# Patient Record
Sex: MALE | Race: WHITE | NOT HISPANIC OR LATINO | Employment: OTHER | URBAN - METROPOLITAN AREA
[De-identification: names, ages, dates, MRNs, and addresses within clinical notes are randomized per-mention and may not be internally consistent; named-entity substitution may affect disease eponyms.]

---

## 2017-01-13 ENCOUNTER — CARE COORDINATION (OUTPATIENT)
Dept: CARDIOLOGY | Facility: CLINIC | Age: 72
End: 2017-01-13

## 2017-01-13 DIAGNOSIS — I48.91 ATRIAL FIBRILLATION (H): ICD-10-CM

## 2017-01-13 DIAGNOSIS — I10 ESSENTIAL HYPERTENSION WITH GOAL BLOOD PRESSURE LESS THAN 140/90: Primary | ICD-10-CM

## 2017-01-13 RX ORDER — FLECAINIDE ACETATE 150 MG/1
150 TABLET ORAL 2 TIMES DAILY
Qty: 180 TABLET | Refills: 3 | Status: SHIPPED | OUTPATIENT
Start: 2017-01-13 | End: 2017-03-20

## 2017-01-13 NOTE — PROGRESS NOTES
Pt calling to request Rx refill of Flecainide. Last seen 12/2/16 by Dr. Castillo advised to follow up in 1 year. Will refill Flecainide 150 mg #90 tabs with 3 refills. JAZZ Campbell.

## 2017-03-06 ENCOUNTER — TELEPHONE (OUTPATIENT)
Dept: CARDIOLOGY | Facility: CLINIC | Age: 72
End: 2017-03-06

## 2017-03-06 DIAGNOSIS — I48.91 ATRIAL FIBRILLATION (H): Primary | ICD-10-CM

## 2017-03-06 NOTE — TELEPHONE ENCOUNTER
Pt called and stated that he was unable to  his Diltiazem and was unsure if this was because of our clinic or insurance. Call to pharmacy and was told it was pt insurance did not cover the 360 mg dose. Made pt aware who will call his insurance. Pt has changed his drug insurance to [employer]. Yanely

## 2017-03-08 RX ORDER — DILTIAZEM HYDROCHLORIDE 180 MG/1
360 CAPSULE, COATED, EXTENDED RELEASE ORAL DAILY
Qty: 180 CAPSULE | Refills: 3 | Status: SHIPPED | OUTPATIENT
Start: 2017-03-08 | End: 2017-03-20

## 2017-03-08 NOTE — TELEPHONE ENCOUNTER
Pt calling for status update on prior authorization for Diltiazem 360 mg as he has 1 tab left. Writer contacted prior authorization team they advised it could take up to a week. [facility] pharmacy contacted, spoke to [place] pharmacist, insurance will cover Diltiazem  mg take 2 tab daily, verbal order given. Med list updated. Pt advised and will  Rx today. JAZZ Campbell.

## 2017-03-15 NOTE — TELEPHONE ENCOUNTER
[facility] Prior Authorization Team   Phone: [phone]  Fax: [phone]    Prior Authorization Approval    Authorization Effective Date:    Authorization Expiration Date:    Medication: diltiazem (CARDIZEM CD; CARTIA XT) 360 MG 24 hr CD capsule  Approved Dose/Quantity: 180 PER 90 DAYS  Reference #:     Insurance Company: [employer] - Phone [phone] Fax [phone]  Expected CoPay: $14.93     CoPay Card Available:      Foundation Assistance Needed:    Which Pharmacy is filling the prescription (Not needed for infusion/clinic administered): [facility] [address] [place] [address] [address]  Pharmacy Notified: Yes  Patient Notified: No     PA not needed, patient picked up RX 03/09/2017 for a $14.93 co-pay.

## 2017-03-20 DIAGNOSIS — K30 INDIGESTION: ICD-10-CM

## 2017-03-20 DIAGNOSIS — I48.0 PAROXYSMAL ATRIAL FIBRILLATION (H): ICD-10-CM

## 2017-03-20 DIAGNOSIS — I10 ESSENTIAL HYPERTENSION WITH GOAL BLOOD PRESSURE LESS THAN 140/90: ICD-10-CM

## 2017-03-20 DIAGNOSIS — I48.91 ATRIAL FIBRILLATION (H): ICD-10-CM

## 2017-03-20 RX ORDER — FLECAINIDE ACETATE 150 MG/1
150 TABLET ORAL EVERY 12 HOURS
Qty: 180 TABLET | Refills: 2 | Status: SHIPPED | OUTPATIENT
Start: 2017-03-20 | End: 2017-09-25

## 2017-03-20 RX ORDER — DILTIAZEM HYDROCHLORIDE 180 MG/1
360 CAPSULE, COATED, EXTENDED RELEASE ORAL DAILY
Qty: 180 CAPSULE | Refills: 2 | Status: SHIPPED | OUTPATIENT
Start: 2017-03-20 | End: 2017-09-25

## 2017-03-20 NOTE — TELEPHONE ENCOUNTER
Pantoprazole Sod 40MG      Last Written Prescription Date: 5/18/16  Last Fill Quantity: 90,  # refills: 3   Last Office Visit with G, P or [facility] prescribing provider: 9/21/16

## 2017-03-23 RX ORDER — PANTOPRAZOLE SODIUM 40 MG/1
40 TABLET, DELAYED RELEASE ORAL DAILY
Qty: 90 TABLET | Refills: 3 | Status: SHIPPED | OUTPATIENT
Start: 2017-03-23 | End: 2017-09-25

## 2017-05-10 ENCOUNTER — TRANSFERRED RECORDS (OUTPATIENT)
Dept: HEALTH INFORMATION MANAGEMENT | Facility: CLINIC | Age: 72
End: 2017-05-10

## 2017-06-01 ENCOUNTER — HOSPITAL ENCOUNTER (EMERGENCY)
Facility: CLINIC | Age: 72
Discharge: HOME | End: 2017-06-01
Attending: EMERGENCY MEDICINE | Admitting: EMERGENCY MEDICINE
Payer: MEDICARE

## 2017-06-01 ENCOUNTER — APPOINTMENT (OUTPATIENT)
Dept: GENERAL RADIOLOGY | Facility: CLINIC | Age: 72
End: 2017-06-01
Attending: EMERGENCY MEDICINE
Payer: MEDICARE

## 2017-06-01 VITALS
TEMPERATURE: 97.7 F | HEART RATE: 73 BPM | DIASTOLIC BLOOD PRESSURE: 70 MMHG | BODY MASS INDEX: 42.66 KG/M2 | OXYGEN SATURATION: 95 % | HEIGHT: 72 IN | SYSTOLIC BLOOD PRESSURE: 151 MMHG | RESPIRATION RATE: 16 BRPM | WEIGHT: 315 LBS

## 2017-06-01 DIAGNOSIS — S49.92XA INJURY OF LEFT SHOULDER, INITIAL ENCOUNTER: ICD-10-CM
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
## 2017-06-01 PROCEDURE — 73030 X-RAY EXAM OF SHOULDER: CPT | Mod: LT

## 2017-06-01 PROCEDURE — 99283 EMERGENCY DEPT VISIT LOW MDM: CPT

## 2017-06-01 PROCEDURE — 25000132 ZZH RX MED GY IP 250 OP 250 PS 637: Performed by: EMERGENCY MEDICINE

## 2017-06-01 RX ORDER — GINSENG 100 MG
CAPSULE ORAL
Status: DISCONTINUED
Start: 2017-06-01 | End: 2017-06-01 | Stop reason: HOSPADM

## 2017-06-01 RX ORDER — OXYCODONE HYDROCHLORIDE 5 MG/1
10 TABLET ORAL ONCE
Status: COMPLETED | OUTPATIENT
Start: 2017-06-01 | End: 2017-06-01

## 2017-06-01 RX ORDER — OXYCODONE HYDROCHLORIDE 5 MG/1
TABLET ORAL
Qty: 20 TABLET | Refills: 0 | Status: SHIPPED | OUTPATIENT
Start: 2017-06-01 | End: 2017-06-05

## 2017-06-01 RX ADMIN — OXYCODONE HYDROCHLORIDE 10 MG: 5 TABLET ORAL at 15:25

## 2017-06-01 ASSESSMENT — ENCOUNTER SYMPTOMS
BLOOD IN STOOL: 0
ABDOMINAL PAIN: 0
HEADACHES: 0
COLOR CHANGE: 0
RECTAL PAIN: 0
WOUND: 1
DIARRHEA: 0
DIZZINESS: 0

## 2017-06-01 NOTE — ED NOTES
Pt educated on Oxycodone, pts wife can drive home, ice placed to affected shoulder pt updated on POC denies needs

## 2017-06-01 NOTE — ED AVS SNAPSHOT
[facility] Emergency Department    [address]    [facility] [address]    Phone:  [phone]    Fax:  [phone]                                       Hugo Weber   MRN: [id number]    Department:  [facility] Emergency Department   Date of Visit:  6/1/2017           Patient Information     Date Of Birth          1945        Your diagnoses for this visit were:     Injury of left shoulder, initial encounter     Fall down stairs, initial encounter        You were seen by Jennie Roberts MD.      Follow-up Information     Follow up with Brett Cassidy MD. Schedule an appointment as soon as possible for a visit in 2 days.    Specialty:  Family Practice    Contact information:    [facility]  [address] [place] [address]  [phone]          Discharge Instructions       You can use the prescribed medication as instructed for severe pain. If you use this medication, you should not drive or perform other activities that require full attention as this medication may make you drowsy. All opiate pain medications are potentially addictive and you should only use the minimum amount necessary to control your pain.    While taking any opiate pain medication (such as oxycodone, hydrocodone, hydromorphone, Vicodin, Percocet, etc) you can develop constipation. If you develop constipation, take over the counter Miralax daily according to package directions.      Shoulder Sprain  A sprain is a stretching or tearing of the ligaments that hold a joint together. A sprain may take up to 6 weeks to fully heal, depending on how severe it is. Moderate to severe shoulder sprains are treated with a sling or shoulder immobilizer. Minor sprains can be treated without any special support.  Home care  The following guidelines will help you care for your injury at home:    If a sling was given to you, leave it in place for the time advised by your health care  provider. If you aren t sure how long to wear it, ask for advice. If the sling becomes loose, adjust it so that your forearm is level with the ground. Your shoulder should feel well supported.    Put an ice pack on the injured area for 20 minutes every 1 to 2 hours the first day. You can make your own ice pack by putting ice cubes in a plastic bag. Wrap the bag in a thin towel. Continue with ice packs 3 to 4 times a day for the next 2 days. Then use the pack as needed to ease pain and swelling.    You may use acetaminophen or ibuprofen to control pain, unless another pain medicine was prescribed. If you have chronic liver or kidney disease, talk with your health care provider before using these medicines. Also talk with your provider if you ve had a stomach ulcer or GI bleeding.    Shoulder joints become stiff if left in a sling for too long. You should start range of motion exercises about 10 days after the injury. Talk with your provider to find out what type of exercises to do and how soon to start.  Follow-up care  Follow up with your health care provider as directed.  Any X-rays you had today don t show any broken bones, breaks, or fractures. Sometimes fractures don t show up on the first X-ray. Bruises and sprains can sometimes hurt as much as a fracture. These injuries can take time to heal completely. If your symptoms don t improve or they get worse, talk with your provider. You may need a repeat X-ray.  When to seek medical advice  Call your health care provider right away if any of the following occur:    Shoulder pain or swelling in your arm that gets worse    Fingers become cold, blue, numb, or tingly    Large amount of bruising of the shoulder or upper arm       2768-9498 [employer]. [address] [place] [address]. All rights reserved. This information is not intended as a substitute for professional medical care. Always follow your healthcare professional's  instructions.      Mechanical Fall  You have had a fall today. It appears that the cause is  mechanical . That means that you slipped, tripped or lost your balance. If your fall had been due to fainting or a seizure, further tests would be required.  Home Care:  1. Rest today and resume your normal activities when you are feeling back to normal.  2. If you were injured during the fall, follow the advice from your doctor regarding care of your injury.  3. You may use acetaminophen (Tylenol) or ibuprofen (Motrin, Advil) to control pain, unless another pain medicine was prescribed. [NOTE: If you have chronic liver or kidney disease or ever had a stomach ulcer or GI bleeding, talk with your doctor before using these medicines.]  Fall Prevention:    Was there anything that caused your fall that can be fixed, removed, or replaced?    Make your home safe by keeping walkways clear of objects you may trip over.    Use non-slip pads under rugs.    Do not walk in poorly lit areas.    Do not stand on chairs or wobbly ladders.    Use caution when reaching overhead or looking upward. This position can cause a loss of balance.    Be sure your shoes fit properly, have non-slip bottoms and are in good condition.    Be cautious when going up and down curbs, and walking on uneven sidewalks.    If your balance is poor, consider using a cane or walker.    Stay as active as you can. Balance, flexibility, strength, and endurance all come from exercise. They all play a role in preventing falls.  Follow Up  with your doctor or as advised by our staff.  Get Prompt Medical Attention  if any of the following occur:    Repeated mechanical falls, or unexplained falls    Dizziness, fainting or seizure    Severe headache    Chest pain or shortness of breath    Palpitations (very rapid or very slow or irregular heartbeat)    Blood in vomit, stools (black or red color)    Weakness of an arm or leg or one side of the face    Difficulty with speech or  vision    9609-7638 [employer]. [address] [place] [address], [place] [address]. All rights reserved. This information is not intended as a substitute for professional medical care. Always follow your healthcare professional's instructions.            24 Hour Appointment Hotline       To make an appointment at any [facility], call [phone] ([phone]). If you don't have a family doctor or clinic, we will help you find one. [facility] clinics are conveniently located to serve the needs of you and your family.             Review of your medicines      START taking        Dose / Directions Last dose taken    oxyCODONE 5 MG IR tablet   Commonly known as:  ROXICODONE   Quantity:  20 tablet        1-2 tabs po q6hrs prn pain   Refills:  0          Our records show that you are taking the medicines listed below. If these are incorrect, please call your family doctor or clinic.        Dose / Directions Last dose taken    azelastine 0.1 % spray   Commonly known as:  ASTELIN   Dose:  1-2 spray   Quantity:  3 Bottle        Spray 1-2 sprays into both nostrils 2 times daily   Refills:  3        buPROPion 100 MG tablet   Commonly known as:  WELLBUTRIN   Dose:  100 mg   Quantity:  60 tablet        Take 1 tablet (100 mg) by mouth 2 times daily   Refills:  3        diltiazem 180 MG 24 hr capsule   Commonly known as:  CARDIZEM CD   Dose:  360 mg   Quantity:  180 capsule        Take 2 capsules (360 mg) by mouth daily   Refills:  2        flecainide acetate 150 MG Tabs   Dose:  150 mg   Quantity:  180 tablet        Take 1 tablet (150 mg) by mouth every 12 hours   Refills:  2        fluticasone 50 MCG/ACT spray   Commonly known as:  FLONASE   Dose:  1-2 spray   Quantity:  48 g        Spray 1-2 sprays into both nostrils daily   Refills:  3        iron 325 (65 FE) MG tablet   Dose:  1 tablet   Quantity:  90 tablet        Take 1 tablet by mouth daily (with breakfast).   Refills:  3        naltrexone 50 MG tablet    Commonly known as:  DEPADE;REVIA   Quantity:  30 tablet        Take 1/2 Tablet daily then increase to maximum of 1 Full Tablet daily as tolerated.  Time it one to two hours prior to worst cravings   Refills:  3        pantoprazole 40 MG EC tablet   Commonly known as:  PROTONIX   Dose:  40 mg   Quantity:  90 tablet        Take 1 tablet (40 mg) by mouth daily   Refills:  3        rivaroxaban ANTICOAGULANT 20 MG Tabs tablet   Commonly known as:  XARELTO   Dose:  20 mg   Quantity:  90 tablet        Take 1 tablet (20 mg) by mouth daily (with dinner)   Refills:  2                Prescriptions were sent or printed at these locations (1 Prescription)                   Other Prescriptions                Printed at Department/Unit printer (1 of 1)         oxyCODONE (ROXICODONE) 5 MG IR tablet                Procedures and tests performed during your visit     Ice to affected area    Shoulder XR, G/E 3 views, left      Orders Needing Specimen Collection     None      Pending Results     No orders found from 5/30/2017 to 6/2/2017.            Pending Culture Results     No orders found from 5/30/2017 to 6/2/2017.            Pending Results Instructions     If you had any lab results that were not finalized at the time of your Discharge, you can call the ED Lab Result RN at [phone]. You will be contacted by this team for any positive Lab results or changes in treatment. The nurses are available 7 days a week from 10A to 6:30P.  You can leave a message 24 hours per day and they will return your call.        Test Results From Your Hospital Stay        6/1/2017  3:54 PM      Narrative     XR SHOULDER LT G/E 3 VW 6/1/2017 3:46 PM    COMPARISON: None.    HISTORY: Trauma, pain.        Impression     IMPRESSION: Moderate left glenohumeral osteoarthritis. No fracture or  dislocation is appreciated. No significant soft tissue swelling.    ADIA DUNN                Clinical Quality Measure: Blood Pressure Screening     Your blood  pressure was checked while you were in the emergency department today. The last reading we obtained was  BP: 151/70 . Please read the guidelines below about what these numbers mean and what you should do about them.  If your systolic blood pressure (the top number) is less than 120 and your diastolic blood pressure (the bottom number) is less than 80, then your blood pressure is normal. There is nothing more that you need to do about it.  If your systolic blood pressure (the top number) is 120-139 or your diastolic blood pressure (the bottom number) is 80-89, your blood pressure may be higher than it should be. You should have your blood pressure rechecked within a year by a primary care provider.  If your systolic blood pressure (the top number) is 140 or greater or your diastolic blood pressure (the bottom number) is 90 or greater, you may have high blood pressure. High blood pressure is treatable, but if left untreated over time it can put you at risk for heart attack, stroke, or kidney failure. You should have your blood pressure rechecked by a primary care provider within the next 4 weeks.  If your provider in the emergency department today gave you specific instructions to follow-up with your doctor or provider even sooner than that, you should follow that instruction and not wait for up to 4 weeks for your follow-up visit.        Thank you for choosing [facility]       Thank you for choosing [facility] for your care. Our goal is always to provide you with excellent care. Hearing back from our patients is one way we can continue to improve our services. Please take a few minutes to complete the written survey that you may receive in the mail after you visit with us. Thank you!        [employer] Information     [employer] gives you secure access to your electronic health record. If you see a primary care provider, you can also send messages to your care team and make appointments. If you have questions, please call your  primary care clinic.  If you do not have a primary care provider, please call [phone] and they will assist you.        Care EveryWhere ID     This is your Care EveryWhere ID. This could be used by other organizations to access your [facility] medical records  [id number]        After Visit Summary       This is your record. Keep this with you and show to your community pharmacist(s) and doctor(s) at your next visit.

## 2017-06-01 NOTE — ED AVS SNAPSHOT
[facility] Emergency Department    [address]    [facility] [address]    Phone:  [phone]    Fax:  [phone]                                       Hugo Weber   MRN: [id number]    Department:  [facility] Emergency Department   Date of Visit:  6/1/2017           After Visit Summary Signature Page     I have received my discharge instructions, and my questions have been answered. I have discussed any challenges I see with this plan with the nurse or doctor.    ..........................................................................................................................................  Patient/Patient Representative Signature      ..........................................................................................................................................  Patient Representative Print Name and Relationship to Patient    ..................................................               ................................................  Date                                            Time    ..........................................................................................................................................  Reviewed by Signature/Title    ...................................................              ..............................................  Date                                                            Time

## 2017-06-01 NOTE — DISCHARGE INSTRUCTIONS
You can use the prescribed medication as instructed for severe pain. If you use this medication, you should not drive or perform other activities that require full attention as this medication may make you drowsy. All opiate pain medications are potentially addictive and you should only use the minimum amount necessary to control your pain.    While taking any opiate pain medication (such as oxycodone, hydrocodone, hydromorphone, Vicodin, Percocet, etc) you can develop constipation. If you develop constipation, take over the counter Miralax daily according to package directions.      Shoulder Sprain  A sprain is a stretching or tearing of the ligaments that hold a joint together. A sprain may take up to 6 weeks to fully heal, depending on how severe it is. Moderate to severe shoulder sprains are treated with a sling or shoulder immobilizer. Minor sprains can be treated without any special support.  Home care  The following guidelines will help you care for your injury at home:    If a sling was given to you, leave it in place for the time advised by your health care provider. If you aren t sure how long to wear it, ask for advice. If the sling becomes loose, adjust it so that your forearm is level with the ground. Your shoulder should feel well supported.    Put an ice pack on the injured area for 20 minutes every 1 to 2 hours the first day. You can make your own ice pack by putting ice cubes in a plastic bag. Wrap the bag in a thin towel. Continue with ice packs 3 to 4 times a day for the next 2 days. Then use the pack as needed to ease pain and swelling.    You may use acetaminophen or ibuprofen to control pain, unless another pain medicine was prescribed. If you have chronic liver or kidney disease, talk with your health care provider before using these medicines. Also talk with your provider if you ve had a stomach ulcer or GI bleeding.    Shoulder joints become stiff if left in a sling for too long. You should  start range of motion exercises about 10 days after the injury. Talk with your provider to find out what type of exercises to do and how soon to start.  Follow-up care  Follow up with your health care provider as directed.  Any X-rays you had today don t show any broken bones, breaks, or fractures. Sometimes fractures don t show up on the first X-ray. Bruises and sprains can sometimes hurt as much as a fracture. These injuries can take time to heal completely. If your symptoms don t improve or they get worse, talk with your provider. You may need a repeat X-ray.  When to seek medical advice  Call your health care provider right away if any of the following occur:    Shoulder pain or swelling in your arm that gets worse    Fingers become cold, blue, numb, or tingly    Large amount of bruising of the shoulder or upper arm       2527-3248 [employer]. [address] [place] [address]. All rights reserved. This information is not intended as a substitute for professional medical care. Always follow your healthcare professional's instructions.      Mechanical Fall  You have had a fall today. It appears that the cause is  mechanical . That means that you slipped, tripped or lost your balance. If your fall had been due to fainting or a seizure, further tests would be required.  Home Care:  1. Rest today and resume your normal activities when you are feeling back to normal.  2. If you were injured during the fall, follow the advice from your doctor regarding care of your injury.  3. You may use acetaminophen (Tylenol) or ibuprofen (Motrin, Advil) to control pain, unless another pain medicine was prescribed. [NOTE: If you have chronic liver or kidney disease or ever had a stomach ulcer or GI bleeding, talk with your doctor before using these medicines.]  Fall Prevention:    Was there anything that caused your fall that can be fixed, removed, or replaced?    Make your home safe by keeping walkways clear  of objects you may trip over.    Use non-slip pads under rugs.    Do not walk in poorly lit areas.    Do not stand on chairs or wobbly ladders.    Use caution when reaching overhead or looking upward. This position can cause a loss of balance.    Be sure your shoes fit properly, have non-slip bottoms and are in good condition.    Be cautious when going up and down curbs, and walking on uneven sidewalks.    If your balance is poor, consider using a cane or walker.    Stay as active as you can. Balance, flexibility, strength, and endurance all come from exercise. They all play a role in preventing falls.  Follow Up  with your doctor or as advised by our staff.  Get Prompt Medical Attention  if any of the following occur:    Repeated mechanical falls, or unexplained falls    Dizziness, fainting or seizure    Severe headache    Chest pain or shortness of breath    Palpitations (very rapid or very slow or irregular heartbeat)    Blood in vomit, stools (black or red color)    Weakness of an arm or leg or one side of the face    Difficulty with speech or vision    4701-8012 [employer]. [address] [place] [address]. All rights reserved. This information is not intended as a substitute for professional medical care. Always follow your healthcare professional's instructions.

## 2017-06-01 NOTE — ED PROVIDER NOTES
History     Chief Complaint:  Shoulder Injury    HPI   Hugo Weber is a right hand dominant 72 year old male with a history of atrial fibrillation on xarelto who presents to the emergency department for evaluation of a left shoulder injury.  He states that he stumbled down 4 steps today around noon.  He describes his fall as landing slightly on his left shoulder, and his pain does not radiate down his arm.  His shoulder pain is worse with movement.  He also notes abrasions to his bilateral knees and left elbow but denies any tenderness or bruises anywhere else.  The patient adamantly denies hitting his head in any fashion, describes landing sitting up on his L arm.  He took tylenol for the pain without improvement.  He denies any black/bloody stools, neck pain, back pain, abdominal pain, dizziness, or numbness. He had no symptoms preceding the fall including the above, or any chest pain, dyspnea, or palpitations.    Allergies:  No known drug allergies.     Medications:    Protonix  Cardizem  Xarelto  Flecainide Acetate  Wellbutrin  Naltrexone  Flonase  Azelastine  Iron     Past Medical History:    Atrial fibrillation   Calculus of kidney   Arthritis   Cholelithiasis   Chronic peptic ulcer  Hepatitis C   Hyperlipidemia  Hypertension   Iron deficiency   Obesity  SCC    Past Surgical History:    Left total knee arthroplasty   Appendectomy   Eye surgery   Right knee arthroscopy   Laser Holmium lithotripsy, insert stent x 4  Gastric bypass  Lysite teeth removed  Left ear chonchal lesion removal   Right forearm spurs    Family History:    Alzheimer Disease-Father  CHF-Mother  Prostate Cancer-Father    Social History:  Marital Status:   Presents to the ED with his wife  Tobacco Use: Never Used  Alcohol Use: Yes, 2 times per week  PCP: Brett Cassidy      Review of Systems   Gastrointestinal: Negative for abdominal pain, blood in stool, diarrhea and rectal pain.   Musculoskeletal:        Positive for left  shoulder pain.    Skin: Positive for wound (abrasions). Negative for color change.   Neurological: Negative for dizziness, syncope and headaches.   All other systems reviewed and are negative.      Physical Exam   First Vitals:  BP: 151/70  Pulse: 73  Heart Rate: 73  Temp: 97.7  F (36.5  C)  Resp: 16  Height: 182.9 cm (6')  Weight: (!) 154.2 kg (340 lb)  SpO2: 95 %      Physical Exam  VITAL SIGNS: /70  Pulse 73  Temp 97.7  F (36.5  C) (Oral)  Resp 16  Ht 1.829 m (6')  Wt (!) 154.2 kg (340 lb)  SpO2 95%  BMI 46.11 kg/m2  Constitutional:  Well developed, well nourished, comfortable appearing, GCS = 15   Eyes:  PERRL, Grossly normal extraocular movements  HENT:  No contusions, abrasions or other gross deformities noted over the scalp or face.  Respiratory:  No respiratory distress, normal breath sounds, no wheezing.   Cardiovascular:  RRR, no murmur.    GI:  Abdomen is nondistended, soft, nontender to palpation  Musculoskeletal:  Shoulders grossly normal appearing bilaterally. Left shoulder has limited range of motion secondary to pain, mild tenderness over left humerus. No tenderness to the left clavicle, AC joint, elbow, wrist or hand.  Nerve distally intact. Minimal left chest wall tenderness, no contusions or abrasions seen. Bilateral knees: no focal tenderness, no joint laxity, full range of motion without pain.  Otherwise, no gross deformities of  bilateral UE or LE noted. C-spine: denies neck pain. Ranging neck spontaneously. T-spine and L-spine are without midline ttp, stepoff, contusion or abrasion.  Integument:  Abrasions over left elbow and bilateral knees  Neurologic: Symmetric face. Alert & oriented x 3, CN 2-12 normal, normal motor function, normal sensory function, no focal deficits noted   Psychiatric:  Normal affect.     Emergency Department Course   Imaging:  Shoulder x-ray, left, 3 views:   Moderate left glenohumeral osteoarthritis. No fracture or  dislocation is appreciated. No  significant soft tissue swelling.  Report per radiology.   Radiographic findings were communicated with the patient who voiced understanding of the findings.    Interventions:   (1525) Oxycodone, 10 mg, PO    Emergency Department Course:  Nursing notes and vitals reviewed.  I performed an exam of the patient as documented above.     The patient was sent for a shoulder x-ray while in the emergency department, findings above.      1622 I reevaluated the patient and provided an update in regards to his ED course.      1627 I revisited the patient for further information on his current medications.    Findings and plan explained to the patient. Patient discharged home with instructions regarding supportive care, medications, and reasons to return. The importance of close follow-up was reviewed. The patient was prescribed Roxicodone.      Impression & Plan    Medical Decision Making:  Hugo Weber is a 72 year old male who presents for evaluation of left shoulder pain after a mechanical fall down 4 stairs.  Exam shows no glenohumeral dislocation and the extremity is otherwise cleared clinically.  Head to toe trauma exam is ok, no signs of serious head, neck, chest, abd, or spinal injuries.  Follow up with primary.      Hugo Weber is a 72 year old male who presents for evaluation of a fall.  This was clearly a mechanical fall, not syncope.  There were no prodromal symptoms so I doubt stroke, cardiac arrhythmia or other serious etiology.  Detailed exam shows an isolated L shoulder injury and knee abrasions.  Based on this I did not do basic blood work and urinalysis.  Results as above.  I had the tech ambulate the patient and he did well.  Based on this I would send home for outpatient management.  I would not do workup for syncope, stroke, ACS, PE at this point.  I doubt serious underlying fractures, intracerebral issues, spinal fractures.  Did caution pt and his wife about return for any worsening symptoms,  arm sling with early ROM exercises, close f/u, rare possibility of delayed sx of head injury. THey agree to return if worse and agree with plan.     Diagnosis:    ICD-10-CM    1. Injury of left shoulder, initial encounter S49.92XA    2. Fall down stairs, initial encounter W10.8XXA        Disposition:  discharged to home    Discharge Medications:  Discharge Medication List as of 6/1/2017  5:00 PM      START taking these medications    Details   oxyCODONE (ROXICODONE) 5 MG IR tablet 1-2 tabs po q6hrs prn pain, Disp-20 tablet, R-0, Local Print             I, Alexis Solorzano, am serving as a scribe on 6/1/2017 at 2:52 PM to personally document services performed by Dr. Roberts based on my observations and the provider's statements to me.      6/1/2017   [facility] EMERGENCY DEPARTMENT  .     Jennie Roberts MD  06/01/17 1314

## 2017-06-01 NOTE — ED NOTES
Pt c/o left shoulder pain after falling on a few stairs, denies hitting head. No LOC. Pt alert, oriented x3. ABCs intact. Pt is on xeralto

## 2017-06-05 ENCOUNTER — OFFICE VISIT (OUTPATIENT)
Dept: FAMILY MEDICINE | Facility: CLINIC | Age: 72
End: 2017-06-05
Payer: COMMERCIAL

## 2017-06-05 VITALS
TEMPERATURE: 97.7 F | SYSTOLIC BLOOD PRESSURE: 146 MMHG | OXYGEN SATURATION: 93 % | HEART RATE: 71 BPM | DIASTOLIC BLOOD PRESSURE: 58 MMHG | HEIGHT: 72 IN | WEIGHT: 315 LBS | BODY MASS INDEX: 42.66 KG/M2

## 2017-06-05 DIAGNOSIS — R73.03 PREDIABETES: ICD-10-CM

## 2017-06-05 DIAGNOSIS — Z51.81 MEDICATION MONITORING ENCOUNTER: ICD-10-CM

## 2017-06-05 DIAGNOSIS — S80.211A ABRASION OF BOTH KNEES: ICD-10-CM

## 2017-06-05 DIAGNOSIS — I10 ESSENTIAL HYPERTENSION WITH GOAL BLOOD PRESSURE LESS THAN 140/90: ICD-10-CM

## 2017-06-05 DIAGNOSIS — I48.0 PAROXYSMAL ATRIAL FIBRILLATION (H): ICD-10-CM

## 2017-06-05 DIAGNOSIS — Z79.01 LONG TERM CURRENT USE OF ANTICOAGULANT THERAPY: ICD-10-CM

## 2017-06-05 DIAGNOSIS — S50.02XA LEFT ELBOW CONTUSION: ICD-10-CM
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
## 2017-06-05 DIAGNOSIS — S80.212A ABRASION OF BOTH KNEES: ICD-10-CM

## 2017-06-05 DIAGNOSIS — E66.01 MORBID OBESITY DUE TO EXCESS CALORIES (H): ICD-10-CM

## 2017-06-05 DIAGNOSIS — M25.512 ACUTE PAIN OF LEFT SHOULDER: ICD-10-CM

## 2017-06-05 DIAGNOSIS — S70.02XD: ICD-10-CM

## 2017-06-05 PROCEDURE — 99214 OFFICE O/P EST MOD 30 MIN: CPT | Performed by: FAMILY MEDICINE

## 2017-06-05 RX ORDER — OXYCODONE HYDROCHLORIDE 5 MG/1
TABLET ORAL
Qty: 20 TABLET | Refills: 0 | Status: SHIPPED | OUTPATIENT
Start: 2017-06-05 | End: 2017-09-25

## 2017-06-05 NOTE — PATIENT INSTRUCTIONS
Refilled oxycodone.     Advised weight loss.     Antibiotic ointment for knee abrasions.     Physical Therapy referral.     Elevate legs to reduce swelling.     Heat or ice - whichever feels better.     Take oxycodone 30-60 minutes before bed to help with sleep.     Take oxycodone 30-60 minutes before PT.    No advil, alleve, or aspirin.    Tylenol is OK -- 2 extra strength every 8 hours.     [facility]                        To reach your care team during and after hours:   [phone]  To reach our pharmacy:        [phone]    Clinic Hours                        Our clinic hours are:    Monday   7:30 am to 7:00 pm                  Tuesday through Friday 7:30 am to 5:00 pm                             Saturday   8:00 am to 12:00 pm      Sunday   Closed      Pharmacy Hours                        Our pharmacy hours are:    Monday   8:30 am to 7:00 pm       Tuesday to Friday  8:30 am to 6:00 pm                       Saturday    9:00 am to 1:00 pm              Sunday    Closed              There is also information available at our web site:  [email or web address]    If your provider ordered any lab tests and you do not receive the results within 10 business days, please call the clinic.    If you need a medication refill please contact your pharmacy.  Please allow 2-3 business days for your refill to be completed.    Our clinic offers telephone visits and e visits.  Please ask one of your team members to explain more.      Use [employer] (secure email communication and access to your chart) to send your primary care provider a message or make an appointment. Ask someone on your Team how to sign up for [employer].  Immunizations                      Immunization History   Administered Date(s) Administered     Influenza (High Dose) 3 valent vaccine 09/20/2012, 12/21/2013, 10/20/2015     Influenza (IIV3) 10/26/2007, 12/22/2008, 09/17/2009, 10/14/2011     Pneumococcal 23 valent 05/11/2005, 09/20/2012     TD  (ADULT, 7+) 11/30/1998     TDAP Vaccine (Adacel) 06/26/2007     TDAP Vaccine (Boostrix) 10/20/2015     Twinrix A/B 05/11/2005, 06/26/2007, 09/04/2008        Health Maintenance                         Health Maintenance Due   Topic Date Due     Pneumococcal Vaccine (2 of 2 - PCV13) 09/20/2013     Cholesterol Lab - yearly  03/10/2017     Microalbumin Lab - yearly  03/10/2017     Prostate Test (PSA) - yearly  03/10/2017     Wellness Visit with your Primary Provider - yearly  03/10/2017     FALL RISK ASSESSMENT  05/27/2017

## 2017-06-05 NOTE — PROGRESS NOTES
SUBJECTIVE:                                                    Hugo Weber is a 72 year old male who presents to clinic today for the following health issues:    Hugo reports that he had fallen down 4 steps outside of a rental home on 6/1/17 and fell on his left side. Hematoma of left hip, edema of left elbow, bilateral knee abrasions, and left should pain with decreased range of motion. Hugo notes left abdominal pain and lower extremity edema bilaterally. Did not get knocked out or hit his head. Hugo reports pain improvement with oxycodone - initially took 2 tablets every 6 hours, then switched to 1 tablet every 4 hours to keep up with the pain. Notes decreased sleep due to pain - only a couple of hours each night.     Hugo denies right sided pain.     Hugo begins baseball camp next week.     See [facility] ER notes    Hypertension/A-fib --    BP Readings from Last 3 Encounters:   06/05/17 146/58   06/01/17 151/70   12/02/16 138/68     Hyperlipidemia --    Recent Labs   Lab Test  03/10/16   0815  01/13/15   0815  09/03/14   0810   CHOL  166  131  156   HDL  38*  43  42   LDL  105*  74  93   TRIG  117  72  103   CHOLHDLRATIO   --   3.0  3.7     Prediabetes --    Lab Results   Component Value Date    A1C 5.8 03/30/2016    A1C 5.6 12/18/2013     Glucose   Date Value Ref Range Status   09/19/2016 132 (H) 70 - 99 mg/dL Final     ED/ Followup:    Facility:  [facility]  Date of visit: 6/1/17  Reason for visit: Left shoulder injury -   Current Status: bilateral knee pain- fluid in ankles - hip pain- left abdomen soreness - and shoulder still sore     61/17 -- [facility] ED    Medical Decision Making:  Hugo Weber is a 72 year old male who presents for evaluation of left shoulder pain after a mechanical fall down 4 stairs.  Exam shows no glenohumeral dislocation and the extremity is otherwise cleared clinically.  Head to toe trauma exam is ok, no signs of serious head, neck, chest, abd, or spinal injuries.   Follow up with primary.       Hugo Weber is a 72 year old male who presents for evaluation of a fall.  This was clearly a mechanical fall, not syncope.  There were no prodromal symptoms so I doubt stroke, cardiac arrhythmia or other serious etiology.  Detailed exam shows an isolated L shoulder injury and knee abrasions.  Based on this I did not do basic blood work and urinalysis.  Results as above.  I had the tech ambulate the patient and he did well.  Based on this I would send home for outpatient management.  I would not do workup for syncope, stroke, ACS, PE at this point.  I doubt serious underlying fractures, intracerebral issues, spinal fractures.  Did caution pt and his wife about return for any worsening symptoms, arm sling with early ROM exercises, close f/u, rare possibility of delayed sx of head injury. THey agree to return if worse and agree with plan.     Imaging:  Shoulder x-ray, left, 3 views:   Moderate left glenohumeral osteoarthritis. No fracture or  dislocation is appreciated. No significant soft tissue swelling.  Report per radiology.   Radiographic findings were communicated with the patient who voiced understanding of the findings.    Interventions:   (1525) Oxycodone, 10 mg, PO    Problem list and histories reviewed & adjusted, as indicated.  Additional history: as documented    Reviewed and updated as needed this visit by clinical staff  Tobacco  Allergies  Meds  Med Hx  Surg Hx  Fam Hx  Soc Hx      Reviewed and updated as needed this visit by Provider       BP Readings from Last 3 Encounters:   06/05/17 146/58   06/01/17 151/70   12/02/16 138/68       Wt Readings from Last 4 Encounters:   06/05/17 (!) 351 lb (159.2 kg)   06/01/17 (!) 340 lb (154.2 kg)   12/02/16 (!) 347 lb (157.4 kg)   09/21/16 (!) 343 lb (155.6 kg)       Health Maintenance    Health Maintenance Due   Topic Date Due     PNEUMOCOCCAL (2 of 2 - PCV13) 09/20/2013     LIPID MONITORING Q1 YEAR  03/10/2017      MICROALBUMIN Q1 YEAR  03/10/2017     PSA Q1 YR  03/10/2017     WELLNESS VISIT Q1 YR  03/10/2017     FALL RISK ASSESSMENT  05/27/2017       Current Problem List    Patient Active Problem List   Diagnosis     Iron deficiency anemia     Colon polyps     Obstructive sleep apnea syndrome     Hyperlipidemia LDL goal <100     Essential hypertension with goal blood pressure less than 140/90     Long term current use of anticoagulant therapy - for intermittent a-fib     Advanced directives, counseling/discussion     Total knee replacement status     Calculus of kidney     NAFLD (nonalcoholic fatty liver disease)     Morbid obesity due to excess calories (H)     History of hepatitis C     Prediabetes     Paroxysmal atrial fibrillation (H)     Cholelithiasis       Past Medical History    Past Medical History:   Diagnosis Date     Arrhythmia      Arthritis      Atrial fibrillation 2006    Followed by [facility]     Calculus of kidney 2005, 6/06, 10/12    dr walker/nestor - hematuria - w/u o/w neg     Cholelithiasis      Chronic peptic ulcer, unspecified site, without mention of hemorrhage, perforation, or obstruction 1985    gastric bypass     Colon polyps 8/05, 9/10, 10/15    2 tubular adenoma, 1 hyperplastic - multiple serrated/tubular adenomas     Hepatitis C 10/12    chronic hepatitis C, grade 1-2, stage 1 - mild - Dr Douglas     Hyperlipidemia LDL goal <130      Hypertension goal BP (blood pressure) < 140/90 6/06    dr jaciel winchester     Iron deficiency anemia, unspecified 1985    gastric bypass     Obesity, unspecified     s/p gastric bypass 1985      Prediabetes      Presbyacusis 1/04    dr corona     Pyelonephritis, unspecified 12/99     SCC (squamous cell carcinoma), ear 10/08    dr saez - lt conchal bowl     Sleep apnea 10/02    cpap - severe - 15 cm - dr cunha       Past Surgical History    Past Surgical History:   Procedure Laterality Date     ARTHROPLASTY KNEE  8/13/2012    LT TKA - Dr Villanueva     C APPENDECTOMY        CARDIOVERSION       COLONOSCOPY  8/05, 9/10, 10/15    multiple tubular/serrated/hyperplastic polyps     COLONOSCOPY N/A 10/29/2015    multiple tubular and serrated adenomas     COLONOSCOPY N/A 9/7/2016    Procedure: COLONOSCOPY;  Surgeon: Casey Bundy MD;  Location: [facility] OR     EYE SURGERY       HC KNEE SCOPE, DIAGNOSTIC  05/00    Arthroscopy, Knee RT     LASER HOLMIUM LITHOTRIPSY URETER(S), INSERT STENT, COMBINED  10/16/2012    stones x 4, Dr Campa     SURGICAL HISTORY OF -   1985    s/p gastric bypass Bilroth II     SURGICAL HISTORY OF -   11/98    wisdom teeth     SURGICAL HISTORY OF -   1979    cellulitis     SURGICAL HISTORY OF - 11/98    lt forearm spur's     SURGICAL HISTORY OF -   1/01,6/02,11/02    s/p lasik     SURGICAL HISTORY OF -   11/99    rt forearm spurs     SURGICAL HISTORY OF -   7/06    dr stevenson - lithotrypsy     SURGICAL HISTORY OF -   10/08, 12/08    Lt ear chonchal lesion removal SCC - dr saez       Current Medications    Current Outpatient Prescriptions   Medication Sig Dispense Refill     oxyCODONE (ROXICODONE) 5 MG IR tablet 1-2 tabs po q6hrs prn pain 20 tablet 0     pantoprazole (PROTONIX) 40 MG EC tablet Take 1 tablet (40 mg) by mouth daily 90 tablet 3     diltiazem (CARDIZEM CD) 180 MG 24 hr capsule Take 2 capsules (360 mg) by mouth daily 180 capsule 2     rivaroxaban ANTICOAGULANT (XARELTO) 20 MG TABS tablet Take 1 tablet (20 mg) by mouth daily (with dinner) 90 tablet 2     flecainide acetate 150 MG TABS Take 1 tablet (150 mg) by mouth every 12 hours 180 tablet 2     buPROPion (WELLBUTRIN) 100 MG tablet Take 1 tablet (100 mg) by mouth 2 times daily 60 tablet 3     fluticasone (FLONASE) 50 MCG/ACT nasal spray Spray 1-2 sprays into both nostrils daily 48 g 3     azelastine (ASTELIN) 0.1 % nasal spray Spray 1-2 sprays into both nostrils 2 times daily 3 Bottle 3     Ferrous Sulfate (IRON) 325 (65 FE) MG tablet Take 1 tablet by mouth daily (with breakfast). 90 tablet 3  "      Allergies    No Known Allergies    Immunizations    Immunization History   Administered Date(s) Administered     Influenza (High Dose) 3 valent vaccine 2012, 2013, 10/20/2015     Influenza (IIV3) 10/26/2007, 2008, 2009, 10/14/2011     Pneumococcal 23 valent 2005, 2012     TD (ADULT, 7+) 1998     TDAP Vaccine (Adacel) 2007     TDAP Vaccine (Boostrix) 10/20/2015     Twinrix A/B 2005, 2007, 2008       Family History    Family History   Problem Relation Age of Onset     Alzheimer Disease Father 82      at 88     Prostate Cancer Father 76     bladder and prostate     HEART DISEASE Mother 80     CHF     HEART DISEASE Maternal Grandfather      MI at 55     CANCER Paternal Uncle      ?       Social History    Social History     Social History     Marital status:      Spouse name: Mayra     Number of children: 3     Years of education: 21     Occupational History     retired teacher and football and   [employer]      Retired     Social History Main Topics     Smoking status: Never Smoker     Smokeless tobacco: Never Used     Alcohol use 2.4 - 3.6 oz/week      Comment: 2 per week     Drug use: No      Comment: acknowledges using herbal supplement \"Vibe\" for energy-none used recently      Sexual activity: Yes     Partners: Female     Birth control/ protection: None      Comment:       Other Topics Concern     Caffeine Concern Yes     <1 can qd     Sleep Concern Yes     sleep apnea, wears cpap     Exercise No     Seat Belt Yes     Parent/Sibling W/ Cabg, Mi Or Angioplasty Before 65f 55m? No     Social History Narrative       All above reviewed and updated, all stable unless otherwise noted    Recent labs reviewed    ROS:  10 point ROS of systems including Constitutional, Eyes, Respiratory, Cardiovascular, Gastroenterology, Genitourinary, Integumentary, Muscularskeletal, Psychiatric were all negative except " for pertinent positives noted in my HPI.    OBJECTIVE:                                                    /58  Pulse 71  Temp 97.7  F (36.5  C) (Oral)  Ht 6' (1.829 m)  Wt (!) 351 lb (159.2 kg)  SpO2 93%  BMI 47.6 kg/m2  Body mass index is 47.6 kg/(m^2).  GENERAL: healthy, alert and no distress, morbidly obese   EYES: Eyes grossly normal to inspection, extraocular movements - intact, and PERRL  HENT: ear canals- normal; TMs- normal; Nose- normal; Mouth- no ulcers, no lesions  NECK: no tenderness, no adenopathy, no asymmetry, no masses, no stiffness; thyroid- normal to palpation  RESP: lungs clear to auscultation - no rales, no rhonchi, no wheezes  CV: regular rates and rhythm, normal S1 S2, no S3 or S4 and no murmur, no click or rub -  ABDOMEN: soft, no tenderness, no  hepatosplenomegaly, no masses, normal bowel sounds  MS: extremities- no gross deformities noted, edema of lower extremities bilaterally and left elbow - mildly decreased left shoulder, slow deliberate motion  SKIN: bilateral knee abrasions, abrasions of left elbow, hematoma/eccymosis of left hip otherwise no suspicious lesions, no rashes  NEURO: mentation intact and speech normal  BACK: no CVA tenderness, no paralumbar tenderness  PSYCH: Alert and oriented times 3; speech- coherent , normal rate and volume; able to articulate logical thoughts, able to abstract reason, no tangential thoughts, no hallucinations or delusions, affect- normal    DIAGNOSTICS/PROCEDURES:                                                      No results found for this or any previous visit (from the past 24 hour(s)).     ASSESSMENT/PLAN:                                                        ICD-10-CM    1. Fall, subsequent encounter W19.XXXD oxyCODONE (ROXICODONE) 5 MG IR tablet     PHYSICAL THERAPY REFERRAL   2. Acute pain of left shoulder M25.512 oxyCODONE (ROXICODONE) 5 MG IR tablet     PHYSICAL THERAPY REFERRAL   3. Abrasion of both knees S80.211A oxyCODONE  (ROXICODONE) 5 MG IR tablet    S80.212A    4. Left elbow contusion S50.02XA oxyCODONE (ROXICODONE) 5 MG IR tablet     PHYSICAL THERAPY REFERRAL   5. Traumatic ecchymosis of left hip, subsequent encounter S70.02XD oxyCODONE (ROXICODONE) 5 MG IR tablet   6. Paroxysmal atrial fibrillation (H) I48.0    7. Prediabetes R73.03    8. Essential hypertension with goal blood pressure less than 140/90 I10    9. Long term current use of anticoagulant therapy - for intermittent a-fib Z79.01    10. Morbid obesity due to excess calories (H) E66.01    11. Medication monitoring encounter Z51.81        Discussed treatment/modality options, including risk and benefits, he desires further health care maintenance, heat/ice, medication refill(oxycodone), OTC meds(tylenol), physical therapy, referral(PT), weight loss information and observation. All diagnosis above reviewed and noted above, otherwise stable.  See [facility] orders for further details.  Follow up as needed - 1-2 weeks prn    Refilled oxycodone.    Advised weight loss, following with  Weight clinic    OTC antibiotic ointment for knee abrasions.     PT referral.     Elevate legs to reduce swelling.     Heat or ice - whichever feels better.     Take oxycodone 30-60 minutes before bed to help with sleep.     Take oxycodone 30-60 minutes before PT.    No advil, alleve, or aspirin.    Tylenol is OK -- 2 extra strength every 8 hours.     Follow up with weight loss clinic.    Health Maintenance Due   Topic Date Due     PNEUMOCOCCAL (2 of 2 - PCV13) 09/20/2013     LIPID MONITORING Q1 YEAR  03/10/2017     MICROALBUMIN Q1 YEAR  03/10/2017     PSA Q1 YR  03/10/2017     WELLNESS VISIT Q1 YR  03/10/2017     FALL RISK ASSESSMENT  05/27/2017     See Patient Instructions    This document serves as a record of the services and decisions personally performed and made by Brett Cassidy MD [facility]. It was created on their behalf by Deirdre Bella, a trained medical scribe. The creation of this  document is based the provider's statements to the medical scribe. Deirdre Bella June 5, 2017 11:04 AM              Brett Cassidy MD [address]  [phone] Fax

## 2017-06-05 NOTE — NURSING NOTE
Chief Complaint   Patient presents with     ER F/U       Initial /58  Pulse 71  Temp 97.7  F (36.5  C) (Oral)  Ht 6' (1.829 m)  Wt (!) 351 lb (159.2 kg)  SpO2 93%  BMI 47.6 kg/m2 Estimated body mass index is 47.6 kg/(m^2) as calculated from the following:    Height as of this encounter: 6' (1.829 m).    Weight as of this encounter: 351 lb (159.2 kg)..  BP completed using cuff size: heather Bowling MA

## 2017-06-05 NOTE — MR AVS SNAPSHOT
After Visit Summary   6/5/2017    Hugo Weber    MRN: [id number]           Patient Information     Date Of Birth          1945        Visit Information        Provider Department      6/5/2017 10:20 AM Brett Cassidy MD [facility]        Today's Diagnoses     Fall, subsequent encounter    -  1    Acute pain of left shoulder        Abrasion of both knees        Left elbow contusion        Traumatic ecchymosis of left hip, subsequent encounter        Paroxysmal atrial fibrillation (H)        Prediabetes        Essential hypertension with goal blood pressure less than 140/90        Long term current use of anticoagulant therapy - for intermittent a-fib        Morbid obesity due to excess calories (H)        Medication monitoring encounter          Care Instructions    Refilled oxycodone.     Advised weight loss.     Antibiotic ointment for knee abrasions.     Physical Therapy referral.     Elevate legs to reduce swelling.     Heat or ice - whichever feels better.     Take oxycodone 30-60 minutes before bed to help with sleep.     Take oxycodone 30-60 minutes before PT.    No advil, alleve, or aspirin.    Tylenol is OK -- 2 extra strength every 8 hours.     [facility]                        To reach your care team during and after hours:   [phone]  To reach our pharmacy:        [phone]    Clinic Hours                        Our clinic hours are:    Monday   7:30 am to 7:00 pm                  Tuesday through Friday 7:30 am to 5:00 pm                             Saturday   8:00 am to 12:00 pm      Sunday   Closed      Pharmacy Hours                        Our pharmacy hours are:    Monday   8:30 am to 7:00 pm       Tuesday to Friday  8:30 am to 6:00 pm                       Saturday    9:00 am to 1:00 pm              Sunday    Closed              There is also information available at our web site:  [email or web address]    If your provider ordered any lab  tests and you do not receive the results within 10 business days, please call the clinic.    If you need a medication refill please contact your pharmacy.  Please allow 2-3 business days for your refill to be completed.    Our clinic offers telephone visits and e visits.  Please ask one of your team members to explain more.      Use [employer] [employer] (secure email communication and access to your chart) to send your primary care provider a message or make an appointment. Ask someone on your Team how to sign up for [employer] [employer].  Immunizations                      Immunization History   Administered Date(s) Administered     Influenza (High Dose) 3 valent vaccine 09/20/2012, 12/21/2013, 10/20/2015     Influenza (IIV3) 10/26/2007, 12/22/2008, 09/17/2009, 10/14/2011     Pneumococcal 23 valent 05/11/2005, 09/20/2012     TD (ADULT, 7+) 11/30/1998     TDAP Vaccine (Adacel) 06/26/2007     TDAP Vaccine (Boostrix) 10/20/2015     Twinrix A/B 05/11/2005, 06/26/2007, 09/04/2008        Health Maintenance                         Health Maintenance Due   Topic Date Due     Pneumococcal Vaccine (2 of 2 - PCV13) 09/20/2013     Cholesterol Lab - yearly  03/10/2017     Microalbumin Lab - yearly  03/10/2017     Prostate Test (PSA) - yearly  03/10/2017     Wellness Visit with your Primary Provider - yearly  03/10/2017     FALL RISK ASSESSMENT  05/27/2017               Follow-ups after your visit        Additional Services     PHYSICAL THERAPY REFERRAL       *This therapy referral will be filtered to a centralized scheduling office at [facility] and the patient will receive a call to schedule an appointment at a [place] location most convenient for them. *     [facility] provides Physical Therapy evaluation and treatment and many specialty services across the [place] system.  If requesting a specialty program, please choose from the list below.    If you have not heard from the scheduling office within 2  "business days, please call [phone] for all locations, with the exception of [place], please call [phone].  Treatment: Evaluation & Treatment  Special Instructions/Modalities: none  Special Programs: None    Please be aware that coverage of these services is subject to the terms and limitations of your health insurance plan.  Call member services at your health plan with any benefit or coverage questions.      **Note to Provider:  If you are referring outside of [facility] [place] for the therapy appointment, please list the name of the location in the \"special instructions\" above, print the referral and give to the patient to schedule the appointment.                  Who to contact     If you have questions or need follow up information about today's clinic visit or your schedule please contact [facility] directly at [phone].  Normal or non-critical lab and imaging results will be communicated to you by MyChart, letter or phone within 4 business days after the clinic has received the results. If you do not hear from us within 7 days, please contact the clinic through [employer] or phone. If you have a critical or abnormal lab result, we will notify you by phone as soon as possible.  Submit refill requests through [employer] or call your pharmacy and they will forward the refill request to us. Please allow 3 business days for your refill to be completed.          Additional Information About Your Visit        [employer] Information     [employer] gives you secure access to your electronic health record. If you see a primary care provider, you can also send messages to your care team and make appointments. If you have questions, please call your primary care clinic.  If you do not have a primary care provider, please call [phone] and they will assist you.        Care EveryWhere ID     This is your Care EveryWhere ID. This could be used by other organizations to access your [place] medical " records  [id number]        Your Vitals Were     Pulse Temperature Height Pulse Oximetry BMI (Body Mass Index)       71 97.7  F (36.5  C) (Oral) 6' (1.829 m) 93% 47.6 kg/m2        Blood Pressure from Last 3 Encounters:   06/05/17 146/58   06/01/17 151/70   12/02/16 138/68    Weight from Last 3 Encounters:   06/05/17 (!) 351 lb (159.2 kg)   06/01/17 (!) 340 lb (154.2 kg)   12/02/16 (!) 347 lb (157.4 kg)              We Performed the Following     PHYSICAL THERAPY REFERRAL          Where to get your medicines      Some of these will need a paper prescription and others can be bought over the counter.  Ask your nurse if you have questions.     Bring a paper prescription for each of these medications     oxyCODONE 5 MG IR tablet          Primary Care Provider Office Phone # Fax #    Brett Cassidy [phone]       [facility] [address] [place] [id number]        Thank you!     Thank you for choosing [facility]  for your care. Our goal is always to provide you with excellent care. Hearing back from our patients is one way we can continue to improve our services. Please take a few minutes to complete the written survey that you may receive in the mail after your visit with us. Thank you!             Your Updated Medication List - Protect others around you: Learn how to safely use, store and throw away your medicines at www.disposemymeds.org.          This list is accurate as of: 6/5/17 11:35 AM.  Always use your most recent med list.                   Brand Name Dispense Instructions for use    azelastine 0.1 % spray    ASTELIN    3 Bottle    Spray 1-2 sprays into both nostrils 2 times daily       buPROPion 100 MG tablet    WELLBUTRIN    60 tablet    Take 1 tablet (100 mg) by mouth 2 times daily       diltiazem 180 MG 24 hr capsule    CARDIZEM CD    180 capsule    Take 2 capsules (360 mg) by mouth daily       flecainide acetate 150 MG Tabs     180 tablet    Take  1 tablet (150 mg) by mouth every 12 hours       fluticasone 50 MCG/ACT spray    FLONASE    48 g    Spray 1-2 sprays into both nostrils daily       iron 325 (65 FE) MG tablet     90 tablet    Take 1 tablet by mouth daily (with breakfast).       oxyCODONE 5 MG IR tablet    ROXICODONE    20 tablet    1-2 tabs po q6hrs prn pain       pantoprazole 40 MG EC tablet    PROTONIX    90 tablet    Take 1 tablet (40 mg) by mouth daily       rivaroxaban ANTICOAGULANT 20 MG Tabs tablet    XARELTO    90 tablet    Take 1 tablet (20 mg) by mouth daily (with dinner)

## 2017-06-06 ENCOUNTER — HOSPITAL ENCOUNTER (OUTPATIENT)
Dept: PHYSICAL THERAPY | Facility: CLINIC | Age: 72
Setting detail: THERAPIES SERIES
End: 2017-06-06
Attending: FAMILY MEDICINE
Payer: MEDICARE

## 2017-06-06 ENCOUNTER — TELEPHONE (OUTPATIENT)
Dept: FAMILY MEDICINE | Facility: CLINIC | Age: 72
End: 2017-06-06

## 2017-06-06 ENCOUNTER — TRANSFERRED RECORDS (OUTPATIENT)
Dept: HEALTH INFORMATION MANAGEMENT | Facility: CLINIC | Age: 72
End: 2017-06-06

## 2017-06-06 PROCEDURE — 97110 THERAPEUTIC EXERCISES: CPT | Mod: GP

## 2017-06-06 PROCEDURE — G8984 CARRY CURRENT STATUS: HCPCS | Mod: GP,CL

## 2017-06-06 PROCEDURE — 40000185 ZZHC STATISTIC PT OUTPT VISIT

## 2017-06-06 PROCEDURE — G8985 CARRY GOAL STATUS: HCPCS | Mod: GP,CI

## 2017-06-06 PROCEDURE — 97161 PT EVAL LOW COMPLEX 20 MIN: CPT | Mod: GP

## 2017-06-06 NOTE — TELEPHONE ENCOUNTER
Name of caller: Hugo  Relationship of Patient: Self    Reason for Call: Patient called to speak with Jeanine ONLY, he stated he has not heard back from the physical therapy schedulers about his shoulder. I told him that it was just put in yesterday and they probably haven't gotten to it. I gave him the number to call them directly if he hasn't heard from them in 2 business days as stated on the referral. However, he still wants Jeanine to call him back asap to discuss the same issue.     Best phone number to reach pt at is: [phone]  Ok to leave a message with medical info? Yes    Pharmacy preferred (if calling for a refill): [facility]

## 2017-06-06 NOTE — PROGRESS NOTES
06/06/17 1500   Quick Adds   Quick Adds Certification   Type of Visit Initial OP PT Evaluation   General Information   Start of Care Date 06/06/17   Referring Physician Brett Cassidy MD   Orders Evaluate and Treat as Indicated   Order Date 06/05/17   Medical Diagnosis L shoulder injury    Onset of illness/injury or Date of Surgery 06/01/17   Precautions/Limitations no known precautions/limitations   Surgical/Medical history reviewed Yes   Pertinent history of current problem (include personal factors and/or comorbidities that impact the POC) Hugo reports that he had fallen down 4 steps outside of a rental home on 6/1/17 and fell on his left side. Hematoma of left hip, edema of left elbow, bilateral knee abrasions, and left should pain with decreased range of motion. Hugo notes left abdominal pain and lower extremity edema bilaterally. Did not get knocked out or hit his head. Hugo reports pain improvement with oxycodone - initially took 2 tablets every 6 hours, then switched to 1 tablet every 4 hours to keep up with the pain. Notes decreased sleep due to pain - only a couple of hours each night.    Pertinent Visual History  Reports had lasik surgery   Diagnostic Tests X-ray   X-ray Results unremarkable  (Moderate L glenohumeral OA. No fracture or dislocation. )   Current Community Support Family/friend caregiver   Patient role/Employment history Retired   Living environment [facility]   Home/Community Accessibility Comments Pt lives with his wife in a house with no stairs to enter and 12 stairs with L handrail to access his bedroom.  Pt has 12 stairs to access his basement but reports he does not go into the basement.   Current Assistive Devices (none )   ADL Devices (none )   Assistive Devices Comments Pt is IND with mobility without AD and IND with ADL's without equipment.  Reports he is having difficulty with bathing and dressing due to decreased ROM in L shoulder.    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     Fall Risk Screen   Fall screen completed by PT   Per patient - Fall 2 or more times in past year? No   Per patient - Fall with injury in past year? Yes   Timed Up and Go score (seconds) 8.35   Is patient a fall risk? No   Pain   Patient currently in pain Yes   Pain location L shoulder   Pain rating 3-4/10  (8/10 with ROM)   Pain description Ache;Throbbing   Pain comments Reports pain in L shoulder down to the elbow (does not go past elbow).  No numbness/tingling.  Reports pain is increased at night after he has been using his arm.    Cognitive Status Examination   Orientation orientation to person, place and time   Level of Consciousness alert   Follows Commands and Answers Questions 100% of the time   Personal Safety and Judgment intact   Integumentary   Integumentary Comments abrasions noted on bilateral knees and L elbow and pt has a large hematoma on his L hip.   Posture   Posture Forward head position;Protracted shoulders   Posture Comments was given a sling for LUE today and donned at end of session.   Range of Motion (ROM)   ROM Quick Adds Shoulder, Left   Left Shoulder Flexion ROM   Left Shoulder Flexion AROM - degrees 35   Left Shoulder Flexion PROM - degrees 136   Left Shoulder ABduction ROM   Left Shoulder ABduction AROM - degrees 96   Left Shoulder ABduction PROM - degrees 118   Left Shoulder External Rotation ROM   Left Shoulder External Rotation AROM - degrees 40   Left Shoulder Internal Rotation ROM   Left Shoulder Internal Rotation AROM - degrees 70   Transfer Skills   Transfer Comments IND   Gait   Gait Comments Pt ambulated (I) demonstrating swing through gait pattern with good pace.  No balance deficits noted.   Balance   Balance no deficits were identified   Balance Special Tests   Balance Special Tests Timed up and go    Balance Special Tests Timed Up and Go   Seconds 8.35 Seconds   Sensory Examination   Sensory Perception no deficits were identified   Planned Therapy Interventions   Planned Therapy Interventions ROM;strengthening;stretching   Clinical Impression   Criteria for Skilled Therapeutic Interventions Met yes, treatment indicated   PT Diagnosis decreased ROM and strength in L shoulder impacting ability to complete functional activities and coaching activities.   Influenced by the following impairments pain, decreased ROM and strength in L shoulder.   Functional limitations due to impairments decreased ability to complete functional activities such as dressing and bathing and decreased ability to complete coaching activities.   Clinical Presentation Stable/Uncomplicated   Clinical Presentation Rationale pt's condition is stable.   Clinical Decision Making (Complexity) Low complexity   Therapy Frequency 1 time/week   Predicted Duration of Therapy Intervention (days/wks) 4 weeks   Risk & Benefits of therapy have been explained Yes   Patient, Family & other staff in agreement with plan of care Yes   Education Assessment   Barriers to Learning No barriers   GOALS   PT Eval Goals 1;2;3;4   Goal 1   Goal Identifier 1   Goal Description Hugo will achieve at least 90 degrees of active L shoulder flexion to improve ease in which he completes functional activities such as dressing and bathing.    Target Date 07/04/17   Goal 2   Goal Identifier 2   Goal Description Hugo will achieve at least 120 degrees of active L shoulder abduction to improve ease in which he completes functional activities such as dressing and bathing.    Target Date 07/04/17   Goal 3   Goal Identifier 3   Goal Description Hugo will achieve at least 70 degrees of active L shoulder ER to improve ease in which he completes functional activities such as dressing and bathing.    Target Date 07/04/17   Goal 4   Goal Identifier 4   Goal Description Hugo will  demonstrate (I) with ROM and strengthening HEP to facilitate greater ease with functional activities such as dressing and bathing.    Target Date 07/04/17   Total Evaluation Time   Total Evaluation Time (Minutes) 36   Therapy Certification   Certification date from 06/06/17   Certification date to 07/18/17   Medical Diagnosis L shoulder injury

## 2017-06-06 NOTE — PROGRESS NOTES
[facility]        OUTPATIENT PHYSICAL THERAPY FUNCTIONAL EVALUATION  PLAN OF TREATMENT FOR OUTPATIENT REHABILITATION  (COMPLETE FOR INITIAL CLAIMS ONLY)  Patient's Last Name, First Name, M.I.  YOB: 1945  Hugo Weber     Provider's Name   [facility]   Medical Record No.  [id number]     Start of Care Date:  06/06/17   Onset Date:  06/01/17   Type:     _X__PT   ____OT  ____SLP Medical Diagnosis:  L shoulder injury     PT Diagnosis:  decreased ROM and strength in L shoulder impacting ability to complete functional activities and coaching activities. Visits from SOC:  1                              __________________________________________________________________________________  Plan of Treatment/Functional Goals:  ROM, strengthening, stretching           GOALS  1  Hugo will achieve at least 90 degrees of active L shoulder flexion to improve ease in which he completes functional activities such as dressing and bathing.   07/04/17    2  Hugo will achieve at least 120 degrees of active L shoulder abduction to improve ease in which he completes functional activities such as dressing and bathing.   07/04/17    3  Hugo will achieve at least 70 degrees of active L shoulder ER to improve ease in which he completes functional activities such as dressing and bathing.   07/04/17    4  Hugo will demonstrate (I) with ROM and strengthening HEP to facilitate greater ease with functional activities such as dressing and bathing.   07/04/17                                                Therapy Frequency:  1 time/week   Predicted Duration of Therapy Intervention:  4 weeks    Breana Engel, PT                                    I CERTIFY THE NEED FOR THESE SERVICES FURNISHED UNDER        THIS PLAN OF TREATMENT AND WHILE UNDER MY CARE     (Physician co-signature of  this document indicates review and certification of the therapy plan).                Certification Date From:  06/06/17   Certification Date To:  07/18/17    Referring Provider:  Brett Cassidy MD    Initial Assessment  See Epic Evaluation- Start of Care Date: 06/06/17

## 2017-06-10 DIAGNOSIS — Z91.09 ENVIRONMENTAL ALLERGIES: ICD-10-CM

## 2017-06-10 DIAGNOSIS — G47.33 OBSTRUCTIVE SLEEP APNEA SYNDROME: ICD-10-CM

## 2017-06-13 RX ORDER — FLUTICASONE PROPIONATE 50 MCG
SPRAY, SUSPENSION (ML) NASAL
Qty: 48 ML | Refills: 3 | Status: SHIPPED | OUTPATIENT
Start: 2017-06-13 | End: 2017-09-25

## 2017-06-15 ENCOUNTER — HOSPITAL ENCOUNTER (OUTPATIENT)
Dept: PHYSICAL THERAPY | Facility: CLINIC | Age: 72
Setting detail: THERAPIES SERIES
End: 2017-06-15
Attending: FAMILY MEDICINE
Payer: MEDICARE

## 2017-06-15 PROCEDURE — 40000718 ZZHC STATISTIC PT DEPARTMENT ORTHO VISIT: Performed by: PHYSICAL THERAPIST

## 2017-06-15 PROCEDURE — 97110 THERAPEUTIC EXERCISES: CPT | Mod: GP | Performed by: PHYSICAL THERAPIST

## 2017-06-21 NOTE — ADDENDUM NOTE
Encounter addended by: Charity Espinosa PT on: 6/21/2017  2:32 PM<BR>     Actions taken: Sign clinical note, Flowsheet accepted

## 2017-06-21 NOTE — ADDENDUM NOTE
Encounter addended by: Charity Espinosa PT on: 6/21/2017  2:34 PM<BR>     Actions taken: Episode edited

## 2017-06-21 NOTE — PROGRESS NOTES
06/06/17 1500   Quick Adds   Quick Adds Certification   Type of Visit Initial OP PT Evaluation   General Information   Start of Care Date 06/06/17   Referring Physician Brett Cassidy MD   Orders Evaluate and Treat as Indicated   Order Date 06/05/17   Medical Diagnosis L shoulder injury    Onset of illness/injury or Date of Surgery 06/01/17   Precautions/Limitations no known precautions/limitations   Surgical/Medical history reviewed Yes   Pertinent history of current problem (include personal factors and/or comorbidities that impact the POC) Hugo reports that he had fallen down 4 steps outside of a rental home on 6/1/17 and fell on his left side. Hematoma of left hip, edema of left elbow, bilateral knee abrasions, and left should pain with decreased range of motion. Hugo notes left abdominal pain and lower extremity edema bilaterally. Did not get knocked out or hit his head. Hugo reports pain improvement with oxycodone - initially took 2 tablets every 6 hours, then switched to 1 tablet every 4 hours to keep up with the pain. Notes decreased sleep due to pain - only a couple of hours each night.    Pertinent Visual History  Reports had lasik surgery   Diagnostic Tests X-ray   X-ray Results unremarkable  (Moderate L glenohumeral OA. No fracture or dislocation. )   Current Community Support Family/friend caregiver   Patient role/Employment history Retired   Living environment [facility]   Home/Community Accessibility Comments Pt lives with his wife in a house with no stairs to enter and 12 stairs with L handrail to access his bedroom.  Pt has 12 stairs to access his basement but reports he does not go into the basement.   Current Assistive Devices (none )   ADL Devices (none )   Assistive Devices Comments Pt is IND with mobility without AD and IND with ADL's without equipment.  Reports he is having difficulty with bathing and dressing due to decreased ROM in L shoulder.    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     Fall Risk Screen   Fall screen completed by PT   Per patient - Fall 2 or more times in past year? No   Per patient - Fall with injury in past year? Yes   Timed Up and Go score (seconds) 8.35   Is patient a fall risk? No   Pain   Patient currently in pain Yes   Pain location L shoulder   Pain rating 3-4/10  (8/10 with ROM)   Pain description Ache;Throbbing   Pain comments Reports pain in L shoulder down to the elbow (does not go past elbow).  No numbness/tingling.  Reports pain is increased at night after he has been using his arm.    Cognitive Status Examination   Orientation orientation to person, place and time   Level of Consciousness alert   Follows Commands and Answers Questions 100% of the time   Personal Safety and Judgment intact   Integumentary   Integumentary Comments abrasions noted on bilateral knees and L elbow and pt has a large hematoma on his L hip.   Posture   Posture Forward head position;Protracted shoulders   Posture Comments was given a sling for LUE today and donned at end of session.   Range of Motion (ROM)   ROM Quick Adds Shoulder, Left   Left Shoulder Flexion ROM   Left Shoulder Flexion AROM - degrees 35   Left Shoulder Flexion PROM - degrees 136   Left Shoulder ABduction ROM   Left Shoulder ABduction AROM - degrees 96   Left Shoulder ABduction PROM - degrees 118   Left Shoulder External Rotation ROM   Left Shoulder External Rotation AROM - degrees 40   Left Shoulder Internal Rotation ROM   Left Shoulder Internal Rotation AROM - degrees 70   Transfer Skills   Transfer Comments IND   Gait   Gait Comments Pt ambulated (I) demonstrating swing through gait pattern with good pace.  No balance deficits noted.   Balance   Balance no deficits were identified   Balance Special Tests   Balance Special Tests Timed up and go    Balance Special Tests Timed Up and Go   Seconds 8.35 Seconds   Sensory Examination   Sensory Perception no deficits were identified   Planned Therapy Interventions   Planned Therapy Interventions ROM;strengthening;stretching   Clinical Impression   Criteria for Skilled Therapeutic Interventions Met yes, treatment indicated   PT Diagnosis decreased ROM and strength in L shoulder impacting ability to complete functional activities and coaching activities.   Influenced by the following impairments pain, decreased ROM and strength in L shoulder.   Functional limitations due to impairments decreased ability to complete functional activities such as dressing and bathing and decreased ability to complete coaching activities.   Clinical Presentation Stable/Uncomplicated   Clinical Presentation Rationale pt's condition is stable.   Clinical Decision Making (Complexity) Low complexity   Therapy Frequency 1 time/week   Predicted Duration of Therapy Intervention (days/wks) 4 weeks   Risk & Benefits of therapy have been explained Yes   Patient, Family & other staff in agreement with plan of care Yes   Education Assessment   Barriers to Learning No barriers   GOALS   PT Eval Goals 1;2;3;4   Goal 1   Goal Identifier 1   Goal Description Hugo will achieve at least 90 degrees of active L shoulder flexion to improve ease in which he completes functional activities such as dressing and bathing.    Target Date 07/04/17   Goal 2   Goal Identifier 2   Goal Description Hugo will achieve at least 120 degrees of active L shoulder abduction to improve ease in which he completes functional activities such as dressing and bathing.    Target Date 07/04/17   Goal 3   Goal Identifier 3   Goal Description Hugo will achieve at least 70 degrees of active L shoulder ER to improve ease in which he completes functional activities such as dressing and bathing.    Target Date 07/04/17   Goal 4   Goal Identifier 4   Goal Description Hugo will  demonstrate (I) with ROM and strengthening HEP to facilitate greater ease with functional activities such as dressing and bathing.    Target Date 07/04/17   Total Evaluation Time   Total Evaluation Time (Minutes) 36   Therapy Certification   Certification date from 06/06/17   Certification date to 07/18/17   Medical Diagnosis L shoulder injury

## 2017-06-28 ENCOUNTER — HOSPITAL ENCOUNTER (OUTPATIENT)
Dept: PHYSICAL THERAPY | Facility: CLINIC | Age: 72
Setting detail: THERAPIES SERIES
End: 2017-06-28
Attending: FAMILY MEDICINE
Payer: MEDICARE

## 2017-06-28 PROCEDURE — 97140 MANUAL THERAPY 1/> REGIONS: CPT | Mod: GP | Performed by: PHYSICAL THERAPIST

## 2017-06-28 PROCEDURE — 40000718 ZZHC STATISTIC PT DEPARTMENT ORTHO VISIT: Performed by: PHYSICAL THERAPIST

## 2017-06-28 PROCEDURE — 97110 THERAPEUTIC EXERCISES: CPT | Mod: GP | Performed by: PHYSICAL THERAPIST

## 2017-07-03 ENCOUNTER — HOSPITAL ENCOUNTER (OUTPATIENT)
Dept: PHYSICAL THERAPY | Facility: CLINIC | Age: 72
Setting detail: THERAPIES SERIES
End: 2017-07-03
Attending: FAMILY MEDICINE
Payer: MEDICARE

## 2017-07-03 PROCEDURE — 97110 THERAPEUTIC EXERCISES: CPT | Mod: GP | Performed by: PHYSICAL THERAPIST

## 2017-07-03 PROCEDURE — 97140 MANUAL THERAPY 1/> REGIONS: CPT | Mod: GP | Performed by: PHYSICAL THERAPIST

## 2017-07-03 PROCEDURE — 40000718 ZZHC STATISTIC PT DEPARTMENT ORTHO VISIT: Performed by: PHYSICAL THERAPIST

## 2017-07-05 ENCOUNTER — TRANSFERRED RECORDS (OUTPATIENT)
Dept: HEALTH INFORMATION MANAGEMENT | Facility: CLINIC | Age: 72
End: 2017-07-05

## 2017-07-13 ENCOUNTER — HOSPITAL ENCOUNTER (OUTPATIENT)
Dept: PHYSICAL THERAPY | Facility: CLINIC | Age: 72
Setting detail: THERAPIES SERIES
End: 2017-07-13
Attending: FAMILY MEDICINE
Payer: MEDICARE

## 2017-07-13 PROCEDURE — 97110 THERAPEUTIC EXERCISES: CPT | Mod: GP | Performed by: PHYSICAL THERAPIST

## 2017-07-13 PROCEDURE — 97140 MANUAL THERAPY 1/> REGIONS: CPT | Mod: GP | Performed by: PHYSICAL THERAPIST

## 2017-07-13 PROCEDURE — 40000718 ZZHC STATISTIC PT DEPARTMENT ORTHO VISIT: Performed by: PHYSICAL THERAPIST

## 2017-07-17 ENCOUNTER — HOSPITAL ENCOUNTER (OUTPATIENT)
Dept: PHYSICAL THERAPY | Facility: CLINIC | Age: 72
Setting detail: THERAPIES SERIES
End: 2017-07-17
Attending: FAMILY MEDICINE
Payer: MEDICARE

## 2017-07-17 PROCEDURE — G8986 CARRY D/C STATUS: HCPCS | Mod: GP,CJ | Performed by: PHYSICAL THERAPIST

## 2017-07-17 PROCEDURE — G8984 CARRY CURRENT STATUS: HCPCS | Mod: GP,CJ | Performed by: PHYSICAL THERAPIST

## 2017-07-17 PROCEDURE — 97110 THERAPEUTIC EXERCISES: CPT | Mod: GP | Performed by: PHYSICAL THERAPIST

## 2017-07-17 PROCEDURE — 97140 MANUAL THERAPY 1/> REGIONS: CPT | Mod: GP | Performed by: PHYSICAL THERAPIST

## 2017-07-17 PROCEDURE — 40000718 ZZHC STATISTIC PT DEPARTMENT ORTHO VISIT: Performed by: PHYSICAL THERAPIST

## 2017-07-17 PROCEDURE — G8985 CARRY GOAL STATUS: HCPCS | Mod: GP,CI | Performed by: PHYSICAL THERAPIST

## 2017-07-17 NOTE — PROGRESS NOTES
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  Minimal pain in L shoulder, only limitation is with overhead activities at times, but very minimal restrictions.    Objective Measurements:  Objective Measure: L shoulder ROM  Details: Flexion 133 deg, Abduction 122 deg, ER 45 deg, IR 78 deg (composite with protraction of scapula)  Objective Measure: L shoulder strength  Details: Flexion and abduction 4/5, mild pain.  IR 4+/5, ER 4/5 with mild pain    Outcome Measures (most recent score):  [facility] not completed at eval or followup.    Goals:  Goal Identifier 1   Goal Description Hugo will achieve at least 110 degrees of active L shoulder flexion to improve ease in which he completes functional activities such as dressing and bathing.  (goal met )   Target Date 17   Date Met   17   Progress:  L shoulder AROM flexion 135 deg, painful arc >110 deg     Goal Identifier 2   Goal Description Hugo will achieve at least 120 degrees of active L shoulder abduction to improve ease in which he completes functional activities such as dressing and bathing.  (goal met )   Target Date 17   Date Met   17   Progress: L shoulder AROM abduction 122 deg, painful arc >110 deg     Goal Identifier 3   Goal Description Hugo will achieve at least 70 degrees of active L shoulder ER to improve ease in which he completes functional activities such as dressing and bathing.  (goal not met, restricted to ~45 deg ER)   Target Date 17   Date Met      Progress:  Goal not met, ~45 deg ER on L, limited by pain/tightness     Goal  "Identifier 4   Goal Description Hugo will demonstrate (I) with ROM and strengthening HEP to facilitate greater ease with functional activities such as dressing and bathing.  (Goal met 7/17/17)   Target Date 07/18/17   Date Met   7/17/17   Progress:  Pt demonstrates indep with individualized HEP       Progress Toward Goals:   Progress this reporting period: Hugo was seen by PT for a total of 6 visits during the EOC to address ROM and strength limitations about his L shoulder.  ROM and strength improved with clinical interventions and HEP.  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  L shoulder flexion and abduction AROM returned to WNL (135 deg and 122 deg respectively).  Mild anterior/superior L shoulder pain with elevation >110 deg yet, improves with cues for posture and scapular retraction.  Mild strength deficit yet L shoulder compared to R, but likely chronic due to reported \"partial tear of rotator cuff\".  Of note, audible and palpable crepitus noted about L shoulder with exercises, sometimes causing pain.  Pt has been instructed in an individualized HEP to continue to manage his symptoms, he has demonstrated understanding.  No longer requires skilled PT care, can continue with his HEP.    Plan:  Discharge from therapy.    Discharge: yes    Reason for Discharge: No further expectation of progress.    Equipment Issued: none    Discharge Plan: Patient to continue home program.  "

## 2017-08-21 ENCOUNTER — TRANSFERRED RECORDS (OUTPATIENT)
Dept: HEALTH INFORMATION MANAGEMENT | Facility: CLINIC | Age: 72
End: 2017-08-21

## 2017-08-25 ENCOUNTER — TELEPHONE (OUTPATIENT)
Dept: FAMILY MEDICINE | Facility: CLINIC | Age: 72
End: 2017-08-25

## 2017-09-14 ENCOUNTER — OFFICE VISIT (OUTPATIENT)
Dept: FAMILY MEDICINE | Facility: CLINIC | Age: 72
End: 2017-09-14
Payer: COMMERCIAL

## 2017-09-14 VITALS
HEART RATE: 73 BPM | HEIGHT: 72 IN | BODY MASS INDEX: 42.66 KG/M2 | OXYGEN SATURATION: 94 % | SYSTOLIC BLOOD PRESSURE: 138 MMHG | DIASTOLIC BLOOD PRESSURE: 70 MMHG | TEMPERATURE: 98.2 F | WEIGHT: 315 LBS

## 2017-09-14 DIAGNOSIS — H92.01 OTALGIA, RIGHT: Primary | ICD-10-CM

## 2017-09-14 DIAGNOSIS — Z79.01 LONG TERM CURRENT USE OF ANTICOAGULANT THERAPY: ICD-10-CM

## 2017-09-14 DIAGNOSIS — I10 ESSENTIAL HYPERTENSION WITH GOAL BLOOD PRESSURE LESS THAN 140/90: ICD-10-CM

## 2017-09-14 DIAGNOSIS — K76.0 NAFLD (NONALCOHOLIC FATTY LIVER DISEASE): ICD-10-CM

## 2017-09-14 DIAGNOSIS — R73.03 PREDIABETES: ICD-10-CM

## 2017-09-14 DIAGNOSIS — I48.0 PAROXYSMAL ATRIAL FIBRILLATION (H): ICD-10-CM

## 2017-09-14 DIAGNOSIS — M26.609 TMJ (TEMPOROMANDIBULAR JOINT SYNDROME): ICD-10-CM

## 2017-09-14 DIAGNOSIS — Z51.81 MEDICATION MONITORING ENCOUNTER: ICD-10-CM

## 2017-09-14 DIAGNOSIS — E66.01 MORBID OBESITY, UNSPECIFIED OBESITY TYPE (H): ICD-10-CM

## 2017-09-14 DIAGNOSIS — G47.33 OBSTRUCTIVE SLEEP APNEA SYNDROME: ICD-10-CM

## 2017-09-14 PROCEDURE — 99213 OFFICE O/P EST LOW 20 MIN: CPT | Performed by: FAMILY MEDICINE

## 2017-09-14 NOTE — MR AVS SNAPSHOT
After Visit Summary   9/14/2017    Hugo Weber    MRN: [id number]           Patient Information     Date Of Birth          1945        Visit Information        Provider Department      9/14/2017 9:20 AM Brett Cassidy MD [facility]        Today's Diagnoses     Otalgia, right    -  1    TMJ (temporomandibular joint syndrome)        Prediabetes        Paroxysmal atrial fibrillation (H)        Essential hypertension with goal blood pressure less than 140/90        Obstructive sleep apnea syndrome        NAFLD (nonalcoholic fatty liver disease)        Morbid obesity, unspecified obesity type (H)        Long term current use of anticoagulant therapy - for intermittent a-fib        Medication monitoring encounter           Follow-ups after your visit        Your next 10 appointments already scheduled     Sep 25, 2017  8:40 AM CDT   PHYSICAL with Brett Cassidy MD   [facility]    [address] [place] [id number] [phone]              Who to contact     If you have questions or need follow up information about today's clinic visit or your schedule please contact [facility] directly at [phone].  Normal or non-critical lab and imaging results will be communicated to you by SweetSlaphart, letter or phone within 4 business days after the clinic has received the results. If you do not hear from us within 7 days, please contact the clinic through SweetSlaphart or phone. If you have a critical or abnormal lab result, we will notify you by phone as soon as possible.  Submit refill requests through [employer] or call your pharmacy and they will forward the refill request to us. Please allow 3 business days for your refill to be completed.          Additional Information About Your Visit        SweetSlaphart Information     T[employer] gives you secure access to your electronic health record. If you see a primary care  provider, you can also send messages to your care team and make appointments. If you have questions, please call your primary care clinic.  If you do not have a primary care provider, please call [phone] and they will assist you.        Care EveryWhere ID     This is your Care EveryWhere ID. This could be used by other organizations to access your [place] medical records  [id number]        Your Vitals Were     Pulse Temperature Height Pulse Oximetry BMI (Body Mass Index)       73 98.2  F (36.8  C) (Oral) 6' (1.829 m) 94% 47.47 kg/m2        Blood Pressure from Last 3 Encounters:   09/14/17 138/70   06/05/17 146/58   06/01/17 151/70    Weight from Last 3 Encounters:   09/14/17 (!) 350 lb (158.8 kg)   06/05/17 (!) 351 lb (159.2 kg)   06/01/17 (!) 340 lb (154.2 kg)              Today, you had the following     No orders found for display       Primary Care Provider Office Phone # Fax #    Brett Cassidy [phone] [address] [facility] [address]        Equal Access to Services     [facility]: Hadii aad ku hadasho Soomaali, waaxda luqadaha, qaybta kaalmada adeegyada, stella adkinsn tara albert. So [facility] [phone].    ATENCIÓN: Si habla español, tiene a oconnor disposición servicios gratuitos de asistencia lingüística. [facility] [phone].    We comply with applicable federal civil rights laws and [place] laws. We do not discriminate on the basis of race, color, national origin, age, disability sex, sexual orientation or gender identity.            Thank you!     Thank you for choosing [facility]  for your care. Our goal is always to provide you with excellent care. Hearing back from our patients is one way we can continue to improve our services. Please take a few minutes to complete the written survey that you may receive in the mail after your visit with us. Thank you!             Your Updated Medication List - Protect others around you: Learn  how to safely use, store and throw away your medicines at www.disposemymeds.org.          This list is accurate as of: 9/14/17 11:59 PM.  Always use your most recent med list.                   Brand Name Dispense Instructions for use Diagnosis    azelastine 0.1 % spray    ASTELIN    3 Bottle    Spray 1-2 sprays into both nostrils 2 times daily    Obstructive sleep apnea syndrome, Environmental allergies       buPROPion 100 MG tablet    WELLBUTRIN    60 tablet    Take 1 tablet (100 mg) by mouth 2 times daily    Morbid obesity due to excess calories (H)       diltiazem 180 MG 24 hr capsule    CARDIZEM CD    180 capsule    Take 2 capsules (360 mg) by mouth daily    Atrial fibrillation (H)       flecainide acetate 150 MG Tabs     180 tablet    Take 1 tablet (150 mg) by mouth every 12 hours    Essential hypertension with goal blood pressure less than 140/90       fluticasone 50 MCG/ACT spray    FLONASE    48 mL    SHAKE WELL AND USE 1 TO 2 SPRAYS IN EACH NOSTRIL DAILY    Environmental allergies, Obstructive sleep apnea syndrome       iron 325 (65 FE) MG tablet     90 tablet    Take 1 tablet by mouth daily (with breakfast).    Iron deficiency anemia, unspecified       oxyCODONE 5 MG IR tablet    ROXICODONE    20 tablet    1-2 tabs po q6hrs prn pain    Fall, subsequent encounter, Acute pain of left shoulder, Abrasion of both knees, Left elbow contusion, Traumatic ecchymosis of left hip, subsequent encounter       pantoprazole 40 MG EC tablet    PROTONIX    90 tablet    Take 1 tablet (40 mg) by mouth daily    Indigestion       rivaroxaban ANTICOAGULANT 20 MG Tabs tablet    XARELTO    90 tablet    Take 1 tablet (20 mg) by mouth daily (with dinner)    Paroxysmal atrial fibrillation (H)

## 2017-09-14 NOTE — NURSING NOTE
Chief Complaint   Patient presents with     Otalgia       Initial /70  Pulse 73  Temp 98.2  F (36.8  C) (Oral)  Ht 6' (1.829 m)  Wt (!) 350 lb (158.8 kg)  SpO2 94%  BMI 47.47 kg/m2 Estimated body mass index is 47.47 kg/(m^2) as calculated from the following:    Height as of this encounter: 6' (1.829 m).    Weight as of this encounter: 350 lb (158.8 kg).  Medication Reconciliation: complete

## 2017-09-14 NOTE — PROGRESS NOTES
"  SUBJECTIVE:   Hugo Weber is a 72 year old male who presents to clinic today for earache and jawpain. He is concerned he may have an ear infection.     The jaw pain has been bothering him for about 6 weeks and he noticed the ear pain about 3 weeks ago. Jaw and ear pain are both right sided and accompanied with \"right eye dripping\". He does not have troubles with opening or closing his eyelid.     Ear is without drainage, no external ear pain when tugging or pulling the ear. His eye is not itching or hurting. Has not experienced fever, nausea, vomiting sore throat, rhinorrhea, cough, SOB, chest pain, abdominal pain, diarrhea, no seasonal allergies, or fatigue. He has had headaches.    Jaw pain and ear pain prompted patient to see a dentist suspecting dental infection. His dentist did a work up and determined there to be no signs of infection or any reason to believe the source of his pain was related to his teeth. The dentist said he might have TMJ dysfunction. Dentist gave him a block or spacer a week ago to keep his teeth from grinding at night to help with is jaw pain. It is not clear whether the dental device has helped, as his dentist informed him it would take about 3 weeks to see improvement.      He has also tried tylenol for his pain which he says helps alleviate the pain.       Acute Illness   Acute illness concerns: ear pain  Onset: 3 wks     Fever: no    Chills/Sweats: no    Headache (location?): YES    Sinus Pressure:no    Conjunctivitis:  YES: right occasional     Ear Pain: YES: right, jaw pain saw dentist he suspected TMJ     Rhinorrhea: no    Congestion: no    Sore Throat: no     Cough: no    Wheeze: no    Decreased Appetite: no    Nausea: no    Vomiting: no    Diarrhea:  no    Dysuria/Freq.: no    Fatigue/Achiness: no    Sick/Strep Exposure: no     Therapies Tried and outcome: tylenol and dental item to help with grinding teeth minor relief       Problem list and histories reviewed & " adjusted, as indicated.  Additional history: as documented    Reviewed and updated as needed this visit by clinical staffAll[facility]  Meds  Med Hx       Reviewed and updated as needed this visit by Provider         ROS:  Negative except for as noted above.     OBJECTIVE:     /70  Pulse 73  Temp 98.2  F (36.8  C) (Oral)  Ht 6' (1.829 m)  Wt (!) 350 lb (158.8 kg)  SpO2 94%  BMI 47.47 kg/m2  Body mass index is 47.47 kg/(m^2).  GENERAL: healthy, alert and no distress  EYES: Eyes grossly normal to inspection, PERRL and conjunctivae and sclerae normal  HENT: ear canal mildly red on right side, with out motion tenderness, TMs normal bilaterally, nose and mouth without ulcers or lesions, tenderness noted over Rt TMJ  NECK: no adenopathy, no asymmetry, masses, or scars and thyroid normal to palpation  RESP: lungs clear to auscultation - no rales, rhonchi or wheezes  CV: regular rate and rhythm, normal S1 S2, no S3 or S4, no murmur, click or rub, no peripheral edema and peripheral pulses strong  ABDOMEN: soft, nontender, no hepatosplenomegaly, no masses and bowel sounds normal    Diagnostic Test Results:  none    ASSESSMENT/PLAN:         ICD-10-CM    1. Otalgia, right H92.01    2. TMJ (temporomandibular joint syndrome) M26.609    3. Prediabetes R73.03    4. Paroxysmal atrial fibrillation (H) I48.0    5. Essential hypertension with goal blood pressure less than 140/90 I10    6. Obstructive sleep apnea syndrome G47.33    7. NAFLD (nonalcoholic fatty liver disease) K76.0    8. Morbid obesity, unspecified obesity type (H) E66.01    9. Long term current use of anticoagulant therapy - for intermittent a-fib Z79.01    10. Medication monitoring encounter Z51.81          Hugo is a 71 yo male presenting for ear pain and TMJ pain. Exam was normal and no medication needed at this time as there are no signs of ear infection. The TMJ dysfunction is most likely the source of his ear pain. Patient was advised to use heat, ice,  and the mouth block. If no signs of improvement, MRI or referral to TMJ clinic were discussed. Patient agreed to above recommendation.     Follow up prn.    CPX fasting due.    Wilder Vásquez MS3 acted as a scribe for me today and accurately reflected my words and actions.    I agree with above History, Review of Systems, Physical exam and Plan.  I have reviewed the content of the documentation and have edited it as needed. I have personally performed the services documented here and the documentation accurately represents those services and the decisions I have made.            Brett Cassidy M.D.

## 2017-09-25 ENCOUNTER — OFFICE VISIT (OUTPATIENT)
Dept: FAMILY MEDICINE | Facility: CLINIC | Age: 72
End: 2017-09-25
Payer: COMMERCIAL

## 2017-09-25 ENCOUNTER — DOCUMENTATION ONLY (OUTPATIENT)
Dept: OTHER | Facility: CLINIC | Age: 72
End: 2017-09-25

## 2017-09-25 VITALS
BODY MASS INDEX: 42.66 KG/M2 | HEIGHT: 72 IN | DIASTOLIC BLOOD PRESSURE: 82 MMHG | OXYGEN SATURATION: 95 % | SYSTOLIC BLOOD PRESSURE: 138 MMHG | WEIGHT: 315 LBS | TEMPERATURE: 97.7 F | HEART RATE: 63 BPM

## 2017-09-25 DIAGNOSIS — D50.9 IRON DEFICIENCY ANEMIA, UNSPECIFIED IRON DEFICIENCY ANEMIA TYPE: ICD-10-CM

## 2017-09-25 DIAGNOSIS — Z51.81 MEDICATION MONITORING ENCOUNTER: ICD-10-CM

## 2017-09-25 DIAGNOSIS — Z91.09 ENVIRONMENTAL ALLERGIES: ICD-10-CM

## 2017-09-25 DIAGNOSIS — Z86.19 HISTORY OF HEPATITIS C: ICD-10-CM

## 2017-09-25 DIAGNOSIS — R73.03 PREDIABETES: ICD-10-CM

## 2017-09-25 DIAGNOSIS — Z23 NEED FOR PROPHYLACTIC VACCINATION AGAINST STREPTOCOCCUS PNEUMONIAE (PNEUMOCOCCUS): ICD-10-CM

## 2017-09-25 DIAGNOSIS — Z23 NEED FOR PROPHYLACTIC VACCINATION AND INOCULATION AGAINST INFLUENZA: ICD-10-CM

## 2017-09-25 DIAGNOSIS — Z98.84 H/O GASTRIC BYPASS: ICD-10-CM

## 2017-09-25 DIAGNOSIS — E66.01 MORBID OBESITY, UNSPECIFIED OBESITY TYPE (H): ICD-10-CM

## 2017-09-25 DIAGNOSIS — I10 HYPERTENSION GOAL BP (BLOOD PRESSURE) < 140/90: ICD-10-CM

## 2017-09-25 DIAGNOSIS — Z12.5 SCREENING FOR PROSTATE CANCER: ICD-10-CM

## 2017-09-25 DIAGNOSIS — K76.0 NAFLD (NONALCOHOLIC FATTY LIVER DISEASE): ICD-10-CM

## 2017-09-25 DIAGNOSIS — G47.33 OBSTRUCTIVE SLEEP APNEA SYNDROME: ICD-10-CM

## 2017-09-25 DIAGNOSIS — Z79.01 LONG TERM CURRENT USE OF ANTICOAGULANT THERAPY: ICD-10-CM

## 2017-09-25 DIAGNOSIS — Z91.81 AT RISK FOR FALLING: ICD-10-CM

## 2017-09-25 DIAGNOSIS — I48.0 PAROXYSMAL ATRIAL FIBRILLATION (H): ICD-10-CM

## 2017-09-25 DIAGNOSIS — Z12.11 SCREEN FOR COLON CANCER: ICD-10-CM

## 2017-09-25 DIAGNOSIS — K63.5 POLYP OF COLON, UNSPECIFIED PART OF COLON, UNSPECIFIED TYPE: ICD-10-CM

## 2017-09-25 DIAGNOSIS — E78.5 HYPERLIPIDEMIA LDL GOAL <100: ICD-10-CM

## 2017-09-25 DIAGNOSIS — Z71.89 ADVANCED DIRECTIVES, COUNSELING/DISCUSSION: Chronic | ICD-10-CM

## 2017-09-25 DIAGNOSIS — Z00.00 ENCOUNTER FOR ROUTINE ADULT HEALTH EXAMINATION WITHOUT ABNORMAL FINDINGS: Primary | ICD-10-CM

## 2017-09-25 LAB
ALBUMIN SERPL-MCNC: 3.7 G/DL (ref 3.4–5)
ALBUMIN UR-MCNC: NEGATIVE MG/DL
ALP SERPL-CCNC: 145 U/L (ref 40–150)
ALT SERPL W P-5'-P-CCNC: 23 U/L (ref 0–70)
ANION GAP SERPL CALCULATED.3IONS-SCNC: 6 MMOL/L (ref 3–14)
APPEARANCE UR: CLEAR
AST SERPL W P-5'-P-CCNC: 17 U/L (ref 0–45)
BACTERIA #/AREA URNS HPF: ABNORMAL /HPF
BILIRUB SERPL-MCNC: 0.4 MG/DL (ref 0.2–1.3)
BILIRUB UR QL STRIP: NEGATIVE
BUN SERPL-MCNC: 18 MG/DL (ref 7–30)
CALCIUM SERPL-MCNC: 8.8 MG/DL (ref 8.5–10.1)
CHLORIDE SERPL-SCNC: 102 MMOL/L (ref 94–109)
CHOLEST SERPL-MCNC: 167 MG/DL
CK SERPL-CCNC: 129 U/L (ref 30–300)
CO2 SERPL-SCNC: 29 MMOL/L (ref 20–32)
COLOR UR AUTO: YELLOW
CREAT SERPL-MCNC: 0.82 MG/DL (ref 0.66–1.25)
CREAT UR-MCNC: 108 MG/DL
ERYTHROCYTE [DISTWIDTH] IN BLOOD BY AUTOMATED COUNT: 13.5 % (ref 10–15)
FERRITIN SERPL-MCNC: 25 NG/ML (ref 26–388)
FOLATE SERPL-MCNC: 13.3 NG/ML
GFR SERPL CREATININE-BSD FRML MDRD: >90 ML/MIN/1.7M2
GLUCOSE SERPL-MCNC: 98 MG/DL (ref 70–99)
GLUCOSE UR STRIP-MCNC: NEGATIVE MG/DL
HBA1C MFR BLD: 5.7 % (ref 4.3–6)
HCT VFR BLD AUTO: 46.5 % (ref 40–53)
HCV AB SERPL QL IA: REACTIVE
HDLC SERPL-MCNC: 45 MG/DL
HGB BLD-MCNC: 15.4 G/DL (ref 13.3–17.7)
HGB UR QL STRIP: ABNORMAL
IRON SATN MFR SERPL: 37 % (ref 15–46)
IRON SERPL-MCNC: 132 UG/DL (ref 35–180)
KETONES UR STRIP-MCNC: NEGATIVE MG/DL
LDLC SERPL CALC-MCNC: 96 MG/DL
LEUKOCYTE ESTERASE UR QL STRIP: NEGATIVE
MCH RBC QN AUTO: 29.6 PG (ref 26.5–33)
MCHC RBC AUTO-ENTMCNC: 33.1 G/DL (ref 31.5–36.5)
MCV RBC AUTO: 89 FL (ref 78–100)
MICROALBUMIN UR-MCNC: 12 MG/L
MICROALBUMIN/CREAT UR: 10.74 MG/G CR (ref 0–17)
NITRATE UR QL: NEGATIVE
NONHDLC SERPL-MCNC: 122 MG/DL
PH UR STRIP: 6 PH (ref 5–7)
PLATELET # BLD AUTO: 257 10E9/L (ref 150–450)
POTASSIUM SERPL-SCNC: 4.3 MMOL/L (ref 3.4–5.3)
PROT SERPL-MCNC: 7.7 G/DL (ref 6.8–8.8)
PSA SERPL-ACNC: 2.76 UG/L (ref 0–4)
RBC # BLD AUTO: 5.2 10E12/L (ref 4.4–5.9)
RBC #/AREA URNS AUTO: ABNORMAL /HPF
SODIUM SERPL-SCNC: 137 MMOL/L (ref 133–144)
SOURCE: ABNORMAL
SP GR UR STRIP: 1.02 (ref 1–1.03)
TIBC SERPL-MCNC: 356 UG/DL (ref 240–430)
TRIGL SERPL-MCNC: 129 MG/DL
TSH SERPL DL<=0.005 MIU/L-ACNC: 2.58 MU/L (ref 0.4–4)
UROBILINOGEN UR STRIP-ACNC: 1 EU/DL (ref 0.2–1)
VIT B12 SERPL-MCNC: 254 PG/ML (ref 193–986)
WBC # BLD AUTO: 8.2 10E9/L (ref 4–11)
WBC #/AREA URNS AUTO: ABNORMAL /HPF

## 2017-09-25 PROCEDURE — 83540 ASSAY OF IRON: CPT | Performed by: FAMILY MEDICINE

## 2017-09-25 PROCEDURE — G0008 ADMIN INFLUENZA VIRUS VAC: HCPCS | Performed by: FAMILY MEDICINE

## 2017-09-25 PROCEDURE — 90662 IIV NO PRSV INCREASED AG IM: CPT | Performed by: FAMILY MEDICINE

## 2017-09-25 PROCEDURE — G0009 ADMIN PNEUMOCOCCAL VACCINE: HCPCS | Performed by: FAMILY MEDICINE

## 2017-09-25 PROCEDURE — 80053 COMPREHEN METABOLIC PANEL: CPT | Performed by: FAMILY MEDICINE

## 2017-09-25 PROCEDURE — 83550 IRON BINDING TEST: CPT | Performed by: FAMILY MEDICINE

## 2017-09-25 PROCEDURE — G0103 PSA SCREENING: HCPCS | Performed by: FAMILY MEDICINE

## 2017-09-25 PROCEDURE — 82728 ASSAY OF FERRITIN: CPT | Performed by: FAMILY MEDICINE

## 2017-09-25 PROCEDURE — 83036 HEMOGLOBIN GLYCOSYLATED A1C: CPT | Performed by: FAMILY MEDICINE

## 2017-09-25 PROCEDURE — 87522 HEPATITIS C REVRS TRNSCRPJ: CPT | Performed by: FAMILY MEDICINE

## 2017-09-25 PROCEDURE — 82746 ASSAY OF FOLIC ACID SERUM: CPT | Performed by: FAMILY MEDICINE

## 2017-09-25 PROCEDURE — 85027 COMPLETE CBC AUTOMATED: CPT | Performed by: FAMILY MEDICINE

## 2017-09-25 PROCEDURE — G0438 PPPS, INITIAL VISIT: HCPCS | Performed by: FAMILY MEDICINE

## 2017-09-25 PROCEDURE — 90670 PCV13 VACCINE IM: CPT | Performed by: FAMILY MEDICINE

## 2017-09-25 PROCEDURE — 82550 ASSAY OF CK (CPK): CPT | Performed by: FAMILY MEDICINE

## 2017-09-25 PROCEDURE — 86803 HEPATITIS C AB TEST: CPT | Performed by: FAMILY MEDICINE

## 2017-09-25 PROCEDURE — 84443 ASSAY THYROID STIM HORMONE: CPT | Performed by: FAMILY MEDICINE

## 2017-09-25 PROCEDURE — 36415 COLL VENOUS BLD VENIPUNCTURE: CPT | Performed by: FAMILY MEDICINE

## 2017-09-25 PROCEDURE — 81001 URINALYSIS AUTO W/SCOPE: CPT | Performed by: FAMILY MEDICINE

## 2017-09-25 PROCEDURE — 82043 UR ALBUMIN QUANTITATIVE: CPT | Performed by: FAMILY MEDICINE

## 2017-09-25 PROCEDURE — 80061 LIPID PANEL: CPT | Performed by: FAMILY MEDICINE

## 2017-09-25 PROCEDURE — 82607 VITAMIN B-12: CPT | Performed by: FAMILY MEDICINE

## 2017-09-25 RX ORDER — FLUTICASONE PROPIONATE 50 MCG
1-2 SPRAY, SUSPENSION (ML) NASAL DAILY
Qty: 48 ML | Refills: 3 | Status: ON HOLD | OUTPATIENT
Start: 2017-09-25 | End: 2018-05-02

## 2017-09-25 RX ORDER — FLECAINIDE ACETATE 150 MG/1
150 TABLET ORAL EVERY 12 HOURS
Qty: 180 TABLET | Refills: 3 | Status: SHIPPED | OUTPATIENT
Start: 2017-09-25 | End: 2018-01-05

## 2017-09-25 RX ORDER — PANTOPRAZOLE SODIUM 40 MG/1
40 TABLET, DELAYED RELEASE ORAL DAILY
Qty: 90 TABLET | Refills: 3 | Status: SHIPPED | OUTPATIENT
Start: 2017-09-25 | End: 2017-12-05

## 2017-09-25 RX ORDER — AZELASTINE 1 MG/ML
1-2 SPRAY, METERED NASAL 2 TIMES DAILY
Qty: 3 BOTTLE | Refills: 3 | Status: ON HOLD | OUTPATIENT
Start: 2017-09-25 | End: 2018-05-02

## 2017-09-25 RX ORDER — PNV NO.95/FERROUS FUM/FOLIC AC 28MG-0.8MG
1 TABLET ORAL
Qty: 90 TABLET | Refills: 3 | Status: SHIPPED | OUTPATIENT
Start: 2017-09-25 | End: 2018-04-24

## 2017-09-25 RX ORDER — DILTIAZEM HYDROCHLORIDE 180 MG/1
360 CAPSULE, COATED, EXTENDED RELEASE ORAL DAILY
Qty: 180 CAPSULE | Refills: 3 | Status: SHIPPED | OUTPATIENT
Start: 2017-09-25 | End: 2017-12-26

## 2017-09-25 NOTE — LETTER
[facility]  [address] [place] [address], [place] [address]                  [phone]   October 3, 2017    Hugo Weber  [address]   [facility] [id number]      Dear Hugo,    Here is a summary of your recent test results:    Labs are overall quite good, hepatitis C is negative.     Ferritin is a little low.   Microscopic blood in your urine and PSA has increased by >0.75.     We advise:     Continue current cares.   Balanced low cholesterol diet.   Regular exercise.   Weight loss.   Urology consult.   Increase Iron supplement to 2 daily. (iron sulfate 324 mg daily(     For additional lab test information, labtestsonline.org is an excellent reference.     Your test results are enclosed.      Please contact me if you have any questions.    In addition, here is a list of due or overdue Health Maintenance reminders.    Health Maintenance Due   Topic Date Due     Colon Cancer Screening - FIT Test - yearly  01/19/2016       Please call us at [phone] (or use [employer]) to address the above recommendations.            Thank you very much for trusting [facility]..     Healthy regards,        Brett Cassidy M.D.        Results for orders placed or performed in visit on 09/25/17   Comprehensive metabolic panel   Result Value Ref Range    Sodium 137 133 - 144 mmol/L    Potassium 4.3 3.4 - 5.3 mmol/L    Chloride 102 94 - 109 mmol/L    Carbon Dioxide 29 20 - 32 mmol/L    Anion Gap 6 3 - 14 mmol/L    Glucose 98 70 - 99 mg/dL    Urea Nitrogen 18 7 - 30 mg/dL    Creatinine 0.82 0.66 - 1.25 mg/dL    GFR Estimate >90 >60 mL/min/1.7m2    GFR Estimate If Black >90 >60 mL/min/1.7m2    Calcium 8.8 8.5 - 10.1 mg/dL    Bilirubin Total 0.4 0.2 - 1.3 mg/dL    Albumin 3.7 3.4 - 5.0 g/dL    Protein Total 7.7 6.8 - 8.8 g/dL    Alkaline Phosphatase 145 40 - 150 U/L    ALT 23 0 - 70 U/L    AST 17 0 - 45 U/L   Lipid panel reflex to direct LDL   Result Value Ref Range    Cholesterol 167  <200 mg/dL    Triglycerides 129 <150 mg/dL    HDL Cholesterol 45 >39 mg/dL    LDL Cholesterol Calculated 96 <100 mg/dL    Non HDL Cholesterol 122 <130 mg/dL   CK total   Result Value Ref Range    CK Total 129 30 - 300 U/L   CBC with platelets   Result Value Ref Range    WBC 8.2 4.0 - 11.0 10e9/L    RBC Count 5.20 4.4 - 5.9 10e12/L    Hemoglobin 15.4 13.3 - 17.7 g/dL    Hematocrit 46.5 40.0 - 53.0 %    MCV 89 78 - 100 fl    MCH 29.6 26.5 - 33.0 pg    MCHC 33.1 31.5 - 36.5 g/dL    RDW 13.5 10.0 - 15.0 %    Platelet Count 257 150 - 450 10e9/L   TSH with free T4 reflex   Result Value Ref Range    TSH 2.58 0.40 - 4.00 mU/L   Prostate spec antigen screen   Result Value Ref Range    PSA 2.76 0 - 4 ug/L   Hemoglobin A1c   Result Value Ref Range    Hemoglobin A1C 5.7 4.3 - 6.0 %   Albumin Random Urine Quantitative with Creat Ratio   Result Value Ref Range    Creatinine Urine 108 mg/dL    Albumin Urine mg/L 12 mg/L    Albumin Urine mg/g Cr 10.74 0 - 17 mg/g Cr   *UA reflex to Microscopic and Culture ([facility] (except [place] and [place])   Result Value Ref Range    Color Urine Yellow     Appearance Urine Clear     Glucose Urine Negative NEG^Negative mg/dL    Bilirubin Urine Negative NEG^Negative    Ketones Urine Negative NEG^Negative mg/dL    Specific Gravity Urine 1.020 1.003 - 1.035    Blood Urine Trace (A) NEG^Negative    pH Urine 6.0 5.0 - 7.0 pH    Protein Albumin Urine Negative NEG^Negative mg/dL    Urobilinogen Urine 1.0 0.2 - 1.0 EU/dL    Nitrite Urine Negative NEG^Negative    Leukocyte Esterase Urine Negative NEG^Negative    Source Midstream Urine    Iron and iron binding capacity   Result Value Ref Range    Iron 132 35 - 180 ug/dL    Iron Binding Cap 356 240 - 430 ug/dL    Iron Saturation Index 37 15 - 46 %   Ferritin   Result Value Ref Range    Ferritin 25 (L) 26 - 388 ng/mL   Vitamin B12   Result Value Ref Range    Vitamin B12 254 193 - 986 pg/mL   Folate   Result Value Ref Range    Folate  13.3 >5.4 ng/mL   Hepatitis C Screen Reflex to HCV RNA Quant and Genotype   Result Value Ref Range    Hepatitis C Antibody Reactive (A) NR^Nonreactive   Urine Microscopic   Result Value Ref Range    WBC Urine O - 2 OTO2^O - 2 /HPF    RBC Urine 2-5 (A) OTO2^O - 2 /HPF    Bacteria Urine Few (A) NEG^Negative /HPF   Hepatitis C RNA Quantitative   Result Value Ref Range    HCV RNA Quant IU/ml HCV RNA Not Detected HCVND^HCV RNA Not Detected [IU]/mL    Log of HCV RNA Qt Not Calculated <1.2 Log IU/mL

## 2017-09-25 NOTE — PATIENT INSTRUCTIONS
[facility]                        To reach your care team during and after hours:   [phone]  To reach our pharmacy:        [phone]    Clinic Hours                        Our clinic hours are:    Monday   7:30 am to 7:00 pm                  Tuesday through Friday 7:30 am to 5:00 pm                             Saturday   8:00 am to 12:00 pm      Sunday   Closed      Pharmacy Hours                        Our pharmacy hours are:    Monday   8:30 am to 7:00 pm       Tuesday to Friday  8:30 am to 6:00 pm                       Saturday    9:00 am to 1:00 pm              Sunday    Closed              There is also information available at our web site:  [email or web address]    If your provider ordered any lab tests and you do not receive the results within 10 business days, please call the clinic.    If you need a medication refill please contact your pharmacy.  Please allow 2-3 business days for your refill to be completed.    Our clinic offers telephone visits and e visits.  Please ask one of your team members to explain more.      Use [employer] (secure email communication and access to your chart) to send your primary care provider a message or make an appointment. Ask someone on your Team how to sign up for [employer].  Immunizations                      Immunization History   Administered Date(s) Administered     Influenza (High Dose) 3 valent vaccine 09/20/2012, 12/21/2013, 10/20/2015     Influenza (IIV3) 10/26/2007, 12/22/2008, 09/17/2009, 10/14/2011     Pneumococcal 23 valent 05/11/2005, 09/20/2012     TD (ADULT, 7+) 11/30/1998     TDAP Vaccine (Adacel) 06/26/2007     TDAP Vaccine (Boostrix) 10/20/2015     Twinrix A/B 05/11/2005, 06/26/2007, 09/04/2008        Health Maintenance                         Health Maintenance Due   Topic Date Due     Pneumococcal Vaccine (2 of 2 - PCV13) 09/20/2013     Cholesterol Lab - yearly  03/10/2017     Microalbumin Lab - yearly  03/10/2017     Prostate Test  (PSA) - yearly  03/10/2017     Wellness Visit with your Primary Provider - yearly  03/10/2017     FALL RISK ASSESSMENT  05/27/2017     Basic Metabolic Lab - yearly  09/19/2017     Flu Vaccine - yearly  09/01/2017     Pneumonia Vaccine (2) 09/20/2017           Services Typically covered by Medicare Recommended Completed   Vaccines    Pneumonoccol    Influenza    Hepatitis B (if medium/high risk)     Once for patients after age 65    Yearly  Medium/high risk factors:    End Stage Kidney Disease    Hemophiliacs who received Factor XIII or IX concentrates    Clients of institutions for developmentally disabled    Persons who live in same house as a Hepatitis B carrier    Homosexual men    Illicit injectable drug users    Health care workers     Mammogram Covered: One-time screen between age 35-39, annually for age 40+     Pap and Pelvic Exam Covered: Annually if  high risk,  or childbearing age with abnormal Pap in last 3 years.  Q24 months for all other women     Prostate Cancer Screening    Digital rectal exam    PSA Covered: Annually for all men > age 50     Corolrectal Cancer Screening Screening colonoscopy every 10 years, more often for high risk patients     Diabetes Self-Management Training Requires referral by treating physician for patient with diabetes     Diabetes Screening    Fasting blood sugar or glucose tolerance test   Once yearly, twice yearly if prediabetic     Cardiovascular Screening Blood Tests    Total Cholesterol    HDL    Triglycerides Every 5 years     Medical Nutrition Therapy for Diabetes or Renal Disease Requires referral by treating physician for patient with diabetes or kidney disease     Glaucoma Screening Annually for patients with one of the following risk factors:    Diabetes Mellitus    Family history of Glaucoma    -American age 50 and over    -American age 65 and over     Bone Mass Measurement Every 24 months if one of the following risk factors:    Estrogen deficiency     Vertebral abnormalities on x-ray indicative of Osteoporosis, Osteopenia, or Vertebral fracture    Receiving/expected to receive the equivalent of at least 5 mg of Prednisone per day for > 3 months    Hyperparathyroidism    Patient being monitored for response to Osteoporosis Therapy     One-time AAA screen  Must be ordered as part of Medicare IPPE   Any patient with a family history of AAA    Males Age 65-75, with history of smoking at least 100 cigarettes in lifetime     Smoking Cessation Counseling Beneficiaries who use tobacco are eligible to receive 2 cessation attempts per year; each attempt includes maximum of 4 sessions     HIV Screening Annually for beneficiaries at increased risk:       Increased risk for HIV infection is defined in the  National Coverage Determinations (NCD) Manual,  Publication 100-03 Sections 190.14 (diagnostic) and 210.7 (screening). See http://www.cms.gov/manuals/downloads/jat131n8_Ohma6.pdf and http://www.cms.gov/manuals/downloads/hga584p1_Cxhl5.pdf on the Internet.  Three times per pregnancy for beneficiaries who are pregnant.     Future Annual Wellness Visit Annually, for all beneficiaries.       Preventive Health Recommendations:       Male Ages 65 and over    Yearly exam:             See your health care provider every year in order to  o   Review health changes.   o   Discuss preventive care.    o   Review your medicines if your doctor has prescribed any.    Talk with your health care provider about whether you should have a test to screen for prostate cancer (PSA).    Every 3 years, have a diabetes test (fasting glucose). If you are at risk for diabetes, you should have this test more often.    Every 5 years, have a cholesterol test. Have this test more often if you are at risk for high cholesterol or heart disease.     Every 10 years, have a colonoscopy. Or, have a yearly FIT test (stool test). These exams will check for colon cancer.    Talk to with your health care  provider about screening for Abdominal Aortic Aneurysm if you have a family history of AAA or have a history of smoking.  Shots:     Get a flu shot each year.     Get a tetanus shot every 10 years.     Talk to your doctor about your pneumonia vaccines. There are now two you should receive - Pneumovax (PPSV 23) and Prevnar (PCV 13).    Talk to your doctor about a shingles vaccine.     Talk to your doctor about the hepatitis B vaccine.  Nutrition:     Eat at least 5 servings of fruits and vegetables each day.     Eat whole-grain bread, whole-wheat pasta and brown rice instead of white grains and rice.     Talk to your doctor about Calcium and Vitamin D.   Lifestyle    Exercise for at least 150 minutes a week (30 minutes a day, 5 days a week). This will help you control your weight and prevent disease.     Limit alcohol to one drink per day.     No smoking.     Wear sunscreen to prevent skin cancer.     See your dentist every six months for an exam and cleaning.     See your eye doctor every 1 to 2 years to screen for conditions such as glaucoma, macular degeneration and cataracts.

## 2017-09-25 NOTE — PROGRESS NOTES
SUBJECTIVE:   Hugo Weber is a 72 year old male who presents for Preventive Visit.    Are you in the first 12 months of your Medicare coverage?  No    Physical   Annual:     Getting at least 3 servings of Calcium per day::  Yes    Bi-annual eye exam::  Yes    Dental care twice a year::  NO    Sleep apnea or symptoms of sleep apnea::  Sleep apnea    Diet::  Regular (no restrictions)    Frequency of exercise::  2-3 days/week    Duration of exercise::  Greater than 60 minutes    Taking medications regularly::  Yes    Medication side effects::  None    Additional concerns today::  No    COGNITIVE SCREEN  1) Repeat 3 items (Banana, Sunrise, Chair)    2) Clock draw: NORMAL  3) 3 item recall: Recalls 2 objects   Results: NORMAL clock, 1-2 items recalled: COGNITIVE IMPAIRMENT LESS LIKELY    Mini-CogTM Copyright S Hubert. Licensed by the author for use in [place] [employer]; reprinted with permission (josi@[facility]). All rights reserved.      HAYLEE -- Using CPAP nightly, no concerns currently.     Right Ear/Jaw Pain -- Improving, still has sx.     GERD -- Protonix 40 mg daily. He reported d/c'ing for a bit, and had to restart due to sx.     NAFLD/Hx of Hep C -- Hx of treatment in past, no concerns currently.     A-fib -- Xarelto 20 mg daily, Flecainide Acetate 150 mg twice daily.     Weight Management -- Hx of gastric bypass surgery.     Wt Readings from Last 5 Encounters:   09/25/17 (!) 354 lb (160.6 kg)   09/14/17 (!) 350 lb (158.8 kg)   06/05/17 (!) 351 lb (159.2 kg)   06/01/17 (!) 340 lb (154.2 kg)   12/02/16 (!) 347 lb (157.4 kg)     HTN -- Cardizem  mg daily.     BP Readings from Last 3 Encounters:   09/25/17 138/82   09/14/17 138/70   06/05/17 146/58     Prediabetes --   Lab Results   Component Value Date    A1C 5.8 03/30/2016    A1C 5.6 12/18/2013     Glucose   Date Value Ref Range Status   09/19/2016 132 (H) 70 - 99 mg/dL Final     Creatinine   Date Value Ref Range Status   09/19/2016 0.81 0.66 - 1.25  mg/dL Final     Lipids --   Recent Labs   Lab Test  03/10/16   0815  01/13/15   0815  09/03/14   0810   CHOL  166  131  156   HDL  38*  43  42   LDL  105*  74  93   TRIG  117  72  103   CHOLHDLRATIO   --   3.0  3.7       Past/recent records reviewed and discussed for -- family hx, social hx, surgical hx, medications, immunizations, allergies.      Reviewed and updated as needed this visit by clinical staff  Tobacco  Allergies  Meds  Med Hx  Surg Hx  Fam Hx  Soc Hx        Reviewed and updated as needed this visit by Provider  Allergies        Social History   Substance Use Topics     Smoking status: Never Smoker     Smokeless tobacco: Never Used     Alcohol use 1.2 oz/week     2 Standard drinks or equivalent per week      Comment: 2 per week     The patient does not drink >3 drinks per day nor >7 drinks per week.    Today's PHQ-2 Score:   PHQ-2 ( 1999 Pfizer) 9/25/2017   Q1: Little interest or pleasure in doing things 0   Q2: Feeling down, depressed or hopeless 0   PHQ-2 Score 0   Q1: Little interest or pleasure in doing things Not at all   Q2: Feeling down, depressed or hopeless Not at all   PHQ-2 Score 0     Do you feel safe in your environment - Yes    Do you have a Health Care Directive?: Yes: Advance Directive has been received and scanned.    Current providers sharing in care for this patient include:   Patient Care Team:  Brett Cassidy MD as PCP - General      Hearing impairment: No    Ability to successfully perform activities of daily living: Yes, no assistance needed     Fall risk:  Fallen 2 or more times in the past year?: No  Any fall with injury in the past year?: Yes    Home safety:  none identified    The following health maintenance items are reviewed in Epic and correct as of today:  Health Maintenance   Topic Date Due     PNEUMOCOCCAL (2 of 2 - PCV13) 09/20/2013     FIT Q1 YR  01/19/2016     LIPID MONITORING Q1 YEAR  03/10/2017     MICROALBUMIN Q1 YEAR  03/10/2017     PSA Q1 YR  03/10/2017      WELLNESS VISIT Q1 YR  03/10/2017     FALL RISK ASSESSMENT  05/27/2017     BMP Q1 YR  09/19/2017     INFLUENZA VACCINE (SYSTEM ASSIGNED)  09/01/2017     PNEUMO 5YR BOOST DUE AFTER AGE 65 (NO IB MSG) (2) 09/20/2017     COLONOSCOPY Q5 YR  09/07/2021     ADVANCE DIRECTIVE PLANNING Q5 YRS  09/25/2022     TETANUS Q10 YR  10/20/2025     AORTIC ANEURYSM SCREENING (SYSTEM ASSIGNED)  Completed     HEPATITIS C SCREENING  Completed     Health Maintenance     Colonoscopy:  5 years, due 9/21 (last 9/16)   FIT:  Yearly, due (last 1/15)              PSA:  Yearly, due (last 3/16)   DEXA:  n/a    Health Maintenance Due   Topic Date Due     PNEUMOCOCCAL (2 of 2 - PCV13) 09/20/2013     FIT Q1 YR  01/19/2016     LIPID MONITORING Q1 YEAR  03/10/2017     MICROALBUMIN Q1 YEAR  03/10/2017     PSA Q1 YR  03/10/2017     WELLNESS VISIT Q1 YR  03/10/2017     FALL RISK ASSESSMENT  05/27/2017     BMP Q1 YR  09/19/2017     INFLUENZA VACCINE (SYSTEM ASSIGNED)  09/01/2017     PNEUMO 5YR BOOST DUE AFTER AGE 65 (NO IB MSG) (2) 09/20/2017       Current Problem List    Patient Active Problem List   Diagnosis     Iron deficiency anemia     Colon polyps     Obstructive sleep apnea syndrome     Hyperlipidemia LDL goal <100     Long term current use of anticoagulant therapy - for intermittent a-fib     Advance Care Planning     Total knee replacement status     Calculus of kidney     NAFLD (nonalcoholic fatty liver disease)     Morbid obesity due to excess calories (H)     History of hepatitis C     Prediabetes     Paroxysmal atrial fibrillation (H)     Cholelithiasis     Hypertension goal BP (blood pressure) < 140/90       Past Medical History    Past Medical History:   Diagnosis Date     Arrhythmia      Arthritis      Atrial fibrillation 2006    Followed by [facility]     Calculus of kidney 2005, 6/06, 10/12    dr walker/nestor - hematuria - w/u o/w neg     Cholelithiasis      Chronic peptic ulcer, unspecified site, without mention of hemorrhage,  perforation, or obstruction 1985    gastric bypass     Colon polyps 8/05, 9/10, 10/15    2 tubular adenoma, 1 hyperplastic - multiple serrated/tubular adenomas     Hepatitis C 10/12    chronic hepatitis C, grade 1-2, stage 1 - mild - Dr Douglas     Hyperlipidemia LDL goal <130      Hypertension goal BP (blood pressure) < 140/90 6/06    dr jaciel winchester     Iron deficiency anemia, unspecified 1985    gastric bypass     Obesity, unspecified     s/p gastric bypass 1985      Prediabetes      Presbyacusis 1/04    dr corona     Pyelonephritis, unspecified 12/99     SCC (squamous cell carcinoma), 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    dr saez - lt conchal bowl     Sleep apnea 10/02    cpap - severe - 15 cm - dr cunha       Past Surgical History    Past Surgical History:   Procedure Laterality Date     ARTHROPLASTY KNEE  8/13/2012    LT TKA - Dr Villanueva     CARDIOVERSION  2016     COLONOSCOPY  8/05, 9/10, 10/15    multiple tubular/serrated/hyperplastic polyps     COLONOSCOPY N/A 10/29/2015    multiple tubular and serrated adenomas     COLONOSCOPY N/A 9/7/2016     HC KNEE SCOPE, DIAGNOSTIC  05/00    Arthroscopy, Knee RT     LASER HOLMIUM LITHOTRIPSY URETER(S), INSERT STENT, COMBINED  10/16/2012    stones x 4, Dr Campa     SURGICAL HISTORY OF -   1985    s/p gastric bypass Bilroth II     SURGICAL HISTORY OF - 11/98    wisdom teeth     SURGICAL HISTORY OF -   1979    cellulitis     SURGICAL HISTORY OF - 11/98    lt forearm spur's     SURGICAL HISTORY OF -   1/01,6/02,11/02    s/p lasik     SURGICAL HISTORY OF -   11/99    rt forearm spurs     SURGICAL HISTORY OF -   7/06    dr stevenson - lithotrypsy     SURGICAL HISTORY OF -   10/08, 12/08    Lt ear chonchal lesion removal SCC - dr saez       Current Medications    Current Outpatient Prescriptions   Medication Sig Dispense Refill     fluticasone (FLONASE) 50 MCG/ACT spray Spray 1-2 sprays into both nostrils daily 48 mL 3     pantoprazole (PROTONIX) 40 MG EC tablet Take 1 tablet (40 mg) by mouth  daily 90 tablet 3     diltiazem (CARDIZEM CD) 180 MG 24 hr capsule Take 2 capsules (360 mg) by mouth daily 180 capsule 3     rivaroxaban ANTICOAGULANT (XARELTO) 20 MG TABS tablet Take 1 tablet (20 mg) by mouth daily (with dinner) 90 tablet 3     flecainide acetate 150 MG TABS Take 1 tablet (150 mg) by mouth every 12 hours 180 tablet 3     azelastine (ASTELIN) 0.1 % spray Spray 1-2 sprays into both nostrils 2 times daily 3 Bottle 3     Ferrous Sulfate (IRON) 325 (65 FE) MG tablet Take 1 tablet by mouth daily (with breakfast) 90 tablet 3     [DISCONTINUED] diltiazem (CARDIZEM CD) 180 MG 24 hr capsule Take 2 capsules (360 mg) by mouth daily 180 capsule 2     [DISCONTINUED] rivaroxaban ANTICOAGULANT (XARELTO) 20 MG TABS tablet Take 1 tablet (20 mg) by mouth daily (with dinner) 90 tablet 2     [DISCONTINUED] flecainide acetate 150 MG TABS Take 1 tablet (150 mg) by mouth every 12 hours 180 tablet 2       Allergies    No Known Allergies    Immunizations    Immunization History   Administered Date(s) Administered     Influenza (High Dose) 3 valent vaccine 2012, 2013, 10/20/2015, 2017     Influenza (IIV3) 10/26/2007, 2008, 2009, 10/14/2011     Pneumococcal (PCV 13) 2017     Pneumococcal 23 valent 2005, 2012     TD (ADULT, 7+) 1998     TDAP Vaccine (Adacel) 2007     TDAP Vaccine (Boostrix) 10/20/2015     Twinrix A/B 2005, 2007, 2008       Family History    Family History   Problem Relation Age of Onset     Alzheimer Disease Father 82      at 88     Prostate Cancer Father 76     bladder and prostate     HEART DISEASE Mother 80     CHF     HEART DISEASE Maternal Grandfather      MI at 55     CANCER Paternal Uncle      ?       Social History    Social History     Social History     Marital status:      Spouse name: Mayra     Number of children: 3     Years of education: 21     Occupational History     retired teacher and football and  "  [phone]      Retired     Social History Main Topics     Smoking status: Never Smoker     Smokeless tobacco: Never Used     Alcohol use 1.2 oz/week     2 Standard drinks or equivalent per week      Comment: 2 per week     Drug use: No      Comment: acknowledges using herbal supplement \"Vibe\" for energy-none used recently      Sexual activity: Yes     Partners: Female     Birth control/ protection: None      Comment:       Other Topics Concern     Caffeine Concern Yes     <1 can qd     Sleep Concern Yes     sleep apnea, wears cpap     Exercise No     Seat Belt Yes     Parent/Sibling W/ Cabg, Mi Or Angioplasty Before 65f 55m? No     Social History Narrative       ROS:  Constitutional, HEENT, cardiovascular, pulmonary, GI, , musculoskeletal, neuro, skin, endocrine and psych systems are negative, except as otherwise noted.    This document serves as a record of the services and decisions personally performed and made by Brett Cassidy MD [facility]. It was created on their behalf by Sanya Álvarez, a trained medical scribe. The creation of this document is based the provider's statements to the medical scribe.  Sanya Álvarez September 25, 2017 9:11 AM    OBJECTIVE:   /82  Pulse 63  Temp 97.7  F (36.5  C) (Oral)  Ht 6' (1.829 m)  Wt (!) 354 lb (160.6 kg)  SpO2 95%  BMI 48.01 kg/m2 Estimated body mass index is 48.01 kg/(m^2) as calculated from the following:    Height as of this encounter: 6' (1.829 m).    Weight as of this encounter: 354 lb (160.6 kg).  EXAM:   GENERAL: healthy, alert and no distress, morbidly obese   HENT: ear canals and TM's normal upon viewing with otoscope, nose and mouth without ulcers or lesions upon viewing with otoscope  RESP: lungs clear to auscultation - no rales, rhonchi or wheezes  CV: regular rate and rhythm, normal S1 S2, no S3 or S4, grade 1-2/6 systolic murmur, click or rub, no peripheral edema and peripheral pulses strong  ABDOMEN: soft, " nontender, no hepatosplenomegaly, no masses and bowel sounds normal   (male): testicles normal without atrophy or masses, no hernias and penis normal without urethral discharge  RECTAL: normal sphincter tone, no rectal masses and prostate 1+ in size and firm, smooth, nontender without masses/nodules  MS: no gross musculoskeletal defects noted, no edema  SKIN: no suspicious lesions or rashes to visible skin  NEURO: mentation intact and speech normal  PSYCH: mentation appears normal, affect normal/bright    ASSESSMENT / PLAN:       ICD-10-CM    1. Encounter for routine adult health examination without abnormal findings Z00.00 Comprehensive metabolic panel     Lipid panel reflex to direct LDL     CK total     CBC with platelets     TSH with free T4 reflex     Prostate spec antigen screen     Hemoglobin A1c     Albumin Random Urine Quantitative with Creat Ratio     *UA reflex to Microscopic and Culture ([facility] (except [place] and [place])     Fecal colorectal cancer screen (FIT)     Iron and iron binding capacity     Ferritin     Vitamin B12     Folate     Hepatitis C Screen Reflex to HCV RNA Quant and Genotype     Urine Microscopic   2. NAFLD (nonalcoholic fatty liver disease) K76.0 Comprehensive metabolic panel   3. History of hepatitis C Z86.19 Comprehensive metabolic panel     Hepatitis C Screen Reflex to HCV RNA Quant and Genotype   4. Paroxysmal atrial fibrillation (H) I48.0 Comprehensive metabolic panel     TSH with free T4 reflex     diltiazem (CARDIZEM CD) 180 MG 24 hr capsule     rivaroxaban ANTICOAGULANT (XARELTO) 20 MG TABS tablet     flecainide acetate 150 MG TABS   5. Prediabetes R73.03 Comprehensive metabolic panel     Lipid panel reflex to direct LDL     Hemoglobin A1c     Albumin Random Urine Quantitative with Creat Ratio     *UA reflex to Microscopic and Culture ([facility] (except [place] and [place])   6. Hypertension goal BP (blood pressure) < 140/90 I10  Comprehensive metabolic panel     Albumin Random Urine Quantitative with Creat Ratio     *UA reflex to Microscopic and Culture ([facility] (except [place] and [place])     diltiazem (CARDIZEM CD) 180 MG 24 hr capsule   7. Hyperlipidemia LDL goal <100 E78.5 Comprehensive metabolic panel     Lipid panel reflex to direct LDL     CK total   8. Obstructive sleep apnea syndrome G47.33 TSH with free T4 reflex     Ferritin     fluticasone (FLONASE) 50 MCG/ACT spray     azelastine (ASTELIN) 0.1 % spray   9. Environmental allergies Z91.09 fluticasone (FLONASE) 50 MCG/ACT spray     azelastine (ASTELIN) 0.1 % spray   10. Iron deficiency anemia, unspecified iron deficiency anemia type D50.9 CBC with platelets     Iron and iron binding capacity     Ferritin     Ferrous Sulfate (IRON) 325 (65 FE) MG tablet   11. Morbid obesity, unspecified obesity type (H) E66.01    12. Polyp of colon, unspecified part of colon, unspecified type K63.5 Fecal colorectal cancer screen (FIT)   13. H/O gastric bypass Z98.890 Comprehensive metabolic panel     Lipid panel reflex to direct LDL     CBC with platelets     Hemoglobin A1c     Albumin Random Urine Quantitative with Creat Ratio     *UA reflex to Microscopic and Culture ([facility] (except [place] and [place])     Iron and iron binding capacity     Ferritin     Vitamin B12     Folate     pantoprazole (PROTONIX) 40 MG EC tablet   14. Screening for prostate cancer Z12.5 Prostate spec antigen screen   15. Screen for colon cancer Z12.11 Fecal colorectal cancer screen (FIT)   16. Long term current use of anticoagulant therapy - for intermittent a-fib Z79.01    17. Medication monitoring encounter Z51.81 Comprehensive metabolic panel     Lipid panel reflex to direct LDL     CK total     CBC with platelets     TSH with free T4 reflex     Hemoglobin A1c     Albumin Random Urine Quantitative with Creat Ratio     *UA reflex to Microscopic and Culture ([facility] [place] [facility] [place])     Iron and iron binding capacity     Ferritin     Vitamin B12     Folate     Hepatitis C Screen Reflex to HCV RNA Quant and Genotype   18. At risk for falling Z91.81    19. Need for prophylactic vaccination and inoculation against influenza Z23 Vaccine Administration, Initial [90259]     FLU VACCINE, INCREASED ANTIGEN, PRESV FREE, AGE 65+ [91550]   20. Need for prophylactic vaccination against Streptococcus pneumoniae (pneumococcus) Z23 PNEUMOCOCCAL CONJ VACCINE 13 VALENT IM     Discussed treatment/modality options, including risk and benefits, he desires advised alcohol consumption 1oz per day or less, advised aspirin 81 mg po daily, advised 1 multivitamin per day, advised calcium 2252-1319 mg/d and Vitamin D 800-1200 IU/d, advised dentist every 6 months, advised diet, exercise, and weight loss, advised opthalmologist every 1-2 years, advised self testicular exam q month, further diagnostic(s), further health care maintenance, further lab(s), immunizations (influenza, prevnar 13), medication refill(s), OTC meds and observation. All diagnosis above reviewed and noted above, otherwise stable.  See Personal orders for further details.  Follow up in 6 month(s) and as needed.    Health Maintenance Due   Topic Date Due     PNEUMOCOCCAL (2 of 2 - PCV13) 09/20/2013     FIT Q1 YR  01/19/2016     LIPID MONITORING Q1 YEAR  03/10/2017     MICROALBUMIN Q1 YEAR  03/10/2017     PSA Q1 YR  03/10/2017     WELLNESS VISIT Q1 YR  03/10/2017     FALL RISK ASSESSMENT  05/27/2017     BMP Q1 YR  09/19/2017     INFLUENZA VACCINE (SYSTEM ASSIGNED)  09/01/2017     PNEUMO 5YR BOOST DUE AFTER AGE 65 (NO IB MSG) (2) 09/20/2017     End of Life Planning:  Patient currently has an advanced directive: Yes.  Practitioner is supportive of decision.    COUNSELING:  Reviewed preventive health counseling, as reflected in patient instructions     Estimated body mass index is 48.01 kg/(m^2) as calculated  from the following:    Height as of this encounter: 6' (1.829 m).    Weight as of this encounter: 354 lb (160.6 kg).  Weight management plan: Discussed healthy diet and exercise guidelines and patient will follow up in 12 months in clinic to re-evaluate.   reports that he has never smoked. He has never used smokeless tobacco.    Appropriate preventive services were discussed with this patient, including applicable screening as appropriate for cardiovascular disease, diabetes, osteopenia/osteoporosis, and glaucoma.  As appropriate for age/gender, discussed screening for colorectal cancer, prostate cancer, breast cancer, and cervical cancer. Checklist reviewing preventive services available has been given to the patient.    Reviewed patients plan of care and provided an AVS. The Basic Care Plan (routine screening as documented in Health Maintenance) for Hugo meets the Care Plan requirement. This Care Plan has been established and reviewed with the Patient.    Counseling Resources:  ATP IV Guidelines  Pooled Cohorts Equation Calculator  Breast Cancer Risk Calculator  FRAX Risk Assessment  ICSI Preventive Guidelines  Dietary Guidelines for Americans, 2010  [employer]'s MyPlate  ASA Prophylaxis  Lung CA Screening    The information in this document, created by the medical scribe for me, accurately reflects the services I personally performed and the decisions made by me. I have reviewed and approved this document for accuracy.   Brett Cassidy MD [facility]     Brett Cassidy MD  [facility]    Injectable Influenza Immunization Documentation    1.  Is the person to be vaccinated sick today?   No    2. Does the person to be vaccinated have an allergy to a component   of the vaccine?   No    3. Has the person to be vaccinated ever had a serious reaction   to influenza vaccine in the past?   No    4. Has the person to be vaccinated ever had Guillain-Barré syndrome?   No    Form completed by Gianfranco Montero  Assistant

## 2017-09-25 NOTE — NURSING NOTE
Chief Complaint   Patient presents with     Wellness Visit       Initial /78  Pulse 63  Temp 97.7  F (36.5  C) (Oral)  Ht 6' (1.829 m)  Wt (!) 354 lb (160.6 kg)  SpO2 95%  BMI 48.01 kg/m2 Estimated body mass index is 48.01 kg/(m^2) as calculated from the following:    Height as of this encounter: 6' (1.829 m).    Weight as of this encounter: 354 lb (160.6 kg)..  BP completed using cuff size: heather Bowling MA

## 2017-09-25 NOTE — MR AVS SNAPSHOT
After Visit Summary   9/25/2017    Hugo Weber    MRN: [id number]           Patient Information     Date Of Birth          1945        Visit Information        Provider Department      9/25/2017 8:40 AM Brett Cassidy MD [facility]        Today's Diagnoses     Encounter for routine adult health examination without abnormal findings    -  1    NAFLD (nonalcoholic fatty liver disease)        History of hepatitis C        Paroxysmal atrial fibrillation (H)        Prediabetes        Hypertension goal BP (blood pressure) < 140/90        Hyperlipidemia LDL goal <100        Obstructive sleep apnea syndrome        Environmental allergies        Iron deficiency anemia, unspecified iron deficiency anemia type        Morbid obesity, unspecified obesity type (H)        Polyp of colon, unspecified part of colon, unspecified type        H/O gastric bypass        Screening for prostate cancer        Screen for colon cancer        Long term current use of anticoagulant therapy - for intermittent a-fib        Medication monitoring encounter        At risk for falling        Need for prophylactic vaccination and inoculation against influenza        Need for prophylactic vaccination against Streptococcus pneumoniae (pneumococcus)          Care Instructions        [facility]                        To reach your care team during and after hours:   [phone]  To reach our pharmacy:        [phone]    Clinic Hours                        Our clinic hours are:    Monday   7:30 am to 7:00 pm                  Tuesday through Friday 7:30 am to 5:00 pm                             Saturday   8:00 am to 12:00 pm      Sunday   Closed      Pharmacy Hours                        Our pharmacy hours are:    Monday   8:30 am to 7:00 pm       Tuesday to Friday  8:30 am to 6:00 pm                       Saturday    9:00 am to 1:00 pm              Sunday    Closed              There is also  information available at our web site:  [email or web address]    If your provider ordered any lab tests and you do not receive the results within 10 business days, please call the clinic.    If you need a medication refill please contact your pharmacy.  Please allow 2-3 business days for your refill to be completed.    Our clinic offers telephone visits and e visits.  Please ask one of your team members to explain more.      Use [employer] (secure email communication and access to your chart) to send your primary care provider a message or make an appointment. Ask someone on your Team how to sign up for Armasightt.  Immunizations                      Immunization History   Administered Date(s) Administered     Influenza (High Dose) 3 valent vaccine 09/20/2012, 12/21/2013, 10/20/2015     Influenza (IIV3) 10/26/2007, 12/22/2008, 09/17/2009, 10/14/2011     Pneumococcal 23 valent 05/11/2005, 09/20/2012     TD (ADULT, 7+) 11/30/1998     TDAP Vaccine (Adacel) 06/26/2007     TDAP Vaccine (Boostrix) 10/20/2015     Twinrix A/B 05/11/2005, 06/26/2007, 09/04/2008        Health Maintenance                         Health Maintenance Due   Topic Date Due     Pneumococcal Vaccine (2 of 2 - PCV13) 09/20/2013     Cholesterol Lab - yearly  03/10/2017     Microalbumin Lab - yearly  03/10/2017     Prostate Test (PSA) - yearly  03/10/2017     Wellness Visit with your Primary Provider - yearly  03/10/2017     FALL RISK ASSESSMENT  05/27/2017     Basic Metabolic Lab - yearly  09/19/2017     Flu Vaccine - yearly  09/01/2017     Pneumonia Vaccine (2) 09/20/2017           Services Typically covered by Medicare Recommended Completed   Vaccines    Pneumonoccol    Influenza    Hepatitis B (if medium/high risk)     Once for patients after age 65    Yearly  Medium/high risk factors:    End Stage Kidney Disease    Hemophiliacs who received Factor XIII or IX concentrates    Clients of institutions for developmentally disabled    Persons who live in same house  as a Hepatitis B carrier    Homosexual men    Illicit injectable drug users    Health care workers     Mammogram Covered: One-time screen between age 35-39, annually for age 40+     Pap and Pelvic Exam Covered: Annually if  high risk,  or childbearing age with abnormal Pap in last 3 years.  Q24 months for all other women     Prostate Cancer Screening    Digital rectal exam    PSA Covered: Annually for all men > age 50     Corolrectal Cancer Screening Screening colonoscopy every 10 years, more often for high risk patients     Diabetes Self-Management Training Requires referral by treating physician for patient with diabetes     Diabetes Screening    Fasting blood sugar or glucose tolerance test   Once yearly, twice yearly if prediabetic     Cardiovascular Screening Blood Tests    Total Cholesterol    HDL    Triglycerides Every 5 years     Medical Nutrition Therapy for Diabetes or Renal Disease Requires referral by treating physician for patient with diabetes or kidney disease     Glaucoma Screening Annually for patients with one of the following risk factors:    Diabetes Mellitus    Family history of Glaucoma    -American age 50 and over    -American age 65 and over     Bone Mass Measurement Every 24 months if one of the following risk factors:    Estrogen deficiency    Vertebral abnormalities on x-ray indicative of Osteoporosis, Osteopenia, or Vertebral fracture    Receiving/expected to receive the equivalent of at least 5 mg of Prednisone per day for > 3 months    Hyperparathyroidism    Patient being monitored for response to Osteoporosis Therapy     One-time AAA screen  Must be ordered as part of Medicare IPPE   Any patient with a family history of AAA    Males Age 65-75, with history of smoking at least 100 cigarettes in lifetime     Smoking Cessation Counseling Beneficiaries who use tobacco are eligible to receive 2 cessation attempts per year; each attempt includes maximum of 4 sessions     HIV  Screening Annually for beneficiaries at increased risk:       Increased risk for HIV infection is defined in the  National Coverage Determinations (NCD) Manual,  Publication 100-03 Sections 190.14 (diagnostic) and 210.7 (screening). See http://www.cms.gov/manuals/downloads/xkx303y0_Wuvl4.pdf and http://www.cms.gov/manuals/downloads/nvs948r1_Wykv5.pdf on the Internet.  Three times per pregnancy for beneficiaries who are pregnant.     Future Annual Wellness Visit Annually, for all beneficiaries.       Preventive Health Recommendations:       Male Ages 65 and over    Yearly exam:             See your health care provider every year in order to  o   Review health changes.   o   Discuss preventive care.    o   Review your medicines if your doctor has prescribed any.    Talk with your health care provider about whether you should have a test to screen for prostate cancer (PSA).    Every 3 years, have a diabetes test (fasting glucose). If you are at risk for diabetes, you should have this test more often.    Every 5 years, have a cholesterol test. Have this test more often if you are at risk for high cholesterol or heart disease.     Every 10 years, have a colonoscopy. Or, have a yearly FIT test (stool test). These exams will check for colon cancer.    Talk to with your health care provider about screening for Abdominal Aortic Aneurysm if you have a family history of AAA or have a history of smoking.  Shots:     Get a flu shot each year.     Get a tetanus shot every 10 years.     Talk to your doctor about your pneumonia vaccines. There are now two you should receive - Pneumovax (PPSV 23) and Prevnar (PCV 13).    Talk to your doctor about a shingles vaccine.     Talk to your doctor about the hepatitis B vaccine.  Nutrition:     Eat at least 5 servings of fruits and vegetables each day.     Eat whole-grain bread, whole-wheat pasta and brown rice instead of white grains and rice.     Talk to your doctor about Calcium and  Vitamin D.   Lifestyle    Exercise for at least 150 minutes a week (30 minutes a day, 5 days a week). This will help you control your weight and prevent disease.     Limit alcohol to one drink per day.     No smoking.     Wear sunscreen to prevent skin cancer.     See your dentist every six months for an exam and cleaning.     See your eye doctor every 1 to 2 years to screen for conditions such as glaucoma, macular degeneration and cataracts.          Follow-ups after your visit        Future tests that were ordered for you today     Open Future Orders        Priority Expected Expires Ordered    Fecal colorectal cancer screen (FIT) Routine 10/16/2017 12/18/2017 9/25/2017            Who to contact     If you have questions or need follow up information about today's clinic visit or your schedule please contact [facility] directly at [phone].  Normal or non-critical lab and imaging results will be communicated to you by Counsylhart, letter or phone within 4 business days after the clinic has received the results. If you do not hear from us within 7 days, please contact the clinic through Counsylhart or phone. If you have a critical or abnormal lab result, we will notify you by phone as soon as possible.  Submit refill requests through Frengo or call your pharmacy and they will forward the refill request to us. Please allow 3 business days for your refill to be completed.          Additional Information About Your Visit        Frengo Information     Frengo gives you secure access to your electronic health record. If you see a primary care provider, you can also send messages to your care team and make appointments. If you have questions, please call your primary care clinic.  If you do not have a primary care provider, please call [phone] and they will assist you.        Care EveryWhere ID     This is your Care EveryWhere ID. This could be used by other organizations to access your [facility]  medical records  [id number]        Your Vitals Were     Pulse Temperature Height Pulse Oximetry BMI (Body Mass Index)       63 97.7  F (36.5  C) (Oral) 6' (1.829 m) 95% 48.01 kg/m2        Blood Pressure from Last 3 Encounters:   09/25/17 146/78   09/14/17 138/70   06/05/17 146/58    Weight from Last 3 Encounters:   09/25/17 (!) 354 lb (160.6 kg)   09/14/17 (!) 350 lb (158.8 kg)   06/05/17 (!) 351 lb (159.2 kg)              We Performed the Following     *UA reflex to Microscopic and Culture ([place] and [facility] (except [place] and [place])     Albumin Random Urine Quantitative with Creat Ratio     CBC with platelets     CK total     Comprehensive metabolic panel     Ferritin     FLU VACCINE, INCREASED ANTIGEN, PRESV FREE, AGE 65+ [57828]     Folate     Hemoglobin A1c     Hepatitis C Screen Reflex to HCV RNA Quant and Genotype     Iron and iron binding capacity     Lipid panel reflex to direct LDL     PNEUMOCOCCAL CONJ VACCINE 13 VALENT IM     Prostate spec antigen screen     TSH with free T4 reflex     Vaccine Administration, Initial [60016]     Vitamin B12          Today's Medication Changes          These changes are accurate as of: 9/25/17  9:22 AM.  If you have any questions, ask your nurse or doctor.               These medicines have changed or have updated prescriptions.        Dose/Directions    fluticasone 50 MCG/ACT spray   Commonly known as:  FLONASE   This may have changed:  See the new instructions.   Used for:  Environmental allergies, Obstructive sleep apnea syndrome   Changed by:  Brett Cassidy MD        Dose:  1-2 spray   Spray 1-2 sprays into both nostrils daily   Quantity:  48 mL   Refills:  3            Where to get your medicines      These medications were sent to [facility] - [place], [place] - [address]   [address], [facility] [address]     Phone:  [phone]     azelastine 0.1 % spray    diltiazem 180 MG 24 hr capsule    flecainide acetate 150  MG Tabs    fluticasone 50 MCG/ACT spray    iron 325 (65 FE) MG tablet    pantoprazole 40 MG EC tablet    rivaroxaban ANTICOAGULANT 20 MG Tabs tablet                Primary Care Provider Office Phone # Fax #    Brett Cassidy [phone] [address] [facility] [address]        Equal Access to Services     [facility] : Hadii aad ku hadasho Soomaali, waaxda luqadaha, qaybta kaalmada adeegyada, waxay idiin hayaan adeeg khalirezash lacarlos an . So [facility] [phone].    ATENCIÓN: Si habla español, tiene a oconnor disposición servicios gratuitos de asistencia lingüística. [facility] [phone].    We comply with applicable federal civil rights laws and [place] laws. We do not discriminate on the basis of race, color, national origin, age, disability sex, sexual orientation or gender identity.            Thank you!     Thank you for choosing [facility]  for your care. Our goal is always to provide you with excellent care. Hearing back from our patients is one way we can continue to improve our services. Please take a few minutes to complete the written survey that you may receive in the mail after your visit with us. Thank you!             Your Updated Medication List - Protect others around you: Learn how to safely use, store and throw away your medicines at www.disposemymeds.org.          This list is accurate as of: 9/25/17  9:22 AM.  Always use your most recent med list.                   Brand Name Dispense Instructions for use Diagnosis    azelastine 0.1 % spray    ASTELIN    3 Bottle    Spray 1-2 sprays into both nostrils 2 times daily    Obstructive sleep apnea syndrome, Environmental allergies       diltiazem 180 MG 24 hr capsule    CARDIZEM CD    180 capsule    Take 2 capsules (360 mg) by mouth daily    Paroxysmal atrial fibrillation (H), Hypertension goal BP (blood pressure) < 140/90       flecainide acetate 150 MG Tabs     180 tablet    Take 1 tablet (150 mg) by mouth every  12 hours    Paroxysmal atrial fibrillation (H)       fluticasone 50 MCG/ACT spray    FLONASE    48 mL    Spray 1-2 sprays into both nostrils daily    Environmental allergies, Obstructive sleep apnea syndrome       iron 325 (65 FE) MG tablet     90 tablet    Take 1 tablet by mouth daily (with breakfast)    Iron deficiency anemia, unspecified iron deficiency anemia type       pantoprazole 40 MG EC tablet    PROTONIX    90 tablet    Take 1 tablet (40 mg) by mouth daily    H/O gastric bypass       rivaroxaban ANTICOAGULANT 20 MG Tabs tablet    XARELTO    90 tablet    Take 1 tablet (20 mg) by mouth daily (with dinner)    Paroxysmal atrial fibrillation (H)

## 2017-09-28 LAB
HCV RNA SERPL NAA+PROBE-ACNC: NORMAL [IU]/ML
HCV RNA SERPL NAA+PROBE-LOG IU: NORMAL LOG IU/ML

## 2017-09-28 NOTE — PROGRESS NOTES
Hugo  I have reviewed your recent labs. Here are the results:    -Hepatitis C antibody screen test shows no signs of a previous hepatitis C infection.     If you have any questions please do not hesitate to contact our office via phone ([phone]) or Smaatohart.    Tegan Méndez, MS, PA-C  [facility] [place]

## 2017-10-02 ENCOUNTER — TELEPHONE (OUTPATIENT)
Dept: FAMILY MEDICINE | Facility: CLINIC | Age: 72
End: 2017-10-02

## 2017-10-02 DIAGNOSIS — M26.609 TEMPOROMANDIBULAR JOINT DISORDER: Primary | ICD-10-CM

## 2017-10-02 NOTE — TELEPHONE ENCOUNTER
Attempt #1  Called # below - Left a non-detailed message to call back and speak with any triage nurse.    Jennifer Berry RN  [place] Triage

## 2017-10-02 NOTE — TELEPHONE ENCOUNTER
Reason for Call: Request for an order or referral:    Order or referral being requested: Please put in an order in to go see a specialist for his clicking in his jaw & headaches. Has jaw pain    Date needed: as soon as possible    Has the patient been seen by the PCP for this problem? YES    Additional comments: Please call him back with info on who he should go see.    Phone number Patient can be reached at:  Cell number on file:    Telephone Information:   Mobile [phone]       Best Time:  any    Can we leave a detailed message on this number?  YES    Call taken on 10/2/2017 at 9:45 AM by Aicha Brewster

## 2017-10-02 NOTE — TELEPHONE ENCOUNTER
RN spoke with patient.  He had 9/14/2017 visit with TS and TMJ was noted.  He would like referral to specialist.  Will route to TS to review and recommend specialist.    RENETTA Sosa, RN, N  [facility]) [phone]

## 2017-10-02 NOTE — TELEPHONE ENCOUNTER
I called patient and let him know referral sent to [facility]  Jerrica Felipe RN- Triage [employer]

## 2017-10-09 ENCOUNTER — TELEPHONE (OUTPATIENT)
Dept: FAMILY MEDICINE | Facility: CLINIC | Age: 72
End: 2017-10-09

## 2017-10-09 DIAGNOSIS — N40.0 ENLARGED PROSTATE: Primary | ICD-10-CM

## 2017-10-09 DIAGNOSIS — L98.9 SKIN LESION: ICD-10-CM

## 2017-10-09 NOTE — TELEPHONE ENCOUNTER
Reason for Call:  Other Referral    Detailed comments: The patient wants a referral to a urologist. He needs the number for the skin doctors and a urologist. He said he has a growth on his toe that he needs cut off again. He wants a call back.    Phone Number Patient can be reached at: Cell number on file:    Telephone Information:   Mobile [phone]     Best Time: Anytime    Can we leave a detailed message on this number? YES    Call taken on 10/9/2017 at 1:52 PM by Gaby Huerta

## 2017-10-09 NOTE — TELEPHONE ENCOUNTER
Called # below     Pt stated he needed the referral for an enlarged prostate and also he needs a growth removed on his foot     Referrals placed     Kathia Centeno RN, BSN  [place] Triage

## 2017-10-12 ENCOUNTER — TRANSFERRED RECORDS (OUTPATIENT)
Dept: HEALTH INFORMATION MANAGEMENT | Facility: CLINIC | Age: 72
End: 2017-10-12

## 2017-10-12 PROBLEM — M26.629 TMJ ARTHRALGIA: Status: ACTIVE | Noted: 2017-09-01

## 2017-12-05 ENCOUNTER — OFFICE VISIT (OUTPATIENT)
Dept: FAMILY MEDICINE | Facility: CLINIC | Age: 72
End: 2017-12-05
Payer: COMMERCIAL

## 2017-12-05 DIAGNOSIS — L82.1 SEBORRHEIC KERATOSES: ICD-10-CM

## 2017-12-05 DIAGNOSIS — M67.471 DIGITAL MUCINOUS CYST OF TOE OF RIGHT FOOT: ICD-10-CM

## 2017-12-05 DIAGNOSIS — L57.0 AK (ACTINIC KERATOSIS): Primary | ICD-10-CM

## 2017-12-05 DIAGNOSIS — D18.01 CAPILLARY HEMANGIOMA OF SKIN: ICD-10-CM

## 2017-12-05 DIAGNOSIS — L81.4 SOLAR LENTIGO: ICD-10-CM

## 2017-12-05 PROCEDURE — 99213 OFFICE O/P EST LOW 20 MIN: CPT | Performed by: FAMILY MEDICINE

## 2017-12-05 NOTE — PROGRESS NOTES
[facility] - PRIMARY CARE SKIN    CC : Lesion(s)  SUBJECTIVE:                                                    Hugo Weber is a 72 year old male who presents to clinic today because of an asymptomatic cyst on the toe and questions regarding some brown spots on the chest.    Bothersome lesions noticed by the patient or other skin concerns :  Issue One : Cyst on right second toe. He reports a previous procedure to this lesion at least 2 years ago. The bump has recurred but has remained stable in size.  Onset : years ago.  Enlarging : NO.  Bleeding : NO  Itchy or irritating : NO.  Pain or tenderness : NO.  Changing color : NO.  Issue Two : Moles on trunk.    Personal history of skin cancer : NO - history of actinic keratoses.  Family history of skin cancer : NO.    Occupation : retired teacher/ (both indoor & outdoor).    Refer to electronic medical record (EMR) for past medical history and medications.    INTEGUMENTARY/SKIN: POSITIVE for lumps or bumps and mole changes  ROS : 14 point review of systems was negative except the symptoms listed above in the HPI.    This document serves as a record of the services and decisions personally performed and made by Selam Magallon MD. It was created on her behalf by Ricky Jones, a trained medical scribe.  The creation of this document is based on the scribe's personal observations and the provider's statements to the medical scribe.  Ricky Jones, December 5, 2017 12:22 PM      OBJECTIVE:                                                    GENERAL: healthy, alert and no distress  SKIN: Jackson Skin Type - II.  Face, Neck, Trunk, Arms and Toes were examined. The dermatoscope was used to help evaluate pigmented lesions.  Skin Pertinent Findings:  Chest : Scattered stuck-on appearing papules, raised, brown, coarse-textured, round lesion(s) most consistent with seborrheic keratoses. Slightly raised, red lesion(s) consistent with capillary hemangioma.    Right base  of neck : 15 mm x 10 mm in size, brown, macule(s) most consistent with benign solar lentigo.    Left lateral base of neck : 5 mm in size, stuck-on appearing papules, raised, brown, coarse-textured, round lesion(s) most consistent with seborrheic keratoses.    Back : Scattered stuck-on appearing papules, raised, brown, coarse-textured, round lesion(s) most consistent with seborrheic keratoses.    Left lateral back : Erythematous maculopapular eruption    Right second toe, at DIP joint : 4 mm in size, raised, smooth, firm lesion most consistent with benign cyst.    Face : 3 mm - 5 mm mm in size, erythematous, scaly, non-indurated lesion(s) most consistent with actinic keratoses  Name : Liquid Nitrogen Cry-Ac Cryotherapy.  Indication : Pre-malignant lesion.  Location(s) : right mid-lower cheek - x1, left temple - x1.  Completed by : Selam Magallon MD  Note : Discussed natural history of lesion and treatment options. Prior to treatment, we discussed inflammation, tenderness post-procedure, the healing process, and the risks of pain, infection, scarring, blistering, and hypo-/hyperpigmentation after healing. Explained that these lesions may grow back and may need additional treatment or re-treatment. The patient expressed a desire to proceed with cryotherapy.    The lesion(s) was treated with liquid nitrogen Cry-Ac, five second freeze repeated twice with a pause to allow for the area to thaw. If this lesion should recur, then it needs to be re-evaluated.    Patient tolerated the procedure well and left in good condition.  Total number of lesions treated : 2.    Diagnostic Test Results:  none           ASSESSMENT:                                                      Encounter Diagnoses   Name Primary?     AK (actinic keratosis) Yes     Seborrheic keratoses      Capillary hemangioma of skin      Solar lentigo      Digital mucinous cyst of toe of right foot          PLAN:                                                     Patient Instructions   FUTURE APPOINTMENTS  Follow up as needed.    CRYOTHERAPY FOR ACTINIC KERATOSES POST-TREATMENT CARE INSTRUCTIONS  Actinic keratoses are benign, scaly or gritty lesions that appear in sun-exposed areas and may progress to skin cancers. For this reason, it is important to treat them before they become cancerous.  Liquid nitrogen is mildly uncomfortable when applied to the skin, but the discomfort rapidly subsides.    Post-Treatment:  You may experience burning and/or stinging immediately following the procedure. The discomfort from the procedure may persist over the next 12-24 hours. The area treated will look pinker and slightly swollen before the healing process begins. You may also notice redness, swelling, tenderness, weeping and crusts or scabs. Healing time is approximately 10-14 days.    Blister - You may or may notice blistering from the freezing. If you develop an uncomfortable blister from today's treatment, you may gently puncture this with a needle that has been cleaned with alcohol. However, do not remove the protective skin layer of the blister.    Scab - After a few days, you may notice scaliness or scab formation. Do not pick at the scabs because this may cause slower healing and a permanent scar.    The skin may appear temporarily darker at the treatment site, but this usually fades over a period of months, provided that the area is protected from the sun.    Care of the areas treated:    Wash the area with a mild cleanser.    Gently pat dry.    Do not rub.     Keep protected from the sun during the healing process and for a full year following treatment as the skin continues to remodel during this time.    Apply Vaseline or Aquaphor ointment sparingly to the site for the first 7 days after treatment.    Do not use Neosporin, as many people eventually develop a medication allergy, that can easily be confused with an infection, to Neomycin.    Return if:  There should not be any  residual scaling. If there is any concern that the lesion has persisted after 4-6 weeks, make an appointment for a re-check. Healing time does vary depending on your individual healing process and the area of the body treated. Most patients will be healed in one month.    Signs of Infection:  Thankfully this is rare. However if you notice persistent colored drainage, increasing pain, fever or other signs of infection, please call us at: [phone]        PROCEDURES:                                                    None.    TT : 20 minutes.  CT : 15 minutes.      The information in this document, created by the medical scribe for me, accurately reflects the services I personally performed and the decisions made by me. I have reviewed and approved this document for accuracy prior to leaving the patient care area.  Selam Magallon MD December 5, 2017 12:22 PM  [facility] - PRIMARY CARE SKIN

## 2017-12-05 NOTE — MR AVS SNAPSHOT
After Visit Summary   12/5/2017    Hugo Weber    MRN: [id number]           Patient Information     Date Of Birth          1945        Visit Information        Provider Department      12/5/2017 12:20 PM Patricia Magallon MD [facility] - Primary Care Skin        Today's Diagnoses     AK (actinic keratosis)    -  1      Care Instructions    FUTURE APPOINTMENTS  Follow up as needed.    CRYOTHERAPY FOR ACTINIC KERATOSES POST-TREATMENT CARE INSTRUCTIONS  Actinic keratoses are benign, scaly or gritty lesions that appear in sun-exposed areas and may progress to skin cancers. For this reason, it is important to treat them before they become cancerous.  Liquid nitrogen is mildly uncomfortable when applied to the skin, but the discomfort rapidly subsides.    Post-Treatment:  You may experience burning and/or stinging immediately following the procedure. The discomfort from the procedure may persist over the next 12-24 hours. The area treated will look pinker and slightly swollen before the healing process begins. You may also notice redness, swelling, tenderness, weeping and crusts or scabs. Healing time is approximately 10-14 days.    Blister - You may or may notice blistering from the freezing. If you develop an uncomfortable blister from today's treatment, you may gently puncture this with a needle that has been cleaned with alcohol. However, do not remove the protective skin layer of the blister.    Scab - After a few days, you may notice scaliness or scab formation. Do not pick at the scabs because this may cause slower healing and a permanent scar.    The skin may appear temporarily darker at the treatment site, but this usually fades over a period of months, provided that the area is protected from the sun.    Care of the areas treated:    Wash the area with a mild cleanser.    Gently pat dry.    Do not rub.     Keep protected from the sun during the healing process and for a  full year following treatment as the skin continues to remodel during this time.    Apply Vaseline or Aquaphor ointment sparingly to the site for the first 7 days after treatment.    Do not use Neosporin, as many people eventually develop a medication allergy, that can easily be confused with an infection, to Neomycin.    Return if:  There should not be any residual scaling. If there is any concern that the lesion has persisted after 4-6 weeks, make an appointment for a re-check. Healing time does vary depending on your individual healing process and the area of the body treated. Most patients will be healed in one month.    Signs of Infection:  Thankfully this is rare. However if you notice persistent colored drainage, increasing pain, fever or other signs of infection, please call us at: [phone]          Follow-ups after your visit        Your next 10 appointments already scheduled     Dec 20, 2017 10:00 AM CST   Return Visit with Osmar Bey MD   [facility] ([facility])    [address]  [address]  [place] [id number] [phone]            Dec 26, 2017  3:15 PM CST   [facility] EP RETURN with Sarah Beth Castillo MD   [facility] ([facility])    [address]  [place] [id number] [phone]              Who to contact     If you have questions or need follow up information about today's clinic visit or your schedule please contact [facility] - PRIMARY CARE SKIN directly at [phone].  Normal or non-critical lab and imaging results will be communicated to you by [employer], letter or phone within 4 business days after the clinic has received the results. If you do not hear from us within 7 days, please contact the clinic through [employer] or phone. If you have a critical or abnormal lab result, we will notify you by phone as soon as possible.  Submit refill requests through [employer]  or call your pharmacy and they will forward the refill request to us. Please allow 3 business days for your refill to be completed.          Additional Information About Your Visit        [employer] Information     [employer] gives you secure access to your electronic health record. If you see a primary care provider, you can also send messages to your care team and make appointments. If you have questions, please call your primary care clinic.  If you do not have a primary care provider, please call [phone] and they will assist you.        Care EveryWhere ID     This is your Care EveryWhere ID. This could be used by other organizations to access your [facility] medical records  [id number]         Blood Pressure from Last 3 Encounters:   09/25/17 138/82   09/14/17 138/70   06/05/17 146/58    Weight from Last 3 Encounters:   09/25/17 (!) 354 lb (160.6 kg)   09/14/17 (!) 350 lb (158.8 kg)   06/05/17 (!) 351 lb (159.2 kg)              Today, you had the following     No orders found for display       Primary Care Provider Office Phone # Fax #    Brett Cassidy [phone] [address] [address]        Equal Access to Services     AUDRA PEARSON : Hadii selam stanfordo Sojoe, waaxda luqadaha, qaybta kaalmada adeegyada, stella albert. So [facility] [phone].    ATENCIÓN: Si habla español, tiene a oconnor disposición servicios gratuitos de asistencia lingüística. Llame al [phone].    We comply with applicable federal civil rights laws and [place] laws. We do not discriminate on the basis of race, color, national origin, age, disability, sex, sexual orientation, or gender identity.            Thank you!     Thank you for choosing [facility] - PRIMARY CARE [facility]  for your care. Our goal is always to provide you with excellent care. Hearing back from our patients is one way we can continue to improve our services. Please take a few minutes to complete  the written survey that you may receive in the mail after your visit with us. Thank you!             Your Updated Medication List - Protect others around you: Learn how to safely use, store and throw away your medicines at www.disposemymeds.org.          This list is accurate as of: 12/5/17 12:34 PM.  Always use your most recent med list.                   Brand Name Dispense Instructions for use Diagnosis    azelastine 0.1 % spray    ASTELIN    3 Bottle    Spray 1-2 sprays into both nostrils 2 times daily    Obstructive sleep apnea syndrome, Environmental allergies       diltiazem 180 MG 24 hr capsule    CARDIZEM CD    180 capsule    Take 2 capsules (360 mg) by mouth daily    Paroxysmal atrial fibrillation (H), Hypertension goal BP (blood pressure) < 140/90       flecainide acetate 150 MG Tabs     180 tablet    Take 1 tablet (150 mg) by mouth every 12 hours    Paroxysmal atrial fibrillation (H)       fluticasone 50 MCG/ACT spray    FLONASE    48 mL    Spray 1-2 sprays into both nostrils daily    Environmental allergies, Obstructive sleep apnea syndrome       iron 325 (65 FE) MG tablet     90 tablet    Take 1 tablet by mouth daily (with breakfast)    Iron deficiency anemia, unspecified iron deficiency anemia type       rivaroxaban ANTICOAGULANT 20 MG Tabs tablet    XARELTO    90 tablet    Take 1 tablet (20 mg) by mouth daily (with dinner)    Paroxysmal atrial fibrillation (H)

## 2017-12-05 NOTE — LETTER
12/5/2017         RE: Hugo Weber  [address]   [facility] [address]        Dear Colleague,    Thank you for referring your patient, Hugo Weber, to the [facility] - PRIMARY CARE SKIN. Please see a copy of my visit note below.    [facility] - PRIMARY CARE SKIN    CC : Lesion(s)  SUBJECTIVE:                                                    Hugo Weber is a 72 year old male who presents to clinic today because of an asymptomatic cyst on the toe and questions regarding some brown spots on the chest.    Bothersome lesions noticed by the patient or other skin concerns :  Issue One : Cyst on right second toe. He reports a previous procedure to this lesion at least 2 years ago. The bump has recurred but has remained stable in size.  Onset : years ago.  Enlarging : NO.  Bleeding : NO  Itchy or irritating : NO.  Pain or tenderness : NO.  Changing color : NO.  Issue Two : Moles on trunk.    Personal history of skin cancer : NO - history of actinic keratoses.  Family history of skin cancer : NO.    Occupation : retired teacher/ (both indoor & outdoor).    Refer to electronic medical record (EMR) for past medical history and medications.    INTEGUMENTARY/SKIN: POSITIVE for lumps or bumps and mole changes  ROS : 14 point review of systems was negative except the symptoms listed above in the HPI.    This document serves as a record of the services and decisions personally performed and made by Selam Magallon MD. It was created on her behalf by Ricky Jones, a trained medical scribe.  The creation of this document is based on the scribe's personal observations and the provider's statements to the medical scribe.  Ricky Jones, December 5, 2017 12:22 PM      OBJECTIVE:                                                    GENERAL: healthy, alert and no distress  SKIN: Jackson Skin Type - II.  Face, Neck, Trunk, Arms and Toes were examined. The dermatoscope was used to help evaluate  pigmented lesions.  Skin Pertinent Findings:  Chest : Scattered stuck-on appearing papules, raised, brown, coarse-textured, round lesion(s) most consistent with seborrheic keratoses. Slightly raised, red lesion(s) consistent with capillary hemangioma.    Right base of neck : 15 mm x 10 mm in size, brown, macule(s) most consistent with benign solar lentigo.    Left lateral base of neck : 5 mm in size, stuck-on appearing papules, raised, brown, coarse-textured, round lesion(s) most consistent with seborrheic keratoses.    Back : Scattered stuck-on appearing papules, raised, brown, coarse-textured, round lesion(s) most consistent with seborrheic keratoses.    Left lateral back : Erythematous maculopapular eruption    Right second toe, at DIP joint : 4 mm in size, raised, smooth, firm lesion most consistent with benign cyst.    Face : 3 mm - 5 mm mm in size, erythematous, scaly, non-indurated lesion(s) most consistent with actinic keratoses  Name : Liquid Nitrogen Cry-Ac Cryotherapy.  Indication : Pre-malignant lesion.  Location(s) : right mid-lower cheek - x1, left temple - x1.  Completed by : Selam Magallon MD  Note : Discussed natural history of lesion and treatment options. Prior to treatment, we discussed inflammation, tenderness post-procedure, the healing process, and the risks of pain, infection, scarring, blistering, and hypo-/hyperpigmentation after healing. Explained that these lesions may grow back and may need additional treatment or re-treatment. The patient expressed a desire to proceed with cryotherapy.    The lesion(s) was treated with liquid nitrogen Cry-Ac, five second freeze repeated twice with a pause to allow for the area to thaw. If this lesion should recur, then it needs to be re-evaluated.    Patient tolerated the procedure well and left in good condition.  Total number of lesions treated : 2.    Diagnostic Test Results:  none           ASSESSMENT:                                                       Encounter Diagnoses   Name Primary?     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    CRYOTHERAPY FOR ACTINIC KERATOSES POST-TREATMENT CARE INSTRUCTIONS  Actinic keratoses are benign, scaly or gritty lesions that appear in sun-exposed areas and may progress to skin cancers. For this reason, it is important to treat them before they become cancerous.  Liquid nitrogen is mildly uncomfortable when applied to the skin, but the discomfort rapidly subsides.    Post-Treatment:  You may experience burning and/or stinging immediately following the procedure. The discomfort from the procedure may persist over the next 12-24 hours. The area treated will look pinker and slightly swollen before the healing process begins. You may also notice redness, swelling, tenderness, weeping and crusts or scabs. Healing time is approximately 10-14 days.    Blister - You may or may notice blistering from the freezing. If you develop an uncomfortable blister from today's treatment, you may gently puncture this with a needle that has been cleaned with alcohol. However, do not remove the protective skin layer of the blister.    Scab - After a few days, you may notice scaliness or scab formation. Do not pick at the scabs because this may cause slower healing and a permanent scar.    The skin may appear temporarily darker at the treatment site, but this usually fades over a period of months, provided that the area is protected from the sun.    Care of the areas treated:    Wash the area with a mild cleanser.    Gently pat dry.    Do not rub.     Keep protected from the sun during the healing process and for a full year following treatment as the skin continues to remodel during this time.    Apply Vaseline  or Aquaphor ointment sparingly to the site for the first 7 days after treatment.    Do not use Neosporin, as many people eventually develop a medication allergy, that can easily be confused with an infection, to Neomycin.    Return if:  There should not be any residual scaling. If there is any concern that the lesion has persisted after 4-6 weeks, make an appointment for a re-check. Healing time does vary depending on your individual healing process and the area of the body treated. Most patients will be healed in one month.    Signs of Infection:  Thankfully this is rare. However if you notice persistent colored drainage, increasing pain, fever or other signs of infection, please call us at: [phone]        PROCEDURES:                                                    None.    TT : 20 minutes.  CT : 15 minutes.      The information in this document, created by the medical scribe for me, accurately reflects the services I personally performed and the decisions made by me. I have reviewed and approved this document for accuracy prior to leaving the patient care area.  Selam Magallon MD December 5, 2017 12:22 PM  [facility] - PRIMARY CARE SKIN    Again, thank you for allowing me to participate in the care of your patient.        Sincerely,        Patricia Magallon MD

## 2017-12-05 NOTE — PATIENT INSTRUCTIONS
FUTURE APPOINTMENTS  Follow up as needed.    CRYOTHERAPY FOR ACTINIC KERATOSES POST-TREATMENT CARE INSTRUCTIONS  Actinic keratoses are benign, scaly or gritty lesions that appear in sun-exposed areas and may progress to skin cancers. For this reason, it is important to treat them before they become cancerous.  Liquid nitrogen is mildly uncomfortable when applied to the skin, but the discomfort rapidly subsides.    Post-Treatment:  You may experience burning and/or stinging immediately following the procedure. The discomfort from the procedure may persist over the next 12-24 hours. The area treated will look pinker and slightly swollen before the healing process begins. You may also notice redness, swelling, tenderness, weeping and crusts or scabs. Healing time is approximately 10-14 days.    Blister - You may or may notice blistering from the freezing. If you develop an uncomfortable blister from today's treatment, you may gently puncture this with a needle that has been cleaned with alcohol. However, do not remove the protective skin layer of the blister.    Scab - After a few days, you may notice scaliness or scab formation. Do not pick at the scabs because this may cause slower healing and a permanent scar.    The skin may appear temporarily darker at the treatment site, but this usually fades over a period of months, provided that the area is protected from the sun.    Care of the areas treated:    Wash the area with a mild cleanser.    Gently pat dry.    Do not rub.     Keep protected from the sun during the healing process and for a full year following treatment as the skin continues to remodel during this time.    Apply Vaseline or Aquaphor ointment sparingly to the site for the first 7 days after treatment.    Do not use Neosporin, as many people eventually develop a medication allergy, that can easily be confused with an infection, to Neomycin.    Return if:  There should not be any residual scaling. If  there is any concern that the lesion has persisted after 4-6 weeks, make an appointment for a re-check. Healing time does vary depending on your individual healing process and the area of the body treated. Most patients will be healed in one month.    Signs of Infection:  Thankfully this is rare. However if you notice persistent colored drainage, increasing pain, fever or other signs of infection, please call us at: [phone]

## 2017-12-20 ENCOUNTER — OFFICE VISIT (OUTPATIENT)
Dept: UROLOGY | Facility: CLINIC | Age: 72
End: 2017-12-20
Payer: COMMERCIAL

## 2017-12-20 VITALS
HEIGHT: 72 IN | DIASTOLIC BLOOD PRESSURE: 70 MMHG | HEART RATE: 80 BPM | BODY MASS INDEX: 42.66 KG/M2 | SYSTOLIC BLOOD PRESSURE: 132 MMHG | WEIGHT: 315 LBS

## 2017-12-20 DIAGNOSIS — N40.0 ENLARGED PROSTATE: Primary | ICD-10-CM

## 2017-12-20 PROCEDURE — 99214 OFFICE O/P EST MOD 30 MIN: CPT | Mod: 25 | Performed by: UROLOGY

## 2017-12-20 PROCEDURE — 51798 US URINE CAPACITY MEASURE: CPT | Performed by: UROLOGY

## 2017-12-20 ASSESSMENT — PAIN SCALES - GENERAL: PAINLEVEL: NO PAIN (0)

## 2017-12-20 NOTE — NURSING NOTE
Chief Complaint   Patient presents with     Clinic Care Coordination - Follow-up     Enlarged Prostate      Milana Camarillo LPN

## 2017-12-20 NOTE — PROGRESS NOTES
History: This is a great pleasure to see this very pleasant 72-year-old gentleman in follow-up for the consultation.  I have not seen him for 2 years and he has a past history of urinary stone disease which is quite stable at this time.  However he does have a family history of prostate cancer, his father was diagnosed at the age of 75 with  at 89 of unrelated causes.  He has mild nocturia and some mild frequency of micturition during the day but his urine stream is satisfactory, there is no history of urinary tract infection or gross hematuria and he has no other major complaints.    Past Medical History:   Diagnosis Date     Arrhythmia      Arthritis      Atrial fibrillation     Followed by [facility]     Calculus of kidney , , 10/12    dr walker/nestor - hematuria - w/u o/w neg     Cholelithiasis      Chronic peptic ulcer, unspecified site, without mention of hemorrhage, perforation, or obstruction     gastric bypass     Colon polyps , 9/10, 10/15    2 tubular adenoma, 1 hyperplastic - multiple serrated/tubular adenomas     Hepatitis C 10/12    chronic hepatitis C, grade 1-2, stage 1 - mild - Dr Douglas     Hyperlipidemia LDL goal <130      Hypertension goal BP (blood pressure) < 140/90     dr jaciel winchester     Iron deficiency anemia, unspecified     gastric bypass     Obesity, unspecified     s/p gastric bypass 1985      Prediabetes      Presbyacusis     dr corona     Pyelonephritis, unspecified      SCC (squamous cell carcinoma), 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    dr saez - lt conchal bowl     Sleep apnea 10/02    cpap - severe - 15 cm - dr cunha     TMJ arthralgia 2017    [facility] Head and Neck       Social History     Social History     Marital status:      Spouse name: Mayra     Number of children: 3     Years of education: 21     Occupational History     retired teacher and football and   [employer]      Retired     Social History Main Topics     Smoking  "status: Never Smoker     Smokeless tobacco: Never Used     Alcohol use 1.2 oz/week     2 Standard drinks or equivalent per week      Comment: 2 per week     Drug use: No      Comment: acknowledges using herbal supplement \"Vibe\" for energy-none used recently      Sexual activity: Yes     Partners: Female     Birth control/ protection: None      Comment:       Other Topics Concern     Caffeine Concern Yes     <1 can qd     Sleep Concern Yes     sleep apnea, wears cpap     Exercise No     Seat Belt Yes     Parent/Sibling W/ Cabg, Mi Or Angioplasty Before 65f 55m? No     Social History Narrative       Past Surgical History:   Procedure Laterality Date     ARTHROPLASTY KNEE  2012    LT TKA - Dr Villanueva     CARDIOVERSION       COLONOSCOPY  , 9/10, 10/15    multiple tubular/serrated/hyperplastic polyps     COLONOSCOPY N/A 10/29/2015    multiple tubular and serrated adenomas     COLONOSCOPY N/A 2016     HC KNEE SCOPE, DIAGNOSTIC      Arthroscopy, Knee RT     LASER HOLMIUM LITHOTRIPSY URETER(S), INSERT STENT, COMBINED  10/16/2012    stones x 4, Dr Campa     SURGICAL HISTORY OF -       s/p gastric bypass Bilroth II     SURGICAL HISTORY OF -       wisdom teeth     SURGICAL HISTORY OF -       cellulitis     SURGICAL HISTORY OF -       lt forearm spur's     SURGICAL HISTORY OF -   ,,    s/p lasik     SURGICAL HISTORY OF -       rt forearm spurs     SURGICAL HISTORY OF -       dr stevenson - lithotrypsy     SURGICAL HISTORY OF -   10/08,     Lt ear chonchal lesion removal SCC - dr saez       Family History   Problem Relation Age of Onset     Alzheimer Disease Father 82      at 88     Prostate Cancer Father 76     bladder and prostate     HEART DISEASE Mother 80     CHF     HEART DISEASE Maternal Grandfather      MI at 55     CANCER Paternal Uncle      ?         Current Outpatient Prescriptions:      fluticasone (FLONASE) 50 MCG/ACT spray, Spray " 1-2 sprays into both nostrils daily, Disp: 48 mL, Rfl: 3     diltiazem (CARDIZEM CD) 180 MG 24 hr capsule, Take 2 capsules (360 mg) by mouth daily, Disp: 180 capsule, Rfl: 3     rivaroxaban ANTICOAGULANT (XARELTO) 20 MG TABS tablet, Take 1 tablet (20 mg) by mouth daily (with dinner), Disp: 90 tablet, Rfl: 3     flecainide acetate 150 MG TABS, Take 1 tablet (150 mg) by mouth every 12 hours, Disp: 180 tablet, Rfl: 3     azelastine (ASTELIN) 0.1 % spray, Spray 1-2 sprays into both nostrils 2 times daily, Disp: 3 Bottle, Rfl: 3     Ferrous Sulfate (IRON) 325 (65 FE) MG tablet, Take 1 tablet by mouth daily (with breakfast), Disp: 90 tablet, Rfl: 3    10 point ROS of systems including Constitutional, Eyes, Respiratory, Cardiovascular, Gastroenterology, Genitourinary, Integumentary, Muscularskeletal, Psychiatric were all negative except for pertinent positives noted in my HPI.    Examination:   /70 (BP Location: Right arm)  Pulse 80  Ht 1.829 m (6')  Wt (!) 160.6 kg (354 lb)  BMI 48.01 kg/m2  General Impression: Very pleasant gentleman in no acute distress, well-oriented in time place and person  Mental Status: Normal.  HEENT.  There is no evidence of jaundice and mucous membranes are normal  Skin: Skin is normal to examination  Respiratory System: The respiratory cycle is normal  Lymph Nodes: Not examined  Back/Flank Tenderness: There is no flank tenderness  Cardiovascular System: Not examined  Abdominal Examination: Markedly obese but no other remarkable features  Extremities: No significant peripheral edema  Genitial: Not examined  Rectal Examination: Good sphincter tone, normal perianal sensation.  Smooth rectal mucosa without hemorrhoids or fissures.  Smooth soft normal-sized prostate without evidence of tenderness, bogginess or nodules.  Seminal vesicles.  Not palpable.  Perineum is otherwise normal to examination  Neurologic System: There are no focal abnormal clinical neurological signs in the central, or  "peripheral nervous systems    Impression: The patient has a number of issues.  1.  There is a history of urinary stone disease, the last CT scan was over a year ago which showed a 5 mm stone in the right renal pelvis and very small stones in each kidney but no other remarkable features.  This is no other symptoms related to renal stones and I do not see an indication therefore for imaging unless further symptoms develop.  2.  There is a significant family history of prostate cancer, with his father having had prostate cancer.  PSA is 2.76 having risen from 1.9 to over the last 12 months.  This is still very low for a man of 72 years of age and the PSA velocity is quite slow.  Digital rectal examination also is quite benign.  3.  He is considerably obese.    My recommendations are that he could be followed at this time by his personal physician with an annual PSA and digital rectal examination however I would like to see him probably should any of the following situations arise.  1.  Gross hematuria.  2.  Significant deterioration in his urinary symptoms.  3.  Rising PSA above 5.0.  4.  Severe flank pain which would indicate recurrent stone symptoms.    I did discuss the entire situation with the patient in detail today.  I answered all his questions.    Plan.  I will see him on a p.r.n. basis.    Time.  25 minutes.  Greater than 50% ascending a full discussion, our consultation, review of records, review of pertinent radiologic studies and lab studies and discussing over a number of urologic issues    \"This dictation was performed with voice recognition software and may contain errors,  omissions and inadvertent word substitution.\"        "

## 2017-12-20 NOTE — MR AVS SNAPSHOT
After Visit Summary   12/20/2017    Hugo Weber    MRN: [id number]           Patient Information     Date Of Birth          1945        Visit Information        Provider Department      12/20/2017 10:00 AM Osmar Bey MD [facility] [place]        Today's Diagnoses     Enlarged prostate    -  1       Follow-ups after your visit        Follow-up notes from your care team     Return if symptoms worsen or fail to improve.      Your next 10 appointments already scheduled     Dec 26, 2017  3:15 PM CST   [facility] EP RETURN with Sarah Beth Castillo MD   [facility] ([facility])    [address] [place], [place] [id number]  [facility] [address] [phone]              Who to contact     If you have questions or need follow up information about today's clinic visit or your schedule please contact [facility] [place] directly at [phone].  Normal or non-critical lab and imaging results will be communicated to you by [employer], letter or phone within 4 business days after the clinic has received the results. If you do not hear from us within 7 days, please contact the clinic through [employer] or phone. If you have a critical or abnormal lab result, we will notify you by phone as soon as possible.  Submit refill requests through [employer] or call your pharmacy and they will forward the refill request to us. Please allow 3 business days for your refill to be completed.          Additional Information About Your Visit        MyChart Information     [employer] gives you secure access to your electronic health record. If you see a primary care provider, you can also send messages to your care team and make appointments. If you have questions, please call your primary care clinic.  If you do not have a primary care provider, please call [phone] and they will assist you.        Care EveryWhere ID     This is  your Care EveryWhere ID. This could be used by other organizations to access your [place] medical records  [id number]        Your Vitals Were     Pulse Height BMI (Body Mass Index)             80 1.829 m (6') 48.01 kg/m2          Blood Pressure from Last 3 Encounters:   12/20/17 132/70   09/25/17 138/82   09/14/17 138/70    Weight from Last 3 Encounters:   12/20/17 (!) 160.6 kg (354 lb)   09/25/17 (!) 160.6 kg (354 lb)   09/14/17 (!) 158.8 kg (350 lb)              We Performed the Following     MEASURE POST-VOID RESIDUAL URINE/BLADDER CAPACITY, US NON-IMAGING ([id number])        Primary Care Provider Office Phone # Fax #    Brett Cassidy [phone] [address] [facility] [address]        Equal Access to Services     [facility]: Hadii aad ku hadasho Soomaali, waaxda luqadaha, qaybta kaalmada adeegyada, stella doughertyin hayaan tara pierson . So [facility] [phone].    ATENCIÓN: Si habla español, tiene a oconnor disposición servicios gratuitos de asistencia lingüística. Llame al [phone].    We comply with applicable federal civil rights laws and [place] laws. We do not discriminate on the basis of race, color, national origin, age, disability, sex, sexual orientation, or gender identity.            Thank you!     Thank you for choosing [facility] [place]  for your care. Our goal is always to provide you with excellent care. Hearing back from our patients is one way we can continue to improve our services. Please take a few minutes to complete the written survey that you may receive in the mail after your visit with us. Thank you!             Your Updated Medication List - Protect others around you: Learn how to safely use, store and throw away your medicines at www.disposemymeds.org.          This list is accurate as of: 12/20/17 10:50 AM.  Always use your most recent med list.                   Brand Name Dispense Instructions for use Diagnosis     azelastine 0.1 % spray    ASTELIN    3 Bottle    Spray 1-2 sprays into both nostrils 2 times daily    Obstructive sleep apnea syndrome, Environmental allergies       diltiazem 180 MG 24 hr capsule    CARDIZEM CD    180 capsule    Take 2 capsules (360 mg) by mouth daily    Paroxysmal atrial fibrillation (H), Hypertension goal BP (blood pressure) < 140/90       flecainide acetate 150 MG Tabs     180 tablet    Take 1 tablet (150 mg) by mouth every 12 hours    Paroxysmal atrial fibrillation (H)       fluticasone 50 MCG/ACT spray    FLONASE    48 mL    Spray 1-2 sprays into both nostrils daily    Environmental allergies, Obstructive sleep apnea syndrome       iron 325 (65 FE) MG tablet     90 tablet    Take 1 tablet by mouth daily (with breakfast)    Iron deficiency anemia, unspecified iron deficiency anemia type       rivaroxaban ANTICOAGULANT 20 MG Tabs tablet    XARELTO    90 tablet    Take 1 tablet (20 mg) by mouth daily (with dinner)    Paroxysmal atrial fibrillation (H)

## 2017-12-20 NOTE — LETTER
2017       RE: Hugo Weber  [address]   [facility] [address]     Dear Colleague,    Thank you for referring your patient, Hugo Weber, to the [facility] [place] at [facility]. Please see a copy of my visit note below.    History: This is a great pleasure to see this very pleasant 72-year-old gentleman in follow-up for the consultation.  I have not seen him for 2 years and he has a past history of urinary stone disease which is quite stable at this time.  However he does have a family history of prostate cancer, his father was diagnosed at the age of 75 with  at 89 of unrelated causes.  He has mild nocturia and some mild frequency of micturition during the day but his urine stream is satisfactory, there is no history of urinary tract infection or gross hematuria and he has no other major complaints.    Past Medical History:   Diagnosis Date     Arrhythmia      Arthritis      Atrial fibrillation     Followed by [facility]     Calculus of kidney , , 10/12    dr walker/nestor - hematuria - w/u o/w neg     Cholelithiasis      Chronic peptic ulcer, unspecified site, without mention of hemorrhage, perforation, or obstruction     gastric bypass     Colon polyps , 9/10, 10/15    2 tubular adenoma, 1 hyperplastic - multiple serrated/tubular adenomas     Hepatitis C 10/12    chronic hepatitis C, grade 1-2, stage 1 - mild - Dr Douglas     Hyperlipidemia LDL goal <130      Hypertension goal BP (blood pressure) < 140/90     dr jaciel winchester     Iron deficiency anemia, unspecified     gastric bypass     Obesity, unspecified     s/p gastric bypass 1985      Prediabetes      Presbyacusis     dr corona     Pyelonephritis, unspecified      SCC (squamous cell carcinoma), 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    dr saez - lt conchal bowl     Sleep apnea 10/02    cpap - severe - 15 cm - dr cunha     TMJ arthralgia 2017    Mn Head and  "Neck       Social History     Social History     Marital status:      Spouse name: Mayra     Number of children: 3     Years of education: 21     Occupational History     retired teacher and football and   [employer]      Retired     Social History Main Topics     Smoking status: Never Smoker     Smokeless tobacco: Never Used     Alcohol use 1.2 oz/week     2 Standard drinks or equivalent per week      Comment: 2 per week     Drug use: No      Comment: acknowledges using herbal supplement \"Vibe\" for energy-none used recently      Sexual activity: Yes     Partners: Female     Birth control/ protection: None      Comment:       Other Topics Concern     Caffeine Concern Yes     <1 can qd     Sleep Concern Yes     sleep apnea, wears cpap     Exercise No     Seat Belt Yes     Parent/Sibling W/ Cabg, Mi Or Angioplasty Before 65f 55m? No     Social History Narrative       Past Surgical History:   Procedure Laterality Date     ARTHROPLASTY KNEE  8/13/2012    LT TKA - Dr Villanueva     CARDIOVERSION  2016     COLONOSCOPY  8/05, 9/10, 10/15    multiple tubular/serrated/hyperplastic polyps     COLONOSCOPY N/A 10/29/2015    multiple tubular and serrated adenomas     COLONOSCOPY N/A 9/7/2016     HC KNEE SCOPE, DIAGNOSTIC  05/00    Arthroscopy, Knee RT     LASER HOLMIUM LITHOTRIPSY URETER(S), INSERT STENT, COMBINED  10/16/2012    stones x 4, Dr Campa     SURGICAL HISTORY OF -   1985    s/p gastric bypass Bilroth II     SURGICAL HISTORY OF -   11/98    wisdom teeth     SURGICAL HISTORY OF -   1979    cellulitis     SURGICAL HISTORY OF - 11/98    lt forearm spur's     SURGICAL HISTORY OF -   1/01,6/02,11/02    s/p lasik     SURGICAL HISTORY OF -   11/99    rt forearm spurs     SURGICAL HISTORY OF -   7/06    dr stevenson - lithotrypsy     SURGICAL HISTORY OF -   10/08, 12/08    Lt ear chonchal lesion removal SCC - dr saez       Family History   Problem Relation Age of Onset     " Alzheimer Disease Father 82      at 88     Prostate Cancer Father 76     bladder and prostate     HEART DISEASE Mother 80     CHF     HEART DISEASE Maternal Grandfather      MI at 55     CANCER Paternal Uncle      ?         Current Outpatient Prescriptions:      fluticasone (FLONASE) 50 MCG/ACT spray, Spray 1-2 sprays into both nostrils daily, Disp: 48 mL, Rfl: 3     diltiazem (CARDIZEM CD) 180 MG 24 hr capsule, Take 2 capsules (360 mg) by mouth daily, Disp: 180 capsule, Rfl: 3     rivaroxaban ANTICOAGULANT (XARELTO) 20 MG TABS tablet, Take 1 tablet (20 mg) by mouth daily (with dinner), Disp: 90 tablet, Rfl: 3     flecainide acetate 150 MG TABS, Take 1 tablet (150 mg) by mouth every 12 hours, Disp: 180 tablet, Rfl: 3     azelastine (ASTELIN) 0.1 % spray, Spray 1-2 sprays into both nostrils 2 times daily, Disp: 3 Bottle, Rfl: 3     Ferrous Sulfate (IRON) 325 (65 FE) MG tablet, Take 1 tablet by mouth daily (with breakfast), Disp: 90 tablet, Rfl: 3    10 point ROS of systems including Constitutional, Eyes, Respiratory, Cardiovascular, Gastroenterology, Genitourinary, Integumentary, Muscularskeletal, Psychiatric were all negative except for pertinent positives noted in my HPI.    Examination:   /70 (BP Location: Right arm)  Pulse 80  Ht 1.829 m (6')  Wt (!) 160.6 kg (354 lb)  BMI 48.01 kg/m2  General Impression: Very pleasant gentleman in no acute distress, well-oriented in time place and person  Mental Status: Normal.  HEENT.  There is no evidence of jaundice and mucous membranes are normal  Skin: Skin is normal to examination  Respiratory System: The respiratory cycle is normal  Lymph Nodes: Not examined  Back/Flank Tenderness: There is no flank tenderness  Cardiovascular System: Not examined  Abdominal Examination: Markedly obese but no other remarkable features  Extremities: No significant peripheral edema  Genitial: Not examined  Rectal Examination: Good sphincter tone, normal perianal  sensation.  Smooth rectal mucosa without hemorrhoids or fissures.  Smooth soft normal-sized prostate without evidence of tenderness, bogginess or nodules.  Seminal vesicles.  Not palpable.  Perineum is otherwise normal to examination  Neurologic System: There are no focal abnormal clinical neurological signs in the central, or peripheral nervous systems    Impression: The patient has a number of issues.  1.  There is a history of urinary stone disease, the last CT scan was over a year ago which showed a 5 mm stone in the right renal pelvis and very small stones in each kidney but no other remarkable features.  This is no other symptoms related to renal stones and I do not see an indication therefore for imaging unless further symptoms develop.  2.  There is a significant family history of prostate cancer, with his father having had prostate cancer.  PSA is 2.76 having risen from 1.9 to over the last 12 months.  This is still very low for a man of 72 years of age and the PSA velocity is quite slow.  Digital rectal examination also is quite benign.  3.  He is considerably obese.    My recommendations are that he could be followed at this time by his personal physician with an annual PSA and digital rectal examination however I would like to see him probably should any of the following situations arise.  1.  Gross hematuria.  2.  Significant deterioration in his urinary symptoms.  3.  Rising PSA above 5.0.  4.  Severe flank pain which would indicate recurrent stone symptoms.    I did discuss the entire situation with the patient in detail today.  I answered all his questions.    Plan.  I will see him on a p.r.n. basis.    Time.  25 minutes.  Greater than 50% ascending a full discussion, our consultation, review of records, review of pertinent radiologic studies and lab studies and discussing over a number of urologic issues    Again, thank you for allowing me to participate in the care of your patient.       Sincerely,    Osmar Bey MD

## 2017-12-26 ENCOUNTER — OFFICE VISIT (OUTPATIENT)
Dept: CARDIOLOGY | Facility: CLINIC | Age: 72
End: 2017-12-26
Attending: INTERNAL MEDICINE
Payer: COMMERCIAL

## 2017-12-26 VITALS
DIASTOLIC BLOOD PRESSURE: 70 MMHG | HEIGHT: 72 IN | BODY MASS INDEX: 42.66 KG/M2 | WEIGHT: 315 LBS | HEART RATE: 60 BPM | SYSTOLIC BLOOD PRESSURE: 134 MMHG

## 2017-12-26 DIAGNOSIS — I10 HYPERTENSION GOAL BP (BLOOD PRESSURE) < 140/90: ICD-10-CM

## 2017-12-26 DIAGNOSIS — I48.0 PAROXYSMAL ATRIAL FIBRILLATION (H): ICD-10-CM

## 2017-12-26 PROCEDURE — 99214 OFFICE O/P EST MOD 30 MIN: CPT | Performed by: INTERNAL MEDICINE

## 2017-12-26 PROCEDURE — 93000 ELECTROCARDIOGRAM COMPLETE: CPT | Performed by: INTERNAL MEDICINE

## 2017-12-26 RX ORDER — DILTIAZEM HYDROCHLORIDE 180 MG/1
180 CAPSULE, COATED, EXTENDED RELEASE ORAL DAILY
Qty: 180 CAPSULE | Refills: 3 | Status: SHIPPED | OUTPATIENT
Start: 2017-12-26 | End: 2018-01-08

## 2017-12-26 NOTE — LETTER
12/26/2017      Brett Cassidy MD  [address] [facility] [address]      RE: Hugo Weber       Dear Colleague,    I had the pleasure of seeing Hugo Weber in the [facility].    HISTORY OF PRESENT ILLNESS:  I saw Mr. Weber for followup of atrial fibrillation.  He saw me in 12/2016.  For the last year, he has been on Xarelto, diltiazem and flecainide.  Symptomatically, he is not aware of palpitation, dizziness or shortness of breath.  The patient has planned to have a shoulder procedure in the next few weeks.  He has some difficulty to exercise and therefore believes that his weight gain is due to inactivity.      PHYSICAL EXAMINATION:   VITAL SIGNS:  Blood pressure was 134/70, heart rate 53 beats per minute, body weight is 360 pounds.   HEENT:  Eyes and ENT were unremarkable.   LUNGS:  Clear.   CARDIAC:  Rhythm was regular and heart sounds were normal with no murmur.   ABDOMEN:  Examination showed no hepatomegaly.   EXTREMITIES:  There was no pedal edema.      EKG today showed sinus bradycardia of 53 beats per minute.      ASSESSMENT AND RECOMMENDATIONS:  Mr. Weber is doing well from the arrhythmia point of view.  To prevent severe bradycardia, I have reduced the dose of Cardizem from 360 mg to 180 mg once a day.  He will continue current dose of flecainide.  If he is required to withhold Xarelto for the shoulder surgery, I would agree for temporary withholding of anticoagulation.  Otherwise, he is encouraged to lose weight.  He is scheduled to return for Cardiology followup in 1 year.        Outpatient Encounter Prescriptions as of 12/26/2017   Medication Sig Dispense Refill     diltiazem (CARDIZEM CD) 180 MG 24 hr capsule Take 1 capsule (180 mg) by mouth daily 180 capsule 3     fluticasone (FLONASE) 50 MCG/ACT spray Spray 1-2 sprays into both nostrils daily 48 mL 3     rivaroxaban ANTICOAGULANT (XARELTO) 20 MG TABS tablet Take 1 tablet (20 mg) by mouth  daily (with dinner) 90 tablet 3     flecainide acetate 150 MG TABS Take 1 tablet (150 mg) by mouth every 12 hours 180 tablet 3     azelastine (ASTELIN) 0.1 % spray Spray 1-2 sprays into both nostrils 2 times daily 3 Bottle 3     Ferrous Sulfate (IRON) 325 (65 FE) MG tablet Take 1 tablet by mouth daily (with breakfast) 90 tablet 3     [DISCONTINUED] diltiazem (CARDIZEM CD) 180 MG 24 hr capsule Take 2 capsules (360 mg) by mouth daily 180 capsule 3     No facility-administered encounter medications on file as of 12/26/2017.        Again, thank you for allowing me to participate in the care of your patient.      Sincerely,    Sarah Beth Castillo MD     [facility]

## 2017-12-26 NOTE — MR AVS SNAPSHOT
After Visit Summary   12/26/2017    Hugo Weber    MRN: [id number]           Patient Information     Date Of Birth          1945        Visit Information        Provider Department      12/26/2017 3:15 PM Sarah Beth Castillo MD [facility]        Today's Diagnoses     Paroxysmal atrial fibrillation (H)        Hypertension goal BP (blood pressure) < 140/90           Follow-ups after your visit        Additional Services     Follow-Up with Cardiac Advanced Practice Provider           Follow-Up with Electrophysiologist                 Who to contact     If you have questions or need follow up information about today's clinic visit or your schedule please contact [facility] directly at [phone].  Normal or non-critical lab and imaging results will be communicated to you by eMindfulhart, letter or phone within 4 business days after the clinic has received the results. If you do not hear from us within 7 days, please contact the clinic through eMindfulhart or phone. If you have a critical or abnormal lab result, we will notify you by phone as soon as possible.  Submit refill requests through SensorWave or call your pharmacy and they will forward the refill request to us. Please allow 3 business days for your refill to be completed.          Additional Information About Your Visit        MyChart Information     SensorWave gives you secure access to your electronic health record. If you see a primary care provider, you can also send messages to your care team and make appointments. If you have questions, please call your primary care clinic.  If you do not have a primary care provider, please call [phone] and they will assist you.        Care EveryWhere ID     This is your Care EveryWhere ID. This could be used by other organizations to access your [facility] medical records  [id number]        Your Vitals Were     Pulse Height BMI (Body  Mass Index)             60 1.829 m (6') 48.82 kg/m2          Blood Pressure from Last 3 Encounters:   12/26/17 134/70   12/20/17 132/70   09/25/17 138/82    Weight from Last 3 Encounters:   12/26/17 (!) 163.3 kg (360 lb)   12/20/17 (!) 160.6 kg (354 lb)   09/25/17 (!) 160.6 kg (354 lb)              We Performed the Following     EKG 12-lead complete w/read (Future)- to be scheduled     Follow-Up with Electrophysiologist          Today's Medication Changes          These changes are accurate as of: 12/26/17 11:59 PM.  If you have any questions, ask your nurse or doctor.               These medicines have changed or have updated prescriptions.        Dose/Directions    diltiazem 180 MG 24 hr capsule   Commonly known as:  CARDIZEM CD   This may have changed:  how much to take   Used for:  Paroxysmal atrial fibrillation (H), Hypertension goal BP (blood pressure) < 140/90   Changed by:  Sarah Beth Castillo MD        Dose:  180 mg   Take 1 capsule (180 mg) by mouth daily   Quantity:  180 capsule   Refills:  3            Where to get your medicines      These medications were sent to [facility] [address] [place] [address] AT [facility] [address] [address]    Hours:  24-hours Phone:  [phone]     diltiazem 180 MG 24 hr capsule                Primary Care Provider Office Phone # Fax #    Brett Cassidy [phone]       [address] [place] [address]        Equal Access to Services     [facility] Center[facility]ENT[facility] AH: Hadii aad darin hadasho Soomaali, waaxda luqadaha, qaybta kaalmada adeegyaterrie, stella albert. So [facility] [phone].    ATENCIÓN: Si habla español, tiene a oconnor disposición servicios gratuitos de asistencia lingüística. Llame al [phone].    We comply with applicable federal civil rights laws and [place] laws. We do not discriminate on the basis of race, color, national origin, age, disability, sex, sexual  orientation, or gender identity.            Thank you!     Thank you for choosing [facility]  for your care. Our goal is always to provide you with excellent care. Hearing back from our patients is one way we can continue to improve our services. Please take a few minutes to complete the written survey that you may receive in the mail after your visit with us. Thank you!             Your Updated Medication List - Protect others around you: Learn how to safely use, store and throw away your medicines at www.disposemymeds.org.          This list is accurate as of: 12/26/17 11:59 PM.  Always use your most recent med list.                   Brand Name Dispense Instructions for use Diagnosis    azelastine 0.1 % spray    ASTELIN    3 Bottle    Spray 1-2 sprays into both nostrils 2 times daily    Obstructive sleep apnea syndrome, Environmental allergies       diltiazem 180 MG 24 hr capsule    CARDIZEM CD    180 capsule    Take 1 capsule (180 mg) by mouth daily    Paroxysmal atrial fibrillation (H), Hypertension goal BP (blood pressure) < 140/90       flecainide acetate 150 MG Tabs     180 tablet    Take 1 tablet (150 mg) by mouth every 12 hours    Paroxysmal atrial fibrillation (H)       fluticasone 50 MCG/ACT spray    FLONASE    48 mL    Spray 1-2 sprays into both nostrils daily    Environmental allergies, Obstructive sleep apnea syndrome       iron 325 (65 FE) MG tablet     90 tablet    Take 1 tablet by mouth daily (with breakfast)    Iron deficiency anemia, unspecified iron deficiency anemia type       rivaroxaban ANTICOAGULANT 20 MG Tabs tablet    XARELTO    90 tablet    Take 1 tablet (20 mg) by mouth daily (with dinner)    Paroxysmal atrial fibrillation (H)

## 2017-12-26 NOTE — PROGRESS NOTES
HPI and Plan:   See dictation    Orders Placed This Encounter   Procedures     Follow-Up with Cardiac Advanced Practice Provider     Follow-Up with Electrophysiologist     EKG 12-lead complete w/read (Future)- to be scheduled       Orders Placed This Encounter   Medications     diltiazem (CARDIZEM CD) 180 MG 24 hr capsule     Sig: Take 1 capsule (180 mg) by mouth daily     Dispense:  180 capsule     Refill:  3       Medications Discontinued During This Encounter   Medication Reason     diltiazem (CARDIZEM CD) 180 MG 24 hr capsule Reorder         Encounter Diagnoses   Name Primary?     Paroxysmal atrial fibrillation (H)      Hypertension goal BP (blood pressure) < 140/90        CURRENT MEDICATIONS:  Current Outpatient Prescriptions   Medication Sig Dispense Refill     diltiazem (CARDIZEM CD) 180 MG 24 hr capsule Take 1 capsule (180 mg) by mouth daily 180 capsule 3     fluticasone (FLONASE) 50 MCG/ACT spray Spray 1-2 sprays into both nostrils daily 48 mL 3     rivaroxaban ANTICOAGULANT (XARELTO) 20 MG TABS tablet Take 1 tablet (20 mg) by mouth daily (with dinner) 90 tablet 3     flecainide acetate 150 MG TABS Take 1 tablet (150 mg) by mouth every 12 hours 180 tablet 3     azelastine (ASTELIN) 0.1 % spray Spray 1-2 sprays into both nostrils 2 times daily 3 Bottle 3     Ferrous Sulfate (IRON) 325 (65 FE) MG tablet Take 1 tablet by mouth daily (with breakfast) 90 tablet 3     [DISCONTINUED] diltiazem (CARDIZEM CD) 180 MG 24 hr capsule Take 2 capsules (360 mg) by mouth daily 180 capsule 3       ALLERGIES   No Known Allergies    PAST MEDICAL HISTORY:  Past Medical History:   Diagnosis Date     Arrhythmia      Arthritis      Atrial fibrillation 2006    Followed by [facility]     Calculus of kidney 2005, 6/06, 10/12    dr walker/nestor/иван - hematuria - w/u o/w neg     Cholelithiasis      Chronic peptic ulcer, unspecified site, without mention of hemorrhage, perforation, or obstruction 1985    gastric bypass     Colon  polyps , 9/10, 10/15    2 tubular adenoma, 1 hyperplastic - multiple serrated/tubular adenomas     Hepatitis C 10/12    chronic hepatitis C, grade 1-2, stage 1 - mild - Dr Douglas     Hyperlipidemia LDL goal <130      Hypertension goal BP (blood pressure) < 140/90     dr jaciel winchester     Iron deficiency anemia, unspecified     gastric bypass     Nephrolithiasis     Dr Bey     Obesity, unspecified     s/p gastric bypass 1985      Prediabetes      Presbyacusis     dr corona     Pyelonephritis, unspecified      SCC (squamous cell carcinoma), 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    dr saez - lt conchal bowl     Sleep apnea 10/02    cpap - severe - 15 cm - dr cunha     TMJ arthralgia 2017    [facility]       PAST SURGICAL HISTORY:  Past Surgical History:   Procedure Laterality Date     ARTHROPLASTY KNEE  2012    LT TKA - Dr Villanueva     CARDIOVERSION       COLONOSCOPY  , 9/10, 10/15    multiple tubular/serrated/hyperplastic polyps     COLONOSCOPY N/A 10/29/2015    multiple tubular and serrated adenomas     COLONOSCOPY N/A 2016     HC KNEE SCOPE, DIAGNOSTIC      Arthroscopy, Knee RT     LASER HOLMIUM LITHOTRIPSY URETER(S), INSERT STENT, COMBINED  10/16/2012    stones x 4, Dr Campa     SURGICAL HISTORY OF -       s/p gastric bypass Bilroth II     SURGICAL HISTORY OF -       wisdom teeth     SURGICAL HISTORY OF -       cellulitis     SURGICAL HISTORY OF     lt forearm spur's     SURGICAL HISTORY OF -   ,,    s/p lasik     SURGICAL HISTORY OF -       rt forearm spurs     SURGICAL HISTORY OF -       dr stevenson - lithotrypsy     SURGICAL HISTORY OF -   10/08,     Lt ear chonchal lesion removal SCC - dr saez       FAMILY HISTORY:  Family History   Problem Relation Age of Onset     Alzheimer Disease Father 82      at 88     Prostate Cancer Father 76     bladder and prostate     HEART DISEASE Mother 80     CHF     HEART DISEASE Maternal  "Grandfather      MI at 55     CANCER Paternal Uncle      ?       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[employer]      Retired     Social History Main Topics     Smoking status: Never Smoker     Smokeless tobacco: Never Used     Alcohol use 1.2 oz/week     2 Standard drinks or equivalent per week      Comment: 2 per week     Drug use: No      Comment: acknowledges using herbal supplement \"Vibe\" for energy-none used recently      Sexual activity: Yes     Partners: Female     Birth control/ protection: None      Comment:       Other Topics Concern     Caffeine Concern Yes     <1 can qd     Sleep Concern Yes     sleep apnea, wears cpap     Exercise No     Seat Belt Yes     Parent/Sibling W/ Cabg, Mi Or Angioplasty Before 65f 55m? No     Social History Narrative       Review of Systems:  Skin:  Positive for bruising     Eyes:  Negative      ENT:  Positive for hearing loss    Respiratory:  Positive for sleep apnea;CPAP     Cardiovascular:  Negative;chest pain;cyanosis;syncope or near-syncope;lightheadedness;dizziness;edema Positive for;palpitations;fatigue heart racing episodes, 1-2X week   Gastroenterology: Positive for heartburn;reflux;abdominal pain;excessive gas or bloating    Genitourinary:  Negative      Musculoskeletal:  Negative      Neurologic:  Negative      Psychiatric:  Negative      Heme/Lymph/Imm:  Negative      Endocrine:  Negative        Physical Exam:  Vitals: /70  Pulse 60  Ht 1.829 m (6')  Wt (!) 163.3 kg (360 lb)  BMI 48.82 kg/m2    Constitutional:           Skin:             Head:           Eyes:  pupils equal and round, conjunctivae and lids unremarkable, sclera white, no xanthalasma, EOMS intact, no nystagmus        Lymph:No Cervical lymphadenopathy present     ENT:  no " pallor or cyanosis, dentition good        Neck:  carotid pulses are full and equal bilaterally, JVP normal, no carotid bruit        Respiratory:            Cardiac:                                                           GI:  abdomen soft, non-tender, BS normoactive, no mass, no HSM, no bruits        Extremities and Muscular Skeletal:                 Neurological:  no gross motor deficits        Psych:           CC  Sarah Beth Castillo MD  [address]  [address]  [place] [address]

## 2017-12-26 NOTE — PROGRESS NOTES
HISTORY OF PRESENT ILLNESS:  I saw Mr. Smith for followup of atrial fibrillation.  He saw me in 2016.  For the last year, he has been on Xarelto, diltiazem and flecainide.  Symptomatically, he is not aware of palpitation, dizziness or shortness of breath.  The patient has planned to have a shoulder procedure in the next few weeks.  He has some difficulty to exercise and therefore believes that his weight gain is due to inactivity.      PHYSICAL EXAMINATION:   VITAL SIGNS:  Blood pressure was 134/70, heart rate 53 beats per minute, body weight is 360 pounds.   HEENT:  Eyes and ENT were unremarkable.   LUNGS:  Clear.   CARDIAC:  Rhythm was regular and heart sounds were normal with no murmur.   ABDOMEN:  Examination showed no hepatomegaly.   EXTREMITIES:  There was no pedal edema.      EKG today showed sinus bradycardia of 53 beats per minute.      ASSESSMENT AND RECOMMENDATIONS:  Mr. Smith is doing well from the arrhythmia point of view.  To prevent severe bradycardia, I have reduced the dose of Cardizem from 360 mg to 180 mg once a day.  He will continue current dose of flecainide.  If he is required to withhold Xarelto for the shoulder surgery, I would agree for temporary withholding of anticoagulation.  Otherwise, he is encouraged to lose weight.  He is scheduled to return for Cardiology followup in 1 year.      cc:      Brett Cassidy MD    [address] [address]         DULCE KRUGER MD             D: 2017 15:36   T: 2017 16:42   MT: ALYSIA      Name:     GAYATHRI SMITH   MRN:      [id number]        Account:      [id number]   :      1945           Service Date: 2017      Document: [id number]

## 2017-12-28 ENCOUNTER — TRANSFERRED RECORDS (OUTPATIENT)
Dept: HEALTH INFORMATION MANAGEMENT | Facility: CLINIC | Age: 72
End: 2017-12-28

## 2018-01-05 DIAGNOSIS — I10 HYPERTENSION GOAL BP (BLOOD PRESSURE) < 140/90: ICD-10-CM

## 2018-01-05 DIAGNOSIS — I48.0 PAROXYSMAL ATRIAL FIBRILLATION (H): ICD-10-CM

## 2018-01-05 RX ORDER — FLECAINIDE ACETATE 150 MG/1
150 TABLET ORAL EVERY 12 HOURS
Qty: 180 TABLET | Refills: 3 | Status: SHIPPED | OUTPATIENT
Start: 2018-01-05 | End: 2018-11-14

## 2018-01-05 RX ORDER — FLECAINIDE ACETATE 150 MG/1
150 TABLET ORAL EVERY 12 HOURS
Qty: 180 TABLET | Refills: 3 | Status: SHIPPED | OUTPATIENT
Start: 2018-01-05 | End: 2018-01-05

## 2018-01-05 NOTE — TELEPHONE ENCOUNTER
Patient called requesting refill on xarelto.  Also reports he is paying approximately $2000.00 annually for this medication.  Requesting cheaper medication.   Reviewed with patient that warfarin was cheaper however required labs to manage for therapeutic levels and dietary restrictions.  Informed patient I would reach out to PA to see if there was a tier exception that could be done to bring cost of medication down.  PA notified and will check on this and get back to us.  30 day refill sent in to preferred pharmacy. JAZZ Stone

## 2018-01-08 RX ORDER — DILTIAZEM HYDROCHLORIDE 180 MG/1
180 CAPSULE, COATED, EXTENDED RELEASE ORAL DAILY
Qty: 90 CAPSULE | Refills: 3 | Status: SHIPPED | OUTPATIENT
Start: 2018-01-08 | End: 2018-09-20

## 2018-01-08 NOTE — TELEPHONE ENCOUNTER
Spoke to patient as PA tried tier exceptions and no need for PA as patient needs to meet $400.00 deductible.  Recommended that patient call insurance to identify cost of medication had decreased.  Patient requesting scripts for 90 day supply be sent to [facility].  Sent script for diltiazem and xarelto as requested.  JAZZ Stone

## 2018-02-07 ENCOUNTER — TELEPHONE (OUTPATIENT)
Dept: FAMILY MEDICINE | Facility: CLINIC | Age: 73
End: 2018-02-07

## 2018-02-07 NOTE — TELEPHONE ENCOUNTER
Patient calling to schedule OV with Dr. Brett aCssidy for a bladder infection this week (2/8/18 or 2/9/18). Please call to schedule.  [phone] (home)   Thank you  Brandy Gregg

## 2018-02-08 ENCOUNTER — OFFICE VISIT (OUTPATIENT)
Dept: FAMILY MEDICINE | Facility: CLINIC | Age: 73
End: 2018-02-08
Payer: COMMERCIAL

## 2018-02-08 VITALS
SYSTOLIC BLOOD PRESSURE: 126 MMHG | BODY MASS INDEX: 42.66 KG/M2 | WEIGHT: 315 LBS | DIASTOLIC BLOOD PRESSURE: 68 MMHG | HEIGHT: 72 IN | OXYGEN SATURATION: 94 % | HEART RATE: 74 BPM | TEMPERATURE: 98.2 F

## 2018-02-08 DIAGNOSIS — K76.0 NAFLD (NONALCOHOLIC FATTY LIVER DISEASE): ICD-10-CM

## 2018-02-08 DIAGNOSIS — I10 HYPERTENSION GOAL BP (BLOOD PRESSURE) < 140/90: ICD-10-CM

## 2018-02-08 DIAGNOSIS — Z79.01 LONG TERM CURRENT USE OF ANTICOAGULANT THERAPY: ICD-10-CM

## 2018-02-08 DIAGNOSIS — I48.0 PAROXYSMAL ATRIAL FIBRILLATION (H): ICD-10-CM

## 2018-02-08 DIAGNOSIS — G47.33 OBSTRUCTIVE SLEEP APNEA SYNDROME: ICD-10-CM

## 2018-02-08 DIAGNOSIS — Z86.19 HISTORY OF HEPATITIS C: ICD-10-CM

## 2018-02-08 DIAGNOSIS — R30.0 DYSURIA: Primary | ICD-10-CM

## 2018-02-08 DIAGNOSIS — R73.03 PREDIABETES: ICD-10-CM

## 2018-02-08 DIAGNOSIS — E66.01 MORBID OBESITY DUE TO EXCESS CALORIES (H): ICD-10-CM

## 2018-02-08 LAB
ALBUMIN UR-MCNC: NEGATIVE MG/DL
APPEARANCE UR: CLEAR
BACTERIA #/AREA URNS HPF: ABNORMAL /HPF
BILIRUB UR QL STRIP: NEGATIVE
COLOR UR AUTO: YELLOW
GLUCOSE UR STRIP-MCNC: NEGATIVE MG/DL
HGB UR QL STRIP: ABNORMAL
KETONES UR STRIP-MCNC: NEGATIVE MG/DL
LEUKOCYTE ESTERASE UR QL STRIP: ABNORMAL
MUCOUS THREADS #/AREA URNS LPF: PRESENT /LPF
NITRATE UR QL: POSITIVE
NON-SQ EPI CELLS #/AREA URNS LPF: ABNORMAL /LPF
PH UR STRIP: 6 PH (ref 5–7)
RBC #/AREA URNS AUTO: ABNORMAL /HPF
SOURCE: ABNORMAL
SP GR UR STRIP: 1.01 (ref 1–1.03)
UROBILINOGEN UR STRIP-ACNC: 0.2 EU/DL (ref 0.2–1)
WBC #/AREA URNS AUTO: ABNORMAL /HPF

## 2018-02-08 PROCEDURE — 99214 OFFICE O/P EST MOD 30 MIN: CPT | Performed by: FAMILY MEDICINE

## 2018-02-08 PROCEDURE — 87186 SC STD MICRODIL/AGAR DIL: CPT | Performed by: FAMILY MEDICINE

## 2018-02-08 PROCEDURE — 87088 URINE BACTERIA CULTURE: CPT | Performed by: FAMILY MEDICINE

## 2018-02-08 PROCEDURE — 87086 URINE CULTURE/COLONY COUNT: CPT | Performed by: FAMILY MEDICINE

## 2018-02-08 PROCEDURE — 81001 URINALYSIS AUTO W/SCOPE: CPT | Performed by: FAMILY MEDICINE

## 2018-02-08 RX ORDER — CIPROFLOXACIN 500 MG/1
500 TABLET, FILM COATED ORAL 2 TIMES DAILY
Qty: 20 TABLET | Refills: 0 | Status: SHIPPED | OUTPATIENT
Start: 2018-02-08 | End: 2018-02-23

## 2018-02-08 NOTE — PATIENT INSTRUCTIONS
Begin Cipro, as directed.     Apply OTC hydrocortisone cream and Cetaphil or CeraVe moisturizer for the rash on your shoulder.     [facility]                        To reach your care team during and after hours:   [phone]  To reach our pharmacy:        [phone]    Clinic Hours                        Our clinic hours are:    Monday   7:30 am to 7:00 pm                  Tuesday through Friday 7:30 am to 5:00 pm                             Saturday   8:00 am to 12:00 pm      Sunday   Closed      Pharmacy Hours                        Our pharmacy hours are:    Monday   8:30 am to 7:00 pm       Tuesday to Friday  8:30 am to 6:00 pm                       Saturday    9:00 am to 1:00 pm              Sunday    Closed              There is also information available at our web site:  [email or web address]    If your provider ordered any lab tests and you do not receive the results within 10 business days, please call the clinic.    If you need a medication refill please contact your pharmacy.  Please allow 2-3 business days for your refill to be completed.    Our clinic offers telephone visits and e visits.  Please ask one of your team members to explain more.      Use Kidizen (secure email communication and access to your chart) to send your primary care provider a message or make an appointment. Ask someone on your Team how to sign up for Kidizen.  Immunizations                      Immunization History   Administered Date(s) Administered     Influenza (High Dose) 3 valent vaccine 09/20/2012, 12/21/2013, 10/20/2015, 09/25/2017     Influenza (IIV3) PF 10/26/2007, 12/22/2008, 09/17/2009, 10/14/2011     Pneumo Conj 13-V (2010&after) 09/25/2017     Pneumococcal 23 valent 05/11/2005, 09/20/2012     TD (ADULT, 7+) 11/30/1998     TDAP Vaccine (Adacel) 06/26/2007     TDAP Vaccine (Boostrix) 10/20/2015     Twinrix A/B 05/11/2005, 06/26/2007, 09/04/2008        [facility]  Maintenance Due   Topic Date Due     Colon Cancer Screening - FIT Test - yearly  01/19/2016

## 2018-02-08 NOTE — NURSING NOTE
Chief Complaint   Patient presents with     UTI       Initial /68  Pulse 74  Temp 98.2  F (36.8  C) (Oral)  Ht 6' (1.829 m)  Wt (!) 352 lb (159.7 kg)  SpO2 94%  BMI 47.74 kg/m2 Estimated body mass index is 47.74 kg/(m^2) as calculated from the following:    Height as of this encounter: 6' (1.829 m).    Weight as of this encounter: 352 lb (159.7 kg).  Medication Reconciliation: complete

## 2018-02-08 NOTE — MR AVS SNAPSHOT
After Visit Summary   2/8/2018    Hugo Weber    MRN: [id number]           Patient Information     Date Of Birth          1945        Visit Information        Provider Department      2/8/2018 9:00 AM Brett Cassidy MD [facility]        Today's Diagnoses     Dysuria    -  1    NAFLD (nonalcoholic fatty liver disease)        History of hepatitis C        Obstructive sleep apnea syndrome        Prediabetes        Paroxysmal atrial fibrillation (H)        Hypertension goal BP (blood pressure) < 140/90        Long term current use of anticoagulant therapy - for intermittent a-fib        Morbid obesity due to excess calories (H)          Care Instructions    Begin Cipro, as directed.     Apply OTC hydrocortisone cream and Cetaphil or CeraVe moisturizer for the rash on your shoulder.     [facility] [place]                        To reach your care team during and after hours:   [phone]  To reach our pharmacy:        [phone]    Clinic Hours                        Our clinic hours are:    Monday   7:30 am to 7:00 pm                  Tuesday through Friday 7:30 am to 5:00 pm                             Saturday   8:00 am to 12:00 pm      Sunday   Closed      Pharmacy Hours                        Our pharmacy hours are:    Monday   8:30 am to 7:00 pm       Tuesday to Friday  8:30 am to 6:00 pm                       Saturday    9:00 am to 1:00 pm              Sunday    Closed              There is also information available at our web site:  [email or web address]    If your provider ordered any lab tests and you do not receive the results within 10 business days, please call the clinic.    If you need a medication refill please contact your pharmacy.  Please allow 2-3 business days for your refill to be completed.    Our clinic offers telephone visits and e visits.  Please ask one of your team members to explain more.      Use [employer]s (secure email communication and  access to your chart) to send your primary care provider a message or make an appointment. Ask someone on your Team how to sign up for [employer].  Immunizations                      Immunization History   Administered Date(s) Administered     Influenza (High Dose) 3 valent vaccine 09/20/2012, 12/21/2013, 10/20/2015, 09/25/2017     Influenza (IIV3) PF 10/26/2007, 12/22/2008, 09/17/2009, 10/14/2011     Pneumo Conj 13-V (2010&after) 09/25/2017     Pneumococcal 23 valent 05/11/2005, 09/20/2012     TD (ADULT, 7+) 11/30/1998     TDAP Vaccine (Adacel) 06/26/2007     TDAP Vaccine (Boostrix) 10/20/2015     Twinrix A/B 05/11/2005, 06/26/2007, 09/04/2008        Health Maintenance                         Health Maintenance Due   Topic Date Due     Colon Cancer Screening - FIT Test - yearly  01/19/2016               Follow-ups after your visit        Follow-up notes from your care team     Return if symptoms worsen or fail to improve.      Who to contact     If you have questions or need follow up information about today's clinic visit or your schedule please contact [facility] directly at [phone].  Normal or non-critical lab and imaging results will be communicated to you by Indiewallshart, letter or phone within 4 business days after the clinic has received the results. If you do not hear from us within 7 days, please contact the clinic through Indiewallshart or phone. If you have a critical or abnormal lab result, we will notify you by phone as soon as possible.  Submit refill requests through [employer]dFan[employer] or call your pharmacy and they will forward the refill request to us. Please allow 3 business days for your refill to be completed.          Additional Information About Your Visit        IndiewallsharSK[employer] Network Technology Information     CrowdFanatic gives you secure access to your electronic health record. If you see a primary care provider, you can also send messages to your care team and make appointments. If you have questions, please call your  primary care clinic.  If you do not have a primary care provider, please call [phone] and they will assist you.        Care EveryWhere ID     This is your Care EveryWhere ID. This could be used by other organizations to access your [place] medical records  [id number]        Your Vitals Were     Pulse Temperature Height Pulse Oximetry BMI (Body Mass Index)       74 98.2  F (36.8  C) (Oral) 6' (1.829 m) 94% 47.74 kg/m2        Blood Pressure from Last 3 Encounters:   02/08/18 126/68   12/26/17 134/70   12/20/17 132/70    Weight from Last 3 Encounters:   02/08/18 (!) 352 lb (159.7 kg)   12/26/17 (!) 360 lb (163.3 kg)   12/20/17 (!) 354 lb (160.6 kg)              We Performed the Following     *UA reflex to Microscopic and Culture ([place] [facility] (except [place] and [place])     Urine Culture Aerobic Bacterial          Today's Medication Changes          These changes are accurate as of 2/8/18 10:15 AM.  If you have any questions, ask your nurse or doctor.               Start taking these medicines.        Dose/Directions    ciprofloxacin 500 MG tablet   Commonly known as:  CIPRO   Used for:  Dysuria   Started by:  Brett Cassidy MD        Dose:  500 mg   Take 1 tablet (500 mg) by mouth 2 times daily   Quantity:  20 tablet   Refills:  0            Where to get your medicines      These medications were sent to [facility] [address] [place] [address] AT [facility] [address] [address]    Hours:  24-hours Phone:  [phone]     ciprofloxacin 500 MG tablet                Primary Care Provider Office Phone # Fax #    Brett Cassidy [phone]       [address] [place] [address]        Equal Access to Services     [facility] MICHEL AH: Michelle higginbotham Sojoe, waaxda luqadaha, qaybta kaalmada adeankushyaterrie, stella albert. So [facility] [phone].    ATENCIÓN: Si renetta lewis  disposición servicios gratuitos de asistencia lingüística. Neha jackson [phone].    We comply with applicable federal civil rights laws and [place] laws. We do not discriminate on the basis of race, color, national origin, age, disability, sex, sexual orientation, or gender identity.            Thank you!     Thank you for choosing [facility]  for your care. Our goal is always to provide you with excellent care. Hearing back from our patients is one way we can continue to improve our services. Please take a few minutes to complete the written survey that you may receive in the mail after your visit with us. Thank you!             Your Updated Medication List - Protect others around you: Learn how to safely use, store and throw away your medicines at www.disposemymeds.org.          This list is accurate as of 2/8/18 10:15 AM.  Always use your most recent med list.                   Brand Name Dispense Instructions for use Diagnosis    azelastine 0.1 % spray    ASTELIN    3 Bottle    Spray 1-2 sprays into both nostrils 2 times daily    Obstructive sleep apnea syndrome, Environmental allergies       ciprofloxacin 500 MG tablet    CIPRO    20 tablet    Take 1 tablet (500 mg) by mouth 2 times daily    Dysuria       diltiazem 180 MG 24 hr capsule    CARDIZEM CD    90 capsule    Take 1 capsule (180 mg) by mouth daily    Paroxysmal atrial fibrillation (H), Hypertension goal BP (blood pressure) < 140/90       flecainide acetate 150 MG Tabs     180 tablet    Take 1 tablet (150 mg) by mouth every 12 hours    Paroxysmal atrial fibrillation (H)       fluticasone 50 MCG/ACT spray    FLONASE    48 mL    Spray 1-2 sprays into both nostrils daily    Environmental allergies, Obstructive sleep apnea syndrome       iron 325 (65 FE) MG tablet     90 tablet    Take 1 tablet by mouth daily (with breakfast)    Iron deficiency anemia, unspecified iron deficiency anemia type       rivaroxaban ANTICOAGULANT 20 MG Tabs  tablet    XARELTO    90 tablet    Take 1 tablet (20 mg) by mouth daily (with dinner)    Paroxysmal atrial fibrillation (H)

## 2018-02-08 NOTE — PROGRESS NOTES
SUBJECTIVE:   Hugo Weber is a 72 year old male who presents to clinic today for the following health issues:      Genitourinary - Male  Onset: 10 days    Description:   Dysuria (painful urination): YES- burning  Hematuria (blood in urine): no   Frequency: YES  Are you urinating at night : YES  Hesitancy (delay in urine): YES  Retention (unable to empty): YES  Decrease in urinary flow: YES  Incontinence: YES    Progression of Symptoms:  same    Accompanying Signs & Symptoms:  Fever: no   Back/Flank pain: no   Urethral discharge: no   Testicle lumps/masses/pain: no   Nausea and/or vomiting: no   Abdominal pain: no     History:   History of frequent UTI's: YES  History of kidney stones: YES  History of hernias: no       Patient with history of frequent UTIs and kidney stones reports dysuria, urinary hesitance, retention, decreased urinary flow, incontinence, increased urinary frequency and nocturia for 10 days. He feels symptoms are consistent with previous UTIs. He denies gross hematuria, urgency, flank/abdominal pain, fevers/chills.       Rash on left shoulder -- he mentions itchy rash on shoulder. He inquires about treatment options.     Problem list and histories reviewed & adjusted, as indicated.  Additional history: as documented    Reviewed and updated as needed this visit by clinical staff  Allergies  Meds  Med Hx       Reviewed and updated as needed this visit by Provider         BP Readings from Last 3 Encounters:   02/08/18 126/68   12/26/17 134/70   12/20/17 132/70       Wt Readings from Last 4 Encounters:   02/08/18 (!) 352 lb (159.7 kg)   12/26/17 (!) 360 lb (163.3 kg)   12/20/17 (!) 354 lb (160.6 kg)   09/25/17 (!) 354 lb (160.6 kg)       Health Maintenance    Health Maintenance Due   Topic Date Due     FIT Q1 YR  01/19/2016       Current Problem List    Patient Active Problem List   Diagnosis     Iron deficiency anemia     Colon polyps     Obstructive sleep apnea syndrome     Hyperlipidemia  LDL goal <100     Long term current use of anticoagulant therapy - for intermittent a-fib     Advance Care Planning     Total knee replacement status     Calculus of kidney     NAFLD (nonalcoholic fatty liver disease)     Morbid obesity due to excess calories (H)     History of hepatitis C     Prediabetes     Paroxysmal atrial fibrillation (H)     Cholelithiasis     Hypertension goal BP (blood pressure) < 140/90     TMJ arthralgia     Nephrolithiasis     OA (osteoarthritis)       Past Medical History    Past Medical History:   Diagnosis Date     Arrhythmia      Arthritis      Atrial fibrillation 2006    Followed by [facility]     Calculus of kidney 2005, 6/06, 10/12    dr walker/nestor/иван - hematuria - w/u o/w neg     Cholelithiasis      Chronic peptic ulcer, unspecified site, without mention of hemorrhage, perforation, or obstruction 1985    gastric bypass     Colon polyps 8/05, 9/10, 10/15    2 tubular adenoma, 1 hyperplastic - multiple serrated/tubular adenomas     Hepatitis C 10/12    chronic hepatitis C, grade 1-2, stage 1 - mild - Dr Douglas     Hyperlipidemia LDL goal <130      Hypertension goal BP (blood pressure) < 140/90 6/06    dr jaciel winchester     Iron deficiency anemia, unspecified 1985    gastric bypass     Nephrolithiasis     Dr Bey     OA (osteoarthritis)     Multiple - Left shoulder with rotator cuff tear - Dr Durham     Obesity, unspecified     s/p gastric bypass 1985      Prediabetes      Presbyacusis 1/04    dr corona     Pyelonephritis, unspecified 12/99     SCC (squamous cell carcinoma), 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    dr saez - lt conchal bowl     Sleep apnea 10/02    cpap - severe - 15 cm - dr cunha     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    [facility]       Past Surgical History    Past Surgical History:   Procedure Laterality Date     ARTHROPLASTY KNEE  8/13/2012    LT TKA - Dr Villanueva     CARDIOVERSION  2016     COLONOSCOPY  8/05, 9/10, 10/15    multiple tubular/serrated/hyperplastic polyps     COLONOSCOPY  N/A 10/29/2015    multiple tubular and serrated adenomas     COLONOSCOPY N/A 9/7/2016     HC KNEE SCOPE, DIAGNOSTIC  05/00    Arthroscopy, Knee RT     LASER HOLMIUM LITHOTRIPSY URETER(S), INSERT STENT, COMBINED  10/16/2012    stones x 4, Dr Campa     SURGICAL HISTORY OF -   1985    s/p gastric bypass Bilroth II     SURGICAL HISTORY OF - 11/98    wisdom teeth     SURGICAL HISTORY OF -   1979    cellulitis     SURGICAL HISTORY OF - 11/98    lt forearm spur's     SURGICAL HISTORY OF -   1/01,6/02,11/02    s/p lasik     SURGICAL HISTORY OF -   11/99    rt forearm spurs     SURGICAL HISTORY OF -   7/06    dr stevenson - lithotrypsy     SURGICAL HISTORY OF -   10/08, 12/08    Lt ear chonchal lesion removal SCC - dr saez       Current Medications    Current Outpatient Prescriptions   Medication Sig Dispense Refill     ciprofloxacin (CIPRO) 500 MG tablet Take 1 tablet (500 mg) by mouth 2 times daily 20 tablet 0     rivaroxaban ANTICOAGULANT (XARELTO) 20 MG TABS tablet Take 1 tablet (20 mg) by mouth daily (with dinner) 90 tablet 3     diltiazem (CARDIZEM CD) 180 MG 24 hr capsule Take 1 capsule (180 mg) by mouth daily 90 capsule 3     flecainide acetate 150 MG TABS Take 1 tablet (150 mg) by mouth every 12 hours 180 tablet 3     fluticasone (FLONASE) 50 MCG/ACT spray Spray 1-2 sprays into both nostrils daily 48 mL 3     azelastine (ASTELIN) 0.1 % spray Spray 1-2 sprays into both nostrils 2 times daily 3 Bottle 3     Ferrous Sulfate (IRON) 325 (65 FE) MG tablet Take 1 tablet by mouth daily (with breakfast) 90 tablet 3       Allergies    No Known Allergies    Immunizations    Immunization History   Administered Date(s) Administered     Influenza (High Dose) 3 valent vaccine 09/20/2012, 12/21/2013, 10/20/2015, 09/25/2017     Influenza (IIV3) PF 10/26/2007, 12/22/2008, 09/17/2009, 10/14/2011     Pneumo Conj 13-V (2010&after) 09/25/2017     Pneumococcal 23 valent 05/11/2005, 09/20/2012     TD (ADULT, 7+) 11/30/1998     TDAP  "Vaccine (Adacel) 2007     TDAP Vaccine (Boostrix) 10/20/2015     Twinrix A/B 2005, 2007, 2008       Family History    Family History   Problem Relation Age of Onset     Alzheimer Disease Father 82      at 88     Prostate Cancer Father 76     bladder and prostate     HEART DISEASE Mother 80     CHF     HEART DISEASE Maternal Grandfather      MI at 55     CANCER Paternal Uncle      ?       Social History    Social History     Social History     Marital status:      Spouse name: Mayra     Number of children: 3     Years of education: 21     Occupational History     retired teacher and football and   [employer]      Retired     Social History Main Topics     Smoking status: Never Smoker     Smokeless tobacco: Never Used     Alcohol use 1.2 oz/week     2 Standard drinks or equivalent per week      Comment: 2 per week     Drug use: No      Comment: acknowledges using herbal supplement \"Vibe\" for energy-none used recently      Sexual activity: Yes     Partners: Female     Birth control/ protection: None      Comment:       Other Topics Concern     Caffeine Concern Yes     <1 can qd     Sleep Concern Yes     sleep apnea, wears cpap     Exercise No     Seat Belt Yes     Parent/Sibling W/ Cabg, Mi Or Angioplasty Before 65f 55m? No     Social History Narrative       All above reviewed and updated, all stable unless otherwise noted    Recent labs reviewed    ROS:  Constitutional, HEENT, cardiovascular, pulmonary, GI, , musculoskeletal, neuro, skin, endocrine and psych systems are negative, except as otherwise noted.    This document serves as a record of the services and decisions personally performed and made by Brett Cassidy MD. It was created on their behalf by Lisa Ponce, a trained medical scribe. The creation of this document is based the provider's statements to the medical scribe.  Lisa Ponce 2018 9:23 AM      OBJECTIVE:  "                                                   /68  Pulse 74  Temp 98.2  F (36.8  C) (Oral)  Ht 6' (1.829 m)  Wt (!) 352 lb (159.7 kg)  SpO2 94%  BMI 47.74 kg/m2  Body mass index is 47.74 kg/(m^2).  GENERAL: healthy, alert and no distress  EYES: Eyes grossly normal to inspection, extraocular movements - intact, and PERRL  HENT: ear canals- normal; TMs- normal; Nose- normal; Mouth- no ulcers, no lesions  NECK: no tenderness, no adenopathy, no asymmetry, no masses, no stiffness; thyroid- normal to palpation  RESP: lungs clear to auscultation - no rales, no rhonchi, no wheezes  CV: regular rates and rhythm, normal S1 S2, no S3 or S4 and no murmur, no click or rub -  ABDOMEN: soft, no tenderness, no  hepatosplenomegaly, no masses, normal bowel sounds  MS: extremities- no gross deformities noted, no edema  SKIN: no suspicious lesions, no rashes  NEURO: strength and tone- normal, sensory exam- grossly normal, mentation- intact, speech- normal, reflexes- symmetric  BACK: no CVA tenderness, no paralumbar tenderness  PSYCH: Alert and oriented times 3; speech- coherent , normal rate and volume; able to articulate logical thoughts, able to abstract reason, no tangential thoughts, no hallucinations or delusions, affect- normal  LYMPHATICS: ant. cervical- normal, post. cervical- normal, axillary- normal, supraclavicular- normal, inguinal- normal    DIAGNOSTICS/PROCEDURES:                                                      No results found for this or any previous visit (from the past 24 hour(s)).     ASSESSMENT/PLAN:                                                        ICD-10-CM    1. Dysuria R30.0 *UA reflex to Microscopic and Culture ([place] [facility] (except [place] and [place])     Urine Culture Aerobic Bacterial     ciprofloxacin (CIPRO) 500 MG tablet     Urine Microscopic   2. NAFLD (nonalcoholic fatty liver disease) K76.0    3. History of hepatitis C Z86.19    4. Obstructive sleep apnea  syndrome G47.33    5. Prediabetes R73.03    6. Paroxysmal atrial fibrillation (H) I48.0    7. Hypertension goal BP (blood pressure) < 140/90 I10    8. Long term current use of anticoagulant therapy - for intermittent a-fib Z79.01    9. Morbid obesity due to excess calories (H) E66.01        Discussed treatment/modality options, including risk and benefits, he desires antibiotic as UA is consistent with UTI. All diagnosis above reviewed and noted above, otherwise stable.  See [employer] orders for further details.  Follow up as needed.    Health Maintenance Due   Topic Date Due     FIT Q1 YR  01/19/2016     Patient Instructions  Begin Cipro, as directed.     Apply OTC hydrocortisone cream and Cetaphil or CeraVe moisturizer for the rash on your shoulder.     The information in this document, created by the medical scribe for me, accurately reflects the services I personally performed and the decisions made by me. I have reviewed and approved this document for accuracy.   Brett Cassidy MD FAAFP            Brett Cassidy MD [address]  [address] [phone] Fax

## 2018-02-10 LAB
BACTERIA SPEC CULT: ABNORMAL
SPECIMEN SOURCE: ABNORMAL

## 2018-02-12 DIAGNOSIS — N39.0 URINARY TRACT INFECTION: Primary | ICD-10-CM

## 2018-02-23 ENCOUNTER — TELEPHONE (OUTPATIENT)
Dept: FAMILY MEDICINE | Facility: CLINIC | Age: 73
End: 2018-02-23

## 2018-02-23 ENCOUNTER — ALLIED HEALTH/NURSE VISIT (OUTPATIENT)
Dept: NURSING | Facility: CLINIC | Age: 73
End: 2018-02-23
Payer: COMMERCIAL

## 2018-02-23 ENCOUNTER — OFFICE VISIT (OUTPATIENT)
Dept: FAMILY MEDICINE | Facility: CLINIC | Age: 73
End: 2018-02-23
Payer: COMMERCIAL

## 2018-02-23 ENCOUNTER — HOSPITAL ENCOUNTER (OUTPATIENT)
Dept: CT IMAGING | Facility: CLINIC | Age: 73
Discharge: HOME | End: 2018-02-23
Attending: FAMILY MEDICINE | Admitting: FAMILY MEDICINE
Payer: MEDICARE

## 2018-02-23 VITALS
BODY MASS INDEX: 42.66 KG/M2 | DIASTOLIC BLOOD PRESSURE: 80 MMHG | RESPIRATION RATE: 14 BRPM | HEART RATE: 62 BPM | TEMPERATURE: 97.2 F | SYSTOLIC BLOOD PRESSURE: 122 MMHG | WEIGHT: 315 LBS | HEIGHT: 72 IN | OXYGEN SATURATION: 97 %

## 2018-02-23 DIAGNOSIS — R73.03 PREDIABETES: ICD-10-CM

## 2018-02-23 DIAGNOSIS — R31.9 BLOOD IN URINE: Primary | ICD-10-CM

## 2018-02-23 DIAGNOSIS — N20.0 NEPHROLITHIASIS: ICD-10-CM

## 2018-02-23 DIAGNOSIS — E66.01 MORBID OBESITY DUE TO EXCESS CALORIES (H): ICD-10-CM

## 2018-02-23 DIAGNOSIS — Z79.01 LONG TERM CURRENT USE OF ANTICOAGULANT THERAPY: ICD-10-CM

## 2018-02-23 DIAGNOSIS — R31.9 BLOOD IN URINE: ICD-10-CM

## 2018-02-23 DIAGNOSIS — N39.0 URINARY TRACT INFECTION: ICD-10-CM

## 2018-02-23 DIAGNOSIS — Z51.81 MEDICATION MONITORING ENCOUNTER: ICD-10-CM

## 2018-02-23 DIAGNOSIS — R31.0 GROSS HEMATURIA: Primary | ICD-10-CM

## 2018-02-23 DIAGNOSIS — I10 HYPERTENSION GOAL BP (BLOOD PRESSURE) < 140/90: ICD-10-CM

## 2018-02-23 LAB
ALBUMIN UR-MCNC: 30 MG/DL
APPEARANCE UR: ABNORMAL
BACTERIA #/AREA URNS HPF: ABNORMAL /HPF
BASOPHILS # BLD AUTO: 0.1 10E9/L (ref 0–0.2)
BASOPHILS NFR BLD AUTO: 0.6 %
BILIRUB UR QL STRIP: NEGATIVE
COLOR UR AUTO: ABNORMAL
DIFFERENTIAL METHOD BLD: NORMAL
EOSINOPHIL # BLD AUTO: 0.3 10E9/L (ref 0–0.7)
EOSINOPHIL NFR BLD AUTO: 2.9 %
ERYTHROCYTE [DISTWIDTH] IN BLOOD BY AUTOMATED COUNT: 14.1 % (ref 10–15)
GLUCOSE UR STRIP-MCNC: NEGATIVE MG/DL
HCT VFR BLD AUTO: 48.8 % (ref 40–53)
HGB BLD-MCNC: 15.7 G/DL (ref 13.3–17.7)
HGB UR QL STRIP: ABNORMAL
KETONES UR STRIP-MCNC: NEGATIVE MG/DL
LEUKOCYTE ESTERASE UR QL STRIP: NEGATIVE
LYMPHOCYTES # BLD AUTO: 2.1 10E9/L (ref 0.8–5.3)
LYMPHOCYTES NFR BLD AUTO: 20.7 %
MCH RBC QN AUTO: 29 PG (ref 26.5–33)
MCHC RBC AUTO-ENTMCNC: 32.2 G/DL (ref 31.5–36.5)
MCV RBC AUTO: 90 FL (ref 78–100)
MONOCYTES # BLD AUTO: 1.1 10E9/L (ref 0–1.3)
MONOCYTES NFR BLD AUTO: 10.7 %
NEUTROPHILS # BLD AUTO: 6.4 10E9/L (ref 1.6–8.3)
NEUTROPHILS NFR BLD AUTO: 65.1 %
NITRATE UR QL: NEGATIVE
PH UR STRIP: 5.5 PH (ref 5–7)
PLATELET # BLD AUTO: 310 10E9/L (ref 150–450)
RBC # BLD AUTO: 5.41 10E12/L (ref 4.4–5.9)
RBC #/AREA URNS AUTO: >100 /HPF
SOURCE: ABNORMAL
SP GR UR STRIP: 1.02 (ref 1–1.03)
UROBILINOGEN UR STRIP-ACNC: 0.2 EU/DL (ref 0.2–1)
WBC # BLD AUTO: 9.9 10E9/L (ref 4–11)
WBC #/AREA URNS AUTO: ABNORMAL /HPF

## 2018-02-23 PROCEDURE — 85025 COMPLETE CBC W/AUTO DIFF WBC: CPT | Performed by: FAMILY MEDICINE

## 2018-02-23 PROCEDURE — 81001 URINALYSIS AUTO W/SCOPE: CPT | Performed by: FAMILY MEDICINE

## 2018-02-23 PROCEDURE — 36415 COLL VENOUS BLD VENIPUNCTURE: CPT | Performed by: FAMILY MEDICINE

## 2018-02-23 PROCEDURE — 74176 CT ABD & PELVIS W/O CONTRAST: CPT

## 2018-02-23 PROCEDURE — 99207 ZZC NO CHARGE NURSE ONLY: CPT

## 2018-02-23 PROCEDURE — 80048 BASIC METABOLIC PNL TOTAL CA: CPT | Performed by: FAMILY MEDICINE

## 2018-02-23 PROCEDURE — 87086 URINE CULTURE/COLONY COUNT: CPT | Performed by: FAMILY MEDICINE

## 2018-02-23 PROCEDURE — 99214 OFFICE O/P EST MOD 30 MIN: CPT | Performed by: FAMILY MEDICINE

## 2018-02-23 RX ORDER — TAMSULOSIN HYDROCHLORIDE 0.4 MG/1
0.4 CAPSULE ORAL DAILY
Qty: 30 CAPSULE | Refills: 1 | Status: SHIPPED | OUTPATIENT
Start: 2018-02-23 | End: 2018-04-24

## 2018-02-23 NOTE — MR AVS SNAPSHOT
After Visit Summary   2/23/2018    Hugo Weber    MRN: [id number]           Patient Information     Date Of Birth          1945        Visit Information        Provider Department      2/23/2018 1:45 PM [facility]        Today's Diagnoses     Blood in urine    -  1       Follow-ups after your visit        Your next 10 appointments already scheduled     Feb 23, 2018  1:45 PM CST   Nurse Only with [facility] ANT[facility]AGULATION CLINIC   [facility] ([facility]    [address] [place] [id number] [phone]              Who to contact     If you have questions or need follow up information about today's clinic visit or your schedule please contact [facility] directly at [phone].  Normal or non-critical lab and imaging results will be communicated to you by MyChart, letter or phone within 4 business days after the clinic has received the results. If you do not hear from us within 7 days, please contact the clinic through MyChart or phone. If you have a critical or abnormal lab result, we will notify you by phone as soon as possible.  Submit refill requests through [email or web address] or call your pharmacy and they will forward the refill request to us. Please allow 3 business days for your refill to be completed.          Additional Information About Your Visit        MyChart Information     [email or web address] gives you secure access to your electronic health record. If you see a primary care provider, you can also send messages to your care team and make appointments. If you have questions, please call your primary care clinic.  If you do not have a primary care provider, please call [phone] and they will assist you.        Care EveryWhere ID     This is your Care EveryWhere ID. This could be used by other organizations to access your [place] medical records  [id number]         Blood Pressure from  Last 3 Encounters:   02/23/18 122/80   02/08/18 126/68   12/26/17 134/70    Weight from Last 3 Encounters:   02/23/18 (!) 350 lb (158.8 kg)   02/08/18 (!) 352 lb (159.7 kg)   12/26/17 (!) 360 lb (163.3 kg)              Today, you had the following     No orders found for display       Primary Care Provider Office Phone # Fax #    Brett Cassidy [phone]       [address] [place] [address]        Equal Access to Services     [facility]: Hadii aad ku hadasho Soomaali, waaxda luqadaha, qaybta kaalmada adeegyada, stella pierson . So [facility] [phone].    ATENCIÓN: Si habla español, tiene a oconnor disposición servicios gratuitos de asistencia lingüística. [facility] [phone].    We comply with applicable federal civil rights laws and [place] laws. We do not discriminate on the basis of race, color, national origin, age, disability, sex, sexual orientation, or gender identity.            Thank you!     Thank you for choosing [facility]  for your care. Our goal is always to provide you with excellent care. Hearing back from our patients is one way we can continue to improve our services. Please take a few minutes to complete the written survey that you may receive in the mail after your visit with us. Thank you!             Your Updated Medication List - Protect others around you: Learn how to safely use, store and throw away your medicines at www.disposemymeds.org.          This list is accurate as of 2/23/18 10:09 AM.  Always use your most recent med list.                   Brand Name Dispense Instructions for use Diagnosis    azelastine 0.1 % spray    ASTELIN    3 Bottle    Spray 1-2 sprays into both nostrils 2 times daily    Obstructive sleep apnea syndrome, Environmental allergies       diltiazem 180 MG 24 hr capsule    CARDIZEM CD    90 capsule    Take 1 capsule (180 mg) by mouth daily    Paroxysmal atrial fibrillation (H), Hypertension goal  BP (blood pressure) < 140/90       flecainide acetate 150 MG Tabs     180 tablet    Take 1 tablet (150 mg) by mouth every 12 hours    Paroxysmal atrial fibrillation (H)       fluticasone 50 MCG/ACT spray    FLONASE    48 mL    Spray 1-2 sprays into both nostrils daily    Environmental allergies, Obstructive sleep apnea syndrome       iron 325 (65 FE) MG tablet     90 tablet    Take 1 tablet by mouth daily (with breakfast)    Iron deficiency anemia, unspecified iron deficiency anemia type       rivaroxaban ANTICOAGULANT 20 MG Tabs tablet    XARELTO    90 tablet    Take 1 tablet (20 mg) by mouth daily (with dinner)    Paroxysmal atrial fibrillation (H)

## 2018-02-23 NOTE — TELEPHONE ENCOUNTER
Patient seen by MD RANDY today - needs to see Dr. Bey either today or this coming Monday for possible Kidney Stones    Called [facility] @ [phone] - appointment scheduled for Monday, 02/26/2018, @ 1120am ([address], [place], [place])    Called patient @ [phone] - detailed VM left with information above.     Jennifer Berry RN  [facility]

## 2018-02-23 NOTE — PROGRESS NOTES
"  SUBJECTIVE:   Hugo Weber is a 72 year old male who presents to clinic today for the following health issues:    Patient dropped off UA/UC for repeat UTI test - states he noticed blood in the urine    Genitourinary - Male  Onset: last night, 8pm    Description:   Dysuria (painful urination): no   Hematuria (blood in urine): YES- started last night @ 2000 - states there was \"quite a bit of blood\" in the urine.   Frequency: YES, at times  Are you urinating at night : no   Hesitancy (delay in urine): YES- just this morning, 02/23/2018  Retention (unable to empty): YES  Decrease in urinary flow: no   Incontinence: no     Progression of Symptoms:  same and constant    Accompanying Signs & Symptoms:  Fever: no   Back/Flank pain: no   Urethral discharge: no   Testicle lumps/masses/pain: no   Nausea and/or vomiting: no   Abdominal pain: no     History:   History of frequent UTI's: YES- states he used to but it has been ~2 years since this UTI  History of kidney stones: YES  History of hernias: no   Personal or Family history of Prostate problems: YES - father had prostate cancer when he was 76.   Sexually active: YES    Precipitating factors:   N/a    Alleviating factors:  n/a     Patient was seen by the urologist 1.5 months ago - states he is to F/U PRN.    Patient states the last time he had kidney stones he did not have any pain - just noticed blood in the urine. Symptoms yesterday/today feel similar to the last time he had kidney stones    Problem list and histories reviewed & adjusted, as indicated.  Additional history: as documented    Reviewed and updated as needed this visit by clinical staff       Reviewed and updated as needed this visit by Provider       BP Readings from Last 3 Encounters:   02/26/18 132/80   02/23/18 122/80   02/08/18 126/68       Wt Readings from Last 4 Encounters:   02/26/18 (!) 350 lb (158.8 kg)   02/23/18 (!) 350 lb (158.8 kg)   02/08/18 (!) 352 lb (159.7 kg)   12/26/17 (!) 360 lb " (163.3 kg)       Health Maintenance    Health Maintenance Due   Topic Date Due     FIT Q1 YR  01/19/2016       Current Problem List    Patient Active Problem List   Diagnosis     Iron deficiency anemia     Colon polyps     Obstructive sleep apnea syndrome     Hyperlipidemia LDL goal <100     Long term current use of anticoagulant therapy - for intermittent a-fib     Advance Care Planning     Total knee replacement status     Calculus of kidney     NAFLD (nonalcoholic fatty liver disease)     Morbid obesity due to excess calories (H)     History of hepatitis C     Prediabetes     Paroxysmal atrial fibrillation (H)     Cholelithiasis     Hypertension goal BP (blood pressure) < 140/90     TMJ arthralgia     Nephrolithiasis     OA (osteoarthritis)       Past Medical History    Past Medical History:   Diagnosis Date     Arrhythmia      Arthritis      Atrial fibrillation 2006    Followed by [facility]     Calculus of kidney 2005, 6/06, 10/12    dr walker/nestor/иван - hematuria - w/u o/w neg     Cholelithiasis      Chronic peptic ulcer, unspecified site, without mention of hemorrhage, perforation, or obstruction 1985    gastric bypass     Colon polyps 8/05, 9/10, 10/15    2 tubular adenoma, 1 hyperplastic - multiple serrated/tubular adenomas     Hepatitis C 10/12    chronic hepatitis C, grade 1-2, stage 1 - mild - Dr Douglas     Hyperlipidemia LDL goal <130      Hypertension goal BP (blood pressure) < 140/90 6/06    dr jaciel winchester     Iron deficiency anemia, unspecified 1985    gastric bypass     Nephrolithiasis     Dr Bey     OA (osteoarthritis)     Multiple - Left shoulder with rotator cuff tear - Dr Durham     Obesity, unspecified     s/p gastric bypass 1985      Prediabetes      Presbyacusis 1/04    dr corona     Pyelonephritis, unspecified 12/99     SCC (squamous cell carcinoma), 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    dr saez - lt conchal bowl     Sleep apnea 10/02    cpap - severe - 15 cm - dr cunha     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    Mn Head  and Neck       Past Surgical History    Past Surgical History:   Procedure Laterality Date     ARTHROPLASTY KNEE  8/13/2012    LT TKA - Dr Villanueva     CARDIOVERSION  2016     COLONOSCOPY  8/05, 9/10, 10/15    multiple tubular/serrated/hyperplastic polyps     COLONOSCOPY N/A 10/29/2015    multiple tubular and serrated adenomas     COLONOSCOPY N/A 9/7/2016     HC KNEE SCOPE, DIAGNOSTIC  05/00    Arthroscopy, Knee RT     LASER HOLMIUM LITHOTRIPSY URETER(S), INSERT STENT, COMBINED  10/16/2012    stones x 4, Dr Campa     SURGICAL HISTORY OF -   1985    s/p gastric bypass Bilroth II     SURGICAL HISTORY OF -   11/98    wisdom teeth     SURGICAL HISTORY OF -   1979    cellulitis     SURGICAL HISTORY OF -   11/98    lt forearm spur's     SURGICAL HISTORY OF -   1/01,6/02,11/02    s/p lasik     SURGICAL HISTORY OF -   11/99    rt forearm spurs     SURGICAL HISTORY OF -   7/06    dr stevenson - lithotrypsy     SURGICAL HISTORY OF -   10/08, 12/08    Lt ear chonchal lesion removal SCC - dr saez       Current Medications    Current Outpatient Prescriptions   Medication Sig Dispense Refill     tamsulosin (FLOMAX) 0.4 MG capsule Take 1 capsule (0.4 mg) by mouth daily 30 capsule 1     rivaroxaban ANTICOAGULANT (XARELTO) 20 MG TABS tablet Take 1 tablet (20 mg) by mouth daily (with dinner) 90 tablet 3     diltiazem (CARDIZEM CD) 180 MG 24 hr capsule Take 1 capsule (180 mg) by mouth daily 90 capsule 3     flecainide acetate 150 MG TABS Take 1 tablet (150 mg) by mouth every 12 hours 180 tablet 3     fluticasone (FLONASE) 50 MCG/ACT spray Spray 1-2 sprays into both nostrils daily 48 mL 3     azelastine (ASTELIN) 0.1 % spray Spray 1-2 sprays into both nostrils 2 times daily 3 Bottle 3     Ferrous Sulfate (IRON) 325 (65 FE) MG tablet Take 1 tablet by mouth daily (with breakfast) 90 tablet 3       Allergies    No Known Allergies    Immunizations    Immunization History   Administered Date(s) Administered     Influenza (High Dose) 3  "valent vaccine 2012, 2013, 10/20/2015, 2017     Influenza (IIV3) PF 10/26/2007, 2008, 2009, 10/14/2011     Pneumo Conj 13-V (2010&after) 2017     Pneumococcal 23 valent 2005, 2012     TD (ADULT, 7+) 1998     TDAP Vaccine (Adacel) 2007     TDAP Vaccine (Boostrix) 10/20/2015     Twinrix A/B 2005, 2007, 2008       Family History    Family History   Problem Relation Age of Onset     Alzheimer Disease Father 82      at 88     Prostate Cancer Father 76     bladder and prostate     HEART DISEASE Mother 80     CHF     HEART DISEASE Maternal Grandfather      MI at 55     CANCER Paternal Uncle      ?       Social History    Social History     Social History     Marital status:      Spouse name: Mayra     Number of children: 3     Years of education: 21     Occupational History     retired teacher and football and   [employer]      Retired     Social History Main Topics     Smoking status: Never Smoker     Smokeless tobacco: Never Used     Alcohol use 1.2 oz/week     2 Standard drinks or equivalent per week      Comment: 2 per week     Drug use: No      Comment: acknowledges using herbal supplement \"Vibe\" for energy-none used recently      Sexual activity: Yes     Partners: Female     Birth control/ protection: None      Comment:       Other Topics Concern     Caffeine Concern Yes     <1 can qd     Sleep Concern Yes     sleep apnea, wears cpap     Exercise No     Seat Belt Yes     Parent/Sibling W/ Cabg, Mi Or Angioplasty Before 65f 55m? No     Social History Narrative       All above reviewed and updated, all stable unless otherwise noted    Recent labs reviewed    ROS:  Constitutional, HEENT, cardiovascular, pulmonary, GI, , musculoskeletal, neuro, skin, endocrine and psych systems are negative, except as in HPI or otherwise noted     OBJECTIVE:                                                    BP " 122/80 (BP Location: Right arm, Patient Position: Sitting, Cuff Size: Adult Regular)  Pulse 62  Temp 97.2  F (36.2  C) (Oral)  Resp 14  Ht 6' (1.829 m)  Wt (!) 350 lb (158.8 kg)  SpO2 97%  BMI 47.47 kg/m2  Body mass index is 47.47 kg/(m^2).  GENERAL: healthy, alert and no distress, super morbidly obese   HENT: ear canals and TM's normal upon viewing with otoscope, nose and mouth without ulcers or lesions upon viewing with otoscope  RESP: lungs clear to auscultation - no rales, no rhonchi, no wheezes  CV: regular rates and rhythm, normal S1 S2, no S3 or S4 and no murmur, no click or rub -  ABDOMEN: soft, no tenderness, no  hepatosplenomegaly, no masses, normal bowel sounds  MS: extremities- no gross deformities noted, no edema  SKIN: no suspicious lesions, no rashes to visible skin  NEURO: mentation intact and speech normal  BACK: no CVA tenderness, no paralumbar tenderness  PSYCH: affect normal/bright    DIAGNOSTICS/PROCEDURES:                                                      No results found for this or any previous visit (from the past 24 hour(s)).     ASSESSMENT/PLAN:                                                        ICD-10-CM    1. Gross hematuria R31.0 Urine Culture Aerobic Bacterial     Basic metabolic panel  (Ca, Cl, CO2, Creat, Gluc, K, Na, BUN)     CBC with platelets and differential     tamsulosin (FLOMAX) 0.4 MG capsule     CT Abdomen Pelvis w/o Contrast     CANCELED: CT Abdomen Pelvis w Contrast   2. Nephrolithiasis N20.0    3. Prediabetes R73.03    4. Hypertension goal BP (blood pressure) < 140/90 I10    5. Morbid obesity due to excess calories (H) E66.01    6. Long term current use of anticoagulant therapy - for intermittent a-fib Z79.01    7. Medication monitoring encounter Z51.81      Discussed treatment/modality options, including risk and benefits, he desires further health care maintenance, further imaging (CT abd/pelvis), further lab(s), new medications (flomax), OTC meds, and  observation. All diagnosis above reviewed and noted above, otherwise stable.  See EpicCare orders for further details.  Follow up as needed, next few days    - Pt was recommended to follow up with Urology ([facility]), ASAP.     Leaving for [place] in the next few days.    Health Maintenance Due   Topic Date Due     FIT Q1 YR  01/19/2016       Patient Instructions   Your provider has ordered a procedure for you to be done at  [facility] - [address], [place]    Check in at the .    *Please understand that you are being worked in to their schedule and there may be a wait.  They will get to your exam as quickly as they can.*                                                                                                                                                                      After your exam, your physician will be contacted by the Radiologist with the results.  This usually takes about 45 minutes after the completion of the exam.  You will then be notified of the results by your physician.    Your exam is scheduled for:  Today immediately following this appointment    Special Instructions for your exam include:  CT Prep CT Abdomen/Pelvis:  Do NOT eat or drink for 2 hours prior to your exam.  You may take medications with a small amount of water.  You will need to drink an oral contrast, which you will start 2 hours before your exam time.  This contrast, Gastroview, will be given to you upon check-in at the Radiology Department.  The staff will go over the prep with you. The CT scan itself will take about 30 minutes.    *Please check in with the Admitting staff 15 minutes prior to your scheduled exam.*               Brett Cassidy MD [address]  [address] [phone] Fax

## 2018-02-23 NOTE — PROGRESS NOTES
Patient dropped off UA/UC for repeat UTI test - states he noticed blood in the urine    Genitourinary - Male  Onset: last night, 2000    Description:   Dysuria (painful urination): no   Hematuria (blood in urine): YES- started last night @ 2000 - states there was a lot of   Frequency: YES  Are you urinating at night : no   Hesitancy (delay in urine): YES- just this morning, 02/23/2018  Retention (unable to empty): YES  Decrease in urinary flow: no   Incontinence: no     Progression of Symptoms:  same and constant    Accompanying Signs & Symptoms:  Fever: no   Back/Flank pain: no   Urethral discharge: no   Testicle lumps/masses/pain: no   Nausea and/or vomiting: no   Abdominal pain: no     History:   History of frequent UTI's: YES- states he used to but it has been ~2 years since this UTI  History of kidney stones: YES  History of hernias: no   Personal or Family history of Prostate problems: YES - father had prostate cancer when he was 76.   Sexually active: YES    Precipitating factors:   N/a    Alleviating factors:  n/a     Patient was seen by the urologist 1.5 months ago - states he is to FU PRN  Patient states the last time he had kidney stones he did not have any pain - just noticed blood in the urine.     Advised OV with MD RANDY - provider to come see patient    Jennifer Berry RN  [facility]

## 2018-02-23 NOTE — PATIENT INSTRUCTIONS
Your provider has ordered a procedure for you to be done at  [facility] - [address], [place]    Check in at the .    *Please understand that you are being worked in to their schedule and there may be a wait.  They will get to your exam as quickly as they can.*                                                                                                                                                                      After your exam, your physician will be contacted by the Radiologist with the results.  This usually takes about 45 minutes after the completion of the exam.  You will then be notified of the results by your physician.    Your exam is scheduled for:  Today immediately following this appointment    Special Instructions for your exam include:  CT Prep CT Abdomen/Pelvis:  Do NOT eat or drink for 2 hours prior to your exam.  You may take medications with a small amount of water.  You will need to drink an oral contrast, which you will start 2 hours before your exam time.  This contrast, Gastroview, will be given to you upon check-in at the Radiology Department.  The staff will go over the prep with you. The CT scan itself will take about 30 minutes.    *Please check in with the Admitting staff 15 minutes prior to your scheduled exam.*

## 2018-02-23 NOTE — MR AVS SNAPSHOT
After Visit Summary   2/23/2018    Hugo Weber    MRN: [id number]           Patient Information     Date Of Birth          1945        Visit Information        Provider Department      2/23/2018 9:40 AM Brett Cassidy MD [facility]        Today's Diagnoses     Blood in urine    -  1      Care Instructions    Your provider has ordered a procedure for you to be done at  [facility] - [address], [place]    Check in at the .    *Please understand that you are being worked in to their schedule and there may be a wait.  They will get to your exam as quickly as they can.*                                                                                                                                                                      After your exam, your physician will be contacted by the Radiologist with the results.  This usually takes about 45 minutes after the completion of the exam.  You will then be notified of the results by your physician.    Your exam is scheduled for:  Today immediately following this appointment    Special Instructions for your exam include:  CT Prep CT Abdomen/Pelvis:  Do NOT eat or drink for 2 hours prior to your exam.  You may take medications with a small amount of water.  You will need to drink an oral contrast, which you will start 2 hours before your exam time.  This contrast, Gastroview, will be given to you upon check-in at the Radiology Department.  The staff will go over the prep with you. The CT scan itself will take about 30 minutes.    *Please check in with the Admitting staff 15 minutes prior to your scheduled exam.*            Follow-ups after your visit        Your next 10 appointments already scheduled     Feb 23, 2018  1:45 PM CST   Nurse [facility] with [facility]   [facility]    [address] [place] [id number]    [phone]              Future tests that were ordered for you today     Open Future Orders        Priority Expected Expires Ordered    CT Abdomen Pelvis w/o Contrast STAT  2/23/2019 2/23/2018            Who to contact     If you have questions or need follow up information about today's clinic visit or your schedule please contact [facility] directly at [phone].  Normal or non-critical lab and imaging results will be communicated to you by MyChart, letter or phone within 4 business days after the clinic has received the results. If you do not hear from us within 7 days, please contact the clinic through [employer] or phone. If you have a critical or abnormal lab result, we will notify you by phone as soon as possible.  Submit refill requests through [employer] or call your pharmacy and they will forward the refill request to us. Please allow 3 business days for your refill to be completed.          Additional Information About Your Visit        brand eins [employer] Information     [employer] gives you secure access to your electronic health record. If you see a primary care provider, you can also send messages to your care team and make appointments. If you have questions, please call your primary care clinic.  If you do not have a primary care provider, please call [phone] and they will assist you.        Care EveryWhere ID     This is your Care EveryWhere ID. This could be used by other organizations to access your [facility] medical records  [id number]        Your Vitals Were     Pulse Temperature Respirations Height Pulse Oximetry BMI (Body Mass Index)    62 97.2  F (36.2  C) (Oral) 14 6' (1.829 m) 97% 47.47 kg/m2       Blood Pressure from Last 3 Encounters:   02/23/18 122/80   02/08/18 126/68   12/26/17 134/70    Weight from Last 3 Encounters:   02/23/18 (!) 350 lb (158.8 kg)   02/08/18 (!) 352 lb (159.7 kg)   12/26/17 (!) 360 lb (163.3 kg)              We Performed the Following     Basic metabolic  panel  (Ca, Cl, CO2, Creat, Gluc, K, Na, BUN)     CBC with platelets and differential     Urine Culture Aerobic Bacterial          Today's Medication Changes          These changes are accurate as of 2/23/18 10:32 AM.  If you have any questions, ask your nurse or doctor.               Start taking these medicines.        Dose/Directions    tamsulosin 0.4 MG capsule   Commonly known as:  FLOMAX   Used for:  Blood in urine   Started by:  Brett Cassidy MD        Dose:  0.4 mg   Take 1 capsule (0.4 mg) by mouth daily   Quantity:  30 capsule   Refills:  1            Where to get your medicines      These medications were sent to [facility] [id number] [address] AT [facility] & [address], [facility] [address]    Hours:  24-hours Phone:  [phone]     tamsulosin 0.4 MG capsule                Primary Care Provider Office Phone # Fax #    Brett Cassidy [phone]       [address] [place] [address]        Equal Access to Services     [facility] AH: Hadii aad ku hadasho Soomaali, waaxda luqadaha, qaybta kaalmada adeegyada, waxay idiin hayangel luisn tara pierson . So [facility] [phone].    ATENCIÓN: Si habla español, tiene a oconnor disposición servicios gratuitos de asistencia lingüística. Llame al [phone].    We comply with applicable federal civil rights laws and [place] laws. We do not discriminate on the basis of race, color, national origin, age, disability, sex, sexual orientation, or gender identity.            Thank you!     Thank you for choosing [facility]  for your care. Our goal is always to provide you with excellent care. Hearing back from our patients is one way we can continue to improve our services. Please take a few minutes to complete the written survey that you may receive in the mail after your visit with us. Thank you!             Your Updated Medication List - Protect others around you: Learn  how to safely use, store and throw away your medicines at www.disposemymeds.org.          This list is accurate as of 2/23/18 10:32 AM.  Always use your most recent med list.                   Brand Name Dispense Instructions for use Diagnosis    azelastine 0.1 % spray    ASTELIN    3 Bottle    Spray 1-2 sprays into both nostrils 2 times daily    Obstructive sleep apnea syndrome, Environmental allergies       diltiazem 180 MG 24 hr capsule    CARDIZEM CD    90 capsule    Take 1 capsule (180 mg) by mouth daily    Paroxysmal atrial fibrillation (H), Hypertension goal BP (blood pressure) < 140/90       flecainide acetate 150 MG Tabs     180 tablet    Take 1 tablet (150 mg) by mouth every 12 hours    Paroxysmal atrial fibrillation (H)       fluticasone 50 MCG/ACT spray    FLONASE    48 mL    Spray 1-2 sprays into both nostrils daily    Environmental allergies, Obstructive sleep apnea syndrome       iron 325 (65 FE) MG tablet     90 tablet    Take 1 tablet by mouth daily (with breakfast)    Iron deficiency anemia, unspecified iron deficiency anemia type       rivaroxaban ANTICOAGULANT 20 MG Tabs tablet    XARELTO    90 tablet    Take 1 tablet (20 mg) by mouth daily (with dinner)    Paroxysmal atrial fibrillation (H)       tamsulosin 0.4 MG capsule    FLOMAX    30 capsule    Take 1 capsule (0.4 mg) by mouth daily    Blood in urine

## 2018-02-23 NOTE — NURSING NOTE
Chief Complaint   Patient presents with     Kidney Problem     Blood in Urine       Initial /80 (BP Location: Right arm, Patient Position: Sitting, Cuff Size: Adult Regular)  Pulse 62  Temp 97.2  F (36.2  C) (Oral)  Ht 6' (1.829 m)  Wt (!) 350 lb (158.8 kg)  SpO2 97%  BMI 47.47 kg/m2 Estimated body mass index is 47.47 kg/(m^2) as calculated from the following:    Height as of this encounter: 6' (1.829 m).    Weight as of this encounter: 350 lb (158.8 kg).  Medication Reconciliation: complete     Jennifer Berry RN  [facility]

## 2018-02-24 LAB
BACTERIA SPEC CULT: NORMAL
SPECIMEN SOURCE: NORMAL

## 2018-02-25 LAB
ANION GAP SERPL CALCULATED.3IONS-SCNC: 11 MMOL/L (ref 3–14)
BUN SERPL-MCNC: 19 MG/DL (ref 7–30)
CALCIUM SERPL-MCNC: 8.9 MG/DL (ref 8.5–10.1)
CHLORIDE SERPL-SCNC: 104 MMOL/L (ref 94–109)
CO2 SERPL-SCNC: 26 MMOL/L (ref 20–32)
CREAT SERPL-MCNC: 0.78 MG/DL (ref 0.66–1.25)
GFR SERPL CREATININE-BSD FRML MDRD: >90 ML/MIN/1.7M2
GLUCOSE SERPL-MCNC: 89 MG/DL (ref 70–99)
POTASSIUM SERPL-SCNC: 4.6 MMOL/L (ref 3.4–5.3)
SODIUM SERPL-SCNC: 141 MMOL/L (ref 133–144)

## 2018-02-26 ENCOUNTER — OFFICE VISIT (OUTPATIENT)
Dept: UROLOGY | Facility: CLINIC | Age: 73
End: 2018-02-26
Payer: COMMERCIAL

## 2018-02-26 VITALS
DIASTOLIC BLOOD PRESSURE: 80 MMHG | HEART RATE: 68 BPM | HEIGHT: 72 IN | OXYGEN SATURATION: 97 % | SYSTOLIC BLOOD PRESSURE: 132 MMHG | WEIGHT: 315 LBS | BODY MASS INDEX: 42.66 KG/M2

## 2018-02-26 DIAGNOSIS — N20.0 CALCULUS OF BOTH KIDNEYS: Primary | ICD-10-CM

## 2018-02-26 PROCEDURE — 99214 OFFICE O/P EST MOD 30 MIN: CPT | Performed by: UROLOGY

## 2018-02-26 ASSESSMENT — PAIN SCALES - GENERAL: PAINLEVEL: NO PAIN (0)

## 2018-02-26 NOTE — PROGRESS NOTES
History: It is a great pleasure to see this pleasant 72-year-old gentleman in follow-up consultation today.  He has recently developed gross hematuria without significant pain.  Recent CT scan has shown a 1 cm calculus in the region of the right ureteropelvic junction, with an 8 mm stone in the middle calyx of the right kidney 8 mm stone in the upper calyx of the left kidney.  No other stones could be seen in the ureter.  He is not complaining of any pain at this time.  He does have significant issues including the fact that he weighs 350 pounds, has a history of atrial fibrillation, is on anticoagulants.  He had previous surgery for stones in 2012, when he had ureteroscopy with treatment of a 1.2 cm stone in the right kidney, treated with holmium laser.  His size has precluded ESWL.      Past Medical History:   Diagnosis Date     Arrhythmia      Arthritis      Atrial fibrillation 2006    Followed by [facility]     Calculus of kidney 2005, 6/06, 10/12    dr walker/nestor/иван - hematuria - w/u o/w neg     Cholelithiasis      Chronic peptic ulcer, unspecified site, without mention of hemorrhage, perforation, or obstruction 1985    gastric bypass     Colon polyps 8/05, 9/10, 10/15    2 tubular adenoma, 1 hyperplastic - multiple serrated/tubular adenomas     Hepatitis C 10/12    chronic hepatitis C, grade 1-2, stage 1 - mild - Dr Douglas     Hyperlipidemia LDL goal <130      Hypertension goal BP (blood pressure) < 140/90 6/06    dr jaciel winchester     Iron deficiency anemia, unspecified 1985    gastric bypass     Nephrolithiasis     Dr Bey     OA (osteoarthritis)     Multiple - Left shoulder with rotator cuff tear - Dr Durham     Obesity, unspecified     s/p gastric bypass 1985      Prediabetes      Presbyacusis 1/04    dr corona     Pyelonephritis, unspecified 12/99     SCC (squamous cell carcinoma), 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    dr saez - lt conchal bowl     Sleep apnea 10/02    cpap - severe - 15 cm - dr cunha     TMJ arthralgia  "09/2017    [facility]       Social History     Social History     Marital status:      Spouse name: Mayra     Number of children: 3     Years of education: 21     Occupational History     retired teacher and football and   [phone]      Retired     Social History Main Topics     Smoking status: Never Smoker     Smokeless tobacco: Never Used     Alcohol use 1.2 oz/week     2 Standard drinks or equivalent per week      Comment: 2 per week     Drug use: No      Comment: acknowledges using herbal supplement \"Vibe\" for energy-none used recently      Sexual activity: Yes     Partners: Female     Birth control/ protection: None      Comment:       Other Topics Concern     Caffeine Concern Yes     <1 can qd     Sleep Concern Yes     sleep apnea, wears cpap     Exercise No     Seat Belt Yes     Parent/Sibling W/ Cabg, Mi Or Angioplasty Before 65f 55m? No     Social History Narrative       Past Surgical History:   Procedure Laterality Date     ARTHROPLASTY KNEE  8/13/2012    LT TKA - Dr Villanueva     CARDIOVERSION  2016     COLONOSCOPY  8/05, 9/10, 10/15    multiple tubular/serrated/hyperplastic polyps     COLONOSCOPY N/A 10/29/2015    multiple tubular and serrated adenomas     COLONOSCOPY N/A 9/7/2016     HC KNEE SCOPE, DIAGNOSTIC  05/00    Arthroscopy, Knee RT     LASER HOLMIUM LITHOTRIPSY URETER(S), INSERT STENT, COMBINED  10/16/2012    stones x 4, Dr Campa     SURGICAL HISTORY OF -   1985    s/p gastric bypass Bilroth II     SURGICAL HISTORY OF -   11/98    wisdom teeth     SURGICAL HISTORY OF -   1979    cellulitis     SURGICAL HISTORY OF -   11/98    lt forearm spur's     SURGICAL HISTORY OF -   1/01,6/02,11/02    s/p lasik     SURGICAL HISTORY OF -   11/99    rt forearm spurs     SURGICAL HISTORY OF -   7/06    dr stevenson - lithotrypsy     SURGICAL HISTORY OF -   10/08, 12/08    Lt ear chonchal lesion removal SCC - dr saez       Family History   Problem " Relation Age of Onset     Alzheimer Disease Father 82      at 88     Prostate Cancer Father 76     bladder and prostate     HEART DISEASE Mother 80     CHF     HEART DISEASE Maternal Grandfather      MI at 55     CANCER Paternal Uncle      ?         Current Outpatient Prescriptions:      tamsulosin (FLOMAX) 0.4 MG capsule, Take 1 capsule (0.4 mg) by mouth daily, Disp: 30 capsule, Rfl: 1     rivaroxaban ANTICOAGULANT (XARELTO) 20 MG TABS tablet, Take 1 tablet (20 mg) by mouth daily (with dinner), Disp: 90 tablet, Rfl: 3     diltiazem (CARDIZEM CD) 180 MG 24 hr capsule, Take 1 capsule (180 mg) by mouth daily, Disp: 90 capsule, Rfl: 3     flecainide acetate 150 MG TABS, Take 1 tablet (150 mg) by mouth every 12 hours, Disp: 180 tablet, Rfl: 3     fluticasone (FLONASE) 50 MCG/ACT spray, Spray 1-2 sprays into both nostrils daily, Disp: 48 mL, Rfl: 3     azelastine (ASTELIN) 0.1 % spray, Spray 1-2 sprays into both nostrils 2 times daily, Disp: 3 Bottle, Rfl: 3     Ferrous Sulfate (IRON) 325 (65 FE) MG tablet, Take 1 tablet by mouth daily (with breakfast), Disp: 90 tablet, Rfl: 3    10 point ROS of systems including Constitutional, Eyes, Respiratory, Cardiovascular, Gastroenterology, Genitourinary, Integumentary, Muscularskeletal, Psychiatric were all negative except for pertinent positives noted in my HPI.    Examination:   /80 (BP Location: Left arm, Patient Position: Sitting, Cuff Size: Adult Large)  Pulse 68  Ht 1.829 m (6')  Wt (!) 158.8 kg (350 lb)  SpO2 97%  BMI 47.47 kg/m2  General Impression: Very pleasant gentleman in no acute distress today was well-oriented in time place and person  Mental Status: Normal.  HEENT.  There is no clinical evidence of jaundice and mucous membranes are normal  Skin: The skin is normal to examination  Respiratory System: The respiratory cycle is normal  Lymph Nodes: Not examined  Back/Flank Tenderness: There is no flank tenderness  Cardiovascular System: Not  examined  Abdominal Examination: Grossly obese abdomen with no other remarkable features  Extremities: No significant peripheral edema  Genitial: Not examined  Rectal Examination: Good sphincter tone, normal perianal sensation.  Smooth rectal mucosa without hemorrhoids or fissures.  Smooth soft and moderately enlarged prostate without evidence of tenderness, bogginess or nodules.  Seminal vesicles.  Not palpable.  Perineum otherwise normal examination  Neurologic System: There are no focal abnormal clinical neurologic signs and central, or peripheral nervous systems    Impression:   I reviewed the recent CT scan  CT ABDOMEN PELVIS W/O CONTRAST 2/23/2018 11:21 AM     HISTORY: Hematuria.     COMPARISON: 9/19/2016     TECHNIQUE: Noncontrast abdomen and pelvis CT.  Radiation dose for this  scan was reduced using automated exposure control, adjustment of the  mA and/or kV according to patient size, or iterative reconstruction  technique.     FINDINGS: Lung bases are clear.     There is a 0.6 cm stone at the right UPJ causing mild right  hydronephrosis. This stone has increased in size in comparison with  9/9/2016. Additional nonobstructive calculi in both kidneys. Distal  ureters have normal caliber. Urinary bladder is partially distended  with normal wall thickness.     Within limits of noncontrast technique: Liver, gallbladder, pancreas,  spleen and adrenal glands appear normal.      Postsurgical changes in the left upper quadrant. No free air. Normal  caliber bowel. No free or loculated fluid collection.         IMPRESSION: Stone at the right UVJ measures 0.6 cm and causes mild  right hydronephrosis.     EVER MCQUEEN MD    The stone in the right kidney in the region of the ureteropelvic junction seemed considerably larger than 6 mm and is closer to 1 cm.  He is getting few symptoms at the present time, though, if this stone falls into the ureter, it may be hard to pass.  He also has one other stone in the right  "kidney and no further stone in the upper calyx of the left kidney over which about 8 mm in diameter.  I had a careful discussion with him therefore about options for management and these included  1 ESWL at 350 pounds ESWL is not possible.  2.  Ureteroscopy with holmium laser lithotripsy of the stones.  These large stones currently in the kidney and although it may be feasible to break the stones up removing all the fragments adequately may be challenging.  3.  Percutaneous nephrolithotomy.  I discussed this also with him in detail today this too may be quite a challenge in view of his size but it would be feasible if necessary.  Certainly it would be the best way to guarantee removing the stones on the right side.  We did talk about this procedure and the others in detail today discussing carefully all the potential side effects and complications.    We have decided to take time and wait 3 months and then do a KUB and then have further discussions unless he develops significant symptoms in the interim.    I did discuss the entire situation in detail with him today.  I answered all his questions  Plan: I reviewed    Time: 25 minutes with greater than 50% spent in discussion, consultation, review all records, review of radiologic studies, an extended discussion about multiple different options due to the complexity of his case    \"This dictation was performed with voice recognition software and may contain errors,  omissions and inadvertent word substitution.\"      "

## 2018-02-26 NOTE — NURSING NOTE
Chief Complaint   Patient presents with     Gross Hematuria     Patient here because seen blood in urine due too  stone      not able to run urine gross hematuria    eSlena Humphrey MA

## 2018-02-26 NOTE — LETTER
2/26/2018       RE: Hugo Weber  [address]   [address] [facility] [address]     Dear Colleague,    Thank you for referring your patient, Hugo Weber, to the [facility] at [facility]. Please see a copy of my visit note below.    History: It is a great pleasure to see this pleasant 72-year-old gentleman in follow-up consultation today.  He has recently developed gross hematuria without significant pain.  Recent CT scan has shown a 1 cm calculus in the region of the right ureteropelvic junction, with an 8 mm stone in the middle calyx of the right kidney 8 mm stone in the upper calyx of the left kidney.  No other stones could be seen in the ureter.  He is not complaining of any pain at this time.  He does have significant issues including the fact that he weighs 350 pounds, has a history of atrial fibrillation, is on anticoagulants.  He had previous surgery for stones in 2012, when he had ureteroscopy with treatment of a 1.2 cm stone in the right kidney, treated with holmium laser.  His size has precluded ESWL.      Past Medical History:   Diagnosis Date     Arrhythmia      Arthritis      Atrial fibrillation 2006    Followed by [facility]     Calculus of kidney 2005, 6/06, 10/12    dr walker/nestor/иван - hematuria - w/u o/w neg     Cholelithiasis      Chronic peptic ulcer, unspecified site, without mention of hemorrhage, perforation, or obstruction 1985    gastric bypass     Colon polyps 8/05, 9/10, 10/15    2 tubular adenoma, 1 hyperplastic - multiple serrated/tubular adenomas     Hepatitis C 10/12    chronic hepatitis C, grade 1-2, stage 1 - mild - Dr Douglas     Hyperlipidemia LDL goal <130      Hypertension goal BP (blood pressure) < 140/90 6/06    dr jaciel winchester     Iron deficiency anemia, unspecified 1985    gastric bypass     Nephrolithiasis     Dr Bey     OA (osteoarthritis)     Multiple - Left shoulder with rotator cuff tear -  "Dr Durham     Obesity, unspecified     s/p gastric bypass 1985      Prediabetes      Presbyacusis 1/04    dr corona     Pyelonephritis, unspecified 12/99     SCC (squamous cell carcinoma), 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    dr saez - lt conchal bowl     Sleep apnea 10/02    cpap - severe - 15 cm - dr cunha     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    [facility] and Neck       Social History     Social History     Marital status:      Spouse name: Mayra     Number of children: 3     Years of education: 21     Occupational History     retired teacher and football and   [employer]      Retired     Social History Main Topics     Smoking status: Never Smoker     Smokeless tobacco: Never Used     Alcohol use 1.2 oz/week     2 Standard drinks or equivalent per week      Comment: 2 per week     Drug use: No      Comment: acknowledges using herbal supplement \"Vibe\" for energy-none used recently      Sexual activity: Yes     Partners: Female     Birth control/ protection: None      Comment:       Other Topics Concern     Caffeine Concern Yes     <1 can qd     Sleep Concern Yes     sleep apnea, wears cpap     Exercise No     Seat Belt Yes     Parent/Sibling W/ Cabg, Mi Or Angioplasty Before 65f 55m? No     Social History Narrative       Past Surgical History:   Procedure Laterality Date     ARTHROPLASTY KNEE  8/13/2012    LT TKA - Dr Villanueva     CARDIOVERSION  2016     COLONOSCOPY  8/05, 9/10, 10/15    multiple tubular/serrated/hyperplastic polyps     COLONOSCOPY N/A 10/29/2015    multiple tubular and serrated adenomas     COLONOSCOPY N/A 9/7/2016     HC KNEE SCOPE, DIAGNOSTIC  05/00    Arthroscopy, Knee RT     LASER HOLMIUM LITHOTRIPSY URETER(S), INSERT STENT, COMBINED  10/16/2012    stones x 4, Dr Campa     SURGICAL HISTORY OF -   1985    s/p gastric bypass Bilroth II     SURGICAL HISTORY OF -   11/98    wisdom teeth     SURGICAL HISTORY OF -   1979    cellulitis     SURGICAL HISTORY OF - 11/98    lt forearm " spur's     SURGICAL HISTORY OF -   ,,    s/p lasik     SURGICAL HISTORY OF -       rt forearm spurs     SURGICAL HISTORY OF -       dr stevenson - lithotrypsy     SURGICAL HISTORY OF -   10/08,     Lt ear chonchal lesion removal SCC - dr saez       Family History   Problem Relation Age of Onset     Alzheimer Disease Father 82      at 88     Prostate Cancer Father 76     bladder and prostate     HEART DISEASE Mother 80     CHF     HEART DISEASE Maternal Grandfather      MI at 55     CANCER Paternal Uncle      ?         Current Outpatient Prescriptions:      tamsulosin (FLOMAX) 0.4 MG capsule, Take 1 capsule (0.4 mg) by mouth daily, Disp: 30 capsule, Rfl: 1     rivaroxaban ANTICOAGULANT (XARELTO) 20 MG TABS tablet, Take 1 tablet (20 mg) by mouth daily (with dinner), Disp: 90 tablet, Rfl: 3     diltiazem (CARDIZEM CD) 180 MG 24 hr capsule, Take 1 capsule (180 mg) by mouth daily, Disp: 90 capsule, Rfl: 3     flecainide acetate 150 MG TABS, Take 1 tablet (150 mg) by mouth every 12 hours, Disp: 180 tablet, Rfl: 3     fluticasone (FLONASE) 50 MCG/ACT spray, Spray 1-2 sprays into both nostrils daily, Disp: 48 mL, Rfl: 3     azelastine (ASTELIN) 0.1 % spray, Spray 1-2 sprays into both nostrils 2 times daily, Disp: 3 Bottle, Rfl: 3     Ferrous Sulfate (IRON) 325 (65 FE) MG tablet, Take 1 tablet by mouth daily (with breakfast), Disp: 90 tablet, Rfl: 3    10 point ROS of systems including Constitutional, Eyes, Respiratory, Cardiovascular, Gastroenterology, Genitourinary, Integumentary, Muscularskeletal, Psychiatric were all negative except for pertinent positives noted in my HPI.    Examination:   /80 (BP Location: Left arm, Patient Position: Sitting, Cuff Size: Adult Large)  Pulse 68  Ht 1.829 m (6')  Wt (!) 158.8 kg (350 lb)  SpO2 97%  BMI 47.47 kg/m2  General Impression: Very pleasant gentleman in no acute distress today was well-oriented in time place and person  Mental Status:  Normal.  HEENT.  There is no clinical evidence of jaundice and mucous membranes are normal  Skin: The skin is normal to examination  Respiratory System: The respiratory cycle is normal  Lymph Nodes: Not examined  Back/Flank Tenderness: There is no flank tenderness  Cardiovascular System: Not examined  Abdominal Examination: Grossly obese abdomen with no other remarkable features  Extremities: No significant peripheral edema  Genitial: Not examined  Rectal Examination: Good sphincter tone, normal perianal sensation.  Smooth rectal mucosa without hemorrhoids or fissures.  Smooth soft and moderately enlarged prostate without evidence of tenderness, bogginess or nodules.  Seminal vesicles.  Not palpable.  Perineum otherwise normal examination  Neurologic System: There are no focal abnormal clinical neurologic signs and central, or peripheral nervous systems    Impression:   I reviewed the recent CT scan  CT ABDOMEN PELVIS W/O CONTRAST 2/23/2018 11:21 AM     HISTORY: Hematuria.     COMPARISON: 9/19/2016     TECHNIQUE: Noncontrast abdomen and pelvis CT.  Radiation dose for this  scan was reduced using automated exposure control, adjustment of the  mA and/or kV according to patient size, or iterative reconstruction  technique.     FINDINGS: Lung bases are clear.     There is a 0.6 cm stone at the right UPJ causing mild right  hydronephrosis. This stone has increased in size in comparison with  9/9/2016. Additional nonobstructive calculi in both kidneys. Distal  ureters have normal caliber. Urinary bladder is partially distended  with normal wall thickness.     Within limits of noncontrast technique: Liver, gallbladder, pancreas,  spleen and adrenal glands appear normal.      Postsurgical changes in the left upper quadrant. No free air. Normal  caliber bowel. No free or loculated fluid collection.         IMPRESSION: Stone at the right UVJ measures 0.6 cm and causes mild  right hydronephrosis.     EVER MCQUEEN MD    The  "stone in the right kidney in the region of the ureteropelvic junction seemed considerably larger than 6 mm and is closer to 1 cm.  He is getting few symptoms at the present time, though, if this stone falls into the ureter, it may be hard to pass.  He also has one other stone in the right kidney and no further stone in the upper calyx of the left kidney over which about 8 mm in diameter.  I had a careful discussion with him therefore about options for management and these included  1 ESWL at 350 pounds ESWL is not possible.  2.  Ureteroscopy with holmium laser lithotripsy of the stones.  These large stones currently in the kidney and although it may be feasible to break the stones up removing all the fragments adequately may be challenging.  3.  Percutaneous nephrolithotomy.  I discussed this also with him in detail today this too may be quite a challenge in view of his size but it would be feasible if necessary.  Certainly it would be the best way to guarantee removing the stones on the right side.  We did talk about this procedure and the others in detail today discussing carefully all the potential side effects and complications.    We have decided to take time and wait 3 months and then do a KUB and then have further discussions unless he develops significant symptoms in the interim.    I did discuss the entire situation in detail with him today.  I answered all his questions  Plan: I reviewed    Time: 25 minutes with greater than 50% spent in discussion, consultation, review all records, review of radiologic studies, an extended discussion about multiple different options due to the complexity of his case    \"This dictation was performed with voice recognition software and may contain errors,  omissions and inadvertent word substitution.\"    Again, thank you for allowing me to participate in the care of your patient.      Sincerely,    Osmar Bey MD  "

## 2018-02-26 NOTE — MR AVS SNAPSHOT
After Visit Summary   2/26/2018    Hugo Weber    MRN: [id number]           Patient Information     Date Of Birth          1945        Visit Information        Provider Department      2/26/2018 11:20 AM Osmar Bey MD [facility] [place]        Today's Diagnoses     Calculus of both kidneys    -  1       Follow-ups after your visit        Follow-up notes from your care team     Return in about 3 months (around 5/26/2018) for KUB, Physical Exam.      Future tests that were ordered for you today     Open Future Orders        Priority Expected Expires Ordered    XR KUB [[id number]] Routine 2/26/2018 2/26/2019 2/26/2018            Who to contact     If you have questions or need follow up information about today's clinic visit or your schedule please contact [facility] [place] directly at [phone].  Normal or non-critical lab and imaging results will be communicated to you by THE [employer], letter or phone within 4 business days after the clinic has received the results. If you do not hear from us within 7 days, please contact the clinic through [employer] or phone. If you have a critical or abnormal lab result, we will notify you by phone as soon as possible.  Submit refill requests through [employer] or call your pharmacy and they will forward the refill request to us. Please allow 3 business days for your refill to be completed.          Additional Information About Your Visit        MyChart Information     [employer] gives you secure access to your electronic health record. If you see a primary care provider, you can also send messages to your care team and make appointments. If you have questions, please call your primary care clinic.  If you do not have a primary care provider, please call [phone] and they will assist you.        Care EveryWhere ID     This is your Care EveryWhere ID. This could be used by other  organizations to access your [place] medical records  [id number]        Your Vitals Were     Pulse Height Pulse Oximetry BMI (Body Mass Index)          68 1.829 m (6') 97% 47.47 kg/m2         Blood Pressure from Last 3 Encounters:   02/26/18 132/80   02/23/18 122/80   02/08/18 126/68    Weight from Last 3 Encounters:   02/26/18 (!) 158.8 kg (350 lb)   02/23/18 (!) 158.8 kg (350 lb)   02/08/18 (!) 159.7 kg (352 lb)               Primary Care Provider Office Phone # Fax #    Brett Cassidy [phone]       [address] [place] [address]        Equal Access to Services     AUDRA PEARSON : Hadii selam webster hadasho Soomaali, waaxda luqadaha, qaybta kaalmada adeegyada, stella doughertyin hayisaura pierson . So [facility] [phone].    ATENCIÓN: Si habla español, tiene a oconnor disposición servicios gratuitos de asistencia lingüística. [facility] [phone].    We comply with applicable federal civil rights laws and [place] laws. We do not discriminate on the basis of race, color, national origin, age, disability, sex, sexual orientation, or gender identity.            Thank you!     Thank you for choosing [facility] [place]  for your care. Our goal is always to provide you with excellent care. Hearing back from our patients is one way we can continue to improve our services. Please take a few minutes to complete the written survey that you may receive in the mail after your visit with us. Thank you!             Your Updated Medication List - Protect others around you: Learn how to safely use, store and throw away your medicines at www.disposemymeds.org.          This list is accurate as of 2/26/18  1:03 PM.  Always use your most recent med list.                   Brand Name Dispense Instructions for use Diagnosis    azelastine 0.1 % spray    ASTELIN    3 Bottle    Spray 1-2 sprays into both nostrils 2 times daily    Obstructive sleep apnea syndrome, Environmental  allergies       diltiazem 180 MG 24 hr capsule    CARDIZEM CD    90 capsule    Take 1 capsule (180 mg) by mouth daily    Paroxysmal atrial fibrillation (H), Hypertension goal BP (blood pressure) < 140/90       flecainide acetate 150 MG Tabs     180 tablet    Take 1 tablet (150 mg) by mouth every 12 hours    Paroxysmal atrial fibrillation (H)       fluticasone 50 MCG/ACT spray    FLONASE    48 mL    Spray 1-2 sprays into both nostrils daily    Environmental allergies, Obstructive sleep apnea syndrome       iron 325 (65 FE) MG tablet     90 tablet    Take 1 tablet by mouth daily (with breakfast)    Iron deficiency anemia, unspecified iron deficiency anemia type       rivaroxaban ANTICOAGULANT 20 MG Tabs tablet    XARELTO    90 tablet    Take 1 tablet (20 mg) by mouth daily (with dinner)    Paroxysmal atrial fibrillation (H)       tamsulosin 0.4 MG capsule    FLOMAX    30 capsule    Take 1 capsule (0.4 mg) by mouth daily    Blood in urine

## 2018-03-26 ENCOUNTER — TRANSFERRED RECORDS (OUTPATIENT)
Dept: HEALTH INFORMATION MANAGEMENT | Facility: CLINIC | Age: 73
End: 2018-03-26

## 2018-04-23 ENCOUNTER — OFFICE VISIT (OUTPATIENT)
Dept: UROLOGY | Facility: CLINIC | Age: 73
End: 2018-04-23
Payer: COMMERCIAL

## 2018-04-23 VITALS
WEIGHT: 315 LBS | SYSTOLIC BLOOD PRESSURE: 132 MMHG | DIASTOLIC BLOOD PRESSURE: 66 MMHG | BODY MASS INDEX: 42.66 KG/M2 | HEART RATE: 72 BPM | HEIGHT: 72 IN

## 2018-04-23 DIAGNOSIS — N20.0 CALCULUS OF RIGHT KIDNEY: Primary | ICD-10-CM

## 2018-04-23 DIAGNOSIS — R31.29 MICROHEMATURIA: Primary | ICD-10-CM

## 2018-04-23 LAB
ALBUMIN UR-MCNC: 100 MG/DL
APPEARANCE UR: ABNORMAL
BILIRUB UR QL STRIP: ABNORMAL
COLOR UR AUTO: ABNORMAL
GLUCOSE UR STRIP-MCNC: NEGATIVE MG/DL
HGB UR QL STRIP: ABNORMAL
KETONES UR STRIP-MCNC: ABNORMAL MG/DL
LEUKOCYTE ESTERASE UR QL STRIP: NEGATIVE
NITRATE UR QL: NEGATIVE
PH UR STRIP: 5 PH (ref 5–7)
SOURCE: ABNORMAL
SP GR UR STRIP: >1.03 (ref 1–1.03)
UROBILINOGEN UR STRIP-ACNC: 0.2 EU/DL (ref 0.2–1)

## 2018-04-23 PROCEDURE — 81003 URINALYSIS AUTO W/O SCOPE: CPT | Performed by: UROLOGY

## 2018-04-23 PROCEDURE — 99215 OFFICE O/P EST HI 40 MIN: CPT | Performed by: UROLOGY

## 2018-04-23 RX ORDER — CEFAZOLIN SODIUM 1 G
1 VIAL (EA) INJECTION SEE ADMIN INSTRUCTIONS
Status: CANCELLED | OUTPATIENT
Start: 2018-04-23

## 2018-04-23 ASSESSMENT — PAIN SCALES - GENERAL: PAINLEVEL: NO PAIN (0)

## 2018-04-23 NOTE — MR AVS SNAPSHOT
After Visit Summary   4/23/2018    Hugo Weber    MRN: [id number]           Patient Information     Date Of Birth          1945        Visit Information        Provider Department      4/23/2018 2:30 PM Osmar Bey MD [facility]        Today's Diagnoses     Microhematuria    -  1       Follow-ups after your visit        Follow-up notes from your care team     Return for Schedule surgery.      Your next 10 appointments already scheduled     May 02, 2018   Procedure with Osmar Bey MD   [facility] (--)    [address]  [facility] [address]   [phone]              Future tests that were ordered for you today     Open Future Orders        Priority Expected Expires Ordered    Ciarra-Operative Worksheet  (Urology General) Routine  4/23/2019 4/23/2018            Who to contact     If you have questions or need follow up information about today's clinic visit or your schedule please contact [facility] directly at [phone].  Normal or non-critical lab and imaging results will be communicated to you by [employer], letter or phone within 4 business days after the clinic has received the results. If you do not hear from us within 7 days, please contact the clinic through [employer] or phone. If you have a critical or abnormal lab result, we will notify you by phone as soon as possible.  Submit refill requests through [employer] or call your pharmacy and they will forward the refill request to us. Please allow 3 business days for your refill to be completed.          Additional Information About Your Visit        MyChart Information     [employer] gives you secure access to your electronic health record. If you see a primary care provider, you can also send messages to your care team and make appointments. If you have questions, please call your primary care clinic.  If  you do not have a primary care provider, please call [phone] and they will assist you.        Care EveryWhere ID     This is your Care EveryWhere ID. This could be used by other organizations to access your [place] medical records  [id number]        Your Vitals Were     Pulse Height BMI (Body Mass Index)             72 1.829 m (6') 47.47 kg/m2          Blood Pressure from Last 3 Encounters:   04/23/18 132/66   02/26/18 132/80   02/23/18 122/80    Weight from Last 3 Encounters:   04/23/18 (!) 158.8 kg (350 lb)   02/26/18 (!) 158.8 kg (350 lb)   02/23/18 (!) 158.8 kg (350 lb)              We Performed the Following     UA without Microscopic        Primary Care Provider Office Phone # Fax #    Brett Cassidy [phone]       [address] [place] [address]        Equal Access to Services     [facility] : Hadii selam ku hadasho Soomaali, waaxda luqadaha, qaybta kaalmada adeegyada, stella doughertyin emily pierson . So [facility] [place] [facility] [phone].    ATENCIÓN: Si habla espraquel, tiene a oconnor disposición servicios gratuitos de asistencia lingüística. Llame al [phone].    We comply with applicable federal civil rights laws and [place] laws. We do not discriminate on the basis of race, color, national origin, age, disability, sex, sexual orientation, or gender identity.            Thank you!     Thank you for choosing [facility] [place]  for your care. Our goal is always to provide you with excellent care. Hearing back from our patients is one way we can continue to improve our services. Please take a few minutes to complete the written survey that you may receive in the mail after your visit with us. Thank you!             Your Updated Medication List - Protect others around you: Learn how to safely use, store and throw away your medicines at www.disposemymeds.org.          This list is accurate as of 4/23/18  3:14 PM.  Always use your most recent med  list.                   Brand Name Dispense Instructions for use Diagnosis    azelastine 0.1 % spray    ASTELIN    3 Bottle    Spray 1-2 sprays into both nostrils 2 times daily    Obstructive sleep apnea syndrome, Environmental allergies       diltiazem 180 MG 24 hr capsule    CARDIZEM CD    90 capsule    Take 1 capsule (180 mg) by mouth daily    Paroxysmal atrial fibrillation (H), Hypertension goal BP (blood pressure) < 140/90       flecainide acetate 150 MG Tabs     180 tablet    Take 1 tablet (150 mg) by mouth every 12 hours    Paroxysmal atrial fibrillation (H)       fluticasone 50 MCG/ACT spray    FLONASE    48 mL    Spray 1-2 sprays into both nostrils daily    Environmental allergies, Obstructive sleep apnea syndrome       iron 325 (65 Fe) MG tablet     90 tablet    Take 1 tablet by mouth daily (with breakfast)    Iron deficiency anemia, unspecified iron deficiency anemia type       rivaroxaban ANTICOAGULANT 20 MG Tabs tablet    XARELTO    90 tablet    Take 1 tablet (20 mg) by mouth daily (with dinner)    Paroxysmal atrial fibrillation (H)       tamsulosin 0.4 MG capsule    FLOMAX    30 capsule    Take 1 capsule (0.4 mg) by mouth daily    Gross hematuria

## 2018-04-23 NOTE — LETTER
4/23/2018       RE: Hugo Weber  [address]   [place] [address]     Dear Colleague,    Thank you for referring your patient, Hugo Weber, to the [facility] [place] [facility]. Please see a copy of my visit note below.    History: It is a great pleasure to see this very pleasant 72-year-old gentleman in follow-up consultation today.  We recall, he has a rather complex history of urinary stone disease and other issues.  In summary, he is 350 pounds, has had previous ureteroscopy with holmium laser lithotripsy of renal stones in 2011, and currently has gross hematuria with some slight discomfort in the right side and the right inguinal area.  The most recent CT scan has shown 2 stones in the right kidney, a 7 mm stone in the right renal pelvis and a 1 cm stone in the right lower calyx as well as an 8 mm stone in the upper calyx of the left kidney.  There is perhaps very mild hydronephrosis above the region of the ureteropelvic junction.  He is not experiencing severe pain.  The patient is on anticoagulants as a result of atrial fibrillation.  Other medical issues as listed below.    Past Medical History:   Diagnosis Date     Arrhythmia      Arthritis      Atrial fibrillation 2006    Followed by [facility]     Calculus of kidney 2005, 6/06, 10/12    dr walker/nestor/иван - hematuria - w/u o/w neg     Cholelithiasis      Chronic peptic ulcer, unspecified site, without mention of hemorrhage, perforation, or obstruction 1985    gastric bypass     Colon polyps 8/05, 9/10, 10/15    2 tubular adenoma, 1 hyperplastic - multiple serrated/tubular adenomas     Hepatitis C 10/12    chronic hepatitis C, grade 1-2, stage 1 - mild - Dr Douglas     Hyperlipidemia LDL goal <130      Hypertension goal BP (blood pressure) < 140/90 6/06    dr jaciel winchester     Iron deficiency anemia, unspecified 1985    gastric bypass     Nephrolithiasis multiple  "episodes, last 2/18    Dr Bey     OA (osteoarthritis)     Multiple - Left shoulder with rotator cuff tear - Dr Durham     Obesity, unspecified     s/p gastric bypass 1985      Prediabetes      Presbyacusis 1/04    dr corona     Pyelonephritis, unspecified 12/99     SCC (squamous cell carcinoma), 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    dr saez - lt conchal bowl     Sleep apnea 10/02    cpap - severe - 15 cm - dr cunha     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    [facility] Neck       Social History     Social History     Marital status:      Spouse name: Mayra     Number of children: 3     Years of education: 21     Occupational History     retired teacher and football and   [employer]      Retired     Social History Main Topics     Smoking status: Never Smoker     Smokeless tobacco: Never Used     Alcohol use 1.2 oz/week     2 Standard drinks or equivalent per week      Comment: 2 per week     Drug use: No      Comment: acknowledges using herbal supplement \"Vibe\" for energy-none used recently      Sexual activity: Yes     Partners: Female     Birth control/ protection: None      Comment:       Other Topics Concern     Caffeine Concern Yes     <1 can qd     Sleep Concern Yes     sleep apnea, wears cpap     Exercise No     Seat Belt Yes     Parent/Sibling W/ Cabg, Mi Or Angioplasty Before 65f 55m? No     Social History Narrative       Past Surgical History:   Procedure Laterality Date     ARTHROPLASTY KNEE  8/13/2012    LT TKA - Dr Villanueva     CARDIOVERSION  2016     COLONOSCOPY  8/05, 9/10, 10/15    multiple tubular/serrated/hyperplastic polyps     COLONOSCOPY N/A 10/29/2015    multiple tubular and serrated adenomas     COLONOSCOPY N/A 9/7/2016     HC KNEE SCOPE, DIAGNOSTIC  05/00    Arthroscopy, Knee RT     LASER HOLMIUM LITHOTRIPSY URETER(S), INSERT STENT, COMBINED  10/16/2012    stones x 4, Dr Campa     SURGICAL HISTORY OF -   1985    s/p gastric bypass Bilroth II     SURGICAL HISTORY OF -   "     wisdom teeth     SURGICAL HISTORY OF -       cellulitis     SURGICAL HISTORY OF -       lt forearm spur's     SURGICAL HISTORY OF -   ,,    s/p lasik     SURGICAL HISTORY OF -       rt forearm spurs     SURGICAL HISTORY OF -       dr stevenson - lithotrypsy     SURGICAL HISTORY OF -   10/08,     Lt ear chonchal lesion removal SCC - dr saez       Family History   Problem Relation Age of Onset     Alzheimer Disease Father 82      at 88     Prostate Cancer Father 76     bladder and prostate     HEART DISEASE Mother 80     CHF     HEART DISEASE Maternal Grandfather      MI at 55     CANCER Paternal Uncle      ?         Current Outpatient Prescriptions:      azelastine (ASTELIN) 0.1 % spray, Spray 1-2 sprays into both nostrils 2 times daily, Disp: 3 Bottle, Rfl: 3     diltiazem (CARDIZEM CD) 180 MG 24 hr capsule, Take 1 capsule (180 mg) by mouth daily, Disp: 90 capsule, Rfl: 3     Ferrous Sulfate (IRON) 325 (65 FE) MG tablet, Take 1 tablet by mouth daily (with breakfast), Disp: 90 tablet, Rfl: 3     flecainide acetate 150 MG TABS, Take 1 tablet (150 mg) by mouth every 12 hours, Disp: 180 tablet, Rfl: 3     fluticasone (FLONASE) 50 MCG/ACT spray, Spray 1-2 sprays into both nostrils daily, Disp: 48 mL, Rfl: 3     rivaroxaban ANTICOAGULANT (XARELTO) 20 MG TABS tablet, Take 1 tablet (20 mg) by mouth daily (with dinner), Disp: 90 tablet, Rfl: 3     tamsulosin (FLOMAX) 0.4 MG capsule, Take 1 capsule (0.4 mg) by mouth daily, Disp: 30 capsule, Rfl: 1    10 point ROS of systems including Constitutional, Eyes, Respiratory, Cardiovascular, Gastroenterology, Genitourinary, Integumentary, Muscularskeletal, Psychiatric were all negative except for pertinent positives noted in my HPI.    Examination:   /66 (BP Location: Left arm)  Pulse 72  Ht 1.829 m (6')  Wt (!) 158.8 kg (350 lb)  BMI 47.47 kg/m2  General Impression: Very pleasant gentleman in no acute distress today was well  oriented to time place and person.    Mental Status: Normal.  HEENT.  There is no clinical evidence of jaundice, the mucous membranes are normal, there were no other remarkable features  Skin: Skin is otherwise normal to examination  Respiratory System: The respiratory cycle is normal  Lymph Nodes: Not examined  Back/Flank Tenderness: No significant flank tenderness elicited  Cardiovascular System: Not examined  Abdominal Examination: Grossly obese abdomen  Extremities: Ankle swelling but no evidence of pitting edema  Genitial: Not examined  Rectal Examination: Not examined  Neurologic System: There are no focal abnormal clinical neurological signs in the central, or peripheral nervous systems    Impression: This is a situation of some complexity.  We have previously discussed options and at 350 pounds ESWL is not an option.  We did again today discussed both URS with holmium laser lithotripsy and PCNL and off like a careful discussion with him closely scrutinized and the situation and decided that percutaneous nephrolithotomy would be both desirable as we could remove the stones but unfortunately the risk issues with him lying prone with a history of sleep apnea and 350 pounds likely preclude PCNL quite apart from the fact that the length of the tract puncture into the kidney would be very long.  For that reason I have decided that we should proceed with ureteroscopy, with a prior cystoscopy under anesthesia because of gross hematuria, with flexible ureteroscopy on the right side and laser lithotripsy of the stone in the right renal pelvis and if possible a stone in the right lower calyx.  He may well have to pass fragments himself.  He will also need a stent placed.  I did discuss this procedure very carefully with the patient and his wife in detail today.  We went over the potential side effects and complications.  I answered all the questions related to this.      Plan: Cystoscopy with right flexible  "ureteroscopy and holmium laser of renal stones with stent placement    Time: 40 minutes.  We did have a extensive discussion today to review the entire situation, review previous lab and radiologic studies and to talk about issues related to the concerns that was size related and noted to make the appropriate decision regarding the most efficacious method of treatment.  There were many questions asked him any onset    \"This dictation was performed with voice recognition software and may contain errors,  omissions and inadvertent word substitution.\"        Again, thank you for allowing me to participate in the care of your patient.      Sincerely,    Osmar Bey MD      "

## 2018-04-23 NOTE — NURSING NOTE
Chief Complaint   Patient presents with     Clinic Care Coordination - Follow-up     Kidney Stone/Gross Hematuria     Milana Camarillo LPN

## 2018-04-23 NOTE — PROGRESS NOTES
History: It is a great pleasure to see this very pleasant 72-year-old gentleman in follow-up consultation today.  We recall, he has a rather complex history of urinary stone disease and other issues.  In summary, he is 350 pounds, has had previous ureteroscopy with holmium laser lithotripsy of renal stones in 2011, and currently has gross hematuria with some slight discomfort in the right side and the right inguinal area.  The most recent CT scan has shown 2 stones in the right kidney, a 7 mm stone in the right renal pelvis and a 1 cm stone in the right lower calyx as well as an 8 mm stone in the upper calyx of the left kidney.  There is perhaps very mild hydronephrosis above the region of the ureteropelvic junction.  He is not experiencing severe pain.  The patient is on anticoagulants as a result of atrial fibrillation.  Other medical issues as listed below.    Past Medical History:   Diagnosis Date     Arrhythmia      Arthritis      Atrial fibrillation 2006    Followed by [facility]     Calculus of kidney 2005, 6/06, 10/12    dr walker/nestor/иван - hematuria - w/u o/w neg     Cholelithiasis      Chronic peptic ulcer, unspecified site, without mention of hemorrhage, perforation, or obstruction 1985    gastric bypass     Colon polyps 8/05, 9/10, 10/15    2 tubular adenoma, 1 hyperplastic - multiple serrated/tubular adenomas     Hepatitis C 10/12    chronic hepatitis C, grade 1-2, stage 1 - mild - Dr Douglas     Hyperlipidemia LDL goal <130      Hypertension goal BP (blood pressure) < 140/90 6/06    dr jaciel winchester     Iron deficiency anemia, unspecified 1985    gastric bypass     Nephrolithiasis multiple episodes, last 2/18    Dr Bey     OA (osteoarthritis)     Multiple - Left shoulder with rotator cuff tear - Dr Durham     Obesity, unspecified     s/p gastric bypass 1985      Prediabetes      Presbyacusis 1/04    dr corona     Pyelonephritis, unspecified 12/99     SCC (squamous cell carcinoma), 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      "se - lt conchal bowl     Sleep apnea 10/02    cpap - severe - 15 cm - dr cunha     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    [facility]       Social History     Social History     Marital status:      Spouse name: Mayra     Number of children: 3     Years of education: 21     Occupational History     retired teacher and football and   [employer]      Retired     Social History Main Topics     Smoking status: Never Smoker     Smokeless tobacco: Never Used     Alcohol use 1.2 oz/week     2 Standard drinks or equivalent per week      Comment: 2 per week     Drug use: No      Comment: acknowledges using herbal supplement \"Vibe\" for energy-none used recently      Sexual activity: Yes     Partners: Female     Birth control/ protection: None      Comment:       Other Topics Concern     Caffeine Concern Yes     <1 can qd     Sleep Concern Yes     sleep apnea, wears cpap     Exercise No     Seat Belt Yes     Parent/Sibling W/ Cabg, Mi Or Angioplasty Before 65f 55m? No     Social History Narrative       Past Surgical History:   Procedure Laterality Date     ARTHROPLASTY KNEE  8/13/2012    LT TKA - Dr Villanueva     CARDIOVERSION  2016     COLONOSCOPY  8/05, 9/10, 10/15    multiple tubular/serrated/hyperplastic polyps     COLONOSCOPY N/A 10/29/2015    multiple tubular and serrated adenomas     COLONOSCOPY N/A 9/7/2016     HC KNEE SCOPE, DIAGNOSTIC  05/00    Arthroscopy, Knee RT     LASER HOLMIUM LITHOTRIPSY URETER(S), INSERT STENT, COMBINED  10/16/2012    stones x 4, Dr Campa     SURGICAL HISTORY OF -   1985    s/p gastric bypass Bilroth II     SURGICAL HISTORY OF -   11/98    wisdom teeth     SURGICAL HISTORY OF -   1979    cellulitis     SURGICAL HISTORY OF - 11/98    lt forearm spur's     SURGICAL HISTORY OF -   1/01,6/02,11/02    s/p lasik     SURGICAL HISTORY OF -   11/99    rt forearm spurs     SURGICAL HISTORY OF -   7/06    dr stevenson - lithotrypsy     SURGICAL HISTORY OF - "   10/08,     Lt ear chonchal lesion removal SCC - dr saez       Family History   Problem Relation Age of Onset     Alzheimer Disease Father 82      at 88     Prostate Cancer Father 76     bladder and prostate     HEART DISEASE Mother 80     CHF     HEART DISEASE Maternal Grandfather      MI at 55     CANCER Paternal Uncle      ?         Current Outpatient Prescriptions:      azelastine (ASTELIN) 0.1 % spray, Spray 1-2 sprays into both nostrils 2 times daily, Disp: 3 Bottle, Rfl: 3     diltiazem (CARDIZEM CD) 180 MG 24 hr capsule, Take 1 capsule (180 mg) by mouth daily, Disp: 90 capsule, Rfl: 3     Ferrous Sulfate (IRON) 325 (65 FE) MG tablet, Take 1 tablet by mouth daily (with breakfast), Disp: 90 tablet, Rfl: 3     flecainide acetate 150 MG TABS, Take 1 tablet (150 mg) by mouth every 12 hours, Disp: 180 tablet, Rfl: 3     fluticasone (FLONASE) 50 MCG/ACT spray, Spray 1-2 sprays into both nostrils daily, Disp: 48 mL, Rfl: 3     rivaroxaban ANTICOAGULANT (XARELTO) 20 MG TABS tablet, Take 1 tablet (20 mg) by mouth daily (with dinner), Disp: 90 tablet, Rfl: 3     tamsulosin (FLOMAX) 0.4 MG capsule, Take 1 capsule (0.4 mg) by mouth daily, Disp: 30 capsule, Rfl: 1    10 point ROS of systems including Constitutional, Eyes, Respiratory, Cardiovascular, Gastroenterology, Genitourinary, Integumentary, Muscularskeletal, Psychiatric were all negative except for pertinent positives noted in my HPI.    Examination:   /66 (BP Location: Left arm)  Pulse 72  Ht 1.829 m (6')  Wt (!) 158.8 kg (350 lb)  BMI 47.47 kg/m2  General Impression: Very pleasant gentleman in no acute distress today was well oriented to time place and person.    Mental Status: Normal.  HEENT.  There is no clinical evidence of jaundice, the mucous membranes are normal, there were no other remarkable features  Skin: Skin is otherwise normal to examination  Respiratory System: The respiratory cycle is normal  Lymph Nodes: Not  examined  Back/Flank Tenderness: No significant flank tenderness elicited  Cardiovascular System: Not examined  Abdominal Examination: Grossly obese abdomen  Extremities: Ankle swelling but no evidence of pitting edema  Genitial: Not examined  Rectal Examination: Not examined  Neurologic System: There are no focal abnormal clinical neurological signs in the central, or peripheral nervous systems    Impression: This is a situation of some complexity.  We have previously discussed options and at 350 pounds ESWL is not an option.  We did again today discussed both URS with holmium laser lithotripsy and PCNL and off like a careful discussion with him closely scrutinized and the situation and decided that percutaneous nephrolithotomy would be both desirable as we could remove the stones but unfortunately the risk issues with him lying prone with a history of sleep apnea and 350 pounds likely preclude PCNL quite apart from the fact that the length of the tract puncture into the kidney would be very long.  For that reason I have decided that we should proceed with ureteroscopy, with a prior cystoscopy under anesthesia because of gross hematuria, with flexible ureteroscopy on the right side and laser lithotripsy of the stone in the right renal pelvis and if possible a stone in the right lower calyx.  He may well have to pass fragments himself.  He will also need a stent placed.  I did discuss this procedure very carefully with the patient and his wife in detail today.  We went over the potential side effects and complications.  I answered all the questions related to this.      Plan: Cystoscopy with right flexible ureteroscopy and holmium laser of renal stones with stent placement    Time: 40 minutes.  We did have a extensive discussion today to review the entire situation, review previous lab and radiologic studies and to talk about issues related to the concerns that was size related and noted to make the appropriate  "decision regarding the most efficacious method of treatment.  There were many questions asked him any onset    \"This dictation was performed with voice recognition software and may contain errors,  omissions and inadvertent word substitution.\"      "

## 2018-04-24 ENCOUNTER — OFFICE VISIT (OUTPATIENT)
Dept: FAMILY MEDICINE | Facility: CLINIC | Age: 73
End: 2018-04-24
Payer: COMMERCIAL

## 2018-04-24 VITALS
OXYGEN SATURATION: 94 % | BODY MASS INDEX: 42.66 KG/M2 | HEIGHT: 72 IN | WEIGHT: 315 LBS | DIASTOLIC BLOOD PRESSURE: 80 MMHG | TEMPERATURE: 98 F | HEART RATE: 62 BPM | SYSTOLIC BLOOD PRESSURE: 138 MMHG

## 2018-04-24 DIAGNOSIS — I10 HYPERTENSION GOAL BP (BLOOD PRESSURE) < 140/90: ICD-10-CM

## 2018-04-24 DIAGNOSIS — E66.01 MORBID OBESITY (H): ICD-10-CM

## 2018-04-24 DIAGNOSIS — E78.5 HYPERLIPIDEMIA LDL GOAL <100: ICD-10-CM

## 2018-04-24 DIAGNOSIS — R73.03 PREDIABETES: ICD-10-CM

## 2018-04-24 DIAGNOSIS — Z01.818 PREOPERATIVE EXAMINATION: Primary | ICD-10-CM

## 2018-04-24 DIAGNOSIS — K76.0 NAFLD (NONALCOHOLIC FATTY LIVER DISEASE): ICD-10-CM

## 2018-04-24 DIAGNOSIS — G47.33 OBSTRUCTIVE SLEEP APNEA SYNDROME: ICD-10-CM

## 2018-04-24 DIAGNOSIS — I48.0 PAROXYSMAL ATRIAL FIBRILLATION (H): ICD-10-CM

## 2018-04-24 DIAGNOSIS — Z79.01 LONG TERM CURRENT USE OF ANTICOAGULANT THERAPY: ICD-10-CM

## 2018-04-24 DIAGNOSIS — D50.9 IRON DEFICIENCY ANEMIA, UNSPECIFIED IRON DEFICIENCY ANEMIA TYPE: ICD-10-CM

## 2018-04-24 DIAGNOSIS — Z51.81 MEDICATION MONITORING ENCOUNTER: ICD-10-CM

## 2018-04-24 LAB
ALBUMIN SERPL-MCNC: 3.7 G/DL (ref 3.4–5)
ALBUMIN UR-MCNC: 30 MG/DL
ALP SERPL-CCNC: 138 U/L (ref 40–150)
ALT SERPL W P-5'-P-CCNC: 23 U/L (ref 0–70)
ANION GAP SERPL CALCULATED.3IONS-SCNC: 5 MMOL/L (ref 3–14)
APPEARANCE UR: CLEAR
AST SERPL W P-5'-P-CCNC: 12 U/L (ref 0–45)
BACTERIA #/AREA URNS HPF: ABNORMAL /HPF
BILIRUB SERPL-MCNC: 0.4 MG/DL (ref 0.2–1.3)
BILIRUB UR QL STRIP: ABNORMAL
BUN SERPL-MCNC: 19 MG/DL (ref 7–30)
CALCIUM SERPL-MCNC: 9 MG/DL (ref 8.5–10.1)
CHLORIDE SERPL-SCNC: 107 MMOL/L (ref 94–109)
CO2 SERPL-SCNC: 28 MMOL/L (ref 20–32)
COLOR UR AUTO: ABNORMAL
CREAT SERPL-MCNC: 0.81 MG/DL (ref 0.66–1.25)
ERYTHROCYTE [DISTWIDTH] IN BLOOD BY AUTOMATED COUNT: 13.4 % (ref 10–15)
GFR SERPL CREATININE-BSD FRML MDRD: >90 ML/MIN/1.7M2
GLUCOSE SERPL-MCNC: 94 MG/DL (ref 70–99)
GLUCOSE UR STRIP-MCNC: NEGATIVE MG/DL
HBA1C MFR BLD: 5.5 % (ref 0–5.6)
HCT VFR BLD AUTO: 45.3 % (ref 40–53)
HGB BLD-MCNC: 14.8 G/DL (ref 13.3–17.7)
HGB UR QL STRIP: ABNORMAL
KETONES UR STRIP-MCNC: NEGATIVE MG/DL
LEUKOCYTE ESTERASE UR QL STRIP: NEGATIVE
MCH RBC QN AUTO: 29.4 PG (ref 26.5–33)
MCHC RBC AUTO-ENTMCNC: 32.7 G/DL (ref 31.5–36.5)
MCV RBC AUTO: 90 FL (ref 78–100)
NITRATE UR QL: NEGATIVE
NON-SQ EPI CELLS #/AREA URNS LPF: ABNORMAL /LPF
PH UR STRIP: 5.5 PH (ref 5–7)
PLATELET # BLD AUTO: 245 10E9/L (ref 150–450)
POTASSIUM SERPL-SCNC: 4.5 MMOL/L (ref 3.4–5.3)
PROT SERPL-MCNC: 7.6 G/DL (ref 6.8–8.8)
RBC # BLD AUTO: 5.04 10E12/L (ref 4.4–5.9)
RBC #/AREA URNS AUTO: ABNORMAL /HPF
SODIUM SERPL-SCNC: 140 MMOL/L (ref 133–144)
SOURCE: ABNORMAL
SP GR UR STRIP: 1.02 (ref 1–1.03)
UROBILINOGEN UR STRIP-ACNC: 1 EU/DL (ref 0.2–1)
WBC # BLD AUTO: 8.9 10E9/L (ref 4–11)
WBC #/AREA URNS AUTO: ABNORMAL /HPF

## 2018-04-24 PROCEDURE — 85027 COMPLETE CBC AUTOMATED: CPT | Performed by: FAMILY MEDICINE

## 2018-04-24 PROCEDURE — 36415 COLL VENOUS BLD VENIPUNCTURE: CPT | Performed by: FAMILY MEDICINE

## 2018-04-24 PROCEDURE — 93000 ELECTROCARDIOGRAM COMPLETE: CPT | Performed by: FAMILY MEDICINE

## 2018-04-24 PROCEDURE — 80053 COMPREHEN METABOLIC PANEL: CPT | Performed by: FAMILY MEDICINE

## 2018-04-24 PROCEDURE — 99214 OFFICE O/P EST MOD 30 MIN: CPT | Performed by: FAMILY MEDICINE

## 2018-04-24 PROCEDURE — 81001 URINALYSIS AUTO W/SCOPE: CPT | Performed by: FAMILY MEDICINE

## 2018-04-24 PROCEDURE — 83036 HEMOGLOBIN GLYCOSYLATED A1C: CPT | Performed by: FAMILY MEDICINE

## 2018-04-24 RX ORDER — PNV NO.95/FERROUS FUM/FOLIC AC 28MG-0.8MG
2 TABLET ORAL
Qty: 90 TABLET | Refills: 3 | COMMUNITY
Start: 2018-04-24 | End: 2019-02-06

## 2018-04-24 NOTE — MR AVS SNAPSHOT
After Visit Summary   4/24/2018    Hugo Weber    MRN: [id number]           Patient Information     Date Of Birth          1945        Visit Information        Provider Department      4/24/2018 11:40 AM Brett Cassidy MD [facility] [place] [facility]        Today's Diagnoses     Preoperative examination    -  1    NAFLD (nonalcoholic fatty liver disease)        Prediabetes        Paroxysmal atrial fibrillation (H)        Hypertension goal BP (blood pressure) < 140/90        Hyperlipidemia LDL goal <100        Obstructive sleep apnea syndrome        Iron deficiency anemia, unspecified iron deficiency anemia type        Morbid obesity (H)        Long term current use of anticoagulant therapy - for intermittent a-fib        Medication monitoring encounter          Care Instructions    Physical after 9/17/18    Check on Shingrix for shingles    Hold xarelto for 1 week    [facility] [place]                        To reach your care team during and after hours:   [phone]  To reach our pharmacy:        [phone]    Clinic Hours                        Our clinic hours are:    Monday   7:30 am to 7:00 pm                  Tuesday through Friday 7:30 am to 5:00 pm                             Saturday   8:00 am to 12:00 pm      Sunday   Closed      Pharmacy Hours                        Our pharmacy hours are:    Monday   8:30 am to 7:00 pm       Tuesday to Friday  8:30 am to 6:00 pm                       Saturday    9:00 am to 1:00 pm              Sunday    Closed              There is also information available at our web site:  [email or web address]    If your provider ordered any lab tests and you do not receive the results within 10 business days, please call the clinic.    If you need a medication refill please contact your pharmacy.  Please allow 2-3 business days for your refill to be completed.    Our clinic offers telephone visits and e visits.  Please ask one of your team members  to explain more.      Use [employer] (secure email communication and access to your chart) to send your primary care provider a message or make an appointment. Ask someone on your Team how to sign up for [employer].  Immunizations                      Immunization History   Administered Date(s) Administered     Influenza (High Dose) 3 valent vaccine 09/20/2012, 12/21/2013, 10/20/2015, 09/25/2017     Influenza (IIV3) PF 10/26/2007, 12/22/2008, 09/17/2009, 10/14/2011     Pneumo Conj 13-V (2010&after) 09/25/2017     Pneumococcal 23 valent 05/11/2005, 09/20/2012     TD (ADULT, 7+) 11/30/1998     TDAP Vaccine (Adacel) 06/26/2007     TDAP Vaccine (Boostrix) 10/20/2015     Twinrix A/B 05/11/2005, 06/26/2007, 09/04/2008        Health Maintenance                         Health Maintenance Due   Topic Date Due     Colon Cancer Screening - FIT Test - yearly  01/19/2016         Before Your Surgery      Call your surgeon if there is any change in your health. This includes signs of a cold or flu (such as a sore throat, runny nose, cough, rash or fever).    Do not smoke, drink alcohol or take over the counter medicine (unless your surgeon or primary care doctor tells you to) for the 24 hours before and after surgery.    If you take prescribed drugs: Follow your doctor s orders about which medicines to take and which to stop until after surgery.    Eating and drinking prior to surgery: follow the instructions from your surgeon    Take a shower or bath the night before surgery. Use the soap your surgeon gave you to gently clean your skin. If you do not have soap from your surgeon, use your regular soap. Do not shave or scrub the surgery site.  Wear clean pajamas and have clean sheets on your bed.           Follow-ups after your visit        Follow-up notes from your care team     Return in about 5 months (around 9/24/2018) for Physical Exam.      Your next 10 appointments already scheduled     May 02, 2018   Procedure with Osmar Singh  MD [place]   [facility] (--)    [address]  [place] [address] [phone]            May 16, 2018  3:00 PM CDT   Cystoscopy with Osmar Bey MD,  CYF   [facility] [place] [facility])    [address]  [address]  [place] [id number] [phone]              Future tests that were ordered for you today     Open Future Orders        Priority Expected Expires Ordered    Ciarra-Operative Worksheet  (Urology General) Routine  4/23/2019 4/23/2018            Who to contact     If you have questions or need follow up information about today's clinic visit or your schedule please contact [facility] directly at [phone].  Normal or non-critical lab and imaging results will be communicated to you by MyChart, letter or phone within 4 business days after the clinic has received the results. If you do not hear from us within 7 days, please contact the clinic through [employer] or phone. If you have a critical or abnormal lab result, we will notify you by phone as soon as possible.  Submit refill requests through [employer] or call your pharmacy and they will forward the refill request to us. Please allow 3 business days for your refill to be completed.          Additional Information About Your Visit        MyChart Information     [employer] gives you secure access to your electronic health record. If you see a primary care provider, you can also send messages to your care team and make appointments. If you have questions, please call your primary care clinic.  If you do not have a primary care provider, please call [phone] and they will assist you.        Care EveryWhere ID     This is your Care EveryWhere ID. This could be used by other organizations to access your [place] medical records  [id number]        Your Vitals Were     Pulse Temperature Height Pulse Oximetry BMI (Body Mass Index)       62  98  F (36.7  C) (Oral) 6' (1.829 m) 94% 47.2 kg/m2        Blood Pressure from Last 3 Encounters:   04/24/18 138/80   04/23/18 132/66   02/26/18 132/80    Weight from Last 3 Encounters:   04/24/18 (!) 348 lb (157.9 kg)   04/23/18 (!) 350 lb (158.8 kg)   02/26/18 (!) 350 lb (158.8 kg)              We Performed the Following     *UA reflex to Microscopic and Culture ([facility] (except [place] and [place])     CBC with platelets     Comprehensive metabolic panel     EKG 12-lead complete w/read - Clinics     Hemoglobin A1c          Today's Medication Changes          These changes are accurate as of 4/24/18 12:52 PM.  If you have any questions, ask your nurse or doctor.               These medicines have changed or have updated prescriptions.        Dose/Directions    iron 325 (65 Fe) MG tablet   This may have changed:  how much to take   Used for:  Iron deficiency anemia, unspecified iron deficiency anemia type   Changed by:  Brett Cassidy MD        Dose:  2 tablet   Take 2 tablets by mouth daily (with breakfast)   Quantity:  90 tablet   Refills:  3                Primary Care Provider Office Phone # Fax #    Brett Cassidy [phone]       [address] [place] [address]        Equal Access to Services     STEVE PEARSON AH: Hadii selam webster hadasho Soomaali, waaxda luqadaha, qaybta kaalmada adeegyada, stella pichardo hayisaura albert. So [facility] [phone].    ATENCIÓN: Si habla español, tiene a oconnor disposición servicios gratuitos de asistencia lingüística. Llame al [phone].    We comply with applicable federal civil rights laws and [place] laws. We do not discriminate on the basis of race, color, national origin, age, disability, sex, sexual orientation, or gender identity.            Thank you!     Thank you for choosing [facility]  for your care. Our goal is always to provide you with excellent care. Hearing back from our patients is one way we  can continue to improve our services. Please take a few minutes to complete the written survey that you may receive in the mail after your visit with us. Thank you!             Your Updated Medication List - Protect others around you: Learn how to safely use, store and throw away your medicines at www.disposemymeds.org.          This list is accurate as of 4/24/18 12:52 PM.  Always use your most recent med list.                   Brand Name Dispense Instructions for use Diagnosis    azelastine 0.1 % spray    ASTELIN    3 Bottle    Spray 1-2 sprays into both nostrils 2 times daily    Obstructive sleep apnea syndrome, Environmental allergies       diltiazem 180 MG 24 hr capsule    CARDIZEM CD    90 capsule    Take 1 capsule (180 mg) by mouth daily    Paroxysmal atrial fibrillation (H), Hypertension goal BP (blood pressure) < 140/90       flecainide acetate 150 MG Tabs     180 tablet    Take 1 tablet (150 mg) by mouth every 12 hours    Paroxysmal atrial fibrillation (H)       fluticasone 50 MCG/ACT spray    FLONASE    48 mL    Spray 1-2 sprays into both nostrils daily    Environmental allergies, Obstructive sleep apnea syndrome       iron 325 (65 Fe) MG tablet     90 tablet    Take 2 tablets by mouth daily (with breakfast)    Iron deficiency anemia, unspecified iron deficiency anemia type       rivaroxaban ANTICOAGULANT 20 MG Tabs tablet    XARELTO    90 tablet    Take 1 tablet (20 mg) by mouth daily (with dinner)    Paroxysmal atrial fibrillation (H)

## 2018-04-24 NOTE — LETTER
[facility]  [address] [place] [address], [place] [address]                  [phone]   April 25, 2018    Hugo Weber  [address]   [facility] [address]      Dear Hugo,    Here is a summary of your recent test results:    Labs are overall quite good, except blood in your urine.     We advise:     Please proceed with surgery.     For additional lab test information, [email or web address] is an excellent reference.     Your test results are enclosed.      Please contact me if you have any questions.    In addition, here is a list of due or overdue Health Maintenance reminders.    Health Maintenance Due   Topic Date Due     Colon Cancer Screening - FIT Test - yearly  01/19/2016       Please call us at [phone] (or use [employer]) to address the above recommendations.            Thank you very much for trusting [facility]..     Healthy regards,        Brett Cassidy M.D.        Results for orders placed or performed in visit on 04/24/18   Comprehensive metabolic panel   Result Value Ref Range    Sodium 140 133 - 144 mmol/L    Potassium 4.5 3.4 - 5.3 mmol/L    Chloride 107 94 - 109 mmol/L    Carbon Dioxide 28 20 - 32 mmol/L    Anion Gap 5 3 - 14 mmol/L    Glucose 94 70 - 99 mg/dL    Urea Nitrogen 19 7 - 30 mg/dL    Creatinine 0.81 0.66 - 1.25 mg/dL    GFR Estimate >90 >60 mL/min/1.7m2    GFR Estimate If Black >90 >60 mL/min/1.7m2    Calcium 9.0 8.5 - 10.1 mg/dL    Bilirubin Total 0.4 0.2 - 1.3 mg/dL    Albumin 3.7 3.4 - 5.0 g/dL    Protein Total 7.6 6.8 - 8.8 g/dL    Alkaline Phosphatase 138 40 - 150 U/L    ALT 23 0 - 70 U/L    AST 12 0 - 45 U/L   CBC with platelets   Result Value Ref Range    WBC 8.9 4.0 - 11.0 10e9/L    RBC Count 5.04 4.4 - 5.9 10e12/L    Hemoglobin 14.8 13.3 - 17.7 g/dL    Hematocrit 45.3 40.0 - 53.0 %    MCV 90 78 - 100 fl    MCH 29.4 26.5 - 33.0 pg    MCHC 32.7 31.5 - 36.5 g/dL    RDW 13.4 10.0 - 15.0 %    Platelet Count 245 150 - 450  10e9/L   *UA reflex to Microscopic and Culture ([place] [facility] (except [place] and [place])   Result Value Ref Range    Color Urine Brown     Appearance Urine Clear     Glucose Urine Negative NEG^Negative mg/dL    Bilirubin Urine Small (A) NEG^Negative    Ketones Urine Negative NEG^Negative mg/dL    Specific Gravity Urine 1.025 1.003 - 1.035    Blood Urine Large (A) NEG^Negative    pH Urine 5.5 5.0 - 7.0 pH    Protein Albumin Urine 30 (A) NEG^Negative mg/dL    Urobilinogen Urine 1.0 0.2 - 1.0 EU/dL    Nitrite Urine Negative NEG^Negative    Leukocyte Esterase Urine Negative NEG^Negative    Source Midstream Urine    Hemoglobin A1c   Result Value Ref Range    Hemoglobin A1C 5.5 0 - 5.6 %   Urine Microscopic   Result Value Ref Range    WBC Urine 0 - 5 OTO5^0 - 5 /HPF    RBC Urine  (A) OTO2^O - 2 /HPF    Squamous Epithelial /LPF Urine Few FEW^Few /LPF    Bacteria Urine Few (A) NEG^Negative /HPF

## 2018-04-24 NOTE — PROGRESS NOTES
[address] [address]  [phone]  Dept: [phone]    PRE-OP EVALUATION:  Today's date: 2018    Hugo Weber (: 1945) presents for pre-operative evaluation assessment as requested by Dr. Bey.  He requires evaluation and anesthesia risk assessment prior to undergoing surgery/procedure for treatment of kidney stones .    Proposed Surgery/ Procedure: combined cystoscopy, ureteroscopy  Date of Surgery/ Procedure: 18  Time of Surgery/ Procedure: 12p  Hospital/Surgical Facility: [facility]  Primary Physician: Brett Cassidy  Type of Anesthesia Anticipated: General    Patient has a Health Care Directive or Living Will:  NO    1. NO - Do you have a history of heart attack, stroke, stent, bypass or surgery on an artery in the head, neck, heart or legs?  2. NO - Do you ever have any pain or discomfort in your chest?  3. NO - Do you have a history of  Heart Failure?  4. NO - Are you troubled by shortness of breath when: walking on the level, up a slight hill or at night?  5. NO - Do you currently have a cold, bronchitis or other respiratory infection?  6. NO - Do you have a cough, shortness of breath or wheezing?  7. NO - Do you sometimes get pains in the calves of your legs when you walk?  8. NO - Do you or anyone in your family have previous history of blood clots?  9. NO - Do you or does anyone in your family have a serious bleeding problem such as prolonged bleeding following surgeries or cuts?  10. YES - HAVE YOU EVER HAD PROBLEMS WITH ANEMIA OR BEEN TOLD TO TAKE IRON PILLS? Controlled currently microscopic hematuria   11. YES - HAVE YOU HAD ANY ABNORMAL BLOOD LOSS SUCH AS BLACK, TARRY OR BLOODY STOOLS, OR ABNORMAL VAGINAL BLEEDING? Dysuria   12. NO - Have you ever had a blood transfusion?  13. NO - Have you or any of your relatives ever had problems with anesthesia?  14. YES - DO YOU HAVE SLEEP APNEA, EXCESSIVE SNORING OR  DAYTIME DROWSINESS? he has continued to use his CPAP machine.   15. NO - Do you have any prosthetic heart valves?  16. YES - DO YOU HAVE PROSTHETIC JOINTS? Left TKA    17. NO - Is there any chance that you may be pregnant?      HPI:     HPI related to upcoming procedure:   Patient is still having hematuria and dysuria.  Patient recorded his BP at 132/66 yesterday.  He has been using his CPAP machine. He has lost a little weight since his last visit. He may try returning to Dr. Garcia, the Endocrinologist and Weight  at the [facility].     NAFLD: Patient has no complaints.     Prediabetes: Patient has no complaints.     Atrial Fibrillation: patient was given the option for an ablation procedure, but patient is doing well on the medication. Followed by Dr Catsillo - On Xarelto - no sx    Htn:    Creatinine   Date Value Ref Range Status   02/23/2018 0.78 0.66 - 1.25 mg/dL Final     BP Readings from Last 3 Encounters:   04/24/18 138/80   04/23/18 132/66   02/26/18 132/80     Lipids    Recent Labs   Lab Test  09/25/17   0922  03/10/16   0815  01/13/15   0815  09/03/14   0810   CHOL  167  166  131  156   HDL  45  38*  43  42   LDL  96  105*  74  93   TRIG  129  117  72  103   CHOLHDLRATIO   --    --   3.0  3.7     HAYLEE    On CPAP    Anemia    Hemoglobin   Date Value Ref Range Status   04/24/2018 14.8 13.3 - 17.7 g/dL Final       See problem list for active medical problems.  Problems all longstanding and stable, except as noted/documented.  See ROS for pertinent symptoms related to these conditions.                                                                                                                                                          .    MEDICAL HISTORY:     Patient Active Problem List    Diagnosis Date Noted     Hypertension goal BP (blood pressure) < 140/90 09/25/2017     Priority: High     Paroxysmal atrial fibrillation (H) 05/27/2016     Priority: High     Prediabetes       Priority: High     History of hepatitis C 05/12/2015     Priority: High     SVR May 2015       NAFLD (nonalcoholic fatty liver disease) 09/05/2014     Priority: High     Hyperlipidemia LDL goal <100 12/30/2011     Priority: High     Obstructive sleep apnea syndrome      Priority: High     cpap - severe - 15 cm - dr cunha       OA (osteoarthritis)      Priority: Medium     Multiple - Left shoulder with rotator cuff tear - Dr Durham       Nephrolithiasis      Priority: Medium     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 Provider to review       Advance Care Planning 09/25/2017     Priority: Low     Discussed advance care planning with patient; information given to patient to review. 8/7/2012 Ernestine Ojeda CMA       Morbid obesity due to excess calories (H) 09/05/2014     Priority: Low     Long term current use of anticoagulant therapy - for intermittent a-fib 03/30/2012     Priority: Low      Past Medical History:   Diagnosis Date     Arrhythmia      Arthritis      Atrial fibrillation 2006    Followed by [facility]     Calculus of kidney 2005, 6/06, 10/12    dr walker/nestor/иван - hematuria - w/u o/w neg     Cholelithiasis      Chronic peptic ulcer, unspecified site, without mention of hemorrhage, perforation, or obstruction 1985    gastric bypass     Colon polyps 8/05, 9/10, 10/15    2 tubular adenoma, 1 hyperplastic - multiple serrated/tubular adenomas     Hepatitis C 10/12    chronic hepatitis C, grade 1-2, stage 1 - mild - Dr Douglas     Hyperlipidemia LDL goal <130      Hypertension goal BP (blood pressure) < 140/90 6/06    dr jaciel winchester      Iron deficiency anemia, unspecified 1985    gastric bypass     Nephrolithiasis multiple episodes, last 2/18    Dr Bey     OA (osteoarthritis)     Multiple - Left shoulder with rotator cuff tear - Dr Durham     Obesity, unspecified     s/p gastric bypass 1985      Prediabetes      Presbyacusis 1/04    dr corona     Pyelonephritis, unspecified 12/99     SCC (squamous cell carcinoma), ear 10/08    dr saez - lt conchal bowl     Sleep apnea 10/02    cpap - severe - 15 cm - dr cunha     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    [facility] Head and Neck     Past Surgical History:   Procedure Laterality Date     ARTHROPLASTY KNEE  8/13/2012    LT TKA - Dr Villanueva     CARDIOVERSION  2016     COLONOSCOPY  8/05, 9/10, 10/15    multiple tubular/serrated/hyperplastic polyps     COLONOSCOPY N/A 10/29/2015    multiple tubular and serrated adenomas     COLONOSCOPY N/A 9/7/2016     HC KNEE SCOPE, DIAGNOSTIC  05/00    Arthroscopy, Knee RT     LASER HOLMIUM LITHOTRIPSY URETER(S), INSERT STENT, COMBINED  10/16/2012    stones x 4, Dr Campa     SURGICAL HISTORY OF -   1985    s/p gastric bypass Bilroth II     SURGICAL HISTORY OF -   11/98    wisdom teeth     SURGICAL HISTORY OF -   1979    cellulitis     SURGICAL HISTORY OF -   11/98    lt forearm spur's     SURGICAL HISTORY OF -   1/01,6/02,11/02    s/p lasik     SURGICAL HISTORY OF -   11/99    rt forearm spurs     SURGICAL HISTORY OF -   7/06    dr stevenson - lithotrypsy     SURGICAL HISTORY OF -   10/08, 12/08    Lt ear chonchal lesion removal SCC - dr saez     Current Outpatient Prescriptions   Medication Sig Dispense Refill     azelastine (ASTELIN) 0.1 % spray Spray 1-2 sprays into both nostrils 2 times daily 3 Bottle 3     diltiazem (CARDIZEM CD) 180 MG 24 hr capsule Take 1 capsule (180 mg) by mouth daily 90 capsule 3     Ferrous Sulfate (IRON) 325 (65 Fe) MG tablet Take 2 tablets by mouth daily (with breakfast) 90 tablet 3     flecainide acetate 150 MG TABS Take 1 tablet (150 mg) by  "mouth every 12 hours 180 tablet 3     fluticasone (FLONASE) 50 MCG/ACT spray Spray 1-2 sprays into both nostrils daily 48 mL 3     rivaroxaban ANTICOAGULANT (XARELTO) 20 MG TABS tablet Take 1 tablet (20 mg) by mouth daily (with dinner) 90 tablet 3     OTC products: None, except as noted above    No Known Allergies   Latex Allergy: NO    Social History   Substance Use Topics     Smoking status: Never Smoker     Smokeless tobacco: Never Used     Alcohol use 1.2 oz/week     2 Standard drinks or equivalent per week      Comment: 2 per week     History   Drug Use No     Comment: acknowledges using herbal supplement \"Vibe\" for energy-none used recently        REVIEW OF SYSTEMS:   CONSTITUTIONAL: NEGATIVE for fever, chills, change in weight  INTEGUMENTARY/SKIN: NEGATIVE for worrisome rashes, moles or lesions  EYES: NEGATIVE for vision changes or irritation  ENT/MOUTH: NEGATIVE for ear, mouth and throat problems  RESP: NEGATIVE for significant cough or SOB  BREAST: NEGATIVE for masses, tenderness or discharge  CV: NEGATIVE for chest pain, palpitations or peripheral edema  GI: NEGATIVE for nausea, abdominal pain, heartburn, or change in bowel habits  : NEGATIVE for frequency, dysuria, or hematuria  MUSCULOSKELETAL: NEGATIVE for significant arthralgias or myalgia  NEURO: NEGATIVE for weakness, dizziness or paresthesias  ENDOCRINE: NEGATIVE for temperature intolerance, skin/hair changes  HEME: NEGATIVE for bleeding problems  PSYCHIATRIC: NEGATIVE for changes in mood or affect    This document serves as a record of the services and decisions personally performed and made by Brett Cassidy MD [facility]. It was created on their behalf by Alex Salinas, a trained medical scribe. The creation of this document is based the provider's statements to the medical scribe.  Alex Salinas April 24, 2018, 12:29 PM     EXAM:   /80  Pulse 62  Temp 98  F (36.7  C) (Oral)  Ht 6' (1.829 m)  Wt (!) 348 lb (157.9 kg)  SpO2 94%  BMI " 47.2 kg/m2    GENERAL APPEARANCE: healthy, alert and no distress     EYES: EOMI,  PERRL     HENT: ear canals and TM's normal and nose and mouth without ulcers or lesions     NECK: no adenopathy, no asymmetry, masses, or scars and thyroid normal to palpation     RESP: lungs clear to auscultation - no rales, rhonchi or wheezes     CV: regular rates and rhythm, normal S1 S2, no S3 or S4 and 1/6 systolic, click or rub     ABDOMEN:  soft, nontender, no HSM or masses and bowel sounds normal     MS: extremities normal- no gross deformities noted, no evidence of inflammation in joints, FROM in all extremities.     SKIN: no suspicious lesions or rashes     NEURO: Normal strength and tone, sensory exam grossly normal, mentation intact and speech normal     PSYCH: mentation appears normal. and affect normal/bright     LYMPHATICS: No cervical adenopathy    DIAGNOSTICS:   EKG: appears normal, NSR, 1st degree block, normal axis, normal intervals, no acute ST/T changes c/w ischemia, no LVH by voltage criteria, unchanged from previous tracings  first degree block    Recent Labs   Lab Test  02/23/18   1017  09/25/17   0922   03/30/16   0754  03/21/16   2139   02/12/15   0833   HGB  15.7  15.4   < >   --   16.2   < >  13.6   PLT  310  257   < >   --   283   < >  263   INR   --    --    --    --   1.04   --   1.04   NA  141  137   < >  139  137   < >  137   POTASSIUM  4.6  4.3   < >  4.2  3.8   < >  3.9   CR  0.78  0.82   < >  0.95  1.00   < >  0.95   A1C   --   5.7   --   5.8   --    --    --     < > = values in this interval not displayed.        IMPRESSION:   Diagnosis/reason for consult: nephrolithiasis    The proposed surgical procedure is considered INTERMEDIATE risk.    REVISED CARDIAC RISK INDEX  The patient has the following serious cardiovascular risks for perioperative complications such as (MI, PE, VFib and 3  AV Block):  No serious cardiac risks  INTERPRETATION: 1 risks: Class II (low risk - 0.9% complication rate)    The  patient has the following additional risks for perioperative complications:  No identified additional risks  Morbid obesity      ICD-10-CM    1. Preoperative examination Z01.818 EKG 12-lead complete w/read - Clinics     Comprehensive metabolic panel     CBC with platelets     *UA reflex to Microscopic and Culture ([place] [facility] (except [place] and [place])     Hemoglobin A1c   2. NAFLD (nonalcoholic fatty liver disease) K76.0 Comprehensive metabolic panel   3. Prediabetes R73.03 EKG 12-lead complete w/read - Clinics     Comprehensive metabolic panel     Hemoglobin A1c   4. Paroxysmal atrial fibrillation (H) I48.0 EKG 12-lead complete w/read - Clinics     Comprehensive metabolic panel   5. Hypertension goal BP (blood pressure) < 140/90 I10 EKG 12-lead complete w/read - Clinics     Comprehensive metabolic panel   6. Hyperlipidemia LDL goal <100 E78.5 EKG 12-lead complete w/read - Clinics     Comprehensive metabolic panel   7. Obstructive sleep apnea syndrome G47.33    8. Iron deficiency anemia, unspecified iron deficiency anemia type D50.9 Comprehensive metabolic panel     Ferrous Sulfate (IRON) 325 (65 Fe) MG tablet   9. Morbid obesity (H) E66.01    10. Long term current use of anticoagulant therapy - for intermittent a-fib Z79.01 Comprehensive metabolic panel   11. Medication monitoring encounter Z51.81 EKG 12-lead complete w/read - Clinics     Comprehensive metabolic panel     CBC with platelets     *UA reflex to Microscopic and Culture ([place] [facility] (except [place] and [place])     Hemoglobin A1c       RECOMMENDATIONS:     --Patient is to take all scheduled medications on the day of surgery EXCEPT for modifications listed below.    Hold Xarelto 1 week    APPROVAL GIVEN to proceed with proposed procedure, without further diagnostic evaluation            Brett Cassidy MD, FAAFP    [facility] - [address]  [address] [phone]  [phone] Fax    Signed Electronically by: Brett Cassidy MD    Copy of this evaluation report is provided to requesting physician.    [place] Preop Guidelines    Revised Cardiac Risk Index        The information in this document, created by the medical scribe for me, accurately reflects the services I personally performed and the decisions made by me. I have reviewed and approved this document for accuracy.   Brett Cassidy MD FAAFP

## 2018-04-24 NOTE — PATIENT INSTRUCTIONS
Physical after 9/17/18    Check on Shingrix for shingles    Hold xarelto for 1 week    [facility]                        To reach your care team during and after hours:   [phone]  To reach our pharmacy:        [phone]    Clinic Hours                        Our clinic hours are:    Monday   7:30 am to 7:00 pm                  Tuesday through Friday 7:30 am to 5:00 pm                             Saturday   8:00 am to 12:00 pm      Sunday   Closed      Pharmacy Hours                        Our pharmacy hours are:    Monday   8:30 am to 7:00 pm       Tuesday to Friday  8:30 am to 6:00 pm                       Saturday    9:00 am to 1:00 pm              Sunday    Closed              There is also information available at our web site:  [email or web address]    If your provider ordered any lab tests and you do not receive the results within 10 business days, please call the clinic.    If you need a medication refill please contact your pharmacy.  Please allow 2-3 business days for your refill to be completed.    Our clinic offers telephone visits and e visits.  Please ask one of your team members to explain more.      Use [employer] (secure email communication and access to your chart) to send your primary care provider a message or make an appointment. Ask someone on your Team how to sign up for [employer].  Immunizations                      Immunization History   Administered Date(s) Administered     Influenza (High Dose) 3 valent vaccine 09/20/2012, 12/21/2013, 10/20/2015, 09/25/2017     Influenza (IIV3) PF 10/26/2007, 12/22/2008, 09/17/2009, 10/14/2011     Pneumo Conj 13-V (2010&after) 09/25/2017     Pneumococcal 23 valent 05/11/2005, 09/20/2012     TD (ADULT, 7+) 11/30/1998     TDAP Vaccine (Adacel) 06/26/2007     TDAP Vaccine (Boostrix) 10/20/2015     Twinrix A/B 05/11/2005, 06/26/2007, 09/04/2008        Health Maintenance                         Health Maintenance Due   Topic Date Due     Colon  Cancer Screening - FIT Test - yearly  01/19/2016         Before Your Surgery      Call your surgeon if there is any change in your health. This includes signs of a cold or flu (such as a sore throat, runny nose, cough, rash or fever).    Do not smoke, drink alcohol or take over the counter medicine (unless your surgeon or primary care doctor tells you to) for the 24 hours before and after surgery.    If you take prescribed drugs: Follow your doctor s orders about which medicines to take and which to stop until after surgery.    Eating and drinking prior to surgery: follow the instructions from your surgeon    Take a shower or bath the night before surgery. Use the soap your surgeon gave you to gently clean your skin. If you do not have soap from your surgeon, use your regular soap. Do not shave or scrub the surgery site.  Wear clean pajamas and have clean sheets on your bed.

## 2018-04-24 NOTE — NURSING NOTE
Chief Complaint   Patient presents with     Pre-Op Exam       Initial /88  Pulse 62  Temp 98  F (36.7  C) (Oral)  Ht 6' (1.829 m)  Wt (!) 348 lb (157.9 kg)  SpO2 94%  BMI 47.2 kg/m2 Estimated body mass index is 47.2 kg/(m^2) as calculated from the following:    Height as of this encounter: 6' (1.829 m).    Weight as of this encounter: 348 lb (157.9 kg)..  BP completed using cuff size: heather Bowling MA

## 2018-04-30 NOTE — H&P (VIEW-ONLY)
[address] [address]  [phone]  Dept: [phone]    PRE-OP EVALUATION:  Today's date: 2018    Hugo Weber (: 1945) presents for pre-operative evaluation assessment as requested by Dr. Bey.  He requires evaluation and anesthesia risk assessment prior to undergoing surgery/procedure for treatment of kidney stones .    Proposed Surgery/ Procedure: combined cystoscopy, ureteroscopy  Date of Surgery/ Procedure: 18  Time of Surgery/ Procedure: 12p  Hospital/Surgical Facility: [facility]  Primary Physician: Brett Cassidy  Type of Anesthesia Anticipated: General    Patient has a Health Care Directive or Living Will:  NO    1. NO - Do you have a history of heart attack, stroke, stent, bypass or surgery on an artery in the head, neck, heart or legs?  2. NO - Do you ever have any pain or discomfort in your chest?  3. NO - Do you have a history of  Heart Failure?  4. NO - Are you troubled by shortness of breath when: walking on the level, up a slight hill or at night?  5. NO - Do you currently have a cold, bronchitis or other respiratory infection?  6. NO - Do you have a cough, shortness of breath or wheezing?  7. NO - Do you sometimes get pains in the calves of your legs when you walk?  8. NO - Do you or anyone in your family have previous history of blood clots?  9. NO - Do you or does anyone in your family have a serious bleeding problem such as prolonged bleeding following surgeries or cuts?  10. YES - HAVE YOU EVER HAD PROBLEMS WITH ANEMIA OR BEEN TOLD TO TAKE IRON PILLS? Controlled currently microscopic hematuria   11. YES - HAVE YOU HAD ANY ABNORMAL BLOOD LOSS SUCH AS BLACK, TARRY OR BLOODY STOOLS, OR ABNORMAL VAGINAL BLEEDING? Dysuria   12. NO - Have you ever had a blood transfusion?  13. NO - Have you or any of your relatives ever had problems with anesthesia?  14. YES - DO YOU HAVE SLEEP APNEA, EXCESSIVE SNORING OR  DAYTIME DROWSINESS? he has continued to use his CPAP machine.   15. NO - Do you have any prosthetic heart valves?  16. YES - DO YOU HAVE PROSTHETIC JOINTS? Left TKA    17. NO - Is there any chance that you may be pregnant?      HPI:     HPI related to upcoming procedure:   Patient is still having hematuria and dysuria.  Patient recorded his BP at 132/66 yesterday.  He has been using his CPAP machine. He has lost a little weight since his last visit. He may try returning to Dr. Garcia, the Endocrinologist and Weight  at the [facility].     NAFLD: Patient has no complaints.     Prediabetes: Patient has no complaints.     Atrial Fibrillation: patient was given the option for an ablation procedure, but patient is doing well on the medication. Followed by Dr Castillo - On Xarelto - no sx    Htn:    Creatinine   Date Value Ref Range Status   02/23/2018 0.78 0.66 - 1.25 mg/dL Final     BP Readings from Last 3 Encounters:   04/24/18 138/80   04/23/18 132/66   02/26/18 132/80     Lipids    Recent Labs   Lab Test  09/25/17   0922  03/10/16   0815  01/13/15   0815  09/03/14   0810   CHOL  167  166  131  156   HDL  45  38*  43  42   LDL  96  105*  74  93   TRIG  129  117  72  103   CHOLHDLRATIO   --    --   3.0  3.7     HAYLEE    On CPAP    Anemia    Hemoglobin   Date Value Ref Range Status   04/24/2018 14.8 13.3 - 17.7 g/dL Final       See problem list for active medical problems.  Problems all longstanding and stable, except as noted/documented.  See ROS for pertinent symptoms related to these conditions.                                                                                                                                                          .    MEDICAL HISTORY:     Patient Active Problem List    Diagnosis Date Noted     Hypertension goal BP (blood pressure) < 140/90 09/25/2017     Priority: High     Paroxysmal atrial fibrillation (H) 05/27/2016     Priority: High     Prediabetes       Priority: High     History of hepatitis C 05/12/2015     Priority: High     SVR May 2015       NAFLD (nonalcoholic fatty liver disease) 09/05/2014     Priority: High     Hyperlipidemia LDL goal <100 12/30/2011     Priority: High     Obstructive sleep apnea syndrome      Priority: High     cpap - severe - 15 cm - dr cunha       OA (osteoarthritis)      Priority: Medium     Multiple - Left shoulder with rotator cuff tear - Dr Durham       Nephrolithiasis      Priority: Medium     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 Provider to review       Advance Care Planning 09/25/2017     Priority: Low     Discussed advance care planning with patient; information given to patient to review. 8/7/2012 Ernestine Ojeda CMA       Morbid obesity due to excess calories (H) 09/05/2014     Priority: Low     Long term current use of anticoagulant therapy - for intermittent a-fib 03/30/2012     Priority: Low      Past Medical History:   Diagnosis Date     Arrhythmia      Arthritis      Atrial fibrillation 2006    Followed by [facility]     Calculus of kidney 2005, 6/06, 10/12    dr walker/nestor/иван - hematuria - w/u o/w neg     Cholelithiasis      Chronic peptic ulcer, unspecified site, without mention of hemorrhage, perforation, or obstruction 1985    gastric bypass     Colon polyps 8/05, 9/10, 10/15    2 tubular adenoma, 1 hyperplastic - multiple serrated/tubular adenomas     Hepatitis C 10/12    chronic hepatitis C, grade 1-2, stage 1 - mild - Dr Douglas     Hyperlipidemia LDL goal <130      Hypertension goal BP (blood pressure) < 140/90 6/06    dr jaciel winchester      Iron deficiency anemia, unspecified 1985    gastric bypass     Nephrolithiasis multiple episodes, last 2/18    Dr Bey     OA (osteoarthritis)     Multiple - Left shoulder with rotator cuff tear - Dr Durham     Obesity, unspecified     s/p gastric bypass 1985      Prediabetes      Presbyacusis 1/04    dr corona     Pyelonephritis, unspecified 12/99     SCC (squamous cell carcinoma), ear 10/08    dr saez - lt conchal bowl     Sleep apnea 10/02    cpap - severe - 15 cm - dr cunha     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    Mn Head and Neck     Past Surgical History:   Procedure Laterality Date     ARTHROPLASTY KNEE  8/13/2012    LT TKA - Dr Villanueva     CARDIOVERSION  2016     COLONOSCOPY  8/05, 9/10, 10/15    multiple tubular/serrated/hyperplastic polyps     COLONOSCOPY N/A 10/29/2015    multiple tubular and serrated adenomas     COLONOSCOPY N/A 9/7/2016     HC KNEE SCOPE, DIAGNOSTIC  05/00    Arthroscopy, Knee RT     LASER HOLMIUM LITHOTRIPSY URETER(S), INSERT STENT, COMBINED  10/16/2012    stones x 4, Dr Campa     SURGICAL HISTORY OF -   1985    s/p gastric bypass Bilroth II     SURGICAL HISTORY OF -   11/98    wisdom teeth     SURGICAL HISTORY OF -   1979    cellulitis     SURGICAL HISTORY OF -   11/98    lt forearm spur's     SURGICAL HISTORY OF -   1/01,6/02,11/02    s/p lasik     SURGICAL HISTORY OF -   11/99    rt forearm spurs     SURGICAL HISTORY OF -   7/06    dr stevenson - lithotrypsy     SURGICAL HISTORY OF -   10/08, 12/08    Lt ear chonchal lesion removal SCC - dr saez     Current Outpatient Prescriptions   Medication Sig Dispense Refill     azelastine (ASTELIN) 0.1 % spray Spray 1-2 sprays into both nostrils 2 times daily 3 Bottle 3     diltiazem (CARDIZEM CD) 180 MG 24 hr capsule Take 1 capsule (180 mg) by mouth daily 90 capsule 3     Ferrous Sulfate (IRON) 325 (65 Fe) MG tablet Take 2 tablets by mouth daily (with breakfast) 90 tablet 3     flecainide acetate 150 MG TABS Take 1 tablet (150 mg) by  "mouth every 12 hours 180 tablet 3     fluticasone (FLONASE) 50 MCG/ACT spray Spray 1-2 sprays into both nostrils daily 48 mL 3     rivaroxaban ANTICOAGULANT (XARELTO) 20 MG TABS tablet Take 1 tablet (20 mg) by mouth daily (with dinner) 90 tablet 3     OTC products: None, except as noted above    No Known Allergies   Latex Allergy: NO    Social History   Substance Use Topics     Smoking status: Never Smoker     Smokeless tobacco: Never Used     Alcohol use 1.2 oz/week     2 Standard drinks or equivalent per week      Comment: 2 per week     History   Drug Use No     Comment: acknowledges using herbal supplement \"Vibe\" for energy-none used recently        REVIEW OF SYSTEMS:   CONSTITUTIONAL: NEGATIVE for fever, chills, change in weight  INTEGUMENTARY/SKIN: NEGATIVE for worrisome rashes, moles or lesions  EYES: NEGATIVE for vision changes or irritation  ENT/MOUTH: NEGATIVE for ear, mouth and throat problems  RESP: NEGATIVE for significant cough or SOB  BREAST: NEGATIVE for masses, tenderness or discharge  CV: NEGATIVE for chest pain, palpitations or peripheral edema  GI: NEGATIVE for nausea, abdominal pain, heartburn, or change in bowel habits  : NEGATIVE for frequency, dysuria, or hematuria  MUSCULOSKELETAL: NEGATIVE for significant arthralgias or myalgia  NEURO: NEGATIVE for weakness, dizziness or paresthesias  ENDOCRINE: NEGATIVE for temperature intolerance, skin/hair changes  HEME: NEGATIVE for bleeding problems  PSYCHIATRIC: NEGATIVE for changes in mood or affect    This document serves as a record of the services and decisions personally performed and made by Brett Cassidy MD [facility]. It was created on their behalf by Alex Salinas, a trained medical scribe. The creation of this document is based the provider's statements to the medical scribe.  Alex Salinas April 24, 2018, 12:29 PM     EXAM:   /80  Pulse 62  Temp 98  F (36.7  C) (Oral)  Ht 6' (1.829 m)  Wt (!) 348 lb (157.9 kg)  SpO2 94%  BMI " 47.2 kg/m2    GENERAL APPEARANCE: healthy, alert and no distress     EYES: EOMI,  PERRL     HENT: ear canals and TM's normal and nose and mouth without ulcers or lesions     NECK: no adenopathy, no asymmetry, masses, or scars and thyroid normal to palpation     RESP: lungs clear to auscultation - no rales, rhonchi or wheezes     CV: regular rates and rhythm, normal S1 S2, no S3 or S4 and 1/6 systolic, click or rub     ABDOMEN:  soft, nontender, no HSM or masses and bowel sounds normal     MS: extremities normal- no gross deformities noted, no evidence of inflammation in joints, FROM in all extremities.     SKIN: no suspicious lesions or rashes     NEURO: Normal strength and tone, sensory exam grossly normal, mentation intact and speech normal     PSYCH: mentation appears normal. and affect normal/bright     LYMPHATICS: No cervical adenopathy    DIAGNOSTICS:   EKG: appears normal, NSR, 1st degree block, normal axis, normal intervals, no acute ST/T changes c/w ischemia, no LVH by voltage criteria, unchanged from previous tracings  first degree block    Recent Labs   Lab Test  02/23/18   1017  09/25/17   0922   03/30/16   0754  03/21/16   2139   02/12/15   0833   HGB  15.7  15.4   < >   --   16.2   < >  13.6   PLT  310  257   < >   --   283   < >  263   INR   --    --    --    --   1.04   --   1.04   NA  141  137   < >  139  137   < >  137   POTASSIUM  4.6  4.3   < >  4.2  3.8   < >  3.9   CR  0.78  0.82   < >  0.95  1.00   < >  0.95   A1C   --   5.7   --   5.8   --    --    --     < > = values in this interval not displayed.        IMPRESSION:   Diagnosis/reason for consult: nephrolithiasis    The proposed surgical procedure is considered INTERMEDIATE risk.    REVISED CARDIAC RISK INDEX  The patient has the following serious cardiovascular risks for perioperative complications such as (MI, PE, VFib and 3  AV Block):  No serious cardiac risks  INTERPRETATION: 1 risks: Class II (low risk - 0.9% complication rate)    The  patient has the following additional risks for perioperative complications:  No identified additional risks  Morbid obesity      ICD-10-CM    1. Preoperative examination Z01.818 EKG 12-lead complete w/read - Clinics     Comprehensive metabolic panel     CBC with platelets     *UA reflex to Microscopic and Culture ([place] and [facility] (except [place] and [place])     Hemoglobin A1c   2. NAFLD (nonalcoholic fatty liver disease) K76.0 Comprehensive metabolic panel   3. Prediabetes R73.03 EKG 12-lead complete w/read - Clinics     Comprehensive metabolic panel     Hemoglobin A1c   4. Paroxysmal atrial fibrillation (H) I48.0 EKG 12-lead complete w/read - Clinics     Comprehensive metabolic panel   5. Hypertension goal BP (blood pressure) < 140/90 I10 EKG 12-lead complete w/read - Clinics     Comprehensive metabolic panel   6. Hyperlipidemia LDL goal <100 E78.5 EKG 12-lead complete w/read - Clinics     Comprehensive metabolic panel   7. Obstructive sleep apnea syndrome G47.33    8. Iron deficiency anemia, unspecified iron deficiency anemia type D50.9 Comprehensive metabolic panel     Ferrous Sulfate (IRON) 325 (65 Fe) MG tablet   9. Morbid obesity (H) E66.01    10. Long term current use of anticoagulant therapy - for intermittent a-fib Z79.01 Comprehensive metabolic panel   11. Medication monitoring encounter Z51.81 EKG 12-lead complete w/read - Clinics     Comprehensive metabolic panel     CBC with platelets     *UA reflex to Microscopic and Culture ([place] and [facility] (except [place] and [place])     Hemoglobin A1c       RECOMMENDATIONS:     --Patient is to take all scheduled medications on the day of surgery EXCEPT for modifications listed below.    Hold Xarelto 1 week    APPROVAL GIVEN to proceed with proposed procedure, without further diagnostic evaluation            Brett Cassidy MD, FAAFP    [facility] - [address]  [address] [phone]  [phone] Fax    Signed Electronically by: Brett Cassidy MD    Copy of this evaluation report is provided to requesting physician.    [place] Preop Guidelines    Revised Cardiac Risk Index        The information in this document, created by the medical scribe for me, accurately reflects the services I personally performed and the decisions made by me. I have reviewed and approved this document for accuracy.   Brett Cassidy MD FAAFP

## 2018-05-02 ENCOUNTER — ANESTHESIA (OUTPATIENT)
Dept: SURGERY | Facility: CLINIC | Age: 73
End: 2018-05-02
Payer: MEDICARE

## 2018-05-02 ENCOUNTER — ANESTHESIA EVENT (OUTPATIENT)
Dept: SURGERY | Facility: CLINIC | Age: 73
End: 2018-05-02
Payer: MEDICARE

## 2018-05-02 ENCOUNTER — SURGERY (OUTPATIENT)
Age: 73
End: 2018-05-02

## 2018-05-02 ENCOUNTER — APPOINTMENT (OUTPATIENT)
Dept: GENERAL RADIOLOGY | Facility: CLINIC | Age: 73
End: 2018-05-02
Attending: UROLOGY
Payer: MEDICARE

## 2018-05-02 ENCOUNTER — HOSPITAL ENCOUNTER (OUTPATIENT)
Facility: CLINIC | Age: 73
Discharge: HOME | End: 2018-05-02
Attending: UROLOGY | Admitting: UROLOGY
Payer: MEDICARE

## 2018-05-02 VITALS
SYSTOLIC BLOOD PRESSURE: 142 MMHG | DIASTOLIC BLOOD PRESSURE: 74 MMHG | TEMPERATURE: 97.2 F | BODY MASS INDEX: 42.66 KG/M2 | HEIGHT: 72 IN | WEIGHT: 315 LBS | RESPIRATION RATE: 18 BRPM | OXYGEN SATURATION: 95 %

## 2018-05-02 DIAGNOSIS — N20.0 CALCULUS OF KIDNEY: Primary | ICD-10-CM

## 2018-05-02 DIAGNOSIS — N20.0 CALCULUS OF RIGHT KIDNEY: ICD-10-CM

## 2018-05-02 PROCEDURE — 40000170 ZZH STATISTIC PRE-PROCEDURE ASSESSMENT II: Performed by: UROLOGY

## 2018-05-02 PROCEDURE — C2617 STENT, NON-COR, TEM W/O DEL: HCPCS | Performed by: UROLOGY

## 2018-05-02 PROCEDURE — 71000027 ZZH RECOVERY PHASE 2 EACH 15 MINS: Performed by: UROLOGY

## 2018-05-02 PROCEDURE — 74019 RADEX ABDOMEN 2 VIEWS: CPT

## 2018-05-02 PROCEDURE — 37000008 ZZH ANESTHESIA TECHNICAL FEE, 1ST 30 MIN: Performed by: UROLOGY

## 2018-05-02 PROCEDURE — 36000056 ZZH SURGERY LEVEL 3 1ST 30 MIN: Performed by: UROLOGY

## 2018-05-02 PROCEDURE — 25000128 H RX IP 250 OP 636: Performed by: NURSE ANESTHETIST, CERTIFIED REGISTERED

## 2018-05-02 PROCEDURE — 40000277 XR SURGERY CARM FLUORO LESS THAN 5 MIN W STILLS

## 2018-05-02 PROCEDURE — 25000128 H RX IP 250 OP 636: Performed by: UROLOGY

## 2018-05-02 PROCEDURE — 27210794 ZZH OR GENERAL SUPPLY STERILE: Performed by: UROLOGY

## 2018-05-02 PROCEDURE — 27210995 ZZH RX 272: Performed by: UROLOGY

## 2018-05-02 PROCEDURE — 71000012 ZZH RECOVERY PHASE 1 LEVEL 1 FIRST HR: Performed by: UROLOGY

## 2018-05-02 PROCEDURE — 25000566 ZZH SEVOFLURANE, EA 15 MIN: Performed by: UROLOGY

## 2018-05-02 PROCEDURE — C1758 CATHETER, URETERAL: HCPCS | Performed by: UROLOGY

## 2018-05-02 PROCEDURE — 52356 CYSTO/URETERO W/LITHOTRIPSY: CPT | Mod: RT | Performed by: UROLOGY

## 2018-05-02 PROCEDURE — 36000058 ZZH SURGERY LEVEL 3 EA 15 ADDTL MIN: Performed by: UROLOGY

## 2018-05-02 PROCEDURE — 37000009 ZZH ANESTHESIA TECHNICAL FEE, EACH ADDTL 15 MIN: Performed by: UROLOGY

## 2018-05-02 PROCEDURE — 25000125 ZZHC RX 250: Performed by: NURSE ANESTHETIST, CERTIFIED REGISTERED

## 2018-05-02 PROCEDURE — C1769 GUIDE WIRE: HCPCS | Performed by: UROLOGY

## 2018-05-02 DEVICE — IMPLANTABLE DEVICE: Type: IMPLANTABLE DEVICE | Status: FUNCTIONAL

## 2018-05-02 RX ORDER — MEPERIDINE HYDROCHLORIDE 25 MG/ML
12.5 INJECTION INTRAMUSCULAR; INTRAVENOUS; SUBCUTANEOUS
Status: DISCONTINUED | OUTPATIENT
Start: 2018-05-02 | End: 2018-05-02 | Stop reason: HOSPADM

## 2018-05-02 RX ORDER — LIDOCAINE HYDROCHLORIDE 20 MG/ML
INJECTION, SOLUTION INFILTRATION; PERINEURAL PRN
Status: DISCONTINUED | OUTPATIENT
Start: 2018-05-02 | End: 2018-05-02

## 2018-05-02 RX ORDER — HYDRALAZINE HYDROCHLORIDE 20 MG/ML
2.5-5 INJECTION INTRAMUSCULAR; INTRAVENOUS EVERY 10 MIN PRN
Status: DISCONTINUED | OUTPATIENT
Start: 2018-05-02 | End: 2018-05-02 | Stop reason: HOSPADM

## 2018-05-02 RX ORDER — SODIUM CHLORIDE, SODIUM LACTATE, POTASSIUM CHLORIDE, CALCIUM CHLORIDE 600; 310; 30; 20 MG/100ML; MG/100ML; MG/100ML; MG/100ML
INJECTION, SOLUTION INTRAVENOUS CONTINUOUS PRN
Status: DISCONTINUED | OUTPATIENT
Start: 2018-05-02 | End: 2018-05-02

## 2018-05-02 RX ORDER — PROPOFOL 10 MG/ML
INJECTION, EMULSION INTRAVENOUS PRN
Status: DISCONTINUED | OUTPATIENT
Start: 2018-05-02 | End: 2018-05-02

## 2018-05-02 RX ORDER — EPHEDRINE SULFATE 50 MG/ML
INJECTION, SOLUTION INTRAMUSCULAR; INTRAVENOUS; SUBCUTANEOUS PRN
Status: DISCONTINUED | OUTPATIENT
Start: 2018-05-02 | End: 2018-05-02

## 2018-05-02 RX ORDER — DEXAMETHASONE SODIUM PHOSPHATE 4 MG/ML
INJECTION, SOLUTION INTRA-ARTICULAR; INTRALESIONAL; INTRAMUSCULAR; INTRAVENOUS; SOFT TISSUE PRN
Status: DISCONTINUED | OUTPATIENT
Start: 2018-05-02 | End: 2018-05-02

## 2018-05-02 RX ORDER — CEFAZOLIN SODIUM 1 G/50ML
3 SOLUTION INTRAVENOUS
Status: COMPLETED | OUTPATIENT
Start: 2018-05-02 | End: 2018-05-02

## 2018-05-02 RX ORDER — FLUTICASONE PROPIONATE 50 MCG
1 SPRAY, SUSPENSION (ML) NASAL DAILY PRN
COMMUNITY
End: 2018-09-20

## 2018-05-02 RX ORDER — DEXAMETHASONE SODIUM PHOSPHATE 4 MG/ML
4 INJECTION, SOLUTION INTRA-ARTICULAR; INTRALESIONAL; INTRAMUSCULAR; INTRAVENOUS; SOFT TISSUE EVERY 10 MIN PRN
Status: DISCONTINUED | OUTPATIENT
Start: 2018-05-02 | End: 2018-05-02 | Stop reason: HOSPADM

## 2018-05-02 RX ORDER — LABETALOL HYDROCHLORIDE 5 MG/ML
10 INJECTION, SOLUTION INTRAVENOUS
Status: DISCONTINUED | OUTPATIENT
Start: 2018-05-02 | End: 2018-05-02 | Stop reason: HOSPADM

## 2018-05-02 RX ORDER — CEFAZOLIN SODIUM 1 G/3ML
1 INJECTION, POWDER, FOR SOLUTION INTRAMUSCULAR; INTRAVENOUS SEE ADMIN INSTRUCTIONS
Status: DISCONTINUED | OUTPATIENT
Start: 2018-05-02 | End: 2018-05-02 | Stop reason: HOSPADM

## 2018-05-02 RX ORDER — ONDANSETRON 2 MG/ML
INJECTION INTRAMUSCULAR; INTRAVENOUS PRN
Status: DISCONTINUED | OUTPATIENT
Start: 2018-05-02 | End: 2018-05-02

## 2018-05-02 RX ORDER — FENTANYL CITRATE 50 UG/ML
INJECTION, SOLUTION INTRAMUSCULAR; INTRAVENOUS PRN
Status: DISCONTINUED | OUTPATIENT
Start: 2018-05-02 | End: 2018-05-02

## 2018-05-02 RX ORDER — FENTANYL CITRATE 50 UG/ML
25-50 INJECTION, SOLUTION INTRAMUSCULAR; INTRAVENOUS
Status: DISCONTINUED | OUTPATIENT
Start: 2018-05-02 | End: 2018-05-02 | Stop reason: HOSPADM

## 2018-05-02 RX ORDER — AZELASTINE 1 MG/ML
1 SPRAY, METERED NASAL 2 TIMES DAILY PRN
COMMUNITY
End: 2018-07-05

## 2018-05-02 RX ORDER — NALOXONE HYDROCHLORIDE 0.4 MG/ML
.1-.4 INJECTION, SOLUTION INTRAMUSCULAR; INTRAVENOUS; SUBCUTANEOUS
Status: DISCONTINUED | OUTPATIENT
Start: 2018-05-02 | End: 2018-05-02 | Stop reason: HOSPADM

## 2018-05-02 RX ORDER — HYDROMORPHONE HYDROCHLORIDE 1 MG/ML
.3-.5 INJECTION, SOLUTION INTRAMUSCULAR; INTRAVENOUS; SUBCUTANEOUS EVERY 10 MIN PRN
Status: DISCONTINUED | OUTPATIENT
Start: 2018-05-02 | End: 2018-05-02 | Stop reason: HOSPADM

## 2018-05-02 RX ORDER — ONDANSETRON 2 MG/ML
4 INJECTION INTRAMUSCULAR; INTRAVENOUS EVERY 30 MIN PRN
Status: DISCONTINUED | OUTPATIENT
Start: 2018-05-02 | End: 2018-05-02 | Stop reason: HOSPADM

## 2018-05-02 RX ORDER — SODIUM CHLORIDE, SODIUM LACTATE, POTASSIUM CHLORIDE, CALCIUM CHLORIDE 600; 310; 30; 20 MG/100ML; MG/100ML; MG/100ML; MG/100ML
INJECTION, SOLUTION INTRAVENOUS CONTINUOUS
Status: DISCONTINUED | OUTPATIENT
Start: 2018-05-02 | End: 2018-05-02 | Stop reason: HOSPADM

## 2018-05-02 RX ORDER — ONDANSETRON 4 MG/1
4 TABLET, ORALLY DISINTEGRATING ORAL EVERY 30 MIN PRN
Status: DISCONTINUED | OUTPATIENT
Start: 2018-05-02 | End: 2018-05-02 | Stop reason: HOSPADM

## 2018-05-02 RX ORDER — FENTANYL CITRATE 50 UG/ML
25-50 INJECTION, SOLUTION INTRAMUSCULAR; INTRAVENOUS EVERY 5 MIN PRN
Status: DISCONTINUED | OUTPATIENT
Start: 2018-05-02 | End: 2018-05-02 | Stop reason: HOSPADM

## 2018-05-02 RX ADMIN — DEXAMETHASONE SODIUM PHOSPHATE 4 MG: 4 INJECTION, SOLUTION INTRA-ARTICULAR; INTRALESIONAL; INTRAMUSCULAR; INTRAVENOUS; SOFT TISSUE at 12:29

## 2018-05-02 RX ADMIN — SODIUM CHLORIDE, POTASSIUM CHLORIDE, SODIUM LACTATE AND CALCIUM CHLORIDE: 600; 310; 30; 20 INJECTION, SOLUTION INTRAVENOUS at 13:07

## 2018-05-02 RX ADMIN — SUCCINYLCHOLINE CHLORIDE 100 MG: 20 INJECTION, SOLUTION INTRAMUSCULAR; INTRAVENOUS; PARENTERAL at 12:21

## 2018-05-02 RX ADMIN — WATER 3000 ML: 100 IRRIGANT IRRIGATION at 12:36

## 2018-05-02 RX ADMIN — FENTANYL CITRATE 50 MCG: 50 INJECTION, SOLUTION INTRAMUSCULAR; INTRAVENOUS at 12:21

## 2018-05-02 RX ADMIN — SODIUM CHLORIDE, POTASSIUM CHLORIDE, SODIUM LACTATE AND CALCIUM CHLORIDE: 600; 310; 30; 20 INJECTION, SOLUTION INTRAVENOUS at 12:14

## 2018-05-02 RX ADMIN — Medication 5 MG: at 12:47

## 2018-05-02 RX ADMIN — FENTANYL CITRATE 50 MCG: 50 INJECTION, SOLUTION INTRAMUSCULAR; INTRAVENOUS at 12:14

## 2018-05-02 RX ADMIN — ONDANSETRON 4 MG: 2 INJECTION INTRAMUSCULAR; INTRAVENOUS at 12:29

## 2018-05-02 RX ADMIN — Medication 3 G: at 12:26

## 2018-05-02 RX ADMIN — Medication 5 MG: at 12:34

## 2018-05-02 RX ADMIN — LIDOCAINE HYDROCHLORIDE 100 MG: 20 INJECTION, SOLUTION INFILTRATION; PERINEURAL at 12:21

## 2018-05-02 RX ADMIN — PROPOFOL 200 MG: 10 INJECTION, EMULSION INTRAVENOUS at 12:21

## 2018-05-02 RX ADMIN — MIDAZOLAM 2 MG: 1 INJECTION INTRAMUSCULAR; INTRAVENOUS at 12:14

## 2018-05-02 ASSESSMENT — ENCOUNTER SYMPTOMS: DYSRHYTHMIAS: 1

## 2018-05-02 NOTE — OR NURSING
Dr Bey called [facility] for pt to start medications- Cipro 500 mg PO BID for two days- Ketoralac 10 mg PO Q4-6 PRN total of 20 tabs- Tamsulosin 0.4 mg PO Q Day for 30 days. Called in order to [facility] - talked to Pharmacist. Order taken w/o difficulty- Contacted pt and wife and instructed to call pharmacy to check. Instructed pt and wife to call [facility] PACU if any questions or problems.

## 2018-05-02 NOTE — ANESTHESIA POSTPROCEDURE EVALUATION
Patient: Hugo Weber    Procedure(s):  CYSTOSCOPY, RIGHT URETEROSCOPY, HOLMIUM LASER LITHOTRIPSY, RIGHT STENT PLACEMENT  - Wound Class: II-Clean Contaminated    Diagnosis:RIGHT RENAL STONE  Diagnosis Additional Information: No value filed.    Anesthesia Type:  General, ETT    Note:  Anesthesia Post Evaluation    Patient location during evaluation: PACU  Patient participation: Able to fully participate in evaluation  Level of consciousness: awake and alert  Pain management: adequate  Airway patency: patent  Cardiovascular status: acceptable  Respiratory status: acceptable and unassisted  Hydration status: acceptable  PONV: none             Last vitals:  Vitals:    05/02/18 1415 05/02/18 1430 05/02/18 1445   BP: (!) 162/96 150/86 162/82   Resp: 12 8 27   Temp: 36.4  C (97.5  F) 36.5  C (97.7  F) 36.2  C (97.2  F)   SpO2: 92% 93% 94%         Electronically Signed By: Roge Walters MD  May 2, 2018  2:58 PM

## 2018-05-02 NOTE — OP NOTE
Procedure Date: 05/02/2018      PREOPERATIVE DIAGNOSIS:  Right renal stones.      POSTOPERATIVE DIAGNOSIS:  Right renal stones.      PROCEDURE:  Cystoscopy, right flexible ureteroscopy, holmium laser lithotripsy of 2 large renal stones and insertion of right double-pigtail stent.      SURGEON:  Dr. Osmar Bey.      ANESTHESIA:  General.      INDICATIONS:  This pleasant 72-year-old gentleman who is unfortunately 350 pounds, who presented with right flank pain.  He has two 9 mm stones in the right kidney, one in the right renal pelvis, the other in the right middle gianni.  Because of his size ESWL is precluded.  Therefore, we planned to try and treat these by ureteroscopy with holmium laser lithotripsy, although he will still have to pass stone fragments.      DESCRIPTION OF PROCEDURE:  The patient was brought to the operative suite and after induction of anesthesia, was placed in the dorsal lithotomy position with the genital area prepped and draped in usual and customary fashion.      Timeout was then called.      A 21-Botswanan cystoscope was passed into the penile urethra.  The penile urethra is quite unremarkable.  The external sphincter was intact.  Went to verumontanum and there was mild enlargement of the lateral lobes of the prostate.  A small median lobe was also identified.  The interior of the bladder showed grade I-II trabeculation with no evidence of neoplasm or stone in the bladder.  A 6-Botswanan open-ended ureteral catheter was then used, so we could pass an 0.035 sensor wire into the right ureter and up into the right renal pelvis.  I then withdrew the cystoscope and over the wire then passed a double-lumen catheter up into the upper ureter and then passed a second 0.035 sensor wire alongside through this up into the renal pelvis.  I then withdrew the double lumen catheter, secured one of the 2 guidewires as a safety wire and over the second working wire then passed a 13/15 46 cm ureteroscopy sheath with  the dilator in place over the wire.  I was able to pass this into the upper part of the ureter, but could not reach all the way into the kidney.  I then withdrew the obturator and the working wire and through this, I then passed the high flexible ureteroscope.  I was able to pass it up into the kidney.  The stone in the renal pelvis was seen without difficulty.  Using a 200 micron holmium laser fiber, initially at 0.2 joules at 50 a second, and then subsequently at 1 joule at 10 per second, I was able to fragment this stone into small pieces, most of the fragments being between 1-2 mm.  Then I turned my attention to checking all the calices and the middle gianni, the second stone was identified in the middle gianni and then in similar fashion this stone was also fragmented into small pieces using the holmium laser at a setting of 1 joule at 10 per second.  Once again, this was fragmented into small fragments between 1 to 2 mm.   At the completion of the procedure, I withdrew the ureteroscope.  I then withdrew the ureteral access sheath and finally passed a 7 Kiswahili 28 cm double pigtail stent over the wire until one end was in the kidney and the other was released in the bladder.  I then decided to place a 20-Kiswahili Quach catheter in the bladder because there was some blood in the urine and the balloon was inflated with about 5 mL of fluid and connected to drainage.  The patient was taken to the recovery room, having tolerated the procedure well.      CONCLUSION:  The patient will go home today with the stent in place.  Will take the catheter out in the recovery room and he will go home when he voids.  I would like to leave the stent in 2 weeks.  I would like to start him on Cipro for 2 days, to take Toradol for pain and to take tamsulosin every day for the next 30 days, not only to reduce some of the spasm from the stent, but also to help the stone fragments pass.  The stent will be taken out in 2 weeks.  He will  continue to strain his urine, so we can have fragments to send for analysis.  I will have further discussions with him in the office at that time about management of the stone in the left kidney, which is currently asymptomatic.         SD LIND MD             D: 2018   T: 2018   MT: PARVIN      Name:     GAYATHRI SMITH   MRN:      [id number]        Account:        [id number]   :      1945           Procedure Date: 2018      Document: [id number]

## 2018-05-02 NOTE — IP AVS SNAPSHOT
[facility] PreOP/Phase II    [address]    [place] [address]    Phone:  [phone]                                       After Visit Summary   5/2/2018    Hugo Weber    MRN: [id number]           After Visit Summary Signature Page     I have received my discharge instructions, and my questions have been answered. I have discussed any challenges I see with this plan with the nurse or doctor.    ..........................................................................................................................................  Patient/Patient Representative Signature      ..........................................................................................................................................  Patient Representative Print Name and Relationship to Patient    ..................................................               ................................................  Date                                            Time    ..........................................................................................................................................  Reviewed by Signature/Title    ...................................................              ..............................................  Date                                                            Time

## 2018-05-02 NOTE — OR NURSING
Quach cath d/wendie- ptto void before discharge to home- pt to restart xarelto  blood thinner medications Tuesday May 8th

## 2018-05-02 NOTE — PROGRESS NOTES
Admission medication history interview status for the 5/2/2018  admission is complete. See EPIC admission navigator for prior to admission medications     Medication history source reliability:Good    Medication history interview source(s):Patient    Medication history resources (including written lists, pill bottles, clinic record):None    Primary pharmacy.[facility]    Additional medication history information not noted on PTA med list :None    Time spent in this activity: 45 minutes    Prior to Admission medications    Medication Sig Last Dose Taking? Auth Provider   AMOXICILLIN PO Take 2,000 mg by mouth daily as needed 4/11/2018 at prn Yes Reported, Patient   azelastine (ASTELIN) 0.1 % spray Spray 1 spray into both nostrils 2 times daily as needed for rhinitis 4/25/2018 Yes Reported, Patient   diltiazem (CARDIZEM CD) 180 MG 24 hr capsule Take 1 capsule (180 mg) by mouth daily 5/2/2018 at 0730 Yes Sarah Beth Castillo MD   Ferrous Sulfate (IRON) 325 (65 Fe) MG tablet Take 2 tablets by mouth daily (with breakfast) 5/1/2018 at am Yes Brett Cassidy MD   flecainide acetate 150 MG TABS Take 1 tablet (150 mg) by mouth every 12 hours 5/2/2018 at 0730 Yes Sarah Beth Castillo MD   fluticasone (FLONASE) 50 MCG/ACT spray Spray 1 spray into both nostrils daily as needed for rhinitis or allergies 4/25/2018 at prn Yes Reported, Patient   rivaroxaban ANTICOAGULANT (XARELTO) 20 MG TABS tablet Take 1 tablet (20 mg) by mouth daily (with dinner) 4/25/2018 Yes Sarah Beth Castillo MD

## 2018-05-02 NOTE — ANESTHESIA PREPROCEDURE EVALUATION
Anesthesia Evaluation     . Pt has had prior anesthetic.     No history of anesthetic complications          ROS/MED HX    ENT/Pulmonary:     (+)sleep apnea, uses CPAP , . .    Neurologic:  - neg neurologic ROS     Cardiovascular:     (+) Dyslipidemia, hypertension----. Taking blood thinners Pt has received instructions: . . . :. dysrhythmias a-fib, .       METS/Exercise Tolerance:     Hematologic:         Musculoskeletal:         GI/Hepatic:     (+) hepatitis type C, liver disease,      (-) GERD   Renal/Genitourinary:     (+) Nephrolithiasis ,       Endo:     (+) Obesity, .      Psychiatric:         Infectious Disease:         Malignancy:         Other:                     Physical Exam  Normal systems: pulmonary    Airway   Mallampati: II  TM distance: >3 FB  Neck ROM: full    Dental   (+) caps    Cardiovascular   Rhythm and rate: irregular      Pulmonary                     Anesthesia Plan      History & Physical Review  History and physical reviewed and following examination; no interval change.    ASA Status:  3 .    NPO Status:  > 8 hours    Plan for General and ETT with Intravenous and Propofol induction. Maintenance will be Balanced.    PONV prophylaxis:  Ondansetron (or other 5HT-3)       Postoperative Care  Postoperative pain management:  IV analgesics.      Consents  Anesthetic plan, risks, benefits and alternatives discussed with:  Patient..                          .

## 2018-05-02 NOTE — IP AVS SNAPSHOT
MRN:[id number]                      After Visit Summary   5/2/2018    Hugo Weber    MRN: [id number]           Thank you!     Thank you for choosing [facility] for your care. Our goal is always to provide you with excellent care. Hearing back from our patients is one way we can continue to improve our services. Please take a few minutes to complete the written survey that you may receive in the mail after you visit with us. Thank you!        Patient Information     Date Of Birth          1945        About your hospital stay     You were admitted on:  May 2, 2018 You last received care in the:  [facility] PreOP/Phase II    You were discharged on:  May 2, 2018       Who to Call     For medical emergencies, please call 911.  For non-urgent questions about your medical care, please call your primary care provider or clinic, [phone]  For questions related to your surgery, please call your surgery clinic        Attending Provider     Provider Osmar Carrion MD Urology       Primary Care Provider Office Phone # Fax #    Brett Cassidy [phone]      Your next 10 appointments already scheduled     May 16, 2018  3:00 PM CDT   Cystoscopy with Osmar Bey MD, UA CYF   [facility] ([facility])    [address]  [address]  [facility] [id number] [phone]            Sep 20, 2018  7:40 AM CDT   PHYSICAL with Brett Cassidy MD   [facility] ([facility]    [address] [place] [address] [facility] [id number] [phone]              Further instructions from your care team       You should restart your xarelto blood thinner medications on Tuesday May 8th    Same Day Surgery Discharge Instructions for  Sedation and General Anesthesia       It's not unusual to feel dizzy, light-headed or faint for up to 24 hours after surgery or while taking pain  medication.  If you have these symptoms: sit for a few minutes before standing and have someone assist you when you get up to walk or use the bathroom.      You should rest and relax for the next 24 hours. We recommend you make arrangements to have an adult stay with you for at least 24 hours after your discharge.  Avoid hazardous and strenuous activity.      DO NOT DRIVE any vehicle or operate mechanical equipment for 24 hours following the end of your surgery.  Even though you may feel normal, your reactions may be affected by the medication you have received.      Do not drink alcoholic beverages for 24 hours following surgery.       Slowly progress to your regular diet as you feel able. It's not unusual to feel nauseated and/or vomit after receiving anesthesia.  If you develop these symptoms, drink clear liquids (apple juice, ginger ale, broth, 7-up, etc. ) until you feel better.  If your nausea and vomiting persists for 24 hours, please notify your surgeon.        All narcotic pain medications, along with inactivity and anesthesia, can cause constipation. Drinking plenty of liquids and increasing fiber intake will help.      For any questions of a medical nature, call your surgeon.      Do not make important decisions for 24 hours.      If you had general anesthesia, you may have a sore throat for a couple of days related to the breathing tube used during surgery.  You may use Cepacol lozenges to help with this discomfort.  If it worsens or if you develop a fever, contact your surgeon.     If you feel your pain is not well managed with the pain medications prescribed by your surgeon, please contact your surgeon's office to let them know so they can address your concerns.         **Because you had anesthesia today and your history of sleep apnea, it is extremely important that you use your CPAP machine for the next 24 hours while napping or sleeping.**      Cystoscopy and  Holmium Laser Discharge  Instructions    Holmium laser lithotripsy was used to break up your kidney stone(s). It is normal to have visible blood in the urine, burning, urgency and frequency following this procedure. These symptoms may last for a few days to weeks.     Diet:    To help pass stone fragments and clear the blood out of the urine, it is important to drink 6-8 glasses of fluids per day at home - at least 3-4 glasses should be water.       Return to the diet that you were on before the procedure, unless you are given specific diet instructions.    Activity:    Walk short distances and increase as your strength allows.    You may climb stairs.    Do not do strenuous exercise or heavy lifting until approved by surgeon.    Do not drive while taking narcotic pain medications.    Bathing:    You may take a shower.           Call your physician if these signs/symptoms are present:    Pain that is not relieved by a short rest or ordered pain medications.    Temperature at or above 101.0 F or chills.    Inability or difficulty urinating.     Excessive blood in urine.    Any questions or concerns.        STENT INFORMATION SHEET  [facility], PKATIA De Santiago M.D.,  ROBI Bey M.D.    LELAND Santos M.D., ALHAJI Hernandez M.D.  [phone]    During surgery, a stent may be place in the ureter.  The ureter is the tube that drains urine from the kidney to the bladder.  The stent is placed to dilate (open) the ureter so the stone fragments can pass easily through the ureter or to decrease ureteral swelling after surgery, or to relieve an obstruction.    The stent is made of rubber.  The upper end of the stent curls in the kidney while the lower end rests in the bladder.    While the stent is in place you may experience the following symptoms:    Blood and/or small blood clots in urine.    Bladder spasm (frequency and urgency of urination).    Discomfort or aching in the back or side where the stent is.    Burning or discomfort at the end  of urine stream.    To decrease these symptoms you should:    Take pain medication as prescribed.    Drink plenty of fluids.    If you experience pain at the end of urination try not emptying your bladder completely.    If having discomfort in back or side, decrease activity.    Please call your physician or the physician on call if you experience:    Fever greater than 101 .    Severe pain not relieved by pain medication or rest.      Please make an appointment for removal of the stent according to your physician s instructions.          **If you have questions or concerns about your procedure,   call Dr. Bey at [phone]**        Pending Results     Date and Time Order Name Status Description    5/2/2018 1341 XR Surgery JESSE L/T 5 Min Fluoro w Stills In process             Admission Information     Date & Time Provider Department Dept. Phone    5/2/2018 Osmar Bey MD [facility] PreOP/Phase [phone]      Your Vitals Were     Blood Pressure Temperature Respirations Height Weight Pulse Oximetry    162/82 97.2  F (36.2  C) (Temporal) 27 1.829 m (6') 160.3 kg (353 lb 4.8 oz) 94%    BMI (Body Mass Index)                   47.92 kg/m2           MyChart Information     [employer] gives you secure access to your electronic health record. If you see a primary care provider, you can also send messages to your care team and make appointments. If you have questions, please call your primary care clinic.  If you do not have a primary care provider, please call [phone] and they will assist you.        Care EveryWhere ID     This is your Care EveryWhere ID. This could be used by other organizations to access your [facility] medical records  [id number]        Equal Access to Services     [facility]: Hadii selam Narvaez, wamisaelda luqadaha, qaybta stella bunn. So [facility] [phone].    ATENCIÓN: Si mishella español, tiene a oconnor disposición  servicios gratuitos de asistencia lingüística. Neha jackson [phone].    We comply with applicable federal civil rights laws and [place] laws. We do not discriminate on the basis of race, color, national origin, age, disability, sex, sexual orientation, or gender identity.               Review of your medicines      UNREVIEWED medicines. Ask your doctor about these medicines        Dose / Directions    AMOXICILLIN PO        Dose:  2000 mg   Take 2,000 mg by mouth daily as needed   Refills:  0       azelastine 0.1 % spray   Commonly known as:  ASTELIN        Dose:  1 spray   Spray 1 spray into both nostrils 2 times daily as needed for rhinitis   Refills:  0       diltiazem 180 MG 24 hr capsule   Commonly known as:  CARDIZEM CD   Used for:  Paroxysmal atrial fibrillation (H), Hypertension goal BP (blood pressure) < 140/90        Dose:  180 mg   Take 1 capsule (180 mg) by mouth daily   Quantity:  90 capsule   Refills:  3       flecainide acetate 150 MG Tabs   Used for:  Paroxysmal atrial fibrillation (H)        Dose:  150 mg   Take 1 tablet (150 mg) by mouth every 12 hours   Quantity:  180 tablet   Refills:  3       fluticasone 50 MCG/ACT spray   Commonly known as:  FLONASE        Dose:  1 spray   Spray 1 spray into both nostrils daily as needed for rhinitis or allergies   Refills:  0       iron 325 (65 Fe) MG tablet   Used for:  Iron deficiency anemia, unspecified iron deficiency anemia type        Dose:  2 tablet   Take 2 tablets by mouth daily (with breakfast)   Quantity:  90 tablet   Refills:  3       rivaroxaban ANTICOAGULANT 20 MG Tabs tablet   Commonly known as:  XARELTO   Used for:  Paroxysmal atrial fibrillation (H)        Dose:  20 mg   Take 1 tablet (20 mg) by mouth daily (with dinner)   Quantity:  90 tablet   Refills:  3                Protect others around you: Learn how to safely use, store and throw away your medicines at www.disposemymeds.org.        ANTIBIOTIC INSTRUCTION     You've Been Prescribed  an Antibiotic - Now What?  Your healthcare team thinks that you or your loved one might have an infection. Some infections can be treated with antibiotics, which are powerful, life-saving drugs. Like all medications, antibiotics have side effects and should only be used when necessary. There are some important things you should know about your antibiotic treatment.      Your healthcare team may run tests before you start taking an antibiotic.    Your team may take samples (e.g., from your blood, urine or other areas) to run tests to look for bacteria. These test can be important to determine if you need an antibiotic at all and, if you do, which antibiotic will work best.      Within a few days, your healthcare team might change or even stop your antibiotic.    Your team may start you on an antibiotic while they are working to find out what is making you sick.    Your team might change your antibiotic because test results show that a different antibiotic would be better to treat your infection.    In some cases, once your team has more information, they learn that you do not need an antibiotic at all. They may find out that you don't have an infection, or that the antibiotic you're taking won't work against your infection. For example, an infection caused by a virus can't be treated with antibiotics. Staying on an antibiotic when you don't need it is more likely to be harmful than helpful.      You may experience side effects from your antibiotic.    Like all medications, antibiotics have side effects. Some of these can be serious.    Let you healthcare team know if you have any known allergies when you are admitted to the hospital.    One significant side effect of nearly all antibiotics is the risk of severe and sometimes deadly diarrhea caused by Clostridium difficile (C. Difficile). This occurs when a person takes antibiotics because some good germs are destroyed. Antibiotic use allows C. diificile to take over,  putting patients at high risk for this serious infection.    As a patient or caregiver, it is important to understand your or your loved one's antibiotic treatment. It is especially important for caregivers to speak up when patients can't speak for themselves. Here are some important questions to ask your healthcare team.    What infection is this antibiotic treating and how do you know I have that infection?    What side effects might occur from this antibiotic?    How long will I need to take this antibiotic?    Is it safe to take this antibiotic with other medications or supplements (e.g., vitamins) that I am taking?     Are there any special directions I need to know about taking this antibiotic? For example, should I take it with food?    How will I be monitored to know whether my infection is responding to the antibiotic?    What tests may help to make sure the right antibiotic is prescribed for me?      Information provided by:  www.cdc.gov/getsmart  U.S. Department of Health and Human Services  Centers for disease Control and Prevention  National Center for Emerging and Zoonotic Infectious Diseases  Division of Healthcare Quality Promotion             Medication List: This is a list of all your medications and when to take them. Check marks below indicate your daily home schedule. Keep this list as a reference.      Medications           Morning Afternoon Evening Bedtime As Needed    AMOXICILLIN PO   Take 2,000 mg by mouth daily as needed                                azelastine 0.1 % spray   Commonly known as:  ASTELIN   Spray 1 spray into both nostrils 2 times daily as needed for rhinitis                                diltiazem 180 MG 24 hr capsule   Commonly known as:  CARDIZEM CD   Take 1 capsule (180 mg) by mouth daily                                flecainide acetate 150 MG Tabs   Take 1 tablet (150 mg) by mouth every 12 hours                                fluticasone 50 MCG/ACT spray   Commonly  known as:  FLONASE   Spray 1 spray into both nostrils daily as needed for rhinitis or allergies                                iron 325 (65 Fe) MG tablet   Take 2 tablets by mouth daily (with breakfast)                                rivaroxaban ANTICOAGULANT 20 MG Tabs tablet   Commonly known as:  XARELTO   Take 1 tablet (20 mg) by mouth daily (with dinner)

## 2018-05-02 NOTE — ANESTHESIA CARE TRANSFER NOTE
Patient: Hugo Weber    Procedure(s):  CYSTOSCOPY, RIGHT URETEROSCOPY, HOLMIUM LASER LITHOTRIPSY, RIGHT STENT PLACEMENT  - Wound Class: II-Clean Contaminated    Diagnosis: RIGHT RENAL STONE  Diagnosis Additional Information: No value filed.    Anesthesia Type:   General, ETT     Note:  Airway :Face Mask  Patient transferred to:PACU  Comments:  TOF 4/4, spontaneous respirations, adequate tidal volumes, followed commands to voice, oropharynx suctioned with soft flexible catheter, extubated atraumatically, extubated with suction, airway patent after extubation.  Oxygen via facemask at 8 liters per minute to PACU. Oxygen tubing connected to wall O2 in PACU, SpO2, NiBP, and EKG monitors and alarms on and functioning, Christianne Hugger warmer connected to patient gown, report on patient's clinical status given to PACU RN, RN questions answered. Handoff Report: Identifed the Patient, Identified the Reponsible Provider, Reviewed the pertinent medical history, Discussed the surgical course, Reviewed Intra-OP anesthesia mangement and issues during anesthesia, Set expectations for post-procedure period and Allowed opportunity for questions and acknowledgement of understanding      Vitals: (Last set prior to Anesthesia Care Transfer)    CRNA VITALS  5/2/2018 1317 - 5/2/2018 1354      5/2/2018             Pulse: 77    SpO2: 97 %    Resp Rate (set): 10                Electronically Signed By: BIENVENIDO Layton CRNA  May 2, 2018  1:54 PM

## 2018-05-02 NOTE — OR NURSING
AOX3-VSS-O2 sats >94% RA- good po intake, denies pain. Pt and family verbalize understanding of discharge instructions, denies questions. Up to BR voids red/pink X4- Up in W/C- transported to door for discharge to home.

## 2018-05-02 NOTE — OR NURSING
PNDS met, po per I&O sheet. Pt dressed, up in recliner and transported to Phase 2. Pt given his glasses

## 2018-05-02 NOTE — DISCHARGE INSTRUCTIONS
You should restart your xarelto blood thinner medications on Tuesday May 8th    Same Day Surgery Discharge Instructions for  Sedation and General Anesthesia       It's not unusual to feel dizzy, light-headed or faint for up to 24 hours after surgery or while taking pain medication.  If you have these symptoms: sit for a few minutes before standing and have someone assist you when you get up to walk or use the bathroom.      You should rest and relax for the next 24 hours. We recommend you make arrangements to have an adult stay with you for at least 24 hours after your discharge.  Avoid hazardous and strenuous activity.      DO NOT DRIVE any vehicle or operate mechanical equipment for 24 hours following the end of your surgery.  Even though you may feel normal, your reactions may be affected by the medication you have received.      Do not drink alcoholic beverages for 24 hours following surgery.       Slowly progress to your regular diet as you feel able. It's not unusual to feel nauseated and/or vomit after receiving anesthesia.  If you develop these symptoms, drink clear liquids (apple juice, ginger ale, broth, 7-up, etc. ) until you feel better.  If your nausea and vomiting persists for 24 hours, please notify your surgeon.        All narcotic pain medications, along with inactivity and anesthesia, can cause constipation. Drinking plenty of liquids and increasing fiber intake will help.      For any questions of a medical nature, call your surgeon.      Do not make important decisions for 24 hours.      If you had general anesthesia, you may have a sore throat for a couple of days related to the breathing tube used during surgery.  You may use Cepacol lozenges to help with this discomfort.  If it worsens or if you develop a fever, contact your surgeon.     If you feel your pain is not well managed with the pain medications prescribed by your surgeon, please contact your surgeon's office to let them know so they  can address your concerns.         **Because you had anesthesia today and your history of sleep apnea, it is extremely important that you use your CPAP machine for the next 24 hours while napping or sleeping.**      Cystoscopy and  Holmium Laser Discharge Instructions    Holmium laser lithotripsy was used to break up your kidney stone(s). It is normal to have visible blood in the urine, burning, urgency and frequency following this procedure. These symptoms may last for a few days to weeks.     Diet:    To help pass stone fragments and clear the blood out of the urine, it is important to drink 6-8 glasses of fluids per day at home - at least 3-4 glasses should be water.       Return to the diet that you were on before the procedure, unless you are given specific diet instructions.    Activity:    Walk short distances and increase as your strength allows.    You may climb stairs.    Do not do strenuous exercise or heavy lifting until approved by surgeon.    Do not drive while taking narcotic pain medications.    Bathing:    You may take a shower.           Call your physician if these signs/symptoms are present:    Pain that is not relieved by a short rest or ordered pain medications.    Temperature at or above 101.0 F or chills.    Inability or difficulty urinating.     Excessive blood in urine.    Any questions or concerns.        STENT INFORMATION SHEET  [facility], [facility].   THOM De Santiago M.D.,  ROBI Bey M.D.    LELAND Santos M.D., ALHAJI Hernandez M.D.  [phone]    During surgery, a stent may be place in the ureter.  The ureter is the tube that drains urine from the kidney to the bladder.  The stent is placed to dilate (open) the ureter so the stone fragments can pass easily through the ureter or to decrease ureteral swelling after surgery, or to relieve an obstruction.    The stent is made of rubber.  The upper end of the stent curls in the kidney while the lower end rests in the bladder.    While the  stent is in place you may experience the following symptoms:    Blood and/or small blood clots in urine.    Bladder spasm (frequency and urgency of urination).    Discomfort or aching in the back or side where the stent is.    Burning or discomfort at the end of urine stream.    To decrease these symptoms you should:    Take pain medication as prescribed.    Drink plenty of fluids.    If you experience pain at the end of urination try not emptying your bladder completely.    If having discomfort in back or side, decrease activity.    Please call your physician or the physician on call if you experience:    Fever greater than 101 .    Severe pain not relieved by pain medication or rest.      Please make an appointment for removal of the stent according to your physician s instructions.          **If you have questions or concerns about your procedure,   call Dr. Bey at [phone]**

## 2018-05-10 ENCOUNTER — TELEPHONE (OUTPATIENT)
Dept: UROLOGY | Facility: CLINIC | Age: 73
End: 2018-05-10

## 2018-05-10 NOTE — TELEPHONE ENCOUNTER
Hugo has indwelling stent in place and was calling with concerns of seeing blood in the urine yesterday. Informed him of the normal side effects from the stent . Instructed him on foods and beverages to avoid with the stent  In place as they may cause more irritated  voiding symptoms . Encouraged him to drink water  And keep BM's regular. Call office back wih any concerns or questions. Jennifer Nolasco LPN

## 2018-05-16 ENCOUNTER — OFFICE VISIT (OUTPATIENT)
Dept: UROLOGY | Facility: CLINIC | Age: 73
End: 2018-05-16
Payer: COMMERCIAL

## 2018-05-16 VITALS — BODY MASS INDEX: 42.66 KG/M2 | WEIGHT: 315 LBS | HEIGHT: 72 IN | HEART RATE: 62 BPM

## 2018-05-16 DIAGNOSIS — N20.0 KIDNEY STONE: Primary | ICD-10-CM

## 2018-05-16 PROCEDURE — 99212 OFFICE O/P EST SF 10 MIN: CPT | Mod: 25 | Performed by: UROLOGY

## 2018-05-16 PROCEDURE — 52310 CYSTOSCOPY AND TREATMENT: CPT | Performed by: UROLOGY

## 2018-05-16 RX ORDER — CIPROFLOXACIN 500 MG/1
500 TABLET, FILM COATED ORAL ONCE
Qty: 1 TABLET | Refills: 0 | Status: SHIPPED | OUTPATIENT
Start: 2018-05-16 | End: 2019-02-06

## 2018-05-16 RX ORDER — CIPROFLOXACIN 500 MG/1
500 TABLET, FILM COATED ORAL 2 TIMES DAILY
Qty: 3 TABLET | Refills: 0 | Status: SHIPPED | OUTPATIENT
Start: 2018-05-16 | End: 2018-07-05

## 2018-05-16 ASSESSMENT — PAIN SCALES - GENERAL: PAINLEVEL: NO PAIN (0)

## 2018-05-16 NOTE — NURSING NOTE
Prior to the start of the procedure and with procedural staff participation, I verbally confirmed the patient s identity using two indicators, relevant allergies, that the procedure was appropriate and matched the consent or emergent situation, and that the correct equipment/implants were available. Immediately prior to starting the procedure I conducted the Time Out with the procedural staff and re-confirmed the patient s name, procedure, and site/side. (The Joint Commission universal protocol was followed.)  Yes    Sedation (Moderate or Deep): None     Patient was draped in sterile fashion after betadine prep. 2% Lidocaine gel instilled into urethra . Jennifer Nolasco LPN

## 2018-05-16 NOTE — LETTER
5/16/2018       RE: Hugo Weber  PO   [facility] [address]     Dear Colleague,    Thank you for referring your patient, Hugo Weber, to the [facility] [place] at [facility]. Please see a copy of my visit note below.    This is a very pleasant 72-year-old gentleman returns today for cystoscopy and removal of the stent.  We recall he presented with right-sided flank pain and had 28 mm stones, one in the region of the right ureteropelvic junction and the other in the right mid calyx.  He was 350 pounds which precluded ESWL and so I did ureteroscopy and holmium laser lithotripsy with the flexible ureteroscope of the stones in the kidney.     Procedure.  Cystoscopy.  With removal of right-sided stent   Surgeon.    Sotero  Anesthesia.  Local anesthesia.  Description.  With the patient in the supine position, with the genital area prepped and draped in the customary fashion, with local anesthetic and the urethra, the flexible cystoscope was Inserted.  Penile urethra is normal.  External sphincter is intact.  Prostatic urethra showed mild hyperplasia.  Into the bladder carefully inspected.  Bloodstained urine observed.  No evidence of neoplasm of bladder.  The right-sided stent, was seen, grasped, and removed without difficulty.  No other remarkable features.    Impression.  He will continue to strain the urine for stone fragments and if retrieves I would like to send for analysis.  I did have a discussion about preventative measures today including hydration, increasing citrate, magnesium and calcium in the diet and reducing sodium in her diet.  Clearly also guidance regarding weight reduction is going to be very important in this I recommended discussions with his personal physician.  There is a close correlation between urinary stone disease and obesity.  I discussed the entire situation with the patient in detail today.  I  "answered all his questions.    Plan.  3 months for CT abdomen and pelvis without contrast and examination.    Time.  10 minutes is spent in addition to the procedure and noted to discuss the results of the procedure, the expectations after removal of the stent, need for measures to try and prevent further stone formation in the future and also to discuss management of a stone that is also remaining in the left kidney.    \"This dictation was performed with voice recognition software and may contain errors,  omissions and inadvertent word substitution.\"        Again, thank you for allowing me to participate in the care of your patient.      Sincerely,    Osmar Bey MD      "

## 2018-05-16 NOTE — PROGRESS NOTES
This is a very pleasant 72-year-old gentleman returns today for cystoscopy and removal of the stent.  We recall he presented with right-sided flank pain and had 28 mm stones, one in the region of the right ureteropelvic junction and the other in the right mid calyx.  He was 350 pounds which precluded ESWL and so I did ureteroscopy and holmium laser lithotripsy with the flexible ureteroscope of the stones in the kidney.     Procedure.  Cystoscopy.  With removal of right-sided stent   Surgeon.    Sotero  Anesthesia.  Local anesthesia.  Description.  With the patient in the supine position, with the genital area prepped and draped in the customary fashion, with local anesthetic and the urethra, the flexible cystoscope was Inserted.  Penile urethra is normal.  External sphincter is intact.  Prostatic urethra showed mild hyperplasia.  Into the bladder carefully inspected.  Bloodstained urine observed.  No evidence of neoplasm of bladder.  The right-sided stent, was seen, grasped, and removed without difficulty.  No other remarkable features.    Impression.  He will continue to strain the urine for stone fragments and if retrieves I would like to send for analysis.  I did have a discussion about preventative measures today including hydration, increasing citrate, magnesium and calcium in the diet and reducing sodium in her diet.  Clearly also guidance regarding weight reduction is going to be very important in this I recommended discussions with his personal physician.  There is a close correlation between urinary stone disease and obesity.  I discussed the entire situation with the patient in detail today.  I answered all his questions.    Plan.  3 months for CT abdomen and pelvis without contrast and examination.    Time.  10 minutes is spent in addition to the procedure and noted to discuss the results of the procedure, the expectations after removal of the stent, need for measures to try and prevent further stone  "formation in the future and also to discuss management of a stone that is also remaining in the left kidney.    \"This dictation was performed with voice recognition software and may contain errors,  omissions and inadvertent word substitution.\"      "

## 2018-05-16 NOTE — MR AVS SNAPSHOT
"              After Visit Summary   5/16/2018    Hugo Weber    MRN: [id number]           Patient Information     Date Of Birth          1945        Visit Information        Provider Department      5/16/2018 3:00 PM Osmar Bey MD; [facility] [place]        Today's Diagnoses     Kidney stone    -  1      Care Instructions         AFTER YOUR CYSTOSCOPY         You have just completed a cystoscopy, or \"cysto\", which allowed your physician to learn more about your bladder (or to remove a stent placed after surgery). We suggest that you continue to avoid caffeine, fruit juice, and alcohol for the next 24 hours, however, you are encouraged to return to your normal activities.       A few things that are considered normal after your cystoscopy:    * small amount of bleeding (or spotting) that clears within the next 24 hours    * slight burning sensation with urination    * sensation to of needing to avoid more frequently    * the feeling of \"air\" in your urine    * mild discomfort that is relieved with Acetaminophen (Example:Tylenol)        Please contact our office promptly if you:    * develop a fever above 101 degrees    * are unable to urinate, during non-office hours seek Medical Attention.       AFTER YOUR CYSTOSCOPY        You have just completed a cystoscopy, or \"cysto\", which allowed your physician to learn more about your bladder (or to remove a stent placed after surgery). We suggest that you continue to avoid caffeine, fruit juice, and alcohol for the next 24 hours, however, you are encouraged to return to your normal activities.         A few things that are considered normal after your cystoscopy:     * Small amount of bleeding (or spotting) that clears within the next 24 hours     * Slight burning sensation with urination     * Sensation to of needing to avoid more frequently     * The feeling of \"air\" in your urine     * Mild discomfort that " is relieved with Tylenol        Please contact our office promptly if you:     * Develop a fever above 101 degrees     * Are unable to urinate     * Develop bright red blood that does not stop     * Severe pain or swelling         Please contact our office with any concerns or questions @[facility].          Follow-ups after your visit        Follow-up notes from your care team     Return in about 3 months (around 8/16/2018) for CT Abdo/Pelvis stone protocol, Physical Exam.      Your next 10 appointments already scheduled     Sep 20, 2018  7:40 AM CDT   PHYSICAL with Brett Cassidy MD   [facility] ([facility])    [address] [place] [id number] [phone]              Who to contact     If you have questions or need follow up information about today's clinic visit or your schedule please contact [facility] [place] directly at [phone].  Normal or non-critical lab and imaging results will be communicated to you by MyChart, letter or phone within 4 business days after the clinic has received the results. If you do not hear from us within 7 days, please contact the clinic through [employer] or phone. If you have a critical or abnormal lab result, we will notify you by phone as soon as possible.  Submit refill requests through [employer] or call your pharmacy and they will forward the refill request to us. Please allow 3 business days for your refill to be completed.          Additional Information About Your Visit        [employer] Information     [employer] gives you secure access to your electronic health record. If you see a primary care provider, you can also send messages to your care team and make appointments. If you have questions, please call your primary care clinic.  If you do not have a primary care provider, please call [phone] and they will assist you.        Care EveryWhere ID     This is your Care EveryWhere ID. This  could be used by other organizations to access your [facility] medical records  [id number]        Your Vitals Were     Pulse Height BMI (Body Mass Index)             62 1.829 m (6') 47.47 kg/m2          Blood Pressure from Last 3 Encounters:   05/02/18 142/74   04/24/18 138/80   04/23/18 132/66    Weight from Last 3 Encounters:   05/16/18 (!) 158.8 kg (350 lb)   05/02/18 (!) 160.3 kg (353 lb 4.8 oz)   04/24/18 (!) 157.9 kg (348 lb)              We Performed the Following     CYSTOSCOPY W FOREIGN BODY REMOVAL, SIMPLE          Today's Medication Changes          These changes are accurate as of 5/16/18  4:01 PM.  If you have any questions, ask your nurse or doctor.               Start taking these medicines.        Dose/Directions    * ciprofloxacin 500 MG tablet   Commonly known as:  CIPRO   Used for:  Kidney stone   Started by:  Osmar Bey MD        Dose:  500 mg   Take 1 tablet (500 mg) by mouth once for 1 dose   Quantity:  1 tablet   Refills:  0       * ciprofloxacin 500 MG tablet   Commonly known as:  CIPRO   Used for:  Kidney stone   Started by:  Osmar Bey MD        Dose:  500 mg   Take 1 tablet (500 mg) by mouth 2 times daily   Quantity:  3 tablet   Refills:  0       * Notice:  This list has 2 medication(s) that are the same as other medications prescribed for you. Read the directions carefully, and ask your doctor or other care provider to review them with you.         Where to get your medicines      These medications were sent to [facility] [place] - [address]  [address] [facility] [address], [place] [address]     Phone:  [phone]     ciprofloxacin 500 MG tablet         These medications were sent to [id number] - [place], [place] - [address] [facility] [address]  [address], [place] [address]    Hours:  24-hours Phone:  [phone]     ciprofloxacin 500 MG tablet                Primary Care Provider Office  Phone # Fax #    Brett Cassidy [phone] [address] [address]        Equal Access to Services     AUDRA PEARSON : Hadii aad ku hadnoemísorin Sojoe, wamisaelda luqadaha, qaybta kaalmada caroline, stella adkinstony maradiagaankush calle kenna albert. So [facility] [phone].    ATENCIÓN: Si habla español, tiene a oconnor disposición servicios gratuitos de asistencia lingüística. Llame al [phone].    We comply with applicable federal civil rights laws and [place] laws. We do not discriminate on the basis of race, color, national origin, age, disability, sex, sexual orientation, or gender identity.            Thank you!     Thank you for choosing [facility] [place]  for your care. Our goal is always to provide you with excellent care. Hearing back from our patients is one way we can continue to improve our services. Please take a few minutes to complete the written survey that you may receive in the mail after your visit with us. Thank you!             Your Updated Medication List - Protect others around you: Learn how to safely use, store and throw away your medicines at www.disposemymeds.org.          This list is accurate as of 5/16/18  4:01 PM.  Always use your most recent med list.                   Brand Name Dispense Instructions for use Diagnosis    AMOXICILLIN PO      Take 2,000 mg by mouth daily as needed        azelastine 0.1 % spray    ASTELIN     Spray 1 spray into both nostrils 2 times daily as needed for rhinitis        * ciprofloxacin 500 MG tablet    CIPRO    1 tablet    Take 1 tablet (500 mg) by mouth once for 1 dose    Kidney stone       * ciprofloxacin 500 MG tablet    CIPRO    3 tablet    Take 1 tablet (500 mg) by mouth 2 times daily    Kidney stone       diltiazem 180 MG 24 hr capsule    CARDIZEM CD    90 capsule    Take 1 capsule (180 mg) by mouth daily    Paroxysmal atrial fibrillation (H), Hypertension goal BP (blood pressure) < 140/90        flecainide acetate 150 MG Tabs     180 tablet    Take 1 tablet (150 mg) by mouth every 12 hours    Paroxysmal atrial fibrillation (H)       fluticasone 50 MCG/ACT spray    FLONASE     Spray 1 spray into both nostrils daily as needed for rhinitis or allergies        iron 325 (65 Fe) MG tablet     90 tablet    Take 2 tablets by mouth daily (with breakfast)    Iron deficiency anemia, unspecified iron deficiency anemia type       rivaroxaban ANTICOAGULANT 20 MG Tabs tablet    XARELTO    90 tablet    Take 1 tablet (20 mg) by mouth daily (with dinner)    Paroxysmal atrial fibrillation (H)       * Notice:  This list has 2 medication(s) that are the same as other medications prescribed for you. Read the directions carefully, and ask your doctor or other care provider to review them with you.

## 2018-06-27 ENCOUNTER — TRANSFERRED RECORDS (OUTPATIENT)
Dept: HEALTH INFORMATION MANAGEMENT | Facility: CLINIC | Age: 73
End: 2018-06-27

## 2018-07-02 ENCOUNTER — TELEPHONE (OUTPATIENT)
Dept: FAMILY MEDICINE | Facility: CLINIC | Age: 73
End: 2018-07-02

## 2018-07-02 NOTE — TELEPHONE ENCOUNTER
Reason for Call:  Same Day Appointment, Requested Provider:  Brett Cassidy MD    PCP: Brett Cassidy    Reason for visit: rash, ringworm?      Duration of symptoms: ??  Awhile but is now getting worse    Have you been treated for this in the past? ??    Additional comments: na    Can we leave a detailed message on this number? YES    Phone number patient can be reached at: Home number on file [phone] (home)    Best Time: anytime    Call taken on 7/2/2018 at 8:16 AM by Patricia Suresh

## 2018-07-05 ENCOUNTER — OFFICE VISIT (OUTPATIENT)
Dept: FAMILY MEDICINE | Facility: CLINIC | Age: 73
End: 2018-07-05
Payer: COMMERCIAL

## 2018-07-05 VITALS
HEART RATE: 72 BPM | WEIGHT: 315 LBS | OXYGEN SATURATION: 94 % | HEIGHT: 72 IN | SYSTOLIC BLOOD PRESSURE: 139 MMHG | BODY MASS INDEX: 42.66 KG/M2 | DIASTOLIC BLOOD PRESSURE: 70 MMHG | TEMPERATURE: 98.8 F

## 2018-07-05 DIAGNOSIS — B35.4 TINEA CORPORIS: ICD-10-CM

## 2018-07-05 DIAGNOSIS — N40.1 BENIGN PROSTATIC HYPERPLASIA (BPH) WITH URINARY URGE INCONTINENCE: ICD-10-CM

## 2018-07-05 DIAGNOSIS — N39.41 BENIGN PROSTATIC HYPERPLASIA (BPH) WITH URINARY URGE INCONTINENCE: ICD-10-CM

## 2018-07-05 DIAGNOSIS — R21 RASH: Primary | ICD-10-CM

## 2018-07-05 LAB
KOH PREP SPEC: NORMAL
SPECIMEN SOURCE: NORMAL

## 2018-07-05 PROCEDURE — 87220 TISSUE EXAM FOR FUNGI: CPT | Performed by: FAMILY MEDICINE

## 2018-07-05 PROCEDURE — 99214 OFFICE O/P EST MOD 30 MIN: CPT | Performed by: FAMILY MEDICINE

## 2018-07-05 RX ORDER — BETAMETHASONE DIPROPIONATE 0.5 MG/G
CREAM TOPICAL
Qty: 45 G | Refills: 0 | Status: SHIPPED | OUTPATIENT
Start: 2018-07-05 | End: 2019-02-06

## 2018-07-05 RX ORDER — FLUCONAZOLE 200 MG/1
200 TABLET ORAL WEEKLY
Qty: 4 TABLET | Refills: 0 | Status: SHIPPED | OUTPATIENT
Start: 2018-07-05 | End: 2019-02-22

## 2018-07-05 RX ORDER — TAMSULOSIN HYDROCHLORIDE 0.4 MG/1
0.4 CAPSULE ORAL DAILY
Qty: 90 CAPSULE | Refills: 1 | Status: SHIPPED | OUTPATIENT
Start: 2018-07-05 | End: 2018-09-04

## 2018-07-05 NOTE — MR AVS SNAPSHOT
After Visit Summary   7/5/2018    Hugo Weber    MRN: [id number]           Patient Information     Date Of Birth          1945        Visit Information        Provider Department      7/5/2018 3:00 PM Magen Dimas MD [facility]        Today's Diagnoses     Rash    -  1    Benign prostatic hyperplasia (BPH) with urinary urge incontinence        Tinea corporis           Follow-ups after your visit        Follow-up notes from your care team     Return if symptoms worsen or fail to improve.      Your next 10 appointments already scheduled     Sep 20, 2018  7:40 AM CDT   PHYSICAL with Brett Cassidy MD   [facility] ([facility])    [address] [place] [id number] [phone]              Who to contact     If you have questions or need follow up information about today's clinic visit or your schedule please contact [facility] directly at [phone].  Normal or non-critical lab and imaging results will be communicated to you by MyChart, letter or phone within 4 business days after the clinic has received the results. If you do not hear from us within 7 days, please contact the clinic through [employer] or phone. If you have a critical or abnormal lab result, we will notify you by phone as soon as possible.  Submit refill requests through [employer] or call your pharmacy and they will forward the refill request to us. Please allow 3 business days for your refill to be completed.          Additional Information About Your Visit        MyChart Information     [employer] gives you secure access to your electronic health record. If you see a primary care provider, you can also send messages to your care team and make appointments. If you have questions, please call your primary care clinic.  If you do not have a primary care provider, please call [phone] and they will assist you.        Care EveryWhere ID      This is your Care EveryWhere ID. This could be used by other organizations to access your [place] medical records  [id number]        Your Vitals Were     Pulse Temperature Height Pulse Oximetry BMI (Body Mass Index)       72 98.8  F (37.1  C) (Oral) 6' (1.829 m) 94% 47.06 kg/m2        Blood Pressure from Last 3 Encounters:   07/05/18 140/70   05/02/18 142/74   04/24/18 138/80    Weight from Last 3 Encounters:   07/05/18 (!) 347 lb (157.4 kg)   05/16/18 (!) 350 lb (158.8 kg)   05/02/18 (!) 353 lb 4.8 oz (160.3 kg)              We Performed the Following     KOH prep (skin, hair or nails only)          Today's Medication Changes          These changes are accurate as of 7/5/18  3:42 PM.  If you have any questions, ask your nurse or doctor.               Start taking these medicines.        Dose/Directions    betamethasone dipropionate 0.05 % cream   Commonly known as:  DIPROSONE   Used for:  Rash   Started by:  Magen Dimas MD        Apply sparingly to affected area twice daily as needed.  Do not apply to face.   Quantity:  45 g   Refills:  0       fluconazole 200 MG tablet   Commonly known as:  DIFLUCAN   Used for:  Tinea corporis   Started by:  Magen Dimas MD        Dose:  200 mg   Take 1 tablet (200 mg) by mouth once a week for 28 days   Quantity:  4 tablet   Refills:  0       tamsulosin 0.4 MG capsule   Commonly known as:  FLOMAX   Used for:  Benign prostatic hyperplasia (BPH) with urinary urge incontinence   Started by:  Magen Dimas MD        Dose:  0.4 mg   Take 1 capsule (0.4 mg) by mouth daily   Quantity:  90 capsule   Refills:  1            Where to get your medicines      These medications were sent to [facility] - [place], [place] - [address] [place] [address]  [address] [place] [address]     Phone:  [phone]     betamethasone dipropionate 0.05 % cream    fluconazole 200 MG tablet    tamsulosin 0.4 MG capsule                Primary Care Provider  Office Phone # Fax #    Brett Cassidy [phone] [address] [facility] [address]        Equal Access to Services     AUDRA PEARSON : Hadii aad ku hadnoemío Sostefanali, wamisaelda luqadaha, qaybta kaalmada caroline, stella adkinstony maradiagaankush calle kenna albert. So [facility] [phone].    ATENCIÓN: Si habla español, tiene a oconnor disposición servicios gratuitos de asistencia lingüística. Llame al [phone].    We comply with applicable federal civil rights laws and [place] laws. We do not discriminate on the basis of race, color, national origin, age, disability, sex, sexual orientation, or gender identity.            Thank you!     Thank you for choosing [facility]  for your care. Our goal is always to provide you with excellent care. Hearing back from our patients is one way we can continue to improve our services. Please take a few minutes to complete the written survey that you may receive in the mail after your visit with us. Thank you!             Your Updated Medication List - Protect others around you: Learn how to safely use, store and throw away your medicines at www.disposemymeds.org.          This list is accurate as of 7/5/18  3:42 PM.  Always use your most recent med list.                   Brand Name Dispense Instructions for use Diagnosis    betamethasone dipropionate 0.05 % cream    DIPROSONE    45 g    Apply sparingly to affected area twice daily as needed.  Do not apply to face.    Rash       diltiazem 180 MG 24 hr capsule    CARDIZEM CD    90 capsule    Take 1 capsule (180 mg) by mouth daily    Paroxysmal atrial fibrillation (H), Hypertension goal BP (blood pressure) < 140/90       flecainide acetate 150 MG Tabs     180 tablet    Take 1 tablet (150 mg) by mouth every 12 hours    Paroxysmal atrial fibrillation (H)       fluconazole 200 MG tablet    DIFLUCAN    4 tablet    Take 1 tablet (200 mg) by mouth once a week for 28 days    Tinea corporis        fluticasone 50 MCG/ACT spray    FLONASE     Spray 1 spray into both nostrils daily as needed for rhinitis or allergies        iron 325 (65 Fe) MG tablet     90 tablet    Take 2 tablets by mouth daily (with breakfast)    Iron deficiency anemia, unspecified iron deficiency anemia type       rivaroxaban ANTICOAGULANT 20 MG Tabs tablet    XARELTO    90 tablet    Take 1 tablet (20 mg) by mouth daily (with dinner)    Paroxysmal atrial fibrillation (H)       tamsulosin 0.4 MG capsule    FLOMAX    90 capsule    Take 1 capsule (0.4 mg) by mouth daily    Benign prostatic hyperplasia (BPH) with urinary urge incontinence

## 2018-07-05 NOTE — TELEPHONE ENCOUNTER
Left message for call back - please have patient discuss current sx with triage.  Does he still need appointment?      Nina Larose

## 2018-07-05 NOTE — PROGRESS NOTES
SUBJECTIVE:                                                    Hugo Weber is a 73 year old male who presents to clinic today for the following health issues:    Rash  Onset: off and on for awhile (3 months ago or so), began on upper chest    Description:   Location: arms, bottom of left leg, chest  Character: round, raised, burning, red  Itching (Pruritis): YES    Progression of Symptoms:  worsening    Accompanying Signs & Symptoms:  Fever: no   Body aches or joint pain: no   Sore throat symptoms: no   Recent cold symptoms: YES    History:   Previous similar rash: no     Precipitating factors:   Exposure to similar rash: no   New exposures: fit bit watch   Recent travel: no     Alleviating factors:  Shower helps    Therapies Tried and outcome: hydrocortisone cream did not help, lamisil (helped), neosporin and first aid cream, 6 daily allergy medication (this did not help much)      Pt notes the fit bit on his wrist started these sx of itching/irritation.    Frequent urination- Pt reports having frequent urination with difficulties fully emptying his bladder and urinary urgency. He gets up 1-2 times at night to urinate. Will be travelling to [place] and is wondering if there is something he could use to help. He has been told by Dr. Cassidy that he has an enlarged prostate.     Problem list and histories reviewed & adjusted, as indicated.  Additional history: as documented    ROS:  Constitutional, HEENT, cardiovascular, pulmonary, GI, , musculoskeletal, neuro, skin, endocrine and psych systems are negative, except as otherwise noted.    This document serves as a record of the services and decisions personally performed and made by Magen Dimas MD. It was created on her behalf by Zita Lomax, a trained medical scribe. The creation of this document is based the provider's statements to the medical scribe.  Aureliano Lomax 3:10 PM, July 5, 2018    OBJECTIVE:                                                     /70  Pulse 72  Temp 98.8  F (37.1  C) (Oral)  Ht 1.829 m (6')  Wt (!) 157.4 kg (347 lb)  SpO2 94%  BMI 47.06 kg/m2 Body mass index is 47.06 kg/(m^2).     GENERAL: healthy, alert, well nourished, well hydrated, no distress  HENT: ear canals- normal; TMs- normal; Nose- normal; Mouth- no ulcers, no lesions  RESP: lungs clear to auscultation - no rales, no rhonchi, no wheezes  CV: regular rates and rhythm, normal S1 S2, no S3 or S4 and no murmur, no click or rub -  MS: extremities- no gross deformities noted, no edema  SKIN: Dry patches with scale on left upper chest and left medial wrist. Left inner medial leg has 3 cm circular, pink patch with active border, otherwise no suspicious lesions, no rashes     Diagnostic test results:  Results for orders placed or performed in visit on 07/05/18 (from the past 24 hour(s))   KOH prep (skin, hair or nails only)   Result Value Ref Range    Specimen Description Arm LEFT FOREARM     TAHMINA Skin Hair Nails Test No fungal elements seen         ASSESSMENT/PLAN:         Hugo was seen today for derm problem.    Diagnoses and all orders for this visit:    Rash - nummular eczema vs tinea vs other  -     KOH prep (skin, hair or nails only)  -     betamethasone dipropionate (DIPROSONE) 0.05 % cream; Apply sparingly to affected area twice daily as needed.  Do not apply to face.    Benign prostatic hyperplasia (BPH) with urinary urge incontinence  Pt reports difficulties fully emptying bladder with increased urinary frequency and urgency.   -     tamsulosin (FLOMAX) 0.4 MG capsule; Take 1 capsule (0.4 mg) by mouth daily    Tinea corporis  KOH test today showed no fungal elements, however the appearance of the rash warrants treatment with oral antifungal.   -     fluconazole (DIFLUCAN) 200 MG tablet; Take 1 tablet (200 mg) by mouth once a week for 28 days    Risks, benefits and alternatives of treatments discussed. Plan agreed on.      Followup: Return if symptoms worsen or  fail to improve.    See patient instructions.     Tobacco Cessation:   reports that he has never smoked. He has never used smokeless tobacco.    BMI:   Estimated body mass index is 47.06 kg/(m^2) as calculated from the following:    Height as of this encounter: 1.829 m (6').    Weight as of this encounter: 157.4 kg (347 lb).   Weight management plan: Discussed healthy diet and exercise guidelines and patient will follow up in 12 months in clinic to re-evaluate.    The information in this document, created by the medical scribe for me, accurately reflects the services I personally performed and the decisions made by me. I have reviewed and approved this document for accuracy prior to leaving the patient care area.  July 5, 2018 3:18 PM        Timur Dimas MD   Pager: [phone]

## 2018-07-05 NOTE — TELEPHONE ENCOUNTER
Angela Pitts contacted Hugo on 07/05/18 and left a message. If patient calls back please contact RN team.  Cassy Pitts RN  [facility]

## 2018-07-05 NOTE — TELEPHONE ENCOUNTER
Rash  Onset: off and on for awhile (3 months ago or so)    Description:   Location: arms, bottom of left leg, chest  Character: round, raised, burning, red  Itching (Pruritis): YES    Progression of Symptoms:  worsening    Accompanying Signs & Symptoms:  Fever: no   Body aches or joint pain: no   Sore throat symptoms: no   Recent cold symptoms: YES    History:   Previous similar rash: no     Precipitating factors:   Exposure to similar rash: no   New exposures: fit bit watch   Recent travel: no     Alleviating factors:  Shower helps    Therapies Tried and outcome: hydrocortisone cream did not help, lamisil (helped), neosporin and first aid cream, 6 daily allergy medication (this did not help much)     Pt notes the fit bit on their wrist started these sx of itching/irritation.    Pt scheduled with RL today for evaluation.  Advised pt to use cool compresses, cool hydrocortisone (in fridge), daily allergy antihistamine, call with any further sx or concerns.    The patient indicates understanding of these issues and agrees with the plan.  Cassy Pitts RN  [place]

## 2018-07-16 ENCOUNTER — OFFICE VISIT (OUTPATIENT)
Dept: FAMILY MEDICINE | Facility: CLINIC | Age: 73
End: 2018-07-16
Payer: COMMERCIAL

## 2018-07-16 VITALS
TEMPERATURE: 98.7 F | HEART RATE: 90 BPM | OXYGEN SATURATION: 94 % | BODY MASS INDEX: 42.66 KG/M2 | SYSTOLIC BLOOD PRESSURE: 126 MMHG | DIASTOLIC BLOOD PRESSURE: 60 MMHG | HEIGHT: 72 IN | WEIGHT: 315 LBS

## 2018-07-16 DIAGNOSIS — R82.90 NONSPECIFIC FINDING ON EXAMINATION OF URINE: ICD-10-CM

## 2018-07-16 DIAGNOSIS — I77.810 THORACIC AORTIC ECTASIA (H): ICD-10-CM

## 2018-07-16 DIAGNOSIS — R30.0 DYSURIA: ICD-10-CM

## 2018-07-16 DIAGNOSIS — N39.0 URINARY TRACT INFECTION WITHOUT HEMATURIA, SITE UNSPECIFIED: Primary | ICD-10-CM

## 2018-07-16 LAB
ALBUMIN UR-MCNC: 30 MG/DL
APPEARANCE UR: CLEAR
BACTERIA #/AREA URNS HPF: ABNORMAL /HPF
BILIRUB UR QL STRIP: NEGATIVE
COLOR UR AUTO: YELLOW
GLUCOSE UR STRIP-MCNC: NEGATIVE MG/DL
HGB UR QL STRIP: NEGATIVE
KETONES UR STRIP-MCNC: NEGATIVE MG/DL
LEUKOCYTE ESTERASE UR QL STRIP: NEGATIVE
NITRATE UR QL: NEGATIVE
PH UR STRIP: 7 PH (ref 5–7)
RBC #/AREA URNS AUTO: ABNORMAL /HPF
SOURCE: ABNORMAL
SP GR UR STRIP: 1.02 (ref 1–1.03)
UROBILINOGEN UR STRIP-ACNC: 0.2 EU/DL (ref 0.2–1)
WBC #/AREA URNS AUTO: >100 /HPF

## 2018-07-16 PROCEDURE — 87088 URINE BACTERIA CULTURE: CPT | Performed by: FAMILY MEDICINE

## 2018-07-16 PROCEDURE — 99213 OFFICE O/P EST LOW 20 MIN: CPT | Performed by: FAMILY MEDICINE

## 2018-07-16 PROCEDURE — 81001 URINALYSIS AUTO W/SCOPE: CPT | Performed by: FAMILY MEDICINE

## 2018-07-16 PROCEDURE — 87086 URINE CULTURE/COLONY COUNT: CPT | Performed by: FAMILY MEDICINE

## 2018-07-16 PROCEDURE — 87186 SC STD MICRODIL/AGAR DIL: CPT | Performed by: FAMILY MEDICINE

## 2018-07-16 RX ORDER — SULFAMETHOXAZOLE/TRIMETHOPRIM 800-160 MG
1 TABLET ORAL 2 TIMES DAILY
Qty: 14 TABLET | Refills: 0 | Status: SHIPPED | OUTPATIENT
Start: 2018-07-16 | End: 2018-07-23

## 2018-07-16 NOTE — MR AVS SNAPSHOT
After Visit Summary   7/16/2018    Hugo Weber    MRN: [id number]           Patient Information     Date Of Birth          1945        Visit Information        Provider Department      7/16/2018 3:00 PM Magen Dimas MD [facility]        Today's Diagnoses     Urinary tract infection without hematuria, site unspecified    -  1    Dysuria        Nonspecific finding on examination of urine           Follow-ups after your visit        Follow-up notes from your care team     Return in about 1 week (around 7/23/2018), or if symptoms worsen or fail to improve.      Your next 10 appointments already scheduled     Sep 20, 2018  7:40 AM CDT   PHYSICAL with Brett Cassidy MD   [facility]    [address] [place] [address] [phone]              Who to contact     If you have questions or need follow up information about today's clinic visit or your schedule please contact [facility] directly at [phone].  Normal or non-critical lab and imaging results will be communicated to you by [employer], letter or phone within 4 business days after the clinic has received the results. If you do not hear from us within 7 days, please contact the clinic through [email or web address] or phone. If you have a critical or abnormal lab result, we will notify you by phone as soon as possible.  Submit refill requests through [email or web address] or call your pharmacy and they will forward the refill request to us. Please allow 3 business days for your refill to be completed.          Additional Information About Your Visit        [employer] Information     [email or web address] gives you secure access to your electronic health record. If you see a primary care provider, you can also send messages to your care team and make appointments. If you have questions, please call your primary care clinic.  If you do not have a primary care provider, please call  [phone] and they will assist you.        Care EveryWhere ID     This is your Care EveryWhere ID. This could be used by other organizations to access your [facility] medical records  [id number]        Your Vitals Were     Pulse Temperature Height Pulse Oximetry BMI (Body Mass Index)       90 98.7  F (37.1  C) (Oral) 6' (1.829 m) 94% 46.52 kg/m2        Blood Pressure from Last 3 Encounters:   07/16/18 126/60   07/05/18 139/70   05/02/18 142/74    Weight from Last 3 Encounters:   07/16/18 (!) 343 lb (155.6 kg)   07/05/18 (!) 347 lb (157.4 kg)   05/16/18 (!) 350 lb (158.8 kg)              We Performed the Following     UA reflex to Microscopic     Urine Culture Aerobic Bacterial     Urine Microscopic          Today's Medication Changes          These changes are accurate as of 7/16/18  3:23 PM.  If you have any questions, ask your nurse or doctor.               Start taking these medicines.        Dose/Directions    sulfamethoxazole-trimethoprim 800-160 MG per tablet   Commonly known as:  BACTRIM DS/SEPTRA DS   Used for:  Urinary tract infection without hematuria, site unspecified   Started by:  Magen Dimas MD        Dose:  1 tablet   Take 1 tablet by mouth 2 times daily   Quantity:  14 tablet   Refills:  0            Where to get your medicines      These medications were sent to [facility] [address] [address]     Phone:  [phone]     sulfamethoxazole-trimethoprim 800-160 MG per tablet                Primary Care Provider Office Phone # Fax #    Brett Cassidy [phone]       [address] [place] [id number]        Equal Access to Services     [facility] MICHEL AH: Michelle Narvaez, waaxda luqadaha, qaybta kaalmada adeegyada, stella albert. So [facility] [phone].    ATENCIÓN: Si habla español, tiene a oconnor disposición servicios gratuitos de asistencia  lingüísticaJohn Solomon al [phone].    We comply with applicable federal civil rights laws and [place] laws. We do not discriminate on the basis of race, color, national origin, age, disability, sex, sexual orientation, or gender identity.            Thank you!     Thank you for choosing [facility]  for your care. Our goal is always to provide you with excellent care. Hearing back from our patients is one way we can continue to improve our services. Please take a few minutes to complete the written survey that you may receive in the mail after your visit with us. Thank you!             Your Updated Medication List - Protect others around you: Learn how to safely use, store and throw away your medicines at www.disposemymeds.org.          This list is accurate as of 7/16/18  3:23 PM.  Always use your most recent med list.                   Brand Name Dispense Instructions for use Diagnosis    betamethasone dipropionate 0.05 % cream    DIPROSONE    45 g    Apply sparingly to affected area twice daily as needed.  Do not apply to face.    Rash       diltiazem 180 MG 24 hr capsule    CARDIZEM CD    90 capsule    Take 1 capsule (180 mg) by mouth daily    Paroxysmal atrial fibrillation (H), Hypertension goal BP (blood pressure) < 140/90       flecainide acetate 150 MG Tabs     180 tablet    Take 1 tablet (150 mg) by mouth every 12 hours    Paroxysmal atrial fibrillation (H)       fluconazole 200 MG tablet    DIFLUCAN    4 tablet    Take 1 tablet (200 mg) by mouth once a week for 28 days    Tinea corporis       fluticasone 50 MCG/ACT spray    FLONASE     Spray 1 spray into both nostrils daily as needed for rhinitis or allergies        iron 325 (65 Fe) MG tablet     90 tablet    Take 2 tablets by mouth daily (with breakfast)    Iron deficiency anemia, unspecified iron deficiency anemia type       rivaroxaban ANTICOAGULANT 20 MG Tabs tablet    XARELTO    90 tablet    Take 1 tablet (20 mg) by mouth daily  (with dinner)    Paroxysmal atrial fibrillation (H)       sulfamethoxazole-trimethoprim 800-160 MG per tablet    BACTRIM DS/SEPTRA DS    14 tablet    Take 1 tablet by mouth 2 times daily    Urinary tract infection without hematuria, site unspecified       tamsulosin 0.4 MG capsule    FLOMAX    90 capsule    Take 1 capsule (0.4 mg) by mouth daily    Benign prostatic hyperplasia (BPH) with urinary urge incontinence

## 2018-07-16 NOTE — PROGRESS NOTES
SUBJECTIVE:  Hugo Weber is a 73 year old male who presents to clinic today for the following health issues:    Genitourinary - Male  Onset: 2 days ago    Description:   Dysuria (painful urination): YES  Hematuria (blood in urine): YES  Frequency: YES  Are you urinating at night : YES- going every 30-45 minutes   Hesitancy (delay in urine): no   Retention (unable to empty): YES  Decrease in urinary flow: YES  Incontinence: YES    Progression of Symptoms:  Slightly worsening    Accompanying Signs & Symptoms:  Fever: YES-  2 hours ago was 101.0, having chills/sweats   Back/Flank pain: no   Urethral discharge: no   Testicle lumps/masses/pain: no   Nausea and/or vomiting: no   Abdominal pain: no   Waking up with headaches that are persistent through out the day- ongoing 2 days   SOB- 2 days ongoing   Phlegm- 2 day ongoing     History:   History of frequent UTI's: YES  History of kidney stones: YES  History of hernias: no   Personal or Family history of Prostate problems: YES- Father history of Prostate Cancer  Sexually active: YES    Precipitating factors:   Hx of BPH    Alleviating factors:  Nothing         Problem list and histories reviewed & adjusted, as indicated.  Additional history: as documented    ROS:  Constitutional, HEENT, cardiovascular, pulmonary, GI, , musculoskeletal, neuro, skin, endocrine and psych systems are negative, except as otherwise noted.    OBJECTIVE:  /60  Pulse 90  Temp 98.7  F (37.1  C) (Oral)  Ht 6' (1.829 m)  Wt (!) 343 lb (155.6 kg)  SpO2 94%  BMI 46.52 kg/m2 Body mass index is 46.52 kg/(m^2).   GENERAL: healthy, alert, well nourished, well hydrated, no distress  HENT: ear canals- normal; TMs- normal; Nose- normal; Mouth- no ulcers, no lesions  NECK: no tenderness, no adenopathy, no asymmetry, no masses, no stiffness; thyroid- normal to palpation  RESP: lungs clear to auscultation - no rales, no rhonchi, no wheezes  CV: regular rates and rhythm, normal S1 S2, no S3  or S4 and no murmur, no click or rub -  ABDOMEN: soft, no tenderness, no  hepatosplenomegaly, no masses, normal bowel sounds  MS: extremities- no gross deformities noted, no edema  SKIN: no suspicious lesions, no rashes  BACK: no CVA tenderness, no paralumbar tenderness    Diagnostic test results:  Results for orders placed or performed in visit on 07/16/18 (from the past 24 hour(s))   UA reflex to Microscopic   Result Value Ref Range    Color Urine Yellow     Appearance Urine Clear     Glucose Urine Negative NEG^Negative mg/dL    Bilirubin Urine Negative NEG^Negative    Ketones Urine Negative NEG^Negative mg/dL    Specific Gravity Urine 1.020 1.003 - 1.035    Blood Urine Negative NEG^Negative    pH Urine 7.0 5.0 - 7.0 pH    Protein Albumin Urine 30 (A) NEG^Negative mg/dL    Urobilinogen Urine 0.2 0.2 - 1.0 EU/dL    Nitrite Urine Negative NEG^Negative    Leukocyte Esterase Urine Negative NEG^Negative    Source Midstream Urine    Urine Microscopic   Result Value Ref Range    WBC Urine >100 (A) OTO5^0 - 5 /HPF    RBC Urine 25-50 (A) OTO2^O - 2 /HPF    Bacteria Urine Many (A) NEG^Negative /HPF     Labs drawn and in process:   Unresulted Labs Ordered in the Past 30 Days of this Admission     Date and Time Order Name Status Description    7/16/2018 1507 URINE CULTURE AEROBIC BACTERIAL In process           ASSESSMENT/PLAN:  Hugo was seen today for uti.    Diagnoses and all orders for this visit:    Urinary tract infection without hematuria, site unspecified  -     sulfamethoxazole-trimethoprim (BACTRIM DS/SEPTRA DS) 800-160 MG per tablet; Take 1 tablet by mouth 2 times daily  -     Urine Culture Aerobic Bacterial  -     Urine Microscopic    H/o thoracic aortic ectasia - stable no change.     Risks, benefits and alternatives of treatments discussed. Plan agreed on.      Followup: Return in about 1 week (around 7/23/2018), or if symptoms worsen or fail to improve.    See patient instructions.         Timur Dimas MD    Pager: [phone]

## 2018-07-18 ENCOUNTER — TELEPHONE (OUTPATIENT)
Dept: FAMILY MEDICINE | Facility: CLINIC | Age: 73
End: 2018-07-18

## 2018-07-18 LAB
BACTERIA SPEC CULT: ABNORMAL
SPECIMEN SOURCE: ABNORMAL

## 2018-07-18 NOTE — TELEPHONE ENCOUNTER
Genitourinary - Male  Onset: Saturday night    Description:   Dysuria (painful urination): YES  Hematuria (blood in urine): no   Frequency: YES  Are you urinating at night : YES- frequency  Hesitancy (delay in urine): no   Retention (unable to empty): YES   Decrease in urinary flow: YES  Incontinence: YES    Progression of Symptoms:  Worsening since RL    Accompanying Signs & Symptoms:  Fever: no   Back/Flank pain: no   Urethral discharge: no   Testicle lumps/masses/pain: no   Nausea and/or vomiting: no   Abdominal pain: no     History:   History of frequent UTI's: YES  History of kidney stones: YES  History of hernias: no   Personal or Family history of Prostate problems: YES father at 75  Sexually active: YES    Precipitating factors:   nothing    Alleviating factors:  Nothing.    Pt started a medication with RL this week. And symptoms have increased frequency. Pt started on flomax as well. Pt reports a lot of urgency.      Pt uses [facility]. Will route to TS as high priority and pt recommendation.    Cassy Pitts RN  [facility]

## 2018-07-18 NOTE — TELEPHONE ENCOUNTER
Please triage full symptoms - can this be a evisit??        Friday's schedule is full -   Monday best I can offer is 3:40 but not sure the patient should wait that long

## 2018-07-18 NOTE — TELEPHONE ENCOUNTER
Name of caller: Hugo  Relationship of Patient: Self    Reason for Call: PT would like to see Dr. Cassidy ONLY on Friday 7/20 or Monday 7/23. He is having urinary frequency and urgency. He stated he is having to go to the restroom every hour.     Best phone number to reach pt at is: [phone]  Ok to leave a message with medical info? Yes    Pharmacy preferred (if calling for a refill): MICHAEL lGynn  Patient Representative - [facility]

## 2018-07-18 NOTE — TELEPHONE ENCOUNTER
Attempt #1  Called # below - Left a non-detailed message to call back and speak with any triage nurse.    Jennifer Berry RN  [place] [facility]

## 2018-07-19 NOTE — TELEPHONE ENCOUNTER
Patient scheduled with Dr. Cassidy on 07/23/2018 at 3:40 p.m.      Gaby Huerta  Patient Representative

## 2018-07-19 NOTE — TELEPHONE ENCOUNTER
Reviewed with patient.    Complete bactrim  Restart flomax.  Push fluids.  Follow up in next 1-2 days

## 2018-07-19 NOTE — PROGRESS NOTES
Dear Hugo,    Here is a summary of your recent test results:  -Urine culture is abnormal and grew out bacteria that are sensitive to the antibiotic you have been given.  Complete the medication as prescribed and if you experience new, worsening or persistent symptoms, you should call or return for a recheck.           Thank you very much for trusting me and [facility].     Healthy regards,  Timur Dimas MD

## 2018-07-23 ENCOUNTER — OFFICE VISIT (OUTPATIENT)
Dept: FAMILY MEDICINE | Facility: CLINIC | Age: 73
End: 2018-07-23
Payer: COMMERCIAL

## 2018-07-23 VITALS
WEIGHT: 315 LBS | SYSTOLIC BLOOD PRESSURE: 128 MMHG | DIASTOLIC BLOOD PRESSURE: 64 MMHG | HEIGHT: 72 IN | RESPIRATION RATE: 14 BRPM | BODY MASS INDEX: 42.66 KG/M2 | OXYGEN SATURATION: 97 % | HEART RATE: 70 BPM | TEMPERATURE: 98.6 F

## 2018-07-23 DIAGNOSIS — E66.01 MORBID OBESITY DUE TO EXCESS CALORIES (H): ICD-10-CM

## 2018-07-23 DIAGNOSIS — R30.0 DYSURIA: ICD-10-CM

## 2018-07-23 DIAGNOSIS — Z79.01 LONG TERM CURRENT USE OF ANTICOAGULANT THERAPY: ICD-10-CM

## 2018-07-23 DIAGNOSIS — N20.0 NEPHROLITHIASIS: ICD-10-CM

## 2018-07-23 DIAGNOSIS — R73.03 PREDIABETES: ICD-10-CM

## 2018-07-23 DIAGNOSIS — R82.90 NONSPECIFIC FINDING ON EXAMINATION OF URINE: ICD-10-CM

## 2018-07-23 DIAGNOSIS — N30.01 ACUTE CYSTITIS WITH HEMATURIA: Primary | ICD-10-CM

## 2018-07-23 DIAGNOSIS — I10 HYPERTENSION GOAL BP (BLOOD PRESSURE) < 140/90: ICD-10-CM

## 2018-07-23 DIAGNOSIS — Z51.81 MEDICATION MONITORING ENCOUNTER: ICD-10-CM

## 2018-07-23 DIAGNOSIS — I48.0 PAROXYSMAL ATRIAL FIBRILLATION (H): ICD-10-CM

## 2018-07-23 LAB
ALBUMIN UR-MCNC: NEGATIVE MG/DL
APPEARANCE UR: CLEAR
BACTERIA #/AREA URNS HPF: ABNORMAL /HPF
BILIRUB UR QL STRIP: NEGATIVE
COLOR UR AUTO: YELLOW
GLUCOSE UR STRIP-MCNC: NEGATIVE MG/DL
HGB UR QL STRIP: ABNORMAL
KETONES UR STRIP-MCNC: ABNORMAL MG/DL
LEUKOCYTE ESTERASE UR QL STRIP: ABNORMAL
MUCOUS THREADS #/AREA URNS LPF: PRESENT /LPF
NITRATE UR QL: NEGATIVE
PH UR STRIP: 5 PH (ref 5–7)
RBC #/AREA URNS AUTO: >100 /HPF
SOURCE: ABNORMAL
SP GR UR STRIP: >1.03 (ref 1–1.03)
UROBILINOGEN UR STRIP-ACNC: 0.2 EU/DL (ref 0.2–1)
WBC #/AREA URNS AUTO: ABNORMAL /HPF

## 2018-07-23 PROCEDURE — 99214 OFFICE O/P EST MOD 30 MIN: CPT | Performed by: FAMILY MEDICINE

## 2018-07-23 PROCEDURE — 87086 URINE CULTURE/COLONY COUNT: CPT | Performed by: FAMILY MEDICINE

## 2018-07-23 PROCEDURE — 81001 URINALYSIS AUTO W/SCOPE: CPT | Performed by: FAMILY MEDICINE

## 2018-07-23 RX ORDER — SULFAMETHOXAZOLE/TRIMETHOPRIM 800-160 MG
1 TABLET ORAL 2 TIMES DAILY
Qty: 20 TABLET | Refills: 1 | Status: SHIPPED | OUTPATIENT
Start: 2018-07-23 | End: 2018-10-26

## 2018-07-23 NOTE — PATIENT INSTRUCTIONS
[facility]                        To reach your care team during and after hours:   [phone]  To reach our pharmacy:        [phone]    Clinic Hours                        Our clinic hours are:    Monday   7:30 am to 7:00 pm                  Tuesday through Friday 7:30 am to 5:00 pm                             Saturday   8:00 am to 12:00 pm      Sunday   Closed      Pharmacy Hours                        Our pharmacy hours are:    Monday   8:30 am to 7:00 pm       Tuesday to Friday  8:30 am to 6:00 pm                       Saturday    9:00 am to 1:00 pm              Sunday    Closed              There is also information available at our web site:  [email or web address]    If your provider ordered any lab tests and you do not receive the results within 10 business days, please call the clinic.    If you need a medication refill please contact your pharmacy.  Please allow 2-3 business days for your refill to be completed.    Our clinic offers telephone visits and e visits.  Please ask one of your team members to explain more.      Use [employer] (secure email communication and access to your chart) to send your primary care provider a message or make an appointment. Ask someone on your Team how to sign up for [employer].  Immunizations                      Immunization History   Administered Date(s) Administered     Influenza (High Dose) 3 valent vaccine 09/20/2012, 12/21/2013, 10/20/2015, 09/25/2017     Influenza (IIV3) PF 10/26/2007, 12/22/2008, 09/17/2009, 10/14/2011     Influenza Vaccine, 3 YRS +, IM (QUADRIVALENT W/PRESERVATIVES) 10/30/2017     Pneumo Conj 13-V (2010&after) 03/10/2016, 09/25/2017     Pneumococcal 23 valent 05/11/2005, 09/20/2012     TD (ADULT, 7+) 11/30/1998     TDAP Vaccine (Adacel) 06/26/2007     TDAP Vaccine (Boostrix) 10/20/2015     Twinrix A/B 05/11/2005, 06/26/2007, 09/04/2008        Health Maintenance                         There are no preventive care reminders to  display for this patient.

## 2018-07-23 NOTE — MR AVS SNAPSHOT
After Visit Summary   7/23/2018    Hugo Weber    MRN: [id number]           Patient Information     Date Of Birth          1945        Visit Information        Provider Department      7/23/2018 3:40 PM Brett Cassidy MD [facility]        Today's Diagnoses     Acute cystitis with hematuria    -  1    Nephrolithiasis        Dysuria        Nonspecific finding on examination of urine        Paroxysmal atrial fibrillation (H)        Prediabetes        Hypertension goal BP (blood pressure) < 140/90        Morbid obesity due to excess calories (H)        Long term current use of anticoagulant therapy - for intermittent a-fib        Medication monitoring encounter          Care Instructions        [facility]                        To reach your care team during and after hours:   [phone]  To reach our pharmacy:        [phone]    Clinic Hours                        Our clinic hours are:    Monday   7:30 am to 7:00 pm                  Tuesday through Friday 7:30 am to 5:00 pm                             Saturday   8:00 am to 12:00 pm      Sunday   Closed      Pharmacy Hours                        Our pharmacy hours are:    Monday   8:30 am to 7:00 pm       Tuesday to Friday  8:30 am to 6:00 pm                       Saturday    9:00 am to 1:00 pm              Sunday    Closed              There is also information available at our web site:  [email or web address]    If your provider ordered any lab tests and you do not receive the results within 10 business days, please call the clinic.    If you need a medication refill please contact your pharmacy.  Please allow 2-3 business days for your refill to be completed.    Our clinic offers telephone visits and e visits.  Please ask one of your team members to explain more.      Use R&V (secure email communication and access to your chart) to send your primary care provider a message or make an appointment. Ask  someone on your Team how to sign up for [facility].  Immunizations                      Immunization History   Administered Date(s) Administered     Influenza (High Dose) 3 valent vaccine 09/20/2012, 12/21/2013, 10/20/2015, 09/25/2017     Influenza (IIV3) PF 10/26/2007, 12/22/2008, 09/17/2009, 10/14/2011     Influenza Vaccine, 3 YRS +, IM (QUADRIVALENT W/PRESERVATIVES) 10/30/2017     Pneumo Conj 13-V (2010&after) 03/10/2016, 09/25/2017     Pneumococcal 23 valent 05/11/2005, 09/20/2012     TD (ADULT, 7+) 11/30/1998     TDAP Vaccine (Adacel) 06/26/2007     TDAP Vaccine (Boostrix) 10/20/2015     Twinrix A/B 05/11/2005, 06/26/2007, 09/04/2008        Health Maintenance                         There are no preventive care reminders to display for this patient.            Follow-ups after your visit        Additional Services     UROLOGY ADULT REFERRAL       Your provider has referred you to: [facility]: [facility] [place] [phone] - Dr Bey [email or web address]    Please be aware that coverage of these services is subject to the terms and limitations of your health insurance plan.  Call member services at your health plan with any benefit or coverage questions.      Please bring the following with you to your appointment:    (1) Any X-Rays, CTs or MRIs which have been performed.  Contact the facility where they were done to arrange for  prior to your scheduled appointment.    (2) List of current medications  (3) This referral request   (4) Any documents/labs given to you for this referral                  Follow-up notes from your care team     Return in about 2 months (around 9/23/2018), or if symptoms worsen or fail to improve, for Routine Visit.      Your next 10 appointments already scheduled     Aug 01, 2018  9:00 AM CDT   Return Visit with Osmar Bey MD   [facility] ([facility]) [address]  [address]  [facility] [address]   [phone]            Sep 20, 2018  7:40 AM CDT   [facility]   [facility]    [address] [place] [id number] [phone]              Who to contact     If you have questions or need follow up information about today's clinic visit or your schedule please contact [facility] directly at [phone].  Normal or non-critical lab and imaging results will be communicated to you by [employer], letter or phone within 4 business days after the clinic has received the results. If you do not hear from us within 7 days, please contact the clinic through [employer] or phone. If you have a critical or abnormal lab result, we will notify you by phone as soon as possible.  Submit refill requests through [employer] or call your pharmacy and they will forward the refill request to us. Please allow 3 business days for your refill to be completed.          Additional Information About Your Visit        Amicus Information     [employer] gives you secure access to your electronic health record. If you see a primary care provider, you can also send messages to your care team and make appointments. If you have questions, please call your primary care clinic.  If you do not have a primary care provider, please call [phone] and they will assist you.        Care EveryWhere ID     This is your Care EveryWhere ID. This could be used by other organizations to access your [facility] medical records  [id number]        Your Vitals Were     Pulse Temperature Respirations Height Pulse Oximetry BMI (Body Mass Index)    70 98.6  F (37  C) (Tympanic) 14 6' (1.829 m) 97% 46.11 kg/m2       Blood Pressure from Last 3 Encounters:   07/23/18 128/64   07/16/18 126/60   07/05/18 139/70    Weight from Last 3 Encounters:   07/23/18 (!) 340 lb (154.2 kg)   07/16/18 (!) 343 lb (155.6 kg)   07/05/18 (!) 347 lb (157.4 kg)               We Performed the Following     UA reflex to Microscopic and Culture     Urine Culture Aerobic Bacterial     Urine Microscopic     UROLOGY ADULT REFERRAL          Today's Medication Changes          These changes are accurate as of 7/23/18  4:44 PM.  If you have any questions, ask your nurse or doctor.               Start taking these medicines.        Dose/Directions    sulfamethoxazole-trimethoprim 800-160 MG per tablet   Commonly known as:  BACTRIM DS/SEPTRA DS   Used for:  Acute cystitis with hematuria, Nephrolithiasis, Dysuria   Started by:  Brett Cassidy MD        Dose:  1 tablet   Take 1 tablet by mouth 2 times daily   Quantity:  20 tablet   Refills:  1            Where to get your medicines      These medications were sent to [facility] [id number] [place] [address] AT [facility] [address] [address], [facility] [address]    Hours:  24-hours Phone:  [phone]     sulfamethoxazole-trimethoprim 800-160 MG per tablet                Primary Care Provider Office Phone # Fax #    Brett Cassidy [phone]       [facility] [address]        Equal Access to Services     [facility] AH: Hadii aad ku hadasho Soomaali, waaxda luqadaha, qaybta kaalmada adeegyada, stella albert. So [facility] [place] [phone].    ATENCIÓN: Si habla español, tiene a oconnor disposición servicios gratuitos de asistencia lingüística. [facility] [phone].    We comply with applicable federal civil rights laws and [place] laws. We do not discriminate on the basis of race, color, national origin, age, disability, sex, sexual orientation, or gender identity.            Thank you!     Thank you for choosing [facility]  for your care. Our goal is always to provide you with excellent care. Hearing back from our patients is one way we can continue to improve our services. Please take a few minutes to complete the written survey  that you may receive in the mail after your visit with us. Thank you!             Your Updated Medication List - Protect others around you: Learn how to safely use, store and throw away your medicines at www.disposemymeds.org.          This list is accurate as of 7/23/18  4:44 PM.  Always use your most recent med list.                   Brand Name Dispense Instructions for use Diagnosis    betamethasone dipropionate 0.05 % cream    DIPROSONE    45 g    Apply sparingly to affected area twice daily as needed.  Do not apply to face.    Rash       diltiazem 180 MG 24 hr capsule    CARDIZEM CD    90 capsule    Take 1 capsule (180 mg) by mouth daily    Paroxysmal atrial fibrillation (H), Hypertension goal BP (blood pressure) < 140/90       flecainide acetate 150 MG Tabs     180 tablet    Take 1 tablet (150 mg) by mouth every 12 hours    Paroxysmal atrial fibrillation (H)       fluconazole 200 MG tablet    DIFLUCAN    4 tablet    Take 1 tablet (200 mg) by mouth once a week for 28 days    Tinea corporis       fluticasone 50 MCG/ACT spray    FLONASE     Spray 1 spray into both nostrils daily as needed for rhinitis or allergies        iron 325 (65 Fe) MG tablet     90 tablet    Take 2 tablets by mouth daily (with breakfast)    Iron deficiency anemia, unspecified iron deficiency anemia type       rivaroxaban ANTICOAGULANT 20 MG Tabs tablet    XARELTO    90 tablet    Take 1 tablet (20 mg) by mouth daily (with dinner)    Paroxysmal atrial fibrillation (H)       sulfamethoxazole-trimethoprim 800-160 MG per tablet    BACTRIM DS/SEPTRA DS    20 tablet    Take 1 tablet by mouth 2 times daily    Acute cystitis with hematuria, Nephrolithiasis, Dysuria       tamsulosin 0.4 MG capsule    FLOMAX    90 capsule    Take 1 capsule (0.4 mg) by mouth daily    Benign prostatic hyperplasia (BPH) with urinary urge incontinence

## 2018-07-23 NOTE — PROGRESS NOTES
SUBJECTIVE:   Hugo Weber is a 73 year old male who presents to clinic today for the following health issues:    Genitourinary - Male  Onset: Saturday night    Description:   Dysuria (painful urination): YES  Hematuria (blood in urine): no   Frequency: YES  Are you urinating at night : YES- frequency  Hesitancy (delay in urine): no   Retention (unable to empty): YES   Decrease in urinary flow: YES  Incontinence: YES    Progression of Symptoms:  Worsening since RL    Accompanying Signs & Symptoms:  Fever: no   Back/Flank pain: no   Urethral discharge: no   Testicle lumps/masses/pain: no   Nausea and/or vomiting: no   Abdominal pain: no     History:   History of frequent UTI's: YES  History of kidney stones: YES  History of hernias: no   Personal or Family history of Prostate problems: YES father at 75  Sexually active: YES    Precipitating factors:   nothing    Alleviating factors:  Nothing.       Patient was seen on 07/16/2018 for evaluation of urinary symptoms. UA culture was positive for E. Coli, treated with Bactrim. Now finished with medication course. Symptoms improved until Saturday 7/21. That night he had chills, sweats especially when going to sleep. He has also been urinating every 45-60 minutes which has never happened before with a previous UTI. Currently on flomax, which has been helping symptoms.     Has a hx of kidney stones, recently had one removed 5/2/2018 and notes that he still has another present. Abdominal CT scan on 2/23/2018 indicated a 6 mm stone at the right UPJ. Patient relates that he has never been able to pass a stone, he has tried straining in the past but was unsuccessful.     A-fib  Patient is on Xarelto.    Weight  Wt Readings from Last 3 Encounters:   07/23/18 (!) 340 lb (154.2 kg)   07/16/18 (!) 343 lb (155.6 kg)   07/05/18 (!) 347 lb (157.4 kg)     Pt has lost weight since last visit.     Prediabetes/Hypertension/Lipids    BP Readings from Last 3 Encounters:   07/23/18  128/64   07/16/18 126/60   07/05/18 139/70     Lab Results   Component Value Date    A1C 5.5 04/24/2018    A1C 5.7 09/25/2017    A1C 5.8 03/30/2016    A1C 5.6 12/18/2013     Recent Labs   Lab Test  09/25/17   0922  03/10/16   0815  01/13/15   0815  09/03/14   0810   CHOL  167  166  131  156   HDL  45  38*  43  42   LDL  96  105*  74  93   TRIG  129  117  72  103   CHOLHDLRATIO   --    --   3.0  3.7       Patient is going on vacation to [place] soon and asked about what he can take so he can sleep on the plane.       Problem list and histories reviewed & adjusted, as indicated.  Additional history: as documented    Labs reviewed in EPIC    Reviewed and updated as needed this visit by clinical staff  Tobacco  Allergies  Meds  Med Hx  Surg Hx  Fam Hx  Soc Hx      Reviewed and updated as needed this visit by Provider         BP Readings from Last 3 Encounters:   07/23/18 128/64   07/16/18 126/60   07/05/18 139/70     Wt Readings from Last 4 Encounters:   07/23/18 (!) 340 lb (154.2 kg)   07/16/18 (!) 343 lb (155.6 kg)   07/05/18 (!) 347 lb (157.4 kg)   05/16/18 (!) 350 lb (158.8 kg)       Health Maintenance    There are no preventive care reminders to display for this patient.    Current Problem List    Patient Active Problem List   Diagnosis     Iron deficiency anemia     Colon polyps     Obstructive sleep apnea syndrome     Hyperlipidemia LDL goal <100     Long term current use of anticoagulant therapy - for intermittent a-fib     Advance Care Planning     Total knee replacement status     Calculus of kidney     NAFLD (nonalcoholic fatty liver disease)     Morbid obesity due to excess calories (H)     History of hepatitis C     Prediabetes     Paroxysmal atrial fibrillation (H)     Cholelithiasis     Hypertension goal BP (blood pressure) < 140/90     TMJ arthralgia     Nephrolithiasis     OA (osteoarthritis)     First degree AV block     Benign prostatic hyperplasia (BPH) with urinary urge incontinence      Thoracic aortic ectasia (H)       Past Medical History    Past Medical History:   Diagnosis Date     Arrhythmia      Arthritis      Atrial fibrillation 2006    Followed by [facility]     Calculus of kidney 2005, 6/06, 10/12    dr walker/nestor/иван - hematuria - w/u o/w neg     Cholelithiasis      Chronic peptic ulcer, unspecified site, without mention of hemorrhage, perforation, or obstruction 1985    gastric bypass     Colon polyps 8/05, 9/10, 10/15    2 tubular adenoma, 1 hyperplastic - multiple serrated/tubular adenomas     First degree AV block      Hepatitis C 10/12    chronic hepatitis C, grade 1-2, stage 1 - mild - Dr Douglas     Hyperlipidemia LDL goal <130      Hypertension goal BP (blood pressure) < 140/90 6/06    dr jaciel winchester     Iron deficiency anemia, unspecified 1985    gastric bypass     Nephrolithiasis multiple episodes, last 6/18    Dr Bey     OA (osteoarthritis)     Multiple - Left shoulder with rotator cuff tear - Dr Durham     Obesity, unspecified     s/p gastric bypass 1985      Prediabetes      Presbyacusis 1/04    dr corona     Pyelonephritis, unspecified 12/99     SCC (squamous cell carcinoma), 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    dr saez - lt conchal bowl     Sleep apnea 10/02    cpap - severe - 15 cm - dr cunha     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    [facility]       Past Surgical History    Past Surgical History:   Procedure Laterality Date     APPENDECTOMY       ARTHROPLASTY KNEE  8/13/2012    LT TKA - Dr Villanueva     CARDIOVERSION  2016     COLONOSCOPY  8/05, 9/10, 10/15    multiple tubular/serrated/hyperplastic polyps     COLONOSCOPY N/A 10/29/2015    multiple tubular and serrated adenomas     COLONOSCOPY N/A 9/7/2016     HC KNEE SCOPE, DIAGNOSTIC  05/00    Arthroscopy, Knee RT     LASER HOLMIUM LITHOTRIPSY URETER(S), INSERT STENT, COMBINED  10/16/2012    stones x 4, Dr Campa     LASER HOLMIUM LITHOTRIPSY URETER(S), INSERT STENT, COMBINED Right 5/2/2018    CYSTOSCOPY, RIGHT URETEROSCOPY, HOLMIUM LASER  LITHOTRIPSY, RIGHT STENT PLACEMENT - Dr Bey     SURGICAL HISTORY OF -   1985    s/p gastric bypass Bilroth II     SURGICAL HISTORY OF -   11/98    wisdom teeth     SURGICAL HISTORY OF -   1979    cellulitis     SURGICAL HISTORY OF -   11/98    lt forearm spur's     SURGICAL HISTORY OF -   1/01,6/02,11/02    s/p lasik     SURGICAL HISTORY OF -   11/99    rt forearm spurs     SURGICAL HISTORY OF -   7/06    dr stevenson - lithotrypsy     SURGICAL HISTORY OF -   10/08, 12/08    Lt ear chonchal lesion removal SCC - dr saez       Current Medications    Current Outpatient Prescriptions   Medication Sig Dispense Refill     diltiazem (CARDIZEM CD) 180 MG 24 hr capsule Take 1 capsule (180 mg) by mouth daily 90 capsule 3     Ferrous Sulfate (IRON) 325 (65 Fe) MG tablet Take 2 tablets by mouth daily (with breakfast) 90 tablet 3     flecainide acetate 150 MG TABS Take 1 tablet (150 mg) by mouth every 12 hours 180 tablet 3     fluconazole (DIFLUCAN) 200 MG tablet Take 1 tablet (200 mg) by mouth once a week for 28 days 4 tablet 0     rivaroxaban ANTICOAGULANT (XARELTO) 20 MG TABS tablet Take 1 tablet (20 mg) by mouth daily (with dinner) 90 tablet 3     sulfamethoxazole-trimethoprim (BACTRIM DS/SEPTRA DS) 800-160 MG per tablet Take 1 tablet by mouth 2 times daily 20 tablet 1     tamsulosin (FLOMAX) 0.4 MG capsule Take 1 capsule (0.4 mg) by mouth daily 90 capsule 1     betamethasone dipropionate (DIPROSONE) 0.05 % cream Apply sparingly to affected area twice daily as needed.  Do not apply to face. 45 g 0     fluticasone (FLONASE) 50 MCG/ACT spray Spray 1 spray into both nostrils daily as needed for rhinitis or allergies         Allergies    No Known Allergies    Immunizations    Immunization History   Administered Date(s) Administered     Influenza (High Dose) 3 valent vaccine 09/20/2012, 12/21/2013, 10/20/2015, 09/25/2017     Influenza (IIV3) PF 10/26/2007, 12/22/2008, 09/17/2009, 10/14/2011     Influenza Vaccine, 3 YRS +, IM  "(QUADRIVALENT W/PRESERVATIVES) 10/30/2017     Pneumo Conj 13-V (2010&after) 03/10/2016, 2017     Pneumococcal 23 valent 2005, 2012     TD (ADULT, 7+) 1998     TDAP Vaccine (Adacel) 2007     TDAP Vaccine (Boostrix) 10/20/2015     Twinrix A/B 2005, 2007, 2008       Family History    Family History   Problem Relation Age of Onset     Alzheimer Disease Father 82      at 88     Prostate Cancer Father 76     bladder and prostate     HEART DISEASE Mother 80     CHF     HEART DISEASE Maternal Grandfather      MI at 55     Cancer Paternal Uncle      ?       Social History    Social History     Social History     Marital status:      Spouse name: Mayra     Number of children: 3     Years of education: 21     Occupational History     retired teacher and football and   [employer]      Retired     Social History Main Topics     Smoking status: Never Smoker     Smokeless tobacco: Never Used     Alcohol use 1.2 oz/week     2 Standard drinks or equivalent per week      Comment: 2 per week     Drug use: No      Comment: acknowledges using herbal supplement \"Vibe\" for energy-none used recently      Sexual activity: Yes     Partners: Female     Birth control/ protection: None      Comment:       Other Topics Concern     Caffeine Concern Yes     <1 can qd     Sleep Concern Yes     sleep apnea, wears cpap     Exercise No     Seat Belt Yes     Parent/Sibling W/ Cabg, Mi Or Angioplasty Before 65f 55m? No     Social History Narrative       All above reviewed and updated, all stable unless otherwise noted    Recent labs reviewed    ROS:  Constitutional, HEENT, cardiovascular, pulmonary, GI, , musculoskeletal, neuro, skin, endocrine and psych systems are negative, except as in HPI or otherwise noted     This document serves as a record of the services and decisions personally performed and made by Magen Dimas MD. It was created on his behalf " by Srinivas Cassidy, a trained medical scribe. The creation of this document is based the provider's statements to the medical scribe.  Scribe Srinivas Cassidy 4:23 PM, July 23, 2018    OBJECTIVE:                                                    /64  Pulse 70  Temp 98.6  F (37  C) (Tympanic)  Resp 14  Ht 6' (1.829 m)  Wt (!) 340 lb (154.2 kg)  SpO2 97%  BMI 46.11 kg/m2  Body mass index is 46.11 kg/(m^2).     GENERAL: healthy, alert and no distress  EYES: Eyes grossly normal to inspection, extraocular movements - intact, and PERRL  HENT: ear canals- normal; TMs- normal upon viewing with otoscope; Nose- normal; Mouth- no ulcers, no lesions upon viewing with otoscope  NECK: no tenderness, no adenopathy, no asymmetry, no masses, no stiffness; thyroid- normal to palpation  RESP: lungs clear to auscultation - no rales, no rhonchi, no wheezes  CV: regular rates and rhythm, normal S1 S2, no S3 or S4 and no murmur, no click or rub -  ABDOMEN: soft, no tenderness, no  hepatosplenomegaly, no masses, normal bowel sounds  MS: extremities- no gross deformities noted, no edema  SKIN: no suspicious lesions, no rashes to visible skin  NEURO: strength and tone- normal, sensory exam- grossly normal, mentation- intact, speech- normal, reflexes- symmetric  BACK: no CVA tenderness, no paralumbar tenderness  PSYCH: Alert and oriented times 3; speech- coherent , normal rate and volume; able to articulate logical thoughts, able to abstract reason, no tangential thoughts, no hallucinations or delusions, affect- normal.    DIAGNOSTICS/PROCEDURES:                                                      No results found for this or any previous visit (from the past 24 hour(s)).     ASSESSMENT/PLAN:                                                        ICD-10-CM    1. Acute cystitis with hematuria N30.01 UROLOGY ADULT REFERRAL     sulfamethoxazole-trimethoprim (BACTRIM DS/SEPTRA DS) 800-160 MG per tablet   2. Nephrolithiasis N20.0  UROLOGY ADULT REFERRAL     sulfamethoxazole-trimethoprim (BACTRIM DS/SEPTRA DS) 800-160 MG per tablet   3. Dysuria R30.0 UA reflex to Microscopic and Culture     Urine Microscopic     UROLOGY ADULT REFERRAL     sulfamethoxazole-trimethoprim (BACTRIM DS/SEPTRA DS) 800-160 MG per tablet   4. Nonspecific finding on examination of urine R82.90 Urine Culture Aerobic Bacterial     UROLOGY ADULT REFERRAL   5. Paroxysmal atrial fibrillation (H) I48.0    6. Prediabetes R73.03    7. Hypertension goal BP (blood pressure) < 140/90 I10    8. Morbid obesity due to excess calories (H) E66.01    9. Long term current use of anticoagulant therapy - for intermittent a-fib Z79.01    10. Medication monitoring encounter Z51.81      Discussed treatment/modality options, including risk and benefits, he desires advised alcohol consumption 1oz per day or less, advised aspirin 81 mg po daily, advised 1 multivitamin per day, advised calcium 3115-7426 mg/d and Vitamin D 800-1200 IU/d, advised dentist every 6 months and advised diet, exercise, and weight loss. All diagnosis above reviewed and noted above, otherwise stable.  See [employer] orders for further details.      Extending Bactrim. Push fluids and follow up with urology     Travel:  Consider ativan to help with sleeping on [place] Flight and compression stockings.     Follow up in 2 months    Return in about 2 months (around 9/23/2018), or if symptoms worsen or fail to improve, for Routine Visit.    There are no preventive care reminders to display for this patient.    The information in this document, created by the medical scribe for me, accurately reflects the services I personally performed and the decisions made by me. I have reviewed and approved this document for accuracy prior to leaving the patient care area.  4:23 PM, 07/23/18             Brett Cassidy MD [address]  [address] [phone] Fax

## 2018-07-24 LAB
BACTERIA SPEC CULT: NORMAL
BACTERIA SPEC CULT: NORMAL
SPECIMEN SOURCE: NORMAL

## 2018-07-26 DIAGNOSIS — R35.0 URINARY FREQUENCY: Primary | ICD-10-CM

## 2018-08-01 ENCOUNTER — OFFICE VISIT (OUTPATIENT)
Dept: UROLOGY | Facility: CLINIC | Age: 73
End: 2018-08-01
Payer: COMMERCIAL

## 2018-08-01 VITALS
SYSTOLIC BLOOD PRESSURE: 130 MMHG | HEIGHT: 72 IN | OXYGEN SATURATION: 94 % | WEIGHT: 315 LBS | DIASTOLIC BLOOD PRESSURE: 76 MMHG | HEART RATE: 58 BPM | BODY MASS INDEX: 42.66 KG/M2

## 2018-08-01 DIAGNOSIS — R35.0 URINARY FREQUENCY: ICD-10-CM

## 2018-08-01 LAB
ALBUMIN UR-MCNC: NEGATIVE MG/DL
APPEARANCE UR: CLEAR
BILIRUB UR QL STRIP: NEGATIVE
COLOR UR AUTO: YELLOW
GLUCOSE UR STRIP-MCNC: NEGATIVE MG/DL
HGB UR QL STRIP: ABNORMAL
KETONES UR STRIP-MCNC: NEGATIVE MG/DL
LEUKOCYTE ESTERASE UR QL STRIP: NEGATIVE
NITRATE UR QL: NEGATIVE
PH UR STRIP: 5.5 PH (ref 5–7)
RESIDUAL VOLUME (RV) (EXTERNAL): 64
SOURCE: ABNORMAL
SP GR UR STRIP: >1.03 (ref 1–1.03)
UROBILINOGEN UR STRIP-ACNC: 1 EU/DL (ref 0.2–1)

## 2018-08-01 PROCEDURE — 51798 US URINE CAPACITY MEASURE: CPT | Performed by: UROLOGY

## 2018-08-01 PROCEDURE — 99213 OFFICE O/P EST LOW 20 MIN: CPT | Mod: 25 | Performed by: UROLOGY

## 2018-08-01 PROCEDURE — 81003 URINALYSIS AUTO W/O SCOPE: CPT | Performed by: UROLOGY

## 2018-08-01 ASSESSMENT — PAIN SCALES - GENERAL: PAINLEVEL: NO PAIN (0)

## 2018-08-01 NOTE — LETTER
"8/1/2018       RE: Hugo Weber  [address]  [facility] [address]     Dear Colleague,    Thank you for referring your patient, Hugo Weber, to the [facility] [place] at [facility]. Please see a copy of my visit note below.    This very pleasant 73-year-old gentleman returns today for evaluation.  As we recall, this is a challenging problem is he weighs 350 pounds and he had to 8 mm stones in the right kidney which is symptomatic.  Subsequently we did perform flexible ureteroscopy with holmium laser lithotripsy of the stones which appeared to fragment well.  He has returned today we have planned a CT scan of the abdomen and pelvis without contrast but this has not been done.  Previous stone analysis was calcium oxalate monohydrate.  The patient does not retrieve fragments on this occasion.  He has no pain no complaints whatsoever is otherwise in good health.  I reviewed the records carefully today and discussed the situation in detail.  1.  We will need to arrange for CT scan of the abdomen and pelvis without contrast to evaluate stone retention rate.  2.  We had a discussion about preventative measures which included hydration, reduction in salt ingestion, and increasing ingestion of calcium, magnesium and citrate.  In addition I have recommended reducing red meat in the diet.  It is likely when we complete our current investigations I may also recommend consultation with a nephrologist regarding metabolic stone evaluation.  I did discuss the entire situation with the patient in detail today.  I answered all the questions.    Plan.  CT scan abdomen and pelvis without contrast and see me after.    Time.  15 minutes with greater than 50% of discussion consultation.    \"This dictation was performed with voice recognition software and may contain errors,  omissions and inadvertent word substitution.\"      Again, thank you for allowing " me to participate in the care of your patient.      Sincerely,    Osmar Bey MD

## 2018-08-01 NOTE — PROGRESS NOTES
"This very pleasant 73-year-old gentleman returns today for evaluation.  As we recall, this is a challenging problem is he weighs 350 pounds and he had to 8 mm stones in the right kidney which is symptomatic.  Subsequently we did perform flexible ureteroscopy with holmium laser lithotripsy of the stones which appeared to fragment well.  He has returned today we have planned a CT scan of the abdomen and pelvis without contrast but this has not been done.  Previous stone analysis was calcium oxalate monohydrate.  The patient does not retrieve fragments on this occasion.  He has no pain no complaints whatsoever is otherwise in good health.  I reviewed the records carefully today and discussed the situation in detail.  1.  We will need to arrange for CT scan of the abdomen and pelvis without contrast to evaluate stone retention rate.  2.  We had a discussion about preventative measures which included hydration, reduction in salt ingestion, and increasing ingestion of calcium, magnesium and citrate.  In addition I have recommended reducing red meat in the diet.  It is likely when we complete our current investigations I may also recommend consultation with a nephrologist regarding metabolic stone evaluation.  I did discuss the entire situation with the patient in detail today.  I answered all the questions.    Plan.  CT scan abdomen and pelvis without contrast and see me after.    Time.  15 minutes with greater than 50% of discussion consultation.    \"This dictation was performed with voice recognition software and may contain errors,  omissions and inadvertent word substitution.\"  .    "

## 2018-08-01 NOTE — NURSING NOTE
Chief Complaint   Patient presents with     Clinic Care Coordination - Follow-up     Pt here for frequency and incontinence     Kamilah Jones CMA

## 2018-08-01 NOTE — MR AVS SNAPSHOT
After Visit Summary   8/1/2018    Hugo Weber    MRN: [id number]           Patient Information     Date Of Birth          1945        Visit Information        Provider Department      8/1/2018 8:50 AM Osmar Bey MD [facility] [place]        Today's Diagnoses     Urinary frequency           Follow-ups after your visit        Follow-up notes from your care team     Return for CT Abdo/Pelvis stone protocol, SEE ME AFTER.      Your next 10 appointments already scheduled     Sep 10, 2018 12:30 PM CDT   CT ABDOMEN PELVIS W/O CONTRAST with SHCT1   [facility] CT ([facility])    [address] [facility] [address]   [phone]           Please bring any scans or X-rays taken at other hospitals, if similar tests were done. Also bring a list of your medicines, including vitamins, minerals and over-the-counter drugs. It is safest to leave personal items at home.  Be sure to tell your doctor:   If you have any allergies.   If there s any chance you are pregnant.   If you are breastfeeding.  How to prepare:   Do not eat or drink for 2 hours before your exam. If you need to take medicine, you may take it with small sips of water. (We may ask you to take liquid medicine as well.)   Please wear loose clothing, such as a sweat suit or jogging clothes. Avoid snaps, zippers and other metal. We may ask you to undress and put on a hospital gown.  Please arrive 30 minutes early for your CT. Once in the department you might be asked to drink water 15-20 minutes prior to your exam.  If indicated you may be asked to drink an oral contrast in advance of your CT.  If this is the case, the imaging team will let you know or be in contact with you prior to your appointment  Patients over 70 or patients with diabetes or kidney problems:   If you haven t had a blood test (creatinine test) within the last 30 days, the Cardiologist/Radiologist  may require you to get this test prior to your exam.  If you have diabetes:   Continue to take your metformin medication on the day of your exam  If you have any questions, please call the Imaging Department where you will have your exam.            Sep 10, 2018  1:30 PM CDT   Return Visit with Osmar Bey MD   [facility] [place] ([facility])    [address]  [address]  [facility] [id number] [phone]            Sep 20, 2018  7:40 AM CDT   PHYSICAL with Brett Cassidy MD   [facility] ([facility])    [address] [place] [id number] [phone]              Future tests that were ordered for you today     Open Future Orders        Priority Expected Expires Ordered    CT Abdomen Pelvis w/o Contrast [[id number]] Routine  8/1/2019 8/1/2018            Who to contact     If you have questions or need follow up information about today's clinic visit or your schedule please contact [facility] [place] directly at [phone].  Normal or non-critical lab and imaging results will be communicated to you by [employer], letter or phone within 4 business days after the clinic has received the results. If you do not hear from us within 7 days, please contact the clinic through [employer] or phone. If you have a critical or abnormal lab result, we will notify you by phone as soon as possible.  Submit refill requests through Get In or call your pharmacy and they will forward the refill request to us. Please allow 3 business days for your refill to be completed.          Additional Information About Your Visit        [employer] Information     Get In gives you secure access to your electronic health record. If you see a primary care provider, you can also send messages to your care team and make appointments. If you have questions, please call your primary care clinic.  If you do not have a  primary care provider, please call [phone] and they will assist you.        Care EveryWhere ID     This is your Care EveryWhere ID. This could be used by other organizations to access your [place] medical records  [id number]        Your Vitals Were     Pulse Height Pulse Oximetry BMI (Body Mass Index)          58 1.829 m (6') 94% 46.25 kg/m2         Blood Pressure from Last 3 Encounters:   08/01/18 130/76   07/23/18 128/64   07/16/18 126/60    Weight from Last 3 Encounters:   08/01/18 (!) 154.7 kg (341 lb)   07/23/18 (!) 154.2 kg (340 lb)   07/16/18 (!) 155.6 kg (343 lb)              We Performed the Following     MEASURE POST-VOID RESIDUAL URINE/BLADDER CAPACITY, US NON-IMAGING (46452)     UA without Microscopic        Primary Care Provider Office Phone # Fax #    Brett Cassidy [phone]       [facility] [address]        Equal Access to Services     STEVE [facility] Hospital[facility]ICENTE : Hadii aad ku hadasho Soomaali, waaxda luqadaha, qaybta kaalmada adeegyada, stella pierson . So [facility] [phone].    ATENCIÓN: Si habla español, tiene a oconnor disposición servicios gratuitos de asistencia lingüística. Llame al [phone].    We comply with applicable federal civil rights laws and [place] laws. We do not discriminate on the basis of race, color, national origin, age, disability, sex, sexual orientation, or gender identity.            Thank you!     Thank you for choosing [facility]  for your care. Our goal is always to provide you with excellent care. Hearing back from our patients is one way we can continue to improve our services. Please take a few minutes to complete the written survey that you may receive in the mail after your visit with us. Thank you!             Your Updated Medication List - Protect others around you: Learn how to safely use, store and throw away your medicines at www.disposemymeds.org.          This  list is accurate as of 8/1/18  9:37 AM.  Always use your most recent med list.                   Brand Name Dispense Instructions for use Diagnosis    betamethasone dipropionate 0.05 % cream    DIPROSONE    45 g    Apply sparingly to affected area twice daily as needed.  Do not apply to face.    Rash       diltiazem 180 MG 24 hr capsule    CARDIZEM CD    90 capsule    Take 1 capsule (180 mg) by mouth daily    Paroxysmal atrial fibrillation (H), Hypertension goal BP (blood pressure) < 140/90       flecainide acetate 150 MG Tabs     180 tablet    Take 1 tablet (150 mg) by mouth every 12 hours    Paroxysmal atrial fibrillation (H)       fluconazole 200 MG tablet    DIFLUCAN    4 tablet    Take 1 tablet (200 mg) by mouth once a week for 28 days    Tinea corporis       fluticasone 50 MCG/ACT spray    FLONASE     Spray 1 spray into both nostrils daily as needed for rhinitis or allergies        iron 325 (65 Fe) MG tablet     90 tablet    Take 2 tablets by mouth daily (with breakfast)    Iron deficiency anemia, unspecified iron deficiency anemia type       rivaroxaban ANTICOAGULANT 20 MG Tabs tablet    XARELTO    90 tablet    Take 1 tablet (20 mg) by mouth daily (with dinner)    Paroxysmal atrial fibrillation (H)       sulfamethoxazole-trimethoprim 800-160 MG per tablet    BACTRIM DS/SEPTRA DS    20 tablet    Take 1 tablet by mouth 2 times daily    Acute cystitis with hematuria, Nephrolithiasis, Dysuria       tamsulosin 0.4 MG capsule    FLOMAX    90 capsule    Take 1 capsule (0.4 mg) by mouth daily    Benign prostatic hyperplasia (BPH) with urinary urge incontinence

## 2018-09-04 DIAGNOSIS — N39.41 BENIGN PROSTATIC HYPERPLASIA (BPH) WITH URINARY URGE INCONTINENCE: ICD-10-CM

## 2018-09-04 DIAGNOSIS — N40.1 BENIGN PROSTATIC HYPERPLASIA (BPH) WITH URINARY URGE INCONTINENCE: ICD-10-CM

## 2018-09-05 NOTE — TELEPHONE ENCOUNTER
"Requested Prescriptions   Pending Prescriptions Disp Refills     tamsulosin (FLOMAX) 0.4 MG capsule 90 capsule 1    Last Written Prescription Date:  7.5.18  Last Fill Quantity: 90,  # refills: 1   Last Office Visit: 7/23/2018   Future Office Visit:    Next 5 appointments (look out 90 days)     Sep 20, 2018  7:40 AM CDT   [facility]   [facility]    [address] [place] [id number] [phone]                  Sig: Take 1 capsule (0.4 mg) by mouth daily    Alpha Blockers Passed    9/4/2018  3:27 PM       Passed - Blood pressure under 140/90 in past 12 months    BP Readings from Last 3 Encounters:   08/01/18 130/76   07/23/18 128/64   07/16/18 126/60                Passed - Recent (12 mo) or future (30 days) visit within the authorizing provider's specialty    Patient had office visit in the last 12 months or has a visit in the next 30 days with authorizing provider or within the authorizing provider's specialty.  See \"Patient Info\" tab in inbasket, or \"Choose Columns\" in Meds & Orders section of the refill encounter.           Passed - Patient does not have Tadalafil, Vardenafil, or Sildenafil on their medication list       Passed - Patient is 18 years of age or older          "

## 2018-09-06 RX ORDER — TAMSULOSIN HYDROCHLORIDE 0.4 MG/1
0.4 CAPSULE ORAL DAILY
Qty: 90 CAPSULE | Refills: 1 | Status: SHIPPED | OUTPATIENT
Start: 2018-09-06 | End: 2018-09-20

## 2018-09-06 NOTE — TELEPHONE ENCOUNTER
Pharmacy change. Prescription approved per [facility] Refill Protocol.      Cassy Pitts RN  [facility]

## 2018-09-07 ENCOUNTER — TELEPHONE (OUTPATIENT)
Dept: FAMILY MEDICINE | Facility: CLINIC | Age: 73
End: 2018-09-07

## 2018-09-07 NOTE — TELEPHONE ENCOUNTER
Patient scheduled for 10/15/2018 at 10:20 a.m. with Dr. Cassidy for his preop.      Gaby Huerta  Patient Representative

## 2018-09-10 ENCOUNTER — HOSPITAL ENCOUNTER (OUTPATIENT)
Dept: CT IMAGING | Facility: CLINIC | Age: 73
Discharge: HOME | End: 2018-09-10
Attending: UROLOGY | Admitting: UROLOGY
Payer: MEDICARE

## 2018-09-10 ENCOUNTER — OFFICE VISIT (OUTPATIENT)
Dept: UROLOGY | Facility: CLINIC | Age: 73
End: 2018-09-10
Payer: COMMERCIAL

## 2018-09-10 VITALS
WEIGHT: 315 LBS | HEIGHT: 72 IN | SYSTOLIC BLOOD PRESSURE: 146 MMHG | DIASTOLIC BLOOD PRESSURE: 66 MMHG | BODY MASS INDEX: 42.66 KG/M2

## 2018-09-10 DIAGNOSIS — R35.0 URINARY FREQUENCY: ICD-10-CM

## 2018-09-10 DIAGNOSIS — N20.0 CALCULUS OF KIDNEY: Primary | ICD-10-CM

## 2018-09-10 PROCEDURE — 74176 CT ABD & PELVIS W/O CONTRAST: CPT

## 2018-09-10 PROCEDURE — 99213 OFFICE O/P EST LOW 20 MIN: CPT | Performed by: UROLOGY

## 2018-09-10 ASSESSMENT — PAIN SCALES - GENERAL: PAINLEVEL: NO PAIN (0)

## 2018-09-10 NOTE — PROGRESS NOTES
This very pleasant 73-year-old gentleman returns today for follow-up following ureteroscopy and holmium laser lithotripsy  He had an 8 mm stone obstructing the right upper ureter and a similar sized stone in the lower calyx of the right kidney.  He was over 350 pounds which preclude ESWL.  He returns to a CT scan today to see how effeCT ABDOMEN AND PELVIS WITHOUT CONTRAST  9/10/2018 12:24 PM      HISTORY:  Post right lithotripsy. Urinary frequency.     COMPARISON: February 23, 2018     TECHNIQUE: Axial CT images of the abdomen and pelvis from the  diaphragm to the symphysis pubis were acquired without IV contrast.  Radiation dose for this scan was reduced using automated exposure  control, adjustment of the mA and/or kV according to patient size, or  iterative reconstruction technique.     FINDINGS: Previously seen stone in the right renal pelvis is no longer  present. No ureteral stones bilaterally. Nonobstructing stone  measuring 9 mm not significantly changed in the upper left kidney. 3-4  nonobstructing stones measuring up to 7 mm are seen on the right,  increased in number since the comparison study. The largest stone is  stable. There is no perinephric fat stranding. Kidneys are normal in  size and configuration.  There are no dilated loops of small intestine  or large bowel to suggest ileus or obstruction. Gastric bypass  changes. No free air or free fluid. There are moderate atherosclerotic  changes of the visualized aorta and its branches. There is no evidence  of aortic aneurysm. Gallbladder unremarkable. Liver unremarkable.  No  splenomegaly.  No definite adrenal nodules.  Pancreas grossly  unremarkable. The remainder of the visualized abdomen is unremarkable  on this noncontrast scan. Survey of the visualized bony structures  demonstrates no destructive bony lesions.   The visualized lung bases  are unremarkable.          IMPRESSION:   1. The previously seen right renal pelvis stone has passed.  2.  "Increased number of stones possibly related to lithotripsy on the  right. Stable left-sided nonobstructing stone.   ctive stone clearance has been.    The patient is quite asymptomatic at the present time and otherwise in good health.  He still weighs about 340 pounds.  Review of the CT scan which I personally reviewed shows that the stone previously seen in the right upper ureter is no longer seen and the stone in the lower calyx has been partially fragmented.  Stone in the left upper calyx is unchanged but had not previously been treated.  I had a lengthy discussion with the patient and his wife today.  In the absence of any symptoms I do not think there is any need to hasten into any further invasive treatment at the present time.  The fragments in the lower calyx of the right kidney may pass.  If they cause significant symptoms I have asked him to see me promptly.  I recommend we repeat a KUB in 1 year unless symptoms develop before then.  I note the previous stone analysis was consistent with calcium oxalate.  We did have a discussion about ongoing preventative measures including hydration, increasing calcium, citrate of magnesium in the diet and reducing sodium in the diet as well as eating red meat in moderation.    Plan.  I will see him in 1 year for KUB and examination.    Time.  20 minutes with 50% in discussion consultation    \"This dictation was performed with voice recognition software and may contain errors,  omissions and inadvertent word substitution.\"    "

## 2018-09-10 NOTE — LETTER
9/10/2018     RE: Hugo Weber  [address]  [facility] [address]     Dear Colleague,    Thank you for referring your patient, Hugo Weber, to the [facility] [place] at [facility]. Please see a copy of my visit note below.    This very pleasant 73-year-old gentleman returns today for follow-up following ureteroscopy and holmium laser lithotripsy  He had an 8 mm stone obstructing the right upper ureter and a similar sized stone in the lower calyx of the right kidney.  He was over 350 pounds which preclude ESWL.  He returns to a CT scan today to see how effeCT ABDOMEN AND PELVIS WITHOUT CONTRAST  9/10/2018 12:24 PM      HISTORY:  Post right lithotripsy. Urinary frequency.     COMPARISON: February 23, 2018     TECHNIQUE: Axial CT images of the abdomen and pelvis from the  diaphragm to the symphysis pubis were acquired without IV contrast.  Radiation dose for this scan was reduced using automated exposure  control, adjustment of the mA and/or kV according to patient size, or  iterative reconstruction technique.     FINDINGS: Previously seen stone in the right renal pelvis is no longer  present. No ureteral stones bilaterally. Nonobstructing stone  measuring 9 mm not significantly changed in the upper left kidney. 3-4  nonobstructing stones measuring up to 7 mm are seen on the right,  increased in number since the comparison study. The largest stone is  stable. There is no perinephric fat stranding. Kidneys are normal in  size and configuration.  There are no dilated loops of small intestine  or large bowel to suggest ileus or obstruction. Gastric bypass  changes. No free air or free fluid. There are moderate atherosclerotic  changes of the visualized aorta and its branches. There is no evidence  of aortic aneurysm. Gallbladder unremarkable. Liver unremarkable.  No  splenomegaly.  No definite adrenal nodules.  Pancreas  "grossly  unremarkable. The remainder of the visualized abdomen is unremarkable  on this noncontrast scan. Survey of the visualized bony structures  demonstrates no destructive bony lesions.   The visualized lung bases  are unremarkable.          IMPRESSION:   1. The previously seen right renal pelvis stone has passed.  2. Increased number of stones possibly related to lithotripsy on the  right. Stable left-sided nonobstructing stone.   ctive stone clearance has been.    The patient is quite asymptomatic at the present time and otherwise in good health.  He still weighs about 340 pounds.  Review of the CT scan which I personally reviewed shows that the stone previously seen in the right upper ureter is no longer seen and the stone in the lower calyx has been partially fragmented.  Stone in the left upper calyx is unchanged but had not previously been treated.  I had a lengthy discussion with the patient and his wife today.  In the absence of any symptoms I do not think there is any need to hasten into any further invasive treatment at the present time.  The fragments in the lower calyx of the right kidney may pass.  If they cause significant symptoms I have asked him to see me promptly.  I recommend we repeat a KUB in 1 year unless symptoms develop before then.  I note the previous stone analysis was consistent with calcium oxalate.  We did have a discussion about ongoing preventative measures including hydration, increasing calcium, citrate of magnesium in the diet and reducing sodium in the diet as well as eating red meat in moderation.    Plan.  I will see him in 1 year for KUB and examination.    Time.  20 minutes with 50% in discussion consultation    \"This dictation was performed with voice recognition software and may contain errors,  omissions and inadvertent word substitution.\"    Again, thank you for allowing me to participate in the care of your patient.      Sincerely,    Osmar Bey MD    "

## 2018-09-10 NOTE — MR AVS SNAPSHOT
After Visit Summary   9/10/2018    Hugo Weber    MRN: [id number]           Patient Information     Date Of Birth          1945        Visit Information        Provider Department      9/10/2018 1:30 PM Osmar Bey MD [facility] [place]        Today's Diagnoses     Calculus of kidney    -  1       Follow-ups after your visit        Follow-up notes from your care team     Return in about 1 year (around 9/10/2019) for KUB, Physical Exam.      Your next 10 appointments already scheduled     Sep 20, 2018  7:40 AM CDT   [facility] Brett [facility]idy MD   [facility]    [address] [place] [address] S. [facility] [id number] [phone]            Oct 15, 2018 10:20 AM CDT   Pre-Op physical with Brett Cassidy MD   [facility]    [address] [place] [id number] [phone]              Future tests that were ordered for you today     Open Future Orders        Priority Expected Expires Ordered    XR KUB [[id number]] Routine 9/10/2018 9/10/2019 9/10/2018            Who to contact     If you have questions or need follow up information about today's clinic visit or your schedule please contact [facility] [place] directly at [phone].  Normal or non-critical lab and imaging results will be communicated to you by MyChart, letter or phone within 4 business days after the clinic has received the results. If you do not hear from us within 7 days, please contact the clinic through MyChart or phone. If you have a critical or abnormal lab result, we will notify you by phone as soon as possible.  Submit refill requests through [employer] or call your pharmacy and they will forward the refill request to us. Please allow 3 business days for your refill to be completed.          Additional Information About Your Visit         [employer] Information     [employer] gives you secure access to your electronic health record. If you see a primary care provider, you can also send messages to your care team and make appointments. If you have questions, please call your primary care clinic.  If you do not have a primary care provider, please call [phone] and they will assist you.        Care EveryWhere ID     This is your Care EveryWhere ID. This could be used by other organizations to access your [place] medical records  [id number]        Your Vitals Were     Height BMI (Body Mass Index)                1.829 m (6') 46.25 kg/m2           Blood Pressure from Last 3 Encounters:   09/10/18 146/66   08/01/18 130/76   07/23/18 128/64    Weight from Last 3 Encounters:   09/10/18 (!) 154.7 kg (341 lb)   08/01/18 (!) 154.7 kg (341 lb)   07/23/18 (!) 154.2 kg (340 lb)               Primary Care Provider Office Phone # Fax #    Brett Cassidy [phone]       [address] [place] [address]        Equal Access to Services     [facility]: Hadii aad ku hadasho Soomaali, waaxda luqadaha, qaybta kaalmada adeegyada, stella pichardo hayangel luisn tara pierson . So [facility] [phone].    ATENCIÓN: Si habla español, tiene a oconnor disposición servicios gratuitos de asistencia lingüística. Llame al [phone].    We comply with applicable federal civil rights laws and [place] laws. We do not discriminate on the basis of race, color, national origin, age, disability, sex, sexual orientation, or gender identity.            Thank you!     Thank you for choosing [facility]  for your care. Our goal is always to provide you with excellent care. Hearing back from our patients is one way we can continue to improve our services. Please take a few minutes to complete the written survey that you may receive in the mail after your visit with us. Thank you!             Your Updated Medication List - Protect  others around you: Learn how to safely use, store and throw away your medicines at www.disposemymeds.org.          This list is accurate as of 9/10/18  2:34 PM.  Always use your most recent med list.                   Brand Name Dispense Instructions for use Diagnosis    betamethasone dipropionate 0.05 % cream    DIPROSONE    45 g    Apply sparingly to affected area twice daily as needed.  Do not apply to face.    Rash       diltiazem 180 MG 24 hr capsule    CARDIZEM CD    90 capsule    Take 1 capsule (180 mg) by mouth daily    Paroxysmal atrial fibrillation (H), Hypertension goal BP (blood pressure) < 140/90       flecainide acetate 150 MG Tabs     180 tablet    Take 1 tablet (150 mg) by mouth every 12 hours    Paroxysmal atrial fibrillation (H)       fluticasone 50 MCG/ACT spray    FLONASE     Spray 1 spray into both nostrils daily as needed for rhinitis or allergies        iron 325 (65 Fe) MG tablet     90 tablet    Take 2 tablets by mouth daily (with breakfast)    Iron deficiency anemia, unspecified iron deficiency anemia type       rivaroxaban ANTICOAGULANT 20 MG Tabs tablet    XARELTO    90 tablet    Take 1 tablet (20 mg) by mouth daily (with dinner)    Paroxysmal atrial fibrillation (H)       sulfamethoxazole-trimethoprim 800-160 MG per tablet    BACTRIM DS/SEPTRA DS    20 tablet    Take 1 tablet by mouth 2 times daily    Acute cystitis with hematuria, Nephrolithiasis, Dysuria       tamsulosin 0.4 MG capsule    FLOMAX    90 capsule    Take 1 capsule (0.4 mg) by mouth daily    Benign prostatic hyperplasia (BPH) with urinary urge incontinence

## 2018-09-14 ENCOUNTER — TELEPHONE (OUTPATIENT)
Dept: FAMILY MEDICINE | Facility: CLINIC | Age: 73
End: 2018-09-14

## 2018-09-14 NOTE — TELEPHONE ENCOUNTER
Yamel from [facility] calling.    Pt is having a double balloon procedure over at [facility] that is a type of colonoscopy to access the polyps easier. Dr. Eduar Huggins will do the procedure.     Hugo is on xarelto.  Is this ok for the pt to hold for 4 days beforehand?    [phone], press option 8, extension [phone]    Routing to PCP for further review/recommendations/orders.  Cassy Pitts RN  [facility]

## 2018-09-14 NOTE — TELEPHONE ENCOUNTER
Called [facility] @ # below - spoke with Yamel    Advised of MD RANDY message below.     Jennifer Berry RN  [facility]

## 2018-09-17 ASSESSMENT — ACTIVITIES OF DAILY LIVING (ADL)
CURRENT_FUNCTION: NO ASSISTANCE NEEDED
I_NEED_ASSISTANCE_FOR_THE_FOLLOWING_DAILY_ACTIVITIES:: NO ASSISTANCE IS NEEDED

## 2018-09-18 ENCOUNTER — TELEPHONE (OUTPATIENT)
Dept: FAMILY MEDICINE | Facility: CLINIC | Age: 73
End: 2018-09-18

## 2018-09-18 DIAGNOSIS — N20.0 NEPHROLITHIASIS: Primary | ICD-10-CM

## 2018-09-18 NOTE — TELEPHONE ENCOUNTER
Reason for Call:  Hugo is requesting a nurse from Dr Cassidy's care team to return his call. He is not happy with his care/treatment for the stone and would like to discuss.    Best phone number to reach pt at is: [phone]  Ok to leave a message with medical info? yes    Mayela Phillips  Patient Representative

## 2018-09-18 NOTE — TELEPHONE ENCOUNTER
"Called # below     Pt would like to have MD RANDY to look at his CT scan again because he has 2 stones that are still in left kidney and the pervious urologist Dr. Bey who blasted them stated that they are \"fragments\" but they are rather large still - pt is not happy that he still has stones after the blasting of them they should be gone. Then when the urologist tried to get the stent out he had to do multiple attempts to remove it causing more discomfort then when pt found out stones were not completely gone that made him more upset. basically this boils down to the pt does not want to go back to Dr. Bey again if he ever needs to get the stones blasted again     Routing to MD RANDY as an FYI     Kathia Centeno RN, BSN  [place] [facility]       "

## 2018-09-19 NOTE — TELEPHONE ENCOUNTER
Referral placed to P: [facility].  Non-detailed message left for patient to return call.  Jennifer Kennedy RN  [employer] [facility] [facility]

## 2018-09-19 NOTE — TELEPHONE ENCOUNTER
Called # below     Advised pt on the information below     Patient stated an understanding and agreed with plan.    Kathia Centeno RN, BSN  [facility]

## 2018-09-20 ENCOUNTER — OFFICE VISIT (OUTPATIENT)
Dept: FAMILY MEDICINE | Facility: CLINIC | Age: 73
End: 2018-09-20
Payer: COMMERCIAL

## 2018-09-20 VITALS
HEIGHT: 72 IN | TEMPERATURE: 97.8 F | BODY MASS INDEX: 42.66 KG/M2 | HEART RATE: 63 BPM | DIASTOLIC BLOOD PRESSURE: 84 MMHG | WEIGHT: 315 LBS | OXYGEN SATURATION: 95 % | SYSTOLIC BLOOD PRESSURE: 148 MMHG

## 2018-09-20 DIAGNOSIS — K76.0 NAFLD (NONALCOHOLIC FATTY LIVER DISEASE): ICD-10-CM

## 2018-09-20 DIAGNOSIS — D50.9 IRON DEFICIENCY ANEMIA, UNSPECIFIED IRON DEFICIENCY ANEMIA TYPE: ICD-10-CM

## 2018-09-20 DIAGNOSIS — Z12.11 SCREEN FOR COLON CANCER: ICD-10-CM

## 2018-09-20 DIAGNOSIS — N20.0 NEPHROLITHIASIS: ICD-10-CM

## 2018-09-20 DIAGNOSIS — I10 HYPERTENSION GOAL BP (BLOOD PRESSURE) < 140/90: ICD-10-CM

## 2018-09-20 DIAGNOSIS — N39.41 BENIGN PROSTATIC HYPERPLASIA (BPH) WITH URINARY URGE INCONTINENCE: ICD-10-CM

## 2018-09-20 DIAGNOSIS — R82.90 NONSPECIFIC FINDING ON EXAMINATION OF URINE: ICD-10-CM

## 2018-09-20 DIAGNOSIS — Z86.19 HISTORY OF HEPATITIS C: ICD-10-CM

## 2018-09-20 DIAGNOSIS — Z79.01 LONG TERM CURRENT USE OF ANTICOAGULANT THERAPY: ICD-10-CM

## 2018-09-20 DIAGNOSIS — Z12.5 SCREENING FOR PROSTATE CANCER: ICD-10-CM

## 2018-09-20 DIAGNOSIS — R73.03 PREDIABETES: ICD-10-CM

## 2018-09-20 DIAGNOSIS — E78.5 HYPERLIPIDEMIA LDL GOAL <100: ICD-10-CM

## 2018-09-20 DIAGNOSIS — E66.01 MORBID OBESITY DUE TO EXCESS CALORIES (H): ICD-10-CM

## 2018-09-20 DIAGNOSIS — I48.0 PAROXYSMAL ATRIAL FIBRILLATION (H): ICD-10-CM

## 2018-09-20 DIAGNOSIS — G47.33 OBSTRUCTIVE SLEEP APNEA SYNDROME: ICD-10-CM

## 2018-09-20 DIAGNOSIS — N40.1 BENIGN PROSTATIC HYPERPLASIA (BPH) WITH URINARY URGE INCONTINENCE: ICD-10-CM

## 2018-09-20 DIAGNOSIS — K63.5 POLYP OF COLON, UNSPECIFIED PART OF COLON, UNSPECIFIED TYPE: ICD-10-CM

## 2018-09-20 DIAGNOSIS — Z00.00 ENCOUNTER FOR ROUTINE ADULT HEALTH EXAMINATION WITHOUT ABNORMAL FINDINGS: Primary | ICD-10-CM

## 2018-09-20 DIAGNOSIS — Z91.09 ENVIRONMENTAL ALLERGIES: ICD-10-CM

## 2018-09-20 DIAGNOSIS — Z98.84 BARIATRIC SURGERY STATUS: ICD-10-CM

## 2018-09-20 DIAGNOSIS — M15.0 PRIMARY OSTEOARTHRITIS INVOLVING MULTIPLE JOINTS: ICD-10-CM

## 2018-09-20 DIAGNOSIS — Z23 NEED FOR INFLUENZA VACCINATION: ICD-10-CM

## 2018-09-20 LAB
ALBUMIN UR-MCNC: NEGATIVE MG/DL
APPEARANCE UR: CLEAR
BACTERIA #/AREA URNS HPF: ABNORMAL /HPF
BILIRUB UR QL STRIP: NEGATIVE
COLOR UR AUTO: YELLOW
ERYTHROCYTE [DISTWIDTH] IN BLOOD BY AUTOMATED COUNT: 13.7 % (ref 10–15)
GLUCOSE UR STRIP-MCNC: NEGATIVE MG/DL
HBA1C MFR BLD: 5.5 % (ref 0–5.6)
HCT VFR BLD AUTO: 46.5 % (ref 40–53)
HGB BLD-MCNC: 14.8 G/DL (ref 13.3–17.7)
HGB UR QL STRIP: NEGATIVE
KETONES UR STRIP-MCNC: NEGATIVE MG/DL
LEUKOCYTE ESTERASE UR QL STRIP: ABNORMAL
MCH RBC QN AUTO: 28.9 PG (ref 26.5–33)
MCHC RBC AUTO-ENTMCNC: 31.8 G/DL (ref 31.5–36.5)
MCV RBC AUTO: 91 FL (ref 78–100)
MUCOUS THREADS #/AREA URNS LPF: PRESENT /LPF
NITRATE UR QL: NEGATIVE
PH UR STRIP: 6 PH (ref 5–7)
PLATELET # BLD AUTO: 253 10E9/L (ref 150–450)
RBC # BLD AUTO: 5.12 10E12/L (ref 4.4–5.9)
RBC #/AREA URNS AUTO: ABNORMAL /HPF
SOURCE: ABNORMAL
SP GR UR STRIP: 1.02 (ref 1–1.03)
UROBILINOGEN UR STRIP-ACNC: 2 EU/DL (ref 0.2–1)
VIT B12 SERPL-MCNC: 225 PG/ML (ref 193–986)
WBC # BLD AUTO: 7.4 10E9/L (ref 4–11)
WBC #/AREA URNS AUTO: ABNORMAL /HPF

## 2018-09-20 PROCEDURE — 81001 URINALYSIS AUTO W/SCOPE: CPT | Performed by: FAMILY MEDICINE

## 2018-09-20 PROCEDURE — 36415 COLL VENOUS BLD VENIPUNCTURE: CPT | Performed by: FAMILY MEDICINE

## 2018-09-20 PROCEDURE — 80053 COMPREHEN METABOLIC PANEL: CPT | Performed by: FAMILY MEDICINE

## 2018-09-20 PROCEDURE — G0008 ADMIN INFLUENZA VIRUS VAC: HCPCS | Performed by: FAMILY MEDICINE

## 2018-09-20 PROCEDURE — 85027 COMPLETE CBC AUTOMATED: CPT | Performed by: FAMILY MEDICINE

## 2018-09-20 PROCEDURE — 82550 ASSAY OF CK (CPK): CPT | Performed by: FAMILY MEDICINE

## 2018-09-20 PROCEDURE — 80061 LIPID PANEL: CPT | Performed by: FAMILY MEDICINE

## 2018-09-20 PROCEDURE — 83540 ASSAY OF IRON: CPT | Performed by: FAMILY MEDICINE

## 2018-09-20 PROCEDURE — 90662 IIV NO PRSV INCREASED AG IM: CPT | Performed by: FAMILY MEDICINE

## 2018-09-20 PROCEDURE — 82728 ASSAY OF FERRITIN: CPT | Performed by: FAMILY MEDICINE

## 2018-09-20 PROCEDURE — G0103 PSA SCREENING: HCPCS | Performed by: FAMILY MEDICINE

## 2018-09-20 PROCEDURE — 83036 HEMOGLOBIN GLYCOSYLATED A1C: CPT | Performed by: FAMILY MEDICINE

## 2018-09-20 PROCEDURE — 87086 URINE CULTURE/COLONY COUNT: CPT | Performed by: FAMILY MEDICINE

## 2018-09-20 PROCEDURE — 82607 VITAMIN B-12: CPT | Performed by: FAMILY MEDICINE

## 2018-09-20 PROCEDURE — 83550 IRON BINDING TEST: CPT | Performed by: FAMILY MEDICINE

## 2018-09-20 PROCEDURE — 84443 ASSAY THYROID STIM HORMONE: CPT | Performed by: FAMILY MEDICINE

## 2018-09-20 PROCEDURE — 82043 UR ALBUMIN QUANTITATIVE: CPT | Performed by: FAMILY MEDICINE

## 2018-09-20 PROCEDURE — 82306 VITAMIN D 25 HYDROXY: CPT | Performed by: FAMILY MEDICINE

## 2018-09-20 PROCEDURE — G0439 PPPS, SUBSEQ VISIT: HCPCS | Performed by: FAMILY MEDICINE

## 2018-09-20 RX ORDER — TAMSULOSIN HYDROCHLORIDE 0.4 MG/1
0.4 CAPSULE ORAL DAILY
Qty: 90 CAPSULE | Refills: 3 | Status: SHIPPED | OUTPATIENT
Start: 2018-09-20 | End: 2019-01-22

## 2018-09-20 RX ORDER — DILTIAZEM HYDROCHLORIDE 180 MG/1
180 CAPSULE, COATED, EXTENDED RELEASE ORAL DAILY
Qty: 90 CAPSULE | Refills: 3 | Status: SHIPPED | OUTPATIENT
Start: 2018-09-20 | End: 2018-12-05

## 2018-09-20 RX ORDER — FLUTICASONE PROPIONATE 50 MCG
1 SPRAY, SUSPENSION (ML) NASAL DAILY PRN
Qty: 3 BOTTLE | Refills: 3 | Status: SHIPPED | OUTPATIENT
Start: 2018-09-20 | End: 2019-02-06

## 2018-09-20 ASSESSMENT — ACTIVITIES OF DAILY LIVING (ADL): CURRENT_FUNCTION: NO ASSISTANCE NEEDED

## 2018-09-20 NOTE — MR AVS SNAPSHOT
After Visit Summary   9/20/2018    Hugo Weber    MRN: [id number]           Patient Information     Date Of Birth          1945        Visit Information        Provider Department      9/20/2018 7:40 AM Brett Cassidy MD [facility]        Today's Diagnoses     Encounter for routine adult health examination without abnormal findings    -  1    Nephrolithiasis        Paroxysmal atrial fibrillation (H)        NAFLD (nonalcoholic fatty liver disease)        History of hepatitis C        Obstructive sleep apnea syndrome        Prediabetes        Hypertension goal BP (blood pressure) < 140/90        Hyperlipidemia LDL goal <100        Iron deficiency anemia, unspecified iron deficiency anemia type        Primary osteoarthritis involving multiple joints        Environmental allergies        Benign prostatic hyperplasia (BPH) with urinary urge incontinence        Bariatric surgery status        Polyp of colon, unspecified part of colon, unspecified type        Screen for colon cancer        Screening for prostate cancer        Long term current use of anticoagulant therapy - for intermittent a-fib        Morbid obesity due to excess calories (H)        Need for influenza vaccination          Care Instructions    [facility]                        To reach your care team during and after hours:   [phone]  To reach our pharmacy:        [phone]    Clinic Hours                        Our clinic hours are:    Monday   7:30 am to 7:00 pm                  Tuesday through Friday 7:30 am to 5:00 pm                             Saturday   8:00 am to 12:00 pm      Sunday   Closed      Pharmacy Hours                        Our pharmacy hours are:    Monday   8:30 am to 7:00 pm       Tuesday to Friday  8:30 am to 6:00 pm                       Saturday    9:00 am to 1:00 pm              Sunday    Closed              There is also information available at our  web site:  [email or web address]    If your provider ordered any lab tests and you do not receive the results within 10 business days, please call the clinic.    If you need a medication refill please contact your pharmacy.  Please allow 2-3 business days for your refill to be completed.    Our clinic offers telephone visits and e visits.  Please ask one of your team members to explain more.      Use [employer] (secure email communication and access to your chart) to send your primary care provider a message or make an appointment. Ask someone on your Team how to sign up for [employer].  Immunizations                      Immunization History   Administered Date(s) Administered     Influenza (High Dose) 3 valent vaccine 09/20/2012, 12/21/2013, 10/20/2015, 09/25/2017     Influenza (IIV3) PF 10/26/2007, 12/22/2008, 09/17/2009, 10/14/2011     Influenza Vaccine, 3 YRS +, IM (QUADRIVALENT W/PRESERVATIVES) 10/30/2017     Pneumo Conj 13-V (2010&after) 03/10/2016, 09/25/2017     Pneumococcal 23 valent 05/11/2005, 09/20/2012     TD (ADULT, 7+) 11/30/1998     TDAP Vaccine (Adacel) 06/26/2007     TDAP Vaccine (Boostrix) 10/20/2015     Twinrix A/B 05/11/2005, 06/26/2007, 09/04/2008        Health Maintenance                         Health Maintenance Due   Topic Date Due     Flu Vaccine (1) 09/01/2018     FALL RISK ASSESSMENT  09/25/2018     Cholesterol Lab - yearly  09/25/2018     Microalbumin Lab - yearly  09/25/2018     Prostate Test (PSA) - yearly  09/25/2018     Wellness Visit with your Primary Provider - yearly  09/25/2018               Follow-ups after your visit        Your next 10 appointments already scheduled     Oct 15, 2018 10:20 AM CDT   Pre-Op physical with Brett Cassidy MD   [facility]    [address] [place] [id number] [phone]              Future tests that were ordered for you today     Open Future Orders        Priority Expected Expires Ordered     Fecal colorectal cancer screen (FIT) Routine 10/11/2018 12/13/2018 9/20/2018            Who to contact     If you have questions or need follow up information about today's clinic visit or your schedule please contact [facility] directly at [phone].  Normal or non-critical lab and imaging results will be communicated to you by MyChart, letter or phone within 4 business days after the clinic has received the results. If you do not hear from us within 7 days, please contact the clinic through [employer] or phone. If you have a critical or abnormal lab result, we will notify you by phone as soon as possible.  Submit refill requests through [employer] or call your pharmacy and they will forward the refill request to us. Please allow 3 business days for your refill to be completed.          Additional Information About Your Visit        [employer] Information     [employer] gives you secure access to your electronic health record. If you see a primary care provider, you can also send messages to your care team and make appointments. If you have questions, please call your primary care clinic.  If you do not have a primary care provider, please call [phone] and they will assist you.        Care EveryWhere ID     This is your Care EveryWhere ID. This could be used by other organizations to access your [place] medical records  [id number]        Your Vitals Were     Pulse Temperature Height Pulse Oximetry BMI (Body Mass Index)       63 97.8  F (36.6  C) (Oral) 6' (1.829 m) 95% 46.38 kg/m2        Blood Pressure from Last 3 Encounters:   09/20/18 170/80   09/10/18 146/66   08/01/18 130/76    Weight from Last 3 Encounters:   09/20/18 (!) 342 lb (155.1 kg)   09/10/18 (!) 341 lb (154.7 kg)   08/01/18 (!) 341 lb (154.7 kg)              We Performed the Following     *UA reflex to Microscopic and Culture ([place] and [place] [facility] (except [place] and [place])     Albumin Random Urine Quantitative with Creat  Ratio     CBC with platelets     CK total     Comprehensive metabolic panel     Ferritin     FLU VACCINE, INCREASED ANTIGEN, PRESV FREE     Hemoglobin A1c     Iron and iron binding capacity     Lipid panel reflex to direct LDL Fasting     Prostate spec antigen screen     TSH with free T4 reflex     Vitamin B12     Vitamin D Deficiency          Where to get your medicines      These medications were sent to [facility] - [place], [place] - [address]  [address] [facility], [place] [address]     Phone:  [phone]     diltiazem 180 MG 24 hr capsule    fluticasone 50 MCG/ACT spray    rivaroxaban ANTICOAGULANT 20 MG Tabs tablet    tamsulosin 0.4 MG capsule          Primary Care Provider Office Phone # Fax #    Brett Cassidy [phone] [address] [facility] [address]        Equal Access to Services     AUDRA PEARSON : Hadii aad ku hadasho Soomaali, waaxda luqadaha, qaybta kaalmada adeegyada, waxay ladonnain emily pierson . So [facility] [phone].    ATENCIÓN: Si habla español, tiene a oconnor disposición servicios gratuitos de asistencia lingüística. [facility] [phone].    We comply with applicable federal civil rights laws and [place] laws. We do not discriminate on the basis of race, color, national origin, age, disability, sex, sexual orientation, or gender identity.            Thank you!     Thank you for choosing [facility]  for your care. Our goal is always to provide you with excellent care. Hearing back from our patients is one way we can continue to improve our services. Please take a few minutes to complete the written survey that you may receive in the mail after your visit with us. Thank you!             Your Updated Medication List - Protect others around you: Learn how to safely use, store and throw away your medicines at www.disposemymeds.org.          This list is accurate as of 9/20/18  8:55 AM.  Always use your most  recent med list.                   Brand Name Dispense Instructions for use Diagnosis    betamethasone dipropionate 0.05 % cream    DIPROSONE    45 g    Apply sparingly to affected area twice daily as needed.  Do not apply to face.    Rash       diltiazem 180 MG 24 hr capsule    CARDIZEM CD    90 capsule    Take 1 capsule (180 mg) by mouth daily    Paroxysmal atrial fibrillation (H), Hypertension goal BP (blood pressure) < 140/90       flecainide acetate 150 MG Tabs     180 tablet    Take 1 tablet (150 mg) by mouth every 12 hours    Paroxysmal atrial fibrillation (H)       fluticasone 50 MCG/ACT spray    FLONASE    3 Bottle    Spray 1 spray into both nostrils daily as needed for rhinitis or allergies    Environmental allergies       iron 325 (65 Fe) MG tablet     90 tablet    Take 2 tablets by mouth daily (with breakfast)    Iron deficiency anemia, unspecified iron deficiency anemia type       rivaroxaban ANTICOAGULANT 20 MG Tabs tablet    XARELTO    90 tablet    Take 1 tablet (20 mg) by mouth daily (with dinner)    Paroxysmal atrial fibrillation (H)       sulfamethoxazole-trimethoprim 800-160 MG per tablet    BACTRIM DS/SEPTRA DS    20 tablet    Take 1 tablet by mouth 2 times daily    Acute cystitis with hematuria, Nephrolithiasis, Dysuria       tamsulosin 0.4 MG capsule    FLOMAX    90 capsule    Take 1 capsule (0.4 mg) by mouth daily    Benign prostatic hyperplasia (BPH) with urinary urge incontinence

## 2018-09-20 NOTE — PROGRESS NOTES
SUBJECTIVE:   Hugo Weber is a 73 year old male who presents for Preventive Visit.    Are you in the first 12 months of your Medicare coverage?  No    Physical   Annual:     Getting at least 3 servings of Calcium per day:  Yes    Bi-annual eye exam:  Yes    Dental care twice a year:  Yes    Sleep apnea or symptoms of sleep apnea:  Sleep apnea    Frequency of exercise:  2-3 days/week    Duration of exercise:  45-60 minutes    Taking medications regularly:  Yes    Medication side effects:  None    Additional concerns today:  YES    Ability to successfully perform activities of daily living: no assistance needed    Home Safety:  No safety concerns identified    Hearing Impairment: difficulty following a conversation in a noisy restaurant or crowded room, feel that people are mumbling or not speaking clearly, difficulty following dialogue in the theater and difficulty understanding soft or whispered speech    Fall risk:  Fallen 2 or more times in the past year?: No  Any fall with injury in the past year?: No    COGNITIVE SCREEN  1) Repeat 3 items (Leader, Season, Table)    2) Clock draw:   NORMAL  3) 3 item recall:   Recalls 3 objects  Results: 3 items recalled: COGNITIVE IMPAIRMENT LESS LIKELY    Mini-CogTM Copyright S Hubert. Licensed by the author for use in [facility]; reprinted with permission (josi@[email or web address]). All rights reserved.        Hypertension:     Medications: diltiazem; was previously on a second tablet last year but the dose was reduced at that time.    Initial BP today (170/80) is the highest that it has been in the past 5-6 years per patient.     BP Readings from Last 3 Encounters:   09/20/18 148/84   09/10/18 146/66   08/01/18 130/76     Creatinine   Date Value Ref Range Status   04/24/2018 0.81 0.66 - 1.25 mg/dL Final     Heart racing: since returning from [place] on 9/1/18 the patient has noticed a few episodes of racing heart beat. These episodes are associated with indigestion and  seem to be helped by taking Tums. The episodes do feel a bit like the afib he has had in the past but he denies irregular rhythms, chest pain, and lightheadedness with his current symptoms. The patient does see cardiology (Dr. Castillo) - he usually wants the patient to come back around this time of year but the patient has not yet scheduled an appointment.    Kidney Stones: The patient saw Dr. Bey (urology) on 9/10/18 for followup on kidney stones and lithotripsy earlier this year (possibly had lithotripsy performed on 5/2/18 per patient). The patient currently does not have any kidney stone symptoms but says that he still has stones in each kidney even after the lithotropsy which he is disappointed about. He had lithotripsy 5-6 years ago which went well but this round of lithotripsy didn't seem to be as effective as before. The patient was recommended to drink lemon juice as well which he has been doing. He says that urology would like him to follow-up every year or with any further concerns, desires to be seen in [place] with Dr De Santiago.    Obesity:     Body mass index is 46.38 kg/(m^2). Hx Bilroth II gastric bypass 1986 per patient. The patient has been working on losing weight but had planatar fasciitis over the past few weeks which has limited his ability to exercise.    Wt Readings from Last 4 Encounters:   09/20/18 (!) 342 lb (155.1 kg)   09/10/18 (!) 341 lb (154.7 kg)   08/01/18 (!) 341 lb (154.7 kg)   07/23/18 (!) 340 lb (154.2 kg)     Diet: Tries not to eat after 8:00pm and also doesn't drink too much pop    Exercise: Three times per week on the treadmill for 30-40 minutes, then weightlifting exercises for 30-45minute    Sleep Apnea: No concerns - uses CPAP without any troubles. Also uses Flonase for CPAP.    Hx of Hepatitis C: took Sovaldi and was cured of it; has had normal AST/ALT over the past 5-6 years - no concerns today.    Rash: Rash from 7/5/18 appointment is clearing up and when it does flare up  he uses the betamethasone cream which helps. No concerns.     Osteoarthritis: No concerns today. Hx left knee replacement. Goes to PT intermittantly for torn rotator cuff and for plantar fasciitis more recently. See's Dr. Durham (ortho) for shoulder pain and at one point received a steroid shot.     Prostate/Sexual Dysfunction: No concerns about leaking urine aside from a small amount intermittantly. However he feels that sex is not as thrilling as it used to be due to decreased volume of ejaculation and wonders if this is related to Flowmax. He wonders if there is a way that this could be improved.    Preiabetes:    Medications: None    Last blood glucose from 4/24/18 was 94.    Lab Results   Component Value Date    A1C 5.5 04/24/2018    A1C 5.7 09/25/2017    A1C 5.8 03/30/2016    A1C 5.6 12/18/2013     Paroxysmal Atrial Fibrillation: On flecainide and rivaroxaban.    Reviewed and updated as needed this visit by clinical staff  Allergies         Reviewed and updated as needed this visit by Provider  Allergies        Social History   Substance Use Topics     Smoking status: Never Smoker     Smokeless tobacco: Never Used     Alcohol use 1.2 oz/week     2 Standard drinks or equivalent per week      Comment: 2 per week       Alcohol Use 9/17/2018   If you drink alcohol do you typically have greater than 3 drinks per day OR greater than 7 drinks per week? No   No flowsheet data found.      Today's PHQ-2 Score:   PHQ-2 ( 1999 Pf[employer]) 9/20/2018   Q1: Little interest or pleasure in doing things 0   Q2: Feeling down, depressed or hopeless 0   PHQ-2 Score 0   Q1: Little interest or pleasure in doing things -   Q2: Feeling down, depressed or hopeless -   PHQ-2 Score -       Do you feel safe in your environment - YES    Do you have a Health Care Directive?: Yes: Advance Directive has been received and scanned.    Current providers sharing in care for this patient include:   Patient Care Team:  Brett Cassidy MD as PCP -  General    The following health maintenance items are reviewed in Epic and correct as of today:  Health Maintenance   Topic Date Due     FALL RISK ASSESSMENT  09/25/2018     LIPID MONITORING Q1 YEAR  09/25/2018     MICROALBUMIN Q1 YEAR  09/25/2018     PSA Q1 YR  09/25/2018     BMP Q1 YR  04/24/2019     FIT Q1 YR  07/05/2019     WELLNESS VISIT Q1 YR  09/20/2019     PHQ-2 Q1 YR  09/20/2019     COLONOSCOPY Q5 YR  09/07/2021     ADVANCE DIRECTIVE PLANNING Q5 YRS  09/25/2022     TETANUS Q10 YR  10/20/2025     PNEUMO 5YR BOOST DUE AFTER AGE 65 (NO IB MSG)  Completed     PNEUMOCOCCAL  Completed     INFLUENZA VACCINE  Completed     AORTIC ANEURYSM SCREENING (SYSTEM ASSIGNED)  Completed     HEPATITIS C SCREENING  Completed     Labs reviewed in EPIC  BP Readings from Last 3 Encounters:   09/20/18 148/84   09/10/18 146/66   08/01/18 130/76    Wt Readings from Last 3 Encounters:   09/20/18 (!) 342 lb (155.1 kg)   09/10/18 (!) 341 lb (154.7 kg)   08/01/18 (!) 341 lb (154.7 kg)            Patient Active Problem List   Diagnosis     Iron deficiency anemia     Colon polyps     Obstructive sleep apnea syndrome     Hyperlipidemia LDL goal <100     Long term current use of anticoagulant therapy - for intermittent a-fib     Advance Care Planning     Total knee replacement status     Calculus of kidney     NAFLD (nonalcoholic fatty liver disease)     Morbid obesity due to excess calories (H)     History of hepatitis C     Prediabetes     Paroxysmal atrial fibrillation (H)     Cholelithiasis     Hypertension goal BP (blood pressure) < 140/90     TMJ arthralgia     Nephrolithiasis     OA (osteoarthritis)     First degree AV block     Benign prostatic hyperplasia (BPH) with urinary urge incontinence     Thoracic aortic ectasia (H)     Past Surgical History:   Procedure Laterality Date     APPENDECTOMY       ARTHROPLASTY KNEE  8/13/2012    LT TKA - Dr Villanueva     CARDIOVERSION  2016     COLONOSCOPY  8/05, 9/10, 10/15    multiple  tubular/serrated/hyperplastic polyps     COLONOSCOPY N/A 10/29/2015    multiple tubular and serrated adenomas     COLONOSCOPY N/A 2016     HC KNEE SCOPE, DIAGNOSTIC      Arthroscopy, Knee RT     LASER HOLMIUM LITHOTRIPSY URETER(S), INSERT STENT, COMBINED  10/16/2012    stones x 4, Dr Campa     LASER HOLMIUM LITHOTRIPSY URETER(S), INSERT STENT, COMBINED Right 2018    CYSTOSCOPY, RIGHT URETEROSCOPY, HOLMIUM LASER LITHOTRIPSY, RIGHT STENT PLACEMENT - Dr Bey     SURGICAL HISTORY OF -       s/p gastric bypass Bilroth II     SURGICAL HISTORY OF -       wisdom teeth     SURGICAL HISTORY OF -       cellulitis     SURGICAL HISTORY OF -       lt forearm spur's     SURGICAL HISTORY OF -   ,,    s/p lasik     SURGICAL HISTORY OF -       rt forearm spurs     SURGICAL HISTORY OF -       dr stevenson - lithotrypsy     SURGICAL HISTORY OF -   10/08,     Lt ear chonchal lesion removal SCC - dr saez       Social History   Substance Use Topics     Smoking status: Never Smoker     Smokeless tobacco: Never Used     Alcohol use 1.2 oz/week     2 Standard drinks or equivalent per week      Comment: 2 per week     Family History   Problem Relation Age of Onset     Alzheimer Disease Father 82      at 88     Prostate Cancer Father 76     bladder and prostate     HEART DISEASE Mother 80     CHF     HEART DISEASE Maternal Grandfather      MI at 55     Cancer Paternal Uncle      ?         Current Outpatient Prescriptions   Medication Sig Dispense Refill     diltiazem (CARDIZEM CD) 180 MG 24 hr capsule Take 1 capsule (180 mg) by mouth daily 90 capsule 3     fluticasone (FLONASE) 50 MCG/ACT spray Spray 1 spray into both nostrils daily as needed for rhinitis or allergies 3 Bottle 3     rivaroxaban ANTICOAGULANT (XARELTO) 20 MG TABS tablet Take 1 tablet (20 mg) by mouth daily (with dinner) 90 tablet 3     tamsulosin (FLOMAX) 0.4 MG capsule Take 1 capsule (0.4 mg) by mouth daily 90  capsule 3     betamethasone dipropionate (DIPROSONE) 0.05 % cream Apply sparingly to affected area twice daily as needed.  Do not apply to face. 45 g 0     Ferrous Sulfate (IRON) 325 (65 Fe) MG tablet Take 2 tablets by mouth daily (with breakfast) 90 tablet 3     flecainide acetate 150 MG TABS Take 1 tablet (150 mg) by mouth every 12 hours 180 tablet 3     sulfamethoxazole-trimethoprim (BACTRIM DS/SEPTRA DS) 800-160 MG per tablet Take 1 tablet by mouth 2 times daily (Patient not taking: Reported on 9/10/2018) 20 tablet 1     [DISCONTINUED] diltiazem (CARDIZEM CD) 180 MG 24 hr capsule Take 1 capsule (180 mg) by mouth daily 90 capsule 3     [DISCONTINUED] rivaroxaban ANTICOAGULANT (XARELTO) 20 MG TABS tablet Take 1 tablet (20 mg) by mouth daily (with dinner) 90 tablet 3     No Known Allergies      Review of Systems  Constitutional, HEENT, cardiovascular, pulmonary, GI, , musculoskeletal, neuro, skin, endocrine and psych systems are negative, except as otherwise noted.     OBJECTIVE:   /84  Pulse 63  Temp 97.8  F (36.6  C) (Oral)  Ht 6' (1.829 m)  Wt (!) 342 lb (155.1 kg)  SpO2 95%  BMI 46.38 kg/m2 Estimated body mass index is 46.38 kg/(m^2) as calculated from the following:    Height as of this encounter: 6' (1.829 m).    Weight as of this encounter: 342 lb (155.1 kg).  Physical Exam  GENERAL: healthy, alert and no distress  EYES: Eyes grossly normal to inspection, conjunctivae and sclerae normal  HENT: ear canals and TM's normal, nose and mouth without ulcers or lesions  RESP: lungs clear to auscultation - no rales, rhonchi or wheezes  CV: Initial BP measurement 170/80. Bilateral trace lower extremity edema. regular rate and rhythm, normal S1 S2, no S3 or S4, no murmur, click or rub  ABDOMEN: Midline scar. Enlarged xyphoid process. soft, nontender, no hepatosplenomegaly, bowel sounds normal   (male): normal male genitalia without lesions or urethral discharge, no hernia  RECTAL: Uniformly enlarged  prostate, 1+. normal sphincter tone, no rectal masses; prostate smooth, nontender without nodules or masses  MS: no gross musculoskeletal defects noted, no edema  SKIN: decreased hair and brown discoloration consistent with chronic venous stasis changes in shins bilaterally; vertical scar on left knee. no suspicious lesions or rashes  NEURO: Normal strength and tone, mentation intact and speech normal  PSYCH: mentation appears normal, affect normal/bright    Diagnostic Test Results:  Results for orders placed or performed in visit on 09/20/18 (from the past 24 hour(s))   CBC with platelets   Result Value Ref Range    WBC 7.4 4.0 - 11.0 10e9/L    RBC Count 5.12 4.4 - 5.9 10e12/L    Hemoglobin 14.8 13.3 - 17.7 g/dL    Hematocrit 46.5 40.0 - 53.0 %    MCV 91 78 - 100 fl    MCH 28.9 26.5 - 33.0 pg    MCHC 31.8 31.5 - 36.5 g/dL    RDW 13.7 10.0 - 15.0 %    Platelet Count 253 150 - 450 10e9/L   Hemoglobin A1c   Result Value Ref Range    Hemoglobin A1C 5.5 0 - 5.6 %   *UA reflex to Microscopic and Culture ([place] [facility] (except [place] and [place])   Result Value Ref Range    Color Urine Yellow     Appearance Urine Clear     Glucose Urine Negative NEG^Negative mg/dL    Bilirubin Urine Negative NEG^Negative    Ketones Urine Negative NEG^Negative mg/dL    Specific Gravity Urine 1.020 1.003 - 1.035    Blood Urine Negative NEG^Negative    pH Urine 6.0 5.0 - 7.0 pH    Protein Albumin Urine Negative NEG^Negative mg/dL    Urobilinogen Urine 2.0 (H) 0.2 - 1.0 EU/dL    Nitrite Urine Negative NEG^Negative    Leukocyte Esterase Urine Trace (A) NEG^Negative    Source Midstream Urine    Vitamin B12   Result Value Ref Range    Vitamin B12 225 193 - 986 pg/mL   Urine Microscopic   Result Value Ref Range    WBC Urine 5-10 (A) OTO5^0 - 5 /HPF    RBC Urine O - 2 OTO2^O - 2 /HPF    Bacteria Urine Few (A) NEG^Negative /HPF    Mucous Urine Present (A) NEG^Negative /LPF       ASSESSMENT / PLAN:       ICD-10-CM    1. Encounter  for routine adult health examination without abnormal findings Z00.00 Comprehensive metabolic panel     Lipid panel reflex to direct LDL Fasting     CK total     CBC with platelets     Hemoglobin A1c     Albumin Random Urine Quantitative with Creat Ratio     TSH with free T4 reflex     Prostate spec antigen screen     *UA reflex to Microscopic and Culture ([place] [facility] (except [place] and [place])     Fecal colorectal cancer screen (FIT)     Vitamin B12     Vitamin D Deficiency     Iron and iron binding capacity     Ferritin     Urine Microscopic   2. Nephrolithiasis N20.0 *UA reflex to Microscopic and Culture ([facility] (except [place] and [place])   3. Paroxysmal atrial fibrillation (H) I48.0 Comprehensive metabolic panel     TSH with free T4 reflex     diltiazem (CARDIZEM CD) 180 MG 24 hr capsule     rivaroxaban ANTICOAGULANT (XARELTO) 20 MG TABS tablet   4. NAFLD (nonalcoholic fatty liver disease) K76.0 Comprehensive metabolic panel   5. History of hepatitis C Z86.19 Comprehensive metabolic panel   6. Obstructive sleep apnea syndrome G47.33 TSH with free T4 reflex   7. Prediabetes R73.03 Comprehensive metabolic panel     Hemoglobin A1c     Albumin Random Urine Quantitative with Creat Ratio     *UA reflex to Microscopic and Culture ([facility] (except [place] and [place])   8. Hypertension goal BP (blood pressure) < 140/90 I10 Comprehensive metabolic panel     Albumin Random Urine Quantitative with Creat Ratio     *UA reflex to Microscopic and Culture ([facility] (except [place] and [place])     diltiazem (CARDIZEM CD) 180 MG 24 hr capsule   9. Hyperlipidemia LDL goal <100 E78.5 Comprehensive metabolic panel     Lipid panel reflex to direct LDL Fasting     CK total   10. Iron deficiency anemia, unspecified iron deficiency anemia type D50.9 CBC with platelets     Iron and iron binding capacity     Ferritin   11. Primary osteoarthritis  involving multiple joints M15.0    12. Environmental allergies Z91.09 fluticasone (FLONASE) 50 MCG/ACT spray   13. Benign prostatic hyperplasia (BPH) with urinary urge incontinence N40.1 Prostate spec antigen screen    N39.41 tamsulosin (FLOMAX) 0.4 MG capsule   14. Bariatric surgery status Z98.84 Comprehensive metabolic panel     Lipid panel reflex to direct LDL Fasting     CBC with platelets     Vitamin B12     Vitamin D Deficiency     Iron and iron binding capacity     Ferritin   15. Polyp of colon, unspecified part of colon, unspecified type K63.5 Fecal colorectal cancer screen (FIT)   16. Screen for colon cancer Z12.11 Fecal colorectal cancer screen (FIT)   17. Screening for prostate cancer Z12.5 Prostate spec antigen screen   18. Long term current use of anticoagulant therapy - for intermittent a-fib Z79.01 CBC with platelets   19. Morbid obesity due to excess calories (H) E66.01 Comprehensive metabolic panel     Lipid panel reflex to direct LDL Fasting     CK total     CBC with platelets     Hemoglobin A1c     Albumin Random Urine Quantitative with Creat Ratio     TSH with free T4 reflex     *UA reflex to Microscopic and Culture ([facility] (except [place] and [place])     Vitamin B12     Vitamin D Deficiency     Iron and iron binding capacity     Ferritin   20. Need for influenza vaccination Z23 FLU VACCINE, INCREASED ANTIGEN, PRESV FREE   21. Nonspecific finding on examination of urine R82.90 Urine Culture Aerobic Bacterial     Discussed treatment/modality options, including risk and benefits, he desires advised alcohol consumption 1oz per day or less, advised aspirin 81 mg po daily, advised 1 multivitamin per day, advised calcium 6004-1495 mg/d and Vitamin D 800-1200 IU/d, advised dentist every 6 months, advised diet, exercise, and weight loss, advised opthalmologist every 1-2 years, advised blood pressure checks soon (nurse only), advised self testicular exam q month, further health  care maintenance, further lab(s), immunization(s), medication refill(s) and observation. All diagnosis above reviewed and noted above, otherwise stable.  See [facility] orders for further details.      Return in about 6 months (around 3/20/2019), or if symptoms worsen or fail to improve, for Medication Recheck Visit.    Health Maintenance Due   Topic Date Due     FALL RISK ASSESSMENT  09/25/2018     LIPID MONITORING Q1 YEAR  09/25/2018     MICROALBUMIN Q1 YEAR  09/25/2018     PSA Q1 YR  09/25/2018       1) Flu shot today. Labs today. Pt will check with insurance about Shingrix.        End of Life Planning:  Patient currently has an advanced directive: Yes.  Practitioner is supportive of decision.    COUNSELING:  Reviewed preventive health counseling, as reflected in patient instructions       Regular exercise       Healthy diet/nutrition       Immunizations    Vaccinated for: Influenza             Colon cancer screening    BP Readings from Last 1 Encounters:   09/20/18 148/84     Estimated body mass index is 46.38 kg/(m^2) as calculated from the following:    Height as of this encounter: 6' (1.829 m).    Weight as of this encounter: 342 lb (155.1 kg).      Weight management plan: Discussed healthy diet and exercise guidelines and patient will follow up in 6 months in clinic to re-evaluate.     reports that he has never smoked. He has never used smokeless tobacco.      Appropriate preventive services were discussed with this patient, including applicable screening as appropriate for cardiovascular disease, diabetes, osteopenia/osteoporosis, and glaucoma.  As appropriate for age/gender, discussed screening for colorectal cancer, prostate cancer, breast cancer, and cervical cancer. Checklist reviewing preventive services available has been given to the patient.    Reviewed patients plan of care and provided an AVS. The Basic Care Plan (routine screening as documented in Health Maintenance) for Hugo meets the Care Plan  requirement. This Care Plan has been established and reviewed with the Patient.    Counseling Resources:  ATP IV Guidelines  Pooled Cohorts Equation Calculator  Breast Cancer Risk Calculator  FRAX Risk Assessment  ICSI Preventive Guidelines  Dietary Guidelines for Americans, 2010  USDA's MyPlate  ASA Prophylaxis  Lung CA Screening    Srinivas Elena, MS3 Student, has participated in the care of this patient.     Provider Disclosure:  I agree with above History, Review of Systems, Physical exam and Plan.  I have reviewed the content of the documentation and have edited it as needed. I have personally performed the services documented here and the documentation accurately represents those services and the decisions I have made.      Electronically signed by:        RAND Donato MD  [facility]  Answers for HPI/ROS submitted by the patient on 9/17/2018   PHQ-2 Score: 0

## 2018-09-20 NOTE — PATIENT INSTRUCTIONS
shingrix    [facility]                        To reach your care team during and after hours:   [phone]  To reach our pharmacy:        [phone]    Clinic Hours                        Our clinic hours are:    Monday   7:30 am to 7:00 pm                  Tuesday through Friday 7:30 am to 5:00 pm                             Saturday   8:00 am to 12:00 pm      Sunday   Closed      Pharmacy Hours                        Our pharmacy hours are:    Monday   8:30 am to 7:00 pm       Tuesday to Friday  8:30 am to 6:00 pm                       Saturday    9:00 am to 1:00 pm              Sunday    Closed              There is also information available at our web site:  [email or web address]    If your provider ordered any lab tests and you do not receive the results within 10 business days, please call the clinic.    If you need a medication refill please contact your pharmacy.  Please allow 2-3 business days for your refill to be completed.    Our clinic offers telephone visits and e visits.  Please ask one of your team members to explain more.      Use [email or web address] (secure email communication and access to your chart) to send your primary care provider a message or make an appointment. Ask someone on your Team how to sign up for [email or web address].  Immunizations                      Immunization History   Administered Date(s) Administered     Influenza (High Dose) 3 valent vaccine 09/20/2012, 12/21/2013, 10/20/2015, 09/25/2017     Influenza (IIV3) PF 10/26/2007, 12/22/2008, 09/17/2009, 10/14/2011     Influenza Vaccine, 3 YRS +, IM (QUADRIVALENT W/PRESERVATIVES) 10/30/2017     Pneumo Conj 13-V (2010&after) 03/10/2016, 09/25/2017     Pneumococcal 23 valent 05/11/2005, 09/20/2012     TD (ADULT, 7+) 11/30/1998     TDAP Vaccine (Adacel) 06/26/2007     TDAP Vaccine (Boostrix) 10/20/2015     Twinrix A/B 05/11/2005, 06/26/2007, 09/04/2008        Health Maintenance                         Health Maintenance Due   Topic Date  Due     Flu Vaccine (1) 09/01/2018     FALL RISK ASSESSMENT  09/25/2018     Cholesterol Lab - yearly  09/25/2018     Microalbumin Lab - yearly  09/25/2018     Prostate Test (PSA) - yearly  09/25/2018     Wellness Visit with your Primary Provider - yearly  09/25/2018

## 2018-09-21 LAB
ALBUMIN SERPL-MCNC: 3.8 G/DL (ref 3.4–5)
ALP SERPL-CCNC: 144 U/L (ref 40–150)
ALT SERPL W P-5'-P-CCNC: 18 U/L (ref 0–70)
ANION GAP SERPL CALCULATED.3IONS-SCNC: 9 MMOL/L (ref 3–14)
AST SERPL W P-5'-P-CCNC: 13 U/L (ref 0–45)
BACTERIA SPEC CULT: NORMAL
BILIRUB SERPL-MCNC: 0.4 MG/DL (ref 0.2–1.3)
BUN SERPL-MCNC: 15 MG/DL (ref 7–30)
CALCIUM SERPL-MCNC: 8.1 MG/DL (ref 8.5–10.1)
CHLORIDE SERPL-SCNC: 102 MMOL/L (ref 94–109)
CHOLEST SERPL-MCNC: 165 MG/DL
CK SERPL-CCNC: 99 U/L (ref 30–300)
CO2 SERPL-SCNC: 26 MMOL/L (ref 20–32)
CREAT SERPL-MCNC: 0.77 MG/DL (ref 0.66–1.25)
CREAT UR-MCNC: 132 MG/DL
DEPRECATED CALCIDIOL+CALCIFEROL SERPL-MC: 29 UG/L (ref 20–75)
FERRITIN SERPL-MCNC: 28 NG/ML (ref 26–388)
GFR SERPL CREATININE-BSD FRML MDRD: >90 ML/MIN/1.7M2
GLUCOSE SERPL-MCNC: 88 MG/DL (ref 70–99)
HDLC SERPL-MCNC: 44 MG/DL
IRON SATN MFR SERPL: 30 % (ref 15–46)
IRON SERPL-MCNC: 112 UG/DL (ref 35–180)
LDLC SERPL CALC-MCNC: 100 MG/DL
MICROALBUMIN UR-MCNC: 20 MG/L
MICROALBUMIN/CREAT UR: 15.38 MG/G CR (ref 0–17)
NONHDLC SERPL-MCNC: 121 MG/DL
POTASSIUM SERPL-SCNC: 4 MMOL/L (ref 3.4–5.3)
PROT SERPL-MCNC: 8 G/DL (ref 6.8–8.8)
PSA SERPL-ACNC: 4.46 UG/L (ref 0–4)
SODIUM SERPL-SCNC: 137 MMOL/L (ref 133–144)
SPECIMEN SOURCE: NORMAL
TIBC SERPL-MCNC: 376 UG/DL (ref 240–430)
TRIGL SERPL-MCNC: 106 MG/DL
TSH SERPL DL<=0.005 MIU/L-ACNC: 2.18 MU/L (ref 0.4–4)

## 2018-09-24 DIAGNOSIS — R97.20 ELEVATED PROSTATE SPECIFIC ANTIGEN (PSA): Primary | ICD-10-CM

## 2018-10-02 ENCOUNTER — TRANSFERRED RECORDS (OUTPATIENT)
Dept: HEALTH INFORMATION MANAGEMENT | Facility: CLINIC | Age: 73
End: 2018-10-02

## 2018-10-11 ENCOUNTER — TELEPHONE (OUTPATIENT)
Dept: UROLOGY | Facility: CLINIC | Age: 73
End: 2018-10-11

## 2018-10-11 NOTE — TELEPHONE ENCOUNTER
----- Message -----     From: Bruno De Santiago MD     Sent: 10/9/2018   5:00 PM       To: Laura Lowery    No. He should see someone treating stones on a regular basis...  Justice Ramirez MD in [facility] or Regulo Heath MD at the [facility]  Nurse can call with this info.  ----- Message -----     From: Laura Lowery     Sent: 10/9/2018   3:48 PM       To: Bruno De Santiago MD    Is this ok with you?  ----- Message -----     From: Osmar Bey MD     Sent: 10/9/2018   3:45 PM       To: Laura Lowery    yes  ----- Message -----     From: Laura Lowery     Sent: 10/4/2018   1:55 PM       To: MD Mariama Montenegro would like to switch care to Dr. De Santiago, is that ok with you?

## 2018-10-26 ENCOUNTER — OFFICE VISIT (OUTPATIENT)
Dept: FAMILY MEDICINE | Facility: CLINIC | Age: 73
End: 2018-10-26
Payer: COMMERCIAL

## 2018-10-26 VITALS
SYSTOLIC BLOOD PRESSURE: 119 MMHG | HEIGHT: 72 IN | HEART RATE: 71 BPM | DIASTOLIC BLOOD PRESSURE: 67 MMHG | TEMPERATURE: 97.9 F | BODY MASS INDEX: 42.66 KG/M2 | WEIGHT: 315 LBS | OXYGEN SATURATION: 94 %

## 2018-10-26 DIAGNOSIS — G47.33 OBSTRUCTIVE SLEEP APNEA SYNDROME: ICD-10-CM

## 2018-10-26 DIAGNOSIS — K76.0 NAFLD (NONALCOHOLIC FATTY LIVER DISEASE): ICD-10-CM

## 2018-10-26 DIAGNOSIS — Z86.0100 HISTORY OF COLONIC POLYPS: ICD-10-CM

## 2018-10-26 DIAGNOSIS — K21.00 GASTROESOPHAGEAL REFLUX DISEASE WITH ESOPHAGITIS: ICD-10-CM

## 2018-10-26 DIAGNOSIS — I10 HYPERTENSION GOAL BP (BLOOD PRESSURE) < 140/90: ICD-10-CM

## 2018-10-26 DIAGNOSIS — Z51.81 MEDICATION MONITORING ENCOUNTER: ICD-10-CM

## 2018-10-26 DIAGNOSIS — E78.5 HYPERLIPIDEMIA LDL GOAL <100: ICD-10-CM

## 2018-10-26 DIAGNOSIS — R73.03 PREDIABETES: ICD-10-CM

## 2018-10-26 DIAGNOSIS — I48.0 PAROXYSMAL ATRIAL FIBRILLATION (H): ICD-10-CM

## 2018-10-26 DIAGNOSIS — R31.29 MICROSCOPIC HEMATURIA: ICD-10-CM

## 2018-10-26 DIAGNOSIS — Z86.19 HISTORY OF HEPATITIS C: ICD-10-CM

## 2018-10-26 DIAGNOSIS — Z01.818 PREOP GENERAL PHYSICAL EXAM: Primary | ICD-10-CM

## 2018-10-26 DIAGNOSIS — E66.01 MORBID OBESITY DUE TO EXCESS CALORIES (H): ICD-10-CM

## 2018-10-26 DIAGNOSIS — N28.9 DECREASED RENAL FUNCTION: ICD-10-CM

## 2018-10-26 DIAGNOSIS — I49.3 PVC'S (PREMATURE VENTRICULAR CONTRACTIONS): ICD-10-CM

## 2018-10-26 DIAGNOSIS — R73.09 ELEVATED GLUCOSE: ICD-10-CM

## 2018-10-26 DIAGNOSIS — Z79.01 LONG TERM CURRENT USE OF ANTICOAGULANT THERAPY: ICD-10-CM

## 2018-10-26 LAB
ALBUMIN UR-MCNC: ABNORMAL MG/DL
APPEARANCE UR: CLEAR
BACTERIA #/AREA URNS HPF: ABNORMAL /HPF
BASOPHILS # BLD AUTO: 0 10E9/L (ref 0–0.2)
BASOPHILS NFR BLD AUTO: 0.5 %
BILIRUB UR QL STRIP: ABNORMAL
COLOR UR AUTO: YELLOW
DIFFERENTIAL METHOD BLD: ABNORMAL
EOSINOPHIL # BLD AUTO: 0.2 10E9/L (ref 0–0.7)
EOSINOPHIL NFR BLD AUTO: 3.1 %
ERYTHROCYTE [DISTWIDTH] IN BLOOD BY AUTOMATED COUNT: 13.6 % (ref 10–15)
GLUCOSE UR STRIP-MCNC: NEGATIVE MG/DL
HCT VFR BLD AUTO: 45.4 % (ref 40–53)
HGB BLD-MCNC: 14.2 G/DL (ref 13.3–17.7)
HGB UR QL STRIP: NEGATIVE
KETONES UR STRIP-MCNC: ABNORMAL MG/DL
LEUKOCYTE ESTERASE UR QL STRIP: NEGATIVE
LYMPHOCYTES # BLD AUTO: 1.7 10E9/L (ref 0.8–5.3)
LYMPHOCYTES NFR BLD AUTO: 21.8 %
MCH RBC QN AUTO: 28.7 PG (ref 26.5–33)
MCHC RBC AUTO-ENTMCNC: 31.3 G/DL (ref 31.5–36.5)
MCV RBC AUTO: 92 FL (ref 78–100)
MONOCYTES # BLD AUTO: 0.8 10E9/L (ref 0–1.3)
MONOCYTES NFR BLD AUTO: 10.7 %
MUCOUS THREADS #/AREA URNS LPF: PRESENT /LPF
NEUTROPHILS # BLD AUTO: 5 10E9/L (ref 1.6–8.3)
NEUTROPHILS NFR BLD AUTO: 63.9 %
NITRATE UR QL: NEGATIVE
NON-SQ EPI CELLS #/AREA URNS LPF: ABNORMAL /LPF
PH UR STRIP: 5.5 PH (ref 5–7)
PLATELET # BLD AUTO: 252 10E9/L (ref 150–450)
RBC # BLD AUTO: 4.95 10E12/L (ref 4.4–5.9)
RBC #/AREA URNS AUTO: ABNORMAL /HPF
SOURCE: ABNORMAL
SP GR UR STRIP: >1.03 (ref 1–1.03)
UROBILINOGEN UR STRIP-ACNC: 1 EU/DL (ref 0.2–1)
WBC # BLD AUTO: 7.8 10E9/L (ref 4–11)
WBC #/AREA URNS AUTO: ABNORMAL /HPF

## 2018-10-26 PROCEDURE — 93000 ELECTROCARDIOGRAM COMPLETE: CPT | Performed by: FAMILY MEDICINE

## 2018-10-26 PROCEDURE — 81001 URINALYSIS AUTO W/SCOPE: CPT | Performed by: FAMILY MEDICINE

## 2018-10-26 PROCEDURE — 80053 COMPREHEN METABOLIC PANEL: CPT | Performed by: FAMILY MEDICINE

## 2018-10-26 PROCEDURE — 85025 COMPLETE CBC W/AUTO DIFF WBC: CPT | Performed by: FAMILY MEDICINE

## 2018-10-26 PROCEDURE — 99214 OFFICE O/P EST MOD 30 MIN: CPT | Performed by: FAMILY MEDICINE

## 2018-10-26 PROCEDURE — 36415 COLL VENOUS BLD VENIPUNCTURE: CPT | Performed by: FAMILY MEDICINE

## 2018-10-26 RX ORDER — PANTOPRAZOLE SODIUM 40 MG/1
40 TABLET, DELAYED RELEASE ORAL DAILY
Qty: 90 TABLET | Refills: 3 | Status: SHIPPED | OUTPATIENT
Start: 2018-10-26 | End: 2020-01-31

## 2018-10-26 NOTE — PROGRESS NOTES
[address] [address]  [phone]  Dept: [phone]    PRE-OP EVALUATION:  Today's date: 10/26/2018    Hugo Weber (: 1945) presents for pre-operative evaluation assessment.  He requires evaluation and anesthesia risk assessment prior to undergoing surgery/procedure for treatment of Colon polyps  .    Proposed Surgery/ Procedure: colonoscopy   Date of Surgery/ Procedure: 18  Time of Surgery/ Procedure: 8:00 AM   Hospital/Surgical Facility: [facility]   Fax number for surgical facility: pending  Primary Physician: Brett Cassidy  Type of Anesthesia Anticipated: General    Patient has a Health Care Directive or Living Will:  YES     1. NO - Do you have a history of heart attack, stroke, stent, bypass or surgery on an artery in the head, neck, heart or legs?  2. NO - Do you ever have any pain or discomfort in your chest?  3. NO - Do you have a history of  Heart Failure?  4. NO - Are you troubled by shortness of breath when: walking on the level, up a slight hill or at night?  5. NO - Do you currently have a cold, bronchitis or other respiratory infection?  6. NO - Do you have a cough, shortness of breath or wheezing?  7. NO - Do you sometimes get pains in the calves of your legs when you walk?  8. NO - Do you or anyone in your family have previous history of blood clots?  9. NO - Do you or does anyone in your family have a serious bleeding problem such as prolonged bleeding following surgeries or cuts?  10. YES - HAVE YOU EVER HAD PROBLEMS WITH ANEMIA OR BEEN TOLD TO TAKE IRON PILLS? Iron pills for gastric bypass  11. NO - Have you had any abnormal blood loss such as black, tarry or bloody stools, or abnormal vaginal bleeding?  12. NO - Have you ever had a blood transfusion?  13. NO - Have you or any of your relatives ever had problems with anesthesia?  14. YES - DO YOU HAVE SLEEP APNEA, EXCESSIVE SNORING OR DAYTIME DROWSINESS? Sleep  apnea   15. NO - Do you have any prosthetic heart valves?  16. YES - DO YOU HAVE PROSTHETIC JOINTS? Left knee   17. NO - Is there any chance that you may be pregnant?      HPI:     HPI related to upcoming procedure: Patient has a double balloon colonoscopy procedure scheduled for 11/16/2018 at [facility] due to colon polyps.     GERD/Indigestion: Stable. Patient's GERD well controlled by Protonix.    Paroxysmal A-fib/Thoracic Aortic AV Block: Stable.    HYPERLIPIDEMIA - Stable. Patient has a long history of significant Hyperlipidemia not requiring medication.                           Recent Labs   Lab Test  09/20/18   0919  09/25/17   0922   01/13/15   0815  09/03/14   0810   CHOL  165  167   < >  131  156   HDL  44  45   < >  43  42   LDL  100*  96   < >  74  93   TRIG  106  129   < >  72  103   CHOLHDLRATIO   --    --    --   3.0  3.7    < > = values in this interval not displayed.                                                                                                                                   .  HYPERTENSION - Patient has longstanding history of HTN , currently denies any symptoms referable to elevated blood pressure. Specifically denies chest pain, palpitations, dyspnea, orthopnea, PND or peripheral edema. Blood pressure readings have been in normal range.    BP Readings from Last 5 Encounters:   10/26/18 119/67   09/20/18 148/84   09/10/18 146/66   08/01/18 130/76   07/23/18 128/64     Creatinine   Date Value Ref Range Status   09/20/2018 0.77 0.66 - 1.25 mg/dL Final                        .  SLEEP APNEA- Patient has a longstanding history of sleep apnea. Patient is currently prescribed a sleep appliance for management of this issue.    Prediabetes: Stable.    Glucose   Date Value Ref Range Status   09/20/2018 88 70 - 99 mg/dL Final     Comment:     Fasting specimen   04/24/2018 94 70 - 99 mg/dL Final   02/23/2018 89 70 - 99 mg/dL Final   09/25/2017 98 70 - 99 mg/dL Final     Comment:     Fasting  specimen   09/19/2016 132 (H) 70 - 99 mg/dL Final     Lab Results   Component Value Date    A1C 5.5 09/20/2018    A1C 5.5 04/24/2018    A1C 5.7 09/25/2017    A1C 5.8 03/30/2016    A1C 5.6 12/18/2013     Osteoarthritis: Patient reports some mild stiffness/pain in neck, otherwise stable.                                                                                                            MEDICAL HISTORY:     Patient Active Problem List    Diagnosis Date Noted     Hypertension goal BP (blood pressure) < 140/90 09/25/2017     Priority: High     Paroxysmal atrial fibrillation (H) 05/27/2016     Priority: High     Prediabetes      Priority: High     History of hepatitis C 05/12/2015     Priority: High     SVR May 2015       NAFLD (nonalcoholic fatty liver disease) 09/05/2014     Priority: High     Hyperlipidemia LDL goal <100 12/30/2011     Priority: High     Obstructive sleep apnea syndrome      Priority: High     cpap - severe - 15 cm - dr cunha       Thoracic aortic ectasia (H) 07/06/2018     Priority: Medium     Benign prostatic hyperplasia (BPH) with urinary urge incontinence 07/05/2018     Priority: Medium     First degree AV block      Priority: Medium     OA (osteoarthritis)      Priority: Medium     Multiple - Left shoulder with rotator cuff tear - Dr Durham       Nephrolithiasis      Priority: Medium     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 Provider to review       Advance Care Planning 09/25/2017     Priority: Low     Discussed advance care planning with patient; information given to patient to review. 8/7/2012 Ernestine Ojeda  CMA       Morbid obesity due to excess calories (H) 09/05/2014     Priority: Low     Long term current use of anticoagulant therapy - for intermittent a-fib 03/30/2012     Priority: Low      Past Medical History:   Diagnosis Date     Arrhythmia      Arthritis      Atrial fibrillation 2006    Followed by [facility]     Calculus of kidney 2005, 6/06, 10/12, 8/18    dr walker/nestor/иван - hematuria - w/u o/w neg     Cholelithiasis      Chronic peptic ulcer, unspecified site, without mention of hemorrhage, perforation, or obstruction 1985    gastric bypass     Colon polyps 8/05, 9/10, 10/15    2 tubular adenoma, 1 hyperplastic - multiple serrated/tubular adenomas     First degree AV block      Hepatitis C 10/12    chronic hepatitis C, grade 1-2, stage 1 - mild - Dr Douglas     Hyperlipidemia LDL goal <130      Hypertension goal BP (blood pressure) < 140/90 6/06    dr jaciel winchester     Iron deficiency anemia, unspecified 1985    gastric bypass     Nephrolithiasis multiple episodes, last 8/18    Dr Bey     OA (osteoarthritis)     Multiple - Left shoulder with rotator cuff tear - Dr Durham     Obesity, unspecified     s/p gastric bypass 1985      Prediabetes      Presbyacusis 1/04    dr corona     Pyelonephritis, unspecified 12/99     SCC (squamous cell carcinoma), ear 10/08    dr saez - lt conchal bowl     Sleep apnea 10/02    cpap - severe - 15 cm - dr cunha     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    Mn Head and Neck     Past Surgical History:   Procedure Laterality Date     APPENDECTOMY       ARTHROPLASTY KNEE  8/13/2012    LT TKA - Dr Villanueva     CARDIOVERSION  2016     COLONOSCOPY  8/05, 9/10, 10/15    multiple tubular/serrated/hyperplastic polyps     COLONOSCOPY N/A 10/29/2015    multiple tubular and serrated adenomas     COLONOSCOPY N/A 9/7/2016     HC KNEE SCOPE, DIAGNOSTIC  05/00    Arthroscopy, Knee RT     LASER HOLMIUM LITHOTRIPSY URETER(S), INSERT STENT, COMBINED  10/16/2012    stones x 4, Dr Campa     LASER HOLMIUM  LITHOTRIPSY URETER(S), INSERT STENT, COMBINED Right 5/2/2018    CYSTOSCOPY, RIGHT URETEROSCOPY, HOLMIUM LASER LITHOTRIPSY, RIGHT STENT PLACEMENT - Dr Bey     SURGICAL HISTORY OF -   1985    s/p gastric bypass Bilroth II     SURGICAL HISTORY OF -   11/98    wisdom teeth     SURGICAL HISTORY OF -   1979    cellulitis     SURGICAL HISTORY OF -   11/98    lt forearm spur's     SURGICAL HISTORY OF -   1/01,6/02,11/02    s/p lasik     SURGICAL HISTORY OF -   11/99    rt forearm spurs     SURGICAL HISTORY OF -   7/06    dr stevenson - lithotrypsy     SURGICAL HISTORY OF -   10/08, 12/08    Lt ear chonchal lesion removal SCC - dr saez     Current Outpatient Prescriptions   Medication Sig Dispense Refill     betamethasone dipropionate (DIPROSONE) 0.05 % cream Apply sparingly to affected area twice daily as needed.  Do not apply to face. 45 g 0     diltiazem (CARDIZEM CD) 180 MG 24 hr capsule Take 1 capsule (180 mg) by mouth daily 90 capsule 3     Ferrous Sulfate (IRON) 325 (65 Fe) MG tablet Take 2 tablets by mouth daily (with breakfast) 90 tablet 3     flecainide acetate 150 MG TABS Take 1 tablet (150 mg) by mouth every 12 hours 180 tablet 3     fluticasone (FLONASE) 50 MCG/ACT spray Spray 1 spray into both nostrils daily as needed for rhinitis or allergies 3 Bottle 3     pantoprazole (PROTONIX) 40 MG EC tablet Take 1 tablet (40 mg) by mouth daily Take 30-60 minutes before a meal. 90 tablet 3     rivaroxaban ANTICOAGULANT (XARELTO) 20 MG TABS tablet Take 1 tablet (20 mg) by mouth daily (with dinner) 90 tablet 3     tamsulosin (FLOMAX) 0.4 MG capsule Take 1 capsule (0.4 mg) by mouth daily 90 capsule 3     OTC products: calcium, pantoprazole     No Known Allergies   Latex Allergy: NO    Social History   Substance Use Topics     Smoking status: Never Smoker     Smokeless tobacco: Never Used     Alcohol use 1.2 oz/week     2 Standard drinks or equivalent per week      Comment: 2 per week     History   Drug Use No      "Comment: acknowledges using herbal supplement \"Vibe\" for energy-none used recently        REVIEW OF SYSTEMS:   Constitutional, HEENT, cardiovascular, pulmonary, GI, , musculoskeletal, neuro, skin, endocrine and psych systems are negative, except as otherwise noted.    EXAM:   /67  Pulse 71  Temp 97.9  F (36.6  C) (Oral)  Ht 6' (1.829 m)  Wt (!) 344 lb (156 kg)  SpO2 94%  BMI 46.65 kg/m2    GENERAL APPEARANCE: healthy, alert and no distress     EYES: EOMI,  PERRL     HENT: ear canals and TM's normal and nose and mouth without ulcers or lesions     NECK: no adenopathy, no asymmetry, masses, or scars and thyroid normal to palpation     RESP: lungs clear to auscultation - no rales, rhonchi or wheezes     CV: regular rates and rhythm, normal S1 S2, no S3 or S4 and 1-2/6 systolic murmur, click or rub     ABDOMEN:  soft, nontender, no HSM or masses and bowel sounds normal     MS: extremities normal- no gross deformities noted, no evidence of inflammation in joints, FROM in all extremities. Mild LE edema     SKIN: no suspicious lesions or rashes     NEURO: Normal strength and tone, sensory exam grossly normal, mentation intact and speech normal     PSYCH: mentation appears normal. and affect normal/bright     LYMPHATICS: No cervical adenopathy    DIAGNOSTICS:   EKG: appears normal, NSR, normal axis, normal intervals, no acute ST/T changes c/w ischemia, no LVH by voltage criteria, Freq PVC's, trigeminy    Recent Labs   Lab Test  09/20/18   0919  04/24/18   1150   03/21/16   2139   02/12/15   0833   HGB  14.8  14.8   < >  16.2   < >  13.6   PLT  253  245   < >  283   < >  263   INR   --    --    --   1.04   --   1.04   NA  137  140   < >  137   < >  137   POTASSIUM  4.0  4.5   < >  3.8   < >  3.9   CR  0.77  0.81   < >  1.00   < >  0.95   A1C  5.5  5.5   < >   --    --    --     < > = values in this interval not displayed.        IMPRESSION:   Patient has a double balloon colonoscopy procedure scheduled for " 11/16/2018 at [facility] due to colon polyps.     The proposed surgical procedure is considered INTERMEDIATE risk.    REVISED CARDIAC RISK INDEX  The patient has the following serious cardiovascular risks for perioperative complications such as (MI, PE, VFib and 3  AV Block):  No serious cardiac risks  INTERPRETATION: 1 risks: Class II (low risk - 0.9% complication rate)    The patient has the following additional risks for perioperative complications:  No identified additional risks      ICD-10-CM    1. Preop general physical exam Z01.818 CBC with platelets and differential     Comprehensive metabolic panel (BMP + Alb, Alk Phos, ALT, AST, Total. Bili, TP)     *UA reflex to Microscopic and Culture ([facility] (except [place] and [place])     EKG 12-lead complete w/read - Clinics   2. History of colonic polyps Z86.010    3. Paroxysmal atrial fibrillation (H) I48.0 Comprehensive metabolic panel (BMP + Alb, Alk Phos, ALT, AST, Total. Bili, TP)   4. PVC's (premature ventricular contractions) I49.3    5. NAFLD (nonalcoholic fatty liver disease) K76.0 Comprehensive metabolic panel (BMP + Alb, Alk Phos, ALT, AST, Total. Bili, TP)   6. History of hepatitis C Z86.19 Comprehensive metabolic panel (BMP + Alb, Alk Phos, ALT, AST, Total. Bili, TP)   7. Gastroesophageal reflux disease with esophagitis K21.0 pantoprazole (PROTONIX) 40 MG EC tablet   8. Obstructive sleep apnea syndrome G47.33    9. Prediabetes R73.03 Comprehensive metabolic panel (BMP + Alb, Alk Phos, ALT, AST, Total. Bili, TP)   10. Hypertension goal BP (blood pressure) < 140/90 I10 Comprehensive metabolic panel (BMP + Alb, Alk Phos, ALT, AST, Total. Bili, TP)   11. Hyperlipidemia LDL goal <100 E78.5 Comprehensive metabolic panel (BMP + Alb, Alk Phos, ALT, AST, Total. Bili, TP)   12. Morbid obesity due to excess calories (H) E66.01    13. Long term current use of anticoagulant therapy - for intermittent a-fib Z79.01    14. Medication monitoring  encounter Z51.81 CBC with platelets and differential     Comprehensive metabolic panel (BMP + Alb, Alk Phos, ALT, AST, Total. Bili, TP)     *UA reflex to Microscopic and Culture ([facility] (except [place] and [place])     EKG 12-lead complete w/read - Clinics       RECOMMENDATIONS:     --Patient is to take all scheduled medications on the day of surgery EXCEPT for modifications listed below.    Hold Xarelto for 5 days prior to surgery  Hold Iron 1 week prior to surgery    APPROVAL GIVEN to proceed with proposed procedure, without further diagnostic evaluation       Signed Electronically by:            Brett Cassidy MD, FAAFP    [address]  [address] [phone] Fax    Copy of this evaluation report is provided to requesting physician.    [facility] Preop Guidelines    Revised Cardiac Risk Index

## 2018-10-26 NOTE — PATIENT INSTRUCTIONS
Recommend heat/ice/stretching for neck soreness/pain.    [facility]                        To reach your care team during and after hours:   [phone]  To reach our pharmacy:        [phone]    Clinic Hours                        Our clinic hours are:    Monday   7:30 am to 7:00 pm                  Tuesday through Friday 7:30 am to 5:00 pm                             Saturday   8:00 am to 12:00 pm      Sunday   Closed      Pharmacy Hours                        Our pharmacy hours are:    Monday   8:30 am to 7:00 pm       Tuesday to Friday  8:30 am to 6:00 pm                       Saturday    9:00 am to 1:00 pm              Sunday    Closed              There is also information available at our web site:  [email or web address]    If your provider ordered any lab tests and you do not receive the results within 10 business days, please call the clinic.    If you need a medication refill please contact your pharmacy.  Please allow 2-3 business days for your refill to be completed.    Our clinic offers telephone visits and e visits.  Please ask one of your team members to explain more.      Use [employer] (secure email communication and access to your chart) to send your primary care provider a message or make an appointment. Ask someone on your Team how to sign up for [employer].  Immunizations                      Immunization History   Administered Date(s) Administered     Influenza (High Dose) 3 valent vaccine 09/20/2012, 12/21/2013, 10/20/2015, 09/25/2017, 09/20/2018     Influenza (IIV3) PF 10/26/2007, 12/22/2008, 09/17/2009, 10/14/2011     Influenza Vaccine, 3 YRS +, IM (QUADRIVALENT W/PRESERVATIVES) 10/30/2017     Pneumo Conj 13-V (2010&after) 03/10/2016, 09/25/2017     Pneumococcal 23 valent 05/11/2005, 09/20/2012     TD (ADULT, 7+) 11/30/1998     TDAP Vaccine (Adacel) 06/26/2007     TDAP Vaccine (Boostrix) 10/20/2015     Twinrix A/B 05/11/2005, 06/26/2007, 09/04/2008        Health Maintenance                          There are no preventive care reminders to display for this patient.    Before Your Surgery      Call your surgeon if there is any change in your health. This includes signs of a cold or flu (such as a sore throat, runny nose, cough, rash or fever).    Do not smoke, drink alcohol or take over the counter medicine (unless your surgeon or primary care doctor tells you to) for the 24 hours before and after surgery.    If you take prescribed drugs: Follow your doctor s orders about which medicines to take and which to stop until after surgery.    Eating and drinking prior to surgery: follow the instructions from your surgeon    Take a shower or bath the night before surgery. Use the soap your surgeon gave you to gently clean your skin. If you do not have soap from your surgeon, use your regular soap. Do not shave or scrub the surgery site.  Wear clean pajamas and have clean sheets on your bed.

## 2018-10-26 NOTE — MR AVS SNAPSHOT
After Visit Summary   10/26/2018    Hugo Weber    MRN: [id number]           Patient Information     Date Of Birth          1945        Visit Information        Provider Department      10/26/2018 2:00 PM Brett Cassidy MD [facility] [place]        Today's Diagnoses     Preop general physical exam    -  1    History of colonic polyps        Paroxysmal atrial fibrillation (H)        PVC's (premature ventricular contractions)        NAFLD (nonalcoholic fatty liver disease)        History of hepatitis C        Gastroesophageal reflux disease with esophagitis        Obstructive sleep apnea syndrome        Prediabetes        Hypertension goal BP (blood pressure) < 140/90        Hyperlipidemia LDL goal <100        Morbid obesity due to excess calories (H)        Long term current use of anticoagulant therapy - for intermittent a-fib        Medication monitoring encounter          Care Instructions    Recommend heat/ice/stretching for neck soreness/pain.    [facility] [place]                        To reach your care team during and after hours:   [phone]  To reach our pharmacy:        [phone]    Clinic Hours                        Our clinic hours are:    Monday   7:30 am to 7:00 pm                  Tuesday through Friday 7:30 am to 5:00 pm                             Saturday   8:00 am to 12:00 pm      Sunday   Closed      Pharmacy Hours                        Our pharmacy hours are:    Monday   8:30 am to 7:00 pm       Tuesday to Friday  8:30 am to 6:00 pm                       Saturday    9:00 am to 1:00 pm              Sunday    Closed              There is also information available at our web site:  [email or web address]    If your provider ordered any lab tests and you do not receive the results within 10 business days, please call the clinic.    If you need a medication refill please contact your pharmacy.  Please allow 2-3 business days for your refill to be  completed.    Our clinic offers telephone visits and e visits.  Please ask one of your team members to explain more.      Use VivoTexthart (secure email communication and access to your chart) to send your primary care provider a message or make an appointment. Ask someone on your Team how to sign up for DorsaVI.  Immunizations                      Immunization History   Administered Date(s) Administered     Influenza (High Dose) 3 valent vaccine 09/20/2012, 12/21/2013, 10/20/2015, 09/25/2017, 09/20/2018     Influenza (IIV3) PF 10/26/2007, 12/22/2008, 09/17/2009, 10/14/2011     Influenza Vaccine, 3 YRS +, IM (QUADRIVALENT W/PRESERVATIVES) 10/30/2017     Pneumo Conj 13-V (2010&after) 03/10/2016, 09/25/2017     Pneumococcal 23 valent 05/11/2005, 09/20/2012     TD (ADULT, 7+) 11/30/1998     TDAP Vaccine (Adacel) 06/26/2007     TDAP Vaccine (Boostrix) 10/20/2015     Twinrix A/B 05/11/2005, 06/26/2007, 09/04/2008        Health Maintenance                         There are no preventive care reminders to display for this patient.    Before Your Surgery      Call your surgeon if there is any change in your health. This includes signs of a cold or flu (such as a sore throat, runny nose, cough, rash or fever).    Do not smoke, drink alcohol or take over the counter medicine (unless your surgeon or primary care doctor tells you to) for the 24 hours before and after surgery.    If you take prescribed drugs: Follow your doctor s orders about which medicines to take and which to stop until after surgery.    Eating and drinking prior to surgery: follow the instructions from your surgeon    Take a shower or bath the night before surgery. Use the soap your surgeon gave you to gently clean your skin. If you do not have soap from your surgeon, use your regular soap. Do not shave or scrub the surgery site.  Wear clean pajamas and have clean sheets on your bed.           Follow-ups after your visit        Your next 10 appointments already  scheduled     Nov 20, 2018 10:20 AM CST   Return Visit with Bruno De Santiago MD   [facility] [place] ([facility])    [address] [facility]  [address]  [facility] [address] [phone]              Who to contact     If you have questions or need follow up information about today's clinic visit or your schedule please contact [facility] directly at [phone].  Normal or non-critical lab and imaging results will be communicated to you by Cartoon Doll Emporiumhart, letter or phone within 4 business days after the clinic has received the results. If you do not hear from us within 7 days, please contact the clinic through [email or web address] or phone. If you have a critical or abnormal lab result, we will notify you by phone as soon as possible.  Submit refill requests through Shopular or call your pharmacy and they will forward the refill request to us. Please allow 3 business days for your refill to be completed.          Additional Information About Your Visit        Shopular Information     Shopular gives you secure access to your electronic health record. If you see a primary care provider, you can also send messages to your care team and make appointments. If you have questions, please call your primary care clinic.  If you do not have a primary care provider, please call [phone] and they will assist you.        Care EveryWhere ID     This is your Care EveryWhere ID. This could be used by other organizations to access your [facility] medical records  [id number]        Your Vitals Were     Pulse Temperature Height Pulse Oximetry BMI (Body Mass Index)       71 97.9  F (36.6  C) (Oral) 6' (1.829 m) 94% 46.65 kg/m2        Blood Pressure from Last 3 Encounters:   10/26/18 119/67   09/20/18 148/84   09/10/18 146/66    Weight from Last 3 Encounters:   10/26/18 (!) 344 lb (156 kg)   09/20/18 (!) 342 lb (155.1 kg)   09/10/18 (!) 341 lb (154.7 kg)              We  Performed the Following     *UA reflex to Microscopic and Culture ([facility] (except [place] and [place])     CBC with platelets and differential     Comprehensive metabolic panel (BMP + Alb, Alk Phos, ALT, AST, Total. Bili, TP)     EKG 12-lead complete w/read - Clinics          Today's Medication Changes          These changes are accurate as of 10/26/18  2:45 PM.  If you have any questions, ask your nurse or doctor.               Start taking these medicines.        Dose/Directions    pantoprazole 40 MG EC tablet   Commonly known as:  PROTONIX   Used for:  Gastroesophageal reflux disease with esophagitis   Started by:  Brett Cassidy MD        Dose:  40 mg   Take 1 tablet (40 mg) by mouth daily Take 30-60 minutes before a meal.   Quantity:  90 tablet   Refills:  3            Where to get your medicines      These medications were sent to [facility] - [place], [place] - [address]  [address] [address]     Phone:  [phone]     pantoprazole 40 MG EC tablet                Primary Care Provider Office Phone # Fax #    Brett Cassidy [phone]       [facility] [facility] [address]        Equal Access to Services     [facility] AH: Hadii aad ku hadasho Soomaali, waaxda luqadaha, qaybta kaalmada adeegyada, stella pierson . So [facility] [phone].    ATENCIÓN: Si habla español, tiene a oconnor disposición servicios gratuitos de asistencia lingüística. Ll[facility]ultman [facility] [phone].    We comply with applicable federal civil rights laws and [place] laws. We do not discriminate on the basis of race, color, national origin, age, disability, sex, sexual orientation, or gender identity.            Thank you!     Thank you for choosing [facility]  for your care. Our goal is always to provide you with excellent care. Hearing back from our patients is one way we can continue to improve our services. Please  take a few minutes to complete the written survey that you may receive in the mail after your visit with us. Thank you!             Your Updated Medication List - Protect others around you: Learn how to safely use, store and throw away your medicines at www.disposemymeds.org.          This list is accurate as of 10/26/18  2:45 PM.  Always use your most recent med list.                   Brand Name Dispense Instructions for use Diagnosis    betamethasone dipropionate 0.05 % cream    DIPROSONE    45 g    Apply sparingly to affected area twice daily as needed.  Do not apply to face.    Rash       diltiazem 180 MG 24 hr capsule    CARDIZEM CD    90 capsule    Take 1 capsule (180 mg) by mouth daily    Paroxysmal atrial fibrillation (H), Hypertension goal BP (blood pressure) < 140/90       flecainide acetate 150 MG Tabs     180 tablet    Take 1 tablet (150 mg) by mouth every 12 hours    Paroxysmal atrial fibrillation (H)       fluticasone 50 MCG/ACT spray    FLONASE    3 Bottle    Spray 1 spray into both nostrils daily as needed for rhinitis or allergies    Environmental allergies       iron 325 (65 Fe) MG tablet     90 tablet    Take 2 tablets by mouth daily (with breakfast)    Iron deficiency anemia, unspecified iron deficiency anemia type       pantoprazole 40 MG EC tablet    PROTONIX    90 tablet    Take 1 tablet (40 mg) by mouth daily Take 30-60 minutes before a meal.    Gastroesophageal reflux disease with esophagitis       rivaroxaban ANTICOAGULANT 20 MG Tabs tablet    XARELTO    90 tablet    Take 1 tablet (20 mg) by mouth daily (with dinner)    Paroxysmal atrial fibrillation (H)       tamsulosin 0.4 MG capsule    FLOMAX    90 capsule    Take 1 capsule (0.4 mg) by mouth daily    Benign prostatic hyperplasia (BPH) with urinary urge incontinence

## 2018-10-27 LAB
ALBUMIN SERPL-MCNC: 3.5 G/DL (ref 3.4–5)
ALP SERPL-CCNC: 131 U/L (ref 40–150)
ALT SERPL W P-5'-P-CCNC: 19 U/L (ref 0–70)
ANION GAP SERPL CALCULATED.3IONS-SCNC: 9 MMOL/L (ref 3–14)
AST SERPL W P-5'-P-CCNC: 15 U/L (ref 0–45)
BILIRUB SERPL-MCNC: 0.3 MG/DL (ref 0.2–1.3)
BUN SERPL-MCNC: 22 MG/DL (ref 7–30)
CALCIUM SERPL-MCNC: 9 MG/DL (ref 8.5–10.1)
CHLORIDE SERPL-SCNC: 105 MMOL/L (ref 94–109)
CO2 SERPL-SCNC: 26 MMOL/L (ref 20–32)
CREAT SERPL-MCNC: 1.27 MG/DL (ref 0.66–1.25)
GFR SERPL CREATININE-BSD FRML MDRD: 56 ML/MIN/1.7M2
GLUCOSE SERPL-MCNC: 106 MG/DL (ref 70–99)
POTASSIUM SERPL-SCNC: 4.7 MMOL/L (ref 3.4–5.3)
PROT SERPL-MCNC: 7.5 G/DL (ref 6.8–8.8)
SODIUM SERPL-SCNC: 140 MMOL/L (ref 133–144)

## 2018-11-12 ENCOUNTER — OFFICE VISIT (OUTPATIENT)
Dept: FAMILY MEDICINE | Facility: CLINIC | Age: 73
End: 2018-11-12
Payer: COMMERCIAL

## 2018-11-12 VITALS
HEART RATE: 62 BPM | SYSTOLIC BLOOD PRESSURE: 139 MMHG | DIASTOLIC BLOOD PRESSURE: 80 MMHG | TEMPERATURE: 98.4 F | HEIGHT: 72 IN | WEIGHT: 315 LBS | BODY MASS INDEX: 42.66 KG/M2 | OXYGEN SATURATION: 96 %

## 2018-11-12 DIAGNOSIS — R73.09 ELEVATED GLUCOSE: ICD-10-CM

## 2018-11-12 DIAGNOSIS — R31.29 MICROSCOPIC HEMATURIA: ICD-10-CM

## 2018-11-12 DIAGNOSIS — N28.9 DECREASED RENAL FUNCTION: ICD-10-CM

## 2018-11-12 DIAGNOSIS — R30.0 DYSURIA: Primary | ICD-10-CM

## 2018-11-12 LAB
ALBUMIN UR-MCNC: NEGATIVE MG/DL
ANION GAP SERPL CALCULATED.3IONS-SCNC: 7 MMOL/L (ref 3–14)
APPEARANCE UR: CLEAR
BILIRUB UR QL STRIP: NEGATIVE
BUN SERPL-MCNC: 18 MG/DL (ref 7–30)
CALCIUM SERPL-MCNC: 8.7 MG/DL (ref 8.5–10.1)
CHLORIDE SERPL-SCNC: 104 MMOL/L (ref 94–109)
CO2 SERPL-SCNC: 27 MMOL/L (ref 20–32)
COLOR UR AUTO: YELLOW
CREAT SERPL-MCNC: 0.84 MG/DL (ref 0.66–1.25)
GFR SERPL CREATININE-BSD FRML MDRD: 89 ML/MIN/1.7M2
GLUCOSE SERPL-MCNC: 91 MG/DL (ref 70–99)
GLUCOSE UR STRIP-MCNC: NEGATIVE MG/DL
HBA1C MFR BLD: 5.7 % (ref 0–5.6)
HGB UR QL STRIP: ABNORMAL
KETONES UR STRIP-MCNC: NEGATIVE MG/DL
LEUKOCYTE ESTERASE UR QL STRIP: NEGATIVE
NITRATE UR QL: NEGATIVE
NON-SQ EPI CELLS #/AREA URNS LPF: ABNORMAL /LPF
PH UR STRIP: 5.5 PH (ref 5–7)
POTASSIUM SERPL-SCNC: 4 MMOL/L (ref 3.4–5.3)
RBC #/AREA URNS AUTO: ABNORMAL /HPF
SODIUM SERPL-SCNC: 138 MMOL/L (ref 133–144)
SOURCE: ABNORMAL
SP GR UR STRIP: 1.02 (ref 1–1.03)
UROBILINOGEN UR STRIP-ACNC: 1 EU/DL (ref 0.2–1)
WBC #/AREA URNS AUTO: ABNORMAL /HPF

## 2018-11-12 PROCEDURE — 36415 COLL VENOUS BLD VENIPUNCTURE: CPT | Performed by: FAMILY MEDICINE

## 2018-11-12 PROCEDURE — 81001 URINALYSIS AUTO W/SCOPE: CPT | Performed by: FAMILY MEDICINE

## 2018-11-12 PROCEDURE — 99213 OFFICE O/P EST LOW 20 MIN: CPT | Performed by: FAMILY MEDICINE

## 2018-11-12 PROCEDURE — 83036 HEMOGLOBIN GLYCOSYLATED A1C: CPT | Performed by: FAMILY MEDICINE

## 2018-11-12 PROCEDURE — 87086 URINE CULTURE/COLONY COUNT: CPT | Performed by: FAMILY MEDICINE

## 2018-11-12 PROCEDURE — 80048 BASIC METABOLIC PNL TOTAL CA: CPT | Performed by: FAMILY MEDICINE

## 2018-11-12 NOTE — MR AVS SNAPSHOT
After Visit Summary   11/12/2018    Hugo Weber    MRN: [id number]           Patient Information     Date Of Birth          1945        Visit Information        Provider Department      11/12/2018 9:00 AM Magen Dimas MD [facility]        Today's Diagnoses     Dysuria    -  1    Microscopic hematuria           Follow-ups after your visit        Your next 10 appointments already scheduled     Nov 20, 2018 10:20 AM CST   Return Visit with Bruno De Santiago MD   [facility] [place] ([facility])    [address]  [address]  [facility] [address] [phone]              Who to contact     If you have questions or need follow up information about today's clinic visit or your schedule please contact [facility] directly at [phone].  Normal or non-critical lab and imaging results will be communicated to you by MyChart, letter or phone within 4 business days after the clinic has received the results. If you do not hear from us within 7 days, please contact the clinic through MyChart or phone. If you have a critical or abnormal lab result, we will notify you by phone as soon as possible.  Submit refill requests through Unifyo or call your pharmacy and they will forward the refill request to us. Please allow 3 business days for your refill to be completed.          Additional Information About Your Visit        MyChart Information     Unifyo gives you secure access to your electronic health record. If you see a primary care provider, you can also send messages to your care team and make appointments. If you have questions, please call your primary care clinic.  If you do not have a primary care provider, please call [phone] and they will assist you.        Care EveryWhere ID     This is your Care EveryWhere ID. This could be used by other organizations to access your [facility]  records  [id number]        Your Vitals Were     Pulse Temperature Height Pulse Oximetry BMI (Body Mass Index)       62 98.4  F (36.9  C) (Oral) 6' (1.829 m) 96% 47.06 kg/m2        Blood Pressure from Last 3 Encounters:   11/12/18 139/80   10/26/18 119/67   09/20/18 148/84    Weight from Last 3 Encounters:   11/12/18 (!) 347 lb (157.4 kg)   10/26/18 (!) 344 lb (156 kg)   09/20/18 (!) 342 lb (155.1 kg)              We Performed the Following     *UA reflex to Microscopic and Culture ([facility] (except [place] and [place])     Urine Culture Aerobic Bacterial     Urine Microscopic        Primary Care Provider Office Phone # Fax #    Brett Cassidy [phone] [address] [facility] [address]        Equal Access to Services     [facility] : Hadii selam ku hadasho Soomaali, waaxda luqadaha, qaybta kaalmada adeegyada, stella doughertyin hayisaura pierson . So [facility] [phone].    ATENCIÓN: Si habla español, tiene a oconnor disposición servicios gratuitos de asistencia lingüística. Llame al [phone].    We comply with applicable federal civil rights laws and [place] laws. We do not discriminate on the basis of race, color, national origin, age, disability, sex, sexual orientation, or gender identity.            Thank you!     Thank you for choosing [facility]  for your care. Our goal is always to provide you with excellent care. Hearing back from our patients is one way we can continue to improve our services. Please take a few minutes to complete the written survey that you may receive in the mail after your visit with us. Thank you!             Your Updated Medication List - Protect others around you: Learn how to safely use, store and throw away your medicines at www.disposemymeds.org.          This list is accurate as of 11/12/18  1:28 PM.  Always use your most recent med list.                   Brand Name Dispense Instructions for use Diagnosis     betamethasone dipropionate 0.05 % cream    DIPROSONE    45 g    Apply sparingly to affected area twice daily as needed.  Do not apply to face.    Rash       diltiazem 180 MG 24 hr capsule    CARDIZEM CD    90 capsule    Take 1 capsule (180 mg) by mouth daily    Paroxysmal atrial fibrillation (H), Hypertension goal BP (blood pressure) < 140/90       flecainide acetate 150 MG Tabs     180 tablet    Take 1 tablet (150 mg) by mouth every 12 hours    Paroxysmal atrial fibrillation (H)       fluticasone 50 MCG/ACT spray    FLONASE    3 Bottle    Spray 1 spray into both nostrils daily as needed for rhinitis or allergies    Environmental allergies       iron 325 (65 Fe) MG tablet     90 tablet    Take 2 tablets by mouth daily (with breakfast)    Iron deficiency anemia, unspecified iron deficiency anemia type       pantoprazole 40 MG EC tablet    PROTONIX    90 tablet    Take 1 tablet (40 mg) by mouth daily Take 30-60 minutes before a meal.    Gastroesophageal reflux disease with esophagitis       rivaroxaban ANTICOAGULANT 20 MG Tabs tablet    XARELTO    90 tablet    Take 1 tablet (20 mg) by mouth daily (with dinner)    Paroxysmal atrial fibrillation (H)       tamsulosin 0.4 MG capsule    FLOMAX    90 capsule    Take 1 capsule (0.4 mg) by mouth daily    Benign prostatic hyperplasia (BPH) with urinary urge incontinence

## 2018-11-12 NOTE — PROGRESS NOTES
SUBJECTIVE:                                                      Hugo Weber is a 73 year old male who presents to clinic today for the following health issues:    Genitourinary - Male  Onset: 2 weeks    Description:   Dysuria (painful urination): no burning  Hematuria (blood in urine): no   Frequency: no   Are you urinating at night : no   Hesitancy (delay in urine): no   Retention (unable to empty): no   Decrease in urinary flow: no   Incontinence: no     Progression of Symptoms:  improving    Accompanying Signs & Symptoms:  Fever: no   Back/Flank pain: no   Urethral discharge: no   Testicle lumps/masses/pain: no   Nausea and/or vomiting: no   Abdominal pain: no     History:   History of frequent UTI's: YES  History of kidney stones: no   History of hernias: no   Personal or Family history of Prostate problems: no  Sexually active:     Precipitating factors:       Alleviating factors:  nothing      Problem list and histories reviewed & adjusted, as indicated.  Additional history: as documented    ROS:  Constitutional, HEENT, cardiovascular, pulmonary, GI, , musculoskeletal, neuro, skin, endocrine and psych systems are negative, except as otherwise noted.    OBJECTIVE:                                                      /80  Pulse 62  Temp 98.4  F (36.9  C) (Oral)  Ht 6' (1.829 m)  Wt (!) 347 lb (157.4 kg)  SpO2 96%  BMI 47.06 kg/m2 Body mass index is 47.06 kg/(m^2).   GENERAL: healthy, alert, well nourished, well hydrated, no distress  NECK: no tenderness, no adenopathy, no asymmetry, no masses, no stiffness; thyroid- normal to palpation  RESP: lungs clear to auscultation - no rales, no rhonchi, no wheezes  CV: regular rates and rhythm, normal S1 S2, no S3 or S4 and no murmur, no click or rub -  ABDOMEN: soft, no tenderness, no  hepatosplenomegaly, no masses, normal bowel sounds  BACK: no CVA tenderness, no paralumbar tenderness    Diagnostic test results:  Results for orders placed or  performed in visit on 11/12/18 (from the past 24 hour(s))   *UA reflex to Microscopic and Culture ([facility] (except [place] and [place])   Result Value Ref Range    Color Urine Yellow     Appearance Urine Clear     Glucose Urine Negative NEG^Negative mg/dL    Bilirubin Urine Negative NEG^Negative    Ketones Urine Negative NEG^Negative mg/dL    Specific Gravity Urine 1.025 1.003 - 1.035    Blood Urine Small (A) NEG^Negative    pH Urine 5.5 5.0 - 7.0 pH    Protein Albumin Urine Negative NEG^Negative mg/dL    Urobilinogen Urine 1.0 0.2 - 1.0 EU/dL    Nitrite Urine Negative NEG^Negative    Leukocyte Esterase Urine Negative NEG^Negative    Source Midstream Urine    Urine Microscopic   Result Value Ref Range    WBC Urine 0 - 5 OTO5^0 - 5 /HPF    RBC Urine 2-5 (A) OTO2^O - 2 /HPF    Squamous Epithelial /LPF Urine Few FEW^Few /LPF       ASSESSMENT/PLAN:                                                      Hugo was seen today for uti.    Diagnoses and all orders for this visit:    Dysuria - symptoms are improving and ua was okay and will check  Cx. - red cells noted - and has a stone history with currently some known retained stones.    -     *UA reflex to Microscopic and Culture ([facility] (except [place] and [place])  -     Urine Microscopic    Microscopic hematuria  -     Urine Culture Aerobic Bacterial        Risks, benefits and alternatives of treatments discussed. Plan agreed on.      Followup: Data Unavailable    See patient instructions.         Timur Dimas MD   Pager: [phone]

## 2018-11-13 DIAGNOSIS — I48.0 PAROXYSMAL ATRIAL FIBRILLATION (H): ICD-10-CM

## 2018-11-13 LAB
BACTERIA SPEC CULT: NORMAL
SPECIMEN SOURCE: NORMAL

## 2018-11-14 DIAGNOSIS — I48.0 PAROXYSMAL ATRIAL FIBRILLATION (H): ICD-10-CM

## 2018-11-14 RX ORDER — FLECAINIDE ACETATE 150 MG/1
150 TABLET ORAL EVERY 12 HOURS
Qty: 180 TABLET | Refills: 0 | Status: SHIPPED | OUTPATIENT
Start: 2018-11-14 | End: 2018-12-05

## 2018-11-14 NOTE — TELEPHONE ENCOUNTER
Requested Prescriptions   Pending Prescriptions Disp Refills     XARELTO 20 MG TABS tablet [Pharmacy Med Name: XARELTO 20 MG Tablet] 30 tablet 3        Last Written Prescription Date:  9.20.18  Last Fill Quantity: 90,  # refills: 3   Last Office Visit: 11/12/2018   Future Office Visit:      Sig: TAKE 1 TABLET DAILY WITH DINNER    Direct Oral Anticoagulant Agents Passed    11/13/2018  7:19 PM       Passed - Normal Platelets on file in past 12 months    Recent Labs   Lab Test  10/26/18   1420   PLT  252              Passed - Medication is active on med list       Passed - Creatinine Clearance greater than 50 ml/min on file in past 3 mos    No lab results found.         Passed - Serum creatinine less than or equal to 1.4 on file in past 3 mos    Recent Labs   Lab Test  11/12/18   0908   12/17/15   1302   CR  0.84   < >   --    CREAT   --    --   0.9    < > = values in this interval not displayed.            Passed - Patient is 18 years of age or older       Passed - Recent (6 mo) or future (30 days) visit within the authorizing provider's specialty

## 2018-11-15 RX ORDER — RIVAROXABAN 20 MG/1
TABLET, FILM COATED ORAL
Qty: 30 TABLET | Refills: 3 | OUTPATIENT
Start: 2018-11-15

## 2018-11-15 NOTE — PROGRESS NOTES
Dear Hugo,    Here is a summary of your recent test results:  -Urine culture is abnormal but it looks like a contaminated, non clean-catch sample.  There is no need for antibiotics at this point.  If new, worsening, or persistent symptoms occur then you should call or return for a recheck.      For additional lab test information, labtestsonline.org is an excellent reference.           Thank you very much for trusting me and [facility].     Healthy regards,  Timur Dimas MD

## 2018-11-20 ENCOUNTER — OFFICE VISIT (OUTPATIENT)
Dept: UROLOGY | Facility: CLINIC | Age: 73
End: 2018-11-20
Payer: COMMERCIAL

## 2018-11-20 VITALS — WEIGHT: 315 LBS | HEART RATE: 60 BPM | HEIGHT: 72 IN | BODY MASS INDEX: 42.66 KG/M2 | OXYGEN SATURATION: 97 %

## 2018-11-20 DIAGNOSIS — N40.2 PROSTATE NODULE: Primary | ICD-10-CM

## 2018-11-20 PROCEDURE — 99213 OFFICE O/P EST LOW 20 MIN: CPT | Performed by: UROLOGY

## 2018-11-20 ASSESSMENT — PAIN SCALES - GENERAL: PAINLEVEL: NO PAIN (0)

## 2018-11-20 NOTE — NURSING NOTE
No UA at this time..... Pt unable to go.  psa in epic.  Pt denies any voiding trouble.  Pt is up once a nt to void.  SHELLI Cloud, CMA

## 2018-11-20 NOTE — MR AVS SNAPSHOT
After Visit Summary   11/20/2018    Hugo Weber    MRN: [id number]           Patient Information     Date Of Birth          1945        Visit Information        Provider Department      11/20/2018 10:20 AM Bruno De Santiago MD [facility] [place]        Today's Diagnoses     Prostate nodule    -  1       Follow-ups after your visit        Your next 10 appointments already scheduled     Dec 05, 2018 12:45 PM CST   EP NEW with Sarah Beth Castillo MD   [facility] ([facility])    [address]  [place] [place] [facility] [address]   [phone] OPT 2            Dec 05, 2018  5:30 PM CST   MR PROSTATE  WO & W CONTRAST with [id number]   [facility] [place] [facility] ([facility])    [address]  1st Floor  [place] [address] [phone]           How do I prepare for my exam? (Food and drink instructions) **If you will be receiving sedation or general anesthesia, please see special notes below.**  How do I prepare for my exam? (Other instructions) Take your medicines as usual, unless your doctor tells you not to. You may or may not receive intravenous (IV) contrast for this exam pending the discretion of the Radiologist.  You do not need to do anything special to prepare.  **If you will be receiving sedation or general anesthesia, please see special notes below.**  What should I wear: The MRI machine uses a strong magnet. Please wear clothes without metal (snaps, zippers). A sweatsuit works well, or we may give you a hospital gown. Please remove any body piercings and hair extensions before you arrive. You will also remove watches, jewelry, hairpins, wallets, dentures, partial dental plates and hearing aids. You may wear contact lenses, and you may be able to wear your rings. We have a safe place to keep your personal items, but it is safer to leave them at home.  How long does the  exam take: Most tests take 30 to 60 minutes.  HOWEVER, IF YOUR DOCTOR PRESCRIBES ANESTHESIA please plan on spending four to five hours in the recovery room.  What should I bring:  Bring a list of your current medicines to your exam (including vitamins, minerals and over-the-counter drugs).  Do I need a :  **If you will be receiving sedation or general anesthesia, please see special notes below.**  What should I do after the exam: No Restrictions, You may resume normal activities.  What is this test: MRI (magnetic resonance imaging) uses a strong magnet and radio waves to look inside the body. An MRA (magnetic resonance angiogram) does the same thing, but it lets us look at your blood vessels. A computer turns the radio waves into pictures showing cross sections of the body, much like slices of bread. This helps us see any problems more clearly. You may receive fluid (called  contrast ) before or during your scan. The fluid helps us see the pictures better. We give the fluid through an IV (small needle in your arm).  Who should I call with questions:  Please call the Imaging Department at your exam site with any questions. Directions, parking instructions, and other information is available on our website, [email or web address].  How do I prepare if I m having sedation or anesthesia? **IMPORTANT** THE INSTRUCTIONS BELOW ARE ONLY FOR THOSE PATIENTS WHO HAVE BEEN TOLD THEY WILL RECEIVE SEDATION OR GENERAL ANESTHESIA DURING THEIR MRI PROCEDURE:  IF YOU WILL RECEIVE SEDATION (take medicine to help you relax during your exam): You must get the medicine from your doctor before you arrive. Bring the medicine to the exam. Do not take it at home. Arrive one hour early. Bring someone who can take you home after the test. Your medicine will make you sleepy. After the exam, you may not drive, take a bus or take a taxi by yourself. No eating 8 hours before your exam. You may have clear liquids up until 4 hours before your  exam. (Clear liquids include water, clear tea, black coffee and fruit juice without pulp.)  IF YOU WILL RECEIVE ANESTHESIA (be asleep for your exam): Arrive 1 1/2 hours early. Bring someone who can take you home after the test. You may not drive, take a bus or take a taxi by yourself. No eating 8 hours before your exam. You may have clear liquids up until 4 hours before your exam. (Clear liquids include water, clear tea, black coffee and fruit juice without pulp.)              Future tests that were ordered for you today     Open Future Orders        Priority Expected Expires Ordered    MR Prostate wo & w Contrast Routine  11/20/2019 11/20/2018            Who to contact     If you have questions or need follow up information about today's clinic visit or your schedule please contact [facility] [place] directly at [phone].  Normal or non-critical lab and imaging results will be communicated to you by MyChart, letter or phone within 4 business days after the clinic has received the results. If you do not hear from us within 7 days, please contact the clinic through [employer] or phone. If you have a critical or abnormal lab result, we will notify you by phone as soon as possible.  Submit refill requests through [employer] or call your pharmacy and they will forward the refill request to us. Please allow 3 business days for your refill to be completed.          Additional Information About Your Visit        MyChart Information     [employer] gives you secure access to your electronic health record. If you see a primary care provider, you can also send messages to your care team and make appointments. If you have questions, please call your primary care clinic.  If you do not have a primary care provider, please call [phone] and they will assist you.        Care EveryWhere ID     This is your Care EveryWhere ID. This could be used by other organizations to access your [facility]  medical records  [id number]        Your Vitals Were     Pulse Height Pulse Oximetry BMI (Body Mass Index)          60 1.829 m (6') 97% 47.06 kg/m2         Blood Pressure from Last 3 Encounters:   11/12/18 139/80   10/26/18 119/67   09/20/18 148/84    Weight from Last 3 Encounters:   11/20/18 (!) 157.4 kg (347 lb)   11/12/18 (!) 157.4 kg (347 lb)   10/26/18 (!) 156 kg (344 lb)               Primary Care Provider Office Phone # Fax #    Brett Cassidy [phone] [address] [address]        Equal Access to Services     AUDRA PEARSON : Hadii selam webster hadasho Soomaali, waaxda luqadaha, qaybta kaalmada adeegyada, stella pierson . [facility] [phone].    ATENCIÓN: Si habla español, tiene a oconnor disposición servicios gratuitos de asistencia lingüística. [facility] [phone].    We comply with applicable federal civil rights laws and [place] laws. We do not discriminate on the basis of race, color, national origin, age, disability, sex, sexual orientation, or gender identity.            Thank you!     Thank you for choosing [facility] [place]  for your care. Our goal is always to provide you with excellent care. Hearing back from our patients is one way we can continue to improve our services. Please take a few minutes to complete the written survey that you may receive in the mail after your visit with us. Thank you!             Your Updated Medication List - Protect others around you: Learn how to safely use, store and throw away your medicines at www.disposemymeds.org.          This list is accurate as of 11/20/18 10:53 AM.  Always use your most recent med list.                   Brand Name Dispense Instructions for use Diagnosis    betamethasone dipropionate 0.05 % cream    DIPROSONE    45 g    Apply sparingly to affected area twice daily as needed.  Do not apply to face.    Rash       diltiazem 180 MG 24 hr  capsule    CARDIZEM CD    90 capsule    Take 1 capsule (180 mg) by mouth daily    Paroxysmal atrial fibrillation (H), Hypertension goal BP (blood pressure) < 140/90       flecainide acetate 150 MG tablet    TAMBOCOR    180 tablet    Take 1 tablet (150 mg) by mouth every 12 hours    Paroxysmal atrial fibrillation (H)       fluticasone 50 MCG/ACT spray    FLONASE    3 Bottle    Spray 1 spray into both nostrils daily as needed for rhinitis or allergies    Environmental allergies       iron 325 (65 Fe) MG tablet     90 tablet    Take 2 tablets by mouth daily (with breakfast)    Iron deficiency anemia, unspecified iron deficiency anemia type       pantoprazole 40 MG EC tablet    PROTONIX    90 tablet    Take 1 tablet (40 mg) by mouth daily Take 30-60 minutes before a meal.    Gastroesophageal reflux disease with esophagitis       rivaroxaban ANTICOAGULANT 20 MG Tabs tablet    XARELTO    90 tablet    Take 1 tablet (20 mg) by mouth daily (with dinner)    Paroxysmal atrial fibrillation (H)       tamsulosin 0.4 MG capsule    FLOMAX    90 capsule    Take 1 capsule (0.4 mg) by mouth daily    Benign prostatic hyperplasia (BPH) with urinary urge incontinence

## 2018-11-20 NOTE — PROGRESS NOTES
Hugo Weber is a 73-year-old male referred to me because of an elevated PSA = 4.46. He has had PSAs in the past within the normal range and a father who had prostate cancer. He is having no difficulty voiding, dysuria or hematuria. He has a significant history of calcium urolithiasis and has been a patient of Dr. Campa and Dr. Bey in the past. He has never seen a nephrologist for metabolic stone evaluation but his had 24-hour urines in the past. His most recent imaging was in September and shows 2 stones in the right kidney and a large stone in the upper pole of the left kidney. He is currently having no flank pain  Other past medical history: Morbid obesity, arrhythmia, arthritis, atrial fibrillation, gallstones, peptic ulcer disease, colonic polyps, first-degree AV block, hepatitis C, hyperlipidemia, hypertension, iron deficiency anemia, osteoarthritis, prediabetes, hearing loss, squamous cell carcinoma of skin, sleep apnea, TMJ arthralgia, gastric bypass, Lasix surgery, right knee arthroscopy, ESWL, left total knee replacement, nonsmoker  Medications: Diltiazem, iron sulfate, flecainide, fluticasone spray, Protonix, tamsulosin xarelto  Allergies: None  Exam: With spouse, alert and oriented, normal vital signs. Morbidly obese. Normal sphincter tone, no rectal mass or impaction, small prostate with nodule at left apex, normal seminal vesicles  Assessment: Elevated PSA, family history of prostate cancer, left apical nodule, recurrent calcium urolithiasis  Plan: 3 [facility] MRI of prostate if possible, will need ultrasound of prostate with biopsies of the left apex, referral to Regulo Heath M.D. at the [place] for metabolic stone evaluation and surgical care of stones in the future

## 2018-11-20 NOTE — LETTER
11/20/2018      RE: Hugo Weber  [address]  [facility] [address]       Hugo Weber is a 73-year-old male referred to me because of an elevated PSA = 4.46. He has had PSAs in the past within the normal range and a father who had prostate cancer. He is having no difficulty voiding, dysuria or hematuria. He has a significant history of calcium urolithiasis and has been a patient of Dr. Campa and Dr. Bey in the past. He has never seen a nephrologist for metabolic stone evaluation but his had 24-hour urines in the past. His most recent imaging was in September and shows 2 stones in the right kidney and a large stone in the upper pole of the left kidney. He is currently having no flank pain  Other past medical history: Morbid obesity, arrhythmia, arthritis, atrial fibrillation, gallstones, peptic ulcer disease, colonic polyps, first-degree AV block, hepatitis C, hyperlipidemia, hypertension, iron deficiency anemia, osteoarthritis, prediabetes, hearing loss, squamous cell carcinoma of skin, sleep apnea, TMJ arthralgia, gastric bypass, Lasix surgery, right knee arthroscopy, ESWL, left total knee replacement, nonsmoker  Medications: Diltiazem, iron sulfate, flecainide, fluticasone spray, Protonix, tamsulosin xarelto  Allergies: None  Exam: With spouse, alert and oriented, normal vital signs. Morbidly obese. Normal sphincter tone, no rectal mass or impaction, small prostate with nodule at left apex, normal seminal vesicles  Assessment: Elevated PSA, family history of prostate cancer, left apical nodule, recurrent calcium urolithiasis  Plan: 3 [facility] MRI of prostate if possible, will need ultrasound of prostate with biopsies of the left apex, referral to Regulo Heath M.D. at the [place] for metabolic stone evaluation and surgical care of stones in the future     Bruno De Santiago MD

## 2018-11-20 NOTE — LETTER
11/20/2018       RE: Hugo Weber  [address]  [facility] [address]     Dear Colleague,    Thank you for referring your patient, Hugo Weber, to the [facility] [place] at [facility]. Please see a copy of my visit note below.    Hugo Weber is a 73-year-old male referred to me because of an elevated PSA = 4.46. He has had PSAs in the past within the normal range and a father who had prostate cancer. He is having no difficulty voiding, dysuria or hematuria. He has a significant history of calcium urolithiasis and has been a patient of Dr. Campa and Dr. Bey in the past. He has never seen a nephrologist for metabolic stone evaluation but his had 24-hour urines in the past. His most recent imaging was in September and shows 2 stones in the right kidney and a large stone in the upper pole of the left kidney. He is currently having no flank pain  Other past medical history: Morbid obesity, arrhythmia, arthritis, atrial fibrillation, gallstones, peptic ulcer disease, colonic polyps, first-degree AV block, hepatitis C, hyperlipidemia, hypertension, iron deficiency anemia, osteoarthritis, prediabetes, hearing loss, squamous cell carcinoma of skin, sleep apnea, TMJ arthralgia, gastric bypass, Lasix surgery, right knee arthroscopy, ESWL, left total knee replacement, nonsmoker  Medications: Diltiazem, iron sulfate, flecainide, fluticasone spray, Protonix, tamsulosin xarelto  Allergies: None  Exam: With spouse, alert and oriented, normal vital signs. Morbidly obese. Normal sphincter tone, no rectal mass or impaction, small prostate with nodule at left apex, normal seminal vesicles  Assessment: Elevated PSA, family history of prostate cancer, left apical nodule, recurrent calcium urolithiasis  Plan: 3 [facility] MRI of prostate if possible, will need ultrasound of prostate with biopsies of the left apex, referral to Regulo Heath  M.D. at the [place] for metabolic stone evaluation and surgical care of stones in the future       Bruno De Santiago MD

## 2018-12-05 ENCOUNTER — OFFICE VISIT (OUTPATIENT)
Dept: CARDIOLOGY | Facility: CLINIC | Age: 73
End: 2018-12-05
Payer: COMMERCIAL

## 2018-12-05 ENCOUNTER — RADIANT APPOINTMENT (OUTPATIENT)
Dept: MRI IMAGING | Facility: CLINIC | Age: 73
End: 2018-12-05
Attending: UROLOGY
Payer: COMMERCIAL

## 2018-12-05 VITALS
HEIGHT: 72 IN | HEART RATE: 99 BPM | SYSTOLIC BLOOD PRESSURE: 141 MMHG | WEIGHT: 315 LBS | DIASTOLIC BLOOD PRESSURE: 73 MMHG | BODY MASS INDEX: 42.66 KG/M2

## 2018-12-05 DIAGNOSIS — N40.2 PROSTATE NODULE: ICD-10-CM

## 2018-12-05 DIAGNOSIS — I48.0 PAROXYSMAL ATRIAL FIBRILLATION (H): Primary | ICD-10-CM

## 2018-12-05 DIAGNOSIS — I48.19 PERSISTENT ATRIAL FIBRILLATION (H): ICD-10-CM

## 2018-12-05 DIAGNOSIS — I10 HYPERTENSION GOAL BP (BLOOD PRESSURE) < 140/90: ICD-10-CM

## 2018-12-05 PROCEDURE — 99214 OFFICE O/P EST MOD 30 MIN: CPT | Performed by: INTERNAL MEDICINE

## 2018-12-05 PROCEDURE — 93000 ELECTROCARDIOGRAM COMPLETE: CPT | Performed by: INTERNAL MEDICINE

## 2018-12-05 RX ORDER — DILTIAZEM HYDROCHLORIDE 180 MG/1
360 CAPSULE, COATED, EXTENDED RELEASE ORAL DAILY
Qty: 90 CAPSULE | Refills: 3 | Status: SHIPPED | OUTPATIENT
Start: 2018-12-05 | End: 2019-02-04

## 2018-12-05 RX ORDER — GADOBUTROL 604.72 MG/ML
15 INJECTION INTRAVENOUS ONCE
Status: COMPLETED | OUTPATIENT
Start: 2018-12-05 | End: 2018-12-05

## 2018-12-05 RX ADMIN — GADOBUTROL 15 ML: 604.72 INJECTION INTRAVENOUS at 17:20

## 2018-12-05 NOTE — LETTER
12/5/2018      Brett Cassidy MD  [address] [place] [address]      RE: Hugo Weber       Dear Colleague,    I had the pleasure of seeing Hugo Weber in the [facility].    Service Date: 12/05/2018      CLINIC NOTE        HISTORY OF PRESENT ILLNESS:  I saw Mr. Weber for followup of atrial fibrillation.  He is a 73-year-old white male with severe obesity and recurrent atrial fibrillation.  He has been on Xarelto and diltiazem and flecainide 150 mg b.i.d.  When I saw him in 12/2017, he was in sinus rhythm.  Somewhere in the recent past, he likely has reverted back to atrial fibrillation.  On the other hand, symptomatically, he does not feel palpitations.  He seemed to feel more fatigued.  He denies chest pain.      PHYSICAL EXAMINATION:   VITAL SIGNS:  Blood pressure was 141/73, heart rate 99 beats per minute, body weight 340 pounds.   LUNGS:  Clear.   CARDIAC:  Rhythm was irregular.  The heart sounds were normal without murmur.   ABDOMEN:  Examination showed severe obesity.   EXTREMITIES:  There was no pedal edema.      RADIOLOGIC STUDIES:  EKG showed atrial flutter with PVCs.  The QRS is widened.      ASSESSMENT AND RECOMMENDATIONS:  Mr. Weber is in atrial flutter which is likely atypical.  He has widened QRS complex.  He has failed to maintain sinus rhythm on flecainide 150 mg p.o. b.i.d.  He is not apparently symptomatic with recurrent atrial fibrillation/flutter.  I have asked him to stop flecainide.  His dose of diltiazem is increased from 180 to 360 mg once a day.  The patient will have a Holter for 24 hours in 1 week.  He will return for followup in 6 months.  If the heart rate cannot be controlled, we will then discuss if we should pursue atrial fibrillation ablation.      cc:   Brett Cassidy MD    [address] [address]         DULCE KRUGER MD             D: 12/05/2018   T: 12/05/2018   MT: VIANEY       Name:     GAYATHRI SMITH   MRN:      [id number]        Account:      [id number]   :      1945           Service Date: 2018      Document: [id number]           Outpatient Encounter Medications as of 2018   Medication Sig Dispense Refill     diltiazem ER COATED BEADS (CARDIZEM CD) 180 MG 24 hr capsule Take 2 capsules (360 mg) by mouth daily 90 capsule 3     Ferrous Sulfate (IRON) 325 (65 Fe) MG tablet Take 2 tablets by mouth daily (with breakfast) 90 tablet 3     fluticasone (FLONASE) 50 MCG/ACT spray Spray 1 spray into both nostrils daily as needed for rhinitis or allergies 3 Bottle 3     pantoprazole (PROTONIX) 40 MG EC tablet Take 1 tablet (40 mg) by mouth daily Take 30-60 minutes before a meal. 90 tablet 3     rivaroxaban ANTICOAGULANT (XARELTO) 20 MG TABS tablet Take 1 tablet (20 mg) by mouth daily (with dinner) 90 tablet 3     tamsulosin (FLOMAX) 0.4 MG capsule Take 1 capsule (0.4 mg) by mouth daily 90 capsule 3     betamethasone dipropionate (DIPROSONE) 0.05 % cream Apply sparingly to affected area twice daily as needed.  Do not apply to face. 45 g 0     [DISCONTINUED] diltiazem (CARDIZEM CD) 180 MG 24 hr capsule Take 1 capsule (180 mg) by mouth daily 90 capsule 3     [DISCONTINUED] flecainide acetate 150 MG TABS Take 1 tablet (150 mg) by mouth every 12 hours 180 tablet 0     No facility-administered encounter medications on file as of 2018.        Again, thank you for allowing me to participate in the care of your patient.      Sincerely,    Sarah Beth Castillo MD     [facility]

## 2018-12-05 NOTE — PROGRESS NOTES
HPI and Plan:   See dictation    Orders Placed This Encounter   Procedures     Follow-Up with Electrophysiologist     EKG 12-lead complete w/read - Clinics (performed today)     Holter Monitor 24 hour - Adult       Orders Placed This Encounter   Medications     diltiazem ER COATED BEADS (CARDIZEM CD) 180 MG 24 hr capsule     Sig: Take 2 capsules (360 mg) by mouth daily     Dispense:  90 capsule     Refill:  3       Medications Discontinued During This Encounter   Medication Reason     flecainide acetate 150 MG TABS      diltiazem (CARDIZEM CD) 180 MG 24 hr capsule Reorder         Encounter Diagnoses   Name Primary?     Paroxysmal atrial fibrillation (H) Yes     Hypertension goal BP (blood pressure) < 140/90      Persistent atrial fibrillation (H)        CURRENT MEDICATIONS:  Current Outpatient Prescriptions   Medication Sig Dispense Refill     diltiazem ER COATED BEADS (CARDIZEM CD) 180 MG 24 hr capsule Take 2 capsules (360 mg) by mouth daily 90 capsule 3     Ferrous Sulfate (IRON) 325 (65 Fe) MG tablet Take 2 tablets by mouth daily (with breakfast) 90 tablet 3     fluticasone (FLONASE) 50 MCG/ACT spray Spray 1 spray into both nostrils daily as needed for rhinitis or allergies 3 Bottle 3     pantoprazole (PROTONIX) 40 MG EC tablet Take 1 tablet (40 mg) by mouth daily Take 30-60 minutes before a meal. 90 tablet 3     rivaroxaban ANTICOAGULANT (XARELTO) 20 MG TABS tablet Take 1 tablet (20 mg) by mouth daily (with dinner) 90 tablet 3     tamsulosin (FLOMAX) 0.4 MG capsule Take 1 capsule (0.4 mg) by mouth daily 90 capsule 3     betamethasone dipropionate (DIPROSONE) 0.05 % cream Apply sparingly to affected area twice daily as needed.  Do not apply to face. 45 g 0     [DISCONTINUED] diltiazem (CARDIZEM CD) 180 MG 24 hr capsule Take 1 capsule (180 mg) by mouth daily 90 capsule 3       ALLERGIES   No Known Allergies    PAST MEDICAL HISTORY:  Past Medical History:   Diagnosis Date     Arrhythmia      Arthritis      Atrial  fibrillation 2006    Followed by [facility]     Calculus of kidney 2005, 6/06, 10/12, 8/18    dr walker/nestor/иван - hematuria - w/u o/w neg     Cholelithiasis      Chronic peptic ulcer, unspecified site, without mention of hemorrhage, perforation, or obstruction 1985    gastric bypass     Colon polyps 8/05, 9/10, 10/15    2 tubular adenoma, 1 hyperplastic - multiple serrated/tubular adenomas     First degree AV block      Hepatitis C 10/12    chronic hepatitis C, grade 1-2, stage 1 - mild - Dr Douglas     Hyperlipidemia LDL goal <130      Hypertension goal BP (blood pressure) < 140/90 6/06    dr jaciel winchester     Iron deficiency anemia, unspecified 1985    gastric bypass     Nephrolithiasis multiple episodes, last 8/18    Dr Bey     OA (osteoarthritis)     Multiple - Left shoulder with rotator cuff tear - Dr Durham     Obesity, unspecified     s/p gastric bypass 1985      Prediabetes      Presbyacusis 1/04    dr corona     Pyelonephritis, unspecified 12/99     SCC (squamous cell carcinoma), 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    dr saez - lt conchal bowl     Sleep apnea 10/02    cpap - severe - 15 cm - dr cunha     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    [facility] Head and Neck       PAST SURGICAL HISTORY:  Past Surgical History:   Procedure Laterality Date     APPENDECTOMY       ARTHROPLASTY KNEE  8/13/2012    LT TKA - Dr Villanueva     CARDIOVERSION  2016     COLONOSCOPY  8/05, 9/10, 10/15    multiple tubular/serrated/hyperplastic polyps     COLONOSCOPY N/A 10/29/2015    multiple tubular and serrated adenomas     COLONOSCOPY N/A 9/7/2016     EYE SURGERY  Lasik     HC KNEE SCOPE, DIAGNOSTIC  05/00    Arthroscopy, Knee RT     LASER HOLMIUM LITHOTRIPSY URETER(S), INSERT STENT, COMBINED  10/16/2012    stones x 4, Dr Campa     LASER HOLMIUM LITHOTRIPSY URETER(S), INSERT STENT, COMBINED Right 5/2/2018    CYSTOSCOPY, RIGHT URETEROSCOPY, HOLMIUM LASER LITHOTRIPSY, RIGHT STENT PLACEMENT - Dr Bey     SURGICAL HISTORY OF -   1985    s/p gastric bypass Bilroth  "II     SURGICAL HISTORY OF -       wisdom teeth     SURGICAL HISTORY OF 1979    cellulitis     SURGICAL HISTORY OF     lt forearm spur's     SURGICAL HISTORY OF -   ,,    s/p lasik     SURGICAL HISTORY OF     rt forearm spurs     SURGICAL HISTORY OF -       dr stevenson - lithotrypsy     SURGICAL HISTORY OF -   10/08,     Lt ear chonchal lesion removal SCC - dr saez       FAMILY HISTORY:  Family History   Problem Relation Age of Onset     Alzheimer Disease Father 82      at 88     Prostate Cancer Father 76     bladder and prostate     Heart Disease Mother 80     CHF     Heart Disease Maternal Grandfather      MI at 55     Cancer Paternal Uncle      ?       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[employer]      Retired     Social History Main Topics     Smoking status: Never Smoker     Smokeless tobacco: Never Used     Alcohol use 1.2 oz/week      Comment: 2 per week     Drug use: No      Comment: acknowledges using herbal supplement \"Vibe\" for energy-none used recently      Sexual activity: Yes     Partners: Female     Birth control/ protection: None      Comment:       Other Topics Concern     Caffeine Concern Yes     <1 can qd     Sleep Concern Yes     sleep apnea, wears cpap     Exercise No     Seat Belt Yes     Parent/Sibling W/ Cabg, Mi Or Angioplasty Before 65f 55m? No     Social History Narrative       Review of Systems:  Skin:  Positive for bruising     Eyes:  Negative      ENT:  Positive for hearing loss    Respiratory:  Positive for sleep apnea;CPAP     Cardiovascular:  Negative;chest pain;cyanosis;syncope or near-syncope;lightheadedness;dizziness;edema Positive for;palpitations;fatigue heart racing episodes, 1-2X week   Gastroenterology: " "Positive for heartburn;reflux;abdominal pain;excessive gas or bloating    Genitourinary:  Negative      Musculoskeletal:  Negative      Neurologic:  Negative      Psychiatric:  Negative      Heme/Lymph/Imm:  Negative      Endocrine:  Negative        Physical Exam:  Vitals: /73  Pulse 99  Ht 1.829 m (6' 0.01\")  Wt (!) 154.2 kg (340 lb)  BMI 46.1 kg/m2    Constitutional:  cooperative, alert and oriented, well developed, well nourished, in no acute distress        Skin:  warm and dry to the touch, no apparent skin lesions or masses noted          Head:  normocephalic, no masses or lesions        Eyes:  pupils equal and round, conjunctivae and lids unremarkable, sclera white, no xanthalasma, EOMS intact, no nystagmus        Lymph:No Cervical lymphadenopathy present     ENT:  no pallor or cyanosis, dentition good        Neck:  carotid pulses are full and equal bilaterally, JVP normal, no carotid bruit        Respiratory:  normal breath sounds, clear to auscultation, normal A-P diameter, normal symmetry, normal respiratory excursion, no use of accessory muscles         Cardiac:   irregularly irregular rhythm              not assessed this visit                                        GI:  abdomen soft, non-tender, BS normoactive, no mass, no HSM, no bruits        Extremities and Muscular Skeletal:  no deformities, clubbing, cyanosis, erythema observed              Neurological:  no gross motor deficits        Psych:           CC  No referring provider defined for this encounter.              "

## 2018-12-05 NOTE — PROGRESS NOTES
Service Date: 2018      CLINIC NOTE        HISTORY OF PRESENT ILLNESS:  I saw Mr. Smith for followup of atrial fibrillation.  He is a 73-year-old white male with severe obesity and recurrent atrial fibrillation.  He has been on Xarelto and diltiazem and flecainide 150 mg b.i.d.  When I saw him in 2017, he was in sinus rhythm.  Somewhere in the recent past, he likely has reverted back to atrial fibrillation.  On the other hand, symptomatically, he does not feel palpitations.  He seemed to feel more fatigued.  He denies chest pain.      PHYSICAL EXAMINATION:   VITAL SIGNS:  Blood pressure was 141/73, heart rate 99 beats per minute, body weight 340 pounds.   LUNGS:  Clear.   CARDIAC:  Rhythm was irregular.  The heart sounds were normal without murmur.   ABDOMEN:  Examination showed severe obesity.   EXTREMITIES:  There was no pedal edema.      RADIOLOGIC STUDIES:  EKG showed atrial flutter with PVCs.  The QRS is widened.      ASSESSMENT AND RECOMMENDATIONS:  Mr. Smith is in atrial flutter which is likely atypical.  He has widened QRS complex.  He has failed to maintain sinus rhythm on flecainide 150 mg p.o. b.i.d.  He is not apparently symptomatic with recurrent atrial fibrillation/flutter.  I have asked him to stop flecainide.  His dose of diltiazem is increased from 180 to 360 mg once a day.  The patient will have a Holter for 24 hours in 1 week.  He will return for followup in 6 months.  If the heart rate cannot be controlled, we will then discuss if we should pursue atrial fibrillation ablation.      cc:   Brett Cassidy MD    [address] [address]         DULCE KRUGER MD             D: 2018   T: 2018   MT: VIANEY      Name:     GAYATHRI SMITH   MRN:      [id number]        Account:      [id number]   :      1945           Service Date: 2018      Document: [id number]

## 2018-12-05 NOTE — LETTER
12/5/2018    Brett Cassidy MD  [address] [facility] [address]    RE: Hugo Weber       Dear Colleague,    I had the pleasure of seeing Hugo Weber in the [facility].    HPI and Plan:   See dictation    Orders Placed This Encounter   Procedures     Follow-Up with Electrophysiologist     EKG 12-lead complete w/read - Clinics (performed today)     Holter Monitor 24 hour - Adult       Orders Placed This Encounter   Medications     diltiazem ER COATED BEADS (CARDIZEM CD) 180 MG 24 hr capsule     Sig: Take 2 capsules (360 mg) by mouth daily     Dispense:  90 capsule     Refill:  3       Medications Discontinued During This Encounter   Medication Reason     flecainide acetate 150 MG TABS      diltiazem (CARDIZEM CD) 180 MG 24 hr capsule Reorder         Encounter Diagnoses   Name Primary?     Paroxysmal atrial fibrillation (H) Yes     Hypertension goal BP (blood pressure) < 140/90      Persistent atrial fibrillation (H)        CURRENT MEDICATIONS:  Current Outpatient Prescriptions   Medication Sig Dispense Refill     diltiazem ER COATED BEADS (CARDIZEM CD) 180 MG 24 hr capsule Take 2 capsules (360 mg) by mouth daily 90 capsule 3     Ferrous Sulfate (IRON) 325 (65 Fe) MG tablet Take 2 tablets by mouth daily (with breakfast) 90 tablet 3     fluticasone (FLONASE) 50 MCG/ACT spray Spray 1 spray into both nostrils daily as needed for rhinitis or allergies 3 Bottle 3     pantoprazole (PROTONIX) 40 MG EC tablet Take 1 tablet (40 mg) by mouth daily Take 30-60 minutes before a meal. 90 tablet 3     rivaroxaban ANTICOAGULANT (XARELTO) 20 MG TABS tablet Take 1 tablet (20 mg) by mouth daily (with dinner) 90 tablet 3     tamsulosin (FLOMAX) 0.4 MG capsule Take 1 capsule (0.4 mg) by mouth daily 90 capsule 3     betamethasone dipropionate (DIPROSONE) 0.05 % cream Apply sparingly to affected area twice daily as needed.  Do not apply to face. 45 g 0     [DISCONTINUED] diltiazem  (CARDIZEM CD) 180 MG 24 hr capsule Take 1 capsule (180 mg) by mouth daily 90 capsule 3       ALLERGIES   No Known Allergies    PAST MEDICAL HISTORY:  Past Medical History:   Diagnosis Date     Arrhythmia      Arthritis      Atrial fibrillation 2006    Followed by [facility]     Calculus of kidney 2005, 6/06, 10/12, 8/18    dr walker/nestor/иван - hematuria - w/u o/w neg     Cholelithiasis      Chronic peptic ulcer, unspecified site, without mention of hemorrhage, perforation, or obstruction 1985    gastric bypass     Colon polyps 8/05, 9/10, 10/15    2 tubular adenoma, 1 hyperplastic - multiple serrated/tubular adenomas     First degree AV block      Hepatitis C 10/12    chronic hepatitis C, grade 1-2, stage 1 - mild - Dr Douglas     Hyperlipidemia LDL goal <130      Hypertension goal BP (blood pressure) < 140/90 6/06    dr jaciel winchester     Iron deficiency anemia, unspecified 1985    gastric bypass     Nephrolithiasis multiple episodes, last 8/18    Dr Bey     OA (osteoarthritis)     Multiple - Left shoulder with rotator cuff tear - Dr Durham     Obesity, unspecified     s/p gastric bypass 1985      Prediabetes      Presbyacusis 1/04    dr corona     Pyelonephritis, unspecified 12/99     SCC (squamous cell carcinoma), 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    dr saez - lt conchal bowl     Sleep apnea 10/02    cpap - severe - 15 cm - dr cunha     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    [facility] Head and Neck       PAST SURGICAL HISTORY:  Past Surgical History:   Procedure Laterality Date     APPENDECTOMY       ARTHROPLASTY KNEE  8/13/2012    LT TKA - Dr Villanueva     CARDIOVERSION  2016     COLONOSCOPY  8/05, 9/10, 10/15    multiple tubular/serrated/hyperplastic polyps     COLONOSCOPY N/A 10/29/2015    multiple tubular and serrated adenomas     COLONOSCOPY N/A 9/7/2016     EYE SURGERY  Lasik     HC KNEE SCOPE, DIAGNOSTIC  05/00    Arthroscopy, Knee RT     LASER HOLMIUM LITHOTRIPSY URETER(S), INSERT STENT, COMBINED  10/16/2012    stones x 4, Dr Campa     LASER  "HOLMIUM LITHOTRIPSY URETER(S), INSERT STENT, COMBINED Right 2018    CYSTOSCOPY, RIGHT URETEROSCOPY, HOLMIUM LASER LITHOTRIPSY, RIGHT STENT PLACEMENT - Dr Bey     SURGICAL HISTORY OF -       s/p gastric bypass Bilroth II     SURGICAL HISTORY OF -       wisdom teeth     SURGICAL HISTORY OF -       cellulitis     SURGICAL HISTORY OF -       lt forearm spur's     SURGICAL HISTORY OF -   ,,    s/p lasik     SURGICAL HISTORY OF -       rt forearm spurs     SURGICAL HISTORY OF -       dr stevenson - lithotrypsy     SURGICAL HISTORY OF -   10/08,     Lt ear chonchal lesion removal SCC - dr saez       FAMILY HISTORY:  Family History   Problem Relation Age of Onset     Alzheimer Disease Father 82      at 88     Prostate Cancer Father 76     bladder and prostate     Heart Disease Mother 80     CHF     Heart Disease Maternal Grandfather      MI at 55     Cancer Paternal Uncle      ?       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[employer]      Retired     Social History Main Topics     Smoking status: Never Smoker     Smokeless tobacco: Never Used     Alcohol use 1.2 oz/week      Comment: 2 per week     Drug use: No      Comment: acknowledges using herbal supplement \"Vibe\" for energy-none used recently      Sexual activity: Yes     Partners: Female     Birth control/ protection: None      Comment:       Other Topics Concern     Caffeine Concern Yes     <1 can qd     Sleep Concern Yes     sleep apnea, wears cpap     Exercise No     Seat Belt Yes     Parent/Sibling W/ Cabg, Mi Or Angioplasty Before 65f 55m? No     Social History Narrative       Review of Systems:  Skin:  Positive for bruising     Eyes:  Negative      ENT:  Positive for hearing loss  " "  Respiratory:  Positive for sleep apnea;CPAP     Cardiovascular:  Negative;chest pain;cyanosis;syncope or near-syncope;lightheadedness;dizziness;edema Positive for;palpitations;fatigue heart racing episodes, 1-2X week   Gastroenterology: Positive for heartburn;reflux;abdominal pain;excessive gas or bloating    Genitourinary:  Negative      Musculoskeletal:  Negative      Neurologic:  Negative      Psychiatric:  Negative      Heme/Lymph/Imm:  Negative      Endocrine:  Negative        Physical Exam:  Vitals: /73  Pulse 99  Ht 1.829 m (6' 0.01\")  Wt (!) 154.2 kg (340 lb)  BMI 46.1 kg/m2    Constitutional:  cooperative, alert and oriented, well developed, well nourished, in no acute distress        Skin:  warm and dry to the touch, no apparent skin lesions or masses noted          Head:  normocephalic, no masses or lesions        Eyes:  pupils equal and round, conjunctivae and lids unremarkable, sclera white, no xanthalasma, EOMS intact, no nystagmus        Lymph:No Cervical lymphadenopathy present     ENT:  no pallor or cyanosis, dentition good        Neck:  carotid pulses are full and equal bilaterally, JVP normal, no carotid bruit        Respiratory:  normal breath sounds, clear to auscultation, normal A-P diameter, normal symmetry, normal respiratory excursion, no use of accessory muscles         Cardiac:   irregularly irregular rhythm              not assessed this visit                                        GI:  abdomen soft, non-tender, BS normoactive, no mass, no HSM, no bruits        Extremities and Muscular Skeletal:  no deformities, clubbing, cyanosis, erythema observed              Neurological:  no gross motor deficits        Psych:           CC  No referring provider defined for this encounter.                Service Date: 12/05/2018      CLINIC NOTE        HISTORY OF PRESENT ILLNESS:  I saw Mr. Weber for followup of atrial fibrillation.  He is a 73-year-old white male with severe obesity " and recurrent atrial fibrillation.  He has been on Xarelto and diltiazem and flecainide 150 mg b.i.d.  When I saw him in 2017, he was in sinus rhythm.  Somewhere in the recent past, he likely has reverted back to atrial fibrillation.  On the other hand, symptomatically, he does not feel palpitations.  He seemed to feel more fatigued.  He denies chest pain.      PHYSICAL EXAMINATION:   VITAL SIGNS:  Blood pressure was 141/73, heart rate 99 beats per minute, body weight 340 pounds.   LUNGS:  Clear.   CARDIAC:  Rhythm was irregular.  The heart sounds were normal without murmur.   ABDOMEN:  Examination showed severe obesity.   EXTREMITIES:  There was no pedal edema.      RADIOLOGIC STUDIES:  EKG showed atrial flutter with PVCs.  The QRS is widened.      ASSESSMENT AND RECOMMENDATIONS:  Mr. Smith is in atrial flutter which is likely atypical.  He has widened QRS complex.  He has failed to maintain sinus rhythm on flecainide 150 mg p.o. b.i.d.  He is not apparently symptomatic with recurrent atrial fibrillation/flutter.  I have asked him to stop flecainide.  His dose of diltiazem is increased from 180 to 360 mg once a day.  The patient will have a Holter for 24 hours in 1 week.  He will return for followup in 6 months.  If the heart rate cannot be controlled, we will then discuss if we should pursue atrial fibrillation ablation.      cc:   Brett Cassidy MD    [address] [address]         DULCE CASTILLO MD             D: 2018   T: 2018   MT: VIANEY      Name:     GAYATHRI SMITH   MRN:      [id number]        Account:      [id number]   :      1945           Service Date: 2018      Document: [id number]         Thank you for allowing me to participate in the care of your patient.      Sincerely,     Dulce Castillo MD     [facility]    cc:   No referring provider defined for this encounter.

## 2018-12-05 NOTE — MR AVS SNAPSHOT
After Visit Summary   12/5/2018    Hugo Weber    MRN: [id number]           Patient Information     Date Of Birth          1945        Visit Information        Provider Department      12/5/2018 12:45 PM Sarah Beth Castillo MD [facility]        Today's Diagnoses     Paroxysmal atrial fibrillation (H)    -  1    Hypertension goal BP (blood pressure) < 140/90        Persistent atrial fibrillation (H)           Follow-ups after your visit        Additional Services     Follow-Up with Electrophysiologist                 Your next 10 appointments already scheduled     Dec 05, 2018  5:30 PM CST   MR PROSTATE  WO & W CONTRAST with [id number]   [facility] ([facility])    [address] [address] [phone]           How do I prepare for my exam? (Food and drink instructions) **If you will be receiving sedation or general anesthesia, please see special notes below.**  How do I prepare for my exam? (Other instructions) Take your medicines as usual, unless your doctor tells you not to. You may or may not receive intravenous (IV) contrast for this exam pending the discretion of the Radiologist.  You do not need to do anything special to prepare.  **If you will be receiving sedation or general anesthesia, please see special notes below.**  What should I wear: The MRI machine uses a strong magnet. Please wear clothes without metal (snaps, zippers). A sweatsuit works well, or we may give you a hospital gown. Please remove any body piercings and hair extensions before you arrive. You will also remove watches, jewelry, hairpins, wallets, dentures, partial dental plates and hearing aids. You may wear contact lenses, and you may be able to wear your rings. We have a safe place to keep your personal items, but it is safer to leave them at home.  How long does the exam take: Most tests take 30 to 60  minutes.  HOWEVER, IF YOUR DOCTOR PRESCRIBES ANESTHESIA please plan on spending four to five hours in the recovery room.  What should I bring:  Bring a list of your current medicines to your exam (including vitamins, minerals and over-the-counter drugs).  Do I need a :  **If you will be receiving sedation or general anesthesia, please see special notes below.**  What should I do after the exam: No Restrictions, You may resume normal activities.  What is this test: MRI (magnetic resonance imaging) uses a strong magnet and radio waves to look inside the body. An MRA (magnetic resonance angiogram) does the same thing, but it lets us look at your blood vessels. A computer turns the radio waves into pictures showing cross sections of the body, much like slices of bread. This helps us see any problems more clearly. You may receive fluid (called  contrast ) before or during your scan. The fluid helps us see the pictures better. We give the fluid through an IV (small needle in your arm).  Who should I call with questions:  Please call the Imaging Department at your exam site with any questions. Directions, parking instructions, and other information is available on our website, [email or web address].  How do I prepare if I m having sedation or anesthesia? **IMPORTANT** THE INSTRUCTIONS BELOW ARE ONLY FOR THOSE PATIENTS WHO HAVE BEEN TOLD THEY WILL RECEIVE SEDATION OR GENERAL ANESTHESIA DURING THEIR MRI PROCEDURE:  IF YOU WILL RECEIVE SEDATION (take medicine to help you relax during your exam): You must get the medicine from your doctor before you arrive. Bring the medicine to the exam. Do not take it at home. Arrive one hour early. Bring someone who can take you home after the test. Your medicine will make you sleepy. After the exam, you may not drive, take a bus or take a taxi by yourself. No eating 8 hours before your exam. You may have clear liquids up until 4 hours before your exam. (Clear liquids include water,  clear tea, black coffee and fruit juice without pulp.)  IF YOU WILL RECEIVE ANESTHESIA (be asleep for your exam): Arrive 1 1/2 hours early. Bring someone who can take you home after the test. You may not drive, take a bus or take a taxi by yourself. No eating 8 hours before your exam. You may have clear liquids up until 4 hours before your exam. (Clear liquids include water, clear tea, black coffee and fruit juice without pulp.)            [place] 15, 2019 10:00 AM CST   (Arrive by 9:45 AM)   Return Visit with Regulo Heath MD   [facility] ([facility])    [address] [facility]  [address] [facility] [address] [phone]              Future tests that were ordered for you today     Open Future Orders        Priority Expected Expires Ordered    Holter Monitor 24 hour - Adult Routine 12/12/2018 12/5/2019 12/5/2018    Follow-Up with Electrophysiologist Routine 6/3/2019 12/5/2019 12/5/2018            Who to contact     If you have questions or need follow up information about today's clinic visit or your schedule please contact [facility] directly at [phone].  Normal or non-critical lab and imaging results will be communicated to you by [employer], letter or phone within 4 business days after the clinic has received the results. If you do not hear from us within 7 days, please contact the clinic through [employer] or phone. If you have a critical or abnormal lab result, we will notify you by phone as soon as possible.  Submit refill requests through [employer] or call your pharmacy and they will forward the refill request to us. Please allow 3 business days for your refill to be completed.          Additional Information About Your Visit        [employer] Information     [employer] gives you secure access to your electronic health record. If you see a primary care provider, you can also send messages to your  "care team and make appointments. If you have questions, please call your primary care clinic.  If you do not have a primary care provider, please call [phone] and they will assist you.        Care EveryWhere ID     This is your Care EveryWhere ID. This could be used by other organizations to access your [facility] medical records  [id number]        Your Vitals Were     Pulse Height BMI (Body Mass Index)             99 1.829 m (6' 0.01\") 46.1 kg/m2          Blood Pressure from Last 3 Encounters:   12/05/18 141/73   11/12/18 139/80   10/26/18 119/67    Weight from Last 3 Encounters:   12/05/18 (!) 154.2 kg (340 lb)   11/20/18 (!) 157.4 kg (347 lb)   11/12/18 (!) 157.4 kg (347 lb)              We Performed the Following     EKG 12-lead complete w/read - Clinics (performed today)          Today's Medication Changes          These changes are accurate as of 12/5/18  1:19 PM.  If you have any questions, ask your nurse or doctor.               These medicines have changed or have updated prescriptions.        Dose/Directions    diltiazem ER COATED BEADS 180 MG 24 hr capsule   Commonly known as:  CARDIZEM CD   This may have changed:  how much to take   Used for:  Paroxysmal atrial fibrillation (H), Hypertension goal BP (blood pressure) < 140/90   Changed by:  Sarah Beth Castillo MD        Dose:  360 mg   Take 2 capsules (360 mg) by mouth daily   Quantity:  90 capsule   Refills:  3         Stop taking these medicines if you haven't already. Please contact your care team if you have questions.     flecainide 150 MG tablet   Commonly known as:  TAMBOCOR   Stopped by:  Sarah Beth Castillo MD                Where to get your medicines      These medications were sent to [facility] [address] [facility] [address] [facility] [address] & [facility]  [address] [place] [address] [address], [facility] [address]    Hours:  24-hours Phone:  [phone]     diltiazem ER COATED BEADS 180 MG 24 hr capsule                Primary Care Provider " Office Phone # Fax #    Brett Cassidy [phone] [address] [address]        Equal Access to Services     AUDRA PEARSON : Hadii aad ku hadnoemísorin Sojoe, waaxda luqadaha, qaybta kaalmada caroline, stella ladonnain hayaatony maradiagaankush calle kenna albert. So [facility] [phone].    ATENCIÓN: Si habla español, tiene a oconnor disposición servicios gratuitos de asistencia lingüística. Llame al [phone].    We comply with applicable federal civil rights laws and [place] laws. We do not discriminate on the basis of race, color, national origin, age, disability, sex, sexual orientation, or gender identity.            Thank you!     Thank you for choosing [facility]  for your care. Our goal is always to provide you with excellent care. Hearing back from our patients is one way we can continue to improve our services. Please take a few minutes to complete the written survey that you may receive in the mail after your visit with us. Thank you!             Your Updated Medication List - Protect others around you: Learn how to safely use, store and throw away your medicines at www.disposemymeds.org.          This list is accurate as of 12/5/18  1:19 PM.  Always use your most recent med list.                   Brand Name Dispense Instructions for use Diagnosis    betamethasone dipropionate 0.05 % external cream    DIPROSONE    45 g    Apply sparingly to affected area twice daily as needed.  Do not apply to face.    Rash       diltiazem ER COATED BEADS 180 MG 24 hr capsule    CARDIZEM CD    90 capsule    Take 2 capsules (360 mg) by mouth daily    Paroxysmal atrial fibrillation (H), Hypertension goal BP (blood pressure) < 140/90       fluticasone 50 MCG/ACT nasal spray    FLONASE    3 Bottle    Spray 1 spray into both nostrils daily as needed for rhinitis or allergies    Environmental allergies       iron 325 (65 Fe) MG tablet     90 tablet    Take 2 tablets by  mouth daily (with breakfast)    Iron deficiency anemia, unspecified iron deficiency anemia type       pantoprazole 40 MG EC tablet    PROTONIX    90 tablet    Take 1 tablet (40 mg) by mouth daily Take 30-60 minutes before a meal.    Gastroesophageal reflux disease with esophagitis       rivaroxaban ANTICOAGULANT 20 MG Tabs tablet    XARELTO    90 tablet    Take 1 tablet (20 mg) by mouth daily (with dinner)    Paroxysmal atrial fibrillation (H)       tamsulosin 0.4 MG capsule    FLOMAX    90 capsule    Take 1 capsule (0.4 mg) by mouth daily    Benign prostatic hyperplasia (BPH) with urinary urge incontinence

## 2018-12-06 NOTE — DISCHARGE INSTRUCTIONS
MRI Contrast Discharge Instructions    The IV contrast you received today will pass out of your body in your  urine. This will happen in the next 24 hours. You will not feel this process.  Your urine will not change color.    Drink at least 4 extra glasses of water or juice today (unless your doctor  has restricted your fluids). This reduces the stress on your kidneys.  You may take your regular medicines.    If you are on dialysis: It is best to have dialysis today.    If you have a reaction: Most reactions happen right away. If you have  any new symptoms after leaving the hospital (such as hives or swelling),  call your hospital at the correct number below. Or call your family doctor.  If you have breathing distress or wheezing, call 911.    Special instructions: ***    I have read and understand the above information.    Signature:______________________________________ Date:___________    Staff:__________________________________________ Date:___________     Time:__________    [facility] Departments:    ___[place]: [phone]  ___[facility]: [phone]  ___[place]: [phone] ___[facility]: [phone]  ___[facility]: [phone]  ___[facility]: [phone]  ___[phone]  ___[facility]: [phone]  ___[phone]

## 2018-12-11 DIAGNOSIS — N40.2 PROSTATIC NODULE: ICD-10-CM

## 2018-12-11 DIAGNOSIS — Z79.2 PROPHYLACTIC ANTIBIOTIC: Primary | ICD-10-CM

## 2018-12-11 RX ORDER — CIPROFLOXACIN 500 MG/1
500 TABLET, FILM COATED ORAL 2 TIMES DAILY
Qty: 6 TABLET | Refills: 0 | Status: SHIPPED | OUTPATIENT
Start: 2018-12-11 | End: 2019-02-06

## 2018-12-13 ENCOUNTER — HOSPITAL ENCOUNTER (OUTPATIENT)
Dept: CARDIOLOGY | Facility: CLINIC | Age: 73
Discharge: HOME | End: 2018-12-13
Attending: INTERNAL MEDICINE | Admitting: INTERNAL MEDICINE
Payer: MEDICARE

## 2018-12-13 DIAGNOSIS — I48.19 PERSISTENT ATRIAL FIBRILLATION (H): ICD-10-CM

## 2018-12-13 PROCEDURE — 93225 XTRNL ECG REC<48 HRS REC: CPT

## 2018-12-13 PROCEDURE — 93227 XTRNL ECG REC<48 HR R&I: CPT | Performed by: INTERNAL MEDICINE

## 2018-12-21 ENCOUNTER — OFFICE VISIT (OUTPATIENT)
Dept: UROLOGY | Facility: CLINIC | Age: 73
End: 2018-12-21
Payer: COMMERCIAL

## 2018-12-21 VITALS
OXYGEN SATURATION: 97 % | DIASTOLIC BLOOD PRESSURE: 74 MMHG | WEIGHT: 315 LBS | HEART RATE: 83 BPM | SYSTOLIC BLOOD PRESSURE: 124 MMHG | BODY MASS INDEX: 42.66 KG/M2 | HEIGHT: 72 IN

## 2018-12-21 DIAGNOSIS — R97.20 ELEVATED PROSTATE SPECIFIC ANTIGEN (PSA): ICD-10-CM

## 2018-12-21 DIAGNOSIS — N40.2 PROSTATE NODULE: Primary | ICD-10-CM

## 2018-12-21 PROCEDURE — 76872 US TRANSRECTAL: CPT | Performed by: UROLOGY

## 2018-12-21 PROCEDURE — 55700 HC BIOPSY PROSTATE NEEDLE/PUNCH: CPT | Performed by: UROLOGY

## 2018-12-21 PROCEDURE — 88305 TISSUE EXAM BY PATHOLOGIST: CPT | Performed by: UROLOGY

## 2018-12-21 ASSESSMENT — MIFFLIN-ST. JEOR
SCORE: 2375.59
SCORE: 2375.59

## 2018-12-21 ASSESSMENT — PAIN SCALES - GENERAL: PAINLEVEL: SEVERE PAIN (6)

## 2018-12-21 NOTE — PATIENT INSTRUCTIONS
[facility]  Transrectal Ultrasound  Post Operative Information    The physician who performed your Transrectal Ultrasound is Dr. De Santiago (telephone number [phone]).  Please contact this doctor if you have any problems or questions.  If unable to reach your doctor, please return to the Emergency Department.      Take one antibiotic the evening of the procedure and then as directed on your prescription.    Drink at least 6-8 glasses of fluids for the first 48 hours.    Avoid heavy lifting and strenuous activity for 48 hours.    Avoid sexual intercourse for the first 24 hours.    No aspirin or ibuprofen products (Motrin, Advil, Nuprin, ect.) for one week.  You may take acetaminophen (Tylenol) for pain.    You may notice a small amount of blood on the tissue after a bowel movement.    You may pass blood with clots in your urine following the procedure.  The amount will decrease with time but may be visible for up to two weeks.     You make have blood in your semen for 4 weeks after the procedure.    You may experience mild perineal (groin area) discomfort after the procedure.    Please call you doctor if you have any of the follow symptoms:  Fever  Increase in the amount of blood passed  Severe discomfort or pain

## 2018-12-21 NOTE — PROGRESS NOTES
Diagnosis: Prostate nodule  Procedure:LEI, transrectal ultrasound of prostate, transrectal biopsies of prostate nodule  Surgeon: Anyi  Anesthesia: None  EBL: Minimal  Findings: No  Prostatic nodule or calculus found at left medial apex

## 2018-12-21 NOTE — LETTER
12/21/2018       RE: Hugo Weber  [address]  [facility] [address]     Dear Colleague,    Thank you for referring your patient, Hugo Weber, to the [facility] [place] at [facility]. Please see a copy of my visit note below.    Diagnosis: Prostate nodule  Procedure:LEI, transrectal ultrasound of prostate, transrectal biopsies of prostate nodule  Surgeon: Anyi  Anesthesia: None  EBL: Minimal  Findings: No  Prostatic nodule or calculus found at left medial apex    Again, thank you for allowing me to participate in the care of your patient.      Sincerely,    Bruno De Santiago MD

## 2018-12-21 NOTE — NURSING NOTE
No chief complaint on file.    Pre-Operative    Consent read and signed: Yes   No Known Allergies  Pre-operative antibiotics taken: Yes  Aspirin or other blood thinning medications not taken in 7-10 days:  Yes  Time of Fleet's enema: 11:00AM

## 2018-12-26 LAB — COPATH REPORT: NORMAL

## 2019-01-03 ENCOUNTER — TRANSFERRED RECORDS (OUTPATIENT)
Dept: HEALTH INFORMATION MANAGEMENT | Facility: CLINIC | Age: 74
End: 2019-01-03

## 2019-01-03 ENCOUNTER — TELEPHONE (OUTPATIENT)
Dept: CARDIOLOGY | Facility: CLINIC | Age: 74
End: 2019-01-03

## 2019-01-03 DIAGNOSIS — I48.0 PAROXYSMAL ATRIAL FIBRILLATION (H): ICD-10-CM

## 2019-01-03 NOTE — TELEPHONE ENCOUNTER
Called and reviewed holter results with pt. Heart rate is well controlled in afib/flutter. He will f/u in 6 months to determine if rate is controlled or if afib ablation should be pursued.

## 2019-01-08 ENCOUNTER — TELEPHONE (OUTPATIENT)
Dept: UROLOGY | Facility: CLINIC | Age: 74
End: 2019-01-08

## 2019-01-08 NOTE — TELEPHONE ENCOUNTER
Hugo is still seeing a little blood in his urine at the end of his stream S/p prostate biopsy . He denies any clots ,dysuria or trouble voiding he is on Xarelto. Informed him most likely blood is still from the recent biopsy and that he takes and blood thinner. Instructed to monitor and  call office if UTI sx develop, flank pain or increase bleeding  occurs.Jennifer Nolasco LPN

## 2019-01-19 ENCOUNTER — TRANSFERRED RECORDS (OUTPATIENT)
Dept: HEALTH INFORMATION MANAGEMENT | Facility: CLINIC | Age: 74
End: 2019-01-19

## 2019-01-20 ENCOUNTER — TRANSFERRED RECORDS (OUTPATIENT)
Dept: HEALTH INFORMATION MANAGEMENT | Facility: CLINIC | Age: 74
End: 2019-01-20

## 2019-01-21 ENCOUNTER — TRANSFERRED RECORDS (OUTPATIENT)
Dept: HEALTH INFORMATION MANAGEMENT | Facility: CLINIC | Age: 74
End: 2019-01-21

## 2019-01-21 ENCOUNTER — TELEPHONE (OUTPATIENT)
Dept: CARDIOLOGY | Facility: CLINIC | Age: 74
End: 2019-01-21

## 2019-01-21 DIAGNOSIS — I63.9 CEREBROVASCULAR ACCIDENT (H): Primary | ICD-10-CM

## 2019-01-21 RX ORDER — ASPIRIN 81 MG/1
81 TABLET ORAL DAILY
Qty: 90 TABLET | Refills: 3
Start: 2019-01-21 | End: 2019-09-20

## 2019-01-21 RX ORDER — ROSUVASTATIN CALCIUM 40 MG/1
40 TABLET, COATED ORAL DAILY
Qty: 90 TABLET | Refills: 3
Start: 2019-01-21 | End: 2019-02-22

## 2019-01-21 NOTE — TELEPHONE ENCOUNTER
Pt called and states that he is in [facility] at the [facility] after having a stroke.  Per pt there was a minor clot on the right side of his head.  Pt Xarelto has been changed to Eliquis 5 mg bid and pt was started on ASA 81 mg and Crestor 40 mg daily.  Pt has been scheduled to see Keila on 2/8, but felt that he would like to see Dr Castillo instead.  Pt states that he does not like the way he feels being in A Fib all the time.  Pt will now see Dr Castillo on 2/13 at 1445 in [place].  Pt currently is still in the hosptial and has been asked to sign a release so that records can be obtained.  Pt also has been told that he should contact his PMD Dr Cassidy, to make aware of this situation.  Pt states understanding.  Med list updated. JNelsonRN

## 2019-01-22 ENCOUNTER — TELEPHONE (OUTPATIENT)
Dept: FAMILY MEDICINE | Facility: CLINIC | Age: 74
End: 2019-01-22

## 2019-01-22 DIAGNOSIS — N40.1 BENIGN PROSTATIC HYPERPLASIA (BPH) WITH URINARY URGE INCONTINENCE: ICD-10-CM

## 2019-01-22 DIAGNOSIS — N39.41 BENIGN PROSTATIC HYPERPLASIA (BPH) WITH URINARY URGE INCONTINENCE: ICD-10-CM

## 2019-01-22 RX ORDER — TAMSULOSIN HYDROCHLORIDE 0.4 MG/1
0.4 CAPSULE ORAL
Qty: 90 CAPSULE | Refills: 3
Start: 2019-01-22 | End: 2019-03-28

## 2019-01-22 NOTE — TELEPHONE ENCOUNTER
Reason for call:  Patient reporting a symptom    Symptom or request: The patient is calling wanting to speak with one of Dr. Cassidy's nurses. He says he had a stroke last week and just got out of the hospital. He is currently in [place].     Phone Number patient can be reached at:  Cell number on file:    Telephone Information:   Mobile [phone]     Best Time:  Anytime    Can we leave a detailed message on this number:  YES    Call taken on 1/22/2019 at 10:13 AM by Gaby Huerta

## 2019-01-22 NOTE — TELEPHONE ENCOUNTER
Patient called again to review medication list.  indicated he did speak with his PMD nurse to clarify meds earlier today as well..  Only discrepancy noted is patient is only taking one tablet of ferrous sulfate tablet all other medications current.  Patient had no further questions at this time.  JAZZ Stone

## 2019-01-23 NOTE — TELEPHONE ENCOUNTER
Huddled with TS,MD - pt made an appointment for 20 mins already, okay to just have to on the 20 min slot     Kathia Centeno RN, BSN  [facility]

## 2019-01-30 ENCOUNTER — PRE VISIT (OUTPATIENT)
Dept: UROLOGY | Facility: CLINIC | Age: 74
End: 2019-01-30

## 2019-01-31 NOTE — TELEPHONE ENCOUNTER
Records from [facility] in [place] have arrived and been sent to be scanned.  Copy made and placed with dr Castillo chart prep. JNelsonRN

## 2019-02-04 DIAGNOSIS — I10 HYPERTENSION GOAL BP (BLOOD PRESSURE) < 140/90: ICD-10-CM

## 2019-02-04 DIAGNOSIS — I63.9 CEREBROVASCULAR ACCIDENT (H): ICD-10-CM

## 2019-02-04 DIAGNOSIS — I48.0 PAROXYSMAL ATRIAL FIBRILLATION (H): ICD-10-CM

## 2019-02-04 RX ORDER — DILTIAZEM HYDROCHLORIDE 180 MG/1
360 CAPSULE, COATED, EXTENDED RELEASE ORAL DAILY
Qty: 90 CAPSULE | Refills: 3 | Status: SHIPPED | OUTPATIENT
Start: 2019-02-04 | End: 2019-02-04

## 2019-02-04 RX ORDER — DILTIAZEM HYDROCHLORIDE 180 MG/1
360 CAPSULE, COATED, EXTENDED RELEASE ORAL DAILY
Qty: 180 CAPSULE | Refills: 3 | Status: SHIPPED | OUTPATIENT
Start: 2019-02-04 | End: 2019-02-06

## 2019-02-04 NOTE — TELEPHONE ENCOUNTER
Pt needing refills to his new medications since having his Stroke.  Explained that Dr Cassidy can refill the Crestor and Protonix, but will refill Eliquis and Diltiazem.  Escript sent. Yanely

## 2019-02-05 ENCOUNTER — OFFICE VISIT (OUTPATIENT)
Dept: UROLOGY | Facility: CLINIC | Age: 74
End: 2019-02-05
Payer: COMMERCIAL

## 2019-02-05 VITALS
HEART RATE: 69 BPM | BODY MASS INDEX: 42.66 KG/M2 | WEIGHT: 315 LBS | DIASTOLIC BLOOD PRESSURE: 73 MMHG | SYSTOLIC BLOOD PRESSURE: 134 MMHG | HEIGHT: 72 IN

## 2019-02-05 DIAGNOSIS — I63.9 CEREBROVASCULAR ACCIDENT (CVA), UNSPECIFIED MECHANISM (H): ICD-10-CM

## 2019-02-05 DIAGNOSIS — N20.0 KIDNEY STONE: Primary | ICD-10-CM

## 2019-02-05 RX ORDER — PANTOPRAZOLE SODIUM 40 MG/1
40 TABLET, DELAYED RELEASE ORAL
COMMUNITY
End: 2019-02-06

## 2019-02-05 RX ORDER — FLUTICASONE PROPIONATE 50 MCG
1 SPRAY, SUSPENSION (ML) NASAL
COMMUNITY
End: 2019-11-29

## 2019-02-05 RX ORDER — DILTIAZEM HYDROCHLORIDE 180 MG/1
360 CAPSULE, EXTENDED RELEASE ORAL DAILY
COMMUNITY
End: 2020-03-24

## 2019-02-05 RX ORDER — TAMSULOSIN HYDROCHLORIDE 0.4 MG/1
0.4 CAPSULE ORAL
COMMUNITY
End: 2019-02-06

## 2019-02-05 ASSESSMENT — PAIN SCALES - GENERAL: PAINLEVEL: NO PAIN (0)

## 2019-02-05 ASSESSMENT — MIFFLIN-ST. JEOR: SCORE: 2354.72

## 2019-02-05 NOTE — NURSING NOTE
Chief Complaint   Patient presents with     Consult For     Kidney stones       Blood pressure 134/73, pulse 69, height 1.829 m (6'), weight (!) 156.7 kg (345 lb 6.4 oz). Body mass index is 46.84 kg/m .    Patient Active Problem List   Diagnosis     Iron deficiency anemia     Colon polyps     Obstructive sleep apnea syndrome     Hyperlipidemia LDL goal <100     Long term current use of anticoagulant therapy - for intermittent a-fib     Advance Care Planning     Total knee replacement status     Calculus of kidney     NAFLD (nonalcoholic fatty liver disease)     Morbid obesity due to excess calories (H)     History of hepatitis C     Prediabetes     Paroxysmal atrial fibrillation (H)     Cholelithiasis     Hypertension goal BP (blood pressure) < 140/90     TMJ arthralgia     Nephrolithiasis     OA (osteoarthritis)     First degree AV block     Benign prostatic hyperplasia (BPH) with urinary urge incontinence     Thoracic aortic ectasia (H)       No Known Allergies    Current Outpatient Medications   Medication Sig Dispense Refill     apixaban ANTICOAGULANT (ELIQUIS) 5 MG tablet Take 1 tablet (5 mg) by mouth 2 times daily 180 tablet 3     aspirin 81 MG EC tablet Take 1 tablet (81 mg) by mouth daily 90 tablet 3     diltiazem ER (DILT-XR) 180 MG 24 hr capsule Take 360 mg by mouth daily       Ferrous Sulfate (IRON) 325 (65 Fe) MG tablet Take 2 tablets by mouth daily (with breakfast) 90 tablet 3     fluticasone (FLONASE) 50 MCG/ACT nasal spray Spray 1 spray in nostril       pantoprazole (PROTONIX) 40 MG EC tablet Take 1 tablet (40 mg) by mouth daily Take 30-60 minutes before a meal. 90 tablet 3     rosuvastatin (CRESTOR) 40 MG tablet Take 1 tablet (40 mg) by mouth daily 90 tablet 3     tamsulosin (FLOMAX) 0.4 MG capsule Take 1 capsule (0.4 mg) by mouth 2 times daily 90 capsule 3     betamethasone dipropionate (DIPROSONE) 0.05 % cream Apply sparingly to affected area twice daily as needed.  Do not apply to face. (Patient  "not taking: Reported on 2/5/2019) 45 g 0     diltiazem ER COATED BEADS (CARDIZEM CD) 180 MG 24 hr capsule Take 2 capsules (360 mg) by mouth daily (Patient not taking: Reported on 2/5/2019) 180 capsule 3     fluticasone (FLONASE) 50 MCG/ACT spray Spray 1 spray into both nostrils daily as needed for rhinitis or allergies (Patient not taking: Reported on 2/5/2019) 3 Bottle 3     pantoprazole (PROTONIX) 40 MG EC tablet Take 40 mg by mouth       tamsulosin (FLOMAX) 0.4 MG capsule Take 0.4 mg by mouth         Social History     Tobacco Use     Smoking status: Never Smoker     Smokeless tobacco: Never Used   Substance Use Topics     Alcohol use: Yes     Alcohol/week: 1.2 oz     Comment: 2 per week     Drug use: No     Comment: acknowledges using herbal supplement \"Vibe\" for energy-none used recently        Hyacinth Washington LPN  2/5/2019  2:37 PM     "

## 2019-02-05 NOTE — LETTER
2/5/2019       RE: Hugo Weber  [address]  [facility] [address]     Dear Colleague,    Thank you for referring your patient, Hugo Weber, to the [facility] UROLOGY AND INST FOR PROSTATE AND UROLOGIC CANCERS at [facility]. Please see a copy of my visit note below.    Name: Hugo Weber   MRN: [id number]  YOB: 1945    Assessment and Plan:  73 year old male with a history of recurrent nephrolithiasis and bilateral nonobstructing renal stones.  Patient is currently not experiencing any pain and denies dysuria.  Some hematuria exists of uncertain cause given a recent prostate biopsy.  We discussed the nature of nonobstructing stones.  Int he absence of symptoms or infection and in the context of recent CVA the shared decision was made to proceed with updated metabolic evaluation at this time with potential for intervention down the line for growth, symptoms or movement.   -2x 24 hour urine collection for analysis   -Next steps based on above    Orders Placed This Encounter   Procedures     [facility]: Clinic Lab Order            Chief Complaint: Nephrolithiasis    History of Present Illness:  Mr. Hugo Weber is a 73 year old male seen in consultation for recurrent nephrolithiasis.  He has had at least 4 stone episodes.  Most recently underwent ureteroscopy for an 8 mm right ureteral stone with Dr. Bey this past summer.  A follow-up KUB after the procedure showed incomplete clearance of the right renal pelvis stone and there were several stones in each kidney, largest of which was just under one cm.      He had a remote metabolic evaluation.  Stone history is calcium oxalate.  Not currently on any preventative diets or medications.  He has no flank pain, fevers, chills, infection.  Does have a little hematuria but related to a recent prostate biopsy.  Of note, patient recently suffered a CVA while in [place], and suffers from atrial  fiberillation on long-term anticoagulation.       We reviewed his recent CT A/P which shows bilateral non-obstructing renal stones.  LArgest stone on the right is about7 mm in the lower pole.  LArgest stone on th eleft is about 9 mm in the upper pole.           Past Medical History:  Past Medical History:   Diagnosis Date     Arrhythmia      Arthritis      Atrial fibrillation 2006    Followed by [facility]     Calculus of kidney 2005, 6/06, 10/12, 8/18    dr walker/nestor/иван - hematuria - w/u o/w neg     Cholelithiasis      Chronic peptic ulcer, unspecified site, without mention of hemorrhage, perforation, or obstruction 1985    gastric bypass     Colon polyps 8/05, 9/10, 10/15    2 tubular adenoma, 1 hyperplastic - multiple serrated/tubular adenomas     First degree AV block      Hepatitis C 10/12    chronic hepatitis C, grade 1-2, stage 1 - mild - Dr Douglas     Hyperlipidemia LDL goal <130      Hypertension goal BP (blood pressure) < 140/90 6/06    dr jaciel winchester     Iron deficiency anemia, unspecified 1985    gastric bypass     Nephrolithiasis multiple episodes, last 8/18    Dr Bey     OA (osteoarthritis)     Multiple - Left shoulder with rotator cuff tear - Dr Durham     Obesity, unspecified     s/p gastric bypass 1985      Prediabetes      Presbyacusis 1/04    dr corona     Pyelonephritis, unspecified 12/99     SCC (squamous cell carcinoma), 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    dr saez - lt conchal bowl     Sleep apnea 10/02    cpap - severe - 15 cm - dr cunha     Stroke (H) 1/19/2019     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    [facility] Ne[facility]            Past Surgical History:  Past Surgical History:   Procedure Laterality Date     APPENDECTOMY       ARTHROPLASTY KNEE  8/13/2012    LT TKA - Dr Villanueva     CARDIOVERSION  2016     COLONOSCOPY  8/05, 9/10, 10/15    multiple tubular/serrated/hyperplastic polyps     COLONOSCOPY N/A 10/29/2015    multiple tubular and serrated adenomas     COLONOSCOPY N/A 9/7/2016     EYE SURGERY  Lasik       KNEE SCOPE, DIAGNOSTIC      Arthroscopy, Knee RT     LASER HOLMIUM LITHOTRIPSY URETER(S), INSERT STENT, COMBINED  10/16/2012    stones x 4, Dr Campa     LASER HOLMIUM LITHOTRIPSY URETER(S), INSERT STENT, COMBINED Right 2018    CYSTOSCOPY, RIGHT URETEROSCOPY, HOLMIUM LASER LITHOTRIPSY, RIGHT STENT PLACEMENT - Dr Bey     SURGICAL HISTORY OF -       s/p gastric bypass Bilroth II     SURGICAL HISTORY OF -       wisdom teeth     SURGICAL HISTORY OF -       cellulitis     SURGICAL HISTORY OF -       lt forearm spur's     SURGICAL HISTORY OF -   ,,    s/p lasik     SURGICAL HISTORY OF -       rt forearm spurs     SURGICAL HISTORY OF -       dr stevenson - lithotrypsy     SURGICAL HISTORY OF -   10/08,     Lt ear chonchal lesion removal SCC - dr saez            Social History:  Social History     Tobacco Use     Smoking status: Never Smoker     Smokeless tobacco: Never Used   Substance Use Topics     Alcohol use: Yes     Alcohol/week: 1.2 oz     Comment: 2 per week            Family History:  Family History   Problem Relation Age of Onset     Alzheimer Disease Father 82         at 88     Prostate Cancer Father 76        bladder and prostate     Heart Disease Mother 80        CHF     Heart Disease Maternal Grandfather         MI at 55     Cancer Paternal Uncle         ?            Allergies:  No Known Allergies         Medications:  Current Outpatient Medications   Medication Sig     apixaban ANTICOAGULANT (ELIQUIS) 5 MG tablet Take 1 tablet (5 mg) by mouth 2 times daily     aspirin 81 MG EC tablet Take 1 tablet (81 mg) by mouth daily     diltiazem ER (DILT-XR) 180 MG 24 hr capsule Take 360 mg by mouth daily     Ferrous Sulfate (IRON) 325 (65 Fe) MG tablet Take 2 tablets by mouth daily (with breakfast)     fluticasone (FLONASE) 50 MCG/ACT nasal spray Spray 1 spray in nostril     pantoprazole (PROTONIX) 40 MG EC tablet Take 1 tablet (40 mg) by mouth daily Take  30-60 minutes before a meal.     rosuvastatin (CRESTOR) 40 MG tablet Take 1 tablet (40 mg) by mouth daily     tamsulosin (FLOMAX) 0.4 MG capsule Take 1 capsule (0.4 mg) by mouth 2 times daily     betamethasone dipropionate (DIPROSONE) 0.05 % cream Apply sparingly to affected area twice daily as needed.  Do not apply to face. (Patient not taking: Reported on 2/5/2019)     diltiazem ER COATED BEADS (CARDIZEM CD) 180 MG 24 hr capsule Take 2 capsules (360 mg) by mouth daily (Patient not taking: Reported on 2/5/2019)     fluticasone (FLONASE) 50 MCG/ACT spray Spray 1 spray into both nostrils daily as needed for rhinitis or allergies (Patient not taking: Reported on 2/5/2019)     pantoprazole (PROTONIX) 40 MG EC tablet Take 40 mg by mouth     tamsulosin (FLOMAX) 0.4 MG capsule Take 0.4 mg by mouth     No current facility-administered medications for this visit.        Review of Systems:  ROS: 14 point ROS negative other than the symptoms noted above in the HPI and PMH.    Physical Exam:  B/P: 134/73, T: Data Unavailable, P: 69, R: Data Unavailable  Estimated body mass index is 46.84 kg/m  as calculated from the following:    Height as of this encounter: 1.829 m (6').    Weight as of this encounter: 156.7 kg (345 lb 6.4 oz).  Gen: Alert and orientedx3; not in acute distress; mentating appropriately   HEENT: No sclera icterus; no facial deformity; both nostrils patent;   Cardio: No JVD visible, grossly well-perfsed  Pulm: Not in acute respiratory distress  GI: Abdomen soft and nontender to palpation; bowel sounds present  Neuro: Mentating well; CN II-XII grossly intact  EXT/MSK: no LE or UE edema; LE and UE warm to touch  Inc: c/d/i; no erythema or warmth    Labs:   All laboratory data reviewed with patient  PSA   Date Value Ref Range Status   09/20/2018 4.46 (H) 0 - 4 ug/L Final     Comment:     Assay Method:  Chemiluminescence using Siemens Vista analyzer     Creatinine   Date Value Ref Range Status   11/12/2018 0.84 0.66  - 1.25 mg/dL Final     No results found for: UA  No components found for: UC    Imaging:   All imaging reviewed with patient.  CT[facility]scan from [facility] dated 09/10/2018, significant for identifiable stones bilaterally    I have discussed this case with Dr. Ivana Ovalle, MS3  February 5, 2019    I, Regulo Heath saw and evaluated this patient and agree with the plan as stated above.  I personally performed all listed procedures.         Again, thank you for allowing me to participate in the care of your patient.      Sincerely,    Regulo Heath MD

## 2019-02-05 NOTE — PROGRESS NOTES
Name: Hugo Weber   MRN: [id number]  YOB: 1945    Assessment and Plan:  73 year old male with a history of recurrent nephrolithiasis and bilateral nonobstructing renal stones.  Patient is currently not experiencing any pain and denies dysuria.  Some hematuria exists of uncertain cause given a recent prostate biopsy.  We discussed the nature of nonobstructing stones.  Int he absence of symptoms or infection and in the context of recent CVA the shared decision was made to proceed with updated metabolic evaluation at this time with potential for intervention down the line for growth, symptoms or movement.   -2x 24 hour urine collection for analysis   -Next steps based on above    Orders Placed This Encounter   Procedures     [facility]: Clinic Lab Order            Chief Complaint: Nephrolithiasis    History of Present Illness:  Mr. Hugo Weber is a 73 year old male seen in consultation for recurrent nephrolithiasis.  He has had at least 4 stone episodes.  Most recently underwent ureteroscopy for an 8 mm right ureteral stone with Dr. Bey this past summer.  A follow-up KUB after the procedure showed incomplete clearance of the right renal pelvis stone and there were several stones in each kidney, largest of which was just under one cm.      He had a remote metabolic evaluation.  Stone history is calcium oxalate.  Not currently on any preventative diets or medications.  He has no flank pain, fevers, chills, infection.  Does have a little hematuria but related to a recent prostate biopsy.  Of note, patient recently suffered a CVA while in [place], and suffers from atrial fiberillation on long-term anticoagulation.       We reviewed his recent CT A/P which shows bilateral non-obstructing renal stones.  LArgest stone on the right is about7 mm in the lower pole.  LArgest stone on th eleft is about 9 mm in the upper pole.           Past Medical History:  Past Medical History:   Diagnosis Date      Arrhythmia      Arthritis      Atrial fibrillation 2006    Followed by [facility]     Calculus of kidney 2005, 6/06, 10/12, 8/18    dr walker/nestor/иван - hematuria - w/u o/w neg     Cholelithiasis      Chronic peptic ulcer, unspecified site, without mention of hemorrhage, perforation, or obstruction 1985    gastric bypass     Colon polyps 8/05, 9/10, 10/15    2 tubular adenoma, 1 hyperplastic - multiple serrated/tubular adenomas     First degree AV block      Hepatitis C 10/12    chronic hepatitis C, grade 1-2, stage 1 - mild - Dr Douglas     Hyperlipidemia LDL goal <130      Hypertension goal BP (blood pressure) < 140/90 6/06    dr jaciel winchester     Iron deficiency anemia, unspecified 1985    gastric bypass     Nephrolithiasis multiple episodes, last 8/18    Dr Bey     OA (osteoarthritis)     Multiple - Left shoulder with rotator cuff tear - Dr Durham     Obesity, unspecified     s/p gastric bypass 1985      Prediabetes      Presbyacusis 1/04    dr corona     Pyelonephritis, unspecified 12/99     SCC (squamous cell carcinoma), 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    dr saez - lt conchal bowl     Sleep apnea 10/02    cpap - severe - 15 cm - dr cunha     Stroke (H) 1/19/2019     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    [facility] Ne[facility]            Past Surgical History:  Past Surgical History:   Procedure Laterality Date     APPENDECTOMY       ARTHROPLASTY KNEE  8/13/2012    LT TKA - Dr Villanueva     CARDIOVERSION  2016     COLONOSCOPY  8/05, 9/10, 10/15    multiple tubular/serrated/hyperplastic polyps     COLONOSCOPY N/A 10/29/2015    multiple tubular and serrated adenomas     COLONOSCOPY N/A 9/7/2016     EYE SURGERY  Lasik     HC KNEE SCOPE, DIAGNOSTIC  05/00    Arthroscopy, Knee RT     LASER HOLMIUM LITHOTRIPSY URETER(S), INSERT STENT, COMBINED  10/16/2012    stones x 4, Dr Campa     LASER HOLMIUM LITHOTRIPSY URETER(S), INSERT STENT, COMBINED Right 5/2/2018    CYSTOSCOPY, RIGHT URETEROSCOPY, HOLMIUM LASER LITHOTRIPSY, RIGHT STENT PLACEMENT -   Sotero     SURGICAL HISTORY OF -       s/p gastric bypass Bilroth II     SURGICAL HISTORY OF -       wisdom teeth     SURGICAL HISTORY OF 1979    cellulitis     SURGICAL HISTORY OF     lt forearm spur's     SURGICAL HISTORY OF -   ,,    s/p lasik     SURGICAL HISTORY OF -       rt forearm spurs     SURGICAL HISTORY OF -       dr stevenson - lithotrypsy     SURGICAL HISTORY OF -   10/08,     Lt ear chonchal lesion removal SCC - dr saez            Social History:  Social History     Tobacco Use     Smoking status: Never Smoker     Smokeless tobacco: Never Used   Substance Use Topics     Alcohol use: Yes     Alcohol/week: 1.2 oz     Comment: 2 per week            Family History:  Family History   Problem Relation Age of Onset     Alzheimer Disease Father 82         at 88     Prostate Cancer Father 76        bladder and prostate     Heart Disease Mother 80        CHF     Heart Disease Maternal Grandfather         MI at 55     Cancer Paternal Uncle         ?            Allergies:  No Known Allergies         Medications:  Current Outpatient Medications   Medication Sig     apixaban ANTICOAGULANT (ELIQUIS) 5 MG tablet Take 1 tablet (5 mg) by mouth 2 times daily     aspirin 81 MG EC tablet Take 1 tablet (81 mg) by mouth daily     diltiazem ER (DILT-XR) 180 MG 24 hr capsule Take 360 mg by mouth daily     Ferrous Sulfate (IRON) 325 (65 Fe) MG tablet Take 2 tablets by mouth daily (with breakfast)     fluticasone (FLONASE) 50 MCG/ACT nasal spray Spray 1 spray in nostril     pantoprazole (PROTONIX) 40 MG EC tablet Take 1 tablet (40 mg) by mouth daily Take 30-60 minutes before a meal.     rosuvastatin (CRESTOR) 40 MG tablet Take 1 tablet (40 mg) by mouth daily     tamsulosin (FLOMAX) 0.4 MG capsule Take 1 capsule (0.4 mg) by mouth 2 times daily     betamethasone dipropionate (DIPROSONE) 0.05 % cream Apply sparingly to affected area twice daily as needed.  Do not apply to face.  (Patient not taking: Reported on 2/5/2019)     diltiazem ER COATED BEADS (CARDIZEM CD) 180 MG 24 hr capsule Take 2 capsules (360 mg) by mouth daily (Patient not taking: Reported on 2/5/2019)     fluticasone (FLONASE) 50 MCG/ACT spray Spray 1 spray into both nostrils daily as needed for rhinitis or allergies (Patient not taking: Reported on 2/5/2019)     pantoprazole (PROTONIX) 40 MG EC tablet Take 40 mg by mouth     tamsulosin (FLOMAX) 0.4 MG capsule Take 0.4 mg by mouth     No current facility-administered medications for this visit.        Review of Systems:  ROS: 14 point ROS negative other than the symptoms noted above in the HPI and PMH.    Physical Exam:  B/P: 134/73, T: Data Unavailable, P: 69, R: Data Unavailable  Estimated body mass index is 46.84 kg/m  as calculated from the following:    Height as of this encounter: 1.829 m (6').    Weight as of this encounter: 156.7 kg (345 lb 6.4 oz).  Gen: Alert and orientedx3; not in acute distress; mentating appropriately   HEENT: No sclera icterus; no facial deformity; both nostrils patent;   Cardio: No JVD visible, grossly well-perfsed  Pulm: Not in acute respiratory distress  GI: Abdomen soft and nontender to palpation; bowel sounds present  Neuro: Mentating well; CN II-XII grossly intact  EXT/MSK: no LE or UE edema; LE and UE warm to touch  Inc: c/d/i; no erythema or warmth    Labs:   All laboratory data reviewed with patient  PSA   Date Value Ref Range Status   09/20/2018 4.46 (H) 0 - 4 ug/L Final     Comment:     Assay Method:  Chemiluminescence using Siemens Vista analyzer     Creatinine   Date Value Ref Range Status   11/12/2018 0.84 0.66 - 1.25 mg/dL Final     No results found for: UA  No components found for: UC    Imaging:   All imaging reviewed with patient.  CT-scan from [facility] dated 09/10/2018, significant for identifiable stones bilaterally    I have discussed this case with Dr. Ivana Ovalle, MS3  February 5, 2019    I,  Regulo Heath saw and evaluated this patient and agree with the plan as stated above.  I personally performed all listed procedures.

## 2019-02-05 NOTE — PATIENT INSTRUCTIONS
A 24 hour urine kit will be mailed to your home with instructions.  Dr. Heath will notify you of the results, and what your follow up should be.    It was a pleasure meeting with you today.  Thank you for allowing me and my team the privilege of caring for you today.  YOU are the reason we are here, and I truly hope we provided you with the excellent service you deserve.  Please let us know if there is anything else we can do for you so that we can be sure you are leaving completely satisfied with your care experience.        PAULINO Mcfarlane

## 2019-02-06 ENCOUNTER — OFFICE VISIT (OUTPATIENT)
Dept: FAMILY MEDICINE | Facility: CLINIC | Age: 74
End: 2019-02-06
Payer: COMMERCIAL

## 2019-02-06 VITALS
HEART RATE: 83 BPM | OXYGEN SATURATION: 96 % | BODY MASS INDEX: 42.66 KG/M2 | TEMPERATURE: 97.6 F | HEIGHT: 72 IN | DIASTOLIC BLOOD PRESSURE: 72 MMHG | WEIGHT: 315 LBS | SYSTOLIC BLOOD PRESSURE: 130 MMHG

## 2019-02-06 DIAGNOSIS — D50.9 IRON DEFICIENCY ANEMIA, UNSPECIFIED IRON DEFICIENCY ANEMIA TYPE: ICD-10-CM

## 2019-02-06 DIAGNOSIS — I48.0 PAROXYSMAL ATRIAL FIBRILLATION (H): ICD-10-CM

## 2019-02-06 DIAGNOSIS — R73.03 PREDIABETES: ICD-10-CM

## 2019-02-06 DIAGNOSIS — E78.5 HYPERLIPIDEMIA LDL GOAL <70: ICD-10-CM

## 2019-02-06 DIAGNOSIS — I63.9 CEREBROVASCULAR ACCIDENT (CVA), UNSPECIFIED MECHANISM (H): Primary | ICD-10-CM

## 2019-02-06 DIAGNOSIS — I10 HYPERTENSION GOAL BP (BLOOD PRESSURE) < 140/90: ICD-10-CM

## 2019-02-06 DIAGNOSIS — M48.02 CERVICAL STENOSIS OF SPINE: ICD-10-CM

## 2019-02-06 DIAGNOSIS — G47.33 OBSTRUCTIVE SLEEP APNEA SYNDROME: ICD-10-CM

## 2019-02-06 DIAGNOSIS — Z51.81 MEDICATION MONITORING ENCOUNTER: ICD-10-CM

## 2019-02-06 DIAGNOSIS — E66.01 MORBID OBESITY (H): ICD-10-CM

## 2019-02-06 DIAGNOSIS — K76.0 NAFLD (NONALCOHOLIC FATTY LIVER DISEASE): ICD-10-CM

## 2019-02-06 LAB
ERYTHROCYTE [DISTWIDTH] IN BLOOD BY AUTOMATED COUNT: 13.9 % (ref 10–15)
HBA1C MFR BLD: 5.8 % (ref 0–5.6)
HCT VFR BLD AUTO: 46.5 % (ref 40–53)
HGB BLD-MCNC: 15.4 G/DL (ref 13.3–17.7)
MCH RBC QN AUTO: 29.2 PG (ref 26.5–33)
MCHC RBC AUTO-ENTMCNC: 33.1 G/DL (ref 31.5–36.5)
MCV RBC AUTO: 88 FL (ref 78–100)
PLATELET # BLD AUTO: 239 10E9/L (ref 150–450)
RBC # BLD AUTO: 5.28 10E12/L (ref 4.4–5.9)
WBC # BLD AUTO: 8.5 10E9/L (ref 4–11)

## 2019-02-06 PROCEDURE — 85027 COMPLETE CBC AUTOMATED: CPT | Performed by: FAMILY MEDICINE

## 2019-02-06 PROCEDURE — 99495 TRANSJ CARE MGMT MOD F2F 14D: CPT | Performed by: FAMILY MEDICINE

## 2019-02-06 PROCEDURE — 83036 HEMOGLOBIN GLYCOSYLATED A1C: CPT | Performed by: FAMILY MEDICINE

## 2019-02-06 PROCEDURE — 80053 COMPREHEN METABOLIC PANEL: CPT | Performed by: FAMILY MEDICINE

## 2019-02-06 PROCEDURE — 36415 COLL VENOUS BLD VENIPUNCTURE: CPT | Performed by: FAMILY MEDICINE

## 2019-02-06 RX ORDER — PNV NO.95/FERROUS FUM/FOLIC AC 28MG-0.8MG
1 TABLET ORAL
Qty: 90 TABLET | Refills: 3 | COMMUNITY
Start: 2019-02-06

## 2019-02-06 ASSESSMENT — MIFFLIN-ST. JEOR: SCORE: 2352.91

## 2019-02-06 NOTE — PATIENT INSTRUCTIONS
[facility]                        To reach your care team during and after hours:   [phone]  To reach our pharmacy:        [phone]    Clinic Hours                        Our clinic hours are:    Monday   7:30 am to 7:00 pm                  Tuesday through Friday 7:30 am to 5:00 pm                             Saturday   8:00 am to 12:00 pm      Sunday   Closed      Pharmacy Hours                        Our pharmacy hours are:    Monday   8:30 am to 7:00 pm       Tuesday to Friday  8:30 am to 6:00 pm                       Saturday    9:00 am to 1:00 pm              Sunday    Closed              There is also information available at our web site:  [email or web address]    If your provider ordered any lab tests and you do not receive the results within 10 business days, please call the clinic.    If you need a medication refill please contact your pharmacy.  Please allow 2-3 business days for your refill to be completed.    Our clinic offers telephone visits and e visits.  Please ask one of your team members to explain more.      Use [facility] (secure email communication and access to your chart) to send your primary care provider a message or make an appointment. Ask someone on your Team how to sign up for [employer].  Immunizations                      Immunization History   Administered Date(s) Administered     Influenza (High Dose) 3 valent vaccine 09/20/2012, 12/21/2013, 10/20/2015, 09/25/2017, 09/20/2018     Influenza (IIV3) PF 10/26/2007, 12/22/2008, 09/17/2009, 10/14/2011     Influenza Vaccine, 3 YRS +, IM (QUADRIVALENT W/PRESERVATIVES) 10/30/2017     Pneumo Conj 13-V (2010&after) 03/10/2016, 09/25/2017     Pneumococcal 23 valent 05/11/2005, 09/20/2012     TD (ADULT, 7+) 11/30/1998     TDAP Vaccine (Adacel) 06/26/2007     TDAP Vaccine (Boostrix) 10/20/2015     Twinrix A/B 05/11/2005, 06/26/2007, 09/04/2008        Health Maintenance                         Health Maintenance Due   Topic  Date Due     Zoster (Shingles) Vaccine (1 of 2) 05/25/1996

## 2019-02-06 NOTE — PROGRESS NOTES
"  SUBJECTIVE:   Hugo Weber is a 73 year old male who presents to clinic today for the following health issues:    Hospital Follow-up Visit:    Hospital/Nursing Home/IP Rehab Facility: [place], [place]  Date of Admission: 1/19/19  Date of Discharge: 1/22/19  Reason(s) for Admission: Stroke            Problems taking medications regularly:  None       Medication changes since discharge: Eliquis, Aspirin, Crestor       Problems adhering to non-medication therapy:  None    Summary of hospitalization:  [facility] discharge summary reviewed  See outside records, reviewed and scanned  Diagnostic Tests/Treatments reviewed.  Follow up needed: PT/OT  Other Healthcare Providers Involved in Patient s Care:         Specialist appointment - neurolgy  Update since discharge: improved.     Post Discharge Medication Reconciliation: discharge medications reconciled, continue medications without change.  Plan of care communicated with patient and wife     Coding guidelines for this visit:  Type of Medical   Decision Making Face-to-Face Visit       within 7 Days of discharge Face-to-Face Visit        within 14 days of discharge   Moderate Complexity [id number]   High Complexity [id number]          Patient is following up post hospitalization from 1/19/2019-1/21/2019 due to small stroke affecting right side of brain. Patient denies any residual side effects. \"feels much better.\" Denies: facial drooping and slurred speech. Patient was seen by neurology, cardiology, hospitalist, and PT/OT while in the hospital. Patient reports he woke up on 1/19 and his left leg was weak and numb and felt different than it normally did and also had some left hand tingling. Patient reports that he had no numbness or tingling in his left shoulder and reports no symptoms to the right side of his body. CTA of neck was negative. CTA of head was negative. MRI of brain showed small infarct on right subcortical brain. MRI of left " shoulder showed mild tear of rotator cuff. MRI of neck showed cervical stenosis at C4-5. Patient was prescribed Eliquis, Aspirin, and Crestor while in the hospital to prevent future infarcts. Patient reports he was prescribed a walker while in the hospital but has not picked it up. Patient reports he feels less stable than before the stroke but still does not think he needs a walker. Patient reports he will be leaving for [place] on 2/25/2019 and will be there for 1 month. Patient reports that the tingling in his hand has improved since infarct but have not completely resolved. Patient reports he feels like his left leg is weaker but denies numbness and tingling.     Problem list and histories reviewed & adjusted, as indicated.  Additional history: as documented    BP Readings from Last 3 Encounters:   02/06/19 130/72   02/05/19 134/73   12/21/18 124/74       body mass index is 46.79 kg/m .    Wt Readings from Last 4 Encounters:   02/06/19 (!) 156.5 kg (345 lb)   02/05/19 (!) 156.7 kg (345 lb 6.4 oz)   12/21/18 (!) 158.8 kg (350 lb)   12/05/18 (!) 154.2 kg (340 lb)       Health Maintenance    Health Maintenance Due   Topic Date Due     ZOSTER IMMUNIZATION (1 of 2) 05/25/1996       Current Problem List    Patient Active Problem List   Diagnosis     Iron deficiency anemia     Colon polyps     Obstructive sleep apnea syndrome     Hyperlipidemia LDL goal <70     Long term current use of anticoagulant therapy - for intermittent a-fib     Advance Care Planning     Total knee replacement status     Calculus of kidney     NAFLD (nonalcoholic fatty liver disease)     Morbid obesity due to excess calories (H)     History of hepatitis C     Prediabetes     Paroxysmal atrial fibrillation (H)     Cholelithiasis     Hypertension goal BP (blood pressure) < 140/90     TMJ arthralgia     Nephrolithiasis     OA (osteoarthritis)     First degree AV block     Benign prostatic hyperplasia (BPH) with urinary urge incontinence     Thoracic  aortic ectasia (H)     Stroke (H)     CVA (cerebral vascular accident) (H)     Cervical stenosis of spine       Past Medical History    Past Medical History:   Diagnosis Date     Arrhythmia      Arthritis      Atrial fibrillation 2006    Followed by [facility]     Calculus of kidney 2005, 6/06, 10/12, 8/18    dr walker/nestor/иван - hematuria - w/u o/w neg     Cervical stenosis of spine     Moderate C4-5     Cholelithiasis      Chronic peptic ulcer, unspecified site, without mention of hemorrhage, perforation, or obstruction 1985    gastric bypass     Colon polyps 8/05, 9/10, 10/15    2 tubular adenoma, 1 hyperplastic - multiple serrated/tubular adenomas     CVA (cerebral vascular accident) (H) 01/2019    small right periorlandic subcortical - left sided residual - left hand tingling/numbness - Left leg weak/numbness     First degree AV block      Hepatitis C 10/12    chronic hepatitis C, grade 1-2, stage 1 - mild - Dr Douglas     Hyperlipidemia LDL goal <130      Hypertension goal BP (blood pressure) < 140/90 6/06    dr jaciel winchester     Iron deficiency anemia, unspecified 1985    gastric bypass     Nephrolithiasis multiple episodes, last 8/18    Dr Bey     OA (osteoarthritis)     Multiple - Left shoulder with rotator cuff tear - Dr Durham     Obesity, unspecified     s/p gastric bypass 1985      Prediabetes      Presbyacusis 1/04    dr corona     Pyelonephritis, unspecified 12/99     SCC (squamous cell carcinoma), 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    dr saez - lt conchal bowl     Sleep apnea 10/02    cpap - severe - 15 cm - dr cunha     Stroke (H) 1/19/2019     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    [facility] Head and Neck       Past Surgical History    Past Surgical History:   Procedure Laterality Date     APPENDECTOMY       ARTHROPLASTY KNEE  8/13/2012    LT TKA - Dr Villanueva     CARDIOVERSION  2016     COLONOSCOPY  8/05, 9/10, 10/15    multiple tubular/serrated/hyperplastic polyps     COLONOSCOPY N/A 10/29/2015    multiple tubular and serrated  adenomas     COLONOSCOPY N/A 9/7/2016     EYE SURGERY  Lasik     HC KNEE SCOPE, DIAGNOSTIC  05/00    Arthroscopy, Knee RT     LASER HOLMIUM LITHOTRIPSY URETER(S), INSERT STENT, COMBINED  10/16/2012    stones x 4, Dr Campa     LASER HOLMIUM LITHOTRIPSY URETER(S), INSERT STENT, COMBINED Right 5/2/2018    CYSTOSCOPY, RIGHT URETEROSCOPY, HOLMIUM LASER LITHOTRIPSY, RIGHT STENT PLACEMENT - Dr Bey     SURGICAL HISTORY OF -   1985    s/p gastric bypass Bilroth II     SURGICAL HISTORY OF -   11/98    wisdom teeth     SURGICAL HISTORY OF -   1979    cellulitis     SURGICAL HISTORY OF -   11/98    lt forearm spur's     SURGICAL HISTORY OF -   1/01,6/02,11/02    s/p lasik     SURGICAL HISTORY OF -   11/99    rt forearm spurs     SURGICAL HISTORY OF -   7/06    dr stevenson - lithotrypsy     SURGICAL HISTORY OF -   10/08, 12/08    Lt ear chonchal lesion removal SCC - dr saez       Current Medications    Current Outpatient Medications   Medication Sig Dispense Refill     apixaban ANTICOAGULANT (ELIQUIS) 5 MG tablet Take 1 tablet (5 mg) by mouth 2 times daily 180 tablet 3     aspirin 81 MG EC tablet Take 1 tablet (81 mg) by mouth daily 90 tablet 3     diltiazem ER (DILT-XR) 180 MG 24 hr capsule Take 360 mg by mouth daily       Ferrous Sulfate (IRON) 325 (65 Fe) MG tablet Take 1 tablet by mouth daily (with breakfast) 90 tablet 3     fluticasone (FLONASE) 50 MCG/ACT nasal spray Spray 1 spray in nostril       pantoprazole (PROTONIX) 40 MG EC tablet Take 1 tablet (40 mg) by mouth daily Take 30-60 minutes before a meal. 90 tablet 3     rosuvastatin (CRESTOR) 40 MG tablet Take 1 tablet (40 mg) by mouth daily 90 tablet 3     tamsulosin (FLOMAX) 0.4 MG capsule Take 1 capsule (0.4 mg) by mouth 2 times daily 90 capsule 3       Allergies    No Known Allergies    Immunizations    Immunization History   Administered Date(s) Administered     Influenza (High Dose) 3 valent vaccine 09/20/2012, 12/21/2013, 10/20/2015, 09/25/2017, 09/20/2018  "    Influenza (IIV3) PF 10/26/2007, 2008, 2009, 10/14/2011     Influenza Vaccine, 3 YRS +, IM (QUADRIVALENT W/PRESERVATIVES) 10/30/2017     Pneumo Conj 13-V (2010&after) 03/10/2016, 2017     Pneumococcal 23 valent 2005, 2012     TD (ADULT, 7+) 1998     TDAP Vaccine (Adacel) 2007     TDAP Vaccine (Boostrix) 10/20/2015     Twinrix A/B 2005, 2007, 2008       Family History    Family History   Problem Relation Age of Onset     Alzheimer Disease Father 82         at 88     Prostate Cancer Father 76        bladder and prostate     Heart Disease Mother 80        CHF     Heart Disease Maternal Grandfather         MI at 55     Cancer Paternal Uncle         ?       Social History    Social History     Socioeconomic History     Marital status:      Spouse name: Mayra     Number of children: 3     Years of education: 21     Highest education level: Not on file   Social Needs     Financial resource strain: Not on file     Food insecurity - worry: Not on file     Food insecurity - inability: Not on file     Transportation needs - medical: Not on file     Transportation needs - non-medical: Not on file   Occupational History     Occupation: retired teacher and football and       Employer: [employer]     Employer: RETIRED   Tobacco Use     Smoking status: Never Smoker     Smokeless tobacco: Never Used   Substance and Sexual Activity     Alcohol use: Yes     Alcohol/week: 1.2 oz     Comment: 2 per week     Drug use: No     Comment: acknowledges using herbal supplement \"Vibe\" for energy-none used recently      Sexual activity: Yes     Partners: Female     Birth control/protection: None     Comment:     Other Topics Concern      Service Not Asked     Blood Transfusions Not Asked     Caffeine Concern Yes     Comment: <1 can qd     Occupational Exposure Not Asked     Hobby Hazards Not Asked     Sleep Concern Yes     " Comment: sleep apnea, wears cpap     Stress Concern Not Asked     Weight Concern Not Asked     Special Diet Not Asked     Back Care Not Asked     Exercise No     Bike Helmet Not Asked     Seat Belt Yes     Self-Exams Not Asked     Parent/sibling w/ CABG, MI or angioplasty before 65F 55M? No   Social History Narrative     Not on file       All above reviewed and updated, all stable unless otherwise noted    Recent labs reviewed    ROS:  Constitutional, HEENT, cardiovascular, pulmonary, GI, , musculoskeletal, neuro, skin, endocrine and psych systems are negative, except as otherwise noted.    OBJECTIVE:                                                    /72   Pulse 83   Temp 97.6  F (36.4  C) (Oral)   Ht 1.829 m (6')   Wt (!) 156.5 kg (345 lb)   SpO2 96%   BMI 46.79 kg/m    Body mass index is 46.79 kg/m .  GENERAL: healthy, alert and no distress  EYES: Eyes grossly normal to inspection  HENT:ear canals and TM's normal upon viewing with otoscope, nose and mouth without ulcers or lesions upon viewing with otoscope  NECK: no tenderness, no adenopathy, no asymmetry, no masses, no stiffness; thyroid- normal to palpation  RESP: lungs clear to auscultation - no rales, no rhonchi, no wheezes  CV: regular rates and rhythm, normal S1 S2, no S3 or S4 and no murmur, no click or rub -  ABDOMEN: soft, no tenderness, no  hepatosplenomegaly, no masses, normal bowel sounds  MS: extremities- no gross deformities noted, no edema, strength reduced very minimally in left arm compared to right, Lateral thigh long-term numbness normal to baseline  SKIN: no suspicious lesions, no rashes  NEURO: strength and tone- normal, sensory exam- grossly normal, mentation- intact, speech- normal  BACK: no CVA tenderness, no paralumbar tenderness  PSYCH: Alert and oriented times 3; speech- coherent , normal rate and volume; able to articulate logical thoughts, able to abstract reason, no tangential thoughts, no hallucinations or delusions,  affect- normal    DIAGNOSTICS/PROCEDURES:                                                      No results found for this or any previous visit (from the past 24 hour(s)).     Labs Pending     ASSESSMENT/PLAN:                                                        ICD-10-CM    1. Cerebrovascular accident (CVA), unspecified mechanism (H) I63.9 PHYSICAL THERAPY REFERRAL     OCCUPATIONAL THERAPY REFERRAL     NEUROLOGY ADULT REFERRAL     Comprehensive metabolic panel     CBC with platelets     Hemoglobin A1c   2. Paroxysmal atrial fibrillation (H) I48.0 Comprehensive metabolic panel   3. Cervical stenosis of spine M48.02    4. Obstructive sleep apnea syndrome G47.33    5. Prediabetes R73.03 Comprehensive metabolic panel     Hemoglobin A1c   6. Hypertension goal BP (blood pressure) < 140/90 I10 Comprehensive metabolic panel   7. Hyperlipidemia LDL goal <70 E78.5 Comprehensive metabolic panel   8. Iron deficiency anemia, unspecified iron deficiency anemia type D50.9 Ferrous Sulfate (IRON) 325 (65 Fe) MG tablet     CBC with platelets   9. NAFLD (nonalcoholic fatty liver disease) K76.0 Comprehensive metabolic panel   10. Morbid obesity (H) E66.01    11. Medication monitoring encounter Z51.81 Comprehensive metabolic panel     CBC with platelets     Hemoglobin A1c     Discussed treatment/modality options, including risk and benefits, he desires advised aspirin 81 mg po daily, advised 1 multivitamin per day, advised dentist every 6 months, advised diet and exercise and advised opthalmologist every 1-2 years. All diagnosis above reviewed and noted above, otherwise stable.  See Ingenic orders for further details.  Follow up as needed.    1) PT ordered today. OT ordered today. Patient referred to Neurology today - Stroke Clinic. Recommend patient use walker. Medications prescribed in the hospital reviewed and reconciled. Patient advised to take as prescribed.    2) Follow up in 2 weeks for medication recheck visit.     [facility]  Maintenance Due   Topic Date Due     ZOSTER IMMUNIZATION (1 of 2) 05/25/1996     This document serves as a record of the services and decisions personally performed and made by rBett Cassidy MD. It was created on his behalf by Kendell Scherer, a trained medical scribe. The creation of this document is based on the provider's statements to the medical scribe.  Kendell Scherer February 6, 2019 12:12 PM     The information in this document, created by the medical scribe for me, accurately reflects the services I personally performed and the decisions made by me. I have reviewed and approved this document for accuracy prior to leaving the patient care area.  February 6, 2019            Brett Cassidy MD [address]  [address] [phone] Fax

## 2019-02-06 NOTE — LETTER
[facility]  [address] [place] [address], [place] [address]                  [phone]   February 8, 2019    Hugo Weber  [address]  [facility] [address]      Dear Hugo,    Here is a summary of your recent test results:    Labs are overall quite good, prediabetes is noted.     We advise:     Continue current cares.   Balanced low cholesterol diet.   Regular exercise.     Follow up as discussed in next few weeks.     Your test results are enclosed.      Please contact me if you have any questions.    In addition, here is a list of due or overdue Health Maintenance reminders.    Health Maintenance Due   Topic Date Due     Zoster (Shingles) Vaccine (1 of 2) 05/25/1996       Please call us at [phone] (or use [employer]) to address the above recommendations.            Thank you very much for trusting [facility]..     Healthy regards,        Brett Cassidy M.D.        Results for orders placed or performed in visit on 02/06/19   Comprehensive metabolic panel   Result Value Ref Range    Sodium 137 133 - 144 mmol/L    Potassium 4.2 3.4 - 5.3 mmol/L    Chloride 107 94 - 109 mmol/L    Carbon Dioxide 23 20 - 32 mmol/L    Anion Gap 7 3 - 14 mmol/L    Glucose 79 70 - 99 mg/dL    Urea Nitrogen 15 7 - 30 mg/dL    Creatinine 0.75 0.66 - 1.25 mg/dL    GFR Estimate >90 >60 mL/min/[1.73_m2]    GFR Estimate If Black >90 >60 mL/min/[1.73_m2]    Calcium 9.2 8.5 - 10.1 mg/dL    Bilirubin Total 0.4 0.2 - 1.3 mg/dL    Albumin 3.9 3.4 - 5.0 g/dL    Protein Total 7.8 6.8 - 8.8 g/dL    Alkaline Phosphatase 123 40 - 150 U/L    ALT 24 0 - 70 U/L    AST 14 0 - 45 U/L   CBC with platelets   Result Value Ref Range    WBC 8.5 4.0 - 11.0 10e9/L    RBC Count 5.28 4.4 - 5.9 10e12/L    Hemoglobin 15.4 13.3 - 17.7 g/dL    Hematocrit 46.5 40.0 - 53.0 %    MCV 88 78 - 100 fl    MCH 29.2 26.5 - 33.0 pg    MCHC 33.1 31.5 - 36.5 g/dL    RDW 13.9 10.0 - 15.0 %    Platelet Count 239 150 - 450  10e9/L   Hemoglobin A1c   Result Value Ref Range    Hemoglobin A1C 5.8 (H) 0 - 5.6 %

## 2019-02-07 ENCOUNTER — HOSPITAL ENCOUNTER (OUTPATIENT)
Dept: OCCUPATIONAL THERAPY | Facility: CLINIC | Age: 74
Setting detail: THERAPIES SERIES
End: 2019-02-07
Attending: FAMILY MEDICINE
Payer: COMMERCIAL

## 2019-02-07 ENCOUNTER — HOSPITAL ENCOUNTER (OUTPATIENT)
Dept: PHYSICAL THERAPY | Facility: CLINIC | Age: 74
Setting detail: THERAPIES SERIES
End: 2019-02-07
Attending: FAMILY MEDICINE
Payer: COMMERCIAL

## 2019-02-07 DIAGNOSIS — I63.9 CEREBROVASCULAR ACCIDENT (CVA), UNSPECIFIED MECHANISM (H): ICD-10-CM

## 2019-02-07 LAB
ALBUMIN SERPL-MCNC: 3.9 G/DL (ref 3.4–5)
ALP SERPL-CCNC: 123 U/L (ref 40–150)
ALT SERPL W P-5'-P-CCNC: 24 U/L (ref 0–70)
ANION GAP SERPL CALCULATED.3IONS-SCNC: 7 MMOL/L (ref 3–14)
AST SERPL W P-5'-P-CCNC: 14 U/L (ref 0–45)
BILIRUB SERPL-MCNC: 0.4 MG/DL (ref 0.2–1.3)
BUN SERPL-MCNC: 15 MG/DL (ref 7–30)
CALCIUM SERPL-MCNC: 9.2 MG/DL (ref 8.5–10.1)
CHLORIDE SERPL-SCNC: 107 MMOL/L (ref 94–109)
CO2 SERPL-SCNC: 23 MMOL/L (ref 20–32)
CREAT SERPL-MCNC: 0.75 MG/DL (ref 0.66–1.25)
GFR SERPL CREATININE-BSD FRML MDRD: >90 ML/MIN/{1.73_M2}
GLUCOSE SERPL-MCNC: 79 MG/DL (ref 70–99)
POTASSIUM SERPL-SCNC: 4.2 MMOL/L (ref 3.4–5.3)
PROT SERPL-MCNC: 7.8 G/DL (ref 6.8–8.8)
SODIUM SERPL-SCNC: 137 MMOL/L (ref 133–144)

## 2019-02-07 PROCEDURE — 97165 OT EVAL LOW COMPLEX 30 MIN: CPT | Mod: GO | Performed by: OCCUPATIONAL THERAPIST

## 2019-02-07 PROCEDURE — 97110 THERAPEUTIC EXERCISES: CPT | Mod: GO | Performed by: OCCUPATIONAL THERAPIST

## 2019-02-07 PROCEDURE — 97112 NEUROMUSCULAR REEDUCATION: CPT | Mod: GP | Performed by: PHYSICAL THERAPIST

## 2019-02-07 PROCEDURE — 97112 NEUROMUSCULAR REEDUCATION: CPT | Mod: GO | Performed by: OCCUPATIONAL THERAPIST

## 2019-02-07 PROCEDURE — 97161 PT EVAL LOW COMPLEX 20 MIN: CPT | Mod: GP | Performed by: PHYSICAL THERAPIST

## 2019-02-07 PROCEDURE — 97110 THERAPEUTIC EXERCISES: CPT | Mod: GP | Performed by: PHYSICAL THERAPIST

## 2019-02-08 NOTE — PROGRESS NOTES
02/07/19 1400   Quick Adds   Type of Visit Initial Outpatient Occupational Therapy Evaluation   General Information   Start Of Care Date 02/07/19   Referring Physician Brett Cassidy MD   Orders Evaluate and treat as indicated   Orders Date 02/06/19   Medical Diagnosis Cerebrovascular accident (CVA), unspecified mechanism (H) I63.9    Special Instructions Cerebrovascular accident (CVA), unspecified mechanism (H) I63.9    Surgical/Medical History Reviewed Yes   Additional Occupational Profile Info/Pertinent History of Current Problem Patient is s/p hospitalization in [place] from 1/19/2019-1/21/2019 due to small infarct on right subcortical brain. He initially presented with left leg weakness, L hand coordination deficits and L UE/LE numbness/tingling. MRI of left shoulder showed mild tear of rotator cuff. MRI of neck showed cervical stenosis at C4-5. Patient was prescribed Eliquis, Aspirin, and Crestor while in the hospital to prevent future infarcts. Patient reports he will be leaving for [place] on 2/25/2019 and will be there for 1 month. Patient reports that the tingling in his hand has improved since infarct but have not completely resolved. Patient reports he feels like his left leg is still weaker.  Complex PMH including: Colon polyps,Obstructive sleep apnea syndrome, hyperlipidemia LDL goal <100, Long term current use of anticoagulant therapy - for intermittent a-fib, TKA, Morbid obesity, History of hepatitis C, Prediabetes, Hypertension goal BP (blood pressure) < 140/90, TMJ arthralgia, OA, BPH, First degree AV block, Thoracic aortic ectasia, anemia.   Comments/Observations saw a therapist for L shoulder rotator cuff tear in the recent past (his insurance no longer covers this provider), reports also has labrum tear and arthritis - MD not recommending surgery at this time in L shoulder due to other medical issues; has had shoulder issues for 5-6 years; also has L neck pain associated with shoulder.   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[employer], football and )   Community/Avocational Activities likes yardwork, sports, traveling, goes [place] for [place]; does stationary bike and weights at [employer], tries to go 3x/week   Current Living Environment House  (2 story home here in [place] with spouse and daughter)   Home/Community Accessibility Comments has a one level home in [place]   Prior Level - Transfers Independent   Prior Level - Ambulation Independent   Prior Level - ADLS Independent   Prior Responsibilities - IADL Meal Preparation;Housekeeping;Laundry;Shopping;Yardwork;Medication management;Finances;Driving   Role/Living Environment Comments patient does most of the laundry, patient and spouse share the rest; manages own medications, independent with driving, does most of the finances   Patient/family Goals Statement get L UE and LE feeling back to normal   Pain   Patient currently in pain No   Pain comments however, some discomfort L cervical area and shoulder when in specific positions   Fall Risk Screen   Have you fallen 2 or more times in the past year? No   Have you fallen and had an injury in the past year? No   Fall screen comments see physical therapy notes   Cognitive Status Examination   Orientation Orientation to person, place and time   Level of Consciousness Alert   Follows Commands and Answers Questions 100% of the time;Able to follow multistep instructions   Personal Safety and Judgment Intact   Memory (mild impairment: recall 2/2 and 1/1 with multiple choice)   Memory Comments Reports slight decline in memory due to aging, no recent changes from his stroke   Cognitive Comment appears WFL/appropriate, no further testing indicated   Visual Perception   Visual Perception Wears glasses  (bifocals - not all the time; can see without them)   Visual Field appears WFLs  "  Oculomotor smooth pursuits WNLs   Visual Perception Comments L eye pupil more constricted - he notes others have said the same things; plans to ask eye MD at next appointment (had before stroke)   Sensation   Sensation Comments \"remnants\" of numbness/tingling; 3/3 correct L hand stereognosis; 90-95% accurate for light touch dorsal surface B hands and 100% B hand ventral surface   Range of Motion (ROM)   ROM Comments Within functional limits for all except mild limitations left shoulder due to rotator cuff issues and arthritis and limited B cervical lateral flexion (these are both baseline prior to stroke)   Strength   Strength Comments overall symmetrical and 5/5 for bilateral shoulder flexion and extension, elbow flexion and extension and wrist flexion and extension   Hand Strength   Hand Dominance Right   Left Hand  (pounds) 99 pounds   Right Hand  (pounds) 112 pounds   Left Lateral Pinch (pounds) 22 pounds  (24)   Right Lateral Pinch (pounds) 27 pounds   Left Three Point Pinch (pounds) 20 pounds   Right Three Point Pinch (pounds) 22 pounds   Hand Strength Comments all above normal limits except L palmar pinch is WNLs   Coordination   Upper Extremity Coordination Left UE impaired  (very mild deficits FM/GM)   Left Hand, Nine Hole Peg Test (seconds) 26   Right Hand, Nine Hole Peg Test (seconds) 21   Functional Mobility   Functional Mobility Comments Independent   Bathing   Level of [place] - Bathing independent   Upper Body Dressing   Upper Body Dressing Comments Modified independent due to continued mild deficits with left hand - takes more effort, but can do   Lower Body Dressing   Lower Body Dressing Comments Modified independent due to continued mild deficits with left hand - takes more effort, but can do   Toileting   Level of [place]: Toilet independent   Grooming   Level of [place]: Grooming independent   Eating/Self-Feeding   Level of [place]: Eating independent   Eating/Self " "Feeding Comments okay to cut meat   Activity Tolerance   Activity Tolerance fatigue initially but improved; reports \"it's wearing on him\" in regards to L UE and LE not completely back to normal    Planned Therapy Interventions   Planned Therapy Interventions ADL training;IADL training;Neuromuscular re-education;ROM;Stretching   Adult OT Eval Goals   OT Eval Goals (Adult) 1;2   OT Goal 1   Goal Identifier Sensory precautions   Goal Description Patient will verbalize 2-3 strategies to compensate for decreased UE sensation for increased independence and safety during ADL/IADLs (household tasks, meal prep, etc.).   Target Date 02/07/19   Date Met 02/07/19   OT Goal 2   Goal Identifier Upper extremity home program   Goal Description Patient to demonstrate a L hand and cervical HEP with SBA and min cues for good follow through at home and increased functional coordination and ROM for maximum independence with performance of ADLs/IADLs.    Target Date 02/07/19   Date Met 02/07/19   Clinical Impression   Criteria for Skilled Therapeutic Interventions Met Yes, treatment indicated   OT Diagnosis Decreased ADL/IADL independence   Influenced by the following impairments Ability to complete fasteners for driving, object his left hand, decreased sensation left hand   Assessment of Occupational Performance 1-3 Performance Deficits   Identified Performance Deficits Decreased ability to dress himself and complete daily household tasks with decreased left upper extremity function   Clinical Decision Making (Complexity) Low complexity   Therapy Frequency one time only   Predicted Duration of Therapy Intervention (days/wks) one time only   Risks and Benefits of Treatment have been explained. Yes   Patient, Family & other staff in agreement with plan of care Yes   Clinical Impression Comments This serves as the evaluation and the discharge summary due to seeing patient for 1 visit only.  All goals met.  Further skilled occupational " therapy services not indicated at this time as patient able to continue to work on mild deficit areas via home program.   Education Assessment   Barriers To Learning Physical   Preferred Learning Style Listening;Reading;Demonstration;Pictures/video   Total Evaluation Time   OT Eval, Low Complexity Minutes (54600) 20

## 2019-02-10 PROBLEM — I77.810 THORACIC AORTIC ECTASIA (H): Status: ACTIVE | Noted: 2018-07-06

## 2019-02-11 NOTE — TELEPHONE ENCOUNTER
"Called # below     Pt stated he has a small stroke on Saturday 1/19/2019 and if affected the right side of the brain.  - pt stated he woke up with left sided weakness and went to the ER and was admitted admitted     Pt was discharged yesterday 1/21/2019 - pt denies any residual side effects. \"feels much better\"   Denies: facial dropping, numbness, tingling, slurred speech     Advised pt to make sure he gets those notes to the clinic here for review     Medication list updated and OV for hospital f/u scheduled     Pt will also f/u with cardiology too       Routing to PCP as an FYI       Kathia Centeno RN, BSN  [facility]         .            " Mother

## 2019-02-13 ENCOUNTER — OFFICE VISIT (OUTPATIENT)
Dept: CARDIOLOGY | Facility: CLINIC | Age: 74
End: 2019-02-13
Payer: COMMERCIAL

## 2019-02-13 VITALS
SYSTOLIC BLOOD PRESSURE: 128 MMHG | OXYGEN SATURATION: 95 % | BODY MASS INDEX: 42.66 KG/M2 | HEART RATE: 86 BPM | HEIGHT: 72 IN | DIASTOLIC BLOOD PRESSURE: 72 MMHG | WEIGHT: 315 LBS

## 2019-02-13 DIAGNOSIS — I48.19 PERSISTENT ATRIAL FIBRILLATION (H): Primary | ICD-10-CM

## 2019-02-13 PROCEDURE — 99213 OFFICE O/P EST LOW 20 MIN: CPT | Performed by: INTERNAL MEDICINE

## 2019-02-13 ASSESSMENT — MIFFLIN-ST. JEOR: SCORE: 2325.69

## 2019-02-13 NOTE — PROGRESS NOTES
HPI and Plan:   See dictation    Orders Placed This Encounter   Procedures     Follow-Up with Electrophysiologist       No orders of the defined types were placed in this encounter.      There are no discontinued medications.      Encounter Diagnosis   Name Primary?     Persistent atrial fibrillation (H) Yes       CURRENT MEDICATIONS:  Current Outpatient Medications   Medication Sig Dispense Refill     apixaban ANTICOAGULANT (ELIQUIS) 5 MG tablet Take 1 tablet (5 mg) by mouth 2 times daily 180 tablet 3     aspirin 81 MG EC tablet Take 1 tablet (81 mg) by mouth daily 90 tablet 3     diltiazem ER (DILT-XR) 180 MG 24 hr capsule Take 360 mg by mouth daily       Ferrous Sulfate (IRON) 325 (65 Fe) MG tablet Take 1 tablet by mouth daily (with breakfast) 90 tablet 3     fluticasone (FLONASE) 50 MCG/ACT nasal spray Spray 1 spray in nostril       pantoprazole (PROTONIX) 40 MG EC tablet Take 1 tablet (40 mg) by mouth daily Take 30-60 minutes before a meal. 90 tablet 3     rosuvastatin (CRESTOR) 40 MG tablet Take 1 tablet (40 mg) by mouth daily 90 tablet 3     tamsulosin (FLOMAX) 0.4 MG capsule Take 1 capsule (0.4 mg) by mouth 2 times daily 90 capsule 3       ALLERGIES   No Known Allergies    PAST MEDICAL HISTORY:  Past Medical History:   Diagnosis Date     Arrhythmia      Arthritis      Atrial fibrillation 2006    Followed by [facility]     Calculus of kidney 2005, 6/06, 10/12, 8/18    dr walker/nestor/иван - hematuria - w/u o/w neg     Cervical stenosis of spine     Moderate C4-5     Cholelithiasis      Chronic peptic ulcer, unspecified site, without mention of hemorrhage, perforation, or obstruction 1985    gastric bypass     Colon polyps 8/05, 9/10, 10/15    2 tubular adenoma, 1 hyperplastic - multiple serrated/tubular adenomas     CVA (cerebral vascular accident) (H) 01/2019    small right periorlandic subcortical - left sided residual - left hand tingling/numbness - Left leg weak/numbness     First degree AV block       Hepatitis C 10/12    chronic hepatitis C, grade 1-2, stage 1 - mild - Dr Douglas     Hyperlipidemia LDL goal <130      Hypertension goal BP (blood pressure) < 140/90 6/06    dr jaciel winchester     Iron deficiency anemia, unspecified 1985    gastric bypass     Nephrolithiasis multiple episodes, last 8/18    Dr Bey     OA (osteoarthritis)     Multiple - Left shoulder with rotator cuff tear - Dr Durham     Obesity, unspecified     s/p gastric bypass 1985      Prediabetes      Presbyacusis 1/04    dr corona     Pyelonephritis, unspecified 12/99     SCC (squamous cell carcinoma), 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    dr saez - lt conchal bowl     Sleep apnea 10/02    cpap - severe - 15 cm - dr cunha     Stroke (H) 1/19/2019     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    [facility]       PAST SURGICAL HISTORY:  Past Surgical History:   Procedure Laterality Date     APPENDECTOMY       ARTHROPLASTY KNEE  8/13/2012    LT TKA - Dr Villanueva     CARDIOVERSION  2016     COLONOSCOPY  8/05, 9/10, 10/15    multiple tubular/serrated/hyperplastic polyps     COLONOSCOPY N/A 10/29/2015    multiple tubular and serrated adenomas     COLONOSCOPY N/A 9/7/2016     EYE SURGERY  Lasik     HC KNEE SCOPE, DIAGNOSTIC  05/00    Arthroscopy, Knee RT     LASER HOLMIUM LITHOTRIPSY URETER(S), INSERT STENT, COMBINED  10/16/2012    stones x 4, Dr Campa     LASER HOLMIUM LITHOTRIPSY URETER(S), INSERT STENT, COMBINED Right 5/2/2018    CYSTOSCOPY, RIGHT URETEROSCOPY, HOLMIUM LASER LITHOTRIPSY, RIGHT STENT PLACEMENT - Dr Bey     SURGICAL HISTORY OF -   1985    s/p gastric bypass Bilroth II     SURGICAL HISTORY OF -   11/98    wisdom teeth     SURGICAL HISTORY OF -   1979    cellulitis     SURGICAL HISTORY OF -   11/98    lt forearm spur's     SURGICAL HISTORY OF -   1/01,6/02,11/02    s/p lasik     SURGICAL HISTORY OF -   11/99    rt forearm spurs     SURGICAL HISTORY OF -   7/06    dr stevenson - lithotrypsy     SURGICAL HISTORY OF -   10/08, 12/08    Lt ear chonchal lesion removal SCC -  "dr saez       FAMILY HISTORY:  Family History   Problem Relation Age of Onset     Alzheimer Disease Father 82         at 88     Prostate Cancer Father 76        bladder and prostate     Heart Disease Mother 80        CHF     Heart Disease Maternal Grandfather         MI at 55     Cancer Paternal Uncle         ?       SOCIAL HISTORY:  Social History     Socioeconomic History     Marital status:      Spouse name: Mayra     Number of children: 3     Years of education: 21     Highest education level: None   Social Needs     Financial resource strain: None     Food insecurity - worry: None     Food insecurity - inability: None     Transportation needs - medical: None     Transportation needs - non-medical: None   Occupational History     Occupation: retired teacher and football and       Employer: [id number]     Employer: [employer]   Tobacco Use     Smoking status: Never Smoker     Smokeless tobacco: Never Used   Substance and Sexual Activity     Alcohol use: Yes     Alcohol/week: 1.2 oz     Comment: 2 per week     Drug use: No     Comment: acknowledges using herbal supplement \"Vibe\" for energy-none used recently      Sexual activity: Yes     Partners: Female     Birth control/protection: None     Comment:     Other Topics Concern      Service Not Asked     Blood Transfusions Not Asked     Caffeine Concern Yes     Comment: <1 can qd     Occupational Exposure Not Asked     Hobby Hazards Not Asked     Sleep Concern Yes     Comment: sleep apnea, wears cpap     Stress Concern Not Asked     Weight Concern Not Asked     Special Diet Not Asked     Back Care Not Asked     Exercise No     Bike Helmet Not Asked     Seat Belt Yes     Self-Exams Not Asked     Parent/sibling w/ CABG, MI or angioplasty before 65F 55M? No   Social History Narrative     None       Review of Systems:  Skin:  Positive for bruising chest   Eyes:  Negative      ENT:  Positive for hearing loss  "   Respiratory:  Positive for sleep apnea;CPAP uses cpap at night   Cardiovascular:  Negative;chest pain;cyanosis;syncope or near-syncope;lightheadedness;dizziness;edema Positive for    Gastroenterology: Positive for heartburn;reflux;abdominal pain;excessive gas or bloating    Genitourinary:  Negative      Musculoskeletal:  Negative      Neurologic:  Negative      Psychiatric:  Negative excessive stress;sleep disturbances;anxiety;depression    Heme/Lymph/Imm:  Negative      Endocrine:  Negative        Physical Exam:  Vitals: /72   Pulse 86   Ht 1.829 m (6')   Wt (!) 153.8 kg (339 lb)   SpO2 95%   BMI 45.98 kg/m      Constitutional:  cooperative, alert and oriented, well developed, well nourished, in no acute distress        Skin:  warm and dry to the touch, no apparent skin lesions or masses noted          Head:  normocephalic, no masses or lesions        Eyes:  pupils equal and round, conjunctivae and lids unremarkable, sclera white, no xanthalasma, EOMS intact, no nystagmus        Lymph:No Cervical lymphadenopathy present     ENT:  no pallor or cyanosis, dentition good        Neck:  carotid pulses are full and equal bilaterally, JVP normal, no carotid bruit        Respiratory:  normal breath sounds, clear to auscultation, normal A-P diameter, normal symmetry, normal respiratory excursion, no use of accessory muscles         Cardiac: regular rhythm, normal S1/S2, no S3 or S4, apical impulse not displaced, no murmurs, gallops or rubs irregularly irregular rhythm              not assessed this visit                                        GI:  abdomen soft, non-tender, BS normoactive, no mass, no HSM, no bruits        Extremities and Muscular Skeletal:  no deformities, clubbing, cyanosis, erythema observed              Neurological:  no gross motor deficits        Psych:           CC  Brett Cassidy MD  [id number] [place] [address]

## 2019-02-13 NOTE — ADDENDUM NOTE
Encounter addended by: Lena Juan, PT on: 2/13/2019 12:21 PM   Actions taken: Sign clinical note, Flowsheet accepted

## 2019-02-13 NOTE — PROGRESS NOTES
02/07/19 1100   Quick Adds   Type of Visit Initial OP PT Evaluation   General Information   Start of Care Date 02/07/19   Referring Physician Brett Cassidy MD   Orders Evaluate and Treat as Indicated   Additional Orders OT   Order Date 02/06/19   Medical Diagnosis Cerebrovascular accident (CVA), unspecified mechanism (H) I63.9    Onset of illness/injury or Date of Surgery 01/19/19  (date of hospitalization for CVA)   Precautions/Limitations no known precautions/limitations   Surgical/Medical history reviewed Yes   Pertinent history of current problem (include personal factors and/or comorbidities that impact the POC) Patient is s/p hospitalization in [place] from 1/19/2019-1/21/2019 due to small infarct on right subcortical brain. He initially presented with left leg weakness, L hand coordination deficits and L UE/LE numbness/tingling. MRI of left shoulder showed mild tear of rotator cuff. MRI of neck showed cervical stenosis at C4-5. Patient was prescribed Eliquis, Aspirin, and Crestor while in the hospital to prevent future infarcts. Patient reports he will be leaving for [place] on 2/25/2019 and will be there for 1 month. Patient reports that the tingling in his hand has improved since infarct but have not completely resolved. Patient reports he feels like his left leg is still weaker.  Complex PMH including: Colon polyps,Obstructive sleep apnea syndrome, hyperlipidemia LDL goal <100, Long term current use of anticoagulant therapy - for intermittent a-fib, TKA, Morbid obesity, History of hepatitis C, Prediabetes, Hypertension goal BP (blood pressure) < 140/90, TMJ arthralgia, OA, BPH, First degree AV block, Thoracic aortic ectasia, anemia. (see General Comments below for pt report during today s evaluation)   Prior level of function comment PLOF: likes yardwork, sports, traveling, [place]; does stationary bike and weights at [employer], tries to go 3x/week x30-45 walk for exercise.    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 Improve balance.    General Information Comments Denies: visual or speech changes, n/t in feet, dizziness. SENSATION: Pt reports chronic L LE sensation changes from knee replacement 6 years ago with L thigh numbness. Since CVA, now feels a \"hollowness\" in foot when walking. PAIN: Left shoulder is painful as been told his neck muscles are in knots. Finds relief with ice and heat. Interested in manual therapy that has helped in the past. PRIOR THERAPY: Saw a therapist for L shoulder rotator cuff tear in the recent past (his insurance no longer covers this provider), reports also has labrum tear and arthritis - MD not recommending surgery at this time in L shoulder due to other medical issues; has had shoulder issues for 5-6 years; also has L neck pain associated with shoulder. BALANCE: Feels his balance is okay, but not as good as it was before CVA. No falls in a year, but a couple of stumbles. CURRENT EXERCISE:  Resumed upper/lower body strengthening exercises at [employer] yesterday (ie UEL flies, pull downs, rows, chest press; LE: heel/toe raises, abd/add machine, leg press, walking on treadmills for 15 minutes (previously 45 min). Has been seen for plantar fasciitis and was advised to use bike which feels better than treadmill.  STRENGTH: Pt reports after prolonged standing (ie 20 miuntes) L leg doesnt feel as strong   Fall Risk Screen   Fall screen completed by PT   Have you fallen 2 or more times in the past year? No   Have you fallen and had an injury in the past year? No   Timed Up and Go score " (seconds) n/t (see FGA)   Is patient a fall risk? Yes;Department fall risk interventions implemented   Fall screen comments pt considered inc risk for falls with high level gait stability challenges per FGA; pt demonstrates consistant and effective balance reactions to mild instability   Pain   Pain comments discomfort L cervical area and shoulder when in specific positions, no pain in sitting rest   Cognitive Status Examination   Orientation orientation to person, place and time   Level of Consciousness alert   Follows Commands and Answers Questions 100% of the time;able to follow multistep instructions   Personal Safety and Judgment intact   Observation   Observation inc body habitus   Posture   Posture Comments L shoulder depression   Range of Motion (ROM)   ROM Comment B LE WNL. Within functional limits for all except mild limitations left shoulder due to rotator cuff issues and arthritis and limited B cervical lateral flexion (these are both baseline prior to stroke)   Strength   Strength Comments MMTBilat LEs 5/5    Bed Mobility   Bed Mobility Comments independent   Transfer Skills   Transfer Comments independent without UE support, wide SRIDEVI   Gait   Gait Comments over short distances indoores without AD reciprocal gait pattern with good speed, step sarah, L shulder depression with decreased armswing compaired to R, maintains stability and straight path   Gait Special Tests   Gait Special Tests 25 FOOT TIMED WALK;FUNCTIONAL GAIT ASSESSMENT;OTHER   Gait Special Tests 25 Foot Timed Walk   Seconds 9.12  (1.0 m/s)   Comments 1.2 m/s = norm for age   Gait Special Tests Functional Gait Assessment Score out of 30   Score out of 30 21   Comments 22/30 or less indicates increased risk of falls   Balance   Balance Comments Romberg EC: x30 sec   Balance Special Tests   Balance Special Tests Single leg stance right;Single leg stance left;Sit to stand reps   Balance Special Tests Single Leg Stance Right,   Comments 2-3 sec    Balance Special Tests Single Leg Stance Left   Comments 1 sec - consistant stepping reponse to LOB   Balance Special Tests Sit to Stand Reps in 30 Seconds   Reps in 30 seconds 16  (14 reps WNL for 70-73 yo M)   Height 18   Comments wide SRIDEVI, no UE support   Sensory Examination   Sensory Perception Comments impaired per pt report (not formally assessed)   Muscle Tone   Muscle Tone no deficits were identified   Planned Therapy Interventions   Planned Therapy Interventions balance training;gait training;neuromuscular re-education;strengthening;stretching   Clinical Impression   Criteria for Skilled Therapeutic Interventions Met yes, treatment indicated   PT Diagnosis impaired high level postural and dynamic gait stability    Influenced by the following impairments L LE sensation changes (chronic since L knee replacement, with minimal change since recent CVA), L neck/shoulder pain in certain positions (chronic with labrum tear and arthritis), overweight, impaired postural stability with single leg stance; minimal decline in gait speed, dynamic gait stability with narrow SRIDEVI and decreased visual input   Functional limitations due to impairments impaired prolonged standing tolerance per pt report; impaired functional dyanmic gait stability with mild inc risk for falls   Clinical Presentation Stable/Uncomplicated   Clinical Decision Making (Complexity) Low complexity   Therapy Frequency 1 time/week   Predicted Duration of Therapy Intervention (days/wks) 2 weeks   Risk & Benefits of therapy have been explained Yes   Patient, Family & other staff in agreement with plan of care Yes   Clinical Impression Comments Pt would benefit from participation in skilled PT to establish high level postural and dynamic gait stability exercises to continue as part of (I)exercise routine to address impairements and limitations noted above.   Education Assessment   Barriers to Learning No barriers   GOALS   PT Eval Goals 1;2;3   Goal 1    Goal Identifier HEP   Goal Description Pt to be independent in HEP to safely address his impairments after d/c from skilled PT services.    Target Date 02/24/19   Goal 2   Goal Identifier FGA (baseline 21/30)   Goal Description Pt to score 24 or greater on the FGA to be considered WNL for age and gender matched norms and improved functional gait and balance for safety with ambulation in the community, decrease in falls   Target Date 02/24/19   Goal 3   Goal Identifier SLS (baseline: R LE 2-3 sec, L LE 1 sec)   Goal Description Pt to sustain B SLS > 5 sec to demonstrate improved postural stability and dec fall risk.   Target Date 02/24/19   Total Evaluation Time   PT Eval, Low Complexity Minutes ([id number]) 33

## 2019-02-13 NOTE — PROGRESS NOTES
Service Date: 02/13/2019      HISTORY OF PRESENT ILLNESS:  I saw Mr. Weber for followup of atrial fibrillation.  He is a 73-year-old white male with obesity and recurrent atrial fibrillation.  He was previously treated with flecainide but developed persistent atrial fibrillation with wide QRS complex in December of last year.  I stopped flecainide and put him on diltiazem for rate control.  His Holter showed properly controlled ventricular rate, and he tolerated diltiazem well.  He was previously on Xarelto.  The patient woke up with left-sided numbness and weakness in January while he was in [place].  He was admitted to the local hospital.  After extensive evaluation, he was told that he had a small stroke.  He has had almost complete recovery of his symptoms with residual numbness in the left hand.  At the present time, he has no shortness of breath or palpitation.  He is undergoing a weight loss diet and has lost about 15 pounds.      PHYSICAL EXAMINATION:   VITAL SIGNS:  Blood pressure was 128/72, heart rate 86 beats per minute, body weight 339 pounds.      ASSESSMENT AND RECOMMENDATIONS:  Mr. Weber is in chronic atrial fibrillation with controlled ventricular rate.  He is losing weight gradually through intentional effort, and I encouraged him to continue the effort.  He is now on Eliquis 5 mg p.o. b.i.d. and aspirin 81 mg once a day.  He will visit with the neurologist tomorrow.  I asked the patient to ask the neurologist about the duration of aspirin use.  Personally, I am concerned about risk of bleeding with the combination of Eliquis and aspirin.  Hopefully, the neurologist would agree for aspirin of 3-6 months only.  He will continue Eliquis indefinitely.  He can stay on current dose of diltiazem.  If he loses weight significantly, the dose of diltiazem may have to be reduced.  He is to return for followup in 1 year.      cc:   Brett Cassidy MD    [address] [place]  [address]         DLUCE KRUGER MD             D: 2019   T: 2019   MT: DIONY      Name:     GAYATHRI SMITH   MRN:      [id number]        Account:      [id number]   :      1945           Service Date: 2019      Document: [id number]

## 2019-02-13 NOTE — LETTER
2/13/2019    Brett Cassidy MD  [address] [facility] [address]    RE: Hugo Weber       Dear Colleague,    I had the pleasure of seeing Hugo Weber in the [facility].    HPI and Plan:   See dictation    Orders Placed This Encounter   Procedures     Follow-Up with Electrophysiologist       No orders of the defined types were placed in this encounter.      There are no discontinued medications.      Encounter Diagnosis   Name Primary?     Persistent atrial fibrillation (H) Yes       CURRENT MEDICATIONS:  Current Outpatient Medications   Medication Sig Dispense Refill     apixaban ANTICOAGULANT (ELIQUIS) 5 MG tablet Take 1 tablet (5 mg) by mouth 2 times daily 180 tablet 3     aspirin 81 MG EC tablet Take 1 tablet (81 mg) by mouth daily 90 tablet 3     diltiazem ER (DILT-XR) 180 MG 24 hr capsule Take 360 mg by mouth daily       Ferrous Sulfate (IRON) 325 (65 Fe) MG tablet Take 1 tablet by mouth daily (with breakfast) 90 tablet 3     fluticasone (FLONASE) 50 MCG/ACT nasal spray Spray 1 spray in nostril       pantoprazole (PROTONIX) 40 MG EC tablet Take 1 tablet (40 mg) by mouth daily Take 30-60 minutes before a meal. 90 tablet 3     rosuvastatin (CRESTOR) 40 MG tablet Take 1 tablet (40 mg) by mouth daily 90 tablet 3     tamsulosin (FLOMAX) 0.4 MG capsule Take 1 capsule (0.4 mg) by mouth 2 times daily 90 capsule 3       ALLERGIES   No Known Allergies    PAST MEDICAL HISTORY:  Past Medical History:   Diagnosis Date     Arrhythmia      Arthritis      Atrial fibrillation 2006    Followed by [facility]     Calculus of kidney 2005, 6/06, 10/12, 8/18    dr walker/nestor/иван - hematuria - w/u o/w neg     Cervical stenosis of spine     Moderate C4-5     Cholelithiasis      Chronic peptic ulcer, unspecified site, without mention of hemorrhage, perforation, or obstruction 1985    gastric bypass     Colon polyps 8/05, 9/10, 10/15    2 tubular adenoma, 1 hyperplastic - multiple  serrated/tubular adenomas     CVA (cerebral vascular accident) (H) 01/2019    small right periorlandic subcortical - left sided residual - left hand tingling/numbness - Left leg weak/numbness     First degree AV block      Hepatitis C 10/12    chronic hepatitis C, grade 1-2, stage 1 - mild - Dr Douglas     Hyperlipidemia LDL goal <130      Hypertension goal BP (blood pressure) < 140/90 6/06    dr jaciel winchester     Iron deficiency anemia, unspecified 1985    gastric bypass     Nephrolithiasis multiple episodes, last 8/18    Dr Bey     OA (osteoarthritis)     Multiple - Left shoulder with rotator cuff tear - Dr Durham     Obesity, unspecified     s/p gastric bypass 1985      Prediabetes      Presbyacusis 1/04    dr corona     Pyelonephritis, unspecified 12/99     SCC (squamous cell carcinoma), 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    dr saez - lt conchal bowl     Sleep apnea 10/02    cpap - severe - 15 cm - dr cunha     Stroke (H) 1/19/2019     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    Mn Head and Neck       PAST SURGICAL HISTORY:  Past Surgical History:   Procedure Laterality Date     APPENDECTOMY       ARTHROPLASTY KNEE  8/13/2012    LT TKA - Dr Villanueva     CARDIOVERSION  2016     COLONOSCOPY  8/05, 9/10, 10/15    multiple tubular/serrated/hyperplastic polyps     COLONOSCOPY N/A 10/29/2015    multiple tubular and serrated adenomas     COLONOSCOPY N/A 9/7/2016     EYE SURGERY  Lasik     HC KNEE SCOPE, DIAGNOSTIC  05/00    Arthroscopy, Knee RT     LASER HOLMIUM LITHOTRIPSY URETER(S), INSERT STENT, COMBINED  10/16/2012    stones x 4, Dr Campa     LASER HOLMIUM LITHOTRIPSY URETER(S), INSERT STENT, COMBINED Right 5/2/2018    CYSTOSCOPY, RIGHT URETEROSCOPY, HOLMIUM LASER LITHOTRIPSY, RIGHT STENT PLACEMENT - Dr Bey     SURGICAL HISTORY OF -   1985    s/p gastric bypass Bilroth II     SURGICAL HISTORY OF -   11/98    wisdom teeth     SURGICAL HISTORY OF -   1979    cellulitis     SURGICAL HISTORY OF -   11/98    lt forearm spur's     SURGICAL HISTORY OF -    ",,    s/p lasik     SURGICAL HISTORY OF -       rt forearm spurs     SURGICAL HISTORY OF -       dr stevenson - lithotrypsy     SURGICAL HISTORY OF -   10/08,     Lt ear chonchal lesion removal SCC - dr saez       FAMILY HISTORY:  Family History   Problem Relation Age of Onset     Alzheimer Disease Father 82         at 88     Prostate Cancer Father 76        bladder and prostate     Heart Disease Mother 80        CHF     Heart Disease Maternal Grandfather         MI at 55     Cancer Paternal Uncle         ?       SOCIAL HISTORY:  Social History     Socioeconomic History     Marital status:      Spouse name: Mayra     Number of children: 3     Years of education: 21     Highest education level: None   Social Needs     Financial resource strain: None     Food insecurity - worry: None     Food insecurity - inability: None     Transportation needs - medical: None     Transportation needs - non-medical: None   Occupational History     Occupation: retired teacher and football and       Employer: [id number]     Employer: [employer]   Tobacco Use     Smoking status: Never Smoker     Smokeless tobacco: Never Used   Substance and Sexual Activity     Alcohol use: Yes     Alcohol/week: 1.2 oz     Comment: 2 per week     Drug use: No     Comment: acknowledges using herbal supplement \"Vibe\" for energy-none used recently      Sexual activity: Yes     Partners: Female     Birth control/protection: None     Comment:     Other Topics Concern      Service Not Asked     Blood Transfusions Not Asked     Caffeine Concern Yes     Comment: <1 can qd     Occupational Exposure Not Asked     Hobby Hazards Not Asked     Sleep Concern Yes     Comment: sleep apnea, wears cpap     Stress Concern Not Asked     Weight Concern Not Asked     Special Diet Not Asked     Back Care Not Asked     Exercise No     Bike Helmet Not Asked     Seat Belt Yes     Self-Exams Not Asked "     Parent/sibling w/ CABG, MI or angioplasty before 65F 55M? No   Social History Narrative     None       Review of Systems:  Skin:  Positive for bruising chest   Eyes:  Negative      ENT:  Positive for hearing loss    Respiratory:  Positive for sleep apnea;CPAP uses cpap at night   Cardiovascular:  Negative;chest pain;cyanosis;syncope or near-syncope;lightheadedness;dizziness;edema Positive for    Gastroenterology: Positive for heartburn;reflux;abdominal pain;excessive gas or bloating    Genitourinary:  Negative      Musculoskeletal:  Negative      Neurologic:  Negative      Psychiatric:  Negative excessive stress;sleep disturbances;anxiety;depression    Heme/Lymph/Imm:  Negative      Endocrine:  Negative        Physical Exam:  Vitals: /72   Pulse 86   Ht 1.829 m (6')   Wt (!) 153.8 kg (339 lb)   SpO2 95%   BMI 45.98 kg/m       Constitutional:  cooperative, alert and oriented, well developed, well nourished, in no acute distress        Skin:  warm and dry to the touch, no apparent skin lesions or masses noted          Head:  normocephalic, no masses or lesions        Eyes:  pupils equal and round, conjunctivae and lids unremarkable, sclera white, no xanthalasma, EOMS intact, no nystagmus        Lymph:No Cervical lymphadenopathy present     ENT:  no pallor or cyanosis, dentition good        Neck:  carotid pulses are full and equal bilaterally, JVP normal, no carotid bruit        Respiratory:  normal breath sounds, clear to auscultation, normal A-P diameter, normal symmetry, normal respiratory excursion, no use of accessory muscles         Cardiac: regular rhythm, normal S1/S2, no S3 or S4, apical impulse not displaced, no murmurs, gallops or rubs irregularly irregular rhythm              not assessed this visit                                        GI:  abdomen soft, non-tender, BS normoactive, no mass, no HSM, no bruits        Extremities and Muscular Skeletal:  no deformities, clubbing, cyanosis,  erythema observed              Neurological:  no gross motor deficits        Psych:           CC  Brett Cassidy MD  [address] [place] [address]                Thank you for allowing me to participate in the care of your patient.      Sincerely,     Sarah Beth Castillo MD     [facility]    cc:   Brett Cassidy MD  [address] [place] [id number]

## 2019-02-13 NOTE — LETTER
2/13/2019      Brett Cassidy MD  [address] [facility] [address]      RE: Hugo Weber       Dear Colleague,    I had the pleasure of seeing Hugo Weber in the [facility].    Service Date: 02/13/2019      HISTORY OF PRESENT ILLNESS:  I saw Mr. Weber for followup of atrial fibrillation.  He is a 73-year-old white male with obesity and recurrent atrial fibrillation.  He was previously treated with flecainide but developed persistent atrial fibrillation with wide QRS complex in December of last year.  I stopped flecainide and put him on diltiazem for rate control.  His Holter showed properly controlled ventricular rate, and he tolerated diltiazem well.  He was previously on Xarelto.  The patient woke up with left-sided numbness and weakness in January while he was in [place].  He was admitted to the local hospital.  After extensive evaluation, he was told that he had a small stroke.  He has had almost complete recovery of his symptoms with residual numbness in the left hand.  At the present time, he has no shortness of breath or palpitation.  He is undergoing a weight loss diet and has lost about 15 pounds.      PHYSICAL EXAMINATION:   VITAL SIGNS:  Blood pressure was 128/72, heart rate 86 beats per minute, body weight 339 pounds.      ASSESSMENT AND RECOMMENDATIONS:  Mr. Weber is in chronic atrial fibrillation with controlled ventricular rate.  He is losing weight gradually through intentional effort, and I encouraged him to continue the effort.  He is now on Eliquis 5 mg p.o. b.i.d. and aspirin 81 mg once a day.  He will visit with the neurologist tomorrow.  I asked the patient to ask the neurologist about the duration of aspirin use.  Personally, I am concerned about risk of bleeding with the combination of Eliquis and aspirin.  Hopefully, the neurologist would agree for aspirin of 3-6 months only.  He will continue Eliquis indefinitely.  He can stay on  current dose of diltiazem.  If he loses weight significantly, the dose of diltiazem may have to be reduced.  He is to return for followup in 1 year.      cc:   Brett Cassidy MD    [address]  [address]         DULCE CASTILLO MD             D: 2019   T: 2019   MT: DIONY      Name:     GAYATHRI SMITH   MRN:      [id number]        Account:      [id number]   :      1945           Service Date: 2019      Document: [id number]           Outpatient Encounter Medications as of 2019   Medication Sig Dispense Refill     apixaban ANTICOAGULANT (ELIQUIS) 5 MG tablet Take 1 tablet (5 mg) by mouth 2 times daily 180 tablet 3     aspirin 81 MG EC tablet Take 1 tablet (81 mg) by mouth daily 90 tablet 3     diltiazem ER (DILT-XR) 180 MG 24 hr capsule Take 360 mg by mouth daily       Ferrous Sulfate (IRON) 325 (65 Fe) MG tablet Take 1 tablet by mouth daily (with breakfast) 90 tablet 3     fluticasone (FLONASE) 50 MCG/ACT nasal spray Spray 1 spray in nostril       pantoprazole (PROTONIX) 40 MG EC tablet Take 1 tablet (40 mg) by mouth daily Take 30-60 minutes before a meal. 90 tablet 3     rosuvastatin (CRESTOR) 40 MG tablet Take 1 tablet (40 mg) by mouth daily 90 tablet 3     tamsulosin (FLOMAX) 0.4 MG capsule Take 1 capsule (0.4 mg) by mouth 2 times daily 90 capsule 3     [] fluconazole (DIFLUCAN) 200 MG tablet Take 1 tablet (200 mg) by mouth once a week for 28 days 4 tablet 0     No facility-administered encounter medications on file as of 2019.            Again, thank you for allowing me to participate in the care of your patient.      Sincerely,    Dulce Castillo MD     [facility]

## 2019-02-19 ENCOUNTER — MEDICAL CORRESPONDENCE (OUTPATIENT)
Dept: HEALTH INFORMATION MANAGEMENT | Facility: CLINIC | Age: 74
End: 2019-02-19

## 2019-02-19 ENCOUNTER — TRANSFERRED RECORDS (OUTPATIENT)
Dept: HEALTH INFORMATION MANAGEMENT | Facility: CLINIC | Age: 74
End: 2019-02-19

## 2019-02-19 DIAGNOSIS — I69.398 ALTERATION OF SENSATION AS LATE EFFECT OF CEREBROVASCULAR ACCIDENT: Primary | ICD-10-CM

## 2019-02-19 DIAGNOSIS — I10 ESSENTIAL HYPERTENSION: ICD-10-CM

## 2019-02-19 DIAGNOSIS — E56.9 MULTIPLE VITAMIN DEFICIENCY DISEASE: ICD-10-CM

## 2019-02-19 DIAGNOSIS — G47.33 OBSTRUCTIVE SLEEP APNEA (ADULT) (PEDIATRIC): ICD-10-CM

## 2019-02-19 DIAGNOSIS — R20.9 ALTERATION OF SENSATION AS LATE EFFECT OF CEREBROVASCULAR ACCIDENT: Primary | ICD-10-CM

## 2019-02-19 DIAGNOSIS — E78.2 MIXED HYPERLIPIDEMIA: ICD-10-CM

## 2019-02-21 ENCOUNTER — HOSPITAL ENCOUNTER (OUTPATIENT)
Dept: LAB | Facility: CLINIC | Age: 74
Discharge: HOME | End: 2019-02-21
Attending: PSYCHIATRY & NEUROLOGY | Admitting: PSYCHIATRY & NEUROLOGY
Payer: COMMERCIAL

## 2019-02-21 ENCOUNTER — HOSPITAL ENCOUNTER (OUTPATIENT)
Dept: PHYSICAL THERAPY | Facility: CLINIC | Age: 74
Setting detail: THERAPIES SERIES
End: 2019-02-21
Attending: FAMILY MEDICINE
Payer: COMMERCIAL

## 2019-02-21 DIAGNOSIS — I69.398 ALTERATION OF SENSATION AS LATE EFFECT OF CEREBROVASCULAR ACCIDENT: ICD-10-CM

## 2019-02-21 DIAGNOSIS — E78.2 MIXED HYPERLIPIDEMIA: ICD-10-CM

## 2019-02-21 DIAGNOSIS — E56.9 MULTIPLE VITAMIN DEFICIENCY DISEASE: ICD-10-CM

## 2019-02-21 DIAGNOSIS — R20.9 ALTERATION OF SENSATION AS LATE EFFECT OF CEREBROVASCULAR ACCIDENT: ICD-10-CM

## 2019-02-21 DIAGNOSIS — I10 ESSENTIAL HYPERTENSION: ICD-10-CM

## 2019-02-21 DIAGNOSIS — G47.33 OBSTRUCTIVE SLEEP APNEA (ADULT) (PEDIATRIC): ICD-10-CM

## 2019-02-21 LAB
DEPRECATED CALCIDIOL+CALCIFEROL SERPL-MC: 31 UG/L (ref 20–75)
FOLATE SERPL-MCNC: 16.6 NG/ML
T3FREE SERPL-MCNC: 2.5 PG/ML (ref 2.3–4.2)
T4 FREE SERPL-MCNC: 1.08 NG/DL (ref 0.76–1.46)
TSH SERPL DL<=0.005 MIU/L-ACNC: 2.05 MU/L (ref 0.4–4)
VIT B12 SERPL-MCNC: 125 PG/ML (ref 193–986)

## 2019-02-21 PROCEDURE — 84439 ASSAY OF FREE THYROXINE: CPT | Performed by: PSYCHIATRY & NEUROLOGY

## 2019-02-21 PROCEDURE — 82306 VITAMIN D 25 HYDROXY: CPT | Performed by: PSYCHIATRY & NEUROLOGY

## 2019-02-21 PROCEDURE — 83921 ORGANIC ACID SINGLE QUANT: CPT | Performed by: PSYCHIATRY & NEUROLOGY

## 2019-02-21 PROCEDURE — 97110 THERAPEUTIC EXERCISES: CPT | Mod: GP | Performed by: PHYSICAL THERAPIST

## 2019-02-21 PROCEDURE — 84207 ASSAY OF VITAMIN B-6: CPT | Performed by: PSYCHIATRY & NEUROLOGY

## 2019-02-21 PROCEDURE — 84425 ASSAY OF VITAMIN B-1: CPT | Performed by: PSYCHIATRY & NEUROLOGY

## 2019-02-21 PROCEDURE — 83735 ASSAY OF MAGNESIUM: CPT | Performed by: PSYCHIATRY & NEUROLOGY

## 2019-02-21 PROCEDURE — 97750 PHYSICAL PERFORMANCE TEST: CPT | Mod: GP | Performed by: PHYSICAL THERAPIST

## 2019-02-21 PROCEDURE — 82746 ASSAY OF FOLIC ACID SERUM: CPT | Performed by: PSYCHIATRY & NEUROLOGY

## 2019-02-21 PROCEDURE — 97112 NEUROMUSCULAR REEDUCATION: CPT | Mod: GP | Performed by: PHYSICAL THERAPIST

## 2019-02-21 PROCEDURE — 84481 FREE ASSAY (FT-3): CPT | Performed by: PSYCHIATRY & NEUROLOGY

## 2019-02-21 PROCEDURE — 84252 ASSAY OF VITAMIN B-2: CPT | Performed by: PSYCHIATRY & NEUROLOGY

## 2019-02-21 PROCEDURE — 84443 ASSAY THYROID STIM HORMONE: CPT | Performed by: PSYCHIATRY & NEUROLOGY

## 2019-02-21 PROCEDURE — 82607 VITAMIN B-12: CPT | Performed by: PSYCHIATRY & NEUROLOGY

## 2019-02-21 PROCEDURE — 82180 ASSAY OF ASCORBIC ACID: CPT | Performed by: PSYCHIATRY & NEUROLOGY

## 2019-02-21 NOTE — PROGRESS NOTES
02/21/19 1100   Signing Clinician's Name / Credentials   Signing clinician's name / credentials Deanne Chavira PT   Functional Gait Assessment (SHELLI Monson, Irene GJohn F., et al. (2004))   1. GAIT LEVEL SURFACE 3  (5.3)   2. CHANGE IN GAIT SPEED 3   3. GAIT WITH HORIZONTAL HEAD TURNS 2   4. GAIT WITH VERTICAL HEAD TURNS 3   5. GAIT AND PIVOT TURN 3   6. STEP OVER OBSTACLE 3   7. GAIT WITH NARROW BASE OF SUPPORT 3   8. GAIT WITH EYES CLOSED 3  (6.72)   9. AMBULATING BACKWARDS 3   10. STEPS 3   Total Functional Gait Assessment Score   TOTAL SCORE: (MAXIMUM SCORE 30) 29     Functional Gait Assessment (FGA): The FGA assesses postural stability during various walking tasks.   Gait assistive device used: none    Patient Score: 29/30  Scores of <22/30 have been correlated with predicting falls in community-dwelling older adults according to Iona & Palomo 2010.   Scores of <18/30 have been correlated with increased risk for falls in patients with Parkinsons Disease according to Aly Dillard Zhou et al 2014.  Minimal Detectable Change for patients with acute/chronic stroke = 4.2 according to Thivenkatesh & Ritschel 2009  Minimal Detectable Change for patients with vestibular disorder = 8 according to Iona & Palomo 2010    Assessment (rationale for performing, application to patient s function & care plan): progress  Minutes billed as physical performance test: 15

## 2019-02-21 NOTE — PROGRESS NOTES
Outpatient Physical Therapy Discharge Note     Patient: Hugo Weber  : 1945    Beginning/End Dates of Reporting Period:  19  to 2019    Referring Provider: Dr. Brett Cassidy    Therapy Diagnosis: impaired high level postural and dynamic gait stability      Client Self Report: States he has been doing the exercises, no questions.     Objective Measurements:  FGA       SLS  1 sec R,  1.6 L    Goals:  Goal Identifier HEP   Goal Description Pt to be independent in [facility] to safely address his impairments after d/c from skilled PT services.    Target Date 19   Date Met   19   Progress:/     Goal Identifier FGA (baseline )   Goal Description Pt to score 24 or greater on the FGA to be considered WNL for age and gender matched norms and improved functional gait and balance for safety with ambulation in the community, decrease in falls   Target Date 19   Date Met   19   Progress: as above     Goal Identifier SLS (baseline: R LE 2-3 sec, L LE 1 sec)   Goal Description Pt to sustain B SLS > 5 sec to demonstrate improved postural stability and dec fall risk.   Target Date 19   Date Met   not met   Progress:will continue to work on with HEP. States he was able to do it better but hasn't been practicing at home the last few days and so isn't as good.      Progress Toward Goals:   Progress this reporting period: 2/3 goals met.  Improved balance. Feels he does not have deficits from his strokes.  He does present with slight decreased coordination and slower motor planning L LE but anticipate this will resolve as he becomes more active.  Pt will be leaving [place] for a month.  He feels he does not need to schedule appts when he returns    Plan:  Discharge from therapy.    Discharge:    Reason for Discharge: leaving state for 1 month. Does not want to schedule appointments for when he returns.  He is demonstrating improvement and anticipate improvement in SLS if he continues with  HEP    Equipment Issued: HEP      Discharge Plan: Patient to continue home program.

## 2019-02-22 ENCOUNTER — TRANSFERRED RECORDS (OUTPATIENT)
Dept: HEALTH INFORMATION MANAGEMENT | Facility: CLINIC | Age: 74
End: 2019-02-22

## 2019-02-22 ENCOUNTER — OFFICE VISIT (OUTPATIENT)
Dept: FAMILY MEDICINE | Facility: CLINIC | Age: 74
End: 2019-02-22
Payer: COMMERCIAL

## 2019-02-22 ENCOUNTER — TELEPHONE (OUTPATIENT)
Dept: FAMILY MEDICINE | Facility: CLINIC | Age: 74
End: 2019-02-22

## 2019-02-22 VITALS
OXYGEN SATURATION: 96 % | HEIGHT: 72 IN | SYSTOLIC BLOOD PRESSURE: 130 MMHG | HEART RATE: 99 BPM | DIASTOLIC BLOOD PRESSURE: 72 MMHG | TEMPERATURE: 98.4 F | BODY MASS INDEX: 42.66 KG/M2 | WEIGHT: 315 LBS

## 2019-02-22 DIAGNOSIS — E66.01 MORBID OBESITY (H): ICD-10-CM

## 2019-02-22 DIAGNOSIS — I48.0 PAROXYSMAL ATRIAL FIBRILLATION (H): ICD-10-CM

## 2019-02-22 DIAGNOSIS — E78.5 HYPERLIPIDEMIA LDL GOAL <70: ICD-10-CM

## 2019-02-22 DIAGNOSIS — G47.33 OBSTRUCTIVE SLEEP APNEA SYNDROME: ICD-10-CM

## 2019-02-22 DIAGNOSIS — Z51.81 MEDICATION MONITORING ENCOUNTER: ICD-10-CM

## 2019-02-22 DIAGNOSIS — I63.9 CEREBROVASCULAR ACCIDENT (CVA), UNSPECIFIED MECHANISM (H): Primary | ICD-10-CM

## 2019-02-22 DIAGNOSIS — R73.03 PREDIABETES: ICD-10-CM

## 2019-02-22 DIAGNOSIS — I10 HYPERTENSION GOAL BP (BLOOD PRESSURE) < 140/90: ICD-10-CM

## 2019-02-22 DIAGNOSIS — K76.0 NAFLD (NONALCOHOLIC FATTY LIVER DISEASE): ICD-10-CM

## 2019-02-22 LAB
CHOLEST SERPL-MCNC: 106 MG/DL
HBA1C MFR BLD: 5.9 % (ref 0–5.7)
HDLC SERPL-MCNC: 44 MG/DL
LDLC SERPL CALC-MCNC: 49 MG/DL (ref 40–130)
NONHDLC SERPL-MCNC: 62 MG/DL
TRIGL SERPL-MCNC: 72 MG/DL (ref 30–150)

## 2019-02-22 PROCEDURE — 99214 OFFICE O/P EST MOD 30 MIN: CPT | Performed by: FAMILY MEDICINE

## 2019-02-22 ASSESSMENT — MIFFLIN-ST. JEOR: SCORE: 2316.62

## 2019-02-22 NOTE — TELEPHONE ENCOUNTER
I advised patient that not all his labs are complete yet and when they are Dr. Velasquez will advise him on the next step.  I advised him to call Dr. Velasquez's office if he has questions or concerns.  Patient verbalized understanding and agreed with plan.    Nicol Tyler, BS, RN, N  [facility]) [phone]

## 2019-02-22 NOTE — TELEPHONE ENCOUNTER
Reason for Call:  Other call back    Detailed comments: Vitamin B levels are off. Wants to know right away what to do about this.  Please call him     Phone Number Patient can be reached at: Cell number on file:    Telephone Information:   Mobile [phone]       Best Time: any    Can we leave a detailed message on this number? YES    Call taken on 2/22/2019 at 3:10 PM by Aicha Brewster

## 2019-02-22 NOTE — PROGRESS NOTES
SUBJECTIVE:   Hugo Weber is a 73 year old male who presents to clinic today for the following health issues:    2/22/2019 Follow up    Dr. Castillo note reviewed. Patient consulted with Dr. Velasquez of Neurology on 2/14/2019 who discontinued patient's statin(40 mg Rosuvastatin daily). Patient was not started on a different statin. Patient was advised to continue Aspirin and Eliquis as prescribed for Atrial Fibrillation management. Dr. Velasquez recommended the patient to lose weight to prevent further issues. Patient is to follow up with Dr. Velasquez in 6 months. Patient reports he has completed PT and the only thing he needs to work on is balance. Patient states OT was done with him after 1 visit and he is all good on that front.     Atrial Fibrillation: Stable. Patient denies CP, SOB, and edema. Was seen by Dr Castillo    Hyperlipidemia: Patient's hyperlipidemia is well controlled. Patient is not currently prescribed any medication for hyperlipidemia management.    Recent Labs   Lab Test 09/20/18  0919 09/25/17  0922  01/13/15  0815 09/03/14  0810   CHOL 165 167   < > 131 156   HDL 44 45   < > 43 42   * 96   < > 74 93   TRIG 106 129   < > 72 103   CHOLHDLRATIO  --   --   --  3.0 3.7    < > = values in this interval not displayed.     Prediabetes:     Glucose   Date Value Ref Range Status   02/06/2019 79 70 - 99 mg/dL Final   11/12/2018 91 70 - 99 mg/dL Final   10/26/2018 106 (H) 70 - 99 mg/dL Final   09/20/2018 88 70 - 99 mg/dL Final     Comment:     Fasting specimen   04/24/2018 94 70 - 99 mg/dL Final     Lab Results   Component Value Date    A1C 5.8 02/06/2019    A1C 5.7 11/12/2018    A1C 5.5 09/20/2018    A1C 5.5 04/24/2018    A1C 5.7 09/25/2017 2/13/2019 [facility] Note    HISTORY OF PRESENT ILLNESS:  I saw Mr. Weber for followup of atrial fibrillation.  He is a 73-year-old white male with obesity and recurrent atrial fibrillation.  He was previously treated with flecainide but developed persistent  "atrial fibrillation with wide QRS complex in December of last year.  I stopped flecainide and put him on diltiazem for rate control.  His Holter showed properly controlled ventricular rate, and he tolerated diltiazem well.  He was previously on Xarelto.  The patient woke up with left-sided numbness and weakness in January while he was in [place].  He was admitted to the local hospital.  After extensive evaluation, he was told that he had a small stroke.  He has had almost complete recovery of his symptoms with residual numbness in the left hand.  At the present time, he has no shortness of breath or palpitation.  He is undergoing a weight loss diet and has lost about 15 pounds.     ASSESSMENT AND RECOMMENDATIONS:  Mr. Weber is in chronic atrial fibrillation with controlled ventricular rate.  He is losing weight gradually through intentional effort, and I encouraged him to continue the effort.  He is now on Eliquis 5 mg p.o. b.i.d. and aspirin 81 mg once a day.  He will visit with the neurologist tomorrow.  I asked the patient to ask the neurologist about the duration of aspirin use.  Personally, I am concerned about risk of bleeding with the combination of Eliquis and aspirin.  Hopefully, the neurologist would agree for aspirin of 3-6 months only.  He will continue Eliquis indefinitely.  He can stay on current dose of diltiazem.  If he loses weight significantly, the dose of diltiazem may have to be reduced.  He is to return for followup in 1 year.     2/6/2019 Office Note    Patient is following up post hospitalization from 1/19/2019-1/21/2019 due to small stroke affecting right side of brain. Patient denies any residual side effects. \"feels much better.\" Denies: facial drooping and slurred speech. Patient was seen by neurology, cardiology, hospitalist, and PT/OT while in the hospital. Patient reports he woke up on 1/19 and his left leg was weak and numb and felt different than it normally did and also had some " left hand tingling. Patient reports that he had no numbness or tingling in his left shoulder and reports no symptoms to the right side of his body. CTA of neck was negative. CTA of head was negative. MRI of brain showed small infarct on right subcortical brain. MRI of left shoulder showed mild tear of rotator cuff. MRI of neck showed cervical stenosis at C4-5. Patient was prescribed Eliquis, Aspirin, and Crestor while in the hospital to prevent future infarcts. Patient reports he was prescribed a walker while in the hospital but has not picked it up. Patient reports he feels less stable than before the stroke but still does not think he needs a walker. Patient reports he will be leaving for [place] on 2/25/2019 and will be there for 1 month. Patient reports that the tingling in his hand has improved since infarct but have not completely resolved. Patient reports he feels like his left leg is weaker but denies numbness and tingling.     1) PT ordered today. OT ordered today. Patient referred to Neurology today - Stroke Clinic. Recommend patient use walker. Medications prescribed in the hospital reviewed and reconciled. Patient advised to take as prescribed.     2) Follow up in 2 weeks for medication recheck visit.     Problem list and histories reviewed & adjusted, as indicated.  Additional history: as documented    BP Readings from Last 3 Encounters:   02/22/19 130/72   02/13/19 128/72   02/06/19 130/72       body mass index is 45.71 kg/m .    Wt Readings from Last 4 Encounters:   02/22/19 (!) 152.9 kg (337 lb)   02/13/19 (!) 153.8 kg (339 lb)   02/06/19 (!) 156.5 kg (345 lb)   02/05/19 (!) 156.7 kg (345 lb 6.4 oz)       Health Maintenance    Health Maintenance Due   Topic Date Due     ZOSTER IMMUNIZATION (1 of 2) 05/25/1996       Current Problem List    Patient Active Problem List   Diagnosis     Iron deficiency anemia     Colon polyps     Obstructive sleep apnea syndrome     Hyperlipidemia LDL goal <70     Long term  current use of anticoagulant therapy - for intermittent a-fib     Advance Care Planning     Total knee replacement status     Calculus of kidney     NAFLD (nonalcoholic fatty liver disease)     Morbid obesity due to excess calories (H)     History of hepatitis C     Prediabetes     Paroxysmal atrial fibrillation (H)     Cholelithiasis     Hypertension goal BP (blood pressure) < 140/90     TMJ arthralgia     Nephrolithiasis     OA (osteoarthritis)     First degree AV block     Benign prostatic hyperplasia (BPH) with urinary urge incontinence     Thoracic aortic ectasia (H)     Stroke (H)     CVA (cerebral vascular accident) (H)     Cervical stenosis of spine       Past Medical History    Past Medical History:   Diagnosis Date     Arrhythmia      Arthritis      Atrial fibrillation 2006    Followed by [facility]     Calculus of kidney 2005, 6/06, 10/12, 8/18    dr walker/nestor/иван - hematuria - w/u o/w neg     Cervical stenosis of spine     Moderate C4-5     Cholelithiasis      Chronic peptic ulcer, unspecified site, without mention of hemorrhage, perforation, or obstruction 1985    gastric bypass     Colon polyps 8/05, 9/10, 10/15    2 tubular adenoma, 1 hyperplastic - multiple serrated/tubular adenomas     CVA (cerebral vascular accident) (H) 01/2019    small right periorlandic subcortical - left sided residual - left hand tingling/numbness - Left leg weak/numbness     First degree AV block      Hepatitis C 10/12    chronic hepatitis C, grade 1-2, stage 1 - mild - Dr Douglas     Hyperlipidemia LDL goal <70      Hypertension goal BP (blood pressure) < 140/90 6/06    dr jaciel winchester     Iron deficiency anemia, unspecified 1985    gastric bypass     Nephrolithiasis multiple episodes, last 8/18    Dr Bey     OA (osteoarthritis)     Multiple - Left shoulder with rotator cuff tear - Dr Durham     Obesity, unspecified     s/p gastric bypass 1985      Prediabetes      Presbyacusis 1/04    dr corona     Pyelonephritis,  unspecified 12/99     SCC (squamous cell carcinoma), 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    dr saez - lt conchal bowl     Sleep apnea 10/02    cpap - severe - 15 cm - dr cunha     Stroke (H) 1/19/2019     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    [facility]       Past Surgical History    Past Surgical History:   Procedure Laterality Date     APPENDECTOMY       ARTHROPLASTY KNEE  8/13/2012    LT TKA - Dr Villanueva     CARDIOVERSION  2016     COLONOSCOPY  8/05, 9/10, 10/15    multiple tubular/serrated/hyperplastic polyps     COLONOSCOPY N/A 10/29/2015    multiple tubular and serrated adenomas     COLONOSCOPY N/A 9/7/2016     EYE SURGERY  Lasik     HC KNEE SCOPE, DIAGNOSTIC  05/00    Arthroscopy, Knee RT     LASER HOLMIUM LITHOTRIPSY URETER(S), INSERT STENT, COMBINED  10/16/2012    stones x 4, Dr Campa     LASER HOLMIUM LITHOTRIPSY URETER(S), INSERT STENT, COMBINED Right 5/2/2018    CYSTOSCOPY, RIGHT URETEROSCOPY, HOLMIUM LASER LITHOTRIPSY, RIGHT STENT PLACEMENT - Dr Bey     SURGICAL HISTORY OF -   1985    s/p gastric bypass Bilroth II     SURGICAL HISTORY OF -   11/98    wisdom teeth     SURGICAL HISTORY OF -   1979    cellulitis     SURGICAL HISTORY OF -   11/98    lt forearm spur's     SURGICAL HISTORY OF -   1/01,6/02,11/02    s/p lasik     SURGICAL HISTORY OF -   11/99    rt forearm spurs     SURGICAL HISTORY OF -   7/06    dr stevenson - lithotrypsy     SURGICAL HISTORY OF -   10/08, 12/08    Lt ear chonchal lesion removal SCC - dr saez       Current Medications    Current Outpatient Medications   Medication Sig Dispense Refill     apixaban ANTICOAGULANT (ELIQUIS) 5 MG tablet Take 1 tablet (5 mg) by mouth 2 times daily 180 tablet 3     aspirin 81 MG EC tablet Take 1 tablet (81 mg) by mouth daily 90 tablet 3     calcium carbonate 600 mg-vitamin D 400 units (CALTRATE) 600-400 MG-UNIT per tablet Take 1 tablet by mouth 2 times daily       diltiazem ER (DILT-XR) 180 MG 24 hr capsule Take 360 mg by mouth daily       Ferrous Sulfate (IRON)  325 (65 Fe) MG tablet Take 1 tablet by mouth daily (with breakfast) 90 tablet 3     fluticasone (FLONASE) 50 MCG/ACT nasal spray Spray 1 spray in nostril       pantoprazole (PROTONIX) 40 MG EC tablet Take 1 tablet (40 mg) by mouth daily Take 30-60 minutes before a meal. 90 tablet 3     tamsulosin (FLOMAX) 0.4 MG capsule Take 1 capsule (0.4 mg) by mouth 2 times daily 90 capsule 3       Allergies    No Known Allergies    Immunizations    Immunization History   Administered Date(s) Administered     Influenza (High Dose) 3 valent vaccine 2012, 2013, 10/20/2015, 2017, 2018     Influenza (IIV3) PF 10/26/2007, 2008, 2009, 10/14/2011     Influenza Vaccine, 3 YRS +, IM (QUADRIVALENT W/PRESERVATIVES) 10/30/2017     Pneumo Conj 13-V (2010&after) 03/10/2016, 2017     Pneumococcal 23 valent 2005, 2012     TD (ADULT, 7+) 1998     TDAP Vaccine (Adacel) 2007     TDAP Vaccine (Boostrix) 10/20/2015     Twinrix A/B 2005, 2007, 2008       Family History    Family History   Problem Relation Age of Onset     Alzheimer Disease Father 82         at 88     Prostate Cancer Father 76        bladder and prostate     Heart Disease Mother 80        CHF     Heart Disease Maternal Grandfather         MI at 55     Cancer Paternal Uncle         ?       Social History    Social History     Socioeconomic History     Marital status:      Spouse name: Mayra     Number of children: 3     Years of education: 21     Highest education level: Not on file   Occupational History     Occupation: retired teacher and football and       Employer: [employer] [address]     Employer: RETIRED   Social Needs     Financial resource strain: Not on file     Food insecurity:     Worry: Not on file     Inability: Not on file     Transportation needs:     Medical: Not on file     Non-medical: Not on file   Tobacco Use     Smoking status: Never Smoker      "Smokeless tobacco: Never Used   Substance and Sexual Activity     Alcohol use: Yes     Alcohol/week: 1.2 oz     Comment: 2 per week     Drug use: No     Comment: acknowledges using herbal supplement \"Vibe\" for energy-none used recently      Sexual activity: Yes     Partners: Female     Birth control/protection: None     Comment:     Lifestyle     Physical activity:     Days per week: Not on file     Minutes per session: Not on file     Stress: Not on file   Relationships     Social connections:     Talks on phone: Not on file     Gets together: Not on file     Attends Tenriism service: Not on file     Active member of club or organization: Not on file     Attends meetings of clubs or organizations: Not on file     Relationship status: Not on file     Intimate partner violence:     Fear of current or ex partner: Not on file     Emotionally abused: Not on file     Physically abused: Not on file     Forced sexual activity: Not on file   Other Topics Concern      Service Not Asked     Blood Transfusions Not Asked     Caffeine Concern Yes     Comment: <1 can qd     Occupational Exposure Not Asked     Hobby Hazards Not Asked     Sleep Concern Yes     Comment: sleep apnea, wears cpap     Stress Concern Not Asked     Weight Concern Not Asked     Special Diet Not Asked     Back Care Not Asked     Exercise No     Bike Helmet Not Asked     Seat Belt Yes     Self-Exams Not Asked     Parent/sibling w/ CABG, MI or angioplasty before 65F 55M? No   Social History Narrative     Not on file       All above reviewed and updated, all stable unless otherwise noted    Recent labs reviewed    ROS:  Constitutional, HEENT, cardiovascular, pulmonary, GI, , musculoskeletal, neuro, skin, endocrine and psych systems are negative, except as otherwise noted.    OBJECTIVE:                                                    /72   Pulse 99   Temp 98.4  F (36.9  C) (Oral)   Ht 1.829 m (6')   Wt (!) 152.9 kg (337 lb)   " SpO2 96%   BMI 45.71 kg/m    Body mass index is 45.71 kg/m .  GENERAL: healthy, alert and no distress  EYES: Eyes grossly normal to inspection  HENT:ear canals and TM's normal upon viewing with otoscope, nose and mouth without ulcers or lesions upon viewing with otoscope  NECK: no tenderness, no adenopathy, no asymmetry, no masses, no stiffness; thyroid- normal to palpation  RESP: lungs clear to auscultation - no rales, no rhonchi, no wheezes  CV: regular rates and rhythm, normal S1 S2, no S3 or S4 and no murmur, no click or rub -  ABDOMEN: soft, no tenderness, no  hepatosplenomegaly, no masses, normal bowel sounds  MS: extremities- no gross deformities noted, no edema  SKIN: no suspicious lesions, no rashes  NEURO: strength and tone- normal, sensory exam- grossly normal, mentation- intact, speech- normal  BACK: no CVA tenderness, no paralumbar tenderness  PSYCH: Alert and oriented times 3; speech- coherent , normal rate and volume; able to articulate logical thoughts, able to abstract reason, no tangential thoughts, no hallucinations or delusions, affect- normal    DIAGNOSTICS/PROCEDURES:                                                      Results for orders placed or performed during the hospital encounter of 02/21/19 (from the past 24 hour(s))   Folate   Result Value Ref Range    Folate 16.6 >5.4 ng/mL   T3 Free   Result Value Ref Range    Free T3 2.5 2.3 - 4.2 pg/mL   T4 free   Result Value Ref Range    T4 Free 1.08 0.76 - 1.46 ng/dL   TSH   Result Value Ref Range    TSH 2.05 0.40 - 4.00 mU/L   Vitamin D Deficiency   Result Value Ref Range    Vitamin D Deficiency screening 31 20 - 75 ug/L   Vitamin B12   Result Value Ref Range    Vitamin B12 125 (L) 193 - 986 pg/mL        ASSESSMENT/PLAN:                                                        ICD-10-CM    1. Cerebrovascular accident (CVA), unspecified mechanism (H) I63.9    2. Paroxysmal atrial fibrillation (H) I48.0    3. Obstructive sleep apnea syndrome  G47.33    4. Prediabetes R73.03    5. Hypertension goal BP (blood pressure) < 140/90 I10    6. Hyperlipidemia LDL goal <70 E78.5    7. NAFLD (nonalcoholic fatty liver disease) K76.0    8. Morbid obesity (H) E66.01    9. Medication monitoring encounter Z51.81      Discussed treatment/modality options, including risk and benefits, he desires advised aspirin 81 mg po daily, advised 1 multivitamin per day, advised calcium 1455-6942 mg/d and Vitamin D 800-1200 IU/d, advised dentist every 6 months, advised diet and exercise and advised opthalmologist every 1-2 years. All diagnosis above reviewed and noted above, otherwise stable.  See [facility] [employer] orders for further details.  Follow up as needed.    1) Patient advised to continue holding Crestor until Dr. Velasquez has been consulted. Patient advised to continue Aspirin and Eliquis as prescribed. Would advise a statin based on CVA/Prediabetes.    2) Patient advised to consider [facility] weight loss program.    3) Follow up in 1 month for medication recheck visit.    Health Maintenance Due   Topic Date Due     ZOSTER IMMUNIZATION (1 of 2) 05/25/1996     This document serves as a record of the services and decisions personally performed and made by Brett Cassidy MD. It was created on his behalf by Kendell Scherer, a trained medical scribe. The creation of this document is based on the provider's statements to the medical scribe.  Kendell Scherer February 22, 2019 9:05 AM     The information in this document, created by the medical scribe for me, accurately reflects the services I personally performed and the decisions made by me. I have reviewed and approved this document for accuracy prior to leaving the patient care area.  February 22, 2019            Brett Cassidy MD [address]  [address] [phone] Fax

## 2019-02-22 NOTE — PATIENT INSTRUCTIONS
[facility]                        To reach your care team during and after hours:   [phone]  To reach our pharmacy:        [phone]    Clinic Hours                        Our clinic hours are:    Monday   7:30 am to 7:00 pm                  Tuesday through Friday 7:30 am to 5:00 pm                             Saturday   8:00 am to 12:00 pm      Sunday   Closed      Pharmacy Hours                        Our pharmacy hours are:    Monday   8:30 am to 7:00 pm       Tuesday to Friday  8:30 am to 6:00 pm                       Saturday    9:00 am to 1:00 pm              Sunday    Closed              There is also information available at our web site:  [email or web address]    If your provider ordered any lab tests and you do not receive the results within 10 business days, please call the clinic.    If you need a medication refill please contact your pharmacy.  Please allow 2-3 business days for your refill to be completed.    Our clinic offers telephone visits and e visits.  Please ask one of your team members to explain more.      Use [employer] (secure email communication and access to your chart) to send your primary care provider a message or make an appointment. Ask someone on your Team how to sign up for [employer].  Immunizations                      Immunization History   Administered Date(s) Administered     Influenza (High Dose) 3 valent vaccine 09/20/2012, 12/21/2013, 10/20/2015, 09/25/2017, 09/20/2018     Influenza (IIV3) PF 10/26/2007, 12/22/2008, 09/17/2009, 10/14/2011     Influenza Vaccine, 3 YRS +, IM (QUADRIVALENT W/PRESERVATIVES) 10/30/2017     Pneumo Conj 13-V (2010&after) 03/10/2016, 09/25/2017     Pneumococcal 23 valent 05/11/2005, 09/20/2012     TD (ADULT, 7+) 11/30/1998     TDAP Vaccine (Adacel) 06/26/2007     TDAP Vaccine (Boostrix) 10/20/2015     Twinrix A/B 05/11/2005, 06/26/2007, 09/04/2008        Health Maintenance                         Health Maintenance Due   Topic  Date Due     Zoster (Shingles) Vaccine (1 of 2) 05/25/1996

## 2019-02-23 LAB — METHYLMALONATE SERPL-SCNC: 0.39 UMOL/L (ref 0–0.4)

## 2019-02-24 LAB
MAGNESIUM RBC-SCNC: 2 MMOL/L (ref 1.5–3.1)
VIT B1 BLD-MCNC: 73 NMOL/L (ref 70–180)
VIT B6 SERPL-MCNC: 24.1 NMOL/L (ref 20–125)
VIT C SERPL-MCNC: 20 UMOL/L (ref 23–114)

## 2019-02-26 LAB — VIT B2 SERPL-MCNC: 7 MCG/L (ref 1–19)

## 2019-03-01 ENCOUNTER — PRE VISIT (OUTPATIENT)
Dept: UROLOGY | Facility: CLINIC | Age: 74
End: 2019-03-01

## 2019-03-05 ENCOUNTER — VIRTUAL VISIT (OUTPATIENT)
Dept: UROLOGY | Facility: CLINIC | Age: 74
End: 2019-03-05
Payer: COMMERCIAL

## 2019-03-05 DIAGNOSIS — R82.994 HYPERCALCIURIA: Primary | ICD-10-CM

## 2019-03-05 DIAGNOSIS — N20.0 KIDNEY STONE: Primary | ICD-10-CM

## 2019-03-05 NOTE — PROGRESS NOTES
Telephone Follow-Up     I had the pleasure of speaking to Hugo Weber over the phone to follow-up on recent 24 hour urine testing for stone prevention.    To review he is a 72 yo M with recurrent nephrolithiasis and several bilateral nonobstructing renal stones.  On anticoagulation for recent CVA.    Since last visit they has had 24 hour urine testing.  Results reviewed and notable for the following:  Adequate urine volume over 2 L  High urinary calcium (suspect from calcium supplementation and high sodium diet)  High urinary sodium  Borderline high urinary oxalate    Serum calcium level is normal     Discussed management options.  Would strongly recommend sodium restricted, low oxalate diet.  Will repeat 24 hour urine once dietary factors have changed.  Will also decrease dietary oxalate levels.  In regards to renal stone, will continue to observe for time being.  Readressed signs of stone obstruction, will otherwise repeat CT In one year.      Regulo Heath     10 minutes were spent on the telephone discussing stone prevention

## 2019-03-28 ENCOUNTER — TELEPHONE (OUTPATIENT)
Dept: FAMILY MEDICINE | Facility: CLINIC | Age: 74
End: 2019-03-28

## 2019-03-28 DIAGNOSIS — N39.41 BENIGN PROSTATIC HYPERPLASIA (BPH) WITH URINARY URGE INCONTINENCE: ICD-10-CM

## 2019-03-28 DIAGNOSIS — N40.1 BENIGN PROSTATIC HYPERPLASIA (BPH) WITH URINARY URGE INCONTINENCE: ICD-10-CM

## 2019-03-28 NOTE — TELEPHONE ENCOUNTER
Reason for Call:  Other prescription    Detailed comments: The patient is calling saying he doesn't know which one of his medications he should be taking in the a.m. and p.m. (two a day). He would like to speak with a nurse.    Phone Number Patient can be reached at: Cell number on file:    Telephone Information:   Mobile [phone]     Best Time: Anytime    Can we leave a detailed message on this number? YES    Call taken on 3/28/2019 at 12:32 PM by Gaby Huerta

## 2019-03-28 NOTE — TELEPHONE ENCOUNTER
Called patient @ # below    Patient stated he just tried to get a refill of tamsulosin and the pharmacy sent refill for 1 cap PO daily  Med List states patient is taking 1 cap PO BID    Patient stated along the way he thought someone increased this med to BID.     Contraindication warning:       Routing to PCP for further review/recommendations/orders.  Please advise on dosing.       Jennifer Berry RN  [facility]

## 2019-03-29 RX ORDER — TAMSULOSIN HYDROCHLORIDE 0.4 MG/1
0.4 CAPSULE ORAL
Qty: 90 CAPSULE | Refills: 3 | Status: SHIPPED | OUTPATIENT
Start: 2019-03-29 | End: 2019-10-21

## 2019-03-29 NOTE — TELEPHONE ENCOUNTER
Reason for Call:  Other returning call    Detailed comments: The patient is returning a call left for him. Please call him back.    Phone Number Patient can be reached at: Cell number on file:    Telephone Information:   Mobile [phone]     Best Time: Anytime    Can we leave a detailed message on this number? YES    Call taken on 3/29/2019 at 9:39 AM by Gaby Huerta

## 2019-03-29 NOTE — TELEPHONE ENCOUNTER
Called patient @ # below -     Patient stated he has been taking flomax BID  Rx sent    Jennifer Berry RN  [place] [facility]

## 2019-03-29 NOTE — TELEPHONE ENCOUNTER
Attempt # [phone] to [phone]    Left non-detailed VM for patient to call back and speak with any triage nurse.    RENETTA Sosa, RN, PHN  [facility]

## 2019-03-29 NOTE — TELEPHONE ENCOUNTER
Please med rec with patient    Discharge summary from [facility] in [place], notes tamsulosin 0.4 mg BID    Ok to refill meds QS 3 months with 3 refills

## 2019-04-03 ENCOUNTER — TRANSFERRED RECORDS (OUTPATIENT)
Dept: HEALTH INFORMATION MANAGEMENT | Facility: CLINIC | Age: 74
End: 2019-04-03

## 2019-04-04 DIAGNOSIS — E78.2 MIXED HYPERLIPIDEMIA: ICD-10-CM

## 2019-04-04 DIAGNOSIS — G47.33 OBSTRUCTIVE APNEA: ICD-10-CM

## 2019-04-04 DIAGNOSIS — I69.398 VERTIGO, POST-STROKE: Primary | ICD-10-CM

## 2019-04-04 DIAGNOSIS — I10 HYPERTENSION: ICD-10-CM

## 2019-04-04 DIAGNOSIS — R42 VERTIGO, POST-STROKE: Primary | ICD-10-CM

## 2019-04-15 ENCOUNTER — OFFICE VISIT (OUTPATIENT)
Dept: FAMILY MEDICINE | Facility: CLINIC | Age: 74
End: 2019-04-15
Payer: COMMERCIAL

## 2019-04-15 VITALS
TEMPERATURE: 97.5 F | OXYGEN SATURATION: 96 % | BODY MASS INDEX: 42.66 KG/M2 | DIASTOLIC BLOOD PRESSURE: 70 MMHG | SYSTOLIC BLOOD PRESSURE: 116 MMHG | HEIGHT: 72 IN | WEIGHT: 315 LBS | HEART RATE: 56 BPM

## 2019-04-15 DIAGNOSIS — I10 HYPERTENSION GOAL BP (BLOOD PRESSURE) < 140/90: ICD-10-CM

## 2019-04-15 DIAGNOSIS — G47.33 OBSTRUCTIVE SLEEP APNEA SYNDROME: ICD-10-CM

## 2019-04-15 DIAGNOSIS — I48.0 PAROXYSMAL ATRIAL FIBRILLATION (H): ICD-10-CM

## 2019-04-15 DIAGNOSIS — I63.9 CEREBROVASCULAR ACCIDENT (CVA), UNSPECIFIED MECHANISM (H): Primary | ICD-10-CM

## 2019-04-15 DIAGNOSIS — E53.8 VITAMIN B12 DEFICIENCY (NON ANEMIC): ICD-10-CM

## 2019-04-15 DIAGNOSIS — N20.0 CALCULUS OF KIDNEY: ICD-10-CM

## 2019-04-15 DIAGNOSIS — K76.0 NAFLD (NONALCOHOLIC FATTY LIVER DISEASE): ICD-10-CM

## 2019-04-15 DIAGNOSIS — I77.810 THORACIC AORTIC ECTASIA (H): ICD-10-CM

## 2019-04-15 DIAGNOSIS — E55.9 VITAMIN D DEFICIENCY: ICD-10-CM

## 2019-04-15 DIAGNOSIS — R73.03 PREDIABETES: ICD-10-CM

## 2019-04-15 DIAGNOSIS — E78.5 HYPERLIPIDEMIA LDL GOAL <70: ICD-10-CM

## 2019-04-15 DIAGNOSIS — Z51.81 MEDICATION MONITORING ENCOUNTER: ICD-10-CM

## 2019-04-15 DIAGNOSIS — D50.9 IRON DEFICIENCY ANEMIA, UNSPECIFIED IRON DEFICIENCY ANEMIA TYPE: ICD-10-CM

## 2019-04-15 DIAGNOSIS — E66.01 MORBID OBESITY DUE TO EXCESS CALORIES (H): ICD-10-CM

## 2019-04-15 DIAGNOSIS — Z79.01 LONG TERM CURRENT USE OF ANTICOAGULANT THERAPY: ICD-10-CM

## 2019-04-15 PROCEDURE — 99214 OFFICE O/P EST MOD 30 MIN: CPT | Performed by: FAMILY MEDICINE

## 2019-04-15 RX ORDER — MULTIVIT WITH MINERALS/LUTEIN
1000 TABLET ORAL EVERY OTHER DAY
COMMUNITY

## 2019-04-15 RX ORDER — NIACIN 500 MG
500 TABLET ORAL
Status: ON HOLD | COMMUNITY
End: 2023-08-22

## 2019-04-15 ASSESSMENT — MIFFLIN-ST. JEOR: SCORE: 2334.77

## 2019-04-15 NOTE — PROGRESS NOTES
SUBJECTIVE:   Hugo Weber is a 73 year old male who presents to clinic today for the following   health issues:    S/P CVA/P Afib: Patient had a stroke in January of 2019 while in [place]. Patient denies any residual effects of stroke. Patient reports he was seen by Dr. Velasquez at stroke clinic recently where patient was recommended B12, Iron, and Vitamin D supplements. Patient reports he has labs scheduled for July and a follow up appointment with Dr. Velasquez scheduled for August. Patient is currently prescribed 5 mg Eliquis BID and 81 mg Aspirin daily for stroke prevention.     Morbid Obesity/NAFLD: Patient reports he has been having some back pain and has been battling some kidney stones which has prevented him from exercising. Patient would like to be referred to a nutritionist today for help with weight loss.     Wt Readings from Last 5 Encounters:   04/15/19 (!) 154.7 kg (341 lb)   02/22/19 (!) 152.9 kg (337 lb)   02/13/19 (!) 153.8 kg (339 lb)   02/06/19 (!) 156.5 kg (345 lb)   02/05/19 (!) 156.7 kg (345 lb 6.4 oz)     Prediabetes: Stable.     Glucose   Date Value Ref Range Status   02/06/2019 79 70 - 99 mg/dL Final   11/12/2018 91 70 - 99 mg/dL Final   10/26/2018 106 (H) 70 - 99 mg/dL Final   09/20/2018 88 70 - 99 mg/dL Final     Comment:     Fasting specimen   04/24/2018 94 70 - 99 mg/dL Final     Lab Results   Component Value Date    A1C 5.8 02/06/2019    A1C 5.7 11/12/2018    A1C 5.5 09/20/2018    A1C 5.5 04/24/2018    A1C 5.7 09/25/2017     Htn:    BP Readings from Last 3 Encounters:   04/15/19 116/70   02/22/19 130/72   02/13/19 128/72     Creatinine   Date Value Ref Range Status   02/06/2019 0.75 0.66 - 1.25 mg/dL Final     Lipids:    Recent Labs   Lab Test 09/20/18  0919 09/25/17  0922  01/13/15  0815 09/03/14  0810   CHOL 165 167   < > 131 156   HDL 44 45   < > 43 42   * 96   < > 74 93   TRIG 106 129   < > 72 103   CHOLHDLRATIO  --   --   --  3.0 3.7    < > = values in this interval not  displayed.     HAYLEE:    Using CPAP.    Nephrolithiasis:    Following with Urology    TOMI/B12/Vit D    Pending    Additional history: as documented    BP Readings from Last 3 Encounters:   04/15/19 116/70   02/22/19 130/72   02/13/19 128/72       body mass index is 46.25 kg/m .    Health Maintenance    Health Maintenance Due   Topic Date Due     ZOSTER IMMUNIZATION (1 of 2) 05/25/1996       Current Problem List    Patient Active Problem List   Diagnosis     Iron deficiency anemia     Colon polyps     Obstructive sleep apnea syndrome     Hyperlipidemia LDL goal <70     Long term current use of anticoagulant therapy - for intermittent a-fib     Advance Care Planning     Total knee replacement status     Calculus of kidney     NAFLD (nonalcoholic fatty liver disease)     Morbid obesity due to excess calories (H)     History of hepatitis C     Prediabetes     Paroxysmal atrial fibrillation (H)     Cholelithiasis     Hypertension goal BP (blood pressure) < 140/90     TMJ arthralgia     Nephrolithiasis     OA (osteoarthritis)     First degree AV block     Benign prostatic hyperplasia (BPH) with urinary urge incontinence     Thoracic aortic ectasia (H)     Stroke (H)     CVA (cerebral vascular accident) (H)     Cervical stenosis of spine     Vitamin B12 deficiency (non anemic)     Vitamin D deficiency       Past Medical History    Past Medical History:   Diagnosis Date     Arrhythmia      Arthritis      Atrial fibrillation 2006    Followed by [facility]     Calculus of kidney 2005, 6/06, 10/12, 8/18    dr walker/nestor/иван - hematuria - w/u o/w neg     Cervical stenosis of spine     Moderate C4-5     Cholelithiasis      Chronic peptic ulcer, unspecified site, without mention of hemorrhage, perforation, or obstruction 1985    gastric bypass     Colon polyps 8/05, 9/10, 10/15    2 tubular adenoma, 1 hyperplastic - multiple serrated/tubular adenomas     CVA (cerebral vascular accident) (H) 01/2019    small right periorlandic  subcortical - left sided residual - left hand tingling/numbness - Left leg weak/numbness     First degree AV block      Hepatitis C 10/12    chronic hepatitis C, grade 1-2, stage 1 - mild - Dr Douglas     Hyperlipidemia LDL goal <70      Hypertension goal BP (blood pressure) < 140/90 6/06    dr jaciel winchester     Iron deficiency anemia, unspecified 1985    gastric bypass     Nephrolithiasis multiple episodes, last 8/18    Dr Bey     OA (osteoarthritis)     Multiple - Left shoulder with rotator cuff tear - Dr Durham     Obesity, unspecified     s/p gastric bypass 1985      Prediabetes      Presbyacusis 1/04    dr corona     Pyelonephritis, unspecified 12/99     SCC (squamous cell carcinoma), 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    dr saez - lt conchal bowl     Sleep apnea 10/02    cpap - severe - 15 cm - dr cunha     Stroke (H) 1/19/2019     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    [facility]     Vitamin B12 deficiency (non anemic) 4/15/2019     Vitamin D deficiency 4/15/2019       Past Surgical History    Past Surgical History:   Procedure Laterality Date     APPENDECTOMY       ARTHROPLASTY KNEE  8/13/2012    LT TKA - Dr Villanueva     CARDIOVERSION  2016     COLONOSCOPY  8/05, 9/10, 10/15    multiple tubular/serrated/hyperplastic polyps     COLONOSCOPY N/A 10/29/2015    multiple tubular and serrated adenomas     COLONOSCOPY N/A 9/7/2016     EYE SURGERY  Lasik     HC KNEE SCOPE, DIAGNOSTIC  05/00    Arthroscopy, Knee RT     LASER HOLMIUM LITHOTRIPSY URETER(S), INSERT STENT, COMBINED  10/16/2012    stones x 4, Dr Campa     LASER HOLMIUM LITHOTRIPSY URETER(S), INSERT STENT, COMBINED Right 5/2/2018    CYSTOSCOPY, RIGHT URETEROSCOPY, HOLMIUM LASER LITHOTRIPSY, RIGHT STENT PLACEMENT - Dr Bey     SURGICAL HISTORY OF -   1985    s/p gastric bypass Bilroth II     SURGICAL HISTORY OF -   11/98    wisdom teeth     SURGICAL HISTORY OF -   1979    cellulitis     SURGICAL HISTORY OF -   11/98    lt forearm spur's     SURGICAL HISTORY OF -   1/01,6/02,11/02     s/p lasik     SURGICAL HISTORY OF -   11/99    rt forearm spurs     SURGICAL HISTORY OF -   7/06    dr stevenson - lithotrypsy     SURGICAL HISTORY OF -   10/08, 12/08    Lt ear chonchal lesion removal SCC - dr saez       Current Medications    Current Outpatient Medications   Medication Sig Dispense Refill     apixaban ANTICOAGULANT (ELIQUIS) 5 MG tablet Take 1 tablet (5 mg) by mouth 2 times daily 180 tablet 3     aspirin 81 MG EC tablet Take 1 tablet (81 mg) by mouth daily 90 tablet 3     Cyanocobalamin 5000 MCG TBDP        diltiazem ER (DILT-XR) 180 MG 24 hr capsule Take 360 mg by mouth daily       Ferrous Sulfate (IRON) 325 (65 Fe) MG tablet Take 1 tablet by mouth daily (with breakfast) 90 tablet 3     fluticasone (FLONASE) 50 MCG/ACT nasal spray Spray 1 spray in nostril       Multiple Vitamins-Minerals (VITAMIN D3 COMPLETE PO) Take 125 mcg by mouth       niacin 500 MG tablet Take by mouth daily (with breakfast)       pantoprazole (PROTONIX) 40 MG EC tablet Take 1 tablet (40 mg) by mouth daily Take 30-60 minutes before a meal. 90 tablet 3     tamsulosin (FLOMAX) 0.4 MG capsule Take 1 capsule (0.4 mg) by mouth 2 times daily 90 capsule 3     vitamin C (ASCORBIC ACID) 1000 MG TABS Take 1,000 mg by mouth daily         Allergies    No Known Allergies    Immunizations    Immunization History   Administered Date(s) Administered     Influenza (High Dose) 3 valent vaccine 09/20/2012, 12/21/2013, 10/20/2015, 09/25/2017, 09/20/2018     Influenza (IIV3) PF 10/26/2007, 12/22/2008, 09/17/2009, 10/14/2011     Influenza Vaccine, 3 YRS +, IM (QUADRIVALENT W/PRESERVATIVES) 10/30/2017     Pneumo Conj 13-V (2010&after) 03/10/2016, 09/25/2017     Pneumococcal 23 valent 05/11/2005, 09/20/2012     TD (ADULT, 7+) 11/30/1998     TDAP Vaccine (Adacel) 06/26/2007     TDAP Vaccine (Boostrix) 10/20/2015     Twinrix A/B 05/11/2005, 06/26/2007, 09/04/2008       Family History    Family History   Problem Relation Age of Onset     Alzheimer  "Disease Father 82         at 88     Prostate Cancer Father 76        bladder and prostate     Heart Disease Mother 80        CHF     Heart Disease Maternal Grandfather         MI at 55     Cancer Paternal Uncle         ?       Social History    Social History     Socioeconomic History     Marital status:      Spouse name: Mayra     Number of children: 3     Years of education: 21     Highest education level: Not on file   Occupational History     Occupation: retired teacher and football and       Employer: [employer]     Employer: RETIRED   Social Needs     Financial resource strain: Not on file     Food insecurity:     Worry: Not on file     Inability: Not on file     Transportation needs:     Medical: Not on file     Non-medical: Not on file   Tobacco Use     Smoking status: Never Smoker     Smokeless tobacco: Never Used   Substance and Sexual Activity     Alcohol use: Yes     Alcohol/week: 1.2 oz     Comment: 2 per week     Drug use: No     Comment: acknowledges using herbal supplement \"Vibe\" for energy-none used recently      Sexual activity: Yes     Partners: Female     Birth control/protection: None     Comment:     Lifestyle     Physical activity:     Days per week: Not on file     Minutes per session: Not on file     Stress: Not on file   Relationships     Social connections:     Talks on phone: Not on file     Gets together: Not on file     Attends Hinduism service: Not on file     Active member of club or organization: Not on file     Attends meetings of clubs or organizations: Not on file     Relationship status: Not on file     Intimate partner violence:     Fear of current or ex partner: Not on file     Emotionally abused: Not on file     Physically abused: Not on file     Forced sexual activity: Not on file   Other Topics Concern      Service Not Asked     Blood Transfusions Not Asked     Caffeine Concern Yes     Comment: <1 can qd     " Occupational Exposure Not Asked     Hobby Hazards Not Asked     Sleep Concern Yes     Comment: sleep apnea, wears cpap     Stress Concern Not Asked     Weight Concern Not Asked     Special Diet Not Asked     Back Care Not Asked     Exercise No     Bike Helmet Not Asked     Seat Belt Yes     Self-Exams Not Asked     Parent/sibling w/ CABG, MI or angioplasty before 65F 55M? No   Social History Narrative     Not on file       All above reviewed and updated, all stable unless otherwise noted    Recent labs reviewed    ROS:  Constitutional, HEENT, cardiovascular, pulmonary, GI, , musculoskeletal, neuro, skin, endocrine and psych systems are negative, except as otherwise noted.    OBJECTIVE:                                                    /70   Pulse 56   Temp 97.5  F (36.4  C) (Oral)   Ht 1.829 m (6')   Wt (!) 154.7 kg (341 lb)   SpO2 96%   BMI 46.25 kg/m    Body mass index is 46.25 kg/m .  GENERAL: healthy, alert and no distress  EYES: Eyes grossly normal to inspection  HENT:ear canals and TM's normal upon viewing with otoscope, nose and mouth without ulcers or lesions upon viewing with otoscope  NECK: no tenderness, no adenopathy, no asymmetry, no masses, no stiffness; thyroid- normal to palpation  RESP: lungs clear to auscultation - no rales, no rhonchi, no wheezes  CV: regular rates and rhythm, normal S1 S2, no S3 or S4 and no murmur, no click or rub -  ABDOMEN: soft, no tenderness, no  hepatosplenomegaly, no masses, normal bowel sounds  MS: extremities- no gross deformities noted, no edema  SKIN: no suspicious lesions, no rashes  NEURO: strength and tone- normal, sensory exam- grossly normal, mentation- intact, speech- normal  BACK: no CVA tenderness, no paralumbar tenderness  PSYCH: Alert and oriented times 3; speech- coherent , normal rate and volume; able to articulate logical thoughts, able to abstract reason, no tangential thoughts, no hallucinations or delusions, affect-  normal    DIAGNOSTICS/PROCEDURES:                                                      No results found for this or any previous visit (from the past 24 hour(s)).     ASSESSMENT/PLAN:                                                        ICD-10-CM    1. Cerebrovascular accident (CVA), unspecified mechanism (H) I63.9 NUTRITION REFERRAL   2. Thoracic aortic ectasia (H) I77.810    3. Paroxysmal atrial fibrillation (H) I48.0    4. Prediabetes R73.03 NUTRITION REFERRAL   5. Obstructive sleep apnea syndrome G47.33    6. Hypertension goal BP (blood pressure) < 140/90 I10 NUTRITION REFERRAL   7. Hyperlipidemia LDL goal <70 E78.5 NUTRITION REFERRAL   8. NAFLD (nonalcoholic fatty liver disease) K76.0 NUTRITION REFERRAL   9. Calculus of kidney N20.0    10. Iron deficiency anemia, unspecified iron deficiency anemia type D50.9 NUTRITION REFERRAL   11. Vitamin B12 deficiency (non anemic) E53.8 NUTRITION REFERRAL   12. Vitamin D deficiency E55.9 NUTRITION REFERRAL   13. Morbid obesity due to excess calories (H) E66.01    14. Long term current use of anticoagulant therapy - for intermittent a-fib Z79.01    15. Medication monitoring encounter Z51.81 NUTRITION REFERRAL       Discussed treatment/modality options, including risk and benefits, he desires advised aspirin 81 mg po daily, advised 1 multivitamin per day, advised dentist every 6 months, advised diet and exercise and advised opthalmologist every 1-2 years. All diagnosis above reviewed and noted above, otherwise stable.  See Health system orders for further details.  Follow up as needed.    1) Patient reports he has labs scheduled for July and a follow up appointment with Dr. Velasquez scheduled for August for further stroke prevention. Recommend continued follow up. Patient is currently prescribed 5 mg Eliquis BID and 81 mg Aspirin daily for stroke prevention. Advised continued use. Patient advised to continue B12, Iron, and Vitamin D supplementation as recommended by Dr. Velasquez.      2) Patient referred to Nutritionist today for aid in weight loss.     3) Patient advised to take Protonix 1 hour prior to meal.     4) Recommend Shingrix vaccine.     5) Follow up in 2 months for medication recheck visit.     Health Maintenance Due   Topic Date Due     ZOSTER IMMUNIZATION (1 of 2) 05/25/1996     This document serves as a record of the services and decisions personally performed and made by Brett Cassidy MD. It was created on his behalf by Kendell Scherer, a trained medical scribe. The creation of this document is based on the provider's statements to the medical scribe.  Kendell Scherer April 15, 2019 10:05 AM     The information in this document, created by the medical scribe for me, accurately reflects the services I personally performed and the decisions made by me. I have reviewed and approved this document for accuracy prior to leaving the patient care area.  April 15, 2019            Brett Cassidy MD [address]  [phone] Fax

## 2019-04-15 NOTE — PATIENT INSTRUCTIONS
Recommend Shingrix vaccine for shingles. Check with insurance to see where the Shingrix vaccine is the cheapest. Follow up 2-6 months after receiving the first shot for the second shot.    [facility]                        To reach your care team during and after hours:   [phone]  To reach our pharmacy:        [phone]    Clinic Hours                        Our clinic hours are:    Monday   7:30 am to 7:00 pm                  Tuesday through Friday 7:30 am to 5:00 pm                             Saturday   8:00 am to 12:00 pm      Sunday   Closed      Pharmacy Hours                        Our pharmacy hours are:    Monday   8:30 am to 7:00 pm       Tuesday to Friday  8:30 am to 6:00 pm                       Saturday    9:00 am to 1:00 pm              Sunday    Closed              There is also information available at our web site:  [email or web address]    If your provider ordered any lab tests and you do not receive the results within 10 business days, please call the clinic.    If you need a medication refill please contact your pharmacy.  Please allow 2-3 business days for your refill to be completed.    Our clinic offers telephone visits and e visits.  Please ask one of your team members to explain more.      Use [facility] (secure email communication and access to your chart) to send your primary care provider a message or make an appointment. Ask someone on your Team how to sign up for [facility].  Immunizations                      Immunization History   Administered Date(s) Administered     Influenza (High Dose) 3 valent vaccine 09/20/2012, 12/21/2013, 10/20/2015, 09/25/2017, 09/20/2018     Influenza (IIV3) PF 10/26/2007, 12/22/2008, 09/17/2009, 10/14/2011     Influenza Vaccine, 3 YRS +, IM (QUADRIVALENT W/PRESERVATIVES) 10/30/2017     Pneumo Conj 13-V (2010&after) 03/10/2016, 09/25/2017     Pneumococcal 23 valent 05/11/2005, 09/20/2012     TD (ADULT, 7+) 11/30/1998     TDAP Vaccine (Adacel)  06/26/2007     TDAP Vaccine (Boostrix) 10/20/2015     Twinrix A/B 05/11/2005, 06/26/2007, 09/04/2008        Health Maintenance                         Health Maintenance Due   Topic Date Due     Zoster (Shingles) Vaccine (1 of 2) 05/25/1996

## 2019-04-18 ENCOUNTER — ALLIED HEALTH/NURSE VISIT (OUTPATIENT)
Dept: EDUCATION SERVICES | Facility: CLINIC | Age: 74
End: 2019-04-18
Payer: COMMERCIAL

## 2019-04-18 DIAGNOSIS — E66.01 MORBID OBESITY DUE TO EXCESS CALORIES (H): Primary | ICD-10-CM

## 2019-04-18 PROCEDURE — 97802 MEDICAL NUTRITION INDIV IN: CPT

## 2019-04-18 NOTE — PROGRESS NOTES
Medical Nutrition Therapy  Visit Type:Initial assessment and intervention    Hugo Weber presents today for MNT and education related to weight management.   He is accompanied by spouse and daughter.     ASSESSMENT:   Patient comments/concerns relating to nutrition: I had a stroke in January, and I've gotten conflicting nutrition advice since then. I know I need to lose weight, but I can't figure out that I am supposed to eat    His wife and daughter (who lives with them) came today to figure out a way to eat better as a family. He admits that he has a habit of eating what is in front of him, and buys things at the grocery store that are on sale, even if they are junk foods. He is retired now, and likes to go out to eat for lunches. 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     NUTRITION HISTORY:    Breakfast: honey nut cheerios, thin bagel with cream cheese OR 1-2 eggs, sausage/okeefe (3-4), slice of toast with jelly OR Bangladeshi toast with syrup, along with skim milk (1/2 a glass)  Lunch: out to eat (burger, fries OR steak - [employer], [place]) OR salads  Dinner: pizza (3 slices) OR meat/potatoes/vegetables -- along with 1/2 glass milk  Snacks: evening --> cheese and crackers OR chips and dip (measured in a saucer) OR skip entirely OR [place] bar  Beverages: Coke zero, water     Misses meals? no  Eats out:  frequently     Previous diet education:  No     Food allergies/intolerances: no    Diet is high in: calories, fat (saturated) and protein  Diet is low in: fruits and vegetables    EXERCISE: is starting to go to [employer] 3 times a week now    SOCIO/ECONOMIC:   Lives with: spouse and daughter    MEDICATIONS:  Current Outpatient Medications   Medication     apixaban ANTICOAGULANT (ELIQUIS) 5 MG tablet     aspirin 81 MG EC tablet     Cyanocobalamin 5000 MCG TBDP      diltiazem ER (DILT-XR) 180 MG 24 hr capsule     Ferrous Sulfate (IRON) 325 (65 Fe) MG tablet     fluticasone (FLONASE) 50 MCG/ACT nasal spray     Multiple Vitamins-Minerals (VITAMIN D3 COMPLETE PO)     niacin 500 MG tablet     pantoprazole (PROTONIX) 40 MG EC tablet     tamsulosin (FLOMAX) 0.4 MG capsule     vitamin C (ASCORBIC ACID) 1000 MG TABS     No current facility-administered medications for this visit.        LABS:  Last Basic Metabolic Panel:  Lab Results   Component Value Date     02/06/2019      Lab Results   Component Value Date    POTASSIUM 4.2 02/06/2019     Lab Results   Component Value Date    CHLORIDE 107 02/06/2019     Lab Results   Component Value Date    BEBO 9.2 02/06/2019     Lab Results   Component Value Date    CO2 23 02/06/2019     Lab Results   Component Value Date    BUN 15 02/06/2019     Lab Results   Component Value Date    CR 0.75 02/06/2019     Lab Results   Component Value Date    GLC 79 02/06/2019       ANTHROPOMETRICS:  Vitals: There were no vitals taken for this visit.  There is no height or weight on file to calculate BMI.      Wt Readings from Last 5 Encounters:   04/15/19 (!) 154.7 kg (341 lb)   02/22/19 (!) 152.9 kg (337 lb)   02/13/19 (!) 153.8 kg (339 lb)   02/06/19 (!) 156.5 kg (345 lb)   02/05/19 (!) 156.7 kg (345 lb 6.4 oz)       Weight Change: stable    NUTRITION DIAGNOSIS: Overweight/Obesity related to excessive calorie intake as evidenced by diet report and BMI >40    NUTRITION INTERVENTION:  Education given to support: general nutrition guidelines, weight reduction, consistent meals, fat modification, exercise, dining out/special occasions, fiber, behavior modification and portion control  Education Materials Provided: My Plate Planner/Choose My Plate  Motivational Interviewing    PATIENT'S BEHAVIOR CHANGE GOALS:   See Patient Instructions for patient stated behavior change goals. AVS was printed and given to patient at today's appointment.    MONITOR /  EVALUATE:  RD will monitor/evaluate:  Beliefs and attitudes related to food  Weight change    FOLLOW-UP:  Follow up with RD as needed.    Lauren Otero RD LD CDE  Time spent in minutes: 60  Encounter: Individual

## 2019-04-18 NOTE — PATIENT INSTRUCTIONS
Plate method: 1/2 plate vegetables, 1/4 plate protein (3oz), 1/4 plate starch (~1/2 cup)  1-2 tsp added fat per meal  Aim for 2-3 fruits per day    Consider meeting with a [employer] dietitian -- they can help you learn to shop the grocery store     Suggestions:  Switch to as many whole grains as possible   Have either okeefe or sausage in the morning, not both (maybe switch to turkey okeefe too??)  Consider new grains to try -- millet, barley, wheat berries, wild rice, farro  1/2 plate vegetables -- for now, I don't care if there is a lot of dressing, etc to help you enjoy them  Maybe consider a goal of not eating in front of the TV?  Increase your activity as you can!    FIT -- frequency, intensity, time (physical activity)

## 2019-06-06 ENCOUNTER — TELEPHONE (OUTPATIENT)
Dept: FAMILY MEDICINE | Facility: CLINIC | Age: 74
End: 2019-06-06

## 2019-06-06 NOTE — TELEPHONE ENCOUNTER
This patient is coming in for a lab only appointment on 6/7/2019 and need future labs ordered.    Next 5 appointments (look out 90 days)    Jun 17, 2019  2:40 PM CDT  SHORT with Brett Cassidy MD  [facility] [address] [place] [id number] [phone]

## 2019-06-06 NOTE — TELEPHONE ENCOUNTER
Pt cancelled 6/7 lab only. Will keep 6/17 visit with TS. Pt doing late in June labs per Dr. Velasquez.    Cassy Pitts RN  [place] [facility]

## 2019-06-17 ENCOUNTER — OFFICE VISIT (OUTPATIENT)
Dept: FAMILY MEDICINE | Facility: CLINIC | Age: 74
End: 2019-06-17
Payer: COMMERCIAL

## 2019-06-17 VITALS
HEIGHT: 72 IN | TEMPERATURE: 97.4 F | OXYGEN SATURATION: 94 % | HEART RATE: 91 BPM | WEIGHT: 315 LBS | BODY MASS INDEX: 42.66 KG/M2 | DIASTOLIC BLOOD PRESSURE: 72 MMHG | SYSTOLIC BLOOD PRESSURE: 132 MMHG

## 2019-06-17 DIAGNOSIS — K21.00 GASTROESOPHAGEAL REFLUX DISEASE WITH ESOPHAGITIS: ICD-10-CM

## 2019-06-17 DIAGNOSIS — I77.810 THORACIC AORTIC ECTASIA (H): ICD-10-CM

## 2019-06-17 DIAGNOSIS — G47.33 OBSTRUCTIVE SLEEP APNEA SYNDROME: ICD-10-CM

## 2019-06-17 DIAGNOSIS — K76.0 NAFLD (NONALCOHOLIC FATTY LIVER DISEASE): ICD-10-CM

## 2019-06-17 DIAGNOSIS — G89.29 CHRONIC LEFT SHOULDER PAIN: ICD-10-CM

## 2019-06-17 DIAGNOSIS — N39.41 BENIGN PROSTATIC HYPERPLASIA (BPH) WITH URINARY URGE INCONTINENCE: ICD-10-CM

## 2019-06-17 DIAGNOSIS — I48.0 PAROXYSMAL ATRIAL FIBRILLATION (H): ICD-10-CM

## 2019-06-17 DIAGNOSIS — M25.512 CHRONIC LEFT SHOULDER PAIN: ICD-10-CM

## 2019-06-17 DIAGNOSIS — E78.5 HYPERLIPIDEMIA LDL GOAL <70: ICD-10-CM

## 2019-06-17 DIAGNOSIS — D50.9 IRON DEFICIENCY ANEMIA, UNSPECIFIED IRON DEFICIENCY ANEMIA TYPE: ICD-10-CM

## 2019-06-17 DIAGNOSIS — Z79.01 LONG TERM CURRENT USE OF ANTICOAGULANT THERAPY: ICD-10-CM

## 2019-06-17 DIAGNOSIS — E53.8 VITAMIN B12 DEFICIENCY (NON ANEMIC): ICD-10-CM

## 2019-06-17 DIAGNOSIS — R73.03 PREDIABETES: ICD-10-CM

## 2019-06-17 DIAGNOSIS — N40.1 BENIGN PROSTATIC HYPERPLASIA (BPH) WITH URINARY URGE INCONTINENCE: ICD-10-CM

## 2019-06-17 DIAGNOSIS — E66.01 MORBID OBESITY (H): ICD-10-CM

## 2019-06-17 DIAGNOSIS — Z51.81 MEDICATION MONITORING ENCOUNTER: ICD-10-CM

## 2019-06-17 DIAGNOSIS — R31.0 GROSS HEMATURIA: ICD-10-CM

## 2019-06-17 DIAGNOSIS — I10 HYPERTENSION GOAL BP (BLOOD PRESSURE) < 140/90: ICD-10-CM

## 2019-06-17 DIAGNOSIS — Z86.19 HISTORY OF HEPATITIS C: ICD-10-CM

## 2019-06-17 DIAGNOSIS — R29.898 SHOULDER WEAKNESS: ICD-10-CM

## 2019-06-17 DIAGNOSIS — I63.9 CEREBROVASCULAR ACCIDENT (CVA), UNSPECIFIED MECHANISM (H): Primary | ICD-10-CM

## 2019-06-17 DIAGNOSIS — N20.0 CALCULUS OF KIDNEY: ICD-10-CM

## 2019-06-17 DIAGNOSIS — E55.9 VITAMIN D DEFICIENCY: ICD-10-CM

## 2019-06-17 PROCEDURE — 99214 OFFICE O/P EST MOD 30 MIN: CPT | Performed by: FAMILY MEDICINE

## 2019-06-17 ASSESSMENT — MIFFLIN-ST. JEOR: SCORE: 2334.3

## 2019-06-17 NOTE — PROGRESS NOTES
SUBJECTIVE:                                                    Hugo Weber is a 74 year old male who presents to clinic today for the following health issues:    Atrial Fibrillation: Stable. Patient denies CP, SOB, and edema. Patient is currently prescribed 5 mg Eliquis BID for A-fib management.     Hx of CVA/Stroke: Stable. Patient states his only residuals from Stroke/CVA is some numbness/tingling in his left hand. Patient consults with Dr. Velasquez of neurology for Hx of CVA/Stroke management. Patient states he has labs scheduled for 7/1 with Dr. Velasquez and a follow up appointment scheduled for soon after.     Hyperlipidemia: Patient's hyperlipidemia is well controlled. Patient is not currently prescribed any medication for hyperlipidemia management.    Recent Labs   Lab Test 02/22/19 09/20/18  0919  01/13/15  0815 09/03/14  0810   CHOL 106 165   < > 131 156   HDL 44 44   < > 43 42   LDL 49 100*   < > 74 93   TRIG 72 106   < > 72 103   CHOLHDLRATIO  --   --   --  3.0 3.7    < > = values in this interval not displayed.     Hypertension: Patient presents today as normotensive. Patient is currently prescribed 360 mg Diltiazem daily for hypertension management.    BP Readings from Last 5 Encounters:   06/17/19 132/72   04/15/19 116/70   02/22/19 130/72   02/13/19 128/72   02/06/19 130/72     Creatinine   Date Value Ref Range Status   02/06/2019 0.75 0.66 - 1.25 mg/dL Final     Sleep Apnea: Stable. Patient is currently prescribed a CPAP machine for sleep apnea management. Patient reports that he uses his machine every night.    Nephrolithiasis: Stable. Patient reports he has seen blood in his urine after riding . Patient states if he does not ride the  he does not have this issue. Patient states that he gets the hematuria every time after he rides the .      GERD: Stable. Patient's GERD is well controlled by 40 mg Protonix daily.     Osteoarthritis: Patient reports he has had some  shoulder soreness/weakness lately. Patient states he has worked with a physical therapist in the past and would like to work with them again for further shoulder pain management.     Patient reports his eyes have been redder that normal lately.    Prediabetes: Stable.     Glucose   Date Value Ref Range Status   02/06/2019 79 70 - 99 mg/dL Final   11/12/2018 91 70 - 99 mg/dL Final   10/26/2018 106 (H) 70 - 99 mg/dL Final   09/20/2018 88 70 - 99 mg/dL Final     Comment:     Fasting specimen   04/24/2018 94 70 - 99 mg/dL Final     Lab Results   Component Value Date    A1C 5.9 02/22/2019    A1C 5.8 02/06/2019    A1C 5.7 11/12/2018    A1C 5.5 09/20/2018    A1C 5.5 04/24/2018     Reviewed and updated as needed this visit by Provider       BP Readings from Last 3 Encounters:   06/17/19 132/72   04/15/19 116/70   02/22/19 130/72       body mass index is 46.38 kg/m .    Wt Readings from Last 4 Encounters:   06/17/19 (!) 155.1 kg (342 lb)   04/15/19 (!) 154.7 kg (341 lb)   02/22/19 (!) 152.9 kg (337 lb)   02/13/19 (!) 153.8 kg (339 lb)       Health Maintenance    Health Maintenance Due   Topic Date Due     ZOSTER IMMUNIZATION (1 of 2) 05/25/1996     PHQ-2  01/01/2019     FIT  07/05/2019       Current Problem List    Patient Active Problem List   Diagnosis     Iron deficiency anemia     Colon polyps     Obstructive sleep apnea syndrome     Hyperlipidemia LDL goal <70     Long term current use of anticoagulant therapy - for intermittent a-fib     Advance Care Planning     Total knee replacement status     Calculus of kidney     NAFLD (nonalcoholic fatty liver disease)     Morbid obesity due to excess calories (H)     History of hepatitis C     Prediabetes     Paroxysmal atrial fibrillation (H)     Cholelithiasis     Hypertension goal BP (blood pressure) < 140/90     TMJ arthralgia     Nephrolithiasis     OA (osteoarthritis)     First degree AV block     Benign prostatic hyperplasia (BPH) with urinary urge incontinence      Thoracic aortic ectasia (H)     Stroke (H)     CVA (cerebral vascular accident) (H)     Cervical stenosis of spine     Vitamin B12 deficiency (non anemic)     Vitamin D deficiency       Past Medical History    Past Medical History:   Diagnosis Date     Arrhythmia      Arthritis      Atrial fibrillation 2006    Followed by [facility]     Calculus of kidney 2005, 6/06, 10/12, 8/18    dr walker/nestor/иван - hematuria - w/u o/w neg     Cervical stenosis of spine     Moderate C4-5     Cholelithiasis      Chronic peptic ulcer, unspecified site, without mention of hemorrhage, perforation, or obstruction 1985    gastric bypass     Colon polyps 8/05, 9/10, 10/15    2 tubular adenoma, 1 hyperplastic - multiple serrated/tubular adenomas     CVA (cerebral vascular accident) (H) 01/2019    small right periorlandic subcortical - left sided residual - left hand tingling/numbness - Left leg weak/numbness - cardioembolic     First degree AV block      Hepatitis C 10/12    chronic hepatitis C, grade 1-2, stage 1 - mild - Dr Douglas     Hyperlipidemia LDL goal <70      Hypertension goal BP (blood pressure) < 140/90 6/06    dr jaciel winchester     Iron deficiency anemia, unspecified 1985    gastric bypass     Nephrolithiasis multiple episodes, last 8/18    Dr Bey     OA (osteoarthritis)     Multiple - Left shoulder with rotator cuff tear - Dr Durham     Obesity, unspecified     s/p gastric bypass 1985      Prediabetes      Presbyacusis 1/04    dr corona     Pyelonephritis, unspecified 12/99     SCC (squamous cell carcinoma), 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    dr saez - lt conchal bowl     Sleep apnea 10/02    cpap - severe - 15 cm - dr cunha     Stroke (H) 1/19/2019     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    [facility]     Vitamin B12 deficiency (non anemic) 4/15/2019     Vitamin D deficiency 4/15/2019       Past Surgical History    Past Surgical History:   Procedure Laterality Date     APPENDECTOMY       ARTHROPLASTY KNEE  8/13/2012    LT TKA - Dr Villanueva      CARDIOVERSION  2016     COLONOSCOPY  8/05, 9/10, 10/15    multiple tubular/serrated/hyperplastic polyps     COLONOSCOPY N/A 10/29/2015    multiple tubular and serrated adenomas     COLONOSCOPY N/A 9/7/2016     EYE SURGERY  Lasik     HC KNEE SCOPE, DIAGNOSTIC  05/00    Arthroscopy, Knee RT     LASER HOLMIUM LITHOTRIPSY URETER(S), INSERT STENT, COMBINED  10/16/2012    stones x 4, Dr Campa     LASER HOLMIUM LITHOTRIPSY URETER(S), INSERT STENT, COMBINED Right 5/2/2018    CYSTOSCOPY, RIGHT URETEROSCOPY, HOLMIUM LASER LITHOTRIPSY, RIGHT STENT PLACEMENT - Dr Bey     SURGICAL HISTORY OF -   1985    s/p gastric bypass Bilroth II     SURGICAL HISTORY OF -   11/98    wisdom teeth     SURGICAL HISTORY OF -   1979    cellulitis     SURGICAL HISTORY OF -   11/98    lt forearm spur's     SURGICAL HISTORY OF -   1/01,6/02,11/02    s/p lasik     SURGICAL HISTORY OF -   11/99    rt forearm spurs     SURGICAL HISTORY OF -   7/06    dr stevenson - lithotrypsy     SURGICAL HISTORY OF -   10/08, 12/08    Lt ear chonchal lesion removal SCC - dr saez       Current Medications    Current Outpatient Medications   Medication Sig Dispense Refill     apixaban ANTICOAGULANT (ELIQUIS) 5 MG tablet Take 1 tablet (5 mg) by mouth 2 times daily 180 tablet 3     aspirin 81 MG EC tablet Take 1 tablet (81 mg) by mouth daily 90 tablet 3     Cyanocobalamin 5000 MCG TBDP        diltiazem ER (DILT-XR) 180 MG 24 hr capsule Take 360 mg by mouth daily       Ferrous Sulfate (IRON) 325 (65 Fe) MG tablet Take 1 tablet by mouth daily (with breakfast) 90 tablet 3     fluticasone (FLONASE) 50 MCG/ACT nasal spray Spray 1 spray in nostril       Multiple Vitamins-Minerals (VITAMIN D3 COMPLETE PO) Take 125 mcg by mouth       niacin 500 MG tablet Take by mouth daily (with breakfast)       pantoprazole (PROTONIX) 40 MG EC tablet Take 1 tablet (40 mg) by mouth daily Take 30-60 minutes before a meal. 90 tablet 3     STATIN NOT PRESCRIBED (INTENTIONAL) Please choose  reason not prescribed, below, treated for hepatitis C       tamsulosin (FLOMAX) 0.4 MG capsule Take 1 capsule (0.4 mg) by mouth 2 times daily 90 capsule 3     vitamin C (ASCORBIC ACID) 1000 MG TABS Take 1,000 mg by mouth daily         Allergies    No Known Allergies    Immunizations    Immunization History   Administered Date(s) Administered     Influenza (High Dose) 3 valent vaccine 2012, 2013, 10/20/2015, 2017, 2018     Influenza (IIV3) PF 10/26/2007, 2008, 2009, 10/14/2011     Influenza Vaccine, 3 YRS +, IM (QUADRIVALENT W/PRESERVATIVES) 10/30/2017     Pneumo Conj 13-V (2010&after) 03/10/2016, 2017     Pneumococcal 23 valent 2005, 2012     TD (ADULT, 7+) 1998     TDAP Vaccine (Adacel) 2007     TDAP Vaccine (Boostrix) 10/20/2015     Twinrix A/B 2005, 2007, 2008       Family History    Family History   Problem Relation Age of Onset     Alzheimer Disease Father 82         at 88     Prostate Cancer Father 76        bladder and prostate     Heart Disease Mother 80        CHF     Heart Disease Maternal Grandfather         MI at 55     Cancer Paternal Uncle         ?       Social History    Social History     Socioeconomic History     Marital status:      Spouse name: Mayra     Number of children: 3     Years of education: 21     Highest education level: Not on file   Occupational History     Occupation: retired teacher and football and       Employer: [employer]     Employer: RETIRED   Social Needs     Financial resource strain: Not on file     Food insecurity:     Worry: Not on file     Inability: Not on file     Transportation needs:     Medical: Not on file     Non-medical: Not on file   Tobacco Use     Smoking status: Never Smoker     Smokeless tobacco: Never Used   Substance and Sexual Activity     Alcohol use: Yes     Alcohol/week: 1.2 oz     Comment: 2 per week     Drug use: No      "Comment: acknowledges using herbal supplement \"Vibe\" for energy-none used recently      Sexual activity: Yes     Partners: Female     Birth control/protection: None     Comment:     Lifestyle     Physical activity:     Days per week: Not on file     Minutes per session: Not on file     Stress: Not on file   Relationships     Social connections:     Talks on phone: Not on file     Gets together: Not on file     Attends [employer] service: Not on file     Active member of club or organization: Not on file     Attends meetings of clubs or organizations: Not on file     Relationship status: Not on file     Intimate partner violence:     Fear of current or ex partner: Not on file     Emotionally abused: Not on file     Physically abused: Not on file     Forced sexual activity: Not on file   Other Topics Concern      Service Not Asked     Blood Transfusions Not Asked     Caffeine Concern Yes     Comment: <1 can qd     Occupational Exposure Not Asked     Hobby Hazards Not Asked     Sleep Concern Yes     Comment: sleep apnea, wears cpap     Stress Concern Not Asked     Weight Concern Not Asked     Special Diet Not Asked     Back Care Not Asked     Exercise No     Bike Helmet Not Asked     Seat Belt Yes     Self-Exams Not Asked     Parent/sibling w/ CABG, MI or angioplasty before 65F 55M? No   Social History Narrative     Not on file       All above reviewed and updated, all stable unless otherwise noted    Recent labs reviewed    ROS:  Constitutional, HEENT, cardiovascular, pulmonary, GI, , musculoskeletal, neuro, skin, endocrine and psych systems are negative, except as otherwise noted.    OBJECTIVE:                                                    /72   Pulse 91   Temp 97.4  F (36.3  C) (Oral)   Ht 1.829 m (6')   Wt (!) 155.1 kg (342 lb)   SpO2 94%   BMI 46.38 kg/m    Body mass index is 46.38 kg/m .  GENERAL: healthy, alert and no distress  EYES: Eyes grossly normal to inspection  HENT:ear " canals and TM's normal upon viewing with otoscope, nose and mouth without ulcers or lesions upon viewing with otoscope  NECK: no tenderness, no adenopathy, no asymmetry, no masses, no stiffness; thyroid- normal to palpation  RESP: lungs clear to auscultation - no rales, no rhonchi, no wheezes  CV: regular rates and rhythm, normal S1 S2, no S3 or S4 and no murmur, no click or rub -  ABDOMEN: soft, no tenderness, no  hepatosplenomegaly, no masses, normal bowel sounds  MS: extremities- no gross deformities noted, no edema  SKIN: no suspicious lesions, no rashes  NEURO: strength and tone- normal, sensory exam- grossly normal, mentation- intact, speech- normal, reflexes- symmetric  BACK: no CVA tenderness, no paralumbar tenderness  PSYCH: Alert and oriented times 3; speech- coherent , normal rate and volume; able to articulate logical thoughts, able to abstract reason, no tangential thoughts, no hallucinations or delusions, affect- normal    DIAGNOSTICS/PROCEDURES:                                                      No results found for this or any previous visit (from the past 24 hour(s)).     ASSESSMENT:                                                        ICD-10-CM    1. Cerebrovascular accident (CVA), unspecified mechanism (H) I63.9 Comprehensive metabolic panel     Lipid panel reflex to direct LDL Fasting     TSH with free T4 reflex     UA reflex to Microscopic and Culture     Albumin Random Urine Quantitative with Creat Ratio     PHYSICAL THERAPY REFERRAL     BARIATRIC ADULT REFERRAL   2. Chronic left shoulder pain M25.512 PHYSICAL THERAPY REFERRAL    G89.29    3. Shoulder weakness R29.898 PHYSICAL THERAPY REFERRAL   4. Thoracic aortic ectasia (H) I77.810 TSH with free T4 reflex   5. Paroxysmal atrial fibrillation (H) I48.0 Comprehensive metabolic panel     TSH with free T4 reflex     BARIATRIC ADULT REFERRAL   6. Prediabetes R73.03 Comprehensive metabolic panel     TSH with free T4 reflex     UA reflex to  Microscopic and Culture     Albumin Random Urine Quantitative with Creat Ratio     Hemoglobin A1c     BARIATRIC ADULT REFERRAL   7. Obstructive sleep apnea syndrome G47.33 TSH with free T4 reflex     BARIATRIC ADULT REFERRAL   8. Hypertension goal BP (blood pressure) < 140/90 I10 Comprehensive metabolic panel     TSH with free T4 reflex     UA reflex to Microscopic and Culture     Albumin Random Urine Quantitative with Creat Ratio     BARIATRIC ADULT REFERRAL   9. Hyperlipidemia LDL goal <70 E78.5 Comprehensive metabolic panel     Lipid panel reflex to direct LDL Fasting     CK total     TSH with free T4 reflex   10. NAFLD (nonalcoholic fatty liver disease) K76.0 Comprehensive metabolic panel     TSH with free T4 reflex     BARIATRIC ADULT REFERRAL     STATIN NOT PRESCRIBED (INTENTIONAL)   11. Calculus of kidney N20.0 Comprehensive metabolic panel     CBC with platelets     TSH with free T4 reflex     UA reflex to Microscopic and Culture     Albumin Random Urine Quantitative with Creat Ratio   12. Gross hematuria R31.0 Comprehensive metabolic panel     CBC with platelets     TSH with free T4 reflex     UA reflex to Microscopic and Culture     Albumin Random Urine Quantitative with Creat Ratio   13. Iron deficiency anemia, unspecified iron deficiency anemia type D50.9 TSH with free T4 reflex   14. Vitamin B12 deficiency (non anemic) E53.8 TSH with free T4 reflex   15. Vitamin D deficiency E55.9 TSH with free T4 reflex   16. Benign prostatic hyperplasia (BPH) with urinary urge incontinence N40.1 TSH with free T4 reflex    N39.41    17. Gastroesophageal reflux disease with esophagitis K21.0 CBC with platelets     TSH with free T4 reflex   18. History of hepatitis C Z86.19 Comprehensive metabolic panel     TSH with free T4 reflex     STATIN NOT PRESCRIBED (INTENTIONAL)   19. Morbid obesity (H) E66.01 TSH with free T4 reflex   20. Long term current use of anticoagulant therapy - for intermittent a-fib Z79.01 CBC with  platelets     TSH with free T4 reflex   21. Medication monitoring encounter Z51.81 UROLOGY ADULT REFERRAL     Comprehensive metabolic panel     Lipid panel reflex to direct LDL Fasting     CK total     CBC with platelets     TSH with free T4 reflex     UA reflex to Microscopic and Culture     Albumin Random Urine Quantitative with Creat Ratio     Hemoglobin A1c       PLAN:                                                    Discussed treatment/modality options, including risk and benefits he desires:    advised aspirin 81 mg po daily, advised 1 multivitamin per day, advised dentist every 6 months, advised diet and exercise and advised opthalmologist every 1-2 years.     1) Patient is currently prescribed 5 mg Eliquis BID for A-fib management. Advised continued use.     2) Patient consults with Dr. Velasquez of neurology for Hx of CVA/Stroke management. Patient states he has labs scheduled for 7/1 with Dr. Velasquez and a follow up appointment scheduled for soon after.     3) Patient's hyperlipidemia is well controlled. Patient is not currently prescribed any medication for hyperlipidemia management, fatty liver, Hx treated Hepatitis C.    4) Patient presents today as normotensive. Patient is currently prescribed 360 mg Diltiazem daily for hypertension management. Advised continued use.     5) Patient is currently prescribed a CPAP machine for sleep apnea management. Advised continued use.     6) Patient has been having some hematuria that he believes to be caused by his kidney stones. Patient referred to Urology today for further hematuria management.     7) Patient's GERD is well controlled by 40 mg Protonix daily. Advised continued use.    8) Recommend Shingrix vaccine.     9) Recommend 1 pound of weight loss per week.    10) Patient referred to PT today for further shoulder pain management.     11) Recommend 10 mg Zyrtec use daily for red eye symptom management.     All diagnosis above reviewed and noted above,  otherwise stable.  See EpicCare orders for further details.     Return in about 3 months (around 9/17/2019), or if symptoms worsen or fail to improve, for Follow Up Chronic, Medication Recheck Visit.      Health Maintenance Due   Topic Date Due     ZOSTER IMMUNIZATION (1 of 2) 05/25/1996     PHQ-2  01/01/2019     FIT  07/05/2019     This document serves as a record of the services and decisions personally performed and made by Brett Cassidy MD. It was created on his behalf by Kendell Scherer, a trained medical scribe. The creation of this document is based on the provider's statements to the medical scribe.  Kendell Scherer June 17, 2019 3:00 PM     The information in this document, created by the medical scribe for me, accurately reflects the services I personally performed and the decisions made by me. I have reviewed and approved this document for accuracy prior to leaving the patient care area.  June 17, 2019           Brett Cassidy MD [address]  [phone] Fax

## 2019-06-17 NOTE — PATIENT INSTRUCTIONS
[facility]                        To reach your care team during and after hours:   [phone]  To reach our pharmacy:        [phone]    Clinic Hours                        Our clinic hours are:    Monday   7:30 am to 7:00 pm                  Tuesday through Friday 7:30 am to 5:00 pm                             Saturday   8:00 am to 12:00 pm      Sunday   Closed      Pharmacy Hours                        Our pharmacy hours are:    Monday   8:30 am to 7:00 pm       Tuesday to Friday  8:30 am to 6:00 pm                       Saturday    9:00 am to 1:00 pm              Sunday    Closed              There is also information available at our web site:  [email or web address]    If your provider ordered any lab tests and you do not receive the results within 10 business days, please call the clinic.    If you need a medication refill please contact your pharmacy.  Please allow 2-3 business days for your refill to be completed.    Our clinic offers telephone visits and e visits.  Please ask one of your team members to explain more.      Use MyDoc (secure email communication and access to your chart) to send your primary care provider a message or make an appointment. Ask someone on your Team how to sign up for MyDoc.  Immunizations                      Immunization History   Administered Date(s) Administered     Influenza (High Dose) 3 valent vaccine 09/20/2012, 12/21/2013, 10/20/2015, 09/25/2017, 09/20/2018     Influenza (IIV3) PF 10/26/2007, 12/22/2008, 09/17/2009, 10/14/2011     Influenza Vaccine, 3 YRS +, IM (QUADRIVALENT W/PRESERVATIVES) 10/30/2017     Pneumo Conj 13-V (2010&after) 03/10/2016, 09/25/2017     Pneumococcal 23 valent 05/11/2005, 09/20/2012     TD (ADULT, 7+) 11/30/1998     TDAP Vaccine (Adacel) 06/26/2007     TDAP Vaccine (Boostrix) 10/20/2015     Twinrix A/B 05/11/2005, 06/26/2007, 09/04/2008        Health Maintenance                         Health Maintenance Due   Topic  Date Due     Zoster (Shingles) Vaccine (1 of 2) 05/25/1996     PHQ-2  01/01/2019     FIT Test  07/05/2019

## 2019-07-23 ENCOUNTER — HOSPITAL ENCOUNTER (OUTPATIENT)
Dept: LAB | Facility: CLINIC | Age: 74
End: 2019-07-23
Attending: PSYCHIATRY & NEUROLOGY
Payer: COMMERCIAL

## 2019-07-23 DIAGNOSIS — G47.33 OBSTRUCTIVE APNEA: ICD-10-CM

## 2019-07-23 DIAGNOSIS — I10 HYPERTENSION: ICD-10-CM

## 2019-07-23 DIAGNOSIS — R73.03 PREDIABETES: ICD-10-CM

## 2019-07-23 DIAGNOSIS — Z51.81 MEDICATION MONITORING ENCOUNTER: ICD-10-CM

## 2019-07-23 DIAGNOSIS — E78.2 MIXED HYPERLIPIDEMIA: ICD-10-CM

## 2019-07-23 DIAGNOSIS — R42 VERTIGO, POST-STROKE: ICD-10-CM

## 2019-07-23 DIAGNOSIS — I69.398 VERTIGO, POST-STROKE: ICD-10-CM

## 2019-07-24 DIAGNOSIS — R73.03 BORDERLINE DIABETES: Primary | ICD-10-CM

## 2019-07-24 DIAGNOSIS — R42 VERTIGO, POST-STROKE: Primary | ICD-10-CM

## 2019-07-24 DIAGNOSIS — I10 HYPERTENSION: ICD-10-CM

## 2019-07-24 DIAGNOSIS — G47.33 OBSTRUCTIVE APNEA: ICD-10-CM

## 2019-07-24 DIAGNOSIS — Z51.81 MEDICATION MONITORING ENCOUNTER: ICD-10-CM

## 2019-07-24 DIAGNOSIS — I69.398 VERTIGO, POST-STROKE: Primary | ICD-10-CM

## 2019-07-24 DIAGNOSIS — E78.2 MIXED HYPERLIPIDEMIA: ICD-10-CM

## 2019-07-25 ENCOUNTER — HOSPITAL ENCOUNTER (OUTPATIENT)
Dept: LAB | Facility: CLINIC | Age: 74
Discharge: HOME | End: 2019-07-25
Attending: PSYCHIATRY & NEUROLOGY | Admitting: PSYCHIATRY & NEUROLOGY
Payer: COMMERCIAL

## 2019-07-25 DIAGNOSIS — I10 HYPERTENSION: ICD-10-CM

## 2019-07-25 DIAGNOSIS — R42 VERTIGO, POST-STROKE: ICD-10-CM

## 2019-07-25 DIAGNOSIS — E78.2 MIXED HYPERLIPIDEMIA: ICD-10-CM

## 2019-07-25 DIAGNOSIS — G47.33 OBSTRUCTIVE APNEA: ICD-10-CM

## 2019-07-25 DIAGNOSIS — I69.398 VERTIGO, POST-STROKE: ICD-10-CM

## 2019-07-25 LAB
DEPRECATED CALCIDIOL+CALCIFEROL SERPL-MC: 51 UG/L (ref 20–75)
VIT B12 SERPL-MCNC: 3095 PG/ML (ref 193–986)

## 2019-07-25 PROCEDURE — 84207 ASSAY OF VITAMIN B-6: CPT | Performed by: PSYCHIATRY & NEUROLOGY

## 2019-07-25 PROCEDURE — 83921 ORGANIC ACID SINGLE QUANT: CPT | Performed by: PSYCHIATRY & NEUROLOGY

## 2019-07-25 PROCEDURE — 36415 COLL VENOUS BLD VENIPUNCTURE: CPT | Performed by: PSYCHIATRY & NEUROLOGY

## 2019-07-25 PROCEDURE — 82607 VITAMIN B-12: CPT | Performed by: PSYCHIATRY & NEUROLOGY

## 2019-07-25 PROCEDURE — 82180 ASSAY OF ASCORBIC ACID: CPT | Performed by: PSYCHIATRY & NEUROLOGY

## 2019-07-25 PROCEDURE — 82306 VITAMIN D 25 HYDROXY: CPT | Performed by: PSYCHIATRY & NEUROLOGY

## 2019-07-25 PROCEDURE — 84591 ASSAY OF NOS VITAMIN: CPT | Performed by: PSYCHIATRY & NEUROLOGY

## 2019-07-27 LAB — VIT B6 SERPL-MCNC: 25.2 NMOL/L (ref 20–125)

## 2019-07-30 LAB — NIACIN SERPL-MCNC: 2.72 UG/ML (ref 0.5–8.45)

## 2019-07-31 LAB — VIT C SERPL-MCNC: 62 UMOL/L (ref 23–114)

## 2019-08-01 LAB — METHYLMALONATE SERPL-SCNC: 0.15 UMOL/L (ref 0–0.4)

## 2019-08-13 ENCOUNTER — TELEPHONE (OUTPATIENT)
Dept: FAMILY MEDICINE | Facility: CLINIC | Age: 74
End: 2019-08-13

## 2019-08-13 PROBLEM — M79.18 MYOFASCIAL PAIN: Status: ACTIVE | Noted: 2017-10-12

## 2019-08-14 ENCOUNTER — OFFICE VISIT (OUTPATIENT)
Dept: FAMILY MEDICINE | Facility: CLINIC | Age: 74
End: 2019-08-14
Payer: COMMERCIAL

## 2019-08-14 ENCOUNTER — HOSPITAL ENCOUNTER (OUTPATIENT)
Dept: CT IMAGING | Facility: CLINIC | Age: 74
Discharge: HOME | End: 2019-08-14
Attending: FAMILY MEDICINE | Admitting: FAMILY MEDICINE
Payer: COMMERCIAL

## 2019-08-14 VITALS
TEMPERATURE: 97.8 F | HEIGHT: 72 IN | SYSTOLIC BLOOD PRESSURE: 120 MMHG | DIASTOLIC BLOOD PRESSURE: 80 MMHG | HEART RATE: 67 BPM | WEIGHT: 315 LBS | OXYGEN SATURATION: 99 % | BODY MASS INDEX: 42.66 KG/M2

## 2019-08-14 DIAGNOSIS — R10.9 RIGHT FLANK PAIN: ICD-10-CM

## 2019-08-14 DIAGNOSIS — R10.31 RIGHT INGUINAL PAIN: ICD-10-CM

## 2019-08-14 DIAGNOSIS — R31.0 GROSS HEMATURIA: ICD-10-CM

## 2019-08-14 DIAGNOSIS — G47.33 OBSTRUCTIVE SLEEP APNEA SYNDROME: ICD-10-CM

## 2019-08-14 DIAGNOSIS — E66.01 MORBID OBESITY DUE TO EXCESS CALORIES (H): ICD-10-CM

## 2019-08-14 DIAGNOSIS — R73.03 PREDIABETES: ICD-10-CM

## 2019-08-14 DIAGNOSIS — E78.5 HYPERLIPIDEMIA LDL GOAL <70: ICD-10-CM

## 2019-08-14 DIAGNOSIS — I48.0 PAROXYSMAL ATRIAL FIBRILLATION (H): ICD-10-CM

## 2019-08-14 DIAGNOSIS — Z79.01 LONG TERM CURRENT USE OF ANTICOAGULANT THERAPY: ICD-10-CM

## 2019-08-14 DIAGNOSIS — N20.0 NEPHROLITHIASIS: ICD-10-CM

## 2019-08-14 DIAGNOSIS — Z86.73 HISTORY OF CVA (CEREBROVASCULAR ACCIDENT): ICD-10-CM

## 2019-08-14 DIAGNOSIS — Z51.81 MEDICATION MONITORING ENCOUNTER: ICD-10-CM

## 2019-08-14 DIAGNOSIS — I10 HYPERTENSION GOAL BP (BLOOD PRESSURE) < 140/90: ICD-10-CM

## 2019-08-14 DIAGNOSIS — R10.9 RIGHT FLANK PAIN: Primary | ICD-10-CM

## 2019-08-14 LAB
ALBUMIN SERPL-MCNC: 3.6 G/DL (ref 3.4–5)
ALBUMIN UR-MCNC: NEGATIVE MG/DL
ALP SERPL-CCNC: 147 U/L (ref 40–150)
ALT SERPL W P-5'-P-CCNC: 21 U/L (ref 0–70)
AMYLASE SERPL-CCNC: 75 U/L (ref 30–110)
ANION GAP SERPL CALCULATED.3IONS-SCNC: 6 MMOL/L (ref 3–14)
APPEARANCE UR: CLEAR
AST SERPL W P-5'-P-CCNC: 12 U/L (ref 0–45)
BACTERIA #/AREA URNS HPF: ABNORMAL /HPF
BASOPHILS # BLD AUTO: 0.1 10E9/L (ref 0–0.2)
BASOPHILS NFR BLD AUTO: 0.7 %
BILIRUB SERPL-MCNC: 0.4 MG/DL (ref 0.2–1.3)
BILIRUB UR QL STRIP: NEGATIVE
BUN SERPL-MCNC: 16 MG/DL (ref 7–30)
CALCIUM SERPL-MCNC: 8.8 MG/DL (ref 8.5–10.1)
CHLORIDE SERPL-SCNC: 106 MMOL/L (ref 94–109)
CO2 SERPL-SCNC: 26 MMOL/L (ref 20–32)
COLOR UR AUTO: YELLOW
CREAT SERPL-MCNC: 0.79 MG/DL (ref 0.66–1.25)
DIFFERENTIAL METHOD BLD: NORMAL
EOSINOPHIL # BLD AUTO: 0.2 10E9/L (ref 0–0.7)
EOSINOPHIL NFR BLD AUTO: 2.7 %
ERYTHROCYTE [DISTWIDTH] IN BLOOD BY AUTOMATED COUNT: 14.2 % (ref 10–15)
GFR SERPL CREATININE-BSD FRML MDRD: 89 ML/MIN/{1.73_M2}
GLUCOSE SERPL-MCNC: 102 MG/DL (ref 70–99)
GLUCOSE UR STRIP-MCNC: NEGATIVE MG/DL
HBA1C MFR BLD: 5.6 % (ref 0–5.6)
HCT VFR BLD AUTO: 47.8 % (ref 40–53)
HGB BLD-MCNC: 15.6 G/DL (ref 13.3–17.7)
HGB UR QL STRIP: ABNORMAL
KETONES UR STRIP-MCNC: NEGATIVE MG/DL
LEUKOCYTE ESTERASE UR QL STRIP: NEGATIVE
LIPASE SERPL-CCNC: 58 U/L (ref 73–393)
LYMPHOCYTES # BLD AUTO: 1.8 10E9/L (ref 0.8–5.3)
LYMPHOCYTES NFR BLD AUTO: 26.3 %
MCH RBC QN AUTO: 28.6 PG (ref 26.5–33)
MCHC RBC AUTO-ENTMCNC: 32.6 G/DL (ref 31.5–36.5)
MCV RBC AUTO: 88 FL (ref 78–100)
MONOCYTES # BLD AUTO: 0.7 10E9/L (ref 0–1.3)
MONOCYTES NFR BLD AUTO: 10.9 %
NEUTROPHILS # BLD AUTO: 4 10E9/L (ref 1.6–8.3)
NEUTROPHILS NFR BLD AUTO: 59.4 %
NITRATE UR QL: NEGATIVE
NON-SQ EPI CELLS #/AREA URNS LPF: ABNORMAL /LPF
PH UR STRIP: 6.5 PH (ref 5–7)
PLATELET # BLD AUTO: 236 10E9/L (ref 150–450)
POTASSIUM SERPL-SCNC: 4.4 MMOL/L (ref 3.4–5.3)
PROT SERPL-MCNC: 7.5 G/DL (ref 6.8–8.8)
RBC # BLD AUTO: 5.46 10E12/L (ref 4.4–5.9)
RBC #/AREA URNS AUTO: ABNORMAL /HPF
SODIUM SERPL-SCNC: 138 MMOL/L (ref 133–144)
SOURCE: ABNORMAL
SP GR UR STRIP: 1.01 (ref 1–1.03)
UROBILINOGEN UR STRIP-ACNC: 0.2 EU/DL (ref 0.2–1)
WBC # BLD AUTO: 6.7 10E9/L (ref 4–11)
WBC #/AREA URNS AUTO: ABNORMAL /HPF

## 2019-08-14 PROCEDURE — 99214 OFFICE O/P EST MOD 30 MIN: CPT | Performed by: FAMILY MEDICINE

## 2019-08-14 PROCEDURE — 80053 COMPREHEN METABOLIC PANEL: CPT | Performed by: FAMILY MEDICINE

## 2019-08-14 PROCEDURE — 25000128 H RX IP 250 OP 636: Performed by: FAMILY MEDICINE

## 2019-08-14 PROCEDURE — 83036 HEMOGLOBIN GLYCOSYLATED A1C: CPT | Performed by: FAMILY MEDICINE

## 2019-08-14 PROCEDURE — 81001 URINALYSIS AUTO W/SCOPE: CPT | Performed by: FAMILY MEDICINE

## 2019-08-14 PROCEDURE — 83690 ASSAY OF LIPASE: CPT | Performed by: FAMILY MEDICINE

## 2019-08-14 PROCEDURE — 74178 CT ABD&PLV WO CNTR FLWD CNTR: CPT

## 2019-08-14 PROCEDURE — 82150 ASSAY OF AMYLASE: CPT | Performed by: FAMILY MEDICINE

## 2019-08-14 PROCEDURE — 36415 COLL VENOUS BLD VENIPUNCTURE: CPT | Performed by: FAMILY MEDICINE

## 2019-08-14 PROCEDURE — 85025 COMPLETE CBC W/AUTO DIFF WBC: CPT | Performed by: FAMILY MEDICINE

## 2019-08-14 PROCEDURE — 99207 C PAF COMPLETED  NO CHARGE: CPT | Performed by: FAMILY MEDICINE

## 2019-08-14 RX ORDER — IOPAMIDOL 755 MG/ML
500 INJECTION, SOLUTION INTRAVASCULAR ONCE
Status: COMPLETED | OUTPATIENT
Start: 2019-08-14 | End: 2019-08-14

## 2019-08-14 RX ADMIN — IOPAMIDOL 100 ML: 755 INJECTION, SOLUTION INTRAVENOUS at 16:19

## 2019-08-14 RX ADMIN — SODIUM CHLORIDE 65 ML: 9 INJECTION, SOLUTION INTRAVENOUS at 16:19

## 2019-08-14 ASSESSMENT — MIFFLIN-ST. JEOR: SCORE: 2321.6

## 2019-08-14 NOTE — PATIENT INSTRUCTIONS
[facility]                        To reach your care team during and after hours:   [phone]  To reach our pharmacy:        [phone]    Clinic Hours                        Our clinic hours are:    Monday   7:30 am to 7:00 pm                  Tuesday through Friday 7:30 am to 5:00 pm                             Saturday   8:00 am to 12:00 pm      Sunday   Closed      Pharmacy Hours                        Our pharmacy hours are:    Monday   8:30 am to 7:00 pm       Tuesday to Friday  8:30 am to 6:00 pm                       Saturday    9:00 am to 1:00 pm              Sunday    Closed              There is also information available at our web site:  [email or web address]    If your provider ordered any lab tests and you do not receive the results within 10 business days, please call the clinic.    If you need a medication refill please contact your pharmacy.  Please allow 2-3 business days for your refill to be completed.    Our clinic offers telephone visits and e visits.  Please ask one of your team members to explain more.      Use [employer] (secure email communication and access to your chart) to send your primary care provider a message or make an appointment. Ask someone on your Team how to sign up for [employer].  Immunizations                      Immunization History   Administered Date(s) Administered     Influenza (High Dose) 3 valent vaccine 09/20/2012, 12/21/2013, 10/20/2015, 09/25/2017, 09/20/2018     Influenza (IIV3) PF 10/26/2007, 12/22/2008, 09/17/2009, 10/14/2011     Influenza Vaccine, 3 YRS +, IM (QUADRIVALENT W/PRESERVATIVES) 10/30/2017     Pneumo Conj 13-V (2010&after) 03/10/2016, 09/25/2017     Pneumococcal 23 valent 05/11/2005, 09/20/2012     TD (ADULT, 7+) 11/30/1998     TDAP Vaccine (Adacel) 06/26/2007     TDAP Vaccine (Boostrix) 10/20/2015     Twinrix A/B 05/11/2005, 06/26/2007, 09/04/2008        Health Maintenance                         Health Maintenance Due   Topic  Date Due     Zoster (Shingles) Vaccine (1 of 2) 05/25/1996     PHQ-2  01/01/2019     FIT Test  07/05/2019

## 2019-08-14 NOTE — PROGRESS NOTES
SUBJECTIVE:   Huog Weber is a 74 year old male who presents to clinic today for the following   health issues:    Abdominal Pain      Duration: 1-2 months     Description (location/character/radiation): LRQ       Associated flank pain: yes Right side     Intensity:  Dull ache with intermittent severe pain    Accompanying signs and symptoms:        Fever/Chills: no        Gas/Bloating: no        Nausea/vomitting: no        Diarrhea: no        Dysuria or Hematuria: YES intermittent hematuria     History (previous similar pain/trauma/previous testing): kidney stones  - last one was 9/2018    Precipitating or alleviating factors:       Pain worse with eating/BM/urination: in am dull ache - if he drinks water it subsides       Pain relieved by BM: no     Therapies tried and outcome: None    LMP:  not applicable    Patient reports lemon juice in the morning helps with symptoms. Patient states pain is present from right flank to right inguinal area. Patient consults with Dr. Bey of Urology for further management of nephrolithiasis/hematuria.     Hx of CVA/Stroke: Stable. Patient reports some mild tingling of his left hand otherwise denies residual effects from CVA/Stroke.     Atrial Fibrillation: Stable. Patient denies CP, SOB, and edema. Patient is currently prescribed 5 mg Eliquis BID for A-fib management.     Sleep Apnea: Stable. Patient is currently prescribed a CPAP machine for sleep apnea management. Patient reports that he uses his machine every night.    Additional history: as documented    Reviewed  and updated as needed this visit by clinical staff  Tobacco  Allergies  Meds  Med Hx  Surg Hx  Fam Hx  Soc Hx      Reviewed and updated as needed this visit by Provider       BP Readings from Last 3 Encounters:   08/14/19 120/80   06/17/19 132/72   04/15/19 116/70       body mass index is 46 kg/m .    Wt Readings from Last 4 Encounters:   08/14/19 (!) 153.9 kg (339 lb 3.2 oz)   06/17/19 (!) 155.1 kg  (342 lb)   04/15/19 (!) 154.7 kg (341 lb)   02/22/19 (!) 152.9 kg (337 lb)       Health Maintenance    Health Maintenance Due   Topic Date Due     ZOSTER IMMUNIZATION (1 of 2) 05/25/1996     PHQ-2  01/01/2019     FIT  07/05/2019       Current Problem List    Patient Active Problem List   Diagnosis     Iron deficiency anemia     Colon polyps     Obstructive sleep apnea syndrome     Hyperlipidemia LDL goal <70     Long term current use of anticoagulant therapy - for intermittent a-fib     Advance Care Planning     Total knee replacement status     Calculus of kidney     NAFLD (nonalcoholic fatty liver disease)     Morbid obesity due to excess calories (H)     History of hepatitis C     Prediabetes     Paroxysmal atrial fibrillation (H)     Cholelithiasis     Hypertension goal BP (blood pressure) < 140/90     TMJ arthralgia     Nephrolithiasis     OA (osteoarthritis)     First degree AV block     Benign prostatic hyperplasia (BPH) with urinary urge incontinence     Thoracic aortic ectasia (H)     Stroke (H)     CVA (cerebral vascular accident) (H)     Cervical stenosis of spine     Vitamin B12 deficiency (non anemic)     Vitamin D deficiency     Myofascial pain       Past Medical History    Past Medical History:   Diagnosis Date     Arrhythmia      Arthritis      Atrial fibrillation 2006    Followed by [facility]     Calculus of kidney 2005, 6/06, 10/12, 8/18    dr walker/nestor/иван - hematuria - w/u o/w neg     Cervical stenosis of spine     Moderate C4-5     Cholelithiasis      Chronic peptic ulcer, unspecified site, without mention of hemorrhage, perforation, or obstruction 1985    gastric bypass     Colon polyps 8/05, 9/10, 10/15    2 tubular adenoma, 1 hyperplastic - multiple serrated/tubular adenomas     CVA (cerebral vascular accident) (H) 01/2019    small right periorlandic subcortical - left sided residual - left hand tingling/numbness - Left leg weak/numbness - cardioembolic     First degree AV block       Hepatitis C 10/12    chronic hepatitis C, grade 1-2, stage 1 - mild - Dr Douglas     Hyperlipidemia LDL goal <70      Hypertension goal BP (blood pressure) < 140/90 6/06    dr jaciel winchester     Iron deficiency anemia, unspecified 1985    gastric bypass     Nephrolithiasis multiple episodes, last 8/18    Dr Bey     OA (osteoarthritis)     Multiple - Left shoulder with rotator cuff tear - Dr Durham     Obesity, unspecified     s/p gastric bypass 1985      Prediabetes      Presbyacusis 1/04    dr corona     Pyelonephritis, unspecified 12/99     SCC (squamous cell carcinoma), 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    dr saez - lt conchal bowl     Sleep apnea 10/02    cpap - severe - 15 cm - dr cunha     Stroke (H) 1/19/2019     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    [facility]     Vitamin B12 deficiency (non anemic) 4/15/2019     Vitamin D deficiency 4/15/2019       Past Surgical History    Past Surgical History:   Procedure Laterality Date     APPENDECTOMY       ARTHROPLASTY KNEE  8/13/2012    LT TKA - Dr Villanueva     CARDIOVERSION  2016     COLONOSCOPY  8/05, 9/10, 10/15    multiple tubular/serrated/hyperplastic polyps     COLONOSCOPY N/A 10/29/2015    multiple tubular and serrated adenomas     COLONOSCOPY N/A 9/7/2016     EYE SURGERY  Lasik     HC KNEE SCOPE, DIAGNOSTIC  05/00    Arthroscopy, Knee RT     LASER HOLMIUM LITHOTRIPSY URETER(S), INSERT STENT, COMBINED  10/16/2012    stones x 4, Dr Campa     LASER HOLMIUM LITHOTRIPSY URETER(S), INSERT STENT, COMBINED Right 5/2/2018    CYSTOSCOPY, RIGHT URETEROSCOPY, HOLMIUM LASER LITHOTRIPSY, RIGHT STENT PLACEMENT - Dr Bey     SURGICAL HISTORY OF -   1985    s/p gastric bypass Bilroth II     SURGICAL HISTORY OF -   11/98    wisdom teeth     SURGICAL HISTORY OF -   1979    cellulitis     SURGICAL HISTORY OF -   11/98    lt forearm spur's     SURGICAL HISTORY OF -   1/01,6/02,11/02    s/p lasik     SURGICAL HISTORY OF -   11/99    rt forearm spurs     SURGICAL HISTORY OF -   7/06    dr stevenson -  lithotrypsy     SURGICAL HISTORY OF -   10/08, 12/08    Lt ear chonchal lesion removal [facility] - dr saez       Current Medications    Current Outpatient Medications   Medication Sig Dispense Refill     apixaban ANTICOAGULANT (ELIQUIS) 5 MG tablet Take 1 tablet (5 mg) by mouth 2 times daily 180 tablet 3     aspirin 81 MG EC tablet Take 1 tablet (81 mg) by mouth daily 90 tablet 3     Cyanocobalamin 5000 MCG TBDP        diltiazem ER (DILT-XR) 180 MG 24 hr capsule Take 360 mg by mouth daily       Ferrous Sulfate (IRON) 325 (65 Fe) MG tablet Take 1 tablet by mouth daily (with breakfast) 90 tablet 3     fluticasone (FLONASE) 50 MCG/ACT nasal spray Spray 1 spray in nostril       Multiple Vitamins-Minerals (VITAMIN D3 COMPLETE PO) Take 125 mcg by mouth       niacin 500 MG tablet Take by mouth daily (with breakfast)       pantoprazole (PROTONIX) 40 MG EC tablet Take 1 tablet (40 mg) by mouth daily Take 30-60 minutes before a meal. 90 tablet 3     STATIN NOT PRESCRIBED (INTENTIONAL) Please choose reason not prescribed, below, treated for hepatitis C       tamsulosin (FLOMAX) 0.4 MG capsule Take 1 capsule (0.4 mg) by mouth 2 times daily 90 capsule 3     vitamin C (ASCORBIC ACID) 1000 MG TABS Take 1,000 mg by mouth daily         Allergies    No Known Allergies    Immunizations    Immunization History   Administered Date(s) Administered     Influenza (High Dose) 3 valent vaccine 09/20/2012, 12/21/2013, 10/20/2015, 09/25/2017, 09/20/2018     Influenza (IIV3) PF 10/26/2007, 12/22/2008, 09/17/2009, 10/14/2011     Influenza Vaccine, 3 YRS +, IM (QUADRIVALENT W/PRESERVATIVES) 10/30/2017     Pneumo Conj 13-V (2010&after) 03/10/2016, 09/25/2017     Pneumococcal 23 valent 05/11/2005, 09/20/2012     TD (ADULT, 7+) 11/30/1998     TDAP Vaccine (Adacel) 06/26/2007     TDAP Vaccine (Boostrix) 10/20/2015     Twinrix A/B 05/11/2005, 06/26/2007, 09/04/2008       Family History    Family History   Problem Relation Age of Onset     Alzheimer  "Disease Father 82         at 88     Prostate Cancer Father 76        bladder and prostate     Heart Disease Mother 80        CHF     Heart Disease Maternal Grandfather         MI at 55     Cancer Paternal Uncle         ?       Social History    Social History     Socioeconomic History     Marital status:      Spouse name: Mayra     Number of children: 3     Years of education: 21     Highest education level: Not on file   Occupational History     Occupation: retired teacher and football and       Employer: [employer]     Employer: RETIRED   Social Needs     Financial resource strain: Not on file     Food insecurity:     Worry: Not on file     Inability: Not on file     Transportation needs:     Medical: Not on file     Non-medical: Not on file   Tobacco Use     Smoking status: Never Smoker     Smokeless tobacco: Never Used   Substance and Sexual Activity     Alcohol use: Yes     Alcohol/week: 1.2 oz     Comment: 2 per week     Drug use: No     Comment: acknowledges using herbal supplement \"Vibe\" for energy-none used recently      Sexual activity: Yes     Partners: Female     Birth control/protection: None     Comment:     Lifestyle     Physical activity:     Days per week: Not on file     Minutes per session: Not on file     Stress: Not on file   Relationships     Social connections:     Talks on phone: Not on file     Gets together: Not on file     Attends Yazidi service: Not on file     Active member of club or organization: Not on file     Attends meetings of clubs or organizations: Not on file     Relationship status: Not on file     Intimate partner violence:     Fear of current or ex partner: Not on file     Emotionally abused: Not on file     Physically abused: Not on file     Forced sexual activity: Not on file   Other Topics Concern      Service Not Asked     Blood Transfusions Not Asked     Caffeine Concern Yes     Comment: <1 can qd     " Occupational Exposure Not Asked     Hobby Hazards Not Asked     Sleep Concern Yes     Comment: sleep apnea, wears cpap     Stress Concern Not Asked     Weight Concern Not Asked     Special Diet Not Asked     Back Care Not Asked     Exercise No     Bike Helmet Not Asked     Seat Belt Yes     Self-Exams Not Asked     Parent/sibling w/ CABG, MI or angioplasty before 65F 55M? No   Social History Narrative     Not on file       All above reviewed and updated, all stable unless otherwise noted    Recent labs reviewed    ROS:  Constitutional, HEENT, cardiovascular, pulmonary, GI, , musculoskeletal, neuro, skin, endocrine and psych systems are negative, except as otherwise noted.    OBJECTIVE:                                                    /80 (BP Location: Right arm, Patient Position: Chair, Cuff Size: Adult Large)   Pulse 67   Temp 97.8  F (36.6  C) (Oral)   Ht 1.829 m (6')   Wt (!) 153.9 kg (339 lb 3.2 oz)   SpO2 99%   BMI 46.00 kg/m    Body mass index is 46 kg/m .  GENERAL: healthy, alert and no distress  EYES: Eyes grossly normal to inspection  HENT:ear canals and TM's normal upon viewing with otoscope, nose and mouth without ulcers or lesions upon viewing with otoscope, cerumen impaction of right ear  NECK: no tenderness, no adenopathy, no asymmetry, no masses, no stiffness; thyroid- normal to palpation  RESP: lungs clear to auscultation - no rales, no rhonchi, no wheezes  CV: regular rates and rhythm, normal S1 S2, no S3 or S4 and no murmur, no click or rub -  ABDOMEN: soft, no tenderness, no  hepatosplenomegaly, no masses, normal bowel sounds  MS: extremities- no gross deformities noted, no edema  SKIN: no suspicious lesions, no rashes  NEURO: strength and tone- normal, sensory exam- grossly normal, mentation- intact, speech- normal, reflexes- symmetric  BACK: no CVA tenderness, no paralumbar tenderness  PSYCH: Alert and oriented times 3; speech- coherent , normal rate and volume; able to  articulate logical thoughts, able to abstract reason, no tangential thoughts, no hallucinations or delusions, affect- normal    DIAGNOSTICS/PROCEDURES:                                                      Results for orders placed or performed in visit on 08/14/19 (from the past 24 hour(s))   Comprehensive metabolic panel   Result Value Ref [facility]    Sodium 138 133 - 144 mmol/L    Potassium 4.4 3.4 - 5.3 mmol/L    Chloride 106 94 - 109 mmol/L    Carbon Dioxide 26 20 - 32 mmol/L    Anion Gap 6 3 - 14 mmol/L    Glucose 102 (H) 70 - 99 mg/dL    Urea Nitrogen 16 7 - 30 mg/dL    Creatinine 0.79 0.66 - 1.25 mg/dL    GFR Estimate 89 >60 mL/min/[1.73_m2]    GFR Estimate If Black >90 >60 mL/min/[1.73_m2]    Calcium 8.8 8.5 - 10.1 mg/dL    Bilirubin Total 0.4 0.2 - 1.3 mg/dL    Albumin 3.6 3.4 - 5.0 g/dL    Protein Total 7.5 6.8 - 8.8 g/dL    Alkaline Phosphatase 147 40 - 150 U/L    ALT 21 0 - 70 U/L    AST 12 0 - 45 U/L   CBC with platelets and differential   Result Value Ref [facility]    WBC 6.7 4.0 - 11.0 10e9/L    RBC Count 5.46 4.4 - 5.9 10e12/L    Hemoglobin 15.6 13.3 - 17.7 g/dL    Hematocrit 47.8 40.0 - 53.0 %    MCV 88 78 - 100 fl    MCH 28.6 26.5 - 33.0 pg    MCHC 32.6 31.5 - 36.5 g/dL    RDW 14.2 10.0 - 15.0 %    Platelet Count 236 150 - 450 10e9/L    % Neutrophils 59.4 %    % Lymphocytes 26.3 %    % Monocytes 10.9 %    % Eosinophils 2.7 %    % Basophils 0.7 %    Absolute Neutrophil 4.0 1.6 - 8.3 10e9/L    Absolute Lymphocytes 1.8 0.8 - 5.3 10e9/L    Absolute Monocytes 0.7 0.0 - 1.3 10e9/L    Absolute Eosinophils 0.2 0.0 - 0.7 10e9/L    Absolute Basophils 0.1 0.0 - 0.2 10e9/L    Diff Method Automated Method    Lipase   Result Value Ref [facility]    Lipase 58 (L) 73 - 393 U/L   Amylase   Result Value Ref [facility]    Amylase 75 30 - 110 U/L   Hemoglobin A1c   Result Value Ref [facility]    Hemoglobin A1C 5.6 0 - 5.6 %   *UA reflex to Microscopic and Culture ([facility] (except [place] and [place])   Result Value  Ref [facility]    Color Urine Yellow     Appearance Urine Clear     Glucose Urine Negative NEG^Negative mg/dL    Bilirubin Urine Negative NEG^Negative    Ketones Urine Negative NEG^Negative mg/dL    Specific Gravity Urine 1.015 1.003 - 1.035    Blood Urine Large (A) NEG^Negative    pH Urine 6.5 5.0 - 7.0 pH    Protein Albumin Urine Negative NEG^Negative mg/dL    Urobilinogen Urine 0.2 0.2 - 1.0 EU/dL    Nitrite Urine Negative NEG^Negative    Leukocyte Esterase Urine Negative NEG^Negative    Source Midstream Urine    Urine Microscopic   Result Value Ref [facility]    WBC Urine 0 - 5 OTO5^0 - 5 /HPF    RBC Urine  (A) OTO2^O - 2 /HPF    Squamous Epithelial /LPF Urine Few FEW^Few /LPF    Bacteria Urine Few (A) NEG^Negative /HPF        ASSESSMENT/PLAN:                                                        ICD-10-CM    1. Right flank pain R10.9 PAF COMPLETED     *UA reflex to Microscopic and Culture ([facility] (except [place] and [place])     CT Abdomen Pelvis w/o & w Contrast     Comprehensive metabolic panel     CBC with platelets and differential     Lipase     Amylase     CT Abdomen Pelvis w/o & w Contrast     Urine Microscopic   2. Right inguinal pain R10.31 PAF COMPLETED     *UA reflex to Microscopic and Culture ([facility] (except [place] and [place])     CT Abdomen Pelvis w/o & w Contrast     Comprehensive metabolic panel     CBC with platelets and differential     Lipase     Amylase     CT Abdomen Pelvis w/o & w Contrast   3. Gross hematuria R31.0 PAF COMPLETED     *UA reflex to Microscopic and Culture ([facility] (except [place] and [place])     CT Abdomen Pelvis w/o & w Contrast     Comprehensive metabolic panel     CBC with platelets and differential     Lipase     Amylase     CT Abdomen Pelvis w/o & w Contrast   4. Nephrolithiasis N20.0 PAF COMPLETED     *UA reflex to Microscopic and Culture ([facility] (except [place] and [place])      CT Abdomen Pelvis w/o & w Contrast     Comprehensive metabolic panel     CBC with platelets and differential     Lipase     Amylase     CT Abdomen Pelvis w/o & w Contrast   5. History of CVA (cerebrovascular accident) Z86.73 PAF COMPLETED     Comprehensive metabolic panel   6. Paroxysmal atrial fibrillation (H) I48.0 PAF COMPLETED     Comprehensive metabolic panel   7. Prediabetes R73.03 PAF COMPLETED     Comprehensive metabolic panel     Hemoglobin A1c   8. Hypertension goal BP (blood pressure) < 140/90 I10 PAF COMPLETED     Comprehensive metabolic panel   9. Hyperlipidemia LDL goal <70 E78.5 PAF COMPLETED     Comprehensive metabolic panel   10. Obstructive sleep apnea syndrome G47.33 PAF COMPLETED   11. Morbid obesity due to excess calories (H) E66.01 PAF COMPLETED   12. Long term current use of anticoagulant therapy - for intermittent a-fib Z79.01 PAF COMPLETED   13. Medication monitoring encounter Z51.81 PAF COMPLETED     *UA reflex to Microscopic and Culture ([facility] (except [place] and [place])     Comprehensive metabolic panel     CBC with platelets and differential       Discussed treatment/modality options, including risk and benefits, he desires advised aspirin 81 mg po daily, advised 1 multivitamin per day, advised dentist every 6 months, advised diet and exercise and advised opthalmologist every 1-2 years. All diagnosis above reviewed and noted above, otherwise stable.  See [facility] orders for further details.  Follow up as needed.    1) Patient presented today with some right sided flank and inguinal pain. Patient has Hx of nephrolithiasis. CT of abdomen and pelvis ordered today for further evaluation of right flank and inguinal pain.     2) Follow up if symptoms persist or worsen.     3) Patient is currently prescribed 5 mg Eliquis BID for A-fib/Hx of CVA/Stroke management. Advised continued use.     4) Patient is currently prescribed a CPAP machine for sleep apnea  management. Advised continued use.     5) Follow up in 3 months for medication recheck visit.     Health Maintenance Due   Topic Date Due     ZOSTER IMMUNIZATION (1 of 2) 05/25/1996     PHQ-2  01/01/2019     FIT  07/05/2019     This document serves as a record of the services and decisions personally performed and made by Brett Cassidy MD. It was created on his behalf by Kendell Scherer, a trained medical scribe. The creation of this document is based on the provider's statements to the medical scribe.  Kendell Scherer August 14, 2019 8:11 AM     The information in this document, created by the medical scribe for me, accurately reflects the services I personally performed and the decisions made by me. I have reviewed and approved this document for accuracy prior to leaving the patient care area.  August 14, 2019            Brett Cassidy MD [address]  [id number] [phone] Fax

## 2019-08-16 ENCOUNTER — PRE VISIT (OUTPATIENT)
Dept: UROLOGY | Facility: CLINIC | Age: 74
End: 2019-08-16

## 2019-08-16 NOTE — TELEPHONE ENCOUNTER
Reason for Visit: Follow up    Diagnosis: Kidney stones, blood in urine    Orders/Procedures/Records: Records available    Contact Patient: N/A    Rooming Requirements: Gabbie Sepulveda  08/16/19  11:37 AM

## 2019-08-19 ENCOUNTER — TELEPHONE (OUTPATIENT)
Dept: CARDIOLOGY | Facility: CLINIC | Age: 74
End: 2019-08-19

## 2019-08-19 NOTE — TELEPHONE ENCOUNTER
"8/19/19 [facility] was called and informed pt can hold Eliquis 3 days, no bridging and resume med day after procedure.Spoke 2 \" Nani \" who recd orders. Betito 2:44 pm  "

## 2019-08-19 NOTE — TELEPHONE ENCOUNTER
8/19/19 Writer recd call from [facility] as pt having upcoming colonoscopy and pt is on Eliquis 5 mg BID and ASA 81 mg daily. CHADsVASC score is 4 but will route to Dr Castillo for holding recommendations since pt has had recent stroke. Betito 1:10 pm

## 2019-08-21 ENCOUNTER — TRANSFERRED RECORDS (OUTPATIENT)
Dept: HEALTH INFORMATION MANAGEMENT | Facility: CLINIC | Age: 74
End: 2019-08-21

## 2019-08-26 ENCOUNTER — TRANSFERRED RECORDS (OUTPATIENT)
Dept: HEALTH INFORMATION MANAGEMENT | Facility: CLINIC | Age: 74
End: 2019-08-26

## 2019-08-27 ENCOUNTER — TELEPHONE (OUTPATIENT)
Dept: FAMILY MEDICINE | Facility: CLINIC | Age: 74
End: 2019-08-27

## 2019-08-27 ENCOUNTER — TELEPHONE (OUTPATIENT)
Dept: UROLOGY | Facility: CLINIC | Age: 74
End: 2019-08-27

## 2019-08-27 ENCOUNTER — MEDICAL CORRESPONDENCE (OUTPATIENT)
Dept: HEALTH INFORMATION MANAGEMENT | Facility: CLINIC | Age: 74
End: 2019-08-27

## 2019-08-27 NOTE — TELEPHONE ENCOUNTER
Reason for Call:  Same Day Appointment, Requested Provider:  Brett Cassidy MD    PCP: Brett Cassidy    Reason for visit: Constipation    Duration of symptoms: 1 week    Have you been treated for this in the past? No    Additional comments: Patient would like to get in tomorrow afternoon if possible.    Can we leave a detailed message on this number? YES    Phone number patient can be reached at: Cell number on file:    Telephone Information:   Mobile [phone]       Best Time: any    Call taken on 8/27/2019 at 10:04 AM by Aicha Brewster

## 2019-09-17 ENCOUNTER — OFFICE VISIT (OUTPATIENT)
Dept: UROLOGY | Facility: CLINIC | Age: 74
End: 2019-09-17
Payer: COMMERCIAL

## 2019-09-17 VITALS
HEART RATE: 63 BPM | BODY MASS INDEX: 39.17 KG/M2 | DIASTOLIC BLOOD PRESSURE: 68 MMHG | SYSTOLIC BLOOD PRESSURE: 111 MMHG | HEIGHT: 75 IN | WEIGHT: 315 LBS

## 2019-09-17 DIAGNOSIS — N20.0 KIDNEY STONE: Primary | ICD-10-CM

## 2019-09-17 LAB
ALBUMIN UR-MCNC: 30 MG/DL
APPEARANCE UR: ABNORMAL
BACTERIA #/AREA URNS HPF: ABNORMAL /HPF
BILIRUB UR QL STRIP: NEGATIVE
COLOR UR AUTO: YELLOW
GLUCOSE UR STRIP-MCNC: NEGATIVE MG/DL
HGB UR QL STRIP: ABNORMAL
KETONES UR STRIP-MCNC: NEGATIVE MG/DL
LEUKOCYTE ESTERASE UR QL STRIP: ABNORMAL
MUCOUS THREADS #/AREA URNS LPF: PRESENT /LPF
NITRATE UR QL: NEGATIVE
PH UR STRIP: 5 PH (ref 5–7)
RBC #/AREA URNS AUTO: >182 /HPF (ref 0–2)
SOURCE: ABNORMAL
SP GR UR STRIP: 1.01 (ref 1–1.03)
UROBILINOGEN UR STRIP-MCNC: 0 MG/DL (ref 0–2)
WBC #/AREA URNS AUTO: 12 /HPF (ref 0–5)

## 2019-09-17 PROCEDURE — 87086 URINE CULTURE/COLONY COUNT: CPT | Performed by: UROLOGY

## 2019-09-17 ASSESSMENT — PAIN SCALES - GENERAL: PAINLEVEL: NO PAIN (0)

## 2019-09-17 ASSESSMENT — MIFFLIN-ST. JEOR: SCORE: 2368.32

## 2019-09-17 NOTE — LETTER
2019       RE: Hugo Weber  [address]  [place] [address]     Dear Colleague,    Thank you for referring your patient, Hugo Weber, to the [facility] FOR PROSTATE AND UROLOGIC CANCERS at [facility]. Please see a copy of my visit note below.      Urology Clinic    Regulo Heath MD  Date of Service: 2019     Name: Hugo Weber  MRN: [id number]  Age: 74 year old  : 1945  Referring provider: Bruno De Santiago     Assessment and Plan:  Assessment:  Hugo Weber is a 74 year old male with recurrent nephrolithiasis and several nonobstructing renal stones.     Plan:  - Schedule right ureteroscopy with laser lithotripsy for renal pelvis stone, presumably cause of hematuria in setting of anticoagulation.  Will check bladder at time of surgery via cystoscopy.  Will not address left sided stones at this time given lack of symptoms and risk of periop morbidity with prolonger procedure  Reviewed risks, benefits, alternatives.  - UA/UCx today  -PReop clearance    ______________________________________________________________________    HPI  Hugo Weber is a 74 year old male with recurrent nephrolithiasis and several bilateral nonobstructing renal stones. His most recent laser lithotripsy procedure for stones was performed in May 2018 by Dr. Bey.  He is on anticoagulation for a recent CVA.     During a telephone encounter on 2019, we reviewed his 24 hour urine study, which was notable for the following:   Adequate urine volume over 2 L  High urinary calcium (suspect from calcium supplementation and high sodium diet)  High urinary sodium  Borderline high urinary oxalate.  Serum calcium level is normal    We discussed a low sodium, low oxalate diet. Plan was to continue with monitoring renal stones.     Today the patient reports that he has made minimal changes in his diet since the telephone visit we had in  March. He states that he has had intermittent right sided flank pain and aching back pain throughout the summer and most recently saw his PCP for flank pain on 8/14/19 of which a CT scan was ordered which demonstrated a right renal pelvis stone measuring 0.7 cm along with the adjacent right renal pelvis showing prominent wall thickening.   On the same day a UA was collected demonstrating  rbc/hpf.  He denies any urinary tract infections over the past year.      Patient also reports LUTS including weak and intermittent stream, inability to fully empty and straining to urinate.  He wakes up only one time per night to urinate.  He does have urgency during the day with some incontinence.      Review of Systems:   Pertinent items are noted in HPI or as below, remainder of complete ROS is negative.      Physical Exam:   Patient is a 74 year old  male   Vitals: There were no vitals taken for this visit.  Notable Findings on Exam: Well-nourished male in no apparent distress  No CVAT bilaterally    Laboratory:   I personally reviewed all applicable laboratory data and went over findings with patient  Significant for:    CBC RESULTS:  Recent Labs   Lab Test 08/14/19  0831 02/06/19  1247 10/26/18  1420 09/20/18  0919   WBC 6.7 8.5 7.8 7.4   HGB 15.6 15.4 14.2 14.8    239 252 253        BMP RESULTS:  Recent Labs   Lab Test 08/14/19  0831 02/06/19  1247 11/12/18  0908 10/26/18  1420    137 138 140   POTASSIUM 4.4 4.2 4.0 4.7   CHLORIDE 106 107 104 105   CO2 26 23 27 26   ANIONGAP 6 7 7 9   * 79 91 106*   BUN 16 15 18 22   CR 0.79 0.75 0.84 1.27*   GFRESTIMATED 89 >90 89 56*   GFRESTBLACK >90 >90 >90 67   BEBO 8.8 9.2 8.7 9.0       UA RESULTS:   Recent Labs   Lab Test 08/14/19  0840 11/12/18  0910 10/26/18  1420   SG 1.015 1.025 >1.030   URINEPH 6.5 5.5 5.5   NITRITE Negative Negative Negative   RBCU * 2-5* 2-5*   WBCU 0 - 5 0 - 5 0 - 5       CALCIUM RESULTS  Lab Results   Component Value Date     BEBO 8.8 08/14/2019    BEBO 9.2 02/06/2019    BEBO 8.7 11/12/2018       PSA RESULTS  PSA   Date Value Ref Range Status   09/20/2018 4.46 (H) 0 - 4 ug/L Final     Comment:     Assay Method:  Chemiluminescence using Siemens Vista analyzer   09/25/2017 2.76 0 - 4 ug/L Final     Comment:     Assay Method:  Chemiluminescence using Siemens Vista analyzer   03/10/2016 1.92 0 - 4 ug/L Final   01/13/2015 1.18 0 - 4 ug/L Final   09/03/2014 1.38 0 - 4 ug/L Final   12/18/2013 1.53 0 - 4 ug/L Final   06/09/2012 1.57 0 - 4 ug/L Final   08/31/2010 1.73 0 - 4 ug/L Final   09/17/2009 1.43 0 - 4 ug/L Final   09/04/2008 2.27 0 - 4 ug/L Final       INR  Recent Labs   Lab Test 03/21/16  2139 02/12/15  0833 06/19/13  1309 05/17/13   INR 1.04 1.04 1.10 2.0*       Imaging:   I personally reviewed all applicable imaging and went over the below findings with patient.    Results for orders placed or performed during the hospital encounter of 08/14/19   CT Abdomen Pelvis w/o & w Contrast    Narrative    CT ABDOMEN AND PELVIS WITHOUT AND WITH CONTRAST  8/14/2019 4:45 PM     HISTORY: History nephrolithiasis.  Right flank to right inguinal pain.  Gross hematuria. Nephrolithiasis.    TECHNIQUE:  CT abdomen and pelvis with 100 mL Isovue-370 IV. Pre- and  post-contrast CT of the abdomen and pelvis was performed using a  biphasic contrast injection technique. Radiation dose for this scan  was reduced using automated exposure control, adjustment of the mA  and/or kV according to patient size, or iterative reconstruction  technique.    COMPARISON: CT abdomen and pelvis 9/10/2018.    FINDINGS:   Right kidney: 0.7 cm right renal pelvis stone, series 2 image 43.  There is also prominent wall thickening of the right renal pelvis  surrounding the stone, series 5 image 44. Nonobstructing stone within  the right lower kidney is 0.9 cm, series 2 image 41. No focal right  renal parenchymal lesion is otherwise seen. The right ureter shows no  specific abnormality.  No hydronephrosis.    Left kidney: Nonobstructing stone at the upper left kidney is 1.1 cm,  series 2 image 29. No hydronephrosis or ureter stone. No focal left  renal parenchymal lesion. No left collecting system filling defect is  seen.    Urinary bladder: No bladder stone is seen. No bladder lumen filling  defect identified. Some mild indentation upon the bladder base from  the adjacent prostate.    Additional findings: Liver, gallbladder, adrenals, spleen, and  pancreas show no significant abnormalities. No acute bowel  abnormality. Vascular calcifications. No enlarged lymph nodes by size  criteria. Diffuse spine degenerative changes.      Impression    IMPRESSION:  1. There is a right renal pelvis stone measuring 0.7 cm. The adjacent  right renal pelvis shows prominent wall thickening. While this could  be edema related to the stone, a collecting system neoplasm remains a  possibility, and further workup is recommended. There is no right  hydronephrosis identified otherwise.  2. Bilateral intrarenal stones as well. No left hydronephrosis.  3. No other acute abnormality is seen.    HUMPHREY GOINS MD       Scribe Disclosure:  Moses BALLARD MS4, am serving as a scribe to document services personally performed by Regulo Heath MD at this visit, based upon the provider's statements to me. All documentation has been reviewed by the aforementioned provider prior to being entered into the official medical record.     Moses BALLARD, a scribe, prepared the chart for today's encounter.     IRegulo saw and evaluated this patient and agree with the plan as stated above.  I personally performed all listed procedures.         Again, thank you for allowing me to participate in the care of your patient.      Sincerely,    Regulo Heath MD

## 2019-09-17 NOTE — NURSING NOTE
"Chief Complaint   Patient presents with     RECHECK     Kidney stones       Height 1.905 m (6' 3\"), weight (!) 153.8 kg (339 lb). Body mass index is 42.37 kg/m .    Patient Active Problem List   Diagnosis     Iron deficiency anemia     Colon polyps     Obstructive sleep apnea syndrome     Hyperlipidemia LDL goal <70     Long term current use of anticoagulant therapy - for intermittent a-fib     Advance Care Planning     Total knee replacement status     Calculus of kidney     NAFLD (nonalcoholic fatty liver disease)     Morbid obesity due to excess calories (H)     History of hepatitis C     Prediabetes     Paroxysmal atrial fibrillation (H)     Cholelithiasis     Hypertension goal BP (blood pressure) < 140/90     TMJ arthralgia     Nephrolithiasis     OA (osteoarthritis)     First degree AV block     Benign prostatic hyperplasia (BPH) with urinary urge incontinence     Thoracic aortic ectasia (H)     Stroke (H)     CVA (cerebral vascular accident) (H)     Cervical stenosis of spine     Vitamin B12 deficiency (non anemic)     Vitamin D deficiency     Myofascial pain       No Known Allergies    Current Outpatient Medications   Medication Sig Dispense Refill     apixaban ANTICOAGULANT (ELIQUIS) 5 MG tablet Take 1 tablet (5 mg) by mouth 2 times daily 180 tablet 3     aspirin 81 MG EC tablet Take 1 tablet (81 mg) by mouth daily 90 tablet 3     Cyanocobalamin 5000 MCG TBDP        diltiazem ER (DILT-XR) 180 MG 24 hr capsule Take 360 mg by mouth daily       Ferrous Sulfate (IRON) 325 (65 Fe) MG tablet Take 1 tablet by mouth daily (with breakfast) 90 tablet 3     fluticasone (FLONASE) 50 MCG/ACT nasal spray Spray 1 spray in nostril       Multiple Vitamins-Minerals (VITAMIN D3 COMPLETE PO) Take 125 mcg by mouth       niacin 500 MG tablet Take by mouth daily (with breakfast)       pantoprazole (PROTONIX) 40 MG EC tablet Take 1 tablet (40 mg) by mouth daily Take 30-60 minutes before a meal. 90 tablet 3     STATIN NOT " "PRESCRIBED (INTENTIONAL) Please choose reason not prescribed, below, treated for hepatitis C       tamsulosin (FLOMAX) 0.4 MG capsule Take 1 capsule (0.4 mg) by mouth 2 times daily 90 capsule 3     vitamin C (ASCORBIC ACID) 1000 MG TABS Take 1,000 mg by mouth daily         Social History     Tobacco Use     Smoking status: Never Smoker     Smokeless tobacco: Never Used   Substance Use Topics     Alcohol use: Yes     Alcohol/week: 1.2 oz     Comment: 2 per week     Drug use: No     Comment: acknowledges using herbal supplement \"Vibe\" for energy-none used recently        Juan Salgado  9/17/2019  1:16 PM  "

## 2019-09-17 NOTE — PROGRESS NOTES
Urology Clinic    Regulo Heath MD  Date of Service: 2019     Name: Hugo Weber  MRN: [id number]  Age: 74 year old  : 1945  Referring provider: Bruno De Santiago     Assessment and Plan:  Assessment:  Hugo Weber is a 74 year old male with recurrent nephrolithiasis and several nonobstructing renal stones.     Plan:  - Schedule right ureteroscopy with laser lithotripsy for renal pelvis stone, presumably cause of hematuria in setting of anticoagulation.  Will check bladder at time of surgery via cystoscopy.  Will not address left sided stones at this time given lack of symptoms and risk of periop morbidity with prolonger procedure  Reviewed risks, benefits, alternatives.  - UA/UCx today  -PReop clearance    ______________________________________________________________________    HPI  Hugo Weber is a 74 year old male with recurrent nephrolithiasis and several bilateral nonobstructing renal stones. His most recent laser lithotripsy procedure for stones was performed in May 2018 by Dr. Bey.  He is on anticoagulation for a recent CVA.     During a telephone encounter on 2019, we reviewed his 24 hour urine study, which was notable for the following:   Adequate urine volume over 2 L  High urinary calcium (suspect from calcium supplementation and high sodium diet)  High urinary sodium  Borderline high urinary oxalate.  Serum calcium level is normal    We discussed a low sodium, low oxalate diet. Plan was to continue with monitoring renal stones.     Today the patient reports that he has made minimal changes in his diet since the telephone visit we had in March. He states that he has had intermittent right sided flank pain and aching back pain throughout the summer and most recently saw his PCP for flank pain on 19 of which a CT scan was ordered which demonstrated a right renal pelvis stone measuring 0.7 cm along with the adjacent right renal pelvis showing prominent  wall thickening.   On the same day a UA was collected demonstrating  rbc/hpf.  He denies any urinary tract infections over the past year.      Patient also reports LUTS including weak and intermittent stream, inability to fully empty and straining to urinate.  He wakes up only one time per night to urinate.  He does have urgency during the day with some incontinence.      Review of Systems:   Pertinent items are noted in HPI or as below, remainder of complete ROS is negative.      Physical Exam:   Patient is a 74 year old  male   Vitals: There were no vitals taken for this visit.  Notable Findings on Exam: Well-nourished male in no apparent distress  No CVAT bilaterally    Laboratory:   I personally reviewed all applicable laboratory data and went over findings with patient  Significant for:    CBC RESULTS:  Recent Labs   Lab Test 08/14/19  0831 02/06/19  1247 10/26/18  1420 09/20/18  0919   WBC 6.7 8.5 7.8 7.4   HGB 15.6 15.4 14.2 14.8    239 252 253        BMP RESULTS:  Recent Labs   Lab Test 08/14/19  0831 02/06/19  1247 11/12/18  0908 10/26/18  1420    137 138 140   POTASSIUM 4.4 4.2 4.0 4.7   CHLORIDE 106 107 104 105   CO2 26 23 27 26   ANIONGAP 6 7 7 9   * 79 91 106*   BUN 16 15 18 22   CR 0.79 0.75 0.84 1.27*   GFRESTIMATED 89 >90 89 56*   GFRESTBLACK >90 >90 >90 67   BEBO 8.8 9.2 8.7 9.0       UA RESULTS:   Recent Labs   Lab Test 08/14/19  0840 11/12/18  0910 10/26/18  1420   SG 1.015 1.025 >1.030   URINEPH 6.5 5.5 5.5   NITRITE Negative Negative Negative   RBCU * 2-5* 2-5*   WBCU 0 - 5 0 - 5 0 - 5       CALCIUM RESULTS  Lab Results   Component Value Date    BEBO 8.8 08/14/2019    BEBO 9.2 02/06/2019    BEBO 8.7 11/12/2018       PSA RESULTS  PSA   Date Value Ref Range Status   09/20/2018 4.46 (H) 0 - 4 ug/L Final     Comment:     Assay Method:  Chemiluminescence using Siemens Vista analyzer   09/25/2017 2.76 0 - 4 ug/L Final     Comment:     Assay Method:  Chemiluminescence using  Siemens Sargent analyzer   03/10/2016 1.92 0 - 4 ug/L Final   01/13/2015 1.18 0 - 4 ug/L Final   09/03/2014 1.38 0 - 4 ug/L Final   12/18/2013 1.53 0 - 4 ug/L Final   06/09/2012 1.57 0 - 4 ug/L Final   08/31/2010 1.73 0 - 4 ug/L Final   09/17/2009 1.43 0 - 4 ug/L Final   09/04/2008 2.27 0 - 4 ug/L Final       INR  Recent Labs   Lab Test 03/21/16  2139 02/12/15  0833 06/19/13  1309 05/17/13   INR 1.04 1.04 1.10 2.0*       Imaging:   I personally reviewed all applicable imaging and went over the below findings with patient.    Results for orders placed or performed during the hospital encounter of 08/14/19   CT Abdomen Pelvis w/o & w Contrast    Narrative    CT ABDOMEN AND PELVIS WITHOUT AND WITH CONTRAST  8/14/2019 4:45 PM     HISTORY: History nephrolithiasis.  Right flank to right inguinal pain.  Gross hematuria. Nephrolithiasis.    TECHNIQUE:  CT abdomen and pelvis with 100 mL Isovue-370 IV. Pre- and  post-contrast CT of the abdomen and pelvis was performed using a  biphasic contrast injection technique. Radiation dose for this scan  was reduced using automated exposure control, adjustment of the mA  and/or kV according to patient size, or iterative reconstruction  technique.    COMPARISON: CT abdomen and pelvis 9/10/2018.    FINDINGS:   Right kidney: 0.7 cm right renal pelvis stone, series 2 image 43.  There is also prominent wall thickening of the right renal pelvis  surrounding the stone, series 5 image 44. Nonobstructing stone within  the right lower kidney is 0.9 cm, series 2 image 41. No focal right  renal parenchymal lesion is otherwise seen. The right ureter shows no  specific abnormality. No hydronephrosis.    Left kidney: Nonobstructing stone at the upper left kidney is 1.1 cm,  series 2 image 29. No hydronephrosis or ureter stone. No focal left  renal parenchymal lesion. No left collecting system filling defect is  seen.    Urinary bladder: No bladder stone is seen. No bladder lumen filling  defect  identified. Some mild indentation upon the bladder base from  the adjacent prostate.    Additional findings: Liver, gallbladder, adrenals, spleen, and  pancreas show no significant abnormalities. No acute bowel  abnormality. Vascular calcifications. No enlarged lymph nodes by size  criteria. Diffuse spine degenerative changes.      Impression    IMPRESSION:  1. There is a right renal pelvis stone measuring 0.7 cm. The adjacent  right renal pelvis shows prominent wall thickening. While this could  be edema related to the stone, a collecting system neoplasm remains a  possibility, and further workup is recommended. There is no right  hydronephrosis identified otherwise.  2. Bilateral intrarenal stones as well. No left hydronephrosis.  3. No other acute abnormality is seen.    HUMPHREY GOINS MD       Scribe Disclosure:  Moses BALLARD MS4, am serving as a scribe to document services personally performed by Regulo Heath MD at this visit, based upon the provider's statements to me. All documentation has been reviewed by the aforementioned provider prior to being entered into the official medical record.     Moses BALLARD, a scribe, prepared the chart for today's encounter.     IRegulo saw and evaluated this patient and agree with the plan as stated above.  I personally performed all listed procedures.    Regulo BALLARD, was present with the medical student who participated in the service and in the documentation of the note. I have verified the history and personally performed the physical exam and medical decision making. I agree with the assessment and plan of care as documented in the note.

## 2019-09-18 ENCOUNTER — COMMUNICATION - HEALTHEAST (OUTPATIENT)
Dept: UROLOGY | Facility: CLINIC | Age: 74
End: 2019-09-18

## 2019-09-18 LAB
BACTERIA SPEC CULT: NORMAL
Lab: NORMAL
SPECIMEN SOURCE: NORMAL

## 2019-09-20 ENCOUNTER — OFFICE VISIT (OUTPATIENT)
Dept: FAMILY MEDICINE | Facility: CLINIC | Age: 74
End: 2019-09-20
Payer: COMMERCIAL

## 2019-09-20 ENCOUNTER — AMBULATORY - HEALTHEAST (OUTPATIENT)
Dept: MULTI SPECIALTY CLINIC | Facility: CLINIC | Age: 74
End: 2019-09-20

## 2019-09-20 VITALS
WEIGHT: 315 LBS | BODY MASS INDEX: 39.17 KG/M2 | TEMPERATURE: 97.7 F | HEART RATE: 73 BPM | OXYGEN SATURATION: 95 % | SYSTOLIC BLOOD PRESSURE: 128 MMHG | HEIGHT: 75 IN | DIASTOLIC BLOOD PRESSURE: 72 MMHG

## 2019-09-20 DIAGNOSIS — Z86.19 HISTORY OF HEPATITIS C: ICD-10-CM

## 2019-09-20 DIAGNOSIS — Z51.81 MEDICATION MONITORING ENCOUNTER: ICD-10-CM

## 2019-09-20 DIAGNOSIS — E78.5 HYPERLIPIDEMIA LDL GOAL <70: ICD-10-CM

## 2019-09-20 DIAGNOSIS — I48.0 PAROXYSMAL ATRIAL FIBRILLATION (H): ICD-10-CM

## 2019-09-20 DIAGNOSIS — Z79.01 LONG TERM CURRENT USE OF ANTICOAGULANT THERAPY: ICD-10-CM

## 2019-09-20 DIAGNOSIS — N20.0 NEPHROLITHIASIS: ICD-10-CM

## 2019-09-20 DIAGNOSIS — I10 HYPERTENSION GOAL BP (BLOOD PRESSURE) < 140/90: ICD-10-CM

## 2019-09-20 DIAGNOSIS — R73.03 PREDIABETES: ICD-10-CM

## 2019-09-20 DIAGNOSIS — Z01.818 PREOP GENERAL PHYSICAL EXAM: Primary | ICD-10-CM

## 2019-09-20 DIAGNOSIS — Z86.73 HISTORY OF CVA (CEREBROVASCULAR ACCIDENT): ICD-10-CM

## 2019-09-20 DIAGNOSIS — D50.9 IRON DEFICIENCY ANEMIA, UNSPECIFIED IRON DEFICIENCY ANEMIA TYPE: ICD-10-CM

## 2019-09-20 DIAGNOSIS — G47.33 OBSTRUCTIVE SLEEP APNEA SYNDROME: ICD-10-CM

## 2019-09-20 DIAGNOSIS — E66.01 MORBID OBESITY DUE TO EXCESS CALORIES (H): ICD-10-CM

## 2019-09-20 LAB
ALBUMIN SERPL-MCNC: 3.8 G/DL (ref 3.4–5)
ALP SERPL-CCNC: 147 U/L (ref 40–150)
ALT SERPL W P-5'-P-CCNC: 23 U/L (ref 0–70)
ANION GAP SERPL CALCULATED.3IONS-SCNC: 8 MMOL/L (ref 3–14)
AST SERPL W P-5'-P-CCNC: 13 U/L (ref 0–45)
BILIRUB SERPL-MCNC: 0.5 MG/DL (ref 0.2–1.3)
BUN SERPL-MCNC: 14 MG/DL (ref 7–30)
CALCIUM SERPL-MCNC: 8.9 MG/DL (ref 8.5–10.1)
CHLORIDE SERPL-SCNC: 103 MMOL/L (ref 94–109)
CO2 SERPL-SCNC: 26 MMOL/L (ref 20–32)
CREAT SERPL-MCNC: 0.78 MG/DL (ref 0.66–1.25)
ERYTHROCYTE [DISTWIDTH] IN BLOOD BY AUTOMATED COUNT: 14.3 % (ref 10–15)
GFR SERPL CREATININE-BSD FRML MDRD: 90 ML/MIN/{1.73_M2}
GLUCOSE SERPL-MCNC: 89 MG/DL (ref 70–99)
HBA1C MFR BLD: 5.6 % (ref 0–5.6)
HBA1C MFR BLD: 5.6 % (ref 0–5.6)
HCT VFR BLD AUTO: 45.5 % (ref 40–53)
HGB BLD-MCNC: 15 G/DL (ref 13.3–17.7)
MCH RBC QN AUTO: 28.9 PG (ref 26.5–33)
MCHC RBC AUTO-ENTMCNC: 33 G/DL (ref 31.5–36.5)
MCV RBC AUTO: 88 FL (ref 78–100)
PLATELET # BLD AUTO: 260 10E9/L (ref 150–450)
POTASSIUM SERPL-SCNC: 4.3 MMOL/L (ref 3.4–5.3)
PROT SERPL-MCNC: 7.3 G/DL (ref 6.8–8.8)
RBC # BLD AUTO: 5.19 10E12/L (ref 4.4–5.9)
SODIUM SERPL-SCNC: 137 MMOL/L (ref 133–144)
WBC # BLD AUTO: 6.4 10E9/L (ref 4–11)

## 2019-09-20 PROCEDURE — 85027 COMPLETE CBC AUTOMATED: CPT | Performed by: FAMILY MEDICINE

## 2019-09-20 PROCEDURE — 83036 HEMOGLOBIN GLYCOSYLATED A1C: CPT | Performed by: FAMILY MEDICINE

## 2019-09-20 PROCEDURE — 99215 OFFICE O/P EST HI 40 MIN: CPT | Performed by: FAMILY MEDICINE

## 2019-09-20 PROCEDURE — 93000 ELECTROCARDIOGRAM COMPLETE: CPT | Performed by: FAMILY MEDICINE

## 2019-09-20 PROCEDURE — 80053 COMPREHEN METABOLIC PANEL: CPT | Performed by: FAMILY MEDICINE

## 2019-09-20 PROCEDURE — 36415 COLL VENOUS BLD VENIPUNCTURE: CPT | Performed by: FAMILY MEDICINE

## 2019-09-20 ASSESSMENT — MIFFLIN-ST. JEOR: SCORE: 2350.18

## 2019-09-20 NOTE — PROGRESS NOTES
[address] [address]  [phone]  Dept: [phone]    PRE-OP EVALUATION:  Today's date: 2019    Hugo Weber (: 1945) presents for pre-operative evaluation assessment as requested by Dr. Heath.  He requires evaluation and anesthesia risk assessment prior to undergoing surgery/procedure for treatment of kidney stone .    Proposed Surgery/ Procedure: blasting kidney stone  Date of Surgery/ Procedure: 10/14/19  Time of Surgery/ Procedure: 11:20  Hospital/Surgical Facility: [facility]  Fax number for surgical facility: pt didn't have it with  Primary Physician: Brett Cassidy  Type of Anesthesia Anticipated: General    Patient has a Health Care Directive or Living Will:  NO    1. YES - DO YOU HAVE A HISTORY OF HEART ATTACK, STROKE, STENT, BYPASS OR SURGERY ON AN ARTERY IN THE HEAD, NECK, HEART OR LEG? Stroke 2019  2. NO - Do you ever have any pain or discomfort in your chest?  3. NO - Do you have a history of  Heart Failure?  4. NO - Are you troubled by shortness of breath when: walking on the level, up a slight hill or at night?  5. NO - DO YOU CURRENTLY HAVE A COLD, BRONCHITIS OR OTHER RESPIRATORY INFECTION?   6. NO - Do you have a cough, shortness of breath or wheezing?  7. NO - Do you sometimes get pains in the calves of your legs when you walk?  8. YES - DO YOU OR ANYONE IN YOUR FAMILY HAVE PREVIOUS HISTORY OF BLOOD CLOTS? Stroke in 2019  9. NO - Do you or does anyone in your family have a serious bleeding problem such as prolonged bleeding following surgeries or cuts?  10. YES - HAVE YOU EVER HAD PROBLEMS WITH ANEMIA OR BEEN TOLD TO TAKE IRON PILLS?   11. NO - Have you had any abnormal blood loss such as black, tarry or bloody stools, or abnormal vaginal bleeding?  12. NO - Have you ever had a blood transfusion?  13. NO - Have you or any of your relatives ever had problems with anesthesia?  14. YES - DO YOU HAVE SLEEP  APNEA, EXCESSIVE SNORING OR DAYTIME DROWSINESS? Sleep apnea w/ CPAP use  15. NO - Do you have any prosthetic heart valves?  16. YES - DO YOU HAVE PROSTHETIC JOINTS? Left knee  17. NO - Is there any chance that you may be pregnant?    HPI:     HPI related to upcoming procedure: Patient has a kidney stone lithotripsy procedure scheduled for 10/14/2019 with Dr. Heath at [facility] for treatment of kidney stones.     Hx of CVA/Stroke: Stable. Patient reports some mild tingling of his left hand otherwise denies residual effects from CVA/Stroke. Patient consults with Dr. Velasquez of neurology for Hx of CVA/Stroke management.     Atrial Fibrillation: Stable. Patient denies CP, SOB, and edema. Patient is currently prescribed 5 mg Eliquis BID for A-fib management.     Hyperlipidemia: Patient's hyperlipidemia is well controlled. Patient is not currently prescribed any medication for hyperlipidemia management.    Recent Labs   Lab Test 02/22/19 09/20/18  0919  01/13/15  0815 09/03/14  0810   CHOL 106 165   < > 131 156   HDL 44 44   < > 43 42   LDL 49 100*   < > 74 93   TRIG 72 106   < > 72 103   CHOLHDLRATIO  --   --   --  3.0 3.7    < > = values in this interval not displayed.     Hypertension: Patient presents today as normotensive. Patient is currently prescribed 360 mg Diltiazem daily for hypertension management.    BP Readings from Last 5 Encounters:   09/20/19 128/72   09/17/19 111/68   08/14/19 120/80   06/17/19 132/72   04/15/19 116/70     Creatinine   Date Value Ref Range Status   08/14/2019 0.79 0.66 - 1.25 mg/dL Final      Sleep Apnea: Stable. Patient is currently prescribed a CPAP machine for sleep apnea management. Patient reports that he uses his machine every night. Recently got new machine, working well.     Osteoarthritis: Stable. No issues.     GERD: Stable. Patient's GERD is well controlled by 40 mg Protonix daily.     Prediabetes: Stable.     Glucose   Date Value Ref Range Status   08/14/2019 102 (H) 70  - 99 mg/dL Final     Comment:     Fasting specimen   02/06/2019 79 70 - 99 mg/dL Final   11/12/2018 91 70 - 99 mg/dL Final   10/26/2018 106 (H) 70 - 99 mg/dL Final   09/20/2018 88 70 - 99 mg/dL Final     Comment:     Fasting specimen     Lab Results   Component Value Date    A1C 5.6 08/14/2019    A1C 5.9 02/22/2019    A1C 5.8 02/06/2019    A1C 5.7 11/12/2018    A1C 5.5 09/20/2018     MEDICAL HISTORY:     Patient Active Problem List    Diagnosis Date Noted     Hyperlipidemia LDL goal <70 12/30/2011     Priority: High     Vitamin B12 deficiency (non anemic) 04/15/2019     Priority: Medium     Vitamin D deficiency 04/15/2019     Priority: Medium     Cervical stenosis of spine      Priority: Medium     Moderate C4-5       Stroke (H) 01/19/2019     Priority: Medium     Reported 2/5/2019, per patient occurred on 1/19/2019 while in [place]       CVA (cerebral vascular accident) (H) 01/01/2019     Priority: Medium     small right periorlandic subcortical       Thoracic aortic ectasia (H) 07/06/2018     Priority: Medium     Benign prostatic hyperplasia (BPH) with urinary urge incontinence 07/05/2018     Priority: Medium     First degree AV block      Priority: Medium     OA (osteoarthritis)      Priority: Medium     Multiple - Left shoulder with rotator cuff tear - Dr Durham       Nephrolithiasis      Priority: Medium     Myofascial pain 10/12/2017     Priority: Medium     Hypertension goal BP (blood pressure) < 140/90 09/25/2017     Priority: Medium     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     Priority: Medium     Mn Head and Neck       Cholelithiasis      Priority: Medium     Paroxysmal atrial fibrillation (H) 05/27/2016     Priority: Medium     Prediabetes      Priority: Medium     History of hepatitis C 05/12/2015     Priority: Medium     SVR May 2015       NAFLD (nonalcoholic fatty liver disease) 09/05/2014     Priority: Medium     Morbid obesity due to excess calories (H) 09/05/2014     Priority: Medium     Total knee replacement  status 08/13/2012     Priority: Medium     Long term current use of anticoagulant therapy - for intermittent a-fib 03/30/2012     Priority: Medium     Obstructive sleep apnea syndrome      Priority: Medium     cpap - severe - 15 cm - dr cunha       Calculus of kidney 06/06/2005     Priority: Medium     dr walker - hematuria - w/u o/w neg       Iron deficiency anemia 08/22/2003     Priority: Medium     Problem list name updated by automated process. Provider to review       Advance Care Planning 09/25/2017     Priority: Low     Discussed advance care planning with patient; information given to patient to review. 8/7/2012 Ernestine Ojeda CMA       Colon polyps      Priority: Low      Past Medical History:   Diagnosis Date     Arrhythmia      Arthritis      Atrial fibrillation 2006    Followed by [facility]     Calculus of kidney 2005, 6/06, 10/12, 8/18    dr walker/nestor/иван - hematuria - w/u o/w neg     Cervical stenosis of spine     Moderate C4-5     Cholelithiasis      Chronic peptic ulcer, unspecified site, without mention of hemorrhage, perforation, or obstruction 1985    gastric bypass     Colon polyps 8/05, 9/10, 10/15    2 tubular adenoma, 1 hyperplastic - multiple serrated/tubular adenomas     CVA (cerebral vascular accident) (H) 01/2019    small right periorlandic subcortical - left sided residual - left hand tingling/numbness - Left leg weak/numbness - cardioembolic     First degree AV block      Hepatitis C 10/12    chronic hepatitis C, grade 1-2, stage 1 - mild - Dr Douglas     Hyperlipidemia LDL goal <70      Hypertension goal BP (blood pressure) < 140/90 6/06    dr jaciel winchester     Iron deficiency anemia, unspecified 1985    gastric bypass     Nephrolithiasis multiple episodes, last 8/18    Dr Bey     OA (osteoarthritis)     Multiple - Left shoulder with rotator cuff tear - Dr Durham     Obesity, unspecified     s/p gastric bypass 1985      Prediabetes      Presbyacusis 1/04    dr corona     Pyelonephritis,  unspecified 12/99     SCC (squamous cell carcinoma), 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    dr saez - lt conchal bowl     Sleep apnea 10/02    cpap - severe - 15 cm - dr cunha     Stroke (H) 1/19/2019     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    Mn Head and Neck     Vitamin B12 deficiency (non anemic) 4/15/2019     Vitamin D deficiency 4/15/2019     Past Surgical History:   Procedure Laterality Date     APPENDECTOMY       ARTHROPLASTY KNEE  8/13/2012    LT TKA - Dr Villanueva     CARDIOVERSION  2016     COLONOSCOPY  8/05, 9/10, 10/15    multiple tubular/serrated/hyperplastic polyps     COLONOSCOPY N/A 10/29/2015    multiple tubular and serrated adenomas     COLONOSCOPY N/A 9/7/2016     EYE SURGERY  Lasik     HC KNEE SCOPE, DIAGNOSTIC  05/00    Arthroscopy, Knee RT     LASER HOLMIUM LITHOTRIPSY URETER(S), INSERT STENT, COMBINED  10/16/2012    stones x 4, Dr Campa     LASER HOLMIUM LITHOTRIPSY URETER(S), INSERT STENT, COMBINED Right 5/2/2018    CYSTOSCOPY, RIGHT URETEROSCOPY, HOLMIUM LASER LITHOTRIPSY, RIGHT STENT PLACEMENT - Dr Bey     SURGICAL HISTORY OF -   1985    s/p gastric bypass Bilroth II     SURGICAL HISTORY OF -   11/98    wisdom teeth     SURGICAL HISTORY OF -   1979    cellulitis     SURGICAL HISTORY OF -   11/98    lt forearm spur's     SURGICAL HISTORY OF -   1/01,6/02,11/02    s/p lasik     SURGICAL HISTORY OF -   11/99    rt forearm spurs     SURGICAL HISTORY OF -   7/06    dr stevenson - lithotrypsy     SURGICAL HISTORY OF -   10/08, 12/08    Lt ear chonchal lesion removal SCC - dr saez     Current Outpatient Medications   Medication Sig Dispense Refill     apixaban ANTICOAGULANT (ELIQUIS) 5 MG tablet Take 1 tablet (5 mg) by mouth 2 times daily 180 tablet 3     aspirin 81 MG EC tablet Take 1 tablet (81 mg) by mouth daily (Patient not taking: Reported on 9/17/2019) 90 tablet 3     Cyanocobalamin 5000 MCG TBDP        diltiazem ER (DILT-XR) 180 MG 24 hr capsule Take 360 mg by mouth daily       Ferrous Sulfate (IRON) 325 (65 Fe) MG  "tablet Take 1 tablet by mouth daily (with breakfast) 90 tablet 3     fluticasone (FLONASE) 50 MCG/ACT nasal spray Spray 1 spray in nostril       Multiple Vitamins-Minerals (VITAMIN D3 COMPLETE PO) Take 125 mcg by mouth       niacin 500 MG tablet Take by mouth daily (with breakfast)       pantoprazole (PROTONIX) 40 MG EC tablet Take 1 tablet (40 mg) by mouth daily Take 30-60 minutes before a meal. 90 tablet 3     STATIN NOT PRESCRIBED (INTENTIONAL) Please choose reason not prescribed, below, treated for hepatitis C (Patient not taking: Reported on 9/17/2019)       tamsulosin (FLOMAX) 0.4 MG capsule Take 1 capsule (0.4 mg) by mouth 2 times daily 90 capsule 3     vitamin C (ASCORBIC ACID) 1000 MG TABS Take 1,000 mg by mouth daily       OTC products: None, except as noted above    No Known Allergies   Latex Allergy: NO    Social History     Tobacco Use     Smoking status: Never Smoker     Smokeless tobacco: Never Used   Substance Use Topics     Alcohol use: Yes     Alcohol/week: 1.2 oz     Comment: 2 per week     History   Drug Use No     Comment: acknowledges using herbal supplement \"Vibe\" for energy-none used recently        REVIEW OF SYSTEMS:   Constitutional, HEENT, cardiovascular, pulmonary, GI, , musculoskeletal, neuro, skin, endocrine and psych systems are negative, except as otherwise noted.    EXAM:   There were no vitals taken for this visit.  GENERAL APPEARANCE: healthy, alert and no distress  EYES: EOMI,  PERRL  HENT:ear canals and TM's normal upon viewing with otoscope, nose and mouth without ulcers or lesions upon viewing with otoscope  NECK: no adenopathy, no asymmetry, masses, or scars and thyroid normal to palpation  RESP: lungs clear to auscultation - no rales, rhonchi or wheezes  CV: regular rates and rhythm, normal S1 S2, no S3 or S4 and no murmur, click or rub  ABDOMEN:  soft, nontender, no HSM or masses and bowel sounds normal  MS: extremities normal- no gross deformities noted, no evidence of " inflammation in joints, FROM in all extremities.  SKIN: no suspicious lesions or rashes  NEURO: Normal strength and tone, sensory exam grossly normal, mentation intact and speech normal  PSYCH: mentation appears normal. and affect normal/bright  LYMPHATICS: No cervical adenopathy  BACK: no CVA tenderness, no paralumbar tenderness    DIAGNOSTICS:     EKG: atrial fibrillation, normal axis, normal intervals, no acute ST/T changes c/w ischemia, no LVH by voltage criteria, unchanged from previous tracings    Labs Resulted Today:   Results for orders placed or performed in visit on 09/20/19   CBC with platelets   Result Value Ref Range    WBC 6.4 4.0 - 11.0 10e9/L    RBC Count 5.19 4.4 - 5.9 10e12/L    Hemoglobin 15.0 13.3 - 17.7 g/dL    Hematocrit 45.5 40.0 - 53.0 %    MCV 88 78 - 100 fl    MCH 28.9 26.5 - 33.0 pg    MCHC 33.0 31.5 - 36.5 g/dL    RDW 14.3 10.0 - 15.0 %    Platelet Count 260 150 - 450 10e9/L       Recent Labs   Lab Test 08/14/19  0831 02/22/19 02/06/19  1247  03/21/16  2139  02/12/15  0833   HGB 15.6  --  15.4   < > 16.2   < > 13.6     --  239   < > 283   < > 263   INR  --   --   --   --  1.04  --  1.04     --  137   < > 137   < > 137   POTASSIUM 4.4  --  4.2   < > 3.8   < > 3.9   CR 0.79  --  0.75   < > 1.00   < > 0.95   A1C 5.6 5.9* 5.8*   < >  --   --   --     < > = values in this interval not displayed.     IMPRESSION:   Patient has a kidney stone lithotripsy procedure scheduled for 10/14/2019 with Dr. Heath at [facility] for treatment of kidney stones.    The proposed surgical procedure is considered INTERMEDIATE risk.    REVISED CARDIAC RISK INDEX  The patient has the following serious cardiovascular risks for perioperative complications such as (MI, PE, VFib and 3  AV Block):  No serious cardiac risks  INTERPRETATION: 1 risks: Class II (low risk - 0.9% complication rate)    The patient has the following additional risks for perioperative complications:  No identified additional  risks      ICD-10-CM    1. Preop general physical exam Z01.818 EKG 12-lead complete w/read - Clinics     Comprehensive metabolic panel     CBC with platelets     UA reflex to Microscopic and Culture       RECOMMENDATIONS:     --Patient is to take all scheduled medications on the day of surgery EXCEPT for modifications listed below.    Hold Aspirin, other anti-inflammatories, multivitamins, and supplements 1 week prior to surgery.  Hold Eliquis 3 days prior to surgery.     Received flu shot today. Recommend Shingrix vaccine.     APPROVAL GIVEN to proceed with proposed procedure, without further diagnostic evaluation     This document serves as a record of the services and decisions personally performed and made by Brett Cassidy MD. It was created on his behalf by Kendell Scherer, a trained medical scribe. The creation of this document is based on the provider's statements to the medical scribe.  Kendell Scherer September 20, 2019 12:09 PM     The information in this document, created by the medical scribe for me, accurately reflects the services I personally performed and the decisions made by me. I have reviewed and approved this document for accuracy prior to leaving the patient care area.  September 20, 2019          Brett Cassidy MD, FAAFP    [address]  [address] [phone] Fax    Copy of this evaluation report is provided to requesting physician.    [place] Preop Guidelines    Revised Cardiac Risk Index

## 2019-09-20 NOTE — LETTER
September 23, 2019      Hugo Weber     [facility] [address]        Dear ,    We are writing to inform you of your test results.    They showed:     -All of your labs are normal.     We advise:     Please proceed with surgery.     Resulted Orders   Comprehensive metabolic panel   Result Value Ref Range    Sodium 137 133 - 144 mmol/L    Potassium 4.3 3.4 - 5.3 mmol/L    Chloride 103 94 - 109 mmol/L    Carbon Dioxide 26 20 - 32 mmol/L    Anion Gap 8 3 - 14 mmol/L    Glucose 89 70 - 99 mg/dL    Urea Nitrogen 14 7 - 30 mg/dL    Creatinine 0.78 0.66 - 1.25 mg/dL    GFR Estimate 90 >60 mL/min/[1.73_m2]      Comment:      Non  GFR Calc  Starting 12/18/2018, serum creatinine based estimated GFR (eGFR) will be   calculated using the Chronic Kidney Disease Epidemiology Collaboration   (CKD-EPI) equation.      GFR Estimate If Black >90 >60 mL/min/[1.73_m2]      Comment:       GFR Calc  Starting 12/18/2018, serum creatinine based estimated GFR (eGFR) will be   calculated using the Chronic Kidney Disease Epidemiology Collaboration   (CKD-EPI) equation.      Calcium 8.9 8.5 - 10.1 mg/dL    Bilirubin Total 0.5 0.2 - 1.3 mg/dL    Albumin 3.8 3.4 - 5.0 g/dL    Protein Total 7.3 6.8 - 8.8 g/dL    Alkaline Phosphatase 147 40 - 150 U/L    ALT 23 0 - 70 U/L    AST 13 0 - 45 U/L   CBC with platelets   Result Value Ref Range    WBC 6.4 4.0 - 11.0 10e9/L    RBC Count 5.19 4.4 - 5.9 10e12/L    Hemoglobin 15.0 13.3 - 17.7 g/dL    Hematocrit 45.5 40.0 - 53.0 %    MCV 88 78 - 100 fl    MCH 28.9 26.5 - 33.0 pg    MCHC 33.0 31.5 - 36.5 g/dL    RDW 14.3 10.0 - 15.0 %    Platelet Count 260 150 - 450 10e9/L   Hemoglobin A1c   Result Value Ref Range    Hemoglobin A1C 5.6 0 - 5.6 %      Comment:      Normal <5.7% Prediabetes 5.7-6.4%  Diabetes 6.5% or higher - adopted from ADA   consensus guidelines.         If you have any questions or concerns, please call the clinic at the number  listed above.       Sincerely,        Brett Cassidy MD

## 2019-09-20 NOTE — PATIENT INSTRUCTIONS
Hold Aspirin, other anti-inflammatories, multivitamins, and supplements 1 week prior to surgery.  Hold Eliquis 3 days prior to surgery.     Before Your Surgery      Call your surgeon if there is any change in your health. This includes signs of a cold or flu (such as a sore throat, runny nose, cough, rash or fever).    Do not smoke, drink alcohol or take over the counter medicine (unless your surgeon or primary care doctor tells you to) for the 24 hours before and after surgery.    If you take prescribed drugs: Follow your doctor s orders about which medicines to take and which to stop until after surgery.    Eating and drinking prior to surgery: follow the instructions from your surgeon    Take a shower or bath the night before surgery. Use the soap your surgeon gave you to gently clean your skin. If you do not have soap from your surgeon, use your regular soap. Do not shave or scrub the surgery site.  Wear clean pajamas and have clean sheets on your bed.

## 2019-09-26 ENCOUNTER — ALLIED HEALTH/NURSE VISIT (OUTPATIENT)
Dept: NURSING | Facility: CLINIC | Age: 74
End: 2019-09-26
Payer: COMMERCIAL

## 2019-09-26 DIAGNOSIS — Z23 ENCOUNTER FOR IMMUNIZATION: Primary | ICD-10-CM

## 2019-09-26 DIAGNOSIS — H61.23 EXCESSIVE CERUMEN IN BOTH EAR CANALS: ICD-10-CM

## 2019-09-26 PROCEDURE — G0008 ADMIN INFLUENZA VIRUS VAC: HCPCS

## 2019-09-26 PROCEDURE — 90662 IIV NO PRSV INCREASED AG IM: CPT

## 2019-09-26 NOTE — PROGRESS NOTES
"Injectable Influenza Immunization Documentation    1.  Has the patient received the information for the injectable influenza vaccine? YES     2. Is the patient 6 months of age or older? YES     3. Does the patient have any of the following contraindications?         Severe allergy to eggs?  No     Severe allergic reaction to previous influenza vaccines?  No   Severe allergy to latex?  No       History of Guillain-[place]romsburg syndrome?  No     Currently have a temperature greater than 100.4F?  No        4.  Severely egg allergic patients should have flu vaccine eligibility assessed by an MD, RN, or pharmacist, and those who received flu vaccine should be observed for 15 min by an MD, RN, Pharmacist, Medical Technician, or member of clinic staff.\": NO    5. Latex-allergic patients should be given latex-free influenza vaccine No. Please reference the Vaccine latex table to determine if your clinic s product is latex-containing.       Vaccination given by King Combs RN         The following medication was given:     MEDICATION: Fluzone high dose  ROUTE: IM  SITE: Deltoid - Right  DOSE: 0.5ml  LOT #: [id number]  :  [employer]  EXPIRATION DATE:  3/26/20  NDC#: 58567-882-45     Pt came in for er wash and flu shot. Pt ears were examined and noted to have moderate amounts of cerumen in both ears. Ears were wash in normal fashion. Pt tolerated well. Ears reexamined and noted to be free of cerumen.     Writert injected Pt with Fluzone high dose in the right deltoid. Pt tolerated well. Pt noted to be on eliquis. No bleeding noted. Pt sat for 15 minutes with no reaction. Pt advised to watch for reactions in next 24 hours.    Patient stated an understanding and agreed with plan.    King Combs RN   [facility]    "

## 2019-09-29 ENCOUNTER — HEALTH MAINTENANCE LETTER (OUTPATIENT)
Age: 74
End: 2019-09-29

## 2019-09-30 ENCOUNTER — TELEPHONE (OUTPATIENT)
Dept: FAMILY MEDICINE | Facility: CLINIC | Age: 74
End: 2019-09-30

## 2019-09-30 DIAGNOSIS — N40.1 BENIGN PROSTATIC HYPERPLASIA (BPH) WITH URINARY URGE INCONTINENCE: ICD-10-CM

## 2019-09-30 DIAGNOSIS — N39.41 BENIGN PROSTATIC HYPERPLASIA (BPH) WITH URINARY URGE INCONTINENCE: ICD-10-CM

## 2019-09-30 NOTE — TELEPHONE ENCOUNTER
Pt called to make an appt for an ear lavage.  He was seen by an RN the other day for this and he went in to his audiology appointment and was told we did not fullt clean it out.  He has an appt on Wednesday at 245PM with the RN again for this.  Can we enter orders for this?  Please advise.  Pt can be reached at [phone] with any issues, but he was told if the appt is okay we will not call him.  Clara Ramirez

## 2019-09-30 NOTE — TELEPHONE ENCOUNTER
"Requested Prescriptions   Pending Prescriptions Disp Refills     tamsulosin (FLOMAX) 0.4 MG capsule [Pharmacy Med Name: TAMSULOSIN HYDROCHLORIDE 0.4 MG Capsule]          Last Written Prescription Date:  3.29.19  Last Fill Quantity: 90 capsule,  # refills: 3   Last office visit: 9/20/2019 with prescribing provider:  Brett Cassidy MD               Future Office Visit:       180 capsule 3     Sig: TAKE 1 CAPSULE TWICE DAILY       Alpha Blockers Passed - 9/30/2019  1:58 PM        Passed - Blood pressure under 140/90 in past 12 months     BP Readings from Last 3 Encounters:   09/20/19 128/72   09/17/19 111/68   08/14/19 120/80                 Passed - Recent (12 mo) or future (30 days) visit within the authorizing provider's specialty     Patient has had an office visit with the authorizing provider or a provider within the authorizing providers department within the previous 12 mos or has a future within next 30 days. See \"Patient Info\" tab in inbasket, or \"Choose Columns\" in Meds & Orders section of the refill encounter.              Passed - Patient does not have Tadalafil, Vardenafil, or Sildenafil on their medication list        Passed - Medication is active on med list        Passed - Patient is 18 years of age or older        "

## 2019-09-30 NOTE — TELEPHONE ENCOUNTER
Next 5 appointments (look out 90 days)    Oct 02, 2019  2:45 PM CDT  Nurse Only with [facility]  [facility] [address] [place] [address] [id number] [phone]        Patient was just seen by MD RANDY on 09/20/2019 for this.   No orders needed    Jennifer Berry RN  [place] [facility]

## 2019-10-01 RX ORDER — TAMSULOSIN HYDROCHLORIDE 0.4 MG/1
CAPSULE ORAL
Qty: 180 CAPSULE | Refills: 3 | OUTPATIENT
Start: 2019-10-01

## 2019-10-01 NOTE — TELEPHONE ENCOUNTER
Duplicate- sent info sent to pharmacy.  Radha COVINGTON RN  [facility]  [phone]

## 2019-10-02 ENCOUNTER — ALLIED HEALTH/NURSE VISIT (OUTPATIENT)
Dept: NURSING | Facility: CLINIC | Age: 74
End: 2019-10-02
Payer: COMMERCIAL

## 2019-10-02 DIAGNOSIS — H61.21 IMPACTED CERUMEN OF RIGHT EAR: Primary | ICD-10-CM

## 2019-10-02 PROCEDURE — 99207 HC REMOVE IMPACTED CERUMEN: CPT

## 2019-10-02 NOTE — PROGRESS NOTES
Cerumenosis is noted.  Wax is removed by syringing and manual debridement. Instructions for home care to prevent wax buildup are given.    Kathia Centeno RN, BSN  [facility]

## 2019-10-16 ENCOUNTER — TRANSFERRED RECORDS (OUTPATIENT)
Dept: HEALTH INFORMATION MANAGEMENT | Facility: CLINIC | Age: 74
End: 2019-10-16

## 2019-10-16 ASSESSMENT — MIFFLIN-ST. JEOR: SCORE: 2358.32

## 2019-10-21 ENCOUNTER — ANESTHESIA - HEALTHEAST (OUTPATIENT)
Dept: SURGERY | Facility: CLINIC | Age: 74
End: 2019-10-21

## 2019-10-21 ENCOUNTER — AMBULATORY - HEALTHEAST (OUTPATIENT)
Dept: UROLOGY | Facility: CLINIC | Age: 74
End: 2019-10-21

## 2019-10-21 ENCOUNTER — SURGERY - HEALTHEAST (OUTPATIENT)
Dept: UROLOGY | Facility: CLINIC | Age: 74
End: 2019-10-21

## 2019-10-21 ENCOUNTER — TRANSFERRED RECORDS (OUTPATIENT)
Dept: HEALTH INFORMATION MANAGEMENT | Facility: CLINIC | Age: 74
End: 2019-10-21

## 2019-10-21 ENCOUNTER — SURGERY - HEALTHEAST (OUTPATIENT)
Dept: SURGERY | Facility: CLINIC | Age: 74
End: 2019-10-21

## 2019-10-21 DIAGNOSIS — N39.41 BENIGN PROSTATIC HYPERPLASIA (BPH) WITH URINARY URGE INCONTINENCE: ICD-10-CM

## 2019-10-21 DIAGNOSIS — Z87.442 HISTORY OF KIDNEY STONES: ICD-10-CM

## 2019-10-21 DIAGNOSIS — N40.1 BENIGN PROSTATIC HYPERPLASIA (BPH) WITH URINARY URGE INCONTINENCE: ICD-10-CM

## 2019-10-21 RX ORDER — TAMSULOSIN HYDROCHLORIDE 0.4 MG/1
0.4 CAPSULE ORAL
Qty: 180 CAPSULE | Refills: 0 | Status: SHIPPED | OUTPATIENT
Start: 2019-10-21 | End: 2020-01-31

## 2019-10-21 ASSESSMENT — MIFFLIN-ST. JEOR: SCORE: 2332.81

## 2019-10-21 NOTE — TELEPHONE ENCOUNTER
Patient callling because  [employer] states needs new refill request for the Tamsulosin.     Please send refill request to [employer] mail order       Radha Koehler

## 2019-10-21 NOTE — TELEPHONE ENCOUNTER
"Requested Prescriptions   Pending Prescriptions Disp Refills     tamsulosin (FLOMAX) 0.4 MG capsule 90 capsule 3     Sig: Take 1 capsule (0.4 mg) by mouth 2 times daily     Last Written Prescription Date:  3/29/2019  Last Fill Quantity: 90,  # refills: 3   Last office visit: 9/20/2019 with prescribing provider:     Future Office Visit:          Alpha Blockers Passed - 10/21/2019 10:07 AM        Passed - Blood pressure under 140/90 in past 12 months     BP Readings from Last 3 Encounters:   09/20/19 128/72   09/17/19 111/68   08/14/19 120/80                 Passed - Recent (12 mo) or future (30 days) visit within the authorizing provider's specialty     Patient has had an office visit with the authorizing provider or a provider within the authorizing providers department within the previous 12 mos or has a future within next 30 days. See \"Patient Info\" tab in inbasket, or \"Choose Columns\" in Meds & Orders section of the refill encounter.              Passed - Patient does not have Tadalafil, Vardenafil, or Sildenafil on their medication list        Passed - Medication is active on med list        Passed - Patient is 18 years of age or older        "

## 2019-10-22 ENCOUNTER — TELEPHONE (OUTPATIENT)
Dept: UROLOGY | Facility: CLINIC | Age: 74
End: 2019-10-22

## 2019-10-22 NOTE — TELEPHONE ENCOUNTER
[facility] Call Center    Phone Message    May a detailed message be left on voicemail: yes    Reason for Call: Other: Hugo calling to schedule appt within 2 weeks with Dr. Heath for kidney stones. Please call him back to schedule.      Action Taken: Message routed to:  [facility] mina uro

## 2019-10-23 NOTE — TELEPHONE ENCOUNTER
Patient called and told to take extra strength tylenol for pain  He cannot take ibu he is on blood thinners also does not like taking oxycodone for pain either  He has some but refuses to take it.  He will be calling danielito about scheduling Rossy Oviedo LPN Staff Nurse

## 2019-10-23 NOTE — TELEPHONE ENCOUNTER
[facility] Call Center    Phone Message    May a detailed message be left on voicemail: yes    Reason for Call: Other: Patient would like a call back he had a stint placed on Monday, pt said he is experiencing some discomfort. Patient said he would like a call back to get scheduled  to complete having his kidney stones blasted. He said he know it can be a month out and he don't want to be scheduled that far out.    Action Taken: Other: [facility] [place] urology

## 2019-10-25 ENCOUNTER — TELEPHONE (OUTPATIENT)
Dept: UROLOGY | Facility: CLINIC | Age: 74
End: 2019-10-25

## 2019-10-25 NOTE — TELEPHONE ENCOUNTER
[facility] Call Center    Phone Message    May a detailed message be left on voicemail: yes    Reason for Call: Other: uHgo calling to clarify with Dr. Heath if he needs a Pre-Op appointment before his Cytoscopy on 11/04/2019. Hugo would like to discuss this at your ealiest convenience in order to get an appointment scheduled if need be.     Action Taken: Message routed to:  [facility] ([facility] DONAVAN Villatoro

## 2019-10-25 NOTE — TELEPHONE ENCOUNTER
Contacted patient. Having surgery on 11/4 with Dr. Heath. His pre-op is still acceptable for this procedure per [place].  Lena Calvert LPN

## 2019-10-27 ENCOUNTER — HEALTH MAINTENANCE LETTER (OUTPATIENT)
Age: 74
End: 2019-10-27

## 2019-11-04 ENCOUNTER — ANESTHESIA - HEALTHEAST (OUTPATIENT)
Dept: SURGERY | Facility: CLINIC | Age: 74
End: 2019-11-04

## 2019-11-04 ENCOUNTER — SURGERY - HEALTHEAST (OUTPATIENT)
Dept: SURGERY | Facility: CLINIC | Age: 74
End: 2019-11-04

## 2019-11-04 ASSESSMENT — MIFFLIN-ST. JEOR: SCORE: 2335.93

## 2019-11-11 ENCOUNTER — AMBULATORY - HEALTHEAST (OUTPATIENT)
Dept: UROLOGY | Facility: CLINIC | Age: 74
End: 2019-11-11

## 2019-11-11 DIAGNOSIS — N13.5 STRICTURE OR KINKING OF URETER: ICD-10-CM

## 2019-11-11 DIAGNOSIS — Z87.442 HISTORY OF KIDNEY STONES: ICD-10-CM

## 2019-11-11 DIAGNOSIS — N20.0 CALCULUS OF KIDNEY: ICD-10-CM

## 2019-11-11 LAB
ALBUMIN UR-MCNC: NEGATIVE MG/DL
APPEARANCE UR: ABNORMAL
BILIRUB UR QL STRIP: NEGATIVE
COLOR UR AUTO: ABNORMAL
GLUCOSE UR STRIP-MCNC: NEGATIVE MG/DL
HGB UR QL STRIP: ABNORMAL
KETONES UR STRIP-MCNC: NEGATIVE MG/DL
LEUKOCYTE ESTERASE UR QL STRIP: ABNORMAL
NITRATE UR QL: NEGATIVE
PH UR STRIP: 7 [PH] (ref 5–8)
SP GR UR STRIP: 1.02 (ref 1–1.03)
UROBILINOGEN UR STRIP-ACNC: ABNORMAL

## 2019-11-14 ENCOUNTER — TRANSFERRED RECORDS (OUTPATIENT)
Dept: HEALTH INFORMATION MANAGEMENT | Facility: CLINIC | Age: 74
End: 2019-11-14

## 2019-11-14 LAB
BACTERIA SPEC CULT: ABNORMAL
BACTERIA SPEC CULT: ABNORMAL

## 2019-11-29 DIAGNOSIS — G47.33 OBSTRUCTIVE SLEEP APNEA SYNDROME: Primary | ICD-10-CM

## 2019-11-29 NOTE — TELEPHONE ENCOUNTER
"Requested Prescriptions   Pending Prescriptions Disp Refills     fluticasone (FLONASE) 50 MCG/ACT nasal spray [Pharmacy Med Name: FLUTICASONE PROPIONATE 50 MCG/ACT Suspension] 32 g 0     Sig: SPRAY 1 SPRAY INTO BOTH NOSTRILS DAILY AS NEEDED FOR RHINITIS OR ALLERGIES       Last Refill:    Disp Refills Start End SUSSY   fluticasone (FLONASE) 50 MCG/ACT nasal spray     --   Sig - Route: Spray 1 spray in nostril - Nasal   Class: Historical     Inhaled Steroids Protocol Passed - 11/29/2019  9:56 AM        Passed - Patient is age 12 or older        Passed - Recent (12 mo) or future (30 days) visit within the authorizing provider's specialty     Patient has had an office visit with the authorizing provider or a provider within the authorizing providers department within the previous 12 mos or has a future within next 30 days. See \"Patient Info\" tab in inbasket, or \"Choose Columns\" in Meds & Orders section of the refill encounter.      LOV: 09/20/2019          Passed - Medication is active on med list        Rx listed as historical - need to verify    Attempt #1  [employer] Message sent  Awaiting response    Jennifer Berry RN  [facility]  "

## 2019-12-02 ENCOUNTER — OFFICE VISIT (OUTPATIENT)
Dept: FAMILY MEDICINE | Facility: CLINIC | Age: 74
End: 2019-12-02
Payer: COMMERCIAL

## 2019-12-02 ENCOUNTER — MYC MEDICAL ADVICE (OUTPATIENT)
Dept: FAMILY MEDICINE | Facility: CLINIC | Age: 74
End: 2019-12-02

## 2019-12-02 VITALS
HEART RATE: 97 BPM | HEIGHT: 75 IN | BODY MASS INDEX: 39.17 KG/M2 | WEIGHT: 315 LBS | TEMPERATURE: 97.6 F | SYSTOLIC BLOOD PRESSURE: 130 MMHG | DIASTOLIC BLOOD PRESSURE: 84 MMHG | OXYGEN SATURATION: 95 %

## 2019-12-02 DIAGNOSIS — M75.112 INCOMPLETE ROTATOR CUFF TEAR OR RUPTURE OF LEFT SHOULDER, NOT SPECIFIED AS TRAUMATIC: ICD-10-CM

## 2019-12-02 DIAGNOSIS — D50.9 IRON DEFICIENCY ANEMIA, UNSPECIFIED IRON DEFICIENCY ANEMIA TYPE: ICD-10-CM

## 2019-12-02 DIAGNOSIS — I10 HYPERTENSION GOAL BP (BLOOD PRESSURE) < 140/90: ICD-10-CM

## 2019-12-02 DIAGNOSIS — R73.03 PREDIABETES: ICD-10-CM

## 2019-12-02 DIAGNOSIS — Z01.818 PREOP GENERAL PHYSICAL EXAM: Primary | ICD-10-CM

## 2019-12-02 DIAGNOSIS — K63.5 POLYP OF COLON, UNSPECIFIED PART OF COLON, UNSPECIFIED TYPE: ICD-10-CM

## 2019-12-02 DIAGNOSIS — Z86.73 HISTORY OF CVA (CEREBROVASCULAR ACCIDENT): ICD-10-CM

## 2019-12-02 DIAGNOSIS — Z86.19 HISTORY OF HEPATITIS C: ICD-10-CM

## 2019-12-02 DIAGNOSIS — Z51.81 MEDICATION MONITORING ENCOUNTER: ICD-10-CM

## 2019-12-02 DIAGNOSIS — I48.0 PAROXYSMAL ATRIAL FIBRILLATION (H): ICD-10-CM

## 2019-12-02 DIAGNOSIS — E66.01 MORBID OBESITY DUE TO EXCESS CALORIES (H): ICD-10-CM

## 2019-12-02 DIAGNOSIS — E78.5 HYPERLIPIDEMIA LDL GOAL <70: ICD-10-CM

## 2019-12-02 DIAGNOSIS — Z79.01 LONG TERM CURRENT USE OF ANTICOAGULANT THERAPY: ICD-10-CM

## 2019-12-02 DIAGNOSIS — Z96.652 STATUS POST TOTAL LEFT KNEE REPLACEMENT: ICD-10-CM

## 2019-12-02 LAB
ALBUMIN UR-MCNC: 30 MG/DL
APPEARANCE UR: CLEAR
BILIRUB UR QL STRIP: NEGATIVE
COLOR UR AUTO: YELLOW
ERYTHROCYTE [DISTWIDTH] IN BLOOD BY AUTOMATED COUNT: 14.3 % (ref 10–15)
GLUCOSE UR STRIP-MCNC: NEGATIVE MG/DL
HBA1C MFR BLD: 5.7 % (ref 0–5.6)
HCT VFR BLD AUTO: 46.3 % (ref 40–53)
HGB BLD-MCNC: 14.9 G/DL (ref 13.3–17.7)
HGB UR QL STRIP: ABNORMAL
KETONES UR STRIP-MCNC: NEGATIVE MG/DL
LEUKOCYTE ESTERASE UR QL STRIP: ABNORMAL
MCH RBC QN AUTO: 28.5 PG (ref 26.5–33)
MCHC RBC AUTO-ENTMCNC: 32.2 G/DL (ref 31.5–36.5)
MCV RBC AUTO: 89 FL (ref 78–100)
NITRATE UR QL: NEGATIVE
NON-SQ EPI CELLS #/AREA URNS LPF: ABNORMAL /LPF
PH UR STRIP: 6 PH (ref 5–7)
PLATELET # BLD AUTO: 228 10E9/L (ref 150–450)
RBC # BLD AUTO: 5.22 10E12/L (ref 4.4–5.9)
RBC #/AREA URNS AUTO: ABNORMAL /HPF
SOURCE: ABNORMAL
SP GR UR STRIP: >1.03 (ref 1–1.03)
UROBILINOGEN UR STRIP-ACNC: 1 EU/DL (ref 0.2–1)
WBC # BLD AUTO: 8 10E9/L (ref 4–11)
WBC #/AREA URNS AUTO: ABNORMAL /HPF

## 2019-12-02 PROCEDURE — 83036 HEMOGLOBIN GLYCOSYLATED A1C: CPT | Performed by: FAMILY MEDICINE

## 2019-12-02 PROCEDURE — 93000 ELECTROCARDIOGRAM COMPLETE: CPT | Performed by: FAMILY MEDICINE

## 2019-12-02 PROCEDURE — 36415 COLL VENOUS BLD VENIPUNCTURE: CPT | Performed by: FAMILY MEDICINE

## 2019-12-02 PROCEDURE — 81001 URINALYSIS AUTO W/SCOPE: CPT | Performed by: FAMILY MEDICINE

## 2019-12-02 PROCEDURE — 85027 COMPLETE CBC AUTOMATED: CPT | Performed by: FAMILY MEDICINE

## 2019-12-02 PROCEDURE — 99214 OFFICE O/P EST MOD 30 MIN: CPT | Performed by: FAMILY MEDICINE

## 2019-12-02 PROCEDURE — 80053 COMPREHEN METABOLIC PANEL: CPT | Performed by: FAMILY MEDICINE

## 2019-12-02 ASSESSMENT — MIFFLIN-ST. JEOR: SCORE: 2381.93

## 2019-12-02 NOTE — PROGRESS NOTES
[address] [address]  [phone]  Dept: [phone]    PRE-OP EVALUATION:  Today's date: 2019    Hugo Weber (: 1945) presents for pre-operative evaluation assessment as requested by Dr. Witt.  He requires evaluation and anesthesia risk assessment prior to undergoing surgery/procedure.    Proposed Surgery/ Procedure: Double Balloon Colonoscopy  Date of Surgery/ Procedure: 19  Time of Surgery/ Procedure: 8am  Hospital/Surgical Facility: [facility]  Primary Physician: Brett Cassidy  Type of Anesthesia Anticipated: to be determined    Patient has a Health Care Directive or Living Will:  YES     1. YES - DO YOU HAVE A HISTORY OF HEART ATTACK, STROKE, STENT, BYPASS OR SURGERY ON AN ARTERY IN THE HEAD, NECK, HEART OR LEG? Stroke 2019  2. NO - Do you ever have any pain or discomfort in your chest?  3. NO - Do you have a history of  Heart Failure?  4. NO - Are you troubled by shortness of breath when: walking on the level, up a slight hill or at night?  5. NO - Do you currently have a cold, bronchitis or other respiratory infection?  6. NO - Do you have a cough, shortness of breath or wheezing?  7. NO - Do you sometimes get pains in the calves of your legs when you walk?  8. NO - Do you or anyone in your family have previous history of blood clots?  9. NO - Do you or does anyone in your family have a serious bleeding problem such as prolonged bleeding following surgeries or cuts?  10. YES - HAVE YOU EVER HAD PROBLEMS WITH ANEMIA OR BEEN TOLD TO TAKE IRON PILLS? Currently taking  11. NO - Have you had any abnormal blood loss such as black, tarry or bloody stools, or abnormal vaginal bleeding?  12. YES - HAVE YOU EVER HAD A BLOOD TRANSFUSION? Distant past with gastric bypass procedure  13. NO - Have you or any of your relatives ever had problems with anesthesia?  14. YES - DO YOU HAVE SLEEP APNEA, EXCESSIVE SNORING OR DAYTIME  DROWSINESS? Sleep apnea w/ CPAP use  15. NO - Do you have any prosthetic heart valves?  16. YES - DO YOU HAVE PROSTHETIC JOINTS? Left knee  17. NO - Is there any chance that you may be pregnant?    HPI:     HPI related to upcoming procedure: Patient has a double balloon colonoscopy procedure scheduled for 12/13/2019 with Dr. Witt at [facility] due to colon polyps.      Hx of CVA/Stroke: Stable. Patient reports some mild tingling of his left hand otherwise denies residual effects from CVA/Stroke. Patient consults with Dr. Velasquez of neurology for Hx of CVA/Stroke management.     Atrial Fibrillation: Stable. Patient denies CP, SOB, and edema. Patient is currently prescribed 5 mg Eliquis BID for A-fib management.     Hyperlipidemia: Patient's hyperlipidemia is well controlled. Patient is not currently prescribed any medication for hyperlipidemia management.    Recent Labs   Lab Test 02/22/19 09/20/18  0919  01/13/15  0815 09/03/14  0810   CHOL 106 165   < > 131 156   HDL 44 44   < > 43 42   LDL 49 100*   < > 74 93   TRIG 72 106   < > 72 103   CHOLHDLRATIO  --   --   --  3.0 3.7    < > = values in this interval not displayed.     Hypertension: Patient presents today as normotensive. Patient is currently prescribed 360 mg Diltiazem daily for hypertension management.    BP Readings from Last 5 Encounters:   12/02/19 130/84   09/20/19 128/72   09/17/19 111/68   08/14/19 120/80   06/17/19 132/72     Creatinine   Date Value Ref Range Status   09/20/2019 0.78 0.66 - 1.25 mg/dL Final     Sleep Apnea: Stable. Patient is currently prescribed a CPAP machine for sleep apnea management. Patient reports that he uses his machine every night. Recently got new machine, working well.      Osteoarthritis: Stable. No issues.      GERD: Stable. Patient's GERD is well controlled by 40 mg Protonix daily.      Prediabetes: Stable.     Glucose   Date Value Ref Range Status   09/20/2019 89 70 - 99 mg/dL Final   08/14/2019 102 (H) 70 - 99  mg/dL Final     Comment:     Fasting specimen   02/06/2019 79 70 - 99 mg/dL Final   11/12/2018 91 70 - 99 mg/dL Final   10/26/2018 106 (H) 70 - 99 mg/dL Final     Lab Results   Component Value Date    A1C 5.6 09/20/2019    A1C 5.6 08/14/2019    A1C 5.9 02/22/2019    A1C 5.8 02/06/2019    A1C 5.7 11/12/2018     MEDICAL HISTORY:     Patient Active Problem List    Diagnosis Date Noted     Vitamin B12 deficiency (non anemic) 04/15/2019     Priority: High     Vitamin D deficiency 04/15/2019     Priority: High     Stroke (H) 01/19/2019     Priority: High     Reported 2/5/2019, per patient occurred on 1/19/2019 while in [place]       CVA (cerebral vascular accident) (H) 01/01/2019     Priority: High     small right periorlandic subcortical       Thoracic aortic ectasia (H) 07/06/2018     Priority: High     Hypertension goal BP (blood pressure) < 140/90 09/25/2017     Priority: High     Paroxysmal atrial fibrillation (H) 05/27/2016     Priority: High     Prediabetes      Priority: High     History of hepatitis C 05/12/2015     Priority: High     SVR May 2015       NAFLD (nonalcoholic fatty liver disease) 09/05/2014     Priority: High     Hyperlipidemia LDL goal <70 12/30/2011     Priority: High     Obstructive sleep apnea syndrome      Priority: High     cpap - severe - 15 cm - dr cunha       Iron deficiency anemia 08/22/2003     Priority: High     Problem list name updated by automated process. 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 8/7/2012 Ernestine Ojeda CMA       Morbid obesity due to excess calories (H) 09/05/2014     Priority: Low     Long term current use of anticoagulant therapy - for intermittent a-fib 03/30/2012     Priority: Low      Past Medical History:   Diagnosis Date     Arrhythmia      Arthritis      Atrial fibrillation 2006    Followed by [facility]     Calculus of kidney 2005, 6/06, 10/12, 8/18, 9/19    dr walker/nestor/иван - hematuria - w/u o/w neg     Cervical stenosis of spine     Moderate C4-5     Cholelithiasis      Chronic peptic ulcer, unspecified site, without mention of hemorrhage, perforation, or obstruction 1985    gastric bypass     Colon polyps 8/05, 9/10, 10/15    2 tubular adenoma, 1 hyperplastic - multiple serrated/tubular adenomas     CVA (cerebral vascular accident) (H) 01/2019    small right periorlandic subcortical - left sided residual - left hand tingling/numbness - Left leg weak/numbness - cardioembolic     First degree AV block      Hepatitis C 10/12    chronic hepatitis C, grade 1-2, stage 1 - mild - Dr Douglas     Hyperlipidemia LDL goal <70      Hypertension goal BP (blood pressure) < 140/90 6/06    dr jaciel winchester     Iron deficiency anemia, unspecified 1985    gastric bypass     Nephrolithiasis multiple episodes, last 10/19    Dr Bey/Ivana     OA (osteoarthritis)     Multiple - Left shoulder with rotator cuff tear - Dr Durham     Obesity, unspecified     s/p gastric bypass 1985      Prediabetes      Presbyacusis 1/04    dr corona     Pyelonephritis, unspecified 12/99     SCC (squamous cell carcinoma), ear 10/08      se - lt conchal bowl     Sleep apnea 10/02    cpap - severe - 15 cm - dr cunha     Stroke (H) 1/19/2019     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    Mn Head and Neck     Vitamin B12 deficiency (non anemic) 4/15/2019     Vitamin D deficiency 4/15/2019     Past Surgical History:   Procedure Laterality Date     APPENDECTOMY       ARTHROPLASTY KNEE  8/13/2012    LT TKA - Dr Villanueva     CARDIOVERSION  2016     COLONOSCOPY  8/05, 9/10, 10/15    multiple tubular/serrated/hyperplastic polyps     COLONOSCOPY N/A 10/29/2015    multiple tubular and serrated adenomas     COLONOSCOPY N/A 9/7/2016     EYE SURGERY  Lasik     HC KNEE SCOPE, DIAGNOSTIC  05/00    Arthroscopy, Knee RT     LASER HOLMIUM LITHOTRIPSY URETER(S), INSERT STENT, COMBINED  10/16/2012    stones x 4, Dr Campa     LASER HOLMIUM LITHOTRIPSY URETER(S), INSERT STENT, COMBINED Right 5/2/2018    CYSTOSCOPY, RIGHT URETEROSCOPY, HOLMIUM LASER LITHOTRIPSY, RIGHT STENT PLACEMENT - Dr Bey     SURGICAL HISTORY OF -   1985    s/p gastric bypass Bilroth II     SURGICAL HISTORY OF -   11/98    wisdom teeth     SURGICAL HISTORY OF -   1979    cellulitis     SURGICAL HISTORY OF -   11/98    lt forearm spur's     SURGICAL HISTORY OF -   1/01,6/02,11/02    s/p lasik     SURGICAL HISTORY OF -   11/99    rt forearm spurs     SURGICAL HISTORY OF -   7/06    dr stevenson - lithotrypsy     SURGICAL HISTORY OF -   10/08, 12/08    Lt ear chonchal lesion removal SCC - dr saez     SURGICAL HISTORY OF -  Right 10/2019    nephrolithiasis - cystoscopy, rt lithotrypsy, Dr Heath     SURGICAL HISTORY OF -  Right 11/2019    Cystoscopy, Rt lithotrypsy, Dr Heath     Current Outpatient Medications   Medication Sig Dispense Refill     apixaban ANTICOAGULANT (ELIQUIS) 5 MG tablet Take 1 tablet (5 mg) by mouth 2 times daily 180 tablet 3     Cyanocobalamin 5000 MCG TBDP        diltiazem ER (DILT-XR) 180 MG 24 hr capsule Take 360 mg by mouth daily       Ferrous Sulfate (IRON) 325 (65 Fe) MG  "tablet Take 1 tablet by mouth daily (with breakfast) 90 tablet 3     fluticasone (FLONASE) 50 MCG/ACT nasal spray Spray 1 spray in nostril       Multiple Vitamins-Minerals (VITAMIN D3 COMPLETE PO) Take 125 mcg by mouth       niacin 500 MG tablet Take by mouth daily (with breakfast)       pantoprazole (PROTONIX) 40 MG EC tablet Take 1 tablet (40 mg) by mouth daily Take 30-60 minutes before a meal. 90 tablet 3     STATIN NOT PRESCRIBED (INTENTIONAL) Please choose reason not prescribed, below, treated for hepatitis C       tamsulosin (FLOMAX) 0.4 MG capsule Take 1 capsule (0.4 mg) by mouth 2 times daily 180 capsule 0     vitamin C (ASCORBIC ACID) 1000 MG TABS Take 1,000 mg by mouth daily       OTC products: None, except as noted above    No Known Allergies   Latex Allergy: NO    Social History     Tobacco Use     Smoking status: Never Smoker     Smokeless tobacco: Never Used   Substance Use Topics     Alcohol use: Yes     Alcohol/week: 2.0 standard drinks     Comment: 2 per week     History   Drug Use No     Comment: acknowledges using herbal supplement \"Vibe\" for energy-none used recently        REVIEW OF SYSTEMS:   Constitutional, HEENT, cardiovascular, pulmonary, GI, , musculoskeletal, neuro, skin, endocrine and psych systems are negative, except as otherwise noted.    EXAM:   /84   Pulse 97   Temp 97.6  F (36.4  C) (Oral)   Ht 1.905 m (6' 3\")   Wt (!) 155.1 kg (342 lb)   SpO2 95%   BMI 42.75 kg/m    GENERAL APPEARANCE: healthy, alert and no distress  EYES: EOMI,  PERRL  HENT:ear canals and TM's normal upon viewing with otoscope, nose and mouth without ulcers or lesions upon viewing with otoscope, mild cerumen impaction of left ear  NECK: no adenopathy, no asymmetry, masses, or scars and thyroid normal to palpation  RESP: lungs clear to auscultation - no rales, rhonchi or wheezes  CV: Atrial Fibrillation, normal S1 S2, no S3 or S4 and no murmur, click or rub  ABDOMEN:  soft, nontender, no HSM or masses " and bowel sounds normal  MS: extremities normal- no gross deformities noted, no evidence of inflammation in joints, FROM in all extremities.  SKIN: no suspicious lesions or rashes  NEURO: Normal strength and tone, sensory exam grossly normal, mentation intact and speech normal  PSYCH: mentation appears normal. and affect normal/bright  LYMPHATICS: No cervical adenopathy  BACK: no CVA tenderness, no paralumbar tenderness    DIAGNOSTICS:   EKG: appears normal, atrial fibrillation, rate controlled, normal axis, normal intervals, no acute ST/T changes c/w ischemia, no LVH by voltage criteria, occasional PVC, unchanged from previous tracings    Labs Resulted Today:   Results for orders placed or performed in visit on 12/02/19   CBC with platelets     Status: None   Result Value Ref Range    WBC 8.0 4.0 - 11.0 10e9/L    RBC Count 5.22 4.4 - 5.9 10e12/L    Hemoglobin 14.9 13.3 - 17.7 g/dL    Hematocrit 46.3 40.0 - 53.0 %    MCV 89 78 - 100 fl    MCH 28.5 26.5 - 33.0 pg    MCHC 32.2 31.5 - 36.5 g/dL    RDW 14.3 10.0 - 15.0 %    Platelet Count 228 150 - 450 10e9/L       Recent Labs   Lab Test 09/20/19  1142 08/14/19  0831  03/21/16  2139  02/12/15  0833   HGB 15.0 15.6   < > 16.2   < > 13.6    236   < > 283   < > 263   INR  --   --   --  1.04  --  1.04    138   < > 137   < > 137   POTASSIUM 4.3 4.4   < > 3.8   < > 3.9   CR 0.78 0.79   < > 1.00   < > 0.95   A1C 5.6 5.6   < >  --   --   --     < > = values in this interval not displayed.     IMPRESSION:   Patient has a double balloon colonoscopy procedure scheduled for 12/13/2019 with Dr. Witt at [facility] due to colon polyps.    The proposed surgical procedure is considered LOW risk.    REVISED CARDIAC RISK INDEX  The patient has the following serious cardiovascular risks for perioperative complications such as (MI, PE, VFib and 3  AV Block):  Cerebrovascular Disease (TIA or CVA)  INTERPRETATION: 1 risks: Class II (low risk - 0.9% complication rate)    The  patient has the following additional risks for perioperative complications:  No identified additional risks      ICD-10-CM    1. Preop general physical exam Z01.818 EKG 12-lead complete w/read - Clinics     Comprehensive metabolic panel     CBC with platelets     UA reflex to Microscopic and Culture   2. Polyp of colon, unspecified part of colon, unspecified type K63.5    3. History of CVA (cerebrovascular accident) Z86.73    4. Paroxysmal atrial fibrillation (H) I48.0    5. Prediabetes R73.03 Hemoglobin A1c   6. Hypertension goal BP (blood pressure) < 140/90 I10    7. Hyperlipidemia LDL goal <70 E78.5    8. History of hepatitis C Z86.19    9. Iron deficiency anemia, unspecified iron deficiency anemia type D50.9    10. Status post total left knee replacement Z96.652    11. Incomplete rotator cuff tear or rupture of left shoulder, not specified as traumatic M75.112 PHYSICAL THERAPY REFERRAL   12. Morbid obesity due to excess calories (H) E66.01    13. Long term current use of anticoagulant therapy - for intermittent a-fib Z79.01    14. Medication monitoring encounter Z51.81      RECOMMENDATIONS:     --Patient is to take all scheduled medications on the day of surgery EXCEPT for modifications listed below.    Hold Aspirin, other anti-inflammatories, multivitamins, and supplements 1 week prior to surgery.  Hold Eliquis 3 days prior to surgery.     Patient referred to PT today due to left shoulder rotator cuff pain.    APPROVAL GIVEN to proceed with proposed procedure, without further diagnostic evaluation     This document serves as a record of the services and decisions personally performed and made by Brett Cassidy MD. It was created on his behalf by Kendell Scherer, a trained medical scribe. The creation of this document is based on the provider's statements to the medical scribe.  Kendell Scherer December 2, 2019 11:55 AM     The information in this document, created by the medical scribe for me, accurately reflects the services  I personally performed and the decisions made by me. I have reviewed and approved this document for accuracy prior to leaving the patient care area.  December 2, 2019          Brett Cassidy MD, FAAFP    [address]  [address] [phone] Fax    Copy of this evaluation report is provided to requesting physician.    [facility] Preop Guidelines    Revised Cardiac Risk Index

## 2019-12-02 NOTE — TELEPHONE ENCOUNTER
Adamst Response:   I take it thru my nose,50MCG/ACT.  For cpath machine usage.    Routing refill request to provider for review/approval because:  Drug not active on patient's medication list        Jennifer Berry RN  [facility]

## 2019-12-03 ENCOUNTER — TELEPHONE (OUTPATIENT)
Dept: FAMILY MEDICINE | Facility: CLINIC | Age: 74
End: 2019-12-03

## 2019-12-03 LAB
ALBUMIN SERPL-MCNC: 3.6 G/DL (ref 3.4–5)
ALP SERPL-CCNC: 143 U/L (ref 40–150)
ALT SERPL W P-5'-P-CCNC: 21 U/L (ref 0–70)
ANION GAP SERPL CALCULATED.3IONS-SCNC: 6 MMOL/L (ref 3–14)
AST SERPL W P-5'-P-CCNC: 10 U/L (ref 0–45)
BILIRUB SERPL-MCNC: 0.4 MG/DL (ref 0.2–1.3)
BUN SERPL-MCNC: 21 MG/DL (ref 7–30)
CALCIUM SERPL-MCNC: 8.7 MG/DL (ref 8.5–10.1)
CHLORIDE SERPL-SCNC: 107 MMOL/L (ref 94–109)
CO2 SERPL-SCNC: 27 MMOL/L (ref 20–32)
CREAT SERPL-MCNC: 1 MG/DL (ref 0.66–1.25)
GFR SERPL CREATININE-BSD FRML MDRD: 75 ML/MIN/{1.73_M2}
GLUCOSE SERPL-MCNC: 120 MG/DL (ref 70–99)
POTASSIUM SERPL-SCNC: 4.3 MMOL/L (ref 3.4–5.3)
PROT SERPL-MCNC: 7.2 G/DL (ref 6.8–8.8)
SODIUM SERPL-SCNC: 140 MMOL/L (ref 133–144)

## 2019-12-03 NOTE — TELEPHONE ENCOUNTER
Called # below     Pt will be having a larger area to remove on his back and stomach for skin cancer and was advised to call about holding his elliquist from his PCP     Also does he need to start an antibiotic for his knee replacement for the colonoscopy or the skin cancer? - he does take it when he gets his teeth cleaned     Please advise     Thank you     Kathia Centeno RN, BSN  [facility]

## 2019-12-03 NOTE — TELEPHONE ENCOUNTER
Pt advised of note below. Patient stated an understanding and agreed with plan.    King Combs RN   [facility]

## 2019-12-03 NOTE — TELEPHONE ENCOUNTER
Reason for Call:  Other     Detailed comments: The patient is calling to speak to a nurse about some skin spots on his back that he had removed. He would like a call back.    Phone Number Patient can be reached at: Cell number on file:    Telephone Information:   Mobile [phone]     Best Time: Anytime    Can we leave a detailed message on this number? YES    Call taken on 12/3/2019 at 10:54 AM by Gaby Huerta

## 2019-12-04 RX ORDER — FLUTICASONE PROPIONATE 50 MCG
SPRAY, SUSPENSION (ML) NASAL
Qty: 54.6 ML | Refills: 3 | Status: SHIPPED | OUTPATIENT
Start: 2019-12-04 | End: 2020-06-01

## 2019-12-04 NOTE — TELEPHONE ENCOUNTER
Sent patient a my chart message letting him know the RX has been signed and sent to the pharmacy.       Radha Koehler

## 2019-12-04 NOTE — TELEPHONE ENCOUNTER
Pt stated that he has been using this for years with his Cpap - it helps with him not getting a cold when he uses the C pap   He also says it helps with reducing the irritation from the Cpap in nasal passageways    Please advise     Thank you     Kathia Centeno RN, BSN  [place] [place] [facility]

## 2019-12-11 ENCOUNTER — TRANSFERRED RECORDS (OUTPATIENT)
Dept: HEALTH INFORMATION MANAGEMENT | Facility: CLINIC | Age: 74
End: 2019-12-11

## 2019-12-13 ENCOUNTER — TELEPHONE (OUTPATIENT)
Dept: CARDIOLOGY | Facility: CLINIC | Age: 74
End: 2019-12-13

## 2019-12-13 NOTE — TELEPHONE ENCOUNTER
12/13/19 Pt called stating he had a colonoscopy today and had 5 polyps  removed. GI recommended he hold his Eliquis x 14 days. Pts is high risk patient and CHADS-VASc score is 5 ( HTN, TIA 1/19, Aortic plaque , and age) . Will route questions to Dr Castillo and call pt back with holding recommendations. Pt voiced understanding. Betito 250 pm

## 2019-12-13 NOTE — TELEPHONE ENCOUNTER
12/13/19 Verbally verified with DR Castillo that it is ok for pt to hold Eliquis x 14 days as noted below. Called pt and informed him. He voiced understanding. Betito 423 pm

## 2019-12-18 ENCOUNTER — HOSPITAL ENCOUNTER (OUTPATIENT)
Dept: CT IMAGING | Facility: CLINIC | Age: 74
Discharge: HOME | End: 2019-12-18
Attending: UROLOGY

## 2019-12-18 ENCOUNTER — OFFICE VISIT - HEALTHEAST (OUTPATIENT)
Dept: UROLOGY | Facility: CLINIC | Age: 74
End: 2019-12-18

## 2019-12-18 ENCOUNTER — RECORDS - HEALTHEAST (OUTPATIENT)
Dept: RADIOLOGY | Facility: CLINIC | Age: 74
End: 2019-12-18

## 2019-12-18 DIAGNOSIS — N13.5 STRICTURE OR KINKING OF URETER: ICD-10-CM

## 2019-12-18 DIAGNOSIS — N20.0 CALCULUS OF KIDNEY: ICD-10-CM

## 2020-01-15 DIAGNOSIS — R42 VERTIGO AS LATE EFFECT OF STROKE: ICD-10-CM

## 2020-01-15 DIAGNOSIS — I10 ESSENTIAL HYPERTENSION, MALIGNANT: ICD-10-CM

## 2020-01-15 DIAGNOSIS — G47.33 OBSTRUCTIVE SLEEP APNEA (ADULT) (PEDIATRIC): Primary | ICD-10-CM

## 2020-01-15 DIAGNOSIS — I69.398 VERTIGO AS LATE EFFECT OF STROKE: ICD-10-CM

## 2020-01-15 DIAGNOSIS — E78.2 MIXED HYPERLIPIDEMIA: ICD-10-CM

## 2020-01-30 DIAGNOSIS — K21.00 GASTROESOPHAGEAL REFLUX DISEASE WITH ESOPHAGITIS: ICD-10-CM

## 2020-01-30 DIAGNOSIS — I63.9 CEREBROVASCULAR ACCIDENT (H): ICD-10-CM

## 2020-01-30 DIAGNOSIS — N40.1 BENIGN PROSTATIC HYPERPLASIA (BPH) WITH URINARY URGE INCONTINENCE: ICD-10-CM

## 2020-01-30 DIAGNOSIS — N39.41 BENIGN PROSTATIC HYPERPLASIA (BPH) WITH URINARY URGE INCONTINENCE: ICD-10-CM

## 2020-01-30 NOTE — TELEPHONE ENCOUNTER
"Requested Prescriptions   Pending Prescriptions Disp Refills     pantoprazole (PROTONIX) 40 MG EC tablet [Pharmacy Med Name: PANTOPRAZOLE SODIUM 40 MG Tablet Delayed Release]        Last Written Prescription Date:  10.26.18  Last Fill Quantity: 90 tablet,  # refills: 3   Last office visit: 12/2/2019 with prescribing provider:  Brett Cassidy MD           Future Office Visit:       90 tablet 3     Sig: TAKE 1 TABLET  DAILY 30 TO 60 MINUTES BEFORE A MEAL.       PPI Protocol Passed - 1/30/2020 12:29 PM        Passed - Not on Clopidogrel (unless Pantoprazole ordered)        Passed - No diagnosis of osteoporosis on record        Passed - Recent (12 mo) or future (30 days) visit within the authorizing provider's specialty     Patient has had an office visit with the authorizing provider or a provider within the authorizing providers department within the previous 12 mos or has a future within next 30 days. See \"Patient Info\" tab in inbasket, or \"Choose Columns\" in Meds & Orders section of the refill encounter.              Passed - Medication is active on med list        Passed - Patient is age 18 or older        tamsulosin (FLOMAX) 0.4 MG capsule [Pharmacy Med Name: TAMSULOSIN HYDROCHLORIDE 0.4 MG Capsule]          Last Written Prescription Date:  10.21.19  Last Fill Quantity: 180 capsule,  # refills: 0   Last office visit: 12/2/2019 with prescribing provider:  Brett Cassidy MD         Future Office Visit:       180 capsule 0     Sig: TAKE 1 CAPSULE TWICE DAILY       Alpha Blockers Passed - 1/30/2020 12:29 PM        Passed - Blood pressure under 140/90 in past 12 months     BP Readings from Last 3 Encounters:   12/02/19 130/84   09/20/19 128/72   09/17/19 111/68                 Passed - Recent (12 mo) or future (30 days) visit within the authorizing provider's specialty     Patient has had an office visit with the authorizing provider or a provider within the authorizing providers department within the previous 12 mos or has " "a future within next 30 days. See \"Patient Info\" tab in inbasket, or \"Choose Columns\" in Meds & Orders section of the refill encounter.              Passed - Patient does not have Tadalafil, Vardenafil, or Sildenafil on their medication list        Passed - Medication is active on med list        Passed - Patient is 18 years of age or older        "

## 2020-01-31 RX ORDER — PANTOPRAZOLE SODIUM 40 MG/1
TABLET, DELAYED RELEASE ORAL
Qty: 90 TABLET | Refills: 0 | Status: SHIPPED | OUTPATIENT
Start: 2020-01-31 | End: 2020-03-26

## 2020-01-31 RX ORDER — TAMSULOSIN HYDROCHLORIDE 0.4 MG/1
CAPSULE ORAL
Qty: 180 CAPSULE | Refills: 0 | Status: SHIPPED | OUTPATIENT
Start: 2020-01-31 | End: 2020-03-26

## 2020-02-12 ENCOUNTER — OFFICE VISIT (OUTPATIENT)
Dept: CARDIOLOGY | Facility: CLINIC | Age: 75
End: 2020-02-12
Attending: INTERNAL MEDICINE
Payer: COMMERCIAL

## 2020-02-12 VITALS
DIASTOLIC BLOOD PRESSURE: 76 MMHG | HEIGHT: 75 IN | HEART RATE: 100 BPM | SYSTOLIC BLOOD PRESSURE: 130 MMHG | WEIGHT: 315 LBS | BODY MASS INDEX: 39.17 KG/M2

## 2020-02-12 DIAGNOSIS — I48.19 PERSISTENT ATRIAL FIBRILLATION (H): ICD-10-CM

## 2020-02-12 PROCEDURE — 93000 ELECTROCARDIOGRAM COMPLETE: CPT | Performed by: INTERNAL MEDICINE

## 2020-02-12 PROCEDURE — 99213 OFFICE O/P EST LOW 20 MIN: CPT | Performed by: INTERNAL MEDICINE

## 2020-02-12 ASSESSMENT — MIFFLIN-ST. JEOR: SCORE: 2418.22

## 2020-02-12 NOTE — PROGRESS NOTES
Service Date: 2020      PRIMARY CARE PHYSICIAN:  Brett Cassidy MD      HISTORY OF PRESENT ILLNESS:  I saw Mr. Smith for followup of atrial fibrillation.  He is a 74-year-old white male with a history of chronic atrial fibrillation.  He has been on anticoagulation and rate control medications.  Over the last year, he did not have recurrence of neurological problems and he is currently taking Eliquis without aspirin.  He injured his back about 10 days ago, and he stated that has caused his weight gain.  He previously had a gastric bypass and currently is taking vitamin C plus iron supplement for that.  Symptomatically, he is doing well without shortness of breath, palpitation or chest pain.      PHYSICAL EXAMINATION:   VITAL SIGNS:  Blood pressure was 130/76, heart rate 85 beats per minute, body weight 350 pounds.   HEENT:  Eyes and ENT were unremarkable.   LUNGS:  Clear.   CARDIAC:  Rhythm was irregularly irregular.  Heart sounds were normal with no murmur.   ABDOMEN:  Severe obesity.   EXTREMITIES:  The legs showed trace edema.      EKG showed atrial fibrillation with controlled ventricular rate.      ASSESSMENT AND RECOMMENDATIONS:  Mr. Smith is doing well from the Cardiology point of view.  He is encouraged to lose weight.  He can continue the current medications and return for followup in 1 year.      cc:   [place] [address]         DULCE KRUGER MD             D: 2020   T: 2020   MT: al      Name:     GAYATHRI SMITH   MRN:      [id number]        Account:      [id number]   :      1945           Service Date: 2020      Document: [id number]

## 2020-02-12 NOTE — LETTER
2020      Brett Cassidy MD  [address] [place] [id number]      RE: Hugo Smith       Dear Colleague,    I had the pleasure of seeing Hugo Smith in the [facility].    Service Date: 2020      PRIMARY CARE PHYSICIAN:  Brett Cassidy MD      HISTORY OF PRESENT ILLNESS:  I saw Mr. Smith for followup of atrial fibrillation.  He is a 74-year-old white male with a history of chronic atrial fibrillation.  He has been on anticoagulation and rate control medications.  Over the last year, he did not have recurrence of neurological problems and he is currently taking Eliquis without aspirin.  He injured his back about 10 days ago, and he stated that has caused his weight gain.  He previously had a gastric bypass and currently is taking vitamin C plus iron supplement for that.  Symptomatically, he is doing well without shortness of breath, palpitation or chest pain.      PHYSICAL EXAMINATION:   VITAL SIGNS:  Blood pressure was 130/76, heart rate 85 beats per minute, body weight 350 pounds.   HEENT:  Eyes and ENT were unremarkable.   LUNGS:  Clear.   CARDIAC:  Rhythm was irregularly irregular.  Heart sounds were normal with no murmur.   ABDOMEN:  Severe obesity.   EXTREMITIES:  The legs showed trace edema.      EKG showed atrial fibrillation with controlled ventricular rate.      ASSESSMENT AND RECOMMENDATIONS:  Mr. Smith is doing well from the Cardiology point of view.  He is encouraged to lose weight.  He can continue the current medications and return for followup in 1 year.      cc:   Brett Cassidy MD   [address] [address]         DULCE KRUGER MD             D: 2020   T: 2020   MT: al      Name:     HUGO SMITH   MRN:      [id number]        Account:      [id number]   :      1945           Service Date: 2020      Document: [id number]           Outpatient Encounter  Medications as of 2/12/2020   Medication Sig Dispense Refill     apixaban ANTICOAGULANT (ELIQUIS) 5 MG tablet Take 1 tablet (5 mg) by mouth 2 times daily 180 tablet 0     Cyanocobalamin 5000 MCG TBDP        diltiazem ER (DILT-XR) 180 MG 24 hr capsule Take 360 mg by mouth daily       Ferrous Sulfate (IRON) 325 (65 Fe) MG tablet Take 1 tablet by mouth daily (with breakfast) 90 tablet 3     fluticasone (FLONASE) 50 MCG/ACT nasal spray SPRAY 1 SPRAY INTO BOTH NOSTRILS DAILY AS NEEDED FOR RHINITIS OR ALLERGIES 54.6 mL 3     Multiple Vitamins-Minerals (VITAMIN D3 COMPLETE PO) Take 125 mcg by mouth       niacin 500 MG tablet Take by mouth daily (with breakfast)       pantoprazole (PROTONIX) 40 MG EC tablet TAKE 1 TABLET  DAILY 30 TO 60 MINUTES BEFORE A MEAL. 90 tablet 0     tamsulosin (FLOMAX) 0.4 MG capsule TAKE 1 CAPSULE TWICE DAILY 180 capsule 0     STATIN NOT PRESCRIBED (INTENTIONAL) Please choose reason not prescribed, below, treated for hepatitis C (Patient not taking: Reported on 2/12/2020)       vitamin C (ASCORBIC ACID) 1000 MG TABS Take 1,000 mg by mouth daily       No facility-administered encounter medications on file as of 2/12/2020.        Again, thank you for allowing me to participate in the care of your patient.      Sincerely,    Sarah Beth Castillo MD     [facility]

## 2020-02-12 NOTE — LETTER
2/12/2020    Brett Cassidy MD  [address] [facility] [address]    RE: Hugo Weber       Dear Colleague,    I had the pleasure of seeing Hugo Weber in the [facility].    HPI and Plan:   See dictation    Orders Placed This Encounter   Procedures     Follow-Up with Cardiac Advanced Practice Provider     Follow-Up with Electrophysiologist     EKG 12-lead complete w/read - Clinics (performed today)     EKG 12-lead complete w/read (Future)- to be scheduled       No orders of the defined types were placed in this encounter.      There are no discontinued medications.      Encounter Diagnosis   Name Primary?     Persistent atrial fibrillation        CURRENT MEDICATIONS:  Current Outpatient Medications   Medication Sig Dispense Refill     apixaban ANTICOAGULANT (ELIQUIS) 5 MG tablet Take 1 tablet (5 mg) by mouth 2 times daily 180 tablet 0     Cyanocobalamin 5000 MCG TBDP        diltiazem ER (DILT-XR) 180 MG 24 hr capsule Take 360 mg by mouth daily       Ferrous Sulfate (IRON) 325 (65 Fe) MG tablet Take 1 tablet by mouth daily (with breakfast) 90 tablet 3     fluticasone (FLONASE) 50 MCG/ACT nasal spray SPRAY 1 SPRAY INTO BOTH NOSTRILS DAILY AS NEEDED FOR RHINITIS OR ALLERGIES 54.6 mL 3     Multiple Vitamins-Minerals (VITAMIN D3 COMPLETE PO) Take 125 mcg by mouth       niacin 500 MG tablet Take by mouth daily (with breakfast)       pantoprazole (PROTONIX) 40 MG EC tablet TAKE 1 TABLET  DAILY 30 TO 60 MINUTES BEFORE A MEAL. 90 tablet 0     tamsulosin (FLOMAX) 0.4 MG capsule TAKE 1 CAPSULE TWICE DAILY 180 capsule 0     STATIN NOT PRESCRIBED (INTENTIONAL) Please choose reason not prescribed, below, treated for hepatitis C (Patient not taking: Reported on 2/12/2020)       vitamin C (ASCORBIC ACID) 1000 MG TABS Take 1,000 mg by mouth daily         ALLERGIES   No Known Allergies    PAST MEDICAL HISTORY:  Past Medical History:   Diagnosis Date     Arrhythmia      Arthritis       Atrial fibrillation 2006    Followed by [facility]     Calculus of kidney 2005, 6/06, 10/12, 8/18, 9/19    dr walker/nestor/иван - hematuria - w/u o/w neg     Cervical stenosis of spine     Moderate C4-5     Cholelithiasis      Chronic peptic ulcer, unspecified site, without mention of hemorrhage, perforation, or obstruction 1985    gastric bypass     Colon polyps 8/05, 9/10, 10/15    2 tubular adenoma, 1 hyperplastic - multiple serrated/tubular adenomas     CVA (cerebral vascular accident) (H) 01/2019    small right periorlandic subcortical - left sided residual - left hand tingling/numbness - Left leg weak/numbness - cardioembolic     First degree AV block      Hepatitis C 10/12    chronic hepatitis C, grade 1-2, stage 1 - mild - Dr Douglas     Hyperlipidemia LDL goal <70      Hypertension goal BP (blood pressure) < 140/90 6/06    dr jaciel winchester     Iron deficiency anemia, unspecified 1985    gastric bypass     Nephrolithiasis multiple episodes, last 10/19    Dr Bey/Ivana     OA (osteoarthritis)     Multiple - Left shoulder with rotator cuff tear - Dr Durham     Obesity, unspecified     s/p gastric bypass 1985      Prediabetes      Presbyacusis 1/04    dr corona     Pyelonephritis, unspecified 12/99     SCC (squamous cell carcinoma), 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    dr saez - lt conchal bowl     Sleep apnea 10/02    cpap - severe - 15 cm - dr cunha     Stroke (H) 1/19/2019     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    [facility] Ne[facility]     Vitamin B12 deficiency (non anemic) 4/15/2019     Vitamin D deficiency 4/15/2019       PAST SURGICAL HISTORY:  Past Surgical History:   Procedure Laterality Date     APPENDECTOMY       ARTHROPLASTY KNEE  8/13/2012    LT TKA - Dr Villanueva     CARDIOVERSION  2016     COLONOSCOPY  8/05, 9/10, 10/15    multiple tubular/serrated/hyperplastic polyps     COLONOSCOPY N/A 10/29/2015    multiple tubular and serrated adenomas     COLONOSCOPY N/A 9/7/2016     EYE SURGERY  Lasik      KNEE SCOPE, DIAGNOSTIC  05/00     Arthroscopy, Knee RT     LASER HOLMIUM LITHOTRIPSY URETER(S), INSERT STENT, COMBINED  10/16/2012    stones x 4, Dr Campa     LASER HOLMIUM LITHOTRIPSY URETER(S), INSERT STENT, COMBINED Right 2018    CYSTOSCOPY, RIGHT URETEROSCOPY, HOLMIUM LASER LITHOTRIPSY, RIGHT STENT PLACEMENT - Dr Bey     SURGICAL HISTORY OF -       s/p gastric bypass Bilroth II     SURGICAL HISTORY OF -       wisdom teeth     SURGICAL HISTORY OF -       cellulitis     SURGICAL HISTORY OF -       lt forearm spur's     SURGICAL HISTORY OF -   ,,    s/p lasik     SURGICAL HISTORY OF -       rt forearm spurs     SURGICAL HISTORY OF -       dr stevenson - lithotrypsy     SURGICAL HISTORY OF -   10/08,     Lt ear chonchal lesion removal SCC - dr saez     SURGICAL HISTORY OF -  Right 10/2019    nephrolithiasis - cystoscopy, rt lithotrypsy, Dr Heath     SURGICAL HISTORY OF -  Right 2019    Cystoscopy, Rt lithotrypsy, Calcium Oxalate, Dr Heath       FAMILY HISTORY:  Family History   Problem Relation Age of Onset     Alzheimer Disease Father 82         at 88     Prostate Cancer Father 76        bladder and prostate     Heart Disease Mother 80        CHF     Heart Disease Maternal Grandfather         MI at 55     Cancer Paternal Uncle         ?       SOCIAL HISTORY:  Social History     Socioeconomic History     Marital status:      Spouse name: Mayra     Number of children: 3     Years of education: 21     Highest education level: None   Occupational History     Occupation: retired teacher and football and       Employer: [employer]     Employer: RETIRED   Social Needs     Financial resource strain: None     Food insecurity:     Worry: None     Inability: None     Transportation needs:     Medical: None     Non-medical: None   Tobacco Use     Smoking status: Never Smoker     Smokeless tobacco: Never Used   Substance and Sexual Activity     Alcohol use:  "Yes     Alcohol/week: 2.0 standard drinks     Comment: 2 per week     Drug use: No     Comment: acknowledges using herbal supplement \"Vibe\" for energy-none used recently      Sexual activity: Yes     Partners: Female     Birth control/protection: None     Comment:     Lifestyle     Physical activity:     Days per week: None     Minutes per session: None     Stress: None   Relationships     Social connections:     Talks on phone: None     Gets together: None     Attends Evangelical service: None     Active member of club or organization: None     Attends meetings of clubs or organizations: None     Relationship status: None     Intimate partner violence:     Fear of current or ex partner: None     Emotionally abused: None     Physically abused: None     Forced sexual activity: None   Other Topics Concern      Service Not Asked     Blood Transfusions Not Asked     Caffeine Concern Yes     Comment: <1 can qd     Occupational Exposure Not Asked     Hobby Hazards Not Asked     Sleep Concern Yes     Comment: sleep apnea, wears cpap     Stress Concern Not Asked     Weight Concern Not Asked     Special Diet Not Asked     Back Care Not Asked     Exercise No     Bike Helmet Not Asked     Seat Belt Yes     Self-Exams Not Asked     Parent/sibling w/ CABG, MI or angioplasty before 65F 55M? No   Social History Narrative     None       Review of Systems:  Skin:  Positive for rash     Eyes:  Positive for glasses    ENT:  Negative for      Respiratory:  Positive for sleep apnea;CPAP     Cardiovascular:    edema;Positive for    Gastroenterology: Negative for      Genitourinary:  not assessed      Musculoskeletal:  Negative for      Neurologic:  Negative for      Psychiatric:  Negative for      Heme/Lymph/Imm:  Negative for      Endocrine:  Negative for        Physical Exam:  Vitals: /76   Pulse 100   Ht 1.905 m (6' 3\")   Wt (!) 158.8 kg (350 lb)   BMI 43.75 kg/m       Constitutional:  cooperative, alert and " oriented, well developed, well nourished, in no acute distress        Skin:  warm and dry to the touch, no apparent skin lesions or masses noted          Head:  normocephalic, no masses or lesions        Eyes:  pupils equal and round, conjunctivae and lids unremarkable, sclera white, no xanthalasma, EOMS intact, no nystagmus        Lymph:No Cervical lymphadenopathy present     ENT:  no pallor or cyanosis, dentition good        Neck:  carotid pulses are full and equal bilaterally, JVP normal, no carotid bruit        Respiratory:  normal breath sounds, clear to auscultation, normal A-P diameter, normal symmetry, normal respiratory excursion, no use of accessory muscles         Cardiac: regular rhythm, normal S1/S2, no S3 or S4, apical impulse not displaced, no murmurs, gallops or rubs irregularly irregular rhythm              not assessed this visit                                        GI:  abdomen soft, non-tender, BS normoactive, no mass, no HSM, no bruits        Extremities and Muscular Skeletal:  no deformities, clubbing, cyanosis, erythema observed              Neurological:  no gross motor deficits        Psych:           CC  Brett Cassidy MD  [address] [place] [address]                Thank you for allowing me to participate in the care of your patient.      Sincerely,     Sarah Beth Castillo MD     [facility]    cc:   Brett Cassidy MD  [address] [place] [address]

## 2020-02-12 NOTE — PROGRESS NOTES
HPI and Plan:   See dictation    Orders Placed This Encounter   Procedures     Follow-Up with Cardiac Advanced Practice Provider     Follow-Up with Electrophysiologist     EKG 12-lead complete w/read - Clinics (performed today)     EKG 12-lead complete w/read (Future)- to be scheduled       No orders of the defined types were placed in this encounter.      There are no discontinued medications.      Encounter Diagnosis   Name Primary?     Persistent atrial fibrillation        CURRENT MEDICATIONS:  Current Outpatient Medications   Medication Sig Dispense Refill     apixaban ANTICOAGULANT (ELIQUIS) 5 MG tablet Take 1 tablet (5 mg) by mouth 2 times daily 180 tablet 0     Cyanocobalamin 5000 MCG TBDP        diltiazem ER (DILT-XR) 180 MG 24 hr capsule Take 360 mg by mouth daily       Ferrous Sulfate (IRON) 325 (65 Fe) MG tablet Take 1 tablet by mouth daily (with breakfast) 90 tablet 3     fluticasone (FLONASE) 50 MCG/ACT nasal spray SPRAY 1 SPRAY INTO BOTH NOSTRILS DAILY AS NEEDED FOR RHINITIS OR ALLERGIES 54.6 mL 3     Multiple Vitamins-Minerals (VITAMIN D3 COMPLETE PO) Take 125 mcg by mouth       niacin 500 MG tablet Take by mouth daily (with breakfast)       pantoprazole (PROTONIX) 40 MG EC tablet TAKE 1 TABLET  DAILY 30 TO 60 MINUTES BEFORE A MEAL. 90 tablet 0     tamsulosin (FLOMAX) 0.4 MG capsule TAKE 1 CAPSULE TWICE DAILY 180 capsule 0     STATIN NOT PRESCRIBED (INTENTIONAL) Please choose reason not prescribed, below, treated for hepatitis C (Patient not taking: Reported on 2/12/2020)       vitamin C (ASCORBIC ACID) 1000 MG TABS Take 1,000 mg by mouth daily         ALLERGIES   No Known Allergies    PAST MEDICAL HISTORY:  Past Medical History:   Diagnosis Date     Arrhythmia      Arthritis      Atrial fibrillation 2006    Followed by [facility]     Calculus of kidney 2005, 6/06, 10/12, 8/18, 9/19    dr walker/nestor/иван - hematuria - w/u o/w neg     Cervical stenosis of spine     Moderate C4-5     Cholelithiasis       Chronic peptic ulcer, unspecified site, without mention of hemorrhage, perforation, or obstruction 1985    gastric bypass     Colon polyps 8/05, 9/10, 10/15    2 tubular adenoma, 1 hyperplastic - multiple serrated/tubular adenomas     CVA (cerebral vascular accident) (H) 01/2019    small right periorlandic subcortical - left sided residual - left hand tingling/numbness - Left leg weak/numbness - cardioembolic     First degree AV block      Hepatitis C 10/12    chronic hepatitis C, grade 1-2, stage 1 - mild - Dr Douglas     Hyperlipidemia LDL goal <70      Hypertension goal BP (blood pressure) < 140/90 6/06    dr jaciel winchester     Iron deficiency anemia, unspecified 1985    gastric bypass     Nephrolithiasis multiple episodes, last 10/19    Dr Bey/Ivana     OA (osteoarthritis)     Multiple - Left shoulder with rotator cuff tear - Dr Durham     Obesity, unspecified     s/p gastric bypass 1985      Prediabetes      Presbyacusis 1/04    dr corona     Pyelonephritis, unspecified 12/99     SCC (squamous cell carcinoma), 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    dr saez - lt conchal bowl     Sleep apnea 10/02    cpap - severe - 15 cm - dr cunha     Stroke (H) 1/19/2019     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    [facility] Ne[facility]     Vitamin B12 deficiency (non anemic) 4/15/2019     Vitamin D deficiency 4/15/2019       PAST SURGICAL HISTORY:  Past Surgical History:   Procedure Laterality Date     APPENDECTOMY       ARTHROPLASTY KNEE  8/13/2012    LT TKA - Dr Villanueva     CARDIOVERSION  2016     COLONOSCOPY  8/05, 9/10, 10/15    multiple tubular/serrated/hyperplastic polyps     COLONOSCOPY N/A 10/29/2015    multiple tubular and serrated adenomas     COLONOSCOPY N/A 9/7/2016     EYE SURGERY  Lasik     HC KNEE SCOPE, DIAGNOSTIC  05/00    Arthroscopy, Knee RT     LASER HOLMIUM LITHOTRIPSY URETER(S), INSERT STENT, COMBINED  10/16/2012    stones x 4, Dr Campa     LASER HOLMIUM LITHOTRIPSY URETER(S), INSERT STENT, COMBINED Right 5/2/2018    CYSTOSCOPY, RIGHT  "URETEROSCOPY, HOLMIUM LASER LITHOTRIPSY, RIGHT STENT PLACEMENT - Dr Bey     SURGICAL HISTORY OF -       s/p gastric bypass Bilroth II     SURGICAL HISTORY OF -       wisdom teeth     SURGICAL HISTORY OF -       cellulitis     SURGICAL HISTORY OF -       lt forearm spur's     SURGICAL HISTORY OF -   ,,    s/p lasik     SURGICAL HISTORY OF -       rt forearm spurs     SURGICAL HISTORY OF -       dr stevenson - lithotrypsy     SURGICAL HISTORY OF -   10/08,     Lt ear chonchal lesion removal SCC - dr saez     SURGICAL HISTORY OF -  Right 10/2019    nephrolithiasis - cystoscopy, rt lithotrypsy, Dr Heath     SURGICAL HISTORY OF -  Right 2019    Cystoscopy, Rt lithotrypsy, Calcium Oxalate, Dr Heath       FAMILY HISTORY:  Family History   Problem Relation Age of Onset     Alzheimer Disease Father 82         at 88     Prostate Cancer Father 76        bladder and prostate     Heart Disease Mother 80        CHF     Heart Disease Maternal Grandfather         MI at 55     Cancer Paternal Uncle         ?       SOCIAL HISTORY:  Social History     Socioeconomic History     Marital status:      Spouse name: Mayra     Number of children: 3     Years of education: 21     Highest education level: None   Occupational History     Occupation: retired teacher and football and       Employer: [employer]     Employer: RETIRED   Social Needs     Financial resource strain: None     Food insecurity:     Worry: None     Inability: None     Transportation needs:     Medical: None     Non-medical: None   Tobacco Use     Smoking status: Never Smoker     Smokeless tobacco: Never Used   Substance and Sexual Activity     Alcohol use: Yes     Alcohol/week: 2.0 standard drinks     Comment: 2 per week     Drug use: No     Comment: acknowledges using herbal supplement \"Vibe\" for energy-none used recently      Sexual activity: Yes     Partners: Female     " "Birth control/protection: None     Comment:     Lifestyle     Physical activity:     Days per week: None     Minutes per session: None     Stress: None   Relationships     Social connections:     Talks on phone: None     Gets together: None     Attends Church service: None     Active member of club or organization: None     Attends meetings of clubs or organizations: None     Relationship status: None     Intimate partner violence:     Fear of current or ex partner: None     Emotionally abused: None     Physically abused: None     Forced sexual activity: None   Other Topics Concern      Service Not Asked     Blood Transfusions Not Asked     Caffeine Concern Yes     Comment: <1 can qd     Occupational Exposure Not Asked     Hobby Hazards Not Asked     Sleep Concern Yes     Comment: sleep apnea, wears cpap     Stress Concern Not Asked     Weight Concern Not Asked     Special Diet Not Asked     Back Care Not Asked     Exercise No     Bike Helmet Not Asked     Seat Belt Yes     Self-Exams Not Asked     Parent/sibling w/ CABG, MI or angioplasty before 65F 55M? No   Social History Narrative     None       Review of Systems:  Skin:  Positive for rash     Eyes:  Positive for glasses    ENT:  Negative for      Respiratory:  Positive for sleep apnea;CPAP     Cardiovascular:    edema;Positive for    Gastroenterology: Negative for      Genitourinary:  not assessed      Musculoskeletal:  Negative for      Neurologic:  Negative for      Psychiatric:  Negative for      Heme/Lymph/Imm:  Negative for      Endocrine:  Negative for        Physical Exam:  Vitals: /76   Pulse 100   Ht 1.905 m (6' 3\")   Wt (!) 158.8 kg (350 lb)   BMI 43.75 kg/m      Constitutional:  cooperative, alert and oriented, well developed, well nourished, in no acute distress        Skin:  warm and dry to the touch, no apparent skin lesions or masses noted          Head:  normocephalic, no masses or lesions        Eyes:  pupils equal " and round, conjunctivae and lids unremarkable, sclera white, no xanthalasma, EOMS intact, no nystagmus        Lymph:No Cervical lymphadenopathy present     ENT:  no pallor or cyanosis, dentition good        Neck:  carotid pulses are full and equal bilaterally, JVP normal, no carotid bruit        Respiratory:  normal breath sounds, clear to auscultation, normal A-P diameter, normal symmetry, normal respiratory excursion, no use of accessory muscles         Cardiac: regular rhythm, normal S1/S2, no S3 or S4, apical impulse not displaced, no murmurs, gallops or rubs irregularly irregular rhythm              not assessed this visit                                        GI:  abdomen soft, non-tender, BS normoactive, no mass, no HSM, no bruits        Extremities and Muscular Skeletal:  no deformities, clubbing, cyanosis, erythema observed              Neurological:  no gross motor deficits        Psych:           CC  Brett Cassidy MD  [address] [place] [address]

## 2020-02-13 DIAGNOSIS — R73.03 BORDERLINE DIABETES: ICD-10-CM

## 2020-02-13 DIAGNOSIS — R42 VERTIGO AS LATE EFFECT OF STROKE: Primary | ICD-10-CM

## 2020-02-13 DIAGNOSIS — E78.5 HYPERLIPEMIA: ICD-10-CM

## 2020-02-13 DIAGNOSIS — I69.398 VERTIGO AS LATE EFFECT OF STROKE: Primary | ICD-10-CM

## 2020-02-14 ENCOUNTER — HOSPITAL ENCOUNTER (OUTPATIENT)
Dept: LAB | Facility: CLINIC | Age: 75
Discharge: HOME | End: 2020-02-14
Attending: PSYCHIATRY & NEUROLOGY | Admitting: PSYCHIATRY & NEUROLOGY
Payer: COMMERCIAL

## 2020-02-14 DIAGNOSIS — I69.398 VERTIGO AS LATE EFFECT OF STROKE: ICD-10-CM

## 2020-02-14 DIAGNOSIS — G47.33 OBSTRUCTIVE SLEEP APNEA (ADULT) (PEDIATRIC): ICD-10-CM

## 2020-02-14 DIAGNOSIS — E78.2 MIXED HYPERLIPIDEMIA: ICD-10-CM

## 2020-02-14 DIAGNOSIS — E78.5 HYPERLIPEMIA: ICD-10-CM

## 2020-02-14 DIAGNOSIS — R73.03 BORDERLINE DIABETES: ICD-10-CM

## 2020-02-14 DIAGNOSIS — I10 ESSENTIAL HYPERTENSION, MALIGNANT: ICD-10-CM

## 2020-02-14 DIAGNOSIS — R42 VERTIGO AS LATE EFFECT OF STROKE: ICD-10-CM

## 2020-02-14 LAB
CRP SERPL-MCNC: 4.4 MG/L (ref 0–8)
DEPRECATED CALCIDIOL+CALCIFEROL SERPL-MC: 54 UG/L (ref 20–75)
HBA1C MFR BLD: 5.9 % (ref 0–5.6)
VIT B12 SERPL-MCNC: 1918 PG/ML (ref 193–986)

## 2020-02-14 PROCEDURE — 86140 C-REACTIVE PROTEIN: CPT | Performed by: PSYCHIATRY & NEUROLOGY

## 2020-02-14 PROCEDURE — 83695 ASSAY OF LIPOPROTEIN(A): CPT | Performed by: PSYCHIATRY & NEUROLOGY

## 2020-02-14 PROCEDURE — 36415 COLL VENOUS BLD VENIPUNCTURE: CPT | Performed by: PSYCHIATRY & NEUROLOGY

## 2020-02-14 PROCEDURE — 83090 ASSAY OF HOMOCYSTEINE: CPT | Performed by: PSYCHIATRY & NEUROLOGY

## 2020-02-14 PROCEDURE — 82180 ASSAY OF ASCORBIC ACID: CPT | Performed by: PSYCHIATRY & NEUROLOGY

## 2020-02-14 PROCEDURE — 83704 LIPOPROTEIN BLD QUAN PART: CPT | Performed by: PSYCHIATRY & NEUROLOGY

## 2020-02-14 PROCEDURE — 83921 ORGANIC ACID SINGLE QUANT: CPT | Performed by: PSYCHIATRY & NEUROLOGY

## 2020-02-14 PROCEDURE — 83036 HEMOGLOBIN GLYCOSYLATED A1C: CPT | Performed by: PSYCHIATRY & NEUROLOGY

## 2020-02-14 PROCEDURE — 80061 LIPID PANEL: CPT | Performed by: PSYCHIATRY & NEUROLOGY

## 2020-02-14 PROCEDURE — 84591 ASSAY OF NOS VITAMIN: CPT | Performed by: PSYCHIATRY & NEUROLOGY

## 2020-02-14 PROCEDURE — 82306 VITAMIN D 25 HYDROXY: CPT | Performed by: PSYCHIATRY & NEUROLOGY

## 2020-02-14 PROCEDURE — 82607 VITAMIN B-12: CPT | Performed by: PSYCHIATRY & NEUROLOGY

## 2020-02-15 LAB — LPA SERPL-MCNC: 67 MG/DL

## 2020-02-16 LAB
CHOLEST SERPL-MCNC: 143 MG/DL
HDL SERPL QN: 9 NM
HDL SERPL-SCNC: 29.2 UMOL/L
HDLC SERPL-MCNC: 48 MG/DL (ref 40–59)
HLD.LARGE SERPL-SCNC: 5 UMOL/L
LDL SERPL QN: 21 NM
LDL SERPL-SCNC: 823 NMOL/L
LDL SMALL SERPL-SCNC: 359 NMOL/L
LDLC SERPL CALC-MCNC: 82 MG/DL
PATHOLOGY STUDY: ABNORMAL
TRIGL SERPL-MCNC: 63 MG/DL (ref 30–149)
VLDL LARGE SERPL-SCNC: 1.9 NMOL/L
VLDL SERPL QN: 47.4 NM

## 2020-02-17 LAB — VIT C SERPL-MCNC: 34 UMOL/L (ref 23–114)

## 2020-02-19 ENCOUNTER — NURSE TRIAGE (OUTPATIENT)
Dept: FAMILY MEDICINE | Facility: CLINIC | Age: 75
End: 2020-02-19

## 2020-02-19 ENCOUNTER — OFFICE VISIT (OUTPATIENT)
Dept: FAMILY MEDICINE | Facility: CLINIC | Age: 75
End: 2020-02-19
Payer: COMMERCIAL

## 2020-02-19 ENCOUNTER — ANCILLARY PROCEDURE (OUTPATIENT)
Dept: GENERAL RADIOLOGY | Facility: CLINIC | Age: 75
End: 2020-02-19
Attending: FAMILY MEDICINE
Payer: COMMERCIAL

## 2020-02-19 VITALS
SYSTOLIC BLOOD PRESSURE: 136 MMHG | HEIGHT: 75 IN | TEMPERATURE: 97.4 F | WEIGHT: 315 LBS | HEART RATE: 103 BPM | BODY MASS INDEX: 39.17 KG/M2 | OXYGEN SATURATION: 95 % | DIASTOLIC BLOOD PRESSURE: 84 MMHG

## 2020-02-19 DIAGNOSIS — Z79.01 LONG TERM CURRENT USE OF ANTICOAGULANT THERAPY: ICD-10-CM

## 2020-02-19 DIAGNOSIS — Z86.73 HISTORY OF CVA (CEREBROVASCULAR ACCIDENT): ICD-10-CM
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
## 2020-02-19 DIAGNOSIS — I48.0 PAROXYSMAL ATRIAL FIBRILLATION (H): ICD-10-CM
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
## 2020-02-19 DIAGNOSIS — E78.5 HYPERLIPIDEMIA LDL GOAL <70: ICD-10-CM

## 2020-02-19 DIAGNOSIS — I10 HYPERTENSION GOAL BP (BLOOD PRESSURE) < 140/90: ICD-10-CM

## 2020-02-19 DIAGNOSIS — E66.01 MORBID OBESITY DUE TO EXCESS CALORIES (H): ICD-10-CM

## 2020-02-19 DIAGNOSIS — Z86.19 HISTORY OF HEPATITIS C: ICD-10-CM

## 2020-02-19 DIAGNOSIS — Z51.81 MEDICATION MONITORING ENCOUNTER: ICD-10-CM

## 2020-02-19 DIAGNOSIS — D50.9 IRON DEFICIENCY ANEMIA, UNSPECIFIED IRON DEFICIENCY ANEMIA TYPE: ICD-10-CM

## 2020-02-19 DIAGNOSIS — R73.03 PREDIABETES: ICD-10-CM

## 2020-02-19 LAB — HCYS SERPL-SCNC: 4.7 UMOL/L (ref 4–12)

## 2020-02-19 PROCEDURE — 71101 X-RAY EXAM UNILAT RIBS/CHEST: CPT | Mod: RT

## 2020-02-19 PROCEDURE — 99214 OFFICE O/P EST MOD 30 MIN: CPT | Performed by: FAMILY MEDICINE

## 2020-02-19 ASSESSMENT — MIFFLIN-ST. JEOR: SCORE: 2391

## 2020-02-19 NOTE — PROGRESS NOTES
[facility]    Hugo Weber is a 74 year old male who presents to clinic today for the following health issues:    Patient fell on black ice in his driveway Sunday morning, 2/16, was walking the dog. Landed on his right side, initial right jaw pain and right rib pain. Reports right rib pain 1-3/10 persists. Worse when lays in bed. Breathing is OK except does hurt to take deep breath/cough.  Pt states he didn't hit head.  No LOC. In general overall improving. On eliquis    Patient will be traveling and wants to be seen before he goes. Leaving next week for [place].    1. MECHANISM: Fell on black ice  2. ONSET:   Oscar 3/16  3. LOCATION:  Right side  4. APPEARANCE:  Nothing visual  5. BLEEDING: no  6. SEVERITY:  Sometimes with deep breath it hurts. Dull ache on right rib  7. SIZE:  none  8. PAIN:  2-3/10    Reviewed and updated as needed this visit by Provider    BP Readings from Last 3 Encounters:   02/19/20 136/84   02/12/20 130/76   12/02/19 130/84     body mass index is 43 kg/m .    Wt Readings from Last 4 Encounters:   02/19/20 (!) 156 kg (344 lb)   02/12/20 (!) 158.8 kg (350 lb)   12/02/19 (!) 155.1 kg (342 lb)   09/20/19 (!) 152 kg (335 lb)       Health Maintenance    Health Maintenance Due   Topic Date Due     FIT  07/05/2019     MEDICARE ANNUAL WELLNESS VISIT  09/20/2019     MICROALBUMIN  09/20/2019     PSA  09/20/2019     FALL RISK ASSESSMENT  09/20/2019     PHQ-2  01/01/2020     LIPID  02/22/2020       Current Problem List    Patient Active Problem List   Diagnosis     Iron deficiency anemia     Colon polyps     Obstructive sleep apnea syndrome     Hyperlipidemia LDL goal <70     Long term current use of anticoagulant therapy - for intermittent a-fib     Advance Care Planning     Total knee replacement status     Calculus of kidney     NAFLD (nonalcoholic fatty liver disease)     Morbid obesity due to excess calories (H)     History of hepatitis C     Prediabetes      Paroxysmal atrial fibrillation (H)     Cholelithiasis     Hypertension goal BP (blood pressure) < 140/90     TMJ arthralgia     Nephrolithiasis     OA (osteoarthritis)     First degree AV block     Benign prostatic hyperplasia (BPH) with urinary urge incontinence     Thoracic aortic ectasia (H)     Stroke (H)     CVA (cerebral vascular accident) (H)     Cervical stenosis of spine     Vitamin B12 deficiency (non anemic)     Vitamin D deficiency     Myofascial pain       Past Medical History    Past Medical History:   Diagnosis Date     Arrhythmia      Arthritis      Atrial fibrillation 2006    Followed by [facility]     Calculus of kidney 2005, 6/06, 10/12, 8/18, 9/19    dr walker/nestor/иван - hematuria - w/u o/w neg     Cervical stenosis of spine     Moderate C4-5     Cholelithiasis      Chronic peptic ulcer, unspecified site, without mention of hemorrhage, perforation, or obstruction 1985    gastric bypass     Colon polyps 8/05, 9/10, 10/15    2 tubular adenoma, 1 hyperplastic - multiple serrated/tubular adenomas     CVA (cerebral vascular accident) (H) 01/2019    small right periorlandic subcortical - left sided residual - left hand tingling/numbness - Left leg weak/numbness - cardioembolic     First degree AV block      Hepatitis C 10/12    chronic hepatitis C, grade 1-2, stage 1 - mild - Dr Douglas     Hyperlipidemia LDL goal <70      Hypertension goal BP (blood pressure) < 140/90 6/06    dr jaciel winchester     Iron deficiency anemia, unspecified 1985    gastric bypass     Nephrolithiasis multiple episodes, last 10/19    Dr Bey/Ivana     OA (osteoarthritis)     Multiple - Left shoulder with rotator cuff tear - Dr Durham     Obesity, unspecified     s/p gastric bypass 1985      Prediabetes      Presbyacusis 1/04    dr corona     Pyelonephritis, unspecified 12/99     SCC (squamous cell carcinoma), 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    dr saez - lt conchal bowl     Sleep apnea 10/02    cpap - severe - 15 cm - dr cunha     Stroke (H)  1/19/2019     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    [facility]     Vitamin B12 deficiency (non anemic) 4/15/2019     Vitamin D deficiency 4/15/2019       Past Surgical History    Past Surgical History:   Procedure Laterality Date     APPENDECTOMY       ARTHROPLASTY KNEE  8/13/2012    LT TKA - Dr Villanueva     CARDIOVERSION  2016     COLONOSCOPY  8/05, 9/10, 10/15    multiple tubular/serrated/hyperplastic polyps     COLONOSCOPY N/A 10/29/2015    multiple tubular and serrated adenomas     COLONOSCOPY N/A 9/7/2016     EYE SURGERY  Lasik     HC KNEE SCOPE, DIAGNOSTIC  05/00    Arthroscopy, Knee RT     LASER HOLMIUM LITHOTRIPSY URETER(S), INSERT STENT, COMBINED  10/16/2012    stones x 4, Dr Campa     LASER HOLMIUM LITHOTRIPSY URETER(S), INSERT STENT, COMBINED Right 5/2/2018    CYSTOSCOPY, RIGHT URETEROSCOPY, HOLMIUM LASER LITHOTRIPSY, RIGHT STENT PLACEMENT - Dr Bey     SURGICAL HISTORY OF -   1985    s/p gastric bypass Bilroth II     SURGICAL HISTORY OF -   11/98    wisdom teeth     SURGICAL HISTORY OF -   1979    cellulitis     SURGICAL HISTORY OF -   11/98    lt forearm spur's     SURGICAL HISTORY OF -   1/01,6/02,11/02    s/p lasik     SURGICAL HISTORY OF -   11/99    rt forearm spurs     SURGICAL HISTORY OF -   7/06    dr stevenson - lithotrypsy     SURGICAL HISTORY OF -   10/08, 12/08    Lt ear chonchal lesion removal SCC - dr saez     SURGICAL HISTORY OF -  Right 10/2019    nephrolithiasis - cystoscopy, rt lithotrypsy, Dr Heath     SURGICAL HISTORY OF -  Right 11/2019    Cystoscopy, Rt lithotrypsy, Calcium Oxalate, Dr Heath       Current Medications    Current Outpatient Medications   Medication Sig Dispense Refill     apixaban ANTICOAGULANT (ELIQUIS) 5 MG tablet Take 1 tablet (5 mg) by mouth 2 times daily 180 tablet 0     Cyanocobalamin 5000 MCG TBDP        diltiazem ER (DILT-XR) 180 MG 24 hr capsule Take 360 mg by mouth daily       Ferrous Sulfate (IRON) 325 (65 Fe) MG tablet Take 1 tablet by mouth daily  (with breakfast) 90 tablet 3     fluticasone (FLONASE) 50 MCG/ACT nasal spray SPRAY 1 SPRAY INTO BOTH NOSTRILS DAILY AS NEEDED FOR RHINITIS OR ALLERGIES 54.6 mL 3     Multiple Vitamins-Minerals (VITAMIN D3 COMPLETE PO) Take 125 mcg by mouth       niacin 500 MG tablet Take by mouth daily (with breakfast)       pantoprazole (PROTONIX) 40 MG EC tablet TAKE 1 TABLET  DAILY 30 TO 60 MINUTES BEFORE A MEAL. 90 tablet 0     STATIN NOT PRESCRIBED (INTENTIONAL) Please choose reason not prescribed, below, treated for hepatitis C       tamsulosin (FLOMAX) 0.4 MG capsule TAKE 1 CAPSULE TWICE DAILY 180 capsule 0     vitamin C (ASCORBIC ACID) 1000 MG TABS Take 1,000 mg by mouth daily         Allergies    No Known Allergies    Immunizations    Immunization History   Administered Date(s) Administered     Influenza (High Dose) 3 valent vaccine 2012, 2013, 10/20/2015, 2017, 2018, 2019     Influenza (IIV3) PF 10/26/2007, 2008, 2009, 10/14/2011     Influenza Vaccine, 6+MO IM (QUADRIVALENT W/PRESERVATIVES) 10/30/2017     Pneumo Conj 13-V (2010&after) 03/10/2016, 2017     Pneumococcal 23 valent 2005, 2012     TD (ADULT, 7+) 1998     TDAP Vaccine (Adacel) 2007     TDAP Vaccine (Boostrix) 10/20/2015     Twinrix A/B 2005, 2007, 2008     Zoster vaccine recombinant adjuvanted (SHINGRIX) 2019, 2019       Family History    Family History   Problem Relation Age of Onset     Alzheimer Disease Father 82         at 88     Prostate Cancer Father 76        bladder and prostate     Heart Disease Mother 80        CHF     Heart Disease Maternal Grandfather         MI at 55     Cancer Paternal Uncle         ?       Social History    Social History     Socioeconomic History     Marital status:      Spouse name: Mayra     Number of children: 3     Years of education: 21     Highest education level: Not on file   Occupational History      "Occupation: retired teacher and football and       Employer: [employer] [address]     Employer: RETIRED   Social Needs     Financial resource strain: Not on file     Food insecurity:     Worry: Not on file     Inability: Not on file     Transportation needs:     Medical: Not on file     Non-medical: Not on file   Tobacco Use     Smoking status: Never Smoker     Smokeless tobacco: Never Used   Substance and Sexual Activity     Alcohol use: Yes     Alcohol/week: 2.0 standard drinks     Comment: 2 per week     Drug use: No     Comment: acknowledges using herbal supplement \"Vibe\" for energy-none used recently      Sexual activity: Yes     Partners: Female     Birth control/protection: None     Comment:     Lifestyle     Physical activity:     Days per week: Not on file     Minutes per session: Not on file     Stress: Not on file   Relationships     Social connections:     Talks on phone: Not on file     Gets together: Not on file     Attends Hinduism service: Not on file     Active member of club or organization: Not on file     Attends meetings of clubs or organizations: Not on file     Relationship status: Not on file     Intimate partner violence:     Fear of current or ex partner: Not on file     Emotionally abused: Not on file     Physically abused: Not on file     Forced sexual activity: Not on file   Other Topics Concern      Service Not Asked     Blood Transfusions Not Asked     Caffeine Concern Yes     Comment: <1 can qd     Occupational Exposure Not Asked     Hobby Hazards Not Asked     Sleep Concern Yes     Comment: sleep apnea, wears cpap     Stress Concern Not Asked     Weight Concern Not Asked     Special Diet Not Asked     Back Care Not Asked     Exercise No     Bike Helmet Not Asked     Seat Belt Yes     Self-Exams Not Asked     Parent/sibling w/ CABG, MI or angioplasty before 65F 55M? No   Social History Narrative     Not on file       All above reviewed and updated, " "all stable unless otherwise noted    Recent labs reviewed    ROS    CONSTITUTIONAL: NEGATIVE for fever, chills, change in weight  INTEGUMENTARY/SKIN: NEGATIVE for worrisome rashes, moles or lesions  EYES: NEGATIVE for vision changes or irritation  ENT/MOUTH: NEGATIVE for ear, mouth and throat problems  RESP: NEGATIVE for significant cough or SOB  CV: NEGATIVE for chest pain, palpitations or peripheral edema  GI: NEGATIVE for nausea, abdominal pain, heartburn, or change in bowel habits  : NEGATIVE for frequency, dysuria, or hematuria  MUSCULOSKELETAL: NEGATIVE for significant arthralgias or myalgia  NEURO: NEGATIVE for weakness, dizziness or paresthesias  ENDOCRINE: NEGATIVE for temperature intolerance, skin/hair changes  HEME: NEGATIVE for bleeding problems  PSYCHIATRIC: NEGATIVE for changes in mood or affect    OBJECTIVE    /84   Pulse 103   Temp 97.4  F (36.3  C) (Oral)   Ht 1.905 m (6' 3\")   Wt (!) 156 kg (344 lb)   SpO2 95%   BMI 43.00 kg/m    Body mass index is 43 kg/m .  GENERAL: healthy, alert and no distress  EYES: Eyes grossly normal to inspection, extraocular movements - intact, and PERRL  HENT: ear canals- normal; TMs- normal; Nose- normal; Mouth- no ulcers, no lesions  NECK: no tenderness, no adenopathy, no asymmetry, no masses, no stiffness; thyroid- normal to palpation  RESP: lungs clear to auscultation - no rales, no rhonchi, no wheezes  CV: regular rates and rhythm, normal S1 S2, no S3 or S4 and no murmur, no click or rub -  ABDOMEN: soft, no tenderness, no  hepatosplenomegaly, no masses, normal bowel sounds  MS: extremities- no gross deformities noted, no edema  SKIN: no suspicious lesions, no rashes  NEURO: strength and tone- normal, sensory exam- grossly normal, mentation- intact, speech- normal, reflexes- symmetric  BACK: no CVA tenderness, no paralumbar tenderness  PSYCH: Alert and oriented times 3; speech- coherent , normal rate and volume; able to articulate logical thoughts, " able to abstract reason, no tangential thoughts, no hallucinations or delusions, affect- normal  LYMPHATICS: no cervical adenopathy    DIAGNOSTICS/PROCEDURE    xrays negative/benign     ASSESSMENT      ICD-10-CM    1. Fall, initial encounter W19.XXXA XR Ribs & Chest Right G/E 3 Views   2. Fall due to slipping on ice or snow, initial encounter W00.9XXA    3. History of CVA (cerebrovascular accident) Z86.73    4. Paroxysmal atrial fibrillation (H) I48.0    5. Prediabetes R73.03    6. Hypertension goal BP (blood pressure) < 140/90 I10    7. Hyperlipidemia LDL goal <70 E78.5    8. History of hepatitis C Z86.19    9. Iron deficiency anemia, unspecified iron deficiency anemia type D50.9    10. Morbid obesity due to excess calories (H) E66.01    11. Long term current use of anticoagulant therapy - for intermittent a-fib Z79.01    12. Medication monitoring encounter Z51.81        PLAN    Discussed treatment/modality options, including risk and benefits he desires:    1) heat/ice/tylenol    2) close follow up     All diagnosis above reviewed and noted above, otherwise stable.  See [employer] orders for further details.     Return in about 1 week (around 2/26/2020), or if symptoms worsen or fail to improve, for Follow Up Acute.    Health Maintenance Due   Topic Date Due     FIT  07/05/2019     MEDICARE ANNUAL WELLNESS VISIT  09/20/2019     MICROALBUMIN  09/20/2019     PSA  09/20/2019     FALL RISK ASSESSMENT  09/20/2019     PHQ-2  01/01/2020     LIPID  02/22/2020       See Patient Instructions             Brett Cassidy MD, FAAFP     [facility]  [address] [place] [id number]  [email or web address]   Office: [phone]  Fax: [phone]  Pager: [phone]

## 2020-02-19 NOTE — TELEPHONE ENCOUNTER
Routing to team to schedule patient.  Patient has already been triaged.    RENETTA WolffN, RN  [employer] [facility]

## 2020-02-19 NOTE — TELEPHONE ENCOUNTER
"Patient fell on black ice in his driveway Sunday morning. Reports right rib pain 1-3/10. Worse when lays in bed. Breathing is OK except does hurt to take deep breath.  Patient will be traveling and wants to be seen before he goes.   States that he wants appointment with Dr. Cassidy (if not Dr. Dimas). Refuses to see anyone else. States that Dr. Cassidy usually works him in.   Patient is willing to wait until Friday.     Additional Information    Negative: Major injury from dangerous force or speed (e.g., MVA, fall > 10 feet or 3 meters)    Negative: Bullet wound, knife wound or other penetrating object    Negative: Puncture wound that sounds life-threatening to the triager    Negative: Severe difficulty breathing (e.g., struggling for each breath, speaks in single words)    Negative: Major bleeding (actively dripping or spurting) that can't be stopped    Negative: Open wound of the chest with sound of moving air (sucking wound) or visible air bubbles    Negative: Shock suspected (e.g., cold/pale/clammy skin, too weak to stand, low BP, rapid pulse)    Negative: Coughing or spitting up blood    Negative: Bluish (or gray) lips or face    Negative: Unconscious or was unconscious    Negative: Sounds like a life-threatening emergency to the triager    Negative: Chest pain not from an injury    Negative: Wound looks infected    Negative: SEVERE chest pain    Negative: Difficulty breathing and not severe    Negative: Skin is split open or gaping (or length > 1/2 inch or 12 mm)    Negative: Bleeding won't stop after 10 minutes of direct pressure (using correct technique)    Negative: Sounds like a serious injury to the triager    Negative: Can't take a deep breath but no respiratory distress (e.g., hurts to take a deep breath)    Negative: Shallow puncture wound    Patient wants to be seen    Answer Assessment - Initial Assessment Questions  1. MECHANISM: \"How did the injury happen?\"      Fell on black ice  2. ONSET: \"When did " "the injury happen?\" (Minutes or hours ago)      Sunday  3. LOCATION: \"Where on the chest is the injury located?\"      Right side  4. APPEARANCE: \"What does the injury look like?\"      Nothing visual  5. BLEEDING: \"Is there any bleeding now? If so, ask: How long has it been bleeding?\"      no  6. SEVERITY: \"Any difficulty with breathing?\"      Sometimes with deep breath it hurts. Dull ache on right rib  7. SIZE: For cuts, bruises, or swelling, ask: \"How large is it?\" (e.g., inches or centimeters)      none  8. PAIN: \"Is there pain?\" If so, ask: \"How bad is the pain?\"   (e.g., Scale 1-10; or mild, moderate, severe)      2-3/10  9. TETANUS: For any breaks in the skin, ask: \"When was the last tetanus booster?\"      NA  10. PREGNANCY: \"Is there any chance you are pregnant?\" \"When was your last menstrual period?\"        NA    Protocols used: CHEST INJURY-A-OH    Carleen Lott RN    "

## 2020-02-20 LAB — METHYLMALONATE SERPL-SCNC: 0.13 UMOL/L (ref 0–0.4)

## 2020-02-21 ENCOUNTER — TELEPHONE (OUTPATIENT)
Dept: FAMILY MEDICINE | Facility: CLINIC | Age: 75
End: 2020-02-21

## 2020-02-21 LAB — NIACIN SERPL-MCNC: 3.27 UG/ML (ref 0.5–8.45)

## 2020-02-21 NOTE — TELEPHONE ENCOUNTER
Pt calling to report that he had a fall last week and seen PCP yesterday but failed to mention the bump as Pt was unaware of it at that time.   Pt stated the bump is in the upper left part of head on top  Pt denies HA, vision change, hearing change, or painful to touch.    Pt leaving to [place] soon and wondering if needing to be seen again.    Routing to PCP to review and advise.    King Combs RN   [facility]

## 2020-02-21 NOTE — TELEPHONE ENCOUNTER
Reason for Call:  Other call back    Detailed comments: pt called and stated that he got a bump on his head due to falling last week and he would like to get advice what to do.    Pt was trying to make niki to see Dr. Cassidy Monday but no available spot.      per pt he is going [place] Tuesday.    pls call and advice pt     Phone Number Patient can be reached at: Cell number on file:    Telephone Information:   Mobile [phone]       Best Time: anytime     Can we leave a detailed message on this number? YES    Call taken on 2/21/2020 at 2:49 PM by FRANNY HUFF

## 2020-02-22 NOTE — TELEPHONE ENCOUNTER
Reviewed with Fredrick SCHULZ, advise Ct [facility], to [facility] ER if any issues

## 2020-02-24 NOTE — TELEPHONE ENCOUNTER
Called #   Telephone Information:   Mobile [phone]     Pt advised of the note below. Pt stated the bump has since resolved and Pt is still asymptomatic. Pt advised of any changes proceed to ER for evaluation. Patient stated an understanding and agreed with plan.    King Combs RN   [facility]

## 2020-03-15 ENCOUNTER — HEALTH MAINTENANCE LETTER (OUTPATIENT)
Age: 75
End: 2020-03-15

## 2020-03-24 DIAGNOSIS — I63.9 CEREBROVASCULAR ACCIDENT (CVA), UNSPECIFIED MECHANISM (H): ICD-10-CM

## 2020-03-24 DIAGNOSIS — I10 HYPERTENSION GOAL BP (BLOOD PRESSURE) < 140/90: ICD-10-CM

## 2020-03-24 DIAGNOSIS — I48.0 PAROXYSMAL ATRIAL FIBRILLATION (H): Primary | ICD-10-CM

## 2020-03-24 RX ORDER — DILTIAZEM HYDROCHLORIDE 180 MG/1
360 CAPSULE, EXTENDED RELEASE ORAL DAILY
Qty: 180 CAPSULE | Refills: 3 | Status: SHIPPED | OUTPATIENT
Start: 2020-03-24 | End: 2020-06-01

## 2020-03-26 DIAGNOSIS — N40.1 BENIGN PROSTATIC HYPERPLASIA (BPH) WITH URINARY URGE INCONTINENCE: ICD-10-CM

## 2020-03-26 DIAGNOSIS — N39.41 BENIGN PROSTATIC HYPERPLASIA (BPH) WITH URINARY URGE INCONTINENCE: ICD-10-CM

## 2020-03-26 DIAGNOSIS — I63.9 CEREBROVASCULAR ACCIDENT (H): ICD-10-CM

## 2020-03-26 DIAGNOSIS — K21.00 GASTROESOPHAGEAL REFLUX DISEASE WITH ESOPHAGITIS: ICD-10-CM

## 2020-03-26 RX ORDER — TAMSULOSIN HYDROCHLORIDE 0.4 MG/1
CAPSULE ORAL
Qty: 180 CAPSULE | Refills: 0 | Status: SHIPPED | OUTPATIENT
Start: 2020-03-26 | End: 2020-06-01

## 2020-03-26 RX ORDER — PANTOPRAZOLE SODIUM 40 MG/1
TABLET, DELAYED RELEASE ORAL
Qty: 90 TABLET | Refills: 0 | Status: SHIPPED | OUTPATIENT
Start: 2020-03-26 | End: 2020-06-01

## 2020-05-19 ENCOUNTER — TRANSFERRED RECORDS (OUTPATIENT)
Dept: HEALTH INFORMATION MANAGEMENT | Facility: CLINIC | Age: 75
End: 2020-05-19

## 2020-06-01 ENCOUNTER — VIRTUAL VISIT (OUTPATIENT)
Dept: FAMILY MEDICINE | Facility: CLINIC | Age: 75
End: 2020-06-01
Payer: COMMERCIAL

## 2020-06-01 DIAGNOSIS — R73.03 PREDIABETES: Primary | ICD-10-CM

## 2020-06-01 DIAGNOSIS — I63.9 CEREBROVASCULAR ACCIDENT (CVA), UNSPECIFIED MECHANISM (H): ICD-10-CM

## 2020-06-01 DIAGNOSIS — Z12.5 SCREENING FOR PROSTATE CANCER: ICD-10-CM

## 2020-06-01 DIAGNOSIS — I48.11 LONGSTANDING PERSISTENT ATRIAL FIBRILLATION (H): ICD-10-CM

## 2020-06-01 DIAGNOSIS — G47.33 OBSTRUCTIVE SLEEP APNEA SYNDROME: ICD-10-CM

## 2020-06-01 DIAGNOSIS — Z12.11 SCREEN FOR COLON CANCER: ICD-10-CM

## 2020-06-01 DIAGNOSIS — I10 HYPERTENSION GOAL BP (BLOOD PRESSURE) < 140/90: ICD-10-CM

## 2020-06-01 DIAGNOSIS — D50.9 IRON DEFICIENCY ANEMIA, UNSPECIFIED IRON DEFICIENCY ANEMIA TYPE: ICD-10-CM

## 2020-06-01 DIAGNOSIS — Z86.19 HISTORY OF HEPATITIS C: ICD-10-CM

## 2020-06-01 DIAGNOSIS — Z86.73 HISTORY OF CVA (CEREBROVASCULAR ACCIDENT): ICD-10-CM

## 2020-06-01 DIAGNOSIS — E78.5 HYPERLIPIDEMIA LDL GOAL <70: ICD-10-CM

## 2020-06-01 DIAGNOSIS — Z51.81 MEDICATION MONITORING ENCOUNTER: ICD-10-CM

## 2020-06-01 DIAGNOSIS — M15.0 PRIMARY OSTEOARTHRITIS INVOLVING MULTIPLE JOINTS: ICD-10-CM

## 2020-06-01 DIAGNOSIS — Z79.01 LONG TERM CURRENT USE OF ANTICOAGULANT THERAPY: ICD-10-CM

## 2020-06-01 DIAGNOSIS — Z00.00 ENCOUNTER FOR ROUTINE ADULT HEALTH EXAMINATION WITHOUT ABNORMAL FINDINGS: ICD-10-CM

## 2020-06-01 DIAGNOSIS — K21.00 GASTROESOPHAGEAL REFLUX DISEASE WITH ESOPHAGITIS: ICD-10-CM

## 2020-06-01 PROCEDURE — 99214 OFFICE O/P EST MOD 30 MIN: CPT | Mod: 95 | Performed by: FAMILY MEDICINE

## 2020-06-01 RX ORDER — DILTIAZEM HYDROCHLORIDE 180 MG/1
360 CAPSULE, EXTENDED RELEASE ORAL DAILY
Qty: 180 CAPSULE | Refills: 3 | Status: SHIPPED | OUTPATIENT
Start: 2020-06-01 | End: 2020-08-07

## 2020-06-01 RX ORDER — PANTOPRAZOLE SODIUM 40 MG/1
TABLET, DELAYED RELEASE ORAL
Qty: 90 TABLET | Refills: 1 | Status: SHIPPED | OUTPATIENT
Start: 2020-06-01 | End: 2020-06-30

## 2020-06-01 RX ORDER — FLUTICASONE PROPIONATE 50 MCG
2 SPRAY, SUSPENSION (ML) NASAL DAILY PRN
Qty: 54.6 ML | Refills: 1 | Status: SHIPPED | OUTPATIENT
Start: 2020-06-01 | End: 2020-08-07

## 2020-06-01 RX ORDER — TAMSULOSIN HYDROCHLORIDE 0.4 MG/1
CAPSULE ORAL
Qty: 180 CAPSULE | Refills: 1 | Status: SHIPPED | OUTPATIENT
Start: 2020-06-01 | End: 2020-06-30

## 2020-06-01 NOTE — PROGRESS NOTES
[facility] [place]    Telephone visit  - [phone]    Subjective    Hugo Weber is a 75 year old male who is being evaluated via a billable telephone visit.      Chief Complaint   Patient presents with     med check     Hx of CVA - 1/2019 - no residual    Prediabetes    Glucose   Date Value Ref Range Status   12/02/2019 120 (H) 70 - 99 mg/dL Final   09/20/2019 89 70 - 99 mg/dL Final   08/14/2019 102 (H) 70 - 99 mg/dL Final     Comment:     Fasting specimen   02/06/2019 79 70 - 99 mg/dL Final   11/12/2018 91 70 - 99 mg/dL Final     Lab Results   Component Value Date    A1C 5.9 02/14/2020    A1C 5.7 12/02/2019    A1C 5.6 09/20/2019    A1C 5.6 08/14/2019    A1C 5.9 02/22/2019     Hyperlipidemia Follow-Up      Are you regularly taking any medication or supplement to lower your cholesterol?   No    Are you having muscle aches or other side effects that you think could be caused by your cholesterol lowering medication?  No    Recent Labs   Lab Test 02/22/19 09/20/18  0919  01/13/15  0815 09/03/14  0810   CHOL 106 165   < > 131 156   HDL 44 44   < > 43 42   LDL 49 100*   < > 74 93   TRIG 72 106   < > 72 103   CHOLHDLRATIO  --   --   --  3.0 3.7    < > = values in this interval not displayed.     Hypertension Follow-up      Do you check your blood pressure regularly outside of the clinic? No     Are you following a low salt diet? Yes    Are your blood pressures ever more than 140 on the top number (systolic) OR more   than 90 on the bottom number (diastolic), for example 140/90? unavailable      How many servings of fruits and vegetables do you eat daily?  2-3    On average, how many sweetened beverages do you drink each day (Examples: soda, juice, sweet tea, etc.  Do NOT count diet or artificially sweetened beverages)?   0    How many days per week do you exercise enough to make your heart beat faster? 4    How many minutes a day do you exercise enough to make your heart beat faster? 60 or  more    How many days per week do you miss taking your medication? 0    BP Readings from Last 3 Encounters:   02/19/20 136/84   02/12/20 130/76   12/02/19 130/84     Creatinine   Date Value Ref Range Status   12/02/2019 1.00 0.66 - 1.25 mg/dL Final     HAYLEE    On CPAP every night    Persistent A Fib - Dr Castillo    No issues    GERD    Doing well    OA    PT/cortisone - Dr Durham    TOMI    Hemoglobin   Date Value Ref Range Status   12/02/2019 14.9 13.3 - 17.7 g/dL Final   09/20/2019 15.0 13.3 - 17.7 g/dL Final     Hx of Hepatitis C    PMH    Past Medical History:   Diagnosis Date     Arrhythmia      Arthritis      Atrial fibrillation 2006    Followed by [facility]     Calculus of kidney 2005, 6/06, 10/12, 8/18, 9/19    dr walker/nestor/иван - hematuria - w/u o/w neg     Cervical stenosis of spine     Moderate C4-5     Cholelithiasis      Chronic peptic ulcer, unspecified site, without mention of hemorrhage, perforation, or obstruction 1985    gastric bypass     Colon polyps 8/05, 9/10, 10/15    2 tubular adenoma, 1 hyperplastic - multiple serrated/tubular adenomas     CVA (cerebral vascular accident) (H) 01/2019    small right periorlandic subcortical - left sided residual - left hand tingling/numbness - Left leg weak/numbness - cardioembolic     First degree AV block      Hepatitis C 10/12    chronic hepatitis C, grade 1-2, stage 1 - mild - Dr Douglas     Hyperlipidemia LDL goal <70      Hypertension goal BP (blood pressure) < 140/90 6/06    dr jaciel winchester     Iron deficiency anemia, unspecified 1985    gastric bypass     Nephrolithiasis multiple episodes, last 10/19    Dr Bey/Ivana     OA (osteoarthritis)     Multiple - Left shoulder with rotator cuff tear - Dr Durham     Obesity, unspecified     s/p gastric bypass 1985      Prediabetes      Presbyacusis 1/04    dr corona     Pyelonephritis, unspecified 12/99     SCC (squamous cell carcinoma), 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    dr saez - lt conchal bowl     Sleep apnea 10/02    cpap -  severe - 15 cm - dr cunha     Stroke (H) 1/19/2019     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    [facility]     Vitamin B12 deficiency (non anemic) 4/15/2019     Vitamin D deficiency 4/15/2019       PS    Past Surgical History:   Procedure Laterality Date     APPENDECTOMY       ARTHROPLASTY KNEE  8/13/2012    LT TKA - Dr Villanueva     CARDIOVERSION  2016     COLONOSCOPY  8/05, 9/10, 10/15    multiple tubular/serrated/hyperplastic polyps     COLONOSCOPY N/A 10/29/2015    multiple tubular and serrated adenomas     COLONOSCOPY N/A 9/7/2016     EYE SURGERY  Lasik     HC KNEE SCOPE, DIAGNOSTIC  05/00    Arthroscopy, Knee RT     LASER HOLMIUM LITHOTRIPSY URETER(S), INSERT STENT, COMBINED  10/16/2012    stones x 4, Dr Campa     LASER HOLMIUM LITHOTRIPSY URETER(S), INSERT STENT, COMBINED Right 5/2/2018    CYSTOSCOPY, RIGHT URETEROSCOPY, HOLMIUM LASER LITHOTRIPSY, RIGHT STENT PLACEMENT - Dr Bey     SURGICAL HISTORY OF -   1985    s/p gastric bypass Bilroth II     SURGICAL HISTORY OF -   11/98    wisdom teeth     SURGICAL HISTORY OF -   1979    cellulitis     SURGICAL HISTORY OF -   11/98    lt forearm spur's     SURGICAL HISTORY OF -   1/01,6/02,11/02    s/p lasik     SURGICAL HISTORY OF -   11/99    rt forearm spurs     SURGICAL HISTORY OF -   7/06    dr stevenson - lithotrypsy     SURGICAL HISTORY OF -   10/08, 12/08    Lt ear chonchal lesion removal SCC - dr saez     SURGICAL HISTORY OF -  Right 10/2019    nephrolithiasis - cystoscopy, rt lithotrypsy, Dr Heath     SURGICAL HISTORY OF -  Right 11/2019    Cystoscopy, Rt lithotrypsy, Calcium Oxalate, Dr Heath       Medications    Current Outpatient Medications   Medication Sig Dispense Refill     apixaban ANTICOAGULANT (ELIQUIS) 5 MG tablet Take 1 tablet (5 mg) by mouth 2 times daily 180 tablet 1     Cyanocobalamin 5000 MCG TBDP        diltiazem ER (DILT-XR) 180 MG 24 hr capsule Take 2 capsules (360 mg) by mouth daily 180 capsule 3     Ferrous Sulfate (IRON) 325 (65  Fe) MG tablet Take 1 tablet by mouth daily (with breakfast) 90 tablet 3     fluticasone (FLONASE) 50 MCG/ACT nasal spray Spray 2 sprays into both nostrils daily as needed for rhinitis or allergies 54.6 mL 1     niacin 500 MG tablet Take by mouth daily (with breakfast)       pantoprazole (PROTONIX) 40 MG EC tablet TAKE 1 TABLET  DAILY 30 TO 60 MINUTES BEFORE A MEAL. 90 tablet 1     tamsulosin (FLOMAX) 0.4 MG capsule TAKE 1 CAPSULE TWICE DAILY 180 capsule 1     vitamin C (ASCORBIC ACID) 1000 MG TABS Take 1,000 mg by mouth daily       Vitamin D-Vitamin K (VITAMIN K2-VITAMIN D3 PO)        Multiple Vitamins-Minerals (VITAMIN D3 COMPLETE PO) Take 125 mcg by mouth       STATIN NOT PRESCRIBED (INTENTIONAL) Please choose reason not prescribed, below, treated for hepatitis C (Patient not taking: Reported on 2020)         Allergies    Patient has no known allergies.    Family History    Family History   Problem Relation Age of Onset     Alzheimer Disease Father 82         at 88     Prostate Cancer Father 76        bladder and prostate     Heart Disease Mother 80        CHF     Heart Disease Maternal Grandfather         MI at 55     Cancer Paternal Uncle         ?       Social History    Social History     Socioeconomic History     Marital status:      Spouse name: Mayra     Number of children: 3     Years of education: 21     Highest education level: Not on file   Occupational History     Occupation: retired teacher and football and       Employer: [email or web address] [id number]     Employer: RETIRED   Social Needs     Financial resource strain: Not on file     Food insecurity     Worry: Not on file     Inability: Not on file     Transportation needs     Medical: Not on file     Non-medical: Not on file   Tobacco Use     Smoking status: Never Smoker     Smokeless tobacco: Never Used   Substance and Sexual Activity     Alcohol use: Yes     Alcohol/week: 2.0 standard drinks     Comment: 2 per week      "Drug use: No     Comment: acknowledges using herbal supplement \"Vibe\" for energy-none used recently      Sexual activity: Yes     Partners: Female     Birth control/protection: None     Comment:     Lifestyle     Physical activity     Days per week: Not on file     Minutes per session: Not on file     Stress: Not on file   Relationships     Social connections     Talks on phone: Not on file     Gets together: Not on file     Attends Zoroastrianism service: Not on file     Active member of club or organization: Not on file     Attends meetings of clubs or organizations: Not on file     Relationship status: Not on file     Intimate partner violence     Fear of current or ex partner: Not on file     Emotionally abused: Not on file     Physically abused: Not on file     Forced sexual activity: Not on file   Other Topics Concern      Service Not Asked     Blood Transfusions Not Asked     Caffeine Concern Yes     Comment: <1 can qd     Occupational Exposure Not Asked     Hobby Hazards Not Asked     Sleep Concern Yes     Comment: sleep apnea, wears cpap     Stress Concern Not Asked     Weight Concern Not Asked     Special Diet Not Asked     Back Care Not Asked     Exercise No     Bike Helmet Not Asked     Seat Belt Yes     Self-Exams Not Asked     Parent/sibling w/ CABG, MI or angioplasty before 65F 55M? No   Social History Narrative     Not on file       Reviewed and updated as needed this visit by Provider           Review of Systems     CONSTITUTIONAL: NEGATIVE for fever, chills, change in weight  INTEGUMENTARY/SKIN: NEGATIVE for worrisome rashes, moles or lesions  EYES: NEGATIVE for vision changes or irritation  ENT/MOUTH: NEGATIVE for ear, mouth and throat problems  RESP: NEGATIVE for significant cough or SOB  CV: NEGATIVE for chest pain, palpitations or peripheral edema  GI: NEGATIVE for nausea, abdominal pain, heartburn, or change in bowel habits  : NEGATIVE for frequency, dysuria, or " hematuria  MUSCULOSKELETAL: NEGATIVE for significant arthralgias or myalgia  NEURO: NEGATIVE for weakness, dizziness or paresthesias  ENDOCRINE: NEGATIVE for temperature intolerance, skin/hair changes  HEME: NEGATIVE for bleeding problems  PSYCHIATRIC: NEGATIVE for changes in mood or affect    Objective    Reported vitals:  There were no vitals taken for this visit.     healthy, alert and no distress  Psych: Alert and oriented times 3; coherent speech, normal   rate and volume, able to articulate logical thoughts, able   to abstract reason, no tangential thoughts, no hallucinations   or delusions  His affect is normal     Diagnostic Test Results:    Assessment/Plan:      ICD-10-CM    1. Prediabetes  R73.03 Comprehensive metabolic panel     UA reflex to Microscopic and Culture     Albumin Random Urine Quantitative with Creat Ratio     **A1C FUTURE anytime   2. History of CVA (cerebrovascular accident)  Z86.73 Comprehensive metabolic panel     Lipid panel reflex to direct LDL Fasting     UA reflex to Microscopic and Culture     Albumin Random Urine Quantitative with Creat Ratio     **A1C FUTURE anytime   3. Cerebrovascular accident (CVA), unspecified mechanism (H)  I63.9    4. Hypertension goal BP (blood pressure) < 140/90  I10 Comprehensive metabolic panel     UA reflex to Microscopic and Culture     Albumin Random Urine Quantitative with Creat Ratio     diltiazem ER (DILT-XR) 180 MG 24 hr capsule   5. Hyperlipidemia LDL goal <70  E78.5 Comprehensive metabolic panel     Lipid panel reflex to direct LDL Fasting     CK total   6. Obstructive sleep apnea syndrome  G47.33 TSH with free T4 reflex     fluticasone (FLONASE) 50 MCG/ACT nasal spray   7. Longstanding persistent atrial fibrillation  I48.11 Comprehensive metabolic panel     TSH with free T4 reflex     **A1C FUTURE anytime     apixaban ANTICOAGULANT (ELIQUIS) 5 MG tablet     diltiazem ER (DILT-XR) 180 MG 24 hr capsule   8. Gastroesophageal reflux disease with  "esophagitis  K21.0 CBC with platelets     pantoprazole (PROTONIX) 40 MG EC tablet   9. History of hepatitis C  Z86.19 Comprehensive metabolic panel     **Hepatitis C Screen Reflex to RNA FUTURE anytime   10. Primary osteoarthritis involving multiple joints  M15.0    11. Iron deficiency anemia, unspecified iron deficiency anemia type  D50.9 CBC with platelets     Iron and iron binding capacity     Ferritin   12. Long term current use of anticoagulant therapy - for intermittent a-fib  Z79.01 CBC with platelets   13. Medication monitoring encounter  Z51.81 Comprehensive metabolic panel     Lipid panel reflex to direct LDL Fasting     CK total     CBC with platelets     TSH with free T4 reflex     UA reflex to Microscopic and Culture     Albumin Random Urine Quantitative with Creat Ratio     Prostate spec antigen screen     Fecal colorectal cancer screen FIT     **A1C FUTURE anytime     Iron and iron binding capacity     Ferritin     **Hepatitis C Screen Reflex to RNA FUTURE anytime   14. Encounter for routine adult health examination without abnormal findings  Z00.00 Comprehensive metabolic panel     Lipid panel reflex to direct LDL Fasting     CK total     CBC with platelets     TSH with free T4 reflex     UA reflex to Microscopic and Culture     Albumin Random Urine Quantitative with Creat Ratio     Prostate spec antigen screen     Fecal colorectal cancer screen FIT     **A1C FUTURE anytime     Iron and iron binding capacity     Ferritin   15. Screening for prostate cancer  Z12.5 Prostate spec antigen screen   16. Screen for colon cancer  Z12.11 Fecal colorectal cancer screen FIT     Continue current cares  Med refills  Labs when able  CPX when able    Return in about 4 months (around 10/1/2020), or if symptoms worsen or fail to improve, for Complete Physical, Follow Up Chronic, Medication Recheck Visit.    Phone call duration:  27 minutes    The patient has been notified of following:     \"This telephone visit will " "be conducted via a call between you and your physician/provider. We have found that certain health care needs can be provided without the need for a physical exam.  This service lets us provide the care you need with a short phone conversation.  If a prescription is necessary we can send it directly to your pharmacy.  If lab work is needed we can place an order for that and you can then stop by our lab to have the test done at a later time.    Telephone visits are billed at different rates depending on your insurance coverage. During this emergency period, for some insurers they may be billed the same as an in-person visit.  Please reach out to your insurance provider with any questions.    If during the course of the call the physician/provider feels a telephone visit is not appropriate, you will not be charged for this service.\"    Patient has given verbal consent for Telephone visit?  Yes    How would you like to obtain your AVS? Maile Cassidy MD, FAA[facility]  [address] [place] [id number]  [email or web address]   Office: [phone]  Fax: [phone]  Pager: [phone]       "

## 2020-06-05 ENCOUNTER — TELEPHONE (OUTPATIENT)
Dept: FAMILY MEDICINE | Facility: CLINIC | Age: 75
End: 2020-06-05

## 2020-06-05 NOTE — TELEPHONE ENCOUNTER
Pt calling to state he would like to speak with Dr. Cassidy. Writer inquired about what, Pt stated his wife has been diagnosed with Glial cell. Writer advised to schedule appt. Pt decline first available stating this is too far away. Appt was for Monday 6/8/20. Pt was advised there are no further appt today. Pt requested to speak to Jeanine, advised Jeanine was not in office. Pt again declined scheduling.    Routing to PCP for FYI, pt concerns.    King Combs RN   [facility] [place] Triage

## 2020-06-06 NOTE — TELEPHONE ENCOUNTER
Glioblastoma - Dr Garcia called on 6/3 - noted glioblastoma - 6/9 Dr Isabel at  - 6/11 Jaron at [facility] pending    Mayra Arias on speaker phone

## 2020-06-29 DIAGNOSIS — K21.00 GASTROESOPHAGEAL REFLUX DISEASE WITH ESOPHAGITIS: ICD-10-CM

## 2020-06-30 RX ORDER — TAMSULOSIN HYDROCHLORIDE 0.4 MG/1
CAPSULE ORAL
Qty: 180 CAPSULE | Refills: 1 | Status: SHIPPED | OUTPATIENT
Start: 2020-06-30 | End: 2020-08-07

## 2020-06-30 RX ORDER — PANTOPRAZOLE SODIUM 40 MG/1
TABLET, DELAYED RELEASE ORAL
Qty: 90 TABLET | Refills: 1 | Status: SHIPPED | OUTPATIENT
Start: 2020-06-30 | End: 2020-08-07

## 2020-06-30 NOTE — TELEPHONE ENCOUNTER
Pt is changing pharmacies.  Prescription approved per [facility] Refill Protocol.    Caroline ROSENBAUM RN  EP Triage

## 2020-07-21 ENCOUNTER — TELEPHONE (OUTPATIENT)
Dept: FAMILY MEDICINE | Facility: CLINIC | Age: 75
End: 2020-07-21

## 2020-08-07 ENCOUNTER — OFFICE VISIT (OUTPATIENT)
Dept: FAMILY MEDICINE | Facility: CLINIC | Age: 75
End: 2020-08-07
Payer: COMMERCIAL

## 2020-08-07 VITALS
OXYGEN SATURATION: 97 % | DIASTOLIC BLOOD PRESSURE: 68 MMHG | SYSTOLIC BLOOD PRESSURE: 126 MMHG | HEIGHT: 75 IN | BODY MASS INDEX: 39.17 KG/M2 | HEART RATE: 87 BPM | TEMPERATURE: 97.6 F | WEIGHT: 315 LBS

## 2020-08-07 DIAGNOSIS — Z12.5 SCREENING FOR PROSTATE CANCER: ICD-10-CM

## 2020-08-07 DIAGNOSIS — K21.00 GASTROESOPHAGEAL REFLUX DISEASE WITH ESOPHAGITIS: ICD-10-CM

## 2020-08-07 DIAGNOSIS — E78.5 HYPERLIPIDEMIA LDL GOAL <70: ICD-10-CM

## 2020-08-07 DIAGNOSIS — N20.0 NEPHROLITHIASIS: ICD-10-CM

## 2020-08-07 DIAGNOSIS — E66.01 MORBID OBESITY (H): ICD-10-CM

## 2020-08-07 DIAGNOSIS — E55.9 VITAMIN D DEFICIENCY: ICD-10-CM

## 2020-08-07 DIAGNOSIS — I48.11 LONGSTANDING PERSISTENT ATRIAL FIBRILLATION (H): ICD-10-CM

## 2020-08-07 DIAGNOSIS — I10 HYPERTENSION GOAL BP (BLOOD PRESSURE) < 140/90: ICD-10-CM

## 2020-08-07 DIAGNOSIS — M25.512 CHRONIC LEFT SHOULDER PAIN: ICD-10-CM

## 2020-08-07 DIAGNOSIS — F43.21 GRIEVING: ICD-10-CM

## 2020-08-07 DIAGNOSIS — Z86.19 HISTORY OF HEPATITIS C: ICD-10-CM

## 2020-08-07 DIAGNOSIS — Z51.81 MEDICATION MONITORING ENCOUNTER: ICD-10-CM

## 2020-08-07 DIAGNOSIS — R82.90 NONSPECIFIC FINDING ON EXAMINATION OF URINE: ICD-10-CM

## 2020-08-07 DIAGNOSIS — E53.8 VITAMIN B12 DEFICIENCY (NON ANEMIC): ICD-10-CM

## 2020-08-07 DIAGNOSIS — Z00.00 ENCOUNTER FOR ROUTINE ADULT HEALTH EXAMINATION WITHOUT ABNORMAL FINDINGS: Primary | ICD-10-CM

## 2020-08-07 DIAGNOSIS — Z12.11 SCREEN FOR COLON CANCER: ICD-10-CM

## 2020-08-07 DIAGNOSIS — Z86.73 HISTORY OF CVA (CEREBROVASCULAR ACCIDENT): ICD-10-CM

## 2020-08-07 DIAGNOSIS — K76.0 NAFLD (NONALCOHOLIC FATTY LIVER DISEASE): ICD-10-CM

## 2020-08-07 DIAGNOSIS — G47.33 OBSTRUCTIVE SLEEP APNEA SYNDROME: ICD-10-CM

## 2020-08-07 DIAGNOSIS — Z79.01 LONG TERM CURRENT USE OF ANTICOAGULANT THERAPY: ICD-10-CM

## 2020-08-07 DIAGNOSIS — G89.29 CHRONIC LEFT SHOULDER PAIN: ICD-10-CM

## 2020-08-07 DIAGNOSIS — Z00.00 ENCOUNTER FOR MEDICARE ANNUAL WELLNESS EXAM: ICD-10-CM

## 2020-08-07 DIAGNOSIS — I48.19 PERSISTENT ATRIAL FIBRILLATION (H): ICD-10-CM

## 2020-08-07 DIAGNOSIS — M15.0 PRIMARY OSTEOARTHRITIS INVOLVING MULTIPLE JOINTS: ICD-10-CM

## 2020-08-07 DIAGNOSIS — I48.0 PAROXYSMAL ATRIAL FIBRILLATION (H): ICD-10-CM

## 2020-08-07 DIAGNOSIS — D50.9 IRON DEFICIENCY ANEMIA, UNSPECIFIED IRON DEFICIENCY ANEMIA TYPE: ICD-10-CM

## 2020-08-07 DIAGNOSIS — R73.03 PREDIABETES: ICD-10-CM

## 2020-08-07 LAB
ALBUMIN SERPL-MCNC: 3.5 G/DL (ref 3.4–5)
ALBUMIN UR-MCNC: NEGATIVE MG/DL
ALP SERPL-CCNC: 145 U/L (ref 40–150)
ALT SERPL W P-5'-P-CCNC: 21 U/L (ref 0–70)
ANION GAP SERPL CALCULATED.3IONS-SCNC: 7 MMOL/L (ref 3–14)
APPEARANCE UR: CLEAR
AST SERPL W P-5'-P-CCNC: 11 U/L (ref 0–45)
BACTERIA #/AREA URNS HPF: ABNORMAL /HPF
BILIRUB SERPL-MCNC: 0.6 MG/DL (ref 0.2–1.3)
BILIRUB UR QL STRIP: NEGATIVE
BUN SERPL-MCNC: 14 MG/DL (ref 7–30)
CALCIUM SERPL-MCNC: 8.8 MG/DL (ref 8.5–10.1)
CHLORIDE SERPL-SCNC: 107 MMOL/L (ref 94–109)
CHOLEST SERPL-MCNC: 148 MG/DL
CK SERPL-CCNC: 67 U/L (ref 30–300)
CO2 SERPL-SCNC: 25 MMOL/L (ref 20–32)
COLOR UR AUTO: YELLOW
CREAT SERPL-MCNC: 0.82 MG/DL (ref 0.66–1.25)
ERYTHROCYTE [DISTWIDTH] IN BLOOD BY AUTOMATED COUNT: 14 % (ref 10–15)
FERRITIN SERPL-MCNC: 28 NG/ML (ref 26–388)
GFR SERPL CREATININE-BSD FRML MDRD: 87 ML/MIN/{1.73_M2}
GLUCOSE SERPL-MCNC: 99 MG/DL (ref 70–99)
GLUCOSE UR STRIP-MCNC: NEGATIVE MG/DL
HBA1C MFR BLD: 5.8 % (ref 0–5.6)
HCT VFR BLD AUTO: 47 % (ref 40–53)
HDLC SERPL-MCNC: 43 MG/DL
HGB BLD-MCNC: 15.4 G/DL (ref 13.3–17.7)
HGB UR QL STRIP: ABNORMAL
IRON SATN MFR SERPL: 28 % (ref 15–46)
IRON SERPL-MCNC: 99 UG/DL (ref 35–180)
KETONES UR STRIP-MCNC: NEGATIVE MG/DL
LDLC SERPL CALC-MCNC: 88 MG/DL
LEUKOCYTE ESTERASE UR QL STRIP: ABNORMAL
MCH RBC QN AUTO: 29.1 PG (ref 26.5–33)
MCHC RBC AUTO-ENTMCNC: 32.8 G/DL (ref 31.5–36.5)
MCV RBC AUTO: 89 FL (ref 78–100)
MUCOUS THREADS #/AREA URNS LPF: PRESENT /LPF
NITRATE UR QL: NEGATIVE
NONHDLC SERPL-MCNC: 105 MG/DL
PH UR STRIP: 7 PH (ref 5–7)
PLATELET # BLD AUTO: 265 10E9/L (ref 150–450)
POTASSIUM SERPL-SCNC: 4 MMOL/L (ref 3.4–5.3)
PROT SERPL-MCNC: 7.5 G/DL (ref 6.8–8.8)
PSA SERPL-ACNC: 3.72 UG/L (ref 0–4)
RBC # BLD AUTO: 5.29 10E12/L (ref 4.4–5.9)
RBC #/AREA URNS AUTO: ABNORMAL /HPF
SODIUM SERPL-SCNC: 139 MMOL/L (ref 133–144)
SOURCE: ABNORMAL
SP GR UR STRIP: 1.02 (ref 1–1.03)
TIBC SERPL-MCNC: 350 UG/DL (ref 240–430)
TRIGL SERPL-MCNC: 84 MG/DL
TSH SERPL DL<=0.005 MIU/L-ACNC: 2.03 MU/L (ref 0.4–4)
UROBILINOGEN UR STRIP-ACNC: 1 EU/DL (ref 0.2–1)
VIT B12 SERPL-MCNC: 1827 PG/ML (ref 193–986)
WBC # BLD AUTO: 7 10E9/L (ref 4–11)
WBC #/AREA URNS AUTO: ABNORMAL /HPF

## 2020-08-07 PROCEDURE — 82043 UR ALBUMIN QUANTITATIVE: CPT | Performed by: FAMILY MEDICINE

## 2020-08-07 PROCEDURE — 83550 IRON BINDING TEST: CPT | Performed by: FAMILY MEDICINE

## 2020-08-07 PROCEDURE — 82306 VITAMIN D 25 HYDROXY: CPT | Performed by: FAMILY MEDICINE

## 2020-08-07 PROCEDURE — 99397 PER PM REEVAL EST PAT 65+ YR: CPT | Performed by: FAMILY MEDICINE

## 2020-08-07 PROCEDURE — 82550 ASSAY OF CK (CPK): CPT | Performed by: FAMILY MEDICINE

## 2020-08-07 PROCEDURE — 82728 ASSAY OF FERRITIN: CPT | Performed by: FAMILY MEDICINE

## 2020-08-07 PROCEDURE — 84443 ASSAY THYROID STIM HORMONE: CPT | Performed by: FAMILY MEDICINE

## 2020-08-07 PROCEDURE — 83036 HEMOGLOBIN GLYCOSYLATED A1C: CPT | Performed by: FAMILY MEDICINE

## 2020-08-07 PROCEDURE — 81001 URINALYSIS AUTO W/SCOPE: CPT | Performed by: FAMILY MEDICINE

## 2020-08-07 PROCEDURE — 36415 COLL VENOUS BLD VENIPUNCTURE: CPT | Performed by: FAMILY MEDICINE

## 2020-08-07 PROCEDURE — 82607 VITAMIN B-12: CPT | Performed by: FAMILY MEDICINE

## 2020-08-07 PROCEDURE — 85027 COMPLETE CBC AUTOMATED: CPT | Performed by: FAMILY MEDICINE

## 2020-08-07 PROCEDURE — G0103 PSA SCREENING: HCPCS | Performed by: FAMILY MEDICINE

## 2020-08-07 PROCEDURE — 87086 URINE CULTURE/COLONY COUNT: CPT | Performed by: FAMILY MEDICINE

## 2020-08-07 PROCEDURE — 80061 LIPID PANEL: CPT | Performed by: FAMILY MEDICINE

## 2020-08-07 PROCEDURE — 80053 COMPREHEN METABOLIC PANEL: CPT | Performed by: FAMILY MEDICINE

## 2020-08-07 PROCEDURE — 87522 HEPATITIS C REVRS TRNSCRPJ: CPT | Performed by: FAMILY MEDICINE

## 2020-08-07 PROCEDURE — 83540 ASSAY OF IRON: CPT | Performed by: FAMILY MEDICINE

## 2020-08-07 RX ORDER — PANTOPRAZOLE SODIUM 40 MG/1
40 TABLET, DELAYED RELEASE ORAL DAILY
Qty: 90 TABLET | Refills: 3 | Status: SHIPPED | OUTPATIENT
Start: 2020-08-07 | End: 2021-02-10

## 2020-08-07 RX ORDER — TAMSULOSIN HYDROCHLORIDE 0.4 MG/1
0.4 CAPSULE ORAL 2 TIMES DAILY
Qty: 180 CAPSULE | Refills: 3 | Status: SHIPPED | OUTPATIENT
Start: 2020-08-07 | End: 2020-12-09

## 2020-08-07 RX ORDER — FLUTICASONE PROPIONATE 50 MCG
2 SPRAY, SUSPENSION (ML) NASAL DAILY PRN
Qty: 54.6 ML | Refills: 3 | Status: SHIPPED | OUTPATIENT
Start: 2020-08-07 | End: 2021-02-15

## 2020-08-07 RX ORDER — DILTIAZEM HYDROCHLORIDE 180 MG/1
360 CAPSULE, EXTENDED RELEASE ORAL DAILY
Qty: 180 CAPSULE | Refills: 3 | Status: SHIPPED | OUTPATIENT
Start: 2020-08-07 | End: 2021-02-10

## 2020-08-07 ASSESSMENT — ACTIVITIES OF DAILY LIVING (ADL): CURRENT_FUNCTION: NO ASSISTANCE NEEDED

## 2020-08-07 ASSESSMENT — MIFFLIN-ST. JEOR: SCORE: 2313.43

## 2020-08-07 NOTE — PATIENT INSTRUCTIONS
Patient Education   Personalized Prevention Plan  You are due for the preventive services outlined below.  Your care team is available to assist you in scheduling these services.  If you have already completed any of these items, please share that information with your care team to update in your medical record.  Health Maintenance Due   Topic Date Due     Annual Wellness Visit  09/20/2019     Kidney Microalbumin Urine Test  09/20/2019     Prostate Test  09/20/2019     FALL RISK ASSESSMENT  09/20/2019     PHQ-2  01/01/2020     Cholesterol Lab  02/22/2020

## 2020-08-08 LAB
BACTERIA SPEC CULT: NORMAL
CREAT UR-MCNC: 87 MG/DL
MICROALBUMIN UR-MCNC: 25 MG/L
MICROALBUMIN/CREAT UR: 28.59 MG/G CR (ref 0–17)
SPECIMEN SOURCE: NORMAL

## 2020-08-10 LAB
DEPRECATED CALCIDIOL+CALCIFEROL SERPL-MC: 59 UG/L (ref 20–75)
HCV RNA SERPL NAA+PROBE-ACNC: NORMAL [IU]/ML
HCV RNA SERPL NAA+PROBE-LOG IU: NORMAL LOG IU/ML

## 2020-08-13 NOTE — RESULT ENCOUNTER NOTE
Dear Hugo,    Here is a summary of your recent test results:  -PSA (prostate specific antigen) test is normal.  This indicates a low likelihood of prostate cancer.  ADVISE: rechecking this in 1 year.  -Cholesterol levels are at your goal levels.  ADVISE: continuing your medication, a regular exercise program with at least 150 minutes of aerobic exercise per week, and eating a low saturated fat/low carbohydrate diet.  Also, you should recheck this fasting cholesterol panel in 12 months.  -Liver and gallbladder tests are normal. (ALT,AST, Alk phos)  -Kidney function(Cr, GFR) is normal.  -Sodium is normal, Potassium is normal.  -Glucose is normal. (diabetes screening test)   -Vitamin D level is normal and getting 1000 IU daily in your diet or supplements is recommended.   -Microalbumin (urine protein) test is normal.  ADVISE: rechecking this annually.  -Urine culture is abnormal and grew out bacteria that are sensitive to the antibiotic you have been given.  Complete the medication as prescribed and if you experience new, worsening or persistent symptoms, you should call or return for a recheck.  -B12 is elevated but stable. ADVISE continuing the same dose of supplementation.    For additional lab test information, [email or web address] is an excellent reference.    Thank you very much for trusting me and [facility].     Healthy regards,  Timur Dimas MD (I am covering for Brett Cassidy MD who is away from the office today.)

## 2020-09-14 ENCOUNTER — MEDICAL CORRESPONDENCE (OUTPATIENT)
Dept: HEALTH INFORMATION MANAGEMENT | Facility: CLINIC | Age: 75
End: 2020-09-14

## 2020-09-14 DIAGNOSIS — B35.8: Primary | ICD-10-CM

## 2020-09-14 LAB
BASOPHILS # BLD AUTO: 0 10E9/L (ref 0–0.2)
BASOPHILS NFR BLD AUTO: 0.5 %
DIFFERENTIAL METHOD BLD: NORMAL
EOSINOPHIL # BLD AUTO: 0.1 10E9/L (ref 0–0.7)
EOSINOPHIL NFR BLD AUTO: 1.6 %
ERYTHROCYTE [DISTWIDTH] IN BLOOD BY AUTOMATED COUNT: 13.8 % (ref 10–15)
HCT VFR BLD AUTO: 44.8 % (ref 40–53)
HGB BLD-MCNC: 14.5 G/DL (ref 13.3–17.7)
LYMPHOCYTES # BLD AUTO: 1.8 10E9/L (ref 0.8–5.3)
LYMPHOCYTES NFR BLD AUTO: 21.1 %
MCH RBC QN AUTO: 29.1 PG (ref 26.5–33)
MCHC RBC AUTO-ENTMCNC: 32.4 G/DL (ref 31.5–36.5)
MCV RBC AUTO: 90 FL (ref 78–100)
MONOCYTES # BLD AUTO: 0.7 10E9/L (ref 0–1.3)
MONOCYTES NFR BLD AUTO: 8.9 %
NEUTROPHILS # BLD AUTO: 5.7 10E9/L (ref 1.6–8.3)
NEUTROPHILS NFR BLD AUTO: 67.9 %
PLATELET # BLD AUTO: 225 10E9/L (ref 150–450)
RBC # BLD AUTO: 4.98 10E12/L (ref 4.4–5.9)
WBC # BLD AUTO: 8.4 10E9/L (ref 4–11)

## 2020-09-14 PROCEDURE — 36415 COLL VENOUS BLD VENIPUNCTURE: CPT | Performed by: PHYSICIAN ASSISTANT

## 2020-09-14 PROCEDURE — 84450 TRANSFERASE (AST) (SGOT): CPT | Performed by: PHYSICIAN ASSISTANT

## 2020-09-14 PROCEDURE — 84460 ALANINE AMINO (ALT) (SGPT): CPT | Performed by: PHYSICIAN ASSISTANT

## 2020-09-14 PROCEDURE — 85025 COMPLETE CBC W/AUTO DIFF WBC: CPT | Performed by: PHYSICIAN ASSISTANT

## 2020-09-15 LAB
ALT SERPL W P-5'-P-CCNC: 20 U/L (ref 0–70)
AST SERPL W P-5'-P-CCNC: 9 U/L (ref 0–45)

## 2020-09-29 DIAGNOSIS — Z12.11 SCREEN FOR COLON CANCER: ICD-10-CM

## 2020-09-29 DIAGNOSIS — Z00.00 ENCOUNTER FOR ROUTINE ADULT HEALTH EXAMINATION WITHOUT ABNORMAL FINDINGS: ICD-10-CM

## 2020-09-29 PROCEDURE — 82274 ASSAY TEST FOR BLOOD FECAL: CPT | Performed by: FAMILY MEDICINE

## 2020-10-04 LAB — HEMOCCULT STL QL IA: NEGATIVE

## 2020-10-14 ENCOUNTER — TRANSFERRED RECORDS (OUTPATIENT)
Dept: HEALTH INFORMATION MANAGEMENT | Facility: CLINIC | Age: 75
End: 2020-10-14

## 2020-10-19 ENCOUNTER — ALLIED HEALTH/NURSE VISIT (OUTPATIENT)
Dept: NURSING | Facility: CLINIC | Age: 75
End: 2020-10-19
Payer: COMMERCIAL

## 2020-10-19 DIAGNOSIS — Z23 NEED FOR PROPHYLACTIC VACCINATION AND INOCULATION AGAINST INFLUENZA: Primary | ICD-10-CM

## 2020-10-19 PROCEDURE — G0008 ADMIN INFLUENZA VIRUS VAC: HCPCS

## 2020-10-19 PROCEDURE — 90662 IIV NO PRSV INCREASED AG IM: CPT

## 2020-10-20 ENCOUNTER — TELEPHONE (OUTPATIENT)
Dept: UROLOGY | Facility: CLINIC | Age: 75
End: 2020-10-20

## 2020-10-20 DIAGNOSIS — N20.0 KIDNEY STONE: Primary | ICD-10-CM

## 2020-10-20 NOTE — TELEPHONE ENCOUNTER
[facility] Call Center    Phone Message    May a detailed message be left on voicemail: yes     Reason for Call: Symptoms or Concerns     If patient has red-flag symptoms, warm transfer to triage line    Current symptom or concern: Pain due to left kidney stone    Symptoms have been present for:  6 month(s)    Has patient previously been seen for this? Yes    By : Dr. Heath    Date: 11/14/2019    Are there any new or worsening symptoms? Yes: pain is increasing significantly.  Please call him back to advise (Dr. Heath's first available isn't until Feb 2021)      Action Taken: Message routed to:  [facility]  Urology    Travel Screening: Not Applicable

## 2020-10-20 NOTE — TELEPHONE ENCOUNTER
Hi Dr. Heath,      Would you like for this patient to schedule any imaging/labs to rule out a kidney stone? You do not have any opening until Feb. 2021. Please advise.       Thanks, Tr Huertas MA

## 2020-10-21 NOTE — TELEPHONE ENCOUNTER
Patient was notified that Dr. Regulo Heath would like for him to have a CT scan done to begin and then we will go from there. Patient agreed with the plan. Patient stated that he will contact [facility] to schedule his radiology appointment.      Tr Huertas MA

## 2020-10-27 ENCOUNTER — HOSPITAL ENCOUNTER (OUTPATIENT)
Dept: CT IMAGING | Facility: CLINIC | Age: 75
Discharge: HOME | End: 2020-10-27
Attending: UROLOGY | Admitting: UROLOGY
Payer: COMMERCIAL

## 2020-10-27 DIAGNOSIS — N20.0 KIDNEY STONE: ICD-10-CM

## 2020-10-27 PROCEDURE — 74176 CT ABD & PELVIS W/O CONTRAST: CPT

## 2020-10-28 ENCOUNTER — OFFICE VISIT (OUTPATIENT)
Dept: FAMILY MEDICINE | Facility: CLINIC | Age: 75
End: 2020-10-28
Payer: COMMERCIAL

## 2020-10-28 ENCOUNTER — TELEPHONE (OUTPATIENT)
Dept: FAMILY MEDICINE | Facility: CLINIC | Age: 75
End: 2020-10-28

## 2020-10-28 VITALS
DIASTOLIC BLOOD PRESSURE: 74 MMHG | TEMPERATURE: 97.5 F | HEART RATE: 74 BPM | OXYGEN SATURATION: 99 % | WEIGHT: 315 LBS | HEIGHT: 75 IN | SYSTOLIC BLOOD PRESSURE: 132 MMHG | BODY MASS INDEX: 39.17 KG/M2

## 2020-10-28 DIAGNOSIS — I48.0 PAROXYSMAL ATRIAL FIBRILLATION (H): ICD-10-CM

## 2020-10-28 DIAGNOSIS — Z86.73 HISTORY OF CVA (CEREBROVASCULAR ACCIDENT): ICD-10-CM

## 2020-10-28 DIAGNOSIS — R10.84 ABDOMINAL PAIN, GENERALIZED: Primary | ICD-10-CM

## 2020-10-28 DIAGNOSIS — R73.03 PREDIABETES: ICD-10-CM

## 2020-10-28 DIAGNOSIS — I10 HYPERTENSION GOAL BP (BLOOD PRESSURE) < 140/90: ICD-10-CM

## 2020-10-28 DIAGNOSIS — K80.20 CALCULUS OF GALLBLADDER WITHOUT CHOLECYSTITIS WITHOUT OBSTRUCTION: ICD-10-CM

## 2020-10-28 LAB
ERYTHROCYTE [DISTWIDTH] IN BLOOD BY AUTOMATED COUNT: 13.8 % (ref 10–15)
ERYTHROCYTE [SEDIMENTATION RATE] IN BLOOD BY WESTERGREN METHOD: 8 MM/H (ref 0–20)
HCT VFR BLD AUTO: 47.5 % (ref 40–53)
HGB BLD-MCNC: 15.5 G/DL (ref 13.3–17.7)
MCH RBC QN AUTO: 28.8 PG (ref 26.5–33)
MCHC RBC AUTO-ENTMCNC: 32.6 G/DL (ref 31.5–36.5)
MCV RBC AUTO: 88 FL (ref 78–100)
PLATELET # BLD AUTO: 247 10E9/L (ref 150–450)
RBC # BLD AUTO: 5.39 10E12/L (ref 4.4–5.9)
WBC # BLD AUTO: 9.9 10E9/L (ref 4–11)

## 2020-10-28 PROCEDURE — 80053 COMPREHEN METABOLIC PANEL: CPT | Performed by: FAMILY MEDICINE

## 2020-10-28 PROCEDURE — 36415 COLL VENOUS BLD VENIPUNCTURE: CPT | Performed by: FAMILY MEDICINE

## 2020-10-28 PROCEDURE — 85652 RBC SED RATE AUTOMATED: CPT | Performed by: FAMILY MEDICINE

## 2020-10-28 PROCEDURE — 86140 C-REACTIVE PROTEIN: CPT | Performed by: FAMILY MEDICINE

## 2020-10-28 PROCEDURE — 83690 ASSAY OF LIPASE: CPT | Performed by: FAMILY MEDICINE

## 2020-10-28 PROCEDURE — 82150 ASSAY OF AMYLASE: CPT | Performed by: FAMILY MEDICINE

## 2020-10-28 PROCEDURE — 85027 COMPLETE CBC AUTOMATED: CPT | Performed by: FAMILY MEDICINE

## 2020-10-28 PROCEDURE — 99214 OFFICE O/P EST MOD 30 MIN: CPT | Performed by: FAMILY MEDICINE

## 2020-10-28 ASSESSMENT — MIFFLIN-ST. JEOR: SCORE: 2322.5

## 2020-10-28 NOTE — TELEPHONE ENCOUNTER
LOV [id number]    ABDOMINAL   PAIN      Onset: 2-3 months gotten worse the last month     Description:   Character: Stabbing  Location: left lower quadrant  Radiation: Back    Intensity: moderate, severe    Progression of Symptoms:  worsening    Accompanying Signs & Symptoms:  Fever/Chills?: no   Gas/Bloating: YES- lots of gas   Nausea: no   Vomitting: no   Diarrhea?: YES- about week it has been loose has had 2 stools a day on average   Constipation:no   Dysuria or Hematuria: no    History:   Trauma: no   Previous similar pain: YES- kidney stones but had a CT scan done yesterday 10/27/2020 and shows the stone is not the cause of this discomfort and was advised to see/call PCP    Previous tests done: CT    Precipitating factors:   Does the pain change with:     Food: YES- pain shows up after eating  and if he drinks water then the pain subsides     BM: no     Urination: no     Alleviating factors:  Water has helped with the discomfort   Tylenol sometimes help   Has a daily antacid does not see a difference with that   Passing gas helps the discomfort     Therapies Tried and outcome: none     LMP:  not applicable      Advised pt that he should be seen today to be assessed - pt wasn't to only see MD RANDY   Rn advised pt that if things worsen or change from now until the appointment he needs to go to the ER   Reviewed worsening symptoms with pt   Patient stated an understanding and agreed with plan.    Kathia Centeno RN, BSN  [place] Triage

## 2020-10-28 NOTE — LETTER
October 30, 2020      Hugo Weber  [address]  [place] [address]        Dear ,    We are writing to inform you of your test results.    Your recent results have been reviewed.     They showed:     -All of your labs are normal.     We advise:     Await endoscopy/colonoscopy     For additional lab test information, [email or web address] is an excellent reference.     Let us know if you have any questions or concerns.     Thank you for choosing us for your health care needs!     Resulted Orders   Comprehensive metabolic panel (BMP + Alb, Alk Phos, ALT, AST, Total. Bili, TP)   Result Value Ref Range    Sodium 138 133 - 144 mmol/L    Potassium 4.5 3.4 - 5.3 mmol/L    Chloride 106 94 - 109 mmol/L    Carbon Dioxide 27 20 - 32 mmol/L    Anion Gap 5 3 - 14 mmol/L    Glucose 86 70 - 99 mg/dL      Comment:      Non Fasting    Urea Nitrogen 19 7 - 30 mg/dL    Creatinine 0.98 0.66 - 1.25 mg/dL    GFR Estimate 76 >60 mL/min/[1.73_m2]      Comment:      Non  GFR Calc  Starting 12/18/2018, serum creatinine based estimated GFR (eGFR) will be   calculated using the Chronic Kidney Disease Epidemiology Collaboration   (CKD-EPI) equation.      GFR Estimate If Black 88 >60 mL/min/[1.73_m2]      Comment:       GFR Calc  Starting 12/18/2018, serum creatinine based estimated GFR (eGFR) will be   calculated using the Chronic Kidney Disease Epidemiology Collaboration   (CKD-EPI) equation.      Calcium 8.9 8.5 - 10.1 mg/dL    Bilirubin Total 0.3 0.2 - 1.3 mg/dL    Albumin 3.6 3.4 - 5.0 g/dL    Protein Total 7.5 6.8 - 8.8 g/dL    Alkaline Phosphatase 130 40 - 150 U/L    ALT 24 0 - 70 U/L    AST 14 0 - 45 U/L   CBC with platelets   Result Value Ref Range    WBC 9.9 4.0 - 11.0 10e9/L    RBC Count 5.39 4.4 - 5.9 10e12/L    Hemoglobin 15.5 13.3 - 17.7 g/dL    Hematocrit 47.5 40.0 - 53.0 %    MCV 88 78 - 100 fl    MCH 28.8 26.5 - 33.0 pg    MCHC 32.6 31.5 - 36.5 g/dL    RDW 13.8 10.0 -  15.0 %    Platelet Count 247 150 - 450 10e9/L   Erythrocyte sedimentation rate auto   Result Value Ref Range    Sed Rate 8 0 - 20 mm/h   CRP, inflammation   Result Value Ref Range    CRP Inflammation <2.9 0.0 - 8.0 mg/L   Lipase   Result Value Ref Range    Lipase 76 73 - 393 U/L   Amylase   Result Value Ref Range    Amylase 74 30 - 110 U/L       If you have any questions or concerns, please call the clinic at the number listed above.       Sincerely,        Brett Cassidy MD

## 2020-10-28 NOTE — RESULT ENCOUNTER NOTE
Reviewed scan with Mr. Weber over the phone.  Stone in left side is stable.  He is having left flank pain but describes it more in the front and notices it associated with meals which would not be classic for stone pain.  Advised him to speak with PMD and consider other causes for his pain besides the stone.  Discussed that certainly we could make arrangements to perform ureteroscopic removal like we did with his right side but that other etiologies should be cosnidered prior.

## 2020-10-28 NOTE — PROGRESS NOTES
[facility]    Hugo Weber is a 75 year old male who presents to clinic today for the following health issues:    Abdominal/Flank Pain  Patient has persistent left lower abdominal pain.The pain is dull but occasionally feels like a stabbing pain. The pain is worse immediately after eating. It is improved with 2 Tylenol and with passing gas. It also seems to improve with increased water intake. The patient has been belching but it does not improve the pain. Has had 2-3 stools/day. He denies any constipation or diarrhea. They have been softer than usual the past week. Stools have been green/brown with no gross blood. He has had no limitations in daily activities, but is in significant discomfort. Patient has a Hx of gastric ulcer and hyperplastic colon polyps.    He has a Hx of nephrolithiasis in bilateral kidneys. Had CT on 1 day ago (10/27) ordered by urologist who told him to follow up with PCP. CT showed nonobstructing left renal collecting system 1.2 cm stone. No hydronephrosis or additional urinary tract calculi. Previously noted right urinary tract stones have resolved. No other acute abnormality noted in the abdomen or pelvis to account for patient's left-sided abdominal pain.    Reviewed and updated as needed this visit by Provider                   BP Readings from Last 3 Encounters:   10/28/20 132/74   08/07/20 126/68   02/19/20 136/84       body mass index is 41.25 kg/m .    Wt Readings from Last 4 Encounters:   10/28/20 149.7 kg (330 lb)   08/07/20 148.8 kg (328 lb)   02/19/20 (!) 156 kg (344 lb)   02/12/20 (!) 158.8 kg (350 lb)       Health Maintenance    There are no preventive care reminders to display for this patient.    Current Problem List    Patient Active Problem List   Diagnosis     Iron deficiency anemia     Colon polyps     Obstructive sleep apnea syndrome     Hyperlipidemia LDL goal <70     Long term current use of anticoagulant therapy - for  intermittent a-fib     Advance Care Planning     Total knee replacement status     Calculus of kidney     NAFLD (nonalcoholic fatty liver disease)     Morbid obesity due to excess calories (H)     History of hepatitis C     Prediabetes     Paroxysmal atrial fibrillation (H)     Cholelithiasis     Hypertension goal BP (blood pressure) < 140/90     TMJ arthralgia     Nephrolithiasis     OA (osteoarthritis)     First degree AV block     Benign prostatic hyperplasia (BPH) with urinary urge incontinence     Thoracic aortic ectasia (H)     Stroke (H)     CVA (cerebral vascular accident) (H)     Cervical stenosis of spine     Vitamin B12 deficiency (non anemic)     Vitamin D deficiency     Myofascial pain     Atrial fibrillation     History of CVA (cerebrovascular accident)       Past Medical History    Past Medical History:   Diagnosis Date     Arrhythmia      Arthritis      Atrial fibrillation 2006    Followed by [facility] - persistent     Calculus of kidney 2005, 6/06, 10/12, 8/18, 9/19    dr walker/nestor/иван - hematuria - w/u o/w neg     Cervical stenosis of spine     Moderate C4-5     Cholelithiasis      Chronic peptic ulcer, unspecified site, without mention of hemorrhage, perforation, or obstruction 1985    gastric bypass     Colon polyps 8/05, 9/10, 10/15    2 tubular adenoma, 1 hyperplastic - multiple serrated/tubular adenomas     CVA (cerebral vascular accident) (H) 01/2019    small right periorlandic subcortical - left sided residual - left hand tingling/numbness - Left leg weak/numbness - cardioembolic     First degree AV block      Hepatitis C 10/12    chronic hepatitis C, grade 1-2, stage 1 - mild - Dr Douglas     Hyperlipidemia LDL goal <70      Hypertension goal BP (blood pressure) < 140/90 6/06    dr jaciel winchester     Iron deficiency anemia, unspecified 1985    gastric bypass     Nephrolithiasis multiple episodes, last 10/19    Dr Bey/Ivana     OA (osteoarthritis)     Multiple - Left shoulder with  rotator cuff tear - Dr Durham     Obesity, unspecified     s/p gastric bypass 1985      Prediabetes      Presbyacusis 1/04    dr corona     Pyelonephritis, unspecified 12/99     SCC (squamous cell carcinoma), 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    dr saez - lt conchal bowl     Sleep apnea 10/02    cpap - severe - 15 cm - dr cunha     Stroke (H) 1/19/2019     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    [facility]     Vitamin B12 deficiency (non anemic) 4/15/2019     Vitamin D deficiency 4/15/2019       Past Surgical History    Past Surgical History:   Procedure Laterality Date     APPENDECTOMY       ARTHROPLASTY KNEE  8/13/2012    LT TKA - Dr Villanueva     CARDIOVERSION  2016     COLONOSCOPY  8/05, 9/10, 10/15    multiple tubular/serrated/hyperplastic polyps     COLONOSCOPY N/A 10/29/2015    multiple tubular and serrated adenomas     COLONOSCOPY N/A 9/7/2016     EYE SURGERY  Lasik     HC KNEE SCOPE, DIAGNOSTIC  05/00    Arthroscopy, Knee RT     LASER HOLMIUM LITHOTRIPSY URETER(S), INSERT STENT, COMBINED  10/16/2012    stones x 4, Dr Campa     LASER HOLMIUM LITHOTRIPSY URETER(S), INSERT STENT, COMBINED Right 5/2/2018    CYSTOSCOPY, RIGHT URETEROSCOPY, HOLMIUM LASER LITHOTRIPSY, RIGHT STENT PLACEMENT - Dr Bey     SURGICAL HISTORY OF -   1985    s/p gastric bypass Bilroth II     SURGICAL HISTORY OF -   11/98    wisdom teeth     SURGICAL HISTORY OF -   1979    cellulitis     SURGICAL HISTORY OF -   11/98    lt forearm spur's     SURGICAL HISTORY OF -   1/01,6/02,11/02    s/p lasik     SURGICAL HISTORY OF -   11/99    rt forearm spurs     SURGICAL HISTORY OF -   7/06    dr stevenson - lithotrypsy     SURGICAL HISTORY OF -   10/08, 12/08    Lt ear chonchal lesion removal SCC - dr saez     SURGICAL HISTORY OF -  Right 10/2019    nephrolithiasis - cystoscopy, rt lithotrypsy, Dr Heath     SURGICAL HISTORY OF -  Right 11/2019    Cystoscopy, Rt lithotrypsy, Calcium Oxalate, Dr Heath       Current Medications    Current Outpatient Medications    Medication Sig Dispense Refill     apixaban ANTICOAGULANT (ELIQUIS) 5 MG tablet Take 1 tablet (5 mg) by mouth 2 times daily 180 tablet 3     diltiazem ER (DILT-XR) 180 MG 24 hr capsule Take 2 capsules (360 mg) by mouth daily 180 capsule 3     Ferrous Sulfate (IRON) 325 (65 Fe) MG tablet Take 1 tablet by mouth daily (with breakfast) 90 tablet 3     fluticasone (FLONASE) 50 MCG/ACT nasal spray Spray 2 sprays into both nostrils daily as needed for rhinitis or allergies 54.6 mL 3     Multiple Vitamins-Minerals (VITAMIN D3 COMPLETE PO) Take 125 mcg by mouth       niacin 500 MG tablet Take by mouth daily (with breakfast)       pantoprazole (PROTONIX) 40 MG EC tablet Take 1 tablet (40 mg) by mouth daily 90 tablet 3     STATIN NOT PRESCRIBED (INTENTIONAL) Please choose reason not prescribed, below, treated for hepatitis C       tamsulosin (FLOMAX) 0.4 MG capsule Take 1 capsule (0.4 mg) by mouth 2 times daily 180 capsule 3     vitamin C (ASCORBIC ACID) 1000 MG TABS Take 1,000 mg by mouth daily       Vitamin D-Vitamin K (VITAMIN K2-VITAMIN D3 PO)        Cyanocobalamin 5000 MCG TBDP          Allergies    No Known Allergies    Immunizations    Immunization History   Administered Date(s) Administered     Influenza (High Dose) 3 valent vaccine 09/20/2012, 12/21/2013, 10/20/2015, 09/25/2017, 09/20/2018, 09/26/2019     Influenza (IIV3) PF 10/26/2007, 12/22/2008, 09/17/2009, 10/14/2011     Influenza Vaccine, 6+MO IM (QUADRIVALENT W/PRESERVATIVES) 10/30/2017     Influenza, Quad, High Dose, Pf, 65yr + 10/19/2020     Pneumo Conj 13-V (2010&after) 03/10/2016, 09/25/2017     Pneumococcal 23 valent 05/11/2005, 09/20/2012     TD (ADULT, 7+) 11/30/1998     TDAP Vaccine (Adacel) 06/26/2007     TDAP Vaccine (Boostrix) 10/20/2015     Twinrix A/B 05/11/2005, 06/26/2007, 09/04/2008     Zoster vaccine recombinant adjuvanted (SHINGRIX) 09/26/2019, 12/17/2019       Family History    Family History   Problem Relation Age of Onset     Alzheimer  "Disease Father 82         at 88     Prostate Cancer Father 76        bladder and prostate     Heart Disease Mother 80        CHF     Heart Disease Maternal Grandfather         MI at 55     Cancer Paternal Uncle         ?       Social History    Social History     Socioeconomic History     Marital status:      Spouse name: Mayra     Number of children: 3     Years of education: 21     Highest education level: Not on file   Occupational History     Occupation: retired teacher and football and       Employer: [employer]     Employer: RETIRED   Social Needs     Financial resource strain: Not on file     Food insecurity     Worry: Not on file     Inability: Not on file     Transportation needs     Medical: Not on file     Non-medical: Not on file   Tobacco Use     Smoking status: Never Smoker     Smokeless tobacco: Never Used   Substance and Sexual Activity     Alcohol use: Yes     Alcohol/week: 2.0 standard drinks     Types: 2 Standard drinks or equivalent per week     Comment: 2 per week     Drug use: No     Comment: acknowledges using herbal supplement \"Vibe\" for energy-none used recently      Sexual activity: Not Currently     Partners: Female     Comment:     Lifestyle     Physical activity     Days per week: Not on file     Minutes per session: Not on file     Stress: Not on file   Relationships     Social connections     Talks on phone: Not on file     Gets together: Not on file     Attends Uats[employer] service: Not on file     Active member of club or organization: Not on file     Attends meetings of clubs or organizations: Not on file     Relationship status: Not on file     Intimate partner violence     Fear of current or ex partner: Not on file     Emotionally abused: Not on file     Physically abused: Not on file     Forced sexual activity: Not on file   Other Topics Concern      Service Not Asked     Blood Transfusions Not Asked     Caffeine Concern Yes " "    Comment: <1 can qd     Occupational Exposure Not Asked     Hobby Hazards Not Asked     Sleep Concern Yes     Comment: sleep apnea, wears cpap     Stress Concern Not Asked     Weight Concern Not Asked     Special Diet Not Asked     Back Care Not Asked     Exercise No     Bike Helmet Not Asked     Seat Belt Yes     Self-Exams Not Asked     Parent/sibling w/ CABG, MI or angioplasty before 65F 55M? No   Social History Narrative     Not on file       All above reviewed and updated, all stable unless otherwise noted    Recent labs reviewed    ROS    CONSTITUTIONAL: NEGATIVE for fever, chills, change in weight  INTEGUMENTARY/SKIN: NEGATIVE for worrisome rashes, moles or lesions  EYES: NEGATIVE for vision changes or irritation  ENT/MOUTH: NEGATIVE for ear, mouth and throat problems  RESP: NEGATIVE for significant cough or SOB  CV: NEGATIVE for chest pain, palpitations or peripheral edema  GI: :LLQ pain. Otherwise, NEGATIVE for nausea, abdominal pain, heartburn, or change in bowel habits  : NEGATIVE for frequency, dysuria, or hematuria  MUSCULOSKELETAL: NEGATIVE for significant arthralgias or myalgia  NEURO: NEGATIVE for weakness, dizziness or paresthesias  ENDOCRINE: NEGATIVE for temperature intolerance, skin/hair changes  HEME: NEGATIVE for bleeding problems  PSYCHIATRIC: NEGATIVE for changes in mood or affect    OBJECTIVE    /74   Pulse 74   Temp 97.5  F (36.4  C) (Tympanic)   Ht 1.905 m (6' 3\")   Wt 149.7 kg (330 lb)   SpO2 99%   BMI 41.25 kg/m    Body mass index is 41.25 kg/m .  GENERAL: healthy, alert and no distress  EYES: Eyes grossly normal to inspection, extraocular movements - intact, and PERRL  HENT: ear canals- normal; TMs- normal; Nose- normal; Mouth- no ulcers, no lesions  NECK: no tenderness, no adenopathy, no asymmetry, no masses, no stiffness; thyroid- normal to palpation  RESP: lungs clear to auscultation - no rales, no rhonchi, no wheezes  CV: regular rates and rhythm, normal S1 S2, no " S3 or S4 and 2/6 systolic murmur, no click or rub -  ABDOMEN: Mild tenderness to deep palpation of the left lower quadrant. Soft, no  hepatosplenomegaly, no masses, normal bowel sounds  MS: extremities- no gross deformities noted, no edema  SKIN: no suspicious lesions, no rashes  NEURO: strength and tone- normal, sensory exam- grossly normal, mentation- intact, speech- normal, reflexes- symmetric  BACK: no CVA tenderness, no paralumbar tenderness  LYMPHATICS: no cervical adenopathy    DIAGNOSTICS/PROCEDURE    Pending     ASSESSMENT      ICD-10-CM    1. Abdominal pain, generalized  R10.84 GASTROENTEROLOGY ADULT REF PROCEDURE ONLY     Comprehensive metabolic panel (BMP + Alb, Alk Phos, ALT, AST, Total. Bili, TP)     CBC with platelets     Erythrocyte sedimentation rate auto     CRP, inflammation     Lipase     Amylase     Helicobacter pylori Antigen Stool     *UA reflex to Microscopic and Culture ([facility] (except [place] and [place])     CANCELED: *UA reflex to Microscopic and Culture ([facility] (except [place] and [place])   2. History of CVA (cerebrovascular accident)  Z86.73    3. Prediabetes  R73.03 Comprehensive metabolic panel (BMP + Alb, Alk Phos, ALT, AST, Total. Bili, TP)   4. Paroxysmal atrial fibrillation (H)  I48.0 Comprehensive metabolic panel (BMP + Alb, Alk Phos, ALT, AST, Total. Bili, TP)   5. Hypertension goal BP (blood pressure) < 140/90  I10 Comprehensive metabolic panel (BMP + Alb, Alk Phos, ALT, AST, Total. Bili, TP)   6. Calculus of gallbladder without cholecystitis without obstruction  K80.20 Comprehensive metabolic panel (BMP + Alb, Alk Phos, ALT, AST, Total. Bili, TP)     Hx is suggestive of potential upper (pain immediately after eating) and lower GI tract (pain relieved with passing gas) etiology. CT showed no diverticulosis. 1.2cm kidney stone also present on the left that is not obstructive, but may be contributing to the patients pain.         PLAN    Discussed treatment/modality options, including risk and benefits he desires:  Rule out upper and lower GI tract etiologies. If negative, follow up with Dr. Heath (Urology)    1) EGD    2) Colonoscopy     3) If unremarkable follow up with Urology    4) CBC, ESR, CRP, amylase, lipase, CMP    All diagnosis above reviewed and noted above, otherwise stable.  [facility] for further [facility].     Return in about 1 week (around 11/4/2020) for Follow Up Acute, Medication Recheck Visit.    There are no preventive care reminders to display for this patient.    Davion De Leon, MS3,  has participated in the care of this patient.     Provider Disclosure:  I agree with above History, Review of Systems, Physical exam and Plan.  I have reviewed the content of the documentation and have edited it as needed. I have personally performed the services documented here and the documentation accurately represents those services and the decisions I have made.      Electronically signed by:           Brett Cassidy MD, FAAFP     [facility]  [address] [place] [id number]  [email or web address]   Office: [phone]  Fax: [phone]  Pager: [phone]

## 2020-10-29 DIAGNOSIS — R10.84 ABDOMINAL PAIN, GENERALIZED: ICD-10-CM

## 2020-10-29 PROBLEM — Z86.73 HISTORY OF CVA (CEREBROVASCULAR ACCIDENT): Status: ACTIVE | Noted: 2020-10-29

## 2020-10-29 LAB
ALBUMIN SERPL-MCNC: 3.6 G/DL (ref 3.4–5)
ALBUMIN UR-MCNC: NEGATIVE MG/DL
ALP SERPL-CCNC: 130 U/L (ref 40–150)
ALT SERPL W P-5'-P-CCNC: 24 U/L (ref 0–70)
AMYLASE SERPL-CCNC: 74 U/L (ref 30–110)
ANION GAP SERPL CALCULATED.3IONS-SCNC: 5 MMOL/L (ref 3–14)
APPEARANCE UR: CLEAR
AST SERPL W P-5'-P-CCNC: 14 U/L (ref 0–45)
BACTERIA #/AREA URNS HPF: ABNORMAL /HPF
BILIRUB SERPL-MCNC: 0.3 MG/DL (ref 0.2–1.3)
BILIRUB UR QL STRIP: NEGATIVE
BUN SERPL-MCNC: 19 MG/DL (ref 7–30)
CALCIUM SERPL-MCNC: 8.9 MG/DL (ref 8.5–10.1)
CHLORIDE SERPL-SCNC: 106 MMOL/L (ref 94–109)
CO2 SERPL-SCNC: 27 MMOL/L (ref 20–32)
COLOR UR AUTO: YELLOW
CREAT SERPL-MCNC: 0.98 MG/DL (ref 0.66–1.25)
CRP SERPL-MCNC: <2.9 MG/L (ref 0–8)
GFR SERPL CREATININE-BSD FRML MDRD: 76 ML/MIN/{1.73_M2}
GLUCOSE SERPL-MCNC: 86 MG/DL (ref 70–99)
GLUCOSE UR STRIP-MCNC: NEGATIVE MG/DL
HGB UR QL STRIP: ABNORMAL
KETONES UR STRIP-MCNC: NEGATIVE MG/DL
LEUKOCYTE ESTERASE UR QL STRIP: NEGATIVE
LIPASE SERPL-CCNC: 76 U/L (ref 73–393)
MUCOUS THREADS #/AREA URNS LPF: PRESENT /LPF
NITRATE UR QL: NEGATIVE
PH UR STRIP: 7 PH (ref 5–7)
POTASSIUM SERPL-SCNC: 4.5 MMOL/L (ref 3.4–5.3)
PROT SERPL-MCNC: 7.5 G/DL (ref 6.8–8.8)
RBC #/AREA URNS AUTO: ABNORMAL /HPF
SODIUM SERPL-SCNC: 138 MMOL/L (ref 133–144)
SOURCE: ABNORMAL
SP GR UR STRIP: 1.02 (ref 1–1.03)
UROBILINOGEN UR STRIP-ACNC: 1 EU/DL (ref 0.2–1)
WBC #/AREA URNS AUTO: ABNORMAL /HPF

## 2020-10-29 PROCEDURE — 87338 HPYLORI STOOL AG IA: CPT | Performed by: FAMILY MEDICINE

## 2020-10-29 PROCEDURE — 81001 URINALYSIS AUTO W/SCOPE: CPT | Performed by: FAMILY MEDICINE

## 2020-10-30 ENCOUNTER — TELEPHONE (OUTPATIENT)
Dept: FAMILY MEDICINE | Facility: CLINIC | Age: 75
End: 2020-10-30

## 2020-10-30 DIAGNOSIS — R10.84 ABDOMINAL PAIN, GENERALIZED: Primary | ICD-10-CM

## 2020-10-30 LAB — H PYLORI AG STL QL IA: NEGATIVE

## 2020-10-30 NOTE — TELEPHONE ENCOUNTER
Patient saw Dr. Cassidy 10/28/20 for abdominal pain and referred to  Gastro   Referral redone to [facility] per patient preference  Faxed to [facility]    Called patient @ # below -   Advised new referral was done. Patient stated an understanding and agreed with plan.      Jennifer Berry RN  [facility]

## 2020-10-30 NOTE — TELEPHONE ENCOUNTER
Order/Referral Request:    Who is requesting: Hugo    Orders being requested: Orders for somewhere else to go.   [facility]    Reason service is needed/diagnosis: Abdominal pain, Wants to have both upper GI done & Colonoscopy at the same time. They say it can be done at the same time.    When are orders needed by: asap. [facility] says they will make the appointment today if we can get the orders over to them.    Has this been discussed with Provider: Yes    Does patient have a preference on a Group/Provider/Facility? [facility]     Does patient have an appointment scheduled: No    Where to send Orders: Phone  [phone]    Okay to leave detailed message?  Yes at Cell number on file:    Telephone Information:   Mobile [phone]       Routing

## 2020-11-02 ENCOUNTER — TELEPHONE (OUTPATIENT)
Dept: FAMILY MEDICINE | Facility: CLINIC | Age: 75
End: 2020-11-02

## 2020-11-02 DIAGNOSIS — I48.11 LONGSTANDING PERSISTENT ATRIAL FIBRILLATION (H): ICD-10-CM

## 2020-11-02 DIAGNOSIS — R10.84 ABDOMINAL PAIN, GENERALIZED: Primary | ICD-10-CM

## 2020-11-02 DIAGNOSIS — Z11.59 ENCOUNTER FOR SCREENING FOR OTHER VIRAL DISEASES: Primary | ICD-10-CM

## 2020-11-02 NOTE — TELEPHONE ENCOUNTER
General Call:     Who is calling:  PT     Reason for Call:  Pt is trying to make an appt for a colonoscopy and they ae not able to get him in till Jan 2021. Plz call pt back to talk about    What are your questions or concerns:      Date of last appointment with provider:     Okay to leave a detailed message:Yes at Cell number on file:    Telephone Information:   Mobile [phone]

## 2020-11-03 DIAGNOSIS — Z11.59 ENCOUNTER FOR SCREENING FOR OTHER VIRAL DISEASES: Primary | ICD-10-CM

## 2020-11-03 NOTE — TELEPHONE ENCOUNTER
"Message handled by Nurse Triage with [facility] - provider name: AMARJIT. Pt recently in go GI issues, need appt within next 1 week.   Per appt note:  Textbox \"Other\":       Per Navi, patient has referral to see Gregor from Brett Cassidy.  Dr. Cassidy and Dr. Bundy will be discussing getting this patient on sooner that January if Gregor  can open up his schedule.  Keep procedure w/Kromhout for now.  But if Gregor can open up his scheduled, Navi wl cl back and move this patient to be with Astrid.  Pm 11-2     Writer called [phone]  LVM to call back.    Writer then called [phone]  Writer spoke to Onel Skyes confirmed Pt will need full sedation and need to be done in OR under anesthetist. Writer will recall scheduling to discuss.  Writer spoke to Adrian who looked for appt. 11/24/20 at 12 in OR    Pt will be called by pre admit to advise of instructions. Writer will call to schedule pre-op.      Called # [phone]     Writer called and Pt stated he wants to see if he can have a HIDA scan or US, Pt stated he looked up online what a decompressed gallbladder will cause and Pt stated he thinks this may be cause of his abdominal pains.     Routing to PCP to review and advise    King Combs RN   [facility] Triage         "

## 2020-11-03 NOTE — TELEPHONE ENCOUNTER
General Call:     Who is calling:  Hugo    Reason for Call:  Hugo is calling again saying he hasn't heard anything from us yet. He said he can't get in for his endoscopy and colonoscopy until January 2021. He is hoping Dr. Cassidy can help him with this and try and get him in sooner. He would like a call back.    Okay to leave a detailed message:Yes at Cell number on file:    Telephone Information:   Mobile [phone]

## 2020-11-03 NOTE — TELEPHONE ENCOUNTER
Called endoscopy scheduling as it looks like his colonoscopy is scheduled for 11/17/2020.  Need to confirm this and see about scheduling upper endo.  They do in fact have him scheduled on 11/17/2020 for both colonoscopy and EGD.    Called and spoke with Hugo.  He states yes, they do have him scheduled on 11/17, however, after talking the the Endo nurse, they said he needs full anesthesia for this.  He has a hx of many polyps and the last 2 colonoscopies he had, he was fully under.  This is why he was told they will have to schedule him out to January.  They can try to get him in sooner if PCP does a doctor to doctor call for him.    During our discussion, he mentioned he thinks a lot of his sxs are related to his gallbladder, so wonders if maybe he can do a US or HIDA scan before he is put under for the EDG and colonoscopy.  I let him know I am working remotely today, but will keep an eye on his message so I can get him a call back once we hear from PCP.  Clara Ramirez

## 2020-11-04 NOTE — TELEPHONE ENCOUNTER
Routing to [place] for TC Help -     Can you please help get patient scheduled for US, HIDA scan, Colonoscopy/EGD      Jennifer Berry RN  [facility]

## 2020-11-04 NOTE — TELEPHONE ENCOUNTER
Pain from gall bladder is typically in the RUQ after eating a high fatty meal, pain in the left lower abdomen is typically related to diverticulitis, pain after eating is typically in the RUQ/epigastric area, nephrolithiasis noted on left on CT, labs are benign    On protonix    Advise EGD/Colonoscopy as discussed (ok for provider to provider consult/GI consult)    Ok for RUQ US/HIDA - not classic GB sx

## 2020-11-04 NOTE — TELEPHONE ENCOUNTER
Called # [phone]     Pt called and discussed US/HIDA. Sent [employer] message with number to call to schedule.     Writer schedule Pre-op for EGD/Colonoscopy.  Next 5 appointments (look out 90 days)    Nov 19, 2020 10:10 AM  SHORT with Brett Cassidy MD  [facility] ([facility]) [address] [place] [address] [id number] [phone]        Writer called and scheduled Covid for Friday 11/20/20 at 0930    King Combs RN   [facility] Triage

## 2020-11-09 ENCOUNTER — HOSPITAL ENCOUNTER (OUTPATIENT)
Dept: ULTRASOUND IMAGING | Facility: CLINIC | Age: 75
Discharge: HOME | End: 2020-11-09
Attending: FAMILY MEDICINE | Admitting: FAMILY MEDICINE
Payer: COMMERCIAL

## 2020-11-09 DIAGNOSIS — R10.84 ABDOMINAL PAIN, GENERALIZED: ICD-10-CM

## 2020-11-09 PROCEDURE — 76700 US EXAM ABDOM COMPLETE: CPT

## 2020-11-12 ENCOUNTER — HOSPITAL ENCOUNTER (OUTPATIENT)
Dept: NUCLEAR MEDICINE | Facility: CLINIC | Age: 75
Setting detail: NUCLEAR MEDICINE
Discharge: HOME | End: 2020-11-12
Attending: FAMILY MEDICINE | Admitting: FAMILY MEDICINE
Payer: COMMERCIAL

## 2020-11-12 DIAGNOSIS — R10.84 ABDOMINAL PAIN, GENERALIZED: ICD-10-CM

## 2020-11-12 PROCEDURE — 343N000001 HC RX 343: Performed by: FAMILY MEDICINE

## 2020-11-12 PROCEDURE — A9537 TC99M MEBROFENIN: HCPCS | Performed by: FAMILY MEDICINE

## 2020-11-12 PROCEDURE — 78227 HEPATOBIL SYST IMAGE W/DRUG: CPT

## 2020-11-12 RX ORDER — KIT FOR THE PREPARATION OF TECHNETIUM TC 99M MEBROFENIN 45 MG/10ML
6 INJECTION, POWDER, LYOPHILIZED, FOR SOLUTION INTRAVENOUS ONCE
Status: COMPLETED | OUTPATIENT
Start: 2020-11-12 | End: 2020-11-12

## 2020-11-12 RX ADMIN — MEBROFENIN 6 MCI.: 45 INJECTION, POWDER, LYOPHILIZED, FOR SOLUTION INTRAVENOUS at 08:06

## 2020-11-16 ENCOUNTER — RESEARCH ENCOUNTER (OUTPATIENT)
Dept: CARDIOLOGY | Facility: CLINIC | Age: 75
End: 2020-11-16

## 2020-11-16 NOTE — PROGRESS NOTES
"November 16, 2020      IRB# [id number]  Trial Name: Multi-center cross-sectional epidemiological study to characterize the prevalence and distribution of lipoprotein(a) levels among patients with established cardiovascular disease  Short Title:  [facility]OM[facility]S Trial      :    Elder Leonard MD, PhD  [phone]     Research Coordinator:  Lexus Hays                     [phone]     Participant:  Hugo Weber     Inclusion Criteria   Yes   No Criteria #   Inclusion Criteria (all must be yes)    [x]   []  1  Signed informed consent must be obtained prior to participation in the study    [x]   []  2  Male and female ? 18 to ? 80 years of age    [x]   []  3  Established CVD defined as one of the following:            [] History of a myocardial infarction (MI) ?3 months and ?10 years before the study visit            [x] History of ischemic stroke ? 3 months and ? 10 years before the study visit           [] Symptomatic PAD (intermitted claudication with ankle-brachial index ?0.90 and/or lower limb amputation or re-vascularization due to lower limb ischemia      Exclusion Criteria  Yes No Criteria #  -must be \"no\"   []  [x]  1  Patients currently enrolled in clinical studies with investigational drugs      Lipoprotein A collection date: 02/04/2020  Lipoprotein A collection was within 5 years of study enrollment: [x] Yes [] No  If no, patient will have to have value redrawn  Lipoprotein A result:  67 mg/dL      LDL-C collection date: 08/07/2020  LDL-C collection was within 1 year of study enrollment: [x] Yes [] No  If no, patient will have to have value redrawn  LDL-C result: 88 mg/dL  "

## 2020-11-16 NOTE — PROGRESS NOTES
November 16, 2020     Research Consent Note     IRB# [id number]  Trial Name: Multi-center cross-sectional epidemiological study to characterize the prevalence and distribution of lipoprotein(a) levels among patients with established cardiovascular disease  Short Title:  HERIT[facility]NAM[facility]IA Trial      :    Elder Leonard MD, PhD  [phone]     Research Coordinator:  Lexus Hays                     [phone]     Participant:  Hugo Weber     Duration of study: 1 day      Spoke with Hugo Weber for possible participation in the above study. The current IRB approved consent form was reviewed and discussed at length with the participant. This included purpose, research hypothesis, nature of the research, risks & benefits, procedures required for screening and enrollment. Confidentiality issues, compensation for injury, and alternatives to participation were explained. Participant was informed that participation is voluntary and that refusal to participate will involve no penalty or decrease benefits to which the participant is otherwise entitled. Participant had the opportunity to read the entire consent, ask questions, express concerns and offered sufficient time to consider the research trial. Participant was able to clearly state what study participation involved and the associated requirements. All questions and concerns were addressed. Participant voluntarily signed the [employer] e-Consent form prior to any research required tests/procedures and was given a copy of the signed forms.     Due to Coronavirus isolation requirements I was unable to visually witness the participant sign consent.     I attest that patient verbally reported signing consent.     Lexus Hays, Research Coordinator

## 2020-11-17 ENCOUNTER — TELEPHONE (OUTPATIENT)
Dept: FAMILY MEDICINE | Facility: CLINIC | Age: 75
End: 2020-11-17

## 2020-11-17 ASSESSMENT — MIFFLIN-ST. JEOR: SCORE: 2320.23

## 2020-11-17 NOTE — TELEPHONE ENCOUNTER
Reason for Call:  Request for results:    Name of test or procedure: CT Scan    Date of test of procedure: 11/11/2020    Location of the test or procedure: in the hospital    OK to leave the result message on voice mail or with a family member? YES    Phone number Patient can be reached at:  Home number on file [phone] (home)    Additional comments: Pt would like to know his CT test results.    Call taken on 11/17/2020 at 11:08 AM by Charisma Godinez

## 2020-11-19 ENCOUNTER — OFFICE VISIT (OUTPATIENT)
Dept: FAMILY MEDICINE | Facility: CLINIC | Age: 75
End: 2020-11-19
Payer: COMMERCIAL

## 2020-11-19 VITALS
BODY MASS INDEX: 42.66 KG/M2 | DIASTOLIC BLOOD PRESSURE: 84 MMHG | OXYGEN SATURATION: 97 % | TEMPERATURE: 97.7 F | HEART RATE: 97 BPM | WEIGHT: 315 LBS | HEIGHT: 72 IN | SYSTOLIC BLOOD PRESSURE: 118 MMHG

## 2020-11-19 DIAGNOSIS — E66.01 MORBID OBESITY (H): ICD-10-CM

## 2020-11-19 DIAGNOSIS — Z86.19 HISTORY OF HEPATITIS C: ICD-10-CM

## 2020-11-19 DIAGNOSIS — D50.9 IRON DEFICIENCY ANEMIA, UNSPECIFIED IRON DEFICIENCY ANEMIA TYPE: ICD-10-CM

## 2020-11-19 DIAGNOSIS — Z51.81 MEDICATION MONITORING ENCOUNTER: ICD-10-CM

## 2020-11-19 DIAGNOSIS — I10 HYPERTENSION GOAL BP (BLOOD PRESSURE) < 140/90: ICD-10-CM

## 2020-11-19 DIAGNOSIS — K76.0 NAFLD (NONALCOHOLIC FATTY LIVER DISEASE): ICD-10-CM

## 2020-11-19 DIAGNOSIS — R82.90 NONSPECIFIC FINDING ON EXAMINATION OF URINE: ICD-10-CM

## 2020-11-19 DIAGNOSIS — H61.23 BILATERAL IMPACTED CERUMEN: ICD-10-CM

## 2020-11-19 DIAGNOSIS — K21.00 GASTROESOPHAGEAL REFLUX DISEASE WITH ESOPHAGITIS WITHOUT HEMORRHAGE: ICD-10-CM

## 2020-11-19 DIAGNOSIS — M15.0 PRIMARY OSTEOARTHRITIS INVOLVING MULTIPLE JOINTS: ICD-10-CM

## 2020-11-19 DIAGNOSIS — Z86.73 HISTORY OF CVA (CEREBROVASCULAR ACCIDENT): ICD-10-CM

## 2020-11-19 DIAGNOSIS — E55.9 VITAMIN D DEFICIENCY: ICD-10-CM

## 2020-11-19 DIAGNOSIS — R73.03 PREDIABETES: ICD-10-CM

## 2020-11-19 DIAGNOSIS — R10.84 ABDOMINAL PAIN, GENERALIZED: ICD-10-CM

## 2020-11-19 DIAGNOSIS — I48.11 LONGSTANDING PERSISTENT ATRIAL FIBRILLATION (H): ICD-10-CM

## 2020-11-19 DIAGNOSIS — G47.33 OBSTRUCTIVE SLEEP APNEA SYNDROME: ICD-10-CM

## 2020-11-19 DIAGNOSIS — Z79.01 LONG TERM CURRENT USE OF ANTICOAGULANT THERAPY: ICD-10-CM

## 2020-11-19 DIAGNOSIS — Z01.818 PREOP GENERAL PHYSICAL EXAM: Primary | ICD-10-CM

## 2020-11-19 DIAGNOSIS — K80.20 CALCULUS OF GALLBLADDER WITHOUT CHOLECYSTITIS WITHOUT OBSTRUCTION: ICD-10-CM

## 2020-11-19 DIAGNOSIS — E78.5 HYPERLIPIDEMIA LDL GOAL <70: ICD-10-CM

## 2020-11-19 DIAGNOSIS — E53.8 VITAMIN B12 DEFICIENCY (NON ANEMIC): ICD-10-CM

## 2020-11-19 LAB
ALBUMIN UR-MCNC: NEGATIVE MG/DL
APPEARANCE UR: CLEAR
BACTERIA #/AREA URNS HPF: ABNORMAL /HPF
BILIRUB UR QL STRIP: NEGATIVE
COLOR UR AUTO: YELLOW
ERYTHROCYTE [DISTWIDTH] IN BLOOD BY AUTOMATED COUNT: 14.2 % (ref 10–15)
GLUCOSE UR STRIP-MCNC: NEGATIVE MG/DL
HBA1C MFR BLD: 5.8 % (ref 0–5.6)
HCT VFR BLD AUTO: 48.2 % (ref 40–53)
HGB BLD-MCNC: 15.6 G/DL (ref 13.3–17.7)
HGB UR QL STRIP: ABNORMAL
KETONES UR STRIP-MCNC: NEGATIVE MG/DL
LEUKOCYTE ESTERASE UR QL STRIP: ABNORMAL
MCH RBC QN AUTO: 29 PG (ref 26.5–33)
MCHC RBC AUTO-ENTMCNC: 32.4 G/DL (ref 31.5–36.5)
MCV RBC AUTO: 90 FL (ref 78–100)
MUCOUS THREADS #/AREA URNS LPF: PRESENT /LPF
NITRATE UR QL: NEGATIVE
PH UR STRIP: 5.5 PH (ref 5–7)
PLATELET # BLD AUTO: 231 10E9/L (ref 150–450)
RBC # BLD AUTO: 5.38 10E12/L (ref 4.4–5.9)
RBC #/AREA URNS AUTO: ABNORMAL /HPF
SOURCE: ABNORMAL
SP GR UR STRIP: 1.01 (ref 1–1.03)
UROBILINOGEN UR STRIP-ACNC: 0.2 EU/DL (ref 0.2–1)
WBC # BLD AUTO: 7.7 10E9/L (ref 4–11)
WBC #/AREA URNS AUTO: ABNORMAL /HPF

## 2020-11-19 PROCEDURE — 93000 ELECTROCARDIOGRAM COMPLETE: CPT | Performed by: FAMILY MEDICINE

## 2020-11-19 PROCEDURE — 80053 COMPREHEN METABOLIC PANEL: CPT | Performed by: FAMILY MEDICINE

## 2020-11-19 PROCEDURE — 81001 URINALYSIS AUTO W/SCOPE: CPT | Performed by: FAMILY MEDICINE

## 2020-11-19 PROCEDURE — 83036 HEMOGLOBIN GLYCOSYLATED A1C: CPT | Performed by: FAMILY MEDICINE

## 2020-11-19 PROCEDURE — 85027 COMPLETE CBC AUTOMATED: CPT | Performed by: FAMILY MEDICINE

## 2020-11-19 PROCEDURE — 99214 OFFICE O/P EST MOD 30 MIN: CPT | Performed by: FAMILY MEDICINE

## 2020-11-19 PROCEDURE — 36415 COLL VENOUS BLD VENIPUNCTURE: CPT | Performed by: FAMILY MEDICINE

## 2020-11-19 PROCEDURE — 87086 URINE CULTURE/COLONY COUNT: CPT | Performed by: FAMILY MEDICINE

## 2020-11-19 ASSESSMENT — MIFFLIN-ST. JEOR: SCORE: 2261.26

## 2020-11-19 NOTE — PROGRESS NOTES
[address] [address]  Phone: [phone]  Primary Provider: Brett Cassidy  Pre-op Performing Provider: BRETT CASSIDY    PREOPERATIVE EVALUATION:  Today's date: 11/19/2020    Hugo Weber is a 75 year old male who presents for a preoperative evaluation.    Surgical Information:  Surgery/Procedure: Colonoscopy and upper Endoscopy  Surgery Location: [facility]  Surgeon: Claudine  Surgery Date: 11/24/20  Time of Surgery: 12:00pm  Where patient plans to recover: At home alone  Fax number for surgical facility: Note does not need to be faxed, will be available electronically in Epic.    Type of Anesthesia Anticipated: General    Subjective     HPI related to upcoming procedure:     10/28    Abdominal/Flank Pain  Patient has persistent left lower abdominal pain.The pain is dull but occasionally feels like a stabbing pain. The pain is worse immediately after eating. It is improved with 2 Tylenol and with passing gas. It also seems to improve with increased water intake. The patient has been belching but it does not improve the pain. Has had 2-3 stools/day. He denies any constipation or diarrhea. They have been softer than usual the past week. Stools have been green/brown with no gross blood. He has had no limitations in daily activities, but is in significant discomfort. Patient has a Hx of gastric ulcer and hyperplastic colon polyps.     He has a Hx of nephrolithiasis in bilateral kidneys. Had CT on 1 day ago (10/27) ordered by urologist who told him to follow up with PCP. CT showed nonobstructing left renal collecting system 1.2 cm stone. No hydronephrosis or additional urinary tract calculi. Previously noted right urinary tract stones have resolved. No other acute abnormality noted in the abdomen or pelvis to account for patient's left-sided abdominal pain.    10/27/20 - CT    IMPRESSION:   1.  Nonobstructing left renal collecting system stone.  No  hydronephrosis or additional urinary tract calculi. Previously noted  right urinary tract stones have resolved. No other acute abnormality  noted in the abdomen or pelvis to account for patient's left-sided  abdominal pain.     2.  Gastric bypass surgery. No evidence of bowel obstruction or  diverticulitis. Appendix not visualized.    11/9/2020 - US    CONCLUSION:  1.  The sensitivity of the examination is limited given the patient's  body habitus.  2.  The liver appears to have mild coarsened parenchymal echotexture  and increased echogenicity which may be seen with mild hepatic  steatosis and/or hepatic fibrosis.  3.  Remainder negative as visualized as detailed above. Specifically  no hydronephrosis to suggest an acute nephroureteral obstruction.    11/12/2020    IMPRESSION:   1. Normal hepatobiliary scan without cystic duct or common bile duct  obstruction.  2. Lower limits of normal for gallbladder ejection fraction at only  35%. This has diminished since previous exam which demonstrated a  gallbladder ejection fraction of 53%.         Preop Questions 11/19/2020   1. Have you ever had a heart attack or stroke? YES - CVA 1/2019 - no residual   2. Have you ever had surgery on your heart or blood vessels, such as a stent placement, a coronary artery bypass, or surgery on an artery in your head, neck, heart, or legs? No   3. Do you have chest pain with activity? No   4. Do you have a history of  heart failure? No   5. Do you currently have a cold, bronchitis or symptoms of other infection? No   6. Do you have a cough, shortness of breath, or wheezing? No   7. Do you or anyone in your family have previous history of blood clots? No   8. Do you or does anyone in your family have a serious bleeding problem such as prolonged bleeding following surgeries or cuts? No   9. Have you ever had problems with anemia or been told to take iron pills? YES - Hx of Gastric Bypass   10. Have you had any abnormal blood loss such  as black, tarry or bloody stools? No   11. Have you ever had a blood transfusion? YES - 1969 with appendectomy?   11a. Have you ever had a transfusion reaction? No   12. Are you willing to have a blood transfusion if it is medically needed before, during, or after your surgery? Yes   13. Have you or any of your relatives ever had problems with anesthesia? No   14. Do you have sleep apnea, excessive snoring or daytime drowsiness? YES - using CPAP   14a. Do you have a CPAP machine? Yes   15. Do you have any artifical heart valves or other implanted medical devices like a pacemaker, defibrillator, or continuous glucose monitor? No   16. Do you have artificial joints? YES - Left TKA   17. Are you allergic to latex? No       Health Care Directive:  Patient has a Health Care Directive on file      Preoperative Review of :   reviewed - no concerns    Hx A Fib - [facility] - no issues - Dr Castillo    Hx CVA - 1/2019 - no residual    Prediabetes    Glucose   Date Value Ref Range Status   10/28/2020 86 70 - 99 mg/dL Final     Comment:     Non Fasting   08/07/2020 99 70 - 99 mg/dL Final   12/02/2019 120 (H) 70 - 99 mg/dL Final   09/20/2019 89 70 - 99 mg/dL Final   08/14/2019 102 (H) 70 - 99 mg/dL Final     Comment:     Fasting specimen     Lab Results   Component Value Date    A1C 5.8 08/07/2020    A1C 5.9 02/14/2020    A1C 5.7 12/02/2019    A1C 5.6 09/20/2019    A1C 5.6 08/14/2019     Htn    BP Readings from Last 3 Encounters:   11/19/20 118/84   10/28/20 132/74   08/07/20 126/68     Creatinine   Date Value Ref Range Status   10/28/2020 0.98 0.66 - 1.25 mg/dL Final     Lipids    Recent Labs   Lab Test 08/07/20  0847 02/22/19 01/13/15  0815 01/13/15  0815 09/03/14  0810   CHOL 148 106   < > 131 156   HDL 43 44   < > 43 42   LDL 88 49   < > 74 93   TRIG 84 72   < > 72 103   CHOLHDLRATIO  --   --   --  3.0 3.7    < > = values in this interval not displayed.     Sleep apnea    Using CPAP nightly    Review of  Systems  CONSTITUTIONAL: NEGATIVE for fever, chills, change in weight  INTEGUMENTARY/SKIN: NEGATIVE for worrisome rashes, moles or lesions  EYES: NEGATIVE for vision changes or irritation  ENT/MOUTH: NEGATIVE for ear, mouth and throat problems  RESP: NEGATIVE for significant cough or SOB  CV: NEGATIVE for chest pain, palpitations or peripheral edema  GI: NEGATIVE for nausea, abdominal pain, heartburn, or change in bowel habits  : NEGATIVE for frequency, dysuria, or hematuria  MUSCULOSKELETAL: NEGATIVE for significant arthralgias or myalgia  NEURO: NEGATIVE for weakness, dizziness or paresthesias  ENDOCRINE: NEGATIVE for temperature intolerance, skin/hair changes  HEME: NEGATIVE for bleeding problems  PSYCHIATRIC: NEGATIVE for changes in mood or affect    Patient Active Problem List    Diagnosis Date Noted     Vitamin B12 deficiency (non anemic) 04/15/2019     Priority: High     Vitamin D deficiency 04/15/2019     Priority: High     Stroke (H) 01/19/2019     Priority: High     Reported 2/5/2019, per patient occurred on 1/19/2019 while in [place]       CVA (cerebral vascular accident) (H) 01/01/2019     Priority: High     small right periorlandic subcortical       Thoracic aortic ectasia (H) 07/06/2018     Priority: High     Hypertension goal BP (blood pressure) < 140/90 09/25/2017     Priority: High     Paroxysmal atrial fibrillation (H) 05/27/2016     Priority: High     Prediabetes      Priority: High     History of hepatitis C 05/12/2015     Priority: High     SVR May 2015       NAFLD (nonalcoholic fatty liver disease) 09/05/2014     Priority: High     Hyperlipidemia LDL goal <70 12/30/2011     Priority: High     Obstructive sleep apnea syndrome      Priority: High     cpap - severe - 15 cm - dr cunha       Iron deficiency anemia 08/22/2003     Priority: High     Problem list name updated by automated process. 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 8/7/2012 Ernestine Ojeda CMA       Morbid obesity due to excess calories (H) 09/05/2014     Priority: Low     Long term current use of anticoagulant therapy - for intermittent a-fib 03/30/2012     Priority: Low      Past Medical History:   Diagnosis Date     Arrhythmia      Arthritis      Atrial fibrillation 2006    Followed by [facility] - persistent     Calculus of kidney 2005, 6/06, 10/12, 8/18, 9/19    dr walker/nestor/иван - hematuria - w/u o/w neg     Cervical stenosis of spine     Moderate C4-5     Cholelithiasis      Chronic peptic ulcer, unspecified site, without mention of hemorrhage, perforation, or obstruction 1985    gastric bypass     Colon polyps 8/05, 9/10, 10/15    2 tubular adenoma, 1 hyperplastic - multiple serrated/tubular adenomas     CVA (cerebral vascular accident) (H) 01/2019    small right periorlandic subcortical - left sided residual - left hand tingling/numbness - Left leg weak/numbness - cardioembolic     First degree AV block       Hepatitis C 10/12    chronic hepatitis C, grade 1-2, stage 1 - mild - Dr Douglas     Hyperlipidemia LDL goal <70      Hypertension goal BP (blood pressure) < 140/90 6/06    dr jaciel winchester     Iron deficiency anemia, unspecified 1985    gastric bypass     Nephrolithiasis multiple episodes, last 10/19    Dr Bey/Ivana     OA (osteoarthritis)     Multiple - Left shoulder with rotator cuff tear - Dr Durham     Obesity, unspecified     s/p gastric bypass 1985      Prediabetes      Presbyacusis 1/04    dr corona     Pyelonephritis, unspecified 12/99     SCC (squamous cell carcinoma), ear 10/08    dr saez - lt conchal bowl     Sleep apnea 10/02    cpap - severe - 15 cm - dr cunha     Stroke (H) 1/19/2019     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    [facility] Ne[facility]     Vitamin B12 deficiency (non anemic) 4/15/2019     Vitamin D deficiency 4/15/2019     Past Surgical History:   Procedure Laterality Date     APPENDECTOMY       ARTHROPLASTY KNEE  8/13/2012    LT TKA - Dr Villanueva     CARDIOVERSION  2016     COLONOSCOPY  8/05, 9/10, 10/15    multiple tubular/serrated/hyperplastic polyps     COLONOSCOPY N/A 10/29/2015    multiple tubular and serrated adenomas     COLONOSCOPY N/A 9/7/2016     EYE SURGERY  Lasik     HC KNEE SCOPE, DIAGNOSTIC  05/00    Arthroscopy, Knee RT     LASER HOLMIUM LITHOTRIPSY URETER(S), INSERT STENT, COMBINED  10/16/2012    stones x 4, Dr Campa     LASER HOLMIUM LITHOTRIPSY URETER(S), INSERT STENT, COMBINED Right 5/2/2018    CYSTOSCOPY, RIGHT URETEROSCOPY, HOLMIUM LASER LITHOTRIPSY, RIGHT STENT PLACEMENT - Dr Bey     SURGICAL HISTORY OF -   1985    s/p gastric bypass Bilroth II     SURGICAL HISTORY OF -   11/98    wisdom teeth     SURGICAL HISTORY OF -   1979    cellulitis     SURGICAL HISTORY OF - 11/98    lt forearm spur's     SURGICAL HISTORY OF -   1/01,6/02,11/02    s/p lasik     SURGICAL HISTORY OF -   11/99    rt forearm spurs     SURGICAL HISTORY OF -   7/06    dr stevenson - lithotrypsy     SURGICAL  HISTORY OF -   10/08,     Lt ear chonchal lesion removal SCC - dr saez     SURGICAL HISTORY OF -  Right 10/2019    nephrolithiasis - cystoscopy, rt lithotrypsy, Dr Heath     SURGICAL HISTORY OF -  Right 2019    Cystoscopy, Rt lithotrypsy, Calcium Oxalate, Dr Heath     Current Outpatient Medications   Medication Sig Dispense Refill     apixaban ANTICOAGULANT (ELIQUIS) 5 MG tablet Take 1 tablet (5 mg) by mouth 2 times daily 180 tablet 3     Cyanocobalamin 5000 MCG TBDP        diltiazem ER (DILT-XR) 180 MG 24 hr capsule Take 2 capsules (360 mg) by mouth daily 180 capsule 3     Ferrous Sulfate (IRON) 325 (65 Fe) MG tablet Take 1 tablet by mouth daily (with breakfast) 90 tablet 3     fluticasone (FLONASE) 50 MCG/ACT nasal spray Spray 2 sprays into both nostrils daily as needed for rhinitis or allergies 54.6 mL 3     niacin 500 MG tablet Take by mouth daily (with breakfast)       pantoprazole (PROTONIX) 40 MG EC tablet Take 1 tablet (40 mg) by mouth daily 90 tablet 3     STATIN NOT PRESCRIBED (INTENTIONAL) Please choose reason not prescribed, below, treated for hepatitis C       tamsulosin (FLOMAX) 0.4 MG capsule Take 1 capsule (0.4 mg) by mouth 2 times daily 180 capsule 3     vitamin C (ASCORBIC ACID) 1000 MG TABS Take 1,000 mg by mouth daily       Vitamin D-Vitamin K (VITAMIN K2-VITAMIN D3 PO)          No Known Allergies     Social History     Tobacco Use     Smoking status: Never Smoker     Smokeless tobacco: Never Used   Substance Use Topics     Alcohol use: Yes     Alcohol/week: 2.0 standard drinks     Types: 2 Standard drinks or equivalent per week     Comment: 2 per week     Family History   Problem Relation Age of Onset     Alzheimer Disease Father 82         at 88     Prostate Cancer Father 76        bladder and prostate     Heart Disease Mother 80        CHF     Heart Disease Maternal Grandfather         MI at 55     Cancer Paternal Uncle         ?     History   Drug Use No     Comment:  "acknowledges using herbal supplement \"Vibe\" for energy-none used recently          Objective     /84   Pulse 97   Temp 97.7  F (36.5  C) (Tympanic)   Ht 1.829 m (6')   Wt 148.3 kg (327 lb)   SpO2 97%   BMI 44.35 kg/m      Physical Exam    GENERAL APPEARANCE: healthy, alert and no distress     EYES: EOMI,  PERRL     HENT: ear canals and TM's normal and nose and mouth without ulcers or lesions     NECK: no adenopathy, no asymmetry, masses, or scars and thyroid normal to palpation     RESP: lungs clear to auscultation - no rales, rhonchi or wheezes     CV: regular rates and rhythm, normal S1 S2, no S3 or S4 and no murmur, click or rub     ABDOMEN:  soft, nontender, no HSM or masses and bowel sounds normal     MS: extremities normal- no gross deformities noted, no evidence of inflammation in joints, FROM in all extremities.     SKIN: no suspicious lesions or rashes     NEURO: Normal strength and tone, sensory exam grossly normal, mentation intact and speech normal     PSYCH: mentation appears normal. and affect normal/bright     LYMPHATICS: No cervical adenopathy    Recent Labs   Lab Test 11/19/20  1008 10/28/20  1749 08/07/20  0847 08/07/20  0847 02/14/20  0820   HGB 15.6 15.5   < > 15.4  --     247   < > 265  --    NA  --  138  --  139  --    POTASSIUM  --  4.5  --  4.0  --    CR  --  0.98  --  0.82  --    A1C  --   --   --  5.8* 5.9*    < > = values in this interval not displayed.        Diagnostics:  Labs pending at this time.  Results will be reviewed when available.   EKG: atrial fibrillation, rate 79, normal axis, normal intervals, no acute ST/T changes c/w ischemia, no LVH by voltage criteria, unchanged from previous tracings    Revised Cardiac Risk Index (RCRI):  The patient has the following serious cardiovascular risks for perioperative complications:   - Cerebrovascular Disease (TIA or CVA) = 1 point     RCRI Interpretation: 0 points: Class I (very low risk - 0.4% complication " rate)           Assessment & Plan   The proposed surgical procedure is considered INTERMEDIATE risk.    Problem List Items Addressed This Visit        High    Iron deficiency anemia    Relevant Orders    CBC with platelets (Completed)    Obstructive sleep apnea syndrome    Relevant Orders    Comprehensive metabolic panel (Completed)    Hyperlipidemia LDL goal <70    Relevant Orders    Comprehensive metabolic panel (Completed)    NAFLD (nonalcoholic fatty liver disease)    Relevant Orders    Comprehensive metabolic panel (Completed)    History of hepatitis C    Relevant Orders    Comprehensive metabolic panel (Completed)    Prediabetes    Relevant Orders    Comprehensive metabolic panel (Completed)    Hemoglobin A1c    Hypertension goal BP (blood pressure) < 140/90    Relevant Orders    Comprehensive metabolic panel (Completed)    Vitamin B12 deficiency (non anemic)    Vitamin D deficiency       Medium    Cholelithiasis    Relevant Orders    UA reflex to Microscopic and Culture (Completed)    Urine Culture Aerobic Bacterial (Completed)    Urine Microscopic (Completed)    OA (osteoarthritis)    Atrial fibrillation    Relevant Orders    Comprehensive metabolic panel (Completed)    History of CVA (cerebrovascular accident)    Relevant Orders    Comprehensive metabolic panel (Completed)       Low    Long term current use of anticoagulant therapy - for intermittent a-fib      Other Visit Diagnoses     Preop general physical exam    -  Primary    Relevant Orders    EKG 12-lead complete w/read - Clinics (Completed)    Comprehensive metabolic panel (Completed)    CBC with platelets (Completed)    UA reflex to Microscopic and Culture (Completed)    Urine Culture Aerobic Bacterial (Completed)    Urine Microscopic (Completed)    Hemoglobin A1c    Abdominal pain, generalized        Relevant Orders    Comprehensive metabolic panel (Completed)    CBC with platelets (Completed)    UA reflex to Microscopic and Culture (Completed)     Urine Culture Aerobic Bacterial (Completed)    Urine Microscopic (Completed)    Gastroesophageal reflux disease with esophagitis without hemorrhage        Relevant Orders    CBC with platelets (Completed)    Bilateral impacted cerumen        Nonspecific finding on examination of urine        Relevant Orders    Urine Culture Aerobic Bacterial (Completed)    Morbid obesity (H)        Medication monitoring encounter        Relevant Orders    EKG 12-lead complete w/read - Clinics (Completed)    Comprehensive metabolic panel (Completed)    CBC with platelets (Completed)    UA reflex to Microscopic and Culture (Completed)    Urine Culture Aerobic Bacterial (Completed)    Urine Microscopic (Completed)             Risks and Recommendations:  The patient has the following additional risks and recommendations for perioperative complications:   - Morbid obesity (BMI >40)    Hold eliquis x 3 days, hold lovenox  Hold Vitamins, NSAID's, and supplements x 1 week  Ok for diltiazem, protonix, tamsulosin    RECOMMENDATION:  APPROVAL GIVEN to proceed with proposed procedure, without further diagnostic evaluation.    Signed Electronically by:            Brett Cassidy MD, FAAFP     [facility]  [address] [place] [address]  [email or web address]   Office: [phone]  Fax: [phone]  Pager: [phone]           Copy of this evaluation report is provided to requesting physician.    [facility]    [facility] Preop Guidelines    Revised Cardiac Risk Index

## 2020-11-20 DIAGNOSIS — Z11.59 ENCOUNTER FOR SCREENING FOR OTHER VIRAL DISEASES: ICD-10-CM

## 2020-11-20 LAB
ALBUMIN SERPL-MCNC: 3.7 G/DL (ref 3.4–5)
ALP SERPL-CCNC: 139 U/L (ref 40–150)
ALT SERPL W P-5'-P-CCNC: 20 U/L (ref 0–70)
ANION GAP SERPL CALCULATED.3IONS-SCNC: 2 MMOL/L (ref 3–14)
AST SERPL W P-5'-P-CCNC: 10 U/L (ref 0–45)
BACTERIA SPEC CULT: NO GROWTH
BILIRUB SERPL-MCNC: 0.4 MG/DL (ref 0.2–1.3)
BUN SERPL-MCNC: 19 MG/DL (ref 7–30)
CALCIUM SERPL-MCNC: 9.3 MG/DL (ref 8.5–10.1)
CHLORIDE SERPL-SCNC: 105 MMOL/L (ref 94–109)
CO2 SERPL-SCNC: 31 MMOL/L (ref 20–32)
CREAT SERPL-MCNC: 0.92 MG/DL (ref 0.66–1.25)
GFR SERPL CREATININE-BSD FRML MDRD: 82 ML/MIN/{1.73_M2}
GLUCOSE SERPL-MCNC: 75 MG/DL (ref 70–99)
Lab: NORMAL
POTASSIUM SERPL-SCNC: 4 MMOL/L (ref 3.4–5.3)
PROT SERPL-MCNC: 7.3 G/DL (ref 6.8–8.8)
SODIUM SERPL-SCNC: 138 MMOL/L (ref 133–144)
SPECIMEN SOURCE: NORMAL

## 2020-11-20 PROCEDURE — U0003 INFECTIOUS AGENT DETECTION BY NUCLEIC ACID (DNA OR RNA); SEVERE ACUTE RESPIRATORY SYNDROME CORONAVIRUS 2 (SARS-COV-2) (CORONAVIRUS DISEASE [COVID-19]), AMPLIFIED PROBE TECHNIQUE, MAKING USE OF HIGH THROUGHPUT TECHNOLOGIES AS DESCRIBED BY CMS-2020-01-R: HCPCS | Performed by: INTERNAL MEDICINE

## 2020-11-21 LAB
LABORATORY COMMENT REPORT: NORMAL
SARS-COV-2 RNA SPEC QL NAA+PROBE: NEGATIVE
SARS-COV-2 RNA SPEC QL NAA+PROBE: NORMAL
SPECIMEN SOURCE: NORMAL
SPECIMEN SOURCE: NORMAL

## 2020-11-24 ENCOUNTER — ANESTHESIA (OUTPATIENT)
Dept: SURGERY | Facility: CLINIC | Age: 75
End: 2020-11-24
Payer: COMMERCIAL

## 2020-11-24 ENCOUNTER — HOSPITAL ENCOUNTER (OUTPATIENT)
Facility: CLINIC | Age: 75
Discharge: HOME | End: 2020-11-24
Attending: INTERNAL MEDICINE | Admitting: INTERNAL MEDICINE
Payer: COMMERCIAL

## 2020-11-24 ENCOUNTER — ANESTHESIA EVENT (OUTPATIENT)
Dept: SURGERY | Facility: CLINIC | Age: 75
End: 2020-11-24
Payer: COMMERCIAL

## 2020-11-24 VITALS
SYSTOLIC BLOOD PRESSURE: 112 MMHG | BODY MASS INDEX: 42.66 KG/M2 | HEIGHT: 72 IN | OXYGEN SATURATION: 96 % | TEMPERATURE: 98.4 F | DIASTOLIC BLOOD PRESSURE: 82 MMHG | WEIGHT: 315 LBS | HEART RATE: 75 BPM | RESPIRATION RATE: 16 BRPM

## 2020-11-24 DIAGNOSIS — K63.5 POLYP OF COLON, UNSPECIFIED PART OF COLON, UNSPECIFIED TYPE: ICD-10-CM

## 2020-11-24 DIAGNOSIS — D50.9 IRON DEFICIENCY ANEMIA, UNSPECIFIED IRON DEFICIENCY ANEMIA TYPE: Primary | ICD-10-CM

## 2020-11-24 LAB
COLONOSCOPY: NORMAL
UPPER GI ENDOSCOPY: NORMAL

## 2020-11-24 PROCEDURE — 370N000001 HC ANESTHESIA TECHNICAL FEE, 1ST 30 MIN: Performed by: INTERNAL MEDICINE

## 2020-11-24 PROCEDURE — 88305 TISSUE EXAM BY PATHOLOGIST: CPT | Mod: 26 | Performed by: PATHOLOGY

## 2020-11-24 PROCEDURE — 360N000014 HC SURGERY LEVEL 2 1ST 30 MIN: Performed by: INTERNAL MEDICINE

## 2020-11-24 PROCEDURE — 258N000003 HC RX IP 258 OP 636: Performed by: ANESTHESIOLOGY

## 2020-11-24 PROCEDURE — 250N000011 HC RX IP 250 OP 636: Performed by: NURSE ANESTHETIST, CERTIFIED REGISTERED

## 2020-11-24 PROCEDURE — 250N000009 HC RX 250: Performed by: ANESTHESIOLOGY

## 2020-11-24 PROCEDURE — 761N000007 HC RECOVERY PHASE 2 EACH 15 MINS: Performed by: INTERNAL MEDICINE

## 2020-11-24 PROCEDURE — 370N000002 HC ANESTHESIA TECHNICAL FEE, EACH ADDTL 15 MIN: Performed by: INTERNAL MEDICINE

## 2020-11-24 PROCEDURE — 250N000009 HC RX 250: Performed by: NURSE ANESTHETIST, CERTIFIED REGISTERED

## 2020-11-24 PROCEDURE — 88305 TISSUE EXAM BY PATHOLOGIST: CPT | Mod: TC | Performed by: INTERNAL MEDICINE

## 2020-11-24 PROCEDURE — 360N000015 HC SURGERY LEVEL 2 EA 15 ADDTL MIN: Performed by: INTERNAL MEDICINE

## 2020-11-24 PROCEDURE — 999N000136 HC STATISTIC PRE PROC ASSESS II: Performed by: INTERNAL MEDICINE

## 2020-11-24 RX ORDER — PROPOFOL 10 MG/ML
INJECTION, EMULSION INTRAVENOUS CONTINUOUS PRN
Status: DISCONTINUED | OUTPATIENT
Start: 2020-11-24 | End: 2020-11-24

## 2020-11-24 RX ORDER — DIMENHYDRINATE 50 MG/ML
25 INJECTION, SOLUTION INTRAMUSCULAR; INTRAVENOUS
Status: DISCONTINUED | OUTPATIENT
Start: 2020-11-24 | End: 2020-11-24 | Stop reason: HOSPADM

## 2020-11-24 RX ORDER — NALOXONE HYDROCHLORIDE 0.4 MG/ML
0.2 INJECTION, SOLUTION INTRAMUSCULAR; INTRAVENOUS; SUBCUTANEOUS
Status: DISCONTINUED | OUTPATIENT
Start: 2020-11-24 | End: 2020-11-24 | Stop reason: HOSPADM

## 2020-11-24 RX ORDER — ONDANSETRON 4 MG/1
4 TABLET, ORALLY DISINTEGRATING ORAL EVERY 30 MIN PRN
Status: DISCONTINUED | OUTPATIENT
Start: 2020-11-24 | End: 2020-11-24 | Stop reason: HOSPADM

## 2020-11-24 RX ORDER — SODIUM CHLORIDE, SODIUM LACTATE, POTASSIUM CHLORIDE, CALCIUM CHLORIDE 600; 310; 30; 20 MG/100ML; MG/100ML; MG/100ML; MG/100ML
INJECTION, SOLUTION INTRAVENOUS CONTINUOUS
Status: DISCONTINUED | OUTPATIENT
Start: 2020-11-24 | End: 2020-11-24 | Stop reason: HOSPADM

## 2020-11-24 RX ORDER — FENTANYL CITRATE 50 UG/ML
25-50 INJECTION, SOLUTION INTRAMUSCULAR; INTRAVENOUS
Status: DISCONTINUED | OUTPATIENT
Start: 2020-11-24 | End: 2020-11-24 | Stop reason: HOSPADM

## 2020-11-24 RX ORDER — FLUMAZENIL 0.1 MG/ML
0.2 INJECTION, SOLUTION INTRAVENOUS
Status: DISCONTINUED | OUTPATIENT
Start: 2020-11-24 | End: 2020-11-24 | Stop reason: HOSPADM

## 2020-11-24 RX ORDER — GLYCOPYRROLATE 0.2 MG/ML
INJECTION, SOLUTION INTRAMUSCULAR; INTRAVENOUS PRN
Status: DISCONTINUED | OUTPATIENT
Start: 2020-11-24 | End: 2020-11-24

## 2020-11-24 RX ORDER — ONDANSETRON 4 MG/1
4 TABLET, ORALLY DISINTEGRATING ORAL EVERY 6 HOURS PRN
Status: DISCONTINUED | OUTPATIENT
Start: 2020-11-24 | End: 2020-11-24 | Stop reason: HOSPADM

## 2020-11-24 RX ORDER — NALOXONE HYDROCHLORIDE 0.4 MG/ML
0.4 INJECTION, SOLUTION INTRAMUSCULAR; INTRAVENOUS; SUBCUTANEOUS
Status: DISCONTINUED | OUTPATIENT
Start: 2020-11-24 | End: 2020-11-24 | Stop reason: HOSPADM

## 2020-11-24 RX ORDER — HYDRALAZINE HYDROCHLORIDE 20 MG/ML
2.5-5 INJECTION INTRAMUSCULAR; INTRAVENOUS EVERY 10 MIN PRN
Status: DISCONTINUED | OUTPATIENT
Start: 2020-11-24 | End: 2020-11-24 | Stop reason: HOSPADM

## 2020-11-24 RX ORDER — METOPROLOL TARTRATE 1 MG/ML
1-2 INJECTION, SOLUTION INTRAVENOUS EVERY 5 MIN PRN
Status: DISCONTINUED | OUTPATIENT
Start: 2020-11-24 | End: 2020-11-24 | Stop reason: HOSPADM

## 2020-11-24 RX ORDER — FENTANYL CITRATE 50 UG/ML
INJECTION, SOLUTION INTRAMUSCULAR; INTRAVENOUS PRN
Status: DISCONTINUED | OUTPATIENT
Start: 2020-11-24 | End: 2020-11-24

## 2020-11-24 RX ORDER — DEXAMETHASONE SODIUM PHOSPHATE 4 MG/ML
4 INJECTION, SOLUTION INTRA-ARTICULAR; INTRALESIONAL; INTRAMUSCULAR; INTRAVENOUS; SOFT TISSUE EVERY 10 MIN PRN
Status: DISCONTINUED | OUTPATIENT
Start: 2020-11-24 | End: 2020-11-24 | Stop reason: HOSPADM

## 2020-11-24 RX ORDER — ONDANSETRON 2 MG/ML
4 INJECTION INTRAMUSCULAR; INTRAVENOUS EVERY 6 HOURS PRN
Status: DISCONTINUED | OUTPATIENT
Start: 2020-11-24 | End: 2020-11-24 | Stop reason: HOSPADM

## 2020-11-24 RX ORDER — PROPOFOL 10 MG/ML
INJECTION, EMULSION INTRAVENOUS PRN
Status: DISCONTINUED | OUTPATIENT
Start: 2020-11-24 | End: 2020-11-24

## 2020-11-24 RX ORDER — ONDANSETRON 2 MG/ML
4 INJECTION INTRAMUSCULAR; INTRAVENOUS
Status: DISCONTINUED | OUTPATIENT
Start: 2020-11-24 | End: 2020-11-24 | Stop reason: HOSPADM

## 2020-11-24 RX ORDER — LIDOCAINE 40 MG/G
CREAM TOPICAL
Status: DISCONTINUED | OUTPATIENT
Start: 2020-11-24 | End: 2020-11-24 | Stop reason: HOSPADM

## 2020-11-24 RX ORDER — KETAMINE HYDROCHLORIDE 10 MG/ML
INJECTION, SOLUTION INTRAMUSCULAR; INTRAVENOUS PRN
Status: DISCONTINUED | OUTPATIENT
Start: 2020-11-24 | End: 2020-11-24

## 2020-11-24 RX ORDER — ONDANSETRON 2 MG/ML
4 INJECTION INTRAMUSCULAR; INTRAVENOUS EVERY 30 MIN PRN
Status: DISCONTINUED | OUTPATIENT
Start: 2020-11-24 | End: 2020-11-24 | Stop reason: HOSPADM

## 2020-11-24 RX ORDER — PROCHLORPERAZINE MALEATE 5 MG
5 TABLET ORAL EVERY 6 HOURS PRN
Status: DISCONTINUED | OUTPATIENT
Start: 2020-11-24 | End: 2020-11-24 | Stop reason: HOSPADM

## 2020-11-24 RX ORDER — METOCLOPRAMIDE 10 MG/1
10 TABLET ORAL EVERY 6 HOURS PRN
Status: DISCONTINUED | OUTPATIENT
Start: 2020-11-24 | End: 2020-11-24 | Stop reason: HOSPADM

## 2020-11-24 RX ORDER — MEPERIDINE HYDROCHLORIDE 25 MG/ML
12.5 INJECTION INTRAMUSCULAR; INTRAVENOUS; SUBCUTANEOUS
Status: DISCONTINUED | OUTPATIENT
Start: 2020-11-24 | End: 2020-11-24 | Stop reason: HOSPADM

## 2020-11-24 RX ORDER — HYDROMORPHONE HYDROCHLORIDE 1 MG/ML
.3-.5 INJECTION, SOLUTION INTRAMUSCULAR; INTRAVENOUS; SUBCUTANEOUS EVERY 10 MIN PRN
Status: DISCONTINUED | OUTPATIENT
Start: 2020-11-24 | End: 2020-11-24 | Stop reason: HOSPADM

## 2020-11-24 RX ORDER — METOCLOPRAMIDE HYDROCHLORIDE 5 MG/ML
10 INJECTION INTRAMUSCULAR; INTRAVENOUS EVERY 6 HOURS PRN
Status: DISCONTINUED | OUTPATIENT
Start: 2020-11-24 | End: 2020-11-24 | Stop reason: HOSPADM

## 2020-11-24 RX ADMIN — SODIUM CHLORIDE, POTASSIUM CHLORIDE, SODIUM LACTATE AND CALCIUM CHLORIDE: 600; 310; 30; 20 INJECTION, SOLUTION INTRAVENOUS at 11:48

## 2020-11-24 RX ADMIN — PROPOFOL 30 MG: 10 INJECTION, EMULSION INTRAVENOUS at 13:17

## 2020-11-24 RX ADMIN — SODIUM CHLORIDE, POTASSIUM CHLORIDE, SODIUM LACTATE AND CALCIUM CHLORIDE: 600; 310; 30; 20 INJECTION, SOLUTION INTRAVENOUS at 13:47

## 2020-11-24 RX ADMIN — PROPOFOL 100 MCG/KG/MIN: 10 INJECTION, EMULSION INTRAVENOUS at 13:16

## 2020-11-24 RX ADMIN — GLYCOPYRROLATE 0.2 MG: 0.2 INJECTION, SOLUTION INTRAMUSCULAR; INTRAVENOUS at 13:10

## 2020-11-24 RX ADMIN — FENTANYL CITRATE 50 MCG: 50 INJECTION, SOLUTION INTRAMUSCULAR; INTRAVENOUS at 13:16

## 2020-11-24 RX ADMIN — TOPICAL ANESTHETIC 1 SPRAY: 200 SPRAY DENTAL; PERIODONTAL at 13:10

## 2020-11-24 RX ADMIN — MIDAZOLAM 2 MG: 1 INJECTION INTRAMUSCULAR; INTRAVENOUS at 13:10

## 2020-11-24 RX ADMIN — KETAMINE HYDROCHLORIDE 20 MG: 10 INJECTION, SOLUTION INTRAMUSCULAR; INTRAVENOUS at 13:14

## 2020-11-24 RX ADMIN — LIDOCAINE HYDROCHLORIDE 40 MG: 10 INJECTION, SOLUTION EPIDURAL; INFILTRATION; INTRACAUDAL; PERINEURAL at 13:16

## 2020-11-24 SDOH — HEALTH STABILITY: MENTAL HEALTH: CURRENT SMOKER: 0

## 2020-11-24 ASSESSMENT — MIFFLIN-ST. JEOR: SCORE: 2261.26

## 2020-11-24 ASSESSMENT — COPD QUESTIONNAIRES: COPD: 0

## 2020-11-24 ASSESSMENT — ENCOUNTER SYMPTOMS: DYSRHYTHMIAS: 1

## 2020-11-24 NOTE — ANESTHESIA CARE TRANSFER NOTE
Patient: Hugo Weber    Procedure(s):  ESOPHAGOGASTRODUODENOSCOPY (EGD) (FV)  COLONOSCOPY    Diagnosis: Abdominal pain, generalized [R10.84]  Diagnosis Additional Information: No value filed.    Anesthesia Type:   MAC     Note:  Airway :Room Air  Patient transferred to:Phase II  Comments: Pt's VSS, A/O x3 resting comfortably post procedure, care continued by RN. Handoff Report: Identifed the Patient, Identified the Reponsible Provider, Reviewed the pertinent medical history, Discussed the surgical course, Reviewed Intra-OP anesthesia mangement and issues during anesthesia, Set expectations for post-procedure period and Allowed opportunity for questions and acknowledgement of understanding      Vitals: (Last set prior to Anesthesia Care Transfer)    CRNA VITALS  11/24/2020 1320 - 11/24/2020 1356      11/24/2020             NIBP:  126/72    Pulse:  82    Resp Rate (observed):  20                Electronically Signed By: BIENVENIDO Menjivar CRNA  November 24, 2020  1:56 PM

## 2020-11-24 NOTE — LETTER
November 11, 2020      Hugo Weber  [address]  [facility] [address]        Dear Hugo,     Thank you for choosing [facility].  You are scheduled for the following services.     You will need to have a History and Physical Exam done within 30 days of this scheduled procedures. Please arrange this with your primary care physician.    Date:  11/24/2020    Procedure:   UPPER ENDOSCOPY & COLONOSCOPY    Doctor: Dr. Chadwick Chow                       Arrival Time:  10:30 am   *check in at the Surgery Center desk*     Procedure Time: 12:00 pm      Location:   [facility]      Surgery Center (on the south side of the [facility])       [address] [place], [place] [address]                    MIRALAX -GATORADE  PREP  Upper Endoscopy or Esophagogastroduodenoscopy (EGD) is a test performed to evaluate symptoms of persistent abdominal pain, nausea, vomiting or difficulty swallowing. It may also be used to treat various conditions of the upper gastrointestinal (GI) tract, such as bleeding, narrowing or abnormal growths. During the procedure, a doctor examines the lining of your esophagus, stomach and the first part of your small intestine through a thin, flexible tube called an endoscope. If growths or other abnormalities are found during the procedure, the doctor may remove the abnormal tissue (biopsy) for further examination.     Colonoscopy is the most accurate test to detect colon polyps and colon cancer; and the only test where polyps can be removed. During this procedure, a doctor examines the lining of your large intestine and rectum through a flexible tube.     Transportation  You must arrange for a ride for the day of your procedure with a responsible adult. A taxi , Uber, etc, is not an option unless you are accompanied by a responsible adult. If you fail to arrange transportation with a responsible adult, your procedure will be cancelled and  rescheduled.      MIRALAX -GATORADE  PREP  Purchase the  following supplies at your local pharmacy:  - 2 (two) bisacodyl tablets: each tablet contains 5 mg.  (Dulcolax  laxative NOT Dulcolax  stool softener)   - 1 (one) 8.3 oz bottle of Polyethylene Glycol (PEG) 3350 Powder   (MiraLAX , Smooth LAX , ClearLAX  or equivalent)  - 64 oz Gatorade    Regular Gatorade, Gatorade G2 , Powerade , Powerade Zero  or Pedialyte  is acceptable. Red colored flavors are not allowed; all other colors (yellow, green, orange, purple and blue) are okay. It is also okay to buy two 2.12 oz packets of powdered Gatorade that can be mixed with water to a total volume of 64 oz of liquid.  - 1 (one) 10 oz bottle of Magnesium Citrate (Red colored flavors are not allowed)  It is also okay for you to use a 0.5 oz package of powdered magnesium citrate (17 g) mixed with 10 oz of water.      PREPARATION FOR COLONOSCOPY  7 days before:    Discontinue fiber supplements and medications containing iron. This includes Metamucil  and Fibercon ; and multivitamins with iron.  3 days before:    Begin a low-fiber diet. A low-fiber diet helps making the cleanout more effective.     Examples of a low-fiber diet include (but are not limited to): white bread, white rice, pasta, crackers, fish, chicken, eggs, ground beef, creamy peanut butter, cooked/steamed/boiled vegetables, canned fruit, bananas, melons, milk, plain yogurt cheese, salad dressing and other condiments.     The following are not allowed on a low-fiber diet: seeds, nuts, popcorn, bran, whole wheat, corn, quinoa, raw fruits and vegetables, berries and dried fruit, beans and lentils.    For additional details on low-fiber diet, please refer to the table on the last page.  2 days before:    Continue the low-fiber diet.     Drink at least 8 glasses of water throughout the day.     Stop eating solid foods at 11:45 pm.  1. 1 day before:    In the morning: begin a clear liquid diet (liquids you can see  through).     Examples of a clear liquid diet include: water, clear broth or bouillon, Gatorade, Pedialyte or Powerade, carbonated and non-carbonated soft drinks (Sprite , 7-Up , ginger ale), strained fruit juices without pulp (apple, white grape, white cranberry), Jell-O  and popsicles.     The following are not allowed on a clear liquid diet: red liquids, alcoholic beverages, dairy products (milk, creamer, and yogurt), protein shakes, creamy broths, juice with pulp and chewing tobacco.    At noon: take 2 (two) bisacodyl tablets     At 4 (and no later than 6pm): start drinking the Miralax-Gatorade preparation (8.3 oz of Miralax mixed with 64 oz of Gatorade in a large pitcher). Drink 1(one) 8 oz glass every 15 minutes thereafter, until the mixture is gone.    COLON CLEANSING TIPS: drink adequate amounts of fluids before and after your colon cleansing to prevent dehydration. Stay near a toilet because you will have diarrhea. Even if you are sitting on the toilet, continue to drink the cleansing solution every 15 minutes. If you feel nauseous or vomit, rinse your mouth with water, take a 15 to 30-minute-break and then continue drinking the solution. You will be uncomfortable until the stool has flushed from your colon (in about 2 to 4 hours). You may feel chilled.    Day of your procedure  You may take all of your morning medications including blood pressure medications, blood thinners (if you have not been instructed to stop these by our office), methadone, anti-seizure medications with sips of water 3 hours prior to your procedure or earlier. Do not take insulin or vitamins prior to your procedure. Continue the clear liquid diet.   4 hours prior: drink 10 oz of magnesium citrate. It may be easier to drink it with a straw.    STOP consuming all liquids after that.     Do not take anything by mouth during this time.     Allow extra time to travel to your procedure as you may need to stop and use a restroom along the  way.  You are ready for the procedure, if you followed all instructions and your stool is no longer formed, but clear or yellow liquid. If you are unsure whether your colon is clean, please call our office at [phone] before you leave for your appointment.    Bring the following to your procedure:  - Insurance Card/Photo ID.   - List of current medications including over-the-counter medications and supplements.   Your rescue inhaler if you currently use one to control asthma.              Canceling or rescheduling your appointment:   If you must cancel or reschedule your appointment, please call [phone] as soon as possible.    COLONOSCOPY PRE-PROCEDURE CHECKLIST  If you have diabetes, ask your regular doctor for diet and medication restrictions.  If you take an anticoagulant or anti-platelet medication (such as Coumadin , Lovenox , Pradaxa , Xarelto , Eliquis , etc.), please call your primary doctor for advice on holding this medication.  If you take aspirin you may continue to do so.  If you are or may be pregnant, please discuss the risks and benefits of this procedure with your doctor.      What happens during a colonoscopy?    Plan to spend up to two hours, starting at registration time, at the endoscopy center the day of your procedure. The colonoscopy takes an average of 15 to 30 minutes. Recovery time is about 30 minutes.    Before the exam:    You will change into a gown.    Your medical history and medication list will be reviewed with you, unless that has been done over the phone prior to the procedure.     A nurse will insert an intravenous (IV) line into your hand or arm.    The doctor will meet with you and will give you a consent form to sign  During the exam:     Medicine will be given through the IV line to help you relax.     Your heart rate and oxygen levels will be monitored. If your blood pressure is low, you may be given fluids through the IV line.     The doctor will insert a flexible  hollow tube, called a colonoscope, into your rectum. The scope will be advanced slowly through the large intestine (colon).    You may have a feeling of fullness or pressure.     If an abnormal tissue or a polyp is found, the doctor may remove it through the endoscope for closer examination, or biopsy. Tissue removal is painless        After the exam:           Any tissue samples removed during the exam will be sent to a lab for evaluation. It may take 5-7 working days for you to be notified of the results.     A nurse will provide you with complete discharge instructions before you leave the endoscopy center. Be sure to ask the nurse for specific instructions if you take blood thinners such as Aspirin, Coumadin or Plavix.     The doctor will prepare a full report for you and for the physician who referred you for the procedure.     Your doctor will talk with you about the initial results of your exam.      Medication given during the exam will prohibit you from driving for the rest of the day.     Following the exam, you may resume your normal diet. Your first meal should be light, no greasy foods. Avoid alcohol until the next day.   You may resume your regular activities the day after the procedure.                                                         LOW-FIBER DIET  Foods RECOMMENDED Foods to AVOID   Breads, Cereal, Rice and Pasta:   White bread, rolls, biscuits, croissant and peggy toast.   Waffles, Nigerian toast and pancakes.   White rice, noodles, pasta, macaroni and peeled cooked potatoes.   Plain crackers and saltines.   Cooked cereals: farina, cream of rice.   Cold cereals: Puffed Rice , Rice Krispies , Corn Flakes  and Special K    Breads, Cereal, Rice and Pasta:   Breads or rolls with nuts, seeds or fruit.   Whole wheat, pumpernickel, rye breads and cornbread.   Potatoes with skin, brown or wild rice, and kasha (buckwheat).     Vegetables:   Tender cooked and canned vegetables without seeds: carrots,  asparagus tips, green or wax beans, pumpkin, spinach, lima beans. Vegetables:   Raw or steamed vegetables.   Vegetables with seeds.   Sauerkraut.   Winter squash, peas, broccoli, Brussel sprouts, cabbage, onions, cauliflower, baked beans, peas and corn.   Fruits:   Strained fruit juice.   Canned fruit, except pineapple.   Ripe bananas and melon. Fruits:   Prunes and prune juice.   Raw fruits.   Dried fruits: figs, dates and raisins.   Milk/Dairy:   Milk: plain or flavored.   Yogurt, custard and ice cream.   Cheese and cottage cheese Milk/Dairy:     Meat and other proteins:   ground, well-cooked tender beef, lamb, ham, veal, pork, fish, poultry and organ meats.   Eggs.   Peanut butter without nuts. Meat and other proteins:   Tough, fibrous meats with gristle.   Dry beans, peas and lentils.   Peanut butter with nuts.   Tofu.   Fats, Snack, Sweets, Condiments and Beverages:   Margarine, butter, oils, mayonnaise, sour cream and salad dressing, plain gravy.   Sugar, hard candy, clear jelly, honey and syrup.   Spices, cooked herbs, bouillon, broth and soups made with allowed vegetable, ketchup and mustard.   Coffee, tea and carbonated drinks.   Plain cakes, cookies and pretzels.   Gelatin, plain puddings, custard, ice cream, sherbet and popsicles. Fats, Snack, Sweets, Condiments and Beverages:   Nuts, seeds and coconut.   Jam, marmalade and preserves.   Pickles, olives, relish and horseradish.   All desserts containing nuts, seeds, dried fruit and coconut; or made from whole grains or bran.   Candy made with nuts or seeds.   Popcorn.                         DIRECTIONS TO THE SURGERY CENTER    From the north ([facility])  Take [address] south, exit on [address]. Get into the left hand stephy, turn left (east), go one-half mile to [address] and turn left. Go north to the first stoplight, take a right on [address] and follow it to the Surgery Center entrance.    From the south ([place],  [place])  Take 35N to the [address]E split and exit on [address] . On [address] , turn left (west) to [address]. Turn right (north) on [address]. Go north to the first stoplight, take a right on [address] and follow it to the Surgery Center entrance.    From the east via 35E ([facility])  Take [address]E south to [address]  exit. Turn right on [address] . Go west to [address]. Turn right (north) on [address]. Go to the first stoplight, take a right and follow on [address] to the Surgery Center entrance.    From the east via [address] ([facility])  Take [address] west to [address]. Turn left (south) on [address] to [address]. Turn left (east) on [address] and follow it to the Surgery Center entrance.    From the west via [address] ([place])  Take [address] east to [address]. Turn right (south) on [address] to [address]. Turn left (east) on [address] and follow it to the Surgery Center entrance.

## 2020-11-24 NOTE — H&P
Pre-Endoscopy History and Physical     Hugo Weber MRN# [id number]   YOB: 1945 Age: 75 year old     Date of Procedure: 11/24/2020  Primary care provider: Brett Cassidy  Type of Endoscopy: Colonoscopy and gastroscopy with possible biopsy, possible polypectomy  Reason for Procedure: polyps,pain  Type of Anesthesia Anticipated: Conscious Sedation    HPI:    Hugo is a 75 year old male who will be undergoing the above procedure.      A history and physical has been performed. The patient's medications and allergies have been reviewed. The risks and benefits of the procedure and the sedation options and risks were discussed with the patient.  All questions were answered and informed consent was obtained.      He denies a personal or family history of anesthesia complications or bleeding disorders.     Patient Active Problem List   Diagnosis     Iron deficiency anemia     Colon polyps     Obstructive sleep apnea syndrome     Hyperlipidemia LDL goal <70     Long term current use of anticoagulant therapy - for intermittent a-fib     Advance Care Planning     Total knee replacement status     Calculus of kidney     NAFLD (nonalcoholic fatty liver disease)     Morbid obesity due to excess calories (H)     History of hepatitis C     Prediabetes     Paroxysmal atrial fibrillation (H)     Cholelithiasis     Hypertension goal BP (blood pressure) < 140/90     TMJ arthralgia     Nephrolithiasis     OA (osteoarthritis)     First degree AV block     Benign prostatic hyperplasia (BPH) with urinary urge incontinence     Thoracic aortic ectasia (H)     Stroke (H)     CVA (cerebral vascular accident) (H)     Cervical stenosis of spine     Vitamin B12 deficiency (non anemic)     Vitamin D deficiency     Myofascial pain     Atrial fibrillation     History of CVA (cerebrovascular accident)        Past Medical History:   Diagnosis Date     Arrhythmia      Arthritis      Atrial fibrillation 2006    Followed by [facility] -  persistent     Calculus of kidney 2005, 6/06, 10/12, 8/18, 9/19    dr walker/nestor/иван - hematuria - w/u o/w neg     Cervical stenosis of spine     Moderate C4-5     Cholelithiasis      Chronic peptic ulcer, unspecified site, without mention of hemorrhage, perforation, or obstruction 1985    gastric bypass     Colon polyps 8/05, 9/10, 10/15    2 tubular adenoma, 1 hyperplastic - multiple serrated/tubular adenomas     CVA (cerebral vascular accident) (H) 01/2019    small right periorlandic subcortical - left sided residual - left hand tingling/numbness - Left leg weak/numbness - cardioembolic     First degree AV block      Hepatitis C 10/12    chronic hepatitis C, grade 1-2, stage 1 - mild - Dr Douglas     Hyperlipidemia LDL goal <70      Hypertension goal BP (blood pressure) < 140/90 6/06    dr jaciel winchester     Iron deficiency anemia, unspecified 1985    gastric bypass     Nephrolithiasis multiple episodes, last 10/19    Dr Bey/Ivana     OA (osteoarthritis)     Multiple - Left shoulder with rotator cuff tear - Dr Durham     Obesity, unspecified     s/p gastric bypass 1985      Prediabetes      Presbyacusis 1/04    dr corona     Pyelonephritis, unspecified 12/99     SCC (squamous cell carcinoma), 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    dr saez - lt conchal bowl     Sleep apnea 10/02    cpap - severe - 15 cm - dr cunha     Stroke (H) 1/19/2019     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    [facility]     Vitamin B12 deficiency (non anemic) 4/15/2019     Vitamin D deficiency 4/15/2019        Past Surgical History:   Procedure Laterality Date     APPENDECTOMY       ARTHROPLASTY KNEE  8/13/2012    LT TKA - Dr Villanueva     CARDIOVERSION  2016     COLONOSCOPY  8/05, 9/10, 10/15    multiple tubular/serrated/hyperplastic polyps     COLONOSCOPY N/A 10/29/2015    multiple tubular and serrated adenomas     COLONOSCOPY N/A 9/7/2016     EYE SURGERY  Lasik     HC KNEE SCOPE, DIAGNOSTIC  05/00    Arthroscopy, Knee RT     LASER HOLMIUM LITHOTRIPSY URETER(S),  INSERT STENT, COMBINED  10/16/2012    stones x 4, Dr Campa     LASER HOLMIUM LITHOTRIPSY URETER(S), INSERT STENT, COMBINED Right 2018    CYSTOSCOPY, RIGHT URETEROSCOPY, HOLMIUM LASER LITHOTRIPSY, RIGHT STENT PLACEMENT - Dr Bey     SURGICAL HISTORY OF -       s/p gastric bypass Bilroth II     SURGICAL HISTORY OF -       wisdom teeth     SURGICAL HISTORY OF -       cellulitis     SURGICAL HISTORY OF -       lt forearm spur's     SURGICAL HISTORY OF -   ,,    s/p lasik     SURGICAL HISTORY OF -       rt forearm spurs     SURGICAL HISTORY OF -       dr stevenson - lithotrypsy     SURGICAL HISTORY OF -   10/08,     Lt ear chonchal lesion removal SCC - dr saez     SURGICAL HISTORY OF -  Right 10/2019    nephrolithiasis - cystoscopy, rt lithotrypsy, Dr Heath     SURGICAL HISTORY OF -  Right 2019    Cystoscopy, Rt lithotrypsy, Calcium Oxalate, Dr Heath       Social History     Tobacco Use     Smoking status: Never Smoker     Smokeless tobacco: Never Used   Substance Use Topics     Alcohol use: Yes     Alcohol/week: 2.0 standard drinks     Types: 2 Standard drinks or equivalent per week     Comment: 2 per week       Family History   Problem Relation Age of Onset     Alzheimer Disease Father 82         at 88     Prostate Cancer Father 76        bladder and prostate     Heart Disease Mother 80        CHF     Heart Disease Maternal Grandfather         MI at 55     Cancer Paternal Uncle         ?       Prior to Admission medications    Medication Sig Start Date End Date Taking? Authorizing Provider   apixaban ANTICOAGULANT (ELIQUIS) 5 MG tablet Take 1 tablet (5 mg) by mouth 2 times daily 20  Yes Brett Cassidy MD   diltiazem ER (DILT-XR) 180 MG 24 hr capsule Take 2 capsules (360 mg) by mouth daily 20  Yes Brett Cassidy MD   Ferrous Sulfate (IRON) 325 (65 Fe) MG tablet Take 1 tablet by mouth daily (with breakfast) 19  Yes Brett Cassidy MD   fluticasone  (FLONASE) 50 MCG/ACT nasal spray Spray 2 sprays into both nostrils daily as needed for rhinitis or allergies 8/7/20  Yes Brett Cassidy MD   niacin 500 MG tablet Take by mouth daily (with breakfast)   Yes Reported, Patient   pantoprazole (PROTONIX) 40 MG EC tablet Take 1 tablet (40 mg) by mouth daily 8/7/20  Yes Brett Cassidy MD   tamsulosin (FLOMAX) 0.4 MG capsule Take 1 capsule (0.4 mg) by mouth 2 times daily 8/7/20  Yes Brett Cassidy MD   vitamin C (ASCORBIC ACID) 1000 MG TABS Take 1,000 mg by mouth daily   Yes Reported, Patient   Vitamin D-Vitamin K (VITAMIN K2-VITAMIN D3 PO)    Yes Reported, Patient   Cyanocobalamin 5000 MCG TBDP     Reported, Patient   STATIN NOT PRESCRIBED (INTENTIONAL) Please choose reason not prescribed, below, treated for hepatitis C 6/29/19   Brett Cassidy MD       No Known Allergies     REVIEW OF SYSTEMS:   5 point ROS negative except as noted above in HPI, including Gen., Resp., CV, GI &  system review.    PHYSICAL EXAM:   /55   Pulse 79   Temp 97.4  F (36.3  C) (Temporal)   Resp 20   Ht 1.829 m (6')   Wt 148.3 kg (327 lb)   SpO2 97%   BMI 44.35 kg/m   Estimated body mass index is 44.35 kg/m  as calculated from the following:    Height as of this encounter: 1.829 m (6').    Weight as of this encounter: 148.3 kg (327 lb).   GENERAL APPEARANCE: alert, and oriented  MENTAL STATUS: alert  AIRWAY EXAM: Mallampatti Class I (visualization of the soft palate, fauces, uvula, anterior and posterior pillars)  RESP: lungs clear to auscultation - no rales, rhonchi or wheezes  CV: regular rates and rhythm  DIAGNOSTICS:    Not indicated    IMPRESSION   ASA Class 2 - Mild systemic disease    PLAN:   Plan for Colonoscopy with possible biopsy, possible polypectomy and Gastroscopy with possible biopsy, possible dilation. We discussed the risks, benefits and alternatives and the patient wished to proceed.    The above has been forwarded to the consulting provider.      Signed Electronically by:  Chadwick Burgos MD  November 24, 2020

## 2020-11-24 NOTE — ANESTHESIA POSTPROCEDURE EVALUATION
Patient: Hugo Weber    Procedure(s):  ESOPHAGOGASTRODUODENOSCOPY (EGD) (FV)  COLONOSCOPY    Diagnosis:Abdominal pain, generalized [R10.84]  Diagnosis Additional Information: No value filed.    Anesthesia Type:  MAC    Note:  Anesthesia Post Evaluation    Patient location during evaluation: PACU  Patient participation: Able to fully participate in evaluation  Level of consciousness: awake and alert  Pain management: adequate  Airway patency: patent  Cardiovascular status: acceptable  Respiratory status: acceptable  Hydration status: acceptable  PONV: controlled     Anesthetic complications: None          Last vitals:  Vitals:    11/24/20 1102 11/24/20 1353   BP: 101/55 126/82   Pulse: 79    Resp: 20    Temp: 97.4  F (36.3  C) 98.1  F (36.7  C)   SpO2: 97% 96%         Electronically Signed By: Frank Clark MD  November 24, 2020  2:00 PM

## 2020-11-24 NOTE — ANESTHESIA PREPROCEDURE EVALUATION
Anesthesia Pre-Procedure Evaluation    Patient: Hugo Weber   MRN: [id number] : 1945          Preoperative Diagnosis: Abdominal pain, generalized [R10.84]    Procedure(s):  ESOPHAGOGASTRODUODENOSCOPY (EGD) ([facility])  COLONOSCOPY    Past Medical History:   Diagnosis Date     Arrhythmia      Arthritis      Atrial fibrillation     Followed by [facility] - persistent     Calculus of kidney , , 10/12, ,     dr walker/nestor/иван - hematuria - w/u o/w neg     Cervical stenosis of spine     Moderate C4-5     Cholelithiasis      Chronic peptic ulcer, unspecified site, without mention of hemorrhage, perforation, or obstruction     gastric bypass     Colon polyps , 9/10, 10/15    2 tubular adenoma, 1 hyperplastic - multiple serrated/tubular adenomas     CVA (cerebral vascular accident) (H) 2019    small right periorlandic subcortical - left sided residual - left hand tingling/numbness - Left leg weak/numbness - cardioembolic     First degree AV block      Hepatitis C 10/12    chronic hepatitis C, grade 1-2, stage 1 - mild - Dr Douglas     Hyperlipidemia LDL goal <70      Hypertension goal BP (blood pressure) < 140/90     dr jaciel winchester     Iron deficiency anemia, unspecified     gastric bypass     Nephrolithiasis multiple episodes, last 10/19    Dr Bey/Ivana     OA (osteoarthritis)     Multiple - Left shoulder with rotator cuff tear - Dr Durham     Obesity, unspecified     s/p gastric bypass 1985      Prediabetes      Presbyacusis     dr corona     Pyelonephritis, unspecified      SCC (squamous cell carcinoma), 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    dr saez - lt conchal bowl     Sleep apnea 10/02    cpap - severe - 15 cm - dr cunha     Stroke (H) 2019     TMJ arthralgia 2017    [facility]     Vitamin B12 deficiency (non anemic) 4/15/2019     Vitamin D deficiency 4/15/2019     Past Surgical History:   Procedure Laterality Date     APPENDECTOMY       ARTHROPLASTY KNEE   8/13/2012    LT TKA - Dr Villanueva     CARDIOVERSION  2016     COLONOSCOPY  8/05, 9/10, 10/15    multiple tubular/serrated/hyperplastic polyps     COLONOSCOPY N/A 10/29/2015    multiple tubular and serrated adenomas     COLONOSCOPY N/A 9/7/2016     EYE SURGERY  Lasik     HC KNEE SCOPE, DIAGNOSTIC  05/00    Arthroscopy, Knee RT     LASER HOLMIUM LITHOTRIPSY URETER(S), INSERT STENT, COMBINED  10/16/2012    stones x 4, Dr Campa     LASER HOLMIUM LITHOTRIPSY URETER(S), INSERT STENT, COMBINED Right 5/2/2018    CYSTOSCOPY, RIGHT URETEROSCOPY, HOLMIUM LASER LITHOTRIPSY, RIGHT STENT PLACEMENT - Dr Bey     SURGICAL HISTORY OF -   1985    s/p gastric bypass Bilroth II     SURGICAL HISTORY OF -   11/98    wisdom teeth     SURGICAL HISTORY OF -   1979    cellulitis     SURGICAL HISTORY OF -   11/98    lt forearm spur's     SURGICAL HISTORY OF -   1/01,6/02,11/02    s/p lasik     SURGICAL HISTORY OF -   11/99    rt forearm spurs     SURGICAL HISTORY OF -   7/06    dr stevenson - lithotrypsy     SURGICAL HISTORY OF -   10/08, 12/08    Lt ear chonchal lesion removal SCC - dr saez     SURGICAL HISTORY OF -  Right 10/2019    nephrolithiasis - cystoscopy, rt lithotrypsy, Dr Heath     SURGICAL HISTORY OF -  Right 11/2019    Cystoscopy, Rt lithotrypsy, Calcium Oxalate, Dr Heath     Anesthesia Evaluation     . Pt has had prior anesthetic. Type: MAC and General    No history of anesthetic complications          ROS/MED HX    ENT/Pulmonary:     (+)sleep apnea, uses CPAP , . .   (-) asthma and COPD   Neurologic: Comment: Cervical stenosis    (+)CVA date: Jan 2019 without deficits    Cardiovascular:     (+) hypertension----. Taking blood thinners Pt has received instructions: Instructions Given to patient: Stopped eliquis 5 days ago. . . :. dysrhythmias a-fib, .      (-) stent   METS/Exercise Tolerance:     Hematologic: Comments: Anemia related to hx of gastric bypass    (+) Anemia, -      Musculoskeletal:  - neg  musculoskeletal ROS       GI/Hepatic: Comment: Nonalcoholic fatty liver disease    (+) GERD Asymptomatic on medication, liver disease,       Renal/Genitourinary:     (+) Nephrolithiasis ,       Endo: Comment: Prediabetic        Psychiatric:  - neg psychiatric ROS       Infectious Disease:  - neg infectious disease ROS       Malignancy:      - no malignancy   Other:    - neg other ROS                      Physical Exam  Normal systems: dental    Airway   Mallampati: III  TM distance: >3 FB  Neck ROM: full    Dental     Cardiovascular   Rhythm and rate: irregular and normal      Pulmonary    breath sounds clear to auscultation            Lab Results   Component Value Date    WBC 7.7 11/19/2020    HGB 15.6 11/19/2020    HCT 48.2 11/19/2020     11/19/2020    CRP <2.9 10/28/2020    SED 8 10/28/2020     11/19/2020    POTASSIUM 4.0 11/19/2020    CHLORIDE 105 11/19/2020    CO2 31 11/19/2020    BUN 19 11/19/2020    CR 0.92 11/19/2020    GLC 75 11/19/2020    BEBO 9.3 11/19/2020    PHOS 3.4 10/29/2012    MAG 2.2 03/30/2016    ALBUMIN 3.7 11/19/2020    PROTTOTAL 7.3 11/19/2020    ALT 20 11/19/2020    AST 10 11/19/2020    GGT 77 (H) 12/24/2012    ALKPHOS 139 11/19/2020    BILITOTAL 0.4 11/19/2020    LIPASE 76 10/28/2020    AMYLASE 74 10/28/2020    PTT 32 03/21/2016    INR 1.04 03/21/2016    TSH 2.03 08/07/2020    T4 1.08 02/21/2019       Preop Vitals  BP Readings from Last 3 Encounters:   11/24/20 101/55   11/19/20 118/84   10/28/20 132/74    Pulse Readings from Last 3 Encounters:   11/24/20 79   11/19/20 97   10/28/20 74      Resp Readings from Last 3 Encounters:   11/24/20 20   07/23/18 14   05/02/18 18    SpO2 Readings from Last 3 Encounters:   11/24/20 97%   11/19/20 97%   10/28/20 99%      Temp Readings from Last 1 Encounters:   11/24/20 97.4  F (36.3  C) (Temporal)    Ht Readings from Last 1 Encounters:   11/24/20 1.829 m (6')      Wt Readings from Last 1 Encounters:   11/24/20 148.3 kg (327 lb)    Estimated  body mass index is 44.35 kg/m  as calculated from the following:    Height as of this encounter: 1.829 m (6').    Weight as of this encounter: 148.3 kg (327 lb).       Anesthesia Plan      History & Physical Review  History and physical reviewed and following examination; no interval change.    ASA Status:  3 .    NPO Status:  > 8 hours    Plan for MAC with Intravenous induction. Maintenance will be TIVA.  Reason for MAC:  Deep or markedly invasive procedure (G8)  PONV prophylaxis:  Ondansetron (or other 5HT-3) and Meclizine or Dimenhydrinate    The patient is not a current smoker      Postoperative Care  Postoperative pain management:  IV analgesics, Oral pain medications and Multi-modal analgesia.      Consents  Anesthetic plan, risks, benefits and alternatives discussed with:  Patient..                 Frank Clark MD                    .

## 2020-11-25 LAB — COPATH REPORT: NORMAL

## 2020-12-02 ENCOUNTER — TELEPHONE (OUTPATIENT)
Dept: FAMILY MEDICINE | Facility: CLINIC | Age: 75
End: 2020-12-02

## 2020-12-02 NOTE — TELEPHONE ENCOUNTER
General Call:     Who is calling:  Patient    Reason for Call:  Hugo had labs done. He is wanting someone to call him back to talk about the lab results as well as go over next steps.    Okay to leave a detailed message:Yes at Cell number on file:    Telephone Information:   Mobile [phone]

## 2020-12-02 NOTE — TELEPHONE ENCOUNTER
Called # [phone]     Pt calling to inquire about colonoscopy and EGD results. Pt stated understanding noting that Dr. Burgos did call to report the results of the biopsy.     Pt would like to know now what to do about the lower abdominal pain. Writer will forward to PCP to discuss further.        King Combs RN   [facility]

## 2020-12-07 DIAGNOSIS — K21.00 GASTROESOPHAGEAL REFLUX DISEASE WITH ESOPHAGITIS: ICD-10-CM

## 2020-12-08 ENCOUNTER — VIRTUAL VISIT (OUTPATIENT)
Dept: FAMILY MEDICINE | Facility: CLINIC | Age: 75
End: 2020-12-08
Payer: COMMERCIAL

## 2020-12-08 DIAGNOSIS — K21.00 GASTROESOPHAGEAL REFLUX DISEASE WITH ESOPHAGITIS WITHOUT HEMORRHAGE: Primary | ICD-10-CM

## 2020-12-08 DIAGNOSIS — Z86.19 HISTORY OF HEPATITIS C: ICD-10-CM

## 2020-12-08 DIAGNOSIS — R10.84 ABDOMINAL PAIN, GENERALIZED: ICD-10-CM

## 2020-12-08 DIAGNOSIS — K76.0 NAFLD (NONALCOHOLIC FATTY LIVER DISEASE): ICD-10-CM

## 2020-12-08 PROCEDURE — 99214 OFFICE O/P EST MOD 30 MIN: CPT | Mod: 95 | Performed by: FAMILY MEDICINE

## 2020-12-08 NOTE — PROGRESS NOTES
[facility]    Telephone visit  - [phone]    Subjective    Hugo Weber is a 75 year old male who is being evaluated via a billable telephone visit.      Chief Complaint   Patient presents with     Results     Notes from 10/28    Abdominal/Flank Pain  Patient has persistent left lower abdominal pain.The pain is dull but occasionally feels like a stabbing pain. The pain is worse immediately after eating. It is improved with 2 Tylenol and with passing gas. It also seems to improve with increased water intake. The patient has been belching but it does not improve the pain. Has had 2-3 stools/day. He denies any constipation or diarrhea. They have been softer than usual the past week. Stools have been green/brown with no gross blood. He has had no limitations in daily activities, but is in significant discomfort. Patient has a Hx of gastric ulcer and hyperplastic colon polyps.     He has a Hx of nephrolithiasis in bilateral kidneys. Had CT on 1 day ago (10/27) ordered by urologist who told him to follow up with PCP. CT showed nonobstructing left renal collecting system 1.2 cm stone. No hydronephrosis or additional urinary tract calculi. Previously noted right urinary tract stones have resolved. No other acute abnormality noted in the abdomen or pelvis to account for patient's left-sided abdominal pain.     Discuss GI results - last few days increasing, improved with apple cider vinegar - will start to try not eating after 8 pm - concerned about longevity - better after passing gas/bowel movement    CT - Dr Heath    IMPRESSION:   1.  Nonobstructing left renal collecting system stone (1.2 cm). No  hydronephrosis or additional urinary tract calculi. Previously noted  right urinary tract stones have resolved. No other acute abnormality  noted in the abdomen or pelvis to account for patient's left-sided  abdominal pain.     2.  Gastric bypass surgery. No evidence of bowel obstruction  or  diverticulitis. Appendix not visualized.    Labs    Benign - prediabetes    US    CONCLUSION:  1.  The sensitivity of the examination is limited given the patient's  body habitus.  2.  The liver appears to have mild coarsened parenchymal echotexture  and increased echogenicity which may be seen with mild hepatic  steatosis and/or hepatic fibrosis.  3.  Remainder negative as visualized as detailed above. Specifically  no hydronephrosis to suggest an acute nephroureteral obstruction.    10 mm gallstone, non distended gall bladder    HIDA    IMPRESSION:   1. Normal hepatobiliary scan without cystic duct or common bile duct  obstruction.  2. Lower limits of normal for gallbladder ejection fraction at only  35%. This has diminished since previous exam which demonstrated a  gallbladder ejection fraction of 53%.     EGD/Colonoscopy    Impression:               - Normal esophagus.                             - Gastric bypass with a normal-sized pouch and                             intact staple line. Gastrojejunal anastomosis                             characterized by healthy appearing mucosa.                             - Normal examined jejunum. No specimens collected.   Recommendation:           - Continue present medications.     Impression:               - Two 4 to 5 mm polyps in the ascending colon,                             removed with a hot snare. Complete resection.                             Partial retrieval.                             - The examination was otherwise normal on direct                             and retroflexion views.   Recommendation:           - Await pathology results.                             - Repeat colonoscopy in 5 years for surveillance.     FINAL DIAGNOSIS:   Ascending colon polyps x2, polypectomies.   -Fragments of tubular adenomas.  Negative for high grade dysplasia and   malignancy.       [facility]    Past Medical History:   Diagnosis Date     Arrhythmia      Arthritis       Atrial fibrillation 2006    Followed by [facility] - persistent     Calculus of kidney 2005, 6/06, 10/12, 8/18, 9/19    dr walker/nestor/иван - hematuria - w/u o/w neg     Cervical stenosis of spine     Moderate C4-5     Cholelithiasis      Chronic peptic ulcer, unspecified site, without mention of hemorrhage, perforation, or obstruction 1985    gastric bypass     Colon polyps 8/05, 9/10, 10/15    2 tubular adenoma, 1 hyperplastic - multiple serrated/tubular adenomas - last colonscopy 11/20 due 5 yrs     CVA (cerebral vascular accident) (H) 01/2019    small right periorlandic subcortical - left sided residual - left hand tingling/numbness - Left leg weak/numbness - cardioembolic     First degree AV block      Hepatitis C 10/2012    chronic hepatitis C, grade 1-2, stage 1 - mild - Dr Douglas     Hyperlipidemia LDL goal <70      Hypertension goal BP (blood pressure) < 140/90 06/2006    dr jaciel winchester     Iron deficiency anemia, unspecified 1985    gastric bypass     Nephrolithiasis multiple episodes, last 10/19    Dr Bey/Ivana     OA (osteoarthritis)     Multiple - Left shoulder with rotator cuff tear - Dr Durham     Obesity, unspecified     s/p gastric bypass 1985      Prediabetes      Presbyacusis 01/2004    dr corona     Pyelonephritis, unspecified 12/1999     SCC (squamous cell carcinoma), ear 10/2008    dr saez - lt conchal bowl     Sleep apnea 10/2002    cpap - severe - 15 cm - dr cunha     Stroke (H) 01/19/2019     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    [facility] Head and Neck     Vitamin B12 deficiency (non anemic) 04/15/2019     Vitamin D deficiency 04/15/2019       PSH    Past Surgical History:   Procedure Laterality Date     APPENDECTOMY       ARTHROPLASTY KNEE  08/13/2012    LT TKA - Dr Villanueva     CARDIOVERSION  2016     COLONOSCOPY  8/05, 9/10, 10/15    multiple tubular/serrated/hyperplastic polyps     COLONOSCOPY N/A 10/29/2015    multiple tubular and serrated adenomas     COLONOSCOPY N/A 09/07/2016      COLONOSCOPY  11/2020    tubular adenomas - due 5 yrs     COLONOSCOPY N/A 11/24/2020    Procedure: COLONOSCOPY with biopsies;  Surgeon: Chadwick Burgos MD;  Location: [facility] OR     ESOPHAGOSCOPY, GASTROSCOPY, DUODENOSCOPY (EGD), COMBINED N/A 11/24/2020    Procedure: ESOPHAGOGASTRODUODENOSCOPY, COLONOSCOPY with biopsies;  Surgeon: Chadwick Burgos MD;  Location: [facility] OR     EYE SURGERY  Lasik     HC KNEE SCOPE, DIAGNOSTIC  05/2000    Arthroscopy, Knee RT     LASER HOLMIUM LITHOTRIPSY URETER(S), INSERT STENT, COMBINED  10/16/2012    stones x 4, Dr Campa     LASER HOLMIUM LITHOTRIPSY URETER(S), INSERT STENT, COMBINED Right 05/02/2018    CYSTOSCOPY, RIGHT URETEROSCOPY, HOLMIUM LASER LITHOTRIPSY, RIGHT STENT PLACEMENT - Dr Bey     SURGICAL HISTORY OF -   1985    s/p gastric bypass Bilroth II     SURGICAL HISTORY OF -   11/1998    wisdom teeth     SURGICAL HISTORY OF -   1979    cellulitis     SURGICAL HISTORY OF -   11/1998    lt forearm spur's     SURGICAL HISTORY OF -   1/01,6/02,11/02    s/p lasik     SURGICAL HISTORY OF -   11/1999    rt forearm spurs     SURGICAL HISTORY OF -   07/2006    dr stevenson - lithotrypsy     SURGICAL HISTORY OF -   10/08, 12/08    Lt ear chonchal lesion removal SCC - dr saez     SURGICAL HISTORY OF -  Right 10/2019    nephrolithiasis - cystoscopy, rt lithotrypsy, Dr Heath     SURGICAL HISTORY OF -  Right 11/2019    Cystoscopy, Rt lithotrypsy, Calcium Oxalate, Dr Heath       Medications    Current Outpatient Medications   Medication Sig Dispense Refill     apixaban ANTICOAGULANT (ELIQUIS) 5 MG tablet Take 1 tablet (5 mg) by mouth 2 times daily 180 tablet 3     Cyanocobalamin 5000 MCG TBDP        diltiazem ER (DILT-XR) 180 MG 24 hr capsule Take 2 capsules (360 mg) by mouth daily 180 capsule 3     Ferrous Sulfate (IRON) 325 (65 Fe) MG tablet Take 1 tablet by mouth daily (with breakfast) 90 tablet 3     fluticasone (FLONASE) 50 MCG/ACT nasal spray Spray 2 sprays into both nostrils  "daily as needed for rhinitis or allergies 54.6 mL 3     niacin 500 MG tablet Take by mouth daily (with breakfast)       pantoprazole (PROTONIX) 40 MG EC tablet Take 1 tablet (40 mg) by mouth daily 90 tablet 3     STATIN NOT PRESCRIBED (INTENTIONAL) Please choose reason not prescribed, below, treated for hepatitis C       tamsulosin (FLOMAX) 0.4 MG capsule Take 1 capsule (0.4 mg) by mouth 2 times daily 180 capsule 3     vitamin C (ASCORBIC ACID) 1000 MG TABS Take 1,000 mg by mouth daily       Vitamin D-Vitamin K (VITAMIN K2-VITAMIN D3 PO)          Allergies    Patient has no known allergies.    Family History    Family History   Problem Relation Age of Onset     Alzheimer Disease Father 82         at 88     Prostate Cancer Father 76        bladder and prostate     Heart Disease Mother 80        CHF     Heart Disease Maternal Grandfather         MI at 55     Cancer Paternal Uncle         ?       Social History    Social History     Socioeconomic History     Marital status:      Spouse name: Mayra     Number of children: 3     Years of education: 21     Highest education level: Not on file   Occupational History     Occupation: retired teacher and football and       Employer: [employer]     Employer: RETIRED   Social Needs     Financial resource strain: Not on file     Food insecurity     Worry: Not on file     Inability: Not on file     Transportation needs     Medical: Not on file     Non-medical: Not on file   Tobacco Use     Smoking status: Never Smoker     Smokeless tobacco: Never Used   Substance and Sexual Activity     Alcohol use: Yes     Alcohol/week: 2.0 standard drinks     Types: 2 Standard drinks or equivalent per week     Comment: 2 per week     Drug use: No     Comment: acknowledges using herbal supplement \"Vibe\" for energy-none used recently      Sexual activity: Not Currently     Partners: Female     Comment:     Lifestyle     Physical activity     Days " per week: Not on file     Minutes per session: Not on file     Stress: Not on file   Relationships     Social connections     Talks on phone: Not on file     Gets together: Not on file     Attends Congregational service: Not on file     Active member of club or organization: Not on file     Attends meetings of clubs or organizations: Not on file     Relationship status: Not on file     Intimate partner violence     Fear of current or ex partner: Not on file     Emotionally abused: Not on file     Physically abused: Not on file     Forced sexual activity: Not on file   Other Topics Concern      Service Not Asked     Blood Transfusions Not Asked     Caffeine Concern Yes     Comment: <1 can qd     Occupational Exposure Not Asked     Hobby Hazards Not Asked     Sleep Concern Yes     Comment: sleep apnea, wears cpap     Stress Concern Not Asked     Weight Concern Not Asked     Special Diet Not Asked     Back Care Not Asked     Exercise No     Bike Helmet Not Asked     Seat Belt Yes     Self-Exams Not Asked     Parent/sibling w/ CABG, MI or angioplasty before 65F 55M? No   Social History Narrative     Not on file       Reviewed and updated as needed this visit by Provider                   Review of Systems     CONSTITUTIONAL: NEGATIVE for fever, chills, change in weight  INTEGUMENTARY/SKIN: NEGATIVE for worrisome rashes, moles or lesions  EYES: NEGATIVE for vision changes or irritation  ENT/MOUTH: NEGATIVE for ear, mouth and throat problems  RESP: NEGATIVE for significant cough or SOB  CV: NEGATIVE for chest pain, palpitations or peripheral edema  GI: NEGATIVE for nausea, abdominal pain, heartburn, or change in bowel habits  : NEGATIVE for frequency, dysuria, or hematuria  MUSCULOSKELETAL: NEGATIVE for significant arthralgias or myalgia  NEURO: NEGATIVE for weakness, dizziness or paresthesias  ENDOCRINE: NEGATIVE for temperature intolerance, skin/hair changes  HEME: NEGATIVE for bleeding problems  PSYCHIATRIC:  "NEGATIVE for changes in mood or affect    Objective    Reported vitals:  There were no vitals taken for this visit.     healthy, alert and no distress  Psych: Alert and oriented times 3; coherent speech, normal   rate and volume, able to articulate logical thoughts, able   to abstract reason, no tangential thoughts, no hallucinations   or delusions  His affect is normal     Diagnostic Test Results:  Labs reviewed in Epic    Assessment/Plan:      ICD-10-CM    1. Gastroesophageal reflux disease with esophagitis without hemorrhage  K21.00 GASTROENTEROLOGY ADULT REF CONSULT ONLY   2. Abdominal pain, generalized  R10.84 GASTROENTEROLOGY ADULT REF CONSULT ONLY   3. NAFLD (nonalcoholic fatty liver disease)  K76.0 GASTROENTEROLOGY ADULT REF CONSULT ONLY   4. History of hepatitis C  Z86.19 GASTROENTEROLOGY ADULT REF CONSULT ONLY       Protonix, gas x, hold peptomisol  Increase water  No food after 8 pm    GI consult - [facility]  Consider Dr Heath, urology, and general surgery    No follow-ups on file.    Phone call duration:  25 minutes    The patient has been notified of following:     \"This telephone visit will be conducted via a call between you and your physician/provider. We have found that certain health care needs can be provided without the need for a physical exam.  This service lets us provide the care you need with a short phone conversation.  If a prescription is necessary we can send it directly to your pharmacy.  If lab work is needed we can place an order for that and you can then stop by our lab to have the test done at a later time.    Telephone visits are billed at different rates depending on your insurance coverage. During this emergency period, for some insurers they may be billed the same as an in-person visit.  Please reach out to your insurance provider with any questions.    If during the course of the call the physician/provider feels a telephone visit is not appropriate, you will not be charged for " "this service.\"    Patient has given verbal consent for Telephone visit?  Yes    How would you like to obtain your AVS? Maile Cassidy MD, FAAFP     [facility]  [address] [place] [id number]  [email or web address]   Office: [phone]  Fax: [phone]  Pager: [phone]     "

## 2020-12-09 RX ORDER — TAMSULOSIN HYDROCHLORIDE 0.4 MG/1
CAPSULE ORAL
Qty: 180 CAPSULE | Refills: 2 | Status: SHIPPED | OUTPATIENT
Start: 2020-12-09 | End: 2021-02-15

## 2020-12-11 ENCOUNTER — TRANSFERRED RECORDS (OUTPATIENT)
Dept: HEALTH INFORMATION MANAGEMENT | Facility: CLINIC | Age: 75
End: 2020-12-11

## 2020-12-13 NOTE — TELEPHONE ENCOUNTER
fluticasone (FLONASE) 50 MCG/ACT nasal spray      Last Written Prescription Date: 3-10-16  Last Fill Quantity: 48g,  # refills: 3   Last Office Visit with FMDELLA, LUB[facility] or [facility] prescribing provider: 6-5-17    Monica Richardson MA                                                Yes

## 2020-12-23 ENCOUNTER — TRANSFERRED RECORDS (OUTPATIENT)
Dept: HEALTH INFORMATION MANAGEMENT | Facility: CLINIC | Age: 75
End: 2020-12-23

## 2021-01-20 ENCOUNTER — TRANSFERRED RECORDS (OUTPATIENT)
Dept: HEALTH INFORMATION MANAGEMENT | Facility: CLINIC | Age: 76
End: 2021-01-20

## 2021-01-21 ENCOUNTER — TRANSFERRED RECORDS (OUTPATIENT)
Dept: HEALTH INFORMATION MANAGEMENT | Facility: CLINIC | Age: 76
End: 2021-01-21

## 2021-01-28 ENCOUNTER — TRANSFERRED RECORDS (OUTPATIENT)
Dept: HEALTH INFORMATION MANAGEMENT | Facility: CLINIC | Age: 76
End: 2021-01-28

## 2021-02-07 NOTE — PROGRESS NOTES
HPI:  Hugo Weber is a 75 year old male who presents to clinic today for annual cardiology.   He is a patient of Dr. Castillo and has a medical history of     1. Chronic atrial fibrillation   2. sleep apnea  3. CVA  4. Obesity. S/p gastric bypass  5. Hypertension    Diagnostics:  Echocardiogram at outside hospital () revealed EF 55-60%    In 2020, patient met with Dr. Castillo and was doing well    Today he reports his wife .   Denies chest pain or pressure, dizziness, syncope, angina, dyspnea at rest or with exertion,  palpitations, orthopnea, PND, abdominal pain, or edema.  States takes medications as prescribed including Eliquis and denies bleeding, hematuria, hematochezia, epistaxis and signs/symptoms of stroke.       ASSESSMENT AND PLAN    Chronic atrial fibrillation    For rate control diltiazem 180 mg twice daily.   His ECG (2020) revealed atrial fibrillation with ventricular rate of 79 bpm    For anticoagulation for CHADS VASC 3 (hypertension, age) he is currently taking Eliquis 5 mg twice daily Last hgb was 15.6 (2020)    Murmur    ECHO    Plan:   Continue with plan of care  ECHO now.     20 minutes spent on the date of the encounter doing chart review, review of outside records, review of test results, interpretation of tests, patient visit, documentation      Patient expresses understanding and agreement with the plan.     I appreciate the chance to help with Hugo Weber Please let me know if you have any questions or concerns.    BIENVENIDO Espinoza, CNP    This note was completed in part using Dragon voice recognition software. Although reviewed after completion, some word and grammatical errors may occur.    No orders of the defined types were placed in this encounter.    No orders of the defined types were placed in this encounter.    Medications Discontinued During This Encounter   Medication Reason     pantoprazole (PROTONIX) 40 MG EC tablet Medication Reconciliation Clean  Up         Encounter Diagnosis   Name Primary?     Persistent atrial fibrillation (H)        CURRENT MEDICATIONS:  Current Outpatient Medications   Medication Sig Dispense Refill     apixaban ANTICOAGULANT (ELIQUIS) 5 MG tablet Take 1 tablet (5 mg) by mouth 2 times daily 180 tablet 3     Cyanocobalamin 5000 MCG TBDP        diltiazem ER (DILT-XR) 180 MG 24 hr capsule Take 2 capsules (360 mg) by mouth daily 180 capsule 3     Ferrous Sulfate (IRON) 325 (65 Fe) MG tablet Take 1 tablet by mouth daily (with breakfast) 90 tablet 3     fluticasone (FLONASE) 50 MCG/ACT nasal spray Spray 2 sprays into both nostrils daily as needed for rhinitis or allergies 54.6 mL 3     niacin 500 MG tablet Take by mouth daily (with breakfast)       tamsulosin (FLOMAX) 0.4 MG capsule TAKE 1 CAPSULE TWICE DAILY 180 capsule 2     vitamin C (ASCORBIC ACID) 1000 MG TABS Take 1,000 mg by mouth daily       Vitamin D-Vitamin K (VITAMIN K2-VITAMIN D3 PO)        STATIN NOT PRESCRIBED (INTENTIONAL) Please choose reason not prescribed, below, treated for hepatitis C         ALLERGIES   No Known Allergies    PAST MEDICAL HISTORY   Past Medical History:   Diagnosis Date     Arrhythmia      Arthritis      Atrial fibrillation 2006    Followed by [facility] - persistent     Calculus of kidney 2005, 6/06, 10/12, 8/18, 9/19    dr walker/nestor/иван - hematuria - w/u o/w neg     Cervical stenosis of spine     Moderate C4-5     Cholelithiasis      Chronic peptic ulcer, unspecified site, without mention of hemorrhage, perforation, or obstruction 1985    gastric bypass     Colon polyps 8/05, 9/10, 10/15    2 tubular adenoma, 1 hyperplastic - multiple serrated/tubular adenomas - last colonscopy 11/20 due 5 yrs     CVA (cerebral vascular accident) (H) 01/2019    small right periorlandic subcortical - left sided residual - left hand tingling/numbness - Left leg weak/numbness - cardioembolic     First degree AV block      Hepatitis C 10/2012    chronic hepatitis C,  grade 1-2, stage 1 - mild - Dr Douglas     Hyperlipidemia LDL goal <70      Hypertension goal BP (blood pressure) < 140/90 06/2006    dr jaciel winchester     Iron deficiency anemia, unspecified 1985    gastric bypass     Nephrolithiasis multiple episodes, last 10/19    Dr Bey/Ivana     OA (osteoarthritis)     Multiple - Left shoulder with rotator cuff tear - Dr Durhma     Obesity, unspecified     s/p gastric bypass 1985      Prediabetes      Presbyacusis 01/2004    dr corona     Pyelonephritis, unspecified 12/1999     SCC (squamous cell carcinoma), ear 10/2008    dr saez - lt conchal bowl     Sleep apnea 10/2002    cpap - severe - 15 cm - dr cunha     Stroke (H) 01/19/2019     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    Mn Head and Neck     Vitamin B12 deficiency (non anemic) 04/15/2019     Vitamin D deficiency 04/15/2019       Review of Systems:  Skin:  Negative     Eyes:  Positive for glasses  ENT:  Positive for hearing loss  Respiratory:  Positive for sleep apnea;CPAP  Cardiovascular:  Negative    Gastroenterology: Negative    Genitourinary:  Negative    Musculoskeletal:  Negative    Neurologic:  Negative    Psychiatric:  Negative    Heme/Lymph/Imm:  Negative    Endocrine:  Negative      Physical Exam:  Vitals: /71 (BP Location: Right arm, Patient Position: Sitting, Cuff Size: Adult Large)   Pulse 67   Ht 1.829 m (6')   Wt (!) 154.5 kg (340 lb 9.6 oz)   BMI 46.19 kg/m    Body mass index is 46.19 kg/m .    Physical Exam   Constitutional: He is oriented to person, place, and time. He appears well-developed and well-nourished.   Neck: Normal range of motion.   Cardiovascular: An irregularly irregular rhythm present.   Murmur heard.  Pulmonary/Chest: Effort normal and breath sounds normal.   Abdominal: Soft.   Musculoskeletal: Normal range of motion.   Neurological: He is alert and oriented to person, place, and time.   Skin: Skin is warm and dry.   Psychiatric: He has a normal mood and affect. His behavior is normal. Judgment  and thought content normal.        basic metabolic panel  Lab Results   Component Value Date    WBC 7.7 11/19/2020     Lab Results   Component Value Date    RBC 5.38 11/19/2020     Lab Results   Component Value Date    HGB 15.6 11/19/2020     Lab Results   Component Value Date    HCT 48.2 11/19/2020     No components found for: MCT  Lab Results   Component Value Date    MCV 90 11/19/2020     Lab Results   Component Value Date    MCH 29.0 11/19/2020     Lab Results   Component Value Date    MCHC 32.4 11/19/2020     Lab Results   Component Value Date    RDW 14.2 11/19/2020     Lab Results   Component Value Date     11/19/2020       Last Comprehensive Metabolic Panel:  Sodium   Date Value Ref Range Status   11/19/2020 138 133 - 144 mmol/L Final     Potassium   Date Value Ref Range Status   11/19/2020 4.0 3.4 - 5.3 mmol/L Final     Chloride   Date Value Ref Range Status   11/19/2020 105 94 - 109 mmol/L Final     Carbon Dioxide   Date Value Ref Range Status   11/19/2020 31 20 - 32 mmol/L Final     Anion Gap   Date Value Ref Range Status   11/19/2020 2 (L) 3 - 14 mmol/L Final     Glucose   Date Value Ref Range Status   11/19/2020 75 70 - 99 mg/dL Final     Urea Nitrogen   Date Value Ref Range Status   11/19/2020 19 7 - 30 mg/dL Final     Creatinine   Date Value Ref Range Status   11/19/2020 0.92 0.66 - 1.25 mg/dL Final     GFR Estimate   Date Value Ref Range Status   11/19/2020 82 >60 mL/min/[1.73_m2] Final     Comment:     Non  GFR Calc  Starting 12/18/2018, serum creatinine based estimated GFR (eGFR) will be   calculated using the Chronic Kidney Disease Epidemiology Collaboration   (CKD-EPI) equation.       Calcium   Date Value Ref Range Status   11/19/2020 9.3 8.5 - 10.1 mg/dL Final       CC  Sarah Beth Castillo MD  [id number]  [id number]  [place] [id number]

## 2021-02-10 ENCOUNTER — HOSPITAL ENCOUNTER (OUTPATIENT)
Dept: CARDIOLOGY | Facility: CLINIC | Age: 76
Discharge: HOME | End: 2021-02-10
Attending: NURSE PRACTITIONER | Admitting: NURSE PRACTITIONER
Payer: COMMERCIAL

## 2021-02-10 ENCOUNTER — OFFICE VISIT (OUTPATIENT)
Dept: CARDIOLOGY | Facility: CLINIC | Age: 76
End: 2021-02-10
Payer: COMMERCIAL

## 2021-02-10 VITALS
DIASTOLIC BLOOD PRESSURE: 71 MMHG | HEIGHT: 72 IN | HEART RATE: 67 BPM | SYSTOLIC BLOOD PRESSURE: 127 MMHG | WEIGHT: 315 LBS | BODY MASS INDEX: 42.66 KG/M2

## 2021-02-10 DIAGNOSIS — R01.1 HEART MURMUR: ICD-10-CM

## 2021-02-10 DIAGNOSIS — I48.11 LONGSTANDING PERSISTENT ATRIAL FIBRILLATION (H): ICD-10-CM

## 2021-02-10 DIAGNOSIS — I10 HYPERTENSION GOAL BP (BLOOD PRESSURE) < 140/90: ICD-10-CM

## 2021-02-10 DIAGNOSIS — I48.19 PERSISTENT ATRIAL FIBRILLATION (H): ICD-10-CM

## 2021-02-10 DIAGNOSIS — R01.1 HEART MURMUR: Primary | ICD-10-CM

## 2021-02-10 PROCEDURE — 93306 TTE W/DOPPLER COMPLETE: CPT | Mod: 26 | Performed by: INTERNAL MEDICINE

## 2021-02-10 PROCEDURE — 255N000002 HC RX 255 OP 636: Performed by: NURSE PRACTITIONER

## 2021-02-10 PROCEDURE — 999N000208 ECHOCARDIOGRAM COMPLETE

## 2021-02-10 PROCEDURE — 99213 OFFICE O/P EST LOW 20 MIN: CPT | Performed by: NURSE PRACTITIONER

## 2021-02-10 RX ORDER — DILTIAZEM HYDROCHLORIDE 360 MG/1
360 CAPSULE, EXTENDED RELEASE ORAL DAILY
Qty: 90 CAPSULE | Refills: 3 | Status: SHIPPED | OUTPATIENT
Start: 2021-02-10 | End: 2021-02-23

## 2021-02-10 RX ADMIN — HUMAN ALBUMIN MICROSPHERES AND PERFLUTREN 3 ML: 10; .22 INJECTION, SOLUTION INTRAVENOUS at 14:35

## 2021-02-10 ASSESSMENT — MIFFLIN-ST. JEOR: SCORE: 2322.95

## 2021-02-10 NOTE — PATIENT INSTRUCTIONS
On exam I heard a murmur please get an ECHO.     If you have problems or questions please call the RNs (Radha Lehman, & Sarina) at [phone]

## 2021-02-10 NOTE — LETTER
2/10/2021    Brett Cassidy MD  [address] [facility] [address]    RE: Hugo Weber       Dear Colleague,    I had the pleasure of seeing Hugo Weber in the [facility].    HPI:  Hugo Weber is a 75 year old male who presents to clinic today for annual cardiology.   He is a patient of Dr. Castillo and has a medical history of     1. Chronic atrial fibrillation   2. sleep apnea  3. CVA  4. Obesity. S/p gastric bypass  5. Hypertension    Diagnostics:  Echocardiogram at outside hospital () revealed EF 55-60%    In 2020, patient met with Dr. Castillo and was doing well    Today he reports his wife .   Denies chest pain or pressure, dizziness, syncope, angina, dyspnea at rest or with exertion,  palpitations, orthopnea, PND, abdominal pain, or edema.  States takes medications as prescribed including Eliquis and denies bleeding, hematuria, hematochezia, epistaxis and signs/symptoms of stroke.       ASSESSMENT AND PLAN    Chronic atrial fibrillation    For rate control diltiazem 180 mg twice daily.   His ECG (2020) revealed atrial fibrillation with ventricular rate of 79 bpm    For anticoagulation for CHADS VASC 3 (hypertension, age) he is currently taking Eliquis 5 mg twice daily Last hgb was 15.6 (2020)    Murmur    ECHO    Plan:   Continue with plan of care  ECHO now.     20 minutes spent on the date of the encounter doing chart review, review of outside records, review of test results, interpretation of tests, patient visit, documentation      Patient expresses understanding and agreement with the plan.     I appreciate the chance to help with Hugo Weber Please let me know if you have any questions or concerns.    Norma Stein, BIENVENIDO, CNP    This note was completed in part using Dragon voice recognition software. Although reviewed after completion, some word and grammatical errors may occur.    No orders of the  defined types were placed in this encounter.    No orders of the defined types were placed in this encounter.    Medications Discontinued During This Encounter   Medication Reason     pantoprazole (PROTONIX) 40 MG EC tablet Medication Reconciliation Clean Up         Encounter Diagnosis   Name Primary?     Persistent atrial fibrillation (H)        CURRENT MEDICATIONS:  Current Outpatient Medications   Medication Sig Dispense Refill     apixaban ANTICOAGULANT (ELIQUIS) 5 MG tablet Take 1 tablet (5 mg) by mouth 2 times daily 180 tablet 3     Cyanocobalamin 5000 MCG TBDP        diltiazem ER (DILT-XR) 180 MG 24 hr capsule Take 2 capsules (360 mg) by mouth daily 180 capsule 3     Ferrous Sulfate (IRON) 325 (65 Fe) MG tablet Take 1 tablet by mouth daily (with breakfast) 90 tablet 3     fluticasone (FLONASE) 50 MCG/ACT nasal spray Spray 2 sprays into both nostrils daily as needed for rhinitis or allergies 54.6 mL 3     niacin 500 MG tablet Take by mouth daily (with breakfast)       tamsulosin (FLOMAX) 0.4 MG capsule TAKE 1 CAPSULE TWICE DAILY 180 capsule 2     vitamin C (ASCORBIC ACID) 1000 MG TABS Take 1,000 mg by mouth daily       Vitamin D-Vitamin K (VITAMIN K2-VITAMIN D3 PO)        STATIN NOT PRESCRIBED (INTENTIONAL) Please choose reason not prescribed, below, treated for hepatitis C         ALLERGIES   No Known Allergies    PAST MEDICAL HISTORY   Past Medical History:   Diagnosis Date     Arrhythmia      Arthritis      Atrial fibrillation 2006    Followed by [facility] - persistent     Calculus of kidney 2005, 6/06, 10/12, 8/18, 9/19    dr walker/nestor/иван - hematuria - w/u o/w neg     Cervical stenosis of spine     Moderate C4-5     Cholelithiasis      Chronic peptic ulcer, unspecified site, without mention of hemorrhage, perforation, or obstruction 1985    gastric bypass     Colon polyps 8/05, 9/10, 10/15    2 tubular adenoma, 1 hyperplastic - multiple serrated/tubular adenomas - last colonscopy 11/20 due 5 yrs      CVA (cerebral vascular accident) (H) 01/2019    small right periorlandic subcortical - left sided residual - left hand tingling/numbness - Left leg weak/numbness - cardioembolic     First degree AV block      Hepatitis C 10/2012    chronic hepatitis C, grade 1-2, stage 1 - mild - Dr Douglas     Hyperlipidemia LDL goal <70      Hypertension goal BP (blood pressure) < 140/90 06/2006    dr jaciel winchester     Iron deficiency anemia, unspecified 1985    gastric bypass     Nephrolithiasis multiple episodes, last 10/19    Dr Bey/Ivana     OA (osteoarthritis)     Multiple - Left shoulder with rotator cuff tear - Dr Durham     Obesity, unspecified     s/p gastric bypass 1985      Prediabetes      Presbyacusis 01/2004    dr corona     Pyelonephritis, unspecified 12/1999     SCC (squamous cell carcinoma), ear 10/2008    dr saez - lt conchal bowl     Sleep apnea 10/2002    cpap - severe - 15 cm - dr cunha     Stroke (H) 01/19/2019     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    Mn Head and Neck     Vitamin B12 deficiency (non anemic) 04/15/2019     Vitamin D deficiency 04/15/2019       Review of Systems:  Skin:  Negative     Eyes:  Positive for glasses  ENT:  Positive for hearing loss  Respiratory:  Positive for sleep apnea;CPAP  Cardiovascular:  Negative    Gastroenterology: Negative    Genitourinary:  Negative    Musculoskeletal:  Negative    Neurologic:  Negative    Psychiatric:  Negative    Heme/Lymph/Imm:  Negative    Endocrine:  Negative      Physical Exam:  Vitals: /71 (BP Location: Right arm, Patient Position: Sitting, Cuff Size: Adult Large)   Pulse 67   Ht 1.829 m (6')   Wt (!) 154.5 kg (340 lb 9.6 oz)   BMI 46.19 kg/m    Body mass index is 46.19 kg/m .    Physical Exam   Constitutional: He is oriented to person, place, and time. He appears well-developed and well-nourished.   Neck: Normal range of motion.   Cardiovascular: An irregularly irregular rhythm present.   Murmur heard.  Pulmonary/Chest: Effort normal and breath  sounds normal.   Abdominal: Soft.   Musculoskeletal: Normal range of motion.   Neurological: He is alert and oriented to person, place, and time.   Skin: Skin is warm and dry.   Psychiatric: He has a normal mood and affect. His behavior is normal. Judgment and thought content normal.        basic metabolic panel  Lab Results   Component Value Date    WBC 7.7 11/19/2020     Lab Results   Component Value Date    RBC 5.38 11/19/2020     Lab Results   Component Value Date    HGB 15.6 11/19/2020     Lab Results   Component Value Date    HCT 48.2 11/19/2020     No components found for: MCT  Lab Results   Component Value Date    MCV 90 11/19/2020     Lab Results   Component Value Date    MCH 29.0 11/19/2020     Lab Results   Component Value Date    MCHC 32.4 11/19/2020     Lab Results   Component Value Date    RDW 14.2 11/19/2020     Lab Results   Component Value Date     11/19/2020       Last Comprehensive Metabolic Panel:  Sodium   Date Value Ref Range Status   11/19/2020 138 133 - 144 mmol/L Final     Potassium   Date Value Ref Range Status   11/19/2020 4.0 3.4 - 5.3 mmol/L Final     Chloride   Date Value Ref Range Status   11/19/2020 105 94 - 109 mmol/L Final     Carbon Dioxide   Date Value Ref Range Status   11/19/2020 31 20 - 32 mmol/L Final     Anion Gap   Date Value Ref Range Status   11/19/2020 2 (L) 3 - 14 mmol/L Final     Glucose   Date Value Ref Range Status   11/19/2020 75 70 - 99 mg/dL Final     Urea Nitrogen   Date Value Ref Range Status   11/19/2020 19 7 - 30 mg/dL Final     Creatinine   Date Value Ref Range Status   11/19/2020 0.92 0.66 - 1.25 mg/dL Final     GFR Estimate   Date Value Ref Range Status   11/19/2020 82 >60 mL/min/[1.73_m2] Final     Comment:     Non  GFR Calc  Starting 12/18/2018, serum creatinine based estimated GFR (eGFR) will be   calculated using the Chronic Kidney Disease Epidemiology Collaboration   (CKD-EPI) equation.       Calcium   Date Value Ref Range Status    11/19/2020 9.3 8.5 - 10.1 mg/dL Final       CC  Sarah Beth Castillo MD  [address]  [address]  [place] [address]                    Thank you for allowing me to participate in the care of your patient.      Sincerely,     BIENVENIDO Milner [facility]  cc:   Sarah Beth Castillo MD  [address]  [address]  [place] [id number]

## 2021-02-10 NOTE — LETTER
2/10/2021    Brett Cassidy MD  [address] [facility] [address]    RE: Hugo Weber       Dear Colleague,    I had the pleasure of seeing Hugo Weber in the [facility].    HPI:  Hugo Weber is a 75 year old male who presents to clinic today for annual cardiology.   He is a patient of Dr. Castillo and has a medical history of     1. Chronic atrial fibrillation   2. sleep apnea  3. CVA  4. Obesity. S/p gastric bypass  5. Hypertension    Diagnostics:  Echocardiogram at outside hospital () revealed EF 55-60%    In 2020, patient met with Dr. Castillo and was doing well    Today he reports his wife .   Denies chest pain or pressure, dizziness, syncope, angina, dyspnea at rest or with exertion,  palpitations, orthopnea, PND, abdominal pain, or edema.  States takes medications as prescribed including Eliquis and denies bleeding, hematuria, hematochezia, epistaxis and signs/symptoms of stroke.       ASSESSMENT AND PLAN    Chronic atrial fibrillation    For rate control diltiazem 180 mg twice daily.   His ECG (2020) revealed atrial fibrillation with ventricular rate of 79 bpm    For anticoagulation for CHADS VASC 3 (hypertension, age) he is currently taking Eliquis 5 mg twice daily Last hgb was 15.6 (2020)    Murmur    ECHO    Plan:   Continue with plan of care  ECHO now.     20 minutes spent on the date of the encounter doing chart review, review of outside records, review of test results, interpretation of tests, patient visit, documentation      Patient expresses understanding and agreement with the plan.     I appreciate the chance to help with Hugo Weber Please let me know if you have any questions or concerns.    Norma Stein, BIENVENIDO, CNP    This note was completed in part using Dragon voice recognition software. Although reviewed after completion, some word and grammatical errors may occur.    No orders of the  defined types were placed in this encounter.    No orders of the defined types were placed in this encounter.    Medications Discontinued During This Encounter   Medication Reason     pantoprazole (PROTONIX) 40 MG EC tablet Medication Reconciliation Clean Up       Encounter Diagnosis   Name Primary?     Persistent atrial fibrillation (H)        CURRENT MEDICATIONS:  Current Outpatient Medications   Medication Sig Dispense Refill     apixaban ANTICOAGULANT (ELIQUIS) 5 MG tablet Take 1 tablet (5 mg) by mouth 2 times daily 180 tablet 3     Cyanocobalamin 5000 MCG TBDP        diltiazem ER (DILT-XR) 180 MG 24 hr capsule Take 2 capsules (360 mg) by mouth daily 180 capsule 3     Ferrous Sulfate (IRON) 325 (65 Fe) MG tablet Take 1 tablet by mouth daily (with breakfast) 90 tablet 3     fluticasone (FLONASE) 50 MCG/ACT nasal spray Spray 2 sprays into both nostrils daily as needed for rhinitis or allergies 54.6 mL 3     niacin 500 MG tablet Take by mouth daily (with breakfast)       tamsulosin (FLOMAX) 0.4 MG capsule TAKE 1 CAPSULE TWICE DAILY 180 capsule 2     vitamin C (ASCORBIC ACID) 1000 MG TABS Take 1,000 mg by mouth daily       Vitamin D-Vitamin K (VITAMIN K2-VITAMIN D3 PO)        STATIN NOT PRESCRIBED (INTENTIONAL) Please choose reason not prescribed, below, treated for hepatitis C         ALLERGIES   No Known Allergies    PAST MEDICAL HISTORY   Past Medical History:   Diagnosis Date     Arrhythmia      Arthritis      Atrial fibrillation 2006    Followed by [facility] - persistent     Calculus of kidney 2005, 6/06, 10/12, 8/18, 9/19    dr walker/nestor/иван - hematuria - w/u o/w neg     Cervical stenosis of spine     Moderate C4-5     Cholelithiasis      Chronic peptic ulcer, unspecified site, without mention of hemorrhage, perforation, or obstruction 1985    gastric bypass     Colon polyps 8/05, 9/10, 10/15    2 tubular adenoma, 1 hyperplastic - multiple serrated/tubular adenomas - last colonscopy 11/20 due 5 yrs      CVA (cerebral vascular accident) (H) 01/2019    small right periorlandic subcortical - left sided residual - left hand tingling/numbness - Left leg weak/numbness - cardioembolic     First degree AV block      Hepatitis C 10/2012    chronic hepatitis C, grade 1-2, stage 1 - mild - Dr Douglas     Hyperlipidemia LDL goal <70      Hypertension goal BP (blood pressure) < 140/90 06/2006    dr jaciel winchester     Iron deficiency anemia, unspecified 1985    gastric bypass     Nephrolithiasis multiple episodes, last 10/19    Dr Bey/Ivana     OA (osteoarthritis)     Multiple - Left shoulder with rotator cuff tear - Dr Durham     Obesity, unspecified     s/p gastric bypass 1985      Prediabetes      Presbyacusis 01/2004    dr corona     Pyelonephritis, unspecified 12/1999     SCC (squamous cell carcinoma), ear 10/2008    dr saez - lt conchal bowl     Sleep apnea 10/2002    cpap - severe - 15 cm - dr cunha     Stroke (H) 01/19/2019     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    Mn Head and Neck     Vitamin B12 deficiency (non anemic) 04/15/2019     Vitamin D deficiency 04/15/2019       Review of Systems:  Skin:  Negative     Eyes:  Positive for glasses  ENT:  Positive for hearing loss  Respiratory:  Positive for sleep apnea;CPAP  Cardiovascular:  Negative    Gastroenterology: Negative    Genitourinary:  Negative    Musculoskeletal:  Negative    Neurologic:  Negative    Psychiatric:  Negative    Heme/Lymph/Imm:  Negative    Endocrine:  Negative      Physical Exam:  Vitals: /71 (BP Location: Right arm, Patient Position: Sitting, Cuff Size: Adult Large)   Pulse 67   Ht 1.829 m (6')   Wt (!) 154.5 kg (340 lb 9.6 oz)   BMI 46.19 kg/m    Body mass index is 46.19 kg/m .    Physical Exam   Constitutional: He is oriented to person, place, and time. He appears well-developed and well-nourished.   Neck: Normal range of motion.   Cardiovascular: An irregularly irregular rhythm present.   Murmur heard.  Pulmonary/Chest: Effort normal and breath  sounds normal.   Abdominal: Soft.   Musculoskeletal: Normal range of motion.   Neurological: He is alert and oriented to person, place, and time.   Skin: Skin is warm and dry.   Psychiatric: He has a normal mood and affect. His behavior is normal. Judgment and thought content normal.        basic metabolic panel  Lab Results   Component Value Date    WBC 7.7 11/19/2020     Lab Results   Component Value Date    RBC 5.38 11/19/2020     Lab Results   Component Value Date    HGB 15.6 11/19/2020     Lab Results   Component Value Date    HCT 48.2 11/19/2020     No components found for: MCT  Lab Results   Component Value Date    MCV 90 11/19/2020     Lab Results   Component Value Date    MCH 29.0 11/19/2020     Lab Results   Component Value Date    MCHC 32.4 11/19/2020     Lab Results   Component Value Date    RDW 14.2 11/19/2020     Lab Results   Component Value Date     11/19/2020       Last Comprehensive Metabolic Panel:  Sodium   Date Value Ref Range Status   11/19/2020 138 133 - 144 mmol/L Final     Potassium   Date Value Ref Range Status   11/19/2020 4.0 3.4 - 5.3 mmol/L Final     Chloride   Date Value Ref Range Status   11/19/2020 105 94 - 109 mmol/L Final     Carbon Dioxide   Date Value Ref Range Status   11/19/2020 31 20 - 32 mmol/L Final     Anion Gap   Date Value Ref Range Status   11/19/2020 2 (L) 3 - 14 mmol/L Final     Glucose   Date Value Ref Range Status   11/19/2020 75 70 - 99 mg/dL Final     Urea Nitrogen   Date Value Ref Range Status   11/19/2020 19 7 - 30 mg/dL Final     Creatinine   Date Value Ref Range Status   11/19/2020 0.92 0.66 - 1.25 mg/dL Final     GFR Estimate   Date Value Ref Range Status   11/19/2020 82 >60 mL/min/[1.73_m2] Final     Comment:     Non  GFR Calc  Starting 12/18/2018, serum creatinine based estimated GFR (eGFR) will be   calculated using the Chronic Kidney Disease Epidemiology Collaboration   (CKD-EPI) equation.       Calcium   Date Value Ref Range Status    11/19/2020 9.3 8.5 - 10.1 mg/dL Final         Thank you for allowing me to participate in the care of your patient.    Sincerely,     BIENVENIDO Milner CNP     [facility]

## 2021-02-11 ENCOUNTER — TELEPHONE (OUTPATIENT)
Dept: CARDIOLOGY | Facility: CLINIC | Age: 76
End: 2021-02-11

## 2021-02-11 DIAGNOSIS — I48.0 PAROXYSMAL ATRIAL FIBRILLATION (H): Primary | ICD-10-CM

## 2021-02-11 NOTE — TELEPHONE ENCOUNTER
Message left for patient to review results of echo and recommendations.  Awaiting return call from patient.  JAZZ Stone

## 2021-02-11 NOTE — TELEPHONE ENCOUNTER
Can you call Hugo Weber    The results from his ECHO show the ejection fraction is slightly less than it was in the past.  Normal is 55-60% and his was 45-50%.  I'd like him to wear a monitor placed just to make sure his heart rates are controlled.     I'd like his to check his blood pressure intermittently and slowly start to exercise more with just walking.  If he has any changes such as chest pain, increased shortness of breath, increased swelling please let us know.  Otherwise we will see you in 1 year and call him back with the results of the monitor.      Thank you,    Norma Stein, BIENVENIDO CNP on 2/11/2021 at 6:53 AM

## 2021-02-15 DIAGNOSIS — K21.00 GASTROESOPHAGEAL REFLUX DISEASE WITH ESOPHAGITIS WITHOUT HEMORRHAGE: ICD-10-CM

## 2021-02-15 DIAGNOSIS — N40.1 BENIGN PROSTATIC HYPERPLASIA (BPH) WITH URINARY URGE INCONTINENCE: Primary | ICD-10-CM

## 2021-02-15 DIAGNOSIS — N39.41 BENIGN PROSTATIC HYPERPLASIA (BPH) WITH URINARY URGE INCONTINENCE: Primary | ICD-10-CM

## 2021-02-15 DIAGNOSIS — G47.33 OBSTRUCTIVE SLEEP APNEA SYNDROME: ICD-10-CM

## 2021-02-15 RX ORDER — FLUTICASONE PROPIONATE 50 MCG
2 SPRAY, SUSPENSION (ML) NASAL DAILY PRN
Qty: 54.6 ML | Refills: 3 | Status: SHIPPED | OUTPATIENT
Start: 2021-02-15 | End: 2024-04-16

## 2021-02-15 RX ORDER — TAMSULOSIN HYDROCHLORIDE 0.4 MG/1
0.4 CAPSULE ORAL 2 TIMES DAILY
Qty: 180 CAPSULE | Refills: 3 | Status: SHIPPED | OUTPATIENT
Start: 2021-02-15 | End: 2022-05-16

## 2021-02-15 NOTE — TELEPHONE ENCOUNTER
02/15/21   The results from his ECHO show the ejection fraction is slightly less than it was in the past.  Normal is 55-60% and his was 45-50%.  I'd like him to wear a monitor placed just to make sure his heart rates are controlled.     I'd like his to check his blood pressure intermittently and slowly start to exercise more with just walking.  If he has any changes such as chest pain, increased shortness of breath, increased swelling please let us know.  Otherwise we will see you in 1 year and call him back with the results of the monitor.       Spoke to pt and reviewed results of Echo and need for monitor. Discussed Bps and exercise and when to call RN line. Pt voiced understanding and agreement. Msg sent to scheduling to call pt to facilitate monitor placement  Betito 848 am

## 2021-02-22 ENCOUNTER — HOSPITAL ENCOUNTER (OUTPATIENT)
Dept: CARDIOLOGY | Facility: CLINIC | Age: 76
Discharge: HOME | End: 2021-02-22
Attending: NURSE PRACTITIONER | Admitting: NURSE PRACTITIONER
Payer: COMMERCIAL

## 2021-02-22 DIAGNOSIS — I48.0 PAROXYSMAL ATRIAL FIBRILLATION (H): ICD-10-CM

## 2021-02-22 PROCEDURE — 93248 EXT ECG>7D<15D REV&INTERPJ: CPT | Performed by: INTERNAL MEDICINE

## 2021-02-22 PROCEDURE — 93246 EXT ECG>7D<15D RECORDING: CPT

## 2021-02-23 DIAGNOSIS — I48.19 PERSISTENT ATRIAL FIBRILLATION (H): ICD-10-CM

## 2021-02-23 DIAGNOSIS — I48.11 LONGSTANDING PERSISTENT ATRIAL FIBRILLATION (H): ICD-10-CM

## 2021-02-23 RX ORDER — DILTIAZEM HYDROCHLORIDE 360 MG/1
360 CAPSULE, EXTENDED RELEASE ORAL DAILY
Qty: 90 CAPSULE | Refills: 3 | Status: SHIPPED | OUTPATIENT
Start: 2021-02-23 | End: 2021-07-21

## 2021-02-23 NOTE — TELEPHONE ENCOUNTER
Pt needing his medications [facility] of [facility].  Escript sent for both Diltiazem and Eliquis.  Pt will call back if will not get his Eliquis in time. Yanely

## 2021-03-08 ENCOUNTER — OFFICE VISIT (OUTPATIENT)
Dept: FAMILY MEDICINE | Facility: CLINIC | Age: 76
End: 2021-03-08
Payer: COMMERCIAL

## 2021-03-08 ENCOUNTER — APPOINTMENT (OUTPATIENT)
Dept: CT IMAGING | Facility: CLINIC | Age: 76
End: 2021-03-08
Attending: EMERGENCY MEDICINE
Payer: COMMERCIAL

## 2021-03-08 ENCOUNTER — HOSPITAL ENCOUNTER (EMERGENCY)
Facility: CLINIC | Age: 76
Discharge: HOME | End: 2021-03-08
Attending: EMERGENCY MEDICINE | Admitting: EMERGENCY MEDICINE
Payer: COMMERCIAL

## 2021-03-08 VITALS
SYSTOLIC BLOOD PRESSURE: 130 MMHG | WEIGHT: 315 LBS | TEMPERATURE: 99.9 F | HEIGHT: 72 IN | HEART RATE: 101 BPM | OXYGEN SATURATION: 96 % | DIASTOLIC BLOOD PRESSURE: 70 MMHG | BODY MASS INDEX: 42.66 KG/M2

## 2021-03-08 VITALS
DIASTOLIC BLOOD PRESSURE: 81 MMHG | RESPIRATION RATE: 18 BRPM | OXYGEN SATURATION: 99 % | TEMPERATURE: 98.1 F | HEART RATE: 101 BPM | SYSTOLIC BLOOD PRESSURE: 119 MMHG

## 2021-03-08 DIAGNOSIS — N39.0 URINARY TRACT INFECTION WITHOUT HEMATURIA, SITE UNSPECIFIED: ICD-10-CM

## 2021-03-08 DIAGNOSIS — N10 ACUTE PYELONEPHRITIS: ICD-10-CM

## 2021-03-08 DIAGNOSIS — D72.829 LEUKOCYTOSIS, UNSPECIFIED TYPE: ICD-10-CM

## 2021-03-08 DIAGNOSIS — N12 PYELONEPHRITIS: ICD-10-CM

## 2021-03-08 DIAGNOSIS — R50.9 FEVER, UNSPECIFIED FEVER CAUSE: Primary | ICD-10-CM

## 2021-03-08 DIAGNOSIS — R82.90 NONSPECIFIC FINDING ON EXAMINATION OF URINE: ICD-10-CM

## 2021-03-08 DIAGNOSIS — R30.0 DYSURIA: ICD-10-CM

## 2021-03-08 LAB
ALBUMIN SERPL-MCNC: 3.2 G/DL (ref 3.4–5)
ALBUMIN UR-MCNC: 10 MG/DL
ALBUMIN UR-MCNC: NEGATIVE MG/DL
ALP SERPL-CCNC: 144 U/L (ref 40–150)
ALT SERPL W P-5'-P-CCNC: 17 U/L (ref 0–70)
ANION GAP SERPL CALCULATED.3IONS-SCNC: 8 MMOL/L (ref 3–14)
APPEARANCE UR: CLEAR
APPEARANCE UR: CLEAR
AST SERPL W P-5'-P-CCNC: 9 U/L (ref 0–45)
BASOPHILS # BLD AUTO: 0.1 10E9/L (ref 0–0.2)
BASOPHILS NFR BLD AUTO: 0.4 %
BILIRUB SERPL-MCNC: 1 MG/DL (ref 0.2–1.3)
BILIRUB UR QL STRIP: NEGATIVE
BILIRUB UR QL STRIP: NEGATIVE
BUN SERPL-MCNC: 16 MG/DL (ref 7–30)
CALCIUM SERPL-MCNC: 8.8 MG/DL (ref 8.5–10.1)
CHLORIDE SERPL-SCNC: 104 MMOL/L (ref 94–109)
CO2 SERPL-SCNC: 23 MMOL/L (ref 20–32)
COLOR UR AUTO: YELLOW
COLOR UR AUTO: YELLOW
CREAT SERPL-MCNC: 0.85 MG/DL (ref 0.66–1.25)
DIFFERENTIAL METHOD BLD: ABNORMAL
EOSINOPHIL # BLD AUTO: 0.1 10E9/L (ref 0–0.7)
EOSINOPHIL NFR BLD AUTO: 0.3 %
ERYTHROCYTE [DISTWIDTH] IN BLOOD BY AUTOMATED COUNT: 13.3 % (ref 10–15)
ERYTHROCYTE [DISTWIDTH] IN BLOOD BY AUTOMATED COUNT: 13.4 % (ref 10–15)
FLUAV RNA RESP QL NAA+PROBE: NEGATIVE
FLUBV RNA RESP QL NAA+PROBE: NEGATIVE
GFR SERPL CREATININE-BSD FRML MDRD: 85 ML/MIN/{1.73_M2}
GLUCOSE SERPL-MCNC: 101 MG/DL (ref 70–99)
GLUCOSE UR STRIP-MCNC: NEGATIVE MG/DL
GLUCOSE UR STRIP-MCNC: NEGATIVE MG/DL
HCT VFR BLD AUTO: 44.9 % (ref 40–53)
HCT VFR BLD AUTO: 46.4 % (ref 40–53)
HGB BLD-MCNC: 14.5 G/DL (ref 13.3–17.7)
HGB BLD-MCNC: 14.7 G/DL (ref 13.3–17.7)
HGB UR QL STRIP: ABNORMAL
HGB UR QL STRIP: ABNORMAL
IMM GRANULOCYTES # BLD: 0.1 10E9/L (ref 0–0.4)
IMM GRANULOCYTES NFR BLD: 0.5 %
KETONES UR STRIP-MCNC: 40 MG/DL
KETONES UR STRIP-MCNC: NEGATIVE MG/DL
LABORATORY COMMENT REPORT: NORMAL
LACTATE BLD-SCNC: 1 MMOL/L (ref 0.7–2)
LEUKOCYTE ESTERASE UR QL STRIP: ABNORMAL
LEUKOCYTE ESTERASE UR QL STRIP: ABNORMAL
LIPASE SERPL-CCNC: 27 U/L (ref 73–393)
LYMPHOCYTES # BLD AUTO: 1.8 10E9/L (ref 0.8–5.3)
LYMPHOCYTES NFR BLD AUTO: 10.1 %
MCH RBC QN AUTO: 29.1 PG (ref 26.5–33)
MCH RBC QN AUTO: 29.6 PG (ref 26.5–33)
MCHC RBC AUTO-ENTMCNC: 31.3 G/DL (ref 31.5–36.5)
MCHC RBC AUTO-ENTMCNC: 32.7 G/DL (ref 31.5–36.5)
MCV RBC AUTO: 91 FL (ref 78–100)
MCV RBC AUTO: 93 FL (ref 78–100)
MONOCYTES # BLD AUTO: 1.8 10E9/L (ref 0–1.3)
MONOCYTES NFR BLD AUTO: 10.1 %
NEUTROPHILS # BLD AUTO: 13.7 10E9/L (ref 1.6–8.3)
NEUTROPHILS NFR BLD AUTO: 78.6 %
NITRATE UR QL: NEGATIVE
NITRATE UR QL: NEGATIVE
NON-SQ EPI CELLS #/AREA URNS LPF: ABNORMAL /LPF
NRBC # BLD AUTO: 0 10*3/UL
NRBC BLD AUTO-RTO: 0 /100
PH UR STRIP: 6 PH (ref 5–7)
PH UR STRIP: 6 PH (ref 5–7)
PLATELET # BLD AUTO: 212 10E9/L (ref 150–450)
PLATELET # BLD AUTO: 230 10E9/L (ref 150–450)
POTASSIUM SERPL-SCNC: 3.8 MMOL/L (ref 3.4–5.3)
PROT SERPL-MCNC: 7.2 G/DL (ref 6.8–8.8)
RBC # BLD AUTO: 4.96 10E12/L (ref 4.4–5.9)
RBC # BLD AUTO: 4.98 10E12/L (ref 4.4–5.9)
RBC #/AREA URNS AUTO: 95 /HPF (ref 0–2)
RBC #/AREA URNS AUTO: ABNORMAL /HPF
RSV RNA SPEC QL NAA+PROBE: NORMAL
SARS-COV-2 RNA RESP QL NAA+PROBE: NEGATIVE
SODIUM SERPL-SCNC: 135 MMOL/L (ref 133–144)
SOURCE: ABNORMAL
SOURCE: ABNORMAL
SP GR UR STRIP: 1.01 (ref 1–1.03)
SP GR UR STRIP: 1.02 (ref 1–1.03)
SPECIMEN SOURCE: NORMAL
SQUAMOUS #/AREA URNS AUTO: <1 /HPF (ref 0–1)
UROBILINOGEN UR STRIP-ACNC: 2 EU/DL (ref 0.2–1)
UROBILINOGEN UR STRIP-MCNC: 3 MG/DL (ref 0–2)
WBC # BLD AUTO: 17.5 10E9/L (ref 4–11)
WBC # BLD AUTO: 19.2 10E9/L (ref 4–11)
WBC #/AREA URNS AUTO: >182 /HPF (ref 0–5)
WBC #/AREA URNS AUTO: ABNORMAL /HPF

## 2021-03-08 PROCEDURE — 85025 COMPLETE CBC W/AUTO DIFF WBC: CPT | Performed by: EMERGENCY MEDICINE

## 2021-03-08 PROCEDURE — 36415 COLL VENOUS BLD VENIPUNCTURE: CPT | Performed by: NURSE PRACTITIONER

## 2021-03-08 PROCEDURE — 80048 BASIC METABOLIC PNL TOTAL CA: CPT | Performed by: NURSE PRACTITIONER

## 2021-03-08 PROCEDURE — 250N000009 HC RX 250: Performed by: EMERGENCY MEDICINE

## 2021-03-08 PROCEDURE — 96365 THER/PROPH/DIAG IV INF INIT: CPT | Mod: 59

## 2021-03-08 PROCEDURE — 87636 SARSCOV2 & INF A&B AMP PRB: CPT | Performed by: EMERGENCY MEDICINE

## 2021-03-08 PROCEDURE — 99215 OFFICE O/P EST HI 40 MIN: CPT | Performed by: NURSE PRACTITIONER

## 2021-03-08 PROCEDURE — 83605 ASSAY OF LACTIC ACID: CPT | Performed by: EMERGENCY MEDICINE

## 2021-03-08 PROCEDURE — 83690 ASSAY OF LIPASE: CPT | Performed by: EMERGENCY MEDICINE

## 2021-03-08 PROCEDURE — 81001 URINALYSIS AUTO W/SCOPE: CPT | Performed by: EMERGENCY MEDICINE

## 2021-03-08 PROCEDURE — 87086 URINE CULTURE/COLONY COUNT: CPT | Performed by: NURSE PRACTITIONER

## 2021-03-08 PROCEDURE — 250N000011 HC RX IP 250 OP 636: Performed by: EMERGENCY MEDICINE

## 2021-03-08 PROCEDURE — C9803 HOPD COVID-19 SPEC COLLECT: HCPCS

## 2021-03-08 PROCEDURE — 99285 EMERGENCY DEPT VISIT HI MDM: CPT | Mod: 25

## 2021-03-08 PROCEDURE — 85027 COMPLETE CBC AUTOMATED: CPT | Performed by: NURSE PRACTITIONER

## 2021-03-08 PROCEDURE — 36415 COLL VENOUS BLD VENIPUNCTURE: CPT | Performed by: EMERGENCY MEDICINE

## 2021-03-08 PROCEDURE — 87040 BLOOD CULTURE FOR BACTERIA: CPT | Performed by: EMERGENCY MEDICINE

## 2021-03-08 PROCEDURE — 81001 URINALYSIS AUTO W/SCOPE: CPT | Performed by: NURSE PRACTITIONER

## 2021-03-08 PROCEDURE — 80053 COMPREHEN METABOLIC PANEL: CPT | Performed by: EMERGENCY MEDICINE

## 2021-03-08 PROCEDURE — 96366 THER/PROPH/DIAG IV INF ADDON: CPT

## 2021-03-08 PROCEDURE — 74177 CT ABD & PELVIS W/CONTRAST: CPT

## 2021-03-08 RX ORDER — CEPHALEXIN 500 MG/1
500 CAPSULE ORAL 4 TIMES DAILY
Qty: 40 CAPSULE | Refills: 0 | Status: SHIPPED | OUTPATIENT
Start: 2021-03-08 | End: 2021-03-18

## 2021-03-08 RX ORDER — SULFAMETHOXAZOLE/TRIMETHOPRIM 800-160 MG
1 TABLET ORAL 2 TIMES DAILY
Qty: 20 TABLET | Refills: 0 | Status: SHIPPED | OUTPATIENT
Start: 2021-03-08 | End: 2021-03-18

## 2021-03-08 RX ORDER — IOPAMIDOL 755 MG/ML
500 INJECTION, SOLUTION INTRAVASCULAR ONCE
Status: COMPLETED | OUTPATIENT
Start: 2021-03-08 | End: 2021-03-08

## 2021-03-08 RX ORDER — CEFTRIAXONE 2 G/1
2 INJECTION, POWDER, FOR SOLUTION INTRAMUSCULAR; INTRAVENOUS ONCE
Status: COMPLETED | OUTPATIENT
Start: 2021-03-08 | End: 2021-03-08

## 2021-03-08 RX ADMIN — CEFTRIAXONE 2 G: 2 INJECTION, POWDER, FOR SOLUTION INTRAMUSCULAR; INTRAVENOUS at 18:54

## 2021-03-08 RX ADMIN — IOPAMIDOL 100 ML: 755 INJECTION, SOLUTION INTRAVENOUS at 19:21

## 2021-03-08 RX ADMIN — SODIUM CHLORIDE 65 ML: 9 INJECTION, SOLUTION INTRAVENOUS at 19:21

## 2021-03-08 ASSESSMENT — ENCOUNTER SYMPTOMS
DIARRHEA: 0
VOMITING: 0
NAUSEA: 0
FREQUENCY: 1
FEVER: 1
DIZZINESS: 0
DYSURIA: 1
HEADACHES: 1
RHINORRHEA: 0
COUGH: 0

## 2021-03-08 ASSESSMENT — MIFFLIN-ST. JEOR: SCORE: 2315.69

## 2021-03-08 NOTE — PATIENT INSTRUCTIONS
Patient Education     Bladder Infection, Male (Adult)     You have a bladder infection.  Urine is normally free of bacteria. But bacteria can get into the urinary tract from the skin around the rectum. Or it may travel in the blood from other parts of the body.  This is called a urinary tract infection (UTI). An infection can occur anywhere in the urinary tract. It could be in a kidney (pyelonephritis) or in the bladder (cystitis) and urethra (urethritis). The urethra is the tube that drains urine from the bladder through the tip of the penis.  The most common place for a UTI is in the bladder. This is called a bladder infection. Most bladder infections are easily treated. They are not serious unless the infection spreads up to the kidney.  The terms bladder infection, UTI, and cystitis are often used to describe the same thing. But they aren t always the same. Cystitis is an inflammation of the bladder. The most common cause of cystitis is an infection.   Keep in mind:    Infections in the urine are called UTIs.    Cystitis is often caused by a UTI.    Not all UTIs and cases of cystitis are bladder infections.    Bladder infections are the most common type of cystitis.  Symptoms of a bladder infection  The infection causes inflammation in the urethra and bladder. This inflammation causes many of the symptoms. The most common symptoms of a bladder infection are:    Pain or burning when urinating    Having to go more often than normal    Feeling like you need to go right away    Only a small amount of urine comes out    Blood in urine    Discomfort in your belly (abdomen), often in the lower belly, above the pubic bone    Cloudy, strong, or bad-smelling urine    Unable to urinate (retention)    Urinary incontinence    Fever    Loss of appetite  Older adults may also feel confused.  Causes of a bladder infection  Bladder infections are not contagious. You can't get one from someone else, from a toilet seat, or from  sharing a bath.  The most common cause of bladder infections is bacteria from the bowels. The bacteria get onto the skin around the opening of the urethra. From there they can get into the urine and travel up to the bladder. This causes inflammation and an infection. This often happens because of:    An enlarged prostate    Poor cleaning of the genitals    Procedures that put a tube in your bladder, such as a Quach catheter    Bowel incontinence    Older age    Not emptying your bladder (the urine stays there, giving the bacteria a chance to grow)    Dehydration (this lets urine to stay in the bladder longer)    Constipation (this can cause the bowels to push on the bladder or urethra and keep the bladder from emptying)  Treatment  Bladder infections are treated with antibiotics. They often clear up quickly without complications. Treatment helps prevent a more serious kidney infection.  Medicines  Medicines can help in treating a bladder infection:    You may have been given phenazopyridine to ease burning when you urinate. It will cause your urine to be bright orange. It can stain clothing.    You may have been prescribed antibiotics. Take this medicine until you have finished it, even if you feel better. Taking all of the medicine will make sure the infection has cleared.  You can use acetaminophen or ibuprofen for pain, fever, or discomfort, unless another medicine was prescribed. You can also alternate them, or use both together. They work differently and are a different class of medicines, so taking them together is not an overdose. If you have chronic liver or kidney disease, talk with your healthcare provider before using these medicines. Also talk with your provider if you ve had a stomach ulcer or GI (gastrointestinal) bleeding or are taking blood thinner medicines.  Home care  Here are some guidelines to help you care for yourself at home:    Drink plenty of fluids, unless your healthcare provider told you  not to. Fluids will prevent dehydration and flush out your bladder.    Use good personal hygiene. Wipe from front to back after using the toilet, and clean your penis regularly. If you aren t circumcised, retract the foreskin when cleaning.    Urinate more often, and don t try to hold it in for long periods of time, if possible.    Wear loose-fitting clothes and cotton underwear. Don't wear tight-fitting pants. This helps keep you clean and dry.    Change your diet to prevent constipation. This means eating more fresh foods and more fiber, and less junk and fatty foods.    Don't have sex until your symptoms are gone.    Don't have caffeine, alcohol, and spicy foods. These can irritate the bladder.  Follow-up care  Follow up with your healthcare provider, or as advised, if all symptoms have not cleared up in 5 days. It's important to keep your follow-up appointment. You can talk with your provider to see if you need more tests of the urinary tract. This is especially important if you have infections that keep coming back.  If a culture was done, you will be told if your treatment needs to be changed. If directed, you can call to find out the results.  If X-rays were taken, you will be told of any findings that may affect your care.  Call 911  Call 911 if any of these occur:    Trouble breathing    Trouble waking up    Feeling confused    Fainting or loss of consciousness    Fast heart rate  When to get medical advice  Call your healthcare provider right away if any of these occur:    Fever of 100.4 F (38 C) or higher, or as directed by your provider    Your symptoms don t improve after 2 days of treatment    Back or b pain that gets worse    Repeated vomiting, or you aren t able to keep medicine down    Weakness or dizziness  [facility] last reviewed this educational content on 10/1/2019    4694-5476 [employer]. All rights reserved. This information is not intended as a substitute for professional  medical care. Always follow your healthcare professional's instructions.           Patient Education     Understanding Urinary Tract Infections (UTIs)   Most UTIs are caused by bacteria, but they may also be caused by viruses or fungi. Bacteria from the bowel are the most common source of infection. The infection may start because of any of the following:     Sexual activity.  During sex, bacteria can travel from the penis, vagina, or rectum into the urethra.     Bacteria outside the rectum getting into the urethra.  Bacteria on the skin outside the rectum may travel into the urethra. This is more common in women since the rectum and urethra are closer to each other than in men. Wiping from front to back after using the toilet and keeping the area clean can help prevent germs from getting to the urethra.    Blocked urine flow through the urinary tract. If urine sits too long, germs may start to grow out of control.  Parts of the urinary tract  The infection can occur in any part of the urinary tract.       The kidneys. These organs collect and store urine.    The ureters. These tubes carry urine from the kidneys to the bladder.    The bladder. This holds urine until you are ready to let it out.    The urethra. This tube carries urine from the bladder out of the body. It is shorter in women, so bacteria can move through it more easily. The urethra is longer in men, so a UTI is less likely to reach the bladder or kidneys in men.  [employer] last reviewed this educational content on 1/1/2020 2000-2020 [employer]. All rights reserved. This information is not intended as a substitute for professional medical care. Always follow your healthcare professional's instructions.           Patient Education     Pyelonephritis or Kidney Infection (Adult Male)  An infection of one or both kidneys is called pyelonephritis. It usually happens when bacteria (or rarely, viruses, fungi, or other disease-causing  organisms) get into the kidneys. The bacteria (or other disease-causing organisms) can enter the kidneys from the bladder or blood traveling from other parts of the body to cause pyelonephritis. A kidney infection can become serious. It can cause severe illness, scarring of the kidneys, or kidney failure if not treated properly.    Common causes include:    Not keeping the genital area clean and dry, which promotes the growth of bacteria    Wearing tight pants or underwear, which allows moisture to build up in the genital area, helping bacteria grow    Holding urine for long periods of time    Dehydration  Kidney infections can cause symptoms similar to bladder infections. The infection can cause one or more of these symptoms:     Pain or burning when urinating    Having to urinate more often than usual    Blood in urine (pink or red)    Belly pain or discomfort, usually in the lower abdomen    Pain in the side or back    Pain above the pubic bone    Fever or chills    Vomiting    Loss of appetite  Treatment is oral antibiotics, or in more severe cases, intramuscular or intravenous (IV) antibiotics. These are started right away. Treatment helps prevent a more serious kidney infection.   Medicines  Medicines can help in the treatment of kidney infection:    Take antibiotics until they are used up, even if you feel better. It's important to finish them to make sure the infection is gone.    Unless another medicine was given, you can use over-the-counter medicines for pain, fever, or discomfort. If you have chronic liver or kidney disease, talk with your healthcare provider before taking these medicines. Also tell your provider if you've ever had a stomach ulcer of gastrointestinal bleeding, or are taking blood thinners.  Home care  These guidelines can help you care for yourself at home:     Stay home from work or school. Rest in bed until your fever breaks and you are feeling better, or as advised by your healthcare  provider.    Drink lots of fluid unless you must restrict fluids for other medical reasons. This will force the medicine into your urinary system and help flush the bacteria out of your body. Ask your healthcare provider how much you should drink.    Don't have sex until you have finished all of your medicine and your symptoms are gone.    Don't have caffeine, alcohol, and spicy foods. These foods may irritate the kidneys and bladder.    Wear loose-fitting clothes and cotton underwear.  Prevention  These self-care steps can help prevent future infections:    Drink plenty of fluids to prevent dehydration and flush out the bladder. Do this unless you must restrict your fluids for other health reasons, or your healthcare provider told you not to.    Wash your hands after using the bathroom.    Clean your penis regularly. If you aren't circumcised, pull back the foreskin when cleaning.    Urinate more often. Don't try to hold urine in for a long time.    Don't wear tight-fitting pants or underwear.    Improve your diet and prevent constipation. Eat more fresh fruit, vegetables, and fiber. Eat less junk and fatty foods. Constipation can increase your chance of getting a urinary tract infection. Talk with your healthcare provider if you have trouble with bowel movements.    Urinate right after sex to flush out the bladder.    Don't follow high-risk sexual behaviors.  Follow-up care  Follow up with your healthcare provider, or as advised. You may need more testing to make sure the infection is getting better. Close follow-up and further testing is very important to find the cause and to prevent future infections.   If a urine culture was done, you will be told if your treatment needs to be changed. If directed, you can call to find out the results.   If you had an X-ray, CT scan, or another diagnostic test, you will be told of any new findings that may affect your care.   Call 911  Call 911 if any of the following occur:      Trouble breathing    Fainting or loss of consciousness    Rapid or very slow heart rate    Weakness, dizziness, or fainting    Trouble arousing or confusion  When to seek medical advice  Call your healthcare provider right away if any of the following occur:    Fever of 100.4 F (38 C) or higher, or as directed by your healthcare provider    Not feeling better within 1 to 2 days after starting antibiotics    Any symptom that continues after 3 days of treatment    Increasing pain in the stomach, back, side, or groin area    Repeated vomiting    Not able to take prescribed medicine due to nausea or another reason    Bloody, dark-colored, or foul smelling urine    Trouble urinating or decreased urine output    No urine for 8 hours, no tears when crying, sunken eyes, or dry mouth    New symptoms or symptoms get worse  [employer] last reviewed this educational content on 3/1/2020    9977-3669 [employer]. All rights reserved. This information is not intended as a substitute for professional medical care. Always follow your healthcare professional's instructions.

## 2021-03-08 NOTE — PROGRESS NOTES
Assessment & Plan     Fever  Nonspecific finding on examination of urine  Urinary tract infection without hematuria, site unspecified  Leukocytosis, unspecified type  Pyelonephritis   Dysuria  Plan was to give patient in clinic Rocephin with outpatient oral Bactrim-with concern for pyelo.  CBC completed and unfortunately he has elevated white count at 19.2; BMP drawn but unable to get results back now.  He is supposed to leave on a plane in the a.m. to [place].   Encouraged him to present to ED for kidney function testing possible imaging iv fluids/antibiotics or further plan of care deemed appropriate based on ED results.  Bactrim twice daily x10 days has been sent to his pharmacy.  Hugo verbalizes understanding of plan of care and is in agreement.   - *UA reflex to Microscopic and Culture ([place] and [facility] (except [place] and [place])  - Urine Microscopic  - CBC with platelets  - Basic metabolic panel  (Ca, Cl, CO2, Creat, Gluc, K, Na, BUN)  - Urine Culture Aerobic Bacterial  - sulfamethoxazole-trimethoprim (BACTRIM DS) 800-160 MG tablet  Dispense: 20 tablet; Refill: 0      BMI:   Estimated body mass index is 45.98 kg/m  as calculated from the following:    Height as of this encounter: 1.829 m (6').    Weight as of this encounter: 153.8 kg (339 lb).       Return for to ED now.    DEANN [facility]   Hugo is a 75 year old who presents for the following health issues   HPI       Genitourinary - Male  Onset/Duration: x3-4 days  Description:     Dysuria (painful urination): YES}  Hematuria (blood in urine): no  Frequency: YES - Decreased  Waking at night to urinate: Same as usual  Hesitancy (delay in urine): YES  Retention (unable to empty): YES  Decrease in urinary flow: YES  Incontinence: YES- worse now  Progression of Symptoms:  worsening  Accompanying Signs & Symptoms:  Fever: YES - could be from Covid  Back/Flank pain: no  Urethral  "discharge: no  Testicle lumps/masses/pain: no  Nausea and/or vomiting: YES  Nausea  Diarrhea - second Covid vacc 03/02/2021 - fever yesterday, Headache for last 2 days - slight today  Abdominal pain: no  History:   History of frequent UTI s: No -   History of kidney stones: YES - currently has a kidney stone 0.5\" - left side  History of hernias: no  Personal or Family history of Prostate problems: YES- father = prostate cancer  Sexually active: YES  Precipitating or alleviating factors: None  Therapies tried and outcome: Tylenol - moderate     Leaving on a plane in a.m. to [place]. Has a left-sided kidney stone. Febrile.     Review of Systems   Constitutional, HEENT, cardiovascular, pulmonary, GI, , musculoskeletal, neuro, skin, endocrine and psych systems are negative, except as otherwise noted in the HPI.      Objective    /70 (BP Location: Left arm, Patient Position: Chair, Cuff Size: Adult Large)   Pulse 101   Temp 99.9  F (37.7  C) (Tympanic)   Ht 1.829 m (6')   Wt (!) 153.8 kg (339 lb)   SpO2 96%   BMI 45.98 kg/m    Body mass index is 45.98 kg/m .  Physical Exam   GENERAL: healthy, alert and no acute distress  RESP: lungs clear to auscultation - no rales, rhonchi or wheezes  CV: regular rate and rhythm, normal S1 S2, no S3 or S4, no murmur, click or rub, no peripheral edema and peripheral pulses strong  ABDOMEN: soft, nontender, no hepatosplenomegaly, no masses and bowel sounds normal  MS: no gross musculoskeletal defects noted, no edema; no CVA tenderness  SKIN: no suspicious lesions or rashes; mildly diaphoretic  NEURO: Normal strength and tone, mentation intact and speech normal  PSYCH: mentation appears normal, affect normal/bright    Results for orders placed or performed in visit on 03/08/21 (from the past 24 hour(s))   *UA reflex to Microscopic and Culture ([place] and [facility] (except [place] and [place])    Specimen: Midstream Urine   Result Value Ref Range    Color Urine " Yellow     Appearance Urine Clear     Glucose Urine Negative NEG^Negative mg/dL    Bilirubin Urine Negative NEG^Negative    Ketones Urine Negative NEG^Negative mg/dL    Specific Gravity Urine 1.020 1.003 - 1.035    Blood Urine Moderate (A) NEG^Negative    pH Urine 6.0 5.0 - 7.0 pH    Protein Albumin Urine Negative NEG^Negative mg/dL    Urobilinogen Urine 2.0 (H) 0.2 - 1.0 EU/dL    Nitrite Urine Negative NEG^Negative    Leukocyte Esterase Urine Moderate (A) NEG^Negative    Source Midstream Urine    Urine Microscopic   Result Value Ref Range    WBC Urine 10-25 (A) OTO5^0 - 5 /HPF    RBC Urine 10-25 (A) OTO2^O - 2 /HPF    Squamous Epithelial /LPF Urine Few FEW^Few /LPF   CBC with platelets   Result Value Ref Range    WBC 19.2 (H) 4.0 - 11.0 10e9/L    RBC Count 4.96 4.4 - 5.9 10e12/L    Hemoglobin 14.7 13.3 - 17.7 g/dL    Hematocrit 44.9 40.0 - 53.0 %    MCV 91 78 - 100 fl    MCH 29.6 26.5 - 33.0 pg    MCHC 32.7 31.5 - 36.5 g/dL    RDW 13.3 10.0 - 15.0 %    Platelet Count 212 150 - 450 10e9/L

## 2021-03-09 LAB
ANION GAP SERPL CALCULATED.3IONS-SCNC: 8 MMOL/L (ref 3–14)
BUN SERPL-MCNC: 16 MG/DL (ref 7–30)
CALCIUM SERPL-MCNC: 8.6 MG/DL (ref 8.5–10.1)
CHLORIDE SERPL-SCNC: 104 MMOL/L (ref 94–109)
CO2 SERPL-SCNC: 23 MMOL/L (ref 20–32)
CREAT SERPL-MCNC: 0.88 MG/DL (ref 0.66–1.25)
GFR SERPL CREATININE-BSD FRML MDRD: 84 ML/MIN/{1.73_M2}
GLUCOSE SERPL-MCNC: 95 MG/DL (ref 70–99)
POTASSIUM SERPL-SCNC: 3.9 MMOL/L (ref 3.4–5.3)
SODIUM SERPL-SCNC: 135 MMOL/L (ref 133–144)

## 2021-03-09 NOTE — ED TRIAGE NOTES
Pt was sent from clinic with an elevated WBC count. Pt was diagnosed with UTI today and has had fevers the last few days. Also endorses diarrhea x2 days, denies blood in it. Denies N/V. ABCs intact.

## 2021-03-09 NOTE — ED PROVIDER NOTES
History     Chief Complaint:  UTI     HPI  Hugo Weber is a 75 year old male with a history of atrial fibrillation, CVA, hypertension, and hyperlipidemia  who presents for evaluation of fever and UTI. The patient states that he has a history of chronic UTI secondary to his uncircumcised status. The patient presented to his clinic this morning after he had onset of dysuria and frequency this morning. There he was found to have leukocytosis and diagnosed with UTI; he was then sent here. He notes a fever of 100.4 at home this morning as well as a headache last night that lasted into today. Tylenol did guillermo the headache today. He has had a few days of diarrhea as well. He denies any cough, rhinorrhea, nausea, vomiting, diplopia, dizziness, or recent medication changes. Of note the patient has an extensive history of kidney stones that have always been removed through lithotripsy. He is also fully vaccinated.     He denies any alcohol, tobacco, or drug use.         Review of Systems   Constitutional: Positive for fever.   HENT: Negative for rhinorrhea.    Eyes: Negative for visual disturbance.   Respiratory: Negative for cough.    Gastrointestinal: Negative for diarrhea, nausea and vomiting.   Genitourinary: Positive for dysuria and frequency.   Neurological: Positive for headaches. Negative for dizziness.   All other systems reviewed and are negative.    Allergies:  No Known Allergies     Medications:   Eliquis  Diltiazem   Iron  Flonase     Medical History:   atrial fibrillation   Arthritis  Kidney stone   Cholelithiasis  CVA   Colon polyps   Chronic peptic ulcer disease  Iron deficiency anemia  Hypertension   Hyperlipidemia    Hepatitis C  First degree AV block   Vitamin D deficiency    Vitamin B12 deficiency   SCC  osteoarthritis   benign prostatic hypertrophy     Surgical History   Appendectomy   Left total knee arthroplasty  Cardioversion   Laser holmium lithotripsy 2x4x   Gastric bypass  Cellulitis surgery    Forearm spurs  Lf ear chonchal lesion removal     Family History:   Father:  Alzheimer, prostate cancer  Mother: CHF    Social History:  Patient presents alone.  PCP: Brett Cassidy     Physical Exam     Patient Vitals for the past 24 hrs:   BP Temp Temp src Pulse Resp SpO2   03/08/21 1803 107/72 98.1  F (36.7  C) Oral 100 18 98 %          Physical Exam  Constitutional: Well developed, nontox appearance  Head: Atraumatic.   Mouth/Throat: Oropharynx is clear and moist.   Neck:  no stridor  Eyes: no scleral icterus  Cardiovascular: Borderline regular tachycardia, 2+ bilat radial pulses  Pulmonary/Chest: nml resp effort, Clear BS bilat  Abdominal: ND, soft, NT, no rebound or guarding   : no CVA tenderness bilat  Ext: Warm, well perfused, no edema  Neurological: A&O, symmetric facies, moves ext x4  Skin: Skin is warm and dry.   Psychiatric: Behavior is normal. Thought content normal.   Nursing note and vitals reviewed    Emergency Department Course     Imaging:    CT Abdomen Pelvis w Contrast  1.  9 mm nonobstructing stone left kidney. The kidneys are otherwise unremarkable. No findings to suggest pyelonephritis.  2.  Fatty liver.  3.  Gastric bypass.  4.  Prostatic hypertrophy.    Reading per radiology     Laboratory:    CBC: WBC 17.5 (H), HGB 14.5,    CMP: Glucose 101 albumin 3.2 (L)  o/w WNL (Creatinine 0.85)    Lipase: 27 (L)   Lactic Acid: 1.0    Blood Culture x2: Pending     UA with Microscopic: blood moderate, protein albumin 10, ketone 40, urobilinogen 3.0, WBC/HPF >182, RBC/HPF 95, Leukocyte Esterase large, o/w WNL     Symptomatic Influenza A/B & SARS-CoV2 (COVID-19) Virus PCR Multiplex:  Covid neg  Influenza A: neg Influenza B neg       Emergency Department Course:    Reviewed:  I reviewed nursing notes, vitals, past medical history and care everywhere    Assessments:  1810 I assessed the patient as above.   2100 Patient rechecked and updated.      Interventions:  1854 Rocephin 2 g IV      Disposition:  Discharged to home.    Impression & Plan     Medical Decision Makin year old male presenting w/ fever, concern for UTI     DDx includes UTI, pyelonephritis, sepsis, severe sepsis, bacteremia, COVID-19 although less likely infected ureteral stone.  Doubt meningitis, encephalitis given history and physical exam.  Labs significant for normal lactate level, leukocytosis, normal creatinine.  Imaging sig for nephrolithiasis without evidence of ureteral lithiasis or perinephric abscess.  Interventions as noted above with improvement in symptoms.  Patient's presentation was consistent with UTI with associated sepsis; the pt does not meet criteria for severe sepsis.  First dose of antibiotics given in the emergency department.  The patient had normalization of his vital signs while in the ED he reports feeling significantly improved.  I think it would be reasonable to trial outpatient management with prescriptions written as noted below.  At this time I feel the pt is safe for discharge.  Recommendations given regarding follow up with PCP and return to the emergency department as needed for new or worsening symptoms.  Pt counseled on all results, disposition and diagnosis.  They are understanding and agreeable to plan. Patient discharged in stable condition.     Covid-19  Hugo Weber was evaluated during a global COVID-19 pandemic, which necessitated consideration that the patient might be at risk for infection with the SARS-CoV-2 virus that causes COVID-19.   Applicable protocols for evaluation were followed during the patient's care.   COVID-19 was considered as part of the patient's evaluation. The plan for testing is:  a test was obtained during this visit.    Diagnosis:     ICD-10-CM    1. Acute pyelonephritis  N10         Discharge Medications:  New Prescriptions    CEPHALEXIN (KEFLEX) 500 MG CAPSULE    Take 1 capsule (500 mg) by mouth 4 times daily for 10 days       Scribe  Disclosure:  I, August Seng, am serving as a scribe at 6:48 PM on 3/8/2021 to document services personally performed by Earl Tolliver MD based on my observations and the provider's statements to me.     [facility]  March 8, 2021      Earl Tolliver MD  03/09/21 1043

## 2021-03-09 NOTE — DISCHARGE INSTRUCTIONS
Please return to the emergency department as needed for new or worsening symptoms including fever greater than 100.4  F after 2 days of antibiotics, vomiting and unable to keep anything down, fainting, severe uncontrollable pain, any other concerning symptoms.      Discharge Instructions  Urinary Tract Infection  You or your child have been diagnosed with a urinary tract infection, or UTI. The urinary tract includes the kidneys (which make urine/pee), ureters (the tubes that carry urine/pee from the kidneys to the bladder), the bladder (which stores urine/pee), and urethra (the tube that carries urine/pee out of the bladder). Urinary tract infections occur when bacteria travel up the urethra into the bladder (bladder infection) and, in some cases, from there into the kidneys (kidney infection).  Generally, every Emergency Department visit should have a follow-up clinic visit with either a primary or a specialty clinic/provider. Please follow-up as instructed by your emergency provider today.  Return to the Emergency Department if:  You or your child have severe back pain.  You or your child are vomiting (throwing up) so that you cannot take your medicine.  You or your child have a new fever (had not previously had a fever) over 101 F.  You or your child have confusion or are very weak, or feel very ill.  Your child seems much more ill, will not wake up, will not respond right, or is crying for a long time and will not calm down.  You or your child are showing signs of dehydration. These signs may include decreased urination (pee), dry mouth/gums/tongue, or decreased activity.    Follow-up with your provider:   Children under 24 months need to be seen by their regular provider within one week after a diagnosis of a UTI. It may be necessary to do some more tests to look at the child s kidney or bladder.  You should begin to feel better within 24 - 48 hours of starting your antibiotic; follow-up with your regular  clinic/doctor/provider if this is not the case.    Treatment:   You will be treated with an antibiotic to kill the bacteria. We have to make an educated guess, based on what we know about common bacteria and antibiotics, as to which antibiotic will work for your infection. We will be correct most times but there will be some cases where the antibiotic chosen is not correct (see urine cultures below).  Take a pain medication such as acetaminophen (Tylenol ) or ibuprofen (Advil , Motrin , Nuprin ).  Phenazopyridine (Pyridium , Uristat ) is a prescription medication that numbs the bladder to reduce the burning pain of some UTIs.  The same medication is available in a non-prescription version (Azo-Standard , Urodol ). This medication will change the color of the urine and tears (usually blue or orange). If you wear contacts, do not wear them while taking this medication as they may be stained by the medication.    Urine Cultures:  If indicated, a urine culture may have been performed today. This test generally takes 24-48 hours to complete so the results are not known at this time. The results can confirm that an infection is present but also determine which antibiotic is effective for the specific bacteria that is causing the infection. If your urine culture shows that the antibiotic you were given today will not work to treat your infection, we will attempt to contact you to make arrangements to change the antibiotic. If the culture confirms that the antibiotic is effective for your infection, you will not be contacted. We often recommend follow-up with your regular physician/provider on the culture results regardless of this process.    Antibiotic Warning:   If you have been placed on antibiotics - watch for signs of allergic reaction.  These include rash, lip swelling, difficulty breathing, wheezing, and dizziness.  If you develop any of these symptoms, stop the antibiotic immediately and go to an emergency room or  "urgent care for evaluation.    Probiotics: If you have been given an antibiotic, you may want to also take a probiotic pill or eat yogurt with live cultures. Probiotics have \"good bacteria\" to help your intestines stay healthy. Studies have shown that probiotics help prevent diarrhea and other intestine problems (including C. diff infection) when you take antibiotics. You can buy these without a prescription in the pharmacy section of the store.   If you were given a prescription for medicine here today, be sure to read all of the information (including the package insert) that comes with your prescription.  This will include important information about the medicine, its side effects, and any warnings that you need to know about.  The pharmacist who fills the prescription can provide more information and answer questions you may have about the medicine.  If you have questions or concerns that the pharmacist cannot address, please call or return to the Emergency Department.   Remember that you can always come back to the Emergency Department if you are not able to see your regular provider in the amount of time listed above, if you get any new symptoms, or if there is anything that worries you.    "

## 2021-03-10 LAB
BACTERIA SPEC CULT: NORMAL
Lab: NORMAL
SPECIMEN SOURCE: NORMAL

## 2021-03-11 NOTE — RESULT ENCOUNTER NOTE
Dear Hugo,    Here is a summary of your recent test results:    -Urine culture is abnormal but it looks like a contaminated, non clean-catch sample so the bacteria is not able to be determined.  Please continue all of your antibiotics and If new, worsening, or persistent symptoms occur then you should be seen for a recheck.    For additional lab test information, [email or web address] is an excellent reference.    In addition, here is a list of due or overdue Health Maintenance reminders:    ANNUAL REVIEW OF  ORDERS Completed  PHQ-2 due on 01/01/2021    Please call us at [phone] (or use [employer]) to address the above recommendations if needed.    Thank you for choosing [facility].  It was an honor and a privilege to participate in your care.       Healthy regards,    Keila Ndiaye, DEANN  [facility]

## 2021-03-14 LAB
BACTERIA SPEC CULT: NO GROWTH
BACTERIA SPEC CULT: NO GROWTH
Lab: NORMAL
SPECIMEN SOURCE: NORMAL
SPECIMEN SOURCE: NORMAL

## 2021-03-24 ENCOUNTER — TELEPHONE (OUTPATIENT)
Dept: FAMILY MEDICINE | Facility: CLINIC | Age: 76
End: 2021-03-24

## 2021-03-24 DIAGNOSIS — R10.84 ABDOMINAL PAIN, GENERALIZED: Primary | ICD-10-CM

## 2021-03-24 NOTE — TELEPHONE ENCOUNTER
Dr. Cassidy- Please see message below. Please advise. Thanks      Veronica Beatty  Referral Coordinator

## 2021-03-24 NOTE — TELEPHONE ENCOUNTER
Reason for Call: Request for an order or referral:    Order or referral being requested: GI    Date needed: as soon as possible    Has the patient been seen by the PCP for this problem? YES    Additional comments: Pt calling as he has been to [facility] but he wants a second opinion, so is asking for a referral to someone else.  Please advise.    Phone number Patient can be reached at:  Cell number on file:    Telephone Information:   Mobile [phone]       Best Time:      Can we leave a detailed message on this number?  YES    Call taken on 3/24/2021 at 1:24 PM by Clara Ramirez

## 2021-03-30 ENCOUNTER — OFFICE VISIT (OUTPATIENT)
Dept: FAMILY MEDICINE | Facility: CLINIC | Age: 76
End: 2021-03-30
Payer: COMMERCIAL

## 2021-03-30 VITALS
DIASTOLIC BLOOD PRESSURE: 80 MMHG | OXYGEN SATURATION: 98 % | SYSTOLIC BLOOD PRESSURE: 124 MMHG | BODY MASS INDEX: 42.66 KG/M2 | WEIGHT: 315 LBS | HEART RATE: 103 BPM | HEIGHT: 72 IN | TEMPERATURE: 96.2 F

## 2021-03-30 DIAGNOSIS — I48.0 PAROXYSMAL ATRIAL FIBRILLATION (H): ICD-10-CM

## 2021-03-30 DIAGNOSIS — Z87.440 RECENT URINARY TRACT INFECTION: ICD-10-CM

## 2021-03-30 DIAGNOSIS — Z51.81 MEDICATION MONITORING ENCOUNTER: ICD-10-CM

## 2021-03-30 DIAGNOSIS — R10.32 LLQ ABDOMINAL PAIN: Primary | ICD-10-CM

## 2021-03-30 DIAGNOSIS — E66.01 MORBID OBESITY (H): ICD-10-CM

## 2021-03-30 DIAGNOSIS — K21.00 GASTROESOPHAGEAL REFLUX DISEASE WITH ESOPHAGITIS WITHOUT HEMORRHAGE: ICD-10-CM

## 2021-03-30 DIAGNOSIS — Z79.01 LONG TERM CURRENT USE OF ANTICOAGULANT THERAPY: ICD-10-CM

## 2021-03-30 DIAGNOSIS — K76.0 NAFLD (NONALCOHOLIC FATTY LIVER DISEASE): ICD-10-CM

## 2021-03-30 DIAGNOSIS — R82.90 ABNORMAL URINE FINDINGS: ICD-10-CM

## 2021-03-30 DIAGNOSIS — Z86.19 HISTORY OF HEPATITIS C: ICD-10-CM

## 2021-03-30 DIAGNOSIS — R73.03 PREDIABETES: ICD-10-CM

## 2021-03-30 DIAGNOSIS — I10 HYPERTENSION GOAL BP (BLOOD PRESSURE) < 140/90: ICD-10-CM

## 2021-03-30 DIAGNOSIS — Z86.73 HISTORY OF CVA (CEREBROVASCULAR ACCIDENT): ICD-10-CM

## 2021-03-30 DIAGNOSIS — E78.5 HYPERLIPIDEMIA LDL GOAL <70: ICD-10-CM

## 2021-03-30 DIAGNOSIS — G47.33 OBSTRUCTIVE SLEEP APNEA SYNDROME: ICD-10-CM

## 2021-03-30 LAB
ALBUMIN SERPL-MCNC: 3.5 G/DL (ref 3.4–5)
ALBUMIN UR-MCNC: NEGATIVE MG/DL
ALP SERPL-CCNC: 158 U/L (ref 40–150)
ALT SERPL W P-5'-P-CCNC: 21 U/L (ref 0–70)
ANION GAP SERPL CALCULATED.3IONS-SCNC: 8 MMOL/L (ref 3–14)
APPEARANCE UR: CLEAR
AST SERPL W P-5'-P-CCNC: 10 U/L (ref 0–45)
BACTERIA #/AREA URNS HPF: ABNORMAL /HPF
BASOPHILS # BLD AUTO: 0.1 10E9/L (ref 0–0.2)
BASOPHILS NFR BLD AUTO: 0.7 %
BILIRUB SERPL-MCNC: 0.5 MG/DL (ref 0.2–1.3)
BILIRUB UR QL STRIP: NEGATIVE
BUN SERPL-MCNC: 20 MG/DL (ref 7–30)
CALCIUM SERPL-MCNC: 8.9 MG/DL (ref 8.5–10.1)
CHLORIDE SERPL-SCNC: 107 MMOL/L (ref 94–109)
CO2 SERPL-SCNC: 27 MMOL/L (ref 20–32)
COLOR UR AUTO: YELLOW
CREAT SERPL-MCNC: 0.84 MG/DL (ref 0.66–1.25)
DIFFERENTIAL METHOD BLD: NORMAL
EOSINOPHIL # BLD AUTO: 0.2 10E9/L (ref 0–0.7)
EOSINOPHIL NFR BLD AUTO: 2.3 %
ERYTHROCYTE [DISTWIDTH] IN BLOOD BY AUTOMATED COUNT: 13.2 % (ref 10–15)
GFR SERPL CREATININE-BSD FRML MDRD: 86 ML/MIN/{1.73_M2}
GLUCOSE SERPL-MCNC: 72 MG/DL (ref 70–99)
GLUCOSE UR STRIP-MCNC: NEGATIVE MG/DL
HCT VFR BLD AUTO: 47.8 % (ref 40–53)
HGB BLD-MCNC: 15.3 G/DL (ref 13.3–17.7)
HGB UR QL STRIP: ABNORMAL
KETONES UR STRIP-MCNC: NEGATIVE MG/DL
LEUKOCYTE ESTERASE UR QL STRIP: ABNORMAL
LYMPHOCYTES # BLD AUTO: 1.5 10E9/L (ref 0.8–5.3)
LYMPHOCYTES NFR BLD AUTO: 19.9 %
MCH RBC QN AUTO: 29.1 PG (ref 26.5–33)
MCHC RBC AUTO-ENTMCNC: 32 G/DL (ref 31.5–36.5)
MCV RBC AUTO: 91 FL (ref 78–100)
MONOCYTES # BLD AUTO: 0.9 10E9/L (ref 0–1.3)
MONOCYTES NFR BLD AUTO: 12.2 %
NEUTROPHILS # BLD AUTO: 4.7 10E9/L (ref 1.6–8.3)
NEUTROPHILS NFR BLD AUTO: 64.9 %
NITRATE UR QL: NEGATIVE
NON-SQ EPI CELLS #/AREA URNS LPF: ABNORMAL /LPF
PH UR STRIP: 6 PH (ref 5–7)
PLATELET # BLD AUTO: 268 10E9/L (ref 150–450)
POTASSIUM SERPL-SCNC: 4.3 MMOL/L (ref 3.4–5.3)
PROT SERPL-MCNC: 7.6 G/DL (ref 6.8–8.8)
RBC # BLD AUTO: 5.26 10E12/L (ref 4.4–5.9)
RBC #/AREA URNS AUTO: ABNORMAL /HPF
SODIUM SERPL-SCNC: 142 MMOL/L (ref 133–144)
SOURCE: ABNORMAL
SP GR UR STRIP: 1.01 (ref 1–1.03)
UROBILINOGEN UR STRIP-ACNC: 0.2 EU/DL (ref 0.2–1)
WBC # BLD AUTO: 7.3 10E9/L (ref 4–11)
WBC #/AREA URNS AUTO: ABNORMAL /HPF

## 2021-03-30 PROCEDURE — 36415 COLL VENOUS BLD VENIPUNCTURE: CPT | Performed by: FAMILY MEDICINE

## 2021-03-30 PROCEDURE — 85025 COMPLETE CBC W/AUTO DIFF WBC: CPT | Performed by: FAMILY MEDICINE

## 2021-03-30 PROCEDURE — 80053 COMPREHEN METABOLIC PANEL: CPT | Performed by: FAMILY MEDICINE

## 2021-03-30 PROCEDURE — 87086 URINE CULTURE/COLONY COUNT: CPT | Performed by: FAMILY MEDICINE

## 2021-03-30 PROCEDURE — 81001 URINALYSIS AUTO W/SCOPE: CPT | Performed by: FAMILY MEDICINE

## 2021-03-30 PROCEDURE — 99214 OFFICE O/P EST MOD 30 MIN: CPT | Performed by: FAMILY MEDICINE

## 2021-03-30 ASSESSMENT — MIFFLIN-ST. JEOR: SCORE: 2302.09

## 2021-03-30 NOTE — PROGRESS NOTES
Assessment & Plan     ICD-10-CM    1. LLQ abdominal pain  R10.32 Urine Culture Aerobic Bacterial     Comprehensive metabolic panel (BMP + Alb, Alk Phos, ALT, AST, Total. Bili, TP)     CBC with platelets and differential     CANCELED: *UA reflex to Microscopic and Culture ([place] [facility] (except [place] and [place])   2. Gastroesophageal reflux disease with esophagitis without hemorrhage  K21.00 Comprehensive metabolic panel (BMP + Alb, Alk Phos, ALT, AST, Total. Bili, TP)     CBC with platelets and differential   3. Recent urinary tract infection  Z87.440 Urine Culture Aerobic Bacterial     Comprehensive metabolic panel (BMP + Alb, Alk Phos, ALT, AST, Total. Bili, TP)     CBC with platelets and differential     CANCELED: *UA reflex to Microscopic and Culture ([place] [facility] (except [place] and [place])   4. History of hepatitis C  Z86.19 Comprehensive metabolic panel (BMP + Alb, Alk Phos, ALT, AST, Total. Bili, TP)   5. History of CVA (cerebrovascular accident)  Z86.73 Comprehensive metabolic panel (BMP + Alb, Alk Phos, ALT, AST, Total. Bili, TP)   6. NAFLD (nonalcoholic fatty liver disease)  K76.0 Comprehensive metabolic panel (BMP + Alb, Alk Phos, ALT, AST, Total. Bili, TP)   7. Prediabetes  R73.03 Comprehensive metabolic panel (BMP + Alb, Alk Phos, ALT, AST, Total. Bili, TP)   8. Hypertension goal BP (blood pressure) < 140/90  I10 Comprehensive metabolic panel (BMP + Alb, Alk Phos, ALT, AST, Total. Bili, TP)   9. Hyperlipidemia LDL goal <70  E78.5 Comprehensive metabolic panel (BMP + Alb, Alk Phos, ALT, AST, Total. Bili, TP)   10. Obstructive sleep apnea syndrome  G47.33    11. Paroxysmal atrial fibrillation (H)  I48.0 Comprehensive metabolic panel (BMP + Alb, Alk Phos, ALT, AST, Total. Bili, TP)   12. Long term current use of anticoagulant therapy - for intermittent a-fib  Z79.01    13. Morbid obesity (H)  E66.01    14. Abnormal urine findings  R82.90 UA reflex to Microscopic and  Culture     Urine Microscopic     Urine Culture Aerobic Bacterial     CANCELED: *UA reflex to Microscopic and Culture ([place] and [facility] (except [place] and [place])   15. Medication monitoring encounter  Z51.81 Urine Culture Aerobic Bacterial     Comprehensive metabolic panel (BMP + Alb, Alk Phos, ALT, AST, Total. Bili, TP)     CBC with platelets and differential     CANCELED: *UA reflex to Microscopic and Culture ([place] [facility] (except [place] and [place])       Discussed treatment/modality options, including risk and benefits, he desires:    1) Dr Webber, [facility] - follow up - 2 further tests for bacterial overgrowth?    2)  GI second opinion 5/14    3) labs pending    All diagnosis above reviewed and noted above, otherwise stable.      See [facility] orders for further details.        BMI:   Estimated body mass index is 45.57 kg/m  as calculated from the following:    Height as of this encounter: 1.829 m (6').    Weight as of this encounter: 152.4 kg (336 lb).   Weight management plan: offered weight loss clinic, diet, exercise       Return in about 4 months (around 7/30/2021), or if symptoms worsen or fail to improve, for Complete Physical, Follow Up Chronic, Medication Recheck Visit.    I spent a total of 24 minutes on the day of the visit.    Doing chart review, history and exam, documentation and further activities as noted.           Brett Cassidy MD, FAAFP     [facility]  [address] [place] [id number]  [email or web address]   Office: [phone]  Fax: [phone]  Pager: [phone]     Malick Arias is a 75 year old who presents for the following health issues     HPI     Follow up on 3/8/21 visit for UTI with Keila Ndiaye - antibiotic worked well - doing very well no concerns    Please see previous extensive work-up for left lower quadrant pain over the last several years  negative imaging studies negative endoscopy endoscopy negative colonoscopy recent consult with Dr. Webber at [facility] he notes symptoms controlled with drinking water daily bowel movements no signs of bleeding bladder has returned to normal after recent UTI causing significant illness please see for serjio emergency room notes from 3/8/2021      Review of Systems   CONSTITUTIONAL: NEGATIVE for fever, chills, change in weight  INTEGUMENTARY/SKIN: NEGATIVE for worrisome rashes, moles or lesions  EYES: NEGATIVE for vision changes or irritation  ENT/MOUTH: NEGATIVE for ear, mouth and throat problems  RESP: NEGATIVE for significant cough or SOB  CV: NEGATIVE for chest pain, palpitations or peripheral edema  GI: NEGATIVE for nausea, abdominal pain, heartburn, or change in bowel habits  : NEGATIVE for frequency, dysuria, or hematuria  MUSCULOSKELETAL: NEGATIVE for significant arthralgias or myalgia  NEURO: NEGATIVE for weakness, dizziness or paresthesias  ENDOCRINE: NEGATIVE for temperature intolerance, skin/hair changes  HEME: NEGATIVE for bleeding problems  PSYCHIATRIC: NEGATIVE for changes in mood or affect      Objective    /80   Pulse 103   Temp 96.2  F (35.7  C) (Tympanic)   Ht 1.829 m (6')   Wt (!) 152.4 kg (336 lb)   SpO2 98%   BMI 45.57 kg/m    Body mass index is 45.57 kg/m .  Physical Exam   GENERAL: healthy, alert and no distress  EYES: Eyes grossly normal to inspection, PERRL and conjunctivae and sclerae normal  HENT: ear canals and TM's normal, nose and mouth without ulcers or lesions  NECK: no adenopathy, no asymmetry, masses, or scars and thyroid normal to palpation  RESP: lungs clear to auscultation - no rales, rhonchi or wheezes  CV: regular rate and rhythm, normal S1 S2, no S3 or S4, no murmur, click or rub, no peripheral edema and peripheral pulses strong  ABDOMEN: soft, nontender, no hepatosplenomegaly, no masses and bowel sounds normal  MS: no gross musculoskeletal defects noted, no  edema  SKIN: no suspicious lesions or rashes  NEURO: Normal strength and tone, mentation intact and speech normal  PSYCH: mentation appears normal, affect normal/bright    Labs pending

## 2021-03-30 NOTE — LETTER
April 1, 2021      Hugo Weber  [address]  [id number]  [facility] [id number]        Dear ,    We are writing to inform you of your test results.    They showed:     Labs are all significantly improved     We advise:     Continue cares as discussed     For additional lab test information, [email or web address] is an excellent reference.    Resulted Orders   UA reflex to Microscopic and Culture   Result Value Ref [facility]    Color Urine Yellow     Appearance Urine Clear     Glucose Urine Negative NEG^Negative mg/dL    Bilirubin Urine Negative NEG^Negative    Ketones Urine Negative NEG^Negative mg/dL    Specific Gravity Urine 1.015 1.003 - 1.035    Blood Urine Large (A) NEG^Negative    pH Urine 6.0 5.0 - 7.0 pH    Protein Albumin Urine Negative NEG^Negative mg/dL    Urobilinogen Urine 0.2 0.2 - 1.0 EU/dL    Nitrite Urine Negative NEG^Negative    Leukocyte Esterase Urine Small (A) NEG^Negative    Source Midstream Urine    Urine Microscopic   Result Value Ref [facility]    WBC Urine 0 - 5 OTO5^0 - 5 /HPF    RBC Urine 2-5 (A) OTO2^O - 2 /HPF    Squamous Epithelial /LPF Urine Few FEW^Few /LPF    Bacteria Urine Moderate (A) NEG^Negative /HPF   Urine Culture Aerobic Bacterial   Result Value Ref [facility]    Specimen Description Midstream Urine     Special Requests Specimen received in preservative     Culture Micro No growth    Comprehensive metabolic panel (BMP + Alb, Alk Phos, ALT, AST, Total. Bili, TP)   Result Value Ref [facility]    Sodium 142 133 - 144 mmol/L    Potassium 4.3 3.4 - 5.3 mmol/L    Chloride 107 94 - 109 mmol/L    Carbon Dioxide 27 20 - 32 mmol/L    Anion Gap 8 3 - 14 mmol/L    Glucose 72 70 - 99 mg/dL    Urea Nitrogen 20 7 - 30 mg/dL    Creatinine 0.84 0.66 - 1.25 mg/dL    GFR Estimate 86 >60 mL/min/[1.73_m2]      Comment:      Non  GFR Calc  Starting 12/18/2018, serum creatinine based estimated GFR (eGFR) will be   calculated using the Chronic Kidney Disease Epidemiology  Collaboration   (CKD-EPI) equation.      GFR Estimate If Black >90 >60 mL/min/[1.73_m2]      Comment:       GFR Calc  Starting 12/18/2018, serum creatinine based estimated GFR (eGFR) will be   calculated using the Chronic Kidney Disease Epidemiology Collaboration   (CKD-EPI) equation.      Calcium 8.9 8.5 - 10.1 mg/dL    Bilirubin Total 0.5 0.2 - 1.3 mg/dL    Albumin 3.5 3.4 - 5.0 g/dL    Protein Total 7.6 6.8 - 8.8 g/dL    Alkaline Phosphatase 158 (H) 40 - 150 U/L    ALT 21 0 - 70 U/L    AST 10 0 - 45 U/L   CBC with platelets and differential   Result Value Ref [facility]    WBC 7.3 4.0 - 11.0 10e9/L    RBC Count 5.26 4.4 - 5.9 10e12/L    Hemoglobin 15.3 13.3 - 17.7 g/dL    Hematocrit 47.8 40.0 - 53.0 %    MCV 91 78 - 100 fl    MCH 29.1 26.5 - 33.0 pg    MCHC 32.0 31.5 - 36.5 g/dL    RDW 13.2 10.0 - 15.0 %    Platelet Count 268 150 - 450 10e9/L    % Neutrophils 64.9 %    % Lymphocytes 19.9 %    % Monocytes 12.2 %    % Eosinophils 2.3 %    % Basophils 0.7 %    Absolute Neutrophil 4.7 1.6 - 8.3 10e9/L    Absolute Lymphocytes 1.5 0.8 - 5.3 10e9/L    Absolute Monocytes 0.9 0.0 - 1.3 10e9/L    Absolute Eosinophils 0.2 0.0 - 0.7 10e9/L    Absolute Basophils 0.1 0.0 - 0.2 10e9/L    Diff Method Automated Method        If you have any questions or concerns, please call the clinic at the number listed above.       Sincerely,      Brett Cassidy MD

## 2021-03-31 LAB
BACTERIA SPEC CULT: NO GROWTH
Lab: NORMAL
SPECIMEN SOURCE: NORMAL

## 2021-04-01 ENCOUNTER — TRANSFERRED RECORDS (OUTPATIENT)
Dept: HEALTH INFORMATION MANAGEMENT | Facility: CLINIC | Age: 76
End: 2021-04-01

## 2021-04-04 DIAGNOSIS — I48.0 PAROXYSMAL ATRIAL FIBRILLATION (H): Primary | ICD-10-CM

## 2021-04-11 NOTE — TELEPHONE ENCOUNTER
REFERRAL INFORMATION:    Referring Provider:  Dr. Brett Cassidy    Referring Clinic:  [facility]    Reason for Visit/Diagnosis: video-mychart // Abdominal Pain referral by Brett Cassidy MD  // nadeem sanders // medical records in [facility] // appt per pt     FUTURE VISIT INFORMATION:    Appointment Date: 5.14.21    Appointment Time: 11:00     NOTES STATUS DETAILS   OFFICE NOTE from Referring Provider Internal 3.24.21, 3.30.21, 12.8.20 + more with  Dr. Cassidy  [facility]   OFFICE NOTE from Other Specialist [facility] 1.28.21, 12.11.20  Dr. Malick Webber  [facility]   HOSPITAL DISCHARGE SUMMARY/  ED VISITS na    OPERATIVE REPORT na    MEDICATION LIST Internal         ENDOSCOPY Internal 11.24.20  Upper GI   COLONOSCOPY Internal 11.24.20, 9.7.16   ERCP na    EUS na    STOOL TESTING Internal 9.29.20   PERTINENT LABS Internal    PATHOLOGY REPORTS (RELATED) Internal    IMAGING (CT, MRI, EGD, MRCP, Small Bowel Follow Through/SBT, MR/CT Enterography) Internal 3.8.21, 10.27.20, 8.14.19  CT Abd/Pelvis    11.9.20  US Abd Complete

## 2021-04-14 ENCOUNTER — TRANSFERRED RECORDS (OUTPATIENT)
Dept: HEALTH INFORMATION MANAGEMENT | Facility: CLINIC | Age: 76
End: 2021-04-14

## 2021-04-29 NOTE — TELEPHONE ENCOUNTER
Prescription approved per [facility] Refill Protocol.    Cassy Webber RN, BSN   [facility]       done

## 2021-05-05 ENCOUNTER — TRANSFERRED RECORDS (OUTPATIENT)
Dept: HEALTH INFORMATION MANAGEMENT | Facility: CLINIC | Age: 76
End: 2021-05-05

## 2021-05-10 DIAGNOSIS — K21.00 GASTROESOPHAGEAL REFLUX DISEASE WITH ESOPHAGITIS WITHOUT HEMORRHAGE: ICD-10-CM

## 2021-05-11 RX ORDER — PANTOPRAZOLE SODIUM 40 MG/1
40 TABLET, DELAYED RELEASE ORAL DAILY
Qty: 90 TABLET | Refills: 3 | Status: SHIPPED | OUTPATIENT
Start: 2021-05-11 | End: 2022-07-06

## 2021-05-11 NOTE — TELEPHONE ENCOUNTER
Routing refill request to provider for review/approval because:  Drug not active on patient's medication list  Julia Carolina RN, BSN

## 2021-05-14 ENCOUNTER — PRE VISIT (OUTPATIENT)
Dept: GASTROENTEROLOGY | Facility: CLINIC | Age: 76
End: 2021-05-14

## 2021-05-14 ENCOUNTER — VIRTUAL VISIT (OUTPATIENT)
Dept: GASTROENTEROLOGY | Facility: CLINIC | Age: 76
End: 2021-05-14
Attending: FAMILY MEDICINE
Payer: COMMERCIAL

## 2021-05-14 VITALS — BODY MASS INDEX: 42.66 KG/M2 | HEIGHT: 72 IN | WEIGHT: 315 LBS

## 2021-05-14 DIAGNOSIS — R10.84 ABDOMINAL PAIN, GENERALIZED: ICD-10-CM

## 2021-05-14 PROCEDURE — 99205 OFFICE O/P NEW HI 60 MIN: CPT | Mod: 95 | Performed by: STUDENT IN AN ORGANIZED HEALTH CARE EDUCATION/TRAINING PROGRAM

## 2021-05-14 ASSESSMENT — MIFFLIN-ST. JEOR: SCORE: 2274.87

## 2021-05-14 ASSESSMENT — PAIN SCALES - GENERAL: PAINLEVEL: MODERATE PAIN (5)

## 2021-05-14 NOTE — NURSING NOTE
Chief Complaint   Patient presents with     New Patient     abdominal pain       Vitals:    05/14/21 1028   Weight: 149.7 kg (330 lb)   Height: 1.829 m (6')       Body mass index is 44.76 kg/m .      Bhanu Carrillo LPN

## 2021-05-14 NOTE — LETTER
5/14/2021         RE: Hugo Weber  [address]  [id number]  [facility] [address]        Dear Colleague,    Thank you for referring your patient, Hugo Weber, to the [facility] [place]. Please see a copy of my visit note below.    Hugo is a 75 year old who is being evaluated via a billable video visit.      How would you like to obtain your AVS? MyChart  If the video visit is dropped, the invitation should be resent by: Text to cell phone: [phone]  Will anyone else be joining your video visit? No    Video-Visit Details    Type of service:  Video Visit    Video Start Time: 11:05    Video End Time:11:55    Originating Location (pt. Location): Home    Distant Location (provider location):  [facility] [place]     Platform used for Video Visit: Well      GI CLINIC VISIT - NEW PATIENT    CC/REFERRING PROVIDER: Brett Cassidy  REASON FOR CONSULTATION: Abdominal Pain  HPI: 75 year old male with history of NAFLD, prediabetes, HTN, Hx of Hep C, GERD, afib on anticoagulation, morbid obesity, hx of gastric bypass, hx of appendectomy, presenting for chronic abdominal pain.     In the past has seen [facility]. In the past, noted to have worsening of LLQ pain symptoms after his wife passed away and improvement of symptoms with passing gas and BM and has denied blood in stools in past. During his workup in the past he has been seen to have a 10 mm gallstone with a normal HIDA scan and gallbladder EF of 35%. He also had an EGD in 2020 that showed gastric bypass with normnal size pouch and intact stable line and normal jejunum. Colonoscopy at the time was normal other than two 4-5 mm polyps removed in ascending colon. His other workup at [facility] includes a glucose breath test that was negative, as well as a negative fructose breath test. He also had a lactose breath test that was negative.     He has LLQ pain and also has a L kidney stone. He  says his kidney doctor said food should not affect symptoms, but eating makes the LLQ pain worse. Water improves the pain, about 16 oz of water. His BM are once per day, they have not changed, but they are formed, solid, and very easy to pass. His pain is sharp in the LLQ. The pain does not happen every time he eats. It likely happens about once per day. Sometimes the pain is minimal. Drinking the water does not trigger a BM and having a BM does not typically help the pain. He denies bloating symptoms or passing flatus.     He has never taken miralax, and he is unsure of whether colonoscopy prep helps his pain. He has not noticed that movement worsens the pain. Pushing on the area does make the pain worse.       ROS: 10pt ROS performed and otherwise negative.    PERTINENT PAST MEDICAL HISTORY:  As noted above.    PREVIOUS ABDOMINAL/GYNECOLOGIC SURGERIES:  As noted above.    PREVIOUS ENDOSCOPY:  EGD 2020 and Colonoscopy reviewed, discussed in HPI.    PERTINENT MEDICATIONS:  Apixaban  Ferrous Sulfate  B12  Panotprazole 40 mg    - Anticoagulation/Antiplatelet Agents: none  Medications reviewed with patient today, see Medication List/Assessment for details.  No other NSAID/anticoagulation reported by patient.  No other OTC/herbal/supplements reported by patient.    SOCIAL HISTORY:  No tobacco, drugs, rare EtOH  Retired    FAMILY HISTORY:  No colon/panc/esophageal/other GI CA, no other Church or other HPS-related Samy. No IBD/celiac, no other AI/liver/thyroid disease. No known FH bleeding/clotting disorders.  Father had prostate.    PHYSICAL EXAMINATION:  Video visit  Gen: alert and oriented, in NAD and non-toxic appearing  HEENT: MMM, no scleral icterus  Resp: breathing comfortably on room air  Skin: No jaundice or visible rashes  Neuro: grossly intact    PERTINENT STUDIES:  Imaging  CT A/P w/contrast reviewed. No significant stool burden or other abnormally other than fatty deposit of liver.     Labs  CBC and CMP WNL  other than slightly elevation of alkaline phosphatase (158 -ULN is 150)    ASSESSMENT/PLAN:    #Recurrent LLQ pain  Pain worsened with eating at times, improved with water. Unsure if movement or sridevi abdominal muscles makes pain worse. Does not have improvement in pain with bowel movements. His symptoms do not seem consistent with IBS. Also not consistent with SIBO. A gallbadder etiology was considered in the past, and despite having a gallstone he has a normal gallbladder EF. His CT scan does not show significant stool burden. He does note some worsening of pain when he pushes on the area. Likely factor of adiposity playing a role in being inflammatory and causing pain. No signs of hernia, and no finding of hernia on CT, and no history of surgery in the LLQ, though he has a very large scar from his gastric bypass surgery.     At this time, we will trial a low dose of miralax 1/2 scoop per day (given his symptoms improve with water). Pt has some of this at home. Also recommended weight loss, and placed a referral for pain clinic appointment to consider abdominal wall pain syndrome (hx of gastric bypass surgery could play a role).     Followup in 3 months to check in and see if symptoms have improved.     Repeat colonoscopy in 2025.         Thank you for this consultation. It was a pleasure to participate in the care of this patient; please contact us with any further questions.    Pt was seen and discussed with Dr. Carlos.    Kelli Mendoza MD PhD   Gastroenterology Fellow    Attestation signed by Estefanía Carlos MD at 5/31/2021  9:07 PM:  I participated in a video visit and discussed the management with the GI Fellow,Dr Mendoza on 5/14/2021. I reviewed the note and there are no changes to the past medical, family or social history. A complete 10 point review of systems was obtained. Please see the HPI for pertinent positives and negatives. All other systems were reviewed and were found to be  negative. I agree with the documented findings and plan of care as outlined.    Thank you for this consultation.  It was a pleasure to participate in the care of this patient; please contact us with any further questions.  A total of 80 minutes spent on the date of the encounter doing chart review, history and exam, documentation and further activities as noted above. This note was created with voice recognition software, and while reviewed for accuracy, typos may remain.     Estefanía Carlos MD  Gastroenterology

## 2021-05-14 NOTE — PATIENT INSTRUCTIONS
Dear Hugo,    It was great to meet you today.     We do not have a full explanation for your symptoms. We think that the pain might be referred from your surgery. It is possible that the pain is due to issues with how the nerves sense pain in the abdominal wall.       If you have any questions, please do not hesitate to contact our nurse, Haily at [phone].

## 2021-05-14 NOTE — PROGRESS NOTES
Hugo is a 75 year old who is being evaluated via a billable video visit.      How would you like to obtain your AVS? MyChart  If the video visit is dropped, the invitation should be resent by: Text to cell phone: [phone]  Will anyone else be joining your video visit? No      Video Start Time: 11:05  Video-Visit Details    Type of service:  Video Visit    Video End Time:11:55    Originating Location (pt. Location): Home    Distant Location (provider location):  [facility] [place]     Platform used for Video Visit: Well      GI CLINIC VISIT - NEW PATIENT    CC/REFERRING PROVIDER: Brett Cassidy  REASON FOR CONSULTATION: Abdominal Pain  HPI: 75 year old male with history of NAFLD, prediabetes, HTN, Hx of Hep C, GERD, afib on anticoagulation, morbid obesity, hx of gastric bypass, hx of appendectomy, presenting for chronic abdominal pain.     In the past has seen [facility]. In the past, noted to have worsening of LLQ pain symptoms after his wife passed away and improvement of symptoms with passing gas and BM and has denied blood in stools in past. During his workup in the past he has been seen to have a 10 mm gallstone with a normal HIDA scan and gallbladder EF of 35%. He also had an EGD in 2020 that showed gastric bypass with normnal size pouch and intact stable line and normal jejunum. Colonoscopy at the time was normal other than two 4-5 mm polyps removed in ascending colon. His other workup at [facility] includes a glucose breath test that was negative, as well as a negative fructose breath test. He also had a lactose breath test that was negative.     He has LLQ pain and also has a L kidney stone. He says his kidney doctor said food should not affect symptoms, but eating makes the LLQ pain worse. Water improves the pain, about 16 oz of water. His BM are once per day, they have not changed, but they are formed, solid, and very easy to pass. His pain is sharp in the LLQ. The pain does not  happen every time he eats. It likely happens about once per day. Sometimes the pain is minimal. Drinking the water does not trigger a BM and having a BM does not typically help the pain. He denies bloating symptoms or passing flatus.     He has never taken miralax, and he is unsure of whether colonoscopy prep helps his pain. He has not noticed that movement worsens the pain. Pushing on the area does make the pain worse.       ROS: 10pt ROS performed and otherwise negative.    PERTINENT PAST MEDICAL HISTORY:  As noted above.    PREVIOUS ABDOMINAL/GYNECOLOGIC SURGERIES:  As noted above.    PREVIOUS ENDOSCOPY:  EGD 2020 and Colonoscopy reviewed, discussed in HPI.    PERTINENT MEDICATIONS:  Apixaban  Ferrous Sulfate  B12  Panotprazole 40 mg    - Anticoagulation/Antiplatelet Agents: none  Medications reviewed with patient today, see Medication List/Assessment for details.  No other NSAID/anticoagulation reported by patient.  No other OTC/herbal/supplements reported by patient.    SOCIAL HISTORY:  No tobacco, drugs, rare EtOH  Retired    FAMILY HISTORY:  No colon/panc/esophageal/other GI CA, no other Church or other HPS-related Samy. No IBD/celiac, no other AI/liver/thyroid disease. No known FH bleeding/clotting disorders.  Father had prostate.    PHYSICAL EXAMINATION:  Video visit  Gen: alert and oriented, in NAD and non-toxic appearing  HEENT: MMM, no scleral icterus  Resp: breathing comfortably on room air  Skin: No jaundice or visible rashes  Neuro: grossly intact    PERTINENT STUDIES:  Imaging  CT A/P w/contrast reviewed. No significant stool burden or other abnormally other than fatty deposit of liver.     Labs  CBC and CMP WNL other than slightly elevation of alkaline phosphatase (158 -ULN is 150)    ASSESSMENT/PLAN:    #Recurrent LLQ pain  Pain worsened with eating at times, improved with water. Unsure if movement or sridevi abdominal muscles makes pain worse. Does not have improvement in pain with bowel  movements. His symptoms do not seem consistent with IBS. Also not consistent with SIBO. A gallbadder etiology was considered in the past, and despite having a gallstone he has a normal gallbladder EF. His CT scan does not show significant stool burden. He does note some worsening of pain when he pushes on the area. Likely factor of adiposity playing a role in being inflammatory and causing pain. No signs of hernia, and no finding of hernia on CT, and no history of surgery in the LLQ, though he has a very large scar from his gastric bypass surgery.     At this time, we will trial a low dose of miralax 1/2 scoop per day (given his symptoms improve with water). Pt has some of this at home. Also recommended weight loss, and placed a referral for pain clinic appointment to consider abdominal wall pain syndrome (hx of gastric bypass surgery could play a role).     Followup in 3 months to check in and see if symptoms have improved.     Repeat colonoscopy in 2025.         Thank you for this consultation. It was a pleasure to participate in the care of this patient; please contact us with any further questions.    Pt was seen and discussed with Dr. Carlos.    Kelli Mendoza MD PhD   Gastroenterology Fellow

## 2021-05-26 VITALS — DIASTOLIC BLOOD PRESSURE: 78 MMHG | TEMPERATURE: 97.6 F | HEART RATE: 110 BPM | SYSTOLIC BLOOD PRESSURE: 132 MMHG

## 2021-05-26 VITALS — TEMPERATURE: 97.7 F | SYSTOLIC BLOOD PRESSURE: 138 MMHG | HEART RATE: 67 BPM | DIASTOLIC BLOOD PRESSURE: 78 MMHG

## 2021-05-28 ASSESSMENT — ENCOUNTER SYMPTOMS
DIARRHEA: 0
ALTERED TEMPERATURE REGULATION: 0
RECTAL PAIN: 0
DECREASED APPETITE: 0
HEARTBURN: 0
FEVER: 0
POLYPHAGIA: 0
VOMITING: 0
CHILLS: 0
INCREASED ENERGY: 1
BLOATING: 0
WEIGHT LOSS: 0
CONSTIPATION: 0
WEIGHT GAIN: 0
NIGHT SWEATS: 0
BLOOD IN STOOL: 0
POLYDIPSIA: 0
JAUNDICE: 0
FATIGUE: 1
NAUSEA: 0
HALLUCINATIONS: 0
BOWEL INCONTINENCE: 0
ABDOMINAL PAIN: 1

## 2021-05-28 ASSESSMENT — PATIENT HEALTH QUESTIONNAIRE - PHQ9
SUM OF ALL RESPONSES TO PHQ QUESTIONS 1-9: 2
10. IF YOU CHECKED OFF ANY PROBLEMS, HOW DIFFICULT HAVE THESE PROBLEMS MADE IT FOR YOU TO DO YOUR WORK, TAKE CARE OF THINGS AT HOME, OR GET ALONG WITH OTHER PEOPLE: NOT DIFFICULT AT ALL
SUM OF ALL RESPONSES TO PHQ QUESTIONS 1-9: 2

## 2021-05-29 ASSESSMENT — PATIENT HEALTH QUESTIONNAIRE - PHQ9: SUM OF ALL RESPONSES TO PHQ QUESTIONS 1-9: 2

## 2021-06-02 NOTE — ANESTHESIA PREPROCEDURE EVALUATION
Anesthesia Evaluation        Airway   Mallampati: III  Neck ROM: limited   Pulmonary     breath sounds clear to auscultation  (+) sleep apnea on CPAP, ,                          Cardiovascular   (+) hypertension, dysrhythmias, , hypercholesterolemia,     ECG reviewed  Rhythm: irregular  Rate: normal,         Neuro/Psych    (+) CVA (January 2019, no residual sequale) ,     Endo/Other    (+) obesity,      GI/Hepatic/Renal    (+)   chronic renal disease,      Other findings: Bilateral hearing aids        Dental                         Anesthesia Plan  Planned anesthetic: general endotracheal  Previous airway: 2 hand mask with oral airway difficult, improved with sux, glidescope.  Will plan on sux/glide today as well.     ASA 3   Induction: intravenous   Anesthetic plan and risks discussed with: patient  Anesthesia plan special considerations: video-assisted, increased risk of difficult airway,   Post-op plan: routine recovery

## 2021-06-02 NOTE — ANESTHESIA CARE TRANSFER NOTE
Last vitals:   Vitals:    10/21/19 1522   BP: 130/77   Pulse: 80   Resp: 16   Temp: 36.1  C (97  F)   SpO2: 99%     Patient's level of consciousness is drowsy  Spontaneous respirations: yes  Maintains airway independently: yes  Dentition unchanged: yes  Oropharynx: oropharynx clear of all foreign objects    QCDR Measures:  ASA# 20 - Surgical Safety Checklist: WHO surgical safety checklist completed prior to induction    PQRS# 430 - Adult PONV Prevention: 4558F - Pt received => 2 anti-emetic agents (different classes) preop & intraop  ASA# 8 - Peds PONV Prevention: NA - Not pediatric patient, not GA or 2 or more risk factors NOT present  PQRS# 424 - Ciarra-op Temp Management: 4559F - At least one body temp DOCUMENTED => 35.5C or 95.9F within required timeframe  PQRS# 426 - PACU Transfer Protocol: - Transfer of care checklist used  ASA# 14 - Acute Post-op Pain: ASA14B - Patient did NOT experience pain >= 7 out of 10

## 2021-06-02 NOTE — ANESTHESIA POSTPROCEDURE EVALUATION
Patient: Hugo Weber  CYSTOSCOPY, RIGHT URETEROSCOPY, STENT INSERTION  Anesthesia type: general    Patient location: PACU  Last vitals:   Vitals Value Taken Time   /66 10/21/2019  4:00 PM   Temp 36.7  C (98  F) 10/21/2019  4:00 PM   Pulse 80 10/21/2019  4:05 PM   Resp 20 10/21/2019  4:05 PM   SpO2 94 % 10/21/2019  4:05 PM   Vitals shown include unvalidated device data.  Post vital signs: stable  Level of consciousness: awake and responds to simple questions  Post-anesthesia pain: pain controlled  Post-anesthesia nausea and vomiting: no  Pulmonary: unassisted, return to baseline  Cardiovascular: stable and blood pressure at baseline  Hydration: adequate  Anesthetic events: no    QCDR Measures:  ASA# 11 - Ciarra-op Cardiac Arrest: ASA11B - Patient did NOT experience unanticipated cardiac arrest  ASA# 12 - Ciarra-op Mortality Rate: ASA12B - Patient did NOT die  ASA# 13 - PACU Re-Intubation Rate: ASA13B - Patient did NOT require a new airway mgmt  ASA# 10 - Composite Anes Safety: ASA10A - No serious adverse event    Additional Notes:

## 2021-06-03 VITALS — WEIGHT: 315 LBS | BODY MASS INDEX: 39.17 KG/M2 | HEIGHT: 75 IN

## 2021-06-03 VITALS — HEIGHT: 75 IN | BODY MASS INDEX: 39.17 KG/M2 | WEIGHT: 315 LBS

## 2021-06-03 NOTE — PROGRESS NOTES
Assessment/Plan:        Diagnoses and all orders for this visit:    Calculus of kidney  -     Cystoscopy with Stent Removal Education  -     Patient Stated Goal: Prevent further stones  -     CT Abdomen Pelvis Without Oral Without IV Contrast; Future; Expected date: 12/11/2019  -     ciprofloxacin HCl tablet 500 mg (CIPRO)  -     lidocaine HCl 2 % topical jelly 10 mL (UROJET)    History of kidney stones  -     Urinalysis Macroscopic  -     Culture, Urine- Future; Future; Expected date: 12/11/2019  -     Culture, Urine- Future  -     ciprofloxacin HCl tablet 500 mg (CIPRO)  -     lidocaine HCl 2 % topical jelly 10 mL (UROJET)    Stricture or kinking of ureter  -     Culture, Urine- Future; Future; Expected date: 12/11/2019  -     Culture, Urine- Future  -     CT Abdomen Pelvis Without Oral Without IV Contrast; Future; Expected date: 12/11/2019  -     ciprofloxacin HCl tablet 500 mg (CIPRO)  -     lidocaine HCl 2 % topical jelly 10 mL (UROJET)    Other orders  -     ciprofloxacin HCl (CIPRO) 500 MG tablet  -     lidocaine HCl (UROJET) 2 % topical jelly      Stone Management Plan  No flowsheet data found.            Subjective:      HPI  Mr. Hugo Weber is a 74 y.o.  male returning to the [facility] [place] for early postoperative follow up for anticipated stent removal.     He returns status post right ureteroscopic laser lithotripsy for proximal ureteral stone. He has had no unanticipated post-operative events.    He has had no symptoms suspicious for infection and stent was mildly symptomatic with typical issues of flank discomfort and lower urinary tract irritation.     Flexible cystoscopy is performed and indwelling stent is removed without incident.    He will follow up in the office in one month with imaging.    He had required staged clearance because of possible ureteral stricture. Will carefull evaluate follow up CT.     ROS   Review of systems is negative except for  HPI.    Past Medical History:   Diagnosis Date     Arthritis     osteo     BPH (benign prostatic hyperplasia) 07/05/2018     Calculus of kidney 06/06/2005     Cervical stenosis of spine     moderate C4-5     Cholelithiasis      Chronic peptic ulcer 1985     Colon polyps      CVA (cerebral vascular accident) (H) 01/01/2019    small right periorlandic subcortical     First degree AV block      Hepatitis C 05/12/2015     Hyperlipidemia 12/30/2011     Hypertension      Iron deficiency anemia 08/22/2003     Long term current use of anticoagulant therapy 03/30/2012     Myofascial pain 10/12/2017     NAFLD (nonalcoholic fatty liver disease) 09/05/2014     Nephrolithiasis      Obesity 09/05/2014     Paroxysmal atrial fibrillation (H) 05/27/2016     Prediabetes      Presbyacusis 01/2004     Pyelonephritis 01/2004     SCC (squamous cell carcinoma), ear 10/2008     Sleep apnea     CPAP use     Stroke (H) 01/19/2019     Thoracic aortic ectasia (H) 07/06/2018     TMJ arthralgia      Vitamin B12 deficiency 04/15/2019     Vitamin D deficiency 04/15/2019       Past Surgical History:   Procedure Laterality Date     APPENDECTOMY       ARTHROPLASTY  08/13/2012    knee     CARDIOVERSION       cellulitis  1979     CYSTOSCOPY W/ LASER LITHOTRIPSY  10/16/2012,5/2/2018     EYE SURGERY  1/01,6/02,11/02    lasik     forearm spur Bilateral 11/1998 ,11/99     GASTRIC BYPASS  1985     lihoripsy  07/2006     lt ear choncal lesion removal scc  10/08, 12/08     REPLACEMENT TOTAL KNEE  08/13/2012     WISDOM TOOTH EXTRACTION  11/1998       Current Outpatient Medications   Medication Sig Dispense Refill     apixaban (ELIQUIS) 5 mg Tab tablet Take by mouth 2 (two) times a day.       ascorbic acid, vitamin C, (VITAMIN C) 1000 MG tablet Take 1,000 mg by mouth daily.       calcium carbonate-vitamin D3 (OS-BEBO 250+ D) 250-125 mg-unit Tab per tablet Take 1 tablet by mouth daily.       cyanocobalamin 1000 MCG tablet Take 1,000 mcg by mouth daily.        diltiazem (TIAZAC) 180 MG 24 hr capsule Take 360 mg by mouth daily.       ferrous sulfate 325 (65 FE) MG tablet Take 1 tablet by mouth daily with breakfast.       fluticasone propionate (FLONASE) 50 mcg/actuation nasal spray Apply 1 spray into each nostril daily.       niacin 500 MG tablet Take 500 mg by mouth daily with breakfast.       pantoprazole (PROTONIX) 40 MG tablet Take 40 mg by mouth daily.       tamsulosin (FLOMAX) 0.4 mg cap Take 1 capsule (0.4 mg total) by mouth 2 (two) times a day. 14 capsule 0     UNABLE TO FIND daily. Med Name:[id number]       oxyCODONE (ROXICODONE) 5 MG immediate release tablet Take 1 tablet (5 mg total) by mouth every 6 (six) hours as needed for pain. 12 tablet 0     Current Facility-Administered Medications   Medication Dose Route Frequency Provider Last Rate Last Dose     ciprofloxacin HCl (CIPRO) 500 MG tablet              lidocaine HCl (UROJET) 2 % topical jelly                No Known Allergies    Social History     Socioeconomic History     Marital status:      Spouse name: Not on file     Number of children: Not on file     Years of education: Not on file     Highest education level: Not on file   Occupational History     Not on file   Social Needs     Financial resource strain: Not on file     Food insecurity:     Worry: Not on file     Inability: Not on file     Transportation needs:     Medical: Not on file     Non-medical: Not on file   Tobacco Use     Smoking status: Never Smoker     Smokeless tobacco: Never Used   Substance and Sexual Activity     Alcohol use: Yes     Comment: 1.2 per week     Drug use: Not on file     Sexual activity: Not on file   Lifestyle     Physical activity:     Days per week: Not on file     Minutes per session: Not on file     Stress: Not on file   Relationships     Social connections:     Talks on phone: Not on file     Gets together: Not on file     Attends Christianity service: Not on file     Active member of club or organization: Not on  file     Attends meetings of clubs or organizations: Not on file     Relationship status: Not on file     Intimate partner violence:     Fear of current or ex partner: Not on file     Emotionally abused: Not on file     Physically abused: Not on file     Forced sexual activity: Not on file   Other Topics Concern     Not on file   Social History Narrative     Not on file       No family history on file.  Objective:      Physical Exam  Vitals:    11/11/19 1114   BP: 138/78   Pulse: 67   Temp: 97.7  F (36.5  C)     General - well developed, well nourished, appropriate for age. Appears no distress at this time  Abdomen - morbidly obese soft, non-tender, no hepatosplenomegaly, no masses.   - no flank tenderness, no suprapubic tenderness, kidney and bladder non-palpable  MSK - normal spinal curvature. no spinal tenderness. normal gait. muscular strength intact.  Psych - oriented to time, place, and person, normal mood and affect.      Labs   Urinalysis POC (Office):  Nitrite, UA   Date Value Ref Range Status   11/11/2019 Negative Negative Final       Lab Urinalysis:  Blood, UA   Date Value Ref Range Status   11/11/2019 Large (!) Negative Final     Nitrite, UA   Date Value Ref Range Status   11/11/2019 Negative Negative Final     Leukocytes, UA   Date Value Ref Range Status   11/11/2019 Small (!) Negative Final     pH, UA   Date Value Ref Range Status   11/11/2019 7.0 5.0 - 8.0 Final

## 2021-06-03 NOTE — ANESTHESIA PREPROCEDURE EVALUATION
Anesthesia Evaluation      Patient summary reviewed   No history of anesthetic complications     Airway    Pulmonary    (+) sleep apnea,                          Cardiovascular   (+) hypertension, dysrhythmias, ,     ECG reviewed        Neuro/Psych    (+) CVA ,     Endo/Other    (+) obesity,      GI/Hepatic/Renal    (+)   chronic renal disease,           Dental                         Anesthesia Plan  Planned anesthetic: general endotracheal    ASA 3   Induction: intravenous   Anesthetic plan and risks discussed with: patient  Anesthesia plan special considerations: antiemetics,   Post-op plan: routine recovery

## 2021-06-03 NOTE — PATIENT INSTRUCTIONS - HE
Patient Stated Goal: Prevent further stones  Cystoscopy with Stent Removal    Cystoscopy is used to help diagnose urinary problems, or to remove a ureteral stent.    During a cystoscopy, your doctor examines the inside of your bladder with an instrument called a cystoscope. A cystoscope is a long, thin flexible tube with a camera at the end.    Your doctor will insert the scope into your urethra allowing him to visualize and evaluate the inside of the bladder for possible abnormalities. The urethra is the tube that carries urine to the outside of your body.    How is the stent removed?    Your stent will be removed in the [facility] with a small telescope and a grasping tool.  It usually takes less than 1 minute to remove the stent.    What should I expect after the stent is removed?     You should feel normal by the next day.    Some patients find:      An increase in back pain about an hour after the stent is removed as the kidney fills up with urine before it starts to empty.  It can be as uncomfortable as your initial stone episode.  Taking pain medications before stent removal may be helpful, but you would need someone else to drive you to and from your appointment.    Bladder symptoms usually disappear by the next morning.    Small amounts of blood in the urine may be seen occasionally for up to a week.    At Home:      It is important to drink plenty of fluids after your procedure    You may continue to use your pain medications as prescribed    What symptoms should I watch for?    Fever     Chills    Increasing back pain that is not relieved with pain medications    Large amounts of blood in the urine or large clots    Leakage of urine (incontinence)     Are not able to urinate for 8 hours    These symptoms may mean you have a blockage or infection. Call the [facility] 24 hours a day at [phone] immediately.

## 2021-06-03 NOTE — ANESTHESIA CARE TRANSFER NOTE
Pt breathing spontaneously, follows commands, suctioned extubated. Spontaneous respirations with SFM to PACU. VSS.    Last vitals:   Vitals:    11/04/19 1736   BP: 128/80   Pulse: 90   Resp: 16   Temp: 36.5  C (97.7  F)   SpO2: 99%     Patient's level of consciousness is drowsy  Spontaneous respirations: yes  Maintains airway independently: yes  Dentition unchanged: yes  Oropharynx: oropharynx clear of all foreign objects    QCDR Measures:  ASA# 20 - Surgical Safety Checklist: WHO surgical safety checklist completed prior to induction    PQRS# 430 - Adult PONV Prevention: 4558F - Pt received => 2 anti-emetic agents (different classes) preop & intraop  ASA# 8 - Peds PONV Prevention: NA - Not pediatric patient, not GA or 2 or more risk factors NOT present  PQRS# 424 - Ciarra-op Temp Management: 4559F - At least one body temp DOCUMENTED => 35.5C or 95.9F within required timeframe  PQRS# 426 - PACU Transfer Protocol: - Transfer of care checklist used  ASA# 14 - Acute Post-op Pain: ASA14B - Patient did NOT experience pain >= 7 out of 10

## 2021-06-03 NOTE — ANESTHESIA POSTPROCEDURE EVALUATION
Patient: Hugo Weber  CYSTOSCOPY, RIGHT URETEROSCOPY, LASER LITHOTRIPSY, STONE BASKET EXTRACTION, STENT EXCHANGE, RIGHT  Anesthesia type: general    Patient location: PACU  Last vitals:   Vitals Value Taken Time   /64 11/4/2019  6:30 PM   Temp 36.5  C (97.7  F) 11/4/2019  6:30 PM   Pulse 93 11/4/2019  6:34 PM   Resp 18 11/4/2019  6:30 PM   SpO2 97 % 11/4/2019  6:34 PM   Vitals shown include unvalidated device data.  Post vital signs: stable  Level of consciousness: awake and responds to simple questions  Post-anesthesia pain: pain controlled  Post-anesthesia nausea and vomiting: no  Pulmonary: unassisted, return to baseline  Cardiovascular: stable and blood pressure at baseline  Hydration: adequate  Anesthetic events: no    QCDR Measures:  ASA# 11 - Ciarra-op Cardiac Arrest: ASA11B - Patient did NOT experience unanticipated cardiac arrest  ASA# 12 - Ciarra-op Mortality Rate: ASA12B - Patient did NOT die  ASA# 13 - PACU Re-Intubation Rate: ASA13B - Patient did NOT require a new airway mgmt  ASA# 10 - Composite Anes Safety: ASA10A - No serious adverse event    Additional Notes:

## 2021-06-04 NOTE — PROGRESS NOTES
Assessment/Plan:        Diagnoses and all orders for this visit:    Calculus of kidney  -     Urinalysis Macroscopic    Other orders  -     triamcinolone (KENALOG) 0.1 % ointment    Stone Management Plan  [facility] Stone Management 11/11/2019 12/18/2019   Urinary Tract Infection No suspicion of infection No suspicion of infection   Renal Colic Well controlled symptoms Well controlled symptoms   Renal Failure No suspicion of renal failure No suspicion of renal failure   Current CT date - 12/18/2019   Right sided stones? - No   R Hydronephrosis - None   R Stone Event Established event Resolved event   Resolved date - 12/18/2019   R Post-op status Stent Removal No residual stone   Left sided stones? - Yes   L Number of ureteral stones - No ureteral stones   L Number of kidney stones  - 1   L GSD of kidney stones - 10 - 15   L Hydronephrosis - None   L Stone Event No current event No current event   L Current Plan - Observe   Observe rationale - Significant stone burden amenable to emergency management         Subjective:      HPI  Mr. Hugo Weber is a 74 y.o.  male returning to the [facility] for late postoperative follow-up.     He returns status post staged Right ureteroscopic laser lithotripsy for proximal ureteral and renal stones, encountered narrow UPJ. He has had no unanticipated events.     He has been experiencing intermittent left flank pain, achy in quality. He is asymptomatic at present. He denies current symptoms of fever, chills, flank pain, nausea, vomiting, urinary frequency and dysuria.    New CT scan was personally reviewed and demonstrates complete clearance of targeted stone  with resolution of previous hydronephrosis. No change to 11 mm left upper pole renal stone.    Stone composition was 100% calcium oxalate.     PLAN    75 yo M with hx of recurrent CaOx stone disease s/p staged ureteroscopy for clearance of right proximal ureteral and renal stones with narrow  UPJ. Intermittent left flank pain with dominant left renal stone.  Previous stone risk factors of hypercalciuria with severely high urine sodium and hyperoxaluria.    He is vocal about his resistance to change his diet to aid in stone prevention. However, he is willing to attempt recommendations of low sodium/oxalate diet. He will return in ~ 6 weeks with results of updated [facility] 24 hour urine collection.    We discussed surgical consideration for left renal stone as pain may be related to intermittent obstruction. He prefers to hold off on surgery at this time.    Patient also seen and examined by Lynn Berrios PA-C       ROS   Review of systems is negative except for HPI.    Past Medical History:   Diagnosis Date     Arthritis     osteo     BPH (benign prostatic hyperplasia) 07/05/2018     Calculus of kidney 06/06/2005     Cervical stenosis of spine     moderate C4-5     Cholelithiasis      Chronic peptic ulcer 1985     Colon polyps      CVA (cerebral vascular accident) (H) 01/01/2019    small right periorlandic subcortical     First degree AV block      Hepatitis C 05/12/2015     Hyperlipidemia 12/30/2011     Hypertension      Iron deficiency anemia 08/22/2003     Long term current use of anticoagulant therapy 03/30/2012     Myofascial pain 10/12/2017     NAFLD (nonalcoholic fatty liver disease) 09/05/2014     Nephrolithiasis      Obesity 09/05/2014     Paroxysmal atrial fibrillation (H) 05/27/2016     Prediabetes      Presbyacusis 01/2004     Pyelonephritis 01/2004     SCC (squamous cell carcinoma), ear 10/2008     Sleep apnea     CPAP use     Stroke (H) 01/19/2019     Thoracic aortic ectasia (H) 07/06/2018     TMJ arthralgia      Vitamin B12 deficiency 04/15/2019     Vitamin D deficiency 04/15/2019       Past Surgical History:   Procedure Laterality Date     APPENDECTOMY       ARTHROPLASTY  08/13/2012    knee     CARDIOVERSION       cellulitis  1979     CYSTOSCOPY W/ LASER LITHOTRIPSY  10/16/2012,5/2/2018      EYE SURGERY  1/01,6/02,11/02    lasik     forearm spur Bilateral 11/1998 ,11/99     GASTRIC BYPASS  1985     lihoripsy  07/2006     lt ear choncal lesion removal scc  10/08, 12/08     REPLACEMENT TOTAL KNEE  08/13/2012     WISDOM TOOTH EXTRACTION  11/1998       Current Outpatient Medications   Medication Sig Dispense Refill     apixaban (ELIQUIS) 5 mg Tab tablet Take by mouth 2 (two) times a day.       ascorbic acid, vitamin C, (VITAMIN C) 1000 MG tablet Take 1,000 mg by mouth daily.       calcium carbonate-vitamin D3 (OS-BEBO 250+ D) 250-125 mg-unit Tab per tablet Take 1 tablet by mouth daily.       cyanocobalamin 1000 MCG tablet Take 1,000 mcg by mouth daily.       diltiazem (TIAZAC) 180 MG 24 hr capsule Take 360 mg by mouth daily.       ferrous sulfate 325 (65 FE) MG tablet Take 1 tablet by mouth daily with breakfast.       fluticasone propionate (FLONASE) 50 mcg/actuation nasal spray Apply 1 spray into each nostril daily.       niacin 500 MG tablet Take 500 mg by mouth daily with breakfast.       pantoprazole (PROTONIX) 40 MG tablet Take 40 mg by mouth daily.       tamsulosin (FLOMAX) 0.4 mg cap Take 1 capsule (0.4 mg total) by mouth 2 (two) times a day. 14 capsule 0     triamcinolone (KENALOG) 0.1 % ointment        UNABLE TO FIND daily. Med Name:[id number]       oxyCODONE (ROXICODONE) 5 MG immediate release tablet Take 1 tablet (5 mg total) by mouth every 6 (six) hours as needed for pain. 12 tablet 0     No current facility-administered medications for this visit.        No Known Allergies    Social History     Socioeconomic History     Marital status:      Spouse name: Not on file     Number of children: Not on file     Years of education: Not on file     Highest education level: Not on file   Occupational History     Not on file   Social Needs     Financial resource strain: Not on file     Food insecurity:     Worry: Not on file     Inability: Not on file     Transportation needs:     Medical: Not on file      Non-medical: Not on file   Tobacco Use     Smoking status: Never Smoker     Smokeless tobacco: Never Used   Substance and Sexual Activity     Alcohol use: Yes     Comment: 1.2 per week     Drug use: Not on file     Sexual activity: Not on file   Lifestyle     Physical activity:     Days per week: Not on file     Minutes per session: Not on file     Stress: Not on file   Relationships     Social connections:     Talks on phone: Not on file     Gets together: Not on file     Attends [employer] service: Not on file     Active member of club or organization: Not on file     Attends meetings of clubs or organizations: Not on file     Relationship status: Not on file     Intimate partner violence:     Fear of current or ex partner: Not on file     Emotionally abused: Not on file     Physically abused: Not on file     Forced sexual activity: Not on file   Other Topics Concern     Not on file   Social History Narrative     Not on file       History reviewed. No pertinent family history.  Objective:      Physical Exam  Vitals:    12/18/19 1406   BP: 132/78   Pulse: (!) 110   Temp: 97.6  F (36.4  C)     General - well developed, well nourished, appropriate for age. Appears no distress at this time  Abdomen - moderately obese soft, non-tender, no hepatosplenomegaly, no masses.   - no flank tenderness, no suprapubic tenderness, kidney and bladder non-palpable  MSK - normal spinal curvature. no spinal tenderness. normal gait. muscular strength intact.  Psych - oriented to time, place, and person, normal mood and affect.      Labs   Urinalysis POC (Office):  Nitrite, UA   Date Value Ref Range Status   11/11/2019 Negative Negative Final       Lab Urinalysis:  Blood, UA   Date Value Ref Range Status   11/11/2019 Large (!) Negative Final     Nitrite, UA   Date Value Ref Range Status   11/11/2019 Negative Negative Final     Leukocytes, UA   Date Value Ref Range Status   11/11/2019 Small (!) Negative Final     pH, UA   Date Value  Ref Range Status   11/11/2019 7.0 5.0 - 8.0 Final     I, Regulo Heath, personally saw and examined the patient, reviewed most recent labs and imaging and agree with the above assessment

## 2021-06-04 NOTE — PATIENT INSTRUCTIONS - HE
Steps for collecting a 24 hour urine specimen    Please follow the directions carefully. All urine voided for a 24-hour period needs to be collected into the jug.  DO NOT change any of your  normal  daily habits when doing this test. Continue to follow your regular diet, intake of fluids, and usual activity level. Pick the most convenient day with your schedule, perhaps on a weekend or a day off.    Start your Diet Log the day before collection and continue on the day of urine collection.  You MUST bring Diet Log with you on follow up visit to discuss results.    One 24hr Urine Collection     Two 24hr Urine Collections  (do not collect on consecutive days)    PLEASE COMPLETE THE 2nd JUG WITHIN 1-2 WEEKS FROM THE 1st JUG    STEP 1  Empty your bladder completely into the toilet. This will be your start time. Write your full legal name, start date and time on the jug label.  Collection start and stop times need to match exactly!  For example:  6 am to 6 am.    STEP 2  The next time you urinate, empty your bladder directly into the jug or collection hat and pour urine into the jug.  Screw the lid back onto the jug.  Do not spill!    STEP 3  Place the jug in the refrigerator or a cooler with ice during the collection period.  Failure to keep it cool could cause inaccurate test results. DO NOT Freeze.    STEP 4  Continue collecting all urine into the jug for the rest of the day, for the full 24 hours.  DO NOT stop early or go over 24 hours!    STEP 5  Exactly 24 hours from start of collection, write your full legal name, stop date and time on the jug label.   Collection start and stop times need to match exactly!  For example:  6 am to 6 am.  Failure to label correctly will result in recollection of urine specimen.    STEP 6  Return each jug within 24 hours after final urination.     STEP 7  Drop off jug locations:   [facility]: Mon-Fri 7am-7pm - Closed on weekends  [facility]: Mon-Fri 7am-5pm - Closed on  Sunday  [facility]: Mon-Fri 7am-6:30pm - Closed on weekends    STEP 8  Please call [facility] after return of your final jug to schedule your follow-up visit. [phone]

## 2021-06-08 ENCOUNTER — TRANSFERRED RECORDS (OUTPATIENT)
Dept: HEALTH INFORMATION MANAGEMENT | Facility: CLINIC | Age: 76
End: 2021-06-08

## 2021-06-11 ENCOUNTER — OFFICE VISIT (OUTPATIENT)
Dept: ANESTHESIOLOGY | Facility: CLINIC | Age: 76
End: 2021-06-11
Attending: STUDENT IN AN ORGANIZED HEALTH CARE EDUCATION/TRAINING PROGRAM
Payer: COMMERCIAL

## 2021-06-11 VITALS
HEART RATE: 71 BPM | SYSTOLIC BLOOD PRESSURE: 147 MMHG | TEMPERATURE: 98 F | DIASTOLIC BLOOD PRESSURE: 85 MMHG | OXYGEN SATURATION: 97 %

## 2021-06-11 DIAGNOSIS — R10.84 ABDOMINAL PAIN, GENERALIZED: Primary | ICD-10-CM

## 2021-06-11 DIAGNOSIS — G62.9 NEUROPATHY: ICD-10-CM

## 2021-06-11 PROCEDURE — 99204 OFFICE O/P NEW MOD 45 MIN: CPT

## 2021-06-11 RX ORDER — GABAPENTIN 100 MG/1
100 CAPSULE ORAL 3 TIMES DAILY
Qty: 90 CAPSULE | Refills: 1 | Status: SHIPPED | OUTPATIENT
Start: 2021-06-11 | End: 2021-07-05

## 2021-06-11 ASSESSMENT — ENCOUNTER SYMPTOMS
HALLUCINATIONS: 0
FEVER: 0
CHILLS: 0
POLYDIPSIA: 0
HEARTBURN: 0
BLOOD IN STOOL: 0
VOMITING: 0
ABDOMINAL PAIN: 1
CONSTIPATION: 0
NAUSEA: 0
DIARRHEA: 0
WEIGHT LOSS: 0

## 2021-06-11 ASSESSMENT — PAIN SCALES - GENERAL: PAINLEVEL: NO PAIN (0)

## 2021-06-11 NOTE — PROGRESS NOTES
LPN reviewed AVS with Pt.  Pt verbalized an understanding of information, and was asked to contact clinic with questions.    Follow up recommended by provider: 6 weeks with Dr. Burgos or Nurse Practitioner Breana Brian.     Jennifer Mei LPN

## 2021-06-11 NOTE — LETTER
6/11/2021       RE: Hugo Weber  [address]  [facility] [address]     Dear Colleague,    Thank you for referring your patient, Hugo Weber, to the [facility] FOR [facility]IVE [facility]IN MANAGEMENT [place] at [facility]. Please see a copy of my visit note below.    VISIT RECOMMENDATIONS:    Starting low dose gabapentin at 100 mg TID and can my chart message the clinic in 2 weeks to let us know if he is tolerating it and if it is having any positive effects. If desired, will double dose at that time.       Interventions:    None at this time but if not getting benefit from gabapentin could consider US guided LFCN block on the left     Will discuss at 6 week follow-up apt.     COMPREHENSIVE PAIN CLINIC INITIAL EVALUATION    I had the pleasure of meeting Mr. Hugo Weber on 6/11/2021 in the [facility] Pain Clinic in consult for Dr. Mendoza with regards to his chronic abdominal pain    Subjective:  The patient is a 76 year old male with past medical history of NAFLD, prediabetes, HTN, Hx of Hep C, GERD, afib on anticoagulation, morbid obesity, hx of gastric bypass, hx of appendectomy who presents for evaluation of intermittent LLQ abdominal pain.  The patient's pain began 1 year ago without any particular precipitating event and has been Slowly becoming more prevalent since that time.  He reports that his pain is located primarily in LLQ at the ASIS area and does not radiate. Pain tends to be very focal in this area.  The patient describes the pain as deeper, dull and achy.  He reports that the pain is made worse by eating and tight pants or shorts (Draw String).  His pain is improved with drinking water and loosening his pants.  In addition, he reports no associated with the pain changes in position and timing of day.  The patient's pain is most severe after he eats.   He rates his average pain score at 7/10, but it can be as low as 0/10 or as  severe as 7/10.   Pain is intermittent, unpredictable, and seems to be relieved with drinking water.  Pain intensity builds quickly and then fades quickly as well.  Lasts 30 min to 3 hours.      He denies any new problems with falls or balance, Denies any new numbness or weakness of the arms or legs, any new bowel or bladder incontinence, any night sweats or unexplained fevers, or any sudden or unexpected weight loss.     Hugo Weber has not been seen at a pain clinic in the past.      Patient reported symptoms:  Patient Supplied Answers To the [facility] Pain Questionnaire  [facility] -  Patient Entered Questionnaire/Answers 5/28/2021   What number best describes your pain right now:  0 = No pain  to  10 = Worst pain imaginable 3   How would you describe the pain? sharp   What number best describes your average pain for the past week:  0 = No pain  to  10 = Worst pain imaginable 3   What number best describes your LOWEST pain in past 24 hours:  0 = No pain  to  10 = Worst pain imaginable 0   What number best describes your WORST pain in past 24 hours:  0 = No pain  to  10 = Worst pain imaginable 6   When is your pain worst? AM, PM   Have you tried treating your pain with medication?  No   Are you currently taking medications for your pain? No             Current treatments:  None for pain    Previous medication treatments included:  Anti-convulsants: none  Muscle relaxors: None  Anti-depressants: None  Acetaminophen/NSAIDs: Occasional for other reasons   Topicals: none    Other treatments have included:  Physical therapy: Not for this   Pain Psychology: No  Chiropractic: No  Acupuncture: No  TENs Unit: No  Injections: None for this reason  Surgeries: Gastric bypass, Left TKA, Appendectomy,     Past Medical History:  Medical history reviewed.   Past Medical History:   Diagnosis Date     Arrhythmia      Arthritis      Atrial fibrillation 2006    Followed by [facility] - persistent     Calculus of kidney 2005, 6/06, 10/12,  8/18, 9/19    dr walker/nestor/иван - hematuria - w/u o/w neg     Cervical stenosis of spine     Moderate C4-5     Cholelithiasis      Chronic peptic ulcer, unspecified site, without mention of hemorrhage, perforation, or obstruction 1985    gastric bypass     Colon polyps 8/05, 9/10, 10/15    2 tubular adenoma, 1 hyperplastic - multiple serrated/tubular adenomas - last colonscopy 11/20 due 5 yrs     CVA (cerebral vascular accident) (H) 01/2019    small right periorlandic subcortical - left sided residual - left hand tingling/numbness - Left leg weak/numbness - cardioembolic     First degree AV block      Hepatitis C 10/2012    chronic hepatitis C, grade 1-2, stage 1 - mild - Dr Douglas     Hyperlipidemia LDL goal <70      Hypertension goal BP (blood pressure) < 140/90 06/2006    dr jaciel winchester     Iron deficiency anemia, unspecified 1985    gastric bypass     Nephrolithiasis multiple episodes, last 10/19    Dr Bey/Ivana - 9mm 3/2021     OA (osteoarthritis)     Multiple - Left shoulder with rotator cuff tear - Dr Durham     Obesity, unspecified     s/p gastric bypass 1985      Prediabetes      Presbyacusis 01/2004    dr corona     Pyelonephritis, unspecified 12/1999     SCC (squamous cell carcinoma), 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    dr saez - lt conchal bowl     Sleep apnea 10/2002    cpap - severe - 15 cm - dr cunha     Stroke (H) 01/19/2019     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    [facility]     Vitamin B12 deficiency (non anemic) 04/15/2019     Vitamin D deficiency 04/15/2019      Patient Active Problem List   Diagnosis     Iron deficiency anemia     Colon polyps     Obstructive sleep apnea syndrome     Hyperlipidemia LDL goal <70     Long term current use of anticoagulant therapy - for intermittent a-fib     Advance Care Planning     Total knee replacement status     Calculus of kidney     NAFLD (nonalcoholic fatty liver disease)     Morbid obesity due to excess calories (H)     History of hepatitis C     Prediabetes      Paroxysmal atrial fibrillation (H)     Cholelithiasis     Hypertension goal BP (blood pressure) < 140/90     TMJ arthralgia     Nephrolithiasis     OA (osteoarthritis)     First degree AV block     Benign prostatic hyperplasia (BPH) with urinary urge incontinence     Thoracic aortic ectasia (H)     Stroke (H)     CVA (cerebral vascular accident) (H)     Cervical stenosis of spine     Vitamin B12 deficiency (non anemic)     Vitamin D deficiency     Myofascial pain     Atrial fibrillation     History of CVA (cerebrovascular accident)       Past Surgical History:  Pertinent surgical history reviewed.   Past Surgical History:   Procedure Laterality Date     APPENDECTOMY       ARTHROPLASTY KNEE  08/13/2012    LT TKA - Dr Villanueva     CARDIOVERSION  2016     COLONOSCOPY  8/05, 9/10, 10/15    multiple tubular/serrated/hyperplastic polyps     COLONOSCOPY N/A 10/29/2015    multiple tubular and serrated adenomas     COLONOSCOPY N/A 09/07/2016     COLONOSCOPY  11/2020    tubular adenomas - due 5 yrs     COLONOSCOPY N/A 11/24/2020    Procedure: COLONOSCOPY with biopsies;  Surgeon: Chadwick Burgos MD;  Location:  OR     ESOPHAGOSCOPY, GASTROSCOPY, DUODENOSCOPY (EGD), COMBINED N/A 11/24/2020    Procedure: ESOPHAGOGASTRODUODENOSCOPY, COLONOSCOPY with biopsies;  Surgeon: Chadwick Burgos MD;  Location:  OR     EYE SURGERY  Lasik     HC KNEE SCOPE, DIAGNOSTIC  05/2000    Arthroscopy, Knee RT     LASER HOLMIUM LITHOTRIPSY URETER(S), INSERT STENT, COMBINED  10/16/2012    stones x 4, Dr Campa     LASER HOLMIUM LITHOTRIPSY URETER(S), INSERT STENT, COMBINED Right 05/02/2018    CYSTOSCOPY, RIGHT URETEROSCOPY, HOLMIUM LASER LITHOTRIPSY, RIGHT STENT PLACEMENT - Dr Bey     SURGICAL HISTORY OF -   1985    s/p gastric bypass Bilroth II     SURGICAL HISTORY OF -   11/1998    wisdom teeth     SURGICAL HISTORY OF -   1979    cellulitis     SURGICAL HISTORY OF -   11/1998    lt forearm spur's     SURGICAL HISTORY OF -    1/01,6/02,11/02    s/p lasik     SURGICAL HISTORY OF -   11/1999    rt forearm spurs     SURGICAL HISTORY OF -   07/2006    dr stevenson - lithotrypsy     SURGICAL HISTORY OF -   10/08, 12/08    Lt ear chonchal lesion removal SCC - dr saez     SURGICAL HISTORY OF -  Right 10/2019    nephrolithiasis - cystoscopy, rt lithotrypsy, Dr Heath     SURGICAL HISTORY OF -  Right 11/2019    Cystoscopy, Rt lithotrypsy, Calcium Oxalate, Dr Heath        Medications: Pertinent medications reviewed and updated  Current Outpatient Medications   Medication Sig Dispense Refill     apixaban ANTICOAGULANT (ELIQUIS) 5 MG tablet Take 1 tablet (5 mg) by mouth 2 times daily 180 tablet 3     Cyanocobalamin 5000 MCG TBDP        diltiazem ER (TIAZAC) 360 MG 24 hr ER beaded capsule Take 1 capsule (360 mg) by mouth daily 90 capsule 3     Ferrous Sulfate (IRON) 325 (65 Fe) MG tablet Take 1 tablet by mouth daily (with breakfast) 90 tablet 3     fluticasone (FLONASE) 50 MCG/ACT nasal spray Spray 2 sprays into both nostrils daily as needed for rhinitis or allergies 54.6 mL 3     niacin 500 MG tablet Take by mouth daily (with breakfast)       pantoprazole (PROTONIX) 40 MG EC tablet Take 1 tablet (40 mg) by mouth daily 90 tablet 3     tamsulosin (FLOMAX) 0.4 MG capsule Take 1 capsule (0.4 mg) by mouth 2 times daily 180 capsule 3     vitamin C (ASCORBIC ACID) 1000 MG TABS Take 1,000 mg by mouth daily       Vitamin D-Vitamin K (VITAMIN K2-VITAMIN D3 PO)        STATIN NOT PRESCRIBED (INTENTIONAL) Please choose reason not prescribed, below, treated for hepatitis C (Patient not taking: Reported on 5/14/2021)         [place] and [place] Prescription Monitoring Program reviewed     Allergies: Pertinent allergies reviewed   No Known Allergies    Family History:   family history includes Alzheimer Disease (age of onset: 82) in his father; Cancer in his paternal uncle; Heart Disease in his maternal grandfather; Heart Disease (age of onset: 80) in his mother;  "Prostate Cancer (age of onset: 76) in his father.    Social history:   Social History     Socioeconomic History     Marital status:      Spouse name: Mayra     Number of children: 3     Years of education: 21     Highest education level: Not on file   Occupational History     Occupation: retired teacher and football and       Employer: [employer]     Employer: RETIRED   Social Needs     Financial resource strain: Not on file     Food insecurity     Worry: Not on file     Inability: Not on file     Transportation needs     Medical: Not on file     Non-medical: Not on file   Tobacco Use     Smoking status: Never Smoker     Smokeless tobacco: Never Used   Substance and Sexual Activity     Alcohol use: Yes     Alcohol/week: 2.0 standard drinks     Types: 2 Standard drinks or equivalent per week     Comment: 2 per week     Drug use: No     Comment: acknowledges using herbal supplement \"Vibe\" for energy-none used recently      Sexual activity: Not Currently     Partners: Female     Comment:     Lifestyle     Physical activity     Days per week: Not on file     Minutes per session: Not on file     Stress: Not on file   Relationships     Social connections     Talks on phone: Not on file     Gets together: Not on file     Attends Druze service: Not on file     Active member of club or organization: Not on file     Attends meetings of clubs or organizations: Not on file     Relationship status: Not on file     Intimate partner violence     Fear of current or ex partner: Not on file     Emotionally abused: Not on file     Physically abused: Not on file     Forced sexual activity: Not on file   Other Topics Concern      Service Not Asked     Blood Transfusions Not Asked     Caffeine Concern Yes     Comment: <1 can qd     Occupational Exposure Not Asked     Hobby Hazards Not Asked     Sleep Concern Yes     Comment: sleep apnea, wears cpap     Stress Concern Not Asked     " Weight Concern Not Asked     Special Diet Not Asked     Back Care Not Asked     Exercise No     Bike Helmet Not Asked     Seat Belt Yes     Self-Exams Not Asked     Parent/sibling w/ CABG, MI or angioplasty before 65F 55M? No   Social History Narrative     Not on file     Social History     Social History Narrative     Not on file     He lives in in a house. He is currently working. He worked as a teach and coached football and baseball.  Smoking: none. Alcohol: rare. Street drugs: none .     Review of Systems:  Review of Systems   Constitutional: Negative for chills, fever and weight loss.   Gastrointestinal: Positive for abdominal pain. Negative for blood in stool, constipation, diarrhea, heartburn, melena, nausea and vomiting.   Endo/Heme/Allergies: Negative for polydipsia.   Psychiatric/Behavioral: Negative for hallucinations.      The 14 system ROS was reviewed from the intake questionnaire; results listed at end of note.    Physical Exam:  There were no vitals taken for this visit.    Physical Exam   Constitutional: He is oriented to person, place, and time.  He appears well-developed and well-nourished. He is not in acute distress.   HENT:     Head: Normocephalic and atraumatic.     Eyes: Pupils are equal, round, and reactive to light. EOM are normal. No scleral icterus.     Neck: Normal range of motion. Neck supple.   Cardiovascular: Normal rate and regular rhythm.   Pulmonary/Chest:  NWOB. No respiratory distress.   Abdominal: He has no focal tenderness noted with palpation of the abdomen but very vague pain over the ASIS area with deep palpation but not enough to recreate his pain that he gets intermittently.  He had a negative Carnett's Sign   Genitourinary: deferred  Neurological: He is alert and oriented to person, place, and time. CN II-XII grossly intact, coordination grossly normal.  Of  Note he has some chronic sensory dysesthesia in the left thigh over the anterior thigh which is chronic and  unchanged and not overly painful.    Skin: Skin is warm and dry. He is not diaphoretic.   Psychiatric: He has a normal mood and affect. His behavior is normal. Judgment and thought content normal.  MSK: Gait is normal and appropriate.     Imaging:  Reviewed abdominal pelvic CT and agree with Rad read      Assessment:  I had the pleasure of meeting Mr. Hugo Weber on 6/11/2021 in the [facility] Pain Clinic in consult for Dr. Mendoza with regards to his chronic abdominal pain    Visit Diagnoses:  1. Most likely cause of intermittent pain is the left lateral femoral cutaneous nerve leading to focal pain at the ASIS.     Plan:  Patient education:    We discussed with the patient the likely mechanisms underlying his pain.  In his case, this includes the LFCN and other nerves in this area which are likely contributing to his overall pain picture.      Work up:    No further work-up at this time    Referrals:    No new referrals today    Medications:    Starting low dose gabapentin at 100 mg TID and can my chart message the clinic in 2 weeks to let us know if he is tolerating it and if it is having any positive effects. If desired, will double dose at that time.      Therapies:    None at this time     Interventions:    None at this time but if not getting benefit from gabapentin could consider US guided LFCN block on the left     Follow up: 6 weeks     Thank you for the consult.    A total of 60 minutes was spent on chart review, ordering studies/procedures/medications, face to face interaction, care coordination, and documentation on the day of the patient's visit.  Greater than 50% of the time spent was in direct patient interaction and care.    Chadwick Wise MD   Pain Fellow   Pager: [phone]    Answers for HPI/ROS submitted by the patient on 5/28/2021   If you checked off any problems, how difficult have these problems made it for you to do your work, take care of things at home, or get along with other  people?: Not difficult at all  PHQ9 TOTAL SCORE: 2  General Symptoms: Yes  Skin Symptoms: No  HENT Symptoms: No  EYE SYMPTOMS: No  HEART SYMPTOMS: No  LUNG SYMPTOMS: No  INTESTINAL SYMPTOMS: Yes  URINARY SYMPTOMS: No  REPRODUCTIVE SYMPTOMS: No  SKELETAL SYMPTOMS: No  BLOOD SYMPTOMS: No  NERVOUS SYSTEM SYMPTOMS: No  MENTAL HEALTH SYMPTOMS: No  Loss of appetite: No  Weight gain: No  Fatigue: Yes  Night sweats: No  Increased stress: No  Excessive hunger: No  Feeling hot or cold when others believe the temperature is normal: No  Loss of height: No  Post-operative complications: No  Surgical site pain: Yes  Change in or Loss of Energy: Yes  Hyperactivity: No  Confusion: No  Bloating: No  Rectal or Anal pain: No  Fecal incontinence: No  Yellowing of skin or eyes: No  Vomit with blood: No  Change in stools: No  I saw and examined the patient with the fellow and agree with the findings and the plan of care as documented in the fellow's note.   Pankaj Burgos IV, MD LPN reviewed AVS with Pt.  Pt verbalized an understanding of information, and was asked to contact clinic with questions.    Follow up recommended by provider: 6 weeks with Dr. Burgos or Nurse Practitioner Breana Brian.     Jennifer Mei LPN        Again, thank you for allowing me to participate in the care of your patient.      Sincerely,    Pankaj Burgos MD

## 2021-06-11 NOTE — PATIENT INSTRUCTIONS
Medications:    Start Gabapentin 100 mg, Three times daily.     MyChart the clinic in 2 weeks to report how this dose is working for you.     Please provide the clinic with a minium of 1 week notice, on all prescription refills.       Recommended Follow up:      Follow up in 6 weeks to continue treatment planning, With Dr. Burgos or Breana Brian, Nurse Practitioner.        To speak with a nurse, schedule/reschedule/cancel a clinic appointment, or request a medication refill call: [phone], option #1.    You can also reach us by [employer]: [email or web address]

## 2021-06-11 NOTE — PROGRESS NOTES
VISIT RECOMMENDATIONS:    Starting low dose gabapentin at 100 mg TID and can my chart message the clinic in 2 weeks to let us know if he is tolerating it and if it is having any positive effects. If desired, will double dose at that time.       Interventions:    None at this time but if not getting benefit from gabapentin could consider US guided LFCN block on the left     Will discuss at 6 week follow-up apt.     COMPREHENSIVE PAIN CLINIC INITIAL EVALUATION    I had the pleasure of meeting Mr. Hugo Weber on 6/11/2021 in the [facility] in consult for Dr. Mendoza with regards to his chronic abdominal pain    Subjective:  The patient is a 76 year old male with past medical history of NAFLD, prediabetes, HTN, Hx of Hep C, GERD, afib on anticoagulation, morbid obesity, hx of gastric bypass, hx of appendectomy who presents for evaluation of intermittent LLQ abdominal pain.  The patient's pain began 1 year ago without any particular precipitating event and has been Slowly becoming more prevalent since that time.  He reports that his pain is located primarily in LLQ at the ASIS area and does not radiate. Pain tends to be very focal in this area.  The patient describes the pain as deeper, dull and achy.  He reports that the pain is made worse by eating and tight pants or shorts (Draw String).  His pain is improved with drinking water and loosening his pants.  In addition, he reports no associated with the pain changes in position and timing of day.  The patient's pain is most severe after he eats.   He rates his average pain score at 7/10, but it can be as low as 0/10 or as severe as 7/10.   Pain is intermittent, unpredictable, and seems to be relieved with drinking water.  Pain intensity builds quickly and then fades quickly as well.  Lasts 30 min to 3 hours.      He denies any new problems with falls or balance, Denies any new numbness or weakness of the arms or legs, any new bowel or bladder  incontinence, any night sweats or unexplained fevers, or any sudden or unexpected weight loss.     Hugo Weber has not been seen at a pain clinic in the past.      Patient reported symptoms:  Patient Supplied Answers To the [facility] Pain Questionnaire  [facility] -  Patient Entered Questionnaire/Answers 5/28/2021   What number best describes your pain right now:  0 = No pain  to  10 = Worst pain imaginable 3   How would you describe the pain? sharp   What number best describes your average pain for the past week:  0 = No pain  to  10 = Worst pain imaginable 3   What number best describes your LOWEST pain in past 24 hours:  0 = No pain  to  10 = Worst pain imaginable 0   What number best describes your WORST pain in past 24 hours:  0 = No pain  to  10 = Worst pain imaginable 6   When is your pain worst? AM, PM   Have you tried treating your pain with medication?  No   Are you currently taking medications for your pain? No             Current treatments:  None for pain    Previous medication treatments included:  Anti-convulsants: none  Muscle relaxors: None  Anti-depressants: None  Acetaminophen/NSAIDs: Occasional for other reasons   Topicals: none    Other treatments have included:  Physical therapy: Not for this   Pain Psychology: No  Chiropractic: No  Acupuncture: No  TENs Unit: No  Injections: None for this reason  Surgeries: Gastric bypass, Left TKA, Appendectomy,     Past Medical History:  Medical history reviewed.   Past Medical History:   Diagnosis Date     Arrhythmia      Arthritis      Atrial fibrillation 2006    Followed by [facility] - persistent     Calculus of kidney 2005, 6/06, 10/12, 8/18, 9/19    dr walker/nestor/иван - hematuria - w/u o/w neg     Cervical stenosis of spine     Moderate C4-5     Cholelithiasis      Chronic peptic ulcer, unspecified site, without mention of hemorrhage, perforation, or obstruction 1985    gastric bypass     Colon polyps 8/05, 9/10, 10/15    2 tubular adenoma, 1  hyperplastic - multiple serrated/tubular adenomas - last colonscopy 11/20 due 5 yrs     CVA (cerebral vascular accident) (H) 01/2019    small right periorlandic subcortical - left sided residual - left hand tingling/numbness - Left leg weak/numbness - cardioembolic     First degree AV block      Hepatitis C 10/2012    chronic hepatitis C, grade 1-2, stage 1 - mild - Dr Douglas     Hyperlipidemia LDL goal <70      Hypertension goal BP (blood pressure) < 140/90 06/2006    dr jaciel winchester     Iron deficiency anemia, unspecified 1985    gastric bypass     Nephrolithiasis multiple episodes, last 10/19    Dr Bey/Ivana - 9mm 3/2021     OA (osteoarthritis)     Multiple - Left shoulder with rotator cuff tear - Dr Durham     Obesity, unspecified     s/p gastric bypass 1985      Prediabetes      Presbyacusis 01/2004    dr corona     Pyelonephritis, unspecified 12/1999     SCC (squamous cell carcinoma), 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    dr saez - lt conchal bowl     Sleep apnea 10/2002    cpap - severe - 15 cm - dr cunha     Stroke (H) 01/19/2019     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    [facility]     Vitamin B12 deficiency (non anemic) 04/15/2019     Vitamin D deficiency 04/15/2019      Patient Active Problem List   Diagnosis     Iron deficiency anemia     Colon polyps     Obstructive sleep apnea syndrome     Hyperlipidemia LDL goal <70     Long term current use of anticoagulant therapy - for intermittent a-fib     Advance Care Planning     Total knee replacement status     Calculus of kidney     NAFLD (nonalcoholic fatty liver disease)     Morbid obesity due to excess calories (H)     History of hepatitis C     Prediabetes     Paroxysmal atrial fibrillation (H)     Cholelithiasis     Hypertension goal BP (blood pressure) < 140/90     TMJ arthralgia     Nephrolithiasis     OA (osteoarthritis)     First degree AV block     Benign prostatic hyperplasia (BPH) with urinary urge incontinence     Thoracic aortic ectasia (H)     Stroke (H)     CVA  (cerebral vascular accident) (H)     Cervical stenosis of spine     Vitamin B12 deficiency (non anemic)     Vitamin D deficiency     Myofascial pain     Atrial fibrillation     History of CVA (cerebrovascular accident)       Past Surgical History:  Pertinent surgical history reviewed.   Past Surgical History:   Procedure Laterality Date     APPENDECTOMY       ARTHROPLASTY KNEE  08/13/2012    LT TKA - Dr Villanueva     CARDIOVERSION  2016     COLONOSCOPY  8/05, 9/10, 10/15    multiple tubular/serrated/hyperplastic polyps     COLONOSCOPY N/A 10/29/2015    multiple tubular and serrated adenomas     COLONOSCOPY N/A 09/07/2016     COLONOSCOPY  11/2020    tubular adenomas - due 5 yrs     COLONOSCOPY N/A 11/24/2020    Procedure: COLONOSCOPY with biopsies;  Surgeon: Chadwick Burgos MD;  Location: [facility] OR     ESOPHAGOSCOPY, GASTROSCOPY, DUODENOSCOPY (EGD), COMBINED N/A 11/24/2020    Procedure: ESOPHAGOGASTRODUODENOSCOPY, COLONOSCOPY with biopsies;  Surgeon: Chadwick Burgos MD;  Location: [facility] OR     EYE SURGERY  Lasik     HC KNEE SCOPE, DIAGNOSTIC  05/2000    Arthroscopy, Knee RT     LASER HOLMIUM LITHOTRIPSY URETER(S), INSERT STENT, COMBINED  10/16/2012    stones x 4, Dr Campa     LASER HOLMIUM LITHOTRIPSY URETER(S), INSERT STENT, COMBINED Right 05/02/2018    CYSTOSCOPY, RIGHT URETEROSCOPY, HOLMIUM LASER LITHOTRIPSY, RIGHT STENT PLACEMENT - Dr Bey     SURGICAL HISTORY OF -   1985    s/p gastric bypass Bilroth II     SURGICAL HISTORY OF -   11/1998    wisdom teeth     SURGICAL HISTORY OF -   1979    cellulitis     SURGICAL HISTORY OF -   11/1998    lt forearm spur's     SURGICAL HISTORY OF -   1/01,6/02,11/02    s/p lasik     SURGICAL HISTORY OF -   11/1999    rt forearm spurs     SURGICAL HISTORY OF -   07/2006    dr stevenson - lithotrypsy     SURGICAL HISTORY OF -   10/08, 12/08    Lt ear chonchal lesion removal SCC - dr saez     SURGICAL HISTORY OF -  Right 10/2019    nephrolithiasis - cystoscopy, rt  Dr Ivana simon     SURGICAL HISTORY OF -  Right 11/2019    Cystoscopy, Rt lithotrypsy, Calcium Oxalate, Dr Heath        Medications: Pertinent medications reviewed and updated  Current Outpatient Medications   Medication Sig Dispense Refill     apixaban ANTICOAGULANT (ELIQUIS) 5 MG tablet Take 1 tablet (5 mg) by mouth 2 times daily 180 tablet 3     Cyanocobalamin 5000 MCG TBDP        diltiazem ER (TIAZAC) 360 MG 24 hr ER beaded capsule Take 1 capsule (360 mg) by mouth daily 90 capsule 3     Ferrous Sulfate (IRON) 325 (65 Fe) MG tablet Take 1 tablet by mouth daily (with breakfast) 90 tablet 3     fluticasone (FLONASE) 50 MCG/ACT nasal spray Spray 2 sprays into both nostrils daily as needed for rhinitis or allergies 54.6 mL 3     niacin 500 MG tablet Take by mouth daily (with breakfast)       pantoprazole (PROTONIX) 40 MG EC tablet Take 1 tablet (40 mg) by mouth daily 90 tablet 3     tamsulosin (FLOMAX) 0.4 MG capsule Take 1 capsule (0.4 mg) by mouth 2 times daily 180 capsule 3     vitamin C (ASCORBIC ACID) 1000 MG TABS Take 1,000 mg by mouth daily       Vitamin D-Vitamin K (VITAMIN K2-VITAMIN D3 PO)        STATIN NOT PRESCRIBED (INTENTIONAL) Please choose reason not prescribed, below, treated for hepatitis C (Patient not taking: Reported on 5/14/2021)         [place] and [place] Prescription Monitoring Program reviewed     Allergies: Pertinent allergies reviewed   No Known Allergies    Family History:   family history includes Alzheimer Disease (age of onset: 82) in his father; Cancer in his paternal uncle; Heart Disease in his maternal grandfather; Heart Disease (age of onset: 80) in his mother; Prostate Cancer (age of onset: 76) in his father.    Social history:   Social History     Socioeconomic History     Marital status:      Spouse name: Mayra     Number of children: 3     Years of education: 21     Highest education level: Not on file   Occupational History     Occupation: retired teacher and  "football and       Employer: [employer] [id number]     Employer: RETIRED   Social Needs     Financial resource strain: Not on file     Food insecurity     Worry: Not on file     Inability: Not on file     Transportation needs     Medical: Not on file     Non-medical: Not on file   Tobacco Use     Smoking status: Never Smoker     Smokeless tobacco: Never Used   Substance and Sexual Activity     Alcohol use: Yes     Alcohol/week: 2.0 standard drinks     Types: 2 Standard drinks or equivalent per week     Comment: 2 per week     Drug use: No     Comment: acknowledges using herbal supplement \"Vibe\" for energy-none used recently      Sexual activity: Not Currently     Partners: Female     Comment:     Lifestyle     Physical activity     Days per week: Not on file     Minutes per session: Not on file     Stress: Not on file   Relationships     Social connections     Talks on phone: Not on file     Gets together: Not on file     Attends Cheondoism service: Not on file     Active member of club or organization: Not on file     Attends meetings of clubs or organizations: Not on file     Relationship status: Not on file     Intimate partner violence     Fear of current or ex partner: Not on file     Emotionally abused: Not on file     Physically abused: Not on file     Forced sexual activity: Not on file   Other Topics Concern      Service Not Asked     Blood Transfusions Not Asked     Caffeine Concern Yes     Comment: <1 can qd     Occupational Exposure Not Asked     Hobby Hazards Not Asked     Sleep Concern Yes     Comment: sleep apnea, wears cpap     Stress Concern Not Asked     Weight Concern Not Asked     Special Diet Not Asked     Back Care Not Asked     Exercise No     Bike Helmet Not Asked     Seat Belt Yes     Self-Exams Not Asked     Parent/sibling w/ CABG, MI or angioplasty before 65F 55M? No   Social History Narrative     Not on file     Social History     Social History Narrative "     Not on file     He lives in in a house. He is currently working. He worked as a teach and coached football and baseball.  Smoking: none. Alcohol: rare. Street drugs: none .     Review of Systems:  Review of Systems   Constitutional: Negative for chills, fever and weight loss.   Gastrointestinal: Positive for abdominal pain. Negative for blood in stool, constipation, diarrhea, heartburn, melena, nausea and vomiting.   Endo/Heme/Allergies: Negative for polydipsia.   Psychiatric/Behavioral: Negative for hallucinations.      The 14 system ROS was reviewed from the intake questionnaire; results listed at end of note.    Physical Exam:  There were no vitals taken for this visit.    Physical Exam   Constitutional: He is oriented to person, place, and time.  He appears well-developed and well-nourished. He is not in acute distress.   HENT:     Head: Normocephalic and atraumatic.     Eyes: Pupils are equal, round, and reactive to light. EOM are normal. No scleral icterus.     Neck: Normal range of motion. Neck supple.   Cardiovascular: Normal rate and regular rhythm.   Pulmonary/Chest:  NWOB. No respiratory distress.   Abdominal: He has no focal tenderness noted with palpation of the abdomen but very vague pain over the ASIS area with deep palpation but not enough to recreate his pain that he gets intermittently.  He had a negative Carnett's Sign   Genitourinary: deferred  Neurological: He is alert and oriented to person, place, and time. CN II-XII grossly intact, coordination grossly normal.  Of  Note he has some chronic sensory dysesthesia in the left thigh over the anterior thigh which is chronic and unchanged and not overly painful.    Skin: Skin is warm and dry. He is not diaphoretic.   Psychiatric: He has a normal mood and affect. His behavior is normal. Judgment and thought content normal.  MSK: Gait is normal and appropriate.     Imaging:  Reviewed abdominal pelvic CT and agree with Rad read      Assessment:  I  had the pleasure of meeting Mr. Hugo Weber on 6/11/2021 in the [facility] in consult for Dr. Mendoza with regards to his chronic abdominal pain    Visit Diagnoses:  1. Most likely cause of intermittent pain is the left lateral femoral cutaneous nerve leading to focal pain at the ASIS.     Plan:  Patient education:    We discussed with the patient the likely mechanisms underlying his pain.  In his case, this includes the LFCN and other nerves in this area which are likely contributing to his overall pain picture.      Work up:    No further work-up at this time    Referrals:    No new referrals today    Medications:    Starting low dose gabapentin at 100 mg TID and can my chart message the clinic in 2 weeks to let us know if he is tolerating it and if it is having any positive effects. If desired, will double dose at that time.      Therapies:    None at this time     Interventions:    None at this time but if not getting benefit from gabapentin could consider US guided LFCN block on the left     Follow up: 6 weeks     Thank you for the consult.    A total of 60 minutes was spent on chart review, ordering studies/procedures/medications, face to face interaction, care coordination, and documentation on the day of the patient's visit.  Greater than 50% of the time spent was in direct patient interaction and care.    Chadwick Wise MD   Pain Fellow   Pager: [phone]    Answers for HPI/ROS submitted by the patient on 5/28/2021   If you checked off any problems, how difficult have these problems made it for you to do your work, take care of things at home, or get along with other people?: Not difficult at all  PHQ9 TOTAL SCORE: 2  General Symptoms: Yes  Skin Symptoms: No  HENT Symptoms: No  EYE SYMPTOMS: No  HEART SYMPTOMS: No  LUNG SYMPTOMS: No  INTESTINAL SYMPTOMS: Yes  URINARY SYMPTOMS: No  REPRODUCTIVE SYMPTOMS: No  SKELETAL SYMPTOMS: No  BLOOD SYMPTOMS: No  NERVOUS SYSTEM SYMPTOMS: No  MENTAL  HEALTH SYMPTOMS: No  Loss of appetite: No  Weight gain: No  Fatigue: Yes  Night sweats: No  Increased stress: No  Excessive hunger: No  Feeling hot or cold when others believe the temperature is normal: No  Loss of height: No  Post-operative complications: No  Surgical site pain: Yes  Change in or Loss of Energy: Yes  Hyperactivity: No  Confusion: No  Bloating: No  Rectal or Anal pain: No  Fecal incontinence: No  Yellowing of skin or eyes: No  Vomit with blood: No  Change in stools: No  I saw and examined the patient with the fellow and agree with the findings and the plan of care as documented in the fellow's note.   Pankaj Burgos IV, MD

## 2021-06-21 ENCOUNTER — HOSPITAL ENCOUNTER (INPATIENT)
Facility: CLINIC | Age: 76
LOS: 2 days | Discharge: HOME | DRG: 389 | End: 2021-06-23
Attending: EMERGENCY MEDICINE | Admitting: INTERNAL MEDICINE
Payer: COMMERCIAL

## 2021-06-21 ENCOUNTER — APPOINTMENT (OUTPATIENT)
Dept: CT IMAGING | Facility: CLINIC | Age: 76
DRG: 389 | End: 2021-06-21
Attending: EMERGENCY MEDICINE
Payer: COMMERCIAL

## 2021-06-21 DIAGNOSIS — N20.0 NEPHROLITHIASIS: ICD-10-CM

## 2021-06-21 DIAGNOSIS — K56.609 SMALL BOWEL OBSTRUCTION (H): ICD-10-CM

## 2021-06-21 DIAGNOSIS — I47.29 NSVT (NONSUSTAINED VENTRICULAR TACHYCARDIA) (H): Primary | ICD-10-CM

## 2021-06-21 LAB
ALBUMIN SERPL-MCNC: 3.7 G/DL (ref 3.4–5)
ALBUMIN UR-MCNC: NEGATIVE MG/DL
ALP SERPL-CCNC: 132 U/L (ref 40–150)
ALT SERPL W P-5'-P-CCNC: 19 U/L (ref 0–70)
ANION GAP SERPL CALCULATED.3IONS-SCNC: 6 MMOL/L (ref 3–14)
APPEARANCE UR: CLEAR
AST SERPL W P-5'-P-CCNC: 13 U/L (ref 0–45)
BASOPHILS # BLD AUTO: 0 10E9/L (ref 0–0.2)
BASOPHILS NFR BLD AUTO: 0.3 %
BILIRUB SERPL-MCNC: 1 MG/DL (ref 0.2–1.3)
BILIRUB UR QL STRIP: NEGATIVE
BUN SERPL-MCNC: 17 MG/DL (ref 7–30)
CALCIUM SERPL-MCNC: 8.8 MG/DL (ref 8.5–10.1)
CHLORIDE SERPL-SCNC: 103 MMOL/L (ref 94–109)
CO2 SERPL-SCNC: 28 MMOL/L (ref 20–32)
COLOR UR AUTO: ABNORMAL
CREAT SERPL-MCNC: 0.71 MG/DL (ref 0.66–1.25)
DIFFERENTIAL METHOD BLD: ABNORMAL
EOSINOPHIL # BLD AUTO: 0 10E9/L (ref 0–0.7)
EOSINOPHIL NFR BLD AUTO: 0.2 %
ERYTHROCYTE [DISTWIDTH] IN BLOOD BY AUTOMATED COUNT: 14.2 % (ref 10–15)
GFR SERPL CREATININE-BSD FRML MDRD: >90 ML/MIN/{1.73_M2}
GLUCOSE SERPL-MCNC: 117 MG/DL (ref 70–99)
GLUCOSE UR STRIP-MCNC: NEGATIVE MG/DL
HCT VFR BLD AUTO: 47.5 % (ref 40–53)
HGB BLD-MCNC: 15.5 G/DL (ref 13.3–17.7)
HGB UR QL STRIP: NEGATIVE
IMM GRANULOCYTES # BLD: 0.1 10E9/L (ref 0–0.4)
IMM GRANULOCYTES NFR BLD: 0.5 %
KETONES UR STRIP-MCNC: 10 MG/DL
LABORATORY COMMENT REPORT: NORMAL
LACTATE BLD-SCNC: 1.1 MMOL/L (ref 0.7–2)
LEUKOCYTE ESTERASE UR QL STRIP: NEGATIVE
LYMPHOCYTES # BLD AUTO: 1.3 10E9/L (ref 0.8–5.3)
LYMPHOCYTES NFR BLD AUTO: 10.1 %
MAGNESIUM SERPL-MCNC: 2.2 MG/DL (ref 1.6–2.3)
MCH RBC QN AUTO: 29.1 PG (ref 26.5–33)
MCHC RBC AUTO-ENTMCNC: 32.6 G/DL (ref 31.5–36.5)
MCV RBC AUTO: 89 FL (ref 78–100)
MONOCYTES # BLD AUTO: 1.1 10E9/L (ref 0–1.3)
MONOCYTES NFR BLD AUTO: 8.8 %
MUCOUS THREADS #/AREA URNS LPF: PRESENT /LPF
NEUTROPHILS # BLD AUTO: 9.9 10E9/L (ref 1.6–8.3)
NEUTROPHILS NFR BLD AUTO: 80.1 %
NITRATE UR QL: NEGATIVE
NRBC # BLD AUTO: 0 10*3/UL
NRBC BLD AUTO-RTO: 0 /100
PH UR STRIP: 7 PH (ref 5–7)
PLATELET # BLD AUTO: 261 10E9/L (ref 150–450)
POTASSIUM SERPL-SCNC: 4 MMOL/L (ref 3.4–5.3)
PROT SERPL-MCNC: 7.2 G/DL (ref 6.8–8.8)
RBC # BLD AUTO: 5.32 10E12/L (ref 4.4–5.9)
RBC #/AREA URNS AUTO: 3 /HPF (ref 0–2)
SARS-COV-2 RNA RESP QL NAA+PROBE: NEGATIVE
SODIUM SERPL-SCNC: 137 MMOL/L (ref 133–144)
SOURCE: ABNORMAL
SP GR UR STRIP: 1.01 (ref 1–1.03)
SPECIMEN SOURCE: NORMAL
UROBILINOGEN UR STRIP-MCNC: 2 MG/DL (ref 0–2)
WBC # BLD AUTO: 12.3 10E9/L (ref 4–11)
WBC #/AREA URNS AUTO: 1 /HPF (ref 0–5)

## 2021-06-21 PROCEDURE — 80053 COMPREHEN METABOLIC PANEL: CPT | Performed by: EMERGENCY MEDICINE

## 2021-06-21 PROCEDURE — 83605 ASSAY OF LACTIC ACID: CPT | Performed by: EMERGENCY MEDICINE

## 2021-06-21 PROCEDURE — 258N000003 HC RX IP 258 OP 636: Performed by: EMERGENCY MEDICINE

## 2021-06-21 PROCEDURE — 120N000001 HC R&B MED SURG/OB

## 2021-06-21 PROCEDURE — 96374 THER/PROPH/DIAG INJ IV PUSH: CPT

## 2021-06-21 PROCEDURE — 250N000009 HC RX 250: Performed by: EMERGENCY MEDICINE

## 2021-06-21 PROCEDURE — 250N000011 HC RX IP 250 OP 636: Performed by: EMERGENCY MEDICINE

## 2021-06-21 PROCEDURE — 99207 PR NO BILLABLE SERVICE THIS VISIT: CPT | Performed by: INTERNAL MEDICINE

## 2021-06-21 PROCEDURE — C9803 HOPD COVID-19 SPEC COLLECT: HCPCS

## 2021-06-21 PROCEDURE — 87635 SARS-COV-2 COVID-19 AMP PRB: CPT | Performed by: EMERGENCY MEDICINE

## 2021-06-21 PROCEDURE — 99207 PR NO BILLABLE SERVICE THIS VISIT: CPT | Performed by: PHYSICIAN ASSISTANT

## 2021-06-21 PROCEDURE — 85025 COMPLETE CBC W/AUTO DIFF WBC: CPT | Performed by: EMERGENCY MEDICINE

## 2021-06-21 PROCEDURE — 74177 CT ABD & PELVIS W/CONTRAST: CPT

## 2021-06-21 PROCEDURE — 96361 HYDRATE IV INFUSION ADD-ON: CPT

## 2021-06-21 PROCEDURE — 99285 EMERGENCY DEPT VISIT HI MDM: CPT | Mod: 25

## 2021-06-21 PROCEDURE — 258N000003 HC RX IP 258 OP 636: Performed by: PHYSICIAN ASSISTANT

## 2021-06-21 PROCEDURE — 99221 1ST HOSP IP/OBS SF/LOW 40: CPT | Performed by: SURGERY

## 2021-06-21 PROCEDURE — 250N000013 HC RX MED GY IP 250 OP 250 PS 637: Performed by: PHYSICIAN ASSISTANT

## 2021-06-21 PROCEDURE — 81001 URINALYSIS AUTO W/SCOPE: CPT | Performed by: EMERGENCY MEDICINE

## 2021-06-21 PROCEDURE — 83735 ASSAY OF MAGNESIUM: CPT | Performed by: EMERGENCY MEDICINE

## 2021-06-21 RX ORDER — ONDANSETRON 2 MG/ML
4 INJECTION INTRAMUSCULAR; INTRAVENOUS ONCE
Status: COMPLETED | OUTPATIENT
Start: 2021-06-21 | End: 2021-06-21

## 2021-06-21 RX ORDER — ONDANSETRON 2 MG/ML
4 INJECTION INTRAMUSCULAR; INTRAVENOUS EVERY 6 HOURS PRN
Status: DISCONTINUED | OUTPATIENT
Start: 2021-06-21 | End: 2021-06-23 | Stop reason: HOSPADM

## 2021-06-21 RX ORDER — ONDANSETRON 4 MG/1
4 TABLET, ORALLY DISINTEGRATING ORAL EVERY 6 HOURS PRN
Status: DISCONTINUED | OUTPATIENT
Start: 2021-06-21 | End: 2021-06-23 | Stop reason: HOSPADM

## 2021-06-21 RX ORDER — OXYCODONE HYDROCHLORIDE 5 MG/1
5-10 TABLET ORAL
Status: DISCONTINUED | OUTPATIENT
Start: 2021-06-21 | End: 2021-06-23 | Stop reason: HOSPADM

## 2021-06-21 RX ORDER — NALOXONE HYDROCHLORIDE 0.4 MG/ML
0.4 INJECTION, SOLUTION INTRAMUSCULAR; INTRAVENOUS; SUBCUTANEOUS
Status: DISCONTINUED | OUTPATIENT
Start: 2021-06-21 | End: 2021-06-23 | Stop reason: HOSPADM

## 2021-06-21 RX ORDER — LIDOCAINE 40 MG/G
CREAM TOPICAL
Status: DISCONTINUED | OUTPATIENT
Start: 2021-06-21 | End: 2021-06-23 | Stop reason: HOSPADM

## 2021-06-21 RX ORDER — NALOXONE HYDROCHLORIDE 0.4 MG/ML
0.2 INJECTION, SOLUTION INTRAMUSCULAR; INTRAVENOUS; SUBCUTANEOUS
Status: DISCONTINUED | OUTPATIENT
Start: 2021-06-21 | End: 2021-06-23 | Stop reason: HOSPADM

## 2021-06-21 RX ORDER — HYDROMORPHONE HYDROCHLORIDE 1 MG/ML
.3-.5 INJECTION, SOLUTION INTRAMUSCULAR; INTRAVENOUS; SUBCUTANEOUS
Status: DISCONTINUED | OUTPATIENT
Start: 2021-06-21 | End: 2021-06-23 | Stop reason: HOSPADM

## 2021-06-21 RX ORDER — PROCHLORPERAZINE 25 MG
12.5 SUPPOSITORY, RECTAL RECTAL EVERY 12 HOURS PRN
Status: DISCONTINUED | OUTPATIENT
Start: 2021-06-21 | End: 2021-06-23 | Stop reason: HOSPADM

## 2021-06-21 RX ORDER — PANTOPRAZOLE SODIUM 40 MG/1
40 TABLET, DELAYED RELEASE ORAL DAILY
Status: DISCONTINUED | OUTPATIENT
Start: 2021-06-21 | End: 2021-06-21

## 2021-06-21 RX ORDER — TAMSULOSIN HYDROCHLORIDE 0.4 MG/1
0.4 CAPSULE ORAL 2 TIMES DAILY
Status: DISCONTINUED | OUTPATIENT
Start: 2021-06-21 | End: 2021-06-23 | Stop reason: HOSPADM

## 2021-06-21 RX ORDER — PROCHLORPERAZINE MALEATE 5 MG
5 TABLET ORAL EVERY 6 HOURS PRN
Status: DISCONTINUED | OUTPATIENT
Start: 2021-06-21 | End: 2021-06-23 | Stop reason: HOSPADM

## 2021-06-21 RX ORDER — ACETAMINOPHEN 325 MG/1
650 TABLET ORAL EVERY 4 HOURS PRN
Status: DISCONTINUED | OUTPATIENT
Start: 2021-06-21 | End: 2021-06-23 | Stop reason: HOSPADM

## 2021-06-21 RX ORDER — GABAPENTIN 100 MG/1
100 CAPSULE ORAL 3 TIMES DAILY
Status: DISCONTINUED | OUTPATIENT
Start: 2021-06-21 | End: 2021-06-23 | Stop reason: HOSPADM

## 2021-06-21 RX ORDER — IOPAMIDOL 755 MG/ML
500 INJECTION, SOLUTION INTRAVASCULAR ONCE
Status: COMPLETED | OUTPATIENT
Start: 2021-06-21 | End: 2021-06-21

## 2021-06-21 RX ORDER — DILTIAZEM HYDROCHLORIDE 180 MG/1
360 CAPSULE, COATED, EXTENDED RELEASE ORAL DAILY
Status: DISCONTINUED | OUTPATIENT
Start: 2021-06-21 | End: 2021-06-23 | Stop reason: HOSPADM

## 2021-06-21 RX ADMIN — SODIUM CHLORIDE 65 ML: 9 INJECTION, SOLUTION INTRAVENOUS at 08:34

## 2021-06-21 RX ADMIN — SODIUM CHLORIDE 1000 ML: 9 INJECTION, SOLUTION INTRAVENOUS at 07:29

## 2021-06-21 RX ADMIN — DEXTROSE AND SODIUM CHLORIDE: 5; 900 INJECTION, SOLUTION INTRAVENOUS at 11:49

## 2021-06-21 RX ADMIN — DILTIAZEM HYDROCHLORIDE 360 MG: 180 CAPSULE, COATED, EXTENDED RELEASE ORAL at 15:02

## 2021-06-21 RX ADMIN — DEXTROSE AND SODIUM CHLORIDE: 5; 900 INJECTION, SOLUTION INTRAVENOUS at 21:28

## 2021-06-21 RX ADMIN — ONDANSETRON 4 MG: 2 INJECTION INTRAMUSCULAR; INTRAVENOUS at 08:05

## 2021-06-21 RX ADMIN — IOPAMIDOL 100 ML: 755 INJECTION, SOLUTION INTRAVENOUS at 08:33

## 2021-06-21 ASSESSMENT — ENCOUNTER SYMPTOMS
NAUSEA: 1
DIARRHEA: 1
VOMITING: 1
ABDOMINAL PAIN: 1
BLOOD IN STOOL: 0
DYSURIA: 1
ABDOMINAL DISTENTION: 1

## 2021-06-21 ASSESSMENT — MIFFLIN-ST. JEOR
SCORE: 2258.98
SCORE: 2269.87

## 2021-06-21 ASSESSMENT — ACTIVITIES OF DAILY LIVING (ADL)
ADLS_ACUITY_SCORE: 13

## 2021-06-21 NOTE — CONSULTS
Consult Date: 06/21/2021    SURGERY CONSULTATION    REASON FOR ADMISSION:  Small-bowel obstruction.    HISTORY OF PRESENT ILLNESS:  Mr. Weber is a 76-year-old gentleman with surgical history notable for an open gastric stapling in the 80s as well as an appendectomy in the 60s, who came to the ED with a day's worth of nausea, cramping and 1 bout of emesis.  He relates that he had a large omelet with many vegetables yesterday for breakfast and had a vegetable heavy dinner with his family that evening as well.  Overnight, the patient had cramping, bloating and general indigestion-type symptoms, although he has been able to pass flatus and small stools throughout this bout.  He did have 1 episode of emesis, but he states that this was self-induced with relieve some of his belching symptoms.  He thinks that his belching has improved and his pain is now absent.  He has not had a history of bowel obstructions.  CT in the ER did reveal dilated small bowel and gastric remnant with distally decompressed bowel and colon.  There was no suggestion of ischemia on this exam.  None of the family members that he dined with yesterday have been feeling ill.  The patient is currently on Eliquis for chronic atrial fibrillation and has had an embolic event, though this was while on Xarelto.    PAST MEDICAL AND SURGICAL HISTORY:  Extensive and includes arthritis, atrial fibrillation, renal stones, cervical spine stenosis, known cholelithiasis, history of peptic ulcer disease, colon polyps, CVA as outlined above, chronic hepatitis C, hyperlipidemia, hypertension, iron deficiency anemia, squamous cell carcinoma of the ear, sleep apnea, TMJ, vitamin B12 deficiency.  He had a previous open appendectomy, knee arthroplasty, cardioversion, multiple endoscopies, multiple cystoscopies for recurrent kidney stones as well as an open gastric restrictive surgery in the 80s.    MEDICATIONS:  At the time of admission, the patient was on Eliquis,  vitamin B12, Tizac, Cardizem, iron tablets, Flonase, Neurontin, niacin, Protonix, Flomax, vitamin C, vitamin D, and vitamin K.    ALLERGIES:  THE PATIENT HAS NO RECOGNIZED DRUG ALLERGIES.    SOCIAL HISTORY:  The patient is here alone.  He is .  He is a retired football and .  He is not a smoker.  Denies any significant alcohol use.    PHYSICAL EXAMINATION:   VITAL SIGNS:  Mr. Weber is afebrile.  Temperature is 97.5, pulse is between , blood pressure is 134/87, respiratory rate is 18 with 97% saturations on room air.  GENERAL:  He is alert and appropriate, nontoxic.  CARDIOVASCULAR:  Heart sounds are irregular.  LUNGS:  Breath sounds are clear.  ABDOMEN:  Soft with mild distention.  He has a well-healed upper midline scar without hernias.  There were multiple palpable metal sutures through the dermis.  He also has a well-healed McBurney incision in the right lower quadrant.  He has no focal tenderness and has normoactive bowel sounds in all 4 quadrants.    LABORATORY EXAMINATION: Notable for white blood cell count of 12.3 thousand, hemoglobin 15.5.  LFTs and electrolytes are all unremarkable.  Initial lactate is 1.1.  Asymptomatic COVID swab was negative.    IMAGING:  I personally reviewed the patient's CT scan that shows postoperative changes consistent with a gastric resection/restriction.  There are multiple metal sutures in the upper midline as well.  He has a dilated small bowel with decompressed bowel distally without overt abrupt transition.  There is no free fluid or edema in the mesentery.    IMPRESSION AND RECOMMENDATIONS:  This is a 76-year-old gentleman with 2 prior open operations, who presents with signs and symptoms consistent with a partial small-bowel obstruction.  I discussed the physiology of adhesive disease and bowel obstructions and their management.  At present, he is passing gas and is pain free.  It seems unlikely to require any intervention on our part.  If he  had an NG in place, I would recommend proceeding with a Gastrografin challenge; however, I think it is going to resolve these symptoms without that need.  I did, however, encourage him to have a day of bowel rest and assured that his bowel function has returned before trialing any clear, likely tomorrow, if things are going at their current trajectory.  I will order a set of plain films for tomorrow morning as well.    Thank you very much for this consultation.  We will follow along with you during the course of his hospitalization and anticipated recovery.    Davion Sol MD        D: 2021   T: 2021   MT: ADIS/DCQA    Name:     GAYATHRI SMITH  MRN:      [id number]        Account:      [id number]   :      1945           Consult Date: 2021     Document: [id number]    cc:  Brett Cassidy MD

## 2021-06-21 NOTE — PHARMACY-ADMISSION MEDICATION HISTORY
Admission medication history interview status for this patient is complete. See [facility] admission navigator for allergy information, prior to admission medications and immunization status.     Medication history interview source(s):Patient  Medication history resources (including written lists, pill bottles, clinic record): [facility] fill history    Changes made to PTA medication list:  Added: none  Deleted: none  Changed: ferrous sulfate daily -> every other day    Actions taken by pharmacist (provider contacted, etc):None     Additional medication history information:None    Medication reconciliation/reorder completed by provider prior to medication history? Yes    Prior to Admission medications    Medication Sig Last Dose Taking? Auth Provider   apixaban ANTICOAGULANT (ELIQUIS) 5 MG tablet Take 1 tablet (5 mg) by mouth 2 times daily 6/19/2021 at PM Yes Norma Stein APRN CNP   Cyanocobalamin 5000 MCG TBDP Take by mouth daily  6/19/2021 Yes Reported, Patient   diltiazem ER (TIAZAC) 360 MG 24 hr ER beaded capsule Take 1 capsule (360 mg) by mouth daily 6/19/2021 Yes Nomra Stein APRN CNP   Ferrous Sulfate (IRON) 325 (65 Fe) MG tablet Take 1 tablet by mouth every other day  6/19/2021 Yes Brett Cassidy MD   fluticasone (FLONASE) 50 MCG/ACT nasal spray Spray 2 sprays into both nostrils daily as needed for rhinitis or allergies 6/19/2021 Yes Brett Cassidy MD   gabapentin (NEURONTIN) 100 MG capsule Take 1 capsule (100 mg) by mouth 3 times daily 6/19/2021 Yes Pankaj Burgos MD   niacin 500 MG tablet Take by mouth daily (with breakfast) 6/19/2021 Yes Reported, Patient   pantoprazole (PROTONIX) 40 MG EC tablet Take 1 tablet (40 mg) by mouth daily 6/19/2021 Yes Brett Cassidy MD   tamsulosin (FLOMAX) 0.4 MG capsule Take 1 capsule (0.4 mg) by mouth 2 times daily 6/19/2021 Yes Brett Cassidy MD   vitamin C (ASCORBIC ACID) 1000 MG TABS Take 1,000 mg by mouth daily 6/19/2021 Yes Reported, Patient    Vitamin D-Vitamin K (VITAMIN K2-VITAMIN D3 PO) Take 1 tablet by mouth daily  6/19/2021 Yes Reported, Patient   STATIN NOT PRESCRIBED (INTENTIONAL) Please choose reason not prescribed, below, treated for hepatitis C  Patient not taking: Reported on 5/14/2021   Brett Cassidy MD

## 2021-06-21 NOTE — ED PROVIDER NOTES
History   Chief Complaint:  Abdominal Pain       HPI   Hugo Weber is a 76 year old male with history of stroke, prediabetes, and atrial fibrillation on Eliquis who presents with lower abdominal pain and nausea/vomiting. The patient says that he had a spicy breakfast yesterday, and around 1230 developed abdominal bloating and nausea. He says that the lower abdominal pain started later in the afternoon and so did the vomiting. The patient says that the pain is slightly improved after passing gas. He says that his stools have been looser than normal. He says that today he had some yellow emesis at 0500. He notes that he did not sleep well yesterday. He says that he also forgot to take his medications yesterday. He endorses some chills last night, but says that it has resolved since then. He says that no one else that ate the breakfast was ill. He denies any blood in his vomit or stool. Of note, he says that he has been having some intermittent dysuria for the past week and was supposed to get a UTI check today.       Review of Systems   Constitutional: Chills: Resolved.   Gastrointestinal: Positive for abdominal distention, abdominal pain, diarrhea, nausea and vomiting (no blood). Negative for blood in stool.   Genitourinary: Positive for dysuria.   All other systems reviewed and are negative.    Allergies:  No Known Drug Allergies     Medications:  Flomax  Protonix  Niacin  Gabapentin  Flonase  Iron  Tiazac   Eliquis     Past Medical History:    TMJ arthralgia  Stroke  Sleep apnea  SCC  Pyelonephritis   Presbyacusis   Prediabetes  Obesity  Osteoarthritis   Nephrolithiasis     Anemia  Hypertension   Hyperlipidemia   Hepatitis C  CVA  Colon polyps  Chronic peptic ulcer   Cholelithiasis   Cervical stenosis of spine    AFIB  Arthritis   Thoracic aortic ectasia   Nonalcoholic fatty liver disease     Past Surgical History:    Cystoscopy  Right lithotripsy  Left ear lesion removed  Lithotripsy   Forearm  spurs  Lasik  [place] teeth removal  Gastric bypass  Laser holmium lithotripsy ureter, insert stent, combined x2  Knee arthroscopy  Eye surgery  EGD  Colonoscopy x5  Cardioversion   Left total knee arthroplasty   Appendectomy       Family History:    Alzheimer disease  Cancer: prostate, bladder  Heart disease     Social History:  Patient presents with daughter.     Physical Exam     Patient Vitals for the past 24 hrs:   BP Temp Temp src Pulse Resp SpO2 Height Weight   06/21/21 0900 (!) 123/109 -- -- 105 -- -- -- --   06/21/21 0845 (!) 143/84 -- -- 93 -- -- -- --   06/21/21 0815 106/75 -- -- 102 -- 95 % -- --   06/21/21 0800 122/84 -- -- 87 -- -- -- --   06/21/21 0745 123/82 -- -- -- -- -- -- --   06/21/21 0730 (!) 139/96 -- -- -- -- -- -- --   06/21/21 0630 (!) 156/89 98  F (36.7  C) Temporal 101 16 98 % 1.829 m (6') 149.7 kg (330 lb)       Physical Exam  General:              Well-nourished              Speaking in full sentences  Eyes:              Conjunctiva without injection or scleral icterus  ENT:              Moist mucous membranes              Nares patent              Pinnae normal  Neck:              Full ROM              No stiffness appreciated  Resp:              Lungs CTAB              No crackles, wheezing or audible rubs              Good air movement  CV:                    Irregularly irregular rate and rhythm              S1 and S2 present              III/VI systolic ejection murmur at LUSB (known per patient)  GI:              BS present              Abdomen soft without distention              Well healed abdominal surgical scar              No guarding or rebound tenderness  Skin:              Warm, dry, well perfused              No rashes or open wounds on exposed skin  MSK:              Moves all extremities              No focal deformities or swelling  Neuro:              Alert              Answers questions appropriately              Moves all extremities equally              Gait  stable  Psych:              Normal affect, normal mood    Emergency Department Course       Imaging:  CT Abdomen Pelvis w Contrast  1.  Small bowel obstruction evidence by newly identified dilated  central small bowel loops and decompressed distal small bowel.  2.  Stable nonobstructing stone upper left kidney.  3.  No other acute abnormality.  Reading per radiology     Laboratory:    UA: Ketones: 10 (A), RBC: 3 (H), Mucous: Present, o/w Negative    CBC: WBC: 12.3 (H), HGB: 15.5, PLT: 261  CMP: Glucose 117 (H) o/w WNL (Creatinine: 0.71)  Lactic acid (Resulted 0754): 1.1     Asymptomatic COVID19 Virus PCR by nasopharyngeal swab pending      Procedures    Emergency Department Course:    Reviewed:  I reviewed nursing notes, vitals, past medical history and care everywhere       Assessments:  0641 I performed an exam of the patient as documented above.   0944 Patient rechecked and updated.      Consults:   1007 I spoke with Dr. Sandoval of the hospitalist service regarding patient's presentation, findings, and plan of care.     Interventions:  0729 NS 1 L IV  0805 Zofran mg IV     Disposition:  The patient was admitted to the hospital under the care of Dr. Sandoval.       Impression & Plan     Medical Decision Making:  Hugo Weber is a 76 year old male who presents to the emergency department today for evaluation of abdominal pain.  VS on presentation reveal elevated BP and HR, which improved during emergency department course.  Work-up included advanced imaging and laboratory studies.  CT imaging demonstrates findings suspicious for small bowel obstruction.  He was informed of the incidentally noted stable nonobstructing upper left kidney stone.  Imaging otherwise unremarkable.  Patient does describe some urinary symptoms, though present UA not suggestive of infection.  Note is made of microscopic hematuria which has been noted on previous urinalyses.  Symptoms at this time are otherwise well controlled.  Suspect  partial obstruction as patient continues to pass gas.  We will plan admission for supportive treatment and care.  Case discussed with hospital service who have accepted the patient for admission.        Diagnosis:    ICD-10-CM    1. Small bowel obstruction (H)  K56.609 Asymptomatic SARS-CoV-2 COVID-19 Virus (Coronavirus) by PCR   2. Nephrolithiasis  N20.0        Scribe Disclosure:  I, Mykel Trent, am serving as a scribe at 6:36 AM on 6/21/2021 to document services personally performed by Frank Durand MD based on my observations and the provider's statements to me.          Frank Durand MD  06/21/21 9526

## 2021-06-21 NOTE — PLAN OF CARE
To Do:  End of Shift Summary  For vital signs and complete assessments, please see documentation flowsheets.     Pertinent assessments: VSS, slightly tachycardic.  Denies pain or nausea, however frequent belching.  Passed gas and had liquid stool x1.  Bowel sounds hypoactive. Ambulating independently.  Major Shift Events Admitted to floor, surgery consult  Treatment Plan:  Pain and nausea control, await return of bowel function, IVF   Bedside Nurse: Edith Peña RN

## 2021-06-21 NOTE — H&P
[facility]  Internal Medicine  History and Physical      Patient Name: Hugo Weber MRN# [id number]   Age: 76 year old YOB: 1945     Date of Admission:6/21/2021    Primary care provider: Brett Cassidy  Date of Service: 6/21/2021         Assessment and Plan:   Hugo Weber is a 76 year old male with a history of Gastric Bypass, Appendectomy, BPH, HTN, Paroxysmal Atrial Fibrillation, NAFLD, HLD, Nephrolithiasis, HAYLEE, OA, GERD, CVA, prediabetes who presents to the ED today with abdominal pain.      Small Bowel Obstruction - pt presents with two days of lower abdominal pain with nausea and one episode of emesis.  Hx of prior appendectomy and gastric bypass.    Laboratory work up revealed wbc 12.3 with otherwise normal CMP, Lactic Acid, CBC.  CT abd/pelvis revealed a small bowel obstruction with dilated central small bowel loops and decompressed distal small bowel.  - npo  - IVF hydration  - analgesics, antiemetics prn  - low threshold to place NG tube if any increased pain/nausea or emesis  - General Surgery consult    Chronic Atrial Fibrillation - rate controlled.  Continue Diltiazem.  Hold Eliquis for now given need for any possible surgical intervention.    HTN - continue diltiazem    HLD - not on a statin.  Hold pta Niacin    Chronic Pain - continue pta Gabapentin    GERD - continue Protonix    BPH - continue Flomax    HAYLEE - continue cpap    Hx CVA - 2019 with no residual deficits    CODE: full  Diet/IVF: npo, D5NS  GI ppx:  protonix  DVT ppx: SCD    Patient discussed with Dr. Lori Nunez MS PA-C  Physician Assistant   Hospitalist Service  Pager: [phone]           Chief Complaint:   Abdominal Pain         HPI:   76 year old male with a history of Gastric Bypass, Appendectomy, BPH, HTN, Paroxysmal Atrial Fibrillation, NAFLD, HLD, Nephrolithiasis, HAYLEE, OA, GERD, CVA, prediabetes who presents to the ED today with abdominal pain.    Patient reports he ate spicy  eggs yesterday morning and by afternoon had indigestion, belching and lower abdominal pain.  He has had one episode of non bloody emesis and last BM was small and loose this morning.  He denies chest pain, shortness of breath, constipation, fevers.           Past Medical History:     Past Medical History:   Diagnosis Date     Arrhythmia      Arthritis      Atrial fibrillation 2006    Followed by [facility] - persistent     Calculus of kidney 2005, 6/06, 10/12, 8/18, 9/19    dr walker/nestor/иван - hematuria - w/u o/w neg     Cervical stenosis of spine     Moderate C4-5     Cholelithiasis      Chronic peptic ulcer, unspecified site, without mention of hemorrhage, perforation, or obstruction 1985    gastric bypass     Colon polyps 8/05, 9/10, 10/15    2 tubular adenoma, 1 hyperplastic - multiple serrated/tubular adenomas - last colonscopy 11/20 due 5 yrs     CVA (cerebral vascular accident) (H) 01/2019    small right periorlandic subcortical - left sided residual - left hand tingling/numbness - Left leg weak/numbness - cardioembolic     First degree AV block      Hepatitis C 10/2012    chronic hepatitis C, grade 1-2, stage 1 - mild - Dr Douglas     Hyperlipidemia LDL goal <70      Hypertension goal BP (blood pressure) < 140/90 06/2006    dr jaciel winchester     Iron deficiency anemia, unspecified 1985    gastric bypass     Nephrolithiasis multiple episodes, last 10/19    Dr Bey/Ivana - 9mm 3/2021     OA (osteoarthritis)     Multiple - Left shoulder with rotator cuff tear - Dr Durham     Obesity, unspecified     s/p gastric bypass 1985      Prediabetes      Presbyacusis 01/2004    dr corona     Pyelonephritis, unspecified 12/1999     [facility] (squamous cell carcinoma), [facility]     Sleep apnea 10/2002    cpap - severe - 15 cm - dr cunha     Stroke (H) 01/19/2019     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    [facility]     Vitamin B12 deficiency (non anemic) 04/15/2019     Vitamin D deficiency  04/15/2019          Past Surgical History:     Past Surgical History:   Procedure Laterality Date     APPENDECTOMY       ARTHROPLASTY KNEE  08/13/2012    LT TKA - Dr Villanueva     CARDIOVERSION  2016     COLONOSCOPY  8/05, 9/10, 10/15    multiple tubular/serrated/hyperplastic polyps     COLONOSCOPY N/A 10/29/2015    multiple tubular and serrated adenomas     COLONOSCOPY N/A 09/07/2016     COLONOSCOPY  11/2020    tubular adenomas - due 5 yrs     COLONOSCOPY N/A 11/24/2020    Procedure: COLONOSCOPY with biopsies;  Surgeon: Chadwick Burgos MD;  Location: [facility] OR     ESOPHAGOSCOPY, GASTROSCOPY, DUODENOSCOPY (EGD), COMBINED N/A 11/24/2020    Procedure: ESOPHAGOGASTRODUODENOSCOPY, COLONOSCOPY with biopsies;  Surgeon: Chadwick Burgos MD;  Location: [facility] OR     EYE SURGERY  Lasik     HC KNEE SCOPE, DIAGNOSTIC  05/2000    Arthroscopy, Knee RT     LASER HOLMIUM LITHOTRIPSY URETER(S), INSERT STENT, COMBINED  10/16/2012    stones x 4, Dr Campa     LASER HOLMIUM LITHOTRIPSY URETER(S), INSERT STENT, COMBINED Right 05/02/2018    CYSTOSCOPY, RIGHT URETEROSCOPY, HOLMIUM LASER LITHOTRIPSY, RIGHT STENT PLACEMENT - Dr Bey     SURGICAL HISTORY OF -   1985    s/p gastric bypass Bilroth II     SURGICAL HISTORY OF -   11/1998    [place] teeth     SURGICAL HISTORY OF -   1979    cellulitis     SURGICAL HISTORY OF -   11/1998    lt forearm spur's     SURGICAL HISTORY OF -   1/01,6/02,11/02    s/p lasik     SURGICAL HISTORY OF -   11/1999    rt forearm spurs     SURGICAL HISTORY OF -   07/2006    dr stevenson - lithotrypsy     SURGICAL HISTORY OF -   10/08, 12/08    Lt ear chonchal lesion removal SCC - dr saez     SURGICAL HISTORY OF -  Right 10/2019    nephrolithiasis - cystoscopy, rt lithotrypsy, Dr Heath     SURGICAL HISTORY OF -  Right 11/2019    Cystoscopy, Rt lithotrypsy, Calcium Oxalate, Dr Heath          Social History:     Social History     Socioeconomic History     Marital status:      Spouse name: Mayra      "Number of children: 3     Years of education: 21     Highest education level: Not on file   Occupational History     Occupation: retired teacher and football and       Employer: [id number]     Employer: RETIRED   Social Needs     Financial resource strain: Not on file     Food insecurity     Worry: Not on file     Inability: Not on file     Transportation needs     Medical: Not on file     Non-medical: Not on file   Tobacco Use     Smoking status: Never Smoker     Smokeless tobacco: Never Used   Substance and Sexual Activity     Alcohol use: Yes     Alcohol/week: 2.0 standard drinks     Types: 2 Standard drinks or equivalent per week     Comment: 2 per week     Drug use: No     Comment: acknowledges using herbal supplement \"Vibe\" for energy-none used recently      Sexual activity: Not Currently     Partners: Female     Comment:     Lifestyle     Physical activity     Days per week: Not on file     Minutes per session: Not on file     Stress: Not on file   Relationships     Social connections     Talks on phone: Not on file     Gets together: Not on file     Attends Taoism service: Not on file     Active member of club or organization: Not on file     Attends meetings of clubs or organizations: Not on file     Relationship status: Not on file     Intimate partner violence     Fear of current or ex partner: Not on file     Emotionally abused: Not on file     Physically abused: Not on file     Forced sexual activity: Not on file   Other Topics Concern      Service Not Asked     Blood Transfusions Not Asked     Caffeine Concern Yes     Comment: <1 can qd     Occupational Exposure Not Asked     Hobby Hazards Not Asked     Sleep Concern Yes     Comment: sleep apnea, wears cpap     Stress Concern Not Asked     Weight Concern Not Asked     Special Diet Not Asked     Back Care Not Asked     Exercise No     Bike Helmet Not Asked     Seat Belt Yes     Self-Exams Not Asked     " Parent/sibling w/ CABG, MI or angioplasty before 65F 55M? No   Social History Narrative     Not on file          Family History:     Family History   Problem Relation Age of Onset     Alzheimer Disease Father 82         at 88     Prostate Cancer Father 76        bladder and prostate     Heart Disease Mother 80        CHF     Heart Disease Maternal Grandfather         MI at 55     Cancer Paternal Uncle         ?     Ulcerative Colitis No family hx of      Crohn's Disease No family hx of      Stomach Cancer No family hx of      GERD No family hx of           Allergies:    No Known Allergies       Medications:     Prior to Admission medications    Medication Sig Last Dose Taking? Auth Provider   apixaban ANTICOAGULANT (ELIQUIS) 5 MG tablet Take 1 tablet (5 mg) by mouth 2 times daily   Norma Stein APRN CNP   Cyanocobalamin 5000 MCG TBDP    Reported, Patient   diltiazem ER (TIAZAC) 360 MG 24 hr ER beaded capsule Take 1 capsule (360 mg) by mouth daily   Norma Stein APRN CNP   Ferrous Sulfate (IRON) 325 (65 Fe) MG tablet Take 1 tablet by mouth daily (with breakfast)   Brett Cassidy MD   fluticasone (FLONASE) 50 MCG/ACT nasal spray Spray 2 sprays into both nostrils daily as needed for rhinitis or allergies   Brett Cassidy MD   gabapentin (NEURONTIN) 100 MG capsule Take 1 capsule (100 mg) by mouth 3 times daily   Pankaj Burgos MD   niacin 500 MG tablet Take by mouth daily (with breakfast)   Reported, Patient   pantoprazole (PROTONIX) 40 MG EC tablet Take 1 tablet (40 mg) by mouth daily   Brett Cassidy MD   STATIN NOT PRESCRIBED (INTENTIONAL) Please choose reason not prescribed, below, treated for hepatitis C  Patient not taking: Reported on 2021   Brett Cassidy MD   tamsulosin (FLOMAX) 0.4 MG capsule Take 1 capsule (0.4 mg) by mouth 2 times daily   Brett Cassidy MD   vitamin C (ASCORBIC ACID) 1000 MG TABS Take 1,000 mg by mouth daily   Reported, Patient   Vitamin D-Vitamin K (VITAMIN  K2-VITAMIN D3 PO)    Reported, Patient          Review of Systems:   A complete ROS was performed and is negative other than what is stated in the HPI.       Physical Exam:   Blood pressure (!) 143/84, pulse 93, temperature 98  F (36.7  C), temperature source Temporal, resp. rate 16, height 1.829 m (6'), weight 149.7 kg (330 lb), SpO2 95 %.  General: Alert, interactive, NAD, lying in bed, pleasant and cooperative with daughter at the bedside  HEENT: AT/NC  Chest/Resp: clear to auscultation bilaterally, no crackles or wheezes  Heart/CV: irregular rate and rhythm, no murmur  Abdomen/GI: Soft, mild BLQ tenderness, distended abdomen. Decreased BS.  No rebound or guarding.  Extremities/MSK: No LE edema  Skin: Warm and dry  Neuro: Alert & oriented x 3, no focal deficits, moves all extremities equally         Labs:   ROUTINE ICU LABS (Last four results)  CMP  Recent Labs   Lab 06/21/21  0729      POTASSIUM 4.0   CHLORIDE 103   CO2 28   ANIONGAP 6   *   BUN 17   CR 0.71   GFRESTIMATED >90   GFRESTBLACK >90   BEBO 8.8   PROTTOTAL 7.2   ALBUMIN 3.7   BILITOTAL 1.0   ALKPHOS 132   AST 13   ALT 19     CBC  Recent Labs   Lab 06/21/21  0729   WBC 12.3*   RBC 5.32   HGB 15.5   HCT 47.5   MCV 89   MCH 29.1   MCHC 32.6   RDW 14.2        INRNo lab results found in last 7 days.  Arterial Blood GasNo lab results found in last 7 days.       Imaging/Procedures:     Results for orders placed or performed during the hospital encounter of 06/21/21   CT Abdomen Pelvis w Contrast    Narrative    CT ABDOMEN AND PELVIS WITH CONTRAST 6/21/2021 8:45 AM    CLINICAL HISTORY: Renal infection. Recent treatment. Lower abdominal  pain and vomiting.    TECHNIQUE: CT scan of the abdomen and pelvis was performed following  injection of IV contrast. Multiplanar reformats were obtained. Dose  reduction techniques were used.    CONTRAST: 100mL Isovue-370    COMPARISON: CT abdomen and pelvis 3/8/2021.    FINDINGS:   LOWER CHEST:  Normal.    HEPATOBILIARY: Normal.    PANCREAS: Normal.    SPLEEN: Normal.    ADRENAL GLANDS: Normal.    KIDNEYS/BLADDER: Stable left upper pole intrarenal stone that is 1 cm,  series 4 image 76. No hydronephrosis on either side. No ureter stone  or bladder stone. Homogeneous enhancement of the kidneys. Stable  cortical thinning posterior right kidney, series 4 image 85.    BOWEL: Multiple newly dilated central small bowel loops. Decompressed  distal small bowel. Appendix not seen. No secondary signs of  appendicitis identified. No abscess. No free air is seen. Decompressed  colon.    PELVIC ORGANS: Normal.    ADDITIONAL FINDINGS: Mild vascular calcifications.    MUSCULOSKELETAL: Spine degenerative changes.      Impression    IMPRESSION:   1.  Small bowel obstruction evidence by newly identified dilated  central small bowel loops and decompressed distal small bowel.  2.  Stable nonobstructing stone upper left kidney.  3.  No other acute abnormality.

## 2021-06-21 NOTE — ED NOTES
[facility]  ED Nurse Handoff Report    Hugo Weber is a 76 year old male   ED Chief complaint: Abdominal Pain  . ED Diagnosis:   Final diagnoses:   Small bowel obstruction (H)   Nephrolithiasis     Allergies: No Known Allergies    Code Status: Full Code  Activity level - Baseline/Home:  Independent. Activity Level - Current:   Stand by Assist. Lift room needed: No. Bariatric: No   Needed: No   Isolation: No. Infection: Not Applicable.     Vital Signs:   Vitals:    06/21/21 0745 06/21/21 0800 06/21/21 0815 06/21/21 0845   BP: 123/82 122/84 106/75 (!) 143/84   Pulse:  87 102 93   Resp:       Temp:       TempSrc:       SpO2:   95%    Weight:       Height:           Cardiac Rhythm:  ,      Pain level:    Patient confused: No. Patient Falls Risk: No.   Elimination Status: Has voided   Patient Report - Initial Complaint: Abdominal Pain. Focused Assessment: Hugo Weber is a 76 year old male with history of stroke, prediabetes, and atrial fibrillation on Eliquis who presents with lower abdominal pain and nausea/vomiting. The patient says that he had a spicy breakfast yesterday, and around 1230 developed abdominal bloating and nausea. He says that the lower abdominal pain started later in the afternoon and so did the vomiting. The patient says that the pain is slightly improved after passing gas. He says that his stools have been looser than normal. He says that today he had some yellow emesis at 0500. He notes that he did not sleep well yesterday. He says that he also forgot to take his medications yesterday. He endorses some chills last night, but says that it has resolved since then. He says that no one else that ate the breakfast was ill. He denies any blood in his vomit or stool. Of note, he says that he has been having some intermittent dysuria for the past week and was supposed to get a UTI check today.   Tests Performed:   Labs Ordered and Resulted from Time of ED Arrival Up to  the Time of Departure from the ED   CBC WITH PLATELETS DIFFERENTIAL - Abnormal; Notable for the following components:       Result Value    WBC 12.3 (*)     Absolute Neutrophil 9.9 (*)     All other components within normal limits   COMPREHENSIVE METABOLIC PANEL - Abnormal; Notable for the following components:    Glucose 117 (*)     All other components within normal limits   ROUTINE UA WITH MICROSCOPIC - Abnormal; Notable for the following components:    Ketones Urine 10 (*)     RBC Urine 3 (*)     Mucous Urine Present (*)     All other components within normal limits   LACTIC ACID WHOLE BLOOD   SARS-COV-2 (COVID-19) VIRUS RT-PCR     CT Abdomen Pelvis w Contrast   Preliminary Result   IMPRESSION:    1.  Small bowel obstruction evidence by newly identified dilated   central small bowel loops and decompressed distal small bowel.   2.  Stable nonobstructing stone upper left kidney.   3.  No other acute abnormality.        Abnormal Results:   Abnormal Labs Reviewed   CBC WITH PLATELETS DIFFERENTIAL - Abnormal; Notable for the following components:       Result Value    WBC 12.3 (*)     Absolute Neutrophil 9.9 (*)     All other components within normal limits   COMPREHENSIVE METABOLIC PANEL - Abnormal; Notable for the following components:    Glucose 117 (*)     All other components within normal limits   ROUTINE UA WITH MICROSCOPIC - Abnormal; Notable for the following components:    Ketones Urine 10 (*)     RBC Urine 3 (*)     Mucous Urine Present (*)     All other components within normal limits     Treatments provided: IVF, zofran  Family Comments: daughterSaida, present at bedside  OBS brochure/video discussed/provided to patient:  N/A  ED Medications:   Medications   0.9% sodium chloride BOLUS (1,000 mLs Intravenous New Bag 6/21/21 0729)   ondansetron (ZOFRAN) injection 4 mg (4 mg Intravenous Given 6/21/21 0805)   CT Scan Flush (65 mLs Intravenous Given 6/21/21 0834)   iopamidol (ISOVUE-370) solution 500 mL (100  mLs Intravenous Given 6/21/21 0833)     Drips infusing:  No  For the majority of the shift, the patient's behavior Green. Interventions performed were update on plan of care.    Sepsis treatment initiated: No     Patient tested for COVID 19 prior to admission: YES    ED Nurse Name/Phone Number: Nataly Bethea RN,   10:06 AM    RECEIVING UNIT ED HANDOFF REVIEW    Above ED Nurse Handoff Report was reviewed: Yes  Reviewed by: Edith Peña RN on June 21, 2021 at 10:56 AM

## 2021-06-21 NOTE — ED TRIAGE NOTES
Here for bilateral lower abdominal pain started yesterday afternoon around 3pm associated with n/v. Tried gas-x, tums, and tylenol but not helping. ABCs intact.

## 2021-06-22 ENCOUNTER — APPOINTMENT (OUTPATIENT)
Dept: GENERAL RADIOLOGY | Facility: CLINIC | Age: 76
DRG: 389 | End: 2021-06-22
Attending: SURGERY
Payer: COMMERCIAL

## 2021-06-22 LAB
ANION GAP SERPL CALCULATED.3IONS-SCNC: 4 MMOL/L (ref 3–14)
BUN SERPL-MCNC: 14 MG/DL (ref 7–30)
CALCIUM SERPL-MCNC: 8.4 MG/DL (ref 8.5–10.1)
CHLORIDE SERPL-SCNC: 108 MMOL/L (ref 94–109)
CO2 SERPL-SCNC: 26 MMOL/L (ref 20–32)
CREAT SERPL-MCNC: 0.81 MG/DL (ref 0.66–1.25)
ERYTHROCYTE [DISTWIDTH] IN BLOOD BY AUTOMATED COUNT: 14.3 % (ref 10–15)
GFR SERPL CREATININE-BSD FRML MDRD: 87 ML/MIN/{1.73_M2}
GLUCOSE SERPL-MCNC: 114 MG/DL (ref 70–99)
HCT VFR BLD AUTO: 46.8 % (ref 40–53)
HGB BLD-MCNC: 14.6 G/DL (ref 13.3–17.7)
MAGNESIUM SERPL-MCNC: 2.1 MG/DL (ref 1.6–2.3)
MCH RBC QN AUTO: 28.5 PG (ref 26.5–33)
MCHC RBC AUTO-ENTMCNC: 31.2 G/DL (ref 31.5–36.5)
MCV RBC AUTO: 91 FL (ref 78–100)
PLATELET # BLD AUTO: 244 10E9/L (ref 150–450)
POTASSIUM SERPL-SCNC: 4.1 MMOL/L (ref 3.4–5.3)
RBC # BLD AUTO: 5.13 10E12/L (ref 4.4–5.9)
SODIUM SERPL-SCNC: 138 MMOL/L (ref 133–144)
WBC # BLD AUTO: 8.4 10E9/L (ref 4–11)

## 2021-06-22 PROCEDURE — 99232 SBSQ HOSP IP/OBS MODERATE 35: CPT | Performed by: INTERNAL MEDICINE

## 2021-06-22 PROCEDURE — 83735 ASSAY OF MAGNESIUM: CPT | Performed by: PHYSICIAN ASSISTANT

## 2021-06-22 PROCEDURE — 80048 BASIC METABOLIC PNL TOTAL CA: CPT | Performed by: PHYSICIAN ASSISTANT

## 2021-06-22 PROCEDURE — C9113 INJ PANTOPRAZOLE SODIUM, VIA: HCPCS | Performed by: INTERNAL MEDICINE

## 2021-06-22 PROCEDURE — 74019 RADEX ABDOMEN 2 VIEWS: CPT

## 2021-06-22 PROCEDURE — 250N000011 HC RX IP 250 OP 636: Performed by: INTERNAL MEDICINE

## 2021-06-22 PROCEDURE — 36415 COLL VENOUS BLD VENIPUNCTURE: CPT | Performed by: PHYSICIAN ASSISTANT

## 2021-06-22 PROCEDURE — 250N000013 HC RX MED GY IP 250 OP 250 PS 637: Performed by: PHYSICIAN ASSISTANT

## 2021-06-22 PROCEDURE — 120N000001 HC R&B MED SURG/OB

## 2021-06-22 PROCEDURE — 85027 COMPLETE CBC AUTOMATED: CPT | Performed by: PHYSICIAN ASSISTANT

## 2021-06-22 PROCEDURE — 99231 SBSQ HOSP IP/OBS SF/LOW 25: CPT | Performed by: SURGERY

## 2021-06-22 PROCEDURE — 258N000003 HC RX IP 258 OP 636: Performed by: PHYSICIAN ASSISTANT

## 2021-06-22 RX ADMIN — DEXTROSE AND SODIUM CHLORIDE: 5; 900 INJECTION, SOLUTION INTRAVENOUS at 18:16

## 2021-06-22 RX ADMIN — DEXTROSE AND SODIUM CHLORIDE: 5; 900 INJECTION, SOLUTION INTRAVENOUS at 09:12

## 2021-06-22 RX ADMIN — DILTIAZEM HYDROCHLORIDE 360 MG: 180 CAPSULE, COATED, EXTENDED RELEASE ORAL at 09:08

## 2021-06-22 RX ADMIN — PANTOPRAZOLE SODIUM 40 MG: 40 INJECTION, POWDER, FOR SOLUTION INTRAVENOUS at 09:11

## 2021-06-22 ASSESSMENT — ACTIVITIES OF DAILY LIVING (ADL)
ADLS_ACUITY_SCORE: 13

## 2021-06-22 NOTE — PROGRESS NOTES
Cross cover notified of patient with 20 beat run NSVT.  He was asleep and asymptomatic.  Reviewed telemetry.  Potassium above 4 and magnesium above 2 earlier this morning.  He is on diltiazem which he received.  Repeat BMP and magnesium ordered for this morning.  Continue telemetry, otherwise no change in plan.

## 2021-06-22 NOTE — PROGRESS NOTES
[facility]  Hospitalist Progress Note  Matteo Sandoval MD 06/22/2021    Reason for Stay (Diagnosis): SBO         Assessment and Plan:      Summary of Stay: Hugo Weber is a 76 year old male admitted on 6/21/2021 with abdominal distention, nausea and abd pain.       Mr. Weber denies hx heart, lung or renal disease. He is non-diabetic. S/P gastric bypass in 1989 and appendectomy about 20 years before that.      He has not had SBO in the past.  Notes eating heavily yesterday.    CT Abd/pelvis indicates mechanical small bowel obstruction, though symptoms are more consistent with partial SBO.      Problem List:   1. SBO, generally doing wlel with supportive cares.     PLAN:  1.  Advance diet to clear liquids today.  Anticipate advancement to solids tomorrow.     DVT Prophylaxis: Pneumatic Compression Devices  Code Status: Full Code  Discharge Dispo: home  Estimated Disch Date / # of Days until Disch: possibly tomorrow        Interval History (Subjective):      Passing flatus, no further abd pain or nausea.                  Physical Exam:      Last Vital Signs:  BP (!) 140/82 (BP Location: Left arm)   Pulse 68   Temp 97.6  F (36.4  C) (Oral)   Resp 18   Ht 1.829 m (6')   Wt 148.6 kg (327 lb 9.6 oz)   SpO2 97%   BMI 44.43 kg/m      I/O last 3 completed shifts:  In: 884 [P.O.:500; I.V.:384]  Out: -     Constitutional: Awake, alert, cooperative, no apparent distress   Respiratory: Clear to auscultation bilaterally, no crackles or wheezing   Cardiovascular: Regular rate and rhythm, normal S1 and S2, and no murmur noted   Abdomen: Normal bowel sounds, soft, non-distended, non-tender   Skin: No rashes, no cyanosis, dry to touch   Neuro: Alert and oriented x3   Extremities: No edema, normal range of motion   Other(s):        All other systems: Negative          Medications:      All current medications were reviewed with changes reflected in problem list.         Data:      All new lab and imaging  data was reviewed.   Labs/Imaging:  Results for orders placed or performed during the hospital encounter of 06/21/21 (from the past 24 hour(s))   Basic metabolic panel   Result Value Ref Range    Sodium 138 133 - 144 mmol/L    Potassium 4.1 3.4 - 5.3 mmol/L    Chloride 108 94 - 109 mmol/L    Carbon Dioxide 26 20 - 32 mmol/L    Anion Gap 4 3 - 14 mmol/L    Glucose 114 (H) 70 - 99 mg/dL    Urea Nitrogen 14 7 - 30 mg/dL    Creatinine 0.81 0.66 - 1.25 mg/dL    GFR Estimate 87 >60 mL/min/[1.73_m2]    GFR Estimate If Black >90 >60 mL/min/[1.73_m2]    Calcium 8.4 (L) 8.5 - 10.1 mg/dL   CBC with platelets   Result Value Ref Range    WBC 8.4 4.0 - 11.0 10e9/L    RBC Count 5.13 4.4 - 5.9 10e12/L    Hemoglobin 14.6 13.3 - 17.7 g/dL    Hematocrit 46.8 40.0 - 53.0 %    MCV 91 78 - 100 fl    MCH 28.5 26.5 - 33.0 pg    MCHC 31.2 (L) 31.5 - 36.5 g/dL    RDW 14.3 10.0 - 15.0 %    Platelet Count 244 150 - 450 10e9/L   Magnesium   Result Value Ref Range    Magnesium 2.1 1.6 - 2.3 mg/dL   XR Abdomen 2 Views    Narrative    ABDOMEN TWO-THREE VIEW June 22, 2021 9:43 AM     HISTORY: Follow up small bowel obstruction.    COMPARISON: None.      Impression    IMPRESSION: Continued small bowel obstruction. No free air  demonstrated.    SD SHEPARD MD

## 2021-06-22 NOTE — PLAN OF CARE
To Do:  End of Shift Summary  For vital signs and complete assessments, please see documentation flowsheets.     Pertinent assessments: A&O x4. Tachycardic. NPO. Denies pain, nausea, and belching. Passing flatus. Ambulating independently. Telemetry- a-fib HR 72. One 7 beat run of v-tach.    Major Shift Events: No significant changes.    Treatment Plan: IVF hydration, diet advancement, and conservative management.    Bedside Nurse: Yarelis Hinojosa RN

## 2021-06-22 NOTE — PROVIDER NOTIFICATION
Tele tech called and said patient had 2 -2 second pauses in a row.  Patient asymptomatic and VSS.  MD mcgrath

## 2021-06-22 NOTE — PLAN OF CARE
End of Shift Summary  For vital signs and complete assessments, please see documentation flowsheets.     Pertinent assessments:  AAOx4, VSS x 2.1 sec pause on tele. Controlled afib. Denies pain, nausea, vomiting. Tolerating clear liquids, passing gas. No BM. Sitting up in chair, encouraged to ambulate in hallway.     Major Shift Events: abd xray no improvement    Treatment Plan: IVF hydration and conservative management. Diet advancement.    Bedside Nurse: Tegan Canales RN

## 2021-06-22 NOTE — PROGRESS NOTES
Pt on home Cpap unit. Pt independent in use. Instructed to call if assistance is needed.     Moni Gill

## 2021-06-22 NOTE — PROVIDER NOTIFICATION
Tele tech called and said patient had a 20 beat run of Vtach and heart rate went up to 200.  When checking on the patient he was sleeping and current vitals /81 and HR 73.  MD notified

## 2021-06-22 NOTE — PROGRESS NOTES
"[facility]   General Surgery Progress Note           Assessment and Plan:   Assessment:   Small-bowel obstruction, clinically resolving  Surgical h/o open gastric stapling in the 80s, appendectomy in the 60s.  AXR today, results pending      Plan:   -start clear liquid diet   -medical management per hospitalist  -continue conservative management         Interval History:   Feels \"great\".  Denies abd pain. Minimal bloating and passing lots of gas.  + loose BM yesterday.  Primary c/o thirsty, hoping to start diet.           Physical Exam:   Blood pressure (!) 140/82, pulse 68, temperature 97.6  F (36.4  C), temperature source Oral, resp. rate 18, height 1.829 m (6'), weight 148.6 kg (327 lb 9.6 oz), SpO2 97 %.    I/O last 3 completed shifts:  In: 297 [I.V.:297]  Out: 700 [Urine:700]    Abdomen:   soft, obese, non-tender and normal bowel sounds             Data:     Recent Labs   Lab 06/22/21  0715 06/21/21  0729   WBC 8.4 12.3*   HGB 14.6 15.5   HCT 46.8 47.5   MCV 91 89    261       Carleen Kaye PA-C     Seen and agree.  Abdominal films are lagging behind his clinical course it seems.  Clears today, ADAT tomorrow if he tolerates.  Nneka Rogers MD    "

## 2021-06-22 NOTE — PLAN OF CARE
End of Shift Summary  For vital signs and complete assessments, please see documentation flowsheets.     Pertinent assessments: A&O. VSS.  Denies pain, nausea and SOB.  Passing gas and had one stool today.   Ambulating independently. Telemetry- a-fib     Major Shift Events: Tele called tonight that the patient had a 20 beat run of vtach and HR went up to 200.  Patient was sleeping and vitals were WNL when checked.  MD notified, no changes made.  Patient also had 2 - 2 second pauses in a row, MD was notified.     Treatment Plan: IVF hydration and conservative management. Diet advancement.    Bedside Nurse: Deanne Starks RN

## 2021-06-23 ENCOUNTER — APPOINTMENT (OUTPATIENT)
Dept: CARDIOLOGY | Facility: CLINIC | Age: 76
DRG: 389 | End: 2021-06-23
Attending: INTERNAL MEDICINE
Payer: COMMERCIAL

## 2021-06-23 VITALS
OXYGEN SATURATION: 98 % | HEIGHT: 72 IN | BODY MASS INDEX: 42.66 KG/M2 | RESPIRATION RATE: 16 BRPM | DIASTOLIC BLOOD PRESSURE: 77 MMHG | HEART RATE: 78 BPM | WEIGHT: 315 LBS | SYSTOLIC BLOOD PRESSURE: 130 MMHG | TEMPERATURE: 97.7 F

## 2021-06-23 PROBLEM — I47.29 NSVT (NONSUSTAINED VENTRICULAR TACHYCARDIA) (H): Status: ACTIVE | Noted: 2021-06-23

## 2021-06-23 LAB
MAGNESIUM SERPL-MCNC: 1.9 MG/DL (ref 1.6–2.3)
POTASSIUM SERPL-SCNC: 4 MMOL/L (ref 3.4–5.3)

## 2021-06-23 PROCEDURE — 250N000013 HC RX MED GY IP 250 OP 250 PS 637: Performed by: PHYSICIAN ASSISTANT

## 2021-06-23 PROCEDURE — 36415 COLL VENOUS BLD VENIPUNCTURE: CPT | Performed by: INTERNAL MEDICINE

## 2021-06-23 PROCEDURE — C9113 INJ PANTOPRAZOLE SODIUM, VIA: HCPCS | Performed by: INTERNAL MEDICINE

## 2021-06-23 PROCEDURE — 99238 HOSP IP/OBS DSCHRG MGMT 30/<: CPT | Performed by: INTERNAL MEDICINE

## 2021-06-23 PROCEDURE — 250N000013 HC RX MED GY IP 250 OP 250 PS 637: Performed by: INTERNAL MEDICINE

## 2021-06-23 PROCEDURE — 250N000011 HC RX IP 250 OP 636: Performed by: INTERNAL MEDICINE

## 2021-06-23 PROCEDURE — 258N000003 HC RX IP 258 OP 636: Performed by: PHYSICIAN ASSISTANT

## 2021-06-23 PROCEDURE — 83735 ASSAY OF MAGNESIUM: CPT | Performed by: INTERNAL MEDICINE

## 2021-06-23 PROCEDURE — 99231 SBSQ HOSP IP/OBS SF/LOW 25: CPT | Performed by: SURGERY

## 2021-06-23 PROCEDURE — 93242 EXT ECG>48HR<7D RECORDING: CPT

## 2021-06-23 PROCEDURE — 93244 EXT ECG>48HR<7D REV&INTERPJ: CPT | Performed by: INTERNAL MEDICINE

## 2021-06-23 PROCEDURE — 84132 ASSAY OF SERUM POTASSIUM: CPT | Performed by: INTERNAL MEDICINE

## 2021-06-23 RX ORDER — PANTOPRAZOLE SODIUM 40 MG/1
40 TABLET, DELAYED RELEASE ORAL
Status: DISCONTINUED | OUTPATIENT
Start: 2021-06-24 | End: 2021-06-23 | Stop reason: HOSPADM

## 2021-06-23 RX ADMIN — GABAPENTIN 100 MG: 100 CAPSULE ORAL at 12:06

## 2021-06-23 RX ADMIN — DEXTROSE AND SODIUM CHLORIDE: 5; 900 INJECTION, SOLUTION INTRAVENOUS at 03:52

## 2021-06-23 RX ADMIN — TAMSULOSIN HYDROCHLORIDE 0.4 MG: 0.4 CAPSULE ORAL at 12:07

## 2021-06-23 RX ADMIN — APIXABAN 5 MG: 5 TABLET, FILM COATED ORAL at 11:20

## 2021-06-23 RX ADMIN — DILTIAZEM HYDROCHLORIDE 360 MG: 180 CAPSULE, COATED, EXTENDED RELEASE ORAL at 07:56

## 2021-06-23 RX ADMIN — PANTOPRAZOLE SODIUM 40 MG: 40 INJECTION, POWDER, FOR SOLUTION INTRAVENOUS at 07:57

## 2021-06-23 ASSESSMENT — ACTIVITIES OF DAILY LIVING (ADL)
ADLS_ACUITY_SCORE: 13

## 2021-06-23 NOTE — DISCHARGE SUMMARY
Patient hospitalized for small bowel obstruction. Cleared for discharge to home today per MD. Discharge instructions, medications, and follow-ups reviewed with patient. Patient verbalized understanding of discharge instructions. Belongings were returned to patient at time of discharge. Son providing transport.

## 2021-06-23 NOTE — PLAN OF CARE
Pt up ind. VSS BS+ Flatus+ LBM yesterday, loose. Tolerating clear liquids, denies nausea or abd pain. On tele. LE edema. IVF infusing. Surgery following.

## 2021-06-23 NOTE — PLAN OF CARE
End of Shift Summary  For vital signs and complete assessments, please see documentation flowsheets.     Pertinent assessments:  A&O.  VSS except HTN.  Denies pain, nausea and SOB.  Passing gas but no BM today.  Tolerating clear liquids, On tele. LE edema. IVF infusing. Surgery following.  Up independently.     Major Shift Events:  2 second pauses more frequent tonight, MD notified.  No changes made.      Treatment Plan: IVF hydration and conservative management. Diet advancement.    Bedside Nurse: Deanne Starks RN

## 2021-06-23 NOTE — PROGRESS NOTES
Surgery-[facility]  C/S for SBO  Continues to feel well, no pain, bloating or nausea, tolerated clears and just tried full liquids this AM.  No fevers, VSS  1220mL po  NAD, up in chair  Abd soft, benign  Improving/SBO resolving  Agree with full liquids; could attempt low residue diet later today and potentially discharge.

## 2021-06-23 NOTE — DISCHARGE SUMMARY
[facility] Discharge Summary    Hugo Weber MRN# [id number]   Age: 76 year old YOB: 1945     Date of Admission:  6/21/2021  Date of Discharge::  6/23/2021  4:22 PM  Admitting Physician:  Matteo Sandoval MD  Discharge Physician:  Matteo Sandoval MD     Home clinic: [facility]          Admission Diagnoses:   Nephrolithiasis [N20.0]  Small bowel obstruction (H) [K56.609]          Discharge Diagnosis:   Principal Problem:    Small bowel obstruction (H)  Active Problems:    Obstructive sleep apnea syndrome    Paroxysmal atrial fibrillation (H)    NSVT (nonsustained ventricular tachycardia) (H)         Procedures:   CT Abd/pelvis          Discharge Medications:     Discharge Medication List as of 6/23/2021  3:48 PM      CONTINUE these medications which have NOT CHANGED    Details   apixaban ANTICOAGULANT (ELIQUIS) 5 MG tablet Take 1 tablet (5 mg) by mouth 2 times daily, Disp-180 tablet, R-3, E-Prescribe      Cyanocobalamin 5000 MCG TBDP Take by mouth daily , Historical      diltiazem ER (TIAZAC) 360 MG 24 hr ER beaded capsule Take 1 capsule (360 mg) by mouth daily, Disp-90 capsule, R-3, E-Prescribe      Ferrous Sulfate (IRON) 325 (65 Fe) MG tablet Take 1 tablet by mouth every other day , Disp-90 tablet, R-3, Historical      fluticasone (FLONASE) 50 MCG/ACT nasal spray Spray 2 sprays into both nostrils daily as needed for rhinitis or allergies, Disp-54.6 mL, R-3, E-Prescribe      gabapentin (NEURONTIN) 100 MG capsule Take 1 capsule (100 mg) by mouth 3 times daily, Disp-90 capsule, R-1, E-Prescribe      niacin 500 MG tablet Take by mouth daily (with breakfast), Historical      pantoprazole (PROTONIX) 40 MG EC tablet Take 1 tablet (40 mg) by mouth daily, Disp-90 tablet, R-3, E-Prescribe      STATIN NOT PRESCRIBED (INTENTIONAL) Reason Statin was Not Prescribed: Intolerance (with supporting documentation of trying a statin at least once within the last 5 years)       tamsulosin (FLOMAX) 0.4 MG capsule Take 1 capsule (0.4 mg) by mouth 2 times daily, Disp-180 capsule, R-3, E-Prescribe      vitamin C (ASCORBIC ACID) 1000 MG TABS Take 1,000 mg by mouth daily, Historical      Vitamin D-Vitamin K (VITAMIN K2-VITAMIN D3 PO) Take 1 tablet by mouth daily , Historical                 Consultations:   Consultation during this admission received from surgery           Hospital Course:   Hugo Weber is a 76 year old male admitted on 6/21/2021 with abdominal distention, nausea and abd pain.        Mr. Weber denies hx heart, lung or renal disease. He is non-diabetic. S/P gastric bypass in 1989 and appendectomy about 20 years before that.      He has not had SBO in the past.  Notes eating heavily on the day prior to hospitalization.     In the ED, Mr. Weber was alert, coherent and in NAD. He was hemodynamically stable and labs: Creat 0.7, WBC 12.3.  CT Abd/pelvis indicated mechanical small bowel obstruction, though symptoms were more consistent with partial SBO.     Mr. Weber was admitted to a medical bed and remained stable. He was placed at NPO initially and later his diet was advanced from clear liquids to low fiber. There were no major problems intervening.     During his hospital stay, Mr. Weber was monitored on telemetry. He remained stable and asymptomatic throughout, but note that he had several runs of what appeared to be NSVT as well as many sub-3-second pauses.  I reviewed his chart and found that he had undergone Zio patch monitoring completed in 2/2021 at which time he was noted to have NSVT up to 12 beats.  The rhythm bothered me, as it may suggest progression of his underlying conducting system disease, so I spoke with Dr. Jc and requested a repeat ZIO patch to be completed.  I have also asked that he follow up with his primary cardiologist, Dr. Ching Castillo as soon as can be arranged.     /77 (BP Location: Left arm)   Pulse 78   Temp 97.7  F  (36.5  C) (Axillary)   Resp 16   Ht 1.829 m (6')   Wt 148.6 kg (327 lb 9.6 oz)   SpO2 98%   BMI 44.43 kg/m    On the date of discharge, Mr. Weber is alert and coherent in NAD. He confirms that he had no symptoms with the abnormal rhythm episodes.  HEENT: non-focal  Chest: CTA  COR: mildly IIRR, soft flow murmur heard in systole  ABd: soft, NTND.  Obese.          Discharge Instructions and Follow-Up:   Discharge diet: Regular   Discharge activity: Activity as tolerated   Discharge follow-up: With PMD as needed.  With cardiology after 1 week of wearing the Zio patch.           Discharge Disposition:   Discharged to home      Attestation:  I have reviewed today's vital signs, notes, medications, labs and imaging.    Matteo Sandoval MD

## 2021-06-23 NOTE — PROGRESS NOTES
Cross cover notified of patient with frequent pauses 2-2.3 seconds overnight.  He has been sleeping.  Heart rate has been 55-75 on telemetry.  Continue his current dose of diltiazem  mg daily.

## 2021-06-23 NOTE — PROVIDER NOTIFICATION
Tele tech called and said patient is having more frequent 2.0-2.3 second pauses tonight  Patient sleeping and VSS.  Md notified.

## 2021-06-23 NOTE — PLAN OF CARE
End of Shift Summary  For vital signs and complete assessments, please see documentation flowsheets.     Pertinent assessments:   Passing gas. No BM. Ambulating in hallway. Denies pain, N/V. Plan to discharge today. showered, tolerated low fiber diet. MD made aware.      Major Shift Events:  17 beat run of MD [facility] notified. Pt asymptomatic    Treatment Plan: IVF hydration and conservative management. Diet advancement.    Bedside Nurse: Tegan Canales RN

## 2021-06-24 ENCOUNTER — TELEPHONE (OUTPATIENT)
Dept: FAMILY MEDICINE | Facility: CLINIC | Age: 76
End: 2021-06-24

## 2021-06-24 NOTE — TELEPHONE ENCOUNTER
"ED/Discharge Protocol    \"Hi, my name is BARBIE LIZ RN, a registered nurse, and I am calling on behalf of Dr. Cassidy's office at [employer].  I am calling to follow up and see how things are going for you after your recent visit.\"    \"I see that you were in the (ER/UC/IP) on 6/21-6/23.    How are you doing now that you are home?\" doing well, no pain    Is patient experiencing symptoms that may require a hospital visit?  no    Discharge Instructions    \"Let's review your discharge instructions.  What is/are the follow-up recommendations?  Pt. Response: Call Dr. Chong, their call me    \"Were you instructed to make a follow-up appointment?\"  Pt. Response: No.       \"When you see the provider, I would recommend that you bring your discharge instructions with you.    Medications    \"How many new medications are you on since your hospitalization/ED visit?\"    0  \"How many of your current medicines changed (dose, timing, name, etc.) while you were in the hospital/ED visit?\"   Pain LLQ - referred to pain clinic - [facility] - dr. Burgos - scar tissue or nerve is crooked - start with nerve pills - in 3 days - it was 3 days -   \"Do you have questions about your medications?\"   No  \"Were you newly diagnosed with heart failure, COPD, diabetes or did you have a heart attack?\"   No  For patients on insulin: \"Did you start on insulin in the hospital or did you have your insulin dose changed?\"   No  Post Discharge Medication Reconciliation Status: discharge medications reconciled and changed, per note/orders.    Was MTM referral placed (*Make sure to put transitions as reason for referral)?   No    Call Summary    \"Do you have any questions or concerns about your condition or care plan at the moment?\"    No -   Triage nurse advice given: no    Patient was in ER 2 in the past year (assess appropriateness of ER visits.)      \"If you have questions or things don't continue to improve, we encourage you contact us through the main clinic " "number,  [phone].  Even if the clinic is not open, triage nurses are available 24/7 to help you.     We would like you to know that our clinic has extended hours (provide information).  We also have urgent care (provide details on closest location and hours/contact info)\"      \"Thank you for your time and take care!\"        "

## 2021-06-24 NOTE — TELEPHONE ENCOUNTER
Patient discharged from [facility] for inpatient hospital stay on 6/23/21 for Small Bowel Obstruction, NSVT.     Please contact patient to follow up; no appointment scheduled at this time.     ER/IP: 1/1/  Care Coordination: Closed    Judd Pardo

## 2021-07-01 ENCOUNTER — TELEPHONE (OUTPATIENT)
Dept: ANESTHESIOLOGY | Facility: CLINIC | Age: 76
End: 2021-07-01

## 2021-07-01 NOTE — TELEPHONE ENCOUNTER
RN returned call to patient to discuss. Patient reports the Gabapentin that Dr. Burgos had prescribed at last office visit hasn't been helpful lately for his pain. Reports it was working initially but hasn't been as effective lately. Patient explained he had a recent bowel obstruction that was treated. Patient denies symptoms related to this, states the pain he is experiencing now is related to his chronic abdominal pain that he was evaluated for by Dr. Burgos. Writer discussed patient make a follow up appointment to address medication management and treatment planning. Patient in agreement, writer scheduled patient for 7/5 at 11:30 am with Breana Brian NP via video visit. Date and time verified with patient. Patient to follow with pain clinic before 7/5 if any additional questions or concerns. Patient verbalized understanding.    Gali Darden RN

## 2021-07-01 NOTE — TELEPHONE ENCOUNTER
[facility] Call Center    Phone Message    May a detailed message be left on voicemail: yes     Reason for Call: Other: Please call pt discuss regarding his pain medications however, they are not working at this time. Pt had a small bowel obstruction.  Please call to discuss further.     Action Taken: Message routed to:  [facility] PAIN    Travel Screening: Not Applicable

## 2021-07-02 NOTE — PROGRESS NOTES
Video Start Time: 11:37 AM      [facility] Pain Management     Date of visit: 7/2/2021      Assessment:   Hugo is a 76 year-old male with past medical history significant for gastricbypass, appendectomy, BPH, HTN, paroxysmal atrial fibrillation, NAFLD, HLD, nephrolithiasis, HAYLEE, OA, GERD, CVA who presents with complaints of chronic abdominal pain    1. Unclear but most likely cause of intermittent pain is the left lateral femoral cutaneous nerve leading to focal pain at the ASIS.     Visit Diagnoses:  1. Abdominal pain, generalized    2. Neuropathy        Plan:      Therapies:   Physical Therapy: no, continue home exercise program.  Clinical Health Psychologist to address issues of relaxation, behavioral change, coping style, and other factors important to improvement: not at this time  Medication Management:   1. Initiation of gabapentin completely eliminated Hugo's pain for a time. Curiously, this pain relief lasted while it was paused during a recent hospitalization. His pain has gradually returned since restarting. As his pain is worse with eating, advised he try adjusting timing of this medication to prior to meals and increase gabapentin as directed below. Monitor pain benefit and call with issues.   Gabapentin 1 tab= 100mg    AM   PM   Bedtime  100mg (1 tab) 100mg (1 tab)  200mg (2 tabs). After 3-5 days, increase as tolerated   200mg (2 tabs) 100mg (1 tab)  200mg (2 tabs). After 3-5 days, increase as tolerated   200mg (2 tabs) 200mg (2 tabs) 200mg (2 tabs).   Call with any problems.  Caution for sedation.    Do not drive until you know how the medication affects you.   You can go slower if you need to or increasing only one dose at a time.  Do not stop abruptly once at higher doses.  This medication must be tapered off.  Further procedures recommended: if not having benefit with gabapentin could consider an US guided LFCN block on the left as recommended by Dr. Burgos.   Follow up: with Breana  BIENVENIDO Brian CNP in 4 weeks.       Breana FARIA CNP  [facility] Pain Management     -------------------------------------------------    Subjective:    Chief complaint:   Chief Complaint   Patient presents with     Pain Management     Follow up       Interval history:  Hugo Weber is a 76 year old male last seen on 6/11/2021.  He is a patient of Dr. Burgos seen in follow up.     Recommendations/plan at the last visit included:  Patient education:    We discussed with the patient the likely mechanisms underlying his pain.  In his case, this includes the LFCN and other nerves in this area which are likely contributing to his overall pain picture.       Work up:    No further work-up at this time     Referrals:    No new referrals today     Medications:    Starting low dose gabapentin at 100 mg TID and can my chart message the clinic in 2 weeks to let us know if he is tolerating it and if it is having any positive effects. If desired, will double dose at that time.       Therapies:    None at this time      Interventions:    None at this time but if not getting benefit from gabapentin could consider US guided LFCN block on the left      Follow up: 6 weeks        Since his last visit, Hugo Weber reports:  -His pain is about the same as it was at last visit.   -He started gabapentin as directed, was taking 100mg TID as directed. After several days his pain subsided, was completely absent for a time. Denies side effects with gabapentin.   -He unfortunately developed a small bowel obstruction a little over two weeks ago, was admitted to the hospital for treatment. He was not given gabapentin during his hospitalization but he continued to not have any pain.  -He restarted the gabapentin on discharge from hospital about 13 days ago but his pain has gradually returned. He notes his pain is back to baseline. His pain is most bothersome after eating.   -He continues to report that his pain subsides  when he drinks a tall glass of water and passes gas.   -He tried taking gabapentin prior to his meal this morning (rather than with his meal) and found this more effective.         HPI per Dr. Burgos 6/11/2021  HTN, Hx of Hep C, GERD, afib on anticoagulation, morbid obesity, hx of gastric bypass, hx of appendectomy who presents for evaluation of intermittent LLQ abdominal pain.  The patient's pain began 1 year ago without any particular precipitating event and has been Slowly becoming more prevalent since that time.  He reports that his pain is located primarily in LLQ at the ASIS area and does not radiate. Pain tends to be very focal in this area.  The patient describes the pain as deeper, dull and achy.  He reports that the pain is made worse by eating and tight pants or shorts (Draw String).  His pain is improved with drinking water and loosening his pants.  In addition, he reports no associated with the pain changes in position and timing of day.  The patient's pain is most severe after he eats.   He rates his average pain score at 7/10, but it can be as low as 0/10 or as severe as 7/10.   Pain is intermittent, unpredictable, and seems to be relieved with drinking water.  Pain intensity builds quickly and then fades quickly as well.  Lasts 30 min to 3 hours.       He denies any new problems with falls or balance, Denies any new numbness or weakness of the arms or legs, any new bowel or bladder incontinence, any night sweats or unexplained fevers, or any sudden or unexpected weight loss.     Pain Information:   Pain rating: averages 0/10 on a 0-10 scale.    Current pain medications:    Gabapentin 100mg TID     Current MME: 0    Review of [place] Prescription Monitoring Program (): No concern for abuse or misuse of controlled medications based on this report.     Annual Controlled Substance Agreement/UDS due date: NA     Previous medication treatments included:  Anti-convulsants: none  Muscle relaxors:  None  Anti-depressants: None  Acetaminophen/NSAIDs: Occasional for other reasons   Topicals: none     Other treatments have included:  Physical therapy: Not for this   Pain Psychology: No  Chiropractic: No  Acupuncture: No  TENs Unit: No  Injections: None for this reason  Surgeries: Gastric bypass, Left TKA, Appendectomy,        Medications:  Current Outpatient Medications   Medication Sig Dispense Refill     apixaban ANTICOAGULANT (ELIQUIS) 5 MG tablet Take 1 tablet (5 mg) by mouth 2 times daily 180 tablet 3     Cyanocobalamin 5000 MCG TBDP Take by mouth daily        diltiazem ER (TIAZAC) 360 MG 24 hr ER beaded capsule Take 1 capsule (360 mg) by mouth daily 90 capsule 3     Ferrous Sulfate (IRON) 325 (65 Fe) MG tablet Take 1 tablet by mouth every other day  90 tablet 3     fluticasone (FLONASE) 50 MCG/ACT nasal spray Spray 2 sprays into both nostrils daily as needed for rhinitis or allergies 54.6 mL 3     gabapentin (NEURONTIN) 100 MG capsule Take 2 capsules (200 mg) by mouth 3 times daily 180 capsule 1     niacin 500 MG tablet Take by mouth daily (with breakfast)       pantoprazole (PROTONIX) 40 MG EC tablet Take 1 tablet (40 mg) by mouth daily 90 tablet 3     tamsulosin (FLOMAX) 0.4 MG capsule Take 1 capsule (0.4 mg) by mouth 2 times daily 180 capsule 3     vitamin C (ASCORBIC ACID) 1000 MG TABS Take 1,000 mg by mouth daily       Vitamin D-Vitamin K (VITAMIN K2-VITAMIN D3 PO) Take 1 tablet by mouth daily        STATIN NOT PRESCRIBED (INTENTIONAL) Please choose reason not prescribed, below, treated for hepatitis C (Patient not taking: Reported on 5/14/2021)         Medical History: any changes in medical history since they were last seen? Yes- recent SBO    Review of Systems: A 10-point review of systems was negative, with the exception of chronic pain issues and nausea, emesis.    Objective:    Physical Exam:  There were no vitals taken for this visit.  Constitutional: Well developed, well nourished, appears  stated age. Overweight.   HEENT: Head atraumatic, normocephalic. Eyes without conjunctival injection or jaundice. Oropharynx clear. Neck supple. No obvious neck masses.  Skin: No rash, lesions, or petechiae of exposed skin.   Psychiatric/mental status: Alert, without lethargy or stupor. Speech fluent. Appropriate affect. Mood normal. Able to follow commands without difficulty.       Video-Visit Details    Type of service:  Video Visit    Video End Time:11:57 AM    Originating Location (pt. Location): Home    Distant Location (provider location):  [facility] FOR [facility]PREH[facility] PA[facility]NAGEMENT [place]     Platform used for Video Visit: [employer]    BILLING TIME DOCUMENTATION:   The total TIME spent on this patient on the date of the encounter/appointment was 38 minutes.      TOTAL TIME includes:   Time spent preparing to see the patient (reviewing records and tests)   Time spent face to face (or over the phone) with the patient   Time spent ordering tests, medications, procedures and referrals   Time spent Referring and communicating with other healthcare professionals   Time spent documenting clinical information in Epic

## 2021-07-05 ENCOUNTER — VIRTUAL VISIT (OUTPATIENT)
Dept: ANESTHESIOLOGY | Facility: CLINIC | Age: 76
End: 2021-07-05
Payer: COMMERCIAL

## 2021-07-05 DIAGNOSIS — R10.84 ABDOMINAL PAIN, GENERALIZED: ICD-10-CM

## 2021-07-05 DIAGNOSIS — G62.9 NEUROPATHY: ICD-10-CM

## 2021-07-05 PROCEDURE — 99214 OFFICE O/P EST MOD 30 MIN: CPT | Mod: 95 | Performed by: NURSE PRACTITIONER

## 2021-07-05 RX ORDER — GABAPENTIN 100 MG/1
200 CAPSULE ORAL 3 TIMES DAILY
Qty: 180 CAPSULE | Refills: 1 | Status: SHIPPED | OUTPATIENT
Start: 2021-07-05 | End: 2021-08-06

## 2021-07-05 ASSESSMENT — PAIN SCALES - GENERAL: PAINLEVEL: NO PAIN (0)

## 2021-07-05 NOTE — LETTER
7/5/2021       RE: Hugo Weber  [phone]  [facility] [address]     Dear Colleague,    Thank you for referring your patient, Hugo Weber, to the [facility] [place] at [facility]. Please see a copy of my visit note below.      Video Start Time: 11:37 AM      [facility] Pain Management     Date of visit: 7/2/2021      Assessment:   Hugo is a 76 year-old male with past medical history significant for gastricbypass, appendectomy, BPH, HTN, paroxysmal atrial fibrillation, NAFLD, HLD, nephrolithiasis, HAYLEE, OA, GERD, CVA who presents with complaints of chronic abdominal pain    1. Unclear but most likely cause of intermittent pain is the left lateral femoral cutaneous nerve leading to focal pain at the ASIS.     Visit Diagnoses:  1. Abdominal pain, generalized    2. Neuropathy        Plan:      Therapies:   Physical Therapy: no, continue home exercise program.  Clinical Health Psychologist to address issues of relaxation, behavioral change, coping style, and other factors important to improvement: not at this time  Medication Management:   1. Initiation of gabapentin completely eliminated Hugo's pain for a time. Curiously, this pain relief lasted while it was paused during a recent hospitalization. His pain has gradually returned since restarting. As his pain is worse with eating, advised he try adjusting timing of this medication to prior to meals and increase gabapentin as directed below. Monitor pain benefit and call with issues.   Gabapentin 1 tab= 100mg    AM   PM   Bedtime  100mg (1 tab) 100mg (1 tab)  200mg (2 tabs). After 3-5 days, increase as tolerated   200mg (2 tabs) 100mg (1 tab)  200mg (2 tabs). After 3-5 days, increase as tolerated   200mg (2 tabs) 200mg (2 tabs) 200mg (2 tabs).   Call with any problems.  Caution for sedation.    Do not drive until you know how the medication affects you.    You can go slower if you need to or increasing only one dose at a time.  Do not stop abruptly once at higher doses.  This medication must be tapered off.  Further procedures recommended: if not having benefit with gabapentin could consider an US guided LFCN block on the left as recommended by Dr. Burgos.   Follow up: with BIENVENIDO Hopson CNP in 4 weeks.       Breana FARIA CNP  [facility] Pain Management     -------------------------------------------------    Subjective:    Chief complaint:   Chief Complaint   Patient presents with     Pain Management     Follow up       Interval history:  Hugo Weber is a 76 year old male last seen on 6/11/2021.  He is a patient of Dr. Burgos seen in follow up.     Recommendations/plan at the last visit included:  Patient education:    We discussed with the patient the likely mechanisms underlying his pain.  In his case, this includes the LFCN and other nerves in this area which are likely contributing to his overall pain picture.       Work up:    No further work-up at this time     Referrals:    No new referrals today     Medications:    Starting low dose gabapentin at 100 mg TID and can my chart message the clinic in 2 weeks to let us know if he is tolerating it and if it is having any positive effects. If desired, will double dose at that time.       Therapies:    None at this time      Interventions:    None at this time but if not getting benefit from gabapentin could consider US guided LFCN block on the left      Follow up: 6 weeks        Since his last visit, Hugo Weber reports:  -His pain is about the same as it was at last visit.   -He started gabapentin as directed, was taking 100mg TID as directed. After several days his pain subsided, was completely absent for a time. Denies side effects with gabapentin.   -He unfortunately developed a small bowel obstruction a little over two weeks ago, was admitted to the hospital for treatment. He  was not given gabapentin during his hospitalization but he continued to not have any pain.  -He restarted the gabapentin on discharge from hospital about 13 days ago but his pain has gradually returned. He notes his pain is back to baseline. His pain is most bothersome after eating.   -He continues to report that his pain subsides when he drinks a tall glass of water and passes gas.   -He tried taking gabapentin prior to his meal this morning (rather than with his meal) and found this more effective.         HPI per Dr. Burgos 6/11/2021  HTN, Hx of Hep C, GERD, afib on anticoagulation, morbid obesity, hx of gastric bypass, hx of appendectomy who presents for evaluation of intermittent LLQ abdominal pain.  The patient's pain began 1 year ago without any particular precipitating event and has been Slowly becoming more prevalent since that time.  He reports that his pain is located primarily in LLQ at the ASIS area and does not radiate. Pain tends to be very focal in this area.  The patient describes the pain as deeper, dull and achy.  He reports that the pain is made worse by eating and tight pants or shorts (Draw String).  His pain is improved with drinking water and loosening his pants.  In addition, he reports no associated with the pain changes in position and timing of day.  The patient's pain is most severe after he eats.   He rates his average pain score at 7/10, but it can be as low as 0/10 or as severe as 7/10.   Pain is intermittent, unpredictable, and seems to be relieved with drinking water.  Pain intensity builds quickly and then fades quickly as well.  Lasts 30 min to 3 hours.       He denies any new problems with falls or balance, Denies any new numbness or weakness of the arms or legs, any new bowel or bladder incontinence, any night sweats or unexplained fevers, or any sudden or unexpected weight loss.     Pain Information:   Pain rating: averages 0/10 on a 0-10 scale.    Current pain medications:     Gabapentin 100mg TID     Current MME: 0    Review of [place] Prescription Monitoring Program (): No concern for abuse or misuse of controlled medications based on this report.     Annual Controlled Substance Agreement/UDS due date: NA     Previous medication treatments included:  Anti-convulsants: none  Muscle relaxors: None  Anti-depressants: None  Acetaminophen/NSAIDs: Occasional for other reasons   Topicals: none     Other treatments have included:  Physical therapy: Not for this   Pain Psychology: No  Chiropractic: No  Acupuncture: No  TENs Unit: No  Injections: None for this reason  Surgeries: Gastric bypass, Left TKA, Appendectomy,        Medications:  Current Outpatient Medications   Medication Sig Dispense Refill     apixaban ANTICOAGULANT (ELIQUIS) 5 MG tablet Take 1 tablet (5 mg) by mouth 2 times daily 180 tablet 3     Cyanocobalamin 5000 MCG TBDP Take by mouth daily        diltiazem ER (TIAZAC) 360 MG 24 hr ER beaded capsule Take 1 capsule (360 mg) by mouth daily 90 capsule 3     Ferrous Sulfate (IRON) 325 (65 Fe) MG tablet Take 1 tablet by mouth every other day  90 tablet 3     fluticasone (FLONASE) 50 MCG/ACT nasal spray Spray 2 sprays into both nostrils daily as needed for rhinitis or allergies 54.6 mL 3     gabapentin (NEURONTIN) 100 MG capsule Take 2 capsules (200 mg) by mouth 3 times daily 180 capsule 1     niacin 500 MG tablet Take by mouth daily (with breakfast)       pantoprazole (PROTONIX) 40 MG EC tablet Take 1 tablet (40 mg) by mouth daily 90 tablet 3     tamsulosin (FLOMAX) 0.4 MG capsule Take 1 capsule (0.4 mg) by mouth 2 times daily 180 capsule 3     vitamin C (ASCORBIC ACID) 1000 MG TABS Take 1,000 mg by mouth daily       Vitamin D-Vitamin K (VITAMIN K2-VITAMIN D3 PO) Take 1 tablet by mouth daily        STATIN NOT PRESCRIBED (INTENTIONAL) Please choose reason not prescribed, below, treated for hepatitis C (Patient not taking: Reported on 5/14/2021)         Medical History: any  changes in medical history since they were last seen? Yes- recent SBO    Review of Systems: A 10-point review of systems was negative, with the exception of chronic pain issues and nausea, emesis.    Objective:    Physical Exam:  There were no vitals taken for this visit.  Constitutional: Well developed, well nourished, appears stated age. Overweight.   HEENT: Head atraumatic, normocephalic. Eyes without conjunctival injection or jaundice. Oropharynx clear. Neck supple. No obvious neck masses.  Skin: No rash, lesions, or petechiae of exposed skin.   Psychiatric/mental status: Alert, without lethargy or stupor. Speech fluent. Appropriate affect. Mood normal. Able to follow commands without difficulty.       Video-Visit Details    Type of service:  Video Visit    Video End Time:11:57 AM    Originating Location (pt. Location): Home    Distant Location (provider location):  [facility]NA[facility] [place]     Platform used for Video Visit: [employer]    BILLING TIME DOCUMENTATION:   The total TIME spent on this patient on the date of the encounter/appointment was 38 minutes.      TOTAL TIME includes:   Time spent preparing to see the patient (reviewing records and tests)   Time spent face to face (or over the phone) with the patient   Time spent ordering tests, medications, procedures and referrals   Time spent Referring and communicating with other healthcare professionals   Time spent documenting clinical information in [facility]             Hugo is a 76 year old who is being evaluated via a billable video visit.      How would you like to obtain your AVS? MyChart  If the video visit is dropped, the invitation should be resent by: Send to e-mail at: [email or web address]  Will anyone else be joining your video visit? No      Krystina Blood CMA      Again, thank you for allowing me to participate in the care of your patient.      Sincerely,    BIENVENIDO Arnold CNP

## 2021-07-05 NOTE — PATIENT INSTRUCTIONS
Therapies:   Physical Therapy: no, continue home exercise program.  Clinical Health Psychologist to address issues of relaxation, behavioral change, coping style, and other factors important to improvement: not at this time  Medication Management:   1. Increase gabapentin as directed below. Try adjusting timing of this medication to prior to meals. Monitor pain benefit and call with issues.   Gabapentin 1 tab= 100mg    AM   PM   Bedtime  100mg (1 tab)  100mg (1 tab)  200mg (2 tabs). After 3-5 days, increase as tolerated   200mg (2 tabs)  100mg (1 tab)  200mg (2 tabs). After 3-5 days, increase as tolerated   200mg (2 tabs)  200mg (2 tabs)  200mg (2 tabs).   Call with any problems.  Caution for sedation.    Do not drive until you know how the medication affects you.   You can go slower if you need to or increasing only one dose at a time.  Do not stop abruptly once at higher doses.  This medication must be tapered off.    Further procedures recommended: if not having benefit with gabapentin could consider an injection.   Follow up: with BIENVENIDO Hopson CNP in 4 weeks.

## 2021-07-05 NOTE — PROGRESS NOTES
Hugo is a 76 year old who is being evaluated via a billable video visit.      How would you like to obtain your AVS? MyChart  If the video visit is dropped, the invitation should be resent by: Send to e-mail at: [email or web address]  Will anyone else be joining your video visit? Rachna Blood CMA

## 2021-07-21 ENCOUNTER — OFFICE VISIT (OUTPATIENT)
Dept: CARDIOLOGY | Facility: CLINIC | Age: 76
End: 2021-07-21
Attending: INTERNAL MEDICINE
Payer: COMMERCIAL

## 2021-07-21 VITALS
DIASTOLIC BLOOD PRESSURE: 71 MMHG | WEIGHT: 315 LBS | HEART RATE: 85 BPM | HEIGHT: 72 IN | BODY MASS INDEX: 42.66 KG/M2 | SYSTOLIC BLOOD PRESSURE: 120 MMHG

## 2021-07-21 DIAGNOSIS — I48.19 PERSISTENT ATRIAL FIBRILLATION (H): ICD-10-CM

## 2021-07-21 DIAGNOSIS — I47.29 NSVT (NONSUSTAINED VENTRICULAR TACHYCARDIA) (H): ICD-10-CM

## 2021-07-21 PROCEDURE — 99214 OFFICE O/P EST MOD 30 MIN: CPT | Performed by: INTERNAL MEDICINE

## 2021-07-21 RX ORDER — DILTIAZEM HYDROCHLORIDE 240 MG/1
240 CAPSULE, EXTENDED RELEASE ORAL DAILY
Qty: 90 CAPSULE | Refills: 3 | Status: SHIPPED | OUTPATIENT
Start: 2021-07-21 | End: 2021-08-09

## 2021-07-21 RX ORDER — METOPROLOL SUCCINATE 100 MG/1
100 TABLET, EXTENDED RELEASE ORAL DAILY
Qty: 90 TABLET | Refills: 3 | Status: SHIPPED | OUTPATIENT
Start: 2021-07-21 | End: 2021-08-09

## 2021-07-21 ASSESSMENT — MIFFLIN-ST. JEOR: SCORE: 2258.21

## 2021-07-21 NOTE — PROGRESS NOTES
HPI and Plan:   See dictation    Orders Placed This Encounter   Procedures     Follow-Up with Electrophysiologist     EKG 12-lead complete w/read (Future)- to be scheduled     Echocardiogram Complete       Orders Placed This Encounter   Medications     diltiazem ER (TIAZAC) 240 MG 24 hr ER beaded capsule     Sig: Take 1 capsule (240 mg) by mouth daily     Dispense:  90 capsule     Refill:  3     metoprolol succinate ER (TOPROL XL) 100 MG 24 hr tablet     Sig: Take 1 tablet (100 mg) by mouth daily     Dispense:  90 tablet     Refill:  3       Medications Discontinued During This Encounter   Medication Reason     STATIN NOT PRESCRIBED (INTENTIONAL)      diltiazem ER (TIAZAC) 360 MG 24 hr ER beaded capsule          Encounter Diagnoses   Name Primary?     NSVT (nonsustained ventricular tachycardia) (H)      Persistent atrial fibrillation (H)        CURRENT MEDICATIONS:  Current Outpatient Medications   Medication Sig Dispense Refill     apixaban ANTICOAGULANT (ELIQUIS) 5 MG tablet Take 1 tablet (5 mg) by mouth 2 times daily 180 tablet 3     Cyanocobalamin 5000 MCG TBDP Take by mouth daily        diltiazem ER (TIAZAC) 240 MG 24 hr ER beaded capsule Take 1 capsule (240 mg) by mouth daily 90 capsule 3     Ferrous Sulfate (IRON) 325 (65 Fe) MG tablet Take 1 tablet by mouth every other day  90 tablet 3     fluticasone (FLONASE) 50 MCG/ACT nasal spray Spray 2 sprays into both nostrils daily as needed for rhinitis or allergies 54.6 mL 3     gabapentin (NEURONTIN) 100 MG capsule Take 2 capsules (200 mg) by mouth 3 times daily 180 capsule 1     metoprolol succinate ER (TOPROL XL) 100 MG 24 hr tablet Take 1 tablet (100 mg) by mouth daily 90 tablet 3     niacin 500 MG tablet Take by mouth daily (with breakfast)       pantoprazole (PROTONIX) 40 MG EC tablet Take 1 tablet (40 mg) by mouth daily 90 tablet 3     tamsulosin (FLOMAX) 0.4 MG capsule Take 1 capsule (0.4 mg) by mouth 2 times daily 180 capsule 3     vitamin C (ASCORBIC  ACID) 1000 MG TABS Take 1,000 mg by mouth daily       Vitamin D-Vitamin K (VITAMIN K2-VITAMIN D3 PO) Take 1 tablet by mouth daily          ALLERGIES   No Known Allergies    PAST MEDICAL HISTORY:  Past Medical History:   Diagnosis Date     Arrhythmia      Arthritis      Atrial fibrillation 2006    Followed by [facility] - persistent     Calculus of kidney 2005, 6/06, 10/12, 8/18, 9/19    dr walker/nestor/иван - hematuria - w/u o/w neg     Cervical stenosis of spine     Moderate C4-5     Cholelithiasis      Chronic peptic ulcer, unspecified site, without mention of hemorrhage, perforation, or obstruction 1985    gastric bypass     Colon polyps 8/05, 9/10, 10/15    2 tubular adenoma, 1 hyperplastic - multiple serrated/tubular adenomas - last colonscopy 11/20 due 5 yrs     CVA (cerebral vascular accident) (H) 01/2019    small right periorlandic subcortical - left sided residual - left hand tingling/numbness - Left leg weak/numbness - cardioembolic     First degree AV block      Hepatitis C 10/2012    chronic hepatitis C, grade 1-2, stage 1 - mild - Dr Douglas     Hyperlipidemia LDL goal <70      Hypertension goal BP (blood pressure) < 140/90 06/2006    dr jaciel winchester     Iron deficiency anemia, unspecified 1985    gastric bypass     Nephrolithiasis multiple episodes, last 10/19    Dr Bey/Ivana - 9mm 3/2021     OA (osteoarthritis)     Multiple - Left shoulder with rotator cuff tear - Dr Durham     Obesity, unspecified     s/p gastric bypass 1985      Prediabetes      Presbyacusis 01/2004    dr corona     Pyelonephritis, unspecified 12/1999     SCC (squamous cell carcinoma), 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    dr saez - lt conchal bowl     Sleep apnea 10/2002    cpap - severe - 15 cm - dr cunha     Stroke (H) 01/19/2019     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    [facility] Ne[facility]     Vitamin B12 deficiency (non anemic) 04/15/2019     Vitamin D deficiency 04/15/2019       PAST SURGICAL HISTORY:  Past Surgical History:   Procedure Laterality Date      APPENDECTOMY       APPENDECTOMY       ARTHROPLASTY  08/13/2012    knee     ARTHROPLASTY KNEE  08/13/2012    LT TKA - Dr Villanueva     CARDIOVERSION  2016     CARDIOVERSION       COLONOSCOPY  8/05, 9/10, 10/15    multiple tubular/serrated/hyperplastic polyps     COLONOSCOPY N/A 10/29/2015    multiple tubular and serrated adenomas     COLONOSCOPY N/A 09/07/2016     COLONOSCOPY  11/2020    tubular adenomas - due 5 yrs     COLONOSCOPY N/A 11/24/2020    Procedure: COLONOSCOPY with biopsies;  Surgeon: Chadwick Burgos MD;  Location: [facility]     CYSTOSCOPY W/ LASER LITHOTRIPSY  10/16/2012,5/2/2018     ESOPHAGOSCOPY, GASTROSCOPY, DUODENOSCOPY (EGD), COMBINED N/A 11/24/2020    Procedure: ESOPHAGOGASTRODUODENOSCOPY, COLONOSCOPY with biopsies;  Surgeon: Chadwick Burgos MD;  Location: [facility] OR     EYE SURGERY  Lasik     EYE SURGERY  1/01,6/02,11/02    lasik     GASTRIC BYPASS  1985     HC KNEE SCOPE, DIAGNOSTIC  05/2000    Arthroscopy, Knee RT     LASER HOLMIUM LITHOTRIPSY URETER(S), INSERT STENT, COMBINED  10/16/2012    stones x 4, Dr Campa     LASER HOLMIUM LITHOTRIPSY URETER(S), INSERT STENT, COMBINED Right 05/02/2018    CYSTOSCOPY, RIGHT URETEROSCOPY, HOLMIUM LASER LITHOTRIPSY, RIGHT STENT PLACEMENT - Dr Bey     OTHER SURGICAL HISTORY  1979    cellulitis     OTHER SURGICAL HISTORY Bilateral 11/1998 ,11/99    forearm spur     OTHER SURGICAL HISTORY  07/2006    lihoripsy     OTHER SURGICAL HISTORY  10/08, 12/08    lt ear choncal lesion removal scc     REPLACEMENT TOTAL KNEE  08/13/2012     SURGICAL HISTORY OF -   1985    s/p gastric bypass Bilroth II     SURGICAL HISTORY OF -   11/1998    wisdom teeth     SURGICAL HISTORY OF -   1979    cellulitis     SURGICAL HISTORY OF -   11/1998    lt forearm spur's     SURGICAL HISTORY OF -   1/01,6/02,11/02    s/p lasik     SURGICAL HISTORY OF -   11/1999    rt forearm spurs     SURGICAL HISTORY OF -   07/2006    dr stevenson - lithotrypsy     SURGICAL HISTORY OF -   10/08, 12/08  "   Lt ear chonchal lesion removal SCC - dr saez     SURGICAL HISTORY OF -  Right 10/2019    nephrolithiasis - cystoscopy, rt lithotrypsy, Dr Heath     SURGICAL HISTORY OF -  Right 2019    Cystoscopy, Rt lithotrypsy, Calcium Oxalate, Dr Heath     WISDOM TOOTH EXTRACTION  1998       FAMILY HISTORY:  Family History   Problem Relation Age of Onset     Alzheimer Disease Father 82         at 88     Prostate Cancer Father 76        bladder and prostate     Heart Disease Mother 80        CHF     Heart Disease Maternal Grandfather         MI at 55     Cancer Paternal Uncle         ?     Ulcerative Colitis No family hx of      Crohn's Disease No family hx of      Stomach Cancer No family hx of      GERD No family hx of        SOCIAL HISTORY:  Social History     Socioeconomic History     Marital status:      Spouse name: Mayra     Number of children: 3     Years of education: 21     Highest education level: None   Occupational History     Occupation: retired teacher and football and       Employer: [employer]     Employer: [employer]   Tobacco Use     Smoking status: Never Smoker     Smokeless tobacco: Never Used   Substance and Sexual Activity     Alcohol use: Yes     Alcohol/week: 2.0 standard drinks     Types: 2 Standard drinks or equivalent per week     Comment: 2 per week     Drug use: No     Comment: acknowledges using herbal supplement \"Vibe\" for energy-none used recently      Sexual activity: Not Currently     Partners: Female     Comment:     Other Topics Concern      Service Not Asked     Blood Transfusions Not Asked     Caffeine Concern Yes     Comment: <1 can qd     Occupational Exposure Not Asked     Hobby Hazards Not Asked     Sleep Concern Yes     Comment: sleep apnea, wears cpap     Stress Concern Not Asked     Weight Concern Not Asked     Special Diet Not Asked     Back Care Not Asked     Exercise No     Bike Helmet Not Asked     Seat Belt " "Yes     Self-Exams Not Asked     Parent/sibling w/ CABG, MI or angioplasty before 65F 55M? No   Social History Narrative     None     Social Determinants of Health     Financial Resource Strain:      Difficulty of Paying Living Expenses:    Food Insecurity:      Worried About Running Out of Food in the Last Year:      Ran Out of Food in the Last Year:    Transportation Needs:      Lack of Transportation (Medical):      Lack of Transportation (Non-Medical):    Physical Activity:      Days of Exercise per Week:      Minutes of Exercise per Session:    Stress:      Feeling of Stress :    Social Connections:      Frequency of Communication with Friends and Family:      Frequency of Social Gatherings with Friends and Family:      Attends Yarsanism Services:      Active Member of Clubs or Organizations:      Attends Club or Organization Meetings:      Marital Status:    Intimate Partner Violence:      Fear of Current or Ex-Partner:      Emotionally Abused:      Physically Abused:      Sexually Abused:        Review of Systems:  Skin:  Negative       Eyes:  Positive for glasses    ENT:  Positive for hearing loss    Respiratory:  Positive for sleep apnea;CPAP uses cpap at night   Cardiovascular:  Negative      Gastroenterology: Negative heartburn;reflux;excessive gas or bloating treated  Genitourinary:  Positive for nocturia    Musculoskeletal:  Negative      Neurologic:  Negative      Psychiatric:  Negative      Heme/Lymph/Imm:  Negative      Endocrine:  Negative        Physical Exam:  Vitals: /71   Pulse 85   Ht 1.829 m (6' 0.01\")   Wt 148.5 kg (327 lb 6.4 oz)   BMI 44.39 kg/m      Constitutional:  cooperative, alert and oriented, well developed, well nourished, in no acute distress        Skin:  warm and dry to the touch, no apparent skin lesions or masses noted          Head:  normocephalic, no masses or lesions        Eyes:  pupils equal and round, conjunctivae and lids unremarkable, sclera white, no " xanthalasma, EOMS intact, no nystagmus        Lymph:No Cervical lymphadenopathy present     ENT:  no pallor or cyanosis, dentition good        Neck:  carotid pulses are full and equal bilaterally, JVP normal, no carotid bruit        Respiratory:  normal breath sounds, clear to auscultation, normal A-P diameter, normal symmetry, normal respiratory excursion, no use of accessory muscles         Cardiac: regular rhythm, normal S1/S2, no S3 or S4, apical impulse not displaced, no murmurs, gallops or rubs irregularly irregular rhythm              not assessed this visit                                        GI:  abdomen soft, non-tender, BS normoactive, no mass, no HSM, no bruits        Extremities and Muscular Skeletal:  no deformities, clubbing, cyanosis, erythema observed              Neurological:  no gross motor deficits        Psych:           CC  Matteo Sandoval MD  [address] [place] [address]

## 2021-07-21 NOTE — LETTER
7/21/2021    Brett Cassidy MD  [address] [facility] [address]    RE: Hugo Weber       Dear Colleague,    I had the pleasure of seeing Hugo Weber in the [facility].    HPI and Plan:   See dictation    Orders Placed This Encounter   Procedures     Follow-Up with Electrophysiologist     EKG 12-lead complete w/read (Future)- to be scheduled     Echocardiogram Complete       Orders Placed This Encounter   Medications     diltiazem ER (TIAZAC) 240 MG 24 hr ER beaded capsule     Sig: Take 1 capsule (240 mg) by mouth daily     Dispense:  90 capsule     Refill:  3     metoprolol succinate ER (TOPROL XL) 100 MG 24 hr tablet     Sig: Take 1 tablet (100 mg) by mouth daily     Dispense:  90 tablet     Refill:  3       Medications Discontinued During This Encounter   Medication Reason     STATIN NOT PRESCRIBED (INTENTIONAL)      diltiazem ER (TIAZAC) 360 MG 24 hr ER beaded capsule          Encounter Diagnoses   Name Primary?     NSVT (nonsustained ventricular tachycardia) (H)      Persistent atrial fibrillation (H)        CURRENT MEDICATIONS:  Current Outpatient Medications   Medication Sig Dispense Refill     apixaban ANTICOAGULANT (ELIQUIS) 5 MG tablet Take 1 tablet (5 mg) by mouth 2 times daily 180 tablet 3     Cyanocobalamin 5000 MCG TBDP Take by mouth daily        diltiazem ER (TIAZAC) 240 MG 24 hr ER beaded capsule Take 1 capsule (240 mg) by mouth daily 90 capsule 3     Ferrous Sulfate (IRON) 325 (65 Fe) MG tablet Take 1 tablet by mouth every other day  90 tablet 3     fluticasone (FLONASE) 50 MCG/ACT nasal spray Spray 2 sprays into both nostrils daily as needed for rhinitis or allergies 54.6 mL 3     gabapentin (NEURONTIN) 100 MG capsule Take 2 capsules (200 mg) by mouth 3 times daily 180 capsule 1     metoprolol succinate ER (TOPROL XL) 100 MG 24 hr tablet Take 1 tablet (100 mg) by mouth daily 90 tablet 3     niacin 500 MG tablet Take by  mouth daily (with breakfast)       pantoprazole (PROTONIX) 40 MG EC tablet Take 1 tablet (40 mg) by mouth daily 90 tablet 3     tamsulosin (FLOMAX) 0.4 MG capsule Take 1 capsule (0.4 mg) by mouth 2 times daily 180 capsule 3     vitamin C (ASCORBIC ACID) 1000 MG TABS Take 1,000 mg by mouth daily       Vitamin D-Vitamin K (VITAMIN K2-VITAMIN D3 PO) Take 1 tablet by mouth daily          ALLERGIES   No Known Allergies    PAST MEDICAL HISTORY:  Past Medical History:   Diagnosis Date     Arrhythmia      Arthritis      Atrial fibrillation 2006    Followed by [facility] - persistent     Calculus of kidney 2005, 6/06, 10/12, 8/18, 9/19    dr walker/nestor/иван - hematuria - w/u o/w neg     Cervical stenosis of spine     Moderate C4-5     Cholelithiasis      Chronic peptic ulcer, unspecified site, without mention of hemorrhage, perforation, or obstruction 1985    gastric bypass     Colon polyps 8/05, 9/10, 10/15    2 tubular adenoma, 1 hyperplastic - multiple serrated/tubular adenomas - last colonscopy 11/20 due 5 yrs     CVA (cerebral vascular accident) (H) 01/2019    small right periorlandic subcortical - left sided residual - left hand tingling/numbness - Left leg weak/numbness - cardioembolic     First degree AV block      Hepatitis C 10/2012    chronic hepatitis C, grade 1-2, stage 1 - mild - Dr Douglas     Hyperlipidemia LDL goal <70      Hypertension goal BP (blood pressure) < 140/90 06/2006    dr jaciel winchester     Iron deficiency anemia, unspecified 1985    gastric bypass     Nephrolithiasis multiple episodes, last 10/19    Dr Bey/Ivana - 9mm 3/2021     OA (osteoarthritis)     Multiple - Left shoulder with rotator cuff tear - Dr Durham     Obesity, unspecified     s/p gastric bypass 1985      Prediabetes      Presbyacusis 01/2004    dr corona     Pyelonephritis, unspecified 12/1999     SCC (squamous cell carcinoma), 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    dr asez - lt conchal bowl     Sleep apnea 10/2002    cpap - severe - 15 cm - dr cunha      Stroke (H) 01/19/2019     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    Mn Head and Neck     Vitamin B12 deficiency (non anemic) 04/15/2019     Vitamin D deficiency 04/15/2019       PAST SURGICAL HISTORY:  Past Surgical History:   Procedure Laterality Date     APPENDECTOMY       APPENDECTOMY       ARTHROPLASTY  08/13/2012    knee     ARTHROPLASTY KNEE  08/13/2012    LT TKA - Dr Villanueva     CARDIOVERSION  2016     CARDIOVERSION       COLONOSCOPY  8/05, 9/10, 10/15    multiple tubular/serrated/hyperplastic polyps     COLONOSCOPY N/A 10/29/2015    multiple tubular and serrated adenomas     COLONOSCOPY N/A 09/07/2016     COLONOSCOPY  11/2020    tubular adenomas - due 5 yrs     COLONOSCOPY N/A 11/24/2020    Procedure: COLONOSCOPY with biopsies;  Surgeon: Chadwick Burgos MD;  Location: [facility]     CYSTOSCOPY W/ LASER LITHOTRIPSY  10/16/2012,5/2/2018     ESOPHAGOSCOPY, GASTROSCOPY, DUODENOSCOPY (EGD), COMBINED N/A 11/24/2020    Procedure: ESOPHAGOGASTRODUODENOSCOPY, COLONOSCOPY with biopsies;  Surgeon: Chadwick Burgos MD;  Location: [facility] OR     EYE SURGERY  Lasik     EYE SURGERY  1/01,6/02,11/02    lasik     GASTRIC BYPASS  1985     HC KNEE SCOPE, DIAGNOSTIC  05/2000    Arthroscopy, Knee RT     LASER HOLMIUM LITHOTRIPSY URETER(S), INSERT STENT, COMBINED  10/16/2012    stones x 4, Dr Campa     LASER HOLMIUM LITHOTRIPSY URETER(S), INSERT STENT, COMBINED Right 05/02/2018    CYSTOSCOPY, RIGHT URETEROSCOPY, HOLMIUM LASER LITHOTRIPSY, RIGHT STENT PLACEMENT - Dr Bey     OTHER SURGICAL HISTORY  1979    cellulitis     OTHER SURGICAL HISTORY Bilateral 11/1998 ,11/99    forearm spur     OTHER SURGICAL HISTORY  07/2006    lihoripsy     OTHER SURGICAL HISTORY  10/08, 12/08    lt ear choncal lesion removal scc     REPLACEMENT TOTAL KNEE  08/13/2012     SURGICAL HISTORY OF -   1985    s/p gastric bypass Bilroth II     SURGICAL HISTORY OF -   11/1998    wisdom teeth     SURGICAL HISTORY OF -   1979    cellulitis     SURGICAL HISTORY OF -    "1998    lt forearm spur's     SURGICAL HISTORY OF -   ,,    s/p lasik     SURGICAL HISTORY OF -   1999    rt forearm spurs     SURGICAL HISTORY OF -   2006    dr stevenson - lithotrypsy     SURGICAL HISTORY OF -   10/08,     Lt ear chonchal lesion removal SCC - dr saez     SURGICAL HISTORY OF -  Right 10/2019    nephrolithiasis - cystoscopy, rt lithotrypsy, Dr Heath     SURGICAL HISTORY OF -  Right 2019    Cystoscopy, Rt lithotrypsy, Calcium Oxalate, Dr Heath     WISDOM TOOTH EXTRACTION  1998       FAMILY HISTORY:  Family History   Problem Relation Age of Onset     Alzheimer Disease Father 82         at 88     Prostate Cancer Father 76        bladder and prostate     Heart Disease Mother 80        CHF     Heart Disease Maternal Grandfather         MI at 55     Cancer Paternal Uncle         ?     Ulcerative Colitis No family hx of      Crohn's Disease No family hx of      Stomach Cancer No family hx of      GERD No family hx of        SOCIAL HISTORY:  Social History     Socioeconomic History     Marital status:      Spouse name: Mayra     Number of children: 3     Years of education: 21     Highest education level: None   Occupational History     Occupation: retired teacher and football and       Employer: [email or web address]     Employer: RETIRED   Tobacco Use     Smoking status: Never Smoker     Smokeless tobacco: Never Used   Substance and Sexual Activity     Alcohol use: Yes     Alcohol/week: 2.0 standard drinks     Types: 2 Standard drinks or equivalent per week     Comment: 2 per week     Drug use: No     Comment: acknowledges using herbal supplement \"Vibe\" for energy-none used recently      Sexual activity: Not Currently     Partners: Female     Comment:     Other Topics Concern      Service Not Asked     Blood Transfusions Not Asked     Caffeine Concern Yes     Comment: <1 can qd     Occupational Exposure Not Asked     " "Hobby Hazards Not Asked     Sleep Concern Yes     Comment: sleep apnea, wears cpap     Stress Concern Not Asked     Weight Concern Not Asked     Special Diet Not Asked     Back Care Not Asked     Exercise No     Bike Helmet Not Asked     Seat Belt Yes     Self-Exams Not Asked     Parent/sibling w/ CABG, MI or angioplasty before 65F 55M? No   Social History Narrative     None     Social Determinants of Health     Financial Resource Strain:      Difficulty of Paying Living Expenses:    Food Insecurity:      Worried About Running Out of Food in the Last Year:      Ran Out of Food in the Last Year:    Transportation Needs:      Lack of Transportation (Medical):      Lack of Transportation (Non-Medical):    Physical Activity:      Days of Exercise per Week:      Minutes of Exercise per Session:    Stress:      Feeling of Stress :    Social Connections:      Frequency of Communication with Friends and Family:      Frequency of Social Gatherings with Friends and Family:      Attends Jehovah's witness Services:      Active Member of Clubs or Organizations:      Attends Club or Organization Meetings:      Marital Status:    Intimate Partner Violence:      Fear of Current or Ex-Partner:      Emotionally Abused:      Physically Abused:      Sexually Abused:        Review of Systems:  Skin:  Negative       Eyes:  Positive for glasses    ENT:  Positive for hearing loss    Respiratory:  Positive for sleep apnea;CPAP uses cpap at night   Cardiovascular:  Negative      Gastroenterology: Negative heartburn;reflux;excessive gas or bloating treated  Genitourinary:  Positive for nocturia    Musculoskeletal:  Negative      Neurologic:  Negative      Psychiatric:  Negative      Heme/Lymph/Imm:  Negative      Endocrine:  Negative        Physical Exam:  Vitals: /71   Pulse 85   Ht 1.829 m (6' 0.01\")   Wt 148.5 kg (327 lb 6.4 oz)   BMI 44.39 kg/m      Constitutional:  cooperative, alert and oriented, well developed, well nourished, in no " acute distress        Skin:  warm and dry to the touch, no apparent skin lesions or masses noted          Head:  normocephalic, no masses or lesions        Eyes:  pupils equal and round, conjunctivae and lids unremarkable, sclera white, no xanthalasma, EOMS intact, no nystagmus        Lymph:No Cervical lymphadenopathy present     ENT:  no pallor or cyanosis, dentition good        Neck:  carotid pulses are full and equal bilaterally, JVP normal, no carotid bruit        Respiratory:  normal breath sounds, clear to auscultation, normal A-P diameter, normal symmetry, normal respiratory excursion, no use of accessory muscles         Cardiac: regular rhythm, normal S1/S2, no S3 or S4, apical impulse not displaced, no murmurs, gallops or rubs irregularly irregular rhythm              not assessed this visit                                        GI:  abdomen soft, non-tender, BS normoactive, no mass, no HSM, no bruits        Extremities and Muscular Skeletal:  no deformities, clubbing, cyanosis, erythema observed              Neurological:  no gross motor deficits        Psych:           CC  Matteo Sandoval MD  [address] [place] [address]                  Thank you for allowing me to participate in the care of your patient.      Sincerely,     Sarah Beth Castillo MD     [facility]  cc:   Matteo Sandoval MD  [address] [place] [address]

## 2021-07-21 NOTE — PROGRESS NOTES
Service Date: 2021    CLINIC NOTE    HISTORY OF PRESENT ILLNESS:  I saw Mr. Weber for followup of atrial fibrillation.  He is a 76-year-old white male with severe obesity and persistent atrial fibrillation.  He has been on rate control and anticoagulation with relatively stable cardiac conditions.  Over the last few months, he has had 2 hospitalizations, one for a UTI in March and another one for small bowel obstruction in .  During the last hospitalization, he was found to have nonsustained VT on telemetry.  The echocardiography showed LV ejection fraction about 45%-50%.  There was mild aortic stenosis.  The patient has no palpitation, shortness of breath or dizziness.    PHYSICAL EXAMINATION:    VITAL SIGNS:  Blood pressure 120/71, heart rate 85 beats per minute, body weight down to 327 pounds.    EYES/ENT:  Unremarkable.     LUNGS:  No crackles or wheezing.    CARDIOVASCULAR:  The cardiac rhythm was irregularly irregular.  Heart sounds were normal.  There was a high-pitched 3rd degree systolic murmur in the left parasternal area at the 3rd intercostal space.    ABDOMEN:  Severe obesity.  EXTREMITIES:  There was no pedal edema.    ASSESSMENT AND RECOMMENDATIONS:  Mr. Weber is doing well symptomatically from the Cardiology point of view.  For management of his nonsustained VT, I have reduced the dose of diltiazem from 360-240 mg once a day.  The patient will add Toprol  mg p.o. daily.  He will continue anticoagulation.  He is encouraged to continue the effort at weight loss.  He will have a repeat echocardiography followed by a visit with me in 1 year.    cc:  [place] [address]    Sarah Beth Castillo MD        D: 2021   T: 2021   MT: hector    Name:     GAYATHRI WEBER  MRN:      [id number]        Account:      [id number]   :      1945           Service Date: 2021       Document:  [id number]

## 2021-08-05 NOTE — PROGRESS NOTES
Hugo is a 76 year old who is being evaluated via a billable video visit.      How would you like to obtain your AVS? MyChart  If the video visit is dropped, the invitation should be resent by: Send to e-mail at: [email or web address]  Will anyone else be joining your video visit? No      Video Start Time: 9:08 AM    [facility]     Date of visit: 8/6/2021      Assessment:   Hugo is a 76 year-old male with past medical history significant for gastricbypass, appendectomy, BPH, HTN, paroxysmal atrial fibrillation, NAFLD, HLD, nephrolithiasis, HALYEE, OA, GERD, CVA who presents with complaints of chronic abdominal pain    1. Unclear but most likely cause of intermittent pain is the left lateral femoral cutaneous nerve leading to focal pain at the ASIS.     Visit Diagnoses:  1. Abdominal pain, generalized    2. Neuropathy        Plan:    Therapies:   Physical Therapy: no, continue home exercise program.  Clinical Health Psychologist to address issues of relaxation, behavioral change, coping style, and other factors important to improvement: not at this time  Medication Management:   1.  Hugo reports fatigue and leg weakness over the last 10 days. I think that it is unlikely that these symptoms are related to gabapentin as he reached 200mg TID dosing weeks ago. That being said, he would like to try to decrease slightly to determine impact. Advised he decrease gabapentin to [phone] for 1 week. Can reduce to [phone] if tolerated well. Advised he monitor symptoms. If pain worsens, okay to return to previous dosing.   2. Advised he follow up with cardiology regarding symptoms that started with metoprolol.   Further procedures recommended: if not having benefit with gabapentin could consider an US guided LFCN block on the left as recommended by Dr. Burgos.   As his pain is problematic specifically with eating and drinking water helps, advised he try drinking a full glass of water after  eating with every meal to determine if this makes a difference.   Follow up: with BIENVENIDO Hopson CNP in 4 weeks.     Breana FARIA CNP  [facility]     -------------------------------------------------    Subjective:    Chief complaint:   Chief Complaint   Patient presents with     Follow Up       Interval history:  Hugo Weber is a 76 year old male last seen on 7/5/2021.  He is a patient of Dr. Burgos seen in follow up.     Recommendations/plan at the last visit included:  Therapies:   Physical Therapy: no, continue home exercise program.  Clinical Health Psychologist to address issues of relaxation, behavioral change, coping style, and other factors important to improvement: not at this time  Medication Management:   1. Initiation of gabapentin completely eliminated Hugo's pain for a time. Curiously, this pain relief lasted while it was paused during a recent hospitalization. His pain has gradually returned since restarting. As his pain is worse with eating, advised he try adjusting timing of this medication to prior to meals and increase gabapentin as directed below. Monitor pain benefit and call with issues.   Gabapentin 1 tab= 100mg    AM   PM   Bedtime  100mg (1 tab)  100mg (1 tab)  200mg (2 tabs). After 3-5 days, increase as tolerated   200mg (2 tabs)  100mg (1 tab)  200mg (2 tabs). After 3-5 days, increase as tolerated   200mg (2 tabs)  200mg (2 tabs)  200mg (2 tabs).   Call with any problems.  Caution for sedation.    Do not drive until you know how the medication affects you.   You can go slower if you need to or increasing only one dose at a time.  Do not stop abruptly once at higher doses.  This medication must be tapered off.  Further procedures recommended: if not having benefit with gabapentin could consider an US guided LFCN block on the left as recommended by Dr. Burgos.   Follow up: with BIENVENIDO Hopson CNP in 4 weeks.         Since his last visit,  "Hugo Weber reports:  -His pain is about the same as it was at last visit, may be somewhat better.   -He notes his pain is most significant with eating, can be mild but can also be severe. \"The pain revolves around the food.\"   -His pain usually lasts for 15 minutes, continues to improve some with water and the passing of gas.   -He increased gabapentin to 200mg TID as directed. Unclear of how much this medication is helping, intensity and frequency of pain seems similar. His pain is variable, at times does seem better.   -Of note, he reports decreased energy level over the last 10 days or so, also reports leg weakness. He is able to walk for short distances because of these side effects. He at first notes that this seems related to gabapentin but after discussion comments that he started metoprolol around this time.   -He would like to continue gabapentin but maybe at a reduce dose.     HPI per Dr. Burgos 6/11/2021  HTN, Hx of Hep C, GERD, afib on anticoagulation, morbid obesity, hx of gastric bypass, hx of appendectomy who presents for evaluation of intermittent LLQ abdominal pain.  The patient's pain began 1 year ago without any particular precipitating event and has been Slowly becoming more prevalent since that time.  He reports that his pain is located primarily in LLQ at the ASIS area and does not radiate. Pain tends to be very focal in this area.  The patient describes the pain as deeper, dull and achy.  He reports that the pain is made worse by eating and tight pants or shorts (Draw String).  His pain is improved with drinking water and loosening his pants.  In addition, he reports no associated with the pain changes in position and timing of day.  The patient's pain is most severe after he eats.   He rates his average pain score at 7/10, but it can be as low as 0/10 or as severe as 7/10.   Pain is intermittent, unpredictable, and seems to be relieved with drinking water.  Pain intensity builds " quickly and then fades quickly as well.  Lasts 30 min to 3 hours.       He denies any new problems with falls or balance, Denies any new numbness or weakness of the arms or legs, any new bowel or bladder incontinence, any night sweats or unexplained fevers, or any sudden or unexpected weight loss.     Pain Information:   Pain rating: averages 0/10 on a 0-10 scale.    Current pain medications:    Gabapentin 200mg TID- ?, SE, fatigue, leg weakness    Current MME: 0    Review of [place] Prescription Monitoring Program (): No concern for abuse or misuse of controlled medications based on this report.     Annual Controlled Substance Agreement/UDS due date: NA     Previous medication treatments included:  Anti-convulsants: none  Muscle relaxors: None  Anti-depressants: None  Acetaminophen/NSAIDs: Occasional for other reasons   Topicals: none     Other treatments have included:  Physical therapy: Not for this   Pain Psychology: No  Chiropractic: No  Acupuncture: No  TENs Unit: No  Injections: None for this reason  Surgeries: Gastric bypass, Left TKA, Appendectomy,        Medications:  Current Outpatient Medications   Medication Sig Dispense Refill     apixaban ANTICOAGULANT (ELIQUIS) 5 MG tablet Take 1 tablet (5 mg) by mouth 2 times daily 180 tablet 3     Cyanocobalamin 5000 MCG TBDP Take by mouth daily        diltiazem ER (TIAZAC) 240 MG 24 hr ER beaded capsule Take 1 capsule (240 mg) by mouth daily 90 capsule 3     Ferrous Sulfate (IRON) 325 (65 Fe) MG tablet Take 1 tablet by mouth every other day  90 tablet 3     fluticasone (FLONASE) 50 MCG/ACT nasal spray Spray 2 sprays into both nostrils daily as needed for rhinitis or allergies 54.6 mL 3     gabapentin (NEURONTIN) 100 MG capsule Take 2 capsules (200 mg) by mouth 3 times daily 180 capsule 1     metoprolol succinate ER (TOPROL XL) 100 MG 24 hr tablet Take 1 tablet (100 mg) by mouth daily 90 tablet 3     niacin 500 MG tablet Take by mouth daily (with breakfast)        pantoprazole (PROTONIX) 40 MG EC tablet Take 1 tablet (40 mg) by mouth daily 90 tablet 3     tamsulosin (FLOMAX) 0.4 MG capsule Take 1 capsule (0.4 mg) by mouth 2 times daily 180 capsule 3     vitamin C (ASCORBIC ACID) 1000 MG TABS Take 1,000 mg by mouth daily       Vitamin D-Vitamin K (VITAMIN K2-VITAMIN D3 PO) Take 1 tablet by mouth daily          Medical History: any changes in medical history since they were last seen? No    Review of Systems: A 10-point review of systems was negative, with the exception of chronic pain issues..    Objective:    Physical Exam:  There were no vitals taken for this visit.  Constitutional: Well developed, well nourished, appears stated age. Overweight.   HEENT: Head atraumatic, normocephalic. Eyes without conjunctival injection or jaundice. Oropharynx clear. Neck supple. No obvious neck masses.  Skin: No rash, lesions, or petechiae of exposed skin.   Psychiatric/mental status: Alert, without lethargy or stupor. Speech fluent. Appropriate affect. Mood normal. Able to follow commands without difficulty.       Video-Visit Details    Type of service:  Video Visit    Video End Time:9:31 AM    Originating Location (pt. Location): Home    Distant Location (provider location):  [facility] [place]     Platform used for Video Visit: [employer]     BILLING TIME DOCUMENTATION:   The total TIME spent on this patient on the date of the encounter/appointment was 30 minutes.      TOTAL TIME includes:   Time spent preparing to see the patient (reviewing records and tests)   Time spent face to face (or over the phone) with the patient   Time spent ordering tests, medications, procedures and referrals   Time spent Referring and communicating with other healthcare professionals   Time spent documenting clinical information in Epic

## 2021-08-06 ENCOUNTER — TELEPHONE (OUTPATIENT)
Dept: CARDIOLOGY | Facility: CLINIC | Age: 76
End: 2021-08-06

## 2021-08-06 ENCOUNTER — VIRTUAL VISIT (OUTPATIENT)
Dept: ANESTHESIOLOGY | Facility: CLINIC | Age: 76
End: 2021-08-06
Payer: COMMERCIAL

## 2021-08-06 DIAGNOSIS — G62.9 NEUROPATHY: ICD-10-CM

## 2021-08-06 DIAGNOSIS — R10.84 ABDOMINAL PAIN, GENERALIZED: ICD-10-CM

## 2021-08-06 PROCEDURE — 99214 OFFICE O/P EST MOD 30 MIN: CPT | Mod: 95 | Performed by: NURSE PRACTITIONER

## 2021-08-06 RX ORDER — GABAPENTIN 100 MG/1
200 CAPSULE ORAL 3 TIMES DAILY
Qty: 180 CAPSULE | Refills: 1 | Status: SHIPPED | OUTPATIENT
Start: 2021-08-06 | End: 2021-11-05

## 2021-08-06 NOTE — NURSING NOTE
Patient stated that he thinks the medication he is taking is giving him less energy as he isnt able to do as much at the gym as he did prior to taking.

## 2021-08-06 NOTE — TELEPHONE ENCOUNTER
Patient called reporting since starting metoprolol after OV on 7/21 he has noticed he is more fatigued.  Takes both diltiazem and metoprolol in the morning.  Suggested to try taking metoprolol in the evening for a few days to see if his symptoms improve. He will start tomorrow and call next week with update on symptoms.  JAZZ Stone

## 2021-08-06 NOTE — PATIENT INSTRUCTIONS
Therapies:   Physical Therapy: no, continue home exercise program.  Clinical Health Psychologist to address issues of relaxation, behavioral change, coping style, and other factors important to improvement: not at this time  Medication Management:   1.  Lets decrease gabapentin to [phone] for 1 week. Monitor if fatigue and leg weakness improves and for worsening of pain. If not, decrease to [phone]. Again monitor symptoms. If pain worsens, okay to return to previous dosing.   2. Follow up with cardiology regarding symptoms that started with metoprolol.   Further procedures recommended: if not having benefit with gabapentin could consider an US guided LFCN block on the left as recommended by Dr. Burgos.   Try drinking a full glass of water after eating with every meal to determine if this makes a difference.   Follow up: with BIENVENIDO Hopson CNP in 4 weeks.

## 2021-08-06 NOTE — LETTER
8/6/2021       RE: Hugo Weber  [id number]  [facility] [address]     Dear Colleague,    Thank you for referring your patient, Hugo Weber, to the [facility] [place] at [facility]. Please see a copy of my visit note below.    [facility]     Date of visit: 8/6/2021      Assessment:   Hugo is a 76 year-old male with past medical history significant for gastricbypass, appendectomy, BPH, HTN, paroxysmal atrial fibrillation, NAFLD, HLD, nephrolithiasis, HAYLEE, OA, GERD, CVA who presents with complaints of chronic abdominal pain    1. Unclear but most likely cause of intermittent pain is the left lateral femoral cutaneous nerve leading to focal pain at the ASIS.     Visit Diagnoses:  1. Abdominal pain, generalized    2. Neuropathy        Plan:    Therapies:   Physical Therapy: no, continue home exercise program.  Clinical Health Psychologist to address issues of relaxation, behavioral change, coping style, and other factors important to improvement: not at this time  Medication Management:   1.  Hugo reports fatigue and leg weakness over the last 10 days. I think that it is unlikely that these symptoms are related to gabapentin as he reached 200mg TID dosing weeks ago. That being said, he would like to try to decrease slightly to determine impact. Advised he decrease gabapentin to [phone] for 1 week. Can reduce to [phone] if tolerated well. Advised he monitor symptoms. If pain worsens, okay to return to previous dosing.   2. Advised he follow up with cardiology regarding symptoms that started with metoprolol.   Further procedures recommended: if not having benefit with gabapentin could consider an US guided LFCN block on the left as recommended by Dr. Burgos.   As his pain is problematic specifically with eating and drinking water helps, advised he try drinking a full glass of  water after eating with every meal to determine if this makes a difference.   Follow up: with BIENVENIDO Hopson CNP in 4 weeks.     Breana FARIA CNP  [facility]     -------------------------------------------------    Subjective:    Chief complaint:   Chief Complaint   Patient presents with     Follow Up       Interval history:  Hugo Weber is a 76 year old male last seen on 7/5/2021.  He is a patient of Dr. Burgos seen in follow up.     Recommendations/plan at the last visit included:  Therapies:   Physical Therapy: no, continue home exercise program.  Clinical Health Psychologist to address issues of relaxation, behavioral change, coping style, and other factors important to improvement: not at this time  Medication Management:   1. Initiation of gabapentin completely eliminated Hugo's pain for a time. Curiously, this pain relief lasted while it was paused during a recent hospitalization. His pain has gradually returned since restarting. As his pain is worse with eating, advised he try adjusting timing of this medication to prior to meals and increase gabapentin as directed below. Monitor pain benefit and call with issues.   Gabapentin 1 tab= 100mg    AM   PM   Bedtime  100mg (1 tab)  100mg (1 tab)  200mg (2 tabs). After 3-5 days, increase as tolerated   200mg (2 tabs)  100mg (1 tab)  200mg (2 tabs). After 3-5 days, increase as tolerated   200mg (2 tabs)  200mg (2 tabs)  200mg (2 tabs).   Call with any problems.  Caution for sedation.    Do not drive until you know how the medication affects you.   You can go slower if you need to or increasing only one dose at a time.  Do not stop abruptly once at higher doses.  This medication must be tapered off.  Further procedures recommended: if not having benefit with gabapentin could consider an US guided LFCN block on the left as recommended by Dr. Burgos.   Follow up: with BIENVENIDO Hopson CNP in 4 weeks.             Since his  "last visit, Hugo Weber reports:  -His pain is about the same as it was at last visit, may be somewhat better.   -He notes his pain is most significant with eating, can be mild but can also be severe. \"The pain revolves around the food.\"   -His pain usually lasts for 15 minutes, continues to improve some with water and the passing of gas.   -He increased gabapentin to 200mg TID as directed. Unclear of how much this medication is helping, intensity and frequency of pain seems similar. His pain is variable, at times does seem better.   -Of note, he reports decreased energy level over the last 10 days or so, also reports leg weakness. He is able to walk for short distances because of these side effects. He at first notes that this seems related to gabapentin but after discussion comments that he started metoprolol around this time.   -He would like to continue gabapentin but maybe at a reduce dose.     HPI per Dr. Burgos 6/11/2021  HTN, Hx of Hep C, GERD, afib on anticoagulation, morbid obesity, hx of gastric bypass, hx of appendectomy who presents for evaluation of intermittent LLQ abdominal pain.  The patient's pain began 1 year ago without any particular precipitating event and has been Slowly becoming more prevalent since that time.  He reports that his pain is located primarily in LLQ at the ASIS area and does not radiate. Pain tends to be very focal in this area.  The patient describes the pain as deeper, dull and achy.  He reports that the pain is made worse by eating and tight pants or shorts (Draw String).  His pain is improved with drinking water and loosening his pants.  In addition, he reports no associated with the pain changes in position and timing of day.  The patient's pain is most severe after he eats.   He rates his average pain score at 7/10, but it can be as low as 0/10 or as severe as 7/10.   Pain is intermittent, unpredictable, and seems to be relieved with drinking water.  Pain intensity " builds quickly and then fades quickly as well.  Lasts 30 min to 3 hours.       He denies any new problems with falls or balance, Denies any new numbness or weakness of the arms or legs, any new bowel or bladder incontinence, any night sweats or unexplained fevers, or any sudden or unexpected weight loss.     Pain Information:   Pain rating: averages 0/10 on a 0-10 scale.    Current pain medications:    Gabapentin 200mg TID- ?, SE, fatigue, leg weakness    Current MME: 0    Review of [place] Prescription Monitoring Program (): No concern for abuse or misuse of controlled medications based on this report.     Annual Controlled Substance Agreement/UDS due date: NA     Previous medication treatments included:  Anti-convulsants: none  Muscle relaxors: None  Anti-depressants: None  Acetaminophen/NSAIDs: Occasional for other reasons   Topicals: none     Other treatments have included:  Physical therapy: Not for this   Pain Psychology: No  Chiropractic: No  Acupuncture: No  TENs Unit: No  Injections: None for this reason  Surgeries: Gastric bypass, Left TKA, Appendectomy,        Medications:  Current Outpatient Medications   Medication Sig Dispense Refill     apixaban ANTICOAGULANT (ELIQUIS) 5 MG tablet Take 1 tablet (5 mg) by mouth 2 times daily 180 tablet 3     Cyanocobalamin 5000 MCG TBDP Take by mouth daily        diltiazem ER (TIAZAC) 240 MG 24 hr ER beaded capsule Take 1 capsule (240 mg) by mouth daily 90 capsule 3     Ferrous Sulfate (IRON) 325 (65 Fe) MG tablet Take 1 tablet by mouth every other day  90 tablet 3     fluticasone (FLONASE) 50 MCG/ACT nasal spray Spray 2 sprays into both nostrils daily as needed for rhinitis or allergies 54.6 mL 3     gabapentin (NEURONTIN) 100 MG capsule Take 2 capsules (200 mg) by mouth 3 times daily 180 capsule 1     metoprolol succinate ER (TOPROL XL) 100 MG 24 hr tablet Take 1 tablet (100 mg) by mouth daily 90 tablet 3     niacin 500 MG tablet Take by mouth daily (with  breakfast)       pantoprazole (PROTONIX) 40 MG EC tablet Take 1 tablet (40 mg) by mouth daily 90 tablet 3     tamsulosin (FLOMAX) 0.4 MG capsule Take 1 capsule (0.4 mg) by mouth 2 times daily 180 capsule 3     vitamin C (ASCORBIC ACID) 1000 MG TABS Take 1,000 mg by mouth daily       Vitamin D-Vitamin K (VITAMIN K2-VITAMIN D3 PO) Take 1 tablet by mouth daily          Medical History: any changes in medical history since they were last seen? No    Review of Systems: A 10-point review of systems was negative, with the exception of chronic pain issues..    Objective:    Physical Exam:  There were no vitals taken for this visit.  Constitutional: Well developed, well nourished, appears stated age. Overweight.   HEENT: Head atraumatic, normocephalic. Eyes without conjunctival injection or jaundice. Oropharynx clear. Neck supple. No obvious neck masses.  Skin: No rash, lesions, or petechiae of exposed skin.   Psychiatric/mental status: Alert, without lethargy or stupor. Speech fluent. Appropriate affect. Mood normal. Able to follow commands without difficulty.         BILLING TIME DOCUMENTATION:   The total TIME spent on this patient on the date of the encounter/appointment was 30 minutes.      TOTAL TIME includes:   Time spent preparing to see the patient (reviewing records and tests)   Time spent face to face (or over the phone) with the patient   Time spent ordering tests, medications, procedures and referrals   Time spent Referring and communicating with other healthcare professionals   Time spent documenting clinical information in Epic         Again, thank you for allowing me to participate in the care of your patient.      Sincerely,    BIENVENIDO Arnold CNP

## 2021-08-09 DIAGNOSIS — I47.29 NSVT (NONSUSTAINED VENTRICULAR TACHYCARDIA) (H): ICD-10-CM

## 2021-08-09 DIAGNOSIS — I48.19 PERSISTENT ATRIAL FIBRILLATION (H): ICD-10-CM

## 2021-08-09 RX ORDER — METOPROLOL SUCCINATE 100 MG/1
100 TABLET, EXTENDED RELEASE ORAL DAILY
Qty: 90 TABLET | Refills: 3 | Status: SHIPPED | OUTPATIENT
Start: 2021-08-09 | End: 2022-07-22

## 2021-08-09 RX ORDER — DILTIAZEM HYDROCHLORIDE 240 MG/1
240 CAPSULE, EXTENDED RELEASE ORAL DAILY
Qty: 90 CAPSULE | Refills: 3 | Status: SHIPPED | OUTPATIENT
Start: 2021-08-09 | End: 2022-07-22

## 2021-08-11 ENCOUNTER — OFFICE VISIT (OUTPATIENT)
Dept: FAMILY MEDICINE | Facility: CLINIC | Age: 76
End: 2021-08-11
Payer: COMMERCIAL

## 2021-08-11 VITALS
RESPIRATION RATE: 16 BRPM | DIASTOLIC BLOOD PRESSURE: 62 MMHG | OXYGEN SATURATION: 97 % | HEIGHT: 72 IN | TEMPERATURE: 98.6 F | SYSTOLIC BLOOD PRESSURE: 118 MMHG | WEIGHT: 315 LBS | HEART RATE: 52 BPM | BODY MASS INDEX: 42.66 KG/M2

## 2021-08-11 DIAGNOSIS — I10 HYPERTENSION GOAL BP (BLOOD PRESSURE) < 140/90: ICD-10-CM

## 2021-08-11 DIAGNOSIS — Z00.00 ROUTINE HISTORY AND PHYSICAL EXAMINATION OF ADULT: Primary | ICD-10-CM

## 2021-08-11 DIAGNOSIS — E78.5 HYPERLIPIDEMIA LDL GOAL <70: ICD-10-CM

## 2021-08-11 DIAGNOSIS — E55.9 VITAMIN D DEFICIENCY: ICD-10-CM

## 2021-08-11 DIAGNOSIS — Z12.5 SCREENING FOR PROSTATE CANCER: ICD-10-CM

## 2021-08-11 DIAGNOSIS — Z86.19 HISTORY OF HEPATITIS C: ICD-10-CM

## 2021-08-11 DIAGNOSIS — E66.01 MORBID OBESITY (H): ICD-10-CM

## 2021-08-11 DIAGNOSIS — Z51.81 MEDICATION MONITORING ENCOUNTER: ICD-10-CM

## 2021-08-11 DIAGNOSIS — R97.20 ELEVATED PROSTATE SPECIFIC ANTIGEN (PSA): ICD-10-CM

## 2021-08-11 DIAGNOSIS — R73.03 PREDIABETES: ICD-10-CM

## 2021-08-11 DIAGNOSIS — I48.0 PAROXYSMAL ATRIAL FIBRILLATION (H): ICD-10-CM

## 2021-08-11 DIAGNOSIS — N20.0 CALCULUS OF KIDNEY: ICD-10-CM

## 2021-08-11 DIAGNOSIS — R10.32 LLQ ABDOMINAL PAIN: ICD-10-CM

## 2021-08-11 DIAGNOSIS — K56.609 SBO (SMALL BOWEL OBSTRUCTION) (H): ICD-10-CM

## 2021-08-11 DIAGNOSIS — M15.0 PRIMARY OSTEOARTHRITIS INVOLVING MULTIPLE JOINTS: ICD-10-CM

## 2021-08-11 DIAGNOSIS — K63.5 POLYP OF COLON, UNSPECIFIED PART OF COLON, UNSPECIFIED TYPE: ICD-10-CM

## 2021-08-11 DIAGNOSIS — Z11.59 ENCOUNTER FOR SCREENING FOR OTHER VIRAL DISEASES: ICD-10-CM

## 2021-08-11 DIAGNOSIS — Z86.73 HISTORY OF CVA (CEREBROVASCULAR ACCIDENT): ICD-10-CM

## 2021-08-11 DIAGNOSIS — Z00.00 MEDICARE ANNUAL WELLNESS VISIT, SUBSEQUENT: ICD-10-CM

## 2021-08-11 DIAGNOSIS — N20.0 NEPHROLITHIASIS: ICD-10-CM

## 2021-08-11 DIAGNOSIS — Z20.822 EXPOSURE TO COVID-19 VIRUS: ICD-10-CM

## 2021-08-11 DIAGNOSIS — Z98.84 S/P GASTRIC BYPASS: ICD-10-CM

## 2021-08-11 DIAGNOSIS — I77.810 THORACIC AORTIC ECTASIA (H): ICD-10-CM

## 2021-08-11 DIAGNOSIS — D50.9 IRON DEFICIENCY ANEMIA, UNSPECIFIED IRON DEFICIENCY ANEMIA TYPE: ICD-10-CM

## 2021-08-11 DIAGNOSIS — E53.8 VITAMIN B12 DEFICIENCY (NON ANEMIC): ICD-10-CM

## 2021-08-11 DIAGNOSIS — K76.0 NAFLD (NONALCOHOLIC FATTY LIVER DISEASE): ICD-10-CM

## 2021-08-11 DIAGNOSIS — Z12.11 SCREEN FOR COLON CANCER: ICD-10-CM

## 2021-08-11 DIAGNOSIS — G47.33 OBSTRUCTIVE SLEEP APNEA SYNDROME: ICD-10-CM

## 2021-08-11 LAB
ALBUMIN SERPL-MCNC: 3.4 G/DL (ref 3.4–5)
ALBUMIN UR-MCNC: ABNORMAL MG/DL
ALP SERPL-CCNC: 116 U/L (ref 40–150)
ALT SERPL W P-5'-P-CCNC: 34 U/L (ref 0–70)
ANION GAP SERPL CALCULATED.3IONS-SCNC: 4 MMOL/L (ref 3–14)
APPEARANCE UR: CLEAR
AST SERPL W P-5'-P-CCNC: 17 U/L (ref 0–45)
BILIRUB SERPL-MCNC: 0.6 MG/DL (ref 0.2–1.3)
BILIRUB UR QL STRIP: NEGATIVE
BUN SERPL-MCNC: 12 MG/DL (ref 7–30)
CALCIUM SERPL-MCNC: 8.9 MG/DL (ref 8.5–10.1)
CHLORIDE BLD-SCNC: 104 MMOL/L (ref 94–109)
CHOLEST SERPL-MCNC: 122 MG/DL
CK SERPL-CCNC: 63 U/L (ref 30–300)
CO2 SERPL-SCNC: 29 MMOL/L (ref 20–32)
COLOR UR AUTO: YELLOW
CREAT SERPL-MCNC: 0.98 MG/DL (ref 0.66–1.25)
DEPRECATED CALCIDIOL+CALCIFEROL SERPL-MC: 65 UG/L (ref 20–75)
ERYTHROCYTE [DISTWIDTH] IN BLOOD BY AUTOMATED COUNT: 13.7 % (ref 10–15)
FASTING STATUS PATIENT QL REPORTED: NORMAL
GFR SERPL CREATININE-BSD FRML MDRD: 75 ML/MIN/1.73M2
GLUCOSE BLD-MCNC: 104 MG/DL (ref 70–99)
GLUCOSE UR STRIP-MCNC: NEGATIVE MG/DL
HBA1C MFR BLD: 5.6 % (ref 0–5.6)
HCT VFR BLD AUTO: 45.6 % (ref 40–53)
HDLC SERPL-MCNC: 44 MG/DL
HGB BLD-MCNC: 14.7 G/DL (ref 13.3–17.7)
HGB UR QL STRIP: ABNORMAL
KETONES UR STRIP-MCNC: NEGATIVE MG/DL
LDLC SERPL CALC-MCNC: 67 MG/DL
LEUKOCYTE ESTERASE UR QL STRIP: NEGATIVE
MCH RBC QN AUTO: 28.9 PG (ref 26.5–33)
MCHC RBC AUTO-ENTMCNC: 32.2 G/DL (ref 31.5–36.5)
MCV RBC AUTO: 90 FL (ref 78–100)
NITRATE UR QL: NEGATIVE
NONHDLC SERPL-MCNC: 78 MG/DL
PH UR STRIP: 6.5 [PH] (ref 5–7)
PLATELET # BLD AUTO: 226 10E3/UL (ref 150–450)
POTASSIUM BLD-SCNC: 4.1 MMOL/L (ref 3.4–5.3)
PROT SERPL-MCNC: 6.7 G/DL (ref 6.8–8.8)
PSA SERPL-MCNC: 4.03 UG/L (ref 0–4)
RBC # BLD AUTO: 5.08 10E6/UL (ref 4.4–5.9)
RBC #/AREA URNS AUTO: NORMAL /HPF
SARS-COV-2 RNA RESP QL NAA+PROBE: NEGATIVE
SODIUM SERPL-SCNC: 137 MMOL/L (ref 133–144)
SP GR UR STRIP: 1.01 (ref 1–1.03)
TRIGL SERPL-MCNC: 54 MG/DL
TSH SERPL DL<=0.005 MIU/L-ACNC: 2.58 MU/L (ref 0.4–4)
UROBILINOGEN UR STRIP-ACNC: 0.2 E.U./DL
VIT B12 SERPL-MCNC: 1034 PG/ML (ref 193–986)
WBC # BLD AUTO: 7.5 10E3/UL (ref 4–11)
WBC #/AREA URNS AUTO: NORMAL /HPF

## 2021-08-11 PROCEDURE — 81001 URINALYSIS AUTO W/SCOPE: CPT | Performed by: FAMILY MEDICINE

## 2021-08-11 PROCEDURE — 80053 COMPREHEN METABOLIC PANEL: CPT | Performed by: FAMILY MEDICINE

## 2021-08-11 PROCEDURE — 80061 LIPID PANEL: CPT | Performed by: FAMILY MEDICINE

## 2021-08-11 PROCEDURE — U0005 INFEC AGEN DETEC AMPLI PROBE: HCPCS | Performed by: FAMILY MEDICINE

## 2021-08-11 PROCEDURE — U0003 INFECTIOUS AGENT DETECTION BY NUCLEIC ACID (DNA OR RNA); SEVERE ACUTE RESPIRATORY SYNDROME CORONAVIRUS 2 (SARS-COV-2) (CORONAVIRUS DISEASE [COVID-19]), AMPLIFIED PROBE TECHNIQUE, MAKING USE OF HIGH THROUGHPUT TECHNOLOGIES AS DESCRIBED BY CMS-2020-01-R: HCPCS | Performed by: FAMILY MEDICINE

## 2021-08-11 PROCEDURE — 82306 VITAMIN D 25 HYDROXY: CPT | Performed by: FAMILY MEDICINE

## 2021-08-11 PROCEDURE — G0103 PSA SCREENING: HCPCS | Performed by: FAMILY MEDICINE

## 2021-08-11 PROCEDURE — 82607 VITAMIN B-12: CPT | Performed by: FAMILY MEDICINE

## 2021-08-11 PROCEDURE — 82550 ASSAY OF CK (CPK): CPT | Performed by: FAMILY MEDICINE

## 2021-08-11 PROCEDURE — 83036 HEMOGLOBIN GLYCOSYLATED A1C: CPT | Performed by: FAMILY MEDICINE

## 2021-08-11 PROCEDURE — 99397 PER PM REEVAL EST PAT 65+ YR: CPT | Performed by: FAMILY MEDICINE

## 2021-08-11 PROCEDURE — 85027 COMPLETE CBC AUTOMATED: CPT | Performed by: FAMILY MEDICINE

## 2021-08-11 PROCEDURE — 36415 COLL VENOUS BLD VENIPUNCTURE: CPT | Performed by: FAMILY MEDICINE

## 2021-08-11 PROCEDURE — 84443 ASSAY THYROID STIM HORMONE: CPT | Performed by: FAMILY MEDICINE

## 2021-08-11 PROCEDURE — 82043 UR ALBUMIN QUANTITATIVE: CPT | Performed by: FAMILY MEDICINE

## 2021-08-11 ASSESSMENT — ENCOUNTER SYMPTOMS
SORE THROAT: 0
JOINT SWELLING: 0
PARESTHESIAS: 0
HEARTBURN: 0
MYALGIAS: 0
NERVOUS/ANXIOUS: 0
HEADACHES: 0
HEMATOCHEZIA: 0
EYE PAIN: 0
DIZZINESS: 0
CHILLS: 0
DIARRHEA: 0
SHORTNESS OF BREATH: 0
HEMATURIA: 0
CONSTIPATION: 0
NAUSEA: 0
COUGH: 0
ABDOMINAL PAIN: 0
ARTHRALGIAS: 0
PALPITATIONS: 0
FEVER: 0
DYSURIA: 0
WEAKNESS: 0
FREQUENCY: 1

## 2021-08-11 ASSESSMENT — ACTIVITIES OF DAILY LIVING (ADL): CURRENT_FUNCTION: NO ASSISTANCE NEEDED

## 2021-08-11 ASSESSMENT — MIFFLIN-ST. JEOR: SCORE: 2274.41

## 2021-08-11 NOTE — PROGRESS NOTES
"[facility]    Hugo Weber is a 76 year old male who presents for Preventive Visit.    Patient has been advised of split billing requirements and indicates understanding: Yes   Are you in the first 12 months of your Medicare coverage?  No    Healthy Habits:     In general, how would you rate your overall health?  Good    Frequency of exercise:  2-3 days/week    Duration of exercise:  15-30 minutes    Do you usually eat at least 4 servings of fruit and vegetables a day, include whole grains    & fiber and avoid regularly eating high fat or \"junk\" foods?  No    Taking medications regularly:  Yes    Medication side effects:  None    Ability to successfully perform activities of daily living:  No assistance needed    Home Safety:  No safety concerns identified    Hearing Impairment:  Difficulty following a conversation in a noisy restaurant or crowded room    In the past 6 months, have you been bothered by leaking of urine? Yes    In general, how would you rate your overall mental or emotional health?  Excellent      PHQ-2 Total Score: 0    Additional concerns today:  No    Do you feel safe in your environment? Yes    Have you ever done Advance Care Planning? (For example, a Health Directive, POLST, or a discussion with a medical provider or your loved ones about your wishes): Yes, advance care planning is on file.     Fall risk  Fallen 2 or more times in the past year?: No  Any fall with injury in the past year?: No  Cognitive Screening   1) Repeat 3 items (Leader, Season, Table)    2) Clock draw: NORMAL  3) 3 item recall: Recalls 3 objects  Results: 3 items recalled: COGNITIVE IMPAIRMENT LESS LIKELY    Mini-CogTM Copyright PANCHO Gaspar. Licensed by the author for use in [facility]; reprinted with permission (josi@.[facility]). All rights reserved.      Do you have sleep apnea, excessive snoring or daytime drowsiness?: yes    Reviewed and updated as needed this visit by " clinical staff  Tobacco  Allergies  Meds  Problems  Med Hx  Surg Hx  Fam Hx          Reviewed and updated as needed this visit by Provider                Social History     Tobacco Use     Smoking status: Never Smoker     Smokeless tobacco: Never Used   Substance Use Topics     Alcohol use: Yes     Alcohol/week: 2.0 - 3.0 standard drinks     Types: 2 - 3 Standard drinks or equivalent per week     Comment: 2-3 per week     If you drink alcohol do you typically have >3 drinks per day or >7 drinks per week? No    Alcohol Use 8/11/2021   Prescreen: >3 drinks/day or >7 drinks/week? No   Prescreen: >3 drinks/day or >7 drinks/week? -   No flowsheet data found.    Recent hospitalization secondary to SBO    Date of Admission:                  6/21/2021  Date of Discharge::                 6/23/2021  4:22 PM  Admitting Physician:               Matteo Sandoval MD  Discharge Physician:              Matteo Sandoval MD     Home clinic:    [facility]          Admission Diagnoses:   Nephrolithiasis [N20.0]  Small bowel obstruction (H) [K56.609]              Discharge Diagnosis:    Principal Problem:    Small bowel obstruction (H)  Active Problems:    Obstructive sleep apnea syndrome    Paroxysmal atrial fibrillation (H)    NSVT (nonsustained ventricular tachycardia) (H)          Procedures:   CT Abd/pelvis         Consultations:   Consultation during this admission received from surgery           Hospital Course:   Hugo Weber is a 76 year old male admitted on 6/21/2021 with abdominal distention, nausea and abd pain.        Mr. Weber denies hx heart, lung or renal disease. He is non-diabetic. S/P gastric bypass in 1989 and appendectomy about 20 years before that.      He has not had SBO in the past.  Notes eating heavily on the day prior to hospitalization.     In the ED, Mr. Weber was alert, coherent and in NAD. He was hemodynamically stable and labs: Creat 0.7, WBC 12.3.  CT Abd/pelvis  indicated mechanical small bowel obstruction, though symptoms were more consistent with partial SBO.      Mr. Weber was admitted to a medical bed and remained stable. He was placed at NPO initially and later his diet was advanced from clear liquids to low fiber. There were no major problems intervening.      During his hospital stay, Mr. Weber was monitored on telemetry. He remained stable and asymptomatic throughout, but note that he had several runs of what appeared to be NSVT as well as many sub-3-second pauses.  I reviewed his chart and found that he had undergone Zio patch monitoring completed in 2/2021 at which time he was noted to have NSVT up to 12 beats.  The rhythm bothered me, as it may suggest progression of his underlying conducting system disease, so I spoke with Dr. Jc and requested a repeat ZIO patch to be completed.  I have also asked that he follow up with his primary cardiologist, Dr. Ching Castillo as soon as can be arranged.         Reviewed recent notes from Dr CURT Burgos/DEREK Brian - LLQ pain, better with gabapentin, extensive work up was negative    No residual - soft stools with no signs of bleeding    Hx of CVA - no residual    P Afib - paroxysmal - Dr Castillo - on metoprolol - decreased diltiazem    Prediabetes    Glucose   Date Value Ref Range Status   06/22/2021 114 (H) 70 - 99 mg/dL Final   06/21/2021 117 (H) 70 - 99 mg/dL Final   03/30/2021 72 70 - 99 mg/dL Final   03/08/2021 101 (H) 70 - 99 mg/dL Final   03/08/2021 95 70 - 99 mg/dL Final     Lab Results   Component Value Date    A1C 5.6 08/11/2021    A1C 5.8 11/19/2020    A1C 5.8 08/07/2020    A1C 5.9 02/14/2020    A1C 5.7 12/02/2019    A1C 5.6 09/20/2019     Htn    BP Readings from Last 3 Encounters:   08/11/21 118/62   07/21/21 120/71   06/23/21 130/77     Creatinine   Date Value Ref Range Status   06/22/2021 0.81 0.66 - 1.25 mg/dL Final     Lipids    Recent Labs   Lab Test 08/07/20  0847 02/22/19  0000 01/13/15  0815  09/03/14  0810   CHOL 148 106 131 156   HDL 43 44 43 42   LDL 88 49 74 93   TRIG 84 72 72 103   CHOLHDLRATIO  --   --  3.0 3.7     HAYLEE - using CPAP    TOMI    Hemoglobin   Date Value Ref Range Status   08/11/2021 14.7 13.3 - 17.7 g/dL Final   06/22/2021 14.6 13.3 - 17.7 g/dL Final   06/21/2021 15.5 13.3 - 17.7 g/dL Final     Morbid Obesity - difficulty with exercise - med related - hx of gastric bypass - 1986    Nephrolithiasis - no issues    Hx of hepatitis C - treated with Soldovi      Current providers sharing in care for this patient include:   Patient Care Team:  Brett Cassidy MD as PCP - General  Brett Cassidy MD as Assigned PCP  Lauren Otero RD as Diabetes Educator (Dietitian, Registered)  Regulo Heath MD as MD (Urology)  Norma Stein APRN CNP as Assigned Heart and Vascular Provider    The following health maintenance items are reviewed in Epic and correct as of today:  Health Maintenance Due   Topic Date Due     LIPID  08/07/2021     MICROALBUMIN  08/07/2021     FALL RISK ASSESSMENT  08/07/2021     PSA  08/07/2021     Health Maintenance     Colonoscopy:  Done per GI, last 11/2020   FIT:  given              PSA:  pending   DEXA:  NA    Health Maintenance Due   Topic Date Due     LIPID  08/07/2021     MICROALBUMIN  08/07/2021     FALL RISK ASSESSMENT  08/07/2021     PSA  08/07/2021       Current Problem List    Patient Active Problem List   Diagnosis     Iron deficiency anemia     Colon polyps     Obstructive sleep apnea syndrome     Hyperlipidemia LDL goal <70     Long term current use of anticoagulant therapy - for intermittent a-fib     Advance Care Planning     Total knee replacement status     Calculus of kidney     NAFLD (nonalcoholic fatty liver disease)     Morbid obesity due to excess calories (H)     History of hepatitis C     Prediabetes     Paroxysmal atrial fibrillation (H)     Cholelithiasis     Hypertension goal BP (blood pressure) < 140/90     TMJ arthralgia      Nephrolithiasis     OA (osteoarthritis)     First degree AV block     Benign prostatic hyperplasia (BPH) with urinary urge incontinence     Thoracic aortic ectasia (H)     Stroke (H)     CVA (cerebral vascular accident) (H)     Cervical stenosis of spine     Vitamin B12 deficiency (non anemic)     Vitamin D deficiency     Myofascial pain     Atrial fibrillation     History of CVA (cerebrovascular accident)     Small bowel obstruction (H)     NSVT (nonsustained ventricular tachycardia) (H)       Past Medical History    Past Medical History:   Diagnosis Date     Arrhythmia      Arthritis      Atrial fibrillation 2006    Followed by [facility] - persistent     Calculus of kidney 2005, 6/06, 10/12, 8/18, 9/19    dr walker/nestor/иван - hematuria - w/u o/w neg     Cervical stenosis of spine     Moderate C4-5     Cholelithiasis      Chronic peptic ulcer, unspecified site, without mention of hemorrhage, perforation, or obstruction 1985    gastric bypass     Colon polyps 8/05, 9/10, 10/15    2 tubular adenoma, 1 hyperplastic - multiple serrated/tubular adenomas - last colonscopy 11/20 due 5 yrs     CVA (cerebral vascular accident) (H) 01/2019    small right periorlandic subcortical - left sided residual - left hand tingling/numbness - Left leg weak/numbness - cardioembolic     First degree AV block      Hepatitis C 10/2012    chronic hepatitis C, grade 1-2, stage 1 - mild - Dr Douglas     Hyperlipidemia LDL goal <70      Hypertension goal BP (blood pressure) < 140/90 06/2006    dr jaciel winchester     Iron deficiency anemia, unspecified 1985    gastric bypass     Nephrolithiasis multiple episodes, last 10/19    Dr Bey/Ivana - 9mm 3/2021     OA (osteoarthritis)     Multiple - Left shoulder with rotator cuff tear - Dr Durham     Obesity, unspecified     s/p gastric bypass 1985      Prediabetes      Presbyacusis 01/2004    dr corona     Pyelonephritis, unspecified 12/1999     SCC (squamous cell carcinoma), 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      se - lt conchal bowl     Sleep apnea 10/2002    cpap - severe - 15 cm - dr cunha     Stroke (H) 01/19/2019     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    [facility] Head [facility] Neck     Vitamin B12 deficiency (non anemic) 04/15/2019     Vitamin D deficiency 04/15/2019       Past Surgical History    Past Surgical History:   Procedure Laterality Date     APPENDECTOMY       APPENDECTOMY       ARTHROPLASTY  08/13/2012    knee     ARTHROPLASTY KNEE  08/13/2012    LT TKA - Dr Villanueva     CARDIOVERSION  2016     CARDIOVERSION       COLONOSCOPY  8/05, 9/10, 10/15    multiple tubular/serrated/hyperplastic polyps     COLONOSCOPY N/A 10/29/2015    multiple tubular and serrated adenomas     COLONOSCOPY N/A 09/07/2016     COLONOSCOPY  11/2020    tubular adenomas - due 5 yrs     COLONOSCOPY N/A 11/24/2020    Procedure: COLONOSCOPY with biopsies;  Surgeon: Chadwick Burgos MD;  Location: [facility] OR     CYSTOSCOPY W/ LASER LITHOTRIPSY  10/16/2012,5/2/2018     ESOPHAGOSCOPY, GASTROSCOPY, DUODENOSCOPY (EGD), COMBINED N/A 11/24/2020    Procedure: ESOPHAGOGASTRODUODENOSCOPY, COLONOSCOPY with biopsies;  Surgeon: Chadwick Burgos MD;  Location: [facility] OR     EYE SURGERY  Lasik     EYE SURGERY  1/01,6/02,11/02    lasik     GASTRIC BYPASS  1985     HC KNEE SCOPE, DIAGNOSTIC  05/2000    Arthroscopy, Knee RT     LASER HOLMIUM LITHOTRIPSY URETER(S), INSERT STENT, COMBINED  10/16/2012    stones x 4, Dr Campa     LASER HOLMIUM LITHOTRIPSY URETER(S), INSERT STENT, COMBINED Right 05/02/2018    CYSTOSCOPY, RIGHT URETEROSCOPY, HOLMIUM LASER LITHOTRIPSY, RIGHT STENT PLACEMENT - Dr Bey     OTHER SURGICAL HISTORY  1979    cellulitis     OTHER SURGICAL HISTORY Bilateral 11/1998 ,11/99    forearm spur     OTHER SURGICAL HISTORY  07/2006    lihoripsy     OTHER SURGICAL HISTORY  10/08, 12/08    lt ear choncal lesion removal scc     REPLACEMENT TOTAL KNEE  08/13/2012     SURGICAL HISTORY OF -   1985    s/p gastric bypass Bilroth II     SURGICAL HISTORY OF -   11/1998     wisdom teeth     SURGICAL HISTORY OF -   1979    cellulitis     SURGICAL HISTORY OF -   11/1998    lt forearm spur's     SURGICAL HISTORY OF -   1/01,6/02,11/02    s/p lasik     SURGICAL HISTORY OF -   11/1999    rt forearm spurs     SURGICAL HISTORY OF -   07/2006    dr stevenson - lithotrypsy     SURGICAL HISTORY OF -   10/08, 12/08    Lt ear chonchal lesion removal SCC - dr saez     SURGICAL HISTORY OF -  Right 10/2019    nephrolithiasis - cystoscopy, rt lithotrypsy, Dr Heath     SURGICAL HISTORY OF -  Right 11/2019    Cystoscopy, Rt lithotrypsy, Calcium Oxalate, Dr Heath     WISDOM TOOTH EXTRACTION  11/1998       Current Medications    Current Outpatient Medications   Medication Sig Dispense Refill     apixaban ANTICOAGULANT (ELIQUIS) 5 MG tablet Take 1 tablet (5 mg) by mouth 2 times daily 180 tablet 3     Cyanocobalamin 5000 MCG TBDP Take by mouth daily        diltiazem ER (TIAZAC) 240 MG 24 hr ER beaded capsule Take 1 capsule (240 mg) by mouth daily 90 capsule 3     Ferrous Sulfate (IRON) 325 (65 Fe) MG tablet Take 1 tablet by mouth every other day  90 tablet 3     fluticasone (FLONASE) 50 MCG/ACT nasal spray Spray 2 sprays into both nostrils daily as needed for rhinitis or allergies 54.6 mL 3     gabapentin (NEURONTIN) 100 MG capsule Take 2 capsules (200 mg) by mouth 3 times daily 180 capsule 1     metoprolol succinate ER (TOPROL XL) 100 MG 24 hr tablet Take 1 tablet (100 mg) by mouth daily 90 tablet 3     niacin 500 MG tablet Take by mouth daily (with breakfast)       pantoprazole (PROTONIX) 40 MG EC tablet Take 1 tablet (40 mg) by mouth daily 90 tablet 3     tamsulosin (FLOMAX) 0.4 MG capsule Take 1 capsule (0.4 mg) by mouth 2 times daily 180 capsule 3     vitamin C (ASCORBIC ACID) 1000 MG TABS Take 1,000 mg by mouth daily         Allergies    No Known Allergies    Immunizations    Immunization History   Administered Date(s) Administered     COVID-19,PF,Moderna 02/05/2021, 03/02/2021     Influenza  "(High Dose) 3 valent vaccine 2012, 2013, 10/20/2015, 2017, 2018, 2019     Influenza (IIV3) PF 10/26/2007, 2008, 2009, 10/14/2011     Influenza Vaccine, 6+MO IM (QUADRIVALENT W/PRESERVATIVES) 10/30/2017     Influenza, Quad, High Dose, Pf, 65yr + 10/19/2020     Pneumo Conj 13-V (2010&after) 03/10/2016, 2017     Pneumococcal 23 valent 2005, 2012     TD (ADULT, 7+) 1998     TDAP Vaccine (Adacel) 2007     TDAP Vaccine (Boostrix) 10/20/2015     Twinrix A/B 2005, 2007, 2008     Zoster vaccine recombinant adjuvanted (SHINGRIX) 2019, 2019       Family History    Family History   Problem Relation Age of Onset     Alzheimer Disease Father 82         at 88     Prostate Cancer Father 76        bladder and prostate     Heart Disease Mother 80        CHF     Heart Disease Maternal Grandfather         MI at 55     Cancer Paternal Uncle         ?     Ulcerative Colitis No family hx of      Crohn's Disease No family hx of      Stomach Cancer No family hx of      GERD No family hx of        Social History    Social History     Socioeconomic History     Marital status:      Spouse name: Mayra     Number of children: 3     Years of education: 21     Highest education level: Not on file   Occupational History     Occupation: retired teacher and football and       Employer: [employer] [address]     Employer: RETIRED   Tobacco Use     Smoking status: Never Smoker     Smokeless tobacco: Never Used   Vaping Use     Vaping Use: Never used   Substance and Sexual Activity     Alcohol use: Yes     Alcohol/week: 2.0 - 3.0 standard drinks     Types: 2 - 3 Standard drinks or equivalent per week     Comment: 2-3 per week     Drug use: No     Comment: acknowledges using herbal supplement \"Vibe\" for energy-none used recently      Sexual activity: Not Currently     Partners: Female     Comment:     Other Topics " Concern      Service Not Asked     Blood Transfusions Not Asked     Caffeine Concern Yes     Comment: <1 can qd     Occupational Exposure Not Asked     Hobby Hazards Not Asked     Sleep Concern Yes     Comment: sleep apnea, wears cpap     Stress Concern Not Asked     Weight Concern Not Asked     Special Diet Not Asked     Back Care Not Asked     Exercise No     Bike Helmet Not Asked     Seat Belt Yes     Self-Exams Not Asked     Parent/sibling w/ CABG, MI or angioplasty before 65F 55M? No   Social History Narrative     Not on file     Social Determinants of Health     Financial Resource Strain:      Difficulty of Paying Living Expenses:    Food Insecurity:      Worried About Running Out of Food in the Last Year:      Ran Out of Food in the Last Year:    Transportation Needs:      Lack of Transportation (Medical):      Lack of Transportation (Non-Medical):    Physical Activity:      Days of Exercise per Week:      Minutes of Exercise per Session:    Stress:      Feeling of Stress :    Social Connections:      Frequency of Communication with Friends and Family:      Frequency of Social Gatherings with Friends and Family:      Attends [employer] Services:      Active Member of Clubs or Organizations:      Attends Club or Organization Meetings:      Marital Status:    Intimate Partner Violence:      Fear of Current or Ex-Partner:      Emotionally Abused:      Physically Abused:      Sexually Abused:        ROS    CONSTITUTIONAL: NEGATIVE for fever, chills, change in weight  INTEGUMENTARY/SKIN: NEGATIVE for worrisome rashes, moles or lesions  EYES: NEGATIVE for vision changes or irritation  ENT/MOUTH: NEGATIVE for ear, mouth and throat problems  RESP: NEGATIVE for significant cough or SOB  CV: NEGATIVE for chest pain, palpitations or peripheral edema  GI: NEGATIVE for nausea, abdominal pain, heartburn, or change in bowel habits  : NEGATIVE for frequency, dysuria, or hematuria  MUSCULOSKELETAL: NEGATIVE for  significant arthralgias or myalgia  NEURO: NEGATIVE for weakness, dizziness or paresthesias  ENDOCRINE: NEGATIVE for temperature intolerance, skin/hair changes  HEME: NEGATIVE for bleeding problems  PSYCHIATRIC: NEGATIVE for changes in mood or affect    OBJECTIVE    /62   Pulse 52   Temp 98.6  F (37  C) (Tympanic)   Resp 16   Ht 1.829 m (6')   Wt (!) 150.1 kg (331 lb)   SpO2 97%   BMI 44.89 kg/m      EXAM:    GENERAL: healthy, alert and no distress  EYES: Eyes grossly normal to inspection, PERRL and conjunctivae and sclerae normal  HENT: ear canals and TM's normal, nose and mouth without ulcers or lesions  NECK: no adenopathy, no asymmetry, masses, or scars and thyroid normal to palpation  RESP: lungs clear to auscultation - no rales, rhonchi or wheezes  CV: regular rate and rhythm, normal S1 S2, no S3 or S4, no murmur, click or rub, no peripheral edema and peripheral pulses strong  ABDOMEN: soft, nontender, no hepatosplenomegaly, no masses and bowel sounds normal   (male): testicles normal without atrophy or masses, no hernias and penis normal without urethral discharge  RECTAL: normal sphincter tone, no rectal masses, prostate of normal size for age, smooth, nontender without masses/nodules and prostate 1-2+ enlarged, nontender  MS: no gross musculoskeletal defects noted, no edema  SKIN: no suspicious lesions or rashes  NEURO: Normal strength and tone, mentation intact and speech normal  PSYCH: mentation appears normal, affect normal/bright  LYMPH: no cervical, supraclavicular, axillary, or inguinal adenopathy    DIAGNOSTICS/PROCEDURES    Pending    ASSESSMENT      ICD-10-CM    1. Routine history and physical examination of adult  Z00.00 Comprehensive metabolic panel     Lipid panel reflex to direct LDL Fasting     CK total     CBC with platelets     TSH with free T4 reflex     Albumin Random Urine Quantitative with Creat Ratio     Fecal colorectal cancer screen FIT     Hemoglobin A1c     Vitamin D  Deficiency     Vitamin B12     UA Macro with Reflex to Micro and Culture - lab collect     Comprehensive metabolic panel     Lipid panel reflex to direct LDL Fasting     CK total     CBC with platelets     TSH with free T4 reflex     Hemoglobin A1c     Vitamin D Deficiency     Vitamin B12     Albumin Random Urine Quantitative with Creat Ratio     UA Macro with Reflex to Micro and Culture - lab collect     REVIEW OF HEALTH MAINTENANCE PROTOCOL ORDERS   2. Medicare annual wellness visit, subsequent  Z00.00 REVIEW OF HEALTH MAINTENANCE PROTOCOL ORDERS   3. SBO (small bowel obstruction) (H)  K56.609 REVIEW OF HEALTH MAINTENANCE PROTOCOL ORDERS   4. LLQ abdominal pain  R10.32 REVIEW OF HEALTH MAINTENANCE PROTOCOL ORDERS   5. History of CVA (cerebrovascular accident)  Z86.73 REVIEW OF HEALTH MAINTENANCE PROTOCOL ORDERS   6. Paroxysmal atrial fibrillation (H)  I48.0 REVIEW OF HEALTH MAINTENANCE PROTOCOL ORDERS   7. Prediabetes  R73.03 Comprehensive metabolic panel     Albumin Random Urine Quantitative with Creat Ratio     Hemoglobin A1c     Comprehensive metabolic panel     Hemoglobin A1c     Albumin Random Urine Quantitative with Creat Ratio     REVIEW OF HEALTH MAINTENANCE PROTOCOL ORDERS   8. Hypertension goal BP (blood pressure) < 140/90  I10 CK total     UA Macro with Reflex to Micro and Culture - lab collect     CK total     UA Macro with Reflex to Micro and Culture - lab collect     REVIEW OF HEALTH MAINTENANCE PROTOCOL ORDERS   9. Hyperlipidemia LDL goal <70  E78.5 Lipid panel reflex to direct LDL Fasting     Lipid panel reflex to direct LDL Fasting     REVIEW OF HEALTH MAINTENANCE PROTOCOL ORDERS   10. Obstructive sleep apnea syndrome  G47.33 REVIEW OF HEALTH MAINTENANCE PROTOCOL ORDERS   11. Iron deficiency anemia, unspecified iron deficiency anemia type  D50.9 CBC with platelets     CBC with platelets     REVIEW OF HEALTH MAINTENANCE PROTOCOL ORDERS   12. Vitamin B12 deficiency (non anemic)  E53.8 Vitamin B12      Vitamin B12     REVIEW OF HEALTH MAINTENANCE PROTOCOL ORDERS   13. Vitamin D deficiency  E55.9 Vitamin D Deficiency     Vitamin D Deficiency     REVIEW OF HEALTH MAINTENANCE PROTOCOL ORDERS   14. Primary osteoarthritis involving multiple joints  M89.49 REVIEW OF HEALTH MAINTENANCE PROTOCOL ORDERS   15. Elevated prostate specific antigen (PSA)  R97.20 REVIEW OF HEALTH MAINTENANCE PROTOCOL ORDERS   16. Nephrolithiasis  N20.0 REVIEW OF HEALTH MAINTENANCE PROTOCOL ORDERS   17. Calculus of kidney  N20.0 REVIEW OF HEALTH MAINTENANCE PROTOCOL ORDERS   18. NAFLD (nonalcoholic fatty liver disease)  K76.0 REVIEW OF HEALTH MAINTENANCE PROTOCOL ORDERS   19. History of hepatitis C  Z86.19 Hepatitis C antibody     Hepatitis C RNA, quantitative     REVIEW OF HEALTH MAINTENANCE PROTOCOL ORDERS   20. S/P gastric bypass  Z98.84 REVIEW OF HEALTH MAINTENANCE PROTOCOL ORDERS   21. Morbid obesity (H)  E66.01 REVIEW OF HEALTH MAINTENANCE PROTOCOL ORDERS   22. Medication monitoring encounter  Z51.81 REVIEW OF HEALTH MAINTENANCE PROTOCOL ORDERS   23. Exposure to COVID-19 virus  Z20.822 COVID-19 Pradeep RBD Marie & Titer Reflex     REVIEW OF HEALTH MAINTENANCE PROTOCOL ORDERS   24. Polyp of colon, unspecified part of colon, unspecified type  K63.5 REVIEW OF HEALTH MAINTENANCE PROTOCOL ORDERS   25. Screen for colon cancer  Z12.11 REVIEW OF HEALTH MAINTENANCE PROTOCOL ORDERS   26. Screening for prostate cancer  Z12.5 Prostate Specific Antigen Screen     Prostate Specific Antigen Screen     REVIEW OF HEALTH MAINTENANCE PROTOCOL ORDERS   27. Thoracic aortic ectasia (H)  I77.810 REVIEW OF HEALTH MAINTENANCE PROTOCOL ORDERS       PLAN    Discussed treatment/modality options, including risk and benefits, he desires:    advised alcohol consumption 1oz per day or less, advised 1 multivitamin per day, advised calcium 2381-7381 mg/d and Vitamin D 800-1200 IU/d, advised dentist every 6 months, advised diet, exercise, and weight loss, advised opthalmologist  every 1-2 years, advised self testicular exam q month, further health care maintenance, further lab(s), immunization(s), medication refill(s) and observation    Discussed controversies surrounding PSA. Specifically reviewed 2017 USPSTF findings recommending discussion of PSA testing for men ages 55-69.  Reviewed findings of the  Randomized Study of Screening for Prostate Cancer which showed a 30% reduction in advanced stage prostate cancer and a 20% reduction in death rate from prostate cancer in this age group. PSA-based screening may prevent up to 2 deaths and up to 3 cases of metastatic disease per 1,000 men screened over 13 years.    We've elected to do PSA this year after discussing these controversies.    All diagnosis above reviewed and noted above, otherwise stable.      See [employer] [employer] orders for further details.      1) med refills UTD    2) labs pending    3) Immunizations UTD    4) adjust gabapentin    5) heat/ice/stretching    No follow-ups on file.    Health Maintenance Due   Topic Date Due     LIPID  08/07/2021     MICROALBUMIN  08/07/2021     FALL RISK ASSESSMENT  08/07/2021     PSA  08/07/2021       COUNSELING    Reviewed preventive health counseling, as reflected in patient instructions    BP Readings from Last 1 Encounters:   08/11/21 118/62     Estimated body mass index is 44.89 kg/m  as calculated from the following:    Height as of this encounter: 1.829 m (6').    Weight as of this encounter: 150.1 kg (331 lb).      Weight management plan: diet and exercise     reports that he has never smoked. He has never used smokeless tobacco.      Counseling Resources:    ATP IV Guidelines  Pooled Cohorts Equation Calculator  FRAX Risk Assessment  ICSI Preventive Guidelines  Dietary Guidelines for Americans, 2010  USDA's MyPlate  ASA Prophylaxis  Lung CA Screening           Brett Cassidy MD, FAAFP     [facility]  [address] [place] [address]  [id number]  [email or web address]  [email or web address]   Office: [phone]  Fax: [phone]  Pager: [phone]

## 2021-08-12 LAB
CREAT UR-MCNC: 47 MG/DL
MICROALBUMIN UR-MCNC: 121 MG/L
MICROALBUMIN/CREAT UR: 257.45 MG/G CR (ref 0–17)

## 2021-08-16 ENCOUNTER — TRANSFERRED RECORDS (OUTPATIENT)
Dept: HEALTH INFORMATION MANAGEMENT | Facility: CLINIC | Age: 76
End: 2021-08-16

## 2021-08-27 ENCOUNTER — APPOINTMENT (OUTPATIENT)
Dept: GENERAL RADIOLOGY | Facility: CLINIC | Age: 76
End: 2021-08-27
Attending: EMERGENCY MEDICINE
Payer: COMMERCIAL

## 2021-08-27 ENCOUNTER — HOSPITAL ENCOUNTER (EMERGENCY)
Facility: CLINIC | Age: 76
Discharge: HOME | End: 2021-08-27
Attending: EMERGENCY MEDICINE | Admitting: EMERGENCY MEDICINE
Payer: COMMERCIAL

## 2021-08-27 VITALS
OXYGEN SATURATION: 99 % | SYSTOLIC BLOOD PRESSURE: 137 MMHG | DIASTOLIC BLOOD PRESSURE: 88 MMHG | HEART RATE: 92 BPM | TEMPERATURE: 98.5 F | RESPIRATION RATE: 18 BRPM

## 2021-08-27 DIAGNOSIS — S49.92XA SHOULDER INJURY, LEFT, INITIAL ENCOUNTER: ICD-10-CM

## 2021-08-27 PROCEDURE — 73030 X-RAY EXAM OF SHOULDER: CPT | Mod: LT

## 2021-08-27 PROCEDURE — 250N000013 HC RX MED GY IP 250 OP 250 PS 637: Performed by: EMERGENCY MEDICINE

## 2021-08-27 PROCEDURE — 99283 EMERGENCY DEPT VISIT LOW MDM: CPT

## 2021-08-27 RX ORDER — HYDROCODONE BITARTRATE AND ACETAMINOPHEN 5; 325 MG/1; MG/1
2 TABLET ORAL ONCE
Status: COMPLETED | OUTPATIENT
Start: 2021-08-27 | End: 2021-08-27

## 2021-08-27 RX ORDER — HYDROCODONE BITARTRATE AND ACETAMINOPHEN 5; 325 MG/1; MG/1
1-2 TABLET ORAL EVERY 4 HOURS PRN
Qty: 15 TABLET | Refills: 0 | Status: SHIPPED | OUTPATIENT
Start: 2021-08-27 | End: 2021-11-01

## 2021-08-27 RX ADMIN — HYDROCODONE BITARTRATE AND ACETAMINOPHEN 2 TABLET: 5; 325 TABLET ORAL at 20:42

## 2021-08-27 ASSESSMENT — ENCOUNTER SYMPTOMS: ARTHRALGIAS: 1

## 2021-08-28 NOTE — ED PROVIDER NOTES
History     Chief Complaint:  Fall      HPI   Hugo Weber is a 76 year old male who presents with a left shoulder injury. 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 He reached his arms out to catch his fall. Since then, he has had significant pain in his left shoulder. Here, he states that he has difficulty with raising his left arm and some tingling.     Review of Systems   Musculoskeletal: Positive for arthralgias (left shoulder).   Neurological:        Tingling in left arm  Difficulty with raising left arm   All other systems reviewed and are negative.      Allergies:  The patient has no known allergies.      Medications:    apixaban ANTICOAGULANT (ELIQUIS) 5 MG tablet  Cyanocobalamin 5000 MCG TBDP  diltiazem ER (TIAZAC) 240 MG 24 hr ER beaded capsule  Ferrous Sulfate (IRON) 325 (65 Fe) MG tablet  fluticasone (FLONASE) 50 MCG/ACT nasal spray  gabapentin (NEURONTIN) 100 MG capsule  metoprolol succinate ER (TOPROL XL) 100 MG 24 hr tablet  niacin 500 MG tablet  pantoprazole (PROTONIX) 40 MG EC tablet  tamsulosin (FLOMAX) 0.4 MG capsule  vitamin C (ASCORBIC ACID) 1000 MG TABS    Past Medical History:    Arrhythmia   Arthritis   Atrial fibrillation   Calculus of kidney   Cervical stenosis of spine   Cholelithiasis   Chronic peptic ulcer, unspecified site, without mention of hemorrhage, perforation, or obstruction   Colon polyps   CVA (cerebral vascular accident)  First degree AV block   Hepatitis C   Hyperlipidemia LDL goal <70   Hypertension goal BP (blood pressure) < 140/90   Iron deficiency anemia, unspecified   Nephrolithiasis   OA (osteoarthritis)   Obesity, unspecified   Prediabetes   Presbyacusis   Pyelonephritis, unspecified   SCC (squamous cell carcinoma), ear   Sleep apnea   Stroke    TMJ arthralgia   Vitamin B12 deficiency (non anemic)   Vitamin D deficiency   NSVT (nonsustained ventricular tachycardia) (H)   Small bowel obstruction  Vitamin  B12 deficiency (non anemic)   Vitamin D deficiency   Thoracic aortic ectasia   Benign prostatic hyperplasia (BPH) with urinary urge incontinence   Myofascial pain   Paroxysmal atrial fibrillation  History of hepatitis C   NAFLD (nonalcoholic fatty liver disease)   Morbid obesity due to excess calories (H)   Total knee replacement status   Long term current use of anticoagulant therapy - for intermittent a-fib  Obstructive sleep apnea syndrome   Advance Care Planning     Past Surgical History:    Appendectomy   Arthroplasty, knee, left   Cardioversion x2  Colonoscopy x5  Cystoscopy with laser lithotripsy   EGD, combined  Eye surgery x2   Gastric bypass  Knee scope, diagnostic  Laser holmium lithotripsy ureter(s), insert stent, combined  Cystoscopy, right ureteroscopy, holmium laser lithotripsy, right stent placement  Replacement of total knee   [place] tooth extraction      Family History:    Alzheimer disease (father)   Prostate cancer (father)   Heart disease (mother)    Physical Exam     Patient Vitals for the past 24 hrs:   BP Temp Temp src Pulse Resp SpO2   08/27/21 1910 137/88 98.5  F (36.9  C) Oral 92 18 99 %       Physical Exam  Constitutional:  Alert, attentive, GCS 15  HENT:    Nose: Nose normal.   Eyes:    EOM are normal.  CV:    regular rate and rhythm; no murmurs, rubs or gallups. 2+ radial and ulnar pulses to the bilateral upper extremities   Chest:   Effort normal and breath sounds normal.   GI:     There is no tenderness. No distension. Normal bowel sounds  Neurological:  5/5 strength to the radian, ulnar and median motor distributions; 4-5 strength to the biceps and triceps, unable to test strength in shoulder due to pain limiting range of motion     sensation intact to light touch to the radian, ulnar and median distributions  MSK:   C-spine cleared by NEXUS;    No tenderness or stepoffs to the C-spine   No deformity noted to the left shoulder but tender over the coracoid process and range of motion  is limited due to pain, including extension and abduction.    Normal, atraumatic inspection to the back    No defect noted to the left bicep or pectoralis  Skin:    Skin is warm and dry.       Emergency Department Course     Imaging:  XR Shoulder Left G/E 3 Views   Final Result   IMPRESSION: Severe end stage left glenohumeral joint osteoarthritis. No acute left shoulder fracture or dislocation. Moderate left acromioclavicular joint osteoarthritis. Large inferior humeral head spur. Visualized left lung grossly clear. Degenerative    change visualized spine.        Emergency Department Course:    Reviewed:  I reviewed nursing notes, vitals, past history and care everywhere    Assessments:  2031 I obtained history and examined the patient as noted above.   2050 I rechecked the patient and explained findings.     Interventions:  2042: HYDROcodone-acetaminophen (NORCO) 5-325 mg per tablet 2 tablet, PO    Disposition:  The patient was discharged to home.    Impression & Plan      Medical Decision Making:  This is a 76-year-old male who presents for evaluation of left shoulder pain and weakness after a fall with FOOSH mechanism of injury.  He does not believe he struck his left shoulder but has significant pain limiting any range of motion as well as pain or tenderness at the coracoid process.  Exam is notable for some biceps weakness due to pain although there is no obvious defect noted.  Shoulder x-ray shows degenerative changes but no obvious fracture, nor is there is significant bony tenderness aside from at the coracoid.  Suspect rotator cuff or possible biceps injury.  Plan placement of the sling, pain medication, and orthopedic follow-up for likely MRI in the next several days.  Return precautions for worse pain, weakness, numbness, or any other concerns.  Will prescribe opioid medications given intolerance to NSAID as he is on Eliquis.    Diagnosis:    ICD-10-CM    1. Shoulder injury, left, initial encounter   S49.92XA        Discharge Medications:  Discharge Medication List as of 8/27/2021  9:42 PM            Scribe Disclosure:  I, Negar Green, am serving as a scribe at 8:31 PM on 8/27/2021 to document services personally performed by Osmar Armstrong MD based on my observations and the provider's statements to me.      Osmar Armstrong MD  08/27/21 2203

## 2021-08-31 ENCOUNTER — TRANSFERRED RECORDS (OUTPATIENT)
Dept: HEALTH INFORMATION MANAGEMENT | Facility: CLINIC | Age: 76
End: 2021-08-31

## 2021-10-12 ENCOUNTER — TRANSFERRED RECORDS (OUTPATIENT)
Dept: HEALTH INFORMATION MANAGEMENT | Facility: CLINIC | Age: 76
End: 2021-10-12

## 2021-10-23 ENCOUNTER — HEALTH MAINTENANCE LETTER (OUTPATIENT)
Age: 76
End: 2021-10-23

## 2021-11-01 ENCOUNTER — LAB (OUTPATIENT)
Dept: LAB | Facility: CLINIC | Age: 76
End: 2021-11-01

## 2021-11-01 ENCOUNTER — VIRTUAL VISIT (OUTPATIENT)
Dept: FAMILY MEDICINE | Facility: CLINIC | Age: 76
End: 2021-11-01
Payer: COMMERCIAL

## 2021-11-01 DIAGNOSIS — R30.0 DYSURIA: ICD-10-CM

## 2021-11-01 DIAGNOSIS — R35.0 URINARY FREQUENCY: Primary | ICD-10-CM

## 2021-11-01 DIAGNOSIS — Z20.822 EXPOSURE TO 2019 NOVEL CORONAVIRUS: ICD-10-CM

## 2021-11-01 DIAGNOSIS — N20.0 CALCULUS OF KIDNEY: ICD-10-CM

## 2021-11-01 DIAGNOSIS — N39.41 BENIGN PROSTATIC HYPERPLASIA (BPH) WITH URINARY URGE INCONTINENCE: ICD-10-CM

## 2021-11-01 DIAGNOSIS — N40.1 BENIGN PROSTATIC HYPERPLASIA (BPH) WITH URINARY URGE INCONTINENCE: ICD-10-CM

## 2021-11-01 DIAGNOSIS — R35.1 NOCTURIA: ICD-10-CM

## 2021-11-01 DIAGNOSIS — R35.0 URINARY FREQUENCY: ICD-10-CM

## 2021-11-01 LAB
ALBUMIN UR-MCNC: ABNORMAL MG/DL
APPEARANCE UR: CLEAR
BACTERIA #/AREA URNS HPF: ABNORMAL /HPF
BILIRUB UR QL STRIP: NEGATIVE
COLOR UR AUTO: YELLOW
GLUCOSE UR STRIP-MCNC: NEGATIVE MG/DL
HGB UR QL STRIP: ABNORMAL
KETONES UR STRIP-MCNC: NEGATIVE MG/DL
LEUKOCYTE ESTERASE UR QL STRIP: ABNORMAL
NITRATE UR QL: NEGATIVE
PH UR STRIP: 5.5 [PH] (ref 5–7)
RBC #/AREA URNS AUTO: >100 /HPF
SP GR UR STRIP: >=1.03 (ref 1–1.03)
UROBILINOGEN UR STRIP-ACNC: 1 E.U./DL
WBC #/AREA URNS AUTO: ABNORMAL /HPF

## 2021-11-01 PROCEDURE — U0005 INFEC AGEN DETEC AMPLI PROBE: HCPCS | Performed by: PHYSICIAN ASSISTANT

## 2021-11-01 PROCEDURE — 99442 PR PHYSICIAN TELEPHONE EVALUATION 11-20 MIN: CPT | Mod: 95 | Performed by: PHYSICIAN ASSISTANT

## 2021-11-01 PROCEDURE — 81001 URINALYSIS AUTO W/SCOPE: CPT

## 2021-11-01 PROCEDURE — U0003 INFECTIOUS AGENT DETECTION BY NUCLEIC ACID (DNA OR RNA); SEVERE ACUTE RESPIRATORY SYNDROME CORONAVIRUS 2 (SARS-COV-2) (CORONAVIRUS DISEASE [COVID-19]), AMPLIFIED PROBE TECHNIQUE, MAKING USE OF HIGH THROUGHPUT TECHNOLOGIES AS DESCRIBED BY CMS-2020-01-R: HCPCS | Performed by: PHYSICIAN ASSISTANT

## 2021-11-01 PROCEDURE — 87086 URINE CULTURE/COLONY COUNT: CPT

## 2021-11-01 RX ORDER — SULFAMETHOXAZOLE/TRIMETHOPRIM 800-160 MG
1 TABLET ORAL 2 TIMES DAILY
Qty: 14 TABLET | Refills: 0 | Status: SHIPPED | OUTPATIENT
Start: 2021-11-01 | End: 2021-11-08

## 2021-11-01 NOTE — RESULT ENCOUNTER NOTE
Dear Hugo,      Your recent test results are noted below:    Urine shows large amount of red blood cells, but only mild white blood cells. In light of your history of kidney infection in the past, I think we can err on the side of caution and put you on antibiotic therapy to be on the safe side in case some of this blood is secondary to infection. I've sent a prescription for bactrim 2x daily to the pharmacy for you. I added a urine culture to see if anything specific grows out. If it returns negative, I would recommend following up with urology for further evaluation per our discussion at your visit. They will repeat your urine to ensure there's no ongoing blood. If you're not able to get in for awhile, we can repeat this again in 1-2 weeks.    For additional lab test information, [email or web address] is an excellent reference. Please contact the clinic at [phone] with any further questions or concerns.    Sincerely,      Krystina Olvera PA-C  [facility]

## 2021-11-01 NOTE — PROGRESS NOTES
Hugo is a 76 year old who is being evaluated via a billable telephone visit.      What phone number would you like to be contacted at? [phone]  How would you like to obtain your AVS? [employer]    Assessment & Plan     Hugo was seen today for uti.    Diagnoses and all orders for this visit:    Urinary frequency  -     UA Macro with Reflex to Micro and Culture - lab collect; Future  -     Adult Urology Referral; Future  -     Urine Culture Aerobic Bacterial - lab collect; Future    Benign prostatic hyperplasia (BPH) with urinary urge incontinence  -     Adult Urology Referral; Future  -     Urine Culture Aerobic Bacterial - lab collect; Future    Nocturia  -     Adult Urology Referral; Future  -     Urine Culture Aerobic Bacterial - lab collect; Future    Calculus of kidney  -     Urine Culture Aerobic Bacterial - lab collect; Future    Exposure to 2019 novel coronavirus  -     Symptomatic COVID-19 Virus (Coronavirus) by PCR Nose    Dysuria      77 yo male on eliquis for hx of afib, CVA with BPH and hx of recurrent renal stones (last noted L intrarenal stone CT in June) already on flomax for urinary frequency, urge incontinence and nocturia with worsening symptoms from baseline for past 3 weeks. Reports hx of 1 infection per year so will return for UA today. Reviewed could represent worsening of underlying BPH as well and encouraged re-assessment with urology as he has not been seen since 2019. New referral given to re-establish care. Fortunately no red flags including fever, flank pain, vomiting or gross hematuria. Would recommend emergent evaluation/repeat imaging with development of any of these. Patient in agreement with plan.     Return today (on 11/1/2021) for Lab Work.    Krystina Olvera PA-C  [facility]    ADDENDUM --------  Pt presented to clinic to leave UA and requested COVID19 PCR due to possible exposure last week. Also endorsed some sore throat and HA. Order placed for  "completion as well.   Electronically Signed By: Krystina Olvera PA-C    Malick Arias is a 76 year old who presents for the following health issues:    HPI   PMHX significant for BPH for which he takes flomax - normally will have a burning sensation every once in awhile and attributes to being uncircumcised - tries to clean himself well, but reports he has traveled out of town for the past 1 month and attributes to regression. Reports he will get an infection about 1x per year as a result.  Feels symptoms are \"light\".     Hx of CVA and atrial fibrillation for which he is on chronic anticoagulation with eliquis    Genitourinary - Male  Onset/Duration: for about 3 weeks  Description:   Dysuria (painful urination): YES  Hematuria (blood in urine): no  Frequency: YES  Waking at night to urinate: YES, 3-5 times a night, normally goes 1-2x per night pending on the day on baseline.  Hesitancy (delay in urine): no  Retention (unable to empty): YES - has had with prior infections, but not currently experiencing this.  Decrease in urinary flow: no  Incontinence: YES - normally has some leakage, but this is worse. Normally has to sit down then get up and 5 min later has to go again. If doesn't make to the bathroom in time will result in leakage.     Progression of Symptoms:  worsening  Accompanying Signs & Symptoms:  Fever: no  Back/Flank pain: not related to this  Urethral discharge: no  Testicle lumps/masses/pain: no  Nausea and/or vomiting: no  Abdominal pain: not related to this - reports chronic for which he takes gabapentin - followed by pain clinic for this. Attributes to scar tissue from previous gastric bypass and appendectomy.    History:   History of frequent UTI s: HX of pyelo 3/2021  History of kidney stones: YES, has a known stone in his left side. Has had lithotripsy in the past 10-15 yrs. Has never passed one.  History of hernias: no  Personal or Family history of Prostate problems: YES, dad " had prostate cancer when he was 75  Sexually active: no  Precipitating or alleviating factors: None  Therapies tried and outcome: none    Current Outpatient Medications   Medication     apixaban ANTICOAGULANT (ELIQUIS) 5 MG tablet     Cyanocobalamin 5000 MCG TBDP     diltiazem ER (TIAZAC) 240 MG 24 hr ER beaded capsule     Ferrous Sulfate (IRON) 325 (65 Fe) MG tablet     fluticasone (FLONASE) 50 MCG/ACT nasal spray     gabapentin (NEURONTIN) 100 MG capsule     metoprolol succinate ER (TOPROL XL) 100 MG 24 hr tablet     niacin 500 MG tablet     pantoprazole (PROTONIX) 40 MG EC tablet     tamsulosin (FLOMAX) 0.4 MG capsule     vitamin C (ASCORBIC ACID) 1000 MG TABS     No current facility-administered medications for this visit.      No Known Allergies      Review of Systems   Constitutional, HEENT, cardiovascular, pulmonary, gi and gu systems are negative, except as otherwise noted.      Objective           Vitals:  No vitals were obtained today due to virtual visit.    Physical Exam   healthy, alert and no distress  PSYCH: Alert and oriented times 3; coherent speech, normal   rate and volume, able to articulate logical thoughts, able   to abstract reason, no tangential thoughts, no hallucinations   or delusions  His affect is normal and pleasant  RESP: No cough, no audible wheezing, able to talk in full sentences  Remainder of exam unable to be completed due to telephone visits    Results for orders placed or performed in visit on 11/01/21   UA Macro with Reflex to Micro and Culture - lab collect     Status: Abnormal    Specimen: Urine, Midstream   Result Value Ref Range    Color Urine Yellow Colorless, Straw, Light Yellow, Yellow    Appearance Urine Clear Clear    Glucose Urine Negative Negative mg/dL    Bilirubin Urine Negative Negative    Ketones Urine Negative Negative mg/dL    Specific Gravity Urine >=1.030 1.003 - 1.035    Blood Urine Large (A) Negative    pH Urine 5.5 5.0 - 7.0    Protein Albumin Urine Trace  (A) Negative mg/dL    Urobilinogen Urine 1.0 0.2, 1.0 E.U./dL    Nitrite Urine Negative Negative    Leukocyte Esterase Urine Trace (A) Negative   Urine Microscopic     Status: Abnormal   Result Value Ref Range    Bacteria Urine Moderate (A) None Seen /HPF    RBC Urine >100 (A) 0-2 /HPF /HPF    WBC Urine 5-10 (A) 0-5 /HPF /HPF    Narrative    Urine Culture not indicated   Urine Culture Aerobic Bacterial - lab collect     Status: None    Specimen: Urine, Midstream   Result Value Ref Range    Culture <10,000 CFU/mL Urogenital roberto    Results for orders placed or performed in visit on 11/01/21   Symptomatic COVID-19 Virus (Coronavirus) by PCR Nose     Status: Normal    Specimen: Nose; Swab   Result Value Ref Range    SARS CoV2 PCR Negative Negative    Narrative    Testing was performed using the quitchen SARS-CoV-2 Assay on the  [employer] Instrument System. Additional information about this  Emergency Use Authorization (EUA) assay can be found via the Lab  Guide. This test should be ordered for the detection of SARS-CoV-2 in  individuals who meet SARS-CoV-2 clinical and/or epidemiological  criteria. Test performance is unknown in asymptomatic patients. This  test is for in vitro diagnostic use under the FDA EUA for  laboratories certified under CLIA to perform high complexity testing.  This test has not been FDA cleared or approved. A negative result  does not rule out the presence of PCR inhibitors in the specimen or  target RNA in concentration below the limit of detection for the  assay. The possibility of a false negative should be considered if  the patient's recent exposure or clinical presentation suggests  COVID-19. This test was validated by the [facility]. This laboratory is certified under  the Clinical Laboratory Improvement Amendments of 1988 (CLIA-88) as  qualified to perform high complexity laboratory testing.               Phone call duration: 16 minutes

## 2021-11-02 ENCOUNTER — TELEPHONE (OUTPATIENT)
Dept: CARDIOLOGY | Facility: CLINIC | Age: 76
End: 2021-11-02

## 2021-11-02 LAB
BACTERIA UR CULT: NORMAL
SARS-COV-2 RNA RESP QL NAA+PROBE: NEGATIVE

## 2021-11-02 NOTE — TELEPHONE ENCOUNTER
Pt has echo and OV with Dr. Castillo scheduled next week on 11/8 and 11/9. These appointments were scheduled from some older orders. Most recently pt saw Dr. Castillo in 7/2021 and he ordered a repeat echo and OV in 1 year (for 7/2022). Appointments next week can be cancelled.    Called pt. Explained that he does not need the appointments next week and they can be cancelled, unless he is having any problems. He said he is feeling fine and has no concerns. Told pt we will cancel the appointments and plan on seeing him in July 2022. Pt agrees with this plan.     Sent message to scheduling to cancel echo and OV for next week.

## 2021-11-04 NOTE — RESULT ENCOUNTER NOTE
Dear Hugo,      Your recent test results are noted below:    COVID19 test was negative/normal.    For additional lab test information, [email or web address] is an excellent reference. Please contact the clinic at [phone] with any further questions or concerns.    Sincerely,      Krystina Olvera PA-C  [facility]

## 2021-11-04 NOTE — RESULT ENCOUNTER NOTE
Dear Hugo,      Your recent test results are noted below:    -Urine culture is abnormal but it looks like a contaminated, non clean-catch sample.  There is no need for further antibiotics at this point. I would recommend following up with urology as previously advised at your visit.       For additional lab test information, [email or web address] is an excellent reference. Please contact the clinic at [phone] with any further questions or concerns.    Sincerely,      Krystina Olvera PA-C  [facility]

## 2021-11-04 NOTE — PROGRESS NOTES
Hugo is a 76 year old who is being evaluated via a billable video visit.      How would you like to obtain your AVS? MyChart  If the video visit is dropped, the invitation should be resent by: Text to cell phone: .  Will anyone else be joining your video visit? No      Video Start Time: 1:37 PM      [facility]     Date of visit: 11/5/2021      Assessment:   Hugo is a 76 year-old male with past medical history significant for gastricbypass, appendectomy, BPH, HTN, paroxysmal atrial fibrillation, NAFLD, HLD, nephrolithiasis, HAYLEE, OA, GERD, CVA who presents with complaints of chronic abdominal pain    1. Unclear but most likely cause of intermittent pain is the left lateral femoral cutaneous nerve leading to focal pain at the ASIS.     Visit Diagnoses:  1. Abdominal pain, generalized    2. Neuropathy        Plan:    Therapies:   Physical Therapy: no, continue home exercise program.  Clinical Health Psychologist to address issues of relaxation, behavioral change, coping style, and other factors important to improvement: not at this time  Medication Management:   1.  Continue gabapentin 200mg TID for now. In 30 days or so if pain isn't improving with addition of regular water intake, we can consider increasing to 300mg TID.   Further procedures recommended: if not having benefit with gabapentin could consider an US guided LFCN block on the left as recommended by Dr. Burgos.   Again we discussed that I do not understand why his symptoms resolve by drinking water. However I did again encourage he try drinking a full glass of water with every meal to see if this can prevent the development of his pain.   Follow up: with BIENVENIDO Hopson CNP in 4 weeks.         Breana FARIA CNP  [facility]     -------------------------------------------------    Subjective:    Chief complaint:   Chief Complaint   Patient presents with     Pain     follow up       Interval  history:  Hugo Weber is a 76 year old male last seen on 8/6/2021.  He is a patient of Dr. Burgos seen in follow up.     Recommendations/plan at the last visit included:  Therapies:   Physical Therapy: no, continue home exercise program.  Clinical Health Psychologist to address issues of relaxation, behavioral change, coping style, and other factors important to improvement: not at this time  Medication Management:   2.  Hugo reports fatigue and leg weakness over the last 10 days. I think that it is unlikely that these symptoms are related to gabapentin as he reached 200mg TID dosing weeks ago. That being said, he would like to try to decrease slightly to determine impact. Advised he decrease gabapentin to [phone] for 1 week. Can reduce to [phone] if tolerated well. Advised he monitor symptoms. If pain worsens, okay to return to previous dosing.   3. Advised he follow up with cardiology regarding symptoms that started with metoprolol.   Further procedures recommended: if not having benefit with gabapentin could consider an US guided LFCN block on the left as recommended by Dr. Burgos.   As his pain is problematic specifically with eating and drinking water helps, advised he try drinking a full glass of water after eating with every meal to determine if this makes a difference.   Follow up: with BIENVENIDO Hopson CNP in 4 weeks.       Since his last visit, Hugo Weber reports:  -His pain is about the same as it was at last visit.  -He hasn't been seen in follow up for a few months. He went on trips to [place], [place], and [place].  -He continues gabapentin 200mg TID, thinks this may help reduce the severity of his pain. He again comments when he first started this medication in completely eliminated this pain.   -His pain usually begins after eating a meal, 90% of the time he has his pain immediately after eating. The pain can also come late at night and in the morning. He is almost  always able to eliminate the pain by drinking a tall glass of water.   -The fatigue and weakness he had at last visit has improved. He had evaluation and was told his EF had decreased. He had some medication changes per his cardiologist and is feeling better.   -Of note, he currently has a kidney infection and is on his antibiotics in treatment of this.       HPI per Dr. Bugros 6/11/2021  HTN, Hx of Hep C, GERD, afib on anticoagulation, morbid obesity, hx of gastric bypass, hx of appendectomy who presents for evaluation of intermittent LLQ abdominal pain.  The patient's pain began 1 year ago without any particular precipitating event and has been Slowly becoming more prevalent since that time.  He reports that his pain is located primarily in LLQ at the ASIS area and does not radiate. Pain tends to be very focal in this area.  The patient describes the pain as deeper, dull and achy.  He reports that the pain is made worse by eating and tight pants or shorts (Draw String).  His pain is improved with drinking water and loosening his pants.  In addition, he reports no associated with the pain changes in position and timing of day.  The patient's pain is most severe after he eats.   He rates his average pain score at 7/10, but it can be as low as 0/10 or as severe as 7/10.   Pain is intermittent, unpredictable, and seems to be relieved with drinking water.  Pain intensity builds quickly and then fades quickly as well.  Lasts 30 min to 3 hours.       He denies any new problems with falls or balance, Denies any new numbness or weakness of the arms or legs, any new bowel or bladder incontinence, any night sweats or unexplained fevers, or any sudden or unexpected weight loss.     Pain Information:   Pain rating: averages 0/10 on a 0-10 scale.    Current pain medications:    Gabapentin 200mg TID- ?, SE, fatigue, leg weakness    Current MME: 0    Review of [place] Prescription Monitoring Program (): No concern for abuse  or misuse of controlled medications based on this report.     Annual Controlled Substance Agreement/UDS due date: NA     Previous medication treatments included:  Anti-convulsants: none  Muscle relaxors: None  Anti-depressants: None  Acetaminophen/NSAIDs: Occasional for other reasons   Topicals: none     Other treatments have included:  Physical therapy: Not for this   Pain Psychology: No  Chiropractic: No  Acupuncture: No  TENs Unit: No  Injections: None for this reason  Surgeries: Gastric bypass, Left TKA, Appendectomy,        Medications:  Current Outpatient Medications   Medication Sig Dispense Refill     gabapentin (NEURONTIN) 100 MG capsule Take 2 capsules (200 mg) by mouth 3 times daily 180 capsule 1     apixaban ANTICOAGULANT (ELIQUIS) 5 MG tablet Take 1 tablet (5 mg) by mouth 2 times daily 180 tablet 3     Cyanocobalamin 5000 MCG TBDP Take by mouth daily        diltiazem ER (TIAZAC) 240 MG 24 hr ER beaded capsule Take 1 capsule (240 mg) by mouth daily 90 capsule 3     Ferrous Sulfate (IRON) 325 (65 Fe) MG tablet Take 1 tablet by mouth every other day  90 tablet 3     fluticasone (FLONASE) 50 MCG/ACT nasal spray Spray 2 sprays into both nostrils daily as needed for rhinitis or allergies 54.6 mL 3     metoprolol succinate ER (TOPROL XL) 100 MG 24 hr tablet Take 1 tablet (100 mg) by mouth daily 90 tablet 3     niacin 500 MG tablet Take by mouth daily (with breakfast)       pantoprazole (PROTONIX) 40 MG EC tablet Take 1 tablet (40 mg) by mouth daily 90 tablet 3     sulfamethoxazole-trimethoprim (BACTRIM DS) 800-160 MG tablet Take 1 tablet by mouth 2 times daily for 7 days 14 tablet 0     tamsulosin (FLOMAX) 0.4 MG capsule Take 1 capsule (0.4 mg) by mouth 2 times daily 180 capsule 3     vitamin C (ASCORBIC ACID) 1000 MG TABS Take 1,000 mg by mouth daily         Medical History: any changes in medical history since they were last seen? No    Review of Systems: A 10-point review of systems was negative, with the  exception of chronic pain issues..    Objective:    Physical Exam:  There were no vitals taken for this visit.  Constitutional: Well developed, well nourished, appears stated age. Obese.  HEENT: Head atraumatic, normocephalic. Eyes without conjunctival injection or jaundice. Oropharynx clear. Neck supple. No obvious neck masses.  Skin: No rash, lesions, or petechiae of exposed skin.   Psychiatric/mental status: Alert, without lethargy or stupor. Speech fluent. Appropriate affect. Mood normal. Able to follow commands without difficulty.       BILLING TIME DOCUMENTATION:   The total TIME spent on this patient on the date of the encounter/appointment was 25 minutes.      TOTAL TIME includes:   Time spent preparing to see the patient (reviewing records and tests)   Time spent face to face (or over the phone) with the patient   Time spent ordering tests, medications, procedures and referrals   Time spent Referring and communicating with other healthcare professionals   Time spent documenting clinical information in Epic       Video-Visit Details    Type of service:  Video Visit    Video End Time:2:00 PM    Originating Location (pt. Location): Home    Distant Location (provider location):  [facility] [place]     Platform used for Video Visit: [employer]

## 2021-11-05 ENCOUNTER — VIRTUAL VISIT (OUTPATIENT)
Dept: ANESTHESIOLOGY | Facility: CLINIC | Age: 76
End: 2021-11-05
Payer: COMMERCIAL

## 2021-11-05 DIAGNOSIS — G62.9 NEUROPATHY: ICD-10-CM

## 2021-11-05 DIAGNOSIS — R10.84 ABDOMINAL PAIN, GENERALIZED: ICD-10-CM

## 2021-11-05 PROCEDURE — 99213 OFFICE O/P EST LOW 20 MIN: CPT | Mod: 95 | Performed by: NURSE PRACTITIONER

## 2021-11-05 RX ORDER — GABAPENTIN 100 MG/1
200 CAPSULE ORAL 3 TIMES DAILY
Qty: 180 CAPSULE | Refills: 1 | Status: SHIPPED | OUTPATIENT
Start: 2021-11-05 | End: 2022-02-14

## 2021-11-05 ASSESSMENT — PAIN SCALES - GENERAL: PAINLEVEL: NO PAIN (0)

## 2021-11-05 NOTE — PATIENT INSTRUCTIONS
Therapies:   Physical Therapy: no, continue home exercise program.  Clinical Health Psychologist to address issues of relaxation, behavioral change, coping style, and other factors important to improvement: not at this time  Medication Management:   1.  Continue gabapentin 200mg three times daily for now. We can consider increasing to 300mg thee times daily if needed in the future.   Further procedures recommended: if not having benefit with gabapentin could consider an US guided LFCN block on the left as recommended by Dr. Burgos.   Try drinking a full glass of water with every meal to determine if this makes a difference. Keep   Follow up: with BIENVENIDO Hopson CNP in 4 weeks.

## 2021-11-05 NOTE — LETTER
11/5/2021       RE: Hugo Weber  [address]  [place] [address]     Dear Colleague,    Thank you for referring your patient, Hugo Weber, to the [facility] [place] at [facility]. Please see a copy of my visit note below.    Hugo is a 76 year old who is being evaluated via a billable video visit.      How would you like to obtain your AVS? MyChart  If the video visit is dropped, the invitation should be resent by: Text to cell phone: .  Will anyone else be joining your video visit? No      Video Start Time: 1:37 PM      [facility]     Date of visit: 11/5/2021      Assessment:   Hugo is a 76 year-old male with past medical history significant for gastricbypass, appendectomy, BPH, HTN, paroxysmal atrial fibrillation, NAFLD, HLD, nephrolithiasis, HAYLEE, OA, GERD, CVA who presents with complaints of chronic abdominal pain    1. Unclear but most likely cause of intermittent pain is the left lateral femoral cutaneous nerve leading to focal pain at the ASIS.     Visit Diagnoses:  1. Abdominal pain, generalized    2. Neuropathy        Plan:    Therapies:   Physical Therapy: no, continue home exercise program.  Clinical Health Psychologist to address issues of relaxation, behavioral change, coping style, and other factors important to improvement: not at this time  Medication Management:   1.  Continue gabapentin 200mg TID for now. In 30 days or so if pain isn't improving with addition of regular water intake, we can consider increasing to 300mg TID.   Further procedures recommended: if not having benefit with gabapentin could consider an US guided LFCN block on the left as recommended by Dr. Burgos.   Again we discussed that I do not understand why his symptoms resolve by drinking water. However I did again encourage he try drinking a full glass of water with every meal to see if this can  prevent the development of his pain.   Follow up: with BIENVENIDO Hopson CNP in 4 weeks.         Breana FARIA CNP  [facility]     -------------------------------------------------    Subjective:    Chief complaint:   Chief Complaint   Patient presents with     Pain     follow up       Interval history:  Hugo Weber is a 76 year old male last seen on 8/6/2021.  He is a patient of Dr. Burgos seen in follow up.     Recommendations/plan at the last visit included:  Therapies:   Physical Therapy: no, continue home exercise program.  Clinical Health Psychologist to address issues of relaxation, behavioral change, coping style, and other factors important to improvement: not at this time  Medication Management:   2.  Hugo reports fatigue and leg weakness over the last 10 days. I think that it is unlikely that these symptoms are related to gabapentin as he reached 200mg TID dosing weeks ago. That being said, he would like to try to decrease slightly to determine impact. Advised he decrease gabapentin to [phone] for 1 week. Can reduce to [phone] if tolerated well. Advised he monitor symptoms. If pain worsens, okay to return to previous dosing.   3. Advised he follow up with cardiology regarding symptoms that started with metoprolol.   Further procedures recommended: if not having benefit with gabapentin could consider an US guided LFCN block on the left as recommended by Dr. Burgos.   As his pain is problematic specifically with eating and drinking water helps, advised he try drinking a full glass of water after eating with every meal to determine if this makes a difference.   Follow up: with BIENVENIDO Hopson CNP in 4 weeks.       Since his last visit, Hugo Weber reports:  -His pain is about the same as it was at last visit.  -He hasn't been seen in follow up for a few months. He went on trips to [place], [place], and [place].  -He continues gabapentin 200mg  TID, thinks this may help reduce the severity of his pain. He again comments when he first started this medication in completely eliminated this pain.   -His pain usually begins after eating a meal, 90% of the time he has his pain immediately after eating. The pain can also come late at night and in the morning. He is almost always able to eliminate the pain by drinking a tall glass of water.   -The fatigue and weakness he had at last visit has improved. He had evaluation and was told his EF had decreased. He had some medication changes per his cardiologist and is feeling better.   -Of note, he currently has a kidney infection and is on his antibiotics in treatment of this.       HPI per Dr. Burgos 6/11/2021  HTN, Hx of Hep C, GERD, afib on anticoagulation, morbid obesity, hx of gastric bypass, hx of appendectomy who presents for evaluation of intermittent LLQ abdominal pain.  The patient's pain began 1 year ago without any particular precipitating event and has been Slowly becoming more prevalent since that time.  He reports that his pain is located primarily in LLQ at the ASIS area and does not radiate. Pain tends to be very focal in this area.  The patient describes the pain as deeper, dull and achy.  He reports that the pain is made worse by eating and tight pants or shorts (Draw String).  His pain is improved with drinking water and loosening his pants.  In addition, he reports no associated with the pain changes in position and timing of day.  The patient's pain is most severe after he eats.   He rates his average pain score at 7/10, but it can be as low as 0/10 or as severe as 7/10.   Pain is intermittent, unpredictable, and seems to be relieved with drinking water.  Pain intensity builds quickly and then fades quickly as well.  Lasts 30 min to 3 hours.       He denies any new problems with falls or balance, Denies any new numbness or weakness of the arms or legs, any new bowel or bladder incontinence, any  night sweats or unexplained fevers, or any sudden or unexpected weight loss.     Pain Information:   Pain rating: averages 0/10 on a 0-10 scale.    Current pain medications:    Gabapentin 200mg TID- ?, SE, fatigue, leg weakness    Current MME: 0    Review of [place] Prescription Monitoring Program (): No concern for abuse or misuse of controlled medications based on this report.     Annual Controlled Substance Agreement/UDS due date: NA     Previous medication treatments included:  Anti-convulsants: none  Muscle relaxors: None  Anti-depressants: None  Acetaminophen/NSAIDs: Occasional for other reasons   Topicals: none     Other treatments have included:  Physical therapy: Not for this   Pain Psychology: No  Chiropractic: No  Acupuncture: No  TENs Unit: No  Injections: None for this reason  Surgeries: Gastric bypass, Left TKA, Appendectomy,        Medications:  Current Outpatient Medications   Medication Sig Dispense Refill     gabapentin (NEURONTIN) 100 MG capsule Take 2 capsules (200 mg) by mouth 3 times daily 180 capsule 1     apixaban ANTICOAGULANT (ELIQUIS) 5 MG tablet Take 1 tablet (5 mg) by mouth 2 times daily 180 tablet 3     Cyanocobalamin 5000 MCG TBDP Take by mouth daily        diltiazem ER (TIAZAC) 240 MG 24 hr ER beaded capsule Take 1 capsule (240 mg) by mouth daily 90 capsule 3     Ferrous Sulfate (IRON) 325 (65 Fe) MG tablet Take 1 tablet by mouth every other day  90 tablet 3     fluticasone (FLONASE) 50 MCG/ACT nasal spray Spray 2 sprays into both nostrils daily as needed for rhinitis or allergies 54.6 mL 3     metoprolol succinate ER (TOPROL XL) 100 MG 24 hr tablet Take 1 tablet (100 mg) by mouth daily 90 tablet 3     niacin 500 MG tablet Take by mouth daily (with breakfast)       pantoprazole (PROTONIX) 40 MG EC tablet Take 1 tablet (40 mg) by mouth daily 90 tablet 3     sulfamethoxazole-trimethoprim (BACTRIM DS) 800-160 MG tablet Take 1 tablet by mouth 2 times daily for 7 days 14 tablet 0      tamsulosin (FLOMAX) 0.4 MG capsule Take 1 capsule (0.4 mg) by mouth 2 times daily 180 capsule 3     vitamin C (ASCORBIC ACID) 1000 MG TABS Take 1,000 mg by mouth daily         Medical History: any changes in medical history since they were last seen? No    Review of Systems: A 10-point review of systems was negative, with the exception of chronic pain issues..    Objective:    Physical Exam:  There were no vitals taken for this visit.  Constitutional: Well developed, well nourished, appears stated age. Obese.  HEENT: Head atraumatic, normocephalic. Eyes without conjunctival injection or jaundice. Oropharynx clear. Neck supple. No obvious neck masses.  Skin: No rash, lesions, or petechiae of exposed skin.   Psychiatric/mental status: Alert, without lethargy or stupor. Speech fluent. Appropriate affect. Mood normal. Able to follow commands without difficulty.       BILLING TIME DOCUMENTATION:   The total TIME spent on this patient on the date of the encounter/appointment was 25 minutes.      TOTAL TIME includes:   Time spent preparing to see the patient (reviewing records and tests)   Time spent face to face (or over the phone) with the patient   Time spent ordering tests, medications, procedures and referrals   Time spent Referring and communicating with other healthcare professionals   Time spent documenting clinical information in Epic       Video-Visit Details    Type of service:  Video Visit    Video End Time:2:00 PM    Originating Location (pt. Location): Home    Distant Location (provider location):  [facility] [place]     Platform used for Video Visit: [employer]        Again, thank you for allowing me to participate in the care of your patient.      Sincerely,    BIENVENIDO Arnold CNP

## 2021-11-15 ENCOUNTER — NURSE TRIAGE (OUTPATIENT)
Dept: NURSING | Facility: CLINIC | Age: 76
End: 2021-11-15
Payer: COMMERCIAL

## 2021-11-16 ENCOUNTER — OFFICE VISIT (OUTPATIENT)
Dept: FAMILY MEDICINE | Facility: CLINIC | Age: 76
End: 2021-11-16
Payer: COMMERCIAL

## 2021-11-16 VITALS
HEIGHT: 72 IN | SYSTOLIC BLOOD PRESSURE: 98 MMHG | OXYGEN SATURATION: 98 % | DIASTOLIC BLOOD PRESSURE: 62 MMHG | BODY MASS INDEX: 42.66 KG/M2 | WEIGHT: 315 LBS | TEMPERATURE: 97.3 F | HEART RATE: 63 BPM

## 2021-11-16 DIAGNOSIS — R30.0 DYSURIA: Primary | ICD-10-CM

## 2021-11-16 DIAGNOSIS — N40.1 BENIGN PROSTATIC HYPERPLASIA (BPH) WITH URINARY URGE INCONTINENCE: ICD-10-CM

## 2021-11-16 DIAGNOSIS — N39.41 BENIGN PROSTATIC HYPERPLASIA (BPH) WITH URINARY URGE INCONTINENCE: ICD-10-CM

## 2021-11-16 DIAGNOSIS — N20.0 CALCULUS OF KIDNEY: ICD-10-CM

## 2021-11-16 LAB
ALBUMIN UR-MCNC: ABNORMAL MG/DL
APPEARANCE UR: CLEAR
BACTERIA #/AREA URNS HPF: ABNORMAL /HPF
BILIRUB UR QL STRIP: NEGATIVE
COLOR UR AUTO: YELLOW
GLUCOSE UR STRIP-MCNC: NEGATIVE MG/DL
HGB UR QL STRIP: ABNORMAL
KETONES UR STRIP-MCNC: NEGATIVE MG/DL
LEUKOCYTE ESTERASE UR QL STRIP: NEGATIVE
NITRATE UR QL: NEGATIVE
PH UR STRIP: 6.5 [PH] (ref 5–7)
RBC #/AREA URNS AUTO: ABNORMAL /HPF
SP GR UR STRIP: 1.02 (ref 1–1.03)
UROBILINOGEN UR STRIP-ACNC: 2 E.U./DL
WBC #/AREA URNS AUTO: ABNORMAL /HPF

## 2021-11-16 PROCEDURE — 87086 URINE CULTURE/COLONY COUNT: CPT | Performed by: NURSE PRACTITIONER

## 2021-11-16 PROCEDURE — 99213 OFFICE O/P EST LOW 20 MIN: CPT | Performed by: NURSE PRACTITIONER

## 2021-11-16 PROCEDURE — 81001 URINALYSIS AUTO W/SCOPE: CPT | Performed by: NURSE PRACTITIONER

## 2021-11-16 ASSESSMENT — ENCOUNTER SYMPTOMS
FREQUENCY: 1
CHILLS: 0
FLANK PAIN: 0

## 2021-11-16 ASSESSMENT — MIFFLIN-ST. JEOR: SCORE: 2274.41

## 2021-11-16 NOTE — TELEPHONE ENCOUNTER
Pt reports dysuria and difficulty urinating today, states he has to push very hard to release urine.  Pt denies a fever.  Recent history of UTI, was on Bactrim.      Triage disposition:  ED now.  Patient verbalized understanding and had no further questions.  He prefers to schedule an appointment for tomorrow, call transferred to scheduling.  Pt is aware the he can seek care at  if needed.     COVID 19 Nurse Triage Plan/Patient Instructions    Please be aware that novel coronavirus (COVID-19) may be circulating in the community. If you develop symptoms such as fever, cough, or SOB or if you have concerns about the presence of another infection including coronavirus (COVID-19), please contact your health care provider or visit [email or web address].     Disposition/Instructions    ED Visit recommended. Follow protocol based instructions.     Bring Your Own Device:  Please also bring your smart device(s) (smart phones, tablets, laptops) and their charging cables for your personal use and to communicate with your care team during your visit.    Thank you for taking steps to prevent the spread of this virus.  o Limit your contact with others.  o Wear a simple mask to cover your cough.  o Wash your hands well and often.    Resources    [facility]: About COVID-19: [email or web address]    CDC: What to Do If You're Sick: www.cdc.gov/coronavirus/2019-ncov/about/steps-when-sick.html    CDC: Ending Home Isolation: www.cdc.gov/coronavirus/2019-ncov/hcp/disposition-in-home-patients.html     CDC: Caring for Someone: www.cdc.gov/coronavirus/2019-ncov/if-you-are-sick/care-for-someone.html     [facility]: Interim Guidance for Hospital Discharge to Home: www.health.[email or web address].[place]/diseases/coronavirus/hcp/hospdischarge.pdf    [facility] clinical trials (COVID-19 research studies): [email or web address]     Below are the COVID-19 hotlines at the [place] Department of Health ([facility]).  Interpreters are available.   o For health questions: Call 054-404-1489 or 1-692.648.7408 (7 a.m. to 7 p.m.)  o For questions about schools and childcare: Call 329-308-2523 or 1-480.753.3694 (7 a.m. to 7 p.m.)               Ysabel White RN/AL      Reason for Disposition    [1] Unable to urinate (or only a few drops) > 4 hours AND     [2] bladder feels very full (e.g., feels blocked with strong urge to urinate; palpable bladder)    Additional Information    Negative: Shock suspected (e.g., cold/pale/clammy skin, too weak to stand, low BP, rapid pulse)    Negative: Sounds like a life-threatening emergency to the triager    Negative: Followed a genital injury (e.g., penis, scrotum)    Negative: Taking antibiotic for urinary tract infection (UTI)    Negative: Pain in scrotum is main symptom    Protocols used: URINATION PAIN - MALE-A-AH

## 2021-11-16 NOTE — PROGRESS NOTES
Assessment & Plan   Problem List Items Addressed This Visit        Urinary    Calculus of kidney    Benign prostatic hyperplasia (BPH) with urinary urge incontinence      Other Visit Diagnoses     Dysuria    -  Primary    Relevant Orders    UA Macro with Reflex to Micro and Culture - lab collect (Completed)    Urine Microscopic (Completed)    Urine Culture Aerobic Bacterial - lab collect (Completed)           Review of the result(s) of each unique test - UA  Ordering of each unique test  No LOS data to display   Time spent doing chart review, history and exam, documentation and further activities per the note       See Patient Instructions    No follow-ups on file.    Keila [facility] LUDMILA Arias is a 76 year old who presents for the following health issues     HPI     Genitourinary - Male  Onset/Duration: a few days ago   Description:   Dysuria (painful urination): YES - has improved in the last day  Hematuria (blood in urine): no  Frequency: YES, was but has now improved  Waking at night to urinate: YES. But that is a regular thing  Hesitancy (delay in urine): YES  Retention (unable to empty): YES  Decrease in urinary flow: YES  Incontinence: YES, leakage but that isn't new  Progression of Symptoms:  improving  Accompanying Signs & Symptoms:  Fever: no  Back/Flank pain: no  Urethral discharge: no  Testicle lumps/masses/pain: no  Nausea and/or vomiting: no  Abdominal pain: YES  History:   History of frequent UTI s: YES  History of kidney stones: YES, known issue in the left kidney -   History of hernias: no  Personal or Family history of Prostate problems: YES, father had prostate cancer at 75  Sexually active: no  Precipitating or alleviating factors:   Therapies tried and outcome:         Review of Systems   Constitutional, HEENT, cardiovascular, pulmonary, GI, , musculoskeletal, neuro, skin, endocrine and psych systems are negative, except as otherwise noted.       Objective    BP 98/62 (BP Location: Right arm, Cuff Size: Adult Large)   Pulse 63   Temp 97.3  F (36.3  C) (Tympanic)   Ht 1.829 m (6')   Wt (!) 150.1 kg (331 lb)   SpO2 98%   BMI 44.89 kg/m    Body mass index is 44.89 kg/m .  Physical Exam   GENERAL: healthy, alert and no distress  ABDOMEN: soft, nontender, no hepatosplenomegaly, no masses and bowel sounds normal  BACK: no CVA tenderness, no paralumbar tenderness    Results for orders placed or performed in visit on 11/16/21   UA Macro with Reflex to Micro and Culture - lab collect     Status: Abnormal    Specimen: Urine, Midstream   Result Value Ref Range    Color Urine Yellow Colorless, Straw, Light Yellow, Yellow    Appearance Urine Clear Clear    Glucose Urine Negative Negative mg/dL    Bilirubin Urine Negative Negative    Ketones Urine Negative Negative mg/dL    Specific Gravity Urine 1.025 1.003 - 1.035    Blood Urine Large (A) Negative    pH Urine 6.5 5.0 - 7.0    Protein Albumin Urine Trace (A) Negative mg/dL    Urobilinogen Urine 2.0 (A) 0.2, 1.0 E.U./dL    Nitrite Urine Negative Negative    Leukocyte Esterase Urine Negative Negative   Urine Microscopic     Status: Abnormal   Result Value Ref Range    Bacteria Urine None Seen None Seen /HPF    RBC Urine 2-5 (A) 0-2 /HPF /HPF    WBC Urine 0-5 0-5 /HPF /HPF    Narrative    Urine Culture not indicated   Urine Culture Aerobic Bacterial - lab collect     Status: None    Specimen: Urine, Midstream   Result Value Ref Range    Culture No Growth              TAB King     [address] [address]  [email or web address]   Office: [phone]                 Answers for HPI/ROS submitted by the patient on 11/16/2021  Chronicity: recurrent  Onset: in the past 7 days  Frequency: intermittently  Progression since onset: rapidly improving  Pain - numeric: 1/10  Fever: no fever  Fever duration: less than 1 day  Sexually active?:  No  History of pyelonephritis?: Yes  discharge: Yes

## 2021-11-16 NOTE — TELEPHONE ENCOUNTER
MEDICAL RECORDS REQUEST   [facility]  Urology Clinic  [address] [place] [address]  PHONE: [phone]  Fax: [phone]        FUTURE VISIT INFORMATION                                                   Hugo Weber, : 1945 scheduled for future visit at [facility]    APPOINTMENT INFORMATION:    Date: 2022    Provider:  Regulo Heath MD    Reason for Visit/Diagnosis: Incontinence per pt //     REFERRAL INFORMATION:    Referring provider:  Krystina Olvera PA-C    Specialty: family Med    Referring providers clinic:  [facility]    Clinic contact number:      RECORDS REQUESTED FOR VISIT                                                     NOTES  STATUS/DETAILS   OFFICE NOTE from referring provider  yes 2021   OFFICE NOTE from other specialist  yes 2019   DISCHARGE SUMMARY from hospital  no   DISCHARGE REPORT from the ER  no   OPERATIVE REPORT  yes 2018   MEDICATION LIST  yes   LABS     URINALYSIS (UA)  yes 2021   URINE CYTOLOGY  no     PRE-VISIT CHECKLIST      Record collection complete Yes   Appointment appropriately scheduled           (right time/right provider) Yes   Joint diagnostic appointment coordinated correctly          (ensure right order & amount of time) N/A   MyChart activation Yes   Questionnaire complete If no, please explain

## 2021-11-17 LAB — BACTERIA UR CULT: NO GROWTH

## 2022-01-06 ENCOUNTER — TRANSFERRED RECORDS (OUTPATIENT)
Dept: HEALTH INFORMATION MANAGEMENT | Facility: CLINIC | Age: 77
End: 2022-01-06
Payer: COMMERCIAL

## 2022-01-06 ENCOUNTER — PRE VISIT (OUTPATIENT)
Dept: UROLOGY | Facility: CLINIC | Age: 77
End: 2022-01-06
Payer: COMMERCIAL

## 2022-01-11 ENCOUNTER — PRE VISIT (OUTPATIENT)
Dept: UROLOGY | Facility: CLINIC | Age: 77
End: 2022-01-11

## 2022-01-11 ENCOUNTER — VIRTUAL VISIT (OUTPATIENT)
Dept: UROLOGY | Facility: CLINIC | Age: 77
End: 2022-01-11
Payer: COMMERCIAL

## 2022-01-11 DIAGNOSIS — Z91.199 NO-SHOW FOR APPOINTMENT: ICD-10-CM

## 2022-01-11 DIAGNOSIS — N39.498 OTHER URINARY INCONTINENCE: Primary | ICD-10-CM

## 2022-01-11 NOTE — LETTER
Date:January 13, 2022      Provider requested that no letter be sent. Do not send.       [facility]

## 2022-01-11 NOTE — ADDENDUM NOTE
Addended by: JONATHAN ESCOBAR on: 1/11/2022 09:10 AM     Modules accepted: Level of Service, SmartSet

## 2022-01-11 NOTE — LETTER
1/11/2022       RE: Hugo Weber  [phone]  [facility] [id number]     Dear Colleague,    Thank you for referring your patient, Hugo Weber, to the [facility]. Please see a copy of my visit note below.    Called and left message on listed number, no answer      This patient was a no show for this scheduled appointment.      Again, thank you for allowing me to participate in the care of your patient.      Sincerely,    Regulo Heath MD

## 2022-01-17 ENCOUNTER — TRANSFERRED RECORDS (OUTPATIENT)
Dept: HEALTH INFORMATION MANAGEMENT | Facility: CLINIC | Age: 77
End: 2022-01-17
Payer: COMMERCIAL

## 2022-01-21 DIAGNOSIS — I48.11 LONGSTANDING PERSISTENT ATRIAL FIBRILLATION (H): ICD-10-CM

## 2022-02-14 ENCOUNTER — TELEPHONE (OUTPATIENT)
Dept: ANESTHESIOLOGY | Facility: CLINIC | Age: 77
End: 2022-02-14
Payer: COMMERCIAL

## 2022-02-14 DIAGNOSIS — R10.84 ABDOMINAL PAIN, GENERALIZED: ICD-10-CM

## 2022-02-14 DIAGNOSIS — G62.9 NEUROPATHY: ICD-10-CM

## 2022-02-14 RX ORDER — GABAPENTIN 100 MG/1
200 CAPSULE ORAL 3 TIMES DAILY
Qty: 180 CAPSULE | Refills: 0 | Status: SHIPPED | OUTPATIENT
Start: 2022-02-14 | End: 2022-02-18 | Stop reason: DRUGHIGH

## 2022-02-14 NOTE — TELEPHONE ENCOUNTER
[facility] Call Center    Phone Message    May a detailed message be left on voicemail: yes     Reason for Call: Medication Refill Request    Has the patient contacted the pharmacy for the refill? Yes   Name of medication being requested: gabapentin (NEURONTIN) 100 MG capsule     Provider who prescribed the medication: Carmita Brian  Pharmacy: [facility]  Date medication is needed: 2/14/22         Action Taken: Message routed to:  [facility] pain

## 2022-02-18 ENCOUNTER — TELEPHONE (OUTPATIENT)
Dept: ANESTHESIOLOGY | Facility: CLINIC | Age: 77
End: 2022-02-18

## 2022-02-18 ENCOUNTER — VIRTUAL VISIT (OUTPATIENT)
Dept: ANESTHESIOLOGY | Facility: CLINIC | Age: 77
End: 2022-02-18
Payer: COMMERCIAL

## 2022-02-18 DIAGNOSIS — G89.29 CHRONIC ABDOMINAL PAIN: ICD-10-CM

## 2022-02-18 DIAGNOSIS — M54.14 THORACIC RADICULOPATHY: Primary | ICD-10-CM

## 2022-02-18 DIAGNOSIS — R10.9 CHRONIC ABDOMINAL PAIN: ICD-10-CM

## 2022-02-18 PROCEDURE — 99213 OFFICE O/P EST LOW 20 MIN: CPT | Mod: 95 | Performed by: NURSE PRACTITIONER

## 2022-02-18 RX ORDER — GABAPENTIN 300 MG/1
300 CAPSULE ORAL 2 TIMES DAILY
Qty: 60 CAPSULE | Refills: 1 | Status: SHIPPED | OUTPATIENT
Start: 2022-02-18 | End: 2022-04-11

## 2022-02-18 NOTE — PATIENT INSTRUCTIONS
Therapies:   Physical Therapy: no, continue home exercise program as able.   Clinical Health Psychologist to address issues of relaxation, behavioral change, coping style, and other factors important to improvement: not at this time  Diagnostic Studies: I wonder if your abdominal pain is caused by your thoracic spine. I recommend a thoracic MRI for further evaluation. Let me know where you would like me to send your open thoracic MRI order.   Medication Management:   1.  Adjust dosing of gabapentin to 300mg twice daily. Monitor pain benefit. We can increase if tolerated.    Further procedures recommended: TBD. We may consider a thoracic epidural steroid injection if there is obvious nerve pinching on your MRI.   Follow up: with BIENVENIDO Hopson CNP in [place] after MRI.

## 2022-02-18 NOTE — TELEPHONE ENCOUNTER
[facility] Call Center    Phone Message    May a detailed message be left on voicemail: yes     Reason for Call: Other: Pt requesting call back. Pt called and stated he called [facility] in [place] to schedule the open MRI that Carmita Snehal wanted the pt to have done. Pt stated that he was instructed to have the pain clinic call [facility] to schedule the MRI for 3/28 at 11:00am and to send them the order. Pt did not have any of their contact information. Pt also stated the he was to see Carmita Brian in clinic the end of that week for a f/u. Pt was informed Carmita Brian had video appts available that week but pt stated he was instructed to be seen in person     Action Taken: Message routed to:  [facility]

## 2022-02-18 NOTE — PROGRESS NOTES
Hugo is a 76 year old who is being evaluated via a billable video visit.      How would you like to obtain your AVS? MyChart  If the video visit is dropped, the invitation should be resent by: Send to e-mail at: [email or web address]  Will anyone else be joining your video visit? No    Video Start Time: 10:41 AM        [facility]     Date of visit: 2/18/2022      Assessment:   Hugo is a 76 year-old male with past medical history significant for gastricbypass, appendectomy, BPH, HTN, paroxysmal atrial fibrillation, NAFLD, HLD, nephrolithiasis, HAYLEE, OA, GERD, CVA who presents with complaints of chronic abdominal pain    1. Etiology unclear. I wonder if some of his pain could be thoracic radicular pain. I will order a thoracic MRI once he determines which location he would like this to go to.     Visit Diagnoses:  1. Thoracic radiculopathy    2. Chronic abdominal pain        Plan:    Therapies:   Physical Therapy: no, continue home exercise program as able.   Clinical Health Psychologist to address issues of relaxation, behavioral change, coping style, and other factors important to improvement: not at this time  Diagnostic Studies: I wonder if Hugo's abdominal pain is caused by his thoracic spine. I recommended a thoracic MRI for further evaluation. He has claustrophobia and needs an open MRI. He will let me know where he would like me to send this order.   Medication Management:   1.  Hugo often forgets midday dose of gabapentin. I advised he adjust dosing of gabapentin to 300mg BID. Monitor pain benefit. We can increase if tolerated.    Further procedures recommended: TBD. We may consider a thoracic epidural steroid injection if there is obvious nerve pinching on MRI.   Follow up: with BIENVENIDO Hopson CNP in person in [place] after MRI.         Breana FARIA CNP  [facility]      -------------------------------------------------    Subjective:    Chief complaint:   Pain.      Interval history:  Hugo Weber is a 76 year old male last seen on 11/5/2021.  He is a patient of Dr. Burgos seen in follow up.     Recommendations/plan at the last visit included:  Therapies:   Physical Therapy: no, continue home exercise program.  Clinical Health Psychologist to address issues of relaxation, behavioral change, coping style, and other factors important to improvement: not at this time  Medication Management:   2.  Continue gabapentin 200mg TID for now. In 30 days or so if pain isn't improving with addition of regular water intake, we can consider increasing to 300mg TID.   Further procedures recommended: if not having benefit with gabapentin could consider an US guided LFCN block on the left as recommended by Dr. Burgos.   Again we discussed that I do not understand why his symptoms resolve by drinking water. However I did again encourage he try drinking a full glass of water with every meal to see if this can prevent the development of his pain.   Follow up: with BIENVENIDO Hopson CNP in 4 weeks.       Since his last visit, Hugo Weber reports:  -His pain is about the same as it was at last visit, remains variable and difficult to predict.  -He does report today an ache in his lower left back. He continues gabapentin 200mg but decreased to BID instead of TID as he forgets the midday dose. He continues to reports some pain benefit from gabapentin. HE didn't notice a difference from TID to BID.   -He continues to report benefit with drinking a glass of water. He tried drinking a glass of water with each meal as directed at last visit but didn't notice a difference so stopped.       HPI per Dr. Burgos 6/11/2021  HTN, Hx of Hep C, GERD, afib on anticoagulation, morbid obesity, hx of gastric bypass, hx of appendectomy who presents for evaluation of intermittent LLQ abdominal pain.   The patient's pain began 1 year ago without any particular precipitating event and has been Slowly becoming more prevalent since that time.  He reports that his pain is located primarily in LLQ at the ASIS area and does not radiate. Pain tends to be very focal in this area.  The patient describes the pain as deeper, dull and achy.  He reports that the pain is made worse by eating and tight pants or shorts (Draw String).  His pain is improved with drinking water and loosening his pants.  In addition, he reports no associated with the pain changes in position and timing of day.  The patient's pain is most severe after he eats.   He rates his average pain score at 7/10, but it can be as low as 0/10 or as severe as 7/10.   Pain is intermittent, unpredictable, and seems to be relieved with drinking water.  Pain intensity builds quickly and then fades quickly as well.  Lasts 30 min to 3 hours.       He denies any new problems with falls or balance, Denies any new numbness or weakness of the arms or legs, any new bowel or bladder incontinence, any night sweats or unexplained fevers, or any sudden or unexpected weight loss.     Pain Information:   Pain rating: averages 0/10 on a 0-10 scale.    Current pain medications:    Gabapentin 200mg TID- ?, SE, fatigue, leg weakness, taking BID    Current MME: 0    Review of [place] Prescription Monitoring Program (): No concern for abuse or misuse of controlled medications based on this report.     Annual Controlled Substance Agreement/UDS due date: NA     Previous medication treatments included:  Anti-convulsants: none  Muscle relaxors: None  Anti-depressants: None  Acetaminophen/NSAIDs: Occasional for other reasons   Topicals: none     Other treatments have included:  Physical therapy: Not for this   Pain Psychology: No  Chiropractic: No  Acupuncture: No  TENs Unit: No  Injections: None for this reason  Surgeries: Gastric bypass, Left TKA, Appendectomy,         Medications:  Current Outpatient Medications   Medication Sig Dispense Refill     apixaban ANTICOAGULANT (ELIQUIS) 5 MG tablet Take 1 tablet (5 mg) by mouth 2 times daily 180 tablet 1     Cyanocobalamin 5000 MCG TBDP Take by mouth daily        diltiazem ER (TIAZAC) 240 MG 24 hr ER beaded capsule Take 1 capsule (240 mg) by mouth daily 90 capsule 3     Ferrous Sulfate (IRON) 325 (65 Fe) MG tablet Take 1 tablet by mouth every other day  90 tablet 3     fluticasone (FLONASE) 50 MCG/ACT nasal spray Spray 2 sprays into both nostrils daily as needed for rhinitis or allergies 54.6 mL 3     gabapentin (NEURONTIN) 300 MG capsule Take 1 capsule (300 mg) by mouth 2 times daily 60 capsule 1     metoprolol succinate ER (TOPROL XL) 100 MG 24 hr tablet Take 1 tablet (100 mg) by mouth daily 90 tablet 3     niacin 500 MG tablet Take by mouth daily (with breakfast)       pantoprazole (PROTONIX) 40 MG EC tablet Take 1 tablet (40 mg) by mouth daily 90 tablet 3     tamsulosin (FLOMAX) 0.4 MG capsule Take 1 capsule (0.4 mg) by mouth 2 times daily 180 capsule 3     vitamin C (ASCORBIC ACID) 1000 MG TABS Take 1,000 mg by mouth daily         Medical History: any changes in medical history since they were last seen? No    Review of Systems: A 10-point review of systems was negative, with the exception of chronic pain issues..    Objective:    Physical Exam:  There were no vitals taken for this visit.  Constitutional: Well developed, well nourished, appears stated age. Obese.  HEENT: Head atraumatic, normocephalic. Eyes without conjunctival injection or jaundice. Oropharynx clear. Neck supple. No obvious neck masses.  Skin: No rash, lesions, or petechiae of exposed skin.   Psychiatric/mental status: Alert, without lethargy or stupor. Speech fluent. Appropriate affect. Mood normal. Able to follow commands without difficulty.       BILLING TIME DOCUMENTATION:   The total TIME spent on this patient on the date of the encounter/appointment  was 25 minutes.      TOTAL TIME includes:   Time spent preparing to see the patient (reviewing records and tests)   Time spent face to face (or over the phone) with the patient   Time spent ordering tests, medications, procedures and referrals   Time spent Referring and communicating with other healthcare professionals   Time spent documenting clinical information in Epic     Video-Visit Details    Type of service:  Video Visit    Video End Time:11:03 AM    Originating Location (pt. Location): Home    Distant Location (provider location):  [facility] [place]     Platform used for Video Visit: Voxel (Internap)

## 2022-02-18 NOTE — LETTER
2/18/2022       RE: Hugo Weber  [address]  [facility] [address]     Dear Colleague,    Thank you for referring your patient, Hugo Weber, to the [facility] [place] at [facility]. Please see a copy of my visit note below.    [facility]     Date of visit: 2/18/2022    Assessment:   Hugo is a 76 year-old male with past medical history significant for gastricbypass, appendectomy, BPH, HTN, paroxysmal atrial fibrillation, NAFLD, HLD, nephrolithiasis, HAYLEE, OA, GERD, CVA who presents with complaints of chronic abdominal pain    1. Etiology unclear. I wonder if some of his pain could be thoracic radicular pain. I will order a thoracic MRI once he determines which location he would like this to go to.     Visit Diagnoses:  1. Thoracic radiculopathy    2. Chronic abdominal pain        Plan:    Therapies:   Physical Therapy: no, continue home exercise program as able.   Clinical Health Psychologist to address issues of relaxation, behavioral change, coping style, and other factors important to improvement: not at this time  Diagnostic Studies: I wonder if Hugo's abdominal pain is caused by his thoracic spine. I recommended a thoracic MRI for further evaluation. He has claustrophobia and needs an open MRI. He will let me know where he would like me to send this order.   Medication Management:   1.  Hugo often forgets midday dose of gabapentin. I advised he adjust dosing of gabapentin to 300mg BID. Monitor pain benefit. We can increase if tolerated.    Further procedures recommended: TBD. We may consider a thoracic epidural steroid injection if there is obvious nerve pinching on MRI.   Follow up: with BIENVENIDO Hopson CNP in person in [place] after MRI.         Breana FARIA CNP  [facility]      -------------------------------------------------    Subjective:    Chief complaint:   Pain.      Interval history:  Hugo Weber is a 76 year old male last seen on 11/5/2021.  He is a patient of Dr. Burgos seen in follow up.     Recommendations/plan at the last visit included:  Therapies:   Physical Therapy: no, continue home exercise program.  Clinical Health Psychologist to address issues of relaxation, behavioral change, coping style, and other factors important to improvement: not at this time  Medication Management:   2.  Continue gabapentin 200mg TID for now. In 30 days or so if pain isn't improving with addition of regular water intake, we can consider increasing to 300mg TID.   Further procedures recommended: if not having benefit with gabapentin could consider an US guided LFCN block on the left as recommended by Dr. Burgos.   Again we discussed that I do not understand why his symptoms resolve by drinking water. However I did again encourage he try drinking a full glass of water with every meal to see if this can prevent the development of his pain.   Follow up: with BIENVENIDO Hopson CNP in 4 weeks.       Since his last visit, Hugo Weber reports:  -His pain is about the same as it was at last visit, remains variable and difficult to predict.  -He does report today an ache in his lower left back. He continues gabapentin 200mg but decreased to BID instead of TID as he forgets the midday dose. He continues to reports some pain benefit from gabapentin. HE didn't notice a difference from TID to BID.   -He continues to report benefit with drinking a glass of water. He tried drinking a glass of water with each meal as directed at last visit but didn't notice a difference so stopped.       HPI per Dr. Burgos 6/11/2021  HTN, Hx of Hep C, GERD, afib on anticoagulation, morbid obesity, hx of gastric bypass, hx of appendectomy who presents for evaluation of intermittent LLQ abdominal pain.   The patient's pain began 1 year ago without any particular precipitating event and has been Slowly becoming more prevalent since that time.  He reports that his pain is located primarily in LLQ at the ASIS area and does not radiate. Pain tends to be very focal in this area.  The patient describes the pain as deeper, dull and achy.  He reports that the pain is made worse by eating and tight pants or shorts (Draw String).  His pain is improved with drinking water and loosening his pants.  In addition, he reports no associated with the pain changes in position and timing of day.  The patient's pain is most severe after he eats.   He rates his average pain score at 7/10, but it can be as low as 0/10 or as severe as 7/10.   Pain is intermittent, unpredictable, and seems to be relieved with drinking water.  Pain intensity builds quickly and then fades quickly as well.  Lasts 30 min to 3 hours.       He denies any new problems with falls or balance, Denies any new numbness or weakness of the arms or legs, any new bowel or bladder incontinence, any night sweats or unexplained fevers, or any sudden or unexpected weight loss.     Pain Information:   Pain rating: averages 0/10 on a 0-10 scale.    Current pain medications:    Gabapentin 200mg TID- ?, SE, fatigue, leg weakness, taking BID    Current MME: 0    Review of [place] Prescription Monitoring Program (): No concern for abuse or misuse of controlled medications based on this report.     Annual Controlled Substance Agreement/UDS due date: NA     Previous medication treatments included:  Anti-convulsants: none  Muscle relaxors: None  Anti-depressants: None  Acetaminophen/NSAIDs: Occasional for other reasons   Topicals: none     Other treatments have included:  Physical therapy: Not for this   Pain Psychology: No  Chiropractic: No  Acupuncture: No  TENs Unit: No  Injections: None for this reason  Surgeries: Gastric bypass, Left TKA, Appendectomy,         Medications:  Current Outpatient Medications   Medication Sig Dispense Refill     apixaban ANTICOAGULANT (ELIQUIS) 5 MG tablet Take 1 tablet (5 mg) by mouth 2 times daily 180 tablet 1     Cyanocobalamin 5000 MCG TBDP Take by mouth daily        diltiazem ER (TIAZAC) 240 MG 24 hr ER beaded capsule Take 1 capsule (240 mg) by mouth daily 90 capsule 3     Ferrous Sulfate (IRON) 325 (65 Fe) MG tablet Take 1 tablet by mouth every other day  90 tablet 3     fluticasone (FLONASE) 50 MCG/ACT nasal spray Spray 2 sprays into both nostrils daily as needed for rhinitis or allergies 54.6 mL 3     gabapentin (NEURONTIN) 300 MG capsule Take 1 capsule (300 mg) by mouth 2 times daily 60 capsule 1     metoprolol succinate ER (TOPROL XL) 100 MG 24 hr tablet Take 1 tablet (100 mg) by mouth daily 90 tablet 3     niacin 500 MG tablet Take by mouth daily (with breakfast)       pantoprazole (PROTONIX) 40 MG EC tablet Take 1 tablet (40 mg) by mouth daily 90 tablet 3     tamsulosin (FLOMAX) 0.4 MG capsule Take 1 capsule (0.4 mg) by mouth 2 times daily 180 capsule 3     vitamin C (ASCORBIC ACID) 1000 MG TABS Take 1,000 mg by mouth daily         Medical History: any changes in medical history since they were last seen? No    Review of Systems: A 10-point review of systems was negative, with the exception of chronic pain issues..    Objective:    Physical Exam:  There were no vitals taken for this visit.  Constitutional: Well developed, well nourished, appears stated age. Obese.  HEENT: Head atraumatic, normocephalic. Eyes without conjunctival injection or jaundice. Oropharynx clear. Neck supple. No obvious neck masses.  Skin: No rash, lesions, or petechiae of exposed skin.   Psychiatric/mental status: Alert, without lethargy or stupor. Speech fluent. Appropriate affect. Mood normal. Able to follow commands without difficulty.         Again, thank you for allowing me to participate in the care of your patient.      Sincerely,    Carmita  BIENVENIDO Ma CNP

## 2022-02-22 NOTE — TELEPHONE ENCOUNTER
RN[facility] reviewed clinic note from visit 2/18/22, thoracic MRI orders placed.     Encounter routed to EMT to update pt and fax orders to preferred location.     Lynn Arriaga DNP, RN

## 2022-02-22 NOTE — TELEPHONE ENCOUNTER
EMT scheduled patient for 4/11/22 2 weeks after his MRI.       Patient reported needing orders for a open MRI.        Julio Matos, EMT

## 2022-02-22 NOTE — TELEPHONE ENCOUNTER
EMT called patient and informed them that their order has been faxed to [facility] in [place].      Julio Matos, EMT

## 2022-03-28 ENCOUNTER — TRANSFERRED RECORDS (OUTPATIENT)
Dept: HEALTH INFORMATION MANAGEMENT | Facility: CLINIC | Age: 77
End: 2022-03-28
Payer: COMMERCIAL

## 2022-04-08 ASSESSMENT — ENCOUNTER SYMPTOMS
BACK PAIN: 1
MYALGIAS: 1
NECK PAIN: 0
BOWEL INCONTINENCE: 0
RECTAL PAIN: 0
MUSCLE WEAKNESS: 0
MUSCLE CRAMPS: 0
BLOATING: 0
ARTHRALGIAS: 0
NAUSEA: 0
VOMITING: 0
JOINT SWELLING: 0
STIFFNESS: 0
HEARTBURN: 0
ABDOMINAL PAIN: 1
BLOOD IN STOOL: 0
CONSTIPATION: 0
JAUNDICE: 0
DIARRHEA: 0

## 2022-04-08 ASSESSMENT — ANXIETY QUESTIONNAIRES
5. BEING SO RESTLESS THAT IT IS HARD TO SIT STILL: NOT AT ALL
7. FEELING AFRAID AS IF SOMETHING AWFUL MIGHT HAPPEN: NOT AT ALL
2. NOT BEING ABLE TO STOP OR CONTROL WORRYING: NOT AT ALL
GAD7 TOTAL SCORE: 0
1. FEELING NERVOUS, ANXIOUS, OR ON EDGE: NOT AT ALL
GAD7 TOTAL SCORE: 0
6. BECOMING EASILY ANNOYED OR IRRITABLE: NOT AT ALL
3. WORRYING TOO MUCH ABOUT DIFFERENT THINGS: NOT AT ALL
4. TROUBLE RELAXING: NOT AT ALL
7. FEELING AFRAID AS IF SOMETHING AWFUL MIGHT HAPPEN: NOT AT ALL

## 2022-04-08 NOTE — PROGRESS NOTES
[facility] [place] [facility]     Date of visit: 4/11/2022      Assessment:   Hugo is a 76 year-old male with past medical history significant for gastric bypass, appendectomy, BPH, HTN, paroxysmal atrial fibrillation, NAFLD, HLD, nephrolithiasis, HAYLEE, OA, GERD, CVA who presents with complaints of chronic abdominal pain    1. Abdominal pain- etiology may be related to related to thoracic spinal stenosis, most significant at T11-12, moderate.     Visit Diagnoses:  1. Thoracic radiculopathy    2. Chronic abdominal pain        Plan:      Therapies:     Physical Therapy: Hugo is morbidly obese, he reports today he has poor dietary habits. We discussed working on gradually improving his diet, focusing on one meal at a time and eating more fruits and vegetables. I also encouraged he continue going to the gym three days a week.     Clinical Health Psychologist to address issues of relaxation, behavioral change, coping style, and other factors important to improvement: not at this time    Medication Management:   1.  Hugo is unsure of how much gabapentin helps. We discussed trying one more dose increase and he is interested. Increase gabapentin to 300mg in the morning and 600mg in the evening for 1 week. As tolerated, increase to 600mg twice daily. Call with issues.     Further procedures recommended: Hugo has moderate spinal canal stenosis at T11-12 which is the dermatome in which has pain. It is likely that his abdominal pain is due to this finding and a thoracic epidural steroid injection might be helpful. Of note, he does weigh 350lbs and the injection would likely be more difficult. He will discuss with his daughter who is a nurse and will let me know if interested.     Follow up: with BIENVENIDO Hopson CNP in 6 weeks.       Breana FARIA CNP  [facility]     -------------------------------------------------    Subjective:    Chief complaint:   Pain.      Interval  "history:  Hugo Weber is a 76 year old male last seen on 2/18/2022.  He is a patient of Dr. Burgos seen in follow up.     Recommendations/plan at the last visit included:  Therapies:   Physical Therapy: no, continue home exercise program as able.   Clinical Health Psychologist to address issues of relaxation, behavioral change, coping style, and other factors important to improvement: not at this time  Diagnostic Studies: I wonder if Hugo's abdominal pain is caused by his thoracic spine. I recommended a thoracic MRI for further evaluation. He has claustrophobia and needs an open MRI. He will let me know where he would like me to send this order.   Medication Management:   1.  Hugo often forgets midday dose of gabapentin. I advised he adjust dosing of gabapentin to 300mg BID. Monitor pain benefit. We can increase if tolerated.    Further procedures recommended: TBD. We may consider a thoracic epidural steroid injection if there is obvious nerve pinching on MRI.   Follow up: with BIENVENIDO Hopson CNP in person in [place] after MRI.     Since his last visit, Hugo Weber reports:  -His pain is somewhat worse than it was at last visit, remains variable and difficult to predict.  -He continues to report LLQ abdominal pain that is bothersome, describes the pain as \"tinging.\" He sometimes has left upper low back pain that radiates into LLQ. He especially notes his low back pain after sleeping.   -He reports again today that his pain can be related to eating, about 90% of the time. He continues to report benefit with drinking a glass of water.   -He continues gabapentin but adjusted his dose to 300mg BID, unsure of how much this helps.  -He had a thoracic MRI completed per my recommendation. He wonders what steps might be recommended next.   -He is in physical therapy for left torn rotator cuff. He has been taking Tylenol PM with some relief.      HPI per Dr. Burgos 6/11/2021  HTN, Hx of Hep C, " GERD, afib on anticoagulation, morbid obesity, hx of gastric bypass, hx of appendectomy who presents for evaluation of intermittent LLQ abdominal pain.  The patient's pain began 1 year ago without any particular precipitating event and has been Slowly becoming more prevalent since that time.  He reports that his pain is located primarily in LLQ at the ASIS area and does not radiate. Pain tends to be very focal in this area.  The patient describes the pain as deeper, dull and achy.  He reports that the pain is made worse by eating and tight pants or shorts (Draw String).  His pain is improved with drinking water and loosening his pants.  In addition, he reports no associated with the pain changes in position and timing of day.  The patient's pain is most severe after he eats.   He rates his average pain score at 7/10, but it can be as low as 0/10 or as severe as 7/10.   Pain is intermittent, unpredictable, and seems to be relieved with drinking water.  Pain intensity builds quickly and then fades quickly as well.  Lasts 30 min to 3 hours.       He denies any new problems with falls or balance, Denies any new numbness or weakness of the arms or legs, any new bowel or bladder incontinence, any night sweats or unexplained fevers, or any sudden or unexpected weight loss.     Pain Information:   Pain rating: averages 1/10 on a 0-10 scale.    Current pain medications:    Gabapentin 300mg BID- ?/HI    Current MME: 0    Review of [place] Prescription Monitoring Program (): No concern for abuse or misuse of controlled medications based on this report.     Annual Controlled Substance Agreement/UDS due date: NA     Previous medication treatments included:  Anti-convulsants: none  Muscle relaxors: None  Anti-depressants: None  Acetaminophen/NSAIDs: Occasional for other reasons   Topicals: none     Other treatments have included:  Physical therapy: Not for this   Pain Psychology: No  Chiropractic: No  Acupuncture: No  TENs  Unit: No  Injections: None for this reason  Surgeries: Gastric bypass, Left TKA, Appendectomy,        Medications:  Current Outpatient Medications   Medication Sig Dispense Refill     apixaban ANTICOAGULANT (ELIQUIS) 5 MG tablet Take 1 tablet (5 mg) by mouth 2 times daily 180 tablet 1     cholecalciferol 25 MCG (1000 UT) TABS taken during winter only       Cyanocobalamin 5000 MCG TBDP Take by mouth daily        diltiazem ER (DILT-XR) 180 MG 24 hr capsule Take 2 capsules by mouth every 24 hours       diltiazem ER (TIAZAC) 240 MG 24 hr ER beaded capsule Take 1 capsule (240 mg) by mouth daily 90 capsule 3     Ferrous Sulfate (IRON) 325 (65 Fe) MG tablet Take 1 tablet by mouth every other day  90 tablet 3     fluticasone (FLONASE) 50 MCG/ACT nasal spray Spray 2 sprays into both nostrils daily as needed for rhinitis or allergies 54.6 mL 3     FLUZONE HIGH-DOSE QUADRIVALENT 0.7 ML ARLEEN injection        gabapentin (NEURONTIN) 300 MG capsule Take 2 capsules (600 mg) by mouth in the morning and 2 capsules (600 mg) in the evening. 120 capsule 1     metoprolol succinate ER (TOPROL XL) 100 MG 24 hr tablet Take 1 tablet (100 mg) by mouth daily 90 tablet 3     niacin 500 MG tablet Take by mouth daily (with breakfast)       pantoprazole (PROTONIX) 40 MG EC tablet Take 1 tablet (40 mg) by mouth daily 90 tablet 3     tamsulosin (FLOMAX) 0.4 MG capsule Take 1 capsule (0.4 mg) by mouth 2 times daily 180 capsule 3     vitamin B-12 (CYANOCOBALAMIN) 1000 MCG tablet Take by mouth every 24 hours       vitamin C (ASCORBIC ACID) 1000 MG TABS Take 1,000 mg by mouth daily       Vitamin D-Vitamin K (K2 PLUS D3) 100-1000 MCG-UNIT TABS          Medical History: any changes in medical history since they were last seen? No    Review of Systems: A 10-point review of systems was negative, with the exception of chronic pain issues..    Objective:    Imaging:  MRI of thoracic spine was completed on 3/28/2022 and shows:        Physical Exam:  Blood  pressure 108/57, pulse 55, SpO2 97 %.  Constitutional: Well developed, well nourished, appears stated age. Morbidly obese.  HEENT: Head atraumatic, normocephalic. Eyes without conjunctival injection or jaundice. Oropharynx clear. Neck supple. No obvious neck masses.  Skin: No rash, lesions, or petechiae of exposed skin.   Psychiatric/mental status: Alert, without lethargy or stupor. Speech fluent. Appropriate affect. Mood normal. Able to follow commands without difficulty.       BILLING TIME DOCUMENTATION:   The total TIME spent on this patient on the date of the encounter/appointment was 31 minutes.      TOTAL TIME includes:   Time spent preparing to see the patient (reviewing records and tests)   Time spent face to face (or over the phone) with the patient   Time spent ordering tests, medications, procedures and referrals   Time spent Referring and communicating with other healthcare professionals   Time spent documenting clinical information in Epic     Answers for HPI/ROS submitted by the patient on 4/8/2022  MARSHALL 7 TOTAL SCORE: 0  General Symptoms: No  Skin Symptoms: No  HENT Symptoms: No  EYE SYMPTOMS: No  HEART SYMPTOMS: No  LUNG SYMPTOMS: No  INTESTINAL SYMPTOMS: Yes  URINARY SYMPTOMS: No  REPRODUCTIVE SYMPTOMS: No  SKELETAL SYMPTOMS: Yes  BLOOD SYMPTOMS: No  NERVOUS SYSTEM SYMPTOMS: No  MENTAL HEALTH SYMPTOMS: No  Heart burn or indigestion: No  Nausea: No  Vomiting: No  Abdominal pain: Yes  Bloating: No  Constipation: No  Diarrhea: No  Blood in stool: No  Black stools: No  Rectal or Anal pain: No  Fecal incontinence: No  Yellowing of skin or eyes: No  Vomit with blood: No  Change in stools: No  Back pain: Yes  Muscle aches: Yes  Neck pain: No  Swollen joints: No  Joint pain: No  Bone pain: No  Muscle cramps: No  Muscle weakness: No  Joint stiffness: No  Bone fracture: No

## 2022-04-09 ASSESSMENT — ANXIETY QUESTIONNAIRES: GAD7 TOTAL SCORE: 0

## 2022-04-11 ENCOUNTER — OFFICE VISIT (OUTPATIENT)
Dept: ANESTHESIOLOGY | Facility: CLINIC | Age: 77
End: 2022-04-11
Payer: COMMERCIAL

## 2022-04-11 VITALS — HEART RATE: 55 BPM | OXYGEN SATURATION: 97 % | SYSTOLIC BLOOD PRESSURE: 108 MMHG | DIASTOLIC BLOOD PRESSURE: 57 MMHG

## 2022-04-11 DIAGNOSIS — R10.9 CHRONIC ABDOMINAL PAIN: ICD-10-CM

## 2022-04-11 DIAGNOSIS — M54.14 THORACIC RADICULOPATHY: Primary | ICD-10-CM

## 2022-04-11 DIAGNOSIS — G89.29 CHRONIC ABDOMINAL PAIN: ICD-10-CM

## 2022-04-11 PROCEDURE — 99214 OFFICE O/P EST MOD 30 MIN: CPT | Performed by: NURSE PRACTITIONER

## 2022-04-11 RX ORDER — INFLUENZA A VIRUS A/MICHIGAN/45/2015 X-275 (H1N1) ANTIGEN (FORMALDEHYDE INACTIVATED), INFLUENZA A VIRUS A/SINGAPORE/INFIMH-16-0019/2016 IVR-186 (H3N2) ANTIGEN (FORMALDEHYDE INACTIVATED), INFLUENZA B VIRUS B/PHUKET/3073/2013 ANTIGEN (FORMALDEHYDE INACTIVATED), AND INFLUENZA B VIRUS B/MARYLAND/15/2016 BX-69A ANTIGEN (FORMALDEHYDE INACTIVATED) 60; 60; 60; 60 UG/.7ML; UG/.7ML; UG/.7ML; UG/.7ML
INJECTION, SUSPENSION INTRAMUSCULAR
COMMUNITY
Start: 2021-11-08 | End: 2022-07-18

## 2022-04-11 RX ORDER — GABAPENTIN 300 MG/1
600 CAPSULE ORAL 2 TIMES DAILY
Qty: 120 CAPSULE | Refills: 1 | Status: SHIPPED | OUTPATIENT
Start: 2022-04-11 | End: 2022-07-05

## 2022-04-11 RX ORDER — CEPHRADINE 500 MG
1 CAPSULE ORAL DAILY
COMMUNITY
Start: 2020-12-11 | End: 2023-08-24

## 2022-04-11 RX ORDER — GABAPENTIN 300 MG/1
600 CAPSULE ORAL 2 TIMES DAILY
Qty: 120 CAPSULE | Refills: 1 | Status: SHIPPED | OUTPATIENT
Start: 2022-04-11 | End: 2022-04-11

## 2022-04-11 RX ORDER — DILTIAZEM HYDROCHLORIDE 180 MG/1
2 CAPSULE, EXTENDED RELEASE ORAL EVERY 24 HOURS
COMMUNITY
Start: 2020-12-11 | End: 2022-07-18

## 2022-04-11 ASSESSMENT — PAIN SCALES - GENERAL: PAINLEVEL: NO PAIN (1)

## 2022-04-11 NOTE — LETTER
4/11/2022       RE: Hugo Weber  [address]  [facility] [address]     Dear Colleague,    Thank you for referring your patient, Hugo Weber, to the [facility] [place] at [facility]. Please see a copy of my visit note below.      [facility] Pain Management     Date of visit: 4/11/2022      Assessment:   Hugo is a 76 year-old male with past medical history significant for gastric bypass, appendectomy, BPH, HTN, paroxysmal atrial fibrillation, NAFLD, HLD, nephrolithiasis, HAYLEE, OA, GERD, CVA who presents with complaints of chronic abdominal pain    1. Abdominal pain- etiology may be related to related to thoracic spinal stenosis, most significant at T11-12, moderate.     Visit Diagnoses:  1. Thoracic radiculopathy    2. Chronic abdominal pain        Plan:      Therapies:     Physical Therapy: Hugo is morbidly obese, he reports today he has poor dietary habits. We discussed working on gradually improving his diet, focusing on one meal at a time and eating more fruits and vegetables. I also encouraged he continue going to the gym three days a week.     Clinical Health Psychologist to address issues of relaxation, behavioral change, coping style, and other factors important to improvement: not at this time    Medication Management:   1.  Hugo is unsure of how much gabapentin helps. We discussed trying one more dose increase and he is interested. Increase gabapentin to 300mg in the morning and 600mg in the evening for 1 week. As tolerated, increase to 600mg twice daily. Call with issues.     Further procedures recommended: Hugo has moderate spinal canal stenosis at T11-12 which is the dermatome in which has pain. It is likely that his abdominal pain is due to this finding and a thoracic epidural steroid injection might be helpful. Of note, he does weigh 350lbs and the injection would likely be more  "difficult. He will discuss with his daughter who is a nurse and will let me know if interested.     Follow up: with BIENVENIDO Hopson CNP in 6 weeks.       Breana FARIA CNP  [facility] Pain Management     -------------------------------------------------    Subjective:    Chief complaint:   Pain.      Interval history:  Hugo Weber is a 76 year old male last seen on 2/18/2022.  He is a patient of Dr. Burgos seen in follow up.     Recommendations/plan at the last visit included:  Therapies:   Physical Therapy: no, continue home exercise program as able.   Clinical Health Psychologist to address issues of relaxation, behavioral change, coping style, and other factors important to improvement: not at this time  Diagnostic Studies: I wonder if Hugo's abdominal pain is caused by his thoracic spine. I recommended a thoracic MRI for further evaluation. He has claustrophobia and needs an open MRI. He will let me know where he would like me to send this order.   Medication Management:   1.  Hugo often forgets midday dose of gabapentin. I advised he adjust dosing of gabapentin to 300mg BID. Monitor pain benefit. We can increase if tolerated.    Further procedures recommended: TBD. We may consider a thoracic epidural steroid injection if there is obvious nerve pinching on MRI.   Follow up: with BIENVENIDO Hopson CNP in person in [place] after MRI.     Since his last visit, Hugo Weber reports:  -His pain is somewhat worse than it was at last visit, remains variable and difficult to predict.  -He continues to report LLQ abdominal pain that is bothersome, describes the pain as \"tinging.\" He sometimes has left upper low back pain that radiates into LLQ. He especially notes his low back pain after sleeping.   -He reports again today that his pain can be related to eating, about 90% of the time. He continues to report benefit with drinking a glass of water.   -He continues gabapentin but " adjusted his dose to 300mg BID, unsure of how much this helps.  -He had a thoracic MRI completed per my recommendation. He wonders what steps might be recommended next.   -He is in physical therapy for left torn rotator cuff. He has been taking Tylenol PM with some relief.      HPI per Dr. Burgos 6/11/2021  HTN, Hx of Hep C, GERD, afib on anticoagulation, morbid obesity, hx of gastric bypass, hx of appendectomy who presents for evaluation of intermittent LLQ abdominal pain.  The patient's pain began 1 year ago without any particular precipitating event and has been Slowly becoming more prevalent since that time.  He reports that his pain is located primarily in LLQ at the ASIS area and does not radiate. Pain tends to be very focal in this area.  The patient describes the pain as deeper, dull and achy.  He reports that the pain is made worse by eating and tight pants or shorts (Draw String).  His pain is improved with drinking water and loosening his pants.  In addition, he reports no associated with the pain changes in position and timing of day.  The patient's pain is most severe after he eats.   He rates his average pain score at 7/10, but it can be as low as 0/10 or as severe as 7/10.   Pain is intermittent, unpredictable, and seems to be relieved with drinking water.  Pain intensity builds quickly and then fades quickly as well.  Lasts 30 min to 3 hours.       He denies any new problems with falls or balance, Denies any new numbness or weakness of the arms or legs, any new bowel or bladder incontinence, any night sweats or unexplained fevers, or any sudden or unexpected weight loss.     Pain Information:   Pain rating: averages 1/10 on a 0-10 scale.    Current pain medications:    Gabapentin 300mg BID- ?/HI    Current MME: 0    Review of [place] Prescription Monitoring Program (): No concern for abuse or misuse of controlled medications based on this report.     Annual Controlled Substance Agreement/UDS  due date: NA     Previous medication treatments included:  Anti-convulsants: none  Muscle relaxors: None  Anti-depressants: None  Acetaminophen/NSAIDs: Occasional for other reasons   Topicals: none     Other treatments have included:  Physical therapy: Not for this   Pain Psychology: No  Chiropractic: No  Acupuncture: No  TENs Unit: No  Injections: None for this reason  Surgeries: Gastric bypass, Left TKA, Appendectomy,        Medications:  Current Outpatient Medications   Medication Sig Dispense Refill     apixaban ANTICOAGULANT (ELIQUIS) 5 MG tablet Take 1 tablet (5 mg) by mouth 2 times daily 180 tablet 1     cholecalciferol 25 MCG (1000 UT) TABS taken during winter only       Cyanocobalamin 5000 MCG TBDP Take by mouth daily        diltiazem ER (DILT-XR) 180 MG 24 hr capsule Take 2 capsules by mouth every 24 hours       diltiazem ER (TIAZAC) 240 MG 24 hr ER beaded capsule Take 1 capsule (240 mg) by mouth daily 90 capsule 3     Ferrous Sulfate (IRON) 325 (65 Fe) MG tablet Take 1 tablet by mouth every other day  90 tablet 3     fluticasone (FLONASE) 50 MCG/ACT nasal spray Spray 2 sprays into both nostrils daily as needed for rhinitis or allergies 54.6 mL 3     FLUZONE HIGH-DOSE QUADRIVALENT 0.7 ML ARLEEN injection        gabapentin (NEURONTIN) 300 MG capsule Take 2 capsules (600 mg) by mouth in the morning and 2 capsules (600 mg) in the evening. 120 capsule 1     metoprolol succinate ER (TOPROL XL) 100 MG 24 hr tablet Take 1 tablet (100 mg) by mouth daily 90 tablet 3     niacin 500 MG tablet Take by mouth daily (with breakfast)       pantoprazole (PROTONIX) 40 MG EC tablet Take 1 tablet (40 mg) by mouth daily 90 tablet 3     tamsulosin (FLOMAX) 0.4 MG capsule Take 1 capsule (0.4 mg) by mouth 2 times daily 180 capsule 3     vitamin B-12 (CYANOCOBALAMIN) 1000 MCG tablet Take by mouth every 24 hours       vitamin C (ASCORBIC ACID) 1000 MG TABS Take 1,000 mg by mouth daily       Vitamin D-Vitamin K (K2 PLUS D3) 100-1000  MCG-UNIT TABS          Medical History: any changes in medical history since they were last seen? No    Review of Systems: A 10-point review of systems was negative, with the exception of chronic pain issues..    Objective:    Imaging:  MRI of thoracic spine was completed on 3/28/2022 and shows:        Physical Exam:  Blood pressure 108/57, pulse 55, SpO2 97 %.  Constitutional: Well developed, well nourished, appears stated age. Morbidly obese.  HEENT: Head atraumatic, normocephalic. Eyes without conjunctival injection or jaundice. Oropharynx clear. Neck supple. No obvious neck masses.  Skin: No rash, lesions, or petechiae of exposed skin.   Psychiatric/mental status: Alert, without lethargy or stupor. Speech fluent. Appropriate affect. Mood normal. Able to follow commands without difficulty.       BILLING TIME DOCUMENTATION:   The total TIME spent on this patient on the date of the encounter/appointment was 31 minutes.      TOTAL TIME includes:   Time spent preparing to see the patient (reviewing records and tests)   Time spent face to face (or over the phone) with the patient   Time spent ordering tests, medications, procedures and referrals   Time spent Referring and communicating with other healthcare professionals   Time spent documenting clinical information in Epic     Answers for HPI/ROS submitted by the patient on 4/8/2022  MARSHALL 7 TOTAL SCORE: 0  General Symptoms: No  Skin Symptoms: No  HENT Symptoms: No  EYE SYMPTOMS: No  HEART SYMPTOMS: No  LUNG SYMPTOMS: No  INTESTINAL SYMPTOMS: Yes  URINARY SYMPTOMS: No  REPRODUCTIVE SYMPTOMS: No  SKELETAL SYMPTOMS: Yes  BLOOD SYMPTOMS: No  NERVOUS SYSTEM SYMPTOMS: No  MENTAL HEALTH SYMPTOMS: No  Heart burn or indigestion: No  Nausea: No  Vomiting: No  Abdominal pain: Yes  Bloating: No  Constipation: No  Diarrhea: No  Blood in stool: No  Black stools: No  Rectal or Anal pain: No  Fecal incontinence: No  Yellowing of skin or eyes: No  Vomit with blood: No  Change in  stools: No  Back pain: Yes  Muscle aches: Yes  Neck pain: No  Swollen joints: No  Joint pain: No  Bone pain: No  Muscle cramps: No  Muscle weakness: No  Joint stiffness: No  Bone fracture: No        BIENVENIDO Arnold CNP

## 2022-04-11 NOTE — NURSING NOTE
Patient presents with:  RECHECK: UMP RETURN, RM 10, patient reports      No Pain (1)         What medications are you using for pain? gabapentin      (Return Patients only) What refills are you needing today? Maybe      Karie Oviedo, EMT

## 2022-04-11 NOTE — PATIENT INSTRUCTIONS
Therapies:   Physical Therapy: no, continue going to the gym three days a week.   Clinical Health Psychologist to address issues of relaxation, behavioral change, coping style, and other factors important to improvement: not at this time  Medication Management:    Increase gabapentin to 300mg in the morning and 600mg in the evening for 1 week. As tolerated, increase to 600mg twice daily. Call with issues.   Further procedures recommended: consider a thoracic epidural steroid injection. Let me know if interested.   Follow up: with BIENVENIDO Hopson CNP in 6 weeks.       Focus on eating well 80% of the time. I recommend choosing one meal to improve and substituting processed foods with more fruit and vegetables.

## 2022-05-13 DIAGNOSIS — N39.41 BENIGN PROSTATIC HYPERPLASIA (BPH) WITH URINARY URGE INCONTINENCE: ICD-10-CM

## 2022-05-13 DIAGNOSIS — N40.1 BENIGN PROSTATIC HYPERPLASIA (BPH) WITH URINARY URGE INCONTINENCE: ICD-10-CM

## 2022-05-16 RX ORDER — TAMSULOSIN HYDROCHLORIDE 0.4 MG/1
CAPSULE ORAL
Qty: 180 CAPSULE | Refills: 1 | Status: SHIPPED | OUTPATIENT
Start: 2022-05-16 | End: 2022-08-16

## 2022-06-20 ENCOUNTER — TRANSFERRED RECORDS (OUTPATIENT)
Dept: HEALTH INFORMATION MANAGEMENT | Facility: CLINIC | Age: 77
End: 2022-06-20

## 2022-06-20 DIAGNOSIS — I48.11 LONGSTANDING PERSISTENT ATRIAL FIBRILLATION (H): ICD-10-CM

## 2022-07-05 ENCOUNTER — TELEPHONE (OUTPATIENT)
Dept: ANESTHESIOLOGY | Facility: CLINIC | Age: 77
End: 2022-07-05

## 2022-07-05 DIAGNOSIS — M54.14 THORACIC RADICULOPATHY: ICD-10-CM

## 2022-07-05 DIAGNOSIS — R10.9 CHRONIC ABDOMINAL PAIN: ICD-10-CM

## 2022-07-05 DIAGNOSIS — G89.29 CHRONIC ABDOMINAL PAIN: ICD-10-CM

## 2022-07-05 RX ORDER — GABAPENTIN 300 MG/1
600 CAPSULE ORAL 2 TIMES DAILY
Qty: 120 CAPSULE | Refills: 1 | Status: SHIPPED | OUTPATIENT
Start: 2022-07-05 | End: 2022-07-15

## 2022-07-05 NOTE — TELEPHONE ENCOUNTER
[facility] Call Center    Phone Message    May a detailed message be left on voicemail: yes     Reason for Call: Medication Refill Request    Has the patient contacted the pharmacy for the refill? Yes   Name of medication being requested:gabapentin (NEURONTIN) 300 MG capsule  Provider who prescribed the medication: Snehal  Pharmacy:    [id number] - [place], [place] - [address]      Date medication is needed: As soon as possible patient only has a few left.        Action Taken: Message routed to:  [facility] ([facility]): pain    Travel Screening: Not Applicable

## 2022-07-06 ENCOUNTER — TRANSFERRED RECORDS (OUTPATIENT)
Dept: HEALTH INFORMATION MANAGEMENT | Facility: CLINIC | Age: 77
End: 2022-07-06

## 2022-07-06 ENCOUNTER — TELEPHONE (OUTPATIENT)
Dept: CARDIOLOGY | Facility: CLINIC | Age: 77
End: 2022-07-06

## 2022-07-06 NOTE — TELEPHONE ENCOUNTER
[facility] Call [facility]    Phone Message    May a detailed message be left on voicemail: yes     Reason for Call: Other: Patient called today looking to schedule an echo and a follow up. The echo order needs to be extended. Please call patient back to further coordinate, thank you.     Action Taken: Message routed to:  Other: Cardiology    Travel Screening: Not Applicable

## 2022-07-06 NOTE — TELEPHONE ENCOUNTER
Pt wanted to be seen after his echo prior to leaving for [facility] mid August.  Echo is scheduled on 7/26 and pt will see Dr Castillo on 8/4 at 1415. Phuong

## 2022-07-14 NOTE — PROGRESS NOTES
[facility]     Date of visit: 4/11/2022    Hugo is a 77 year old who is being evaluated via a billable telephone visit.      What phone number would you like to be contacted at? [phone]  How would you like to obtain your AVS? Mail a copy      Is Pt currently in [place]? Yes    NOTE:  If Pt is not in [place], Appointment needs to be canceled and rescheduled.        Assessment:   Hugo is a 77 year-old male with past medical history significant for gastric bypass, appendectomy, BPH, HTN, paroxysmal atrial fibrillation, NAFLD, HLD, nephrolithiasis, HAYLEE, OA, GERD, CVA who presents with complaints of chronic abdominal pain    1. Abdominal pain- etiology may be related to related to thoracic spinal stenosis, most significant at T11-12, moderate.     Visit Diagnoses:  1. Thoracic radiculopathy    2. Chronic abdominal pain        Plan:      Therapies:     Physical Therapy: continue physical therapy with [facility].     Clinical Health Psychologist to address issues of relaxation, behavioral change, coping style, and other factors important to improvement: not at this time    Medication Management:   1.  Gabapentin increase seems to have helped reduce intensity of pain. He is interested in TID dosing for better coverage and this is reasonable. Increase gabapentin to [phone] for 5-7 days. Then increase to 600mg three times daily. Call with issues.     Further procedures recommended: Hugo has moderate spinal canal stenosis at T11-12 which is the dermatome in which has pain. It is likely that his abdominal pain is due to this finding and a thoracic epidural steroid injection might be helpful. Of note, he does weigh 350lbs and the injection would likely be more difficult. He would like to first try a gabapentin dose increase prior to an injection. We will discuss a thoracic epidural steroid injection at next follow up visit.     Follow up: with BIENVENIDO Hopson CNP in 6  "weeks.       Breana FARIA CNP  [facility] Management     -------------------------------------------------    Subjective:    Chief complaint:   Pain.      Interval history:  Hugo Weber is a 77 year old male last seen on 4/11/2022.  He is a patient of Dr. Burgos seen in follow up.     Recommendations/plan at the last visit included:    Therapies:     Physical Therapy: Hugo is morbidly obese, he reports today he has poor dietary habits. We discussed working on gradually improving his diet, focusing on one meal at a time and eating more fruits and vegetables. I also encouraged he continue going to the gym three days a week.     Clinical Health Psychologist to address issues of relaxation, behavioral change, coping style, and other factors important to improvement: not at this time    Medication Management:   2.  Hugo is unsure of how much gabapentin helps. We discussed trying one more dose increase and he is interested. Increase gabapentin to 300mg in the morning and 600mg in the evening for 1 week. As tolerated, increase to 600mg twice daily. Call with issues.     Further procedures recommended: Hugo has moderate spinal canal stenosis at T11-12 which is the dermatome in which has pain. It is likely that his abdominal pain is due to this finding and a thoracic epidural steroid injection might be helpful. Of note, he does weigh 350lbs and the injection would likely be more difficult. He will discuss with his daughter who is a nurse and will let me know if interested.     Follow up: with BIENVENIDO Hopson CNP in 6 weeks.     Since his last visit, Hugo Weber reports:  -His pain is somewhat worse than it was at last visit, continues to report his pain is variable.  -He increased gabapentin as directed to 600mg BID. He denies a significant impact on his pain but notes he has a hard time evaluating the benefit, \"I pretty sure it made a difference it wasn't as severe.\" He is " interested in adjusting to TID dosing to see if he has better coverage of his pain.   -He did have a couple of days where he forgot to take his gabapentin and had a significant increase in pain with this, knows this medication is helping.  -He discussed an epidural steroid injection with his daughter, she recommended he try adjusting dose of gabapentin first.   -He has a long standing hx of low back pain. States his pain typically radiates into RLE but recently into LLE. His chiropractor tried to adjust him with limited benefit. He went to see [facility]. He completed a steroid dose pack with limited benefit. He had a lumbar MRI completed and is starting physical therapy. He may be getting an epidural steroid injection.   -Of note, his daughter is moving into his house as she is buying his home from him. He has a lot going on with them moving in. He will be going on a [place] cruise at the end of the August.       HPI per Dr. Burgos 6/11/2021  HTN, Hx of Hep C, GERD, afib on anticoagulation, morbid obesity, hx of gastric bypass, hx of appendectomy who presents for evaluation of intermittent LLQ abdominal pain.  The patient's pain began 1 year ago without any particular precipitating event and has been Slowly becoming more prevalent since that time.  He reports that his pain is located primarily in LLQ at the ASIS area and does not radiate. Pain tends to be very focal in this area.  The patient describes the pain as deeper, dull and achy.  He reports that the pain is made worse by eating and tight pants or shorts (Draw String).  His pain is improved with drinking water and loosening his pants.  In addition, he reports no associated with the pain changes in position and timing of day.  The patient's pain is most severe after he eats.   He rates his average pain score at 7/10, but it can be as low as 0/10 or as severe as 7/10.   Pain is intermittent, unpredictable, and seems to be relieved with drinking  water.  Pain intensity builds quickly and then fades quickly as well.  Lasts 30 min to 3 hours.       He denies any new problems with falls or balance, Denies any new numbness or weakness of the arms or legs, any new bowel or bladder incontinence, any night sweats or unexplained fevers, or any sudden or unexpected weight loss.     Pain Information:   Pain rating: averages 1/10 on a 0-10 scale.    Current pain medications:    Gabapentin 300mg BID- ?/HI    Current MME: 0    Review of [place] Prescription Monitoring Program (): No concern for abuse or misuse of controlled medications based on this report.     Annual Controlled Substance Agreement/UDS due date: NA     Previous medication treatments included:  Anti-convulsants: none  Muscle relaxors: None  Anti-depressants: None  Acetaminophen/NSAIDs: Occasional for other reasons   Topicals: none     Other treatments have included:  Physical therapy: Not for this   Pain Psychology: No  Chiropractic: No  Acupuncture: No  TENs Unit: No  Injections: None for this reason  Surgeries: Gastric bypass, Left TKA, Appendectomy,        Medications:  Current Outpatient Medications   Medication Sig Dispense Refill     apixaban ANTICOAGULANT (ELIQUIS) 5 MG tablet Take 1 tablet (5 mg) by mouth 2 times daily Appointment needed for additional refills. Please call [phone] to schedule. 180 tablet 0     cholecalciferol 25 MCG (1000 UT) TABS taken during winter only       Cyanocobalamin 5000 MCG TBDP Take by mouth daily        diltiazem ER (DILT-XR) 180 MG 24 hr capsule Take 2 capsules by mouth every 24 hours       diltiazem ER (TIAZAC) 240 MG 24 hr ER beaded capsule Take 1 capsule (240 mg) by mouth daily 90 capsule 3     Ferrous Sulfate (IRON) 325 (65 Fe) MG tablet Take 1 tablet by mouth every other day  90 tablet 3     fluticasone (FLONASE) 50 MCG/ACT nasal spray Spray 2 sprays into both nostrils daily as needed for rhinitis or allergies 54.6 mL 3     FLUZONE HIGH-DOSE  QUADRIVALENT 0.7 ML ARLEEN injection        gabapentin (NEURONTIN) 300 MG capsule Take 2 capsules (600 mg) by mouth 3 times daily 180 capsule 1     metoprolol succinate ER (TOPROL XL) 100 MG 24 hr tablet Take 1 tablet (100 mg) by mouth daily 90 tablet 3     niacin 500 MG tablet Take by mouth daily (with breakfast)       pantoprazole (PROTONIX) 40 MG EC tablet TAKE 1 TABLET DAILY 90 tablet 1     tamsulosin (FLOMAX) 0.4 MG capsule TAKE 1 CAPSULE TWICE A  capsule 1     vitamin B-12 (CYANOCOBALAMIN) 1000 MCG tablet Take by mouth every 24 hours       vitamin C (ASCORBIC ACID) 1000 MG TABS Take 1,000 mg by mouth daily       Vitamin D-Vitamin K (K2 PLUS D3) 100-1000 MCG-UNIT TABS          Medical History: any changes in medical history since they were last seen? No    Review of Systems: A 10-point review of systems was negative, with the exception of chronic pain issues..    Objective:    Imaging:  MRI of thoracic spine was completed on 3/28/2022 and shows:        Physical Exam:  There were no vitals taken for this visit.  Constitutional: Well developed, well nourished, appears stated age. Morbidly obese.  HEENT: Head atraumatic, normocephalic. Eyes without conjunctival injection or jaundice. Oropharynx clear. Neck supple. No obvious neck masses.  Skin: No rash, lesions, or petechiae of exposed skin.   Psychiatric/mental status: Alert, without lethargy or stupor. Speech fluent. Appropriate affect. Mood normal. Able to follow commands without difficulty.       BILLING TIME DOCUMENTATION:   The total TIME spent on this patient on the date of the encounter/appointment was 20 minutes.      TOTAL TIME includes:   Time spent preparing to see the patient (reviewing records and tests)   Time spent face to face (or over the phone) with the patient   Time spent ordering tests, medications, procedures and referrals   Time spent Referring and communicating with other healthcare professionals   Time spent documenting clinical  information in Epic     Phone call duration: 16 minutes

## 2022-07-15 ENCOUNTER — VIRTUAL VISIT (OUTPATIENT)
Dept: PALLIATIVE MEDICINE | Facility: CLINIC | Age: 77
End: 2022-07-15
Payer: COMMERCIAL

## 2022-07-15 DIAGNOSIS — G89.29 CHRONIC ABDOMINAL PAIN: ICD-10-CM

## 2022-07-15 DIAGNOSIS — M54.14 THORACIC RADICULOPATHY: ICD-10-CM

## 2022-07-15 DIAGNOSIS — R10.9 CHRONIC ABDOMINAL PAIN: ICD-10-CM

## 2022-07-15 PROCEDURE — 99442 PR PHYSICIAN TELEPHONE EVALUATION 11-20 MIN: CPT | Mod: 95 | Performed by: NURSE PRACTITIONER

## 2022-07-15 RX ORDER — GABAPENTIN 300 MG/1
600 CAPSULE ORAL 3 TIMES DAILY
Qty: 180 CAPSULE | Refills: 1 | Status: SHIPPED | OUTPATIENT
Start: 2022-07-15 | End: 2022-07-15

## 2022-07-15 RX ORDER — GABAPENTIN 300 MG/1
600 CAPSULE ORAL 3 TIMES DAILY
Qty: 180 CAPSULE | Refills: 1 | Status: SHIPPED | OUTPATIENT
Start: 2022-07-15 | End: 2022-12-19

## 2022-07-15 ASSESSMENT — PAIN SCALES - GENERAL: PAINLEVEL: NO PAIN (1)

## 2022-07-15 NOTE — PATIENT INSTRUCTIONS
Therapies:   Physical Therapy: continue physical therapy with [facility].   Clinical Health Psychologist to address issues of relaxation, behavioral change, coping style, and other factors important to improvement: not at this time  Medication Management:    Increase gabapentin to [phone] for 5-7 days. Then increase to 600mg three times daily. Call with issues.   Further procedures recommended: lets discuss a thoracic epidural steroid injection at next follow up visit.   Follow up: with BIENVENIDO Hopson CNP in 6 weeks.

## 2022-07-18 ENCOUNTER — OFFICE VISIT (OUTPATIENT)
Dept: FAMILY MEDICINE | Facility: CLINIC | Age: 77
End: 2022-07-18
Payer: COMMERCIAL

## 2022-07-18 VITALS
BODY MASS INDEX: 44.1 KG/M2 | WEIGHT: 315 LBS | OXYGEN SATURATION: 96 % | DIASTOLIC BLOOD PRESSURE: 70 MMHG | SYSTOLIC BLOOD PRESSURE: 112 MMHG | HEIGHT: 71 IN | TEMPERATURE: 98.5 F | HEART RATE: 79 BPM

## 2022-07-18 DIAGNOSIS — R21 RASH: Primary | ICD-10-CM

## 2022-07-18 DIAGNOSIS — I77.810 THORACIC AORTIC ECTASIA (H): ICD-10-CM

## 2022-07-18 DIAGNOSIS — I48.0 PAROXYSMAL ATRIAL FIBRILLATION (H): ICD-10-CM

## 2022-07-18 DIAGNOSIS — E66.01 MORBID OBESITY (H): ICD-10-CM

## 2022-07-18 LAB
KOH PREPARATION: NORMAL
KOH PREPARATION: NORMAL

## 2022-07-18 PROCEDURE — 99213 OFFICE O/P EST LOW 20 MIN: CPT | Performed by: FAMILY MEDICINE

## 2022-07-18 PROCEDURE — 87220 TISSUE EXAM FOR FUNGI: CPT | Performed by: FAMILY MEDICINE

## 2022-07-18 RX ORDER — METHYLPREDNISOLONE 4 MG
TABLET, DOSE PACK ORAL
COMMUNITY
Start: 2022-07-06 | End: 2022-07-18

## 2022-07-18 RX ORDER — FLUCONAZOLE 150 MG/1
150 TABLET ORAL WEEKLY
Qty: 4 TABLET | Refills: 0 | Status: SHIPPED | OUTPATIENT
Start: 2022-07-18 | End: 2023-01-24

## 2022-07-18 RX ORDER — BETAMETHASONE DIPROPIONATE 0.05 %
OINTMENT (GRAM) TOPICAL 2 TIMES DAILY
Qty: 50 G | Refills: 1 | Status: ON HOLD | OUTPATIENT
Start: 2022-07-18 | End: 2023-03-07

## 2022-07-18 NOTE — PROGRESS NOTES
"  Assessment & Plan   Rash  Despite negative KOH the rash looks somewhat fungal.  Will treat with oral antifungal for 1 month as well as topical steroid ointment.  Also recommended cleaning shoes  - KOH prep (skin, hair or nails only)  - fluconazole (DIFLUCAN) 150 MG tablet  Dispense: 4 tablet; Refill: 0  - betamethasone dipropionate (DIPROSONE) 0.05 % external ointment  Dispense: 50 g; Refill: 1    Thoracic aortic ectasia (H)  Prior history and stable without symptoms.    Paroxysmal atrial fibrillation (H)  Rate controlled, continue medications    Morbid obesity (H)  Healthy diet and exercise    BMI:   Estimated body mass index is 48.1 kg/m  as calculated from the following:    Height as of this encounter: 1.791 m (5' 10.5\").    Weight as of this encounter: 154.2 kg (340 lb).   Weight management plan: Discussed healthy diet and exercise guidelines      Return in about 2 weeks (around 8/1/2022) for symptoms failing to improve or sooner if worsening.      Timur Dimas MD      [address] [address]  [email or web address]   Office: [phone]       Malick Arias is a 77 year old, presenting for the following health issues:  Rash    Rash  Associated symptoms include a rash.   History of Present Illness       Reason for visit:  Rash on left foot  Symptom onset:  1-2 weeks ago  Symptom intensity:  Moderate  Symptom progression:  Worsening  Had these symptoms before:  No  What makes it worse:  No  What makes it better:  No    He eats 2-3 servings of fruits and vegetables daily.He consumes 1 sweetened beverage(s) daily.He exercises with enough effort to increase his heart rate 60 or more minutes per day.  He exercises with enough effort to increase his heart rate 3 or less days per week.   He is taking medications regularly.     Rash  Onset/Duration: x 1-2 weeks ago  Description  Location: Bilateral Feet, started on Left foot  Character: round, blotchy, raised, " "flakey, red  Itching: mild  Intensity:  mild  Progression of Symptoms:  worsening and constant  Accompanying signs and symptoms:   Fever: No  Body aches or joint pain: No  Sore throat symptoms: No  Recent cold symptoms: No  History:           Previous episodes of similar rash: None  New exposures:  medication methylPREDNISolone (MEDROL DOSEPAK) 4 MG tablet therapy pack was recently taken for his back pain.   Recent travel: YES- [place] 6/30/22-7/2/22  Exposure to similar rash: No  Precipitating or alleviating factors: none  Therapies tried and outcome: Patient stated he tried a old prescription of a Cream that made it worse, but does not know the name of the cream.       Review of Systems   Skin: Positive for rash.          Objective    /70 (BP Location: Left arm, Patient Position: Sitting, Cuff Size: Adult Large)   Pulse 79   Temp 98.5  F (36.9  C) (Tympanic)   Ht 1.791 m (5' 10.5\")   Wt (!) 154.2 kg (340 lb)   SpO2 96%   BMI 48.10 kg/m    Body mass index is 48.1 kg/m .  Physical Exam   GENERAL: healthy, alert and no distress  NECK: no adenopathy, no asymmetry, masses, or scars and thyroid normal to palpation  RESP: lungs clear to auscultation - no rales, rhonchi or wheezes  CV: regular rate and rhythm, normal S1 S2, no S3 or S4, no murmur, click or rub, no peripheral edema and peripheral pulses strong  MS: no gross musculoskeletal defects noted, no edema  SKIN: -Pink slight raised rash across the dorsum of left foot and nonhealing on the toes and on right ankle has a similar rash and less on the dorsum of the right foot.  Otherwise no suspicious lesions or rashes  NEURO: Normal strength and tone, mentation intact and speech normal  BACK: no CVA tenderness, no paralumbar tenderness.    Results for orders placed or performed in visit on 07/18/22   KOH prep (skin, hair or nails only)     Status: None    Specimen: Foot, Right; Skin   Result Value Ref Range    KOH Preparation No fungal elements seen     " KOH Preparation Reference Range: No fungal elements seen.                  .  ..

## 2022-07-22 ENCOUNTER — TELEPHONE (OUTPATIENT)
Dept: CARDIOLOGY | Facility: CLINIC | Age: 77
End: 2022-07-22

## 2022-07-22 DIAGNOSIS — I48.19 PERSISTENT ATRIAL FIBRILLATION (H): ICD-10-CM

## 2022-07-22 DIAGNOSIS — I47.29 NSVT (NONSUSTAINED VENTRICULAR TACHYCARDIA) (H): ICD-10-CM

## 2022-07-22 RX ORDER — DILTIAZEM HYDROCHLORIDE 240 MG/1
240 CAPSULE, EXTENDED RELEASE ORAL EVERY EVENING
Qty: 90 CAPSULE | Refills: 3
Start: 2022-07-22 | End: 2022-08-04

## 2022-07-22 RX ORDER — METOPROLOL SUCCINATE 100 MG/1
TABLET, EXTENDED RELEASE ORAL
Qty: 45 TABLET | Refills: 1
Start: 2022-07-22 | End: 2022-08-04

## 2022-07-22 NOTE — TELEPHONE ENCOUNTER
1. Pls have him send strips of bradycardia.  2. At this point, OK to reduce metoprolol XL to 50 mg daily to see if that makes a difference. Pls update Epic med list    3. Make sure taking Diltiazem and (now lower dose) metoprolol XL split up ... one in AM and one in PM    4. To ER if severe or any syncope    Giacomo Rodriguez

## 2022-07-22 NOTE — TELEPHONE ENCOUNTER
7/22/22 Called pt and explained recommendations  Pt states he does not know how to record and EKG on his Apple Watch. He will have his daughter who is an RN help him. She is out of town for the weekend but he will see her next week and they will try. Sent pt instructions via [employer]     He will cut current Toprol 100 mg tabs in half and change the time to take in the am. He will continue to take Diltiazem in the pm    Pt voiced understanding and agreement with plan.   Betito 1254 pm

## 2022-07-22 NOTE — TELEPHONE ENCOUNTER
7/22/22 Pt called pt he has noted on his Apple Watch for the past 10 days he has recd alerts that his HR has reading of <40 bpm over 10 minutes in duration. This happens 4-5 times that he has noticed. He has stated he feels lightheadedness occasionally with position changes.  He verifies he is taking   Diltiazem  mg every evening and Torpol  mg daily in addition to Eliquis  5 mg BID.  Encouraged pt to change positions slowly and to keep himself hydrated. Informed him Dr Castillo is out of the office until 7/28. Will have Jennifer Shabazz PA-C review and call pt back w recommendations.  Pt voiced understanding and agreement with plan.   Betito 1135 am

## 2022-07-26 ENCOUNTER — HOSPITAL ENCOUNTER (OUTPATIENT)
Dept: CARDIOLOGY | Facility: CLINIC | Age: 77
Discharge: HOME | End: 2022-07-26
Attending: INTERNAL MEDICINE | Admitting: INTERNAL MEDICINE
Payer: COMMERCIAL

## 2022-07-26 DIAGNOSIS — I47.29 NSVT (NONSUSTAINED VENTRICULAR TACHYCARDIA) (H): ICD-10-CM

## 2022-07-26 DIAGNOSIS — I48.19 PERSISTENT ATRIAL FIBRILLATION (H): ICD-10-CM

## 2022-07-26 LAB — LVEF ECHO: NORMAL

## 2022-07-26 PROCEDURE — 255N000002 HC RX 255 OP 636: Performed by: INTERNAL MEDICINE

## 2022-07-26 PROCEDURE — 999N000208 ECHOCARDIOGRAM COMPLETE

## 2022-07-26 PROCEDURE — 93306 TTE W/DOPPLER COMPLETE: CPT | Mod: 26 | Performed by: INTERNAL MEDICINE

## 2022-07-26 RX ADMIN — HUMAN ALBUMIN MICROSPHERES AND PERFLUTREN 3 ML: 10; .22 INJECTION, SOLUTION INTRAVENOUS at 10:17

## 2022-08-04 ENCOUNTER — OFFICE VISIT (OUTPATIENT)
Dept: CARDIOLOGY | Facility: CLINIC | Age: 77
End: 2022-08-04
Payer: COMMERCIAL

## 2022-08-04 VITALS
SYSTOLIC BLOOD PRESSURE: 110 MMHG | OXYGEN SATURATION: 97 % | WEIGHT: 315 LBS | HEIGHT: 71 IN | HEART RATE: 66 BPM | DIASTOLIC BLOOD PRESSURE: 74 MMHG | BODY MASS INDEX: 44.1 KG/M2

## 2022-08-04 DIAGNOSIS — I48.19 PERSISTENT ATRIAL FIBRILLATION (H): ICD-10-CM

## 2022-08-04 DIAGNOSIS — I47.29 NSVT (NONSUSTAINED VENTRICULAR TACHYCARDIA) (H): ICD-10-CM

## 2022-08-04 PROCEDURE — 93000 ELECTROCARDIOGRAM COMPLETE: CPT | Performed by: INTERNAL MEDICINE

## 2022-08-04 PROCEDURE — 99214 OFFICE O/P EST MOD 30 MIN: CPT | Performed by: INTERNAL MEDICINE

## 2022-08-04 RX ORDER — LOSARTAN POTASSIUM 50 MG/1
50 TABLET ORAL DAILY
Qty: 90 TABLET | Refills: 3 | Status: SHIPPED | OUTPATIENT
Start: 2022-08-04 | End: 2022-08-16

## 2022-08-04 RX ORDER — METOPROLOL SUCCINATE 50 MG/1
50 TABLET, EXTENDED RELEASE ORAL DAILY
Qty: 180 TABLET | Refills: 3 | Status: SHIPPED | OUTPATIENT
Start: 2022-08-04 | End: 2022-08-16

## 2022-08-04 RX ORDER — DILTIAZEM HYDROCHLORIDE 180 MG/1
180 CAPSULE, COATED, EXTENDED RELEASE ORAL DAILY
Qty: 90 CAPSULE | Refills: 3 | Status: SHIPPED | OUTPATIENT
Start: 2022-08-04 | End: 2022-08-16

## 2022-08-04 NOTE — PROGRESS NOTES
Service Date: 2022    HISTORY OF PRESENT ILLNESS:  I saw Mr. Weber for followup of atrial fibrillation.  He is a 77-year-old white male who has been in chronic atrial fibrillation for years.  He is on rate control and anticoagulation.  Symptomatically, he has not had palpitation, chest pain, shortness of breath or fatigue.  Over the last year, he has not had hospitalization.    PHYSICAL EXAMINATION:    VITAL SIGNS:  Blood pressure was 110/74, heart rate 67 beats per minute, body weight 340 pounds.  HEENT:  The eyes and ENT were unremarkable.  LUNGS:  No crackles or wheezing.  CARDIOVASCULAR:  Rhythm was irregularly irregular.  Heart sounds were normal without murmur.  ABDOMEN:  Showed severe obesity.  EXTREMITIES:  He has bilateral leg edema.    Recent echocardiography showed enlarged left ventricle with end-systolic diameter at 6.0.  The aortic valve stenosis is considered mild.  However, the ejection fraction, seems to be reduced to about 35%-40%.  There was a technical difficulty for the image quality.    ASSESSMENT AND RECOMMENDATIONS:  Mr. Weber is doing well symptomatically.  The patient has had some dizziness recently, and the dose of metoprolol was reduced from 100 mg to 50 mg once a day.  He no longer has dizziness.    Based on his echo reading, he has had evidence of cardiomyopathy with moderate LV dysfunction.  The LV diameter is relatively large.  Based on the above, I have added losartan 50 mg once a day.  I changed his metoprolol back to 50 mg p.o. b.i.d. . In order to avoid dizziness in the future,the dosage of diltiazem is reduced from 240 mg to 180 mg once a day.  His next cardiology followup is scheduled for 1 year.    cc:    Brett Cassidy MD   [address] [address]     Sarah Beth Castillo MD        D: 2022   T: 2022   MT: ALYSIA    Name:     GAYATHRI WEBER  MRN:      [id number]        Account:      [id number]   :       1945           Service Date: 08/04/2022       Document: [id number]

## 2022-08-04 NOTE — LETTER
8/4/2022    Brett Cassidy MD  [address] [facility] [address]    RE: Hugo Weber       Dear Colleague,     I had the pleasure of seeing Hugo Weber in the [facility].  HPI and Plan:   See dictation      Orders Placed This Encounter   Procedures     Follow-Up with Cardiology- EP     EKG 12-lead complete w/read (Future)- to be scheduled     Echocardiogram Complete       Orders Placed This Encounter   Medications     metoprolol succinate ER (TOPROL XL) 50 MG 24 hr tablet     Sig: Take 1 tablet (50 mg) by mouth daily     Dispense:  180 tablet     Refill:  3     diltiazem ER COATED BEADS (CARDIZEM CD) 180 MG 24 hr capsule     Sig: Take 1 capsule (180 mg) by mouth daily     Dispense:  90 capsule     Refill:  3     losartan (COZAAR) 50 MG tablet     Sig: Take 1 tablet (50 mg) by mouth daily     Dispense:  90 tablet     Refill:  3       Medications Discontinued During This Encounter   Medication Reason     metoprolol succinate ER (TOPROL XL) 100 MG 24 hr tablet      diltiazem ER (TIAZAC) 240 MG 24 hr ER beaded capsule          Encounter Diagnoses   Name Primary?     NSVT (nonsustained ventricular tachycardia) (H)      Persistent atrial fibrillation (H)        CURRENT MEDICATIONS:  Current Outpatient Medications   Medication Sig Dispense Refill     apixaban ANTICOAGULANT (ELIQUIS) 5 MG tablet Take 1 tablet (5 mg) by mouth 2 times daily Appointment needed for additional refills. Please call [phone] to schedule. 180 tablet 0     betamethasone dipropionate (DIPROSONE) 0.05 % external ointment Apply topically 2 times daily 50 g 1     cholecalciferol 25 MCG (1000 UT) TABS taken during winter only       Cyanocobalamin 5000 MCG TBDP Take by mouth daily        diltiazem ER COATED BEADS (CARDIZEM CD) 180 MG 24 hr capsule Take 1 capsule (180 mg) by mouth daily 90 capsule 3     Ferrous Sulfate (IRON) 325 (65 Fe) MG tablet Take 1 tablet by mouth every other day  90 tablet 3     fluconazole  (DIFLUCAN) 150 MG tablet Take 1 tablet (150 mg) by mouth once a week 4 tablet 0     fluticasone (FLONASE) 50 MCG/ACT nasal spray Spray 2 sprays into both nostrils daily as needed for rhinitis or allergies 54.6 mL 3     gabapentin (NEURONTIN) 300 MG capsule Take 2 capsules (600 mg) by mouth 3 times daily 180 capsule 1     losartan (COZAAR) 50 MG tablet Take 1 tablet (50 mg) by mouth daily 90 tablet 3     metoprolol succinate ER (TOPROL XL) 50 MG 24 hr tablet Take 1 tablet (50 mg) by mouth daily 180 tablet 3     niacin 500 MG tablet Take by mouth daily (with breakfast)       pantoprazole (PROTONIX) 40 MG EC tablet TAKE 1 TABLET DAILY 90 tablet 1     tamsulosin (FLOMAX) 0.4 MG capsule TAKE 1 CAPSULE TWICE A  capsule 1     vitamin B-12 (CYANOCOBALAMIN) 1000 MCG tablet Take by mouth every 24 hours       vitamin C (ASCORBIC ACID) 1000 MG TABS Take 1,000 mg by mouth daily       Vitamin D-Vitamin K (K2 PLUS D3) 100-1000 MCG-UNIT TABS          ALLERGIES   No Known Allergies    PAST MEDICAL HISTORY:  Past Medical History:   Diagnosis Date     Arrhythmia      Arthritis      Atrial fibrillation 2006    Followed by [facility] - persistent     Calculus of kidney 2005, 6/06, 10/12, 8/18, 9/19    dr walker/nestor/иван - hematuria - w/u o/w neg     Cervical stenosis of spine     Moderate C4-5     Cholelithiasis      Chronic peptic ulcer, unspecified site, without mention of hemorrhage, perforation, or obstruction 1985    gastric bypass     Colon polyps 8/05, 9/10, 10/15    2 tubular adenoma, 1 hyperplastic - multiple serrated/tubular adenomas - last colonscopy 11/20 due 5 yrs     CVA (cerebral vascular accident) (H) 01/2019    small right periorlandic subcortical - left sided residual - left hand tingling/numbness - Left leg weak/numbness - cardioembolic     First degree AV block      Hepatitis C 10/2012    chronic hepatitis C, grade 1-2, stage 1 - mild - Dr Douglas     Hyperlipidemia LDL goal <70      Hypertension goal BP  (blood pressure) < 140/90 06/2006    dr jaciel winchester     Iron deficiency anemia, unspecified 1985    gastric bypass     Nephrolithiasis multiple episodes, last 10/19    Dr Bey/Ivana - 9mm 3/2021     OA (osteoarthritis)     Multiple - Left shoulder with rotator cuff tear - Dr Durham     Obesity, unspecified     s/p gastric bypass 1985      Obstructive sleep apnea syndrome      Prediabetes      Presbyacusis 01/2004    dr corona     Pyelonephritis, unspecified 12/1999     SCC (squamous cell carcinoma), 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    dr saez - lt conchal bowl     Sleep apnea 10/2002    cpap - severe - 15 cm - dr cunha     Stroke (H) 01/19/2019     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    [facility]     Vitamin B12 deficiency (non anemic) 04/15/2019     Vitamin D deficiency 04/15/2019       PAST SURGICAL HISTORY:  Past Surgical History:   Procedure Laterality Date     APPENDECTOMY       APPENDECTOMY       ARTHROPLASTY  08/13/2012    knee     ARTHROPLASTY KNEE  08/13/2012    LT TKA - Dr Villanueva     CARDIOVERSION  2016     CARDIOVERSION       COLONOSCOPY  8/05, 9/10, 10/15    multiple tubular/serrated/hyperplastic polyps     COLONOSCOPY N/A 10/29/2015    multiple tubular and serrated adenomas     COLONOSCOPY N/A 09/07/2016     COLONOSCOPY  11/2020    tubular adenomas - due 5 yrs     COLONOSCOPY N/A 11/24/2020    Procedure: COLONOSCOPY with biopsies;  Surgeon: Chadwick Burgos MD;  Location:  OR     CYSTOSCOPY W/ LASER LITHOTRIPSY  10/16/2012,5/2/2018     ESOPHAGOSCOPY, GASTROSCOPY, DUODENOSCOPY (EGD), COMBINED N/A 11/24/2020    Procedure: ESOPHAGOGASTRODUODENOSCOPY, COLONOSCOPY with biopsies;  Surgeon: Chadwick Burgos MD;  Location:  OR     EYE SURGERY  Lasik     EYE SURGERY  1/01,6/02,11/02    lasik     GASTRIC BYPASS  1985     HC KNEE SCOPE, DIAGNOSTIC  05/2000    Arthroscopy, Knee RT     LASER HOLMIUM LITHOTRIPSY URETER(S), INSERT STENT, COMBINED  10/16/2012    stones x 4, Dr Campa     LASER HOLMIUM LITHOTRIPSY URETER(S),  INSERT STENT, COMBINED Right 2018    CYSTOSCOPY, RIGHT URETEROSCOPY, HOLMIUM LASER LITHOTRIPSY, RIGHT STENT PLACEMENT - Dr Bey     OTHER SURGICAL HISTORY  1979    cellulitis     OTHER SURGICAL HISTORY Bilateral 1998 ,    forearm spur     OTHER SURGICAL HISTORY  2006    lihoripsy     OTHER SURGICAL HISTORY  10/08,     lt ear choncal lesion removal scc     REPLACEMENT TOTAL KNEE  2012     SURGICAL HISTORY OF -       s/p gastric bypass Bilroth II     SURGICAL HISTORY OF -   1998    wisdom teeth     SURGICAL HISTORY OF -       cellulitis     SURGICAL HISTORY OF -   1998    lt forearm spur's     SURGICAL HISTORY OF -   ,,    s/p lasik     SURGICAL HISTORY OF -   1999    rt forearm spurs     SURGICAL HISTORY OF -   2006    dr stevenson - lithotrypsy     SURGICAL HISTORY OF -   10/08,     Lt ear chonchal lesion removal SCC - dr saez     SURGICAL HISTORY OF -  Right 10/2019    nephrolithiasis - cystoscopy, rt lithotrypsy, Dr Heath     SURGICAL HISTORY OF -  Right 2019    Cystoscopy, Rt lithotrypsy, Calcium Oxalate, Dr Heath     WISDOM TOOTH EXTRACTION  1998       FAMILY HISTORY:  Family History   Problem Relation Age of Onset     Alzheimer Disease Father 82         at 88     Prostate Cancer Father 76        bladder and prostate     Heart Disease Mother 80        CHF     Heart Disease Maternal Grandfather         MI at 55     Cancer Paternal Uncle         ?     Ulcerative Colitis No family hx of      Crohn's Disease No family hx of      Stomach Cancer No family hx of      GERD No family hx of        SOCIAL HISTORY:  Social History     Socioeconomic History     Marital status:      Spouse name: Mayra     Number of children: 3     Years of education: 21     Highest education level: None   Occupational History     Occupation: retired teacher and football and       Employer: [employer]     Employer: RETIRED  "  Tobacco Use     Smoking status: Never Smoker     Smokeless tobacco: Never Used   Vaping Use     Vaping Use: Never used   Substance and Sexual Activity     Alcohol use: Yes     Alcohol/week: 2.0 - 3.0 standard drinks     Comment: 2-3 per week     Drug use: No     Comment: acknowledges using herbal supplement \"Vibe\" for energy-none used recently      Sexual activity: Not Currently     Partners: Female     Comment:     Other Topics Concern     Caffeine Concern Yes     Comment: <1 can qd     Sleep Concern Yes     Comment: sleep apnea, wears cpap     Exercise No     Seat Belt Yes     Parent/sibling w/ CABG, MI or angioplasty before 65F 55M? No       Review of Systems:  Skin:  not assessed       Eyes:  Positive for glasses    ENT:  Positive for hearing loss    Respiratory:  Positive for sleep apnea;CPAP     Cardiovascular:  Negative edema;Positive for some edema in ankles  Gastroenterology: not assessed      Genitourinary:  Positive for nocturia    Musculoskeletal:  Positive for back pain managed with PCP  Neurologic:  not assessed      Psychiatric:  not assessed      Heme/Lymph/Imm:  not assessed      Endocrine:  Negative        Physical Exam:  Vitals: /74   Pulse 66   Ht 1.791 m (5' 10.5\")   Wt (!) 154.2 kg (340 lb)   SpO2 97%   BMI 48.10 kg/m      Constitutional:  cooperative, alert and oriented, well developed, well nourished, in no acute distress        Skin:  warm and dry to the touch, no apparent skin lesions or masses noted          Head:  normocephalic, no masses or lesions        Eyes:  pupils equal and round, conjunctivae and lids unremarkable, sclera white, no xanthalasma, EOMS intact, no nystagmus        Lymph:No Cervical lymphadenopathy present     ENT:  no pallor or cyanosis, dentition good        Neck:  carotid pulses are full and equal bilaterally, JVP normal, no carotid bruit        Respiratory:  normal breath sounds, clear to auscultation, normal A-P diameter, normal symmetry, normal " respiratory excursion, no use of accessory muscles         Cardiac: regular rhythm, normal S1/S2, no S3 or S4, apical impulse not displaced, no murmurs, gallops or rubs irregularly irregular rhythm              not assessed this visit                                        GI:  abdomen soft, non-tender, BS normoactive, no mass, no HSM, no bruits        Extremities and Muscular Skeletal:  no deformities, clubbing, cyanosis, erythema observed              Neurological:  no gross motor deficits        Psych:             Sarah Beth Castillo MD  [address]  [address]  [place] [address]                Service Date: 08/04/2022    HISTORY OF PRESENT ILLNESS:  I saw Mr. Weber for followup of atrial fibrillation.  He is a 77-year-old white male who has been in chronic atrial fibrillation for years.  He is on rate control and anticoagulation.  Symptomatically, he has not had palpitation, chest pain, shortness of breath or fatigue.  Over the last year, he has not had hospitalization.    PHYSICAL EXAMINATION:    VITAL SIGNS:  Blood pressure was 110/74, heart rate 67 beats per minute, body weight 340 pounds.  HEENT:  The eyes and ENT were unremarkable.  LUNGS:  No crackles or wheezing.  CARDIOVASCULAR:  Rhythm was irregularly irregular.  Heart sounds were normal without murmur.  ABDOMEN:  Showed severe obesity.  EXTREMITIES:  He has bilateral leg edema.    Recent echocardiography showed enlarged left ventricle with end-systolic diameter at 6.0.  The aortic valve stenosis is considered mild.  However, the ejection fraction, seems to be reduced to about 35%-40%.  There was a technical difficulty for the image quality.    ASSESSMENT AND RECOMMENDATIONS:  Mr. Weber is doing well symptomatically.  The patient has had some dizziness recently, and the dose of metoprolol was reduced from 100 mg to 50 mg once a day.  He no longer has dizziness.    Based on his echo reading, he has had evidence of cardiomyopathy with moderate LV  dysfunction.  The LV diameter is relatively large.  Based on the above, I have added losartan 50 mg once a day.  I changed his metoprolol back to 50 mg p.o. b.i.d. . In order to avoid dizziness in the future,the dosage of diltiazem is reduced from 240 mg to 180 mg once a day.  His next cardiology followup is scheduled for 1 year.    cc:    Brett Cassidy MD   [facility]  [address] [place] [id number]     Sarah Beth Castillo MD        D: 2022   T: 2022   MT: ALYSIA    Name:     GAYATHRI SMITH  MRN:      [id number]        Account:      [id number]   :      1945           Service Date: 2022       Document: [id number]      Thank you for allowing me to participate in the care of your patient.      Sincerely,     Sarah Beth Castillo MD     [facility]  cc:   Sarah Beth Castillo MD  [address]  [address] [place] [address],  [place] [address]

## 2022-08-09 ENCOUNTER — MYC MEDICAL ADVICE (OUTPATIENT)
Dept: FAMILY MEDICINE | Facility: CLINIC | Age: 77
End: 2022-08-09

## 2022-08-10 NOTE — TELEPHONE ENCOUNTER
Please see my chart message below     Please review and advise     Thank you     Kathia Centeno RN, BSN  [facility]

## 2022-08-10 NOTE — TELEPHONE ENCOUNTER
Please call patient & review    Gabapentin - advise decrease from 300 mg BID to at bedtime x 1 week, then 100 mg BID x 1 week, then 100 mg at bedtime, then off    Magnesium 250 - 400 mg daily reasonable, especially if leg cramps/RLS    No advise/experience on ashwagandha    Trial of pantoprazole daily to every other day, if no GERD, the to every 3 days and off

## 2022-08-15 ASSESSMENT — ENCOUNTER SYMPTOMS: ABDOMINAL PAIN: 1

## 2022-08-15 ASSESSMENT — ACTIVITIES OF DAILY LIVING (ADL): CURRENT_FUNCTION: NO ASSISTANCE NEEDED

## 2022-08-15 NOTE — TELEPHONE ENCOUNTER
Attempt # 1  Called Phone # [phone]    Left a non detailed voicemail to please call back and ask for any available triage nurse regarding your  [employer] message  @ [phone].     [employer] message sent      Chichi ALEXIS RN   [facility]

## 2022-08-16 ENCOUNTER — OFFICE VISIT (OUTPATIENT)
Dept: FAMILY MEDICINE | Facility: CLINIC | Age: 77
End: 2022-08-16
Payer: COMMERCIAL

## 2022-08-16 VITALS
TEMPERATURE: 96.1 F | DIASTOLIC BLOOD PRESSURE: 80 MMHG | HEIGHT: 71 IN | HEART RATE: 61 BPM | SYSTOLIC BLOOD PRESSURE: 124 MMHG | WEIGHT: 315 LBS | RESPIRATION RATE: 20 BRPM | BODY MASS INDEX: 44.1 KG/M2 | OXYGEN SATURATION: 95 %

## 2022-08-16 DIAGNOSIS — E78.5 HYPERLIPIDEMIA LDL GOAL <70: ICD-10-CM

## 2022-08-16 DIAGNOSIS — E55.9 VITAMIN D DEFICIENCY: ICD-10-CM

## 2022-08-16 DIAGNOSIS — Z98.84 HISTORY OF GASTRIC BYPASS: ICD-10-CM

## 2022-08-16 DIAGNOSIS — I10 HYPERTENSION GOAL BP (BLOOD PRESSURE) < 140/90: ICD-10-CM

## 2022-08-16 DIAGNOSIS — D50.9 IRON DEFICIENCY ANEMIA, UNSPECIFIED IRON DEFICIENCY ANEMIA TYPE: ICD-10-CM

## 2022-08-16 DIAGNOSIS — Z86.19 HISTORY OF HEPATITIS C: ICD-10-CM

## 2022-08-16 DIAGNOSIS — G89.29 CHRONIC PAIN OF BOTH SHOULDERS: ICD-10-CM

## 2022-08-16 DIAGNOSIS — R10.32 CHRONIC LLQ PAIN: ICD-10-CM

## 2022-08-16 DIAGNOSIS — M15.0 PRIMARY OSTEOARTHRITIS INVOLVING MULTIPLE JOINTS: ICD-10-CM

## 2022-08-16 DIAGNOSIS — K63.5 POLYP OF COLON, UNSPECIFIED PART OF COLON, UNSPECIFIED TYPE: ICD-10-CM

## 2022-08-16 DIAGNOSIS — Z86.73 HISTORY OF CVA (CEREBROVASCULAR ACCIDENT): ICD-10-CM

## 2022-08-16 DIAGNOSIS — R73.03 PREDIABETES: ICD-10-CM

## 2022-08-16 DIAGNOSIS — Z12.5 SCREENING FOR PROSTATE CANCER: ICD-10-CM

## 2022-08-16 DIAGNOSIS — I47.29 NSVT (NONSUSTAINED VENTRICULAR TACHYCARDIA) (H): ICD-10-CM

## 2022-08-16 DIAGNOSIS — E53.8 VITAMIN B12 DEFICIENCY (NON ANEMIC): ICD-10-CM

## 2022-08-16 DIAGNOSIS — N20.0 NEPHROLITHIASIS: ICD-10-CM

## 2022-08-16 DIAGNOSIS — E66.01 MORBID OBESITY DUE TO EXCESS CALORIES (H): ICD-10-CM

## 2022-08-16 DIAGNOSIS — M25.512 CHRONIC PAIN OF BOTH SHOULDERS: ICD-10-CM

## 2022-08-16 DIAGNOSIS — Z51.81 MEDICATION MONITORING ENCOUNTER: ICD-10-CM

## 2022-08-16 DIAGNOSIS — G89.29 CHRONIC LLQ PAIN: ICD-10-CM

## 2022-08-16 DIAGNOSIS — K21.00 GASTROESOPHAGEAL REFLUX DISEASE WITH ESOPHAGITIS WITHOUT HEMORRHAGE: ICD-10-CM

## 2022-08-16 DIAGNOSIS — G47.33 OBSTRUCTIVE SLEEP APNEA SYNDROME: ICD-10-CM

## 2022-08-16 DIAGNOSIS — I48.21 PERMANENT ATRIAL FIBRILLATION (H): ICD-10-CM

## 2022-08-16 DIAGNOSIS — Z12.11 SCREEN FOR COLON CANCER: ICD-10-CM

## 2022-08-16 DIAGNOSIS — Z00.00 ROUTINE GENERAL MEDICAL EXAMINATION AT A HEALTH CARE FACILITY: Primary | ICD-10-CM

## 2022-08-16 DIAGNOSIS — N39.41 BENIGN PROSTATIC HYPERPLASIA (BPH) WITH URINARY URGE INCONTINENCE: ICD-10-CM

## 2022-08-16 DIAGNOSIS — K76.0 NAFLD (NONALCOHOLIC FATTY LIVER DISEASE): ICD-10-CM

## 2022-08-16 DIAGNOSIS — N40.1 BENIGN PROSTATIC HYPERPLASIA (BPH) WITH URINARY URGE INCONTINENCE: ICD-10-CM

## 2022-08-16 DIAGNOSIS — M25.511 CHRONIC PAIN OF BOTH SHOULDERS: ICD-10-CM

## 2022-08-16 LAB
ALBUMIN SERPL-MCNC: 3.4 G/DL (ref 3.4–5)
ALBUMIN UR-MCNC: NEGATIVE MG/DL
ALP SERPL-CCNC: 130 U/L (ref 40–150)
ALT SERPL W P-5'-P-CCNC: 21 U/L (ref 0–70)
ANION GAP SERPL CALCULATED.3IONS-SCNC: 7 MMOL/L (ref 3–14)
APPEARANCE UR: CLEAR
AST SERPL W P-5'-P-CCNC: 12 U/L (ref 0–45)
BACTERIA #/AREA URNS HPF: ABNORMAL /HPF
BILIRUB SERPL-MCNC: 0.5 MG/DL (ref 0.2–1.3)
BILIRUB UR QL STRIP: NEGATIVE
BUN SERPL-MCNC: 16 MG/DL (ref 7–30)
CALCIUM SERPL-MCNC: 8.9 MG/DL (ref 8.5–10.1)
CHLORIDE BLD-SCNC: 108 MMOL/L (ref 94–109)
CHOLEST SERPL-MCNC: 139 MG/DL
CK SERPL-CCNC: 58 U/L (ref 30–300)
CO2 SERPL-SCNC: 25 MMOL/L (ref 20–32)
COLOR UR AUTO: YELLOW
CREAT SERPL-MCNC: 0.74 MG/DL (ref 0.66–1.25)
DEPRECATED CALCIDIOL+CALCIFEROL SERPL-MC: 72 UG/L (ref 20–75)
ERYTHROCYTE [DISTWIDTH] IN BLOOD BY AUTOMATED COUNT: 14 % (ref 10–15)
FASTING STATUS PATIENT QL REPORTED: YES
FERRITIN SERPL-MCNC: 29 NG/ML (ref 26–388)
GFR SERPL CREATININE-BSD FRML MDRD: >90 ML/MIN/1.73M2
GLUCOSE BLD-MCNC: 101 MG/DL (ref 70–99)
GLUCOSE UR STRIP-MCNC: NEGATIVE MG/DL
HBA1C MFR BLD: 5.7 % (ref 0–5.6)
HCT VFR BLD AUTO: 44.2 % (ref 40–53)
HCV AB SERPL QL IA: REACTIVE
HDLC SERPL-MCNC: 43 MG/DL
HGB BLD-MCNC: 14.1 G/DL (ref 13.3–17.7)
HGB UR QL STRIP: ABNORMAL
IRON SATN MFR SERPL: 43 % (ref 15–46)
IRON SERPL-MCNC: 154 UG/DL (ref 35–180)
KETONES UR STRIP-MCNC: NEGATIVE MG/DL
LDLC SERPL CALC-MCNC: 81 MG/DL
LEUKOCYTE ESTERASE UR QL STRIP: NEGATIVE
MCH RBC QN AUTO: 29.1 PG (ref 26.5–33)
MCHC RBC AUTO-ENTMCNC: 31.9 G/DL (ref 31.5–36.5)
MCV RBC AUTO: 91 FL (ref 78–100)
MUCOUS THREADS #/AREA URNS LPF: PRESENT /LPF
NITRATE UR QL: NEGATIVE
NONHDLC SERPL-MCNC: 96 MG/DL
PH UR STRIP: 6 [PH] (ref 5–7)
PLATELET # BLD AUTO: 224 10E3/UL (ref 150–450)
POTASSIUM BLD-SCNC: 4.2 MMOL/L (ref 3.4–5.3)
PROT SERPL-MCNC: 7 G/DL (ref 6.8–8.8)
PSA SERPL-MCNC: 8.7 UG/L (ref 0–4)
RBC # BLD AUTO: 4.84 10E6/UL (ref 4.4–5.9)
RBC #/AREA URNS AUTO: ABNORMAL /HPF
SODIUM SERPL-SCNC: 140 MMOL/L (ref 133–144)
SP GR UR STRIP: 1.02 (ref 1–1.03)
SPERM #/AREA URNS HPF: PRESENT /HPF
TIBC SERPL-MCNC: 361 UG/DL (ref 240–430)
TRIGL SERPL-MCNC: 73 MG/DL
TSH SERPL DL<=0.005 MIU/L-ACNC: 2.84 MU/L (ref 0.4–4)
UROBILINOGEN UR STRIP-ACNC: 0.2 E.U./DL
VIT B12 SERPL-MCNC: 1586 PG/ML (ref 232–1245)
WBC # BLD AUTO: 5.4 10E3/UL (ref 4–11)
WBC #/AREA URNS AUTO: ABNORMAL /HPF

## 2022-08-16 PROCEDURE — 86803 HEPATITIS C AB TEST: CPT | Performed by: FAMILY MEDICINE

## 2022-08-16 PROCEDURE — 82728 ASSAY OF FERRITIN: CPT | Performed by: FAMILY MEDICINE

## 2022-08-16 PROCEDURE — 80053 COMPREHEN METABOLIC PANEL: CPT | Performed by: FAMILY MEDICINE

## 2022-08-16 PROCEDURE — G0103 PSA SCREENING: HCPCS | Performed by: FAMILY MEDICINE

## 2022-08-16 PROCEDURE — 36415 COLL VENOUS BLD VENIPUNCTURE: CPT | Performed by: FAMILY MEDICINE

## 2022-08-16 PROCEDURE — 83036 HEMOGLOBIN GLYCOSYLATED A1C: CPT | Performed by: FAMILY MEDICINE

## 2022-08-16 PROCEDURE — 87902 NFCT AGT GNTYP ALYS HEP C: CPT | Performed by: FAMILY MEDICINE

## 2022-08-16 PROCEDURE — 83550 IRON BINDING TEST: CPT | Performed by: FAMILY MEDICINE

## 2022-08-16 PROCEDURE — 80061 LIPID PANEL: CPT | Performed by: FAMILY MEDICINE

## 2022-08-16 PROCEDURE — 84443 ASSAY THYROID STIM HORMONE: CPT | Performed by: FAMILY MEDICINE

## 2022-08-16 PROCEDURE — 82607 VITAMIN B-12: CPT | Performed by: FAMILY MEDICINE

## 2022-08-16 PROCEDURE — 82306 VITAMIN D 25 HYDROXY: CPT | Performed by: FAMILY MEDICINE

## 2022-08-16 PROCEDURE — 82550 ASSAY OF CK (CPK): CPT | Performed by: FAMILY MEDICINE

## 2022-08-16 PROCEDURE — 82043 UR ALBUMIN QUANTITATIVE: CPT | Performed by: FAMILY MEDICINE

## 2022-08-16 PROCEDURE — G0439 PPPS, SUBSEQ VISIT: HCPCS | Performed by: FAMILY MEDICINE

## 2022-08-16 PROCEDURE — 85027 COMPLETE CBC AUTOMATED: CPT | Performed by: FAMILY MEDICINE

## 2022-08-16 PROCEDURE — 99214 OFFICE O/P EST MOD 30 MIN: CPT | Mod: 25 | Performed by: FAMILY MEDICINE

## 2022-08-16 PROCEDURE — 81001 URINALYSIS AUTO W/SCOPE: CPT | Performed by: FAMILY MEDICINE

## 2022-08-16 RX ORDER — DILTIAZEM HYDROCHLORIDE 180 MG/1
180 CAPSULE, COATED, EXTENDED RELEASE ORAL DAILY
Qty: 90 CAPSULE | Refills: 3 | Status: SHIPPED | OUTPATIENT
Start: 2022-08-16 | End: 2022-12-20 | Stop reason: SINTOL

## 2022-08-16 RX ORDER — LOSARTAN POTASSIUM 50 MG/1
50 TABLET ORAL DAILY
Qty: 90 TABLET | Refills: 3 | Status: SHIPPED | OUTPATIENT
Start: 2022-08-16 | End: 2023-07-19

## 2022-08-16 RX ORDER — PANTOPRAZOLE SODIUM 40 MG/1
40 TABLET, DELAYED RELEASE ORAL DAILY
Qty: 90 TABLET | Refills: 3 | Status: SHIPPED | OUTPATIENT
Start: 2022-08-16 | End: 2023-01-31

## 2022-08-16 RX ORDER — METOPROLOL SUCCINATE 50 MG/1
50 TABLET, EXTENDED RELEASE ORAL DAILY
Qty: 90 TABLET | Refills: 3 | Status: ON HOLD | OUTPATIENT
Start: 2022-08-16 | End: 2023-08-22

## 2022-08-16 RX ORDER — TAMSULOSIN HYDROCHLORIDE 0.4 MG/1
0.4 CAPSULE ORAL 2 TIMES DAILY
Qty: 180 CAPSULE | Refills: 3 | Status: ON HOLD | OUTPATIENT
Start: 2022-08-16 | End: 2023-08-22

## 2022-08-16 ASSESSMENT — ENCOUNTER SYMPTOMS: ABDOMINAL PAIN: 1

## 2022-08-16 ASSESSMENT — ACTIVITIES OF DAILY LIVING (ADL): CURRENT_FUNCTION: NO ASSISTANCE NEEDED

## 2022-08-16 NOTE — PROGRESS NOTES
"[facility]    Hugo Weber is a 77 year old male who presents for Preventive Visit.    Patient has been advised of split billing requirements and indicates understanding: Yes     Are you in the first 12 months of your Medicare coverage?  No    Healthy Habits:     In general, how would you rate your overall health?  Good    Frequency of exercise:  2-3 days/week    Duration of exercise:  Greater than 60 minutes    Do you usually eat at least 4 servings of fruit and vegetables a day, include whole grains    & fiber and avoid regularly eating high fat or \"junk\" foods?  No    Taking medications regularly:  Yes    Medication side effects:  Not applicable    Ability to successfully perform activities of daily living:  No assistance needed    Home Safety:  No safety concerns identified    Hearing Impairment:  No hearing concerns    In the past 6 months, have you been bothered by leaking of urine? Yes    In general, how would you rate your overall mental or emotional health?  Good      PHQ-2 Total Score: 0    Additional concerns today:  No    Do you feel safe in your environment? Yes    Have you ever done Advance Care Planning? (For example, a Health Directive, POLST, or a discussion with a medical provider or your loved ones about your wishes): Yes, advance care planning is on file.    Fall risk  Fallen 2 or more times in the past year?: No  Any fall with injury in the past year?: No    Cognitive Screening   1) Repeat 3 items (Leader, Season, Table)    2) Clock draw: NORMAL  3) 3 item recall: Recalls 3 objects  Results: 3 items recalled: COGNITIVE IMPAIRMENT LESS LIKELY    Mini-CogTM Copyright PANCHO Gaspar. Licensed by the author for use in [facility]; reprinted with permission (josi@.[facility]). All rights reserved.      Do you have sleep apnea, excessive snoring or daytime drowsiness?: no    Reviewed and updated as needed this visit by clinical staff   Tobacco  Allergies  " Meds   Med Hx  Surg Hx  Fam Hx  Soc Hx        Reviewed and updated as needed this visit by Provider                   Social History     Tobacco Use     Smoking status: Never Smoker     Smokeless tobacco: Never Used   Substance Use Topics     Alcohol use: Yes     Alcohol/week: 2.0 - 3.0 standard drinks     Comment: 2-3 per week     If you drink alcohol do you typically have >3 drinks per day or >7 drinks per week? No    Alcohol Use 8/16/2022   Prescreen: >3 drinks/day or >7 drinks/week? -   Prescreen: >3 drinks/day or >7 drinks/week? No     Current providers sharing in care for this patient include:   Patient Care Team:  Brett Cassidy MD as PCP - General  Brett Cassidy MD as Assigned PCP  Lauren Otero RD as Diabetes Educator (Dietitian, Registered)  Regulo Heath MD as MD (Urology)  Sarah Beth Castillo MD as Assigned Heart and Vascular Provider  Regulo Heath MD as Assigned Surgical Provider    The following health maintenance items are reviewed in Epic and correct as of today:  There are no preventive care reminders to display for this patient.  Review of Systems   Gastrointestinal: Positive for abdominal pain.   Genitourinary: Negative for impotence and penile discharge.     CONSTITUTIONAL: NEGATIVE for fever, chills, change in weight  INTEGUMENTARY/SKIN: NEGATIVE for worrisome rashes, moles or lesions  EYES: NEGATIVE for vision changes or irritation  ENT/MOUTH: NEGATIVE for ear, mouth and throat problems  RESP: NEGATIVE for significant cough or SOB  CV: NEGATIVE for chest pain, palpitations or peripheral edema  GI: NEGATIVE for nausea, abdominal pain, heartburn, or change in bowel habits  : NEGATIVE for frequency, dysuria, or hematuria  MUSCULOSKELETAL: NEGATIVE for significant arthralgias or myalgia  NEURO: NEGATIVE for weakness, dizziness or paresthesias  ENDOCRINE: NEGATIVE for temperature intolerance, skin/hair changes  HEME: NEGATIVE for bleeding problems  PSYCHIATRIC: NEGATIVE for changes in  mood or affect    HX of CVA - no residual - Dr Velasquez    Permanent A Fib - Dr Castillo    HAYLEE - using CPAP every night    Hx of Hepatitis C    NAFLD     Chronic LLQ - K Snehal - Gabapentin    Prediabetes    Lab Results   Component Value Date    A1C 5.6 08/11/2021    A1C 5.8 11/19/2020    A1C 5.8 08/07/2020    A1C 5.9 02/14/2020    A1C 5.7 12/02/2019    A1C 5.6 09/20/2019     Lipids    Recent Labs   Lab Test 08/11/21  0840 08/07/20  0847 03/10/16  0815 01/13/15  0815 09/03/14  0810   CHOL 122 148   < > 131 156   HDL 44 43   < > 43 42   LDL 67 88   < > 74 93   TRIG 54 84   < > 72 103   CHOLHDLRATIO  --   --   --  3.0 3.7    < > = values in this interval not displayed.     Hypertension Follow-up      Do you check your blood pressure regularly outside of the clinic? No     Are you following a low salt diet? Yes    Are your blood pressures ever more than 140 on the top number (systolic) OR more   than 90 on the bottom number (diastolic), for example 140/90? Does not check    BP Readings from Last 3 Encounters:   08/16/22 124/80   08/04/22 110/74   07/18/22 112/70     TOMI    Hemoglobin   Date Value Ref Range Status   08/16/2022 14.1 13.3 - 17.7 g/dL Final   08/11/2021 14.7 13.3 - 17.7 g/dL Final   06/22/2021 14.6 13.3 - 17.7 g/dL Final   06/21/2021 15.5 13.3 - 17.7 g/dL Final     Vitamin B12 / Vitamin D deficiency    Urinary incontinence - Dr Heath    Nephrolithiasis - no issues    Bilateral Shoulder Pain - Ortho - Dr Durham    Hx of Gastric bypass - 1985    Health Maintenance     Colonoscopy:  Last 11/2020, due 5 yrs   FIT:  given              PSA:  pending   DEXA:  NA    There are no preventive care reminders to display for this patient.    Current Problem List    Patient Active Problem List   Diagnosis     Iron deficiency anemia     Colon polyps     Obstructive sleep apnea syndrome     Hyperlipidemia LDL goal <70     Long term current use of anticoagulant therapy - for intermittent a-fib     Advance Care Planning      Total knee replacement status     Calculus of kidney     NAFLD (nonalcoholic fatty liver disease)     Morbid obesity due to excess calories (H)     History of hepatitis C     Prediabetes     Paroxysmal atrial fibrillation (H)     Cholelithiasis     Hypertension goal BP (blood pressure) < 140/90     TMJ arthralgia     Nephrolithiasis     OA (osteoarthritis)     First degree AV block     Benign prostatic hyperplasia (BPH) with urinary urge incontinence     Thoracic aortic ectasia (H)     Stroke (H)     CVA (cerebral vascular accident) (H)     Cervical stenosis of spine     Vitamin B12 deficiency (non anemic)     Vitamin D deficiency     Myofascial pain     Atrial fibrillation     History of CVA (cerebrovascular accident)     Small bowel obstruction (H)     NSVT (nonsustained ventricular tachycardia) (H)     Chronic LLQ pain       Past Medical History    Past Medical History:   Diagnosis Date     Atrial fibrillation 2006    Followed by [facility] - persistent     Calculus of kidney 2005, 6/06, 10/12, 8/18, 9/19    dr walker/nestor/иван - hematuria - w/u o/w neg     Cervical stenosis of spine     Moderate C4-5     Cholelithiasis      Chronic peptic ulcer, unspecified site, without mention of hemorrhage, perforation, or obstruction 1985    gastric bypass     Colon polyps 8/05, 9/10, 10/15    2 tubular adenoma, 1 hyperplastic - multiple serrated/tubular adenomas - last colonscopy 11/20 due 5 yrs     CVA (cerebral vascular accident) (H) 01/2019    small right periorlandic subcortical - left sided residual - left hand tingling/numbness - Left leg weak/numbness - cardioembolic     Hepatitis C 10/2012    chronic hepatitis C, grade 1-2, stage 1 - mild - Dr Douglas     Hyperlipidemia LDL goal <70      Hypertension goal BP (blood pressure) < 140/90 06/2006    dr jaciel winchester     Iron deficiency anemia, unspecified 1985    gastric bypass     Nephrolithiasis multiple episodes, last 10/19    Dr Bey/Ivana - 9mm 3/2021     OA  (osteoarthritis)     Multiple - Left shoulder with rotator cuff tear - Dr Durham     Obesity, unspecified     s/p gastric bypass 1985      Obstructive sleep apnea syndrome      Prediabetes      Presbyacusis 01/2004    dr corona     Pyelonephritis, unspecified 12/1999     SCC (squamous cell carcinoma), [facility] 10[facility]2008    [facility]     Sleep apnea 10/2002    cpap - severe - 15 cm - dr cunha     Stroke (H) 01/19/2019     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    [facility]     Vitamin B12 deficiency (non anemic) 04/15/2019     Vitamin D deficiency 04/15/2019       Past Surgical History    Past Surgical History:   Procedure Laterality Date     APPENDECTOMY       APPENDECTOMY       ARTHROPLASTY  08/13/2012    knee     ARTHROPLASTY KNEE  08/13/2012    LT TKA - Dr Villanueva     CARDIOVERSION  2016     CARDIOVERSION       COLONOSCOPY  8/05, 9/10, 10/15    multiple tubular/serrated/hyperplastic polyps     COLONOSCOPY N/A 10/29/2015    multiple tubular and serrated adenomas     COLONOSCOPY N/A 09/07/2016     COLONOSCOPY  11/2020    tubular adenomas - due 5 yrs     COLONOSCOPY N/A 11/24/2020    Procedure: COLONOSCOPY with biopsies;  Surgeon: Chadwick Burgos MD;  Location: [facility] OR     CYSTOSCOPY W/ LASER LITHOTRIPSY  10/16/2012,5/2/2018     ESOPHAGOSCOPY, GASTROSCOPY, DUODENOSCOPY (EGD), COMBINED N/A 11/24/2020    Procedure: ESOPHAGOGASTRODUODENOSCOPY, COLONOSCOPY with biopsies;  Surgeon: Chadwick Burgos MD;  Location: [facility] OR     EYE SURGERY  Lasik     EYE SURGERY  1/01,6/02,11/02    lasik     GASTRIC BYPASS  1985     HC KNEE SCOPE, DIAGNOSTIC  05/2000    Arthroscopy, Knee RT     LASER HOLMIUM LITHOTRIPSY URETER(S), INSERT STENT, COMBINED  10/16/2012    stones x 4, Dr Campa     LASER HOLMIUM LITHOTRIPSY URETER(S), INSERT STENT, COMBINED Right 05/02/2018    CYSTOSCOPY, RIGHT URETEROSCOPY, HOLMIUM LASER LITHOTRIPSY, RIGHT STENT PLACEMENT - Dr Bey     OTHER SURGICAL HISTORY  1979    cellulitis     OTHER SURGICAL  HISTORY Bilateral 11/1998 ,11/99    forearm spur     OTHER SURGICAL HISTORY  07/2006    lihoripsy     OTHER SURGICAL HISTORY  10/08, 12/08    lt ear choncal lesion removal scc     REPLACEMENT TOTAL KNEE  08/13/2012     SURGICAL HISTORY OF -   1985    s/p gastric bypass Bilroth II     SURGICAL HISTORY OF -   11/1998    wisdom teeth     SURGICAL HISTORY OF -   1979    cellulitis     SURGICAL HISTORY OF -   11/1998    lt forearm spur's     SURGICAL HISTORY OF -   1/01,6/02,11/02    s/p lasik     SURGICAL HISTORY OF -   11/1999    rt forearm spurs     SURGICAL HISTORY OF -   07/2006    dr stevenson - lithotrypsy     SURGICAL HISTORY OF -   10/08, 12/08    Lt ear chonchal lesion removal SCC - dr saez     SURGICAL HISTORY OF -  Right 10/2019    nephrolithiasis - cystoscopy, rt lithotrypsy, Dr Heath     SURGICAL HISTORY OF -  Right 11/2019    Cystoscopy, Rt lithotrypsy, Calcium Oxalate, Dr Heath     WISDOM TOOTH EXTRACTION  11/1998       Current Medications    Current Outpatient Medications   Medication Sig Dispense Refill     apixaban ANTICOAGULANT (ELIQUIS) 5 MG tablet Take 1 tablet (5 mg) by mouth 2 times daily Appointment needed for additional refills. Please call [phone] to schedule. 180 tablet 3     betamethasone dipropionate (DIPROSONE) 0.05 % external ointment Apply topically 2 times daily 50 g 1     cholecalciferol 25 MCG (1000 UT) TABS taken during winter only       Cyanocobalamin 5000 MCG TBDP Take by mouth daily        diltiazem ER COATED BEADS (CARDIZEM CD) 180 MG 24 hr capsule Take 1 capsule (180 mg) by mouth daily 90 capsule 3     Ferrous Sulfate (IRON) 325 (65 Fe) MG tablet Take 1 tablet by mouth every other day  90 tablet 3     fluconazole (DIFLUCAN) 150 MG tablet Take 1 tablet (150 mg) by mouth once a week 4 tablet 0     gabapentin (NEURONTIN) 300 MG capsule Take 2 capsules (600 mg) by mouth 3 times daily 180 capsule 1     losartan (COZAAR) 50 MG tablet Take 1 tablet (50 mg) by mouth daily 90  tablet 3     metoprolol succinate ER (TOPROL XL) 50 MG 24 hr tablet Take 1 tablet (50 mg) by mouth daily 90 tablet 3     niacin 500 MG tablet Take by mouth daily (with breakfast)       pantoprazole (PROTONIX) 40 MG EC tablet Take 1 tablet (40 mg) by mouth daily 90 tablet 3     tamsulosin (FLOMAX) 0.4 MG capsule Take 1 capsule (0.4 mg) by mouth 2 times daily 180 capsule 3     vitamin B-12 (CYANOCOBALAMIN) 1000 MCG tablet Take by mouth every 24 hours       vitamin C (ASCORBIC ACID) 1000 MG TABS Take 1,000 mg by mouth daily       Vitamin D-Vitamin K (K2 PLUS D3) 100-1000 MCG-UNIT TABS        fluticasone (FLONASE) 50 MCG/ACT nasal spray Spray 2 sprays into both nostrils daily as needed for rhinitis or allergies 54.6 mL 3       Allergies    No Known Allergies    Immunizations    Immunization History   Administered Date(s) Administered     COVID-19,PF,Moderna 2021, 2021, 2021, 2022     Influenza (High Dose) 3 valent vaccine 2012, 2013, 10/20/2015, 2017, 2018, 2019     Influenza (IIV3) PF 10/26/2007, 2008, 2009, 10/14/2011     Influenza Vaccine, 6+MO IM (QUADRIVALENT W/PRESERVATIVES) 10/30/2017     Influenza, Quad, High Dose, Pf, 65yr+ (Fluzone HD) 10/19/2020, 2021     Pneumo Conj 13-V (2010&after) 03/10/2016, 2017     Pneumococcal 23 valent 2005, 2012     TD (ADULT, 7+) 1998     TDAP Vaccine (Adacel) 2007     TDAP Vaccine (Boostrix) 10/20/2015     Twinrix A/B 2005, 2007, 2008     Zoster vaccine recombinant adjuvanted (SHINGRIX) 2019, 2019       Family History    Family History   Problem Relation Age of Onset     Alzheimer Disease Father 82         at 88     Prostate Cancer Father 76        bladder and prostate     Heart Disease Mother 80        CHF     Heart Disease Maternal Grandfather         MI at 55     Cancer Paternal Uncle         ?     Ulcerative Colitis No family hx of       "Crohn's Disease No family hx of      Stomach Cancer No family hx of      GERD No family hx of        Social History    Social History     Socioeconomic History     Marital status:      Spouse name: Mayra     Number of children: 3     Years of education: 21     Highest education level: Not on file   Occupational History     Occupation: retired teacher and football and       Employer: [employer]     Employer: RETIRED   Tobacco Use     Smoking status: Never Smoker     Smokeless tobacco: Never Used   Vaping Use     Vaping Use: Never used   Substance and Sexual Activity     Alcohol use: Yes     Alcohol/week: 2.0 - 3.0 standard drinks     Comment: 2-3 per week     Drug use: No     Comment: acknowledges using herbal supplement \"Vibe\" for energy-none used recently      Sexual activity: Not Currently     Partners: Male     Birth control/protection: None     Comment:     Other Topics Concern      Service Not Asked     Blood Transfusions Not Asked     Caffeine Concern Yes     Comment: <1 can qd     Occupational Exposure Not Asked     Hobby Hazards Not Asked     Sleep Concern Yes     Comment: sleep apnea, wears cpap     Stress Concern Not Asked     Weight Concern Not Asked     Special Diet Not Asked     Back Care Not Asked     Exercise No     Bike Helmet Not Asked     Seat Belt Yes     Self-Exams Not Asked     Parent/sibling w/ CABG, MI or angioplasty before 65F 55M? No   Social History Narrative     Not on file     Social Determinants of Health     Financial Resource Strain: Not on file   Food Insecurity: Not on file   Transportation Needs: Not on file   Physical Activity: Not on file   Stress: Not on file   Social Connections: Not on file   Intimate Partner Violence: Not on file   Housing Stability: Not on file       ROS    CONSTITUTIONAL: NEGATIVE for fever, chills, change in weight  INTEGUMENTARY/SKIN: NEGATIVE for worrisome rashes, moles or lesions  EYES: NEGATIVE for " "vision changes or irritation  ENT/MOUTH: NEGATIVE for ear, mouth and throat problems  RESP: NEGATIVE for significant cough or SOB  BREAST: NEGATIVE for masses, tenderness or discharge  CV: NEGATIVE for chest pain, palpitations or peripheral edema  GI: NEGATIVE for nausea, abdominal pain, heartburn, or change in bowel habits  : NEGATIVE for frequency, dysuria, or hematuria  MUSCULOSKELETAL: NEGATIVE for significant arthralgias or myalgia  NEURO: NEGATIVE for weakness, dizziness or paresthesias  ENDOCRINE: NEGATIVE for temperature intolerance, skin/hair changes  HEME: NEGATIVE for bleeding problems  PSYCHIATRIC: NEGATIVE for changes in mood or affect    OBJECTIVE    /80   Pulse 61   Temp (!) 96.1  F (35.6  C) (Tympanic)   Resp 20   Ht 1.791 m (5' 10.5\")   Wt (!) 155.1 kg (342 lb)   SpO2 95%   BMI 48.38 kg/m      EXAM:    GENERAL: healthy, alert and no distress  EYES: Eyes grossly normal to inspection, PERRL and conjunctivae and sclerae normal  HENT: ear canals and TM's normal, nose and mouth without ulcers or lesions  NECK: no adenopathy, no asymmetry, masses, or scars and thyroid normal to palpation  RESP: lungs clear to auscultation - no rales, rhonchi or wheezes  CV: regular rate and rhythm, normal S1 S2, no S3 or S4, no murmur, click or rub, no peripheral edema and peripheral pulses strong  ABDOMEN: soft, nontender, no hepatosplenomegaly, no masses and bowel sounds normal   (male): pt declines  RECTAL: pt declines  MS: no gross musculoskeletal defects noted, no edema  SKIN: no suspicious lesions or rashes  NEURO: Normal strength and tone, mentation intact and speech normal  PSYCH: mentation appears normal, affect normal/bright  LYMPH: no cervical, supraclavicular, axillary, or inguinal adenopathy    DIAGNOSTICS/PROCEDURES    Pending    ASSESSMENT      ICD-10-CM    1. Routine general medical examination at a health care facility  Z00.00 Comprehensive metabolic panel     Lipid panel reflex to direct " LDL Fasting     CBC with platelets     CK total     UA reflex to Microscopic and Culture     Albumin Random Urine Quantitative with Creat Ratio     TSH with free T4 reflex     Prostate Specific Antigen Screen     Fecal colorectal cancer screen FIT     Vitamin B12     Hemoglobin A1c     Vitamin D Deficiency     Iron and iron binding capacity     Ferritin     Hepatitis C Screen Reflex to HCV RNA Quant and Genotype     Fecal colorectal cancer screen FIT     Comprehensive metabolic panel     Lipid panel reflex to direct LDL Fasting     CBC with platelets     CK total     UA reflex to Microscopic and Culture     Albumin Random Urine Quantitative with Creat Ratio     TSH with free T4 reflex     Prostate Specific Antigen Screen     Vitamin B12     Hemoglobin A1c     Vitamin D Deficiency     Iron and iron binding capacity     Ferritin     Hepatitis C Screen Reflex to HCV RNA Quant and Genotype     Urine Microscopic     Hepatitis C RNA, Quantitative by PCR with Confirmatory Reflex to Genotyping   2. History of CVA (cerebrovascular accident)  Z86.73 Comprehensive metabolic panel     Lipid panel reflex to direct LDL Fasting     UA reflex to Microscopic and Culture     Albumin Random Urine Quantitative with Creat Ratio     Hemoglobin A1c     OFFICE/OUTPT VISIT,EST,LEVL IV     Comprehensive metabolic panel     Lipid panel reflex to direct LDL Fasting     UA reflex to Microscopic and Culture     Albumin Random Urine Quantitative with Creat Ratio     Hemoglobin A1c     Urine Microscopic   3. Permanent atrial fibrillation (H)  I48.21 Comprehensive metabolic panel     TSH with free T4 reflex     apixaban ANTICOAGULANT (ELIQUIS) 5 MG tablet     diltiazem ER COATED BEADS (CARDIZEM CD) 180 MG 24 hr capsule     metoprolol succinate ER (TOPROL XL) 50 MG 24 hr tablet     OFFICE/OUTPT VISIT,EST,LEVL IV     Comprehensive metabolic panel     TSH with free T4 reflex   4. NSVT (nonsustained ventricular tachycardia) (H)  I47.2 apixaban  ANTICOAGULANT (ELIQUIS) 5 MG tablet     diltiazem ER COATED BEADS (CARDIZEM CD) 180 MG 24 hr capsule     metoprolol succinate ER (TOPROL XL) 50 MG 24 hr tablet     OFFICE/OUTPT VISIT,EST,LEVL IV   5. Obstructive sleep apnea syndrome  G47.33 Comprehensive metabolic panel     TSH with free T4 reflex     Iron and iron binding capacity     Ferritin     OFFICE/OUTPT VISIT,EST,LEVL IV     Comprehensive metabolic panel     TSH with free T4 reflex     Iron and iron binding capacity     Ferritin   6. History of hepatitis C  Z86.19 Comprehensive metabolic panel     Hepatitis C Screen Reflex to HCV RNA Quant and Genotype     OFFICE/OUTPT VISIT,EST,LEVL IV     Comprehensive metabolic panel     Hepatitis C Screen Reflex to HCV RNA Quant and Genotype     Hepatitis C RNA, Quantitative by PCR with Confirmatory Reflex to Genotyping   7. NAFLD (nonalcoholic fatty liver disease)  K76.0 Comprehensive metabolic panel     OFFICE/OUTPT VISIT,EST,LEVL IV     Comprehensive metabolic panel   8. Chronic LLQ pain  R10.32 Comprehensive metabolic panel    G89.29 OFFICE/OUTPT VISIT,EST,LEVL IV     Comprehensive metabolic panel   9. Prediabetes  R73.03 Comprehensive metabolic panel     Lipid panel reflex to direct LDL Fasting     UA reflex to Microscopic and Culture     Albumin Random Urine Quantitative with Creat Ratio     Hemoglobin A1c     losartan (COZAAR) 50 MG tablet     OFFICE/OUTPT VISIT,EST,LEVL IV     Comprehensive metabolic panel     Lipid panel reflex to direct LDL Fasting     UA reflex to Microscopic and Culture     Albumin Random Urine Quantitative with Creat Ratio     Hemoglobin A1c     Urine Microscopic   10. Hypertension goal BP (blood pressure) < 140/90  I10 Comprehensive metabolic panel     UA reflex to Microscopic and Culture     Albumin Random Urine Quantitative with Creat Ratio     losartan (COZAAR) 50 MG tablet     OFFICE/OUTPT VISIT,EST,LEVL IV     Comprehensive metabolic panel     UA reflex to Microscopic and Culture      Albumin Random Urine Quantitative with Creat Ratio     Urine Microscopic   11. Hyperlipidemia LDL goal <70  E78.5 Comprehensive metabolic panel     Lipid panel reflex to direct LDL Fasting     CK total     OFFICE/OUTPT VISIT,EST,LEVL IV     Comprehensive metabolic panel     Lipid panel reflex to direct LDL Fasting     CK total   12. Iron deficiency anemia, unspecified iron deficiency anemia type  D50.9 Comprehensive metabolic panel     CBC with platelets     Iron and iron binding capacity     Ferritin     OFFICE/OUTPT VISIT,EST,LEVL IV     Comprehensive metabolic panel     CBC with platelets     Iron and iron binding capacity     Ferritin   13. Vitamin B12 deficiency (non anemic)  E53.8 Comprehensive metabolic panel     CBC with platelets     Vitamin B12     OFFICE/OUTPT VISIT,EST,LEVL IV     Comprehensive metabolic panel     CBC with platelets     Vitamin B12   14. Vitamin D deficiency  E55.9 Comprehensive metabolic panel     CBC with platelets     Vitamin D Deficiency     OFFICE/OUTPT VISIT,EST,LEVL IV     Comprehensive metabolic panel     CBC with platelets     Vitamin D Deficiency   15. Primary osteoarthritis involving multiple joints  M89.49 OFFICE/OUTPT VISIT,EST,LEVL IV   16. Chronic pain of both shoulders  M25.511 OFFICE/OUTPT VISIT,EST,LEVL IV    G89.29     M25.512    17. Nephrolithiasis  N20.0 UA reflex to Microscopic and Culture     OFFICE/OUTPT VISIT,EST,LEVL IV     UA reflex to Microscopic and Culture     Urine Microscopic   18. Gastroesophageal reflux disease with esophagitis without hemorrhage  K21.00 pantoprazole (PROTONIX) 40 MG EC tablet     OFFICE/OUTPT VISIT,EST,LEVL IV   19. History of gastric bypass  Z98.84 Comprehensive metabolic panel     Lipid panel reflex to direct LDL Fasting     UA reflex to Microscopic and Culture     Albumin Random Urine Quantitative with Creat Ratio     Vitamin B12     Hemoglobin A1c     Vitamin D Deficiency     Iron and iron binding capacity     Ferritin      pantoprazole (PROTONIX) 40 MG EC tablet     OFFICE/OUTPT VISIT,EST,LEVL IV     Comprehensive metabolic panel     Lipid panel reflex to direct LDL Fasting     UA reflex to Microscopic and Culture     Albumin Random Urine Quantitative with Creat Ratio     Vitamin B12     Hemoglobin A1c     Vitamin D Deficiency     Iron and iron binding capacity     Ferritin     Urine Microscopic   20. Morbid obesity due to excess calories (H)  E66.01 TSH with free T4 reflex     OFFICE/OUTPT VISIT,EST,LEVL IV     TSH with free T4 reflex   21. Polyp of colon, unspecified part of colon, unspecified type  K63.5 Fecal colorectal cancer screen FIT     OFFICE/OUTPT VISIT,EST,LEVL IV     Fecal colorectal cancer screen FIT   22. Benign prostatic hyperplasia (BPH) with urinary urge incontinence  N40.1 tamsulosin (FLOMAX) 0.4 MG capsule    N39.41 OFFICE/OUTPT VISIT,EST,LEVL IV   23. Screen for colon cancer  Z12.11 Fecal colorectal cancer screen FIT     Fecal colorectal cancer screen FIT   24. Screening for prostate cancer  Z12.5 Prostate Specific Antigen Screen     Prostate Specific Antigen Screen   25. Medication monitoring encounter  Z51.81 Comprehensive metabolic panel     Lipid panel reflex to direct LDL Fasting     CBC with platelets     CK total     UA reflex to Microscopic and Culture     Albumin Random Urine Quantitative with Creat Ratio     TSH with free T4 reflex     Prostate Specific Antigen Screen     Fecal colorectal cancer screen FIT     Vitamin B12     Hemoglobin A1c     Vitamin D Deficiency     Iron and iron binding capacity     Ferritin     Hepatitis C Screen Reflex to HCV RNA Quant and Genotype     Fecal colorectal cancer screen FIT     Comprehensive metabolic panel     Lipid panel reflex to direct LDL Fasting     CBC with platelets     CK total     UA reflex to Microscopic and Culture     Albumin Random Urine Quantitative with Creat Ratio     TSH with free T4 reflex     Prostate Specific Antigen Screen     Vitamin B12      Hemoglobin A1c     Vitamin D Deficiency     Iron and iron binding capacity     Ferritin     Hepatitis C Screen Reflex to HCV RNA Quant and Genotype     Urine Microscopic     Hepatitis C RNA, Quantitative by PCR with Confirmatory Reflex to Genotyping       PLAN    Discussed treatment/modality options, including risk and benefits, he desires:    advised alcohol consumption 1oz per day or less, advised aspirin 81 mg po daily, advised 1 multivitamin per day, advised calcium 3419-0695 mg/d and Vitamin D 800-1200 IU/d, advised dentist every 6 months, advised diet, exercise, and weight loss, advised opthalmologist every 1-2 years, advised self testicular exam q month, further health care maintenance, further lab(s), immunization(s), medication refill(s) and observation    Discussed controversies surrounding PSA. Specifically reviewed 2017 USPSTF findings recommending discussion of PSA testing for men ages 55-69.  Reviewed findings of the  Randomized Study of Screening for Prostate Cancer which showed a 30% reduction in advanced stage prostate cancer and a 20% reduction in death rate from prostate cancer in this age group. PSA-based screening may prevent up to 2 deaths and up to 3 cases of metastatic disease per 1,000 men screened over 13 years.    We've elected to do PSA this year after discussing these controversies.    All diagnosis above reviewed and noted above, otherwise stable.      See [employer] orders for further details.      1) med refills    2) labs pending    3) immunizations reviewed    4) cardiology, urology, neurology, pain, ortho    Return in about 1 year (around 8/16/2023) for Complete Physical, Medication Recheck Visit, Follow Up Chronic.    There are no preventive care reminders to display for this patient.    COUNSELING    Reviewed preventive health counseling, as reflected in patient instructions    BP Readings from Last 1 Encounters:   08/16/22 124/80     Estimated body mass index is 48.38  "kg/m  as calculated from the following:    Height as of this encounter: 1.791 m (5' 10.5\").    Weight as of this encounter: 155.1 kg (342 lb).      Weight management plan: diet and exercise     reports that he has never smoked. He has never used smokeless tobacco.      Counseling Resources:    ATP IV Guidelines  Pooled Cohorts Equation Calculator  FRAX Risk Assessment  ICSI Preventive Guidelines  Dietary Guidelines for Americans, 2010  USDA's MyPlate  ASA Prophylaxis  Lung CA Screening           Brett Cassidy MD, FAAFP     [facility]  [address] [place] [id number]  [email or web address]   Office: [phone]  Fax: [phone]  Pager: [phone]     Identified Health Risks:  "

## 2022-08-17 LAB
CREAT UR-MCNC: 100 MG/DL
MICROALBUMIN UR-MCNC: 34 MG/L
MICROALBUMIN/CREAT UR: 34 MG/G CR (ref 0–17)

## 2022-08-18 LAB — HCV RNA SERPL NAA+PROBE-ACNC: NOT DETECTED IU/ML

## 2022-08-23 ENCOUNTER — TRANSFERRED RECORDS (OUTPATIENT)
Dept: HEALTH INFORMATION MANAGEMENT | Facility: CLINIC | Age: 77
End: 2022-08-23

## 2022-08-25 DIAGNOSIS — D50.9 IRON DEFICIENCY ANEMIA, UNSPECIFIED IRON DEFICIENCY ANEMIA TYPE: Primary | ICD-10-CM

## 2022-10-10 ENCOUNTER — HEALTH MAINTENANCE LETTER (OUTPATIENT)
Age: 77
End: 2022-10-10

## 2022-11-16 ENCOUNTER — TELEPHONE (OUTPATIENT)
Dept: CARDIOLOGY | Facility: CLINIC | Age: 77
End: 2022-11-16

## 2022-11-16 DIAGNOSIS — I48.0 PAROXYSMAL ATRIAL FIBRILLATION (H): Primary | ICD-10-CM

## 2022-11-16 NOTE — TELEPHONE ENCOUNTER
11/16/22 Msg recd from Dr Anna Palomino to withhold diltiazem for now.   May consider a 24 hr Holter next week.     Attempted to call pt but reached SASHA AMADOR and requested callback. Betito 305 pm

## 2022-11-16 NOTE — TELEPHONE ENCOUNTER
11/16/22 Msg recd from Dr Delgadillo  Call from pt tonight:  pt dizzy with transient drop in HR to 40's.  Asked pt to stop diltiazem.  Pls follow up.      Spoke w pt who stated he was at home, sitting in chair , talking on the phone w his daughter when he got extremely dizzy. His watch alerted him hi HR was < 40 bpm. This resolved on its own after a few seconds.    He noted this also happened a few days prior as well. He was not able to check BP at the time.    He has been holding his Diltiazem as directed by Dr Delgadillo last night     Routing to Dr Castillo and will call pt back w recommendations.  Pt voiced understanding and agreement with plan.   Betito 120 pm

## 2022-11-17 NOTE — TELEPHONE ENCOUNTER
11/17/22 Spoke w pt and explained recommendations . He is in agreement . Orders placed, pt requested a call from scheduling later today. Message sent to them  Betito 1120 am

## 2022-11-29 ENCOUNTER — HOSPITAL ENCOUNTER (OUTPATIENT)
Dept: CARDIOLOGY | Facility: CLINIC | Age: 77
Discharge: HOME | End: 2022-11-29
Attending: INTERNAL MEDICINE | Admitting: INTERNAL MEDICINE
Payer: COMMERCIAL

## 2022-11-29 DIAGNOSIS — I48.0 PAROXYSMAL ATRIAL FIBRILLATION (H): ICD-10-CM

## 2022-11-29 PROCEDURE — 93227 XTRNL ECG REC<48 HR R&I: CPT | Performed by: INTERNAL MEDICINE

## 2022-11-29 PROCEDURE — 93226 XTRNL ECG REC<48 HR SCAN A/R: CPT

## 2022-12-05 ENCOUNTER — VIRTUAL VISIT (OUTPATIENT)
Dept: FAMILY MEDICINE | Facility: CLINIC | Age: 77
End: 2022-12-05
Payer: COMMERCIAL

## 2022-12-05 ENCOUNTER — LAB (OUTPATIENT)
Dept: LAB | Facility: CLINIC | Age: 77
End: 2022-12-05
Payer: COMMERCIAL

## 2022-12-05 DIAGNOSIS — R39.89 SUSPECTED UTI: ICD-10-CM

## 2022-12-05 DIAGNOSIS — R39.9 URINARY SYMPTOM OR SIGN: Primary | ICD-10-CM

## 2022-12-05 LAB
ALBUMIN UR-MCNC: ABNORMAL MG/DL
APPEARANCE UR: CLEAR
BACTERIA #/AREA URNS HPF: ABNORMAL /HPF
BILIRUB UR QL STRIP: ABNORMAL
COLOR UR AUTO: YELLOW
GLUCOSE UR STRIP-MCNC: NEGATIVE MG/DL
HGB UR QL STRIP: ABNORMAL
KETONES UR STRIP-MCNC: 40 MG/DL
LEUKOCYTE ESTERASE UR QL STRIP: ABNORMAL
NITRATE UR QL: NEGATIVE
PH UR STRIP: 6 [PH] (ref 5–7)
RBC #/AREA URNS AUTO: ABNORMAL /HPF
SP GR UR STRIP: 1.01 (ref 1–1.03)
SQUAMOUS #/AREA URNS AUTO: ABNORMAL /LPF
UROBILINOGEN UR STRIP-ACNC: >=8 E.U./DL
WBC #/AREA URNS AUTO: ABNORMAL /HPF

## 2022-12-05 PROCEDURE — 99213 OFFICE O/P EST LOW 20 MIN: CPT | Mod: 95 | Performed by: NURSE PRACTITIONER

## 2022-12-05 PROCEDURE — 81001 URINALYSIS AUTO W/SCOPE: CPT

## 2022-12-05 RX ORDER — SULFAMETHOXAZOLE/TRIMETHOPRIM 800-160 MG
1 TABLET ORAL 2 TIMES DAILY
Qty: 14 TABLET | Refills: 0 | Status: SHIPPED | OUTPATIENT
Start: 2022-12-05 | End: 2022-12-12

## 2022-12-05 NOTE — RESULT ENCOUNTER NOTE
Your urine doesn't show infection but shows dehydration. Please push fluids and follow-up with urology in the near future. I would schedule a follow-up with your primary in the next couple weeks if you are able just for a recheck. Let me know if you have any questions   Dequan

## 2022-12-05 NOTE — PROGRESS NOTES
Hugo is a 77 year old who is being evaluated via a billable telephone visit.      What phone number would you like to be contacted at? [phone]  How would you like to obtain your AVS? [facility]    Assessment & Plan   Problem List Items Addressed This Visit    None  Visit Diagnoses     Urinary symptom or sign    -  Primary    Suspected UTI        Relevant Medications    sulfamethoxazole-trimethoprim (BACTRIM DS) 800-160 MG tablet    Other Relevant Orders    UA Macro with Reflex to Micro and Culture - lab collect (Completed)    Adult Urology  Referral           UTI like symptoms. Will get UA and start treatment. Even if urine is negative this could be prostate. Recommend he follow-up with urology considering multiple UTI episodes with neg urine culture.       BIENVENIDO Merrill CNP  [facility]    Malick Arias is a 77 year old, presenting for the following health issues:  UTI (Orders)      HPI     Thursday urinary symptoms  Hard to urinate, dysuria   Some diarrhea   No fevers   No back or abd pain       Review of Systems   Detailed as above         Objective           Vitals:  No vitals were obtained today due to virtual visit.    Physical Exam   healthy, alert and no distress  PSYCH: Alert and oriented times 3; coherent speech, normal   rate and volume, able to articulate logical thoughts, able   to abstract reason, no tangential thoughts, no hallucinations   or delusions  His affect is normal  RESP: No cough, no audible wheezing, able to talk in full sentences  Remainder of exam unable to be completed due to telephone visits            Phone call duration: 6 minutes plus 3 minutes discussing results

## 2022-12-08 ENCOUNTER — TELEPHONE (OUTPATIENT)
Dept: FAMILY MEDICINE | Facility: CLINIC | Age: 77
End: 2022-12-08

## 2022-12-08 NOTE — TELEPHONE ENCOUNTER
Patient is calling because he did a virtual visit and is taking antibiotics for a UTI. This is his third day of medicine and he is feeling no better at all. Please advise.

## 2022-12-08 NOTE — TELEPHONE ENCOUNTER
"Patient had virtual visit with Rosalie ZACARIAS on 12/5.     Started on Bactrim. Started on Monday 12/5.    UA did not show infection. UC not indicated.     Patient having symptoms of frequent urination. Going every 45 -60 and 4 times during the night. Usually goes once at night. Some burning with urination, but has improved this afternoon.   Denies urgency but does get \"leakage\"  Ayush hematuria.   Denies foul-smelling or cloudy urine.   Patient states he \"Can't empty bladder\" - \"sometimes I'll urinate, get up and 5 min later. Don't go very much\".   No fever or chills.    Drinking water.     Video visit with urology on 12/19 - for LUTS    Advised he doesn't need to take abx but can finish if he wants.     Routing to provider to review and advise. Recheck urine?      Yue Valdovinos RN  [place] [facility]     "

## 2022-12-12 ENCOUNTER — TELEPHONE (OUTPATIENT)
Dept: UROLOGY | Facility: CLINIC | Age: 77
End: 2022-12-12

## 2022-12-12 NOTE — TELEPHONE ENCOUNTER
Called waiting for an in person will  delay initial appointment  So he will keep  Appointment as is . After visit Margot will  Ordered a  appropriate   Testing . He mentions  Britt has seen urologist  In  Past/ Maybe  [facility]  He will check as he maybe able to get in sooner there?  Will keep an eye out for sooner appointment  As well.

## 2022-12-12 NOTE — TELEPHONE ENCOUNTER
Complete bactrim  Continue flomax  He is not on diuretics    Limit caffeine/ETOH  Limit fluids after dinner  Urinate before going to bed

## 2022-12-12 NOTE — TELEPHONE ENCOUNTER
Called patient and advised of providers recommendations   advised to keep urology appointment     Chichi ALEXIS RN   [facility] Triage

## 2022-12-15 ENCOUNTER — TELEPHONE (OUTPATIENT)
Dept: PALLIATIVE MEDICINE | Facility: CLINIC | Age: 77
End: 2022-12-15

## 2022-12-15 NOTE — TELEPHONE ENCOUNTER
[facility]    Phone Message    May a detailed message be left on voicemail: yes     Reason for Call: Medication Refill Request    Has the patient contacted the pharmacy for the refill? Yes   Name of medication being requested: gabapentin (NEURONTIN) 300 MG   Provider who prescribed the medication:   Pharmacy: [id number] - [facility] [address]  Date medication is needed: ASAP   Pt has had a medication request in   From 11/30/2022 and has not received it. Pt is now out of his medication. If there are any questions please call Pt back at [phone]    Action Taken: Message routed to:  Other: [facility]    Travel Screening: Not Applicable

## 2022-12-16 NOTE — TELEPHONE ENCOUNTER
[facility] Call [facility]    Phone Message    May a detailed message be left on voicemail: yes     Reason for Call: Other: Patient is calling in regards to his medication refill request. He is completely out of medication and needs to pick them up today. Please call patient once this medication has been sent to the pharmacy. Thank you     Action Taken: Message routed to:  Other: [facility]    Travel Screening: Not Applicable

## 2022-12-19 ENCOUNTER — VIRTUAL VISIT (OUTPATIENT)
Dept: UROLOGY | Facility: CLINIC | Age: 77
End: 2022-12-19
Attending: NURSE PRACTITIONER
Payer: COMMERCIAL

## 2022-12-19 VITALS — HEIGHT: 72 IN | WEIGHT: 315 LBS | BODY MASS INDEX: 42.66 KG/M2

## 2022-12-19 DIAGNOSIS — G89.29 CHRONIC ABDOMINAL PAIN: ICD-10-CM

## 2022-12-19 DIAGNOSIS — N13.8 BPH WITH OBSTRUCTION/LOWER URINARY TRACT SYMPTOMS: ICD-10-CM

## 2022-12-19 DIAGNOSIS — R97.20 ELEVATED PROSTATE SPECIFIC ANTIGEN (PSA): Primary | ICD-10-CM

## 2022-12-19 DIAGNOSIS — R10.9 CHRONIC ABDOMINAL PAIN: ICD-10-CM

## 2022-12-19 DIAGNOSIS — R31.29 MICROSCOPIC HEMATURIA: ICD-10-CM

## 2022-12-19 DIAGNOSIS — N40.1 BPH WITH OBSTRUCTION/LOWER URINARY TRACT SYMPTOMS: ICD-10-CM

## 2022-12-19 DIAGNOSIS — M54.14 THORACIC RADICULOPATHY: ICD-10-CM

## 2022-12-19 PROCEDURE — 99214 OFFICE O/P EST MOD 30 MIN: CPT | Mod: 95 | Performed by: PHYSICIAN ASSISTANT

## 2022-12-19 RX ORDER — GABAPENTIN 300 MG/1
600 CAPSULE ORAL 3 TIMES DAILY
Qty: 180 CAPSULE | Refills: 0 | Status: SHIPPED | OUTPATIENT
Start: 2022-12-19 | End: 2023-01-31

## 2022-12-19 ASSESSMENT — PAIN SCALES - GENERAL: PAINLEVEL: MILD PAIN (2)

## 2022-12-19 NOTE — TELEPHONE ENCOUNTER
RN spoke with patient regarding medication refill. Writer confirmed with pharmacy, the last refill was sent to patient on 8/4/22 for a 30 day supply. Patient reports he has only been out of medication for 3 weeks. Patient reports taking Gabapentin 600 mg TID. Writer informed patient Breana Brian is no longer with our clinic, but he could establish care with BIENVENIDO Hoyos CNP. Patient agreed, but expressed concern being out of medication from now until appointment. Writer informed patient a message would be routed to provider to see if she would be willing to bridge the prescription to get him to his next appointment. Writer scheduled an appointment for the patient to [facility] on 1/27/23. Patient understood and agreed.    Maame Herron RNCC

## 2022-12-19 NOTE — LETTER
[facility]           [address] [place] [address]  [phone]  December 27, 2022    Hugo Weber  [address]   [facility] [address]    Dear Hugo,    We received a refill request from your pharmacy for your medication(s).  At this time the nurses were able to give you a simon refill, but you are due to be seen for a medication review visit before the next refill.  This appointment can be scheduled by calling [phone] or can be scheduled via [employer] as well.    Have a great day!      Best Regards,        Brett Cassidy M.D.

## 2022-12-19 NOTE — LETTER
12/19/2022       RE: Hugo Weber  [address]  [place] [address]     Dear Colleague,    Thank you for referring your patient, Hugo Weber, to the [facility]. Please see a copy of my visit note below.    Send link to cell phone    Pt states he has some LLQ abd pain for years    Hugo is a 77 year old who is being evaluated via a billable video visit.      How would you like to obtain your AVS? MyChart  If the video visit is dropped, the invitation should be resent by: Text to cell phone: [phone]  Will anyone else be joining your video visit? No        Video-Visit Details    Video Start Time: 11:08 AM    Type of service:  Video Visit    Video End Time:11:33 AM    Originating Location (pt. Location): Home        Distant Location (provider location):  On-site    Platform used for Video Visit: "[employer]    CC: LUTS, micro hematuria, elevated PSA    HPI:  Hugo Weber is a pleasant 77 year old male who presents in consultation from Leslie Calix CNP for evaluation of the above.     Intermittent UTI symptoms (burning, leakage en route to bathroom, freq; UCs neg) for the past several months. Nocturia x 1-2. Slower stream, dribbling, urgency at baseline.     UAs intermittently with micro hematuria (up to 100 RBCs/HPF; on Eliquis). No gross hematuria.     Has a hx of stones.   Has required URS with [facility] (last in 2019).  Has seen Anyi Duffy, Ivana and Nestor in the past. Recalls having a prostate bx many years ago.     PSA elevated at 8.7 8/22/22.     Past Medical History:   Diagnosis Date     Atrial fibrillation 2006    Followed by [facility] - persistent     Calculus of kidney 2005, 6/06, 10/12, 8/18, 9/19    dr walker/nestor/иван - hematuria - w/u o/w neg     Cervical stenosis of spine     Moderate C4-5     Cholelithiasis      Chronic peptic ulcer, unspecified site, without mention of  hemorrhage, perforation, or obstruction 1985    gastric bypass     Colon polyps 8/05, 9/10, 10/15    2 tubular adenoma, 1 hyperplastic - multiple serrated/tubular adenomas - last colonscopy 11/20 due 5 yrs     CVA (cerebral vascular accident) (H) 01/2019    small right periorlandic subcortical - left sided residual - left hand tingling/numbness - Left leg weak/numbness - cardioembolic     Hepatitis C 10/2012    chronic hepatitis C, grade 1-2, stage 1 - mild - Dr Douglas     Hyperlipidemia LDL goal <70      Hypertension goal BP (blood pressure) < 140/90 06/2006    dr jaciel winchester     Iron deficiency anemia, unspecified 1985    gastric bypass     Nephrolithiasis multiple episodes, last 10/19    Dr Bey/Ivana - 9mm 3/2021     OA (osteoarthritis)     Multiple - Left shoulder with rotator cuff tear - Dr Durham     Obesity, unspecified     s/p gastric bypass 1985      Obstructive sleep apnea syndrome      Prediabetes      Presbyacusis 01/2004    dr corona     Pyelonephritis, unspecified 12/1999     SCC (squamous cell carcinoma), 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    dr saez - lt conchal bowl     Sleep apnea 10/2002    cpap - severe - 15 cm - dr cunha     Stroke (H) 01/19/2019     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    [facility]     Vitamin B12 deficiency (non anemic) 04/15/2019     Vitamin D deficiency 04/15/2019       Past Surgical History:   Procedure Laterality Date     APPENDECTOMY       APPENDECTOMY       ARTHROPLASTY  08/13/2012    knee     ARTHROPLASTY KNEE  08/13/2012    LT TKA - Dr Villanueva     CARDIOVERSION  2016     CARDIOVERSION       COLONOSCOPY  8/05, 9/10, 10/15    multiple tubular/serrated/hyperplastic polyps     COLONOSCOPY N/A 10/29/2015    multiple tubular and serrated adenomas     COLONOSCOPY N/A 09/07/2016     COLONOSCOPY  11/2020    tubular adenomas - due 5 yrs     COLONOSCOPY N/A 11/24/2020    Procedure: COLONOSCOPY with biopsies;  Surgeon: Chadwick Burgos MD;  Location: [facility] OR     CYSTOSCOPY W/ LASER LITHOTRIPSY   10/16/2012,5/2/2018     ESOPHAGOSCOPY, GASTROSCOPY, DUODENOSCOPY (EGD), COMBINED N/A 11/24/2020    Procedure: ESOPHAGOGASTRODUODENOSCOPY, COLONOSCOPY with biopsies;  Surgeon: Chadwick Burgos MD;  Location: [facility] OR     EYE SURGERY  Lasik     EYE SURGERY  1/01,6/02,11/02    lasik     GASTRIC BYPASS  1985     HC KNEE SCOPE, DIAGNOSTIC  05/2000    Arthroscopy, Knee RT     LASER HOLMIUM LITHOTRIPSY URETER(S), INSERT STENT, COMBINED  10/16/2012    stones x 4, Dr Campa     LASER HOLMIUM LITHOTRIPSY URETER(S), INSERT STENT, COMBINED Right 05/02/2018    CYSTOSCOPY, RIGHT URETEROSCOPY, HOLMIUM LASER LITHOTRIPSY, RIGHT STENT PLACEMENT - Dr Bey     OTHER SURGICAL HISTORY  1979    cellulitis     OTHER SURGICAL HISTORY Bilateral 11/1998 ,11/99    forearm spur     OTHER SURGICAL HISTORY  07/2006    lihoripsy     OTHER SURGICAL HISTORY  10/08, 12/08    lt ear choncal lesion removal scc     REPLACEMENT TOTAL KNEE  08/13/2012     SURGICAL HISTORY OF -   1985    s/p gastric bypass Bilroth II     SURGICAL HISTORY OF -   11/1998    wisdom teeth     SURGICAL HISTORY OF -   1979    cellulitis     SURGICAL HISTORY OF -   11/1998    lt forearm spur's     SURGICAL HISTORY OF -   1/01,6/02,11/02    s/p lasik     SURGICAL HISTORY OF -   11/1999    rt forearm spurs     SURGICAL HISTORY OF -   07/2006    dr stevenson - lithotrypsy     SURGICAL HISTORY OF -   10/08, 12/08    Lt ear chonchal lesion removal SCC - dr saez     SURGICAL HISTORY OF -  Right 10/2019    nephrolithiasis - cystoscopy, rt lithotrypsy, Dr Heath     SURGICAL HISTORY OF -  Right 11/2019    Cystoscopy, Rt lithotrypsy, Calcium Oxalate, Dr Heath     WISDOM TOOTH EXTRACTION  11/1998       Social History     Socioeconomic History     Marital status:      Spouse name: Mayra     Number of children: 3     Years of education: 21     Highest education level: Not on file   Occupational History     Occupation: retired teacher and football and        "Employer: [employer]     Employer: RETIRED   Tobacco Use     Smoking status: Never     Smokeless tobacco: Never   Vaping Use     Vaping Use: Never used   Substance and Sexual Activity     Alcohol use: Yes     Alcohol/week: 2.0 - 3.0 standard drinks     Comment: 2-3 per week     Drug use: No     Comment: acknowledges using herbal supplement \"Vibe\" for energy-none used recently      Sexual activity: Not Currently     Partners: Male     Birth control/protection: None     Comment:     Other Topics Concern      Service Not Asked     Blood Transfusions Not Asked     Caffeine Concern Yes     Comment: <1 can qd     Occupational Exposure Not Asked     Hobby Hazards Not Asked     Sleep Concern Yes     Comment: sleep apnea, wears cpap     Stress Concern Not Asked     Weight Concern Not Asked     Special Diet Not Asked     Back Care Not Asked     Exercise No     Bike Helmet Not Asked     Seat Belt Yes     Self-Exams Not Asked     Parent/sibling w/ CABG, MI or angioplasty before 65F 55M? No   Social History Narrative     Not on file     Social Determinants of Health     Financial Resource Strain: Not on file   Food Insecurity: Not on file   Transportation Needs: Not on file   Physical Activity: Not on file   Stress: Not on file   Social Connections: Not on file   Intimate Partner Violence: Not on file   Housing Stability: Not on file       Family History   Problem Relation Age of Onset     Alzheimer Disease Father 82         at 88     Prostate Cancer Father 76        bladder and prostate     Heart Disease Mother 80        CHF     Heart Disease Maternal Grandfather         MI at 55     Cancer Paternal Uncle         ?     Ulcerative Colitis No family hx of      Crohn's Disease No family hx of      Stomach Cancer No family hx of      GERD No family hx of        No Known Allergies    Current Outpatient Medications   Medication     apixaban ANTICOAGULANT (ELIQUIS) 5 MG tablet     betamethasone " dipropionate (DIPROSONE) 0.05 % external ointment     cholecalciferol 25 MCG (1000 UT) TABS     Cyanocobalamin 5000 MCG TBDP     diltiazem ER COATED BEADS (CARDIZEM CD) 180 MG 24 hr capsule     Ferrous Sulfate (IRON) 325 (65 Fe) MG tablet     fluconazole (DIFLUCAN) 150 MG tablet     fluticasone (FLONASE) 50 MCG/ACT nasal spray     gabapentin (NEURONTIN) 300 MG capsule     losartan (COZAAR) 50 MG tablet     metoprolol succinate ER (TOPROL XL) 50 MG 24 hr tablet     niacin 500 MG tablet     pantoprazole (PROTONIX) 40 MG EC tablet     tamsulosin (FLOMAX) 0.4 MG capsule     vitamin B-12 (CYANOCOBALAMIN) 1000 MCG tablet     vitamin C (ASCORBIC ACID) 1000 MG TABS     Vitamin D-Vitamin K (K2 PLUS D3) 100-1000 MCG-UNIT TABS     No current facility-administered medications for this visit.         PEx:   Height 1.829 m (6'), weight 142.9 kg (315 lb).    PSYCH: NAD  EYES: EOMI  MOUTH: MMM  NEURO: AAO x3    A/P: Hugo Weber is a 77 year old male with high risk micro hematuria, elevated PSA, BPH w/ significant LUTS  -CT urogram  -Cysto  -Urine cytology  -T3 MRI prostate  -Reviewed plan above with MD. Will arrange.    CHICO [facility]        33 minutes spent on the date of the encounter doing chart review, review of outside records, review of test results, interpretation of tests, patient visit and documentation

## 2022-12-19 NOTE — PATIENT INSTRUCTIONS
- Urine cytology to look for abnormal cells. Please make an appointment at your local [place] lab to leave a urine sample.  - CT scan of the kidneys.   - Cystoscopy with the  urologist to evaluate the interior of the bladder. Follow up as recommended by the urologist.    CYSTOSCOPY    What is a Cystoscopy?  This is a procedure done to check for problems inside the bladder.  Problems may include polyps (growths), tumors, inflammation (swelling and redness) and other concerns.    The Urologist inserts a thin tube (called a cystoscope) into the bladder.  The tube is about the size of a pencil.  We will give you numbing medicine to reduce the pain or discomfort you may feel.    The Urologist will be able to see inside the bladder by filling the bladder with water.  The water makes it easier to see any problems that may be present. You will have a sense to need to urinate and this is normal.       How should I get ready for the exam?  Nothing to do to prepare. You may eat normally the day of the exam. There is no sedation, so you may drive yourself to and from if you can drive.       Please tell your doctor if:  You have a history of urinary tract infections.  You know that you have a tumor in your bladder.  You have bleeding problems.  You have any allergies.  You are or may be pregnant.      What happens after the exam?  You may go back to your normal diet and activity as you feel ready.    For the next two days after the exam, you may notice:  Some blood in your urine.  Some burning when you urinate (use the toilet).  An urge to urinate more often.  Bladder spasms.    These are normal after the procedure. They should go away on their own after a day or two.      You can help to relieve the above listed symptoms by:  Drinking 6 to 8 large glasses of water each day (includes drinks at meals).  This will help clear the urine.  Take warm baths to relieve pain and bladder spasms.  Do not add anything to the bath  water.  You may take Tylenol (acetaminophen) per label instructions for discomfort.

## 2022-12-19 NOTE — TELEPHONE ENCOUNTER
Pt calling stating he needs a refill of his gabapentin that was prescribed by pain clinic. He spoke with the pain clinic and was instructed that he need to call PCP for a refill as the provider (Snehal) is no longer with the group. Pt is scheduled with a new provider but they will not provide a refill to get him to the appt.    Notes are in Epic for provider to review.    Verified dosing with patient.    Pt has been out for several days.    Please advise    Julia COVINGTON RN, BSN

## 2022-12-19 NOTE — PROGRESS NOTES
Send link to cell phone    Pt states he has some LLQ abd pain for years    Hugo is a 77 year old who is being evaluated via a billable video visit.      How would you like to obtain your AVS? MyChart  If the video visit is dropped, the invitation should be resent by: Text to cell phone: [phone]  Will anyone else be joining your video visit? No        Video-Visit Details    Video Start Time: 11:08 AM    Type of service:  Video Visit    Video End Time:11:33 AM    Originating Location (pt. Location): Home        Distant Location (provider location):  On-site    Platform used for Video Visit: [employer]    CC: LUTS, micro hematuria, elevated PSA    HPI:  Hugo Weber is a pleasant 77 year old male who presents in consultation from Leslie Calix CNP for evaluation of the above.     Intermittent UTI symptoms (burning, leakage en route to bathroom, freq; UCs neg) for the past several months. Nocturia x 1-2. Slower stream, dribbling, urgency at baseline.     UAs intermittently with micro hematuria (up to 100 RBCs/HPF; on Eliquis). No gross hematuria.     Has a hx of stones.   Has required URS with HLL (last in 2019).  Has seen Anyi Duffy, Ivana and Nestor in the past. Recalls having a prostate bx many years ago.     PSA elevated at 8.7 8/22/22.     Past Medical History:   Diagnosis Date     Atrial fibrillation 2006    Followed by [facility] - persistent     Calculus of kidney 2005, 6/06, 10/12, 8/18, 9/19    dr walker/nestor/иван - hematuria - w/u o/w neg     Cervical stenosis of spine     Moderate C4-5     Cholelithiasis      Chronic peptic ulcer, unspecified site, without mention of hemorrhage, perforation, or obstruction 1985    gastric bypass     Colon polyps 8/05, 9/10, 10/15    2 tubular adenoma, 1 hyperplastic - multiple serrated/tubular adenomas - last colonscopy 11/20 due 5 yrs     CVA (cerebral vascular accident) (H) 01/2019    small right periorlandic subcortical - left sided  residual - left hand tingling/numbness - Left leg weak/numbness - cardioembolic     Hepatitis C 10/2012    chronic hepatitis C, grade 1-2, stage 1 - mild - Dr Douglas     Hyperlipidemia LDL goal <70      Hypertension goal BP (blood pressure) < 140/90 06/2006    dr jaciel winchester     Iron deficiency anemia, unspecified 1985    gastric bypass     Nephrolithiasis multiple episodes, last 10/19    Dr Bey/Ivana - 9mm 3/2021     OA (osteoarthritis)     Multiple - Left shoulder with rotator cuff tear - Dr Durham     Obesity, unspecified     s/p gastric bypass 1985      Obstructive sleep apnea syndrome      Prediabetes      Presbyacusis 01/2004    dr corona     Pyelonephritis, unspecified 12/1999     SCC (squamous cell carcinoma), 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    dr saez - lt conchal bowl     Sleep apnea 10/2002    cpap - severe - 15 cm - dr cunha     Stroke (H) 01/19/2019     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    Mn Head and Neck     Vitamin B12 deficiency (non anemic) 04/15/2019     Vitamin D deficiency 04/15/2019       Past Surgical History:   Procedure Laterality Date     APPENDECTOMY       APPENDECTOMY       ARTHROPLASTY  08/13/2012    knee     ARTHROPLASTY KNEE  08/13/2012    LT TKA - Dr Villanueva     CARDIOVERSION  2016     CARDIOVERSION       COLONOSCOPY  8/05, 9/10, 10/15    multiple tubular/serrated/hyperplastic polyps     COLONOSCOPY N/A 10/29/2015    multiple tubular and serrated adenomas     COLONOSCOPY N/A 09/07/2016     COLONOSCOPY  11/2020    tubular adenomas - due 5 yrs     COLONOSCOPY N/A 11/24/2020    Procedure: COLONOSCOPY with biopsies;  Surgeon: Chadwick Burgos MD;  Location:  OR     CYSTOSCOPY W/ LASER LITHOTRIPSY  10/16/2012,5/2/2018     ESOPHAGOSCOPY, GASTROSCOPY, DUODENOSCOPY (EGD), COMBINED N/A 11/24/2020    Procedure: ESOPHAGOGASTRODUODENOSCOPY, COLONOSCOPY with biopsies;  Surgeon: Chadwick Burgos MD;  Location:  OR     EYE SURGERY  Lasik     EYE SURGERY  1/01,6/02,11/02    lasik     GASTRIC BYPASS  1985       KNEE SCOPE, DIAGNOSTIC  05/2000    Arthroscopy, Knee RT     LASER HOLMIUM LITHOTRIPSY URETER(S), INSERT STENT, COMBINED  10/16/2012    stones x 4, Dr Campa     LASER HOLMIUM LITHOTRIPSY URETER(S), INSERT STENT, COMBINED Right 05/02/2018    CYSTOSCOPY, RIGHT URETEROSCOPY, HOLMIUM LASER LITHOTRIPSY, RIGHT STENT PLACEMENT - Dr Bey     OTHER SURGICAL HISTORY  1979    cellulitis     OTHER SURGICAL HISTORY Bilateral 11/1998 ,11/99    forearm spur     OTHER SURGICAL HISTORY  07/2006    lihoripsy     OTHER SURGICAL HISTORY  10/08, 12/08    lt ear choncal lesion removal scc     REPLACEMENT TOTAL KNEE  08/13/2012     SURGICAL HISTORY OF -   1985    s/p gastric bypass Bilroth II     SURGICAL HISTORY OF -   11/1998    wisdom teeth     SURGICAL HISTORY OF -   1979    cellulitis     SURGICAL HISTORY OF -   11/1998    lt forearm spur's     SURGICAL HISTORY OF -   1/01,6/02,11/02    s/p lasik     SURGICAL HISTORY OF -   11/1999    rt forearm spurs     SURGICAL HISTORY OF -   07/2006    dr stevenson - lithotrypsy     SURGICAL HISTORY OF -   10/08, 12/08    Lt ear chonchal lesion removal SCC - dr saez     SURGICAL HISTORY OF -  Right 10/2019    nephrolithiasis - cystoscopy, rt lithotrypsy, Dr Heath     SURGICAL HISTORY OF -  Right 11/2019    Cystoscopy, Rt lithotrypsy, Calcium Oxalate, Dr Heath     WISDOM TOOTH EXTRACTION  11/1998       Social History     Socioeconomic History     Marital status:      Spouse name: Mayra     Number of children: 3     Years of education: 21     Highest education level: Not on file   Occupational History     Occupation: retired teacher and football and       Employer: [employer]     Employer: RETIRED   Tobacco Use     Smoking status: Never     Smokeless tobacco: Never   Vaping Use     Vaping Use: Never used   Substance and Sexual Activity     Alcohol use: Yes     Alcohol/week: 2.0 - 3.0 standard drinks     Comment: 2-3 per week     Drug use: No      "Comment: acknowledges using herbal supplement \"Vibe\" for energy-none used recently      Sexual activity: Not Currently     Partners: Male     Birth control/protection: None     Comment:     Other Topics Concern      Service Not Asked     Blood Transfusions Not Asked     Caffeine Concern Yes     Comment: <1 can qd     Occupational Exposure Not Asked     Hobby Hazards Not Asked     Sleep Concern Yes     Comment: sleep apnea, wears cpap     Stress Concern Not Asked     Weight Concern Not Asked     Special Diet Not Asked     Back Care Not Asked     Exercise No     Bike Helmet Not Asked     Seat Belt Yes     Self-Exams Not Asked     Parent/sibling w/ CABG, MI or angioplasty before 65F 55M? No   Social History Narrative     Not on file     Social Determinants of Health     Financial Resource Strain: Not on file   Food Insecurity: Not on file   Transportation Needs: Not on file   Physical Activity: Not on file   Stress: Not on file   Social Connections: Not on file   Intimate Partner Violence: Not on file   Housing Stability: Not on file       Family History   Problem Relation Age of Onset     Alzheimer Disease Father 82         at 88     Prostate Cancer Father 76        bladder and prostate     Heart Disease Mother 80        CHF     Heart Disease Maternal Grandfather         MI at 55     Cancer Paternal Uncle         ?     Ulcerative Colitis No family hx of      Crohn's Disease No family hx of      Stomach Cancer No family hx of      GERD No family hx of        No Known Allergies    Current Outpatient Medications   Medication     apixaban ANTICOAGULANT (ELIQUIS) 5 MG tablet     betamethasone dipropionate (DIPROSONE) 0.05 % external ointment     cholecalciferol 25 MCG (1000 UT) TABS     Cyanocobalamin 5000 MCG TBDP     diltiazem ER COATED BEADS (CARDIZEM CD) 180 MG 24 hr capsule     Ferrous Sulfate (IRON) 325 (65 Fe) MG tablet     fluconazole (DIFLUCAN) 150 MG tablet     fluticasone (FLONASE) 50 MCG/ACT " nasal spray     gabapentin (NEURONTIN) 300 MG capsule     losartan (COZAAR) 50 MG tablet     metoprolol succinate ER (TOPROL XL) 50 MG 24 hr tablet     niacin 500 MG tablet     pantoprazole (PROTONIX) 40 MG EC tablet     tamsulosin (FLOMAX) 0.4 MG capsule     vitamin B-12 (CYANOCOBALAMIN) 1000 MCG tablet     vitamin C (ASCORBIC ACID) 1000 MG TABS     Vitamin D-Vitamin K (K2 PLUS D3) 100-1000 MCG-UNIT TABS     No current facility-administered medications for this visit.         PEx:   Height 1.829 m (6'), weight 142.9 kg (315 lb).    PSYCH: NAD  EYES: EOMI  MOUTH: MMM  NEURO: AAO x3    A/P: Hugo Weber is a 77 year old male with high risk micro hematuria, elevated PSA, BPH w/ significant LUTS  -CT urogram  -Cysto  -Urine cytology  -T3 MRI prostate  -Reviewed plan above with MD. Will arrange.    CHICO Thompson [facility]        33 minutes spent on the date of the encounter doing chart review, review of outside records, review of test results, interpretation of tests, patient visit and documentation

## 2022-12-19 NOTE — TELEPHONE ENCOUNTER
RN returned call to patient to update with provider recommendations. Writer informed patient to reach out to PCP to get Gabapentin prescription until he establishes care with BIENVENIDO Hoyos CNP on 1/27/23. Patient understood and agreed.    Maame Herron RNCC

## 2023-01-11 ENCOUNTER — HOSPITAL ENCOUNTER (OUTPATIENT)
Dept: CT IMAGING | Facility: CLINIC | Age: 78
Discharge: HOME | End: 2023-01-11
Attending: PHYSICIAN ASSISTANT | Admitting: PHYSICIAN ASSISTANT
Payer: COMMERCIAL

## 2023-01-11 ENCOUNTER — TELEPHONE (OUTPATIENT)
Dept: ANESTHESIOLOGY | Facility: CLINIC | Age: 78
End: 2023-01-11

## 2023-01-11 DIAGNOSIS — R97.20 ELEVATED PROSTATE SPECIFIC ANTIGEN (PSA): ICD-10-CM

## 2023-01-11 LAB
CREAT BLD-MCNC: 0.5 MG/DL (ref 0.7–1.3)
GFR SERPL CREATININE-BSD FRML MDRD: >60 ML/MIN/1.73M2

## 2023-01-11 PROCEDURE — 82565 ASSAY OF CREATININE: CPT

## 2023-01-11 PROCEDURE — 250N000011 HC RX IP 250 OP 636: Performed by: RADIOLOGY

## 2023-01-11 PROCEDURE — 74178 CT ABD&PLV WO CNTR FLWD CNTR: CPT

## 2023-01-11 PROCEDURE — 258N000003 HC RX IP 258 OP 636: Performed by: RADIOLOGY

## 2023-01-11 RX ORDER — IOPAMIDOL 755 MG/ML
500 INJECTION, SOLUTION INTRAVASCULAR ONCE
Status: COMPLETED | OUTPATIENT
Start: 2023-01-11 | End: 2023-01-11

## 2023-01-11 RX ADMIN — SODIUM CHLORIDE 60 ML: 9 INJECTION, SOLUTION INTRAVENOUS at 10:19

## 2023-01-11 RX ADMIN — IOPAMIDOL 100 ML: 755 INJECTION, SOLUTION INTRAVENOUS at 10:19

## 2023-01-11 NOTE — TELEPHONE ENCOUNTER
Health Call Center    Phone Message    May a detailed message be left on voicemail: yes     Reason for Call: Other: Patient is calling to let us know that he had a CT scan done today. He is requesting that Lynn Arriaga reviews this CT scan as his pain is in the lower abdomen and he would like to discuss the CT results at him next appt or sooner if needed.     Action Taken: Message routed to:  [facility] ([facility]    Travel Screening: Not Applicable

## 2023-01-12 NOTE — TELEPHONE ENCOUNTER
I called the patient and told him as soon as Lynn Arriaga has time she will look at his CT scan and if anything is not normal the nurse or Lynn Arriaga will call also to keep his appointment and at that time he can discuss at his consult visit 1/27 anything else if he any other concerns    Pt was worry about scar tissue from other procedures he had in the past     If he has any other questions feel free to call back at [phone]

## 2023-01-18 ENCOUNTER — TRANSFERRED RECORDS (OUTPATIENT)
Dept: HEALTH INFORMATION MANAGEMENT | Facility: CLINIC | Age: 78
End: 2023-01-18
Payer: COMMERCIAL

## 2023-01-24 ENCOUNTER — MYC MEDICAL ADVICE (OUTPATIENT)
Dept: FAMILY MEDICINE | Facility: CLINIC | Age: 78
End: 2023-01-24
Payer: COMMERCIAL

## 2023-01-24 ENCOUNTER — TRANSFERRED RECORDS (OUTPATIENT)
Dept: HEALTH INFORMATION MANAGEMENT | Facility: CLINIC | Age: 78
End: 2023-01-24
Payer: COMMERCIAL

## 2023-01-24 NOTE — TELEPHONE ENCOUNTER
Patient calls regarding mychart message he received about gabapentin. He accidentally deleted it and would like it sent to him again.     Mychart from 8/9/22 sent to patient.     BARBIE LIZ RN on 1/24/2023 at 10:54 AM   [facility]

## 2023-01-30 ENCOUNTER — ANCILLARY PROCEDURE (OUTPATIENT)
Dept: MRI IMAGING | Facility: CLINIC | Age: 78
End: 2023-01-30
Attending: PHYSICIAN ASSISTANT
Payer: COMMERCIAL

## 2023-01-30 DIAGNOSIS — R97.20 ELEVATED PROSTATE SPECIFIC ANTIGEN (PSA): ICD-10-CM

## 2023-01-30 PROCEDURE — 72197 MRI PELVIS W/O & W/DYE: CPT | Performed by: STUDENT IN AN ORGANIZED HEALTH CARE EDUCATION/TRAINING PROGRAM

## 2023-01-30 PROCEDURE — A9585 GADOBUTROL INJECTION: HCPCS | Performed by: STUDENT IN AN ORGANIZED HEALTH CARE EDUCATION/TRAINING PROGRAM

## 2023-01-30 RX ORDER — GADOBUTROL 604.72 MG/ML
15 INJECTION INTRAVENOUS ONCE
Status: COMPLETED | OUTPATIENT
Start: 2023-01-30 | End: 2023-01-30

## 2023-01-30 RX ADMIN — GADOBUTROL 15 ML: 604.72 INJECTION INTRAVENOUS at 12:55

## 2023-01-31 ENCOUNTER — OFFICE VISIT (OUTPATIENT)
Dept: UROLOGY | Facility: CLINIC | Age: 78
End: 2023-01-31
Payer: COMMERCIAL

## 2023-01-31 VITALS
HEIGHT: 72 IN | HEART RATE: 95 BPM | OXYGEN SATURATION: 98 % | WEIGHT: 308.5 LBS | DIASTOLIC BLOOD PRESSURE: 80 MMHG | SYSTOLIC BLOOD PRESSURE: 120 MMHG | BODY MASS INDEX: 41.78 KG/M2

## 2023-01-31 DIAGNOSIS — R97.20 ELEVATED PROSTATE SPECIFIC ANTIGEN (PSA): ICD-10-CM

## 2023-01-31 DIAGNOSIS — N13.8 BPH WITH OBSTRUCTION/LOWER URINARY TRACT SYMPTOMS: Primary | ICD-10-CM

## 2023-01-31 DIAGNOSIS — N40.1 BPH WITH OBSTRUCTION/LOWER URINARY TRACT SYMPTOMS: Primary | ICD-10-CM

## 2023-01-31 DIAGNOSIS — N20.0 NEPHROLITHIASIS: ICD-10-CM

## 2023-01-31 LAB
ALBUMIN UR-MCNC: NEGATIVE MG/DL
APPEARANCE UR: CLEAR
BILIRUB UR QL STRIP: NEGATIVE
COLOR UR AUTO: YELLOW
GLUCOSE UR STRIP-MCNC: NEGATIVE MG/DL
HGB UR QL STRIP: ABNORMAL
KETONES UR STRIP-MCNC: NEGATIVE MG/DL
LEUKOCYTE ESTERASE UR QL STRIP: ABNORMAL
NITRATE UR QL: NEGATIVE
PH UR STRIP: 6 [PH] (ref 5–7)
SP GR UR STRIP: <=1.005 (ref 1–1.03)
UROBILINOGEN UR STRIP-ACNC: 1 E.U./DL

## 2023-01-31 PROCEDURE — 81003 URINALYSIS AUTO W/O SCOPE: CPT | Mod: QW | Performed by: UROLOGY

## 2023-01-31 PROCEDURE — 88112 CYTOPATH CELL ENHANCE TECH: CPT | Performed by: PATHOLOGY

## 2023-01-31 PROCEDURE — 99214 OFFICE O/P EST MOD 30 MIN: CPT | Performed by: UROLOGY

## 2023-01-31 ASSESSMENT — PAIN SCALES - GENERAL: PAINLEVEL: MODERATE PAIN (4)

## 2023-01-31 NOTE — LETTER
1/31/2023       RE: Hugo Weber  [address]  [place] [address]     Dear Colleague,    Thank you for referring your patient, Hugo Weber, to the [facility] at [facility]. Please see a copy of my visit note below.    Office Visit Note  [facility]  [phone]    UROLOGIC DIAGNOSES:   Microscopic hematuria  Kidney stones  Elevated PSA    CURRENT INTERVENTIONS:       HISTORY:   This is a 77-year-old gentleman with a prior history of kidney stones who has had prior ureteroscopy with laser lithotripsy, most recently in 2019, during which time he had to have a staged ureteroscopy on the right side for a large stone burden with Dr. Heath.  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    He has also had an asymptomatic upper pole left kidney stone that has been present since at least 2015.  In 2018 it measured 8 mm and most recently measured 12 mm.    He was seen recently in the urology clinic for evaluation of microscopic hematuria.  A CT scan was ordered with above results showing the upper pole stone in the left kidney.  The right side is free of stones and no hydronephrosis on the right.  He also had an elevated PSA of 8.7 and MRI of the prostate was ordered.  The MRI the prostate showed an enlarged prostate measuring 55 g.  There is a single PI-RADS 4 lesion on the right side of the prostate.      PAST MEDICAL HISTORY:   Past Medical History:   Diagnosis Date     Atrial fibrillation 2006    Followed by [facility] - persistent     Calculus of kidney 2005, 6/06, 10/12, 8/18, 9/19    dr walker/nestor/иван - hematuria - w/u o/w neg     Cervical stenosis of spine     Moderate C4-5     Cholelithiasis      Chronic peptic ulcer, unspecified site, without mention of hemorrhage, perforation, or obstruction 1985    gastric bypass     Colon polyps 8/05, 9/10, 10/15    2 tubular adenoma, 1  hyperplastic - multiple serrated/tubular adenomas - last colonscopy 11/20 due 5 yrs     CVA (cerebral vascular accident) (H) 01/2019    small right periorlandic subcortical - left sided residual - left hand tingling/numbness - Left leg weak/numbness - cardioembolic     Hepatitis C 10/2012    chronic hepatitis C, grade 1-2, stage 1 - mild - Dr Douglas     Hyperlipidemia LDL goal <70      Hypertension goal BP (blood pressure) < 140/90 06/2006    dr jaciel winchester     Iron deficiency anemia, unspecified 1985    gastric bypass     Nephrolithiasis multiple episodes, last 10/19    Dr Bey/Ivana - 9mm 3/2021     OA (osteoarthritis)     Multiple - Left shoulder with rotator cuff tear - Dr Durham     Obesity, unspecified     s/p gastric bypass 1985      Obstructive sleep apnea syndrome      Prediabetes      Presbyacusis 01/2004    dr corona     Pyelonephritis, unspecified 12/1999     SCC (squamous cell carcinoma), 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    dr saez - lt conchal bowl     Sleep apnea 10/2002    cpap - severe - 15 cm - dr cunha     Stroke (H) 01/19/2019     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    Mn Head and Neck     Vitamin B12 deficiency (non anemic) 04/15/2019     Vitamin D deficiency 04/15/2019       PAST SURGICAL HISTORY:   Past Surgical History:   Procedure Laterality Date     APPENDECTOMY       APPENDECTOMY       ARTHROPLASTY  08/13/2012    knee     ARTHROPLASTY KNEE  08/13/2012    LT TKA - Dr Villanueva     CARDIOVERSION  2016     CARDIOVERSION       COLONOSCOPY  8/05, 9/10, 10/15    multiple tubular/serrated/hyperplastic polyps     COLONOSCOPY N/A 10/29/2015    multiple tubular and serrated adenomas     COLONOSCOPY N/A 09/07/2016     COLONOSCOPY  11/2020    tubular adenomas - due 5 yrs     COLONOSCOPY N/A 11/24/2020    Procedure: COLONOSCOPY with biopsies;  Surgeon: Chadwick Burgos MD;  Location: [facility] OR     CYSTOSCOPY W/ LASER LITHOTRIPSY  10/16/2012,5/2/2018     ESOPHAGOSCOPY, GASTROSCOPY, DUODENOSCOPY (EGD), COMBINED N/A 11/24/2020     Procedure: ESOPHAGOGASTRODUODENOSCOPY, COLONOSCOPY with biopsies;  Surgeon: Chadwick Burgos MD;  Location: [facility] OR     EYE SURGERY  Lasik     EYE SURGERY  ,,    lasik     GASTRIC BYPASS       HC KNEE SCOPE, DIAGNOSTIC  2000    Arthroscopy, Knee RT     LASER HOLMIUM LITHOTRIPSY URETER(S), INSERT STENT, COMBINED  10/16/2012    stones x 4, Dr Campa     LASER HOLMIUM LITHOTRIPSY URETER(S), INSERT STENT, COMBINED Right 2018    CYSTOSCOPY, RIGHT URETEROSCOPY, HOLMIUM LASER LITHOTRIPSY, RIGHT STENT PLACEMENT - Dr Bey     OTHER SURGICAL HISTORY  1979    cellulitis     OTHER SURGICAL HISTORY Bilateral 1998 ,    forearm spur     OTHER SURGICAL HISTORY  2006    lihoripsy     OTHER SURGICAL HISTORY  10/08,     lt ear choncal lesion removal scc     REPLACEMENT TOTAL KNEE  2012     SURGICAL HISTORY OF -       s/p gastric bypass Bilroth II     SURGICAL HISTORY OF -   1998    wisdom teeth     SURGICAL HISTORY OF -       cellulitis     SURGICAL HISTORY OF -   1998    lt forearm spur's     SURGICAL HISTORY OF -   ,,    s/p lasik     SURGICAL HISTORY OF -   1999    rt forearm spurs     SURGICAL HISTORY OF -   2006    dr stevenson - lithotrypsy     SURGICAL HISTORY OF -   10/08,     Lt ear chonchal lesion removal SCC - dr saez     SURGICAL HISTORY OF -  Right 10/2019    nephrolithiasis - cystoscopy, rt lithotrypsy, Dr Heath     SURGICAL HISTORY OF -  Right 2019    Cystoscopy, Rt lithotrypsy, Calcium Oxalate, Dr Heath     WISDOM TOOTH EXTRACTION  1998       FAMILY HISTORY:   Family History   Problem Relation Age of Onset     Alzheimer Disease Father 82         at 88     Prostate Cancer Father 76        bladder and prostate     Heart Disease Mother 80        CHF     Heart Disease Maternal Grandfather         MI at 55     Cancer Paternal Uncle         ?     Ulcerative Colitis No family hx of      Crohn's Disease No family hx of   "    Stomach Cancer No family hx of      GERD No family hx of        SOCIAL HISTORY:   Social History     Socioeconomic History     Marital status:      Spouse name: Mayra     Number of children: 3     Years of education: 21     Highest education level: None   Occupational History     Occupation: retired teacher and football and       Employer: [id number]     Employer: RETIRED   Tobacco Use     Smoking status: Never     Smokeless tobacco: Never   Vaping Use     Vaping Use: Never used   Substance and Sexual Activity     Alcohol use: Yes     Alcohol/week: 2.0 - 3.0 standard drinks     Comment: 2-3 per week     Drug use: No     Comment: acknowledges using herbal supplement \"Vibe\" for energy-none used recently      Sexual activity: Not Currently     Partners: Male     Birth control/protection: None     Comment:     Other Topics Concern     Caffeine Concern Yes     Comment: <1 can qd     Sleep Concern Yes     Comment: sleep apnea, wears cpap     Exercise No     Seat Belt Yes     Parent/sibling w/ CABG, MI or angioplasty before 65F 55M? No       Review Of Systems:  Skin: No rash, pruritis, or skin pigmentation  Eyes: No changes in vision  Ears/Nose/Throat: No changes in hearing, no nosebleeds  Respiratory: No shortness of breath, dyspnea on exertion, cough, or hemoptysis  Cardiovascular: No chest pain or palpitations  Gastrointestinal: No diarrhea or constipation. No abdominal pain. No hematochezia  Genitourinary: see HPI  Musculoskeletal: No pain or swelling of joints, normal range of motion  Neurologic: No weakness or tremors  Psychiatric: No recent changes in memory or mood  Hematologic/Lymphatic/Immunologic: No easy bruising or enlarged lymph nodes  Endocrine: No weight gain or loss      PHYSICAL EXAM:    /80 (BP Location: Right arm, Patient Position: Sitting, Cuff Size: Adult Regular)   Pulse 95   Ht 1.829 m (6')   Wt 139.9 kg (308 lb 8 oz)   SpO2 98%   BMI 41.84 " kg/m      Constitutional: Well developed. Conversant and in no acute distress  Eyes: Anicteric sclera, conjunctiva clear, normal extraocular movements  ENT: Normocephalic and atraumatic,   Skin: Warm and dry. No rashes or lesions  Cardiac: No peripheral edema  Back/Flank: Not done  CNS/PNS: Normal musculature and movements, moves all extremities normally  Respiratory: Normal non-labored breathing  Abdomen: Soft nontender and nondistended  Peripheral Vascular: No peripheral edema  Mental Status/Psych: Alert and Oriented x 3. Normal mood and affect    Penis: Not done  Scrotal Skin: Not done  Testicles: Not done  Epididymis: Not done  Digital Rectal Exam:     Cystoscopy: Not done    Imaging: I personally reviewed his CT scan images.  Stable stone on the left side.  No stones in the right side and no hydronephrosis.    Urinalysis: UA RESULTS:  Recent Labs   Lab Test 12/05/22  1158   COLOR Yellow   APPEARANCE Clear   URINEGLC Negative   URINEBILI Small*   URINEKETONE 40*   SG 1.015   UBLD Trace*   URINEPH 6.0   PROTEIN Trace*   UROBILINOGEN >=8.0*   NITRITE Negative   LEUKEST Small*   RBCU 0-2   WBCU 0-5       PSA: 8.7    Post Void Residual:     Other labs: None today      IMPRESSION:  Elevated PSA  Enlarged prostate  Left kidney stone  Microscopic hematuria    PLAN:  Regarding his microscopic hematuria: His CT scan is benign.  There is a stable left upper pole kidney stone which is likely a cause of some hematuria.  He has an enlarged prostate as well.  He had a cystoscopy in 2019 with Dr. Heath and I do not think we need to repeat a cystoscopy at this time.  Urine sample from today will be sent for cytology in order to complete his hematuria work-up.    Regarding his elevated PSA: We reviewed his MRI images and results.  There is a small PI-RADS 4 lesion located on the right side of the prostate.  I counseled him that this shows there is a likely prostate cancer present.  However, we do not know how aggressive the  cancer would be.  We discussed the controversies around PSA screening beyond age of 75.  We discussed his options, which would be proceeding with a prostate biopsy versus following the PSA closely.  We discussed prostate biopsy today along with its risks, including bleeding and infection.    He has not had a PSA checked since August.  So we will plan to check a PSA in the coming few days.  I will call him with results.  If the PSA continues to rise at his current rate he may consider proceeding with prostate biopsy.  If the PSA stabilizes or goes back down he could consider following the PSA and avoiding biopsy for now.      Casey Santos M.D.

## 2023-01-31 NOTE — PROGRESS NOTES
Office Visit Note  [facility]  [phone]    UROLOGIC DIAGNOSES:   Microscopic hematuria  Kidney stones  Elevated PSA    CURRENT INTERVENTIONS:       HISTORY:   This is a 77-year-old gentleman with a prior history of kidney stones who has had prior ureteroscopy with laser lithotripsy, most recently in 2019, during which time he had to have a staged ureteroscopy on the right side for a large stone burden with Dr. Heath.  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    He has also had an asymptomatic upper pole left kidney stone that has been present since at least 2015.  In 2018 it measured 8 mm and most recently measured 12 mm.    He was seen recently in the urology clinic for evaluation of microscopic hematuria.  A CT scan was ordered with above results showing the upper pole stone in the left kidney.  The right side is free of stones and no hydronephrosis on the right.  He also had an elevated PSA of 8.7 and MRI of the prostate was ordered.  The MRI the prostate showed an enlarged prostate measuring 55 g.  There is a single PI-RADS 4 lesion on the right side of the prostate.      PAST MEDICAL HISTORY:   Past Medical History:   Diagnosis Date     Atrial fibrillation 2006    Followed by [facility] - persistent     Calculus of kidney 2005, 6/06, 10/12, 8/18, 9/19    dr walker/nestor/иван - hematuria - w/u o/w neg     Cervical stenosis of spine     Moderate C4-5     Cholelithiasis      Chronic peptic ulcer, unspecified site, without mention of hemorrhage, perforation, or obstruction 1985    gastric bypass     Colon polyps 8/05, 9/10, 10/15    2 tubular adenoma, 1 hyperplastic - multiple serrated/tubular adenomas - last colonscopy 11/20 due 5 yrs     CVA (cerebral vascular accident) (H) 01/2019    small right periorlandic subcortical - left sided residual - left hand tingling/numbness - Left leg weak/numbness - cardioembolic     Hepatitis C 10/2012    chronic  hepatitis C, grade 1-2, stage 1 - mild - Dr Douglas     Hyperlipidemia LDL goal <70      Hypertension goal BP (blood pressure) < 140/90 06/2006    dr jaciel winchester     Iron deficiency anemia, unspecified 1985    gastric bypass     Nephrolithiasis multiple episodes, last 10/19    Dr Bey/Ivana - 9mm 3/2021     OA (osteoarthritis)     Multiple - Left shoulder with rotator cuff tear - Dr Durham     Obesity, unspecified     s/p gastric bypass 1985      Obstructive sleep apnea syndrome      Prediabetes      Presbyacusis 01/2004    dr corona     Pyelonephritis, unspecified 12/1999     SCC (squamous cell carcinoma), 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    dr saez - lt conchal bowl     Sleep apnea 10/2002    cpap - severe - 15 cm - dr cunha     Stroke (H) 01/19/2019     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    [facility] Ne[facility]     Vitamin B12 deficiency (non anemic) 04/15/2019     Vitamin D deficiency 04/15/2019       PAST SURGICAL HISTORY:   Past Surgical History:   Procedure Laterality Date     APPENDECTOMY       APPENDECTOMY       ARTHROPLASTY  08/13/2012    knee     ARTHROPLASTY KNEE  08/13/2012    LT TKA - Dr Villanueva     CARDIOVERSION  2016     CARDIOVERSION       COLONOSCOPY  8/05, 9/10, 10/15    multiple tubular/serrated/hyperplastic polyps     COLONOSCOPY N/A 10/29/2015    multiple tubular and serrated adenomas     COLONOSCOPY N/A 09/07/2016     COLONOSCOPY  11/2020    tubular adenomas - due 5 yrs     COLONOSCOPY N/A 11/24/2020    Procedure: COLONOSCOPY with biopsies;  Surgeon: Chadwick Burgos MD;  Location:  OR     CYSTOSCOPY W/ LASER LITHOTRIPSY  10/16/2012,5/2/2018     ESOPHAGOSCOPY, GASTROSCOPY, DUODENOSCOPY (EGD), COMBINED N/A 11/24/2020    Procedure: ESOPHAGOGASTRODUODENOSCOPY, COLONOSCOPY with biopsies;  Surgeon: Chadwick Brugos MD;  Location: [facility] OR     EYE SURGERY  Lasik     EYE SURGERY  1/01,6/02,11/02    lasik     GASTRIC BYPASS  1985     HC KNEE SCOPE, DIAGNOSTIC  05/2000    Arthroscopy, Knee RT     LASER HOLMIUM LITHOTRIPSY  URETER(S), INSERT STENT, COMBINED  10/16/2012    stones x 4, Dr Campa     LASER HOLMIUM LITHOTRIPSY URETER(S), INSERT STENT, COMBINED Right 2018    CYSTOSCOPY, RIGHT URETEROSCOPY, HOLMIUM LASER LITHOTRIPSY, RIGHT STENT PLACEMENT - Dr Bey     OTHER SURGICAL HISTORY  1979    cellulitis     OTHER SURGICAL HISTORY Bilateral 1998 ,    forearm spur     OTHER SURGICAL HISTORY  2006    lihoripsy     OTHER SURGICAL HISTORY  10/08,     lt ear choncal lesion removal scc     REPLACEMENT TOTAL KNEE  2012     SURGICAL HISTORY OF -       s/p gastric bypass Bilroth II     SURGICAL HISTORY OF -   1998    wisdom teeth     SURGICAL HISTORY OF -       cellulitis     SURGICAL HISTORY OF -   1998    lt forearm spur's     SURGICAL HISTORY OF -   ,,    s/p lasik     SURGICAL HISTORY OF -   1999    rt forearm spurs     SURGICAL HISTORY OF -   2006    dr stevenson - lithotrypsy     SURGICAL HISTORY OF -   10/08,     Lt ear chonchal lesion removal SCC - dr saez     SURGICAL HISTORY OF -  Right 10/2019    nephrolithiasis - cystoscopy, rt lithotrypsy, Dr Heath     SURGICAL HISTORY OF -  Right 2019    Cystoscopy, Rt lithotrypsy, Calcium Oxalate, Dr Heath     WISDOM TOOTH EXTRACTION  1998       FAMILY HISTORY:   Family History   Problem Relation Age of Onset     Alzheimer Disease Father 82         at 88     Prostate Cancer Father 76        bladder and prostate     Heart Disease Mother 80        CHF     Heart Disease Maternal Grandfather         MI at 55     Cancer Paternal Uncle         ?     Ulcerative Colitis No family hx of      Crohn's Disease No family hx of      Stomach Cancer No family hx of      GERD No family hx of        SOCIAL HISTORY:   Social History     Socioeconomic History     Marital status:      Spouse name: Mayra     Number of children: 3     Years of education: 21     Highest education level: None   Occupational History      "Occupation: retired teacher and football and       Employer: [employer] [address]     Employer: RETIRED   Tobacco Use     Smoking status: Never     Smokeless tobacco: Never   Vaping Use     Vaping Use: Never used   Substance and Sexual Activity     Alcohol use: Yes     Alcohol/week: 2.0 - 3.0 standard drinks     Comment: 2-3 per week     Drug use: No     Comment: acknowledges using herbal supplement \"Vibe\" for energy-none used recently      Sexual activity: Not Currently     Partners: Male     Birth control/protection: None     Comment:     Other Topics Concern     Caffeine Concern Yes     Comment: <1 can qd     Sleep Concern Yes     Comment: sleep apnea, wears cpap     Exercise No     Seat Belt Yes     Parent/sibling w/ CABG, MI or angioplasty before 65F 55M? No       Review Of Systems:  Skin: No rash, pruritis, or skin pigmentation  Eyes: No changes in vision  Ears/Nose/Throat: No changes in hearing, no nosebleeds  Respiratory: No shortness of breath, dyspnea on exertion, cough, or hemoptysis  Cardiovascular: No chest pain or palpitations  Gastrointestinal: No diarrhea or constipation. No abdominal pain. No hematochezia  Genitourinary: see HPI  Musculoskeletal: No pain or swelling of joints, normal range of motion  Neurologic: No weakness or tremors  Psychiatric: No recent changes in memory or mood  Hematologic/Lymphatic/Immunologic: No easy bruising or enlarged lymph nodes  Endocrine: No weight gain or loss      PHYSICAL EXAM:    /80 (BP Location: Right arm, Patient Position: Sitting, Cuff Size: Adult Regular)   Pulse 95   Ht 1.829 m (6')   Wt 139.9 kg (308 lb 8 oz)   SpO2 98%   BMI 41.84 kg/m      Constitutional: Well developed. Conversant and in no acute distress  Eyes: Anicteric sclera, conjunctiva clear, normal extraocular movements  ENT: Normocephalic and atraumatic,   Skin: Warm and dry. No rashes or lesions  Cardiac: No peripheral edema  Back/Flank: Not done  CNS/PNS: " Normal musculature and movements, moves all extremities normally  Respiratory: Normal non-labored breathing  Abdomen: Soft nontender and nondistended  Peripheral Vascular: No peripheral edema  Mental Status/Psych: Alert and Oriented x 3. Normal mood and affect    Penis: Not done  Scrotal Skin: Not done  Testicles: Not done  Epididymis: Not done  Digital Rectal Exam:     Cystoscopy: Not done    Imaging: I personally reviewed his CT scan images.  Stable stone on the left side.  No stones in the right side and no hydronephrosis.    Urinalysis: UA RESULTS:  Recent Labs   Lab Test 12/05/22  1158   COLOR Yellow   APPEARANCE Clear   URINEGLC Negative   URINEBILI Small*   URINEKETONE 40*   SG 1.015   UBLD Trace*   URINEPH 6.0   PROTEIN Trace*   UROBILINOGEN >=8.0*   NITRITE Negative   LEUKEST Small*   RBCU 0-2   WBCU 0-5       PSA: 8.7    Post Void Residual:     Other labs: None today      IMPRESSION:  Elevated PSA  Enlarged prostate  Left kidney stone  Microscopic hematuria    PLAN:  Regarding his microscopic hematuria: His CT scan is benign.  There is a stable left upper pole kidney stone which is likely a cause of some hematuria.  He has an enlarged prostate as well.  He had a cystoscopy in 2019 with Dr. Heath and I do not think we need to repeat a cystoscopy at this time.  Urine sample from today will be sent for cytology in order to complete his hematuria work-up.    Regarding his elevated PSA: We reviewed his MRI images and results.  There is a small PI-RADS 4 lesion located on the right side of the prostate.  I counseled him that this shows there is a likely prostate cancer present.  However, we do not know how aggressive the cancer would be.  We discussed the controversies around PSA screening beyond age of 75.  We discussed his options, which would be proceeding with a prostate biopsy versus following the PSA closely.  We discussed prostate biopsy today along with its risks, including bleeding and  infection.    He has not had a PSA checked since August.  So we will plan to check a PSA in the coming few days.  I will call him with results.  If the PSA continues to rise at his current rate he may consider proceeding with prostate biopsy.  If the PSA stabilizes or goes back down he could consider following the PSA and avoiding biopsy for now.      Casey Santos M.D.

## 2023-01-31 NOTE — NURSING NOTE
Chief Complaint   Patient presents with     Benign Prostatic Hypertrophy     Hematuria     Micro   Patient states he can not empty his bladder.  Jennifer Venegas LPN

## 2023-02-01 LAB
PATH REPORT.COMMENTS IMP SPEC: NORMAL
PATH REPORT.FINAL DX SPEC: NORMAL
PATH REPORT.GROSS SPEC: NORMAL
PATH REPORT.MICROSCOPIC SPEC OTHER STN: NORMAL
PATH REPORT.RELEVANT HX SPEC: NORMAL

## 2023-02-02 ENCOUNTER — TELEPHONE (OUTPATIENT)
Dept: FAMILY MEDICINE | Facility: CLINIC | Age: 78
End: 2023-02-02

## 2023-02-02 ENCOUNTER — TELEPHONE (OUTPATIENT)
Dept: UROLOGY | Facility: CLINIC | Age: 78
End: 2023-02-02

## 2023-02-02 ENCOUNTER — LAB (OUTPATIENT)
Dept: LAB | Facility: CLINIC | Age: 78
End: 2023-02-02
Payer: COMMERCIAL

## 2023-02-02 ENCOUNTER — DOCUMENTATION ONLY (OUTPATIENT)
Dept: LAB | Facility: CLINIC | Age: 78
End: 2023-02-02

## 2023-02-02 DIAGNOSIS — R97.20 ELEVATED PROSTATE SPECIFIC ANTIGEN (PSA): ICD-10-CM

## 2023-02-02 DIAGNOSIS — N13.8 BPH WITH OBSTRUCTION/LOWER URINARY TRACT SYMPTOMS: Primary | ICD-10-CM

## 2023-02-02 DIAGNOSIS — N40.1 BPH WITH OBSTRUCTION/LOWER URINARY TRACT SYMPTOMS: Primary | ICD-10-CM

## 2023-02-02 LAB — PSA SERPL-MCNC: 8.51 NG/ML (ref 0–6.5)

## 2023-02-02 PROCEDURE — 84153 ASSAY OF PSA TOTAL: CPT

## 2023-02-02 PROCEDURE — 36415 COLL VENOUS BLD VENIPUNCTURE: CPT

## 2023-02-02 NOTE — TELEPHONE ENCOUNTER
Patient calling for PSA lab-see written order in 1/31 office note     MRI back today and urology saw a spot on the prostate he wants a psa and a biopsy done     Scheduled lab appointment today @ [address]    Under order review-psa tumor marker order is listed as future    Chichi ALEXIS RN   [facility] Triage

## 2023-02-02 NOTE — TELEPHONE ENCOUNTER
[facility] Call Center    Phone Message    May a detailed message be left on voicemail: yes     Reason for Call: Other: Pt called and stated that he has spoke to his family and has made a decision on the biopsy. Pt has a PSA draw at 1:15 today and would like Dr. Santos to reach out to him after that so they can discuss pts decision, thanks!     Action Taken: Message routed to:  Other: Uro    Travel Screening: Not Applicable

## 2023-02-03 PROBLEM — R97.20 ELEVATED PROSTATE SPECIFIC ANTIGEN (PSA): Status: ACTIVE | Noted: 2023-02-03

## 2023-02-03 RX ORDER — CIPROFLOXACIN 500 MG/1
500 TABLET, FILM COATED ORAL 2 TIMES DAILY
Qty: 6 TABLET | Refills: 0 | Status: SHIPPED | OUTPATIENT
Start: 2023-02-03 | End: 2023-02-06

## 2023-02-03 NOTE — TELEPHONE ENCOUNTER
He needs a fusion biopsy . He is anxious . Can you give him a call   On Monday or Tuesday  Just to give him a date or  Let him know what an estimated  Time fram  For fusion biopsy Thanks

## 2023-02-03 NOTE — TELEPHONE ENCOUNTER
Patient is calling to received results for PSA and schedule prostate bx. Please reach out. Thank you

## 2023-02-09 ENCOUNTER — OFFICE VISIT (OUTPATIENT)
Dept: UROLOGY | Facility: CLINIC | Age: 78
End: 2023-02-09
Payer: COMMERCIAL

## 2023-02-09 VITALS
DIASTOLIC BLOOD PRESSURE: 75 MMHG | SYSTOLIC BLOOD PRESSURE: 113 MMHG | HEART RATE: 95 BPM | WEIGHT: 306.4 LBS | OXYGEN SATURATION: 96 % | BODY MASS INDEX: 41.5 KG/M2 | HEIGHT: 72 IN

## 2023-02-09 DIAGNOSIS — R97.20 ELEVATED PROSTATE SPECIFIC ANTIGEN (PSA): ICD-10-CM

## 2023-02-09 DIAGNOSIS — Z79.2 PROPHYLACTIC ANTIBIOTIC: Primary | ICD-10-CM

## 2023-02-09 PROCEDURE — 96372 THER/PROPH/DIAG INJ SC/IM: CPT | Mod: 59 | Performed by: UROLOGY

## 2023-02-09 PROCEDURE — 55700 PR BIOPSY OF PROSTATE,NEEDLE/PUNCH: CPT | Performed by: UROLOGY

## 2023-02-09 PROCEDURE — G0416 PROSTATE BIOPSY, ANY MTHD: HCPCS | Performed by: PATHOLOGY

## 2023-02-09 PROCEDURE — 76942 ECHO GUIDE FOR BIOPSY: CPT | Performed by: UROLOGY

## 2023-02-09 PROCEDURE — 88344 IMHCHEM/IMCYTCHM EA MLT ANTB: CPT | Performed by: PATHOLOGY

## 2023-02-09 RX ORDER — LIDOCAINE HYDROCHLORIDE 10 MG/ML
20 INJECTION, SOLUTION EPIDURAL; INFILTRATION; INTRACAUDAL; PERINEURAL ONCE
Status: COMPLETED | OUTPATIENT
Start: 2023-02-09 | End: 2023-02-09

## 2023-02-09 RX ORDER — GENTAMICIN 40 MG/ML
80 INJECTION, SOLUTION INTRAMUSCULAR; INTRAVENOUS ONCE
Status: DISCONTINUED | OUTPATIENT
Start: 2023-02-09 | End: 2023-02-09 | Stop reason: HOSPADM

## 2023-02-09 RX ORDER — GENTAMICIN 40 MG/ML
80 INJECTION, SOLUTION INTRAMUSCULAR; INTRAVENOUS ONCE
Status: COMPLETED | OUTPATIENT
Start: 2023-02-09 | End: 2023-02-09

## 2023-02-09 RX ADMIN — GENTAMICIN 80 MG: 40 INJECTION, SOLUTION INTRAMUSCULAR; INTRAVENOUS at 11:00

## 2023-02-09 RX ADMIN — LIDOCAINE HYDROCHLORIDE 10 ML: 10 INJECTION, SOLUTION EPIDURAL; INFILTRATION; INTRACAUDAL; PERINEURAL at 11:30

## 2023-02-09 ASSESSMENT — PAIN SCALES - GENERAL: PAINLEVEL: NO PAIN (1)

## 2023-02-09 NOTE — LETTER
2/9/2023       RE: Hugo Weber  [phone]  [facility] [address]     Dear Colleague,    Thank you for referring your patient, Hugo Weber, to the [facility]. Please see a copy of my visit note below.    Here for an MRI-ultrasound-fusion guided biopsy of the prostate    We previously obtained an MRI of the prostate and identified all PIRADS 4-5 lesions for targeting    Target Lesion #1 Right sided lesion    Prostate volume on MRI 53mL    PSA   Date Value Ref Range Status   08/07/2020 3.72 0 - 4 ug/L Final     Comment:     Assay Method:  Chemiluminescence using Siemens Vista analyzer   09/20/2018 4.46 (H) 0 - 4 ug/L Final     Comment:     Assay Method:  Chemiluminescence using Siemens Vista analyzer   09/25/2017 2.76 0 - 4 ug/L Final     Comment:     Assay Method:  Chemiluminescence using Siemens Vista analyzer   03/10/2016 1.92 0 - 4 ug/L Final     Prostate Specific Antigen Screen   Date Value Ref Range Status   08/16/2022 8.70 (H) 0.00 - 4.00 ug/L Final   08/11/2021 4.03 (H) 0.00 - 4.00 ug/L Final     PSA Tumor Marker   Date Value Ref Range Status   02/02/2023 8.51 (H) 0.00 - 6.50 ng/mL Final       An enema was completed and 500 mg of Cipro was given prior to the biopsy.  After obtaining informed consent patient was placed in lateral decubitus position.  The ultrasound probe was placed in the rectum.  The prostate was numbed using ultrasound guidance with 1% lidocaine 5 mls along each nerve bundle.  US images were obtained and then fused with the MRI images.  The fused images were then used to guide the biopsy of the targeted lesions with at least 2 cores taken of each lesion.  We then performed random biopsies.  12 cores were taken with 6 on each side, base, mid and apex.  The patient tolerated the procedure well.      We will follow up with the results in 7-10 days and contact patient with these results.      Casey Santos MD

## 2023-02-09 NOTE — PROGRESS NOTES
Here for an MRI-ultrasound-fusion guided biopsy of the prostate    We previously obtained an MRI of the prostate and identified all PIRADS 4-5 lesions for targeting    Target Lesion #1 Right sided lesion    Prostate volume on MRI 53mL    PSA   Date Value Ref Range Status   08/07/2020 3.72 0 - 4 ug/L Final     Comment:     Assay Method:  Chemiluminescence using Siemens Vista analyzer   09/20/2018 4.46 (H) 0 - 4 ug/L Final     Comment:     Assay Method:  Chemiluminescence using Siemens Vista analyzer   09/25/2017 2.76 0 - 4 ug/L Final     Comment:     Assay Method:  Chemiluminescence using Siemens Vista analyzer   03/10/2016 1.92 0 - 4 ug/L Final     Prostate Specific Antigen Screen   Date Value Ref Range Status   08/16/2022 8.70 (H) 0.00 - 4.00 ug/L Final   08/11/2021 4.03 (H) 0.00 - 4.00 ug/L Final     PSA Tumor Marker   Date Value Ref Range Status   02/02/2023 8.51 (H) 0.00 - 6.50 ng/mL Final       An enema was completed and 500 mg of Cipro was given prior to the biopsy.  After obtaining informed consent patient was placed in lateral decubitus position.  The ultrasound probe was placed in the rectum.  The prostate was numbed using ultrasound guidance with 1% lidocaine 5 mls along each nerve bundle.  US images were obtained and then fused with the MRI images.  The fused images were then used to guide the biopsy of the targeted lesions with at least 2 cores taken of each lesion.  We then performed random biopsies.  12 cores were taken with 6 on each side, base, mid and apex.  The patient tolerated the procedure well.      We will follow up with the results in 7-10 days and contact patient with these results.

## 2023-02-09 NOTE — NURSING NOTE
Chief Complaint   Patient presents with     Elevated PSA     Patient here today for MRI-Transrectal Ultrasound Guided Fusion Prostate Biopsy     Procedure was explained to patient prior to performing said procedure. The patient signed the consent form and all questions were answered prior to the procedure. Any pre-procedural antibiotics were given according to the performing physicians recommendation. Patient's information was confirmed on samples and samples were sent for analysis. Paient reviewed information on labels sent with patient and confirmed the accuracy of all the labels.    Consent read and signed: Yes   No Known Allergies  Performing Physician: Dr. Santos  Antibiotic taken?  Yes  Aspirin or other blood thinning medications discontinued 7-10 days:  Yes  Time of Fleet's enema: Yes 08:00am  Patient given Gentamicin intramuscular injection 30 minutes prior to procedure Yes    12 samples were taken from the right and left, medial and lateral base, mid, and apex of the prostate respectively. Yes additional samples were taken.  Vitals were repeated prior to patient leaving and instructions for post MRI-Transrectal Ultrasound Guided Fusion Prostate Biopsy care were explained to the patient.      The following medication was given:     MEDICATION:  Lidocaine 1% Soln  ROUTE: Local Infiltration   SITE: PROSTATE  DOSE: 15ML  LOT #: [id number]  : [employer]  EXPIRATION DATE: 09/01/2023  NDC#: 5713-6045-98  Was there drug waste? No  Multi-dose vial: Yes    The following medication was given:     MEDICATION: Gentamicin   ROUTE: IM  SITE: LUQ - Gluteus  DOSE:80mg/2ml  LOT #: [id number]  : [employer]  EXPIRATION DATE: 12/23  NDC#:  60236-985-38  Was there drug waste? No  Multi-dose vial: Yes        PAULINO Nicholson

## 2023-02-09 NOTE — PATIENT INSTRUCTIONS
[facility]  Transrectal Ultrasound  Post Operative Information  The physician who performed your Transrectal Ultrasound is Dr. Santos (telephone number [phone]).  Please contact this doctor if you have any problems or questions.  If unable to reach your doctor, please return to the Emergency Department.    Take one antibiotic the evening of the procedure and then as directed on your prescription.  Drink at least 6-8 glasses of fluids for the first 48 hours.  Avoid heavy lifting and strenuous activity for 48 hours.  Avoid sexual intercourse for the first 24 hours.  No aspirin or ibuprofen products (Motrin, Advil, Nuprin, ect.) for one week.  You may take acetaminophen (Tylenol) for pain.  You may notice a small amount of blood on the tissue after a bowel movement.  You may pass blood with clots in your urine following the procedure.  The amount will decrease with time but may be visible for up to two weeks.   You make have blood in your semen for 4 weeks after the procedure.  You may experience mild perineal (groin area) discomfort after the procedure.  Please call you doctor if you have any of the follow symptoms:  Fever  Increase in the amount of blood passed  Severe discomfort or pain

## 2023-02-12 ASSESSMENT — ANXIETY QUESTIONNAIRES
5. BEING SO RESTLESS THAT IT IS HARD TO SIT STILL: NOT AT ALL
4. TROUBLE RELAXING: NOT AT ALL
8. IF YOU CHECKED OFF ANY PROBLEMS, HOW DIFFICULT HAVE THESE MADE IT FOR YOU TO DO YOUR WORK, TAKE CARE OF THINGS AT HOME, OR GET ALONG WITH OTHER PEOPLE?: NOT DIFFICULT AT ALL
GAD7 TOTAL SCORE: 0
1. FEELING NERVOUS, ANXIOUS, OR ON EDGE: NOT AT ALL
6. BECOMING EASILY ANNOYED OR IRRITABLE: NOT AT ALL
7. FEELING AFRAID AS IF SOMETHING AWFUL MIGHT HAPPEN: NOT AT ALL
IF YOU CHECKED OFF ANY PROBLEMS ON THIS QUESTIONNAIRE, HOW DIFFICULT HAVE THESE PROBLEMS MADE IT FOR YOU TO DO YOUR WORK, TAKE CARE OF THINGS AT HOME, OR GET ALONG WITH OTHER PEOPLE: NOT DIFFICULT AT ALL
7. FEELING AFRAID AS IF SOMETHING AWFUL MIGHT HAPPEN: NOT AT ALL
2. NOT BEING ABLE TO STOP OR CONTROL WORRYING: NOT AT ALL
GAD7 TOTAL SCORE: 0
3. WORRYING TOO MUCH ABOUT DIFFERENT THINGS: NOT AT ALL

## 2023-02-12 ASSESSMENT — PAIN SCALES - PAIN ENJOYMENT GENERAL ACTIVITY SCALE (PEG)
INTERFERED_GENERAL_ACTIVITY: 2
INTERFERED_GENERAL_ACTIVITY: 2
PEG_TOTALSCORE: 2.33
PEG_TOTALSCORE: 2.33
INTERFERED_ENJOYMENT_LIFE: 2
AVG_PAIN_PASTWEEK: 3
INTERFERED_ENJOYMENT_LIFE: 2
AVG_PAIN_PASTWEEK: 3

## 2023-02-14 LAB
PATH REPORT.COMMENTS IMP SPEC: ABNORMAL
PATH REPORT.COMMENTS IMP SPEC: ABNORMAL
PATH REPORT.COMMENTS IMP SPEC: YES
PATH REPORT.FINAL DX SPEC: ABNORMAL
PATH REPORT.GROSS SPEC: ABNORMAL
PATH REPORT.MICROSCOPIC SPEC OTHER STN: ABNORMAL
PATH REPORT.RELEVANT HX SPEC: ABNORMAL
PHOTO IMAGE: ABNORMAL

## 2023-02-15 ENCOUNTER — TELEPHONE (OUTPATIENT)
Dept: UROLOGY | Facility: CLINIC | Age: 78
End: 2023-02-15
Payer: COMMERCIAL

## 2023-02-15 NOTE — TELEPHONE ENCOUNTER
[facility] Call Center    Phone Message    May a detailed message be left on voicemail: yes     Reason for Call: Pt called stating that he was told he may have some blood clots after procedure on 2/9/23. Pt stated that he did not have any blood clots until 2/14/23. Pt would like to discuss symptoms. Please give pt a call back to discuss.Thank you.    Action Taken: Other: URO    Travel Screening: Not Applicable

## 2023-02-16 NOTE — TELEPHONE ENCOUNTER
[facility] Call Center    Phone Message    May a detailed message be left on voicemail: yes     Reason for Call: Other: pt calling again and is having alot of blood clots, had 6 or 7 yesterday, still bleeding , pleaes advise     Action Taken: Other: urolgoy    Travel Screening: Not Applicable

## 2023-02-16 NOTE — TELEPHONE ENCOUNTER
No clots today,slight blood tinge to urine,no issues urinating ,or symptoms of infection.He is on a blood thinner,reasurred patient.  He will call if symptoms worsen or change.  Sabi Yoder LPN

## 2023-02-16 NOTE — PROGRESS NOTES
[facility] Consultation    Date of visit: 2/17/2023    Reason for consultation:    Hugo Weber is a 77 year old male who is seen in consultation today to re-establish care.    PCP is Brett Cassidy.  Pain medications are being prescribed by PCP.    Chief Complaint:    Chief Complaint   Patient presents with     Consult     Establishing care with new provider       Pain history:  Hugo Weber is a 77 year old male presenting with a chief pain complaint of left lower abdomen pain    Onset: 3-4 years  Location: left lower quadrant abdomen, feels deeper than skin layer  Provoking: worse sometimes after he eats food or when he wakes up  Palliating: improves 100% with water  Quality: dull, sometimes sharp  Radiation: none  Severity: 1-2/10  Timing: morning  Numbness: none  Weakness: none    Also has L shoulder pain which he will have operated on March 7 - reverse shoulder by Dr. Multani.    Current pain medications include:  none    Previous medication treatments included:  Gabapentin - been off it for 1 week and pain has actually been better.    Other treatments have included:  Hugo Weber has been seen at a pain clinic in the past.   PT: none  Injections: none  Surgery:    1986 Gastric bypass   1970 appendectomy  Alternative: none    Past Medical History:  Past Medical History:   Diagnosis Date     Atrial fibrillation 2006    Followed by [facility] - persistent     Calculus of kidney 2005, 6/06, 10/12, 8/18, 9/19    dr walker/nestor/иван - hematuria - w/u o/w neg     Cervical stenosis of spine     Moderate C4-5     Cholelithiasis      Chronic peptic ulcer, unspecified site, without mention of hemorrhage, perforation, or obstruction 1985    gastric bypass     Colon polyps 8/05, 9/10, 10/15    2 tubular adenoma, 1 hyperplastic - multiple serrated/tubular adenomas - last colonscopy 11/20 due 5 yrs     CVA (cerebral vascular accident) (H) 01/2019     small right periorlandic subcortical - left sided residual - left hand tingling/numbness - Left leg weak/numbness - cardioembolic     Elevated prostate specific antigen (PSA)     Dr Santos     Hepatitis C 10/2012    chronic hepatitis C, grade 1-2, stage 1 - mild - Dr Douglas     Hyperlipidemia LDL goal <70      Hypertension goal BP (blood pressure) < 140/90 06/2006    dr jaciel winchester     Iron deficiency anemia, unspecified 1985    gastric bypass     Nephrolithiasis multiple episodes, last 10/19    Dr Bey/Ivana/Tom - 9mm 3/2021     OA (osteoarthritis)     Multiple - Left shoulder with rotator cuff tear - Dr Durham     Obesity, unspecified     s/p gastric bypass 1985      Obstructive sleep apnea syndrome     CPAP - 15 cm - [facility] 1/2023     Prediabetes      Presbyacusis 01/2004    dr corona     Pyelonephritis, unspecified 12/1999     SCC (squamous cell carcinoma), 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    dr saez - lt conchal bowl     Sleep apnea 10/2002    cpap - severe - 15 cm - dr cunha     Stroke (H) 01/19/2019     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    Mn Head and Neck     Vitamin B12 deficiency (non anemic) 04/15/2019     Vitamin D deficiency 04/15/2019     Patient Active Problem List    Diagnosis Date Noted     Hyperlipidemia LDL goal <70 12/30/2011     Priority: High     Elevated prostate specific antigen (PSA) 02/03/2023     Priority: Medium     Dr Santos       Chronic LLQ pain 08/16/2022     Priority: Medium     NSVT (nonsustained ventricular tachycardia) 06/23/2021     Priority: Medium     Small bowel obstruction (H) 06/21/2021     Priority: Medium     History of CVA (cerebrovascular accident) 10/29/2020     Priority: Medium     Vitamin B12 deficiency (non anemic) 04/15/2019     Priority: Medium     Vitamin D deficiency 04/15/2019     Priority: Medium     Cervical stenosis of spine      Priority: Medium     Moderate C4-5       Stroke (H) 01/19/2019     Priority: Medium     Reported 2/5/2019, per patient occurred on 1/19/2019 while  in [place]       CVA (cerebral vascular accident) (H) 01/01/2019     Priority: Medium     small right periorlandic subcortical       Thoracic aortic ectasia (H) 07/06/2018     Priority: Medium     Benign prostatic hyperplasia (BPH) with urinary urge incontinence 07/05/2018     Priority: Medium     First degree AV block      Priority: Medium     OA (osteoarthritis)      Priority: Medium     Multiple - Left shoulder with rotator cuff tear - Dr Durham       Nephrolithiasis      Priority: Medium     Myofascial pain 10/12/2017     Priority: Medium     Hypertension goal BP (blood pressure) < 140/90 09/25/2017     Priority: Medium     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     Priority: Medium     Mn Head and Neck       Cholelithiasis      Priority: Medium     Paroxysmal atrial fibrillation (H) 05/27/2016     Priority: Medium     Prediabetes      Priority: Medium     History of hepatitis C 05/12/2015     Priority: Medium     SVR May 2015       NAFLD (nonalcoholic fatty liver disease) 09/05/2014     Priority: Medium     Morbid obesity due to excess calories (H) 09/05/2014     Priority: Medium     Total knee replacement status 08/13/2012     Priority: Medium     Long term current use of anticoagulant therapy - for intermittent a-fib 03/30/2012     Priority: Medium     Obstructive sleep apnea syndrome      Priority: Medium     cpap - severe - 15 cm - dr cunha       Atrial fibrillation 2006     Priority: Medium     Followed by [facility] - persistent       Calculus of kidney 06/06/2005     Priority: Medium     dr walker - hematuria - w/u o/w neg       Iron deficiency anemia 08/22/2003     Priority: Medium     Problem list name updated by automated process. Provider to review       Advance Care Planning 09/25/2017     Priority: Low     Discussed advance care planning with patient; information given to patient to review. 8/7/2012 Ernestine Ojeda [facility]       Colon polyps      Priority: Low       Past Surgical History:  Past Surgical History:    Procedure Laterality Date     APPENDECTOMY       APPENDECTOMY       ARTHROPLASTY  08/13/2012    knee     ARTHROPLASTY KNEE  08/13/2012    LT TKA - Dr Villanueva     CARDIOVERSION  2016     CARDIOVERSION       COLONOSCOPY  8/05, 9/10, 10/15    multiple tubular/serrated/hyperplastic polyps     COLONOSCOPY N/A 10/29/2015    multiple tubular and serrated adenomas     COLONOSCOPY N/A 09/07/2016     COLONOSCOPY  11/2020    tubular adenomas - due 5 yrs     COLONOSCOPY N/A 11/24/2020    Procedure: COLONOSCOPY with biopsies;  Surgeon: Chadwick Burgos MD;  Location: [facility] OR     CYSTOSCOPY W/ LASER LITHOTRIPSY  10/16/2012,5/2/2018     ESOPHAGOSCOPY, GASTROSCOPY, DUODENOSCOPY (EGD), COMBINED N/A 11/24/2020    Procedure: ESOPHAGOGASTRODUODENOSCOPY, COLONOSCOPY with biopsies;  Surgeon: Chadwick Burgos MD;  Location: [facility] OR     EYE SURGERY  Lasik     EYE SURGERY  1/01,6/02,11/02    lasik     GASTRIC BYPASS  1985     HC KNEE SCOPE, DIAGNOSTIC  05/2000    Arthroscopy, Knee RT     LASER HOLMIUM LITHOTRIPSY URETER(S), INSERT STENT, COMBINED  10/16/2012    stones x 4, Dr Campa     LASER HOLMIUM LITHOTRIPSY URETER(S), INSERT STENT, COMBINED Right 05/02/2018    CYSTOSCOPY, RIGHT URETEROSCOPY, HOLMIUM LASER LITHOTRIPSY, RIGHT STENT PLACEMENT - Dr Bey     MRI BIOPSY PROSTATE Bilateral 02/09/2023    mark     OTHER SURGICAL HISTORY  1979    cellulitis     OTHER SURGICAL HISTORY Bilateral 11/1998 ,11/99    forearm spur     OTHER SURGICAL HISTORY  07/2006    lihoripsy     OTHER SURGICAL HISTORY  10/08, 12/08    lt ear choncal lesion removal scc     REPLACEMENT TOTAL KNEE  08/13/2012     SURGICAL HISTORY OF -   1985    s/p gastric bypass Bilroth II     SURGICAL HISTORY OF -   11/1998    wisdom teeth     SURGICAL HISTORY OF -   1979    cellulitis     SURGICAL HISTORY OF -   11/1998    lt forearm spur's     SURGICAL HISTORY OF -   1/01,6/02,11/02    s/p lasik     SURGICAL HISTORY OF -   11/1999    rt forearm spurs     SURGICAL  HISTORY OF -   2006    dr stevenson - lithotrypsy     SURGICAL HISTORY OF -   10/08,     Lt ear chonchal lesion removal SCC - dr saez     SURGICAL HISTORY OF -  Right 10/2019    nephrolithiasis - cystoscopy, rt lithotrypsy, Dr Heath     SURGICAL HISTORY OF -  Right 2019    Cystoscopy, Rt lithotrypsy, Calcium Oxalate, Dr Heath     WISDOM TOOTH EXTRACTION  1998     Medications:  Current Outpatient Medications   Medication Sig Dispense Refill     apixaban ANTICOAGULANT (ELIQUIS) 5 MG tablet Take 1 tablet (5 mg) by mouth 2 times daily Appointment needed for additional refills. Please call [phone] to schedule. 180 tablet 3     betamethasone dipropionate (DIPROSONE) 0.05 % external ointment Apply topically 2 times daily 50 g 1     cholecalciferol 25 MCG (1000 UT) TABS taken during winter only       Cyanocobalamin 5000 MCG TBDP Take by mouth daily        Ferrous Sulfate (IRON) 325 (65 Fe) MG tablet Take 1 tablet by mouth every other day  90 tablet 3     fluticasone (FLONASE) 50 MCG/ACT nasal spray Spray 2 sprays into both nostrils daily as needed for rhinitis or allergies 54.6 mL 3     losartan (COZAAR) 50 MG tablet Take 1 tablet (50 mg) by mouth daily 90 tablet 3     metoprolol succinate ER (TOPROL XL) 50 MG 24 hr tablet Take 1 tablet (50 mg) by mouth daily 90 tablet 3     niacin 500 MG tablet Take by mouth daily (with breakfast)       tamsulosin (FLOMAX) 0.4 MG capsule Take 1 capsule (0.4 mg) by mouth 2 times daily 180 capsule 3     vitamin B-12 (CYANOCOBALAMIN) 1000 MCG tablet Take by mouth every 24 hours       vitamin C (ASCORBIC ACID) 1000 MG TABS Take 1,000 mg by mouth daily       Vitamin D-Vitamin K (K2 PLUS D3) 100-1000 MCG-UNIT TABS        Allergies:   No Known Allergies    Family history:  Family History   Problem Relation Age of Onset     Alzheimer Disease Father 82         at 88     Prostate Cancer Father 76        bladder and prostate     Heart Disease Mother 80        CHF      Heart Disease Maternal Grandfather         MI at 55     Cancer Paternal Uncle         ?     Ulcerative Colitis No family hx of      Crohn's Disease No family hx of      Stomach Cancer No family hx of      GERD No family hx of        Physical Exam:  Vitals:    02/17/23 1228   BP: 113/79   BP Location: Right arm   Patient Position: Chair   Cuff Size: Adult Large   Pulse: 67   SpO2: 99%     Exam:  Constitutional: Well developed, NAD  Head: normocephalic. Atraumatic.   Eyes: no redness or jaundice noted   CV: warm, well perfused extremities   Skin: no suspicious lesions or rashes   Psychiatric: mentation appears normal and affect full  Abdomen: central midline surgical scar well healed, nondistended, nontender at LLQ even with deep palpation.  Negative Carnett's sign    Diagnostic tests:  none    Personally reviewed imaging: none    [place] Prescription Monitoring Program reviewed: no    Outside records reviewed: no    Assessment:  1. Abdominal pain    Hugo Weber is a 77 year old male who presents with 3 to 4 years of left lower quadrant abdominal pain, worse with eating, improves with drinking water.  It has previously been at about a 4 out of 10 in severity, and the patient was previously treated with gabapentin.  More recently, however, patient incidentally ran out of his gabapentin and has noticed a significant improvement in his abdominal pain, now rating it as an intermittent 1-2 out of 10.  It does not have any association with defecation.  He states he has been worked up for metastatic disease which was negative, as he has been recently diagnosed with prostate cancer.  On examination he is not tender to palpation, has negative Carnett's sign, and no allodynia.  In a shared discussion with the patient, I believe it is appropriate to pursue symptomatic management of this very conservatively, holding off on therapies, medications, and interventions.  As the patient can essentially entirely treat the pain with  drinking water, encouraged him to do this.  I also discussed briefly the possible effect of stool consistency on this pain.    Plan:  Diagnosis reviewed, treatment option addressed, and risk/benefits discussed.     1. Physical Therapy: none  2. Diagnostic Studies: none  3. Medication Management: none  4. Procedures: none  5. Follow up: PRN    35 minutes spent on the date of encounter doing chart review, history, and exam documentation and further activities as noted above.     Prosper Gooden MD  Interventional Pain Medicine  [facility]

## 2023-02-17 ENCOUNTER — OFFICE VISIT (OUTPATIENT)
Dept: ANESTHESIOLOGY | Facility: CLINIC | Age: 78
End: 2023-02-17
Payer: COMMERCIAL

## 2023-02-17 ENCOUNTER — VIRTUAL VISIT (OUTPATIENT)
Dept: UROLOGY | Facility: CLINIC | Age: 78
End: 2023-02-17
Payer: COMMERCIAL

## 2023-02-17 VITALS — HEIGHT: 72 IN | BODY MASS INDEX: 41.45 KG/M2 | WEIGHT: 306 LBS

## 2023-02-17 VITALS — OXYGEN SATURATION: 99 % | DIASTOLIC BLOOD PRESSURE: 79 MMHG | HEART RATE: 67 BPM | SYSTOLIC BLOOD PRESSURE: 113 MMHG

## 2023-02-17 DIAGNOSIS — R10.32 LEFT LOWER QUADRANT ABDOMINAL PAIN: Primary | ICD-10-CM

## 2023-02-17 DIAGNOSIS — N20.0 NEPHROLITHIASIS: ICD-10-CM

## 2023-02-17 DIAGNOSIS — C61 PROSTATE CANCER (H): Primary | ICD-10-CM

## 2023-02-17 DIAGNOSIS — N40.0 ENLARGED PROSTATE: ICD-10-CM

## 2023-02-17 PROCEDURE — 99203 OFFICE O/P NEW LOW 30 MIN: CPT | Performed by: STUDENT IN AN ORGANIZED HEALTH CARE EDUCATION/TRAINING PROGRAM

## 2023-02-17 PROCEDURE — 99214 OFFICE O/P EST MOD 30 MIN: CPT | Mod: VID | Performed by: UROLOGY

## 2023-02-17 ASSESSMENT — PAIN SCALES - GENERAL
PAINLEVEL: NO PAIN (0)
PAINLEVEL: NO PAIN (1)

## 2023-02-17 NOTE — PATIENT INSTRUCTIONS
Recommended Follow up:      Follow up as needed.       To speak with a nurse, schedule/reschedule/cancel a clinic appointment, or request a medication refill call: [phone].    You can also reach us by [employer]: [email or web address]

## 2023-02-17 NOTE — LETTER
2/17/2023       RE: Hugo Weber  [address]  [place] [facility] [address]     Dear Colleague,    Thank you for referring your patient, Hugo Weber, to the [facility] [place] [facility]. Please see a copy of my visit note below.    **PT WILL MEET IN [employer] FOR VISIT**    **Pt still having some bleeding/clots        Hugo is a 77 year old who is being evaluated via a billable video visit.      How would you like to obtain your AVS? MyChart     If the video visit is dropped, the invitation should be resent by: Text to cell phone: [phone]     Will anyone else be joining your video visit? No           Office Visit Note  [facility]  [phone]    UROLOGIC DIAGNOSES:   Intermediate risk prostate cancer  Enlarged prostate  Left kidney stone    CURRENT INTERVENTIONS:   Observation of kidney stone    HISTORY:   Hugo is set up for virtual visit today along with his daughter in order to discuss his recent prostate biopsy results.  He had some hematuria after the biopsy but now feels well.  At baseline he has no urinary symptoms or complaints.  His biopsy taken from the targeted lesion seen on the right side of the prostate showed [place] 3+4 equal 7 prostate cancer.  For other template biopsies on the right side also showed [place] grade 7 cancer, one of which was 4+3=7 prostate cancer.      PAST MEDICAL HISTORY:   Past Medical History:   Diagnosis Date     Atrial fibrillation 2006    Followed by [facility] - persistent     Calculus of kidney 2005, 6/06, 10/12, 8/18, 9/19    dr walker/nestor/иван - hematuria - w/u o/w neg     Cervical stenosis of spine     Moderate C4-5     Cholelithiasis      Chronic peptic ulcer, unspecified site, without mention of hemorrhage, perforation, or obstruction 1985    gastric bypass     Colon polyps 8/05, 9/10, 10/15    2 tubular adenoma, 1 hyperplastic - multiple serrated/tubular adenomas -  last colonscopy 11/20 due 5 yrs     CVA (cerebral vascular accident) (H) 01/2019    small right periorlandic subcortical - left sided residual - left hand tingling/numbness - Left leg weak/numbness - cardioembolic     Elevated prostate specific antigen (PSA)     Dr Santos     Hepatitis C 10/2012    chronic hepatitis C, grade 1-2, stage 1 - mild - Dr Douglas     Hyperlipidemia LDL goal <70      Hypertension goal BP (blood pressure) < 140/90 06/2006    dr jaciel winchester     Iron deficiency anemia, unspecified 1985    gastric bypass     Nephrolithiasis multiple episodes, last 10/19    Dr Bey/Ivana/Tom - 9mm 3/2021     OA (osteoarthritis)     Multiple - Left shoulder with rotator cuff tear - Dr Durham     Obesity, unspecified     s/p gastric bypass 1985      Obstructive sleep apnea syndrome     CPAP - 15 cm - [facility] 1/2023     Prediabetes      Presbyacusis 01/2004    dr corona     Pyelonephritis, unspecified 12/1999     SCC (squamous cell carcinoma), 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    dr saez - lt conchal bowl     Sleep apnea 10/2002    cpap - severe - 15 cm - dr cunha     Stroke (H) 01/19/2019     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    Mn Head and Neck     Vitamin B12 deficiency (non anemic) 04/15/2019     Vitamin D deficiency 04/15/2019       PAST SURGICAL HISTORY:   Past Surgical History:   Procedure Laterality Date     APPENDECTOMY       APPENDECTOMY       ARTHROPLASTY  08/13/2012    knee     ARTHROPLASTY KNEE  08/13/2012    LT TKA - Dr Villanueva     CARDIOVERSION  2016     CARDIOVERSION       COLONOSCOPY  8/05, 9/10, 10/15    multiple tubular/serrated/hyperplastic polyps     COLONOSCOPY N/A 10/29/2015    multiple tubular and serrated adenomas     COLONOSCOPY N/A 09/07/2016     COLONOSCOPY  11/2020    tubular adenomas - due 5 yrs     COLONOSCOPY N/A 11/24/2020    Procedure: COLONOSCOPY with biopsies;  Surgeon: Chadwick Burgos MD;  Location: [facility] OR     CYSTOSCOPY W/ LASER LITHOTRIPSY  10/16/2012,5/2/2018     ESOPHAGOSCOPY,  GASTROSCOPY, DUODENOSCOPY (EGD), COMBINED N/A 2020    Procedure: ESOPHAGOGASTRODUODENOSCOPY, COLONOSCOPY with biopsies;  Surgeon: Chadwick Burgos MD;  Location: [facility] OR     EYE SURGERY  Lasik     EYE SURGERY  ,,    lasik     GASTRIC BYPASS       HC KNEE SCOPE, DIAGNOSTIC  2000    Arthroscopy, Knee RT     LASER HOLMIUM LITHOTRIPSY URETER(S), INSERT STENT, COMBINED  10/16/2012    stones x 4, Dr Campa     LASER HOLMIUM LITHOTRIPSY URETER(S), INSERT STENT, COMBINED Right 2018    CYSTOSCOPY, RIGHT URETEROSCOPY, HOLMIUM LASER LITHOTRIPSY, RIGHT STENT PLACEMENT - Dr Bey     MRI BIOPSY PROSTATE Bilateral 2023    mark     OTHER SURGICAL HISTORY  1979    cellulitis     OTHER SURGICAL HISTORY Bilateral 1998 ,    forearm spur     OTHER SURGICAL HISTORY  2006    lihoripsy     OTHER SURGICAL HISTORY  10/08,     lt ear choncal lesion removal scc     REPLACEMENT TOTAL KNEE  2012     SURGICAL HISTORY OF -       s/p gastric bypass Bilroth II     SURGICAL HISTORY OF -   1998    wisdom teeth     SURGICAL HISTORY OF -       cellulitis     SURGICAL HISTORY OF -   1998    lt forearm spur's     SURGICAL HISTORY OF -   ,,    s/p lasik     SURGICAL HISTORY OF -   1999    rt forearm spurs     SURGICAL HISTORY OF -   2006    dr stevenson - lithotrypsy     SURGICAL HISTORY OF -   10/08,     Lt ear chonchal lesion removal SCC - dr saez     SURGICAL HISTORY OF -  Right 10/2019    nephrolithiasis - cystoscopy, rt lithotrypsy, Dr Heath     SURGICAL HISTORY OF -  Right 2019    Cystoscopy, Rt lithotrypsy, Calcium Oxalate, Dr Heath     WISDOM TOOTH EXTRACTION  1998       FAMILY HISTORY:   Family History   Problem Relation Age of Onset     Alzheimer Disease Father 82         at 88     Prostate Cancer Father 76        bladder and prostate     Heart Disease Mother 80        CHF     Heart Disease Maternal Grandfather         MI at 55      Cancer Paternal Uncle         ?     Ulcerative Colitis No family hx of      Crohn's Disease No family hx of      Stomach Cancer No family hx of      GERD No family hx of        SOCIAL HISTORY:   Social History     Tobacco Use     Smoking status: Never     Smokeless tobacco: Never   Substance Use Topics     Alcohol use: Yes     Alcohol/week: 2.0 - 3.0 standard drinks     Comment: 2-3 per week       REVIEW OF SYSTEMS:  Skin: No rash, pruritis, or skin pigmentation  Eyes: No changes in vision  Ears/Nose/Throat: No changes in hearing, no nosebleeds  Respiratory: No shortness of breath, dyspnea on exertion, cough, or hemoptysis  Cardiovascular: No chest pain or palpitations  Gastrointestinal: No diarrhea or constipation. No abdominal pain. No hematochezia  Genitourinary: see HPI  Musculoskeletal: No pain or swelling of joints, normal range of motion  Neurologic: No weakness or tremors  Psychiatric: No recent changes in memory or mood  Hematologic/Lymphatic/Immunologic: No easy bruising or enlarged lymph nodes  Endocrine: No weight gain or loss      PHYSICAL EXAM:    General: Alert and oriented to time, place, and self. In NAD   HEENT: Head AT/NC, EOMI, CN Grossly intact   Lungs: no respiratory distress, or pursed lip breathing   Heart: No obvious jugular venous distension present   Musculoskeltal: Normal movements. Normal appearing musculature  Skin: no suspicious lesions or rashes   Neuro: Alert, oriented, speech and mentation normal; moving all 4 extremities equally.   Psych: affect and mood normal    Imaging: None    Urinalysis: UA RESULTS:  Recent Labs   Lab Test 01/31/23  1035 12/05/22  1158   COLOR Yellow Yellow   APPEARANCE Clear Clear   URINEGLC Negative Negative   URINEBILI Negative Small*   URINEKETONE Negative 40*   SG <=1.005 1.015   UBLD Trace* Trace*   URINEPH 6.0 6.0   PROTEIN Negative Trace*   UROBILINOGEN 1.0 >=8.0*   NITRITE Negative Negative   LEUKEST Trace* Small*   RBCU  --  0-2   WBCU  --  0-5        PSA: 8.51    Post Void Residual:     Other labs: None today      IMPRESSION:  Intermediate risk prostate cancer    PLAN:  He has been diagnosed with an intermediate risk prostate cancer.  I discussed the diagnosis with the patient and his daughter and discussed treatment options.  We discussed the natural history of prostate cancer as well.  Treatment options discussed included active surveillance, radical prostatectomy, radiotherapy combined with hormonal therapy, and ablative treatment options.  I would not recommend radical prostatectomy due to his age and body habitus.  We discussed the pros and cons of the above options.    At this time I recommended that we send his biopsy specimen for Decipher testing.  We discussed the rationale behind this and he agreed.  If Decipher testing suggests a more aggressive cancer he may consider radiotherapy combined with hormonal therapy.  If Decipher testing shows an intermediate grade cancer he could potentially proceed with active surveillance.  I will call him when the decipher test result is available.      Casey Santos M.D.              Video-Visit Details    Type of service:  Video Visit   Video Start Time: 11:07 AM  Video End Time:11:26 AM    Originating Location (pt. Location): Home    Distant Location (provider location):  On-site  Platform used for Video Visit: [employer]

## 2023-02-17 NOTE — NURSING NOTE
EMT reviewed AVS with patient. Patient to contact clinic with any questions/concerns.  Patient verbalized understanding.    Julio Han, EMT

## 2023-02-17 NOTE — NURSING NOTE
Chief Complaint   Patient presents with     Elevated PSA     Review biopsy results     Harriett Ashley EMT

## 2023-02-17 NOTE — PROGRESS NOTES
**PT WILL MEET IN [facility] FOR VISIT**    **Pt still having some bleeding/clots        Hugo is a 77 year old who is being evaluated via a billable video visit.      How would you like to obtain your AVS? [employer]     If the video visit is dropped, the invitation should be resent by: Text to cell phone: [phone]     Will anyone else be joining your video visit? No           Office Visit Note  [facility]  [phone]    UROLOGIC DIAGNOSES:   Intermediate risk prostate cancer  Enlarged prostate  Left kidney stone    CURRENT INTERVENTIONS:   Observation of kidney stone    HISTORY:   Hugo is set up for virtual visit today along with his daughter in order to discuss his recent prostate biopsy results.  He had some hematuria after the biopsy but now feels well.  At baseline he has no urinary symptoms or complaints.  His biopsy taken from the targeted lesion seen on the right side of the prostate showed [place] 3+4 equal 7 prostate cancer.  For other template biopsies on the right side also showed [place] grade 7 cancer, one of which was 4+3=7 prostate cancer.      PAST MEDICAL HISTORY:   Past Medical History:   Diagnosis Date     Atrial fibrillation 2006    Followed by [facility] - persistent     Calculus of kidney 2005, 6/06, 10/12, 8/18, 9/19    dr walker/nestor/иван - hematuria - w/u o/w neg     Cervical stenosis of spine     Moderate C4-5     Cholelithiasis      Chronic peptic ulcer, unspecified site, without mention of hemorrhage, perforation, or obstruction 1985    gastric bypass     Colon polyps 8/05, 9/10, 10/15    2 tubular adenoma, 1 hyperplastic - multiple serrated/tubular adenomas - last colonscopy 11/20 due 5 yrs     CVA (cerebral vascular accident) (H) 01/2019    small right periorlandic subcortical - left sided residual - left hand tingling/numbness - Left leg weak/numbness - cardioembolic     Elevated prostate specific antigen (PSA)     Dr Santos     Hepatitis C 10/2012    chronic  hepatitis C, grade 1-2, stage 1 - mild - Dr Douglas     Hyperlipidemia LDL goal <70      Hypertension goal BP (blood pressure) < 140/90 06/2006    dr jaciel winchester     Iron deficiency anemia, unspecified 1985    gastric bypass     Nephrolithiasis multiple episodes, last 10/19    Dr Bey/Ivana/Tom - 9mm 3/2021     OA (osteoarthritis)     Multiple - Left shoulder with rotator cuff tear - Dr Durham     Obesity, unspecified     s/p gastric bypass 1985      Obstructive sleep apnea syndrome     CPAP - 15 cm - Dream station 1/2023     Prediabetes      Presbyacusis 01/2004    dr corona     Pyelonephritis, unspecified 12/1999     SCC (squamous cell carcinoma), 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    dr saez - lt conchal bowl     Sleep apnea 10/2002    cpap - severe - 15 cm - dr cunha     Stroke (H) 01/19/2019     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    Mn Head and Neck     Vitamin B12 deficiency (non anemic) 04/15/2019     Vitamin D deficiency 04/15/2019       PAST SURGICAL HISTORY:   Past Surgical History:   Procedure Laterality Date     APPENDECTOMY       APPENDECTOMY       ARTHROPLASTY  08/13/2012    knee     ARTHROPLASTY KNEE  08/13/2012    LT TKA - Dr Villanueva     CARDIOVERSION  2016     CARDIOVERSION       COLONOSCOPY  8/05, 9/10, 10/15    multiple tubular/serrated/hyperplastic polyps     COLONOSCOPY N/A 10/29/2015    multiple tubular and serrated adenomas     COLONOSCOPY N/A 09/07/2016     COLONOSCOPY  11/2020    tubular adenomas - due 5 yrs     COLONOSCOPY N/A 11/24/2020    Procedure: COLONOSCOPY with biopsies;  Surgeon: Chadwick Burgos MD;  Location:  OR     CYSTOSCOPY W/ LASER LITHOTRIPSY  10/16/2012,5/2/2018     ESOPHAGOSCOPY, GASTROSCOPY, DUODENOSCOPY (EGD), COMBINED N/A 11/24/2020    Procedure: ESOPHAGOGASTRODUODENOSCOPY, COLONOSCOPY with biopsies;  Surgeon: Chadwick Burgos MD;  Location: [facility] OR     EYE SURGERY  Lasik     EYE SURGERY  1/01,6/02,11/02    lasik     GASTRIC BYPASS  1985     HC KNEE SCOPE, DIAGNOSTIC  05/2000     Arthroscopy, Knee RT     LASER HOLMIUM LITHOTRIPSY URETER(S), INSERT STENT, COMBINED  10/16/2012    stones x 4, Dr Campa     LASER HOLMIUM LITHOTRIPSY URETER(S), INSERT STENT, COMBINED Right 2018    CYSTOSCOPY, RIGHT URETEROSCOPY, HOLMIUM LASER LITHOTRIPSY, RIGHT STENT PLACEMENT - Dr Bey     MRI BIOPSY PROSTATE Bilateral 2023    mark     OTHER SURGICAL HISTORY  1979    cellulitis     OTHER SURGICAL HISTORY Bilateral 1998 ,    forearm spur     OTHER SURGICAL HISTORY  2006    lihoripsy     OTHER SURGICAL HISTORY  10/08,     lt ear choncal lesion removal scc     REPLACEMENT TOTAL KNEE  2012     SURGICAL HISTORY OF -       s/p gastric bypass Bilroth II     SURGICAL HISTORY OF -   1998    wisdom teeth     SURGICAL HISTORY OF -       cellulitis     SURGICAL HISTORY OF -   1998    lt forearm spur's     SURGICAL HISTORY OF -   ,,    s/p lasik     SURGICAL HISTORY OF -   1999    rt forearm spurs     SURGICAL HISTORY OF -   2006    dr stevenson - lithotrypsy     SURGICAL HISTORY OF -   10/08,     Lt ear chonchal lesion removal SCC - dr saez     SURGICAL HISTORY OF -  Right 10/2019    nephrolithiasis - cystoscopy, rt lithotrypsy, Dr Heath     SURGICAL HISTORY OF -  Right 2019    Cystoscopy, Rt lithotrypsy, Calcium Oxalate, Dr Heath     WISDOM TOOTH EXTRACTION  1998       FAMILY HISTORY:   Family History   Problem Relation Age of Onset     Alzheimer Disease Father 82         at 88     Prostate Cancer Father 76        bladder and prostate     Heart Disease Mother 80        CHF     Heart Disease Maternal Grandfather         MI at 55     Cancer Paternal Uncle         ?     Ulcerative Colitis No family hx of      Crohn's Disease No family hx of      Stomach Cancer No family hx of      GERD No family hx of        SOCIAL HISTORY:   Social History     Tobacco Use     Smoking status: Never     Smokeless tobacco: Never   Substance Use Topics      Alcohol use: Yes     Alcohol/week: 2.0 - 3.0 standard drinks     Comment: 2-3 per week       REVIEW OF SYSTEMS:  Skin: No rash, pruritis, or skin pigmentation  Eyes: No changes in vision  Ears/Nose/Throat: No changes in hearing, no nosebleeds  Respiratory: No shortness of breath, dyspnea on exertion, cough, or hemoptysis  Cardiovascular: No chest pain or palpitations  Gastrointestinal: No diarrhea or constipation. No abdominal pain. No hematochezia  Genitourinary: see HPI  Musculoskeletal: No pain or swelling of joints, normal range of motion  Neurologic: No weakness or tremors  Psychiatric: No recent changes in memory or mood  Hematologic/Lymphatic/Immunologic: No easy bruising or enlarged lymph nodes  Endocrine: No weight gain or loss      PHYSICAL EXAM:    General: Alert and oriented to time, place, and self. In NAD   HEENT: Head AT/NC, EOMI, CN Grossly intact   Lungs: no respiratory distress, or pursed lip breathing   Heart: No obvious jugular venous distension present   Musculoskeltal: Normal movements. Normal appearing musculature  Skin: no suspicious lesions or rashes   Neuro: Alert, oriented, speech and mentation normal; moving all 4 extremities equally.   Psych: affect and mood normal    Imaging: None    Urinalysis: UA RESULTS:  Recent Labs   Lab Test 01/31/23  1035 12/05/22  1158   COLOR Yellow Yellow   APPEARANCE Clear Clear   URINEGLC Negative Negative   URINEBILI Negative Small*   URINEKETONE Negative 40*   SG <=1.005 1.015   UBLD Trace* Trace*   URINEPH 6.0 6.0   PROTEIN Negative Trace*   UROBILINOGEN 1.0 >=8.0*   NITRITE Negative Negative   LEUKEST Trace* Small*   RBCU  --  0-2   WBCU  --  0-5       PSA: 8.51    Post Void Residual:     Other labs: None today      IMPRESSION:  Intermediate risk prostate cancer    PLAN:  He has been diagnosed with an intermediate risk prostate cancer.  I discussed the diagnosis with the patient and his daughter and discussed treatment options.  We discussed the  natural history of prostate cancer as well.  Treatment options discussed included active surveillance, radical prostatectomy, radiotherapy combined with hormonal therapy, and ablative treatment options.  I would not recommend radical prostatectomy due to his age and body habitus.  We discussed the pros and cons of the above options.    At this time I recommended that we send his biopsy specimen for Decipher testing.  We discussed the rationale behind this and he agreed.  If Decipher testing suggests a more aggressive cancer he may consider radiotherapy combined with hormonal therapy.  If Decipher testing shows an intermediate grade cancer he could potentially proceed with active surveillance.  I will call him when the decipher test result is available.      Casey Santos M.D.              Video-Visit Details    Type of service:  Video Visit   Video Start Time: 11:07 AM  Video End Time:11:26 AM    Originating Location (pt. Location): Home    Distant Location (provider location):  On-site  Platform used for Video Visit: Garett

## 2023-02-17 NOTE — NURSING NOTE
Patient presents with:  Consult: Establishing care with new provider      No Pain (1)         What medications are you using for pain? Tylenol    (New patients only) Have you been seen by another pain clinic/ provider? Yes, former [facility]    Julio Han, EMT

## 2023-02-17 NOTE — LETTER
2/17/2023       RE: Hugo Weber  [address]  [facility] [address]     Dear Colleague,    Thank you for referring your patient, Hugo Weber, to the [facility] [place] at [facility]. Please see a copy of my visit note below.                          [facility] Consultation    Date of visit: 2/17/2023    Reason for consultation:    Hugo Weber is a 77 year old male who is seen in consultation today to re-establish care.    PCP is Brett Cassidy.  Pain medications are being prescribed by PCP.    Chief Complaint:    Chief Complaint   Patient presents with     Consult     Establishing care with new provider       Pain history:  Hugo Weber is a 77 year old male presenting with a chief pain complaint of left lower abdomen pain    Onset: 3-4 years  Location: left lower quadrant abdomen, feels deeper than skin layer  Provoking: worse sometimes after he eats food or when he wakes up  Palliating: improves 100% with water  Quality: dull, sometimes sharp  Radiation: none  Severity: 1-2/10  Timing: morning  Numbness: none  Weakness: none    Also has L shoulder pain which he will have operated on March 7 - reverse shoulder by Dr. Multani.    Current pain medications include:  none    Previous medication treatments included:  Gabapentin - been off it for 1 week and pain has actually been better.    Other treatments have included:  Hugo Weber has been seen at a pain clinic in the past.   PT: none  Injections: none  Surgery:    1986 Gastric bypass   1970 appendectomy  Alternative: none    Past Medical History:  Past Medical History:   Diagnosis Date     Atrial fibrillation 2006    Followed by [facility] - persistent     Calculus of kidney 2005, 6/06, 10/12, 8/18, 9/19    dr walker/nestor/иван - hematuria - w/u o/w neg     Cervical stenosis of spine     Moderate C4-5      Cholelithiasis      Chronic peptic ulcer, unspecified site, without mention of hemorrhage, perforation, or obstruction 1985    gastric bypass     Colon polyps 8/05, 9/10, 10/15    2 tubular adenoma, 1 hyperplastic - multiple serrated/tubular adenomas - last colonscopy 11/20 due 5 yrs     CVA (cerebral vascular accident) (H) 01/2019    small right periorlandic subcortical - left sided residual - left hand tingling/numbness - Left leg weak/numbness - cardioembolic     Elevated prostate specific antigen (PSA)     Dr Santos     Hepatitis C 10/2012    chronic hepatitis C, grade 1-2, stage 1 - mild - Dr Douglas     Hyperlipidemia LDL goal <70      Hypertension goal BP (blood pressure) < 140/90 06/2006    dr jaciel winchester     Iron deficiency anemia, unspecified 1985    gastric bypass     Nephrolithiasis multiple episodes, last 10/19    Dr Bey/Ivana/Tom - 9mm 3/2021     OA (osteoarthritis)     Multiple - Left shoulder with rotator cuff tear - Dr Durham     Obesity, unspecified     s/p gastric bypass 1985      Obstructive sleep apnea syndrome     CPAP - 15 cm - Dream station 1/2023     Prediabetes      Presbyacusis 01/2004    dr corona     Pyelonephritis, unspecified 12/1999     SCC (squamous cell carcinoma), 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    dr saez - lt conchal bowl     Sleep apnea 10/2002    cpap - severe - 15 cm - dr cunha     Stroke (H) 01/19/2019     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    Mn Head and Neck     Vitamin B12 deficiency (non anemic) 04/15/2019     Vitamin D deficiency 04/15/2019     Patient Active Problem List    Diagnosis Date Noted     Hyperlipidemia LDL goal <70 12/30/2011     Priority: High     Elevated prostate specific antigen (PSA) 02/03/2023     Priority: Medium     Dr Santos       Chronic LLQ pain 08/16/2022     Priority: Medium     NSVT (nonsustained ventricular tachycardia) 06/23/2021     Priority: Medium     Small bowel obstruction (H) 06/21/2021     Priority: Medium     History of CVA (cerebrovascular accident)  10/29/2020     Priority: Medium     Vitamin B12 deficiency (non anemic) 04/15/2019     Priority: Medium     Vitamin D deficiency 04/15/2019     Priority: Medium     Cervical stenosis of spine      Priority: Medium     Moderate C4-5       Stroke (H) 01/19/2019     Priority: Medium     Reported 2/5/2019, per patient occurred on 1/19/2019 while in [place]       CVA (cerebral vascular accident) (H) 01/01/2019     Priority: Medium     small right periorlandic subcortical       Thoracic aortic ectasia (H) 07/06/2018     Priority: Medium     Benign prostatic hyperplasia (BPH) with urinary urge incontinence 07/05/2018     Priority: Medium     First degree AV block      Priority: Medium     OA (osteoarthritis)      Priority: Medium     Multiple - Left shoulder with rotator cuff tear - Dr Durham       Nephrolithiasis      Priority: Medium     Myofascial pain 10/12/2017     Priority: Medium     Hypertension goal BP (blood pressure) < 140/90 09/25/2017     Priority: Medium     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     Priority: Medium     Mn Head and Neck       Cholelithiasis      Priority: Medium     Paroxysmal atrial fibrillation (H) 05/27/2016     Priority: Medium     Prediabetes      Priority: Medium     History of hepatitis C 05/12/2015     Priority: Medium     SVR May 2015       NAFLD (nonalcoholic fatty liver disease) 09/05/2014     Priority: Medium     Morbid obesity due to excess calories (H) 09/05/2014     Priority: Medium     Total knee replacement status 08/13/2012     Priority: Medium     Long term current use of anticoagulant therapy - for intermittent a-fib 03/30/2012     Priority: Medium     Obstructive sleep apnea syndrome      Priority: Medium     cpap - severe - 15 cm - dr ucnha       Atrial fibrillation 2006     Priority: Medium     Followed by [facility] - persistent       Calculus of kidney 06/06/2005     Priority: Medium     dr walker - hematuria - w/u o/w neg       Iron deficiency anemia 08/22/2003     Priority:  Medium     Problem list name updated by automated process. Provider to review       Advance Care Planning 09/25/2017     Priority: Low     Discussed advance care planning with patient; information given to patient to review. 8/7/2012 Ernestine Ojeda CMA       Colon polyps      Priority: Low       Past Surgical History:  Past Surgical History:   Procedure Laterality Date     APPENDECTOMY       APPENDECTOMY       ARTHROPLASTY  08/13/2012    knee     ARTHROPLASTY KNEE  08/13/2012    LT TKA - Dr Villanueva     CARDIOVERSION  2016     CARDIOVERSION       COLONOSCOPY  8/05, 9/10, 10/15    multiple tubular/serrated/hyperplastic polyps     COLONOSCOPY N/A 10/29/2015    multiple tubular and serrated adenomas     COLONOSCOPY N/A 09/07/2016     COLONOSCOPY  11/2020    tubular adenomas - due 5 yrs     COLONOSCOPY N/A 11/24/2020    Procedure: COLONOSCOPY with biopsies;  Surgeon: Chadwick Burgos MD;  Location: [facility] OR     CYSTOSCOPY W/ LASER LITHOTRIPSY  10/16/2012,5/2/2018     ESOPHAGOSCOPY, GASTROSCOPY, DUODENOSCOPY (EGD), COMBINED N/A 11/24/2020    Procedure: ESOPHAGOGASTRODUODENOSCOPY, COLONOSCOPY with biopsies;  Surgeon: Chadwick Burgos MD;  Location: [facility] OR     EYE SURGERY  Lasik     EYE SURGERY  1/01,6/02,11/02    lasik     GASTRIC BYPASS  1985     HC KNEE SCOPE, DIAGNOSTIC  05/2000    Arthroscopy, Knee RT     LASER HOLMIUM LITHOTRIPSY URETER(S), INSERT STENT, COMBINED  10/16/2012    stones x 4, Dr Campa     LASER HOLMIUM LITHOTRIPSY URETER(S), INSERT STENT, COMBINED Right 05/02/2018    CYSTOSCOPY, RIGHT URETEROSCOPY, HOLMIUM LASER LITHOTRIPSY, RIGHT STENT PLACEMENT - Dr Bey     MRI BIOPSY PROSTATE Bilateral 02/09/2023    mark     OTHER SURGICAL HISTORY  1979    cellulitis     OTHER SURGICAL HISTORY Bilateral 11/1998 ,11/99    forearm spur     OTHER SURGICAL HISTORY  07/2006    lihoripsy     OTHER SURGICAL HISTORY  10/08, 12/08    lt ear choncal lesion removal scc     REPLACEMENT TOTAL KNEE  08/13/2012     SURGICAL  HISTORY OF -   1985    s/p gastric bypass Bilroth II     SURGICAL HISTORY OF -   11/1998    wisdom teeth     SURGICAL HISTORY OF -   1979    cellulitis     SURGICAL HISTORY OF -   11/1998    lt forearm spur's     SURGICAL HISTORY OF -   1/01,6/02,11/02    s/p lasik     SURGICAL HISTORY OF -   11/1999    rt forearm spurs     SURGICAL HISTORY OF -   07/2006    dr stevenson - lithotrypsy     SURGICAL HISTORY OF -   10/08, 12/08    Lt ear chonchal lesion removal SCC - dr saez     SURGICAL HISTORY OF -  Right 10/2019    nephrolithiasis - cystoscopy, rt lithotrypsy, Dr Heath     SURGICAL HISTORY OF -  Right 11/2019    Cystoscopy, Rt lithotrypsy, Calcium Oxalate, Dr Heath     WISDOM TOOTH EXTRACTION  11/1998     Medications:  Current Outpatient Medications   Medication Sig Dispense Refill     apixaban ANTICOAGULANT (ELIQUIS) 5 MG tablet Take 1 tablet (5 mg) by mouth 2 times daily Appointment needed for additional refills. Please call [phone] to schedule. 180 tablet 3     betamethasone dipropionate (DIPROSONE) 0.05 % external ointment Apply topically 2 times daily 50 g 1     cholecalciferol 25 MCG (1000 UT) TABS taken during winter only       Cyanocobalamin 5000 MCG TBDP Take by mouth daily        Ferrous Sulfate (IRON) 325 (65 Fe) MG tablet Take 1 tablet by mouth every other day  90 tablet 3     fluticasone (FLONASE) 50 MCG/ACT nasal spray Spray 2 sprays into both nostrils daily as needed for rhinitis or allergies 54.6 mL 3     losartan (COZAAR) 50 MG tablet Take 1 tablet (50 mg) by mouth daily 90 tablet 3     metoprolol succinate ER (TOPROL XL) 50 MG 24 hr tablet Take 1 tablet (50 mg) by mouth daily 90 tablet 3     niacin 500 MG tablet Take by mouth daily (with breakfast)       tamsulosin (FLOMAX) 0.4 MG capsule Take 1 capsule (0.4 mg) by mouth 2 times daily 180 capsule 3     vitamin B-12 (CYANOCOBALAMIN) 1000 MCG tablet Take by mouth every 24 hours       vitamin C (ASCORBIC ACID) 1000 MG TABS Take 1,000 mg by  mouth daily       Vitamin D-Vitamin K (K2 PLUS D3) 100-1000 MCG-UNIT TABS        Allergies:   No Known Allergies    Family history:  Family History   Problem Relation Age of Onset     Alzheimer Disease Father 82         at 88     Prostate Cancer Father 76        bladder and prostate     Heart Disease Mother 80        CHF     Heart Disease Maternal Grandfather         MI at 55     Cancer Paternal Uncle         ?     Ulcerative Colitis No family hx of      Crohn's Disease No family hx of      Stomach Cancer No family hx of      GERD No family hx of        Physical Exam:  Vitals:    23 1228   BP: 113/79   BP Location: Right arm   Patient Position: Chair   Cuff Size: Adult Large   Pulse: 67   SpO2: 99%     Exam:  Constitutional: Well developed, NAD  Head: normocephalic. Atraumatic.   Eyes: no redness or jaundice noted   CV: warm, well perfused extremities   Skin: no suspicious lesions or rashes   Psychiatric: mentation appears normal and affect full  Abdomen: central midline surgical scar well healed, nondistended, nontender at LLQ even with deep palpation.  Negative Carnett's sign    Diagnostic tests:  none    Personally reviewed imaging: none    [place] Prescription Monitoring Program reviewed: no    Outside records reviewed: no    Assessment:  1. Abdominal pain    Hugo Weber is a 77 year old male who presents with 3 to 4 years of left lower quadrant abdominal pain, worse with eating, improves with drinking water.  It has previously been at about a 4 out of 10 in severity, and the patient was previously treated with gabapentin.  More recently, however, patient incidentally ran out of his gabapentin and has noticed a significant improvement in his abdominal pain, now rating it as an intermittent 1-2 out of 10.  It does not have any association with defecation.  He states he has been worked up for metastatic disease which was negative, as he has been recently diagnosed with prostate cancer.  On examination  he is not tender to palpation, has negative Carnett's sign, and no allodynia.  In a shared discussion with the patient, I believe it is appropriate to pursue symptomatic management of this very conservatively, holding off on therapies, medications, and interventions.  As the patient can essentially entirely treat the pain with drinking water, encouraged him to do this.  I also discussed briefly the possible effect of stool consistency on this pain.    Plan:  Diagnosis reviewed, treatment option addressed, and risk/benefits discussed.     1. Physical Therapy: none  2. Diagnostic Studies: none  3. Medication Management: none  4. Procedures: none  5. Follow up: PRN    35 minutes spent on the date of encounter doing chart review, history, and exam documentation and further activities as noted above.     Prosper Gooden MD  Interventional Pain Medicine  [facility]

## 2023-02-21 ENCOUNTER — OFFICE VISIT (OUTPATIENT)
Dept: FAMILY MEDICINE | Facility: CLINIC | Age: 78
End: 2023-02-21
Payer: COMMERCIAL

## 2023-02-21 VITALS
HEIGHT: 72 IN | SYSTOLIC BLOOD PRESSURE: 124 MMHG | DIASTOLIC BLOOD PRESSURE: 76 MMHG | RESPIRATION RATE: 16 BRPM | TEMPERATURE: 97 F | OXYGEN SATURATION: 97 % | BODY MASS INDEX: 40.77 KG/M2 | WEIGHT: 301 LBS | HEART RATE: 83 BPM

## 2023-02-21 DIAGNOSIS — Z01.818 PREOP GENERAL PHYSICAL EXAM: Primary | ICD-10-CM

## 2023-02-21 DIAGNOSIS — Z86.19 HISTORY OF HEPATITIS C: ICD-10-CM

## 2023-02-21 DIAGNOSIS — Z51.81 MEDICATION MONITORING ENCOUNTER: ICD-10-CM

## 2023-02-21 DIAGNOSIS — I77.810 THORACIC AORTIC ECTASIA (H): ICD-10-CM

## 2023-02-21 DIAGNOSIS — K76.0 NAFLD (NONALCOHOLIC FATTY LIVER DISEASE): ICD-10-CM

## 2023-02-21 DIAGNOSIS — E66.01 MORBID OBESITY DUE TO EXCESS CALORIES (H): ICD-10-CM

## 2023-02-21 DIAGNOSIS — I10 HYPERTENSION GOAL BP (BLOOD PRESSURE) < 140/90: ICD-10-CM

## 2023-02-21 DIAGNOSIS — C61 PROSTATE CANCER (H): ICD-10-CM

## 2023-02-21 DIAGNOSIS — R73.03 PREDIABETES: ICD-10-CM

## 2023-02-21 DIAGNOSIS — Z86.73 HISTORY OF CVA (CEREBROVASCULAR ACCIDENT): ICD-10-CM

## 2023-02-21 DIAGNOSIS — I48.0 PAROXYSMAL ATRIAL FIBRILLATION (H): ICD-10-CM

## 2023-02-21 DIAGNOSIS — M19.012 PRIMARY OSTEOARTHRITIS OF LEFT SHOULDER: ICD-10-CM

## 2023-02-21 LAB
ALBUMIN UR-MCNC: NEGATIVE MG/DL
APPEARANCE UR: CLEAR
BACTERIA #/AREA URNS HPF: ABNORMAL /HPF
BILIRUB UR QL STRIP: NEGATIVE
COLOR UR AUTO: YELLOW
ERYTHROCYTE [DISTWIDTH] IN BLOOD BY AUTOMATED COUNT: 14.2 % (ref 10–15)
GLUCOSE UR STRIP-MCNC: NEGATIVE MG/DL
HBA1C MFR BLD: 5.4 % (ref 0–5.6)
HCT VFR BLD AUTO: 42.8 % (ref 40–53)
HGB BLD-MCNC: 13.9 G/DL (ref 13.3–17.7)
HGB UR QL STRIP: ABNORMAL
KETONES UR STRIP-MCNC: 15 MG/DL
LEUKOCYTE ESTERASE UR QL STRIP: NEGATIVE
MCH RBC QN AUTO: 28.7 PG (ref 26.5–33)
MCHC RBC AUTO-ENTMCNC: 32.5 G/DL (ref 31.5–36.5)
MCV RBC AUTO: 88 FL (ref 78–100)
MUCOUS THREADS #/AREA URNS LPF: PRESENT /LPF
NITRATE UR QL: NEGATIVE
PH UR STRIP: 6 [PH] (ref 5–7)
PLATELET # BLD AUTO: 217 10E3/UL (ref 150–450)
RBC # BLD AUTO: 4.84 10E6/UL (ref 4.4–5.9)
RBC #/AREA URNS AUTO: >100 /HPF
SP GR UR STRIP: 1.02 (ref 1–1.03)
UROBILINOGEN UR STRIP-ACNC: 1 E.U./DL
WBC # BLD AUTO: 6.5 10E3/UL (ref 4–11)
WBC #/AREA URNS AUTO: ABNORMAL /HPF

## 2023-02-21 PROCEDURE — 83036 HEMOGLOBIN GLYCOSYLATED A1C: CPT | Performed by: FAMILY MEDICINE

## 2023-02-21 PROCEDURE — 36415 COLL VENOUS BLD VENIPUNCTURE: CPT | Performed by: FAMILY MEDICINE

## 2023-02-21 PROCEDURE — 85027 COMPLETE CBC AUTOMATED: CPT | Performed by: FAMILY MEDICINE

## 2023-02-21 PROCEDURE — 81001 URINALYSIS AUTO W/SCOPE: CPT | Performed by: FAMILY MEDICINE

## 2023-02-21 PROCEDURE — 99215 OFFICE O/P EST HI 40 MIN: CPT | Performed by: FAMILY MEDICINE

## 2023-02-21 PROCEDURE — 80053 COMPREHEN METABOLIC PANEL: CPT | Performed by: FAMILY MEDICINE

## 2023-02-21 PROCEDURE — 93000 ELECTROCARDIOGRAM COMPLETE: CPT | Performed by: FAMILY MEDICINE

## 2023-02-21 NOTE — LETTER
[facility]  [address] [place] [address]  [phone]                    February 27, 2023    Hugo Weber  [address]   [facility] [address]      Dear Hugo,    Here is a summary of your recent test results:    Labs are overall quite good, except     Blood in your urine     We advise:     Please proceed with surgery   Follow up with Dr Santos/his nurse to review microscopic hematuria, probably related to prior prostate biopsies            Thank you very much for trusting [facility].     Healthy regards,          Brett Cassidy M.D.        Results for orders placed or performed in visit on 02/21/23   Comprehensive metabolic panel     Status: Normal   Result Value Ref Range    Sodium 141 136 - 145 mmol/L    Potassium 4.4 3.4 - 5.3 mmol/L    Chloride 104 98 - 107 mmol/L    Carbon Dioxide (CO2) 23 22 - 29 mmol/L    Anion Gap 14 7 - 15 mmol/L    Urea Nitrogen 15.6 8.0 - 23.0 mg/dL    Creatinine 0.80 0.67 - 1.17 mg/dL    Calcium 9.3 8.8 - 10.2 mg/dL    Glucose 94 70 - 99 mg/dL    Alkaline Phosphatase 124 40 - 129 U/L    AST 18 10 - 50 U/L    ALT 15 10 - 50 U/L    Protein Total 6.8 6.4 - 8.3 g/dL    Albumin 3.9 3.5 - 5.2 g/dL    Bilirubin Total 0.5 <=1.2 mg/dL    GFR Estimate >90 >60 mL/min/1.73m2   CBC with platelets     Status: Normal   Result Value Ref Range    WBC Count 6.5 4.0 - 11.0 10e3/uL    RBC Count 4.84 4.40 - 5.90 10e6/uL    Hemoglobin 13.9 13.3 - 17.7 g/dL    Hematocrit 42.8 40.0 - 53.0 %    MCV 88 78 - 100 fL    MCH 28.7 26.5 - 33.0 pg    MCHC 32.5 31.5 - 36.5 g/dL    RDW 14.2 10.0 - 15.0 %    Platelet Count 217 150 - 450 10e3/uL   Hemoglobin A1c     Status: Normal   Result Value Ref Range    Hemoglobin A1C 5.4 0.0 - 5.6 %   UA reflex to Microscopic and Culture     Status: Abnormal    Specimen: Urine, Midstream   Result Value Ref Range    Color Urine Yellow Colorless, Straw, Light Yellow, Yellow    Appearance Urine Clear Clear    Glucose  Urine Negative Negative mg/dL    Bilirubin Urine Negative Negative    Ketones Urine 15 (A) Negative mg/dL    Specific Gravity Urine 1.025 1.003 - 1.035    Blood Urine Large (A) Negative    pH Urine 6.0 5.0 - 7.0    Protein Albumin Urine Negative Negative mg/dL    Urobilinogen Urine 1.0 0.2, 1.0 E.U./dL    Nitrite Urine Negative Negative    Leukocyte Esterase Urine Negative Negative   Urine Microscopic     Status: Abnormal   Result Value Ref Range    Bacteria Urine Few (A) None Seen /HPF    RBC Urine >100 (A) 0-2 /HPF /HPF    WBC Urine None Seen 0-5 /HPF /HPF    Mucus Urine Present (A) None Seen /LPF    Narrative    Urine Culture not indicated

## 2023-02-21 NOTE — PROGRESS NOTES
[address] [address]  Phone: [phone]  Primary Provider: Brett Cassidy  Pre-op Performing Provider: BRETT CASSIDY        PREOPERATIVE EVALUATION:  Today's date: 2/21/2023    Hugo Weber is a 77 year old male who presents for a preoperative evaluation.    Surgical Information:  Surgery/Procedure: LEFT REVERSE SHOULDER ARTHROPLASTY WITH CUSTOM GUIDE  Surgery Location:  OR  Surgeon: Booker Multani MD  Surgery Date: 3/7/23  Time of Surgery: 10:20 am  Where patient plans to recover: At home with family, after 1 night stay  Fax number for surgical facility: in [facility]    Type of Anesthesia Anticipated: General with block    Assessment & Plan     The proposed surgical procedure is considered INTERMEDIATE risk.    Preop general physical exam    - Comprehensive metabolic panel  - CBC with platelets  - UA reflex to Microscopic and Culture  - EKG 12-lead complete w/read - Clinics  - Comprehensive metabolic panel  - CBC with platelets  - Hemoglobin A1c    Primary osteoarthritis of left shoulder      Prostate cancer (H)      History of CVA (cerebrovascular accident)    - Comprehensive metabolic panel  - EKG 12-lead complete w/read - Clinics    Prediabetes    - Comprehensive metabolic panel  - Hemoglobin A1c    Paroxysmal atrial fibrillation (H)    - Comprehensive metabolic panel    Hypertension goal BP (blood pressure) < 140/90    - Comprehensive metabolic panel    NAFLD (nonalcoholic fatty liver disease)    - Comprehensive metabolic panel    History of hepatitis C    - Comprehensive metabolic panel    Medication monitoring encounter    - Comprehensive metabolic panel  - CBC with platelets  - UA reflex to Microscopic and Culture  - EKG 12-lead complete w/read - Clinics  - Comprehensive metabolic panel  - CBC with platelets  - Hemoglobin A1c             Risks and Recommendations:  The patient has the following additional risks and recommendations  for perioperative complications:   - Consult Hospitalist / IM to assist with post-op medical management   - Morbid obesity (BMI >40)    Medication Instructions:  No NSAID's, vitamins, or supplements for 5-7 days prior   Hold eliquis 2 days prior  Hold losartan 1 day prior  OK to take metoprolol, protonix, tamsulosin, flonase    RECOMMENDATION:  APPROVAL GIVEN to proceed with proposed procedure, without further diagnostic evaluation.    Independent interpretation of a test performed by another physician/other qualified health care professional (not separately reported) - ECG     40 minutes spent on the date of the encounter doing chart review, history and exam, documentation and further activities per the note       Subjective     HPI related to upcoming procedure:     Increasing  Left shoulder - TSA    Recent diagnosis prostate cancer - 5/13 biopsies - 3+4, 4+3 - Dr Santos    Preop Questions 2/20/2023   1. Have you ever had a heart attack or stroke? YES - HX of CVA, no residual   2. Have you ever had surgery on your heart or blood vessels, such as a stent placement, a coronary artery bypass, or surgery on an artery in your head, neck, heart, or legs? No   3. Do you have chest pain with activity? No   4. Do you have a history of  heart failure? No   5. Do you currently have a cold, bronchitis or symptoms of other infection? No   6. Do you have a cough, shortness of breath, or wheezing? No   7. Do you or anyone in your family have previous history of blood clots? No   8. Do you or does anyone in your family have a serious bleeding problem such as prolonged bleeding following surgeries or cuts? No   9. Have you ever had problems with anemia or been told to take iron pills? YES - Hx of gastric bypass, 1986   10. Have you had any abnormal blood loss such as black, tarry or bloody stools? No   11. Have you ever had a blood transfusion? No   11a. Have you ever had a transfusion reaction? -   12. Are you willing to have a  blood transfusion if it is medically needed before, during, or after your surgery? Yes   13. Have you or any of your relatives ever had problems with anesthesia? No   14. Do you have sleep apnea, excessive snoring or daytime drowsiness? YES - CPAP   14a. Do you have a CPAP machine? Yes   15. Do you have any artifical heart valves or other implanted medical devices like a pacemaker, defibrillator, or continuous glucose monitor? No   16. Do you have artificial joints? YES - Left TKA   17. Are you allergic to latex? No       Health Care Directive:  Patient has a Health Care Directive on file      Preoperative Review of :   reviewed - controlled substances reflected in medication list.      Off gabapentin - chronic left sided abdominal pain    Hx of CVA - no residual    Prediabetes    Lab Results   Component Value Date    A1C 5.7 08/16/2022    A1C 5.6 08/11/2021    A1C 5.8 11/19/2020    A1C 5.8 08/07/2020    A1C 5.9 02/14/2020    A1C 5.7 12/02/2019    A1C 5.6 09/20/2019     P A fib - on eliquis/metropolol    Htn    BP Readings from Last 3 Encounters:   02/21/23 124/76   02/17/23 113/79   02/09/23 113/75     Creatinine   Date Value Ref Range Status   08/16/2022 0.74 0.66 - 1.25 mg/dL Final   06/22/2021 0.81 0.66 - 1.25 mg/dL Final     Creatinine POCT   Date Value Ref Range Status   01/11/2023 0.5 (L) 0.7 - 1.3 mg/dL Final     GFR Estimate   Date Value Ref Range Status   06/22/2021 87 >60 mL/min/[1.73_m2] Final     Comment:     Non  GFR Calc  Starting 12/18/2018, serum creatinine based estimated GFR (eGFR) will be   calculated using the Chronic Kidney Disease Epidemiology Collaboration   (CKD-EPI) equation.       GFR, ESTIMATED POCT   Date Value Ref Range Status   01/11/2023 >60 >60 mL/min/1.73m2 Final     Hx of hepatitis C - NAFLD - treated with saldovi    Review of Systems  CONSTITUTIONAL: NEGATIVE for fever, chills, change in weight  INTEGUMENTARY/SKIN: NEGATIVE for worrisome rashes, moles or  lesions  EYES: NEGATIVE for vision changes or irritation  ENT/MOUTH: NEGATIVE for ear, mouth and throat problems  RESP: NEGATIVE for significant cough or SOB  CV: NEGATIVE for chest pain, palpitations or peripheral edema  GI: NEGATIVE for nausea, abdominal pain, heartburn, or change in bowel habits  : NEGATIVE for frequency, dysuria, or hematuria  MUSCULOSKELETAL: NEGATIVE for significant arthralgias or myalgia  NEURO: NEGATIVE for weakness, dizziness or paresthesias  ENDOCRINE: NEGATIVE for temperature intolerance, skin/hair changes  HEME: NEGATIVE for bleeding problems  PSYCHIATRIC: NEGATIVE for changes in mood or affect    Patient Active Problem List    Diagnosis Date Noted     Vitamin B12 deficiency (non anemic) 04/15/2019     Priority: High     Vitamin D deficiency 04/15/2019     Priority: High     Stroke (H) 01/19/2019     Priority: High     Reported 2/5/2019, per patient occurred on 1/19/2019 while in [place]       CVA (cerebral vascular accident) (H) 01/01/2019     Priority: High     small right periorlandic subcortical       Thoracic aortic ectasia (H) 07/06/2018     Priority: High     Hypertension goal BP (blood pressure) < 140/90 09/25/2017     Priority: High     Paroxysmal atrial fibrillation (H) 05/27/2016     Priority: High     Prediabetes      Priority: High     History of hepatitis C 05/12/2015     Priority: High     SVR May 2015       NAFLD (nonalcoholic fatty liver disease) 09/05/2014     Priority: High     Hyperlipidemia LDL goal <70 12/30/2011     Priority: High     Obstructive sleep apnea syndrome      Priority: High     cpap - severe - 15 cm - dr cunha       Iron deficiency anemia 08/22/2003     Priority: High     Problem list name updated by automated process. Provider to review       Elevated prostate specific antigen (PSA) 02/03/2023     Priority: Medium     Dr Santos       Prostate cancer (H) 2023     Priority: Medium     Dr Santos - Manju 4+3 = 7       Chronic LLQ pain 08/16/2022      Priority: Medium     NSVT (nonsustained ventricular tachycardia) 06/23/2021     Priority: Medium     Small bowel obstruction (H) 06/21/2021     Priority: Medium     History of CVA (cerebrovascular accident) 10/29/2020     Priority: Medium     Cervical stenosis of spine      Priority: Medium     Moderate C4-5       Benign prostatic hyperplasia (BPH) with urinary urge incontinence 07/05/2018     Priority: Medium     First degree AV block      Priority: Medium     OA (osteoarthritis)      Priority: Medium     Multiple - Left shoulder with rotator cuff tear - Dr Durham       Nephrolithiasis      Priority: Medium     Myofascial pain 10/12/2017     Priority: Medium     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 8/7/2012 Ernestine Ojeda CMA       Morbid obesity due to excess calories (H) 09/05/2014     Priority: Low     Long term current use of anticoagulant therapy - for intermittent a-fib 03/30/2012     Priority: Low      Past Medical History:   Diagnosis Date     Atrial fibrillation 2006    Followed by [facility] - persistent     Calculus of kidney 2005, 6/06, 10/12, 8/18, 9/19    dr walker/nestor/иван - hematuria - w/u o/w neg     Cervical stenosis of spine     Moderate C4-5     Cholelithiasis      Chronic peptic ulcer, unspecified site, without mention of hemorrhage, perforation, or obstruction 1985    gastric bypass     Colon polyps 8/05, 9/10, 10/15    2 tubular adenoma, 1 hyperplastic - multiple serrated/tubular adenomas  - last colonscopy 11/20 due 5 yrs     CVA (cerebral vascular accident) (H) 01/2019    small right periorlandic subcortical - left sided residual - left hand tingling/numbness - Left leg weak/numbness - cardioembolic     Elevated prostate specific antigen (PSA)     Dr Santos     Hepatitis C 10/2012    chronic hepatitis C, grade 1-2, stage 1 - mild - Dr Douglas     Hyperlipidemia LDL goal <70      Hypertension goal BP (blood pressure) < 140/90 06/2006    dr jaciel winchester     Iron deficiency anemia, unspecified 1985    gastric bypass     Nephrolithiasis multiple episodes, last 10/19    Dr Bey/Ivana/Tom - 9mm 3/2021     OA (osteoarthritis)     Multiple - Left shoulder with rotator cuff tear - Dr Durham     Obesity, unspecified     s/p gastric bypass 1985      Obstructive sleep apnea syndrome     CPAP - 15 cm - [facility] 1/2023     Prediabetes      Presbyacusis 01/2004    dr corona     Prostate cancer (H) 2023    Dr Santos - Manju 3+4, 4+3 = 7, bx 5/13 positive     Pyelonephritis, unspecified 12/1999     SCC (squamous cell carcinoma), 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    dr saez - lt conchal bowl     Sleep apnea 10/2002    cpap - severe - 15 cm - dr cunha     Stroke (H) 01/19/2019     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    Mn Head and Neck     Vitamin B12 deficiency (non anemic) 04/15/2019     Vitamin D deficiency 04/15/2019     Past Surgical History:   Procedure Laterality Date     APPENDECTOMY       APPENDECTOMY       ARTHROPLASTY  08/13/2012    knee     ARTHROPLASTY KNEE  08/13/2012    LT TKA - Dr Villanueva     CARDIOVERSION  2016     CARDIOVERSION       COLONOSCOPY  8/05, 9/10, 10/15    multiple tubular/serrated/hyperplastic polyps     COLONOSCOPY N/A 10/29/2015    multiple tubular and serrated adenomas     COLONOSCOPY N/A 09/07/2016     COLONOSCOPY  11/2020    tubular adenomas - due 5 yrs     COLONOSCOPY N/A 11/24/2020    Procedure: COLONOSCOPY with biopsies;  Surgeon: Chadwick Burgos MD;  Location: [facility] OR     CYSTOSCOPY W/ LASER  LITHOTRIPSY  10/16/2012,5/2/2018     ESOPHAGOSCOPY, GASTROSCOPY, DUODENOSCOPY (EGD), COMBINED N/A 11/24/2020    Procedure: ESOPHAGOGASTRODUODENOSCOPY, COLONOSCOPY with biopsies;  Surgeon: Chadwick Burgos MD;  Location: [facility] OR     EYE SURGERY  Lasik     EYE SURGERY  1/01,6/02,11/02    lasik     GASTRIC BYPASS  1985     HC KNEE SCOPE, DIAGNOSTIC  05/2000    Arthroscopy, Knee RT     LASER HOLMIUM LITHOTRIPSY URETER(S), INSERT STENT, COMBINED  10/16/2012    stones x 4, Dr Campa     LASER HOLMIUM LITHOTRIPSY URETER(S), INSERT STENT, COMBINED Right 05/02/2018    CYSTOSCOPY, RIGHT URETEROSCOPY, HOLMIUM LASER LITHOTRIPSY, RIGHT STENT PLACEMENT - Dr Bey     MRI BIOPSY PROSTATE Bilateral 02/09/2023    mark     OTHER SURGICAL HISTORY  1979    cellulitis     OTHER SURGICAL HISTORY Bilateral 11/1998 ,11/99    forearm spur     OTHER SURGICAL HISTORY  07/2006    lihoripsy     OTHER SURGICAL HISTORY  10/08, 12/08    lt ear choncal lesion removal scc     REPLACEMENT TOTAL KNEE  08/13/2012     SURGICAL HISTORY OF -   1985    s/p gastric bypass Bilroth II     SURGICAL HISTORY OF -   11/1998    wisdom teeth     SURGICAL HISTORY OF -   1979    cellulitis     SURGICAL HISTORY OF -   11/1998    lt forearm spur's     SURGICAL HISTORY OF -   1/01,6/02,11/02    s/p lasik     SURGICAL HISTORY OF -   11/1999    rt forearm spurs     SURGICAL HISTORY OF -   07/2006    dr stevenson - lithotrypsy     SURGICAL HISTORY OF -   10/08, 12/08    Lt ear chonchal lesion removal SCC - dr saez     SURGICAL HISTORY OF -  Right 10/2019    nephrolithiasis - cystoscopy, rt lithotrypsy, Dr Heath     SURGICAL HISTORY OF -  Right 11/2019    Cystoscopy, Rt lithotrypsy, Calcium Oxalate, Dr Heath     WISDOM TOOTH EXTRACTION  11/1998     Current Outpatient Medications   Medication Sig Dispense Refill     apixaban ANTICOAGULANT (ELIQUIS) 5 MG tablet Take 1 tablet (5 mg) by mouth 2 times daily Appointment needed for additional refills. Please call  "[phone] to schedule. 180 tablet 3     betamethasone dipropionate (DIPROSONE) 0.05 % external ointment Apply topically 2 times daily 50 g 1     cholecalciferol 25 MCG (1000 UT) TABS taken during winter only       Cyanocobalamin 5000 MCG TBDP Take by mouth daily        Ferrous Sulfate (IRON) 325 (65 Fe) MG tablet Take 1 tablet by mouth every other day  90 tablet 3     fluticasone (FLONASE) 50 MCG/ACT nasal spray Spray 2 sprays into both nostrils daily as needed for rhinitis or allergies 54.6 mL 3     losartan (COZAAR) 50 MG tablet Take 1 tablet (50 mg) by mouth daily 90 tablet 3     metoprolol succinate ER (TOPROL XL) 50 MG 24 hr tablet Take 1 tablet (50 mg) by mouth daily 90 tablet 3     niacin 500 MG tablet Take by mouth daily (with breakfast)       tamsulosin (FLOMAX) 0.4 MG capsule Take 1 capsule (0.4 mg) by mouth 2 times daily 180 capsule 3     vitamin B-12 (CYANOCOBALAMIN) 1000 MCG tablet Take by mouth every 24 hours       vitamin C (ASCORBIC ACID) 1000 MG TABS Take 1,000 mg by mouth daily       Vitamin D-Vitamin K (K2 PLUS D3) 100-1000 MCG-UNIT TABS          No Known Allergies     Social History     Tobacco Use     Smoking status: Never     Smokeless tobacco: Never   Substance Use Topics     Alcohol use: Yes     Alcohol/week: 2.0 - 3.0 standard drinks     Comment: 2-3 per week     Family History   Problem Relation Age of Onset     Alzheimer Disease Father 82         at 88     Prostate Cancer Father         bladder and prostate     Heart Disease Mother 80        CHF     Heart Disease Maternal Grandfather         MI at 55     Cancer Paternal Uncle         ?     Ulcerative Colitis No family hx of      Crohn's Disease No family hx of      Stomach Cancer No family hx of      GERD No family hx of      History   Drug Use No     Comment: acknowledges using herbal supplement \"Vibe\" for energy-none used recently          Objective     /76 (BP Location: Left arm, Patient Position: Chair, Cuff Size: Adult " Large)   Pulse 83   Temp 97  F (36.1  C) (Tympanic)   Resp 16   Ht 1.829 m (6')   Wt 136.5 kg (301 lb)   SpO2 97%   BMI 40.82 kg/m      Physical Exam    GENERAL APPEARANCE: healthy, alert and no distress     EYES: EOMI,  PERRL     HENT: ear canals and TM's normal and nose and mouth without ulcers or lesions     NECK: no adenopathy, no asymmetry, masses, or scars and thyroid normal to palpation     RESP: lungs clear to auscultation - no rales, rhonchi or wheezes     CV: regular rates and rhythm, normal S1 S2, no S3 or S4 and no murmur, click or rub     ABDOMEN:  soft, nontender, no HSM or masses and bowel sounds normal     MS: extremities normal- no gross deformities noted, no evidence of inflammation in joints, FROM in all extremities.     SKIN: no suspicious lesions or rashes     NEURO: Normal strength and tone, sensory exam grossly normal, mentation intact and speech normal     PSYCH: mentation appears normal. and affect normal/bright     LYMPHATICS: No cervical adenopathy    Recent Labs   Lab Test 01/11/23  0951 08/16/22  0804 08/11/21  0840   HGB  --  14.1 14.7   PLT  --  224 226   NA  --  140 137   POTASSIUM  --  4.2 4.1   CR 0.5* 0.74 0.98   A1C  --  5.7* 5.6        Diagnostics:  Labs pending at this time.  Results will be reviewed when available.   EKG: atrial fibrillation, rate 85, normal axis, normal intervals, no acute ST/T changes c/w ischemia, no LVH by voltage criteria, unchanged from previous tracings    Revised Cardiac Risk Index (RCRI):  The patient has the following serious cardiovascular risks for perioperative complications:   - Cerebrovascular Disease (TIA or CVA) = 1 point     RCRI Interpretation: 1 point: Class II (low risk - 0.9% complication rate)    Signed Electronically by:            Brett Cassidy MD, FAAFP     [facility]  [address] [place] [id number]  [email or web address]   Office: [phone]   Fax: [phone]         Copy of this evaluation report is provided to requesting physician.

## 2023-02-21 NOTE — PATIENT INSTRUCTIONS
For informational purposes only. Not to replace the advice of your health care provider. Copyright   2003,  [facility] [employer] [facility]. All rights reserved. Clinically reviewed by Beverley Box MD. Resermap [id number] - REV .  Preparing for Your Surgery  Getting started  A nurse will call you to review your health history and instructions. They will give you an arrival time based on your scheduled surgery time. Please be ready to share:    Your doctor's clinic name and phone number    Your medical, surgical, and anesthesia history    A list of allergies and sensitivities    A list of medicines, including herbal treatments and over-the-counter drugs    Whether the patient has a legal guardian (ask how to send us the papers in advance)  Please tell us if you're pregnant--or if there's any chance you might be pregnant. Some surgeries may injure a fetus (unborn baby), so they require a pregnancy test. Surgeries that are safe for a fetus don't always need a test, and you can choose whether to have one.   If you have a child who's having surgery, please ask for a copy of Preparing for Your Child's Surgery.    Preparing for surgery    Within 10 to 30 days of surgery: Have a pre-op exam (sometimes called an H&P, or History and Physical). This can be done at a clinic or pre-operative center.  ? If you're having a , you may not need this exam. Talk to your care team.    At your pre-op exam, talk to your care team about all medicines you take. If you need to stop any medicines before surgery, ask when to start taking them again.  ? We do this for your safety. Many medicines can make you bleed too much during surgery. Some change how well surgery (anesthesia) drugs work.    Call your insurance company to let them know you're having surgery. (If you don't have insurance, call [phone].)    Call your clinic if there's any change in your health. This includes signs of a cold or flu (sore throat, runny nose,  cough, rash, fever). It also includes a scrape or scratch near the surgery site.    If you have questions on the day of surgery, call your hospital or surgery center.  Eating and drinking guidelines  For your safety: Unless your surgeon tells you otherwise, follow the guidelines below.    Eat and drink as usual until 8 hours before you arrive for surgery. After that, no food or milk.    Drink clear liquids until 2 hours before you arrive. These are liquids you can see through, like water, Gatorade, and Propel Water. They also include plain black coffee and tea (no cream or milk), candy, and breath mints. You can spit out gum when you arrive.    If you drink alcohol: Stop drinking it the night before surgery.    If your care team tells you to take medicine on the morning of surgery, it's okay to take it with a sip of water.  Preventing infection    Shower or bathe the night before and morning of your surgery. Follow the instructions your clinic gave you. (If no instructions, use regular soap.)    Don't shave or clip hair near your surgery site. We'll remove the hair if needed.    Don't smoke or vape the morning of surgery. You may chew nicotine gum up to 2 hours before surgery. A nicotine patch is okay.  ? Note: Some surgeries require you to completely quit smoking and nicotine. Check with your surgeon.    Your care team will make every effort to keep you safe from infection. We will:  ? Clean our hands often with soap and water (or an alcohol-based hand rub).  ? Clean the skin at your surgery site with a special soap that kills germs.  ? Give you a special gown to keep you warm. (Cold raises the risk of infection.)  ? Wear special hair covers, masks, gowns and gloves during surgery.  ? Give antibiotic medicine, if prescribed. Not all surgeries need antibiotics.  What to bring on the day of surgery    Photo ID and insurance card    Copy of your health care directive, if you have one    Glasses and hearing aids (bring  cases)  ? You can't wear contacts during surgery    Inhaler and eye drops, if you use them (tell us about these when you arrive)    CPAP machine or breathing device, if you use them    A few personal items, if spending the night    If you have . . .  ? A pacemaker, ICD (cardiac defibrillator) or other implant: Bring the ID card.  ? An implanted stimulator: Bring the remote control.  ? A legal guardian: Bring a copy of the certified (court-stamped) guardianship papers.  Please remove any jewelry, including body piercings. Leave jewelry and other valuables at home.  If you're going home the day of surgery    You must have a responsible adult drive you home. They should stay with you overnight as well.    If you don't have someone to stay with you, and you aren't safe to go home alone, we may keep you overnight. Insurance often won't pay for this.  After surgery  If it's hard to control your pain or you need more pain medicine, please call your surgeon's office.  Questions?   If you have any questions for your care team, list them here: _________________________________________________________________________________________________________________________________________________________________________ ____________________________________ ____________________________________ ____________________________________

## 2023-02-22 LAB
ALBUMIN SERPL BCG-MCNC: 3.9 G/DL (ref 3.5–5.2)
ALP SERPL-CCNC: 124 U/L (ref 40–129)
ALT SERPL W P-5'-P-CCNC: 15 U/L (ref 10–50)
ANION GAP SERPL CALCULATED.3IONS-SCNC: 14 MMOL/L (ref 7–15)
AST SERPL W P-5'-P-CCNC: 18 U/L (ref 10–50)
BILIRUB SERPL-MCNC: 0.5 MG/DL
BUN SERPL-MCNC: 15.6 MG/DL (ref 8–23)
CALCIUM SERPL-MCNC: 9.3 MG/DL (ref 8.8–10.2)
CHLORIDE SERPL-SCNC: 104 MMOL/L (ref 98–107)
CREAT SERPL-MCNC: 0.8 MG/DL (ref 0.67–1.17)
DEPRECATED HCO3 PLAS-SCNC: 23 MMOL/L (ref 22–29)
GFR SERPL CREATININE-BSD FRML MDRD: >90 ML/MIN/1.73M2
GLUCOSE SERPL-MCNC: 94 MG/DL (ref 70–99)
POTASSIUM SERPL-SCNC: 4.4 MMOL/L (ref 3.4–5.3)
PROT SERPL-MCNC: 6.8 G/DL (ref 6.4–8.3)
SODIUM SERPL-SCNC: 141 MMOL/L (ref 136–145)

## 2023-03-06 ENCOUNTER — ANESTHESIA EVENT (OUTPATIENT)
Dept: SURGERY | Facility: CLINIC | Age: 78
End: 2023-03-06
Payer: COMMERCIAL

## 2023-03-06 NOTE — ANESTHESIA PREPROCEDURE EVALUATION
Anesthesia Pre-Procedure Evaluation    Patient: Hugo Weber   MRN: [id number] : 1945        Procedure : Procedure(s):  LEFT REVERSE SHOULDER ARTHROPLASTY WITH CUSTOM GUIDE          Past Medical History:   Diagnosis Date     Atrial fibrillation     Followed by [facility] - persistent     Calculus of kidney , , 10/12, ,     dr walker/nestor/иван - hematuria - w/u o/w neg     Cervical stenosis of spine     Moderate C4-5     Cholelithiasis      Chronic peptic ulcer, unspecified site, without mention of hemorrhage, perforation, or obstruction     gastric bypass     Colon polyps , 9/10, 10/15    2 tubular adenoma, 1 hyperplastic - multiple serrated/tubular adenomas - last colonscopy  due 5 yrs     CVA (cerebral vascular accident) (H) 2019    small right periorlandic subcortical - left sided residual - left hand tingling/numbness - Left leg weak/numbness - cardioembolic     Elevated prostate specific antigen (PSA)     Dr Santos     Hepatitis C 10/2012    chronic hepatitis C, grade 1-2, stage 1 - mild - Dr Douglas     Hyperlipidemia LDL goal <70      Hypertension goal BP (blood pressure) < 140/90 2006    dr jaciel winchester     Iron deficiency anemia, unspecified     gastric bypass     Nephrolithiasis multiple episodes, last 10/19    Dr Bey/Ivana/Tom - 9mm 3/2021     OA (osteoarthritis)     Multiple - Left shoulder with rotator cuff tear - Dr Durham     Obesity, unspecified     s/p gastric bypass       Obstructive sleep apnea syndrome     CPAP - 15 cm - [facility] 2023     Prediabetes      Presbyacusis 2004    dr corona     Prostate cancer (H)     Dr Santos - Manju 3+4, 4+3 = 7, bx  positive     Pyelonephritis, unspecified 1999     SCC (squamous cell carcinoma), 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    dr saez - lt conchal bowl     Sleep apnea 10/2002    cpap - severe - 15 cm - dr cunha     Stroke (H) 2019     TMJ arthralgia 2017    [facility]      Vitamin B12 deficiency (non anemic) 04/15/2019     Vitamin D deficiency 04/15/2019      Past Surgical History:   Procedure Laterality Date     APPENDECTOMY       APPENDECTOMY       ARTHROPLASTY  08/13/2012    knee     ARTHROPLASTY KNEE  08/13/2012    LT TKA - Dr Villanueva     CARDIOVERSION  2016     CARDIOVERSION       COLONOSCOPY  8/05, 9/10, 10/15    multiple tubular/serrated/hyperplastic polyps     COLONOSCOPY N/A 10/29/2015    multiple tubular and serrated adenomas     COLONOSCOPY N/A 09/07/2016     COLONOSCOPY  11/2020    tubular adenomas - due 5 yrs     COLONOSCOPY N/A 11/24/2020    Procedure: COLONOSCOPY with biopsies;  Surgeon: Chadwick Burgos MD;  Location: [facility] OR     CYSTOSCOPY W/ LASER LITHOTRIPSY  10/16/2012,5/2/2018     ESOPHAGOSCOPY, GASTROSCOPY, DUODENOSCOPY (EGD), COMBINED N/A 11/24/2020    Procedure: ESOPHAGOGASTRODUODENOSCOPY, COLONOSCOPY with biopsies;  Surgeon: Chadwick Burgos MD;  Location: [facility] OR     EYE SURGERY  Lasik     EYE SURGERY  1/01,6/02,11/02    lasik     GASTRIC BYPASS  1985     HC KNEE SCOPE, DIAGNOSTIC  05/2000    Arthroscopy, Knee RT     LASER HOLMIUM LITHOTRIPSY URETER(S), INSERT STENT, COMBINED  10/16/2012    stones x 4, Dr Campa     LASER HOLMIUM LITHOTRIPSY URETER(S), INSERT STENT, COMBINED Right 05/02/2018    CYSTOSCOPY, RIGHT URETEROSCOPY, HOLMIUM LASER LITHOTRIPSY, RIGHT STENT PLACEMENT - Dr Bey     MRI BIOPSY PROSTATE Bilateral 02/09/2023    mark     OTHER SURGICAL HISTORY  1979    cellulitis     OTHER SURGICAL HISTORY Bilateral 11/1998 ,11/99    forearm spur     OTHER SURGICAL HISTORY  07/2006    lihoripsy     OTHER SURGICAL HISTORY  10/08, 12/08    lt ear choncal lesion removal scc     REPLACEMENT TOTAL KNEE  08/13/2012     SURGICAL HISTORY OF -   1985    s/p gastric bypass Bilroth II     SURGICAL HISTORY OF -   11/1998    wisdom teeth     SURGICAL HISTORY OF -   1979    cellulitis     SURGICAL HISTORY OF -   11/1998    lt forearm spur's     SURGICAL HISTORY  "OF -   1/01,6/02,11/02    s/p lasik     SURGICAL HISTORY OF -   11/1999    rt forearm spurs     SURGICAL HISTORY OF -   07/2006    dr stevenson - lithotrypsy     SURGICAL HISTORY OF -   10/08, 12/08    Lt ear chonchal lesion removal SCC - dr saez     SURGICAL HISTORY OF -  Right 10/2019    nephrolithiasis - cystoscopy, rt lithotrypsy, Dr Heath     SURGICAL HISTORY OF -  Right 11/2019    Cystoscopy, Rt lithotrypsy, Calcium Oxalate, Dr Heath     WISDOM TOOTH EXTRACTION  11/1998      No Known Allergies   Social History     Tobacco Use     Smoking status: Never     Smokeless tobacco: Never   Substance Use Topics     Alcohol use: Yes     Alcohol/week: 2.0 - 3.0 standard drinks     Comment: 2-3 per week      Wt Readings from Last 1 Encounters:   02/21/23 136.5 kg (301 lb)        Anesthesia Evaluation   Pt has had prior anesthetic.     No history of anesthetic complications       ROS/MED HX  ENT/Pulmonary:     (+) sleep apnea, severe, uses CPAP,     Neurologic:     (+) peripheral neuropathy, - left hand has always felt \"weird\".  Some intermittent tingling.  He could not give me a specific location.  . CVA, date: 2019, without deficits,     Cardiovascular:     (+) hypertension-range: controlled/ ----Taking blood thinners (eliquis stopped 2.5 days ago) CHF (Pt denies ever having CHF sxms) Last EF: 35-40% date: 2022 dysrhythmias (NSVT per H&P, the patient does not know about this and no history of arrest), a-fib, valvular problems/murmurs mild-moderate MR. Previous cardiac testing   Echo: Date: 7/2022 Results:  Interpretation Summary     The visual ejection fraction is 35-40%  Left ventricular systolic function is mild to moderately reduced.  There is mild to moderate (1-2+) mitral regurgitation.  Mild valvular aortic stenosis. The degree of aortic stenosis is similar to before.  There is trace aortic regurgitation.  The ascending aorta is Moderately dilated.  The study was technically difficult.  Stress Test: Date: " Results:    ECG Reviewed: Date: Results:    Cath: Date: Results:      METS/Exercise Tolerance:     Hematologic:       Musculoskeletal: Comment: Ho C4-5 cervical stenosis on H&P, the patient denies      GI/Hepatic: Comment: H/O gastric bypass    (+) hepatitis (sp treated and in resolved per patient) type C, liver disease,  (-) GERD   Renal/Genitourinary:       Endo:     (+) Obesity (BMI 41),     Psychiatric/Substance Use:       Infectious Disease:       Malignancy:   (+) Malignancy, History of Prostate.    Other:            Physical Exam    Airway        Mallampati: III   TM distance: > 3 FB   Neck ROM: full   Mouth opening: > 3 cm    Respiratory Devices and Support         Dental       (+) Modest Abnormalities - crowns, retainers, 1 or 2 missing teeth      Cardiovascular   cardiovascular exam normal          Pulmonary                   OUTSIDE LABS:  CBC:   Lab Results   Component Value Date    WBC 6.5 02/21/2023    WBC 5.4 08/16/2022    HGB 13.9 02/21/2023    HGB 14.1 08/16/2022    HCT 42.8 02/21/2023    HCT 44.2 08/16/2022     02/21/2023     08/16/2022     BMP:   Lab Results   Component Value Date     02/21/2023     08/16/2022    POTASSIUM 4.4 02/21/2023    POTASSIUM 4.2 08/16/2022    CHLORIDE 104 02/21/2023    CHLORIDE 108 08/16/2022    CO2 23 02/21/2023    CO2 25 08/16/2022    BUN 15.6 02/21/2023    BUN 16 08/16/2022    CR 0.80 02/21/2023    CR 0.5 (L) 01/11/2023    GLC 94 02/21/2023     (H) 08/16/2022     COAGS:   Lab Results   Component Value Date    PTT 32 03/21/2016    INR 1.04 03/21/2016     POC:   Lab Results   Component Value Date     (H) 09/07/2016     HEPATIC:   Lab Results   Component Value Date    ALBUMIN 3.9 02/21/2023    PROTTOTAL 6.8 02/21/2023    ALT 15 02/21/2023    AST 18 02/21/2023    GGT 77 (H) 12/24/2012    ALKPHOS 124 02/21/2023    BILITOTAL 0.5 02/21/2023     OTHER:   Lab Results   Component Value Date    LACT 1.1 06/21/2021    A1C 5.4 02/21/2023     BEBO 9.3 02/21/2023    PHOS 3.4 10/29/2012    MAG 1.9 06/23/2021    LIPASE 27 (L) 03/08/2021    AMYLASE 74 10/28/2020    TSH 2.84 08/16/2022    T4 1.08 02/21/2019    CRP <2.9 10/28/2020    SED 8 10/28/2020       Anesthesia Plan    ASA Status:  3   NPO Status:  NPO Appropriate    Anesthesia Type: General.     - Airway: ETT   Induction: Intravenous.   Maintenance: Balanced.   Techniques and Equipment:     - Airway: Video-Laryngoscope         Consents    Anesthesia Plan(s) and associated risks, benefits, and realistic alternatives discussed. Questions answered and patient/representative(s) expressed understanding.    - Discussed:     - Discussed with:  Patient         Postoperative Care    Pain management: IV analgesics, Multi-modal analgesia, Peripheral nerve block (Single Shot).   PONV prophylaxis: Ondansetron (or other 5HT-3), Dexamethasone or Solumedrol, Background Propofol Infusion     Comments:    Other Comments: H&P reviewed    Decadron in PNB (no IV)            Yifan Herzog MD

## 2023-03-07 ENCOUNTER — HOSPITAL ENCOUNTER (OUTPATIENT)
Facility: CLINIC | Age: 78
Discharge: HOME | End: 2023-03-08
Attending: ORTHOPAEDIC SURGERY | Admitting: ORTHOPAEDIC SURGERY
Payer: COMMERCIAL

## 2023-03-07 ENCOUNTER — ANESTHESIA (OUTPATIENT)
Dept: SURGERY | Facility: CLINIC | Age: 78
End: 2023-03-07
Payer: COMMERCIAL

## 2023-03-07 ENCOUNTER — APPOINTMENT (OUTPATIENT)
Dept: GENERAL RADIOLOGY | Facility: CLINIC | Age: 78
End: 2023-03-07
Attending: STUDENT IN AN ORGANIZED HEALTH CARE EDUCATION/TRAINING PROGRAM
Payer: COMMERCIAL

## 2023-03-07 ENCOUNTER — MEDICAL CORRESPONDENCE (OUTPATIENT)
Dept: HEALTH INFORMATION MANAGEMENT | Facility: CLINIC | Age: 78
End: 2023-03-07

## 2023-03-07 DIAGNOSIS — Z96.612 S/P SHOULDER REPLACEMENT, LEFT: Primary | ICD-10-CM

## 2023-03-07 LAB
GLUCOSE BLDC GLUCOMTR-MCNC: 190 MG/DL (ref 70–99)
POTASSIUM SERPL-SCNC: 4.3 MMOL/L (ref 3.4–5.3)

## 2023-03-07 PROCEDURE — 250N000011 HC RX IP 250 OP 636: Performed by: STUDENT IN AN ORGANIZED HEALTH CARE EDUCATION/TRAINING PROGRAM

## 2023-03-07 PROCEDURE — 36415 COLL VENOUS BLD VENIPUNCTURE: CPT | Performed by: ANESTHESIOLOGY

## 2023-03-07 PROCEDURE — 250N000025 HC SEVOFLURANE, PER MIN: Performed by: ORTHOPAEDIC SURGERY

## 2023-03-07 PROCEDURE — 999N000063 XR SHOULDER LEFT PORT G/E 2 VIEWS: Mod: LT

## 2023-03-07 PROCEDURE — 250N000013 HC RX MED GY IP 250 OP 250 PS 637: Performed by: NURSE PRACTITIONER

## 2023-03-07 PROCEDURE — 84132 ASSAY OF SERUM POTASSIUM: CPT | Performed by: ANESTHESIOLOGY

## 2023-03-07 PROCEDURE — 258N000003 HC RX IP 258 OP 636: Performed by: ANESTHESIOLOGY

## 2023-03-07 PROCEDURE — 710N000009 HC RECOVERY PHASE 1, LEVEL 1, PER MIN: Performed by: ORTHOPAEDIC SURGERY

## 2023-03-07 PROCEDURE — C1776 JOINT DEVICE (IMPLANTABLE): HCPCS | Performed by: ORTHOPAEDIC SURGERY

## 2023-03-07 PROCEDURE — 258N000003 HC RX IP 258 OP 636: Performed by: NURSE ANESTHETIST, CERTIFIED REGISTERED

## 2023-03-07 PROCEDURE — C1713 ANCHOR/SCREW BN/BN,TIS/BN: HCPCS | Performed by: ORTHOPAEDIC SURGERY

## 2023-03-07 PROCEDURE — 250N000011 HC RX IP 250 OP 636: Performed by: ANESTHESIOLOGY

## 2023-03-07 PROCEDURE — 250N000009 HC RX 250: Performed by: ANESTHESIOLOGY

## 2023-03-07 PROCEDURE — 360N000077 HC SURGERY LEVEL 4, PER MIN: Performed by: ORTHOPAEDIC SURGERY

## 2023-03-07 PROCEDURE — 250N000009 HC RX 250: Performed by: NURSE ANESTHETIST, CERTIFIED REGISTERED

## 2023-03-07 PROCEDURE — 250N000011 HC RX IP 250 OP 636: Performed by: NURSE ANESTHETIST, CERTIFIED REGISTERED

## 2023-03-07 PROCEDURE — 82962 GLUCOSE BLOOD TEST: CPT

## 2023-03-07 PROCEDURE — 250N000013 HC RX MED GY IP 250 OP 250 PS 637: Performed by: STUDENT IN AN ORGANIZED HEALTH CARE EDUCATION/TRAINING PROGRAM

## 2023-03-07 PROCEDURE — 250N000011 HC RX IP 250 OP 636: Performed by: ORTHOPAEDIC SURGERY

## 2023-03-07 PROCEDURE — 272N000001 HC OR GENERAL SUPPLY STERILE: Performed by: ORTHOPAEDIC SURGERY

## 2023-03-07 PROCEDURE — 999N000141 HC STATISTIC PRE-PROCEDURE NURSING ASSESSMENT: Performed by: ORTHOPAEDIC SURGERY

## 2023-03-07 PROCEDURE — 370N000017 HC ANESTHESIA TECHNICAL FEE, PER MIN: Performed by: ORTHOPAEDIC SURGERY

## 2023-03-07 DEVICE — BASEPLATE LATERAL RVRS 25MM OFFS 15D WEDGE AUGMENT DWJ505: Type: IMPLANTABLE DEVICE | Site: SHOULDER | Status: FUNCTIONAL

## 2023-03-07 DEVICE — INSERT REVISION REVERSE +6/39MM DWF391B: Type: IMPLANTABLE DEVICE | Site: SHOULDER | Status: FUNCTIONAL

## 2023-03-07 DEVICE — SCREW PERIPHERAL 5.0X30MM DWJ330: Type: IMPLANTABLE DEVICE | Site: SHOULDER | Status: FUNCTIONAL

## 2023-03-07 DEVICE — IMPLANTABLE DEVICE: Type: IMPLANTABLE DEVICE | Site: SHOULDER | Status: FUNCTIONAL

## 2023-03-07 DEVICE — GLEOSPHERE LATERALIZED AP REVERSED +3 39MM DWJ033: Type: IMPLANTABLE DEVICE | Site: SHOULDER | Status: FUNCTIONAL

## 2023-03-07 DEVICE — SCREW PERIPHERAL 22MM: Type: IMPLANTABLE DEVICE | Site: SHOULDER | Status: FUNCTIONAL

## 2023-03-07 DEVICE — SCREW PERIPHERAL 38MM: Type: IMPLANTABLE DEVICE | Site: SHOULDER | Status: FUNCTIONAL

## 2023-03-07 DEVICE — SCREW CENTRAL 6.5X30MM DWJ130: Type: IMPLANTABLE DEVICE | Site: SHOULDER | Status: FUNCTIONAL

## 2023-03-07 DEVICE — TRAY REVERSE HIGH OFFSET +0 DWF520: Type: IMPLANTABLE DEVICE | Site: SHOULDER | Status: FUNCTIONAL

## 2023-03-07 RX ORDER — PANTOPRAZOLE SODIUM 40 MG/1
40 TABLET, DELAYED RELEASE ORAL DAILY
Status: DISCONTINUED | OUTPATIENT
Start: 2023-03-08 | End: 2023-03-08 | Stop reason: HOSPADM

## 2023-03-07 RX ORDER — PROCHLORPERAZINE MALEATE 5 MG
5 TABLET ORAL EVERY 6 HOURS PRN
Status: DISCONTINUED | OUTPATIENT
Start: 2023-03-07 | End: 2023-03-08 | Stop reason: HOSPADM

## 2023-03-07 RX ORDER — HYDROMORPHONE HCL IN WATER/PF 6 MG/30 ML
0.2 PATIENT CONTROLLED ANALGESIA SYRINGE INTRAVENOUS
Status: DISCONTINUED | OUTPATIENT
Start: 2023-03-07 | End: 2023-03-08 | Stop reason: HOSPADM

## 2023-03-07 RX ORDER — METOPROLOL SUCCINATE 50 MG/1
50 TABLET, EXTENDED RELEASE ORAL DAILY
Status: DISCONTINUED | OUTPATIENT
Start: 2023-03-08 | End: 2023-03-08 | Stop reason: HOSPADM

## 2023-03-07 RX ORDER — ACETAMINOPHEN 325 MG/1
975 TABLET ORAL EVERY 8 HOURS
Status: DISCONTINUED | OUTPATIENT
Start: 2023-03-07 | End: 2023-03-08 | Stop reason: HOSPADM

## 2023-03-07 RX ORDER — ONDANSETRON 2 MG/ML
INJECTION INTRAMUSCULAR; INTRAVENOUS PRN
Status: DISCONTINUED | OUTPATIENT
Start: 2023-03-07 | End: 2023-03-07

## 2023-03-07 RX ORDER — GABAPENTIN 300 MG/1
600 CAPSULE ORAL EVERY MORNING
Status: ON HOLD | COMMUNITY
End: 2023-03-07

## 2023-03-07 RX ORDER — GABAPENTIN 300 MG/1
300 CAPSULE ORAL EVERY EVENING
Status: ON HOLD | COMMUNITY
End: 2023-03-07

## 2023-03-07 RX ORDER — ONDANSETRON 2 MG/ML
4 INJECTION INTRAMUSCULAR; INTRAVENOUS EVERY 6 HOURS PRN
Status: DISCONTINUED | OUTPATIENT
Start: 2023-03-07 | End: 2023-03-08 | Stop reason: HOSPADM

## 2023-03-07 RX ORDER — NALOXONE HYDROCHLORIDE 0.4 MG/ML
0.4 INJECTION, SOLUTION INTRAMUSCULAR; INTRAVENOUS; SUBCUTANEOUS
Status: DISCONTINUED | OUTPATIENT
Start: 2023-03-07 | End: 2023-03-08 | Stop reason: HOSPADM

## 2023-03-07 RX ORDER — ACETAMINOPHEN 325 MG/1
650 TABLET ORAL EVERY 4 HOURS PRN
Status: DISCONTINUED | OUTPATIENT
Start: 2023-03-10 | End: 2023-03-08 | Stop reason: HOSPADM

## 2023-03-07 RX ORDER — SODIUM CHLORIDE, SODIUM LACTATE, POTASSIUM CHLORIDE, CALCIUM CHLORIDE 600; 310; 30; 20 MG/100ML; MG/100ML; MG/100ML; MG/100ML
INJECTION, SOLUTION INTRAVENOUS CONTINUOUS
Status: DISCONTINUED | OUTPATIENT
Start: 2023-03-07 | End: 2023-03-07 | Stop reason: HOSPADM

## 2023-03-07 RX ORDER — TRANEXAMIC ACID 650 MG/1
1950 TABLET ORAL ONCE
Status: COMPLETED | OUTPATIENT
Start: 2023-03-07 | End: 2023-03-07

## 2023-03-07 RX ORDER — AMOXICILLIN 250 MG
1-2 CAPSULE ORAL 2 TIMES DAILY
Qty: 30 TABLET | Refills: 0 | Status: SHIPPED | OUTPATIENT
Start: 2023-03-07 | End: 2023-08-24

## 2023-03-07 RX ORDER — LIDOCAINE 40 MG/G
CREAM TOPICAL
Status: DISCONTINUED | OUTPATIENT
Start: 2023-03-07 | End: 2023-03-08 | Stop reason: HOSPADM

## 2023-03-07 RX ORDER — VANCOMYCIN HYDROCHLORIDE 1 G/20ML
INJECTION, POWDER, LYOPHILIZED, FOR SOLUTION INTRAVENOUS PRN
Status: DISCONTINUED | OUTPATIENT
Start: 2023-03-07 | End: 2023-03-07 | Stop reason: HOSPADM

## 2023-03-07 RX ORDER — FERROUS SULFATE 325(65) MG
325 TABLET ORAL
Status: DISCONTINUED | OUTPATIENT
Start: 2023-03-08 | End: 2023-03-08 | Stop reason: HOSPADM

## 2023-03-07 RX ORDER — HYDROXYZINE HYDROCHLORIDE 10 MG/1
10 TABLET, FILM COATED ORAL EVERY 6 HOURS PRN
Status: DISCONTINUED | OUTPATIENT
Start: 2023-03-07 | End: 2023-03-08 | Stop reason: HOSPADM

## 2023-03-07 RX ORDER — FENTANYL CITRATE 50 UG/ML
INJECTION, SOLUTION INTRAMUSCULAR; INTRAVENOUS PRN
Status: DISCONTINUED | OUTPATIENT
Start: 2023-03-07 | End: 2023-03-07

## 2023-03-07 RX ORDER — ONDANSETRON 2 MG/ML
4 INJECTION INTRAMUSCULAR; INTRAVENOUS EVERY 30 MIN PRN
Status: DISCONTINUED | OUTPATIENT
Start: 2023-03-07 | End: 2023-03-07 | Stop reason: HOSPADM

## 2023-03-07 RX ORDER — ACETAMINOPHEN 325 MG/1
975 TABLET ORAL ONCE
Status: COMPLETED | OUTPATIENT
Start: 2023-03-07 | End: 2023-03-07

## 2023-03-07 RX ORDER — FLUTICASONE PROPIONATE 50 MCG
2 SPRAY, SUSPENSION (ML) NASAL DAILY PRN
Status: DISCONTINUED | OUTPATIENT
Start: 2023-03-07 | End: 2023-03-08 | Stop reason: HOSPADM

## 2023-03-07 RX ORDER — HYDROMORPHONE HCL IN WATER/PF 6 MG/30 ML
0.4 PATIENT CONTROLLED ANALGESIA SYRINGE INTRAVENOUS EVERY 5 MIN PRN
Status: DISCONTINUED | OUTPATIENT
Start: 2023-03-07 | End: 2023-03-07 | Stop reason: HOSPADM

## 2023-03-07 RX ORDER — ONDANSETRON 4 MG/1
4 TABLET, ORALLY DISINTEGRATING ORAL EVERY 6 HOURS PRN
Status: DISCONTINUED | OUTPATIENT
Start: 2023-03-07 | End: 2023-03-08 | Stop reason: HOSPADM

## 2023-03-07 RX ORDER — HYDROMORPHONE HCL IN WATER/PF 6 MG/30 ML
0.2 PATIENT CONTROLLED ANALGESIA SYRINGE INTRAVENOUS EVERY 5 MIN PRN
Status: DISCONTINUED | OUTPATIENT
Start: 2023-03-07 | End: 2023-03-07 | Stop reason: HOSPADM

## 2023-03-07 RX ORDER — DEXAMETHASONE SODIUM PHOSPHATE 10 MG/ML
INJECTION, SOLUTION INTRAMUSCULAR; INTRAVENOUS
Status: COMPLETED | OUTPATIENT
Start: 2023-03-07 | End: 2023-03-07

## 2023-03-07 RX ORDER — CEFAZOLIN SODIUM/WATER 3 G/30 ML
3 SYRINGE (ML) INTRAVENOUS SEE ADMIN INSTRUCTIONS
Status: DISCONTINUED | OUTPATIENT
Start: 2023-03-07 | End: 2023-03-07 | Stop reason: HOSPADM

## 2023-03-07 RX ORDER — FENTANYL CITRATE 0.05 MG/ML
25 INJECTION, SOLUTION INTRAMUSCULAR; INTRAVENOUS EVERY 5 MIN PRN
Status: DISCONTINUED | OUTPATIENT
Start: 2023-03-07 | End: 2023-03-07 | Stop reason: HOSPADM

## 2023-03-07 RX ORDER — NALOXONE HYDROCHLORIDE 0.4 MG/ML
0.2 INJECTION, SOLUTION INTRAMUSCULAR; INTRAVENOUS; SUBCUTANEOUS
Status: DISCONTINUED | OUTPATIENT
Start: 2023-03-07 | End: 2023-03-08 | Stop reason: HOSPADM

## 2023-03-07 RX ORDER — PROPOFOL 10 MG/ML
INJECTION, EMULSION INTRAVENOUS PRN
Status: DISCONTINUED | OUTPATIENT
Start: 2023-03-07 | End: 2023-03-07

## 2023-03-07 RX ORDER — CEFAZOLIN SODIUM/WATER 3 G/30 ML
3 SYRINGE (ML) INTRAVENOUS
Status: COMPLETED | OUTPATIENT
Start: 2023-03-07 | End: 2023-03-07

## 2023-03-07 RX ORDER — NIACIN 500 MG
500 TABLET ORAL
Status: DISCONTINUED | OUTPATIENT
Start: 2023-03-08 | End: 2023-03-08 | Stop reason: HOSPADM

## 2023-03-07 RX ORDER — ONDANSETRON 4 MG/1
4 TABLET, ORALLY DISINTEGRATING ORAL EVERY 30 MIN PRN
Status: DISCONTINUED | OUTPATIENT
Start: 2023-03-07 | End: 2023-03-07 | Stop reason: HOSPADM

## 2023-03-07 RX ORDER — LIDOCAINE HYDROCHLORIDE 20 MG/ML
INJECTION, SOLUTION INFILTRATION; PERINEURAL PRN
Status: DISCONTINUED | OUTPATIENT
Start: 2023-03-07 | End: 2023-03-07

## 2023-03-07 RX ORDER — CEFAZOLIN SODIUM 2 G/100ML
2 INJECTION, SOLUTION INTRAVENOUS EVERY 8 HOURS
Status: COMPLETED | OUTPATIENT
Start: 2023-03-07 | End: 2023-03-08

## 2023-03-07 RX ORDER — TAMSULOSIN HYDROCHLORIDE 0.4 MG/1
0.4 CAPSULE ORAL 2 TIMES DAILY
Status: DISCONTINUED | OUTPATIENT
Start: 2023-03-07 | End: 2023-03-08 | Stop reason: HOSPADM

## 2023-03-07 RX ORDER — HYDROMORPHONE HCL IN WATER/PF 6 MG/30 ML
0.4 PATIENT CONTROLLED ANALGESIA SYRINGE INTRAVENOUS
Status: DISCONTINUED | OUTPATIENT
Start: 2023-03-07 | End: 2023-03-08 | Stop reason: HOSPADM

## 2023-03-07 RX ORDER — LABETALOL HYDROCHLORIDE 5 MG/ML
10 INJECTION, SOLUTION INTRAVENOUS
Status: DISCONTINUED | OUTPATIENT
Start: 2023-03-07 | End: 2023-03-07 | Stop reason: HOSPADM

## 2023-03-07 RX ORDER — FENTANYL CITRATE 0.05 MG/ML
50 INJECTION, SOLUTION INTRAMUSCULAR; INTRAVENOUS EVERY 5 MIN PRN
Status: DISCONTINUED | OUTPATIENT
Start: 2023-03-07 | End: 2023-03-07 | Stop reason: HOSPADM

## 2023-03-07 RX ORDER — PANTOPRAZOLE SODIUM 40 MG/1
40 TABLET, DELAYED RELEASE ORAL DAILY
COMMUNITY
End: 2023-10-02

## 2023-03-07 RX ORDER — OXYCODONE HYDROCHLORIDE 5 MG/1
10 TABLET ORAL EVERY 4 HOURS PRN
Status: DISCONTINUED | OUTPATIENT
Start: 2023-03-07 | End: 2023-03-08 | Stop reason: HOSPADM

## 2023-03-07 RX ORDER — AMOXICILLIN 250 MG
1 CAPSULE ORAL 2 TIMES DAILY
Status: DISCONTINUED | OUTPATIENT
Start: 2023-03-07 | End: 2023-03-08 | Stop reason: HOSPADM

## 2023-03-07 RX ORDER — OXYCODONE HYDROCHLORIDE 5 MG/1
5 TABLET ORAL EVERY 4 HOURS PRN
Status: DISCONTINUED | OUTPATIENT
Start: 2023-03-07 | End: 2023-03-08 | Stop reason: HOSPADM

## 2023-03-07 RX ORDER — SODIUM CHLORIDE, SODIUM LACTATE, POTASSIUM CHLORIDE, CALCIUM CHLORIDE 600; 310; 30; 20 MG/100ML; MG/100ML; MG/100ML; MG/100ML
INJECTION, SOLUTION INTRAVENOUS CONTINUOUS
Status: DISCONTINUED | OUTPATIENT
Start: 2023-03-07 | End: 2023-03-08 | Stop reason: HOSPADM

## 2023-03-07 RX ADMIN — TRANEXAMIC ACID 1950 MG: 650 TABLET ORAL at 09:10

## 2023-03-07 RX ADMIN — CEFAZOLIN SODIUM 2 G: 2 INJECTION, SOLUTION INTRAVENOUS at 17:40

## 2023-03-07 RX ADMIN — ACETAMINOPHEN 975 MG: 325 TABLET ORAL at 16:42

## 2023-03-07 RX ADMIN — SODIUM CHLORIDE, POTASSIUM CHLORIDE, SODIUM LACTATE AND CALCIUM CHLORIDE: 600; 310; 30; 20 INJECTION, SOLUTION INTRAVENOUS at 12:58

## 2023-03-07 RX ADMIN — TAMSULOSIN HYDROCHLORIDE 0.4 MG: 0.4 CAPSULE ORAL at 21:32

## 2023-03-07 RX ADMIN — SODIUM CHLORIDE, POTASSIUM CHLORIDE, SODIUM LACTATE AND CALCIUM CHLORIDE: 600; 310; 30; 20 INJECTION, SOLUTION INTRAVENOUS at 09:35

## 2023-03-07 RX ADMIN — SUGAMMADEX 200 MG: 100 INJECTION, SOLUTION INTRAVENOUS at 12:58

## 2023-03-07 RX ADMIN — BUPIVACAINE HYDROCHLORIDE 20 ML: 5 INJECTION, SOLUTION EPIDURAL; INTRACAUDAL at 09:43

## 2023-03-07 RX ADMIN — ACETAMINOPHEN 975 MG: 325 TABLET ORAL at 09:10

## 2023-03-07 RX ADMIN — SENNOSIDES AND DOCUSATE SODIUM 1 TABLET: 50; 8.6 TABLET ORAL at 21:32

## 2023-03-07 RX ADMIN — MIDAZOLAM 2 MG: 1 INJECTION INTRAMUSCULAR; INTRAVENOUS at 09:37

## 2023-03-07 RX ADMIN — Medication 3 G: at 10:45

## 2023-03-07 RX ADMIN — LIDOCAINE HYDROCHLORIDE 80 MG: 20 INJECTION, SOLUTION INFILTRATION; PERINEURAL at 10:47

## 2023-03-07 RX ADMIN — PROPOFOL 140 MG: 10 INJECTION, EMULSION INTRAVENOUS at 10:47

## 2023-03-07 RX ADMIN — PHENYLEPHRINE HYDROCHLORIDE 0.5 MCG/KG/MIN: 10 INJECTION INTRAVENOUS at 10:50

## 2023-03-07 RX ADMIN — ROCURONIUM BROMIDE 50 MG: 50 INJECTION, SOLUTION INTRAVENOUS at 10:47

## 2023-03-07 RX ADMIN — DEXAMETHASONE SODIUM PHOSPHATE 10 MG: 10 INJECTION, SOLUTION INTRAMUSCULAR; INTRAVENOUS at 09:43

## 2023-03-07 RX ADMIN — FENTANYL CITRATE 50 MCG: 50 INJECTION, SOLUTION INTRAMUSCULAR; INTRAVENOUS at 10:47

## 2023-03-07 RX ADMIN — ONDANSETRON 4 MG: 2 INJECTION INTRAMUSCULAR; INTRAVENOUS at 12:25

## 2023-03-07 ASSESSMENT — ACTIVITIES OF DAILY LIVING (ADL)
ADLS_ACUITY_SCORE: 27
ADLS_ACUITY_SCORE: 40
ADLS_ACUITY_SCORE: 27

## 2023-03-07 ASSESSMENT — ENCOUNTER SYMPTOMS: DYSRHYTHMIAS: 1

## 2023-03-07 NOTE — ANESTHESIA POSTPROCEDURE EVALUATION
Patient: Hugo Weber    Procedure: Procedure(s):  LEFT REVERSE SHOULDER ARTHROPLASTY WITH CUSTOM GUIDE WITH AUTOLOGOUS BONE GRAFTOF PROXIMAL HUMERUS       Anesthesia Type:  General    Note:  Disposition: Inpatient   Postop Pain Control: Uneventful            Sign Out: Well controlled pain   PONV: No   Neuro/Psych: Uneventful            Sign Out: Acceptable/Baseline neuro status   Airway/Respiratory: Uneventful            Sign Out: Acceptable/Baseline resp. status   CV/Hemodynamics: Uneventful            Sign Out: Acceptable CV status; No obvious hypovolemia; No obvious fluid overload   Other NRE: NONE   DID A NON-ROUTINE EVENT OCCUR? No           Last vitals:  Vitals Value Taken Time   /93 03/07/23 1420   Temp 36.2  C (97.1  F) 03/07/23 1310   Pulse 93 03/07/23 1431   Resp 7 03/07/23 1431   SpO2 98 % 03/07/23 1431   Vitals shown include unvalidated device data.    Electronically Signed By: Mikel Peña DO  March 7, 2023  3:42 PM

## 2023-03-07 NOTE — ANESTHESIA CARE TRANSFER NOTE
Patient: Hugo Weber    Procedure: Procedure(s):  LEFT REVERSE SHOULDER ARTHROPLASTY WITH CUSTOM GUIDE WITH AUTOLOGOUS BONE GRAFTOF PROXIMAL HUMERUS       Diagnosis: Glenohumeral arthritis, left [M19.012]  Diagnosis Additional Information: No value filed.    Anesthesia Type:   General     Note:    Oropharynx: oropharynx clear of all foreign objects and spontaneously breathing  Level of Consciousness: drowsy  Oxygen Supplementation: face mask    Independent Airway: airway patency satisfactory and stable    Vital Signs Stable: post-procedure vital signs reviewed and stable  Report to RN Given: handoff report given  Patient transferred to: PACU    Handoff Report: Identifed the Patient, Identified the Reponsible Provider, Reviewed the pertinent medical history, Discussed the surgical course, Reviewed Intra-OP anesthesia mangement and issues during anesthesia, Set expectations for post-procedure period and Allowed opportunity for questions and acknowledgement of understanding      Vitals:  Vitals Value Taken Time   /94    Temp     Pulse 73    Resp     SpO2 99 % 03/07/23 1309   Vitals shown include unvalidated device data.    Electronically Signed By: BIENVENIDO Desai [facility]  March 7, 2023  1:10 PM

## 2023-03-07 NOTE — CONSULTS
Hospitalist Consult Note  3/7/2023  3:01 PM      Mr. Weber is a 76 yo male with PMH significant for history prostate cancer, history CVA, pre-diabetes and not on medication prior to admission, paroxysmal atrial fibrillation on Eliquis, HTN, NAFLD and OA of the left shoulder who was admitted for elective left reverse shoulder arthroplasty with Dr. Multani on 3/7/23. Hospitalist service was consulted post-op for medical management.     The patient was not seen. EMR was reviewed that included pre-op H&P and PTA medications, labs/diagnostics, notes and vitals. The patient is hemodynamically stable with vital signs within normal limits, pain is well managed, and care thus far is as expected. I have reviewed the H&P, reviewed and reconciled prior to admission medications. Given the above, will defer formal consult. Routine post-operative cares, IVF, DVT prophylaxis, and pain management to orthopedic service. Will check some basic lab work in the AM to assess for acute blood loss anemia given surgery. PT and OT to assess per Ortho. Bowel regimen in place while on pain regimen. No changes to PTA medication regimen at discharge unless issues arise throughout stay. Plan is outlined below.     POD # 0 s/p Left Reverse Shoulder Arthroplasty  Glenohumeral Arthritis   Hemodynamically stable with BP's in the 120's to 130's over 80's to 90's, heart rates 70's to 90's, and satting 96% on RA. EBL of 125 mL. General anesthesia with block.  -Post-op management as per Ortho  -Pain, activity, anticoagulation, IVF's, therapies, diet and BM regimen as per Primary Service  -The patient is on Eliquis prior to admission. Ortho to address and start anticoagulation when clinically indicated post-op  -Aggressive pulmonary hygiene post-op   -Wean oxygen to maintain sats >90%   -Incentive spirometry every hour while awake   -Cough and deep breathe  -PRN pain meds  -BM regimen  -Therapies  -Monitor VS per order  -Hgb in AM    Chronic Atrial  Fibrillation  Long-term Use Anticoagulation  History of NSVT (6/21/21)  -PTA regimen: Eliquis and Metoprolol  -Anticoagulation per Ortho  -Resume BB with hold parameters  -Telemetry     Hypertension   -PTA regimen: Losartan and Metoprolol XL  -Hold Losartan today and check renal function in AM, prior to restarting  -Resume BB, as noted above  -Monitor VS  -BMP in AM    Fe+ Deficiency Anemia  -Resume PTA Fe+     History Prostate Cancer  -Resume PTA Flomax     History CVA (2020)  -Noted     HAYLEE  -CPAP at night and with naps, as tolerated        Medical co-morbidities listed that are chronic and stable medical problems without need for optimization. Hospitalist will follow peripherally and chart check in AM to review heart rates and blood pressures, prior to making medication recommendations for discharge.The Hospitalist service appreciates this consult.    Paola Bolivar NP  [facility]  Securely message with the Vocera Web Console (learn more here)  Text page via Dynamic Signal Paging/Directory

## 2023-03-07 NOTE — ANESTHESIA PROCEDURE NOTES
Airway       Patient location during procedure: OR       Procedure Start/Stop Times: 3/7/2023 10:49 AM  Staff -        Anesthesiologist:  Yifan Herzog MD       CRNA: Lucy Harp APRN CRNA       Other Anesthesia Staff: Nicol Solano       Performed By: SRNAIndications and Patient Condition       Indications for airway management: hodan-procedural       Induction type:intravenous       Mask difficulty assessment: 1 - vent by mask    Final Airway Details       Final airway type: endotracheal airway       Successful airway: ETT - single  Endotracheal Airway Details        ETT size (mm): 8.0       Cuffed: yes       Cuff volume (mL): 10       Successful intubation technique: video laryngoscopy       VL Blade Size: Glidescope 4       Grade View of Cords: 1       Adjucts: stylet       Position: Right       Measured from: gums/teeth       Secured at (cm): 22       Bite block used: None    Post intubation assessment        Placement verified by: capnometry, equal breath sounds and chest rise        Number of attempts at approach: 1       Secured with: pink tape       Ease of procedure: easy       Dentition: Intact and Unchanged    Medication(s) Administered   Medication Administration Time: 3/7/2023 10:49 AM

## 2023-03-07 NOTE — OP NOTE
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 LEFT REVERSE SHOULDER ARTHROPLASTY WITH CUSTOM GUIDE AND AUGMENTED GLENOID BASEPLATE.  2. AUTOLOGOUS BONE GRAFTOF PROXIMAL HUMERUS  Surgeon: Surgeon(s) and Role:     * Booker Multani MD - Primary     * Susanna Whelan PA-C - Assisting  Anesthesia: General with Block   Estimated Blood Loss: 125ML    Drains: None  Specimens: * No specimens in log *  Findings:   None.  Complications: None.  Implants:   Implant Name Type Inv. Item Serial No.  Lot No. LRB No. Used Action   BASEPLATE LATERAL RVRS 25MM OFFS 15D WEDGE AUGMENT [id number] - [id number] Total Joint Component/Insert BASEPLATE LATERAL RVRS 25MM OFFS 15D WEDGE AUGMENT [id number] [employer]  Left 1 Implanted   SCREW CENTRAL 6.5X30MM [id number] - [id number] Metallic Hardware/[place] SCREW CENTRAL 6.5X30MM [id number]  [employer] 41 04 [id number] Left 1 Implanted   SCREW PERIPHERAL 38MM - [id number] Metallic Hardware/[place] SCREW PERIPHERAL 38MM  [employer] 41 04 [id number] Left 2 Implanted   SCREW PERIPHERAL 22MM - [id number] Metallic Hardware/[place] SCREW PERIPHERAL 22MM  [employer] 41 04 [id number] Left 1 Implanted   SCREW PERIPHERAL 5.0X30MM [id number] - [id number] Metallic Hardware/[place] SCREW PERIPHERAL 5.0X30MM [id number]  [employer] 41 04 [id number] Left 1 Implanted   GLEOSPHERE LATERALIZED AP REVERSED +3 39MM [id number] - [id number] Total Joint Component/Insert GLEOSPHERE LATERALIZED AP REVERSED +3 39MM [id number] [employer]  Left 1 Implanted   HUMERAL STEM PTC 7B STANDARD 90MM [id number] - [id number] Total Joint Component/Insert HUMERAL STEM PTC 7B STANDARD 90MM [id number] [employer]  Left 1 Implanted   INSERT REVISION REVERSE +6/39MM [id number] - [id number] Total Joint Component/Insert  INSERT REVISION REVERSE +6/39MM [id number] [employer]N  Left 1 Implanted   TRAY REVERSE HIGH OFFSET +0 [id number] - [id number] Total Joint Component/Insert TRAY REVERSE HIGH OFFSET +0 [id number] [employer]  Left 1 Implanted           NARRATIVE:  After discussing the risks, benefits, possible complications and alternatives, the patient voiced their understanding and gave consent . The patient wished to proceed.  An interscalene block was administered. The patient was then brought to the operating room and placed in a supine position. Following administration of general anesthetic, the patient was positioned in the modified beach chair position. All bony prominences were well-padded. A lateral chest pad was placed, and the head was secured with a [place] head hernandez with safety goggles in place.    After sterile prep and drape, a standard deltopectoral incision   was made, carried down through the skin and soft tissue. Electrocautery was used for hemostasis. Cephalic vein was taken laterally with the deltoid, and was noted to be intact throughout the case and at closure. The deltopectoral fascia was incised to approach the shoulder.    The superior cuff is intact.  The subscapularis is thinned but intact..    The biceps was tenodesed at the superior edge of the bicipital groove.  The subscapularis tendon is tenotomized 1cm medial to the insertion area for later repair.  This allows delivery of the head from the incision, after which a starting point   for the proximal guide was then well-visualized.  The proximal cut guide is used to check the anatomic version which is approximately 35 degrees. This was cut in 30 degrees of retroversion. Following this, it was then reamed and broached up to a size 7.  This was left in place and the protective cap was placed.     A Fukuda retractor was used to displace the humeral head posteriorly.  The glenoid is well visualized and the soft tissue is cleared from  the periphery of the glenoid.  A custom guide has been fabricated for this patient to allow accurate recreation of the templated surgical procedure. Retractors are positioned to allow full seating of the guide.  The pin is used to sony the glenoid and the point of contact is confirmed on the guide model.  The pin is then advanced until purchase is obtained at the far cortex.  This is checked and felt to be in good position.  The central pin was passed and checked along the medial scapular neck with my direct digital palpation, and noted to be in excellent position.  The pin was then reamed over to prepare the glenoid baseplate. There is good bone for fixation.    The baseplate is fully seated and drilled. Appropriate length screws were placed x4 with excellent fixation.  The cone reamer was used to remove any glenoid prominence.  The glenosphere was impacted into place and the central screw was fully tightened. This shows excellent fixation and good clearance inferiorly.    Following this, attention was turned to the humerus.  The baseplate was then trialed with the low offset metaphyseal tray and  6mm bearing, showing excellent fit, stability, and rotation. The arm could be brought through 150 degrees of passive forward flexion and 55 degrees of passive external rotation without any opening of the anterior joint line.  Instruments were removed. Final implants were seated after copious irrigation.  Using the same standard bearing and tray, this shows excellent stability after reduction.  1000 mL of brown wash followed by 1000 mL of normal saline was used for irrigation.    The subscapularis was then reattached with transtendon figure eight sutures x7.  This was done with the arm abducted 40 degrees and externally rotated 30 degrees to allow excellent excursion.  Copious irrigation was performed again with pulsatile lavage. The deltopectoral interval was closed with 0 Stratafix. The subcutaneous tissue was closed  with 3-0 Vicryl and skin with 3-0 Stratafix. Generic Prineo dressing was applied.     Vancomycin powder 1gm total was used during the case and was applied to the intramedulary canal prior to stem placement, the joint space prior to subscapularis repair and the deep and superficial wound layers.    A physician assistant was necessary for this case to protect the neurovascular structures when retracting.  Maintaining the position of the arm during implantation of the components, Assisting with the dislocation of the implant trials and maintaining a clear operative field during the case. The PA is present for the entirety of the case.    The patient went to the recovery room in good condition, and will have a standard reverse total shoulder rehabilitation course.     COMPLICATIONS:  None.     CONDITION UPON DISCHARGE FOR OPERATING ROOM:  Stable    PLAN:  1. Antibiotic prophylaxis were given including Ancef. Abx given within 1 hr of surgical incision and discontinued within 24hrs.   2. DVT prophylaxis: restart preop anticoagulant and sequential compression device's will be started within 24hrs of surgery.   3. Weight Bearing NWB (Non weight bearing) left .upper extremity   4. Discharge anticipated date POD# 1 to home.   5. Pain Medication oral Oxycodone and IV Dilaudid.  6. Exercises: Full elbow, wrist, and hand AROM/PROM on operative side 5x/day.  Encourage ADLs out of sling as tolerated.

## 2023-03-07 NOTE — PROGRESS NOTES
PTA medications completed by Medication Scribe day of surgery     Medication history sources: Patient, Surescripts, H&P and Patient's home med list  In the past week, patient estimated taking medication this percent of the time: Greater than 90%  Adherence assessment: N/A Not Observed    Significant changes made to the medication list:  Patient reports no longer taking the following meds (med scribe removed from PTA med list): Gabapentin, Diltiazem, Diprosone Ointment      Additional medication history information:   None    Medication reconciliation completed by provider prior to medication history? No    Time spent in this activity: 30 minutes    The information provided in this note is only as accurate as the sources available at the time of update(s)    Prior to Admission medications    Medication Sig Last Dose Taking? Auth Provider Long Term End Date   apixaban ANTICOAGULANT (ELIQUIS) 5 MG tablet Take 1 tablet (5 mg) by mouth 2 times daily Appointment needed for additional refills. Please call [phone] to schedule. 3/4/2023 at pm Yes Brett Cassidy MD     Ferrous Sulfate (IRON) 325 (65 Fe) MG tablet Take 1 tablet by mouth three times a week (Monday, Wednesday, Friday) 3/1/2023 at am Yes Brett Cassidy MD     fluticasone (FLONASE) 50 MCG/ACT nasal spray Spray 2 sprays into both nostrils daily as needed for rhinitis or allergies Unknown at prn Yes Brett Cassidy MD     losartan (COZAAR) 50 MG tablet Take 1 tablet (50 mg) by mouth daily 3/6/2023 at 1700 Yes Brett Cassidy MD Yes    metoprolol succinate ER (TOPROL XL) 50 MG 24 hr tablet Take 1 tablet (50 mg) by mouth daily 3/6/2023 at pm Yes Brett Cassidy MD Yes    niacin 500 MG tablet Take 500 mg by mouth daily (with breakfast) 3/1/2023 at am Yes Reported, Patient     pantoprazole (PROTONIX) 40 MG EC tablet Take 40 mg by mouth daily 3/5/2023 at am Yes Reported, Patient     tamsulosin (FLOMAX) 0.4 MG capsule Take 1 capsule (0.4 mg) by mouth 2 times daily 3/6/2023 at pm  Yes Brett Cassidy MD     vitamin B-12 (CYANOCOBALAMIN) 1000 MCG tablet Take 1,000 mcg by mouth daily 3/1/2023 at am Yes Reported, Patient     vitamin C (ASCORBIC ACID) 1000 MG TABS Take 1,000 mg by mouth daily 3/1/2023 at am Yes Reported, Patient     Vitamin D-Vitamin K (K2 PLUS D3) 100-1000 MCG-UNIT TABS Take 1 tablet by mouth daily 3/1/2023 at am Yes Reported, Patient     vitamin D3 (CHOLECALCIFEROL) 125 MCG (5000 UT) tablet Take 1 tablet by mouth daily 3/1/2023 at am Yes Reported, Patient       Medication history completed by:    Richy Olsen CPhT  Medication [facility]

## 2023-03-07 NOTE — OR NURSING
OK by KEATON Peña to transfer to the floor. Glasses and bilat hearing aids returned to face, belonging bags sent with pt.

## 2023-03-07 NOTE — ANESTHESIA PROCEDURE NOTES
Brachial plexus Procedure Note    Pre-Procedure   Staff -        Anesthesiologist:  Yifan Herzog MD       Performed By: Anesthesiologist       Location: pre-op       Pre-Anesthestic Checklist: patient identified, IV checked, risks and benefits discussed, informed consent, pre-op evaluation, at physician/surgeon's request and post-op pain management  Timeout:       Correct Patient: Yes        Correct Procedure: Yes        Correct Site: Yes        Correct Position: Yes        Correct Laterality: Yes        Site Marked: Yes  Procedure Documentation  Procedure: Brachial plexus       Laterality: left       Patient Position: supine       Skin prep: Chloraprep       Local skin infiltrated with mL of 1% lidocaine.  (interscalene (artery in path of superior trunk, so performed interscalened instead) approach).       Needle Type: short bevel       Needle Gauge: 22.        Needle Length (Inches): 2        Ultrasound guided       1. Ultrasound was used to identify targeted nerve, plexus, vascular marker, or fascial plane and place a needle adjacent to it in real-time.       2. Ultrasound was used to visualize the spread of anesthetic in close proximity to the above referenced structure.       3. A permanent image is entered into the patient's record.    Assessment/Narrative         The placement was negative for: blood aspirated, painful injection and site bleeding       Paresthesias: Yes and Resolved (to shoulder, resolved immediately.  needle was located at bottom of C6).       Bolus given via needle..        Secured via.        Insertion/Infusion Method: Single Shot       Complications: none       Injection made incrementally with aspirations every 5 mL.    Medication(s) Administered   Bupivacaine 0.5% w/ 1:400K Epi (Injection) - Injection   20 mL - 3/7/2023 9:43:00 AM  Dexamethasone 10 mg/mL PF (Perineural) - Perineural   10 mg - 3/7/2023 9:43:00 AM   Comments:  Ultrasound Interpretation, peripheral nerve  "block    1.  Under ultrasound guidance, needle was inserted and placed in close proximity to the nerve.  2. Ultrasound was also used to visualize the spread of the anesthetic in close proximity to the nerve being blocked.  3. The nerve appeared anatomically normal.  4. There were no apparent abnormal pathological findings.  5. A permanent ultrasound image was saved n the patient's record.    Yifan Herzog MD   9:42 AM      FOR [facility] ([facility]) ONLY:   Pain Team Contact information: please page the Pain Team Via [employer]. Search \"Pain\". During daytime hours, please page the attending first. At night please page the resident first.    "

## 2023-03-08 ENCOUNTER — APPOINTMENT (OUTPATIENT)
Dept: OCCUPATIONAL THERAPY | Facility: CLINIC | Age: 78
End: 2023-03-08
Attending: STUDENT IN AN ORGANIZED HEALTH CARE EDUCATION/TRAINING PROGRAM
Payer: COMMERCIAL

## 2023-03-08 VITALS
WEIGHT: 298 LBS | OXYGEN SATURATION: 94 % | DIASTOLIC BLOOD PRESSURE: 79 MMHG | TEMPERATURE: 98 F | RESPIRATION RATE: 17 BRPM | SYSTOLIC BLOOD PRESSURE: 120 MMHG | BODY MASS INDEX: 40.36 KG/M2 | HEIGHT: 72 IN | HEART RATE: 94 BPM

## 2023-03-08 LAB
ANION GAP SERPL CALCULATED.3IONS-SCNC: 10 MMOL/L (ref 7–15)
BUN SERPL-MCNC: 14.1 MG/DL (ref 8–23)
CALCIUM SERPL-MCNC: 9 MG/DL (ref 8.8–10.2)
CHLORIDE SERPL-SCNC: 99 MMOL/L (ref 98–107)
CREAT SERPL-MCNC: 0.73 MG/DL (ref 0.67–1.17)
DEPRECATED HCO3 PLAS-SCNC: 27 MMOL/L (ref 22–29)
FASTING STATUS PATIENT QL REPORTED: YES
GFR SERPL CREATININE-BSD FRML MDRD: >90 ML/MIN/1.73M2
GLUCOSE SERPL-MCNC: 125 MG/DL (ref 70–99)
GLUCOSE SERPL-MCNC: 125 MG/DL (ref 70–99)
HGB BLD-MCNC: 14 G/DL (ref 13.3–17.7)
POTASSIUM SERPL-SCNC: 4.9 MMOL/L (ref 3.4–5.3)
SODIUM SERPL-SCNC: 136 MMOL/L (ref 136–145)

## 2023-03-08 PROCEDURE — 250N000013 HC RX MED GY IP 250 OP 250 PS 637: Performed by: NURSE PRACTITIONER

## 2023-03-08 PROCEDURE — 97530 THERAPEUTIC ACTIVITIES: CPT | Mod: GO | Performed by: OCCUPATIONAL THERAPIST

## 2023-03-08 PROCEDURE — 82947 ASSAY GLUCOSE BLOOD QUANT: CPT | Performed by: ORTHOPAEDIC SURGERY

## 2023-03-08 PROCEDURE — 97535 SELF CARE MNGMENT TRAINING: CPT | Mod: GO | Performed by: OCCUPATIONAL THERAPIST

## 2023-03-08 PROCEDURE — 36415 COLL VENOUS BLD VENIPUNCTURE: CPT | Performed by: NURSE PRACTITIONER

## 2023-03-08 PROCEDURE — 99214 OFFICE O/P EST MOD 30 MIN: CPT | Performed by: PHYSICIAN ASSISTANT

## 2023-03-08 PROCEDURE — 85018 HEMOGLOBIN: CPT | Performed by: STUDENT IN AN ORGANIZED HEALTH CARE EDUCATION/TRAINING PROGRAM

## 2023-03-08 PROCEDURE — 97110 THERAPEUTIC EXERCISES: CPT | Mod: GO | Performed by: OCCUPATIONAL THERAPIST

## 2023-03-08 PROCEDURE — 80048 BASIC METABOLIC PNL TOTAL CA: CPT | Performed by: NURSE PRACTITIONER

## 2023-03-08 PROCEDURE — 250N000011 HC RX IP 250 OP 636: Performed by: STUDENT IN AN ORGANIZED HEALTH CARE EDUCATION/TRAINING PROGRAM

## 2023-03-08 PROCEDURE — 250N000013 HC RX MED GY IP 250 OP 250 PS 637: Performed by: STUDENT IN AN ORGANIZED HEALTH CARE EDUCATION/TRAINING PROGRAM

## 2023-03-08 PROCEDURE — 97165 OT EVAL LOW COMPLEX 30 MIN: CPT | Mod: GO | Performed by: OCCUPATIONAL THERAPIST

## 2023-03-08 RX ORDER — ACETAMINOPHEN 325 MG/1
650 TABLET ORAL EVERY 4 HOURS PRN
Qty: 100 TABLET | Refills: 0 | Status: ON HOLD | OUTPATIENT
Start: 2023-03-10 | End: 2023-08-22

## 2023-03-08 RX ORDER — OXYCODONE HYDROCHLORIDE 5 MG/1
5-10 TABLET ORAL EVERY 4 HOURS PRN
Qty: 30 TABLET | Refills: 0 | Status: SHIPPED | OUTPATIENT
Start: 2023-03-08 | End: 2023-07-19

## 2023-03-08 RX ADMIN — ACETAMINOPHEN 975 MG: 325 TABLET ORAL at 08:10

## 2023-03-08 RX ADMIN — ACETAMINOPHEN 975 MG: 325 TABLET ORAL at 01:07

## 2023-03-08 RX ADMIN — PANTOPRAZOLE SODIUM 40 MG: 40 TABLET, DELAYED RELEASE ORAL at 08:11

## 2023-03-08 RX ADMIN — Medication 500 MG: at 08:11

## 2023-03-08 RX ADMIN — CEFAZOLIN SODIUM 2 G: 2 INJECTION, SOLUTION INTRAVENOUS at 01:07

## 2023-03-08 RX ADMIN — TAMSULOSIN HYDROCHLORIDE 0.4 MG: 0.4 CAPSULE ORAL at 08:11

## 2023-03-08 RX ADMIN — SENNOSIDES AND DOCUSATE SODIUM 1 TABLET: 50; 8.6 TABLET ORAL at 08:11

## 2023-03-08 RX ADMIN — METOPROLOL SUCCINATE 50 MG: 50 TABLET, EXTENDED RELEASE ORAL at 08:11

## 2023-03-08 RX ADMIN — Medication 125 MCG: at 08:11

## 2023-03-08 ASSESSMENT — ACTIVITIES OF DAILY LIVING (ADL)
ADLS_ACUITY_SCORE: 27
ADLS_ACUITY_SCORE: 29
ADLS_ACUITY_SCORE: 27
ADLS_ACUITY_SCORE: 29
PREVIOUS_RESPONSIBILITIES: MEAL PREP;HOUSEKEEPING;LAUNDRY;SHOPPING;MEDICATION MANAGEMENT;FINANCES;DRIVING

## 2023-03-08 NOTE — PROGRESS NOTES
Hospitalist following for medical management post op following L reverse shoulder arthroplasty 3/7/2023.    POD # 1 s/p Left Reverse Shoulder Arthroplasty  Glenohumeral Arthritis    EBL of 125 mL. General anesthesia with block.  -Post-op management as per Ortho  -Pain, activity, anticoagulation, IVF's, therapies, diet and BM regimen as per Primary Service  -The patient is on Eliquis prior to admission. Ortho to address and start anticoagulation when clinically indicated post-op  -Aggressive pulmonary hygiene post-op              -Incentive spirometry every hour while awake              -Cough and deep breathe  -PRN pain meds  -BM regimen  -Therapies  -Hgb is stable      Chronic Atrial Fibrillation  Long-term Use Anticoagulation  History of NSVT (6/21/21)  -PTA regimen: Eliquis and Metoprolol  -Anticoagulation per Ortho  -Resume BB with hold parameters     Hypertension   -PTA regimen: Losartan and Metoprolol XL  -renal function stable, resume losartan at discharge   -Resume BB, as noted above     Fe+ Deficiency Anemia  -Resume PTA Fe+     History Prostate Cancer  -Resume PTA Flomax      History CVA (2020)  -Noted      HAYLEE  -CPAP at night and with naps, as tolerated     VSS. Discussed with nursing, pt doing well at this time. Ok to discharge from medicine standpoint.      Netta Yates PA-C

## 2023-03-08 NOTE — PLAN OF CARE
Goal Outcome Evaluation:    Orientation: A&O x 4    Vitals/Tele: VSS on RA    IV Access/drains: L PIV infusing    Diet: Regular    Mobility: SBA    GI/: Incontinent    Wound/Skin: L shoulder     Consults: Ortho    Discharge Plan:       See Flow sheets for assessment

## 2023-03-08 NOTE — PROGRESS NOTES
[facility]      OUTPATIENT OCCUPATIONAL THERAPY  EVALUATION  PLAN OF TREATMENT FOR OUTPATIENT REHABILITATION  (COMPLETE FOR INITIAL CLAIMS ONLY)  Patient's Last Name, First Name, M.I.  YOB: 1945  MikeHugo alvares  EDITH                          Provider's Name  [facility] Medical Record No.  [id number]                               Onset Date:  03/07/23   Start of Care Date:  03/08/23     Type:     ___PT   _X_OT   ___SLP Medical Diagnosis:  L RTSA                        OT Diagnosis:  Decreased ROM and post surgical precations limiting ADL I   Visits from SOC:  1   _________________________________________________________________________________  Plan of Treatment/Functional Goals    Planned Interventions: ADL retraining, home program guidelines   Goals: See Occupational Therapy Goals on Care Plan in [facility] electronic health record.    Therapy Frequency: One time eval and treatment  Predicted Duration of Therapy Intervention: 03/08/23  _________________________________________________________________________________    I CERTIFY THE NEED FOR THESE SERVICES FURNISHED UNDER        THIS PLAN OF TREATMENT AND WHILE UNDER MY CARE     (Physician co-signature of this document indicates review and certification of the therapy plan).              Certification date from: 03/08/23, Certification date to: 03/08/23    Referring Physician: Susanna Whelan PA-C            Initial Assessment        See Occupational Therapy evaluation dated 03/08/23 in Epic electronic health record.

## 2023-03-08 NOTE — PLAN OF CARE
Pt A/ox4, VSS on RA, denies pain, block intact, assist of 1 to BR, CPAP in use overnight, POD 1, will continue to monitor

## 2023-03-08 NOTE — PLAN OF CARE
Goal Outcome Evaluation:      Plan of Care Reviewed With: patient, child          Outcome Evaluation: Discharge to Home 03/08/2023 @11:00am w/ Daughter.    Patient vital signs are at baseline: Yes  Patient able to ambulate as they were prior to admission or with assist devices provided by therapies during their stay:  Yes  Patient MUST void prior to discharge:  Yes  Patient able to tolerate oral intake:  Yes  Pain has adequate pain control using Oral analgesics:  Yes  Does patient have an identified :  Yes  w/ Family  Has goal D/C date and time been discussed with patient:  Yes   Discharge to Home 03/08/2023 @11:00am     A&O x4.   CMS Intact except slight tingling in Left thumb d/t block.   VSS on RA.   Up with SBA w/ GB.   Voiding in BR.   Denies Pain.   Discharge to Home 03/08/2023 @11:00am w/ Daughter.    Reviewed discharge instructions and medications with patient:YES  Questions answered:YES  Patient discharged to:Home w/ Daughter  All belongs discharged with patient:YES

## 2023-03-08 NOTE — PROGRESS NOTES
ORTHOPEDIC UPPER EXTREMITY PROGRESS NOTE    POD# 1  Patient is a 77 year old male who underwent Procedure(s):  LEFT REVERSE SHOULDER ARTHROPLASTY WITH CUSTOM GUIDE WITH AUTOLOGOUS BONE GRAFTOF PROXIMAL HUMERUS on 3/7/2023. Pain is well controlled. Tolerating medication well, no nausea or vomiting.  Voiding well.  Discharge instructions reviewed.    Vitals:   Blood pressure 120/79, pulse 94, temperature 98  F (36.7  C), resp. rate 17, height 1.829 m (6'), weight 135.2 kg (298 lb), SpO2 94 %.  Temp (24hrs), Av.8  F (36.6  C), Min:97.1  F (36.2  C), Max:98.5  F (36.9  C)      EXAM   The patient is awake and alert.   Sensation is intact.  Digital Flexion/Extension maintained.   Brisk cap refill.   The incision is covered with prineo dressing, CDI.    Labs:   Recent Labs   Lab Test 23  0742 23  1438 22  0804 16  0832 16  2139 03/10/16  0815 02/12/15  0833   HGB 14.0 13.9 14.1   < > 16.2   < > 13.6   INR  --   --   --   --  1.04  --  1.04    < > = values in this interval not displayed.       ASSESSMENT  S/p L rev TSA   PLAN  1. DVT prophylaxis: Eliquis  2. Weight Bearing NWB (Non weight bearing).  3. Wound Care leave undisturbed  4. Discharge anticipated date today Home with No Skilled Service  5. Cont Pain Control Oxycodone and Tylenol  6. Continue with shoulder immobilizer.  Okay to remove for ADLs.  Okay for active range of motion for elbow, wrist and digits.    Светлана Wilhelm PA-C  [facility]

## 2023-03-08 NOTE — PROGRESS NOTES
03/08/23 0824   Appointment Info   Signing Clinician's Name / Credentials (OT) Charles Ilia OTR/L   Living Environment   People in Home alone   Current Living Arrangements   (Town home)   Home Accessibility stairs to enter home;stairs within home   Number of Stairs, Main Entrance 2   Stair Railings, Main Entrance railing on left side (ascending)   Number of Stairs, Within Home, Primary none   Transportation Anticipated family or friend will provide   Living Environment Comments PT lives alone in two level town home. No steps to main level where all needs are met. Walk in shower.   Self-Care   Usual Activity Tolerance good   Current Activity Tolerance moderate   Regular Exercise Yes   Activity/Exercise Type strength training;walking;biking   Exercise Amount/Frequency 3-5 times/wk   Equipment Currently Used at Home none   Fall history within last six months no   Activity/Exercise/Self-Care Comment IND with Self care at baseline.   Instrumental Activities of Daily Living (IADL)   Previous Responsibilities meal prep;housekeeping;laundry;shopping;medication management;finances;driving   IADL Comments INd with IADL at baseline. Family can assist with IADL as needed   General Information   Onset of Illness/Injury or Date of Surgery 03/07/23   Referring Physician Susanna Whelan PA-C   Patient/Family Therapy Goal Statement (OT) return home   Additional Occupational Profile Info/Pertinent History of Current Problem L TSA   Existing Precautions/Restrictions shoulder  (NWB L UE Sling on all times. Off for cares and ROM program.)   Left Upper Extremity (Weight-bearing Status) non weight-bearing (NWB)   Cognitive Status Examination   Orientation Status orientation to person, place and time   Sensory   Sensory Comments Tingln in index finger and thumb   Pain Assessment   Patient Currently in Pain No   Range of Motion Comprehensive   Comment, General Range of Motion R UE WFL. Immobilized L UE   Strength Comprehensive (MMT)    Comment, General Manual Muscle Testing (MMT) Assessment R UE WFl L UE NWB   Transfers   Transfers sit-stand transfer   Sit-Stand Transfer   Sit-Stand Edwards (Transfers) modified independence   Activities of Daily Living   BADL Assessment/Intervention upper body dressing;lower body dressing   Upper Body Dressing Assessment/Training   Edwards Level (Upper Body Dressing) maximum assist (25% patient effort)   Lower Body Dressing Assessment/Training   Edwards Level (Lower Body Dressing) moderate assist (50% patient effort)   Clinical Impression   Criteria for Skilled Therapeutic Interventions Met (OT) Yes, treatment indicated   OT Diagnosis Decreased ROM and post surgical precations limiting ADL I   OT Problem List-Impairments impacting ADL problems related to;activity tolerance impaired;mobility;post-surgical precautions   Assessment of Occupational Performance 1-3 Performance Deficits   Identified Performance Deficits Dressing, IADL   Planned Therapy Interventions (OT) ADL retraining;home program guidelines   Clinical Decision Making Complexity (OT) low complexity   Anticipated Equipment Needs Upon Discharge (OT) dressing equipment   Risk & Benefits of therapy have been explained evaluation/treatment results reviewed;care plan/treatment goals reviewed;risks/benefits reviewed;current/potential barriers reviewed;participants voiced agreement with care plan;participants included;patient;daughter   OT Total Evaluation Time   OT Eval, Low Complexity Minutes (56708) 10   Therapy Certification   Start of Care Date 03/08/23   Certification date from 03/08/23   Certification date to 03/08/23   Medical Diagnosis L RTSA   OT Goals   Therapy Frequency (OT) One time eval and treatment   OT Predicted Duration/Target Date for Goal Attainment 03/08/23   OT Goals Transfers;Cognition;Toilet Transfer/Toileting;Lower Body Dressing;Upper Body Dressing   OT: Upper Body Dressing Supervision/stand-by assist;within  precautions;Goal Met   OT: Lower Body Dressing Supervision/stand-by assist;using adaptive equipment;within precautions;Goal Met   OT: Transfer Modified independent;within precautions;Goal Met  (Stair mobility)   OT: Toilet Transfer/Toileting Modified independent;cleaning and garment management;within precautions;Goal Met   OT: Cognitive Patient/caregiver will verbalize understanding of cognitive assessment results/recommendations as needed for safe discharge planning  (goal met)   Interventions   Interventions Quick Adds Self-Care/Home Management;Therapeutic Activity;Therapeutic Procedures/Exercise   Self-Care/Home Management   Self-Care/Home Mgmt/ADL, Compensatory, Meal Prep Minutes (54471) 15   Symptoms Noted During/After Treatment (Meal Preparation/Planning Training) fatigue   Treatment Detail/Skilled Intervention WEval completed. Daughter present. Instructed in LE dressing. Use of sock aide required in pt to dress alex. Pt completed modified sock donning applying sock to aide within supported reach of LE follow precautions with SBA. Pt. will likely wear slip on shoes along while at home. Pt donned underwear and pants to knees seated following cues with set up assist. Diffuclty reaching around body and pulling underwear and pants over left hip. Discussed a variet of options: ininlcuding short reacher, Modified hooking device to extend reach and looser clothing with repttion of reaching with R UE behind. and infront repeatedly. Pt. completed UE dressing following precautions with SBA and VCs for technique. Completed in seated posiiton from piullow supported sit and standing position. Pt was using button down shirt with difficulty buttoning. OT recommended extra large pull over shirts. Pt. will  have initial family assist. Family will practive with pt upon return home.   Therapeutic Procedures/Exercise   Therapeutic Procedure: strength, endurance, ROM, flexibillity minutes (13325) 11   Symptoms Noted During/After  Treatment none   Treatment Detail/Skilled Intervention Pt edcuated in L UE AROM program completed elbow flexion with R UE support at 10 reps. PT required R UE support to complete self range suppination/Pronation. Completed wrist flex/ext and fist pumps with MOD I. Educated to completed. Fist pumps hourly for circulation to surgical site. Pt given written information for ROM program and no  further questions. ROM program to be completed 2 x daily.   Therapeutic Activities   Therapeutic Activity Minutes (83350) 17   Symptoms noted during/after treatment none   Treatment Detail/Skilled Intervention Pt educated in sling management with instruction of strap indications and placement of L UE in sling all the way back applying thenar strap. Pt set up with pillows to left UE. Completed unfastening of straps and removval of sling following precations. Pt. edcuated in reapplication of sling with modification to have waist strap velcroed to front of sling so pt can acces and bring around head to clip into place. Shoulder straap adjusted x 2 for proper fit first seated then standing. Educated pt/daughter in ability to add more padding to neck for comfort. Educated to support UE with pillows for sleeping with sling donned at [facility]. Pt has adjustable bed. Pt ambulated x 150 feet to strairs, Educated and completed side stap technique to access railing to left side with sup A.   Total Session Time   Timed Code Treatment Minutes 43   Total Session Time (sum of timed and untimed services) 53

## 2023-03-15 ENCOUNTER — TRANSFERRED RECORDS (OUTPATIENT)
Dept: HEALTH INFORMATION MANAGEMENT | Facility: CLINIC | Age: 78
End: 2023-03-15
Payer: COMMERCIAL

## 2023-03-17 ENCOUNTER — TRANSFERRED RECORDS (OUTPATIENT)
Dept: HEALTH INFORMATION MANAGEMENT | Facility: CLINIC | Age: 78
End: 2023-03-17

## 2023-03-20 LAB — SCANNED LAB RESULT: NORMAL

## 2023-03-21 ENCOUNTER — TELEPHONE (OUTPATIENT)
Dept: SURGERY | Facility: CLINIC | Age: 78
End: 2023-03-21
Payer: COMMERCIAL

## 2023-03-21 NOTE — TELEPHONE ENCOUNTER
Spoke on phone re: Decipher test results  I would like to get an opinion from radiation oncology and then have him come back to see me again in clinic.

## 2023-04-12 ENCOUNTER — TRANSFERRED RECORDS (OUTPATIENT)
Dept: HEALTH INFORMATION MANAGEMENT | Facility: CLINIC | Age: 78
End: 2023-04-12
Payer: COMMERCIAL

## 2023-06-28 ENCOUNTER — TRANSFERRED RECORDS (OUTPATIENT)
Dept: HEALTH INFORMATION MANAGEMENT | Facility: CLINIC | Age: 78
End: 2023-06-28
Payer: COMMERCIAL

## 2023-07-14 ENCOUNTER — HOSPITAL ENCOUNTER (OUTPATIENT)
Dept: CARDIOLOGY | Facility: CLINIC | Age: 78
Discharge: HOME | End: 2023-07-14
Attending: ALLERGY & IMMUNOLOGY | Admitting: ALLERGY & IMMUNOLOGY
Payer: COMMERCIAL

## 2023-07-14 DIAGNOSIS — I48.19 PERSISTENT ATRIAL FIBRILLATION (H): ICD-10-CM

## 2023-07-14 LAB — LVEF ECHO: NORMAL

## 2023-07-14 PROCEDURE — 93306 TTE W/DOPPLER COMPLETE: CPT | Mod: 26 | Performed by: INTERNAL MEDICINE

## 2023-07-14 PROCEDURE — 93306 TTE W/DOPPLER COMPLETE: CPT

## 2023-07-17 ENCOUNTER — PATIENT OUTREACH (OUTPATIENT)
Dept: CARE COORDINATION | Facility: CLINIC | Age: 78
End: 2023-07-17
Payer: COMMERCIAL

## 2023-07-19 ENCOUNTER — OFFICE VISIT (OUTPATIENT)
Dept: CARDIOLOGY | Facility: CLINIC | Age: 78
End: 2023-07-19
Payer: COMMERCIAL

## 2023-07-19 ENCOUNTER — TELEPHONE (OUTPATIENT)
Dept: CARDIOLOGY | Facility: CLINIC | Age: 78
End: 2023-07-19

## 2023-07-19 VITALS
BODY MASS INDEX: 39.9 KG/M2 | WEIGHT: 294.6 LBS | SYSTOLIC BLOOD PRESSURE: 101 MMHG | OXYGEN SATURATION: 98 % | DIASTOLIC BLOOD PRESSURE: 67 MMHG | HEART RATE: 58 BPM | HEIGHT: 72 IN

## 2023-07-19 DIAGNOSIS — I50.22 CHRONIC SYSTOLIC HEART FAILURE (H): Primary | ICD-10-CM

## 2023-07-19 DIAGNOSIS — I48.19 PERSISTENT ATRIAL FIBRILLATION (H): ICD-10-CM

## 2023-07-19 PROCEDURE — 99215 OFFICE O/P EST HI 40 MIN: CPT | Performed by: PHYSICIAN ASSISTANT

## 2023-07-19 PROCEDURE — 93005 ELECTROCARDIOGRAM TRACING: CPT | Performed by: PHYSICIAN ASSISTANT

## 2023-07-19 NOTE — PROGRESS NOTES
Electrophysiology Clinic Progress Note    Hugo Weber MRN# [id number]   YOB: 1945 Age: 78 year old     Primary cardiology team: Was Dr. Castillo         Assessment and Plan     In summary, Hugo Weber presents today for annual follow up on CAF and cardiomyopathy of unclear etiology.     1. Chronic HFrEF.     EF 55% (2019) --> 45% (2021) --> 35% (2022), where it has remained.     GDMT includes Losartan 50 + Toprol XL 50.     FC I sxs. Appears compensated.    No prior ischemic w/u.     QRS narrow.    2. Mild-mod MR, Moderate aortic stenosis.     3. CAF.     Rate controlled, anticoagulated.     4. HTN.     Well-controlled, marginal.     Plan:    Discussed coronary angiogram vs stress nuclear study today to r/o ischemic etiology of CMP. He's leaning toward stress, but would like to discuss it further with his daughter who his an RN.    Start jardiance 10 mg daily.     Stop Losartan 50 and start Entresto 24-26 mg twice daily.   Follow-up:    BMP in two weeks.    With Dr. Narvaez in 2-3 months, for discussion of ICD for primary prevention SCD. We discussed this briefly today.    TTE in one year to follow up on aortic stenosis.     o Addendum: Pt would like to proceed with angiogram.   - Risks and benefits of left heart catheterization and coronary angiogram were discussed with the patient in detail. 0.1-0.3% (for diagnostic angio) and 1-2% (for PCI)  risk of stroke, MI, death, emergent bypass for diagnostic angio, risk of contrast induced allergic reaction, renal dysfunction, vascular complications were discussed. Patient understands and wishes to proceed. The patient would be an appropriate candidate for DAPT, if required.   - Pt will need to hold jardiance x 72h prior, and Eliquis 48h prior. Given his hx of CVA, he will need to take lovenox 1 mg/kg SQ one dose 48 & 36 hours prior to procedure. Full dose aspirin day prior to and day of procedure.    Laura Olivia PA-C  [facility] - [facility]  Clinic         History of Presenting Illness     Hugo Weber is a pleasant 78 year old patient with a pertinent history chronic atrial fibrillation and a cardiomyopathy, NSVT, morbid obesity with hx of gastric bypass surgery, HAYLEE (on CPAP), hx of CVA (2019), and HTN.     This patient previously followed with Dr. Castillo.  It appears that in 2021 he had an echocardiogram that showed a decline in his EF from 55-60% in 2019, to 45-50%.  This was obtained after a murmur was noted on exam by my colleague Norma Stein.  No changes were made at that time.  Echo was repeated again in July 2022, showing further decline in his EF to 35-40%, with mild to moderate MR, and mild aortic stenosis with a moderately dilated ascending aorta.  At that time, Dr. Castillo added losartan to his medication regimen and recommended follow-up in another year. 24-hour Holter monitor in November 2022 showed atrial fibrillation with an average heart rate of 84 bpm.  Ranging from  bpm.    Echo was repeated earlier this month, showing a stable EF at 35%. The RV is normal. MR is stable. Aortic stenosis is now moderate. His GDMT includes losartan 50 mg daily, Toprol-XL 50 mg daily. He has had no prior ischemic workup.      Today, I am meeting Hugo who returns to clinic stating he feels pretty well from a cardiac standpoint. Over the past year, he's gone through a shoulder surgery and radiation for prostate ca at [facility] (now completed), and tolerated this well. Patient denies chest pain, shortness of breath, PND, orthopnea, edema, claudication, palpitations, near syncope or syncope. BP and HR marginal. He has a smart watch but doesn't really monitor his HR with it. Exercises 3x per week on machines, bikes, etc, and feels well with this. Retired teacher. .         Review of Systems     12-pt ROS is negative except for as noted in the HPI.          Physical Exam     Vitals: /67 (BP Location: Right arm, Patient Position: Sitting, Cuff  Size: Adult Large)   Pulse 58   Ht 1.829 m (6')   Wt 133.6 kg (294 lb 9.6 oz)   SpO2 98%   BMI 39.95 kg/m    Wt Readings from Last 10 Encounters:   07/19/23 133.6 kg (294 lb 9.6 oz)   03/07/23 135.2 kg (298 lb)   02/21/23 136.5 kg (301 lb)   02/17/23 138.8 kg (306 lb)   02/09/23 139 kg (306 lb 6.4 oz)   01/31/23 139.9 kg (308 lb 8 oz)   12/19/22 142.9 kg (315 lb)   08/16/22 (!) 155.1 kg (342 lb)   08/04/22 (!) 154.2 kg (340 lb)   07/18/22 (!) 154.2 kg (340 lb)       Constitutional:  Patient is pleasant, alert, cooperative, and in NAD.  HEENT:  NCAT. PERRLA. EOM's intact.   Neck:  CVP appears normal. No carotid bruits.   Pulmonary: Normal respiratory effort. CTAB.   Cardiac: Irregularly irregular rhythm, variable S1, grade 3/6 sm at the LSB.  Abdomen:  Non-tender abdomen, no hepatosplenomegaly appreciated.   Vascular: Pulses in the upper and lower extremities are 2+ and equal bilaterally.  Extremities: Trace ankle edema bilaterally. No erythema, cyanosis or tenderness appreciated.  Skin:  No rashes or lesions appreciated.   Neurological:  No gross motor or sensory deficits.   Psych: Appropriate affect.          Data   Labs reviewed:  Recent Labs   Lab Test 08/16/22  0804 08/11/21  0840 08/07/20  0847   LDL 81 67 88   HDL 43 44 43   NHDL 96 78 105   CHOL 139 122 148   TRIG 73 54 84   TSH 2.84 2.58 2.03   IRON 154  --  99     --  350   IRONSAT 43  --  28   ELENA 29  --  28       Lab Results   Component Value Date    WBC 6.5 02/21/2023    WBC 8.4 06/22/2021    RBC 4.84 02/21/2023    RBC 5.13 06/22/2021    HGB 14.0 03/08/2023    HGB 14.6 06/22/2021    HCT 42.8 02/21/2023    HCT 46.8 06/22/2021    MCV 88 02/21/2023    MCV 91 06/22/2021    MCH 28.7 02/21/2023    MCH 28.5 06/22/2021    MCHC 32.5 02/21/2023    MCHC 31.2 (L) 06/22/2021    RDW 14.2 02/21/2023    RDW 14.3 06/22/2021     02/21/2023     06/22/2021       Lab Results   Component Value Date     03/08/2023     06/22/2021     POTASSIUM 4.9 03/08/2023    POTASSIUM 4.2 08/16/2022    POTASSIUM 4.0 06/23/2021    CHLORIDE 99 03/08/2023    CHLORIDE 108 08/16/2022    CHLORIDE 108 06/22/2021    CO2 27 03/08/2023    CO2 25 08/16/2022    CO2 26 06/22/2021    ANIONGAP 10 03/08/2023    ANIONGAP 7 08/16/2022    ANIONGAP 4 06/22/2021     (H) 03/08/2023     (H) 03/08/2023     (H) 03/07/2023     (H) 08/16/2022     (H) 06/22/2021    BUN 14.1 03/08/2023    BUN 16 08/16/2022    BUN 14 06/22/2021    CR 0.73 03/08/2023    CR 0.81 06/22/2021    GFRESTIMATED >90 03/08/2023    GFRESTIMATED >60 01/11/2023    GFRESTIMATED 87 06/22/2021    GFRESTBLACK >90 06/22/2021    BEBO 9.0 03/08/2023    BEBO 8.4 (L) 06/22/2021      Lab Results   Component Value Date    AST 18 02/21/2023    AST 13 06/21/2021    ALT 15 02/21/2023    ALT 19 06/21/2021       Lab Results   Component Value Date    A1C 5.4 02/21/2023    A1C 5.8 (H) 11/19/2020       Lab Results   Component Value Date    INR 1.04 03/21/2016    INR 1.04 02/12/2015            Problem List     Patient Active Problem List   Diagnosis     Iron deficiency anemia     Colon polyps     Obstructive sleep apnea syndrome     Hyperlipidemia LDL goal <70     Long term current use of anticoagulant therapy - for intermittent a-fib     Advance Care Planning     Total knee replacement status     Calculus of kidney     NAFLD (nonalcoholic fatty liver disease)     Morbid obesity due to excess calories (H)     History of hepatitis C     Prediabetes     Paroxysmal atrial fibrillation (H)     Cholelithiasis     Hypertension goal BP (blood pressure) < 140/90     TMJ arthralgia     Nephrolithiasis     OA (osteoarthritis)     First degree AV block     Benign prostatic hyperplasia (BPH) with urinary urge incontinence     Thoracic aortic ectasia (H)     Stroke (H)     CVA (cerebral vascular accident) (H)     Cervical stenosis of spine     Vitamin B12 deficiency (non anemic)     Vitamin D deficiency     Myofascial  pain     Atrial fibrillation     History of CVA (cerebrovascular accident)     Small bowel obstruction (H)     NSVT (nonsustained ventricular tachycardia) (H)     Chronic LLQ pain     Elevated prostate specific antigen (PSA)     Prostate cancer (H)     Chronic systolic heart failure (H)            Medications     Current Outpatient Medications   Medication Sig Dispense Refill     acetaminophen (TYLENOL) 325 MG tablet Take 2 tablets (650 mg) by mouth every 4 hours as needed for other (For optimal non-opioid multimodal pain management to improve pain control.) 100 tablet 0     apixaban ANTICOAGULANT (ELIQUIS) 5 MG tablet Take 1 tablet (5 mg) by mouth 2 times daily Appointment needed for additional refills. Please call [phone] to schedule. 180 tablet 3     Ferrous Sulfate (IRON) 325 (65 Fe) MG tablet Take 1 tablet by mouth three times a week (Monday, Wednesday, Friday) 90 tablet 3     fluticasone (FLONASE) 50 MCG/ACT nasal spray Spray 2 sprays into both nostrils daily as needed for rhinitis or allergies 54.6 mL 3     losartan (COZAAR) 50 MG tablet Take 1 tablet (50 mg) by mouth daily 90 tablet 3     metoprolol succinate ER (TOPROL XL) 50 MG 24 hr tablet Take 1 tablet (50 mg) by mouth daily 90 tablet 3     niacin 500 MG tablet Take 500 mg by mouth daily (with breakfast)       pantoprazole (PROTONIX) 40 MG EC tablet Take 40 mg by mouth daily       senna-docusate (SENOKOT-S/PERICOLACE) 8.6-50 MG tablet Take 1-2 tablets by mouth 2 times daily Take while on oral narcotics to prevent or treat constipation. 30 tablet 0     tamsulosin (FLOMAX) 0.4 MG capsule Take 1 capsule (0.4 mg) by mouth 2 times daily 180 capsule 3     vitamin B-12 (CYANOCOBALAMIN) 1000 MCG tablet Take 1,000 mcg by mouth daily       vitamin C (ASCORBIC ACID) 1000 MG TABS Take 1,000 mg by mouth daily       Vitamin D-Vitamin K (K2 PLUS D3) 100-1000 MCG-UNIT TABS Take 1 tablet by mouth daily       vitamin D3 (CHOLECALCIFEROL) 125 MCG (5000 UT) tablet Take  1 tablet by mouth daily       oxyCODONE (ROXICODONE) 5 MG tablet Take 1-2 tablets (5-10 mg) by mouth every 4 hours as needed for moderate pain (4-6) 30 tablet 0            Past Medical History     Past Medical History:   Diagnosis Date     Atrial fibrillation 2006    Followed by [facility] - persistent     Calculus of kidney 2005, 6/06, 10/12, 8/18, 9/19    dr walker/nestor/иван - hematuria - w/u o/w neg     Cervical stenosis of spine     Moderate C4-5     Cholelithiasis      Chronic peptic ulcer, unspecified site, without mention of hemorrhage, perforation, or obstruction 1985    gastric bypass     Colon polyps 8/05, 9/10, 10/15    2 tubular adenoma, 1 hyperplastic - multiple serrated/tubular adenomas - last colonscopy 11/20 due 5 yrs     CVA (cerebral vascular accident) (H) 01/2019    small right periorlandic subcortical - left sided residual - left hand tingling/numbness - Left leg weak/numbness - cardioembolic     Elevated prostate specific antigen (PSA)     Dr Santos     Hepatitis C 10/2012    chronic hepatitis C, grade 1-2, stage 1 - mild - Dr Douglas     Hyperlipidemia LDL goal <70      Hypertension goal BP (blood pressure) < 140/90 06/2006    dr jaciel winchester     Iron deficiency anemia, unspecified 1985    gastric bypass     Nephrolithiasis multiple episodes, last 10/19    Dr Bey/Ivana/Tom - 9mm 3/2021     OA (osteoarthritis)     Multiple - Left shoulder with rotator cuff tear - Dr Durham     Obesity, unspecified     s/p gastric bypass 1985      Obstructive sleep apnea syndrome     CPAP - 15 cm - [facility] 1/2023     Prediabetes      Presbyacusis 01/2004    dr corona     Prostate cancer (H) 2023    Dr Santos - [place] 3+4, 4+3 = 7, bx 5/13 positive     Pyelonephritis, unspecified 12/1999     SCC (squamous cell carcinoma), 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    dr saez - lt conchal bowl     Sleep apnea 10/2002    cpap - severe - 15 cm - dr cunha     Stroke (H) 01/19/2019     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    [facility]      Vitamin B12 deficiency (non anemic) 04/15/2019     Vitamin D deficiency 04/15/2019     Past Surgical History:   Procedure Laterality Date     APPENDECTOMY       ARTHROPLASTY KNEE  08/13/2012    LT TKA - Dr Villanueva     CARDIOVERSION  2016     CARDIOVERSION       COLONOSCOPY  8/05, 9/10, 10/15    multiple tubular/serrated/hyperplastic polyps     COLONOSCOPY N/A 10/29/2015    multiple tubular and serrated adenomas     COLONOSCOPY N/A 09/07/2016     COLONOSCOPY  11/2020    tubular adenomas - due 5 yrs     COLONOSCOPY N/A 11/24/2020    Procedure: COLONOSCOPY with biopsies;  Surgeon: Chadwick Burgos MD;  Location: [facility] OR     CYSTOSCOPY W/ LASER LITHOTRIPSY  10/16/2012,5/2/2018     ESOPHAGOSCOPY, GASTROSCOPY, DUODENOSCOPY (EGD), COMBINED N/A 11/24/2020    Procedure: ESOPHAGOGASTRODUODENOSCOPY, COLONOSCOPY with biopsies;  Surgeon: Chadwick Burgos MD;  Location:  OR     EYE SURGERY  1/01,6/02,11/02    lasik     HC KNEE SCOPE, DIAGNOSTIC  05/2000    Arthroscopy, Knee RT     LASER HOLMIUM LITHOTRIPSY URETER(S), INSERT STENT, COMBINED  10/16/2012    stones x 4, Dr Campa     LASER HOLMIUM LITHOTRIPSY URETER(S), INSERT STENT, COMBINED Right 05/02/2018    CYSTOSCOPY, RIGHT URETEROSCOPY, HOLMIUM LASER LITHOTRIPSY, RIGHT STENT PLACEMENT - Dr Bey     MRI BIOPSY PROSTATE Bilateral 02/09/2023    mark     REVERSE ARTHROPLASTY SHOULDER Left 03/07/2023    Procedure: LEFT REVERSE SHOULDER ARTHROPLASTY WITH CUSTOM GUIDE WITH AUTOLOGOUS BONE GRAFTOF PROXIMAL HUMERUS;  Surgeon: Booker Multani MD;  Location:  OR     SURGICAL HISTORY OF -   1985    s/p gastric bypass Bilroth II     SURGICAL HISTORY OF -   11/1998    wisdom teeth     SURGICAL HISTORY OF -   1979    cellulitis     SURGICAL HISTORY OF -   11/1998    lt forearm spur's     SURGICAL HISTORY OF -   1/01,6/02,11/02    s/p lasik     SURGICAL HISTORY OF -   11/1999    rt forearm spurs     SURGICAL HISTORY OF -   07/2006    dr stevenson - lithotrypsy      "SURGICAL HISTORY OF -   10/08,     Lt ear chonchal lesion removal SCC - dr saez     SURGICAL HISTORY OF -  Right 10/2019    nephrolithiasis - cystoscopy, rt lithotrypsy, Dr Heath     SURGICAL HISTORY OF -  Right 2019    Cystoscopy, Rt lithotrypsy, Calcium Oxalate, Dr Heath     Family History   Problem Relation Age of Onset     Alzheimer Disease Father 82         at 88     Prostate Cancer Father         bladder and prostate     Heart Disease Mother 80        CHF     Heart Disease Maternal Grandfather         MI at 55     Cancer Paternal Uncle         ?     Ulcerative Colitis No family hx of      Crohn's Disease No family hx of      Stomach Cancer No family hx of      GERD No family hx of      Social History     Socioeconomic History     Marital status:      Spouse name: Mayra     Number of children: 3     Years of education: 21     Highest education level: Not on file   Occupational History     Occupation: retired teacher and football and       Employer: [employer]     Employer: RETIRED   Tobacco Use     Smoking status: Never     Smokeless tobacco: Never   Vaping Use     Vaping Use: Never used   Substance and Sexual Activity     Alcohol use: Yes     Alcohol/week: 2.0 - 3.0 standard drinks of alcohol     Types: 2 - 3 Standard drinks or equivalent per week     Comment: occassiinally     Drug use: No     Comment: acknowledges using herbal supplement \"Vibe\" for energy-none used recently      Sexual activity: Not Currently     Partners: Male     Birth control/protection: None     Comment:    Other Topics Concern      Service Not Asked     Blood Transfusions Not Asked     Caffeine Concern Yes     Comment: <1 can qd     Occupational Exposure Not Asked     Hobby Hazards Not Asked     Sleep Concern Yes     Comment: sleep apnea, wears cpap     Stress Concern Not Asked     Weight Concern Not Asked     Special Diet Not Asked     Back Care Not Asked     " Exercise No     Bike Helmet Not Asked     Seat Belt Yes     Self-Exams Not Asked     Parent/sibling w/ CABG, MI or angioplasty before 65F 55M? No   Social History Narrative     Not on file     Social Determinants of Health     Financial Resource Strain: Not on file   Food Insecurity: Not on file   Transportation Needs: Not on file   Physical Activity: Not on file   Stress: Not on file   Social Connections: Not on file   Intimate Partner Violence: Not on file   Housing Stability: Not on file            Allergies   Patient has no known allergies.    Today's clinic visit entailed:  Review of the result(s) of each unique test - echocardiograms, EKG, BMP, zio monitor  Ordering of each unique test  Prescription drug management  I spent a total of 60 minutes on the day of the visit.   Time spent by me doing chart review, history and exam, documentation and further activities per the note  Provider  Link to MDM Help Grid     The level of medical decision making during this visit was of high complexity.

## 2023-07-19 NOTE — TELEPHONE ENCOUNTER
[facility] Call Center    Phone Message    May a detailed message be left on voicemail: yes     Reason for Call: Other: Pt cannot afford medication wants to know what can he do, please reach out to further discuss.     Action Taken: Other: Cardiology    Travel Screening: Not Applicable     Thank you!  [facility]

## 2023-07-19 NOTE — TELEPHONE ENCOUNTER
Pt went to get his Entresto and Jardiance and the cost was very high. Made pt aware that I can get him a copay card for his Entresto and get 30 days for free. Can not find the same with the Jardiance. I told pt that I I will send the cost question to a team that can let me know what the cost will be. Dicussed also that their is probably a deductible to meet also.  Will call pt back tomorrow. [email or web address]

## 2023-07-19 NOTE — LETTER
7/19/2023    Brett Cassidy MD  [address] [facility] [address]    RE: Hugo Weber       Dear Colleague,     I had the pleasure of seeing Hugo Weber in the [facility].    Electrophysiology Clinic Progress Note    Hugo Weber MRN# [id number]   YOB: 1945 Age: 78 year old     Primary cardiology team: Was Dr. Castillo         Assessment and Plan     In summary, Hugo Weber presents today for annual follow up on CAF and cardiomyopathy of unclear etiology.     Chronic HFrEF.   EF 55% (2019) --> 45% (2021) --> 35% (2022), where it has remained.   GDMT includes Losartan 50 + Toprol XL 50.   FC I sxs. Appears compensated.  No prior ischemic w/u.   QRS narrow.    Mild-mod MR, Moderate aortic stenosis.     CAF.   Rate controlled, anticoagulated.     HTN.   Well-controlled, marginal.     Plan:  Discussed coronary angiogram vs stress nuclear study today to r/o ischemic etiology of CMP. He's leaning toward stress, but would like to discuss it further with his daughter who his an RN.  Start jardiance 10 mg daily.   Stop Losartan 50 and start Entresto 24-26 mg twice daily.   Follow-up:  BMP in two weeks.  With Dr. Narvaez in 2-3 months, for discussion of ICD for primary prevention SCD. We discussed this briefly today.  TTE in one year to follow up on aortic stenosis.     CHICO Aguillon [facility]         History of Presenting Illness     Hugo Weber is a pleasant 78 year old patient with a pertinent history chronic atrial fibrillation and a cardiomyopathy, NSVT, morbid obesity with hx of gastric bypass surgery, HAYLEE (on CPAP), hx of CVA (2019), and HTN.     This patient previously followed with Dr. Castillo.  It appears that in 2021 he had an echocardiogram that showed a decline in his EF from 55-60% in 2019, to 45-50%.  This was obtained after a murmur was noted on exam by my colleague Norma Stein.  No changes were made at that time.   Echo was repeated again in July 2022, showing further decline in his EF to 35-40%, with mild to moderate MR, and mild aortic stenosis with a moderately dilated ascending aorta.  At that time, Dr. Castillo added losartan to his medication regimen and recommended follow-up in another year. 24-hour Holter monitor in November 2022 showed atrial fibrillation with an average heart rate of 84 bpm.  Ranging from  bpm.    Echo was repeated earlier this month, showing a stable EF at 35%. The RV is normal. MR is stable. Aortic stenosis is now moderate. His GDMT includes losartan 50 mg daily, Toprol-XL 50 mg daily. He has had no prior ischemic workup.      Today, I am meeting Hugo who returns to clinic stating he feels pretty well from a cardiac standpoint. Over the past year, he's gone through a shoulder surgery and radiation for prostate ca at [facility] (now completed), and tolerated this well. Patient denies chest pain, shortness of breath, PND, orthopnea, edema, claudication, palpitations, near syncope or syncope. BP and HR marginal. He has a smart watch but doesn't really monitor his HR with it. Exercises 3x per week on machines, bikes, etc, and feels well with this. Retired teacher. .         Review of Systems     12-pt ROS is negative except for as noted in the HPI.          Physical Exam     Vitals: /67 (BP Location: Right arm, Patient Position: Sitting, Cuff Size: Adult Large)   Pulse 58   Ht 1.829 m (6')   Wt 133.6 kg (294 lb 9.6 oz)   SpO2 98%   BMI 39.95 kg/m    Wt Readings from Last 10 Encounters:   07/19/23 133.6 kg (294 lb 9.6 oz)   03/07/23 135.2 kg (298 lb)   02/21/23 136.5 kg (301 lb)   02/17/23 138.8 kg (306 lb)   02/09/23 139 kg (306 lb 6.4 oz)   01/31/23 139.9 kg (308 lb 8 oz)   12/19/22 142.9 kg (315 lb)   08/16/22 (!) 155.1 kg (342 lb)   08/04/22 (!) 154.2 kg (340 lb)   07/18/22 (!) 154.2 kg (340 lb)       Constitutional:  Patient is pleasant, alert, cooperative, and in NAD.  HEENT:  NCAT.  PERRLA. EOM's intact.   Neck:  CVP appears normal. No carotid bruits.   Pulmonary: Normal respiratory effort. CTAB.   Cardiac: Irregularly irregular rhythm, variable S1, grade 3/6 sm at the LSB.  Abdomen:  Non-tender abdomen, no hepatosplenomegaly appreciated.   Vascular: Pulses in the upper and lower extremities are 2+ and equal bilaterally.  Extremities: Trace ankle edema bilaterally. No erythema, cyanosis or tenderness appreciated.  Skin:  No rashes or lesions appreciated.   Neurological:  No gross motor or sensory deficits.   Psych: Appropriate affect.          Data   Labs reviewed:  Recent Labs   Lab Test 08/16/22  0804 08/11/21  0840 08/07/20  0847   LDL 81 67 88   HDL 43 44 43   NHDL 96 78 105   CHOL 139 122 148   TRIG 73 54 84   TSH 2.84 2.58 2.03   IRON 154  --  99     --  350   IRONSAT 43  --  28   ELENA 29  --  28       Lab Results   Component Value Date    WBC 6.5 02/21/2023    WBC 8.4 06/22/2021    RBC 4.84 02/21/2023    RBC 5.13 06/22/2021    HGB 14.0 03/08/2023    HGB 14.6 06/22/2021    HCT 42.8 02/21/2023    HCT 46.8 06/22/2021    MCV 88 02/21/2023    MCV 91 06/22/2021    MCH 28.7 02/21/2023    MCH 28.5 06/22/2021    MCHC 32.5 02/21/2023    MCHC 31.2 (L) 06/22/2021    RDW 14.2 02/21/2023    RDW 14.3 06/22/2021     02/21/2023     06/22/2021       Lab Results   Component Value Date     03/08/2023     06/22/2021    POTASSIUM 4.9 03/08/2023    POTASSIUM 4.2 08/16/2022    POTASSIUM 4.0 06/23/2021    CHLORIDE 99 03/08/2023    CHLORIDE 108 08/16/2022    CHLORIDE 108 06/22/2021    CO2 27 03/08/2023    CO2 25 08/16/2022    CO2 26 06/22/2021    ANIONGAP 10 03/08/2023    ANIONGAP 7 08/16/2022    ANIONGAP 4 06/22/2021     (H) 03/08/2023     (H) 03/08/2023     (H) 03/07/2023     (H) 08/16/2022     (H) 06/22/2021    BUN 14.1 03/08/2023    BUN 16 08/16/2022    BUN 14 06/22/2021    CR 0.73 03/08/2023    CR 0.81 06/22/2021    GFRESTIMATED >90  03/08/2023    GFRESTIMATED >60 01/11/2023    GFRESTIMATED 87 06/22/2021    GFRESTBLACK >90 06/22/2021    BEBO 9.0 03/08/2023    BEBO 8.4 (L) 06/22/2021      Lab Results   Component Value Date    AST 18 02/21/2023    AST 13 06/21/2021    ALT 15 02/21/2023    ALT 19 06/21/2021       Lab Results   Component Value Date    A1C 5.4 02/21/2023    A1C 5.8 (H) 11/19/2020       Lab Results   Component Value Date    INR 1.04 03/21/2016    INR 1.04 02/12/2015            Problem List     Patient Active Problem List   Diagnosis    Iron deficiency anemia    Colon polyps    Obstructive sleep apnea syndrome    Hyperlipidemia LDL goal <70    Long term current use of anticoagulant therapy - for intermittent a-fib    Advance Care Planning    Total knee replacement status    Calculus of kidney    NAFLD (nonalcoholic fatty liver disease)    Morbid obesity due to excess calories (H)    History of hepatitis C    Prediabetes    Paroxysmal atrial fibrillation (H)    Cholelithiasis    Hypertension goal BP (blood pressure) < 140/90    TMJ arthralgia    Nephrolithiasis    OA (osteoarthritis)    First degree AV block    Benign prostatic hyperplasia (BPH) with urinary urge incontinence    Thoracic aortic ectasia (H)    Stroke (H)    CVA (cerebral vascular accident) (H)    Cervical stenosis of spine    Vitamin B12 deficiency (non anemic)    Vitamin D deficiency    Myofascial pain    Atrial fibrillation    History of CVA (cerebrovascular accident)    Small bowel obstruction (H)    NSVT (nonsustained ventricular tachycardia) (H)    Chronic LLQ pain    Elevated prostate specific antigen (PSA)    Prostate cancer (H)    Chronic systolic heart failure (H)            Medications     Current Outpatient Medications   Medication Sig Dispense Refill    acetaminophen (TYLENOL) 325 MG tablet Take 2 tablets (650 mg) by mouth every 4 hours as needed for other (For optimal non-opioid multimodal pain management to improve pain control.) 100 tablet 0    apixaban  ANTICOAGULANT (ELIQUIS) 5 MG tablet Take 1 tablet (5 mg) by mouth 2 times daily Appointment needed for additional refills. Please call [phone] to schedule. 180 tablet 3    Ferrous Sulfate (IRON) 325 (65 Fe) MG tablet Take 1 tablet by mouth three times a week (Monday, Wednesday, Friday) 90 tablet 3    fluticasone (FLONASE) 50 MCG/ACT nasal spray Spray 2 sprays into both nostrils daily as needed for rhinitis or allergies 54.6 mL 3    losartan (COZAAR) 50 MG tablet Take 1 tablet (50 mg) by mouth daily 90 tablet 3    metoprolol succinate ER (TOPROL XL) 50 MG 24 hr tablet Take 1 tablet (50 mg) by mouth daily 90 tablet 3    niacin 500 MG tablet Take 500 mg by mouth daily (with breakfast)      pantoprazole (PROTONIX) 40 MG EC tablet Take 40 mg by mouth daily      senna-docusate (SENOKOT-S/PERICOLACE) 8.6-50 MG tablet Take 1-2 tablets by mouth 2 times daily Take while on oral narcotics to prevent or treat constipation. 30 tablet 0    tamsulosin (FLOMAX) 0.4 MG capsule Take 1 capsule (0.4 mg) by mouth 2 times daily 180 capsule 3    vitamin B-12 (CYANOCOBALAMIN) 1000 MCG tablet Take 1,000 mcg by mouth daily      vitamin C (ASCORBIC ACID) 1000 MG TABS Take 1,000 mg by mouth daily      Vitamin D-Vitamin K (K2 PLUS D3) 100-1000 MCG-UNIT TABS Take 1 tablet by mouth daily      vitamin D3 (CHOLECALCIFEROL) 125 MCG (5000 UT) tablet Take 1 tablet by mouth daily      oxyCODONE (ROXICODONE) 5 MG tablet Take 1-2 tablets (5-10 mg) by mouth every 4 hours as needed for moderate pain (4-6) 30 tablet 0            Past Medical History     Past Medical History:   Diagnosis Date    Atrial fibrillation 2006    Followed by [facility] - persistent    Calculus of kidney 2005, 6/06, 10/12, 8/18, 9/19    dr walker/nestor/иван - hematuria - w/u o/w neg    Cervical stenosis of spine     Moderate C4-5    Cholelithiasis     Chronic peptic ulcer, unspecified site, without mention of hemorrhage, perforation, or obstruction 1985    gastric bypass     Colon polyps 8/05, 9/10, 10/15    2 tubular adenoma, 1 hyperplastic - multiple serrated/tubular adenomas - last colonscopy 11/20 due 5 yrs    CVA (cerebral vascular accident) (H) 01/2019    small right periorlandic subcortical - left sided residual - left hand tingling/numbness - Left leg weak/numbness - cardioembolic    Elevated prostate specific antigen (PSA)     Dr Santos    Hepatitis C 10/2012    chronic hepatitis C, grade 1-2, stage 1 - mild - Dr Douglas    Hyperlipidemia LDL goal <70     Hypertension goal BP (blood pressure) < 140/90 06/2006    dr jaciel winchester    Iron deficiency anemia, unspecified 1985    gastric bypass    Nephrolithiasis multiple episodes, last 10/19    Dr Bey/Ivana/Tom - 9mm 3/2021    OA (osteoarthritis)     Multiple - Left shoulder with rotator cuff tear - Dr Durham    Obesity, unspecified     s/p gastric bypass 1985     Obstructive sleep apnea syndrome     CPAP - 15 cm - [facility] 1/2023    Prediabetes     Presbyacusis 01/2004    dr corona    Prostate cancer (H) 2023    Dr Santos - Manju 3+4, 4+3 = 7, bx 5/13 positive    Pyelonephritis, unspecified 12/1999    SCC (squamous cell carcinoma), 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    dr saez - lt conchal bowl    Sleep apnea 10/2002    cpap - severe - 15 cm - dr cunha    Stroke (H) 01/19/2019    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    [facility]    Vitamin B12 deficiency (non anemic) 04/15/2019    Vitamin D deficiency 04/15/2019     Past Surgical History:   Procedure Laterality Date    APPENDECTOMY      ARTHROPLASTY KNEE  08/13/2012    LT TKA - Dr Villanueva    CARDIOVERSION  2016    CARDIOVERSION      COLONOSCOPY  8/05, 9/10, 10/15    multiple tubular/serrated/hyperplastic polyps    COLONOSCOPY N/A 10/29/2015    multiple tubular and serrated adenomas    COLONOSCOPY N/A 09/07/2016    COLONOSCOPY  11/2020    tubular adenomas - due 5 yrs    COLONOSCOPY N/A 11/24/2020    Procedure: COLONOSCOPY with biopsies;  Surgeon: Chadwick Burgos MD;  Location:  OR     CYSTOSCOPY W/ LASER LITHOTRIPSY  10/16/2012,2018    ESOPHAGOSCOPY, GASTROSCOPY, DUODENOSCOPY (EGD), COMBINED N/A 2020    Procedure: ESOPHAGOGASTRODUODENOSCOPY, COLONOSCOPY with biopsies;  Surgeon: Chadwick Burgos MD;  Location:  OR    EYE SURGERY  ,,    lasik    HC KNEE SCOPE, DIAGNOSTIC  2000    Arthroscopy, Knee RT    LASER HOLMIUM LITHOTRIPSY URETER(S), INSERT STENT, COMBINED  10/16/2012    stones x 4, Dr Campa    LASER HOLMIUM LITHOTRIPSY URETER(S), INSERT STENT, COMBINED Right 2018    CYSTOSCOPY, RIGHT URETEROSCOPY, HOLMIUM LASER LITHOTRIPSY, RIGHT STENT PLACEMENT - Dr Bey    MRI BIOPSY PROSTATE Bilateral 2023    mark    REVERSE ARTHROPLASTY SHOULDER Left 2023    Procedure: LEFT REVERSE SHOULDER ARTHROPLASTY WITH CUSTOM GUIDE WITH AUTOLOGOUS BONE GRAFTOF PROXIMAL HUMERUS;  Surgeon: Booker Multani MD;  Location:  OR    SURGICAL HISTORY OF -       s/p gastric bypass Bilroth II    SURGICAL HISTORY OF -   1998    wisdom teeth    SURGICAL HISTORY OF -       cellulitis    SURGICAL HISTORY OF -   1998    lt forearm spur's    SURGICAL HISTORY OF -   ,,    s/p lasik    SURGICAL HISTORY OF -   1999    rt forearm spurs    SURGICAL HISTORY OF -   2006    dr stevenson - lithotrypsy    SURGICAL HISTORY OF -   10/08,     Lt ear chonchal lesion removal SCC - dr saez    SURGICAL HISTORY OF -  Right 10/2019    nephrolithiasis - cystoscopy, rt lithotrypsy, Dr Heath    SURGICAL HISTORY OF -  Right 2019    Cystoscopy, Rt lithotrypsy, Calcium Oxalate, Dr Heath     Family History   Problem Relation Age of Onset    Alzheimer Disease Father 82         at 88    Prostate Cancer Father         bladder and prostate    Heart Disease Mother 80        CHF    Heart Disease Maternal Grandfather         MI at 55    Cancer Paternal Uncle         ?    Ulcerative Colitis No family hx of     Crohn's Disease No family hx of     Stomach  "Cancer No family hx of     GERD No family hx of      Social History     Socioeconomic History    Marital status:      Spouse name: Mayra    Number of children: 3    Years of education: 21    Highest education level: Not on file   Occupational History    Occupation: retired teacher and football and       Employer: [employer]     Employer: RETIRED   Tobacco Use    Smoking status: Never    Smokeless tobacco: Never   Vaping Use    Vaping Use: Never used   Substance and Sexual Activity    Alcohol use: Yes     Alcohol/week: 2.0 - 3.0 standard drinks of alcohol     Types: 2 - 3 Standard drinks or equivalent per week     Comment: occassiinally    Drug use: No     Comment: acknowledges using herbal supplement \"Vibe\" for energy-none used recently     Sexual activity: Not Currently     Partners: Male     Birth control/protection: None     Comment:    Other Topics Concern     Service Not Asked    Blood Transfusions Not Asked    Caffeine Concern Yes     Comment: <1 can qd    Occupational Exposure Not Asked    Hobby Hazards Not Asked    Sleep Concern Yes     Comment: sleep apnea, wears cpap    Stress Concern Not Asked    Weight Concern Not Asked    Special Diet Not Asked    Back Care Not Asked    Exercise No    Bike Helmet Not Asked    Seat Belt Yes    Self-Exams Not Asked    Parent/sibling w/ CABG, MI or angioplasty before 65F 55M? No   Social History Narrative    Not on file     Social Determinants of Health     Financial Resource Strain: Not on file   Food Insecurity: Not on file   Transportation Needs: Not on file   Physical Activity: Not on file   Stress: Not on file   Social Connections: Not on file   Intimate Partner Violence: Not on file   Housing Stability: Not on file            Allergies   Patient has no known allergies.    Today's clinic visit entailed:  Review of the result(s) of each unique test - echocardiograms, EKG, BMP, zio monitor  Ordering of each unique " test  Prescription drug management  I spent a total of 60 minutes on the day of the visit.   Time spent by me doing chart review, history and exam, documentation and further activities per the note  Provider  Link to MDM Help Grid     The level of medical decision making during this visit was of high complexity.        Thank you for allowing me to participate in the care of your patient.      Sincerely,     Laura Olivia PA-C     [facility]  cc:   Sarah Beth Castillo MD

## 2023-07-19 NOTE — PATIENT INSTRUCTIONS
Today's Plan:   We discussed that your heart is weakened (since 2021). The EF is 35%.   START jardiance 10 mg daily. Stop Losartan and start Entresto 24-26 mg twice daily. Call us if unaffordable.  Non-fasting labs two weeks after you start the new meds.   We discussed a coronary angiogram vs stress test today, to rule out a heart blockage as a contributor to your heart failure. Let me know what you decide.   See Dr. Narvaez in ~2 months (your new EP MD).     If you have questions or concerns please call my nurse team at [phone].   Scheduling phone number: [phone]  For after hours urgent concerns call [phone] option 2.   Reminder: Please bring in all current medications, over the counter supplements and vitamin bottles to your next appointment.    It was a pleasure seeing you today!     Laura Olivia PA-C

## 2023-07-20 PROBLEM — I50.20 HFREF (HEART FAILURE WITH REDUCED EJECTION FRACTION) (H): Status: ACTIVE | Noted: 2023-07-20

## 2023-07-20 PROBLEM — I35.0 AORTIC STENOSIS: Status: ACTIVE | Noted: 2023-07-20

## 2023-07-20 PROBLEM — I34.0 MITRAL REGURGITATION: Status: ACTIVE | Noted: 2023-07-20

## 2023-07-20 NOTE — TELEPHONE ENCOUNTER
"Patient has Medicare D through [facility].  Patient's drug costs have exceeded $4660, and as such will now pay a 25% coinsurance on all covered drugs.  (Also called the \"coverage gap\" or \"donut hole.\")      Jardiance/Farxiga  --$144/mo.  --If total out-of-pocket costs exceed $7,400, coinsurance will be reduced to a 5%, equivalent to $29/mo.    Entresto  --$162/mo.  --If total out-of-pocket costs exceed $7,400, coinsurance will be reduced to a 5%, equivalent to $32/mo.    Resources for free/discounted medication    --[facility] enrique.  Patients that have primary care through [facility] may be eligible for [facility]'s income-based once yearly enrique of $500 to pay for medications.  Information on this can be found at [email or web address], or by calling [phone].     -- program.  Free medication is available through the mail for income-eligible patients.  Application and info for   Jardiance: [employer]our-responsibility/patient-assistance-program or by calling [phone]  Entresto: [employer] or by calling [phone].    --[place].  If patient is not eligible for the  program, please let me know and I can assist with obtaining a enrique through [employer].    --Social Security subsidy.  Eligibility for this is based on both assets and income.  Apply at ssa.gov/extrahelp, by calling 1-610.699.5134, or at the Social Security office.       Elissa Gonsalez  Pharmacy Technician/Liaison, Discharge Pharmacy   [phone] (voice or text)  [email or web address]        ++7- I called pt to discuss the BI cares and [employer] assistance programs, I got brittanie VM and  left my information and a call back number-if his income is with in the program guidelines, I will send him the applications.  Await his call back  Jackelin grimes Lpn    "

## 2023-07-21 ENCOUNTER — DOCUMENTATION ONLY (OUTPATIENT)
Dept: CARDIOLOGY | Facility: CLINIC | Age: 78
End: 2023-07-21
Payer: COMMERCIAL

## 2023-07-21 ENCOUNTER — TELEPHONE (OUTPATIENT)
Dept: CARDIOLOGY | Facility: CLINIC | Age: 78
End: 2023-07-21
Payer: COMMERCIAL

## 2023-07-21 DIAGNOSIS — I42.9 CARDIOMYOPATHY (H): Primary | ICD-10-CM

## 2023-07-21 DIAGNOSIS — I48.19 PERSISTENT ATRIAL FIBRILLATION (H): ICD-10-CM

## 2023-07-21 NOTE — PROGRESS NOTES
I spoke w/ pt today 7- re the possibility of assistance programs for both Entresto and Jardiance. He is well above the income guidelines to qualify for the assistance programs. Entresto max for annual taxable income is 54,360 for a family of 1  Income guidline for jardiance for family of 1 is 40.000.    He was given a 30 day free card for Entresto he plans to use, but is asking if anything less expensive would be available. Will message his care Team  mmunns lpn

## 2023-07-21 NOTE — TELEPHONE ENCOUNTER
[facility] Call Center    Phone Message    May a detailed message be left on voicemail: yes     Reason for Call: Other: Patient called back and was to let Savannah Olivia know what test he wanted to do. The patient would like to do the angiogram. Please call the paitent to schedule.  Also the patient is to see Dr. Narvaez. However the order is for HF. Not sure how to schedule this as Dr. Narvaez is EP.. Please review and schedule as needed.     Action Taken: Other: cardiology    Travel Screening: Not Applicable   Thank you!  [facility]

## 2023-07-24 ENCOUNTER — TELEPHONE (OUTPATIENT)
Dept: CARDIOLOGY | Facility: CLINIC | Age: 78
End: 2023-07-24
Payer: COMMERCIAL

## 2023-07-24 NOTE — TELEPHONE ENCOUNTER
I have obtained a $10,000 [employer] enrique for Hugo.  It will require renewal on 6/24/24.  The pharmacy will bill the following secondary to his [employer] for Cardiomyopathy medications only:    BIN: [id number]  PCN: [id number]  Group: [id number]  ID: [id number]    Patient should be receiving an email with this information.      [facility] [place] [facility] to give them this information and verify it is valid.  I will have them fill the Jardiance and Entresto and call the patient when they are ready.  Copay should be $0.

## 2023-07-24 NOTE — TELEPHONE ENCOUNTER
HOPE APPROVED    Medication: ENTRESTO 24-26 MG PO TABS, Jardiance, Metoprolol  Amount $: 10,000  [employer] Name: [facility] Phone:    Foundation Fax:   Foundation Effective Date: 6/24/2023  Foundation Expiration Date: 6/24/2024  Additional Information:  Patient Notified: No

## 2023-07-24 NOTE — TELEPHONE ENCOUNTER
Pt made aware of the [employer] enrique that was obtained and that he should look for an email related to this enrique. Pt was very happy to hear this, but had already picked up some medication and has started taking. Yanely

## 2023-07-25 RX ORDER — ENOXAPARIN SODIUM 150 MG/ML
120 INJECTION SUBCUTANEOUS DAILY
Qty: 2 ML | Refills: 0 | Status: ON HOLD | OUTPATIENT
Start: 2023-07-25 | End: 2023-08-22

## 2023-07-25 NOTE — CONFIDENTIAL NOTE
Spoke to pt about the Angiogram and he would like to have done after his vacation. Pt  will be back on 8/12 and will be able to do it then and per Laura that is fine. Pt made aware of the Lovenox that was prescribed. Pt told that he can  now or  when needed.  Did explained that is is a shot in the abdomen at 48 and 36 hours prior to his procedure. Pt also told that he will need to hold his Jardiance for 3 days prior to the procedure. Pt may have others that this writer did not go through. Order for angio in place and Tegan is aware to schedule. Yanely

## 2023-07-31 ENCOUNTER — PATIENT OUTREACH (OUTPATIENT)
Dept: CARE COORDINATION | Facility: CLINIC | Age: 78
End: 2023-07-31
Payer: COMMERCIAL

## 2023-08-02 ENCOUNTER — LAB (OUTPATIENT)
Dept: LAB | Facility: CLINIC | Age: 78
End: 2023-08-02
Attending: PHYSICIAN ASSISTANT
Payer: COMMERCIAL

## 2023-08-02 DIAGNOSIS — I50.22 CHRONIC SYSTOLIC HEART FAILURE (H): ICD-10-CM

## 2023-08-02 LAB
ANION GAP SERPL CALCULATED.3IONS-SCNC: 10 MMOL/L (ref 7–15)
BUN SERPL-MCNC: 20.4 MG/DL (ref 8–23)
CALCIUM SERPL-MCNC: 9.1 MG/DL (ref 8.8–10.2)
CHLORIDE SERPL-SCNC: 103 MMOL/L (ref 98–107)
CREAT SERPL-MCNC: 0.77 MG/DL (ref 0.67–1.17)
DEPRECATED HCO3 PLAS-SCNC: 26 MMOL/L (ref 22–29)
GFR SERPL CREATININE-BSD FRML MDRD: >90 ML/MIN/1.73M2
GLUCOSE SERPL-MCNC: 136 MG/DL (ref 70–99)
POTASSIUM SERPL-SCNC: 4 MMOL/L (ref 3.4–5.3)
SODIUM SERPL-SCNC: 139 MMOL/L (ref 136–145)

## 2023-08-02 PROCEDURE — 80048 BASIC METABOLIC PNL TOTAL CA: CPT | Performed by: PHYSICIAN ASSISTANT

## 2023-08-02 PROCEDURE — 36415 COLL VENOUS BLD VENIPUNCTURE: CPT | Performed by: PHYSICIAN ASSISTANT

## 2023-08-08 ENCOUNTER — MYC MEDICAL ADVICE (OUTPATIENT)
Dept: FAMILY MEDICINE | Facility: CLINIC | Age: 78
End: 2023-08-08
Payer: COMMERCIAL

## 2023-08-08 ENCOUNTER — HOSPITAL ENCOUNTER (OUTPATIENT)
Facility: CLINIC | Age: 78
DRG: 231 | End: 2023-08-08
Payer: COMMERCIAL

## 2023-08-11 DIAGNOSIS — I42.9 CARDIOMYOPATHY, UNSPECIFIED TYPE (H): Primary | ICD-10-CM

## 2023-08-11 RX ORDER — SODIUM CHLORIDE 9 MG/ML
INJECTION, SOLUTION INTRAVENOUS CONTINUOUS
Status: CANCELLED | OUTPATIENT
Start: 2023-08-11

## 2023-08-11 RX ORDER — ASPIRIN 81 MG/1
243 TABLET, CHEWABLE ORAL ONCE
Status: CANCELLED | OUTPATIENT
Start: 2023-08-11

## 2023-08-11 RX ORDER — ASPIRIN 325 MG
325 TABLET ORAL ONCE
Status: CANCELLED | OUTPATIENT
Start: 2023-08-11 | End: 2023-08-11

## 2023-08-11 RX ORDER — POTASSIUM CHLORIDE 1500 MG/1
20 TABLET, EXTENDED RELEASE ORAL
Status: CANCELLED | OUTPATIENT
Start: 2023-08-11

## 2023-08-11 RX ORDER — LIDOCAINE 40 MG/G
CREAM TOPICAL
Status: CANCELLED | OUTPATIENT
Start: 2023-08-11

## 2023-08-11 NOTE — PROGRESS NOTES
Coronary angiogram/PCI/Right Heart Cath prep instructions.     Patient is scheduled for a Coronary Angiogram at [facility] - [address], [place] [address] - Main Entrance of the Hospital on 8/15/23.  Check in time is at 7:30am and procedure to follow.    Patient instructed to remain NPO for solid foods 8 hours prior to arrival and may have clear liquids up to 2 hours prior to arrival.    Patient Patient does not require extra fluids prior to procedure.    Patient is not diabetic.    Patient is on Eliquis (Eliquis, Pradaxa, Xarelto) and has been advised to hold for 2 days prior to procedure starting 8/13/23.  Patient can resume after the procedure.    Patient is not on diuretics.       Patient is not currently taking ASA and has been advised to take one 325 mg tablet the day prior and morning of the procedure.    Pt is on Jardiance and should hold it for 3 days prior to procedure.    Per 7/19/23 OV note from CHEYANNE Morocho, patien will need to hold jardiance x 72h prior, and Eliquis 48h prior. Given his hx of CVA, he will need to take lovenox 1 mg/kg SQ one dose 48 & 36 hours prior to procedure. Full dose aspirin day prior to and day of procedure.     Patient advised to take their other daily medications the morning of the procedure with small sips of water.     Verified patient does not have a contrast allergy.    Verified patient has someone available to drive them home from the hospital and can stay with them for 24 hours after the procedure.     Patient advised to notify care team with any new COVID like symptoms prior to procedure.    Patient will check their temperature the morning of procedure and call [facility] at [phone] if temp is >100.0.    Patient is aware of visitor policy.    Patient expresses understanding of above instructions and denies further questions at this time.      Keila, Team 1 RN  [facility]

## 2023-08-14 ENCOUNTER — OFFICE VISIT (OUTPATIENT)
Dept: FAMILY MEDICINE | Facility: CLINIC | Age: 78
End: 2023-08-14
Payer: COMMERCIAL

## 2023-08-14 ENCOUNTER — TELEPHONE (OUTPATIENT)
Dept: FAMILY MEDICINE | Facility: CLINIC | Age: 78
End: 2023-08-14

## 2023-08-14 VITALS
DIASTOLIC BLOOD PRESSURE: 76 MMHG | TEMPERATURE: 97.4 F | HEART RATE: 90 BPM | HEIGHT: 72 IN | WEIGHT: 296 LBS | BODY MASS INDEX: 40.09 KG/M2 | RESPIRATION RATE: 18 BRPM | OXYGEN SATURATION: 97 % | SYSTOLIC BLOOD PRESSURE: 118 MMHG

## 2023-08-14 DIAGNOSIS — I35.0 AORTIC VALVE STENOSIS, ETIOLOGY OF CARDIAC VALVE DISEASE UNSPECIFIED: ICD-10-CM

## 2023-08-14 DIAGNOSIS — S22.24XS: ICD-10-CM

## 2023-08-14 DIAGNOSIS — N30.00 ACUTE CYSTITIS WITHOUT HEMATURIA: Primary | ICD-10-CM

## 2023-08-14 DIAGNOSIS — I50.22 CHRONIC SYSTOLIC HEART FAILURE (H): ICD-10-CM

## 2023-08-14 DIAGNOSIS — I48.21 PERMANENT ATRIAL FIBRILLATION (H): ICD-10-CM

## 2023-08-14 DIAGNOSIS — R30.0 DYSURIA: ICD-10-CM

## 2023-08-14 DIAGNOSIS — N20.0 CALCULUS OF KIDNEY: ICD-10-CM

## 2023-08-14 DIAGNOSIS — E78.5 HYPERLIPIDEMIA LDL GOAL <70: ICD-10-CM

## 2023-08-14 LAB
ALBUMIN UR-MCNC: 30 MG/DL
APPEARANCE UR: CLEAR
BILIRUB UR QL STRIP: NEGATIVE
COLOR UR AUTO: YELLOW
CREAT UR-MCNC: 151 MG/DL
GLUCOSE UR STRIP-MCNC: 100 MG/DL
HGB UR QL STRIP: ABNORMAL
HYALINE CASTS #/AREA URNS LPF: ABNORMAL /LPF
KETONES UR STRIP-MCNC: NEGATIVE MG/DL
LEUKOCYTE ESTERASE UR QL STRIP: NEGATIVE
MICROALBUMIN UR-MCNC: 68.5 MG/L
MICROALBUMIN/CREAT UR: 45.36 MG/G CR (ref 0–17)
MUCOUS THREADS #/AREA URNS LPF: PRESENT /LPF
NITRATE UR QL: NEGATIVE
PH UR STRIP: 5.5 [PH] (ref 5–7)
RBC #/AREA URNS AUTO: ABNORMAL /HPF
SP GR UR STRIP: >=1.03 (ref 1–1.03)
SQUAMOUS #/AREA URNS AUTO: ABNORMAL /LPF
UROBILINOGEN UR STRIP-ACNC: 0.2 E.U./DL
WBC #/AREA URNS AUTO: ABNORMAL /HPF

## 2023-08-14 PROCEDURE — 99214 OFFICE O/P EST MOD 30 MIN: CPT | Performed by: FAMILY MEDICINE

## 2023-08-14 PROCEDURE — 82570 ASSAY OF URINE CREATININE: CPT | Performed by: FAMILY MEDICINE

## 2023-08-14 PROCEDURE — 82043 UR ALBUMIN QUANTITATIVE: CPT | Performed by: FAMILY MEDICINE

## 2023-08-14 PROCEDURE — 81001 URINALYSIS AUTO W/SCOPE: CPT | Performed by: FAMILY MEDICINE

## 2023-08-14 RX ORDER — NITROFURANTOIN 25; 75 MG/1; MG/1
100 CAPSULE ORAL 2 TIMES DAILY
Qty: 10 CAPSULE | Refills: 0 | Status: ON HOLD | OUTPATIENT
Start: 2023-08-14 | End: 2023-08-22

## 2023-08-14 NOTE — TELEPHONE ENCOUNTER
Pt calling asking if he can take 4 baby Asprin instead of a 325 mg tablet for his procedure tomorrow - Rn advised that it should be good    Patient stated an understanding and agreed with plan.    Kathia Centeno RN, BSN  [facility]

## 2023-08-14 NOTE — PATIENT INSTRUCTIONS
" [facility]  [address] [place] [address]  Office: [phone]   Fax:    [phone]     Thank you so much or choosing [facility]  for your Health Care. It was a pleasure seeing you at your visit today! Please contact us with any questions or concerns you may have.                   Ingrid Girard MD                              To reach your [facility] care team after hours call:   [phone] press #[phone] \"to speak with your care team\".  This will get you to our clinic instead of routing to central [facility]  scheduling.     PLEASE NOTE OUR HOURS HAVE CHANGED secondary to COVID-19 coronavirus pandemic, as we are trying to minimize patient exposure to the virus,  which is now widespread in the [facility].  These hours may change with very little notice.  We apologize for any inconvenience.       Our current clinic hours are:          Monday- Thursday   7:00am - 6:00pm  in person.      Friday  7:00am- 5:00pm                       Saturday and Sunday : Closed to in person and virtual visits        We have telephone and virtual visit times available between    7:00am - 6pm on Monday-Friday as well.                                                Phone:  [phone]      Our pharmacy hours: Monday through Friday 8:00am to 5:00pm                        Saturday - 9:00 am to 12 noon       Sunday : Closed.              Phone:  [phone]              ###  Please note: at this time we are not accepting any walk-in visits. ###      There is also information available at our web site:  [email or web address]    If your provider ordered any lab tests and you do not receive the results within 10 business days, please call the clinic.    If you need a medication refill please contact your pharmacy.  Please allow 3 business days for your refill to be completed.    Our clinic offers telephone visits and e visits.  Please ask one of " your team members to explain more.      Use [employer] (secure email communication and access to your chart) to send your primary care provider a message or make an appointment. Ask someone on your Team how to sign up for [employer].

## 2023-08-14 NOTE — PROGRESS NOTES
Assessment & Plan       ICD-10-CM    1. Acute cystitis without hematuria  N30.00 nitroFURantoin macrocrystal-monohydrate (MACROBID) 100 MG capsule      2. Dysuria  R30.0 UA with Microscopic - lab collect     Urine Culture Aerobic Bacterial - lab collect     Urine Microscopic Exam     Urine Culture Aerobic Bacterial - lab collect      3. Hyperlipidemia LDL goal <70  E78.5 REVIEW OF HEALTH MAINTENANCE PROTOCOL ORDERS     Lipid panel reflex to direct LDL Non-fasting     Albumin Random Urine Quantitative with Creat Ratio     Albumin Random Urine Quantitative with Creat Ratio      4. Closed fracture of xiphoid process, sequela- hypertrophy  S22.24XS       5. Permanent atrial fibrillation (H)  I48.21       6. Aortic valve stenosis, etiology of cardiac valve disease unspecified  I35.0       7. Calculus of kidney - 1.2 cm stone at the upper pole as of CT urogram fr 1/11/2023  N20.0         Reviewed pt's previous CT abd/pelvis scans for lower sternal/xiphoid process abnormality - stable over time from 2016- to CT Urogram 1/11/2023.     With pt's hx of left kidney stone and valvular heart disease , with pt having a coronary angiogram tomorrow , will treat possible UTI with nitrofurantoin 100mg po bid x 5 days per current guidelines to hopefully avoid C diff colitis.     Please, call our clinic or go to the ER immediately if signs or symptoms worsen or fail to improve as anticipated.     Ordering of each unique test  Prescription drug management  30 minutes spent by me on the date of the encounter doing chart review, history and exam, documentation and further activities per the note     Will watch out for pt's UC results in the next 48 hours.     BMI:   Estimated body mass index is 40.14 kg/m  as calculated from the following:    Height as of this encounter: 1.829 m (6').    Weight as of this encounter: 134.3 kg (296 lb).   Weight management plan: Patient was referred to their PCP to discuss a diet and exercise  plan.    MEDICATIONS:   Orders Placed This Encounter   Medications    nitroFURantoin macrocrystal-monohydrate (MACROBID) 100 MG capsule     Sig: Take 1 capsule (100 mg) by mouth 2 times daily for 5 days     Dispense:  10 capsule     Refill:  0          - Continue other medications without change  Work on weight loss  Regular exercise  See Patient Instructions         Ingrid Girard MD  [facility] PRIOR LUDMILA Arias is a 78 year old, presenting for the following health issues:  Urinary Problem        8/14/2023     9:47 AM   Additional Questions   Roomed by dorothy junior   Accompanied by self         8/14/2023     9:47 AM   Patient Reported Additional Medications   Patient reports taking the following new medications none       History of Present Illness       Reason for visit:  Uti  Symptom onset:  1-2 weeks ago  Symptoms include:  Burning when going to bathroom sometimes  Symptom intensity:  Mild  Symptom progression:  Staying the same  Had these symptoms before:  Yes  Prior treatment description:  Antibiotics  What makes it worse:  None  What makes it better:  None    He eats 2-3 servings of fruits and vegetables daily.He consumes 0 sweetened beverage(s) daily.He exercises with enough effort to increase his heart rate 30 to 60 minutes per day.  He exercises with enough effort to increase his heart rate 3 or less days per week.   He is taking medications regularly.     Patient Active Problem List   Diagnosis    Iron deficiency anemia    Colon polyps    Obstructive sleep apnea syndrome    Hyperlipidemia LDL goal <70    Long term current use of anticoagulant therapy - for intermittent a-fib    Advance Care Planning    Total knee replacement status    Calculus of kidney - 1.2 cm stone at the upper pole as of CT urogram fr 1/11/2023    NAFLD (nonalcoholic fatty liver disease)    Morbid obesity due to excess calories (H)    History of hepatitis C    Prediabetes    Paroxysmal atrial  fibrillation (H)    Cholelithiasis    Hypertension goal BP (blood pressure) < 140/90    TMJ arthralgia    Nephrolithiasis    OA (osteoarthritis)    First degree AV block    Benign prostatic hyperplasia (BPH) with urinary urge incontinence    Thoracic aortic ectasia (H)    Stroke (H)    CVA (cerebral vascular accident) (H)    Cervical stenosis of spine    Vitamin B12 deficiency (non anemic)    Vitamin D deficiency    Myofascial pain    Permanent atrial fibrillation (H)    History of CVA (cerebrovascular accident)    Small bowel obstruction (H)    NSVT (nonsustained ventricular tachycardia) (H)    Chronic LLQ pain    Elevated prostate specific antigen (PSA)    Prostate cancer (H)    Chronic systolic heart failure (H)    HFrEF (heart failure with reduced ejection fraction) (H)    Aortic valve stenosis, etiology of cardiac valve disease unspecified    Mitral regurgitation       Current Outpatient Medications   Medication Sig Dispense Refill    acetaminophen (TYLENOL) 325 MG tablet Take 2 tablets (650 mg) by mouth every 4 hours as needed for other (For optimal non-opioid multimodal pain management to improve pain control.) 100 tablet 0    apixaban ANTICOAGULANT (ELIQUIS) 5 MG tablet Take 1 tablet (5 mg) by mouth 2 times daily Appointment needed for additional refills. Please call [phone] to schedule. 180 tablet 3    empagliflozin (JARDIANCE) 10 MG TABS tablet Take 1 tablet (10 mg) by mouth daily 90 tablet 3    enoxaparin ANTICOAGULANT (LOVENOX) 120 MG/0.8ML syringe Inject 0.8 mLs (120 mg) Subcutaneous daily Take one dose 48 hours before procedure and second dose 36 hours before proceure 2 mL 0    Ferrous Sulfate (IRON) 325 (65 Fe) MG tablet Take 1 tablet by mouth three times a week (Monday, Wednesday, Friday) 90 tablet 3    fluticasone (FLONASE) 50 MCG/ACT nasal spray Spray 2 sprays into both nostrils daily as needed for rhinitis or allergies 54.6 mL 3    metoprolol succinate ER (TOPROL XL) 50 MG 24 hr tablet Take 1  tablet (50 mg) by mouth daily 90 tablet 3    niacin 500 MG tablet Take 500 mg by mouth daily (with breakfast)      nitroFURantoin macrocrystal-monohydrate (MACROBID) 100 MG capsule Take 1 capsule (100 mg) by mouth 2 times daily for 5 days 10 capsule 0    pantoprazole (PROTONIX) 40 MG EC tablet Take 40 mg by mouth daily      sacubitril-valsartan (ENTRESTO) 24-26 MG per tablet Take 1 tablet by mouth 2 times daily 180 tablet 3    senna-docusate (SENOKOT-S/PERICOLACE) 8.6-50 MG tablet Take 1-2 tablets by mouth 2 times daily Take while on oral narcotics to prevent or treat constipation. 30 tablet 0    tamsulosin (FLOMAX) 0.4 MG capsule Take 1 capsule (0.4 mg) by mouth 2 times daily 180 capsule 3    vitamin B-12 (CYANOCOBALAMIN) 1000 MCG tablet Take 1,000 mcg by mouth daily      vitamin C (ASCORBIC ACID) 1000 MG TABS Take 1,000 mg by mouth daily      Vitamin D-Vitamin K (K2 PLUS D3) 100-1000 MCG-UNIT TABS Take 1 tablet by mouth daily      vitamin D3 (CHOLECALCIFEROL) 125 MCG (5000 UT) tablet Take 1 tablet by mouth daily          No Known Allergies      Review of Systems :  Constitutional, HEENT, cardiovascular, pulmonary, GI, , musculoskeletal, neuro, skin, endocrine and psych systems are negative, except as otherwise noted.      Objective  :   /76   Pulse 90   Temp 97.4  F (36.3  C)   Resp 18   Ht 1.829 m (6')   Wt 134.3 kg (296 lb)   SpO2 97%   BMI 40.14 kg/m    Body mass index is 40.14 kg/m .  Physical Exam :   GENERAL: nontoxic, morbidly obese,  alert and no distress  EYES: Eyes grossly normal to inspection, PERRL and conjunctivae and sclerae normal  HENT: ear canals and TM's normal, nose and mouth without ulcers or lesions  NECK: no adenopathy, no asymmetry, masses, or scars and thyroid normal to palpation  RESP: lungs clear to auscultation - no rales, rhonchi or wheezes  CV: regular rate and rhythm, normal S1 S2, no S3 or S4, 3/6 mixed regurgitant and ejection murmur, consistent with pt's hx of  moderate MR and moderate AS, no click or rub, no peripheral edema today and peripheral pulses strong  ABDOMEN: soft, nontender, no hepatosplenomegaly, there is an irregular very firm/bony mass in the midepigastrium - approx. 4-5cm x 6+cm - consistent with previous xiphoid process abnormality - unchanged in years per patient -  and bowel sounds normal  MS: no gross musculoskeletal defects noted, no edema  SKIN: no suspicious lesions or rashes  NEURO: Normal strength and tone, mentation intact and speech normal  PSYCH: mentation appears normal, affect normal/bright  No CVA tenderness.          UA RESULTS:  Recent Labs   Lab Test 08/14/23  0927   COLOR Yellow   APPEARANCE Clear   URINEGLC 100*   URINEBILI Negative   URINEKETONE Negative   SG >=1.030   UBLD Moderate*   URINEPH 5.5   PROTEIN 30*   UROBILINOGEN 0.2   NITRITE Negative   LEUKEST Negative   RBCU *   WBCU 5-10*     UC = pending.       Echocardiogram fr 7/14/2023: Interpretation Summary     1. The left ventricle is normal in size. The visual ejection fraction is  estimated at 35%. There is moderate global hypokinesia of the left ventricle.  2. The right ventricle is normal in structure, function and size.  3. There is mild to moderate (1-2+) mitral regurgitation.  4. Moderate valvular aortic stenosis. Mean 17mmHg, Vmax 2.5m/s, FREDY 1.2cm2, DI  0.27, low SVI of 23ml/m2.  5. The ascending aorta is Mildly dilated. 4.3cm.     Echo 7/2022 showed EF 35-40%, 1-2+ MR, AS with mean 13mmHg, Vmax 2.4m/s, FREDY  1.8cm2, DI 0.38, aorta 4.5cm.

## 2023-08-14 NOTE — TELEPHONE ENCOUNTER
Pt calling stating that he has a uti and needs to be seen today    RN - advised the  to get pt on today with MD KYARA for MANDO Centeno RN, BSN  [facility]

## 2023-08-15 ENCOUNTER — ANESTHESIA EVENT (OUTPATIENT)
Dept: SURGERY | Facility: CLINIC | Age: 78
DRG: 231 | End: 2023-08-15
Payer: COMMERCIAL

## 2023-08-15 ENCOUNTER — ANESTHESIA (OUTPATIENT)
Dept: SURGERY | Facility: CLINIC | Age: 78
DRG: 231 | End: 2023-08-15
Payer: COMMERCIAL

## 2023-08-15 ENCOUNTER — HOSPITAL ENCOUNTER (INPATIENT)
Facility: CLINIC | Age: 78
LOS: 7 days | Discharge: NURSING, REHAB OR LONG-TERM CARE | DRG: 231 | End: 2023-08-22
Admitting: STUDENT IN AN ORGANIZED HEALTH CARE EDUCATION/TRAINING PROGRAM
Payer: COMMERCIAL

## 2023-08-15 ENCOUNTER — APPOINTMENT (OUTPATIENT)
Dept: GENERAL RADIOLOGY | Facility: CLINIC | Age: 78
DRG: 231 | End: 2023-08-15
Attending: SURGERY
Payer: COMMERCIAL

## 2023-08-15 ENCOUNTER — APPOINTMENT (OUTPATIENT)
Dept: GENERAL RADIOLOGY | Facility: CLINIC | Age: 78
DRG: 231 | End: 2023-08-15
Attending: INTERNAL MEDICINE
Payer: COMMERCIAL

## 2023-08-15 DIAGNOSIS — I42.9 CARDIOMYOPATHY (H): ICD-10-CM

## 2023-08-15 DIAGNOSIS — I50.22 CHRONIC SYSTOLIC HEART FAILURE (H): ICD-10-CM

## 2023-08-15 DIAGNOSIS — Z95.1 S/P CABG (CORONARY ARTERY BYPASS GRAFT): Primary | ICD-10-CM

## 2023-08-15 DIAGNOSIS — I50.20 HFREF (HEART FAILURE WITH REDUCED EJECTION FRACTION) (H): ICD-10-CM

## 2023-08-15 DIAGNOSIS — I42.9 CARDIOMYOPATHY, UNSPECIFIED TYPE (H): ICD-10-CM

## 2023-08-15 LAB
ABO/RH(D): NORMAL
ACT BLD: 234 SECONDS (ref 74–150)
ACT BLD: 255 SECONDS (ref 74–150)
ACT BLD: 263 SECONDS (ref 74–150)
ACT BLD: 264 SECONDS (ref 74–150)
ACT BLD: 268 SECONDS (ref 74–150)
ACT BLD: 284 SECONDS (ref 74–150)
ACT BLD: 293 SECONDS (ref 74–150)
ACT BLD: 297 SECONDS (ref 74–150)
ACT BLD: 313 SECONDS (ref 74–150)
ACT BLD: 343 SECONDS (ref 74–150)
ACT BLD: 480 SECONDS (ref 74–150)
ALBUMIN SERPL BCG-MCNC: 2.8 G/DL (ref 3.5–5.2)
ALBUMIN SERPL BCG-MCNC: 3.2 G/DL (ref 3.5–5.2)
ALLEN'S TEST: ABNORMAL
ALP SERPL-CCNC: 62 U/L (ref 40–129)
ALP SERPL-CCNC: 66 U/L (ref 40–129)
ALT SERPL W P-5'-P-CCNC: 12 U/L (ref 0–70)
ALT SERPL W P-5'-P-CCNC: 13 U/L (ref 0–70)
ANION GAP SERPL CALCULATED.3IONS-SCNC: 12 MMOL/L (ref 7–15)
ANION GAP SERPL CALCULATED.3IONS-SCNC: 18 MMOL/L (ref 7–15)
ANION GAP SERPL CALCULATED.3IONS-SCNC: 9 MMOL/L (ref 7–15)
ANTIBODY SCREEN: NEGATIVE
APTT PPP: 34 SECONDS (ref 22–38)
APTT PPP: 35 SECONDS (ref 22–38)
APTT PPP: 63 SECONDS (ref 22–38)
AST SERPL W P-5'-P-CCNC: 21 U/L (ref 0–45)
AST SERPL W P-5'-P-CCNC: 24 U/L (ref 0–45)
BASE EXCESS BLDA CALC-SCNC: -8.1 MMOL/L (ref -9–1.8)
BILIRUB SERPL-MCNC: 0.4 MG/DL
BILIRUB SERPL-MCNC: 0.7 MG/DL
BLD PROD TYP BPU: NORMAL
BLOOD COMPONENT TYPE: NORMAL
BUN SERPL-MCNC: 14.6 MG/DL (ref 8–23)
BUN SERPL-MCNC: 15.8 MG/DL (ref 8–23)
BUN SERPL-MCNC: 18.2 MG/DL (ref 8–23)
CA-I BLD-MCNC: 4.7 MG/DL (ref 4.4–5.2)
CALCIUM SERPL-MCNC: 8 MG/DL (ref 8.8–10.2)
CALCIUM SERPL-MCNC: 8.2 MG/DL (ref 8.8–10.2)
CALCIUM SERPL-MCNC: 9 MG/DL (ref 8.8–10.2)
CHLORIDE SERPL-SCNC: 105 MMOL/L (ref 98–107)
CHLORIDE SERPL-SCNC: 105 MMOL/L (ref 98–107)
CHLORIDE SERPL-SCNC: 106 MMOL/L (ref 98–107)
CODING SYSTEM: NORMAL
CREAT SERPL-MCNC: 0.56 MG/DL (ref 0.67–1.17)
CREAT SERPL-MCNC: 0.66 MG/DL (ref 0.67–1.17)
CREAT SERPL-MCNC: 0.69 MG/DL (ref 0.67–1.17)
CROSSMATCH: NORMAL
CROSSMATCH: NORMAL
DEPRECATED HCO3 PLAS-SCNC: 18 MMOL/L (ref 22–29)
DEPRECATED HCO3 PLAS-SCNC: 23 MMOL/L (ref 22–29)
DEPRECATED HCO3 PLAS-SCNC: 27 MMOL/L (ref 22–29)
ERYTHROCYTE [DISTWIDTH] IN BLOOD BY AUTOMATED COUNT: 13.8 % (ref 10–15)
ERYTHROCYTE [DISTWIDTH] IN BLOOD BY AUTOMATED COUNT: 13.8 % (ref 10–15)
ERYTHROCYTE [DISTWIDTH] IN BLOOD BY AUTOMATED COUNT: 14 % (ref 10–15)
FIBRINOGEN PPP-MCNC: 207 MG/DL (ref 170–490)
GFR SERPL CREATININE-BSD FRML MDRD: >90 ML/MIN/1.73M2
GLUCOSE BLDC GLUCOMTR-MCNC: 200 MG/DL (ref 70–99)
GLUCOSE BLDC GLUCOMTR-MCNC: 217 MG/DL (ref 70–99)
GLUCOSE SERPL-MCNC: 155 MG/DL (ref 70–99)
GLUCOSE SERPL-MCNC: 213 MG/DL (ref 70–99)
GLUCOSE SERPL-MCNC: 90 MG/DL (ref 70–99)
HCO3 BLD-SCNC: 19 MMOL/L (ref 21–28)
HCT VFR BLD AUTO: 25.9 % (ref 40–53)
HCT VFR BLD AUTO: 28.8 % (ref 40–53)
HCT VFR BLD AUTO: 40 % (ref 40–53)
HGB BLD-MCNC: 12.7 G/DL (ref 13.3–17.7)
HGB BLD-MCNC: 8.3 G/DL (ref 13.3–17.7)
HGB BLD-MCNC: 9.1 G/DL (ref 13.3–17.7)
INR PPP: 1.14 (ref 0.85–1.15)
INR PPP: 1.47 (ref 0.85–1.15)
INR PPP: 1.79 (ref 0.85–1.15)
ISSUE DATE AND TIME: NORMAL
LACTATE SERPL-SCNC: 5.6 MMOL/L (ref 0.7–2)
MAGNESIUM SERPL-MCNC: 2.1 MG/DL (ref 1.7–2.3)
MCH RBC QN AUTO: 29.3 PG (ref 26.5–33)
MCH RBC QN AUTO: 29.5 PG (ref 26.5–33)
MCH RBC QN AUTO: 29.5 PG (ref 26.5–33)
MCHC RBC AUTO-ENTMCNC: 31.6 G/DL (ref 31.5–36.5)
MCHC RBC AUTO-ENTMCNC: 31.8 G/DL (ref 31.5–36.5)
MCHC RBC AUTO-ENTMCNC: 32 G/DL (ref 31.5–36.5)
MCV RBC AUTO: 92 FL (ref 78–100)
MCV RBC AUTO: 92 FL (ref 78–100)
MCV RBC AUTO: 94 FL (ref 78–100)
O2/TOTAL GAS SETTING VFR VENT: 60 %
OXYHGB MFR BLD: 98 % (ref 92–100)
PCO2 BLD: 43 MM HG (ref 35–45)
PH BLD: 7.25 [PH] (ref 7.35–7.45)
PHOSPHATE SERPL-MCNC: 4.2 MG/DL (ref 2.5–4.5)
PLATELET # BLD AUTO: 148 10E3/UL (ref 150–450)
PLATELET # BLD AUTO: 177 10E3/UL (ref 150–450)
PLATELET # BLD AUTO: 197 10E3/UL (ref 150–450)
PO2 BLD: 171 MM HG (ref 80–105)
POTASSIUM SERPL-SCNC: 3.7 MMOL/L (ref 3.4–5.3)
POTASSIUM SERPL-SCNC: 4 MMOL/L (ref 3.4–5.3)
POTASSIUM SERPL-SCNC: 4.3 MMOL/L (ref 3.4–5.3)
PROT SERPL-MCNC: 4.5 G/DL (ref 6.4–8.3)
PROT SERPL-MCNC: 4.8 G/DL (ref 6.4–8.3)
RBC # BLD AUTO: 2.81 10E6/UL (ref 4.4–5.9)
RBC # BLD AUTO: 3.08 10E6/UL (ref 4.4–5.9)
RBC # BLD AUTO: 4.33 10E6/UL (ref 4.4–5.9)
SODIUM SERPL-SCNC: 141 MMOL/L (ref 136–145)
SPECIMEN EXPIRATION DATE: NORMAL
UNIT ABO/RH: NORMAL
UNIT NUMBER: NORMAL
UNIT STATUS: NORMAL
UNIT TYPE ISBT: 5100
WBC # BLD AUTO: 14.9 10E3/UL (ref 4–11)
WBC # BLD AUTO: 19 10E3/UL (ref 4–11)
WBC # BLD AUTO: 4.8 10E3/UL (ref 4–11)

## 2023-08-15 PROCEDURE — 250N000009 HC RX 250: Performed by: SURGERY

## 2023-08-15 PROCEDURE — 250N000011 HC RX IP 250 OP 636: Mod: JZ | Performed by: SURGERY

## 2023-08-15 PROCEDURE — 85730 THROMBOPLASTIN TIME PARTIAL: CPT | Performed by: PHYSICIAN ASSISTANT

## 2023-08-15 PROCEDURE — 250N000009 HC RX 250

## 2023-08-15 PROCEDURE — 85730 THROMBOPLASTIN TIME PARTIAL: CPT | Performed by: STUDENT IN AN ORGANIZED HEALTH CARE EDUCATION/TRAINING PROGRAM

## 2023-08-15 PROCEDURE — 93454 CORONARY ARTERY ANGIO S&I: CPT | Mod: 26 | Performed by: INTERNAL MEDICINE

## 2023-08-15 PROCEDURE — C1898 LEAD, PMKR, OTHER THAN TRANS: HCPCS | Performed by: STUDENT IN AN ORGANIZED HEALTH CARE EDUCATION/TRAINING PROGRAM

## 2023-08-15 PROCEDURE — P9045 ALBUMIN (HUMAN), 5%, 250 ML: HCPCS | Mod: JZ | Performed by: ANESTHESIOLOGY

## 2023-08-15 PROCEDURE — 85014 HEMATOCRIT: CPT | Performed by: STUDENT IN AN ORGANIZED HEALTH CARE EDUCATION/TRAINING PROGRAM

## 2023-08-15 PROCEDURE — 99153 MOD SED SAME PHYS/QHP EA: CPT | Performed by: INTERNAL MEDICINE

## 2023-08-15 PROCEDURE — 99152 MOD SED SAME PHYS/QHP 5/>YRS: CPT | Performed by: INTERNAL MEDICINE

## 2023-08-15 PROCEDURE — 94002 VENT MGMT INPAT INIT DAY: CPT

## 2023-08-15 PROCEDURE — 021009W BYPASS CORONARY ARTERY, ONE ARTERY FROM AORTA WITH AUTOLOGOUS VENOUS TISSUE, OPEN APPROACH: ICD-10-PCS | Performed by: STUDENT IN AN ORGANIZED HEALTH CARE EDUCATION/TRAINING PROGRAM

## 2023-08-15 PROCEDURE — 33315 EXPLORATORY HEART SURGERY: CPT | Mod: XU | Performed by: STUDENT IN AN ORGANIZED HEALTH CARE EDUCATION/TRAINING PROGRAM

## 2023-08-15 PROCEDURE — P9045 ALBUMIN (HUMAN), 5%, 250 ML: HCPCS

## 2023-08-15 PROCEDURE — 5A1221Z PERFORMANCE OF CARDIAC OUTPUT, CONTINUOUS: ICD-10-PCS | Performed by: STUDENT IN AN ORGANIZED HEALTH CARE EDUCATION/TRAINING PROGRAM

## 2023-08-15 PROCEDURE — 258N000003 HC RX IP 258 OP 636: Performed by: INTERNAL MEDICINE

## 2023-08-15 PROCEDURE — 82040 ASSAY OF SERUM ALBUMIN: CPT | Performed by: STUDENT IN AN ORGANIZED HEALTH CARE EDUCATION/TRAINING PROGRAM

## 2023-08-15 PROCEDURE — 250N000011 HC RX IP 250 OP 636: Mod: JZ | Performed by: STUDENT IN AN ORGANIZED HEALTH CARE EDUCATION/TRAINING PROGRAM

## 2023-08-15 PROCEDURE — 84132 ASSAY OF SERUM POTASSIUM: CPT | Performed by: STUDENT IN AN ORGANIZED HEALTH CARE EDUCATION/TRAINING PROGRAM

## 2023-08-15 PROCEDURE — 250N000011 HC RX IP 250 OP 636: Mod: JZ | Performed by: ANESTHESIOLOGY

## 2023-08-15 PROCEDURE — C1874 STENT, COATED/COV W/DEL SYS: HCPCS | Performed by: INTERNAL MEDICINE

## 2023-08-15 PROCEDURE — 999N000184 HC STATISTIC TELEMETRY

## 2023-08-15 PROCEDURE — 33510 CABG VEIN SINGLE: CPT | Mod: GC | Performed by: STUDENT IN AN ORGANIZED HEALTH CARE EDUCATION/TRAINING PROGRAM

## 2023-08-15 PROCEDURE — 93571 IV DOP VEL&/PRESS C FLO 1ST: CPT | Mod: RC

## 2023-08-15 PROCEDURE — 258N000003 HC RX IP 258 OP 636: Performed by: ANESTHESIOLOGY

## 2023-08-15 PROCEDURE — 278N000051 HC OR IMPLANT GENERAL: Performed by: STUDENT IN AN ORGANIZED HEALTH CARE EDUCATION/TRAINING PROGRAM

## 2023-08-15 PROCEDURE — 85027 COMPLETE CBC AUTOMATED: CPT | Performed by: SURGERY

## 2023-08-15 PROCEDURE — 82330 ASSAY OF CALCIUM: CPT | Performed by: STUDENT IN AN ORGANIZED HEALTH CARE EDUCATION/TRAINING PROGRAM

## 2023-08-15 PROCEDURE — 37197 REMOVE INTRVAS FOREIGN BODY: CPT

## 2023-08-15 PROCEDURE — 36415 COLL VENOUS BLD VENIPUNCTURE: CPT | Performed by: PHYSICIAN ASSISTANT

## 2023-08-15 PROCEDURE — 250N000013 HC RX MED GY IP 250 OP 250 PS 637: Performed by: STUDENT IN AN ORGANIZED HEALTH CARE EDUCATION/TRAINING PROGRAM

## 2023-08-15 PROCEDURE — 02PA0YZ REMOVAL OF OTHER DEVICE FROM HEART, OPEN APPROACH: ICD-10-PCS | Performed by: STUDENT IN AN ORGANIZED HEALTH CARE EDUCATION/TRAINING PROGRAM

## 2023-08-15 PROCEDURE — 84100 ASSAY OF PHOSPHORUS: CPT | Performed by: SURGERY

## 2023-08-15 PROCEDURE — 250N000011 HC RX IP 250 OP 636

## 2023-08-15 PROCEDURE — 250N000009 HC RX 250: Performed by: ANESTHESIOLOGY

## 2023-08-15 PROCEDURE — 99291 CRITICAL CARE FIRST HOUR: CPT | Mod: 24 | Performed by: INTERNAL MEDICINE

## 2023-08-15 PROCEDURE — 85347 COAGULATION TIME ACTIVATED: CPT

## 2023-08-15 PROCEDURE — 06BQ4ZZ EXCISION OF LEFT SAPHENOUS VEIN, PERCUTANEOUS ENDOSCOPIC APPROACH: ICD-10-PCS | Performed by: STUDENT IN AN ORGANIZED HEALTH CARE EDUCATION/TRAINING PROGRAM

## 2023-08-15 PROCEDURE — 999N000065 XR CHEST PORT 1 VIEW

## 2023-08-15 PROCEDURE — 250N000013 HC RX MED GY IP 250 OP 250 PS 637: Performed by: INTERNAL MEDICINE

## 2023-08-15 PROCEDURE — 272N000001 HC OR GENERAL SUPPLY STERILE: Performed by: INTERNAL MEDICINE

## 2023-08-15 PROCEDURE — P9016 RBC LEUKOCYTES REDUCED: HCPCS

## 2023-08-15 PROCEDURE — 93571 IV DOP VEL&/PRESS C FLO 1ST: CPT | Mod: 26 | Performed by: INTERNAL MEDICINE

## 2023-08-15 PROCEDURE — 80053 COMPREHEN METABOLIC PANEL: CPT | Performed by: PHYSICIAN ASSISTANT

## 2023-08-15 PROCEDURE — 85610 PROTHROMBIN TIME: CPT | Performed by: STUDENT IN AN ORGANIZED HEALTH CARE EDUCATION/TRAINING PROGRAM

## 2023-08-15 PROCEDURE — 86901 BLOOD TYPING SEROLOGIC RH(D): CPT

## 2023-08-15 PROCEDURE — P9037 PLATE PHERES LEUKOREDU IRRAD: HCPCS

## 2023-08-15 PROCEDURE — 33268 EXCL LAA OPN OTH PX ANY METH: CPT | Mod: GC | Performed by: STUDENT IN AN ORGANIZED HEALTH CARE EDUCATION/TRAINING PROGRAM

## 2023-08-15 PROCEDURE — 250N000011 HC RX IP 250 OP 636: Performed by: INTERNAL MEDICINE

## 2023-08-15 PROCEDURE — 999N000065 XR ABDOMEN PORT 1 VIEW

## 2023-08-15 PROCEDURE — 93454 CORONARY ARTERY ANGIO S&I: CPT | Performed by: INTERNAL MEDICINE

## 2023-08-15 PROCEDURE — 85610 PROTHROMBIN TIME: CPT | Performed by: PHYSICIAN ASSISTANT

## 2023-08-15 PROCEDURE — B2111ZZ FLUOROSCOPY OF MULTIPLE CORONARY ARTERIES USING LOW OSMOLAR CONTRAST: ICD-10-PCS | Performed by: INTERNAL MEDICINE

## 2023-08-15 PROCEDURE — 86923 COMPATIBILITY TEST ELECTRIC: CPT

## 2023-08-15 PROCEDURE — 85384 FIBRINOGEN ACTIVITY: CPT | Performed by: STUDENT IN AN ORGANIZED HEALTH CARE EDUCATION/TRAINING PROGRAM

## 2023-08-15 PROCEDURE — 999N000157 HC STATISTIC RCP TIME EA 10 MIN

## 2023-08-15 PROCEDURE — P9045 ALBUMIN (HUMAN), 5%, 250 ML: HCPCS | Mod: JZ | Performed by: INTERNAL MEDICINE

## 2023-08-15 PROCEDURE — 360N000079 HC SURGERY LEVEL 6, PER MIN: Performed by: STUDENT IN AN ORGANIZED HEALTH CARE EDUCATION/TRAINING PROGRAM

## 2023-08-15 PROCEDURE — 86850 RBC ANTIBODY SCREEN: CPT

## 2023-08-15 PROCEDURE — 258N000003 HC RX IP 258 OP 636: Performed by: PHYSICIAN ASSISTANT

## 2023-08-15 PROCEDURE — 83605 ASSAY OF LACTIC ACID: CPT | Performed by: SURGERY

## 2023-08-15 PROCEDURE — 36591 DRAW BLOOD OFF VENOUS DEVICE: CPT

## 2023-08-15 PROCEDURE — C1769 GUIDE WIRE: HCPCS | Performed by: INTERNAL MEDICINE

## 2023-08-15 PROCEDURE — 250N000012 HC RX MED GY IP 250 OP 636 PS 637: Mod: JZ | Performed by: STUDENT IN AN ORGANIZED HEALTH CARE EDUCATION/TRAINING PROGRAM

## 2023-08-15 PROCEDURE — C1725 CATH, TRANSLUMIN NON-LASER: HCPCS | Performed by: INTERNAL MEDICINE

## 2023-08-15 PROCEDURE — 82330 ASSAY OF CALCIUM: CPT | Performed by: SURGERY

## 2023-08-15 PROCEDURE — 250N000011 HC RX IP 250 OP 636: Mod: JZ | Performed by: INTERNAL MEDICINE

## 2023-08-15 PROCEDURE — C1887 CATHETER, GUIDING: HCPCS | Performed by: INTERNAL MEDICINE

## 2023-08-15 PROCEDURE — 82805 BLOOD GASES W/O2 SATURATION: CPT | Performed by: INTERNAL MEDICINE

## 2023-08-15 PROCEDURE — 02L70CK OCCLUSION OF LEFT ATRIAL APPENDAGE WITH EXTRALUMINAL DEVICE, OPEN APPROACH: ICD-10-PCS | Performed by: STUDENT IN AN ORGANIZED HEALTH CARE EDUCATION/TRAINING PROGRAM

## 2023-08-15 PROCEDURE — 250N000009 HC RX 250: Performed by: INTERNAL MEDICINE

## 2023-08-15 PROCEDURE — 80053 COMPREHEN METABOLIC PANEL: CPT | Performed by: SURGERY

## 2023-08-15 PROCEDURE — C9600 PERC DRUG-EL COR STENT SING: HCPCS | Performed by: INTERNAL MEDICINE

## 2023-08-15 PROCEDURE — 272N000001 HC OR GENERAL SUPPLY STERILE: Performed by: STUDENT IN AN ORGANIZED HEALTH CARE EDUCATION/TRAINING PROGRAM

## 2023-08-15 PROCEDURE — 370N000017 HC ANESTHESIA TECHNICAL FEE, PER MIN: Performed by: STUDENT IN AN ORGANIZED HEALTH CARE EDUCATION/TRAINING PROGRAM

## 2023-08-15 PROCEDURE — 250N000009 HC RX 250: Performed by: STUDENT IN AN ORGANIZED HEALTH CARE EDUCATION/TRAINING PROGRAM

## 2023-08-15 PROCEDURE — 93005 ELECTROCARDIOGRAM TRACING: CPT

## 2023-08-15 PROCEDURE — C1894 INTRO/SHEATH, NON-LASER: HCPCS | Performed by: INTERNAL MEDICINE

## 2023-08-15 PROCEDURE — 250N000024 HC ISOFLURANE, PER MIN: Performed by: STUDENT IN AN ORGANIZED HEALTH CARE EDUCATION/TRAINING PROGRAM

## 2023-08-15 PROCEDURE — 86923 COMPATIBILITY TEST ELECTRIC: CPT | Performed by: PHYSICIAN ASSISTANT

## 2023-08-15 PROCEDURE — 999N000071 HC STATISTIC HEART CATH LAB OR EP LAB

## 2023-08-15 PROCEDURE — 92920 PRQ TRLUML C ANGIOP 1ART&/BR: CPT | Mod: 53 | Performed by: INTERNAL MEDICINE

## 2023-08-15 PROCEDURE — 85610 PROTHROMBIN TIME: CPT | Performed by: SURGERY

## 2023-08-15 PROCEDURE — 82803 BLOOD GASES ANY COMBINATION: CPT | Performed by: STUDENT IN AN ORGANIZED HEALTH CARE EDUCATION/TRAINING PROGRAM

## 2023-08-15 PROCEDURE — 250N000011 HC RX IP 250 OP 636: Performed by: ANESTHESIOLOGY

## 2023-08-15 PROCEDURE — 99223 1ST HOSP IP/OBS HIGH 75: CPT | Mod: 57 | Performed by: STUDENT IN AN ORGANIZED HEALTH CARE EDUCATION/TRAINING PROGRAM

## 2023-08-15 PROCEDURE — 85027 COMPLETE CBC AUTOMATED: CPT | Performed by: PHYSICIAN ASSISTANT

## 2023-08-15 PROCEDURE — 258N000003 HC RX IP 258 OP 636

## 2023-08-15 PROCEDURE — 83735 ASSAY OF MAGNESIUM: CPT | Performed by: SURGERY

## 2023-08-15 PROCEDURE — 200N000001 HC R&B ICU

## 2023-08-15 PROCEDURE — 3E043XZ INTRODUCTION OF VASOPRESSOR INTO CENTRAL VEIN, PERCUTANEOUS APPROACH: ICD-10-PCS | Performed by: STUDENT IN AN ORGANIZED HEALTH CARE EDUCATION/TRAINING PROGRAM

## 2023-08-15 PROCEDURE — 02PA0DZ REMOVAL OF INTRALUMINAL DEVICE FROM HEART, OPEN APPROACH: ICD-10-PCS | Performed by: STUDENT IN AN ORGANIZED HEALTH CARE EDUCATION/TRAINING PROGRAM

## 2023-08-15 PROCEDURE — 02703ZZ DILATION OF CORONARY ARTERY, ONE ARTERY, PERCUTANEOUS APPROACH: ICD-10-PCS | Performed by: INTERNAL MEDICINE

## 2023-08-15 PROCEDURE — 85730 THROMBOPLASTIN TIME PARTIAL: CPT | Performed by: SURGERY

## 2023-08-15 DEVICE — IMP ATRICLIP FLEX V DEVICE LAA EXLUSION 45MM ACHV45: Type: IMPLANTABLE DEVICE | Site: HEART | Status: FUNCTIONAL

## 2023-08-15 DEVICE — STENT CORONARY DES SYNERGY XD MR US 4.00X38MM H7493941838400: Type: IMPLANTABLE DEVICE | Status: FUNCTIONAL

## 2023-08-15 RX ORDER — NALOXONE HYDROCHLORIDE 0.4 MG/ML
0.2 INJECTION, SOLUTION INTRAMUSCULAR; INTRAVENOUS; SUBCUTANEOUS
Status: DISCONTINUED | OUTPATIENT
Start: 2023-08-15 | End: 2023-08-22 | Stop reason: HOSPADM

## 2023-08-15 RX ORDER — OXYCODONE HYDROCHLORIDE 5 MG/1
10 TABLET ORAL EVERY 4 HOURS PRN
Status: DISCONTINUED | OUTPATIENT
Start: 2023-08-15 | End: 2023-08-20

## 2023-08-15 RX ORDER — SODIUM CHLORIDE, SODIUM LACTATE, POTASSIUM CHLORIDE, CALCIUM CHLORIDE 600; 310; 30; 20 MG/100ML; MG/100ML; MG/100ML; MG/100ML
INJECTION, SOLUTION INTRAVENOUS CONTINUOUS PRN
Status: DISCONTINUED | OUTPATIENT
Start: 2023-08-15 | End: 2023-08-15

## 2023-08-15 RX ORDER — BISACODYL 10 MG
10 SUPPOSITORY, RECTAL RECTAL DAILY PRN
Status: DISCONTINUED | OUTPATIENT
Start: 2023-08-15 | End: 2023-08-22 | Stop reason: HOSPADM

## 2023-08-15 RX ORDER — MAGNESIUM HYDROXIDE 1200 MG/15ML
LIQUID ORAL PRN
Status: DISCONTINUED | OUTPATIENT
Start: 2023-08-15 | End: 2023-08-15 | Stop reason: HOSPADM

## 2023-08-15 RX ORDER — PROPOFOL 10 MG/ML
INJECTION, EMULSION INTRAVENOUS CONTINUOUS PRN
Status: DISCONTINUED | OUTPATIENT
Start: 2023-08-15 | End: 2023-08-15

## 2023-08-15 RX ORDER — PROCHLORPERAZINE MALEATE 5 MG
5 TABLET ORAL EVERY 6 HOURS PRN
Status: DISCONTINUED | OUTPATIENT
Start: 2023-08-15 | End: 2023-08-22 | Stop reason: HOSPADM

## 2023-08-15 RX ORDER — FENTANYL CITRATE 50 UG/ML
INJECTION, SOLUTION INTRAMUSCULAR; INTRAVENOUS PRN
Status: DISCONTINUED | OUTPATIENT
Start: 2023-08-15 | End: 2023-08-15

## 2023-08-15 RX ORDER — PROTAMINE SULFATE 10 MG/ML
INJECTION, SOLUTION INTRAVENOUS PRN
Status: DISCONTINUED | OUTPATIENT
Start: 2023-08-15 | End: 2023-08-15

## 2023-08-15 RX ORDER — HEPARIN SODIUM 1000 [USP'U]/ML
INJECTION, SOLUTION INTRAVENOUS; SUBCUTANEOUS PRN
Status: DISCONTINUED | OUTPATIENT
Start: 2023-08-15 | End: 2023-08-15

## 2023-08-15 RX ORDER — PROPOFOL 10 MG/ML
INJECTION, EMULSION INTRAVENOUS
Status: COMPLETED
Start: 2023-08-15 | End: 2023-08-15

## 2023-08-15 RX ORDER — ACETAMINOPHEN 325 MG/1
975 TABLET ORAL EVERY 8 HOURS
Status: DISPENSED | OUTPATIENT
Start: 2023-08-15 | End: 2023-08-18

## 2023-08-15 RX ORDER — OXYCODONE HYDROCHLORIDE 5 MG/1
5 TABLET ORAL EVERY 4 HOURS PRN
Status: DISCONTINUED | OUTPATIENT
Start: 2023-08-15 | End: 2023-08-22 | Stop reason: HOSPADM

## 2023-08-15 RX ORDER — NITROGLYCERIN 5 MG/ML
VIAL (ML) INTRAVENOUS
Status: DISCONTINUED | OUTPATIENT
Start: 2023-08-15 | End: 2023-08-15 | Stop reason: HOSPADM

## 2023-08-15 RX ORDER — ASPIRIN 81 MG/1
162 TABLET, CHEWABLE ORAL
Status: DISCONTINUED | OUTPATIENT
Start: 2023-08-15 | End: 2023-08-15

## 2023-08-15 RX ORDER — CEFAZOLIN SODIUM IN 0.9 % NACL 3 G/100 ML
3 INTRAVENOUS SOLUTION, PIGGYBACK (ML) INTRAVENOUS SEE ADMIN INSTRUCTIONS
Status: DISCONTINUED | OUTPATIENT
Start: 2023-08-15 | End: 2023-08-15

## 2023-08-15 RX ORDER — CALCIUM GLUCONATE 20 MG/ML
2 INJECTION, SOLUTION INTRAVENOUS
Status: DISCONTINUED | OUTPATIENT
Start: 2023-08-15 | End: 2023-08-20

## 2023-08-15 RX ORDER — LIDOCAINE HYDROCHLORIDE 20 MG/ML
INJECTION, SOLUTION INFILTRATION; PERINEURAL PRN
Status: DISCONTINUED | OUTPATIENT
Start: 2023-08-15 | End: 2023-08-15

## 2023-08-15 RX ORDER — CEFAZOLIN SODIUM IN 0.9 % NACL 3 G/100 ML
3 INTRAVENOUS SOLUTION, PIGGYBACK (ML) INTRAVENOUS
Status: DISCONTINUED | OUTPATIENT
Start: 2023-08-15 | End: 2023-08-15

## 2023-08-15 RX ORDER — NALOXONE HYDROCHLORIDE 0.4 MG/ML
0.4 INJECTION, SOLUTION INTRAMUSCULAR; INTRAVENOUS; SUBCUTANEOUS
Status: DISCONTINUED | OUTPATIENT
Start: 2023-08-15 | End: 2023-08-22 | Stop reason: HOSPADM

## 2023-08-15 RX ORDER — ACETAMINOPHEN 325 MG/1
650 TABLET ORAL EVERY 4 HOURS PRN
Status: DISCONTINUED | OUTPATIENT
Start: 2023-08-18 | End: 2023-08-22 | Stop reason: HOSPADM

## 2023-08-15 RX ORDER — ONDANSETRON 4 MG/1
4 TABLET, ORALLY DISINTEGRATING ORAL EVERY 6 HOURS PRN
Status: DISCONTINUED | OUTPATIENT
Start: 2023-08-15 | End: 2023-08-22 | Stop reason: HOSPADM

## 2023-08-15 RX ORDER — NICOTINE POLACRILEX 4 MG
15-30 LOZENGE BUCCAL
Status: DISCONTINUED | OUTPATIENT
Start: 2023-08-15 | End: 2023-08-17

## 2023-08-15 RX ORDER — HYDROMORPHONE HCL IN WATER/PF 6 MG/30 ML
0.2 PATIENT CONTROLLED ANALGESIA SYRINGE INTRAVENOUS
Status: DISCONTINUED | OUTPATIENT
Start: 2023-08-15 | End: 2023-08-22 | Stop reason: HOSPADM

## 2023-08-15 RX ORDER — AMOXICILLIN 250 MG
1 CAPSULE ORAL 2 TIMES DAILY
Status: DISCONTINUED | OUTPATIENT
Start: 2023-08-15 | End: 2023-08-22 | Stop reason: HOSPADM

## 2023-08-15 RX ORDER — EPINEPHRINE IN 0.9 % SOD CHLOR 5 MG/250ML
.01-.1 PLASTIC BAG, INJECTION (ML) INTRAVENOUS CONTINUOUS
Status: DISCONTINUED | OUTPATIENT
Start: 2023-08-15 | End: 2023-08-20

## 2023-08-15 RX ORDER — PROPOFOL 10 MG/ML
5-75 INJECTION, EMULSION INTRAVENOUS CONTINUOUS
Status: DISCONTINUED | OUTPATIENT
Start: 2023-08-15 | End: 2023-08-17

## 2023-08-15 RX ORDER — LIDOCAINE 40 MG/G
CREAM TOPICAL
Status: DISCONTINUED | OUTPATIENT
Start: 2023-08-15 | End: 2023-08-22 | Stop reason: HOSPADM

## 2023-08-15 RX ORDER — CEFAZOLIN SODIUM 2 G/100ML
2 INJECTION, SOLUTION INTRAVENOUS EVERY 8 HOURS
Status: COMPLETED | OUTPATIENT
Start: 2023-08-15 | End: 2023-08-16

## 2023-08-15 RX ORDER — CALCIUM CHLORIDE 100 MG/ML
INJECTION INTRAVENOUS; INTRAVENTRICULAR PRN
Status: DISCONTINUED | OUTPATIENT
Start: 2023-08-15 | End: 2023-08-15

## 2023-08-15 RX ORDER — CHLORHEXIDINE GLUCONATE ORAL RINSE 1.2 MG/ML
10 SOLUTION DENTAL ONCE
Status: COMPLETED | OUTPATIENT
Start: 2023-08-15 | End: 2023-08-15

## 2023-08-15 RX ORDER — ASPIRIN 81 MG/1
243 TABLET, CHEWABLE ORAL ONCE
Status: COMPLETED | OUTPATIENT
Start: 2023-08-15 | End: 2023-08-15

## 2023-08-15 RX ORDER — LIDOCAINE 40 MG/G
CREAM TOPICAL
Status: DISCONTINUED | OUTPATIENT
Start: 2023-08-15 | End: 2023-08-18

## 2023-08-15 RX ORDER — PANTOPRAZOLE SODIUM 40 MG/1
40 TABLET, DELAYED RELEASE ORAL DAILY
Status: DISCONTINUED | OUTPATIENT
Start: 2023-08-15 | End: 2023-08-22 | Stop reason: HOSPADM

## 2023-08-15 RX ORDER — SODIUM CHLORIDE 9 MG/ML
INJECTION, SOLUTION INTRAVENOUS CONTINUOUS
Status: DISCONTINUED | OUTPATIENT
Start: 2023-08-15 | End: 2023-08-15 | Stop reason: HOSPADM

## 2023-08-15 RX ORDER — ONDANSETRON 2 MG/ML
4 INJECTION INTRAMUSCULAR; INTRAVENOUS EVERY 6 HOURS PRN
Status: DISCONTINUED | OUTPATIENT
Start: 2023-08-15 | End: 2023-08-22 | Stop reason: HOSPADM

## 2023-08-15 RX ORDER — LIDOCAINE 40 MG/G
CREAM TOPICAL
Status: DISCONTINUED | OUTPATIENT
Start: 2023-08-15 | End: 2023-08-15 | Stop reason: HOSPADM

## 2023-08-15 RX ORDER — VERAPAMIL HYDROCHLORIDE 2.5 MG/ML
INJECTION, SOLUTION INTRAVENOUS
Status: DISCONTINUED | OUTPATIENT
Start: 2023-08-15 | End: 2023-08-15 | Stop reason: HOSPADM

## 2023-08-15 RX ORDER — ASPIRIN 325 MG
325 TABLET ORAL ONCE
Status: COMPLETED | OUTPATIENT
Start: 2023-08-15 | End: 2023-08-15

## 2023-08-15 RX ORDER — FENTANYL CITRATE 50 UG/ML
INJECTION, SOLUTION INTRAMUSCULAR; INTRAVENOUS
Status: DISCONTINUED | OUTPATIENT
Start: 2023-08-15 | End: 2023-08-15 | Stop reason: HOSPADM

## 2023-08-15 RX ORDER — HYDRALAZINE HYDROCHLORIDE 20 MG/ML
10 INJECTION INTRAMUSCULAR; INTRAVENOUS EVERY 30 MIN PRN
Status: DISCONTINUED | OUTPATIENT
Start: 2023-08-15 | End: 2023-08-22 | Stop reason: HOSPADM

## 2023-08-15 RX ORDER — FAMOTIDINE 20 MG/1
20 TABLET, FILM COATED ORAL
Status: DISCONTINUED | OUTPATIENT
Start: 2023-08-15 | End: 2023-08-15

## 2023-08-15 RX ORDER — IOPAMIDOL 755 MG/ML
INJECTION, SOLUTION INTRAVASCULAR
Status: DISCONTINUED | OUTPATIENT
Start: 2023-08-15 | End: 2023-08-15 | Stop reason: HOSPADM

## 2023-08-15 RX ORDER — PHENYLEPHRINE HCL IN 0.9% NACL 50MG/250ML
.1-4 PLASTIC BAG, INJECTION (ML) INTRAVENOUS CONTINUOUS PRN
Status: DISCONTINUED | OUTPATIENT
Start: 2023-08-15 | End: 2023-08-20

## 2023-08-15 RX ORDER — VECURONIUM BROMIDE 1 MG/ML
INJECTION, POWDER, LYOPHILIZED, FOR SOLUTION INTRAVENOUS PRN
Status: DISCONTINUED | OUTPATIENT
Start: 2023-08-15 | End: 2023-08-15

## 2023-08-15 RX ORDER — POLYETHYLENE GLYCOL 3350 17 G/17G
17 POWDER, FOR SOLUTION ORAL DAILY
Status: DISCONTINUED | OUTPATIENT
Start: 2023-08-16 | End: 2023-08-20

## 2023-08-15 RX ORDER — PHENYLEPHRINE HCL IN 0.9% NACL 50MG/250ML
PLASTIC BAG, INJECTION (ML) INTRAVENOUS CONTINUOUS PRN
Status: DISCONTINUED | OUTPATIENT
Start: 2023-08-15 | End: 2023-08-15

## 2023-08-15 RX ORDER — CALCIUM GLUCONATE 20 MG/ML
1 INJECTION, SOLUTION INTRAVENOUS
Status: DISCONTINUED | OUTPATIENT
Start: 2023-08-15 | End: 2023-08-20

## 2023-08-15 RX ORDER — SODIUM CHLORIDE 9 MG/ML
INJECTION, SOLUTION INTRAVENOUS CONTINUOUS PRN
Status: DISCONTINUED | OUTPATIENT
Start: 2023-08-15 | End: 2023-08-15

## 2023-08-15 RX ORDER — PAPAVERINE HYDROCHLORIDE 30 MG/ML
INJECTION INTRAMUSCULAR; INTRAVENOUS PRN
Status: DISCONTINUED | OUTPATIENT
Start: 2023-08-15 | End: 2023-08-15 | Stop reason: HOSPADM

## 2023-08-15 RX ORDER — PROPOFOL 10 MG/ML
INJECTION, EMULSION INTRAVENOUS PRN
Status: DISCONTINUED | OUTPATIENT
Start: 2023-08-15 | End: 2023-08-15

## 2023-08-15 RX ORDER — DEXMEDETOMIDINE HYDROCHLORIDE 4 UG/ML
.2-.7 INJECTION, SOLUTION INTRAVENOUS CONTINUOUS
Status: DISCONTINUED | OUTPATIENT
Start: 2023-08-15 | End: 2023-08-20

## 2023-08-15 RX ORDER — CLOPIDOGREL BISULFATE 75 MG/1
TABLET ORAL
Status: DISCONTINUED | OUTPATIENT
Start: 2023-08-15 | End: 2023-08-15 | Stop reason: HOSPADM

## 2023-08-15 RX ORDER — CEFAZOLIN SODIUM 1 G/3ML
INJECTION, POWDER, FOR SOLUTION INTRAMUSCULAR; INTRAVENOUS PRN
Status: DISCONTINUED | OUTPATIENT
Start: 2023-08-15 | End: 2023-08-15

## 2023-08-15 RX ORDER — POTASSIUM CHLORIDE 1500 MG/1
20 TABLET, EXTENDED RELEASE ORAL
Status: DISCONTINUED | OUTPATIENT
Start: 2023-08-15 | End: 2023-08-15 | Stop reason: HOSPADM

## 2023-08-15 RX ORDER — HEPARIN SODIUM 5000 [USP'U]/.5ML
5000 INJECTION, SOLUTION INTRAVENOUS; SUBCUTANEOUS EVERY 8 HOURS
Status: DISCONTINUED | OUTPATIENT
Start: 2023-08-16 | End: 2023-08-22 | Stop reason: HOSPADM

## 2023-08-15 RX ORDER — DEXTROSE MONOHYDRATE 25 G/50ML
25-50 INJECTION, SOLUTION INTRAVENOUS
Status: DISCONTINUED | OUTPATIENT
Start: 2023-08-15 | End: 2023-08-17

## 2023-08-15 RX ORDER — NOREPINEPHRINE BITARTRATE 0.06 MG/ML
.01-.4 INJECTION, SOLUTION INTRAVENOUS CONTINUOUS PRN
Status: DISCONTINUED | OUTPATIENT
Start: 2023-08-15 | End: 2023-08-20

## 2023-08-15 RX ORDER — VANCOMYCIN HYDROCHLORIDE 1 G/20ML
INJECTION, POWDER, LYOPHILIZED, FOR SOLUTION INTRAVENOUS PRN
Status: DISCONTINUED | OUTPATIENT
Start: 2023-08-15 | End: 2023-08-15 | Stop reason: HOSPADM

## 2023-08-15 RX ORDER — ADENOSINE 3 MG/ML
INJECTION, SOLUTION INTRAVENOUS
Status: DISCONTINUED | OUTPATIENT
Start: 2023-08-15 | End: 2023-08-15 | Stop reason: HOSPADM

## 2023-08-15 RX ORDER — HEPARIN SODIUM 1000 [USP'U]/ML
INJECTION, SOLUTION INTRAVENOUS; SUBCUTANEOUS
Status: DISCONTINUED | OUTPATIENT
Start: 2023-08-15 | End: 2023-08-15 | Stop reason: HOSPADM

## 2023-08-15 RX ORDER — HYDROMORPHONE HCL IN WATER/PF 6 MG/30 ML
0.4 PATIENT CONTROLLED ANALGESIA SYRINGE INTRAVENOUS
Status: DISCONTINUED | OUTPATIENT
Start: 2023-08-15 | End: 2023-08-22 | Stop reason: HOSPADM

## 2023-08-15 RX ORDER — ASPIRIN 81 MG/1
81 TABLET, CHEWABLE ORAL
Status: DISCONTINUED | OUTPATIENT
Start: 2023-08-15 | End: 2023-08-15

## 2023-08-15 RX ORDER — ASPIRIN 81 MG/1
162 TABLET, CHEWABLE ORAL DAILY
Status: DISCONTINUED | OUTPATIENT
Start: 2023-08-16 | End: 2023-08-22 | Stop reason: HOSPADM

## 2023-08-15 RX ADMIN — HEPARIN SODIUM 30000 UNITS: 1000 INJECTION INTRAVENOUS; SUBCUTANEOUS at 16:09

## 2023-08-15 RX ADMIN — CHLORHEXIDINE GLUCONATE 10 ML: 1.2 SOLUTION ORAL at 22:27

## 2023-08-15 RX ADMIN — MIDAZOLAM HYDROCHLORIDE 2 MG: 1 INJECTION, SOLUTION INTRAMUSCULAR; INTRAVENOUS at 15:41

## 2023-08-15 RX ADMIN — SODIUM CHLORIDE: 9 INJECTION, SOLUTION INTRAVENOUS at 15:05

## 2023-08-15 RX ADMIN — PROPOFOL 20 MCG/KG/MIN: 10 INJECTION, EMULSION INTRAVENOUS at 21:28

## 2023-08-15 RX ADMIN — MIDAZOLAM 2 MG: 1 INJECTION INTRAMUSCULAR; INTRAVENOUS at 20:48

## 2023-08-15 RX ADMIN — PHENYLEPHRINE HYDROCHLORIDE 100 MCG: 10 INJECTION INTRAVENOUS at 18:15

## 2023-08-15 RX ADMIN — NOREPINEPHRINE BITARTRATE 3.2 MCG: 1 INJECTION, SOLUTION, CONCENTRATE INTRAVENOUS at 15:43

## 2023-08-15 RX ADMIN — PROPOFOL 40 MCG/KG/MIN: 10 INJECTION, EMULSION INTRAVENOUS at 18:24

## 2023-08-15 RX ADMIN — PHENYLEPHRINE HYDROCHLORIDE 100 MCG: 10 INJECTION INTRAVENOUS at 16:09

## 2023-08-15 RX ADMIN — PHENYLEPHRINE HYDROCHLORIDE 100 MCG: 10 INJECTION INTRAVENOUS at 18:19

## 2023-08-15 RX ADMIN — FENTANYL CITRATE 100 MCG: 50 INJECTION, SOLUTION INTRAMUSCULAR; INTRAVENOUS at 18:29

## 2023-08-15 RX ADMIN — PHENYLEPHRINE HYDROCHLORIDE 100 MCG: 10 INJECTION INTRAVENOUS at 17:46

## 2023-08-15 RX ADMIN — SODIUM CHLORIDE: 9 INJECTION, SOLUTION INTRAVENOUS at 14:37

## 2023-08-15 RX ADMIN — PHENYLEPHRINE HYDROCHLORIDE 100 MCG: 10 INJECTION INTRAVENOUS at 14:58

## 2023-08-15 RX ADMIN — SODIUM CHLORIDE: 9 INJECTION, SOLUTION INTRAVENOUS at 21:00

## 2023-08-15 RX ADMIN — CEFAZOLIN 3 G: 1 INJECTION, POWDER, FOR SOLUTION INTRAMUSCULAR; INTRAVENOUS at 15:15

## 2023-08-15 RX ADMIN — PROPOFOL 140 MG: 10 INJECTION, EMULSION INTRAVENOUS at 14:48

## 2023-08-15 RX ADMIN — VECURONIUM BROMIDE 2 MG: 1 INJECTION, POWDER, LYOPHILIZED, FOR SOLUTION INTRAVENOUS at 16:05

## 2023-08-15 RX ADMIN — PROPOFOL 50 MG: 10 INJECTION, EMULSION INTRAVENOUS at 16:15

## 2023-08-15 RX ADMIN — CALCIUM CHLORIDE INJECTION 300 MG: 100 INJECTION, SOLUTION INTRAVENOUS at 19:35

## 2023-08-15 RX ADMIN — FENTANYL CITRATE 100 MCG: 50 INJECTION, SOLUTION INTRAMUSCULAR; INTRAVENOUS at 16:15

## 2023-08-15 RX ADMIN — PHENYLEPHRINE HYDROCHLORIDE 100 MCG: 10 INJECTION INTRAVENOUS at 16:20

## 2023-08-15 RX ADMIN — PHENYLEPHRINE HYDROCHLORIDE 100 MCG: 10 INJECTION INTRAVENOUS at 16:06

## 2023-08-15 RX ADMIN — FENTANYL CITRATE 100 MCG: 50 INJECTION, SOLUTION INTRAMUSCULAR; INTRAVENOUS at 15:51

## 2023-08-15 RX ADMIN — SODIUM CHLORIDE, POTASSIUM CHLORIDE, SODIUM LACTATE AND CALCIUM CHLORIDE: 600; 310; 30; 20 INJECTION, SOLUTION INTRAVENOUS at 15:05

## 2023-08-15 RX ADMIN — VECURONIUM BROMIDE 5 MG: 1 INJECTION, POWDER, LYOPHILIZED, FOR SOLUTION INTRAVENOUS at 15:34

## 2023-08-15 RX ADMIN — NOREPINEPHRINE BITARTRATE 3.2 MCG: 1 INJECTION, SOLUTION, CONCENTRATE INTRAVENOUS at 15:41

## 2023-08-15 RX ADMIN — PHENYLEPHRINE HYDROCHLORIDE 100 MCG: 10 INJECTION INTRAVENOUS at 17:45

## 2023-08-15 RX ADMIN — ROCURONIUM BROMIDE 50 MG: 50 INJECTION, SOLUTION INTRAVENOUS at 14:48

## 2023-08-15 RX ADMIN — NOREPINEPHRINE BITARTRATE 6.4 MCG: 1 INJECTION, SOLUTION, CONCENTRATE INTRAVENOUS at 18:23

## 2023-08-15 RX ADMIN — PHENYLEPHRINE HYDROCHLORIDE 50 MCG: 10 INJECTION INTRAVENOUS at 16:18

## 2023-08-15 RX ADMIN — PHENYLEPHRINE HYDROCHLORIDE 100 MCG: 10 INJECTION INTRAVENOUS at 15:44

## 2023-08-15 RX ADMIN — VECURONIUM BROMIDE 3 MG: 1 INJECTION, POWDER, LYOPHILIZED, FOR SOLUTION INTRAVENOUS at 17:24

## 2023-08-15 RX ADMIN — FENTANYL CITRATE 100 MCG: 50 INJECTION, SOLUTION INTRAMUSCULAR; INTRAVENOUS at 15:48

## 2023-08-15 RX ADMIN — CALCIUM CHLORIDE INJECTION 200 MG: 100 INJECTION, SOLUTION INTRAVENOUS at 19:30

## 2023-08-15 RX ADMIN — ALBUMIN HUMAN: 0.05 INJECTION, SOLUTION INTRAVENOUS at 17:48

## 2023-08-15 RX ADMIN — PROTAMINE SULFATE 290 MG: 10 INJECTION, SOLUTION INTRAVENOUS at 17:48

## 2023-08-15 RX ADMIN — SODIUM CHLORIDE: 9 INJECTION, SOLUTION INTRAVENOUS at 07:33

## 2023-08-15 RX ADMIN — NOREPINEPHRINE BITARTRATE 0.02 MCG/KG/MIN: 1 INJECTION, SOLUTION, CONCENTRATE INTRAVENOUS at 15:15

## 2023-08-15 RX ADMIN — NOREPINEPHRINE BITARTRATE 6.4 MCG: 1 INJECTION, SOLUTION, CONCENTRATE INTRAVENOUS at 18:36

## 2023-08-15 RX ADMIN — MIDAZOLAM HYDROCHLORIDE 1 MG: 1 INJECTION, SOLUTION INTRAMUSCULAR; INTRAVENOUS at 16:15

## 2023-08-15 RX ADMIN — ALBUMIN HUMAN: 0.05 INJECTION, SOLUTION INTRAVENOUS at 17:53

## 2023-08-15 RX ADMIN — ALBUMIN HUMAN 12.5 G: 0.05 INJECTION, SOLUTION INTRAVENOUS at 23:40

## 2023-08-15 RX ADMIN — EPINEPHRINE 0.03 MCG/KG/MIN: 1 INJECTION INTRAMUSCULAR; INTRAVENOUS; SUBCUTANEOUS at 17:29

## 2023-08-15 RX ADMIN — NOREPINEPHRINE BITARTRATE 0.1 MCG/KG/MIN: 0.06 INJECTION, SOLUTION INTRAVENOUS at 22:15

## 2023-08-15 RX ADMIN — LIDOCAINE HYDROCHLORIDE 100 MG: 20 INJECTION, SOLUTION INFILTRATION; PERINEURAL at 14:48

## 2023-08-15 RX ADMIN — CEFAZOLIN SODIUM 2 G: 2 INJECTION, SOLUTION INTRAVENOUS at 23:23

## 2023-08-15 RX ADMIN — SODIUM BICARBONATE 50 MEQ: 84 INJECTION INTRAVENOUS at 21:27

## 2023-08-15 RX ADMIN — ALBUMIN HUMAN 25 G: 0.05 INJECTION, SOLUTION INTRAVENOUS at 22:03

## 2023-08-15 RX ADMIN — HYDROMORPHONE HYDROCHLORIDE 0.4 MG: 0.2 INJECTION, SOLUTION INTRAMUSCULAR; INTRAVENOUS; SUBCUTANEOUS at 20:46

## 2023-08-15 RX ADMIN — PHENYLEPHRINE HYDROCHLORIDE 100 MCG: 10 INJECTION INTRAVENOUS at 18:23

## 2023-08-15 RX ADMIN — Medication 0.25 MCG/KG/MIN: at 17:48

## 2023-08-15 RX ADMIN — FENTANYL CITRATE 100 MCG: 50 INJECTION, SOLUTION INTRAMUSCULAR; INTRAVENOUS at 16:12

## 2023-08-15 RX ADMIN — SODIUM CHLORIDE: 9 INJECTION, SOLUTION INTRAVENOUS at 18:44

## 2023-08-15 RX ADMIN — INSULIN HUMAN 1.5 UNITS/HR: 1 INJECTION, SOLUTION INTRAVENOUS at 22:18

## 2023-08-15 RX ADMIN — HYDROMORPHONE HYDROCHLORIDE 0.4 MG: 0.2 INJECTION, SOLUTION INTRAMUSCULAR; INTRAVENOUS; SUBCUTANEOUS at 23:43

## 2023-08-15 RX ADMIN — AMINOCAPROIC ACID 5 G/HR: 250 INJECTION, SOLUTION INTRAVENOUS at 15:05

## 2023-08-15 RX ADMIN — PHENYLEPHRINE HYDROCHLORIDE 100 MCG: 10 INJECTION INTRAVENOUS at 18:03

## 2023-08-15 RX ADMIN — FENTANYL CITRATE 100 MCG: 50 INJECTION, SOLUTION INTRAMUSCULAR; INTRAVENOUS at 15:41

## 2023-08-15 RX ADMIN — NOREPINEPHRINE BITARTRATE 6.4 MCG: 1 INJECTION, SOLUTION, CONCENTRATE INTRAVENOUS at 18:52

## 2023-08-15 ASSESSMENT — ACTIVITIES OF DAILY LIVING (ADL)
ADLS_ACUITY_SCORE: 40

## 2023-08-15 ASSESSMENT — COPD QUESTIONNAIRES: COPD: 0

## 2023-08-15 ASSESSMENT — ENCOUNTER SYMPTOMS: DYSRHYTHMIAS: 1

## 2023-08-15 ASSESSMENT — LIFESTYLE VARIABLES: TOBACCO_USE: 0

## 2023-08-15 NOTE — ANESTHESIA PROCEDURE NOTES
Central Line/PA Catheter Placement    Pre-Procedure   Staff -        Anesthesiologist:  Gabe Arteaga MD       Performed By: anesthesiologist       Location: pre-op       Pre-Anesthestic Checklist: patient identified, IV checked, site marked, risks and benefits discussed, informed consent, monitors and equipment checked, pre-op evaluation and at physician/surgeon's request  Timeout:       Correct Patient: Yes        Correct Procedure: Yes        Correct Site: Yes        Correct Position: Yes        Correct Laterality: Yes   Line Placement:   This line was placed Post Induction    Procedure   Procedure: central line       Laterality: right       Insertion Site: right, internal jugular.       Patient Position: Trendelenburg  Sterile Prep        All elements of maximal sterile barrier technique followed       Patient Prep/Sterile Barriers: draped, hand hygiene, gloves , hat , mask , draped, gown, sterile gel and probe cover       Skin prep: Chloraprep  Insertion/Injection        Local skin infiltrated with 3 mL of 1% lidocaine.        Technique: ultrasound guided and Seldinger Technique        1. Ultrasound was used to evaluate the access site.       2. Vein evaluated via ultrasound for patency/adequacy.       3. Using real-time ultrasound the needle/catheter was observed entering the artery/vein.       4. Permanent image was captured and entered into the patient's record.       5. The visualized structures were anatomically normal.       6. There were no apparent abnormal pathologic findings.       Introducer Type: 9 Fr, 10 cm        Type: Introducer  Narrative         Secured by: suture       Tegaderm and Biopatch dressing used.       Complications: None apparent,        blood aspirated from all lumens,        All lumens flushed: Yes       Verification method: Ultrasound   Comments:  Ultrasound Interpretation central venous access    1. Ultrasound guidance was used to evaluate potential access sites.  2.  Ultrasound was also used to verify the patency of the vessel specified above.   3. Ultrasound was used to visualize the needle entering the vessel.   4. The visualized structures were anatomically normal.  5. There were no apparent abnormal pathological findings.  6. A permanent ultrasound image was saved in the patient's record.

## 2023-08-15 NOTE — PROGRESS NOTES
Care Suites Admission Nursing Note    Patient Information  Name: Hugo Weber  Age: 78 year old  Reason for admission: heart cath  Care Suites arrival time: 0700    Visitor Information  Name: Vanessa     Patient Admission/Assessment   Pre-procedure assessment complete: Yes  If abnormal assessment/labs, provider notified: No  NPO: Yes  Medications held per instructions/orders: Yes  Consent: obtained  If applicable, pregnancy test status: deferred  Patient oriented to room: Yes  Education/questions answered: Yes  Plan/other: Proceed with procedure as planned    Discharge Planning  Discharge name/phone number: Vanessa [phone]  Overnight post sedation caregiver: Vanessa  Discharge location: [place]    Meli Vargas RN

## 2023-08-15 NOTE — DISCHARGE INSTRUCTIONS
AFTER YOU GO HOME FROM YOUR HEART SURGERY       You had a sternotomy, avoid lifting, pushing, or pulling anything greater than ten pounds for 8 weeks after surgery and then less than 20-30 pounds for an additional 4 weeks. Do not reach backwards or use arms to push out of chair. Do not let people pull on your arms to assist with standing. Avoid twisting or reaching too far across your body.  Avoid strenuous activities such as bowling, vacuuming, raking, shoveling, golf or tennis for 12 weeks after your surgery. Splint your chest incision by hugging a pillow or bringing your arms across your chest when coughing or sneezing. Please try to sleep on your back for the first 4-6 weeks to avoid extra stress on your sternum (breastbone) while it is healing.     No driving for 4 weeks after surgery or while on pain medication.      Shower or wash your incisions daily with antibacterial soap and water (or as instructed), pat dry. Keep wound clean and dry, showers are okay after discharge, but don't let spray hit directly on incision. No baths or swimming for 6 weeks and/or until incisions are completely healed over. Cover chest tube sites with gauze until they stop draining, then leave open to air. It is normal for chest tube sites to drain fluid for up to 2-3 weeks after surgery. It is not normal to have drainage from other incisions.   Watch for signs of infection: increased redness, tenderness, warmth or any drainage from sternum ***incision.  Also a temperature > 100.5 F or chills. Call your surgeon or primary care provider's office immediately.     Exercise is very important in your recovery. Please follow the guidelines set up for you in your cardiac rehab classes at the hospital. If outpatient cardiac rehab was ordered for you, we highly recommend you participate. If you have problems arranging your cardiac rehab, please call [phone] for all locations, with the exception of [place], please call [phone] and  [place], please call [phone].      Avoid sitting for prolonged periods of time, try to walk every hour during the day. If you have a leg incision, elevate your leg often when you are not walking.    Check your weight when you get home from the hospital and continue to check it daily through your recovery for at least a month. If you notice a weight gain of 2-3 pounds in a week, notify your primary care physician, cardiologist or surgeon.    Bowel activity may be slow after surgery. If necessary, you may take an over the counter laxative such as Milk of Magnesia or Miralax. You may have stool softeners prescribed (docusate sodium, Senokot). We recommend using stool softeners while using narcotics for pain (oxycodone/percocet, hydrocodone/vicodin, hydromorphone/dilaudid).      Wean OFF of narcotics (oxycodone, dilaudid, hydrocodone) as soon as possible. You should continue taking acetaminophen as long as you have any surgical pain as the first choice for pain control and add narcotics as necessary for pain to be tolerable.      You are on a blood thinner, follow the instructions you were given in the hospital and DO NOT SKIP this medication.        REGARDING PRESCRIPTION REFILLS.  If you need a refill on your pain medication contact us to discuss your pain and a possible one time refill.   All other medications will be adjusted, discontinued and re-filled by your primary care physician and/or your cardiologist as they were prior to your surgery. We have given you enough for one to three month with possibly one refill. You may have refills available to you for some of your medications through the [facility]. If you would like your refills sent to a different a different pharmacy, please contact your preferred pharmacy and let them know that you have prescriptions at [facility] that you would like transferred.    POST-OPERATIVE CLINIC VISITS  You have a follow up visit with CV Surgery  Advance Care Practitioners as scheduled at the Heart Clinic at [facility] in [place].  You will then return to the care of your primary provider and your cardiologist. Future medication refills should come from your PCP or Cardiologist.   Primary Care Doctor as scheduled.  Cardiology, Dr. Narvaez/Laura Olivia as scheduled  If you do not hear from a  in 7 days, please call [phone] and request to be seen with a general cardiologist or someone that you have seen in the past.     SURGICAL QUESTIONS  Please call our nurse coordinators, Breana and Maame, at [phone] with questions or concerns related to surgery. For other non-urgent questions and requests, our nurse coordinators can also be reached via email; Breana at [email or web address] and Maame at [email or web address]    On weekends or after hours, please call [phone] and ask the  to page the Cardiothoracic Surgeon on-call.      Thank you,    Your Cardiothoracic Surgery Team

## 2023-08-15 NOTE — PRE-PROCEDURE
GENERAL PRE-PROCEDURE:   Procedure:  Cor angio possible PCI  Date/Time:  8/15/2023 9:05 AM    Written consent obtained?: Yes    Risks and benefits: Risks, benefits and alternatives were discussed    Consent given by:  Patient  Patient states understanding of procedure being performed: Yes    Patient's understanding of procedure matches consent: Yes    Procedure consent matches procedure scheduled: Yes    Expected level of sedation:  Moderate  Appropriately NPO:  Yes  ASA Class:  4  Mallampati  :  Grade 2- soft palate, base of uvula, tonsillar pillars, and portion of posterior pharyngeal wall visible  Lungs:  Lungs clear with good breath sounds bilaterally  Heart:  Normal heart sounds and rate  History & Physical reviewed:  History and physical reviewed and no updates needed  Statement of review:  I have reviewed the lab findings, diagnostic data, medications, and the plan for sedation

## 2023-08-15 NOTE — Clinical Note
*NOTICE TO RECEIVING PARTY AGENCY**  This information is strictly Confidential and protected under 
[place] law.  [place] law prohibits you from making any further disclosure of this 
information unless further disclosure is expressly permitted by the written consent of the person to 
whom it pertains or is authorized by law.  A general authorization for the release of medical or 
other information is not sufficient for this purpose.  Hospital accepts no responsibility if the 
information is made available to any other person, INCLUDING THE PATIENT.



Interpretation Summary

  *  Name: BRIANDA ZHOU  Study Date: 01/10/2018 02:58 PM  BP: 175/78 mmHg

  *  MRN: [id number]  Patient Location: [facility]  HR: 77

  *  : 1941 (M/d/yyyy)  Gender: Female  Height: 62 in

  *  Age: 76 yrs  Ethnicity: [place]  Weight: 179 lb

  *  Ordering Physician: Russell Jay

  *  Referring Physician: Self, Referred

  *  Performed By: Erika Benedict RDCS

  *  Accession# [id number]  Account# [id number]

  *  Reason For Study: Chest pain

  *  BSA: 1.8 m2

  *  The study was technically adequate.

  *  Compared to prior study, changes are noted.

  *  -- Conclusions --

  *  Left ventricular systolic function is normal.

  *  Ejection Fraction = 65-70%.

  *  There is mild concentric left ventricular hypertrophy.

  *  The left atrium is moderately dilated.

  *  There is severe mitral annular calcification.

  *  There is mild mitral regurgitation.

  *  Aortic valve sclerosis moderate, without significant aortic valvular stenosis.

  *  Grade I diastolic dysfunction, (abnormal relaxation pattern).

Procedure Details

  *  A complete two-dimensional transthoracic echocardiogram was performed (2D, M-mode, Doppler and 
color flow Doppler).

  *  A contrast injection of Definity was performed to improve assessment of LV function.

  *  Contrast was injected into an intravenous site in the right arm.

  *  One vial of Definity ultrasound contrast was diluted in normal saline to a total volume of 10 
ml.  A total of '2' ml of solution was administered during imaging.

  *  Lot # [id number] of Definity utilized for procedure.

  *  Expiration date .

  *  The attending nurse who injected the contrast agent was Gabriel Salas RN.

Left Ventricle

  *  The left ventricle is normal in size.

  *  There is no thrombus.

  *  There is mild concentric left ventricular hypertrophy.

  *  Ejection Fraction = 65-70%.

  *  Left ventricular systolic function is normal.

  *  No regional wall motion abnormalities noted.

Right Ventricle

  *  The right ventricle is normal size.

  *  The right ventricular systolic function is normal as assessed by tricuspid annular plane 
systolic excursion (TAPSE) (normal >1.5 cm).

Atria

  *  The left atrium is moderately dilated.

  *  Right atrial size is normal.

  *  There is no evidence of atrial septal defect, but resolution does not allow assessment for a 
patent foramen ovale.

Mitral Valve

  *  There is severe mitral annular calcification.

  *  There is no mitral valve stenosis.

  *  There is mild mitral regurgitation.

Tricuspid Valve

  *  The tricuspid valve is normal.

  *  There is no tricuspid stenosis.

  *  Significant tricuspid regurgitation is absent.

Aortic Valve

  *  The aortic valve is trileaflet.

  *  Aortic valve sclerosis moderate, without significant aortic valvular stenosis.

  *  Aortic stenosis is absent.

  *  There is no significant aortic regurgitation.

Pulmonic Valve

  *  The pulmonary valve is not well seen, but the Doppler examination is normal without significant 
regurgitation or stenosis.

Great Vessels

  *  The aortic root and proximal ascending aorta are normal sized.

Pericardium/Pleural

  *  There is no pericardial effusion.

Great Vessels

  *  Normal inferior vena cava diameter and respiratory variation suggests normal central venous 
pressure.

Left Ventricular Diastolic Function

  *  Grade I diastolic dysfunction, (abnormal relaxation pattern).



MMode 2D Measurements and Calculations

IVSd 1.0 cm



LVIDd 4.1 cm

LVIDs 2.9 cm

LVPWd 10 cm



IVS/LVPW 1.0 

FS 30.1 %

EDV(Teich) 75.9 ml

ESV(Teich) 32.0 ml

EF(Teich) 57.9 %



EDV(cubed) 70.9 ml

ESV(cubed) 24.2 ml

EF(cubed) 65.9 %





LV mass(C)d 135.1 grams

LV mass(C)dI 74.1 grams/m\S\2



SV(Teich) 43.9 ml

SI(Teich) 24.1 ml/m\S\2

SV(cubed) 46.7 ml

SI(cubed) 25.6 ml/m\S\2



Ao root diam 2.7 cm

Ao root area 5.8 cm\S\2

ACS 1.2 cm

LA dimension 4.7 cm



asc Aorta Diam 2.9 cm





LA/Ao 1.7 

LVOT diam 1.9 cm

LVOT area 2.9 cm\S\2



LVAd ap4 28.9 cm\S\2

LVLd ap4 7.2 cm

EDV(MOD-sp4) 95.6 ml

EDV(sp4-el) 98.5 ml

LVAs ap4 16.5 cm\S\2

LVLs ap4 5.6 cm

ESV(MOD-sp4) 39.0 ml

ESV(sp4-el) 41.0 ml

EF(MOD-sp4) 59.2 %

EF(sp4-el) 58.4 %



LVAd ap2 26.2 cm\S\2

LVLd ap2 7.2 cm

EDV(MOD-sp2) 76.9 ml

EDV(sp2-el) 81.0 ml

LVAs ap2 14.7 cm\S\2

LVLs ap2 5.6 cm

ESV(MOD-sp2) 31.9 ml

ESV(sp2-el) 32.7 ml

EF(MOD-sp2) 58.5 %

EF(sp2-el) 59.7 %



LVLd %diff 0 %

EDV(MOD-bp) 85.8 ml

LVLs %diff -0.05 %

ESV(MOD-bp) 35.1 ml

EF(MOD-bp) 59.1 %





SV(MOD-sp4) 56.6 ml

SI(MOD-sp4) 31.0 ml/m\S\2



SV(MOD-sp2) 44.9 ml

SI(MOD-sp2) 24.6 ml/m\S\2



SV(MOD-bp) 50.7 ml

SI(MOD-bp) 27.8 ml/m\S\2



SV(sp4-el) 57.5 ml

SI(sp4-el) 31.5 ml/m\S\2





SV(sp2-el) 48.3 ml

SI(sp2-el) 26.5 ml/m\S\2













Doppler Measurements and Calculations

MV E max alexandro 128.5 cm/sec

MV A max alexandro 154.2 cm/sec



MV E/A 0.83 



MV dec time 0.27 sec



Ao V2 max 150.7 cm/sec

Ao max PG 9.1 mmHg

Ao max PG (full) 1.3 mmHg

OFE(V,A) 2.7 cm\S\2

OFE(V,D) 2.7 cm\S\2





LV V1 max PG 7.8 mmHg



LV V1 max 139.6 cm/sec



PA V2 max 89.6 cm/sec

PA max PG 3.2 mmHg

PA acc slope 469.8 cm/sec\S\2

PA acc time 0.14 sec



TR max alexandro 257.9 cm/sec





PA pr(Accel) 17.2 mmHg Pre-calculated contrast dose 333 ml

## 2023-08-15 NOTE — Clinical Note
The first balloon was inserted into the right coronary artery.Max pressure = 12 jerrell. Total duration = 14 seconds.     Max pressure = 14 jerrell. Total duration = 23 seconds.    Balloon reinflated a second time: Max pressure = 14 jerrell. Total duration = 23 seconds.  Balloon reinflated a third time: Max pressure = 14 jerrell. Total duration = 6 seconds.  Balloon reinflated a fourth time: Max pressure = 14 jerrell. Total duration = 7 seconds.  Balloon r einflated a fourth time: Max pressure = 14 jerrell. Total duration = 8 seconds.

## 2023-08-15 NOTE — PROGRESS NOTES
Pt from heart cath to main OR. His three children present in [facility], and they took patient's belongings, along with his glasses.

## 2023-08-15 NOTE — CONSULTS
CARDIOTHORACIC SURGERY CONSULT NOTE  8/15/2023      Reason for Consult: RCA stenosis      ASSESSMENT/PLAN:   Hugo Weber is a 78 year old male with a past medical history of chronic systolic heart failure (EF 35%), moderate aortic stenosis, rate controlled atrial fibrillation, hypertension, prostate cancer who presented to [facility] 08/15 for out-patient coronary angiogram to investigate underlying etiology of decreased EF. Coronary angiogram revealed distal RCA stenosis; an attempt was made to stent the lesion, however the stent was unable to be delivered and the stent and stent balloon became dislodged in the RCA and unable to be retrieved percutaneously. CV surgery consulted for stent retrieval and CABG.     - To OR for emergent CABG and removal of stent/balloon    Patient and plan discussed with attending, Dr. Mulvihill.      Tammie Weiner PA-C  Cardiothoracic Surgery  Pager [phone]        ________________________________________________________________________________________________    HPI:   Hugo Weber is a 78 year old male with a past medical history of chronic systolic heart failure (EF 35%), rate controlled atrial fibrillation, hypertension, prostate cancer who presented to [facility] 08/15 for out-patient coronary angiogram to investigate underlying etiology of decreased EF. Coronary angiogram revealed distal RCA stenosis; an attempt was made to stent the lesion, however the stent was unable to be delivered and the stent and stent balloon became dislodged in the RCA and unable to be retrieved percutaneously. CV surgery consulted for stent retrieval and CABG.       PMH:  Past Medical History:   Diagnosis Date    Aortic stenosis     moderate    Atrial fibrillation 2006    Followed by [facility] - persistent    Calculus of kidney 2005, 6/06, 10/12, 8/18, 9/19    dr walker/nestor/иван - hematuria - w/u o/w neg    Cervical stenosis of spine     Moderate C4-5    Cholelithiasis     Chronic peptic ulcer,  unspecified site, without mention of hemorrhage, perforation, or obstruction 1985    gastric bypass    Colon polyps 8/05, 9/10, 10/15    2 tubular adenoma, 1 hyperplastic - multiple serrated/tubular adenomas - last colonscopy 11/20 due 5 yrs    CVA (cerebral vascular accident) (H) 01/2019    small right periorlandic subcortical - left sided residual - left hand tingling/numbness - Left leg weak/numbness - cardioembolic    Elevated prostate specific antigen (PSA)     Dr Santos    Hepatitis C 10/2012    chronic hepatitis C, grade 1-2, stage 1 - mild - Dr Douglas    HFrEF (heart failure with reduced ejection fraction) (H)     [facility] - 35%    Hyperlipidemia LDL goal <70     Hypertension goal BP (blood pressure) < 140/90 06/2006    dr jaciel winchester    Iron deficiency anemia, unspecified 1985    gastric bypass    Mitral regurgitation     mild-moderate    Nephrolithiasis multiple episodes, last 10/19    Dr Bey/Ivana/Tom - 9mm 3/2021    OA (osteoarthritis)     Multiple - Left shoulder with rotator cuff tear - Dr Durham    Obesity, unspecified     s/p gastric bypass 1985     Obstructive sleep apnea syndrome     CPAP - 15 cm - [facility] 1/2023    Prediabetes     Presbyacusis 01/2004    dr corona    Prostate cancer (H) 2023    Dr Santos - Manju 3+4, 4+3 = 7, bx 5/13 positive    Pyelonephritis, unspecified 12/1999    SCC (squamous cell carcinoma), 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    dr saez [facility]    Sleep apnea 10/2002    cpap - severe - 15 cm - dr cunha    Stroke (H) 01/19/2019    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    [facility]    Vitamin B12 deficiency (non anemic) 04/15/2019    Vitamin D deficiency 04/15/2019       PSH:  Past Surgical History:   Procedure Laterality Date    APPENDECTOMY      ARTHROPLASTY KNEE  08/13/2012    LT TKA - Dr Villanueva    CARDIOVERSION  2016    CARDIOVERSION      COLONOSCOPY  8/05, 9/10, 10/15    multiple tubular/serrated/hyperplastic polyps    COLONOSCOPY N/A 10/29/2015    multiple tubular and  serrated adenomas    COLONOSCOPY N/A 2016    COLONOSCOPY  2020    tubular adenomas - due 5 yrs    COLONOSCOPY N/A 2020    Procedure: COLONOSCOPY with biopsies;  Surgeon: Chadwick Burgos MD;  Location: [facility] OR    CYSTOSCOPY W/ LASER LITHOTRIPSY  10/16/2012,2018    ESOPHAGOSCOPY, GASTROSCOPY, DUODENOSCOPY (EGD), COMBINED N/A 2020    Procedure: ESOPHAGOGASTRODUODENOSCOPY, COLONOSCOPY with biopsies;  Surgeon: Chadwick Burgos MD;  Location:  OR    EYE SURGERY  ,,    lasik    HC KNEE SCOPE, DIAGNOSTIC  2000    Arthroscopy, Knee RT    LASER HOLMIUM LITHOTRIPSY URETER(S), INSERT STENT, COMBINED  10/16/2012    stones x 4, Dr Campa    LASER HOLMIUM LITHOTRIPSY URETER(S), INSERT STENT, COMBINED Right 2018    CYSTOSCOPY, RIGHT URETEROSCOPY, HOLMIUM LASER LITHOTRIPSY, RIGHT STENT PLACEMENT - Dr Bey    MRI BIOPSY PROSTATE Bilateral 2023    mark    REVERSE ARTHROPLASTY SHOULDER Left 2023    Procedure: LEFT REVERSE SHOULDER ARTHROPLASTY WITH CUSTOM GUIDE WITH AUTOLOGOUS BONE GRAFTOF PROXIMAL HUMERUS;  Surgeon: Booker Multani MD;  Location:  OR    SURGICAL HISTORY OF -       s/p gastric bypass Bilroth II    SURGICAL HISTORY OF -   1998    wisdom teeth    SURGICAL HISTORY OF -       cellulitis    SURGICAL HISTORY OF -   1998    lt forearm spur's    SURGICAL HISTORY OF -   ,,    s/p lasik    SURGICAL HISTORY OF -   1999    rt forearm spurs    SURGICAL HISTORY OF -   2006    dr stevenson - lithotrypsy    SURGICAL HISTORY OF -   10/08,     Lt ear chonchal lesion removal SCC - dr saez    SURGICAL HISTORY OF -  Right 10/2019    nephrolithiasis - cystoscopy, rt lithotrypsy, Dr Heath    SURGICAL HISTORY OF -  Right 2019    Cystoscopy, Rt lithotrypsy, Calcium Oxalate, Dr Heath       FH:  Family History   Problem Relation Age of Onset    Alzheimer Disease Father 82         at 88    Prostate Cancer Father          "bladder and prostate    Heart Disease Mother 80        CHF    Heart Disease Maternal Grandfather         MI at 55    Cancer Paternal Uncle         ?    Ulcerative Colitis No family hx of     Crohn's Disease No family hx of     Stomach Cancer No family hx of     GERD No family hx of        SH:  Social History     Socioeconomic History    Marital status:      Spouse name: Mayra    Number of children: 3    Years of education: 21    Highest education level: None   Occupational History    Occupation: retired teacher and football and       Employer: [id number]     Employer: RETIRED   Tobacco Use    Smoking status: Never    Smokeless tobacco: Never   Vaping Use    Vaping Use: Never used   Substance and Sexual Activity    Alcohol use: Yes     Alcohol/week: 2.0 - 3.0 standard drinks of alcohol     Types: 2 - 3 Standard drinks or equivalent per week     Comment: occassiinally    Drug use: No     Comment: acknowledges using herbal supplement \"Vibe\" for energy-none used recently     Sexual activity: Not Currently     Partners: Male     Birth control/protection: None     Comment:    Other Topics Concern    Caffeine Concern Yes     Comment: <1 can qd    Sleep Concern Yes     Comment: sleep apnea, wears cpap    Exercise No    Seat Belt Yes    Parent/sibling w/ CABG, MI or angioplasty before 65F 55M? No   Social History Narrative    Wife , Mayra,  from brain gliobastoma 2020.  --Ingrid Girard MD             Home Meds:  Medications Prior to Admission   Medication Sig Dispense Refill Last Dose    acetaminophen (TYLENOL) 325 MG tablet Take 2 tablets (650 mg) by mouth every 4 hours as needed for other (For optimal non-opioid multimodal pain management to improve pain control.) 100 tablet 0 Past Week    apixaban ANTICOAGULANT (ELIQUIS) 5 MG tablet Take 1 tablet (5 mg) by mouth 2 times daily Appointment needed for additional refills. Please call [phone] to schedule. 180 " tablet 3 8/13    empagliflozin (JARDIANCE) 10 MG TABS tablet Take 1 tablet (10 mg) by mouth daily 90 tablet 3 8/11    enoxaparin ANTICOAGULANT (LOVENOX) 120 MG/0.8ML syringe Inject 0.8 mLs (120 mg) Subcutaneous daily Take one dose 48 hours before procedure and second dose 36 hours before proceure 2 mL 0 8/11    Ferrous Sulfate (IRON) 325 (65 Fe) MG tablet Take 1 tablet by mouth three times a week (Monday, Wednesday, Friday) 90 tablet 3 8/14/2023    fluticasone (FLONASE) 50 MCG/ACT nasal spray Spray 2 sprays into both nostrils daily as needed for rhinitis or allergies 54.6 mL 3 8/14/2023    metoprolol succinate ER (TOPROL XL) 50 MG 24 hr tablet Take 1 tablet (50 mg) by mouth daily 90 tablet 3 8/14/2023    niacin 500 MG tablet Take 500 mg by mouth daily (with breakfast)   8/15/2023    nitroFURantoin macrocrystal-monohydrate (MACROBID) 100 MG capsule Take 1 capsule (100 mg) by mouth 2 times daily for 5 days 10 capsule 0 8/15/2023    pantoprazole (PROTONIX) 40 MG EC tablet Take 40 mg by mouth daily   8/15/2023    sacubitril-valsartan (ENTRESTO) 24-26 MG per tablet Take 1 tablet by mouth 2 times daily 180 tablet 3 8/15/2023    senna-docusate (SENOKOT-S/PERICOLACE) 8.6-50 MG tablet Take 1-2 tablets by mouth 2 times daily Take while on oral narcotics to prevent or treat constipation. 30 tablet 0 Past Month    tamsulosin (FLOMAX) 0.4 MG capsule Take 1 capsule (0.4 mg) by mouth 2 times daily 180 capsule 3 8/15/2023    vitamin B-12 (CYANOCOBALAMIN) 1000 MCG tablet Take 1,000 mcg by mouth daily   8/15/2023    Vitamin D-Vitamin K (K2 PLUS D3) 100-1000 MCG-UNIT TABS Take 1 tablet by mouth daily   8/15/2023    vitamin D3 (CHOLECALCIFEROL) 125 MCG (5000 UT) tablet Take 1 tablet by mouth daily   8/15/2023    vitamin C (ASCORBIC ACID) 1000 MG TABS Take 1,000 mg by mouth daily        Allergies:  No Known Allergies  ROS: 10 point ROS neg other than the symptoms noted above in the HPI.      Physical Exam:  Temp:  [97.6  F (36.4  C)]  97.6  F (36.4  C)  Pulse:  [] 108  Resp:  [18] 18  BP: (122-125)/(89-93) 125/93  SpO2:  [98 %] 98 %  Gen: NAD  CV: RRR  Pulm: clear to auscultation bilaterally  Abd: soft, non-tender, no guarding, +BS  Ext: no lower extremity edema      Labs:  ABG No lab results found in last 7 days.  CBC  Recent Labs   Lab 08/15/23  1757 08/15/23  0735   WBC 14.9* 4.8   HGB 8.3* 12.7*   * 177     BMP  Recent Labs   Lab 08/15/23  0735      POTASSIUM 4.0   CHLORIDE 105   CO2 27   BUN 18.2   CR 0.69   GLC 90     LFT  Recent Labs   Lab 08/15/23  1757 08/15/23  0735   INR 1.79* 1.14     PancreasNo lab results found in last 7 days.    Imaging:  Recent Results (from the past 24 hour(s))   Cardiac Catheterization    Narrative      Dist Cx lesion is 40% stenosed.    Prox RCA lesion is 40% stenosed.    Dist RCA lesion is 75% stenosed.    Pressure wire/FFR was performed on the lesion.    Hemodynamically significant stenosis of the distal right coronary artery.  Fracture of stent delivery system resulting in retention of an undeployed   drug-eluting stent and stent balloon in the ostial and proximal RCA.  Unsuccessful attempt to snare and retrieve the stent and balloon from the   RCA.

## 2023-08-15 NOTE — Clinical Note
The first balloon was inserted into the right coronary artery.Max pressure = 16 jerrell. Total duration = 14 seconds.     Max pressure = 17 jerrell. Total duration = 11 seconds.    Balloon reinflated a second time: Max pressure = 17 jerrell. Total duration = 11 seconds.  Balloon reinflated a third time: Max pressure = 18 jerrell. Total duration = 9 seconds.  Balloon reinflated a fourth time:

## 2023-08-15 NOTE — ANESTHESIA PREPROCEDURE EVALUATION
Anesthesia Pre-Procedure Evaluation    Patient: Hugo Weber   MRN: [id number] : 1945        Procedure : Procedure(s):  Bypass graft artery coronary          Past Medical History:   Diagnosis Date    Aortic stenosis     moderate    Atrial fibrillation     Followed by [facility] - persistent    Calculus of kidney , , 10/12, ,     dr walker/nestor/иван - hematuria - w/u o/w neg    Cervical stenosis of spine     Moderate C4-5    Cholelithiasis     Chronic peptic ulcer, unspecified site, without mention of hemorrhage, perforation, or obstruction     gastric bypass    Colon polyps , 9/10, 10/15    2 tubular adenoma, 1 hyperplastic - multiple serrated/tubular adenomas - last colonscopy  due 5 yrs    CVA (cerebral vascular accident) (H) 2019    small right periorlandic subcortical - left sided residual - left hand tingling/numbness - Left leg weak/numbness - cardioembolic    Elevated prostate specific antigen (PSA)     Dr Santos    Hepatitis C 10/2012    chronic hepatitis C, grade 1-2, stage 1 - mild - Dr Douglas    HFrEF (heart failure with reduced ejection fraction) (H)     [facility] - 35%    Hyperlipidemia LDL goal <70     Hypertension goal BP (blood pressure) < 140/90 2006    dr jaciel winchester    Iron deficiency anemia, unspecified     gastric bypass    Mitral regurgitation     mild-moderate    Nephrolithiasis multiple episodes, last 10/19    Dr Bey/Ivana/Tom - 9mm 3/2021    OA (osteoarthritis)     Multiple - Left shoulder with rotator cuff tear - Dr Durham    Obesity, unspecified     s/p gastric bypass      Obstructive sleep apnea syndrome     CPAP - 15 cm - [facility] 2023    Prediabetes     Presbyacusis 2004    dr corona    Prostate cancer (H)     Dr Santos - [place] 3+4, 4+3 = 7, bx  positive    Pyelonephritis, unspecified 1999    SCC (squamous cell carcinoma), 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    dr saez - [facility]    Sleep apnea 10/2002     cpap - severe - 15 cm - dr cunha    Stroke (H) 01/19/2019    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    [facility]    Vitamin B12 deficiency (non anemic) 04/15/2019    Vitamin D deficiency 04/15/2019      Past Surgical History:   Procedure Laterality Date    APPENDECTOMY      ARTHROPLASTY KNEE  08/13/2012    LT TKA - Dr Villanueva    CARDIOVERSION  2016    CARDIOVERSION      COLONOSCOPY  8/05, 9/10, 10/15    multiple tubular/serrated/hyperplastic polyps    COLONOSCOPY N/A 10/29/2015    multiple tubular and serrated adenomas    COLONOSCOPY N/A 09/07/2016    COLONOSCOPY  11/2020    tubular adenomas - due 5 yrs    COLONOSCOPY N/A 11/24/2020    Procedure: COLONOSCOPY with biopsies;  Surgeon: Chadwick Burgos MD;  Location:  OR    CYSTOSCOPY W/ LASER LITHOTRIPSY  10/16/2012,5/2/2018    ESOPHAGOSCOPY, GASTROSCOPY, DUODENOSCOPY (EGD), COMBINED N/A 11/24/2020    Procedure: ESOPHAGOGASTRODUODENOSCOPY, COLONOSCOPY with biopsies;  Surgeon: Chadwick Burgos MD;  Location:  OR    EYE SURGERY  1/01,6/02,11/02    lasik    HC KNEE SCOPE, DIAGNOSTIC  05/2000    Arthroscopy, Knee RT    LASER HOLMIUM LITHOTRIPSY URETER(S), INSERT STENT, COMBINED  10/16/2012    stones x 4, Dr Campa    LASER HOLMIUM LITHOTRIPSY URETER(S), INSERT STENT, COMBINED Right 05/02/2018    CYSTOSCOPY, RIGHT URETEROSCOPY, HOLMIUM LASER LITHOTRIPSY, RIGHT STENT PLACEMENT - Dr Bey    MRI BIOPSY PROSTATE Bilateral 02/09/2023    mark    REVERSE ARTHROPLASTY SHOULDER Left 03/07/2023    Procedure: LEFT REVERSE SHOULDER ARTHROPLASTY WITH CUSTOM GUIDE WITH AUTOLOGOUS BONE GRAFTOF PROXIMAL HUMERUS;  Surgeon: Booker Multani MD;  Location:  OR    SURGICAL HISTORY OF -   1985    s/p gastric bypass Bilroth II    SURGICAL HISTORY OF -   11/1998    wisdom teeth    SURGICAL HISTORY OF -   1979    cellulitis    SURGICAL HISTORY OF -   11/1998    lt forearm spur's    SURGICAL HISTORY OF -   1/01,6/02,11/02    s/p lasik    SURGICAL HISTORY OF -   11/1999    rt  forearm spurs    SURGICAL HISTORY OF -   07/2006    dr stevenson - lithotrypsy    SURGICAL HISTORY OF -   10/08, 12/08    Lt ear chonchal lesion removal SCC - dr saez    SURGICAL HISTORY OF -  Right 10/2019    nephrolithiasis - cystoscopy, rt lithotrypsy, Dr Heath    SURGICAL HISTORY OF -  Right 11/2019    Cystoscopy, Rt lithotrypsy, Calcium Oxalate, Dr Heath      No Known Allergies   Social History     Tobacco Use    Smoking status: Never    Smokeless tobacco: Never   Substance Use Topics    Alcohol use: Yes     Alcohol/week: 2.0 - 3.0 standard drinks of alcohol     Types: 2 - 3 Standard drinks or equivalent per week     Comment: occassiinally      Wt Readings from Last 1 Encounters:   08/15/23 133.8 kg (295 lb)        Anesthesia Evaluation            ROS/MED HX  ENT/Pulmonary:     (+) sleep apnea, uses CPAP,                                  (-) tobacco use, asthma and COPD   Neurologic:     (+)          CVA,                      Cardiovascular:     (+) Dyslipidemia hypertension- -  CAD -  - stent-      CHF  Last EF: 35                dysrhythmias, a-fib,  valvular problems/murmurs type: AS and MR     Previous cardiac testing   Echo: Date: 8/23 Results:  Interpretation Summary     1. The left ventricle is normal in size. The visual ejection fraction is  estimated at 35%. There is moderate global hypokinesia of the left ventricle.  2. The right ventricle is normal in structure, function and size.  3. There is mild to moderate (1-2+) mitral regurgitation.  4. Moderate valvular aortic stenosis. Mean 17mmHg, Vmax 2.5m/s, FREDY 1.2cm2, DI  0.27, low SVI of 23ml/m2.  5. The ascending aorta is Mildly dilated. 4.3cm.     Echo 7/2022 showed EF 35-40%, 1-2+ MR, AS with mean 13mmHg, Vmax 2.4m/s, FREDY  1.8cm2, DI 0.38, aorta 4.5cm.    Stress Test:  Date: Results:    ECG Reviewed:  Date: Results:    Cath:  Date: Today Results:  Dislodged RCA stent. L system clear per CV surgeon    METS/Exercise Tolerance:     Hematologic:        Musculoskeletal:       GI/Hepatic: Comment: H/O SBO    (+)           hepatitis type Other and C, liver disease,    (-) GERD   Renal/Genitourinary:     (+)       Nephrolithiasis ,       Endo:     (+)  type II DM,             Obesity,       Psychiatric/Substance Use:    (-) psychiatric history   Infectious Disease:       Malignancy:   (+) Malignancy, History of Prostate.    Other:               OUTSIDE LABS:  CBC:   Lab Results   Component Value Date    WBC 4.8 08/15/2023    WBC 6.5 02/21/2023    HGB 12.7 (L) 08/15/2023    HGB 14.0 03/08/2023    HCT 40.0 08/15/2023    HCT 42.8 02/21/2023     08/15/2023     02/21/2023     BMP:   Lab Results   Component Value Date     08/15/2023     08/02/2023    POTASSIUM 4.0 08/15/2023    POTASSIUM 4.0 08/02/2023    CHLORIDE 105 08/15/2023    CHLORIDE 103 08/02/2023    CO2 27 08/15/2023    CO2 26 08/02/2023    BUN 18.2 08/15/2023    BUN 20.4 08/02/2023    CR 0.69 08/15/2023    CR 0.77 08/02/2023    GLC 90 08/15/2023     (H) 08/02/2023     COAGS:   Lab Results   Component Value Date    PTT 34 08/15/2023    INR 1.14 08/15/2023     POC:   Lab Results   Component Value Date     (H) 09/07/2016     HEPATIC:   Lab Results   Component Value Date    ALBUMIN 3.9 02/21/2023    PROTTOTAL 6.8 02/21/2023    ALT 15 02/21/2023    AST 18 02/21/2023    GGT 77 (H) 12/24/2012    ALKPHOS 124 02/21/2023    BILITOTAL 0.5 02/21/2023     OTHER:   Lab Results   Component Value Date    LACT 1.1 06/21/2021    A1C 5.4 02/21/2023    BEBO 9.0 08/15/2023    PHOS 3.4 10/29/2012    MAG 1.9 06/23/2021    LIPASE 27 (L) 03/08/2021    AMYLASE 74 10/28/2020    TSH 2.84 08/16/2022    T4 1.08 02/21/2019    CRP <2.9 10/28/2020    SED 8 10/28/2020       Anesthesia Plan    ASA Status:  4, emergent    NPO Status:  NPO Appropriate    Anesthesia Type: General.     - Airway: ETT   Induction: Intravenous, RSI, Propofol.   Maintenance: Balanced.   Techniques and Equipment:     -  Lines/Monitors: Arterial Line, Central Line, TAE            TAE Absolute Contra-indication: NONE            TAE Relative Contra-indication: NONE     Consents    Anesthesia Plan(s) and associated risks, benefits, and realistic alternatives discussed. Questions answered and patient/representative(s) expressed understanding.     - Discussed:     - Discussed with:  Implied consent/emergency            Postoperative Care    Pain management: Multi-modal analgesia, IV analgesics.   PONV prophylaxis: Ondansetron (or other 5HT-3), Dexamethasone or Solumedrol     Comments:    Other Comments: Induction with Fentanyl, Versed, Propofol and Rocuronium.  Plan to infuse Epinephrine  and Phenylephrine for separation from cardiopulmonary bypass.   TAE for monitoring purposes only.   Will plan to transfer to ICU on full monitors with ETT in situ.   Propofol gtt for post operative sedation.     Multi Model Analgesia:  -Tylenol 1000 mg po.  -Precedex gtt at 0.2-0.5 mcg/kg/hour  -Decadron 8mg IV after induction.   -Dilaudid titrated prn.               Gabe Arteaga MD

## 2023-08-15 NOTE — ANESTHESIA PROCEDURE NOTES
Airway       Patient location during procedure: OR       Procedure Start/Stop Times: 8/15/2023 2:51 PM  Staff -        CRNA: Alejandrina Hirsch APRN CRNA       Performed By: CRNA  Consent for Airway        Urgency: elective  Indications and Patient Condition       Indications for airway management: hodan-procedural       Induction type:intravenous       Mask difficulty assessment: 2 - vent by mask + OA or adjuvant +/- NMBA    Final Airway Details       Final airway type: endotracheal airway       Successful airway: ETT - single  Endotracheal Airway Details        ETT size (mm): 8.0       Cuffed: yes       Successful intubation technique: video laryngoscopy       VL Blade Size: Glidescope 4       Grade View of Cords: 1       Adjucts: stylet       Position: Right       Measured from: gums/teeth       Secured at (cm): 23       Bite block used: None    Post intubation assessment        Placement verified by: capnometry, equal breath sounds and chest rise        Number of attempts at approach: 1       Number of other approaches attempted: 0       Secured with: pink tape       Ease of procedure: easy       Dentition: Unchanged    Medication(s) Administered   Medication Administration Time: 8/15/2023 2:51 PM

## 2023-08-15 NOTE — PROCEDURES
Interventional cardiology    Brief procedure note.     79 yo M with atrial fibrillation, EF 35% and moderate aortic stenosis referred for coronary angiogram and possible PCI.     Coronary angiogram demonstrating significant stenosis of the distal RCA.  No significant stenosis in the left system.    Distal RCA was treated with balloon angioplasty.  Attempted to deliver a 4.0 x 38mm Synergy LUCIE, stent would not deliver to distal RCA. Upon attempt to remove the stent from the RCA the shaft  from the stent balloon and stent.  Multiple attempts to snare the stent were made and the stent was able to be snared but not with enough force to remove it from the proximal RCA.    Plan will be for surgical removal of the RCA stent and balloon and CABG of the distal RCA. Family and CV surgery were updated.     Carly Luna MD on 8/15/2023 at 2:38 PM

## 2023-08-16 ENCOUNTER — APPOINTMENT (OUTPATIENT)
Dept: GENERAL RADIOLOGY | Facility: CLINIC | Age: 78
DRG: 231 | End: 2023-08-16
Attending: INTERNAL MEDICINE
Payer: COMMERCIAL

## 2023-08-16 ENCOUNTER — APPOINTMENT (OUTPATIENT)
Dept: GENERAL RADIOLOGY | Facility: CLINIC | Age: 78
DRG: 231 | End: 2023-08-16
Attending: SURGERY
Payer: COMMERCIAL

## 2023-08-16 DIAGNOSIS — Z95.1 S/P CABG (CORONARY ARTERY BYPASS GRAFT): Primary | ICD-10-CM

## 2023-08-16 LAB
ALBUMIN SERPL BCG-MCNC: 3.5 G/DL (ref 3.5–5.2)
ALLEN'S TEST: ABNORMAL
ALP SERPL-CCNC: 54 U/L (ref 40–129)
ALT SERPL W P-5'-P-CCNC: 11 U/L (ref 0–70)
ANION GAP SERPL CALCULATED.3IONS-SCNC: 12 MMOL/L (ref 7–15)
AST SERPL W P-5'-P-CCNC: 22 U/L (ref 0–45)
BASE EXCESS BLDA CALC-SCNC: -1.1 MMOL/L (ref -9–1.8)
BASE EXCESS BLDA CALC-SCNC: -1.1 MMOL/L (ref -9–1.8)
BASE EXCESS BLDA CALC-SCNC: -6.3 MMOL/L (ref -9–1.8)
BASE EXCESS BLDA CALC-SCNC: 1.1 MMOL/L (ref -9–1.8)
BILIRUB SERPL-MCNC: 0.5 MG/DL
BUN SERPL-MCNC: 16.4 MG/DL (ref 8–23)
CA-I BLD-MCNC: 4.4 MG/DL (ref 4.4–5.2)
CALCIUM SERPL-MCNC: 8.1 MG/DL (ref 8.8–10.2)
CHLORIDE SERPL-SCNC: 107 MMOL/L (ref 98–107)
CREAT SERPL-MCNC: 0.71 MG/DL (ref 0.67–1.17)
DEPRECATED HCO3 PLAS-SCNC: 22 MMOL/L (ref 22–29)
ERYTHROCYTE [DISTWIDTH] IN BLOOD BY AUTOMATED COUNT: 13.7 % (ref 10–15)
ERYTHROCYTE [DISTWIDTH] IN BLOOD BY AUTOMATED COUNT: 14 % (ref 10–15)
GFR SERPL CREATININE-BSD FRML MDRD: >90 ML/MIN/1.73M2
GLUCOSE BLDC GLUCOMTR-MCNC: 108 MG/DL (ref 70–99)
GLUCOSE BLDC GLUCOMTR-MCNC: 110 MG/DL (ref 70–99)
GLUCOSE BLDC GLUCOMTR-MCNC: 117 MG/DL (ref 70–99)
GLUCOSE BLDC GLUCOMTR-MCNC: 121 MG/DL (ref 70–99)
GLUCOSE BLDC GLUCOMTR-MCNC: 122 MG/DL (ref 70–99)
GLUCOSE BLDC GLUCOMTR-MCNC: 129 MG/DL (ref 70–99)
GLUCOSE BLDC GLUCOMTR-MCNC: 132 MG/DL (ref 70–99)
GLUCOSE BLDC GLUCOMTR-MCNC: 166 MG/DL (ref 70–99)
GLUCOSE BLDC GLUCOMTR-MCNC: 189 MG/DL (ref 70–99)
GLUCOSE BLDC GLUCOMTR-MCNC: 213 MG/DL (ref 70–99)
GLUCOSE SERPL-MCNC: 129 MG/DL (ref 70–99)
HCO3 BLD-SCNC: 20 MMOL/L (ref 21–28)
HCO3 BLD-SCNC: 23 MMOL/L (ref 21–28)
HCO3 BLD-SCNC: 23 MMOL/L (ref 21–28)
HCO3 BLD-SCNC: 26 MMOL/L (ref 21–28)
HCT VFR BLD AUTO: 25 % (ref 40–53)
HCT VFR BLD AUTO: 26.1 % (ref 40–53)
HGB BLD-MCNC: 8.2 G/DL (ref 13.3–17.7)
HGB BLD-MCNC: 8.5 G/DL (ref 13.3–17.7)
LACTATE SERPL-SCNC: 3.3 MMOL/L (ref 0.7–2)
LACTATE SERPL-SCNC: 6.7 MMOL/L (ref 0.7–2)
LACTATE SERPL-SCNC: 6.9 MMOL/L (ref 0.7–2)
MAGNESIUM SERPL-MCNC: 1.8 MG/DL (ref 1.7–2.3)
MCH RBC QN AUTO: 29.8 PG (ref 26.5–33)
MCH RBC QN AUTO: 30.2 PG (ref 26.5–33)
MCHC RBC AUTO-ENTMCNC: 32.6 G/DL (ref 31.5–36.5)
MCHC RBC AUTO-ENTMCNC: 32.8 G/DL (ref 31.5–36.5)
MCV RBC AUTO: 91 FL (ref 78–100)
MCV RBC AUTO: 93 FL (ref 78–100)
O2/TOTAL GAS SETTING VFR VENT: 40 %
OXYHGB MFR BLD: 98 % (ref 92–100)
OXYHGB MFR BLD: 98 % (ref 92–100)
PCO2 BLD: 35 MM HG (ref 35–45)
PCO2 BLD: 35 MM HG (ref 35–45)
PCO2 BLD: 40 MM HG (ref 35–45)
PCO2 BLD: 42 MM HG (ref 35–45)
PH BLD: 7.3 [PH] (ref 7.35–7.45)
PH BLD: 7.4 [PH] (ref 7.35–7.45)
PH BLD: 7.43 [PH] (ref 7.35–7.45)
PH BLD: 7.43 [PH] (ref 7.35–7.45)
PHOSPHATE SERPL-MCNC: 3.6 MG/DL (ref 2.5–4.5)
PLATELET # BLD AUTO: 181 10E3/UL (ref 150–450)
PLATELET # BLD AUTO: 204 10E3/UL (ref 150–450)
PO2 BLD: 121 MM HG (ref 80–105)
PO2 BLD: 139 MM HG (ref 80–105)
PO2 BLD: 139 MM HG (ref 80–105)
PO2 BLD: 165 MM HG (ref 80–105)
POTASSIUM SERPL-SCNC: 3.8 MMOL/L (ref 3.4–5.3)
PROT SERPL-MCNC: 5 G/DL (ref 6.4–8.3)
RBC # BLD AUTO: 2.75 10E6/UL (ref 4.4–5.9)
RBC # BLD AUTO: 2.81 10E6/UL (ref 4.4–5.9)
SODIUM SERPL-SCNC: 141 MMOL/L (ref 136–145)
WBC # BLD AUTO: 13.7 10E3/UL (ref 4–11)
WBC # BLD AUTO: 18.3 10E3/UL (ref 4–11)

## 2023-08-16 PROCEDURE — 250N000011 HC RX IP 250 OP 636: Performed by: INTERNAL MEDICINE

## 2023-08-16 PROCEDURE — 80053 COMPREHEN METABOLIC PANEL: CPT | Performed by: SURGERY

## 2023-08-16 PROCEDURE — 258N000003 HC RX IP 258 OP 636: Performed by: SURGERY

## 2023-08-16 PROCEDURE — 94003 VENT MGMT INPAT SUBQ DAY: CPT

## 2023-08-16 PROCEDURE — 200N000001 HC R&B ICU

## 2023-08-16 PROCEDURE — 99291 CRITICAL CARE FIRST HOUR: CPT | Mod: 24 | Performed by: INTERNAL MEDICINE

## 2023-08-16 PROCEDURE — 250N000013 HC RX MED GY IP 250 OP 250 PS 637: Performed by: SURGERY

## 2023-08-16 PROCEDURE — 999N000259 HC STATISTIC EXTUBATION

## 2023-08-16 PROCEDURE — 83605 ASSAY OF LACTIC ACID: CPT | Performed by: INTERNAL MEDICINE

## 2023-08-16 PROCEDURE — 84100 ASSAY OF PHOSPHORUS: CPT | Performed by: SURGERY

## 2023-08-16 PROCEDURE — 83735 ASSAY OF MAGNESIUM: CPT | Performed by: SURGERY

## 2023-08-16 PROCEDURE — 71045 X-RAY EXAM CHEST 1 VIEW: CPT

## 2023-08-16 PROCEDURE — 85027 COMPLETE CBC AUTOMATED: CPT | Performed by: INTERNAL MEDICINE

## 2023-08-16 PROCEDURE — 258N000003 HC RX IP 258 OP 636: Performed by: STUDENT IN AN ORGANIZED HEALTH CARE EDUCATION/TRAINING PROGRAM

## 2023-08-16 PROCEDURE — 36620 INSERTION CATHETER ARTERY: CPT | Performed by: INTERNAL MEDICINE

## 2023-08-16 PROCEDURE — 250N000011 HC RX IP 250 OP 636: Mod: JZ | Performed by: STUDENT IN AN ORGANIZED HEALTH CARE EDUCATION/TRAINING PROGRAM

## 2023-08-16 PROCEDURE — 82803 BLOOD GASES ANY COMBINATION: CPT | Performed by: INTERNAL MEDICINE

## 2023-08-16 PROCEDURE — 999N000157 HC STATISTIC RCP TIME EA 10 MIN

## 2023-08-16 PROCEDURE — 999N000065 XR ABDOMEN PORT 1 VIEW

## 2023-08-16 PROCEDURE — 36620 INSERTION CATHETER ARTERY: CPT | Mod: GC

## 2023-08-16 PROCEDURE — 250N000011 HC RX IP 250 OP 636: Performed by: SURGERY

## 2023-08-16 PROCEDURE — 82805 BLOOD GASES W/O2 SATURATION: CPT | Performed by: INTERNAL MEDICINE

## 2023-08-16 PROCEDURE — 250N000013 HC RX MED GY IP 250 OP 250 PS 637: Performed by: NURSE PRACTITIONER

## 2023-08-16 PROCEDURE — 85027 COMPLETE CBC AUTOMATED: CPT | Performed by: SURGERY

## 2023-08-16 PROCEDURE — 258N000003 HC RX IP 258 OP 636: Performed by: INTERNAL MEDICINE

## 2023-08-16 PROCEDURE — 93005 ELECTROCARDIOGRAM TRACING: CPT

## 2023-08-16 PROCEDURE — 82330 ASSAY OF CALCIUM: CPT | Performed by: SURGERY

## 2023-08-16 PROCEDURE — 999N000253 HC STATISTIC WEANING TRIALS

## 2023-08-16 RX ORDER — ROSUVASTATIN CALCIUM 10 MG/1
10 TABLET, COATED ORAL DAILY
Status: DISCONTINUED | OUTPATIENT
Start: 2023-08-16 | End: 2023-08-22 | Stop reason: HOSPADM

## 2023-08-16 RX ORDER — SODIUM CHLORIDE 9 MG/ML
INJECTION, SOLUTION INTRAVENOUS CONTINUOUS
Status: DISCONTINUED | OUTPATIENT
Start: 2023-08-15 | End: 2023-08-20

## 2023-08-16 RX ADMIN — OXYCODONE HYDROCHLORIDE 5 MG: 5 TABLET ORAL at 21:37

## 2023-08-16 RX ADMIN — PHENYLEPHRINE HYDROCHLORIDE 0.5 MCG/KG/MIN: 10 INJECTION INTRAVENOUS at 21:45

## 2023-08-16 RX ADMIN — PROPOFOL 30 MCG/KG/MIN: 10 INJECTION, EMULSION INTRAVENOUS at 00:50

## 2023-08-16 RX ADMIN — PROPOFOL 30 MCG/KG/MIN: 10 INJECTION, EMULSION INTRAVENOUS at 08:48

## 2023-08-16 RX ADMIN — SODIUM CHLORIDE: 9 INJECTION, SOLUTION INTRAVENOUS at 11:01

## 2023-08-16 RX ADMIN — CEFAZOLIN SODIUM 2 G: 2 INJECTION, SOLUTION INTRAVENOUS at 07:52

## 2023-08-16 RX ADMIN — ACETAMINOPHEN 975 MG: 325 TABLET, FILM COATED ORAL at 05:03

## 2023-08-16 RX ADMIN — AMIODARONE HYDROCHLORIDE 1 MG/MIN: 50 INJECTION, SOLUTION INTRAVENOUS at 23:31

## 2023-08-16 RX ADMIN — OXYCODONE HYDROCHLORIDE 5 MG: 5 TABLET ORAL at 18:10

## 2023-08-16 RX ADMIN — OXYCODONE HYDROCHLORIDE 10 MG: 5 TABLET ORAL at 05:03

## 2023-08-16 RX ADMIN — OXYCODONE HYDROCHLORIDE 5 MG: 5 TABLET ORAL at 13:56

## 2023-08-16 RX ADMIN — SENNOSIDES AND DOCUSATE SODIUM 1 TABLET: 50; 8.6 TABLET ORAL at 21:37

## 2023-08-16 RX ADMIN — ASPIRIN 81 MG 162 MG: 81 TABLET ORAL at 08:03

## 2023-08-16 RX ADMIN — HEPARIN SODIUM 5000 UNITS: 5000 INJECTION, SOLUTION INTRAVENOUS; SUBCUTANEOUS at 21:41

## 2023-08-16 RX ADMIN — HYDROMORPHONE HYDROCHLORIDE 0.4 MG: 0.2 INJECTION, SOLUTION INTRAMUSCULAR; INTRAVENOUS; SUBCUTANEOUS at 02:53

## 2023-08-16 RX ADMIN — ACETAMINOPHEN 975 MG: 325 TABLET, FILM COATED ORAL at 21:37

## 2023-08-16 RX ADMIN — ACETAMINOPHEN 975 MG: 325 TABLET, FILM COATED ORAL at 13:55

## 2023-08-16 RX ADMIN — ROSUVASTATIN CALCIUM 10 MG: 10 TABLET, FILM COATED ORAL at 09:29

## 2023-08-16 RX ADMIN — HEPARIN SODIUM 5000 UNITS: 5000 INJECTION, SOLUTION INTRAVENOUS; SUBCUTANEOUS at 13:56

## 2023-08-16 RX ADMIN — Medication 40 MG: at 08:03

## 2023-08-16 RX ADMIN — SENNOSIDES AND DOCUSATE SODIUM 1 TABLET: 50; 8.6 TABLET ORAL at 08:03

## 2023-08-16 RX ADMIN — CEFAZOLIN SODIUM 2 G: 2 INJECTION, SOLUTION INTRAVENOUS at 14:05

## 2023-08-16 RX ADMIN — PHENYLEPHRINE HYDROCHLORIDE 1.3 MCG/KG/MIN: 10 INJECTION INTRAVENOUS at 00:44

## 2023-08-16 RX ADMIN — PROPOFOL 30 MCG/KG/MIN: 10 INJECTION, EMULSION INTRAVENOUS at 04:47

## 2023-08-16 RX ADMIN — PHENYLEPHRINE HYDROCHLORIDE 1 MCG/KG/MIN: 10 INJECTION INTRAVENOUS at 12:56

## 2023-08-16 RX ADMIN — PHENYLEPHRINE HYDROCHLORIDE 1 MCG/KG/MIN: 10 INJECTION INTRAVENOUS at 05:53

## 2023-08-16 RX ADMIN — OXYCODONE HYDROCHLORIDE 5 MG: 5 TABLET ORAL at 09:29

## 2023-08-16 RX ADMIN — POLYETHYLENE GLYCOL 3350 17 G: 17 POWDER, FOR SOLUTION ORAL at 08:03

## 2023-08-16 RX ADMIN — EPINEPHRINE 0.04 MCG/KG/MIN: 1 INJECTION INTRAMUSCULAR; INTRAVENOUS; SUBCUTANEOUS at 00:48

## 2023-08-16 RX ADMIN — AMIODARONE HYDROCHLORIDE 150 MG: 1.5 INJECTION, SOLUTION INTRAVENOUS at 23:21

## 2023-08-16 ASSESSMENT — ACTIVITIES OF DAILY LIVING (ADL)
ADLS_ACUITY_SCORE: 50
ADLS_ACUITY_SCORE: 48
ADLS_ACUITY_SCORE: 50
ADLS_ACUITY_SCORE: 50
ADLS_ACUITY_SCORE: 40
ADLS_ACUITY_SCORE: 40

## 2023-08-16 NOTE — PROGRESS NOTES
Right groin femoral sheath removed, hemostasis achieved after 15 minutes of manual pressure, thrombix covered with Tegaderm. No immediate complication observed.

## 2023-08-16 NOTE — OP NOTE
Date of Surgery:   Preop Diagnosis: CAD, embolized and un-retrievable right coronary stent + delivery system  Postop Diagnosis: same  Surgeon : Michael S. Mulvihill, M.D.  Assistant{s) : Dr. Saldana, Dr. Tovar, and Kaitlynn Valdovinos PA-C (performed [facility])  Anesthesia: GET  Procedures:  1. CABG x1 (vein graft to PDA)  2. Endoscopic Vein Yale  3. Aortotomy and removal of embolized / un-retrievable right coronary stent + delivery system  4. Occlusion of left atrial appendage (45 mm Atriclip)    Approach : Median sternotomy  Drains: 2x mediastinal 32 fr  Pacing Wires: ventricular    Findings of Procedure:  Right coronary stent with delivery system / balloon removed from ascending aorta / right coronary ostia    Estimated Blood Loss: 300 ml    Blood Products Administered : 1U PLT    Disposition : ICU  Specimens : none    GRAFT MATRIX:  Distal: Graft Source: Target Quality: Conduit Quality: Distal Suture: Proximal Suture:  PDA  RSVG  Good  Good  7-0  6-0    Procedure in Detail:  The risks, benefits, complications, treatment options, and expected outcomes were discussed with the patient. The possibilities of reaction to medication, pulmonary aspiration, perforation of viscus, bleeding, recurrent infection, the need for additional procedures, failure to diagnose a condition, and creating a complication requiring transfusion or operation were discussed with the patient. The patient concurred with the proposed plan, giving informed consent. The patient was taken to the operating room, identified as Hugo Weber and the procedure verified as Coronary Artery Bypass Grafting + removal of coronary stent / balloon. A Time Out was held and the above information confirmed.    Standard monitoring lines and Quach catheter were placed. General anesthesia was induced. The patient was prepped and draped in a sterile fashion. An endoscopic vein harvesting in the left leg was carried out by Kaitlynn FRAZIER.    A standard  median sternotomy was performed. Intravenous heparin was given at the appropriate dose to obtain anticoagulation sufficient for CPB. The pericardium was then opened and the pericardial well was created.     Concentric purse strings were placed and the aorta was cannulated with a 22fr EOPA cannula. After adequate de-airing this cannula was connected to the CPB circuit. A purse string was placed around the right atrial appendage and it was cannulated with a large dual stage venous cannula.     Cardiopulmonary bypass was then initiated.     The distal targets (PDA) were then evaluated and marked. The heart basket was then placed into the appropriate position. An antegrade vent/cardioplegia cannula was then placed. The aortic cross clamp was then applied and the heart was arrested with 1000 cc antegrade [place] cardioplegia.     The distal anastomosis to the PDA was completed first. After positioning the heart, the artery was opened and RSV was ananstomosed to the artery using 7-0 prolene in a running fashion. Prior to completing the anastomosis it was probed proximally and distally to ensure patency.    Next, the aorta was opened low to examine the right coronary ostia. A large length (about 6cm) of delivery system was plainly visible. The proximal aspect of the stent was identified at the coronary ostia. This was carefully removed from the R coronary ostia and the entire stent verified to be intact. The delivery system was assessed by the cardiology team to ensure that the entire system was accounted for.    The left atrial appendage was then occluded with a 45 mm Atriclip.    The proximal anastomosis was then completed in the standard fashion using 6-0 prolene in a running fashion. After adequate deairing maneuvers, the cross clamp was removed and the heart was reperfused.    Examination of all surgical sites revealed good hemostasis. Ventricular epicardial pacing wires were then placed. The patient was fully  ventillated and successfully weaned off of CPB. After a test dose of protamine, the patient was fully decannulated. The heparin was fully reversed with protamine. Again, examination of all surgical sites were evaluated for hemostasis which was good.    Post-bypass TAE revealed stably reduced RV/LV function and no new wall motion abnormalities.  Mediastinal drains were placed. The sternum was closed using stainless steel wires. The subcutaneous tissues were closed in layers followed by a subcuticular stitch. A sterile dressing was applied over the incision.    At the end of the operation, all sponge, instruments, and needle counts were correct.    The patient tolerated the procedure without complication and was transported to the CTICU in stable condition.    I was present and scrubbed for the entire procedure.    Michael S Mulvihill, M.D.  Cardiothoracic Surgery  8/15/2023

## 2023-08-16 NOTE — ANESTHESIA PROCEDURE NOTES
Arterial Line Procedure Note    Pre-Procedure   Staff -        Anesthesiologist:  Yris Rod MD       Performed By: anesthesiologist       Location: OR       Pre-Anesthestic Checklist: patient identified, IV checked, risks and benefits discussed, informed consent, monitors and equipment checked and pre-op evaluation  Timeout:       Correct Patient: Yes        Correct Procedure: Yes        Correct Site: Yes        Correct Position: Yes   Line Placement:   This line was placed Post Induction  Procedure   Procedure: arterial line       Laterality: left       Insertion Site: radial.  Sterile Prep        All elements of maximal sterile barrier technique followed       Patient Prep/Sterile Barriers: hand hygiene, sterile gloves, hat, mask, draped, sterile gown, draped       Skin prep: Chloraprep  Insertion/Injection        Technique: Seldinger Technique        Catheter Type/Size: 20 gauge, 1.75 in/4.5 cm quick cath (integral wire)  Narrative        Arterial waveform: Yes        IBP within 10% of NIBP: Yes

## 2023-08-16 NOTE — PROGRESS NOTES
[facility]  Cardiovascular and Thoracic Surgery Daily Note      Assessment and Plan    POD # 1 s/p CABG x1 (vein graft to PDA), endoscopic vein harvest, aortotomy and removal of embolized/ un-retrievable right coronary stent + delivery system, occlusion of left atrial appendage (45 mm Atriclip) on 8/15/23 with Dr. Michael Mulvihill.    - CVS: Pre-op TTE with LVEF 35%, 1-2+ MR, moderate aortic stenosis, mildy dilated ascending aorta at 4.3 cm. HD stable overnight requiring epi/norepi/phenyl gtts to maintain HD parameters. CI this AM 2.7 with SVR ~700. Titrate pressors as able, give fluid as needed (CVP ~4). Has history of afib on apixaban, in controlled afib.  mg daily, start statin. PTA meds on hold: Entresto, apixaban, jardiance, toprol. CTs in place with appropriate output, leave in to suction, cap TPWs.    - Resp: Remains intubated, progress as able and then encourage aggressive IS, pulmonary toilet. Current vent settings:  Vent Mode: CMV/AC  (Continuous Mandatory Ventilation/ Assist Control)  FiO2 (%): 40 %  Resp Rate (Set): 20 breaths/min  Tidal Volume (Set, mL): 550 mL  PEEP (cm H2O): 5 cmH2O  Resp: 20     - Neuro: Neuros intact, lighten sedation as tolerated. Pain appears well controlled on current regimen    - Renal: No history of significant renal disease, BL creat ~0.7. Cr stable. Trend BMP. Fluid as above.  Recent Labs   Lab 08/16/23  0639 08/15/23  2043 08/15/23  1757   CR 0.71 0.66* 0.56*       - GI: No BM or flatus, continue bowel regimen. Abd xray unremarkable.    - : Leave patton in place while intubated, sedated. Look to resume PTA flomax once stable off of pressors.    - Endo: Insulin infusion to continue for full 48h.   Hemoglobin A1C   Date Value Ref Range Status   02/21/2023 5.4 0.0 - 5.6 % Final     Comment:     Normal <5.7%   Prediabetes 5.7-6.4%    Diabetes 6.5% or higher     Note: Adopted from ADA consensus guidelines.   11/19/2020 5.8 (H) 0 - 5.6 % Final      Comment:     Normal <5.7% Prediabetes 5.7-6.4%  Diabetes 6.5% or higher - adopted from ADA   consensus guidelines.          - FEN: Replace electrolytes as needed. NPO while intubated, then ADAT once extubated.    - ID: Afebrile . WBC 18.3. Periop abx completing. Trend CBC, fever curve.   Recent Labs   Lab 08/16/23  0639 08/16/23  0024 08/15/23  2043   WBC 13.7* 18.3* 19.0*       - Heme: Acute blood loss anemia and thrombocytopenia due to surgery. Hgb and PLT stable. Trend CBC, transfuse PRN. Did receive plavix load day of surgery, received one dose of platelets.  Recent Labs   Lab 08/16/23  0639 08/16/23  0024 08/15/23  2043   HGB 8.2* 8.5* 9.1*    204 197       - Proph: SCD, subcutaneous heparin, PPI    - Dispo: Remain in ICU, awake and extubate as able, titrate pressors      Interval History  No acute events overnight. Remains intubated on pressors this AM. Is able to awaken and follow commands.      Medications   acetaminophen  975 mg Oral Q8H    aspirin  162 mg Oral or NG Tube Daily    ceFAZolin  2 g Intravenous Q8H    heparin ANTICOAGULANT  5,000 Units Subcutaneous Q8H    pantoprazole  40 mg Oral or NG Tube Daily    Or    pantoprazole  40 mg Oral Daily    polyethylene glycol  17 g Oral Daily    senna-docusate  1 tablet Oral BID    sodium chloride (PF)  3 mL Intracatheter Q8H     [START ON 8/18/2023] acetaminophen, bisacodyl, calcium gluconate, calcium gluconate, calcium gluconate, glucose **OR** dextrose **OR** glucagon, HOLD MEDICATION, HOLD MEDICATION, hydrALAZINE, HYDROmorphone **OR** HYDROmorphone, lidocaine 4%, lidocaine 4%, lidocaine (buffered or not buffered), lidocaine (buffered or not buffered), magnesium hydroxide, propofol **AND** propofol **AND** CK total **AND** Triglycerides **AND** - MEDICATION INSTRUCTIONS - **AND** Notify Physician, naloxone **OR** naloxone **OR** naloxone **OR** naloxone, norepinephrine, ondansetron **OR** ondansetron, oxyCODONE **OR** oxyCODONE, phenylephrine,  prochlorperazine **OR** prochlorperazine, BETA BLOCKER NOT PRESCRIBED, sodium chloride (PF)      Physical Exam  Vitals were reviewed  Blood pressure 113/65, pulse 65, temperature 99.7  F (37.6  C), temperature source Bladder, resp. rate 20, height 1.829 m (6'), weight 138.7 kg (305 lb 12.5 oz), SpO2 96 %.  Rhythm: Controlled afib    Lungs: diminished bases, synchronous with ventilator    Cardiovascular: Irregular, no m/r/g    Abdomen: soft, NT, ND, rare BS    Extremeties: warm, no LE edema    Incision: CDI    CT: serosang output 161 mL, no air leak    Weight:   Vitals:    08/15/23 0656 08/16/23 0100   Weight: 133.8 kg (295 lb) 138.7 kg (305 lb 12.5 oz)         Data  Recent Labs   Lab 08/16/23  0755 08/16/23  0639 08/16/23  0635 08/16/23  0100 08/16/23  0024 08/15/23  2127 08/15/23  2043 08/15/23  1757 08/15/23  0735 08/15/23  0735   WBC  --  13.7*  --   --  18.3*  --  19.0* 14.9*  --  4.8   HGB  --  8.2*  --   --  8.5*  --  9.1* 8.3*  --  12.7*   MCV  --  91  --   --  93  --  94 92  --  92   PLT  --  181  --   --  204  --  197 148*  --  177   INR  --   --   --   --   --   --  1.47* 1.79*  --  1.14   NA  --  141  --   --   --   --  141 141  --  141   POTASSIUM  --  3.8  --   --   --   --  3.7 4.3  --  4.0   CHLORIDE  --  107  --   --   --   --  105 106  --  105   CO2  --  22  --   --   --   --  18* 23  --  27   BUN  --  16.4  --   --   --   --  15.8 14.6  --  18.2   CR  --  0.71  --   --   --   --  0.66* 0.56*  --  0.69   ANIONGAP  --  12  --   --   --   --  18* 12  --  9   BEBO  --  8.1*  --   --   --   --  8.2* 8.0*  --  9.0   * 129* 117*   < >  --    < > 213* 155*  --  90   ALBUMIN  --  3.5  --   --   --   --  3.2* 2.8*   < >  --    PROTTOTAL  --  5.0*  --   --   --   --  4.8* 4.5*   < >  --    BILITOTAL  --  0.5  --   --   --   --  0.7 0.4   < >  --    ALKPHOS  --  54  --   --   --   --  66 62   < >  --    ALT  --  11  --   --   --   --  13 12   < >  --    AST  --  22  --   --   --   --  24 21   < >  --      < > = values in this interval not displayed.       Imaging:  Recent Results (from the past 24 hour(s))   Cardiac Catheterization    Narrative      Dist Cx lesion is 40% stenosed.    Prox RCA lesion is 40% stenosed.    Dist RCA lesion is 75% stenosed.    Pressure wire/FFR was performed on the lesion.    Hemodynamically significant stenosis of the distal right coronary artery.  Fracture of stent delivery system resulting in retention of an undeployed   drug-eluting stent and stent balloon in the ostial and proximal RCA.  Unsuccessful attempt to snare and retrieve the stent and balloon from the   RCA.   XR Chest Port 1 View    Narrative    EXAM: XR CHEST PORT 1 VIEW  LOCATION: [facility]  DATE: 8/15/2023    INDICATION: Post Op CVTS Surgery  COMPARISON: 02/19/2020      Impression    IMPRESSION: Endotracheal tube terminates 3.3 cm above the hannah. Right internal jugular approach central venous catheter tip in the mid SVC. Enteric decompression tube terminates in the distal esophagus. Recommend advancing at least 10 cm. Median   sternotomy wires and left atrial appendage clip also noted. Partially imaged left shoulder prosthesis.    Low lung volumes with left basilar airspace opacities, probably atelectasis. No definite pneumothorax or pleural effusion.    Normal cardiomediastinal silhouette.   XR Abdomen Port 1 View    Narrative    EXAM: XR ABDOMEN PORT 1 VIEW  LOCATION: [facility]  DATE: 8/15/2023    INDICATION: Confirm OG placement  COMPARISON: Chest x-ray at 2116 hours.      Impression    IMPRESSION: The enteric tube tip is visualized in the distal esophagus. This should be advanced by at least 10 cm for better positioning.    XR Chest Port 1 View    Narrative    EXAM: XR CHEST PORT 1 VIEW  LOCATION: [facility]  DATE: 8/16/2023    INDICATION: Post Op CVTS Surgery: 08/15/2023.      Impression    IMPRESSION: Endotracheal tube in satisfactory  position terminating 3.5 cm above the hannah. Right internal jugular venous catheter tip in the SVC. Enteric tube passes below the diaphragm and the inferior margin of the radiograph. Poststernotomy. Stable   cardiomegaly. Normal pulmonary vascularity. Low lung volumes. Mild left basilar atelectasis and possible small left pleural effusion. No pneumothorax.   XR Abdomen Port 1 View    Narrative    EXAM: XR ABDOMEN PORTABLE 1 VIEW  LOCATION: [facility]  DATE: 08/16/2023    INDICATION: NG placement. Verify tip location.  COMPARISON: Portable abdomen single view 08/15/2023 at 2119 hours.      Impression    IMPRESSION: Enteric tube has been advanced, tip in the stomach, satisfactory positioning. Postoperative changes in the left upper abdomen. Sternotomy and CABG. Left atrial appendage clip. Mediastinal drains. Degenerative spine.           Patient seen and discussed with Dr. Mulvihill Leah Jensen, APRN, ACNPC-AG, CCRN  Nurse Practitioner  Cardiothoracic Surgery  Pager: [phone]

## 2023-08-16 NOTE — PROGRESS NOTES
The patient arrived to the ICU from the OR shortly after 7:30 PM. At that time, he was on Levophed, phenylephrine, epi and propofol. He initially appeared to be hemodynamically stable, but shortly after being settled and placed on monitor, his blood pressure dropped rapidly. This event was also accompanied by a drop in heart rate. The systolic pressure noted on the transduced arterial line dropped below 50. This correlated with the pressure reading using the blood pressure cuff, which checked concurrently. To achieve stability, all 3 hemodynamic drips had to be increased significantly. Additional albumin and LR was also given. Chest tube output remained stable and labs showed that his hemoglobin was stable. Lactic acid was elevated, but trended down through out the night. Hemodynamics improved slightly over night and vasoactive gtts were weaned appropriately. He continues to have frequent ectopy and variable heart rate. While still intubated and sedated, he does open his eyes to voice and does move all 4 extremities, but not to command. LS are clear and he is tolerating current vent settings. Family came to the bedside at aprox. 9pm and were updated by both myself and the ICU MD. His daughter was updated again over the phone this morning.

## 2023-08-16 NOTE — PROGRESS NOTES
Critical Care Progress Note      08/15/2023    Name: Hugo Weber MRN#: [id number]   Age: 78 year old YOB: 1945     \[facility] [place] [facility]\"" Day# 0  ICU DAY #    MV DAY #             Problem List:   Principal Problem:    Cardiomyopathy, unspecified type (H)  Active Problems:    Cardiomyopathy (H)           Summary/Hospital Course:     Hugo Weber is a 78 year old male with a past medical history of chronic systolic heart failure (EF 35%), moderate aortic stenosis, rate controlled atrial fibrillation, hypertension, prostate cancer who presented to [facility] 08/15 for out-patient coronary angiogram to investigate underlying etiology of decreased EF. Coronary angiogram revealed distal RCA stenosis; an attempt was made to stent the lesion, however the stent was unable to be delivered and the stent and stent balloon became dislodged in the RCA and unable to be retrieved percutaneously.     -CV surgery had taken patient urgently to OR and done CABG*1        Assessment and plan :     Hugo Weber IS a 78 year old male admitted on 8/15/2023 for   Post CABG care.   I have personally reviewed the daily labs, imaging studies, cultures and discussed the case with referring physician and consulting physicians.     My assessment and plan by system for this patient is as follows:    Neurology/Psychiatry:   - On propofol drip     Cardiovascular:   -RCA stenosis post cardiac cath attempt on 8/15 needed emergency Cv surgery after stent got lodged.  -Post CABg *1 saphaneous venin graft.  -EF is 30-35%   -on multiple pressers.  -Getting resucitated.     Atrial fibrillation:  -On Apixaban. Being held for now.     Pulmonary/Ventilator Management:   - On ventilator.  -Bedside ultrasound showed pulmonary edema.  - Will keep him intuabted tonight.     GI and Nutrition :   -Npo for now.   -Ppi     Renal/Fluids/Electrolytes:   -Monitor    Infectious Disease:   -Nad    Endocrine:   -Diabetes  - ISS    Hematology/Oncology:   -Holding  home apixaban.  -Had received plavix load before cath.   - Mreceived one unit of platelets.  -Monitor for now.       IV/Access:   1. Venous access -  Right internal jugular   2. Arterial access -  Left radial and right femoral  3.  Plan  - central access required and necessary      ICU Prophylaxis:   1. DVT: mechanical  2. Stress Ulcer: PPI/H2 blocker  3. Restraints: Nonviolent soft two point restraints required and necessary for patient safety and continued cares and good effect as patient continues to pull at necessary lines, tubes despite education and distraction. Will readdress daily.   4. Feeding - NPO  5. Family Update:Yes  6. Disposition - To stay in Icu              Judge Medications:      acetaminophen  975 mg Oral Q8H    [START ON 8/16/2023] aspirin  162 mg Oral or NG Tube Daily    ceFAZolin  2 g Intravenous Q8H    chlorhexidine  10 mL Swish & Spit Once    [START ON 8/16/2023] heparin ANTICOAGULANT  5,000 Units Subcutaneous Q8H    pantoprazole  40 mg Oral or NG Tube Daily    Or    pantoprazole  40 mg Oral Daily    [START ON 8/16/2023] polyethylene glycol  17 g Oral Daily    senna-docusate  1 tablet Oral BID    sodium chloride (PF)  3 mL Intracatheter Q8H      dexmedeTOMIDine Stopped (08/15/23 2154)    EPINEPHrine 0.07 mcg/kg/min (08/15/23 2155)    insulin regular      insulin regular      propofol 30 mcg/kg/min (08/15/23 2157)    And    - MEDICATION INSTRUCTIONS -      norepinephrine 0.1 mcg/kg/min (08/15/23 2123)    phenylephrine 0.13 mcg/kg/min (08/15/23 2156)    BETA BLOCKER NOT PRESCRIBED                 Physical Examination:   Temp:  [96.1  F (35.6  C)-97.6  F (36.4  C)] 96.3  F (35.7  C)  Pulse:  [] 84  Resp:  [9-24] 19  BP: ()/() 111/63  MAP:  [60 mmHg-123 mmHg] 66 mmHg  Arterial Line BP: ()/(43-69) 105/51  FiO2 (%):  [60 %] 60 %  SpO2:  [85 %-100 %] 99 %    Intake/Output Summary (Last 24 hours) at 8/15/2023 2203  Last data filed at 8/15/2023 2000  Gross per 24 hour   Intake  4068 ml   Output 997 ml   Net 3071 ml     Wt Readings from Last 4 Encounters:   08/15/23 133.8 kg (295 lb)   08/14/23 134.3 kg (296 lb)   07/19/23 133.6 kg (294 lb 9.6 oz)   03/07/23 135.2 kg (298 lb)     Arterial Line BP: ()/(43-69) 105/51  MAP:  [60 mmHg-123 mmHg] 66 mmHg  BP - Mean:  [] 76  CVP:  [7 mmHg-64 mmHg] 8 mmHg  FiO2 (%): 60 %  Resp Rate (Set): 20 breaths/min  Tidal Volume (Set, mL): 550 mL  PEEP (cm H2O): 5 cmH2O  Resp: 19    Recent Labs   Lab 08/15/23  2045   PH 7.25*   PCO2 43   PO2 171*   HCO3 19*   O2PER 60       GEN: no acute distress   HEENT: head ncat, sclera anicteric, OP patent, trachea midline   PULM: unlabored synchronous with vent, clear anteriorly    CV/COR: RRR S1S2 no gallop,  No rub, no murmur  ABD: soft nontender, hypoactive bowel sounds, no mass  EXT:  Edema   warm  NEURO: grossly intact  SKIN: no obvious rash  LINES: clean, dry intact         Data:   All data and imaging reviewed     ROUTINE ICU LABS (Last four results)  CMP  Recent Labs   Lab 08/15/23  2127 08/15/23  2043 08/15/23  1757 08/15/23  0735   NA  --  141 141 141   POTASSIUM  --  3.7 4.3 4.0   CHLORIDE  --  105 106 105   CO2  --  18* 23 27   ANIONGAP  --  18* 12 9   * 213* 155* 90   BUN  --  15.8 14.6 18.2   CR  --  0.66* 0.56* 0.69   GFRESTIMATED  --  >90 >90 >90   BEBO  --  8.2* 8.0* 9.0   MAG  --  2.1  --   --    PHOS  --  4.2  --   --    PROTTOTAL  --  4.8* 4.5*  --    ALBUMIN  --  3.2* 2.8*  --    BILITOTAL  --  0.7 0.4  --    ALKPHOS  --  66 62  --    AST  --  24 21  --    ALT  --  13 12  --      CBC  Recent Labs   Lab 08/15/23  2043 08/15/23  1757 08/15/23  0735   WBC 19.0* 14.9* 4.8   RBC 3.08* 2.81* 4.33*   HGB 9.1* 8.3* 12.7*   HCT 28.8* 25.9* 40.0   MCV 94 92 92   MCH 29.5 29.5 29.3   MCHC 31.6 32.0 31.8   RDW 13.8 13.8 14.0    148* 177     INR  Recent Labs   Lab 08/15/23  2043 08/15/23  1757 08/15/23  0735   INR 1.47* 1.79* 1.14     Arterial Blood Gas  Recent Labs   Lab 08/15/23  2045    PH 7.25*   PCO2 43   PO2 171*   HCO3 19*   O2PER 60       All cultures:  No results for input(s): CULT in the last 168 hours.  Recent Results (from the past 24 hour(s))   Cardiac Catheterization    Narrative      Dist Cx lesion is 40% stenosed.    Prox RCA lesion is 40% stenosed.    Dist RCA lesion is 75% stenosed.    Pressure wire/FFR was performed on the lesion.    Hemodynamically significant stenosis of the distal right coronary artery.  Fracture of stent delivery system resulting in retention of an undeployed   drug-eluting stent and stent balloon in the ostial and proximal RCA.  Unsuccessful attempt to snare and retrieve the stent and balloon from the   RCA.   XR Chest Port 1 View    Narrative    EXAM: XR CHEST PORT 1 VIEW  LOCATION: [facility]  DATE: 8/15/2023    INDICATION: Post Op CVTS Surgery  COMPARISON: 02/19/2020      Impression    IMPRESSION: Endotracheal tube terminates 3.3 cm above the hannah. Right internal jugular approach central venous catheter tip in the mid SVC. Enteric decompression tube terminates in the distal esophagus. Recommend advancing at least 10 cm. Median   sternotomy wires and left atrial appendage clip also noted. Partially imaged left shoulder prosthesis.    Low lung volumes with left basilar airspace opacities, probably atelectasis. No definite pneumothorax or pleural effusion.    Normal cardiomediastinal silhouette.   XR Abdomen Port 1 View    Narrative    EXAM: XR ABDOMEN PORT 1 VIEW  LOCATION: [facility]  DATE: 8/15/2023    INDICATION: Confirm OG placement  COMPARISON: Chest x-ray at 2116 hours.      Impression    IMPRESSION: The enteric tube tip is visualized in the distal esophagus. This should be advanced by at least 10 cm for better positioning.          [facility]  CRITICAL CARE STAFF NOTE    I am managing these acute and ongoing critical issues resulting in critical condition that impairs one or more vital  organ systems, incur a high probability of imminent or life-threatening deterioration in the patient's condition and providing frequent personal assessment and manipulation of medications and life support equipment.     1.  Hypoxemic respiratroy failure   2. Cardiogenic shock   3. RCA stenosis    ICU Interventions: ventilator management, parenteral medications necessitating continuous monitoring including vasoactive medications, medication for heart rhythm control, insulin infusion, and/or sedative/opiates , and interpretation of lab values, cardiac output, cxr, pulse oximetry, blood gases, and/or information/data stored in computers     We have discussed the patient in detail and I agree with the findings, assessment, and plan as documented when this note was cosigned on this day. The plan was formulated in conjunction with pharmacy, ICU nurses, and respiratory therapist. I have evaluated all laboratory values and imaging studies for the past 24 hours. I have reviewed all the consults that have been ordered and are active for this patient.      Critical Care Time: 30 min.  I spent this time (excluding procedures) personally providing and directing critical care services at the bedside and on the critical care unit.      Teofilo SAXENA MPH   of Medicine  Pager: [phone]     Clinically Significant Risk Factors Present on Admission              # Hypoalbuminemia: Lowest albumin = 2.8 g/dL at 8/15/2023  5:57 PM, will monitor as appropriate  # Drug Induced Coagulation Defect: home medication list includes an anticoagulant medication    # Hypertension: Noted on problem list  # Chronic heart failure with reduced ejection fraction: last echo with EF <40%  # Circulatory Shock: currently requiring pressors for blood pressure support     # Severe Obesity: Estimated body mass index is 40.01 kg/m  as calculated from the following:    Height as of this encounter: 1.829 m (6').    Weight as of this  encounter: 133.8 kg (295 lb).           # Anemia: based on hgb <11

## 2023-08-16 NOTE — PROGRESS NOTES
Major Events 9445-3455  Femoral sheath removed, site CDI. CMS intact.   Art line placed LUE.   Levo & Epi weaned off  PS trial  Extubated 1550 to 6L oxymask    Alert, oriented x4. Pain controlled with PRN oxycodone. C/o chest soreness and sore throat.   Tele SR/PAC&PVCs. Occasional runs of self-limiting afib. MAP goal >65. Haris infusing. Doppler pulses.   4L oxymask. Productive cough. LS dim/clear, pt taking shallow breaths.  Tolerating sips of water and ice chips. No flatus yet. BS hypoactive.   Quach with adequate UOP.   Incision CDI, ecchymosis. CT patent.     Family at bedside, supportive of patient.

## 2023-08-16 NOTE — PROGRESS NOTES
[facility] ICU RESPIRATORY NOTE        Date of Admission: 8/15/2023    Date of Intubation (most recent): 8/15/23    Reason for Mechanical Ventilation: Airway protection    Number of Days on Mechanical Ventilation: 2    Met Criteria for Spontaneous Breathing Trial: No    Reason for No Spontaneous Breathing Trial:Per MD    Significant Events Today: Pt desat on the vent in 70s, ventilated manually with 15 lpm with SpO2 92%    ABG Results:   Recent Labs   Lab 08/16/23  0025 08/15/23  2045   PH 7.30* 7.25*   PCO2 40 43   PO2 165* 171*   HCO3 20* 19*   O2PER 40 60         Current Vent Settings: Vent Mode: -- (PRVC)  FiO2 (%): 60 %  Resp Rate (Set): 20 breaths/min  Tidal Volume (Set, mL): 550 mL  PEEP (cm H2O): 5 cmH2O  Resp:   (!) 0      Skin Assessment: intact    Plan: conyinue full vent support and wean as tolerated    RT Jayce on 8/16/2023 at 5:11 AM

## 2023-08-16 NOTE — PROGRESS NOTES
Critical Care  Note      08/16/2023    Name: Hugo Weber MRN#: [id number]   Age: 78 year old YOB: 1945     [facility]    [facility] DAY #             Problem List:   Principal Problem:    Cardiomyopathy, unspecified type (H)  Active Problems:    Cardiomyopathy (H)           Summary/Hospital Course:     Hugo Weber is a 78 year old male with a past medical history of chronic systolic heart failure (EF 35%), moderate aortic stenosis, rate controlled atrial fibrillation, hypertension, prostate cancer who presented to [facility] 08/15 for out-patient coronary angiogram to investigate underlying etiology of decreased EF. Coronary angiogram revealed distal RCA stenosis; an attempt was made to stent the lesion, however the stent was unable to be delivered and the stent and stent balloon became dislodged in the RCA and unable to be retrieved percutaneously.      - CV surgery had taken patient urgently to OR     - Procedures:  1. CABG x1 (vein graft to PDA)  2. Endoscopic Vein [place]  3. Aortotomy and removal of embolized / un-retrievable right coronary stent + delivery system  4. Occlusion of left atrial appendage (45 mm Atriclip)      Post op admission for respiratory and circulatory support       Assessment and plan :     Hugo Weber IS a 78 year old male admitted on 8/15/2023 for PCI .   I have personally reviewed the daily labs, imaging studies, cultures and discussed the case with referring physician and consulting physicians.     My assessment and plan by system for this patient is as follows:    Neurology/Psychiatry:   1. Sedated on propofol , to wean off       Cardiovascular:   1.Hemodynamics - on minimal doses epinephrine , norepinephrine , phenylephrine , to wean off as tolerated       Pulmonary/Ventilator Management:   1. Oxygenation/ventilation/mechanics intubated on ventilator , to wean off as tolerated       GI and Nutrition :   1.NPO , OGT   2. PPI      Renal/Fluids/Electrolytes:   1. Foleys catheter in place adequate output   2. Creatinine 0.71   3. Electrolytes normal   4. ABGs ok     Infectious Disease:   No concerns for now     Endocrine:   1. Insulin sliding scale PRN Stress induced hyperglycemia.    Hematology/Oncology:   1. WBC 13.7   2. Hgb 8.2   3. Plts 181     IV/Access:   1. Venous access - right IJV CVC , periphral line   2. Arterial access - right arterial sheath (to be removed) , left arterial line inserted   Plan  - central access required and necessary      ICU Prophylaxis:   1. DVT: Hep Subq/ LMWH/mechanical  2. VAP: HOB 30 degrees, chlorhexidine rinse  3. Stress Ulcer: PPI/H2 blocker  4. Restraints: Nonviolent soft two point restraints required and necessary for patient safety and continued cares and good effect as patient continues to pull at necessary lines, tubes despite education and distraction. Will readdress daily.   5. Wound care  -   6. Feeding - NPO   7. Family Update:non bedside  8. Disposition - ICU    Clinically Significant Risk Factors Present on Admission              # Hypoalbuminemia: Lowest albumin = 2.8 g/dL at 8/15/2023  5:57 PM, will monitor as appropriate  # Drug Induced Coagulation Defect: home medication list includes an anticoagulant medication    # Hypertension: Noted on problem list  # Chronic heart failure with reduced ejection fraction: last echo with EF <40%  # Circulatory Shock: currently requiring pressors for blood pressure support     # Severe Obesity: Estimated body mass index is 41.47 kg/m  as calculated from the following:    Height as of this encounter: 1.829 m (6').    Weight as of this encounter: 138.7 kg (305 lb 12.5 oz).        # History of CABG: noted on surgical history    # Anemia: based on hgb <11                        Key Medications:      acetaminophen  975 mg Oral Q8H    aspirin  162 mg Oral or NG Tube Daily    heparin ANTICOAGULANT  5,000 Units Subcutaneous Q8H    pantoprazole  40 mg Oral or  NG Tube Daily    Or    pantoprazole  40 mg Oral Daily    polyethylene glycol  17 g Oral Daily    rosuvastatin  10 mg Oral Daily    senna-docusate  1 tablet Oral BID    sodium chloride (PF)  3 mL Intracatheter Q8H      dexmedeTOMIDine Stopped (08/15/23 2154)    EPINEPHrine 0.03 mcg/kg/min (08/16/23 0730)    insulin regular 1.5 Units/hr (08/16/23 1424)    propofol Stopped (08/16/23 1518)    And    - MEDICATION INSTRUCTIONS -      norepinephrine Stopped (08/16/23 1258)    phenylephrine 0.6 mcg/kg/min (08/16/23 1512)    BETA BLOCKER NOT PRESCRIBED      sodium chloride 50 mL/hr at 08/16/23 1101              Physical Examination:   Temp:  [96.1  F (35.6  C)-100.2  F (37.9  C)] 100.2  F (37.9  C)  Pulse:  [] 73  Resp:  [11-24] 13  BP: ()/() 101/56  MAP:  [56 mmHg-237 mmHg] 76 mmHg  Arterial Line BP: ()/(38-69) 129/54  FiO2 (%):  [40 %-60 %] 40 %  SpO2:  [85 %-100 %] 99 %    Intake/Output Summary (Last 24 hours) at 8/16/2023 1540  Last data filed at 8/16/2023 1400  Gross per 24 hour   Intake 7577.4 ml   Output 2299 ml   Net 5278.4 ml     Wt Readings from Last 4 Encounters:   08/16/23 138.7 kg (305 lb 12.5 oz)   08/14/23 134.3 kg (296 lb)   07/19/23 133.6 kg (294 lb 9.6 oz)   03/07/23 135.2 kg (298 lb)     Arterial Line BP: ()/(38-69) 129/54  MAP:  [56 mmHg-237 mmHg] 76 mmHg  BP - Mean:  [] 76  CVP:  [2 mmHg-21 mmHg] 10 mmHg  Vent Mode: CPAP/PS  (Continuous positive airway pressure with Pressure Support)  FiO2 (%): 40 %  Resp Rate (Set): 20 breaths/min  Tidal Volume (Set, mL): 550 mL  PEEP (cm H2O): 5 cmH2O  Pressure Support (cm H2O): 5 cmH2O  Resp: 13    Recent Labs   Lab 08/16/23  1527 08/16/23  0641 08/16/23  0025 08/15/23  2045   PH 7.40 7.43  7.43 7.30* 7.25*   PCO2 42 35  35 40 43   PO2 121* 139*  139* 165* 171*   HCO3 26 23  23 20* 19*   O2PER 40 40  40 40 60       GEN: no acute distress   HEENT: head ncat, sclera anicteric, OP patent, trachea midline   PULM: unlabored  synchronous with vent, clear anteriorly    CV/COR: RRR S1S2   ABD: soft nontender, hypoactive bowel sounds, no mass  EXT:  warm and well perfused x4  NEURO: PERRL, no obvious deficits  SKIN: no obvious rash  LINES: clean, dry intact         Data:   All data and imaging reviewed     ROUTINE ICU LABS (Last four results)  CMP  Recent Labs   Lab 08/16/23  1423 08/16/23  0956 08/16/23  0755 08/16/23  0639 08/15/23  2127 08/15/23  2043 08/15/23  1757 08/15/23  0735   NA  --   --   --  141  --  141 141 141   POTASSIUM  --   --   --  3.8  --  3.7 4.3 4.0   CHLORIDE  --   --   --  107  --  105 106 105   CO2  --   --   --  22  --  18* 23 27   ANIONGAP  --   --   --  12  --  18* 12 9   * 122* 117* 129*   < > 213* 155* 90   BUN  --   --   --  16.4  --  15.8 14.6 18.2   CR  --   --   --  0.71  --  0.66* 0.56* 0.69   GFRESTIMATED  --   --   --  >90  --  >90 >90 >90   BEBO  --   --   --  8.1*  --  8.2* 8.0* 9.0   MAG  --   --   --  1.8  --  2.1  --   --    PHOS  --   --   --  3.6  --  4.2  --   --    PROTTOTAL  --   --   --  5.0*  --  4.8* 4.5*  --    ALBUMIN  --   --   --  3.5  --  3.2* 2.8*  --    BILITOTAL  --   --   --  0.5  --  0.7 0.4  --    ALKPHOS  --   --   --  54  --  66 62  --    AST  --   --   --  22  --  24 21  --    ALT  --   --   --  11  --  13 12  --     < > = values in this interval not displayed.     CBC  Recent Labs   Lab 08/16/23  0639 08/16/23  0024 08/15/23  2043 08/15/23  1757   WBC 13.7* 18.3* 19.0* 14.9*   RBC 2.75* 2.81* 3.08* 2.81*   HGB 8.2* 8.5* 9.1* 8.3*   HCT 25.0* 26.1* 28.8* 25.9*   MCV 91 93 94 92   MCH 29.8 30.2 29.5 29.5   MCHC 32.8 32.6 31.6 32.0   RDW 14.0 13.7 13.8 13.8    204 197 148*     INR  Recent Labs   Lab 08/15/23  2043 08/15/23  1757 08/15/23  0735   INR 1.47* 1.79* 1.14     Arterial Blood Gas  Recent Labs   Lab 08/16/23  1527 08/16/23  0641 08/16/23  0025 08/15/23  2045   PH 7.40 7.43  7.43 7.30* 7.25*   PCO2 42 35  35 40 43   PO2 121* 139*  139* 165* 171*   HCO3 26  23  23 20* 19*   O2PER 40 40  40 40 60       All cultures:  No results for input(s): CULT in the last 168 hours.  Recent Results (from the past 24 hour(s))   XR Chest Port 1 View    Narrative    EXAM: XR CHEST PORT 1 VIEW  LOCATION: [facility]  DATE: 8/15/2023    INDICATION: Post Op CVTS Surgery  COMPARISON: 02/19/2020      Impression    IMPRESSION: Endotracheal tube terminates 3.3 cm above the hannah. Right internal jugular approach central venous catheter tip in the mid SVC. Enteric decompression tube terminates in the distal esophagus. Recommend advancing at least 10 cm. Median   sternotomy wires and left atrial appendage clip also noted. Partially imaged left shoulder prosthesis.    Low lung volumes with left basilar airspace opacities, probably atelectasis. No definite pneumothorax or pleural effusion.    Normal cardiomediastinal silhouette.   XR Abdomen Port 1 View    Narrative    EXAM: XR ABDOMEN PORT 1 VIEW  LOCATION: [facility]  DATE: 8/15/2023    INDICATION: Confirm OG placement  COMPARISON: Chest x-ray at 2116 hours.      Impression    IMPRESSION: The enteric tube tip is visualized in the distal esophagus. This should be advanced by at least 10 cm for better positioning.    XR Chest Port 1 View    Narrative    EXAM: XR CHEST PORT 1 VIEW  LOCATION: [facility]  DATE: 8/16/2023    INDICATION: Post Op CVTS Surgery: 08/15/2023.      Impression    IMPRESSION: Endotracheal tube in satisfactory position terminating 3.5 cm above the hannah. Right internal jugular venous catheter tip in the SVC. Enteric tube passes below the diaphragm and the inferior margin of the radiograph. Poststernotomy. Stable   cardiomegaly. Normal pulmonary vascularity. Low lung volumes. Mild left basilar atelectasis and possible small left pleural effusion. No pneumothorax.   XR Abdomen Port 1 View    Narrative    EXAM: XR ABDOMEN PORTABLE 1 VIEW  LOCATION:   [facility]  DATE: 08/16/2023    INDICATION: NG placement. Verify tip location.  COMPARISON: Portable abdomen single view 08/15/2023 at 2119 hours.      Impression    IMPRESSION: Enteric tube has been advanced, tip in the stomach, satisfactory positioning. Postoperative changes in the left upper abdomen. Sternotomy and CABG. Left atrial appendage clip. Mediastinal drains. Degenerative spine.           Billing: This patient is critically ill: Yes. Total critical care time today 40 min, excluding procedures.

## 2023-08-16 NOTE — BRIEF OP NOTE
[facility]    Brief Operative Note    Pre-operative diagnosis: Coronary artery disease [I25.10]  Post-operative diagnosis Same as pre-operative diagnosis    Procedure: Procedure(s):  CORONARY ARTERY BYPASS GRAFT x 1 (SAPHENOUS VEIN - RIGHT POSTERIOR DESCENDING ARTERY) WITH ENDOSCOPIC SAPHENOUS VEIN HARVEST ON THE LEFT LOWER EXTREMITY, AND ON CARDIOPULMONARY PUMP OXYGENATOR  (INTRAOPERATIVE TRANSESOPHAGEAL ECHOCARDIOGRAM BY ANESTHESIOLOGIST), REMOVAL OF RIGHT CORONARY ARTERY STENT AND DELIVERY SYSTEM, LEFT ATRIAL APPENDAGE CLIPPING WITH ATRICLIP FLEX V DEVICE SIZE: 45MM    Surgeon: Surgeon(s) and Role:     * Mulvihill, Michael, MD - Primary     * Casey Saldana MD - Assisting     * Kaitlynn Valdovinos PA-C - Assisting     * Jamarcus Tovar MD - Fellow    Anesthesia: General   Estimated Blood Loss: 500 ml    Drains: 2 med tubes  Specimens:   ID Type Source Tests Collected by Time Destination   A :  Blood Line, arterial CBC WITH PLATELETS, COMPREHENSIVE METABOLIC PANEL, FIBRINOGEN ACTIVITY, PARTIAL THROMBOPLASTIN TIME, INR Sample, BIENVENIDO Mckinley CRNA 8/15/2023  5:57 PM      Findings:   See op note .  Complications: None.  Implants:   Implant Name Type Inv. Item Serial No.  Lot No. LRB No. Used Action   IMP ATRICLIP FLEX V DEVICE KAVIN EXLUSION 45MM [id number] - [id number] Clip IMP ATRICLIP FLEX V DEVICE KAVIN EXLUSION 45MM [id number]  [employer] [id number] N/A 1 Implanted         Jamarcus Tovar MD  Cardiothoracic Surgery Fellow  Pager: [phone]

## 2023-08-16 NOTE — ANESTHESIA POSTPROCEDURE EVALUATION
Patient: Hugo Weber    Procedure: Procedure(s):  CORONARY ARTERY BYPASS GRAFT x 1 (SAPHENOUS VEIN - RIGHT POSTERIOR DESCENDING ARTERY) WITH ENDOSCOPIC SAPHENOUS VEIN HARVEST ON THE LEFT LOWER EXTREMITY, AND ON CARDIOPULMONARY PUMP OXYGENATOR  (INTRAOPERATIVE TRANSESOPHAGEAL ECHOCARDIOGRAM BY ANESTHESIOLOGIST), REMOVAL OF RIGHT CORONARY ARTERY STENT AND DELIVERY SYSTEM, LEFT ATRIAL APPENDAGE CLIPPING WITH ATRICLIP FLEX V DEVICE SIZE: 45MM       Anesthesia Type:  General    Note:     Postop Pain Control: Uneventful            Sign Out: Well controlled pain   PONV: No   Neuro/Psych: Uneventful            Sign Out: Acceptable/Baseline neuro status   Airway/Respiratory: Uneventful            Sign Out: AIRWAY IN SITU/Resp. Support   CV/Hemodynamics: Uneventful            Sign Out: Acceptable CV status; No obvious hypovolemia; No obvious fluid overload   Other NRE:    DID A NON-ROUTINE EVENT OCCUR? No           Last vitals:  Vitals:    08/15/23 2115 08/15/23 2130 08/15/23 2145   BP: (!) 87/59 93/62 111/63   Pulse: 79 81 84   Resp: (!) 9 20 19   Temp: (!) 35.6  C (96.1  F) (!) 35.6  C (96.1  F) (!) 35.7  C (96.3  F)   SpO2: 95% 99% 99%       Electronically Signed By: Yris Rod MD, MD  August 15, 2023  11:56 PM

## 2023-08-16 NOTE — CONSULTS
CARDIAC SURGERY NUTRITION CONSULT    Received standing order to assess and educate patient.    Will follow and complete assessment once patient is extubated and/or is transferred to medical unit.    Patient will receive nutrition education during the Outpatient Cardiac Rehab Program (nutrition classes/dietitian counseling).    Eula Lin RD, LD

## 2023-08-16 NOTE — PROGRESS NOTES
Right radial sheath in situ post-cath lab procedure. Manual pressure held x10 minutes and TR band applied to R Radial site per cath lab protocol. The R radial site is clean dry and intact with no signs of bleeding or hematoma. Report regarding R Radial sheath given to intensivist and ICU RN.

## 2023-08-16 NOTE — ANESTHESIA CARE TRANSFER NOTE
Patient: Hugo Weber    Procedure: Procedure(s):  CORONARY ARTERY BYPASS GRAFT x 1 (SAPHENOUS VEIN - RIGHT POSTERIOR DESCENDING ARTERY) WITH ENDOSCOPIC SAPHENOUS VEIN HARVEST ON THE LEFT LOWER EXTREMITY, AND ON CARDIOPULMONARY PUMP OXYGENATOR  (INTRAOPERATIVE TRANSESOPHAGEAL ECHOCARDIOGRAM BY ANESTHESIOLOGIST), REMOVAL OF RIGHT CORONARY ARTERY STENT AND DELIVERY SYSTEM, LEFT ATRIAL APPENDAGE CLIPPING WITH ATRICLIP FLEX V DEVICE SIZE: 45MM       Diagnosis: Coronary artery disease [I25.10]  Diagnosis Additional Information: No value filed.    Anesthesia Type:   General     Note:    Oropharynx: endotracheal tube in place and ventilatory support  Level of Consciousness: iatrogenic sedation and unresponsive  Patient oxygen source: Ventilator/Ambu.  Level of Supplemental Oxygen (L/min / FiO2): 15  Independent Airway: airway patency not satisfactory and stable  Dentition: dentition unchanged  Vital Signs Stable: post-procedure vital signs reviewed and stable  Report to RN Given: handoff report given  Patient transferred to: ICU  Comments: Report given to RN and intensivist regarding R radial sheath removal. Intensivist to contact interventional cardiology regarding sheath pull in R groin.  ICU Handoff: Call for PAUSE to initiate/utilize ICU HANDOFF, Identified Patient, Identified Responsible Provider, Reviewed the Pertinent Medical History, Discussed Surgical Course, Reviewed Intra-OP Anesthesia Management and Issues during Anesthesia, Set Expectations for Post Procedure Period and Allowed Opportunity for Questions and Acknowledgement of Understanding      Vitals:  Vitals Value Taken Time   /61 08/15/23 1950   Temp 36.5C    Pulse 73 08/15/23 1950   Resp 20 08/15/23 1950   SpO2 100 % 08/15/23 1950   Vitals shown include unvalidated device data.    Electronically Signed By: BIENVENIDO Christianson CRNA  August 15, 2023  8:05 PM

## 2023-08-16 NOTE — PROGRESS NOTES
Extubation Note    Successful completion of SBT (Yes or No): Yes  Extubation time: 1550    Patient assessment:  Lung sounds: clear and diminished bilaterally   Stridor Present (Yes or No): No  Patient tolerance: Well    Oxygen device:  Liter flow: 6lpm oxymask  SpO2: 98%       Trish Valdovinos, RT

## 2023-08-16 NOTE — PROCEDURES
[facility]    Arterial line placement    Date/Time: 8/16/2023 4:18 PM    Performed by: Aria Manning MD  Authorized by: Aria Manning MD      UNIVERSAL PROTOCOL   Site Marked: Yes  Prior Images Obtained and Reviewed:  Yes  Required items: Required blood products, implants, devices and special equipment available    Patient identity confirmed:  Arm band and hospital-assigned identification number  NA - No sedation, light sedation, or local anesthesia  Confirmation Checklist:  Patient's identity using two indicators, relevant allergies, procedure was appropriate and matched the consent or emergent situation and correct equipment/implants were available  Time out: Immediately prior to the procedure a time out was called    Universal Protocol: the Joint Commission Universal Protocol was followed    Indication:  multiple ABGs respiratory failure hemodynamic monitoring  Location:  Left radial       ANESTHESIA    Local Anesthetic:  Lidocaine 1% without epinephrine  Anesthetic Total (mL):  2      SEDATION  Patient Sedated: Yes    Sedation Type:  Moderate (conscious) sedation  Sedation:  Propofol  Vital signs: Vital signs monitored during sedation      PROCEDURE DETAILS  Eduardo's Test Normal?: Eduardo's test not abnormal  Needle Gauge:  22  Seldinger technique: Seldinger technique used    Number of Attempts:  2  Post-procedure:  Line sutured and dressing applied  CMS: normal    PROCEDURE    Patient Tolerance:  Patient tolerated the procedure well with no immediate complications  Length of time physician/provider present for 1:1 monitoring during sedation: 20

## 2023-08-17 ENCOUNTER — APPOINTMENT (OUTPATIENT)
Dept: PHYSICAL THERAPY | Facility: CLINIC | Age: 78
DRG: 231 | End: 2023-08-17
Attending: SURGERY
Payer: COMMERCIAL

## 2023-08-17 LAB
ALLEN'S TEST: ABNORMAL
ANION GAP SERPL CALCULATED.3IONS-SCNC: 10 MMOL/L (ref 7–15)
APTT PPP: 53 SECONDS (ref 22–38)
BASE EXCESS BLDA CALC-SCNC: -0.1 MMOL/L (ref -9.6–2)
BASE EXCESS BLDA CALC-SCNC: -0.4 MMOL/L (ref -9.6–2)
BASE EXCESS BLDA CALC-SCNC: -1.4 MMOL/L (ref -9.6–2)
BASE EXCESS BLDA CALC-SCNC: 0.2 MMOL/L (ref -9.6–2)
BASE EXCESS BLDA CALC-SCNC: 2.1 MMOL/L (ref -9–1.8)
BASE EXCESS BLDV CALC-SCNC: 2.1 MMOL/L (ref -8.1–1.9)
BUN SERPL-MCNC: 19.9 MG/DL (ref 8–23)
CA-I BLD-MCNC: 4 MG/DL (ref 4.4–5.2)
CA-I BLD-MCNC: 4.2 MG/DL (ref 4.4–5.2)
CA-I BLD-MCNC: 4.3 MG/DL (ref 4.4–5.2)
CA-I BLD-MCNC: 4.6 MG/DL (ref 4.4–5.2)
CA-I BLD-MCNC: 4.6 MG/DL (ref 4.4–5.2)
CA-I BLD-MCNC: 4.7 MG/DL (ref 4.4–5.2)
CALCIUM SERPL-MCNC: 8 MG/DL (ref 8.8–10.2)
CHLORIDE SERPL-SCNC: 107 MMOL/L (ref 98–107)
CREAT SERPL-MCNC: 0.58 MG/DL (ref 0.67–1.17)
DEPRECATED HCO3 PLAS-SCNC: 23 MMOL/L (ref 22–29)
ERYTHROCYTE [DISTWIDTH] IN BLOOD BY AUTOMATED COUNT: 14.6 % (ref 10–15)
GFR SERPL CREATININE-BSD FRML MDRD: >90 ML/MIN/1.73M2
GLUCOSE BLD-MCNC: 125 MG/DL (ref 70–99)
GLUCOSE BLD-MCNC: 132 MG/DL (ref 70–99)
GLUCOSE BLD-MCNC: 138 MG/DL (ref 70–99)
GLUCOSE BLD-MCNC: 147 MG/DL (ref 70–99)
GLUCOSE BLD-MCNC: 149 MG/DL (ref 70–99)
GLUCOSE BLDC GLUCOMTR-MCNC: 102 MG/DL (ref 70–99)
GLUCOSE BLDC GLUCOMTR-MCNC: 106 MG/DL (ref 70–99)
GLUCOSE BLDC GLUCOMTR-MCNC: 108 MG/DL (ref 70–99)
GLUCOSE BLDC GLUCOMTR-MCNC: 111 MG/DL (ref 70–99)
GLUCOSE BLDC GLUCOMTR-MCNC: 121 MG/DL (ref 70–99)
GLUCOSE BLDC GLUCOMTR-MCNC: 122 MG/DL (ref 70–99)
GLUCOSE BLDC GLUCOMTR-MCNC: 133 MG/DL (ref 70–99)
GLUCOSE BLDC GLUCOMTR-MCNC: 90 MG/DL (ref 70–99)
GLUCOSE BLDC GLUCOMTR-MCNC: 93 MG/DL (ref 70–99)
GLUCOSE BLDC GLUCOMTR-MCNC: 98 MG/DL (ref 70–99)
GLUCOSE SERPL-MCNC: 105 MG/DL (ref 70–99)
HCO3 BLD-SCNC: 27 MMOL/L (ref 21–28)
HCO3 BLDA-SCNC: 25 MMOL/L (ref 21–28)
HCO3 BLDA-SCNC: 25 MMOL/L (ref 21–28)
HCO3 BLDA-SCNC: 27 MMOL/L (ref 21–28)
HCO3 BLDA-SCNC: 28 MMOL/L (ref 21–28)
HCO3 BLDV-SCNC: 29 MMOL/L (ref 21–28)
HCT VFR BLD AUTO: 23.9 % (ref 40–53)
HGB BLD-MCNC: 11.6 G/DL (ref 13.3–17.7)
HGB BLD-MCNC: 7.7 G/DL (ref 13.3–17.7)
HGB BLD-MCNC: 8 G/DL (ref 13.3–17.7)
HGB BLD-MCNC: 8.6 G/DL (ref 13.3–17.7)
HGB BLD-MCNC: 8.8 G/DL (ref 13.3–17.7)
HGB BLD-MCNC: 8.9 G/DL (ref 13.3–17.7)
HOLD SPECIMEN: NORMAL
HOLD SPECIMEN: NORMAL
INR PPP: 1.37 (ref 0.85–1.15)
LACTATE BLD-SCNC: 0.5 MMOL/L
LACTATE BLD-SCNC: 0.6 MMOL/L
LACTATE BLD-SCNC: 1.1 MMOL/L
MAGNESIUM SERPL-MCNC: 1.9 MG/DL (ref 1.7–2.3)
MCH RBC QN AUTO: 29.5 PG (ref 26.5–33)
MCHC RBC AUTO-ENTMCNC: 32.2 G/DL (ref 31.5–36.5)
MCV RBC AUTO: 92 FL (ref 78–100)
O2/TOTAL GAS SETTING VFR VENT: 100 %
O2/TOTAL GAS SETTING VFR VENT: 23 %
O2/TOTAL GAS SETTING VFR VENT: 85 %
PCO2 BLD: 42 MM HG (ref 35–45)
PCO2 BLDA: 42 MM HG (ref 35–45)
PCO2 BLDA: 46 MM HG (ref 35–45)
PCO2 BLDA: 62 MM HG (ref 35–45)
PCO2 BLDA: 66 MM HG (ref 35–45)
PCO2 BLDV: 58 MM HG (ref 40–50)
PH BLD: 7.41 [PH] (ref 7.35–7.45)
PH BLDA: 7.24 [PH] (ref 7.35–7.45)
PH BLDA: 7.25 [PH] (ref 7.35–7.45)
PH BLDA: 7.34 [PH] (ref 7.35–7.45)
PH BLDA: 7.38 [PH] (ref 7.35–7.45)
PH BLDV: 7.31 [PH] (ref 7.32–7.43)
PLATELET # BLD AUTO: 137 10E3/UL (ref 150–450)
PO2 BLD: 143 MM HG (ref 80–105)
PO2 BLDA: 373 MM HG (ref 80–105)
PO2 BLDA: 414 MM HG (ref 80–105)
PO2 BLDA: 454 MM HG (ref 80–105)
PO2 BLDA: 514 MM HG (ref 80–105)
PO2 BLDV: 48 MM HG (ref 25–47)
POTASSIUM BLD-SCNC: 3.8 MMOL/L (ref 3.5–5)
POTASSIUM BLD-SCNC: 3.9 MMOL/L (ref 3.5–5)
POTASSIUM BLD-SCNC: 4.1 MMOL/L (ref 3.5–5)
POTASSIUM BLD-SCNC: 4.6 MMOL/L (ref 3.5–5)
POTASSIUM BLD-SCNC: 5.6 MMOL/L (ref 3.5–5)
POTASSIUM SERPL-SCNC: 4.2 MMOL/L (ref 3.4–5.3)
RBC # BLD AUTO: 2.61 10E6/UL (ref 4.4–5.9)
SODIUM BLD-SCNC: 138 MMOL/L (ref 133–144)
SODIUM BLD-SCNC: 139 MMOL/L (ref 133–144)
SODIUM BLD-SCNC: 140 MMOL/L (ref 133–144)
SODIUM SERPL-SCNC: 140 MMOL/L (ref 136–145)
WBC # BLD AUTO: 13.1 10E3/UL (ref 4–11)

## 2023-08-17 PROCEDURE — 83735 ASSAY OF MAGNESIUM: CPT | Performed by: NURSE PRACTITIONER

## 2023-08-17 PROCEDURE — 250N000013 HC RX MED GY IP 250 OP 250 PS 637: Performed by: NURSE PRACTITIONER

## 2023-08-17 PROCEDURE — 97161 PT EVAL LOW COMPLEX 20 MIN: CPT | Mod: GP

## 2023-08-17 PROCEDURE — 258N000003 HC RX IP 258 OP 636: Performed by: SURGERY

## 2023-08-17 PROCEDURE — 85730 THROMBOPLASTIN TIME PARTIAL: CPT

## 2023-08-17 PROCEDURE — 85610 PROTHROMBIN TIME: CPT

## 2023-08-17 PROCEDURE — 94799 UNLISTED PULMONARY SVC/PX: CPT

## 2023-08-17 PROCEDURE — 97530 THERAPEUTIC ACTIVITIES: CPT | Mod: GP

## 2023-08-17 PROCEDURE — 250N000013 HC RX MED GY IP 250 OP 250 PS 637: Performed by: SURGERY

## 2023-08-17 PROCEDURE — 82330 ASSAY OF CALCIUM: CPT | Performed by: SURGERY

## 2023-08-17 PROCEDURE — 999N000157 HC STATISTIC RCP TIME EA 10 MIN

## 2023-08-17 PROCEDURE — G0463 HOSPITAL OUTPT CLINIC VISIT: HCPCS

## 2023-08-17 PROCEDURE — 85027 COMPLETE CBC AUTOMATED: CPT | Performed by: NURSE PRACTITIONER

## 2023-08-17 PROCEDURE — 82803 BLOOD GASES ANY COMBINATION: CPT

## 2023-08-17 PROCEDURE — 80048 BASIC METABOLIC PNL TOTAL CA: CPT | Performed by: NURSE PRACTITIONER

## 2023-08-17 PROCEDURE — 272N000202 HC AEROBIKA WITH MANOMETER

## 2023-08-17 PROCEDURE — 250N000011 HC RX IP 250 OP 636: Performed by: SURGERY

## 2023-08-17 PROCEDURE — 200N000001 HC R&B ICU

## 2023-08-17 RX ORDER — DEXTROSE MONOHYDRATE 25 G/50ML
25-50 INJECTION, SOLUTION INTRAVENOUS
Status: DISCONTINUED | OUTPATIENT
Start: 2023-08-17 | End: 2023-08-22 | Stop reason: HOSPADM

## 2023-08-17 RX ORDER — NICOTINE POLACRILEX 4 MG
15-30 LOZENGE BUCCAL
Status: DISCONTINUED | OUTPATIENT
Start: 2023-08-17 | End: 2023-08-22 | Stop reason: HOSPADM

## 2023-08-17 RX ADMIN — PHENYLEPHRINE HYDROCHLORIDE 1 MCG/KG/MIN: 10 INJECTION INTRAVENOUS at 17:22

## 2023-08-17 RX ADMIN — ASPIRIN 81 MG 162 MG: 81 TABLET ORAL at 08:15

## 2023-08-17 RX ADMIN — PHENYLEPHRINE HYDROCHLORIDE 1 MCG/KG/MIN: 10 INJECTION INTRAVENOUS at 04:42

## 2023-08-17 RX ADMIN — OXYCODONE HYDROCHLORIDE 5 MG: 5 TABLET ORAL at 04:27

## 2023-08-17 RX ADMIN — SENNOSIDES AND DOCUSATE SODIUM 1 TABLET: 50; 8.6 TABLET ORAL at 21:28

## 2023-08-17 RX ADMIN — SENNOSIDES AND DOCUSATE SODIUM 1 TABLET: 50; 8.6 TABLET ORAL at 08:15

## 2023-08-17 RX ADMIN — PANTOPRAZOLE SODIUM 40 MG: 40 TABLET, DELAYED RELEASE ORAL at 08:15

## 2023-08-17 RX ADMIN — ROSUVASTATIN CALCIUM 10 MG: 10 TABLET, FILM COATED ORAL at 08:15

## 2023-08-17 RX ADMIN — POLYETHYLENE GLYCOL 3350 17 G: 17 POWDER, FOR SOLUTION ORAL at 08:15

## 2023-08-17 RX ADMIN — ACETAMINOPHEN 975 MG: 325 TABLET, FILM COATED ORAL at 04:26

## 2023-08-17 RX ADMIN — OXYCODONE HYDROCHLORIDE 5 MG: 5 TABLET ORAL at 18:03

## 2023-08-17 RX ADMIN — HEPARIN SODIUM 5000 UNITS: 5000 INJECTION, SOLUTION INTRAVENOUS; SUBCUTANEOUS at 21:28

## 2023-08-17 RX ADMIN — OXYCODONE HYDROCHLORIDE 5 MG: 5 TABLET ORAL at 13:32

## 2023-08-17 RX ADMIN — ACETAMINOPHEN 975 MG: 325 TABLET, FILM COATED ORAL at 21:28

## 2023-08-17 RX ADMIN — PHENYLEPHRINE HYDROCHLORIDE 0.8 MCG/KG/MIN: 10 INJECTION INTRAVENOUS at 10:51

## 2023-08-17 RX ADMIN — ACETAMINOPHEN 975 MG: 325 TABLET, FILM COATED ORAL at 13:31

## 2023-08-17 RX ADMIN — HEPARIN SODIUM 5000 UNITS: 5000 INJECTION, SOLUTION INTRAVENOUS; SUBCUTANEOUS at 04:47

## 2023-08-17 RX ADMIN — HEPARIN SODIUM 5000 UNITS: 5000 INJECTION, SOLUTION INTRAVENOUS; SUBCUTANEOUS at 13:32

## 2023-08-17 ASSESSMENT — ACTIVITIES OF DAILY LIVING (ADL)
ADLS_ACUITY_SCORE: 51
ADLS_ACUITY_SCORE: 51
ADLS_ACUITY_SCORE: 49
ADLS_ACUITY_SCORE: 49
ADLS_ACUITY_SCORE: 51
ADLS_ACUITY_SCORE: 49
ADLS_ACUITY_SCORE: 49
ADLS_ACUITY_SCORE: 51

## 2023-08-17 NOTE — PROGRESS NOTES
[facility] RCAT Note    Date:8/17/23  Admission Diagnosis:s/p CABGx1  Pulmonary History:None  Home Nebulizer/ MDI Use:None  Home Oxygen Use:None  Acuity Level (from RT Assessment flow sheet):4    Aerosol Therapy Initiated:None      Pulmonary Hygiene Initiated:Aerobika BID per RCAT protocol. Pt has a fair cough that is non-productive.      Volume Expansion Therapy Initiated:IS BID per RCAT protocol. Pt is achieving 1000 ml on IS with good pt effort.      Current Oxygen Requirement:1L NC  Current SpO2:99%    Re-evaluation date:8/20/23    See 'RT Assessments' flow sheet for patient assessment scoring and Acuity Level Details.    8/17/2023  Kathia Reed, RT

## 2023-08-17 NOTE — PROGRESS NOTES
08/17/23 1105   Appointment Info   Signing Clinician's Name / Credentials (PT) Clara Ruelas, PT, DPT   Living Environment   People in Home alone   Current Living Arrangements house   Home Accessibility stairs to enter home   Number of Stairs, Main Entrance 3   Stair Railings, Main Entrance railings safe and in good condition   Transportation Anticipated car, drives self;family or friend will provide   Living Environment Comments Pt is set up first floor once entering home, has 3 stairs to enter, living alone since spouse passed away 3 years ago, does have daughter at bedside who provides support at home   Self-Care   Usual Activity Tolerance good   Current Activity Tolerance fair   Regular Exercise No   Equipment Currently Used at Home none   Fall history within last six months no   Activity/Exercise/Self-Care Comment At baseline, pt is independent without use of assistive device, can ambulate about a half a mile at at time before fatigue, recently returned home from a trip to [place] with family.   General Information   Onset of Illness/Injury or Date of Surgery 08/15/23   Referring Physician Jamarcus Tovar MD   Patient/Family Therapy Goals Statement (PT) To get better and go home   Pertinent History of Current Problem (include personal factors and/or comorbidities that impact the POC) Pt is 78 year old male adm on 8/15/23 for CABGx1, aortomy and removal of embolized/unretrievable R coronary stent and delivery system, occlusion L atrial appendage, remained intubated post-op, extubated 8/16/23. Post-op further complicated by a-fib with RVR.   Existing Precautions/Restrictions fall;sternal   Cognition   Affect/Mental Status (Cognition) WFL   Orientation Status (Cognition) oriented x 3   Follows Commands (Cognition) WFL   Cognitive Status Comments Flat affect   Pain Assessment   Patient Currently in Pain Yes, see Vital Sign flowsheet  (L shoulder)   Integumentary/Edema   Integumentary/Edema Comments Some LE edema  noted as to be expected post surgery   Posture    Posture Forward head position   Range of Motion (ROM)   Range of Motion ROM is WFL   Strength (Manual Muscle Testing)   Strength (Manual Muscle Testing) Deficits observed during functional mobility   Bed Mobility   Comment, (Bed Mobility) Mod assist of 2   Transfers   Comment, (Transfers) Min assist of 2   Gait/Stairs (Locomotion)   Comment, (Gait/Stairs) Min assist of 2   Balance   Balance Comments Fair, needs assist of 2 in standing   Sensory Examination   Sensory Perception Comments Denies numbness/tingling   Clinical Impression   Criteria for Skilled Therapeutic Intervention Yes, treatment indicated   PT Diagnosis (PT) Impaired mobility   Influenced by the following impairments Decreased strength, decreased balance, decreased activity tolerance   Functional limitations due to impairments Decreased ability to participate in daily tasks   Clinical Presentation (PT Evaluation Complexity) Stable/Uncomplicated   Clinical Presentation Rationale Current presentation, [facility]   Clinical Decision Making (Complexity) low complexity   Planned Therapy Interventions (PT) balance training;bed mobility training;gait training;home exercise program;patient/family education;stair training;strengthening;transfer training;progressive activity/exercise;risk factor education;home program guidelines   Risk & Benefits of therapy have been explained evaluation/treatment results reviewed;care plan/treatment goals reviewed;risks/benefits reviewed;current/potential barriers reviewed;participants voiced agreement with care plan;participants included;patient;daughter   PT Total Evaluation Time   PT Eval, Low Complexity Minutes (30389) 10   Physical Therapy Goals   PT Frequency 2x/day   PT Predicted Duration/Target Date for Goal Attainment 08/26/23   PT Goals Bed Mobility;Cardiac Phase 1   PT: Bed Mobility Modified independent;Supine to/from sit;Rolling;Within precautions   PT: Understanding of  cardiac education to maximize quality of life, condition management, and health outcomes Patient;Verbalize;Demonstrate   PT: Perform aerobic activity with stable cardiovascular response continuous;15 minutes;ambulation;treadmill   PT: Functional/aerobic ambulation tolerance with stable cardiovascular response in order to return to home and community environment Independent;Greater than 300 feet   PT: Navigation of stairs simulating home set up with stable cardiovascular response in order to return to home and community environment Supervision/SBA;3 stairs;Rail on both sides   Interventions   Interventions Quick Adds Therapeutic Activity;Therapeutic Procedure;Cardiac Rehab   Therapeutic Activity   Therapeutic Activities: dynamic activities to improve functional performance Minutes (05580) 30   Symptoms Noted During/After Treatment Fatigue;Increased pain   Treatment Detail/Skilled Intervention Pt supine in bed on arrival agreeable to participate, daughter who is a nurse at bedside. Pt c/o L shoulder pain, ice packs provided at end of session. Pt with flat affect throughout. Education provided on sternal precautions and performing mobility tasks within sternal precautions. Pt supine to sit at EOB with mod assist of 2, needing to do 2 trials of bed mobility due to needing to change room set up. Pt sits with min assist of 1. Pt sit to stand with min assist of 2, able to perform pre-gait activities including lateral weight shifts and standing marching x5 reps with min assist of 2. Pt able to take 5 steps to bedside chair with min assist of 2, assist needed of a third person to manage lines. At completion of session, pt OOB in chair, needs within reach, soft BP, RN aware.   Cardiac Rehab Phase II Plan   Phase II Order Received Yes   Phase II Appointment Status Scheduled   Date/Time 8/30/23 @ 9:00am   Location [facility]   PT Discharge Planning   PT Plan General strengthening, ambulation, education   PT Discharge Recommendation  (DC Rec) home with assist;home with outpatient cardiac rehab   PT Rationale for DC Rec Anticipate pt will progress during hospital stay to allow for return to home with family support due to sternal precautions and outpatient cardiac rehab to further progress overall activity tolerance in monitored setting. If pt does not progress as anticipated or does not have assist at home, may need TCU prior to return home.   PT Brief overview of current status Min to mod assist of 2   Total Session Time   Timed Code Treatment Minutes 30   Total Session Time (sum of timed and untimed services) 40

## 2023-08-17 NOTE — PROGRESS NOTES
Interventional cardiology    Patient and his son were seen at the bedside. We reviewed his cath lab case with retained stent, balloon and part of delivery system in his RCA and aorta in detail, and their questions were answered.  If needed I would be happy to answer any additional questions from the patient and his family or his of the care team.     Carly Luna MD on 8/17/2023 at 4:17 PM

## 2023-08-17 NOTE — PROGRESS NOTES
[facility]  Cardiovascular and Thoracic Surgery Daily Note      Assessment and Plan    POD #2 s/p CABG x1 (vein graft to PDA), endoscopic vein harvest, aortotomy and removal of embolized/ un-retrievable right coronary stent + delivery system, occlusion of left atrial appendage (45 mm Atriclip) on 8/15/23 with Dr. Michael Mulvihill.    - CVS: Pre-op TTE with LVEF 35%, 1-2+ MR, moderate aortic stenosis, mildy dilated ascending aorta at 4.3 cm.   HD stable, continues on phenyl 0.9 gtt to maintain HD parameters. Titrate pressors as able. Started on amio gtt protocol due to afib RVR. Continues afib with rates in 90-100s. Will plan to stop amio gtt in am tomorrow. Has history of afib on apixaban, in controlled afib.  mg daily, start statin. PTA meds on hold: Entresto, apixaban, jardiance, toprol. CTs in place with appropriate output, leave in to suction, cap TPWs.    - Resp: Extubated POD #1 at 1550, saturating well on O2, pulmonary toilet.     - Neuro: Neuros intact, Pain appears well controlled on current regimen    - Renal: No history of significant renal disease, BL creat ~0.7. Cr stable. Trend BMP. Fluid as above.  Recent Labs   Lab 08/17/23  0433 08/16/23  0639 08/15/23  2043   CR 0.58* 0.71 0.66*         - GI: No BM or +flatus, continue bowel regimen. Abd xray unremarkable.    - : Leave patton in place while on shama. Look to resume PTA flomax once stable off of pressors.    - Endo: transition to sliding scale insulin   Hemoglobin A1C   Date Value Ref Range Status   02/21/2023 5.4 0.0 - 5.6 % Final     Comment:     Normal <5.7%   Prediabetes 5.7-6.4%    Diabetes 6.5% or higher     Note: Adopted from ADA consensus guidelines.   11/19/2020 5.8 (H) 0 - 5.6 % Final     Comment:     Normal <5.7% Prediabetes 5.7-6.4%  Diabetes 6.5% or higher - adopted from ADA   consensus guidelines.          - FEN: Replace electrolytes as needed. Start clears and advance as tolerated    - ID: Afebrile . Periop abx  completed. Trend CBC, fever curve.   Recent Labs   Lab 08/17/23  0433 08/16/23  0639 08/16/23  0024   WBC 13.1* 13.7* 18.3*       - Heme: Acute blood loss anemia and thrombocytopenia due to surgery. Hgb and PLT stable. Trend CBC, transfuse PRN. Did receive plavix load day of surgery, received one dose of platelets.  Recent Labs   Lab 08/17/23  0433 08/16/23  0639 08/16/23  0024   HGB 7.7* 8.2* 8.5*   * 181 204     - Proph: SCD, subcutaneous heparin, PPI    - Dispo: Encouraged IS/TCDB/amb. Sternal precautions. Wean shama as able. Continue amio gtt until am tomorrow. Ideally plan to start lopressor in am. Change to sliding scale insulin. Ok to start diet. Leave chest tubes in place. Leave patton in place with shama gtt. Continue ICU. Continue to monitor.      Interval History  States that pain is being controlled. Denies any hallucinations. Breathing is ok. Feeling hungry this am. +flatus/-BM, denies any popping/clicking in his breast bone, was able to get some sleep      Medications   acetaminophen  975 mg Oral Q8H    aspirin  162 mg Oral or NG Tube Daily    heparin ANTICOAGULANT  5,000 Units Subcutaneous Q8H    insulin aspart  1-7 Units Subcutaneous TID AC    insulin aspart  1-5 Units Subcutaneous At Bedtime    pantoprazole  40 mg Oral or NG Tube Daily    Or    pantoprazole  40 mg Oral Daily    polyethylene glycol  17 g Oral Daily    rosuvastatin  10 mg Oral Daily    senna-docusate  1 tablet Oral BID    sodium chloride (PF)  3 mL Intracatheter Q8H     [START ON 8/18/2023] acetaminophen, bisacodyl, calcium gluconate, calcium gluconate, calcium gluconate, glucose **OR** dextrose **OR** glucagon, HOLD MEDICATION, HOLD MEDICATION, hydrALAZINE, HYDROmorphone **OR** HYDROmorphone, lactated ringers, lidocaine 4%, lidocaine 4%, lidocaine (buffered or not buffered), lidocaine (buffered or not buffered), magnesium hydroxide, naloxone **OR** naloxone **OR** naloxone **OR** naloxone, norepinephrine, ondansetron **OR**  ondansetron, oxyCODONE **OR** oxyCODONE, phenylephrine, prochlorperazine **OR** prochlorperazine, sodium chloride (PF)      Physical Exam  Vitals were reviewed  Blood pressure 96/48, pulse (!) 121, temperature 99.1  F (37.3  C), resp. rate 20, height 1.829 m (6'), weight 142.2 kg (313 lb 7.9 oz), SpO2 96 %.  Gen: up in chair, comfortable, +O2    Lungs: diminished bases anteriorly    Cardiovascular: IRRR, telemetry afib 90-100s, +edema L LE    Abdomen: BS+, NT/ND, soft    Derm: sternal incision D/I  L LE incisions D/I    CT: serosang output, no air leak    Insulin 1 unit/hr  Haris 0.9 mcg/kg/min    UO 35 ml/hr    Weight:   Vitals:    08/15/23 0656 08/16/23 0100 08/17/23 0445   Weight: 133.8 kg (295 lb) 138.7 kg (305 lb 12.5 oz) 142.2 kg (313 lb 7.9 oz)         Data  Recent Labs   Lab 08/17/23  1056 08/17/23  0834 08/17/23  0822 08/17/23  0615 08/17/23  0433 08/16/23  0755 08/16/23  0639 08/16/23  0100 08/16/23  0024 08/15/23  2127 08/15/23  2043 08/15/23  1802 08/15/23  1757   WBC  --   --   --   --  13.1*  --  13.7*  --  18.3*  --  19.0*  --  14.9*   HGB  --   --   --   --  7.7*  --  8.2*  --  8.5*  --  9.1*   < > 8.3*   MCV  --   --   --   --  92  --  91  --  93  --  94  --  92   PLT  --   --   --   --  137*  --  181  --  204  --  197  --  148*   INR  --  1.37*  --   --   --   --   --   --   --   --  1.47*  --  1.79*   NA  --   --   --   --  140  --  141  --   --   --  141   < > 141   POTASSIUM  --   --   --   --  4.2  --  3.8  --   --   --  3.7   < > 4.3   CHLORIDE  --   --   --   --  107  --  107  --   --   --  105  --  106   CO2  --   --   --   --  23  --  22  --   --   --  18*  --  23   BUN  --   --   --   --  19.9  --  16.4  --   --   --  15.8  --  14.6   CR  --   --   --   --  0.58*  --  0.71  --   --   --  0.66*  --  0.56*   ANIONGAP  --   --   --   --  10  --  12  --   --   --  18*  --  12   BEBO  --   --   --   --  8.0*  --  8.1*  --   --   --  8.2*  --  8.0*   *  --  102* 106* 105*   < > 129*   < >   --    < > 213*   < > 155*   ALBUMIN  --   --   --   --   --   --  3.5  --   --   --  3.2*  --  2.8*   PROTTOTAL  --   --   --   --   --   --  5.0*  --   --   --  4.8*  --  4.5*   BILITOTAL  --   --   --   --   --   --  0.5  --   --   --  0.7  --  0.4   ALKPHOS  --   --   --   --   --   --  54  --   --   --  66  --  62   ALT  --   --   --   --   --   --  11  --   --   --  13  --  12   AST  --   --   --   --   --   --  22  --   --   --  24  --  21    < > = values in this interval not displayed.         Imaging:  No results found for this or any previous visit (from the past 24 hour(s)).    Patient seen and discussed with Dr. Mulvihill Dustin Carda PA-C  Cardiothoracic Surgery  Pager: [phone]

## 2023-08-17 NOTE — PROVIDER NOTIFICATION
Patient has been in sustained afib RVR for the past 90 minutes. Heart rate 100-120s. Pressures soft but otherwise asymptomatic. Intensivist Dr. Cardoso notified, amiodarone drip and bolus ordered.    Dr. Castrejon (on-call CV surgeon) was also paged and updated via phone on this new development. He voiced agreement with this plan.

## 2023-08-17 NOTE — PLAN OF CARE
Remains alert and orientedx4. Started to sustain afib RVR, amiodarone drip started. Patient remains in afib although rate is now controlled (80-90s). All other vital signs stable on 4L NC. On home CPAP overnight. Up to chair at 1945 with assist of 2. Patient tolerated activity fairly well. Pain well controlled on oxycodone and scheduled tylenol. Still needing pressor support to maintain blood pressure goals. Insulin drip on pause for four hours due to blood glucose less than 100,  bumped down to algorithm 2 this morning. Plan to continue to wean pressor support as patient tolerates.

## 2023-08-18 ENCOUNTER — APPOINTMENT (OUTPATIENT)
Dept: PHYSICAL THERAPY | Facility: CLINIC | Age: 78
DRG: 231 | End: 2023-08-18
Payer: COMMERCIAL

## 2023-08-18 LAB
ALBUMIN UR-MCNC: 30 MG/DL
ANION GAP SERPL CALCULATED.3IONS-SCNC: 10 MMOL/L (ref 7–15)
APPEARANCE UR: CLEAR
ATRIAL RATE - MUSE: 70 BPM
ATRIAL RATE - MUSE: 70 BPM
BACTERIA #/AREA URNS HPF: ABNORMAL /HPF
BILIRUB UR QL STRIP: NEGATIVE
BLD PROD TYP BPU: NORMAL
BLOOD COMPONENT TYPE: NORMAL
BUN SERPL-MCNC: 23.3 MG/DL (ref 8–23)
CA-I BLD-MCNC: 4.6 MG/DL (ref 4.4–5.2)
CALCIUM SERPL-MCNC: 7.8 MG/DL (ref 8.8–10.2)
CHLORIDE SERPL-SCNC: 107 MMOL/L (ref 98–107)
CODING SYSTEM: NORMAL
COLOR UR AUTO: YELLOW
CREAT SERPL-MCNC: 0.57 MG/DL (ref 0.67–1.17)
CROSSMATCH: NORMAL
DEPRECATED HCO3 PLAS-SCNC: 22 MMOL/L (ref 22–29)
DIASTOLIC BLOOD PRESSURE - MUSE: NORMAL MMHG
DIASTOLIC BLOOD PRESSURE - MUSE: NORMAL MMHG
ERYTHROCYTE [DISTWIDTH] IN BLOOD BY AUTOMATED COUNT: 14.3 % (ref 10–15)
ERYTHROCYTE [DISTWIDTH] IN BLOOD BY AUTOMATED COUNT: 14.6 % (ref 10–15)
GFR SERPL CREATININE-BSD FRML MDRD: >90 ML/MIN/1.73M2
GLUCOSE BLDC GLUCOMTR-MCNC: 100 MG/DL (ref 70–99)
GLUCOSE BLDC GLUCOMTR-MCNC: 106 MG/DL (ref 70–99)
GLUCOSE BLDC GLUCOMTR-MCNC: 109 MG/DL (ref 70–99)
GLUCOSE BLDC GLUCOMTR-MCNC: 132 MG/DL (ref 70–99)
GLUCOSE SERPL-MCNC: 117 MG/DL (ref 70–99)
GLUCOSE UR STRIP-MCNC: NEGATIVE MG/DL
HCT VFR BLD AUTO: 22.6 % (ref 40–53)
HCT VFR BLD AUTO: 25.6 % (ref 40–53)
HGB BLD-MCNC: 7.2 G/DL (ref 13.3–17.7)
HGB BLD-MCNC: 8.3 G/DL (ref 13.3–17.7)
HGB UR QL STRIP: ABNORMAL
INTERPRETATION ECG - MUSE: NORMAL
INTERPRETATION ECG - MUSE: NORMAL
ISSUE DATE AND TIME: NORMAL
KETONES UR STRIP-MCNC: 80 MG/DL
LEUKOCYTE ESTERASE UR QL STRIP: ABNORMAL
MCH RBC QN AUTO: 29.8 PG (ref 26.5–33)
MCH RBC QN AUTO: 30.2 PG (ref 26.5–33)
MCHC RBC AUTO-ENTMCNC: 31.9 G/DL (ref 31.5–36.5)
MCHC RBC AUTO-ENTMCNC: 32.4 G/DL (ref 31.5–36.5)
MCV RBC AUTO: 93 FL (ref 78–100)
MCV RBC AUTO: 93 FL (ref 78–100)
MUCOUS THREADS #/AREA URNS LPF: PRESENT /LPF
NITRATE UR QL: NEGATIVE
P AXIS - MUSE: 85 DEGREES
P AXIS - MUSE: NORMAL DEGREES
PH UR STRIP: 6 [PH] (ref 5–7)
PLATELET # BLD AUTO: 136 10E3/UL (ref 150–450)
PLATELET # BLD AUTO: 151 10E3/UL (ref 150–450)
POTASSIUM SERPL-SCNC: 4.3 MMOL/L (ref 3.4–5.3)
PR INTERVAL - MUSE: 196 MS
PR INTERVAL - MUSE: NORMAL MS
PROCALCITONIN SERPL IA-MCNC: 0.24 NG/ML
QRS DURATION - MUSE: 88 MS
QRS DURATION - MUSE: 94 MS
QT - MUSE: 388 MS
QT - MUSE: 406 MS
QTC - MUSE: 419 MS
QTC - MUSE: 441 MS
R AXIS - MUSE: 31 DEGREES
R AXIS - MUSE: 46 DEGREES
RBC # BLD AUTO: 2.42 10E6/UL (ref 4.4–5.9)
RBC # BLD AUTO: 2.75 10E6/UL (ref 4.4–5.9)
RBC URINE: 66 /HPF
SODIUM SERPL-SCNC: 139 MMOL/L (ref 136–145)
SP GR UR STRIP: 1.03 (ref 1–1.03)
SQUAMOUS EPITHELIAL: 1 /HPF
SYSTOLIC BLOOD PRESSURE - MUSE: NORMAL MMHG
SYSTOLIC BLOOD PRESSURE - MUSE: NORMAL MMHG
T AXIS - MUSE: 50 DEGREES
T AXIS - MUSE: 75 DEGREES
UNIT ABO/RH: NORMAL
UNIT NUMBER: NORMAL
UNIT STATUS: NORMAL
UNIT TYPE ISBT: 9500
UROBILINOGEN UR STRIP-MCNC: 2 MG/DL
VENTRICULAR RATE- MUSE: 70 BPM
VENTRICULAR RATE- MUSE: 71 BPM
WBC # BLD AUTO: 10.8 10E3/UL (ref 4–11)
WBC # BLD AUTO: 11.4 10E3/UL (ref 4–11)
WBC URINE: 10 /HPF

## 2023-08-18 PROCEDURE — 258N000003 HC RX IP 258 OP 636: Performed by: PHYSICIAN ASSISTANT

## 2023-08-18 PROCEDURE — 250N000011 HC RX IP 250 OP 636: Mod: JZ | Performed by: PHYSICIAN ASSISTANT

## 2023-08-18 PROCEDURE — 97116 GAIT TRAINING THERAPY: CPT | Mod: GP

## 2023-08-18 PROCEDURE — 81001 URINALYSIS AUTO W/SCOPE: CPT | Performed by: NURSE PRACTITIONER

## 2023-08-18 PROCEDURE — P9016 RBC LEUKOCYTES REDUCED: HCPCS | Performed by: PHYSICIAN ASSISTANT

## 2023-08-18 PROCEDURE — 97110 THERAPEUTIC EXERCISES: CPT | Mod: GP

## 2023-08-18 PROCEDURE — 258N000003 HC RX IP 258 OP 636: Performed by: INTERNAL MEDICINE

## 2023-08-18 PROCEDURE — 36415 COLL VENOUS BLD VENIPUNCTURE: CPT | Performed by: NURSE PRACTITIONER

## 2023-08-18 PROCEDURE — 87040 BLOOD CULTURE FOR BACTERIA: CPT | Performed by: NURSE PRACTITIONER

## 2023-08-18 PROCEDURE — 250N000013 HC RX MED GY IP 250 OP 250 PS 637: Performed by: NURSE PRACTITIONER

## 2023-08-18 PROCEDURE — 200N000001 HC R&B ICU

## 2023-08-18 PROCEDURE — 82330 ASSAY OF CALCIUM: CPT | Performed by: SURGERY

## 2023-08-18 PROCEDURE — 84145 PROCALCITONIN (PCT): CPT | Performed by: NURSE PRACTITIONER

## 2023-08-18 PROCEDURE — 94799 UNLISTED PULMONARY SVC/PX: CPT

## 2023-08-18 PROCEDURE — 258N000003 HC RX IP 258 OP 636: Performed by: SURGERY

## 2023-08-18 PROCEDURE — 97530 THERAPEUTIC ACTIVITIES: CPT | Mod: GP

## 2023-08-18 PROCEDURE — 80048 BASIC METABOLIC PNL TOTAL CA: CPT | Performed by: NURSE PRACTITIONER

## 2023-08-18 PROCEDURE — 250N000011 HC RX IP 250 OP 636: Performed by: SURGERY

## 2023-08-18 PROCEDURE — 250N000013 HC RX MED GY IP 250 OP 250 PS 637: Performed by: SURGERY

## 2023-08-18 PROCEDURE — 85027 COMPLETE CBC AUTOMATED: CPT | Performed by: NURSE PRACTITIONER

## 2023-08-18 RX ORDER — FUROSEMIDE 10 MG/ML
20 INJECTION INTRAMUSCULAR; INTRAVENOUS ONCE
Status: COMPLETED | OUTPATIENT
Start: 2023-08-18 | End: 2023-08-18

## 2023-08-18 RX ORDER — CEFTRIAXONE 1 G/1
1 INJECTION, POWDER, FOR SOLUTION INTRAMUSCULAR; INTRAVENOUS EVERY 24 HOURS
Status: COMPLETED | OUTPATIENT
Start: 2023-08-18 | End: 2023-08-20

## 2023-08-18 RX ADMIN — OXYCODONE HYDROCHLORIDE 5 MG: 5 TABLET ORAL at 09:08

## 2023-08-18 RX ADMIN — SODIUM CHLORIDE: 9 INJECTION, SOLUTION INTRAVENOUS at 00:32

## 2023-08-18 RX ADMIN — ASPIRIN 81 MG 162 MG: 81 TABLET ORAL at 08:18

## 2023-08-18 RX ADMIN — PHENYLEPHRINE HYDROCHLORIDE 0.1 MCG/KG/MIN: 10 INJECTION INTRAVENOUS at 08:18

## 2023-08-18 RX ADMIN — CEFTRIAXONE SODIUM 1 G: 1 INJECTION, POWDER, FOR SOLUTION INTRAMUSCULAR; INTRAVENOUS at 09:07

## 2023-08-18 RX ADMIN — SENNOSIDES AND DOCUSATE SODIUM 1 TABLET: 50; 8.6 TABLET ORAL at 08:18

## 2023-08-18 RX ADMIN — FUROSEMIDE 20 MG: 10 INJECTION, SOLUTION INTRAMUSCULAR; INTRAVENOUS at 11:09

## 2023-08-18 RX ADMIN — OXYCODONE HYDROCHLORIDE 5 MG: 5 TABLET ORAL at 20:39

## 2023-08-18 RX ADMIN — POLYETHYLENE GLYCOL 3350 17 G: 17 POWDER, FOR SOLUTION ORAL at 08:18

## 2023-08-18 RX ADMIN — PHENYLEPHRINE HYDROCHLORIDE 0.2 MCG/KG/MIN: 10 INJECTION INTRAVENOUS at 11:02

## 2023-08-18 RX ADMIN — HEPARIN SODIUM 5000 UNITS: 5000 INJECTION, SOLUTION INTRAVENOUS; SUBCUTANEOUS at 22:04

## 2023-08-18 RX ADMIN — HEPARIN SODIUM 5000 UNITS: 5000 INJECTION, SOLUTION INTRAVENOUS; SUBCUTANEOUS at 05:44

## 2023-08-18 RX ADMIN — ACETAMINOPHEN 975 MG: 325 TABLET, FILM COATED ORAL at 05:45

## 2023-08-18 RX ADMIN — ROSUVASTATIN CALCIUM 10 MG: 10 TABLET, FILM COATED ORAL at 08:18

## 2023-08-18 RX ADMIN — PHENYLEPHRINE HYDROCHLORIDE 0.5 MCG/KG/MIN: 10 INJECTION INTRAVENOUS at 04:32

## 2023-08-18 RX ADMIN — AMIODARONE HYDROCHLORIDE 0.5 MG/MIN: 50 INJECTION, SOLUTION INTRAVENOUS at 00:29

## 2023-08-18 RX ADMIN — PANTOPRAZOLE SODIUM 40 MG: 40 TABLET, DELAYED RELEASE ORAL at 08:18

## 2023-08-18 RX ADMIN — OXYCODONE HYDROCHLORIDE 5 MG: 5 TABLET ORAL at 04:02

## 2023-08-18 RX ADMIN — ACETAMINOPHEN 975 MG: 325 TABLET, FILM COATED ORAL at 13:41

## 2023-08-18 RX ADMIN — HEPARIN SODIUM 5000 UNITS: 5000 INJECTION, SOLUTION INTRAVENOUS; SUBCUTANEOUS at 13:42

## 2023-08-18 ASSESSMENT — ACTIVITIES OF DAILY LIVING (ADL)
ADLS_ACUITY_SCORE: 53
ADLS_ACUITY_SCORE: 49
ADLS_ACUITY_SCORE: 53
ADLS_ACUITY_SCORE: 49
ADLS_ACUITY_SCORE: 53
ADLS_ACUITY_SCORE: 50

## 2023-08-18 NOTE — PLAN OF CARE
Care [id number]    Pt alert, oriented. Able to make needs known. Tele - Afib. HR 90-low 100s. Amio off at 0700 as per order. Haris off from 2900-3054. Turned back on after rehab for MAP in low 50s. Beginning to titrate back down. Pt received 1 unit PRBC followed by IV Lasix. No significant change noted in BP with PRBC. Repeat CBC pending. Received oxycodone x1 for sternal/ incisional pain with effect. Up to chair with assist x2 and ambulated in hallway. HR up to 130 at max. Returned to baseline promptly at rest.     Plan - chest tubes to be removed today as per cardiac sx. Quach to be discontinued. Rocephin started for UTI.

## 2023-08-18 NOTE — PLAN OF CARE
Patient alert and oriented. Flat affect. Neuro checks intact. Tele Afib w/CVR. Continues on Amiodarone and Haris for rate control. CPAP on overnight. Chest tubes in place; patent with small (30ml) serosanguinous drainage. Pacer wires remains in place; capped. Quach patent with good output. Plan to wean Hairs; stopped Amio:to start Lopressor today.

## 2023-08-18 NOTE — PLAN OF CARE
Shift Summary  Assumed cares from [id number].    Neuro: A/Ox4, makes needs known via call light. Pt [place]; has Has in place.   Cardiac: Afib CVR; on amio gtt. Per cardiology, will stop at 0700 on 08.17.23 and will transition to po antiarrhythmic. CT in place and draining serosanguinous fluid. Pacer wires capped.   Pulmonary: LSC/dim, titrated off of O2. Will utilize home CPAP at [facility].   GI: No BM this shift, hypoactive bowel sounds. Advanced to regular diet per order. Pt tolerating well.   : Quach in place, adequate UOP.   Integumentary: See flow-sheet.   Restraints: Not needed  Activity: Chairfast, A2 w/gaitbelt.    Plan of care has been explained to patient: Yes    Giana Joyce RN

## 2023-08-18 NOTE — PROGRESS NOTES
[facility]  Cardiovascular and Thoracic Surgery Daily Note      Assessment and Plan    POD #3 s/p CABG x1 (vein graft to PDA), endoscopic vein harvest, aortotomy and removal of embolized/ un-retrievable right coronary stent + delivery system, occlusion of left atrial appendage (45 mm Atriclip) on 8/15/23 with Dr. Michael Mulvihill.    - CVS: Pre-op TTE with LVEF 35%, 1-2+ MR, moderate aortic stenosis, mildy dilated ascending aorta at 4.3 cm.   HD stable, continues on phenyl 0.3 gtt to maintain HD parameters. Titrate pressors as able. One unit of pRBCs on 8/18 for Hgb 7.2 to facilitate pressor wean followed by lasix 20 mg IV for hypervolemia. Started on amio gtt protocol due to afib RVR. Continues afib with rates in 80-100s. Will plan to stop amio gtt today. Has history of afib on apixaban, in controlled afib.  mg daily, start statin. PTA meds on hold: Entresto, apixaban, jardiance, toprol. Will remove CT and TPWs today, 8/18.    - Resp: Extubated POD #1 at 1550, saturating well on room air, bipap at HS, encourage pulmonary toilet.     - Neuro: Neuros intact, Pain appears well controlled on current regimen    - Renal: No history of significant renal disease, BL creat ~0.7. Cr stable. Trend BMP. Diuretic as above.  Recent Labs   Lab 08/18/23  0701 08/17/23  0433 08/16/23  0639   CR 0.57* 0.58* 0.71         - GI: No BM, +flatus, continue bowel regimen. Abd xray unremarkable.    - : Remove patton. Look to resume PTA flomax once stable off of pressors.    - Endo: transitioned to sliding scale insulin   Hemoglobin A1C   Date Value Ref Range Status   02/21/2023 5.4 0.0 - 5.6 % Final     Comment:     Normal <5.7%   Prediabetes 5.7-6.4%    Diabetes 6.5% or higher     Note: Adopted from ADA consensus guidelines.   11/19/2020 5.8 (H) 0 - 5.6 % Final     Comment:     Normal <5.7% Prediabetes 5.7-6.4%  Diabetes 6.5% or higher - adopted from ADA   consensus guidelines.          - FEN: Replace electrolytes  as needed. Start clears and advance as tolerated    - ID: Tmax 102.7 overnight, +UA - start Ceftriaxone until culture results, plan for 3 days. Blood cultures obtained 8/18. Periop abx completed. Trend CBC, fever curve.   Recent Labs   Lab 08/18/23  0701 08/17/23  0433 08/16/23  0639   WBC 10.8 13.1* 13.7*       - Heme: Acute blood loss anemia and thrombocytopenia due to surgery. Hgb and PLT stable. Trend CBC, transfuse PRN. One unit of pRBCs on 8/18 for Hgb 7.2 given persistent pressor requirement. Did receive plavix load day of surgery, received one dose of platelets.  Recent Labs   Lab 08/18/23  0701 08/17/23  0433 08/16/23  0639   HGB 7.2* 7.7* 8.2*   * 137* 181     - Proph: SCD, subcutaneous heparin, PPI    - Dispo: Continue in ICU, titrate pressors as able, transfuse pRBCs. Continue pulm hygiene, mobility. Therapies recommending home with OP CR once medically stable.      Interval History  No acute events overnight. States pain is well managed on current regimen, slept well. Breathing is stable on room air, working with IS. Tolerating diet, is passing flatus, no BM, no n/v. Denies chest pain, palpitations, dizziness, syncopal symptoms, chills, myalgias, sternal popping/clicking.      Medications   acetaminophen  975 mg Oral Q8H    aspirin  162 mg Oral or NG Tube Daily    cefTRIAXone  1 g Intravenous Q24H    furosemide  20 mg Intravenous Once    heparin ANTICOAGULANT  5,000 Units Subcutaneous Q8H    insulin aspart  1-7 Units Subcutaneous TID AC    insulin aspart  1-5 Units Subcutaneous At Bedtime    pantoprazole  40 mg Oral or NG Tube Daily    Or    pantoprazole  40 mg Oral Daily    polyethylene glycol  17 g Oral Daily    rosuvastatin  10 mg Oral Daily    senna-docusate  1 tablet Oral BID    sodium chloride (PF)  3 mL Intracatheter Q8H     acetaminophen, bisacodyl, calcium gluconate, calcium gluconate, calcium gluconate, glucose **OR** dextrose **OR** glucagon, HOLD MEDICATION, HOLD MEDICATION,  hydrALAZINE, HYDROmorphone **OR** HYDROmorphone, lactated ringers, lidocaine 4%, lidocaine (buffered or not buffered), magnesium hydroxide, naloxone **OR** naloxone **OR** naloxone **OR** naloxone, norepinephrine, ondansetron **OR** ondansetron, oxyCODONE **OR** oxyCODONE, phenylephrine, prochlorperazine **OR** prochlorperazine, sodium chloride (PF)      Physical Exam  Vitals were reviewed  Blood pressure 96/48, pulse 86, temperature 98.1  F (36.7  C), temperature source Oral, resp. rate 18, height 1.829 m (6'), weight 141.7 kg (312 lb 6.3 oz), SpO2 97 %.  Gen: NAD on bipap    Lungs: diminished throughout on room air    Cardiovascular: Irregular, no m/r/g, controlled afib    Abdomen: BS+, NT/ND, soft    Derm: sternal incision D/I  L LE incisions D/I    CT: serosang output, no air leak    Weight:   Vitals:    08/15/23 0656 08/16/23 0100 08/17/23 0445 08/18/23 0500   Weight: 133.8 kg (295 lb) 138.7 kg (305 lb 12.5 oz) 142.2 kg (313 lb 7.9 oz) 141.7 kg (312 lb 6.3 oz)         Data  Recent Labs   Lab 08/18/23  0814 08/18/23  0701 08/17/23  2131 08/17/23  1056 08/17/23  0834 08/17/23  0615 08/17/23  0433 08/16/23  0755 08/16/23  0639 08/15/23  2127 08/15/23  2043 08/15/23  1802 08/15/23  1757   WBC  --  10.8  --   --   --   --  13.1*  --  13.7*   < > 19.0*  --  14.9*   HGB  --  7.2*  --   --   --   --  7.7*  --  8.2*   < > 9.1*   < > 8.3*   MCV  --  93  --   --   --   --  92  --  91   < > 94  --  92   PLT  --  136*  --   --   --   --  137*  --  181   < > 197  --  148*   INR  --   --   --   --  1.37*  --   --   --   --   --  1.47*  --  1.79*   NA  --  139  --   --   --   --  140  --  141  --  141   < > 141   POTASSIUM  --  4.3  --   --   --   --  4.2  --  3.8  --  3.7   < > 4.3   CHLORIDE  --  107  --   --   --   --  107  --  107  --  105  --  106   CO2  --  22  --   --   --   --  23  --  22  --  18*  --  23   BUN  --  23.3*  --   --   --   --  19.9  --  16.4  --  15.8  --  14.6   CR  --  0.57*  --   --   --   --  0.58*   --  0.71  --  0.66*  --  0.56*   ANIONGAP  --  10  --   --   --   --  10  --  12  --  18*  --  12   BEBO  --  7.8*  --   --   --   --  8.0*  --  8.1*  --  8.2*  --  8.0*   * 117* 122*   < >  --    < > 105*   < > 129*   < > 213*   < > 155*   ALBUMIN  --   --   --   --   --   --   --   --  3.5  --  3.2*  --  2.8*   PROTTOTAL  --   --   --   --   --   --   --   --  5.0*  --  4.8*  --  4.5*   BILITOTAL  --   --   --   --   --   --   --   --  0.5  --  0.7  --  0.4   ALKPHOS  --   --   --   --   --   --   --   --  54  --  66  --  62   ALT  --   --   --   --   --   --   --   --  11  --  13  --  12   AST  --   --   --   --   --   --   --   --  22  --  24  --  21    < > = values in this interval not displayed.         Imaging:  No results found for this or any previous visit (from the past 24 hour(s)).    Patient seen and discussed with BIENVENIDO Richardson, ACNPC-AG, CCRN  Nurse Practitioner  Cardiothoracic Surgery  Pager: [phone]

## 2023-08-18 NOTE — CONSULTS
"NUTRITION ASSESSMENT      REASON FOR ASSESSMENT:  Cardiac Surgery Nutrition Consult    CURRENT DIET / INTAKE:  Regular diet   Intake this morning was 75% of breakfast --> German toast, sausage, peaches, and apple juice.     ANTHROPOMETRICS:   Ht: 6'0\"  Wt: 138.7 kg (admit)  BMI: 41.47 kg/m^2  IBW: 80.9 kg   Weight Status: Obesity Grade III BMI >40  %IBW: 171%    MALNUTRITION:  Patient does not meet two of the following criteria necessary for diagnosing malnutrition:  significant weight loss, reduced intake, subcutaneous fat loss, muscle loss or fluid retention. Nutrition Focused Physical Assessment (NFPA) not appropriate at this time.    NUTRITION DIAGNOSIS:   Increased nutrient needs (protein) R/t recent open heart surgery AEB need for oral nutritional supplement     INTERVENTIONS:    Nutrition Prescription:  Regular diet as tolerated   Ensure daily at 2pm     Implementation:  Ensure as above     Goals:  Patient to consume ~75% at meals in the next 3 - 5 days    Follow Up/Monitoring (InPatient):  Food and Fluid intake -  Monitor for adequacy    Follow Up/Monitoring (OutPatient):  Patient will participate in out-patient cardiac rehab and attend nutrition classes during the program    Katia Edward RD, LD, CNSC   Clinical Dietitian - [facility]       "

## 2023-08-19 ENCOUNTER — APPOINTMENT (OUTPATIENT)
Dept: GENERAL RADIOLOGY | Facility: CLINIC | Age: 78
DRG: 231 | End: 2023-08-19
Attending: NURSE PRACTITIONER
Payer: COMMERCIAL

## 2023-08-19 ENCOUNTER — APPOINTMENT (OUTPATIENT)
Dept: PHYSICAL THERAPY | Facility: CLINIC | Age: 78
DRG: 231 | End: 2023-08-19
Payer: COMMERCIAL

## 2023-08-19 LAB
ANION GAP SERPL CALCULATED.3IONS-SCNC: 11 MMOL/L (ref 7–15)
BUN SERPL-MCNC: 26.5 MG/DL (ref 8–23)
CALCIUM SERPL-MCNC: 8 MG/DL (ref 8.8–10.2)
CHLORIDE SERPL-SCNC: 104 MMOL/L (ref 98–107)
CREAT SERPL-MCNC: 0.58 MG/DL (ref 0.67–1.17)
DEPRECATED HCO3 PLAS-SCNC: 22 MMOL/L (ref 22–29)
ERYTHROCYTE [DISTWIDTH] IN BLOOD BY AUTOMATED COUNT: 14.3 % (ref 10–15)
GFR SERPL CREATININE-BSD FRML MDRD: >90 ML/MIN/1.73M2
GLUCOSE BLDC GLUCOMTR-MCNC: 101 MG/DL (ref 70–99)
GLUCOSE BLDC GLUCOMTR-MCNC: 151 MG/DL (ref 70–99)
GLUCOSE BLDC GLUCOMTR-MCNC: 85 MG/DL (ref 70–99)
GLUCOSE BLDC GLUCOMTR-MCNC: 92 MG/DL (ref 70–99)
GLUCOSE BLDC GLUCOMTR-MCNC: 94 MG/DL (ref 70–99)
GLUCOSE SERPL-MCNC: 113 MG/DL (ref 70–99)
HCT VFR BLD AUTO: 24.3 % (ref 40–53)
HGB BLD-MCNC: 7.9 G/DL (ref 13.3–17.7)
MCH RBC QN AUTO: 30.2 PG (ref 26.5–33)
MCHC RBC AUTO-ENTMCNC: 32.5 G/DL (ref 31.5–36.5)
MCV RBC AUTO: 93 FL (ref 78–100)
PLATELET # BLD AUTO: 159 10E3/UL (ref 150–450)
POTASSIUM SERPL-SCNC: 3.9 MMOL/L (ref 3.4–5.3)
RBC # BLD AUTO: 2.62 10E6/UL (ref 4.4–5.9)
SODIUM SERPL-SCNC: 137 MMOL/L (ref 136–145)
WBC # BLD AUTO: 9.1 10E3/UL (ref 4–11)

## 2023-08-19 PROCEDURE — 999N000065 XR CHEST 2 VIEWS

## 2023-08-19 PROCEDURE — 250N000011 HC RX IP 250 OP 636: Mod: JZ | Performed by: PHYSICIAN ASSISTANT

## 2023-08-19 PROCEDURE — 97530 THERAPEUTIC ACTIVITIES: CPT | Mod: GP | Performed by: PHYSICAL THERAPIST

## 2023-08-19 PROCEDURE — 250N000011 HC RX IP 250 OP 636: Performed by: NURSE PRACTITIONER

## 2023-08-19 PROCEDURE — 258N000003 HC RX IP 258 OP 636: Performed by: INTERNAL MEDICINE

## 2023-08-19 PROCEDURE — 250N000013 HC RX MED GY IP 250 OP 250 PS 637: Performed by: SURGERY

## 2023-08-19 PROCEDURE — 85027 COMPLETE CBC AUTOMATED: CPT | Performed by: NURSE PRACTITIONER

## 2023-08-19 PROCEDURE — 120N000001 HC R&B MED SURG/OB

## 2023-08-19 PROCEDURE — 97110 THERAPEUTIC EXERCISES: CPT | Mod: GP | Performed by: PHYSICAL THERAPIST

## 2023-08-19 PROCEDURE — 250N000011 HC RX IP 250 OP 636: Performed by: SURGERY

## 2023-08-19 PROCEDURE — 80048 BASIC METABOLIC PNL TOTAL CA: CPT | Performed by: NURSE PRACTITIONER

## 2023-08-19 PROCEDURE — 94799 UNLISTED PULMONARY SVC/PX: CPT

## 2023-08-19 PROCEDURE — 250N000013 HC RX MED GY IP 250 OP 250 PS 637: Performed by: NURSE PRACTITIONER

## 2023-08-19 PROCEDURE — 250N000013 HC RX MED GY IP 250 OP 250 PS 637: Performed by: PHYSICIAN ASSISTANT

## 2023-08-19 RX ORDER — FUROSEMIDE 10 MG/ML
20 INJECTION INTRAMUSCULAR; INTRAVENOUS ONCE
Status: COMPLETED | OUTPATIENT
Start: 2023-08-19 | End: 2023-08-19

## 2023-08-19 RX ORDER — TAMSULOSIN HYDROCHLORIDE 0.4 MG/1
0.4 CAPSULE ORAL DAILY
Status: DISCONTINUED | OUTPATIENT
Start: 2023-08-19 | End: 2023-08-22 | Stop reason: HOSPADM

## 2023-08-19 RX ADMIN — HEPARIN SODIUM 5000 UNITS: 5000 INJECTION, SOLUTION INTRAVENOUS; SUBCUTANEOUS at 21:17

## 2023-08-19 RX ADMIN — ASPIRIN 81 MG 162 MG: 81 TABLET ORAL at 08:17

## 2023-08-19 RX ADMIN — HEPARIN SODIUM 5000 UNITS: 5000 INJECTION, SOLUTION INTRAVENOUS; SUBCUTANEOUS at 06:07

## 2023-08-19 RX ADMIN — METOPROLOL TARTRATE 12.5 MG: 25 TABLET, FILM COATED ORAL at 21:19

## 2023-08-19 RX ADMIN — TAMSULOSIN HYDROCHLORIDE 0.4 MG: 0.4 CAPSULE ORAL at 08:15

## 2023-08-19 RX ADMIN — HEPARIN SODIUM 5000 UNITS: 5000 INJECTION, SOLUTION INTRAVENOUS; SUBCUTANEOUS at 14:36

## 2023-08-19 RX ADMIN — SENNOSIDES AND DOCUSATE SODIUM 1 TABLET: 50; 8.6 TABLET ORAL at 08:15

## 2023-08-19 RX ADMIN — PANTOPRAZOLE SODIUM 40 MG: 40 TABLET, DELAYED RELEASE ORAL at 08:15

## 2023-08-19 RX ADMIN — ROSUVASTATIN CALCIUM 10 MG: 10 TABLET, FILM COATED ORAL at 08:15

## 2023-08-19 RX ADMIN — METOPROLOL TARTRATE 12.5 MG: 25 TABLET, FILM COATED ORAL at 08:15

## 2023-08-19 RX ADMIN — POLYETHYLENE GLYCOL 3350 17 G: 17 POWDER, FOR SOLUTION ORAL at 08:15

## 2023-08-19 RX ADMIN — SODIUM CHLORIDE: 9 INJECTION, SOLUTION INTRAVENOUS at 03:58

## 2023-08-19 RX ADMIN — CEFTRIAXONE SODIUM 1 G: 1 INJECTION, POWDER, FOR SOLUTION INTRAMUSCULAR; INTRAVENOUS at 08:15

## 2023-08-19 RX ADMIN — FUROSEMIDE 20 MG: 10 INJECTION, SOLUTION INTRAMUSCULAR; INTRAVENOUS at 09:16

## 2023-08-19 ASSESSMENT — ACTIVITIES OF DAILY LIVING (ADL)
ADLS_ACUITY_SCORE: 50

## 2023-08-19 NOTE — PROGRESS NOTES
[facility]  Cardiovascular and Thoracic Surgery Daily Note    Assessment and Plan    POD #4 s/p CABG x1 (vein graft to PDA), endoscopic vein harvest, aortotomy and removal of embolized/ un-retrievable right coronary stent + delivery system, occlusion of left atrial appendage (45 mm Atriclip) on 8/15/23 with Dr. Michael Mulvihill.    - CVS: Pre-op TTE with LVEF 35%, 1-2+ MR, moderate aortic stenosis, mildy dilated ascending aorta at 4.3 cm.   HD stable, off of all gtt, One unit of pRBCs on 8/18 for Hgb 7.2, continue lasix 20 mg IV for hypervolemia. Started on amio gtt protocol due to afib RVR. Continues afib with rates in 90s. Chronic afib and no further amio. Has history of afib on apixaban, in controlled afib.  mg daily, statin. Start lopressor 12.5 mg PO bid with parameters. PTA meds on hold: Entresto, apixaban, jardiance, toprol. CTs and TPWs  removed 8/18.    - Resp: Extubated POD #1 at 1550, saturating well on room air, CPAP at HS, encourage pulmonary toilet.     - Neuro: Neuros intact, Pain appears well controlled on current regimen    - Renal: No history of significant renal disease, BL creat ~0.7. Cr stable. Trend BMP. Diuretic as above.  Recent Labs   Lab 08/19/23  0355 08/18/23  0701 08/17/23  0433   CR 0.58* 0.57* 0.58*     - GI: +BMs, +flatus, continue bowel regimen.     - : Quach removed. Voiding with no issues. Restart flomax     - Endo: transitioned to sliding scale insulin   Hemoglobin A1C   Date Value Ref Range Status   02/21/2023 5.4 0.0 - 5.6 % Final     Comment:     Normal <5.7%   Prediabetes 5.7-6.4%    Diabetes 6.5% or higher     Note: Adopted from ADA consensus guidelines.   11/19/2020 5.8 (H) 0 - 5.6 % Final     Comment:     Normal <5.7% Prediabetes 5.7-6.4%  Diabetes 6.5% or higher - adopted from ADA   consensus guidelines.          - FEN: Replace electrolytes as needed. Start clears and advance as tolerated    - ID: Tmax 102.7 overnight, +UA - start Ceftriaxone  plan for 3 days. Blood cultures obtained 8/18 (neg x 2 for 12 hours). Periop abx completed. Trend CBC, fever curve.   Recent Labs   Lab 08/19/23  0355 08/18/23  1144 08/18/23  0701   WBC 9.1 11.4* 10.8       - Heme: Acute blood loss anemia and thrombocytopenia due to surgery. Hgb and PLT stable. Trend CBC, transfuse PRN. One unit of pRBCs on 8/18 for Hgb 7.2 given persistent pressor requirement. Did receive plavix load day of surgery, received one dose of platelets.  Recent Labs   Lab 08/19/23  0355 08/18/23  1144 08/18/23  0701   HGB 7.9* 8.3* 7.2*    151 136*     - Proph: SCD, subcutaneous heparin, PPI    - Dispo: Encouraged IS/TCDB/amb. Sternal precautions. Continue gentle diureis, start lopressor 12.5 mg PO bid with parameters and flomax. Will transfer out of the ICU. Awaiting CXR. Ok to shower. Looking at likely discharge in 1-2 days. Continue to monitor.      Interval History  States pain is well managed on current regimen, slept well. Breathing is stable on room air, working with IS. Tolerating diet, is passing flatus, +BM, no n/v. Denies chest pain, palpitations, dizziness, syncopal symptoms, chills, myalgias, sternal popping/clicking. Ambulated x 2 yesterday.      Medications   aspirin  162 mg Oral or NG Tube Daily    cefTRIAXone  1 g Intravenous Q24H    heparin ANTICOAGULANT  5,000 Units Subcutaneous Q8H    insulin aspart  1-7 Units Subcutaneous TID AC    insulin aspart  1-5 Units Subcutaneous At Bedtime    pantoprazole  40 mg Oral or NG Tube Daily    Or    pantoprazole  40 mg Oral Daily    polyethylene glycol  17 g Oral Daily    rosuvastatin  10 mg Oral Daily    senna-docusate  1 tablet Oral BID    sodium chloride (PF)  3 mL Intracatheter Q8H     acetaminophen, bisacodyl, calcium gluconate, calcium gluconate, calcium gluconate, glucose **OR** dextrose **OR** glucagon, HOLD MEDICATION, HOLD MEDICATION, hydrALAZINE, HYDROmorphone **OR** HYDROmorphone, lactated ringers, lidocaine 4%, lidocaine  (buffered or not buffered), magnesium hydroxide, naloxone **OR** naloxone **OR** naloxone **OR** naloxone, norepinephrine, ondansetron **OR** ondansetron, oxyCODONE **OR** oxyCODONE, phenylephrine, prochlorperazine **OR** prochlorperazine, sodium chloride (PF)      Physical Exam  Vitals were reviewed  Blood pressure 92/65, pulse 87, temperature 98.4  F (36.9  C), temperature source Axillary, resp. rate 10, height 1.829 m (6'), weight 142 kg (313 lb 0.9 oz), SpO2 93 %.  Gen: lying in bed, comfortable, +CPAP    Lungs: diminished throughout     Cardiovascular: IRRR, telemetry afib 90s, -edema LE    Abdomen: BS+, NT/ND, soft    Derm: sternal incision D/I  L LE incisions D/I    CT: removed    Weight:   Vitals:    08/15/23 0656 08/16/23 0100 08/17/23 0445 08/18/23 0500   Weight: 133.8 kg (295 lb) 138.7 kg (305 lb 12.5 oz) 142.2 kg (313 lb 7.9 oz) 141.7 kg (312 lb 6.3 oz)    08/19/23 0630   Weight: 142 kg (313 lb 0.9 oz)         Data  Recent Labs   Lab 08/19/23  0355 08/18/23  2203 08/18/23  1837 08/18/23  1144 08/18/23  0814 08/18/23  0701 08/17/23  1056 08/17/23  0834 08/17/23  0615 08/17/23  0433 08/16/23  0755 08/16/23  0639 08/15/23  2127 08/15/23  2043 08/15/23  1802 08/15/23  1757   WBC 9.1  --   --  11.4*  --  10.8  --   --   --  13.1*  --  13.7*   < > 19.0*  --  14.9*   HGB 7.9*  --   --  8.3*  --  7.2*  --   --   --  7.7*  --  8.2*   < > 9.1*   < > 8.3*   MCV 93  --   --  93  --  93  --   --   --  92  --  91   < > 94  --  92     --   --  151  --  136*  --   --   --  137*  --  181   < > 197  --  148*   INR  --   --   --   --   --   --   --  1.37*  --   --   --   --   --  1.47*  --  1.79*     --   --   --   --  139  --   --   --  140  --  141  --  141   < > 141   POTASSIUM 3.9  --   --   --   --  4.3  --   --   --  4.2  --  3.8  --  3.7   < > 4.3   CHLORIDE 104  --   --   --   --  107  --   --   --  107  --  107  --  105  --  106   CO2 22  --   --   --   --  22  --   --   --  23  --  22  --  18*  --   23   BUN 26.5*  --   --   --   --  23.3*  --   --   --  19.9  --  16.4  --  15.8  --  14.6   CR 0.58*  --   --   --   --  0.57*  --   --   --  0.58*  --  0.71  --  0.66*  --  0.56*   ANIONGAP 11  --   --   --   --  10  --   --   --  10  --  12  --  18*  --  12   BEBO 8.0*  --   --   --   --  7.8*  --   --   --  8.0*  --  8.1*  --  8.2*  --  8.0*   * 106* 109*  --    < > 117*   < >  --    < > 105*   < > 129*   < > 213*   < > 155*   ALBUMIN  --   --   --   --   --   --   --   --   --   --   --  3.5  --  3.2*  --  2.8*   PROTTOTAL  --   --   --   --   --   --   --   --   --   --   --  5.0*  --  4.8*  --  4.5*   BILITOTAL  --   --   --   --   --   --   --   --   --   --   --  0.5  --  0.7  --  0.4   ALKPHOS  --   --   --   --   --   --   --   --   --   --   --  54  --  66  --  62   ALT  --   --   --   --   --   --   --   --   --   --   --  11  --  13  --  12   AST  --   --   --   --   --   --   --   --   --   --   --  22  --  24  --  21    < > = values in this interval not displayed.         Imaging:  No results found for this or any previous visit (from the past 24 hour(s)).    Patient seen and discussed with Dr Gabi Dick PA-C  Cardiothoracic Surgery  Pager: [phone]

## 2023-08-19 NOTE — PLAN OF CARE
Goal Outcome Evaluation:     Pt A&Ox3.  Intermittent hypotension, shama gtt resumed to meet goal of>65.Pt up to chair most of shift.  Tolerates food and fluids with encouragement. Pt performs [place] w/encouragement. Quach dc'd, due to void.  Bladder scan at 1830 55cc.

## 2023-08-19 NOTE — PLAN OF CARE
Oxygen: 3LNC to CPAP PRN  Continuous Drips: None (phenylephrine off 1915)  RASS: 0  Pain/CPOT: Oxycodone 5mg given PRN x1 dose  Mobility: Up with 2 assist, gait belt, walker  Restraints: NA  Lines: All removed with exception of peripheral IV (x1)  Quach: None, one episode incontinence, bladder scan @ 0400 was 247 mL  Skin: No change, all incisions approximated, no drainage  Diet/Bowel: Had large, formed BM start of shift, denies nausea, denies appetite  DVT/GI Prophylaxis: Protonix, subcutaneous heparin given as ordered, PCDs applied  Glucose: Did not meet subcutaneous insulin parameters  Antibiotics: Rocephin ordered  Events: Pt slept between care, no acute events overnight  Social: Daughter and son at bedside start of shift, hopeful for patient transfer out of ICU

## 2023-08-20 ENCOUNTER — APPOINTMENT (OUTPATIENT)
Dept: PHYSICAL THERAPY | Facility: CLINIC | Age: 78
DRG: 231 | End: 2023-08-20
Payer: COMMERCIAL

## 2023-08-20 LAB
GLUCOSE BLDC GLUCOMTR-MCNC: 100 MG/DL (ref 70–99)
GLUCOSE BLDC GLUCOMTR-MCNC: 123 MG/DL (ref 70–99)

## 2023-08-20 PROCEDURE — 97110 THERAPEUTIC EXERCISES: CPT | Mod: GP | Performed by: PHYSICAL THERAPIST

## 2023-08-20 PROCEDURE — 250N000013 HC RX MED GY IP 250 OP 250 PS 637: Performed by: PHYSICIAN ASSISTANT

## 2023-08-20 PROCEDURE — 94799 UNLISTED PULMONARY SVC/PX: CPT

## 2023-08-20 PROCEDURE — 120N000001 HC R&B MED SURG/OB

## 2023-08-20 PROCEDURE — 250N000011 HC RX IP 250 OP 636: Performed by: NURSE PRACTITIONER

## 2023-08-20 PROCEDURE — 97530 THERAPEUTIC ACTIVITIES: CPT | Mod: GP | Performed by: PHYSICAL THERAPIST

## 2023-08-20 PROCEDURE — 250N000013 HC RX MED GY IP 250 OP 250 PS 637: Performed by: NURSE PRACTITIONER

## 2023-08-20 PROCEDURE — 250N000011 HC RX IP 250 OP 636: Mod: JZ | Performed by: PHYSICIAN ASSISTANT

## 2023-08-20 PROCEDURE — 250N000011 HC RX IP 250 OP 636: Mod: JZ | Performed by: NURSE PRACTITIONER

## 2023-08-20 RX ORDER — FUROSEMIDE 10 MG/ML
20 INJECTION INTRAMUSCULAR; INTRAVENOUS DAILY
Status: DISCONTINUED | OUTPATIENT
Start: 2023-08-20 | End: 2023-08-21

## 2023-08-20 RX ADMIN — FUROSEMIDE 20 MG: 10 INJECTION, SOLUTION INTRAMUSCULAR; INTRAVENOUS at 10:29

## 2023-08-20 RX ADMIN — TAMSULOSIN HYDROCHLORIDE 0.4 MG: 0.4 CAPSULE ORAL at 08:04

## 2023-08-20 RX ADMIN — ROSUVASTATIN CALCIUM 10 MG: 10 TABLET, FILM COATED ORAL at 08:03

## 2023-08-20 RX ADMIN — ASPIRIN 81 MG 162 MG: 81 TABLET ORAL at 08:04

## 2023-08-20 RX ADMIN — CEFTRIAXONE SODIUM 1 G: 1 INJECTION, POWDER, FOR SOLUTION INTRAMUSCULAR; INTRAVENOUS at 08:06

## 2023-08-20 RX ADMIN — HEPARIN SODIUM 5000 UNITS: 5000 INJECTION, SOLUTION INTRAVENOUS; SUBCUTANEOUS at 14:09

## 2023-08-20 RX ADMIN — PANTOPRAZOLE SODIUM 40 MG: 40 TABLET, DELAYED RELEASE ORAL at 08:04

## 2023-08-20 RX ADMIN — HEPARIN SODIUM 5000 UNITS: 5000 INJECTION, SOLUTION INTRAVENOUS; SUBCUTANEOUS at 05:23

## 2023-08-20 RX ADMIN — POLYETHYLENE GLYCOL 3350 17 G: 17 POWDER, FOR SOLUTION ORAL at 08:04

## 2023-08-20 RX ADMIN — METOPROLOL TARTRATE 12.5 MG: 25 TABLET, FILM COATED ORAL at 20:32

## 2023-08-20 RX ADMIN — SENNOSIDES AND DOCUSATE SODIUM 1 TABLET: 50; 8.6 TABLET ORAL at 08:04

## 2023-08-20 RX ADMIN — HEPARIN SODIUM 5000 UNITS: 5000 INJECTION, SOLUTION INTRAVENOUS; SUBCUTANEOUS at 23:49

## 2023-08-20 RX ADMIN — METOPROLOL TARTRATE 12.5 MG: 25 TABLET, FILM COATED ORAL at 08:03

## 2023-08-20 ASSESSMENT — ACTIVITIES OF DAILY LIVING (ADL)
ADLS_ACUITY_SCORE: 54
ADLS_ACUITY_SCORE: 50
ADLS_ACUITY_SCORE: 54

## 2023-08-20 NOTE — PLAN OF CARE
Plan of Care Reviewed With: patient    Overall Patient Progress: improvingOverall Patient Progress: improving    Orientations: A&Ox4  Vitals/Pain: VSS on RA. Pt denies pain  Tele: controlled Afib with PVC  Lines/Drains: PIV   Skin/Wounds: Sternal incision CDI, 4 harvest sites  GI/: Voiding w/urgency. Lasix given, BM+  Ambulation/Assist: A1 GBW, needs A2 when ambulating in hallway (ambulated x3)  Plan: Possible discharge tomorrow, pending safe discharge plan.

## 2023-08-20 NOTE — PLAN OF CARE
Goal Outcome Evaluation:    [id number]  COGNITION/MENTATION: A/O x 4, [place], bilateral hearing aides   NEURO/CMS: Cool extremities, bilat weak DP; trace bilat ankle edema  CARDIAC/TELE: A fib CVR w/PVC  RESPIRATORY: On RA, LS diminished at bases otherwise clear. Home CPAP at HS.   GI: BS+, obese, last BM    : Voiding in BR, incontinent at times  PAIN: Denies  SKIN: Scattered bruising. Sternal incision, LLE harvest sites. Redness to bridge of nose.   DRAINS/LINES: PIV SL  ACTIVITY: A of 2 GBW  DIET: Regular     B, 94, 100.

## 2023-08-20 NOTE — PROGRESS NOTES
[facility]  Cardiovascular and Thoracic Surgery Daily Note    Assessment and Plan    POD #5 s/p CABG x1 (vein graft to PDA), endoscopic vein harvest, aortotomy and removal of embolized/ un-retrievable right coronary stent + delivery system, occlusion of left atrial appendage (45 mm Atriclip) on 8/15/23 with Dr. Michael Mulvihill.    - CVS: Pre-op TTE with LVEF 35%, 1-2+ MR, moderate aortic stenosis, mildy dilated ascending aorta at 4.3 cm.   HD stable, off of all gtt, One unit of pRBCs on 8/18 for Hgb 7.2, continue lasix 20 mg IV for hypervolemia. Started on amio gtt protocol due to afib RVR. Continues afib with rates in 90s. Chronic afib and no further amio. Has history of afib on apixaban, in controlled afib.  mg daily, statin. Lopressor 12.5 mg PO bid with parameters. PTA meds on hold: Entresto, apixaban, jardiance, toprol. CTs and TPWs  removed 8/18.    - Resp: Extubated POD #1 at 1550, saturating well on room air, CPAP at HS, encourage pulmonary toilet.     - Neuro: Neuros intact, Pain appears well controlled on current regimen    - Renal: No history of significant renal disease, BL creat ~0.7. Cr stable. Trend BMP. Diuretic as above.  Recent Labs   Lab 08/19/23  0355 08/18/23  0701 08/17/23  0433   CR 0.58* 0.57* 0.58*     - GI: +BMs, +flatus, continue bowel regimen.     - : Quach removed. Voiding with no issues. Restart flomax     - Endo: transitioned to sliding scale insulin   Hemoglobin A1C   Date Value Ref Range Status   02/21/2023 5.4 0.0 - 5.6 % Final     Comment:     Normal <5.7%   Prediabetes 5.7-6.4%    Diabetes 6.5% or higher     Note: Adopted from ADA consensus guidelines.   11/19/2020 5.8 (H) 0 - 5.6 % Final     Comment:     Normal <5.7% Prediabetes 5.7-6.4%  Diabetes 6.5% or higher - adopted from ADA   consensus guidelines.          - FEN: Replace electrolytes as needed. Start clears and advance as tolerated    - ID: Afebrile, +UA - start Ceftriaxone plan for 3 days -  finished. Blood cultures obtained 8/18 (neg x 2 for 12 hours). Periop abx completed. Trend CBC, fever curve.   Recent Labs   Lab 08/19/23  0355 08/18/23  1144 08/18/23  0701   WBC 9.1 11.4* 10.8       - Heme: Acute blood loss anemia and thrombocytopenia due to surgery. Hgb and PLT stable. Trend CBC, transfuse PRN. One unit of pRBCs on 8/18 for Hgb 7.2 given persistent pressor requirement. Did receive plavix load day of surgery, received one dose of platelets.  Recent Labs   Lab 08/19/23  0355 08/18/23  1144 08/18/23  0701   HGB 7.9* 8.3* 7.2*    151 136*     - Proph: SCD, subcutaneous heparin, PPI    - Dispo: Encouraged IS/TCDB/amb. Sternal precautions. Continue gentle diureis, continue lopressor 12.5 mg PO bid with parameters and flomax. Ok to shower. Looking at likely discharge tomorrow pending strength. Continue to monitor.      Interval History  States pain is well managed on current regimen, slept well. Breathing is stable on room air, working with IS. Tolerating diet, is passing flatus, +BM, no n/v. Denies chest pain, palpitations, dizziness, syncopal symptoms, chills, myalgias, sternal popping/clicking. Ambulating      Medications   aspirin  162 mg Oral or NG Tube Daily    furosemide  20 mg Intravenous Daily    heparin ANTICOAGULANT  5,000 Units Subcutaneous Q8H    metoprolol tartrate  12.5 mg Oral BID    pantoprazole  40 mg Oral Daily    rosuvastatin  10 mg Oral Daily    senna-docusate  1 tablet Oral BID    sodium chloride (PF)  3 mL Intracatheter Q8H    tamsulosin  0.4 mg Oral Daily     acetaminophen, bisacodyl, glucose **OR** dextrose **OR** glucagon, hydrALAZINE, HYDROmorphone **OR** HYDROmorphone, lidocaine 4%, lidocaine (buffered or not buffered), magnesium hydroxide, naloxone **OR** naloxone **OR** naloxone **OR** naloxone, ondansetron **OR** ondansetron, oxyCODONE **OR** [DISCONTINUED] oxyCODONE, prochlorperazine **OR** prochlorperazine, sodium chloride (PF)      Physical Exam  Vitals were  reviewed  Blood pressure 97/63, pulse 108, temperature 97.7  F (36.5  C), temperature source Oral, resp. rate 20, height 1.829 m (6'), weight 142.9 kg (315 lb 1.6 oz), SpO2 97 %.  Gen: up in chair, comfortable, -O2    Lungs: CTA, IS 1500 ml    Cardiovascular: IRRR, telemetry afib 90s, -edema LE    Abdomen: BS+, NT/ND, soft    Derm: sternal incision D/I  L LE incisions D/I    CT: removed    Weight:   Vitals:    08/16/23 0100 08/17/23 0445 08/18/23 0500 08/19/23 0630   Weight: 138.7 kg (305 lb 12.5 oz) 142.2 kg (313 lb 7.9 oz) 141.7 kg (312 lb 6.3 oz) 142 kg (313 lb 0.9 oz)    08/20/23 0502   Weight: 142.9 kg (315 lb 1.6 oz)         Data  Recent Labs   Lab 08/20/23  0738 08/20/23  0129 08/19/23  2120 08/19/23  0806 08/19/23  0355 08/18/23  1837 08/18/23  1144 08/18/23  0814 08/18/23  0701 08/17/23  1056 08/17/23  0834 08/17/23  0615 08/17/23  0433 08/16/23  0755 08/16/23  0639 08/15/23  2127 08/15/23  2043 08/15/23  1802 08/15/23  1757   WBC  --   --   --   --  9.1  --  11.4*  --  10.8  --   --   --  13.1*  --  13.7*   < > 19.0*  --  14.9*   HGB  --   --   --   --  7.9*  --  8.3*  --  7.2*  --   --   --  7.7*  --  8.2*   < > 9.1*   < > 8.3*   MCV  --   --   --   --  93  --  93  --  93  --   --   --  92  --  91   < > 94  --  92   PLT  --   --   --   --  159  --  151  --  136*  --   --   --  137*  --  181   < > 197  --  148*   INR  --   --   --   --   --   --   --   --   --   --  1.37*  --   --   --   --   --  1.47*  --  1.79*   NA  --   --   --   --  137  --   --   --  139  --   --   --  140  --  141  --  141   < > 141   POTASSIUM  --   --   --   --  3.9  --   --   --  4.3  --   --   --  4.2  --  3.8  --  3.7   < > 4.3   CHLORIDE  --   --   --   --  104  --   --   --  107  --   --   --  107  --  107  --  105  --  106   CO2  --   --   --   --  22  --   --   --  22  --   --   --  23  --  22  --  18*  --  23   BUN  --   --   --   --  26.5*  --   --   --  23.3*  --   --   --  19.9  --  16.4  --  15.8  --  14.6   CR  --    --   --   --  0.58*  --   --   --  0.57*  --   --   --  0.58*  --  0.71  --  0.66*  --  0.56*   ANIONGAP  --   --   --   --  11  --   --   --  10  --   --   --  10  --  12  --  18*  --  12   BEBO  --   --   --   --  8.0*  --   --   --  7.8*  --   --   --  8.0*  --  8.1*  --  8.2*  --  8.0*   * 100* 94   < > 113*   < >  --    < > 117*   < >  --    < > 105*   < > 129*   < > 213*   < > 155*   ALBUMIN  --   --   --   --   --   --   --   --   --   --   --   --   --   --  3.5  --  3.2*  --  2.8*   PROTTOTAL  --   --   --   --   --   --   --   --   --   --   --   --   --   --  5.0*  --  4.8*  --  4.5*   BILITOTAL  --   --   --   --   --   --   --   --   --   --   --   --   --   --  0.5  --  0.7  --  0.4   ALKPHOS  --   --   --   --   --   --   --   --   --   --   --   --   --   --  54  --  66  --  62   ALT  --   --   --   --   --   --   --   --   --   --   --   --   --   --  11  --  13  --  12   AST  --   --   --   --   --   --   --   --   --   --   --   --   --   --  22  --  24  --  21    < > = values in this interval not displayed.         Imaging:  No results found for this or any previous visit (from the past 24 hour(s)).    Patient seen and discussed with Dr Gabi Dick PA-C  Cardiothoracic Surgery  Pager: [phone]

## 2023-08-21 ENCOUNTER — APPOINTMENT (OUTPATIENT)
Dept: PHYSICAL THERAPY | Facility: CLINIC | Age: 78
DRG: 231 | End: 2023-08-21
Payer: COMMERCIAL

## 2023-08-21 PROCEDURE — 250N000013 HC RX MED GY IP 250 OP 250 PS 637: Performed by: PHYSICIAN ASSISTANT

## 2023-08-21 PROCEDURE — 120N000001 HC R&B MED SURG/OB

## 2023-08-21 PROCEDURE — 97530 THERAPEUTIC ACTIVITIES: CPT | Mod: GP

## 2023-08-21 PROCEDURE — 97110 THERAPEUTIC EXERCISES: CPT | Mod: GP

## 2023-08-21 PROCEDURE — 250N000011 HC RX IP 250 OP 636: Mod: JZ | Performed by: PHYSICIAN ASSISTANT

## 2023-08-21 PROCEDURE — 250N000011 HC RX IP 250 OP 636: Mod: JZ | Performed by: NURSE PRACTITIONER

## 2023-08-21 PROCEDURE — 250N000013 HC RX MED GY IP 250 OP 250 PS 637: Performed by: NURSE PRACTITIONER

## 2023-08-21 RX ORDER — FLUTICASONE PROPIONATE 50 MCG
1 SPRAY, SUSPENSION (ML) NASAL DAILY
Status: DISCONTINUED | OUTPATIENT
Start: 2023-08-21 | End: 2023-08-22 | Stop reason: HOSPADM

## 2023-08-21 RX ADMIN — FLUTICASONE PROPIONATE 1 SPRAY: 50 SPRAY, METERED NASAL at 16:37

## 2023-08-21 RX ADMIN — PANTOPRAZOLE SODIUM 40 MG: 40 TABLET, DELAYED RELEASE ORAL at 09:17

## 2023-08-21 RX ADMIN — ASPIRIN 81 MG 162 MG: 81 TABLET ORAL at 09:17

## 2023-08-21 RX ADMIN — FUROSEMIDE 20 MG: 10 INJECTION, SOLUTION INTRAMUSCULAR; INTRAVENOUS at 09:16

## 2023-08-21 RX ADMIN — HEPARIN SODIUM 5000 UNITS: 5000 INJECTION, SOLUTION INTRAVENOUS; SUBCUTANEOUS at 13:57

## 2023-08-21 RX ADMIN — ROSUVASTATIN CALCIUM 10 MG: 10 TABLET, FILM COATED ORAL at 09:17

## 2023-08-21 RX ADMIN — SENNOSIDES AND DOCUSATE SODIUM 1 TABLET: 50; 8.6 TABLET ORAL at 09:17

## 2023-08-21 RX ADMIN — SENNOSIDES AND DOCUSATE SODIUM 1 TABLET: 50; 8.6 TABLET ORAL at 20:45

## 2023-08-21 RX ADMIN — HEPARIN SODIUM 5000 UNITS: 5000 INJECTION, SOLUTION INTRAVENOUS; SUBCUTANEOUS at 06:44

## 2023-08-21 RX ADMIN — HEPARIN SODIUM 5000 UNITS: 5000 INJECTION, SOLUTION INTRAVENOUS; SUBCUTANEOUS at 21:01

## 2023-08-21 RX ADMIN — METOPROLOL TARTRATE 12.5 MG: 25 TABLET, FILM COATED ORAL at 20:45

## 2023-08-21 RX ADMIN — METOPROLOL TARTRATE 12.5 MG: 25 TABLET, FILM COATED ORAL at 09:17

## 2023-08-21 RX ADMIN — TAMSULOSIN HYDROCHLORIDE 0.4 MG: 0.4 CAPSULE ORAL at 09:17

## 2023-08-21 ASSESSMENT — ACTIVITIES OF DAILY LIVING (ADL)
ADLS_ACUITY_SCORE: 54

## 2023-08-21 NOTE — PLAN OF CARE
Orientation: A&Ox4. [place], bilateral hearing aids.   Vitals/Pain: VSS ex soft BP, on RA, CPAP at HS. Denies pain.   Tele: Afib CVR/RVR w/PVC's.  Diet: Regular.  Lungs: Dim LS, SAVAGE, reports some SOB. Encouraging IS & acapella use.   Lines/Drains: PIV SL.   Skin/Wounds: Sternal incision, CT removal site, LLE harvest sites LIZZY & WNL. R FA dressing CDI. +2 BLE edema, cool extremities.   GI/: AUO. Gave scheduled lasix. +BM.   Ambulation/Assist: Up A1 gb+w to BR, sat in chair, ambulated in hallway.   Plan: Echo ordered. CM/SW consulted. Possible discharge tomorrow.

## 2023-08-21 NOTE — PLAN OF CARE
Goal Outcome Evaluation:    [id number]  COGNITION/MENTATION: A/O x 4, [place], bilateral hearing aides    NEURO/CMS: +2 BLE edema, cool extremities   ABNL. VITALS: Soft BP   CARDIAC/TELE: A fib CVR w/PVC's   RESPIRATORY: RA, CPAP at HS   GI: BS+, reports last BM 8/20   : Incontinent at times, using graduated cylinder w/help as urinal   PAIN: Denies   SKIN: Sternal incision, LLE harvest site  DRAINS/LINES: PIV SL   ACTIVITY: A of 1,   DIET: Regular, poor appetite     Discharge pending safe discharge

## 2023-08-21 NOTE — PROGRESS NOTES
[facility]  Cardiovascular and Thoracic Surgery Daily Note    Assessment and Plan    POD #6 s/p CABG x1 (vein graft to PDA), endoscopic vein harvest, aortotomy and removal of embolized/ un-retrievable right coronary stent + delivery system, occlusion of left atrial appendage (45 mm Atriclip) on 8/15/23 with Dr. Michael Mulvihill.    - CVS: Pre-op TTE with LVEF 35%, 1-2+ MR, moderate aortic stenosis, mildy dilated ascending aorta at 4.3 cm.   HD stable overnight in CAF. One unit of pRBCs on 8/18 for Hgb 7.2, continue lasix 20 mg IV for hypervolemia. Started on amio gtt protocol due to afib RVR. Continues afib with rates in 90s. Chronic afib and no further amio. Has history of afib on apixaban, in controlled afib.  mg daily, statin. Lopressor 12.5 mg PO bid with parameters. BP too soft to restart HF regimen at this time. CORE follow up outpatient. PTA meds on hold: Entresto, apixaban, jardiance, toprol. CTs and TPWs removed 8/18. Postop baseline echo ordered 8/21 given low LVEF.    - Resp: Extubated POD #1 at 1550, saturating well on room air, CPAP at HS, encourage pulmonary toilet.     - Neuro: Neuros intact, pain well controlled on current regimen    - Renal: No history of significant renal disease, BL creat ~0.7. Cr stable. Trend BMP. Diuretic as above.  Recent Labs   Lab 08/19/23  0355 08/18/23  0701 08/17/23  0433   CR 0.58* 0.57* 0.58*     - GI: +BMs, +flatus, continue bowel regimen.     - : Quach removed. Voiding with no issues. Restarted flomax     - Endo: transitioned to sliding scale insulin   Hemoglobin A1C   Date Value Ref Range Status   02/21/2023 5.4 0.0 - 5.6 % Final     Comment:     Normal <5.7%   Prediabetes 5.7-6.4%    Diabetes 6.5% or higher     Note: Adopted from ADA consensus guidelines.   11/19/2020 5.8 (H) 0 - 5.6 % Final     Comment:     Normal <5.7% Prediabetes 5.7-6.4%  Diabetes 6.5% or higher - adopted from ADA   consensus guidelines.          - FEN: Replace  electrolytes as needed. Start clears and advance as tolerated    - ID: Afebrile, +UA, Ceftriaxone plan for 3 days - finished. Blood cultures obtained 8/18 (NGTD). Periop abx completed. Trend CBC, fever curve.   Recent Labs   Lab 08/19/23  0355 08/18/23  1144 08/18/23  0701   WBC 9.1 11.4* 10.8       - Heme: Acute blood loss anemia and thrombocytopenia due to surgery. Hgb and PLT stable. Trend CBC, transfuse PRN. One unit of pRBCs on 8/18 for Hgb 7.2 given persistent pressor requirement. Did receive plavix load day of surgery, received one dose of platelets.  Recent Labs   Lab 08/19/23  0355 08/18/23  1144 08/18/23  0701   HGB 7.9* 8.3* 7.2*    151 136*     - Proph: SCD, subcutaneous heparin, PPI    - Dispo: Therapies recommending home with assist, pt does not feel ready today. Will discuss with pt/family dispo plans and plan for discharge tomorrow.      Interval History  States pain is well managed on current regimen, slept quite poorly last night. Breathing is stable on room air, working with IS. Tolerating diet, is passing flatus, +BM, no n/v. Denies chest pain, palpitations, dizziness, syncopal symptoms, chills, myalgias, sternal popping/clicking. Ambulating with walker, does not want to go home today, doesn't feel ready.      Medications   aspirin  162 mg Oral or NG Tube Daily    furosemide  20 mg Intravenous Daily    heparin ANTICOAGULANT  5,000 Units Subcutaneous Q8H    metoprolol tartrate  12.5 mg Oral BID    pantoprazole  40 mg Oral Daily    rosuvastatin  10 mg Oral Daily    senna-docusate  1 tablet Oral BID    sodium chloride (PF)  3 mL Intracatheter Q8H    tamsulosin  0.4 mg Oral Daily     acetaminophen, bisacodyl, glucose **OR** dextrose **OR** glucagon, hydrALAZINE, HYDROmorphone **OR** HYDROmorphone, lidocaine 4%, lidocaine (buffered or not buffered), magnesium hydroxide, naloxone **OR** naloxone **OR** naloxone **OR** naloxone, ondansetron **OR** ondansetron, oxyCODONE **OR** [DISCONTINUED]  oxyCODONE, prochlorperazine **OR** prochlorperazine, sodium chloride (PF)      Physical Exam  Vitals were reviewed  Blood pressure 98/76, pulse (Abnormal) 126, temperature 97.8  F (36.6  C), temperature source Oral, resp. rate 20, height 1.829 m (6'), weight 142.2 kg (313 lb 9.6 oz), SpO2 97 %.  Gen: up in chair, comfortable, -O2    Lungs: CTA    Cardiovascular: IRRR, telemetry afib 70-80s, -edema LE    Abdomen: BS+, NT/ND, soft    Derm: sternal incision D/I  L LE incisions D/I    CT: removed    Weight:   Vitals:    08/17/23 0445 08/18/23 0500 08/19/23 0630 08/20/23 0502   Weight: 142.2 kg (313 lb 7.9 oz) 141.7 kg (312 lb 6.3 oz) 142 kg (313 lb 0.9 oz) 142.9 kg (315 lb 1.6 oz)    08/21/23 0121   Weight: 142.2 kg (313 lb 9.6 oz)         Data  Recent Labs   Lab 08/20/23  0738 08/20/23  0129 08/19/23  2120 08/19/23  0806 08/19/23  0355 08/18/23  1837 08/18/23  1144 08/18/23  0814 08/18/23  0701 08/17/23  1056 08/17/23  0834 08/17/23  0615 08/17/23  0433 08/16/23  0755 08/16/23  0639 08/15/23  2127 08/15/23  2043 08/15/23  1802 08/15/23  1757   WBC  --   --   --   --  9.1  --  11.4*  --  10.8  --   --   --  13.1*  --  13.7*   < > 19.0*  --  14.9*   HGB  --   --   --   --  7.9*  --  8.3*  --  7.2*  --   --   --  7.7*  --  8.2*   < > 9.1*   < > 8.3*   MCV  --   --   --   --  93  --  93  --  93  --   --   --  92  --  91   < > 94  --  92   PLT  --   --   --   --  159  --  151  --  136*  --   --   --  137*  --  181   < > 197  --  148*   INR  --   --   --   --   --   --   --   --   --   --  1.37*  --   --   --   --   --  1.47*  --  1.79*   NA  --   --   --   --  137  --   --   --  139  --   --   --  140  --  141  --  141   < > 141   POTASSIUM  --   --   --   --  3.9  --   --   --  4.3  --   --   --  4.2  --  3.8  --  3.7   < > 4.3   CHLORIDE  --   --   --   --  104  --   --   --  107  --   --   --  107  --  107  --  105  --  106   CO2  --   --   --   --  22  --   --   --  22  --   --   --  23  --  22  --  18*  --  23   BUN   --   --   --   --  26.5*  --   --   --  23.3*  --   --   --  19.9  --  16.4  --  15.8  --  14.6   CR  --   --   --   --  0.58*  --   --   --  0.57*  --   --   --  0.58*  --  0.71  --  0.66*  --  0.56*   ANIONGAP  --   --   --   --  11  --   --   --  10  --   --   --  10  --  12  --  18*  --  12   BEBO  --   --   --   --  8.0*  --   --   --  7.8*  --   --   --  8.0*  --  8.1*  --  8.2*  --  8.0*   * 100* 94   < > 113*   < >  --    < > 117*   < >  --    < > 105*   < > 129*   < > 213*   < > 155*   ALBUMIN  --   --   --   --   --   --   --   --   --   --   --   --   --   --  3.5  --  3.2*  --  2.8*   PROTTOTAL  --   --   --   --   --   --   --   --   --   --   --   --   --   --  5.0*  --  4.8*  --  4.5*   BILITOTAL  --   --   --   --   --   --   --   --   --   --   --   --   --   --  0.5  --  0.7  --  0.4   ALKPHOS  --   --   --   --   --   --   --   --   --   --   --   --   --   --  54  --  66  --  62   ALT  --   --   --   --   --   --   --   --   --   --   --   --   --   --  11  --  13  --  12   AST  --   --   --   --   --   --   --   --   --   --   --   --   --   --  22  --  24  --  21    < > = values in this interval not displayed.         Imaging:  No results found for this or any previous visit (from the past 24 hour(s)).    Patient seen and discussed with Dr Mulvihill Leah Jensen, BIENVENIDO, ACN-AG, CCRN  Nurse Practitioner  Cardiothoracic Surgery  Pager: [phone]

## 2023-08-21 NOTE — PROVIDER NOTIFICATION
MD Notification    Notified Person: MD    Notified Person Name: Carlie Monroe    Notification Date/Time: 8/21/23 @1402    Notification Interaction: Texted    Purpose of Notification: Pt is c/o of a cold. He's been sneezing a lot. Can you please place PRN med orders?    Orders Received: Flonase ordered.

## 2023-08-22 ENCOUNTER — APPOINTMENT (OUTPATIENT)
Dept: CARDIOLOGY | Facility: CLINIC | Age: 78
DRG: 231 | End: 2023-08-22
Attending: NURSE PRACTITIONER
Payer: COMMERCIAL

## 2023-08-22 VITALS
DIASTOLIC BLOOD PRESSURE: 80 MMHG | BODY MASS INDEX: 42.42 KG/M2 | SYSTOLIC BLOOD PRESSURE: 110 MMHG | HEART RATE: 103 BPM | OXYGEN SATURATION: 95 % | HEIGHT: 72 IN | WEIGHT: 313.2 LBS | RESPIRATION RATE: 20 BRPM | TEMPERATURE: 98.3 F

## 2023-08-22 LAB
ANION GAP SERPL CALCULATED.3IONS-SCNC: 13 MMOL/L (ref 7–15)
BUN SERPL-MCNC: 25.3 MG/DL (ref 8–23)
CALCIUM SERPL-MCNC: 8.1 MG/DL (ref 8.8–10.2)
CHLORIDE SERPL-SCNC: 104 MMOL/L (ref 98–107)
CREAT SERPL-MCNC: 0.58 MG/DL (ref 0.67–1.17)
DEPRECATED HCO3 PLAS-SCNC: 22 MMOL/L (ref 22–29)
ERYTHROCYTE [DISTWIDTH] IN BLOOD BY AUTOMATED COUNT: 14.8 % (ref 10–15)
GFR SERPL CREATININE-BSD FRML MDRD: >90 ML/MIN/1.73M2
GLUCOSE SERPL-MCNC: 100 MG/DL (ref 70–99)
HCT VFR BLD AUTO: 24.1 % (ref 40–53)
HGB BLD-MCNC: 7.7 G/DL (ref 13.3–17.7)
LVEF ECHO: NORMAL
MCH RBC QN AUTO: 29.5 PG (ref 26.5–33)
MCHC RBC AUTO-ENTMCNC: 32 G/DL (ref 31.5–36.5)
MCV RBC AUTO: 92 FL (ref 78–100)
PLATELET # BLD AUTO: 234 10E3/UL (ref 150–450)
POTASSIUM SERPL-SCNC: 3.7 MMOL/L (ref 3.4–5.3)
RBC # BLD AUTO: 2.61 10E6/UL (ref 4.4–5.9)
SODIUM SERPL-SCNC: 139 MMOL/L (ref 136–145)
WBC # BLD AUTO: 6.7 10E3/UL (ref 4–11)

## 2023-08-22 PROCEDURE — 80048 BASIC METABOLIC PNL TOTAL CA: CPT | Performed by: NURSE PRACTITIONER

## 2023-08-22 PROCEDURE — 250N000013 HC RX MED GY IP 250 OP 250 PS 637: Performed by: NURSE PRACTITIONER

## 2023-08-22 PROCEDURE — 250N000011 HC RX IP 250 OP 636: Mod: JZ | Performed by: PHYSICIAN ASSISTANT

## 2023-08-22 PROCEDURE — 93325 DOPPLER ECHO COLOR FLOW MAPG: CPT

## 2023-08-22 PROCEDURE — 255N000002 HC RX 255 OP 636: Performed by: STUDENT IN AN ORGANIZED HEALTH CARE EDUCATION/TRAINING PROGRAM

## 2023-08-22 PROCEDURE — 93308 TTE F-UP OR LMTD: CPT | Mod: 26 | Performed by: INTERNAL MEDICINE

## 2023-08-22 PROCEDURE — 85018 HEMOGLOBIN: CPT | Performed by: NURSE PRACTITIONER

## 2023-08-22 PROCEDURE — 250N000013 HC RX MED GY IP 250 OP 250 PS 637: Performed by: PHYSICIAN ASSISTANT

## 2023-08-22 PROCEDURE — 250N000011 HC RX IP 250 OP 636: Performed by: NURSE PRACTITIONER

## 2023-08-22 PROCEDURE — 36415 COLL VENOUS BLD VENIPUNCTURE: CPT | Performed by: NURSE PRACTITIONER

## 2023-08-22 PROCEDURE — 93321 DOPPLER ECHO F-UP/LMTD STD: CPT | Mod: 26 | Performed by: INTERNAL MEDICINE

## 2023-08-22 PROCEDURE — 93321 DOPPLER ECHO F-UP/LMTD STD: CPT

## 2023-08-22 PROCEDURE — 93325 DOPPLER ECHO COLOR FLOW MAPG: CPT | Mod: 26 | Performed by: INTERNAL MEDICINE

## 2023-08-22 RX ORDER — FLUTICASONE PROPIONATE 50 MCG
2 SPRAY, SUSPENSION (ML) NASAL DAILY PRN
Status: DISCONTINUED | OUTPATIENT
Start: 2023-08-22 | End: 2023-08-22 | Stop reason: HOSPADM

## 2023-08-22 RX ORDER — FUROSEMIDE 20 MG
20 TABLET ORAL DAILY
Status: DISCONTINUED | OUTPATIENT
Start: 2023-08-22 | End: 2023-08-22 | Stop reason: HOSPADM

## 2023-08-22 RX ORDER — ACETAMINOPHEN 325 MG/1
650 TABLET ORAL EVERY 4 HOURS PRN
DISCHARGE
Start: 2023-08-22 | End: 2023-08-24 | Stop reason: DRUGHIGH

## 2023-08-22 RX ORDER — ROSUVASTATIN CALCIUM 10 MG/1
10 TABLET, COATED ORAL DAILY
DISCHARGE
Start: 2023-08-22 | End: 2023-08-30

## 2023-08-22 RX ORDER — ASPIRIN 81 MG/1
81 TABLET, CHEWABLE ORAL DAILY
DISCHARGE
Start: 2023-08-22 | End: 2023-11-10

## 2023-08-22 RX ORDER — FERROUS SULFATE 325(65) MG
325 TABLET ORAL
Status: DISCONTINUED | OUTPATIENT
Start: 2023-08-22 | End: 2023-08-22 | Stop reason: HOSPADM

## 2023-08-22 RX ORDER — CEFAZOLIN SODIUM 2 G/100ML
2 INJECTION, SOLUTION INTRAVENOUS EVERY 8 HOURS
Status: COMPLETED | OUTPATIENT
Start: 2023-08-22 | End: 2023-08-22

## 2023-08-22 RX ORDER — CEPHALEXIN 500 MG/1
500 CAPSULE ORAL EVERY 6 HOURS SCHEDULED
Status: DISCONTINUED | OUTPATIENT
Start: 2023-08-22 | End: 2023-08-22 | Stop reason: HOSPADM

## 2023-08-22 RX ORDER — TAMSULOSIN HYDROCHLORIDE 0.4 MG/1
0.4 CAPSULE ORAL EVERY EVENING
DISCHARGE
Start: 2023-08-22 | End: 2023-10-30

## 2023-08-22 RX ORDER — ASPIRIN 81 MG/1
162 TABLET, CHEWABLE ORAL DAILY
DISCHARGE
Start: 2023-08-22 | End: 2023-08-22

## 2023-08-22 RX ORDER — METOPROLOL TARTRATE 25 MG/1
12.5 TABLET, FILM COATED ORAL 2 TIMES DAILY
DISCHARGE
Start: 2023-08-22 | End: 2023-08-30

## 2023-08-22 RX ADMIN — HEPARIN SODIUM 5000 UNITS: 5000 INJECTION, SOLUTION INTRAVENOUS; SUBCUTANEOUS at 05:56

## 2023-08-22 RX ADMIN — HEPARIN SODIUM 5000 UNITS: 5000 INJECTION, SOLUTION INTRAVENOUS; SUBCUTANEOUS at 15:08

## 2023-08-22 RX ADMIN — FUROSEMIDE 20 MG: 20 TABLET ORAL at 09:44

## 2023-08-22 RX ADMIN — PANTOPRAZOLE SODIUM 40 MG: 40 TABLET, DELAYED RELEASE ORAL at 09:44

## 2023-08-22 RX ADMIN — TAMSULOSIN HYDROCHLORIDE 0.4 MG: 0.4 CAPSULE ORAL at 09:44

## 2023-08-22 RX ADMIN — EMPAGLIFLOZIN 10 MG: 10 TABLET, FILM COATED ORAL at 09:44

## 2023-08-22 RX ADMIN — ROSUVASTATIN CALCIUM 10 MG: 10 TABLET, FILM COATED ORAL at 09:44

## 2023-08-22 RX ADMIN — HUMAN ALBUMIN MICROSPHERES AND PERFLUTREN 9 ML: 10; .22 INJECTION, SOLUTION INTRAVENOUS at 11:37

## 2023-08-22 RX ADMIN — FERROUS SULFATE TAB 325 MG (65 MG ELEMENTAL FE) 325 MG: 325 (65 FE) TAB at 09:46

## 2023-08-22 RX ADMIN — ASPIRIN 81 MG 162 MG: 81 TABLET ORAL at 09:44

## 2023-08-22 RX ADMIN — SENNOSIDES AND DOCUSATE SODIUM 1 TABLET: 50; 8.6 TABLET ORAL at 09:44

## 2023-08-22 RX ADMIN — CEFAZOLIN SODIUM 2 G: 2 INJECTION, SOLUTION INTRAVENOUS at 12:13

## 2023-08-22 RX ADMIN — METOPROLOL TARTRATE 12.5 MG: 25 TABLET, FILM COATED ORAL at 09:44

## 2023-08-22 RX ADMIN — FLUTICASONE PROPIONATE 1 SPRAY: 50 SPRAY, METERED NASAL at 09:46

## 2023-08-22 ASSESSMENT — ACTIVITIES OF DAILY LIVING (ADL)
ADLS_ACUITY_SCORE: 56
ADLS_ACUITY_SCORE: 56
ADLS_ACUITY_SCORE: 54
ADLS_ACUITY_SCORE: 56
ADLS_ACUITY_SCORE: 54
ADLS_ACUITY_SCORE: 56
ADLS_ACUITY_SCORE: 54
ADLS_ACUITY_SCORE: 54

## 2023-08-22 NOTE — CONSULTS
Care Management Initial Consult    General Information  Assessment completed with: Hugo Parks  Type of CM/SW Visit: Initial Assessment    Primary Care Provider verified and updated as needed: Yes   Readmission within the last 30 days: no previous admission in last 30 days      Reason for Consult: discharge planning  Advance Care Planning: Advance Care Planning Reviewed: present on chart          Communication Assessment  Patient's communication style: spoken language (English or Bilingual)             Cognitive  Cognitive/Neuro/Behavioral: WDL  Level of Consciousness: alert  Arousal Level: opens eyes spontaneously  Orientation: oriented x 4  Mood/Behavior: calm, cooperative  Best Language: 0 - No aphasia  Speech: clear, spontaneous, logical    Living Environment:   People in home: alone     Current living Arrangements: house      Able to return to prior arrangements: yes       Family/Social Support:  Care provided by: self  Provides care for: no one  Marital Status:   Children          Description of Support System: Supportive    Support Assessment: Adequate family and caregiver support    Current Resources:   Patient receiving home care services:       Community Resources:    Equipment currently used at home: none  Supplies currently used at home:      Employment/Financial:  Employment Status: retired        Financial Concerns: No concerns identified   Referral to Financial Worker: No       Does the patient's insurance plan have a 3 day qualifying hospital stay waiver?  Yes   Will the waiver be used for post-acute placement? Yes    Lifestyle & Psychosocial Needs:  Social Determinants of Health     Tobacco Use: Low Risk  (8/16/2023)    Patient History     Smoking Tobacco Use: Never     Smokeless Tobacco Use: Never     Passive Exposure: Not on file   Alcohol Use: Not on file   Financial Resource Strain: Not on file   Food Insecurity: Not on file   Transportation Needs: Not on file   Physical Activity: Not  on file   Stress: Not on file   Social Connections: Not on file   Intimate Partner Violence: Not on file   Depression: Not at risk (2/21/2023)    PHQ-2     PHQ-2 Score: 0   Housing Stability: Not on file       Functional Status:  Prior to admission patient needed assistance:              Mental Health Status:          Chemical Dependency Status:                Values/Beliefs:  Spiritual, Cultural Beliefs, Baptism Practices, Values that affect care:                 Additional Information:  Writer was consulted for discharge planning with patient. Patient has been recommended to discharge to TCU.    Writer reviewed patient's chart. Hugo Weber is a 78 year old male with a past medical history of chronic systolic heart failure (EF 35%), moderate aortic stenosis, rate controlled atrial fibrillation, hypertension, prostate cancer who presented to [facility] 08/15 for out-patient coronary angiogram to investigate underlying etiology of decreased EF. Coronary angiogram revealed distal RCA stenosis; an attempt was made to stent the lesion, however the stent was unable to be delivered and the stent and stent balloon became dislodged in the RCA and unable to be retrieved percutaneously. CV surgery consulted for stent retrieval and CABG.      Patient is living at home alone. Patient does have support with children but is not able to discharge home alone at this time. Patient will be discharging to TCU. Writer has submitted referrals and [facility] has accepted the patient. Patient will be able to discharge today.    Yovany Pack [facility]  Care Management

## 2023-08-22 NOTE — DISCHARGE SUMMARY
Discharge Summary    Hugo Weber MRN# [id number]   YOB: 1945 Age: 78 year old     Date of Admission:  8/15/2023  Date of Discharge:  8/22/2023  Admitting Physician:  Michael Mulvihill, MD  Discharge Physician:  Mulvihill, Michael, MD  Discharging Service:  Cardiovascular and Thoracic Surgery     Home clinic: [facility]   Primary Provider: Brett Cassidy          Admission Diagnoses:   Cardiomyopathy (H) [I42.9]  Cardiomyopathy, unspecified type (H) [I42.9]          Discharge Diagnosis:     Patient Active Problem List   Diagnosis    Iron deficiency anemia    Colon polyps    Obstructive sleep apnea syndrome    Hyperlipidemia LDL goal <70    Long term current use of anticoagulant therapy - for intermittent a-fib    Advance Care Planning    Total knee replacement status    Calculus of kidney - 1.2 cm stone at the upper pole as of CT urogram fr 1/11/2023    NAFLD (nonalcoholic fatty liver disease)    Morbid obesity due to excess calories (H)    History of hepatitis C    Prediabetes    Paroxysmal atrial fibrillation (H)    Cholelithiasis    Hypertension goal BP (blood pressure) < 140/90    TMJ arthralgia    Nephrolithiasis    OA (osteoarthritis)    First degree AV block    Benign prostatic hyperplasia (BPH) with urinary urge incontinence    Thoracic aortic ectasia (H)    Stroke (H)    CVA (cerebral vascular accident) (H)    Cervical stenosis of spine    Vitamin B12 deficiency (non anemic)    Vitamin D deficiency    Myofascial pain    Permanent atrial fibrillation (H)    History of CVA (cerebrovascular accident)    Small bowel obstruction (H)    NSVT (nonsustained ventricular tachycardia) (H)    Chronic LLQ pain    Elevated prostate specific antigen (PSA)    Prostate cancer (H)    Chronic systolic heart failure (H)    HFrEF (heart failure with reduced ejection fraction) (H)    Aortic valve stenosis, etiology of cardiac valve disease unspecified- moderate 2+ with FREDY = 1.2cm2 on echocardiogram fr  7/14/2023    Mitral [id number]+ on echocardiogram fr 7/14/2023    Cardiomyopathy (H)    Cardiomyopathy, unspecified type (H)    S/P CABG (coronary artery bypass graft)    Fluid overload    Transient hyperglycemia post procedure    Status post ligation of left atrial appendage                Discharge Disposition:     Discharged to TCU           Condition on Discharge:     Discharge condition: Stable   Discharge vitals: Blood pressure 105/59, pulse 85, temperature 97.7  F (36.5  C), temperature source Oral, resp. rate 21, height 1.829 m (6'), weight 142.1 kg (313 lb 3.2 oz), SpO2 95 %.   Code status on discharge: Full Code           Procedures:   On 8/15/23 Mr Weber successfully underwent CABG x1 (vein graft to PDA), Endoscopic Vein [place],  Aortotomy and removal of embolized / un-retrievable right coronary stent + delivery system, Occlusion of left atrial appendage (45 mm Atriclip) by Dr. Verde.           Medications Prior to Admission:     Medications Prior to Admission   Medication Sig Dispense Refill Last Dose    apixaban ANTICOAGULANT (ELIQUIS) 5 MG tablet Take 1 tablet (5 mg) by mouth 2 times daily Appointment needed for additional refills. Please call [phone] to schedule. 180 tablet 3 8/13    empagliflozin (JARDIANCE) 10 MG TABS tablet Take 1 tablet (10 mg) by mouth daily 90 tablet 3 8/11    Ferrous Sulfate (IRON) 325 (65 Fe) MG tablet Take 1 tablet by mouth three times a week (Monday, Wednesday, Friday) 90 tablet 3 8/14/2023    fluticasone (FLONASE) 50 MCG/ACT nasal spray Spray 2 sprays into both nostrils daily as needed for rhinitis or allergies 54.6 mL 3 8/14/2023    pantoprazole (PROTONIX) 40 MG EC tablet Take 40 mg by mouth daily   8/15/2023    senna-docusate (SENOKOT-S/PERICOLACE) 8.6-50 MG tablet Take 1-2 tablets by mouth 2 times daily Take while on oral narcotics to prevent or treat constipation. 30 tablet 0 Past Month    vitamin B-12 (CYANOCOBALAMIN) 1000 MCG tablet Take 1,000 mcg  by mouth daily   8/15/2023    Vitamin D-Vitamin K (K2 PLUS D3) 100-1000 MCG-UNIT TABS Take 1 tablet by mouth daily   8/15/2023    vitamin D3 (CHOLECALCIFEROL) 125 MCG (5000 UT) tablet Take 1 tablet by mouth daily   8/15/2023    vitamin C (ASCORBIC ACID) 1000 MG TABS Take 1,000 mg by mouth daily       [DISCONTINUED] acetaminophen (TYLENOL) 325 MG tablet Take 2 tablets (650 mg) by mouth every 4 hours as needed for other (For optimal non-opioid multimodal pain management to improve pain control.) 100 tablet 0 Past Week    [DISCONTINUED] enoxaparin ANTICOAGULANT (LOVENOX) 120 MG/0.8ML syringe Inject 0.8 mLs (120 mg) Subcutaneous daily Take one dose 48 hours before procedure and second dose 36 hours before proceure 2 mL 0 8/11    [DISCONTINUED] metoprolol succinate ER (TOPROL XL) 50 MG 24 hr tablet Take 1 tablet (50 mg) by mouth daily 90 tablet 3 8/14/2023    [DISCONTINUED] niacin 500 MG tablet Take 500 mg by mouth daily (with breakfast)   8/15/2023    [DISCONTINUED] nitroFURantoin macrocrystal-monohydrate (MACROBID) 100 MG capsule Take 1 capsule (100 mg) by mouth 2 times daily for 5 days 10 capsule 0 8/15/2023    [DISCONTINUED] sacubitril-valsartan (ENTRESTO) 24-26 MG per tablet Take 1 tablet by mouth 2 times daily 180 tablet 3 8/15/2023    [DISCONTINUED] tamsulosin (FLOMAX) 0.4 MG capsule Take 1 capsule (0.4 mg) by mouth 2 times daily 180 capsule 3 8/15/2023             Discharge Medications:     Current Discharge Medication List        START taking these medications    Details   aspirin (ASA) 81 MG chewable tablet 1 tablet (81 mg) by Oral or NG Tube route daily    Associated Diagnoses: S/P CABG (coronary artery bypass graft)      metoprolol tartrate (LOPRESSOR) 25 MG tablet Take 0.5 tablets (12.5 mg) by mouth 2 times daily    Associated Diagnoses: S/P CABG (coronary artery bypass graft)      rosuvastatin (CRESTOR) 10 MG tablet Take 1 tablet (10 mg) by mouth daily    Associated Diagnoses: S/P CABG (coronary artery  bypass graft)           CONTINUE these medications which have CHANGED    Details   acetaminophen (TYLENOL) 325 MG tablet Take 2 tablets (650 mg) by mouth every 4 hours as needed for other (For optimal non-opioid multimodal pain management to improve pain control.)    Associated Diagnoses: S/P CABG (coronary artery bypass graft)      tamsulosin (FLOMAX) 0.4 MG capsule Take 1 capsule (0.4 mg) by mouth every evening    Associated Diagnoses: S/P CABG (coronary artery bypass graft)           CONTINUE these medications which have NOT CHANGED    Details   apixaban ANTICOAGULANT (ELIQUIS) 5 MG tablet Take 1 tablet (5 mg) by mouth 2 times daily Appointment needed for additional refills. Please call [phone] to schedule.  Qty: 180 tablet, Refills: 3    Associated Diagnoses: Permanent atrial fibrillation (H); NSVT (nonsustained ventricular tachycardia) (H)      empagliflozin (JARDIANCE) 10 MG TABS tablet Take 1 tablet (10 mg) by mouth daily  Qty: 90 tablet, Refills: 3    Associated Diagnoses: Chronic systolic heart failure (H)      Ferrous Sulfate (IRON) 325 (65 Fe) MG tablet Take 1 tablet by mouth three times a week (Monday, Wednesday, Friday)  Qty: 90 tablet, Refills: 3    Associated Diagnoses: Iron deficiency anemia, unspecified iron deficiency anemia type      fluticasone (FLONASE) 50 MCG/ACT nasal spray Spray 2 sprays into both nostrils daily as needed for rhinitis or allergies  Qty: 54.6 mL, Refills: 3    Associated Diagnoses: Obstructive sleep apnea syndrome      pantoprazole (PROTONIX) 40 MG EC tablet Take 40 mg by mouth daily      senna-docusate (SENOKOT-S/PERICOLACE) 8.6-50 MG tablet Take 1-2 tablets by mouth 2 times daily Take while on oral narcotics to prevent or treat constipation.  Qty: 30 tablet, Refills: 0    Comments: While taking narcotics  Associated Diagnoses: S/P shoulder replacement, left      vitamin B-12 (CYANOCOBALAMIN) 1000 MCG tablet Take 1,000 mcg by mouth daily      Vitamin D-Vitamin K (K2 PLUS  D3) 100-1000 MCG-UNIT TABS Take 1 tablet by mouth daily      vitamin D3 (CHOLECALCIFEROL) 125 MCG (5000 UT) tablet Take 1 tablet by mouth daily      vitamin C (ASCORBIC ACID) 1000 MG TABS Take 1,000 mg by mouth daily           STOP taking these medications       enoxaparin ANTICOAGULANT (LOVENOX) 120 MG/0.8ML syringe Comments:   Reason for Stopping:         metoprolol succinate ER (TOPROL XL) 50 MG 24 hr tablet Comments:   Reason for Stopping:         niacin 500 MG tablet Comments:   Reason for Stopping:         nitroFURantoin macrocrystal-monohydrate (MACROBID) 100 MG capsule Comments:   Reason for Stopping:         sacubitril-valsartan (ENTRESTO) 24-26 MG per tablet Comments:   Reason for Stopping:                     Consultations:     Nutrition, Intensivist, social work, CORE clinic             Brief History of Illness:   In brief, this is a 78 year old gentleman with a cardiomyopathy of an uncertain etiology. He went to the lab today electively for evaluation and to ascertain an ischemic etiology of his cardiomyopathy. During treatment of a R coronary lesion, the stent + delivery system were unfortunately embolized. The delivery system fractured and despite attempts to snare and retrieve the balloon + stent, it became clear that the pieces were un-retrievable via endovascular approaches. Fortunately, he has remained hemodynamically stable with flow through the R system.           Hospital Course:     On 8/15/23 Mr Weber successfully underwent CABG x1 (vein graft to PDA), Endoscopic Vein [place],  Aortotomy and removal of embolized / un-retrievable right coronary stent + delivery system, Occlusion of left atrial appendage (45 mm Atriclip) by Dr. Verde.   Was extubated within 24 hours of surgery. Once he was weaned off hemodynamic stabilizing gtt's, transferred to the surgical floor.    Had some transient hyperglycemia treated with an insulin gtt, transitioned to sliding scale insulin and has no need at  discharge.  Had some fluid overload treated with diuretic medication. At discharge he is 9 kg above his pre-op weight, he was given a dose of diuretics prior to discharge and will closely monitor his weight at TCU and then home. His blood pressures were too soft to send him with diuretic medication. I have asked TCU to give lasix as blood pressure allows. His PTA entresto was held on discharge as well due to softer pressures. CORE clinic was consulted as he will need close follow up due to dec EF/Fluid overload-EF 35%. He had some PAF which is chronic and was resumed on his PTA   Eliquis. Heart rhythm remained stable. He progressed well with cardiac rehab. He is discharged to TCU on pod# [phone] with the help of their family.              Significant Results:     Lab Results   Component Value Date    WBC 6.7 08/22/2023    WBC 8.4 06/22/2021     Lab Results   Component Value Date    RBC 2.61 08/22/2023    RBC 5.13 06/22/2021     Lab Results   Component Value Date    HGB 7.7 08/22/2023    HGB 14.6 06/22/2021     Lab Results   Component Value Date    HCT 24.1 08/22/2023    HCT 46.8 06/22/2021     No components found for: MCT  Lab Results   Component Value Date    MCV 92 08/22/2023    MCV 91 06/22/2021     Lab Results   Component Value Date    MCH 29.5 08/22/2023    MCH 28.5 06/22/2021     Lab Results   Component Value Date    MCHC 32.0 08/22/2023    MCHC 31.2 06/22/2021     Lab Results   Component Value Date    RDW 14.8 08/22/2023    RDW 14.3 06/22/2021     Lab Results   Component Value Date     08/22/2023     06/22/2021       Last Basic Metabolic Panel:  Lab Results   Component Value Date     08/22/2023     06/22/2021      Lab Results   Component Value Date    POTASSIUM 3.7 08/22/2023    POTASSIUM 4.1 08/15/2023    POTASSIUM 4.2 08/16/2022    POTASSIUM 4.0 06/23/2021     Lab Results   Component Value Date    CHLORIDE 104 08/22/2023    CHLORIDE 108 08/16/2022    CHLORIDE 108 06/22/2021     Lab  Results   Component Value Date    BEBO 8.1 08/22/2023    BEBO 8.4 06/22/2021     Lab Results   Component Value Date    CO2 22 08/22/2023    CO2 25 08/16/2022    CO2 26 06/22/2021     Lab Results   Component Value Date    BUN 25.3 08/22/2023    BUN 16 08/16/2022    BUN 14 06/22/2021     Lab Results   Component Value Date    CR 0.58 08/22/2023    CR 0.81 06/22/2021     Lab Results   Component Value Date     08/22/2023     08/20/2023     08/16/2022     06/22/2021                  Pending Results:     None           Discharge Instructions and Follow-Up:     Discharge diet: Regular   Discharge activity: Daily weights: Call if weight gain 2-3 lbs over 24 hours or if greater than 5 lbs in 1 week.  No lifting more than 10 lbs for 8-12 weeks.  No driving for 1 month.  Call for pain medication refill.  Call for temperature greater than 101.0  Daily shower with antibacterial soap.   Discharge follow-up: Follow-up Appointments  Follow Up and recommended labs and tests   *Follow up primary care provider in 7-10 days after discharge in order to review your medication, vital signs, obtain any necessary lab work your doctor may want, and to update them on your hospitalization and medical issues.  *Follow up with Lena/Kaitlynn/Tammie/Pedro EDWARD-Abdi with Dr Guevara/Armando/Lissa, heart surgeon, at [facility] at [facility] [address] on 9/6/23 at 2:45 PM. If any questions or concerns call [phone].  You will see us once at this visit and then if everything is going well you will not need to see us again.  You will follow long term with your cardiologist.  * Follow up with Laura Pacheco NP with cardiology on 9/18/23 at 3:30 PM at Heart Clinic  *Follow up with Dr Narvaez, cardiologist, 1/19/24 at 10:45. This is who you will follow with long term about your heart issues. [phone].  *Please schedule outpatient cardiac rehab within 5 days of discharge from TCU, call [phone].       Outpatient therapy: Cardiac rehab   Home Care agency: None    Supplies and equipment: None   Lines and drains: None    Wound care: Wash incision daily with antibacterial soap   Other instructions: None

## 2023-08-22 NOTE — PROGRESS NOTES
[facility]    Limited echo was repeated today and showed LVEF remains reduced at 35%.    Will arrange for CORE enrollment visit in [place] next week on 8/30/23. Info sent via [employer].    Will request [facility] repeat BMP, Hgb, BNP prior to visit.    Future Appointments   Date Time Provider Department Center   8/30/2023  9:00 AM RH CARDIAC REHAB 4 RHCR [place] RID   8/30/2023 10:50 AM Laura Olivia PA-C [facility] PSA CLIN   9/6/2023  2:45 PM [facility]   9/7/2023  8:30 AM Brett Cassidy MD RVFP RV   9/18/2023  3:30 PM Laura Olivia PA-C [facility] PSA CLIN   1/19/2024 10:45 AM [facility]         Peyton Reeder RN BSN  [facility] Clinic Care Coordinator [place]alie  [phone]  1:47 PM 08/22/23

## 2023-08-22 NOTE — CARE PLAN
Orientations: A&O x4. Neuro's intact. [place], bilateral hearing aids.   Vitals/Pain: VSS on RA during the day,  CPAP at night time. Denies pain.  -tolerating reg diet.   Tele: afib cvr w/ occasional pvc's  Lines/Drains: PIV L arm SL.   Skin/Wounds: Midline sternal incision, old CT sites, LLE harvest sites WDL. R.arm wound covered with foam mepilex.    GI/: WDL. No BM.   Ambulation/Assist: Assist of 1 with gb & walker   Sleep Quality: Good   Plan: Possible discharge today, Echo ordered.

## 2023-08-22 NOTE — CONSULTS
Consult has been completed today for patient. Please see consult for discharge planning from today 8/22/23.

## 2023-08-22 NOTE — PROGRESS NOTES
[facility]  Cardiovascular and Thoracic Surgery Daily Note    Assessment and Plan    POD #7 s/p CABG x1 (vein graft to PDA), endoscopic vein harvest, aortotomy and removal of embolized/ un-retrievable right coronary stent + delivery system, occlusion of left atrial appendage (45 mm Atriclip) on 8/15/23 with Dr. Michael Mulvihill.    - CVS: Pre-op TTE with LVEF 35%, 1-2+ MR, moderate aortic stenosis, mildy dilated ascending aorta at 4.3 cm.   HD stable overnight in CAF. One unit of pRBCs on 8/18 for Hgb 7.2, continue lasix 20 mg IV for hypervolemia. Started on amio gtt protocol due to afib RVR. Continues afib with rates in 90s. Chronic afib and no further amio. Has history of afib on apixaban, in controlled afib.  mg daily, statin. Lopressor 12.5 mg PO bid with parameters. BP too soft to restart HF regimen at this time. CORE follow up outpatient. PTA meds on hold: Entresto, apixaban, jardiance, toprol. CTs and TPWs removed 8/18. Postop baseline echo ordered 8/21 given low LVEF. Trial of lasix this am.    - Resp: Extubated POD #1 at 1550, saturating well on room air, CPAP at HS, encourage pulmonary toilet.     - Neuro: Neuros intact, pain well controlled on current regimen    - Renal: No history of significant renal disease, BL creat ~0.7. Cr stable. Trend BMP. Diuretic as above.  Recent Labs   Lab 08/22/23  0526 08/19/23  0355 08/18/23  0701   CR 0.58* 0.58* 0.57*   - GI: +BMs, +flatus, continue bowel regimen.     - : Quach removed. Voiding with no issues. Restarted flomax     - Endo: transitioned to sliding scale insulin   Hemoglobin A1C   Date Value Ref Range Status   02/21/2023 5.4 0.0 - 5.6 % Final     Comment:     Normal <5.7%   Prediabetes 5.7-6.4%    Diabetes 6.5% or higher     Note: Adopted from ADA consensus guidelines.   11/19/2020 5.8 (H) 0 - 5.6 % Final     Comment:     Normal <5.7% Prediabetes 5.7-6.4%  Diabetes 6.5% or higher - adopted from ADA   consensus guidelines.           - FEN: Replace electrolytes as needed. Start clears and advance as tolerated    - ID: Afebrile, +UA, Ceftriaxone plan for 3 days - finished. Blood cultures obtained 8/18 (NGTD). Periop abx completed. Trend CBC, fever curve.   Recent Labs   Lab 08/22/23  0526 08/19/23  0355 08/18/23  1144   WBC 6.7 9.1 11.4*     - Heme: Acute blood loss anemia and thrombocytopenia due to surgery. Hgb and PLT stable. Trend CBC, transfuse PRN. One unit of pRBCs on 8/18 for Hgb 7.2 given persistent pressor requirement. Did receive plavix load day of surgery, received one dose of platelets.  Recent Labs   Lab 08/22/23  0526 08/19/23  0355 08/18/23  1144   HGB 7.7* 7.9* 8.3*    159 151   - Proph: SCD, subcutaneous heparin, PPI    - Dispo: Therapies recommending home with assist, pt does not feel ready today.        Interval History  Seen multiple times, up walking in hallway with SBA with walker, present in room for when daughter discussed via phone acute concerns about him going home-she is a nurse at local hospital and cannot help out for 2 more days, son's could intermittently help but patient unable to wipe himself and would need more help than what son's could help with.       Medications   aspirin  162 mg Oral or NG Tube Daily    fluticasone  1 spray Both Nostrils Daily    heparin ANTICOAGULANT  5,000 Units Subcutaneous Q8H    metoprolol tartrate  12.5 mg Oral BID    pantoprazole  40 mg Oral Daily    rosuvastatin  10 mg Oral Daily    senna-docusate  1 tablet Oral BID    sodium chloride (PF)  3 mL Intracatheter Q8H    tamsulosin  0.4 mg Oral Daily     acetaminophen, bisacodyl, glucose **OR** dextrose **OR** glucagon, hydrALAZINE, HYDROmorphone **OR** HYDROmorphone, lidocaine 4%, lidocaine (buffered or not buffered), magnesium hydroxide, naloxone **OR** naloxone **OR** naloxone **OR** naloxone, ondansetron **OR** ondansetron, oxyCODONE **OR** [DISCONTINUED] oxyCODONE, prochlorperazine **OR** prochlorperazine, sodium  chloride (PF)      Physical Exam  Vitals were reviewed  Blood pressure 105/59, pulse 85, temperature 97.7  F (36.5  C), temperature source Oral, resp. rate 21, height 1.829 m (6'), weight 142.1 kg (313 lb 3.2 oz), SpO2 95 %.  Gen: up walking in hallway, comfortable, -O2    Lungs: CTA    Cardiovascular: IRRR, telemetry afib 70-80s, -edema LE    Abdomen: BS+, NT/ND, soft    Derm: sternal incision D/I  L LE incisions D/I    CT: na    Weight:   Vitals:    08/18/23 0500 08/19/23 0630 08/20/23 0502 08/21/23 0121   Weight: 141.7 kg (312 lb 6.3 oz) 142 kg (313 lb 0.9 oz) 142.9 kg (315 lb 1.6 oz) 142.2 kg (313 lb 9.6 oz)    08/22/23 0611   Weight: 142.1 kg (313 lb 3.2 oz)         Data  Recent Labs   Lab 08/22/23  0526 08/20/23  0738 08/20/23  0129 08/19/23  0806 08/19/23  0355 08/18/23  1837 08/18/23  1144 08/18/23  0814 08/18/23  0701 08/17/23  1056 08/17/23  0834 08/16/23  0755 08/16/23  0639 08/15/23  2127 08/15/23  2043 08/15/23  1802 08/15/23  1757   WBC 6.7  --   --   --  9.1  --  11.4*  --  10.8  --   --    < > 13.7*   < > 19.0*  --  14.9*   HGB 7.7*  --   --   --  7.9*  --  8.3*  --  7.2*  --   --    < > 8.2*   < > 9.1*   < > 8.3*   MCV 92  --   --   --  93  --  93  --  93  --   --    < > 91   < > 94  --  92     --   --   --  159  --  151  --  136*  --   --    < > 181   < > 197  --  148*   INR  --   --   --   --   --   --   --   --   --   --  1.37*  --   --   --  1.47*  --  1.79*     --   --   --  137  --   --   --  139  --   --    < > 141  --  141   < > 141   POTASSIUM 3.7  --   --   --  3.9  --   --   --  4.3  --   --    < > 3.8  --  3.7   < > 4.3   CHLORIDE 104  --   --   --  104  --   --   --  107  --   --    < > 107  --  105  --  106   CO2 22  --   --   --  22  --   --   --  22  --   --    < > 22  --  18*  --  23   BUN 25.3*  --   --   --  26.5*  --   --   --  23.3*  --   --    < > 16.4  --  15.8  --  14.6   CR 0.58*  --   --   --  0.58*  --   --   --  0.57*  --   --    < > 0.71  --  0.66*  --   0.56*   ANIONGAP 13  --   --   --  11  --   --   --  10  --   --    < > 12  --  18*  --  12   BEBO 8.1*  --   --   --  8.0*  --   --   --  7.8*  --   --    < > 8.1*  --  8.2*  --  8.0*   * 123* 100*   < > 113*   < >  --    < > 117*   < >  --    < > 129*   < > 213*   < > 155*   ALBUMIN  --   --   --   --   --   --   --   --   --   --   --   --  3.5  --  3.2*  --  2.8*   PROTTOTAL  --   --   --   --   --   --   --   --   --   --   --   --  5.0*  --  4.8*  --  4.5*   BILITOTAL  --   --   --   --   --   --   --   --   --   --   --   --  0.5  --  0.7  --  0.4   ALKPHOS  --   --   --   --   --   --   --   --   --   --   --   --  54  --  66  --  62   ALT  --   --   --   --   --   --   --   --   --   --   --   --  11  --  13  --  12   AST  --   --   --   --   --   --   --   --   --   --   --   --  22  --  24  --  21    < > = values in this interval not displayed.       Imaging:  No results found for this or any previous visit (from the past 24 hour(s)).    Patient seen and discussed with Dr Mulvihill Erin Frendin, PA-C  Cardiothoracic Surgery  Pager: [phone]

## 2023-08-22 NOTE — PROGRESS NOTES
Care Management Discharge Note    Discharge Date: 08/22/2023       Discharge Disposition: Transitional Care    Discharge Services: None    Discharge DME: None    Discharge Transportation: family or friend will provide    Private pay costs discussed: Not applicable    Does the patient's insurance plan have a 3 day qualifying hospital stay waiver?  Yes   Will the waiver be used for post-acute placement? Yes    PAS Confirmation Code: [id number]  Patient/family educated on Medicare website which has current facility and service quality ratings: no    Education Provided on the Discharge Plan:    Persons Notified of Discharge Plans: MD, Patient, Family, Bedside Nurse, Charge Nurse, HUC and Facility.  Patient/Family in Agreement with the Plan: yes    Handoff Referral Completed: Yes    Additional Information:  Writer spoke with MD told and patient is ready for discharge. Orders have been sent to facility or [facility] in [place]. Patient will be transported by family members (Son Alvaro) between 4:30 and 5:00PM. Writer called facility to make them aware of discharge. PAS has been completed and faxed to the facility. PAS is present on chart. Scripts do not need to be faxed for patient. Patient will discharge to TCU today with family transporting.    Yovany Pack [facility]  Care Management

## 2023-08-22 NOTE — PLAN OF CARE
Physical Therapy Discharge Summary    Reason for therapy discharge:    Discharged to transitional care facility.    Progress towards therapy goal(s). See goals on Care Plan in [facility] electronic health record for goal details.  Goals partially met.  Barriers to achieving goals:   discharge from facility.    Therapy recommendation(s):    Continued therapy is recommended.  Rationale/Recommendations:  To improve IND with mobility as pt below baseline at this time, lives alone without support post CABG.

## 2023-08-22 NOTE — PLAN OF CARE
Discharge Note    Patient discharged to TCU via private vehicle  accompanied by son.  IV: Discontinued  Prescriptions will be given at TCU.  Belongings reviewed and sent with patient.   patient and family verbalize understanding of discharge instructions. AVS given to family.

## 2023-08-22 NOTE — CONSULTS
[facility] Heart-CORE Clinic    Acknowledging CORE Clinic Consult.     Mr. Weber lives in [place] and has followed in our clinic with EP service with Dr. Castillo and Laura Olivia PAC. He currently has follow-up as scheduled below.    Future Appointments   Date Time Provider Department Center   8/30/2023  9:00 AM [facility]   9/6/2023  2:45 PM [facility] CARDIOTHORACIC SURGERY, CHEYANNE THOMAS [place]   9/7/2023  8:30 AM Brett Cassidy MD RVFP RV   9/18/2023  3:30 PM Laura Olivia PA-C [facility] PSA CLIN   1/19/2024 10:45 AM Martita Narvaez MD [facility] PSA CLIN     Now admitted after coronary angiogram that led to emergent CABG x1. He is post op date #7.     Prior to surgery his LVEF was 35%. A limited echo has been ordered by CT surgery and is pending today. Will determine appropriateness for CORE follow-up pending echo results.     Note there are concerns with Mr. Weber's ability to care for himself at home. Discharge disposition/location not yet defined.    Peyton Reeder RN BSN  CORE Clinic Care Coordinator [place]  [phone]  9:18 AM 08/22/23

## 2023-08-23 ENCOUNTER — PATIENT OUTREACH (OUTPATIENT)
Dept: CARE COORDINATION | Facility: CLINIC | Age: 78
End: 2023-08-23
Payer: COMMERCIAL

## 2023-08-23 ENCOUNTER — TELEPHONE (OUTPATIENT)
Dept: GERIATRICS | Facility: CLINIC | Age: 78
End: 2023-08-23
Payer: COMMERCIAL

## 2023-08-23 ENCOUNTER — DOCUMENTATION ONLY (OUTPATIENT)
Dept: CARDIOLOGY | Facility: CLINIC | Age: 78
End: 2023-08-23
Payer: COMMERCIAL

## 2023-08-23 VITALS
HEIGHT: 72 IN | RESPIRATION RATE: 14 BRPM | DIASTOLIC BLOOD PRESSURE: 67 MMHG | TEMPERATURE: 97.9 F | SYSTOLIC BLOOD PRESSURE: 104 MMHG | HEART RATE: 87 BPM | BODY MASS INDEX: 42.46 KG/M2 | OXYGEN SATURATION: 97 % | WEIGHT: 313.5 LBS

## 2023-08-23 PROBLEM — Z95.1 S/P CABG (CORONARY ARTERY BYPASS GRAFT): Chronic | Status: ACTIVE | Noted: 2023-08-23

## 2023-08-23 PROBLEM — E87.70 FLUID OVERLOAD: Status: ACTIVE | Noted: 2023-08-23

## 2023-08-23 PROBLEM — Z98.890 STATUS POST LIGATION OF LEFT ATRIAL APPENDAGE: Status: ACTIVE | Noted: 2023-08-23

## 2023-08-23 PROBLEM — R73.9 TRANSIENT HYPERGLYCEMIA POST PROCEDURE: Status: ACTIVE | Noted: 2023-08-23

## 2023-08-23 PROBLEM — Z95.1 S/P CABG (CORONARY ARTERY BYPASS GRAFT): Status: ACTIVE | Noted: 2023-08-23

## 2023-08-23 LAB
BACTERIA BLD CULT: NO GROWTH
BACTERIA BLD CULT: NO GROWTH

## 2023-08-23 NOTE — LETTER
[facility]   To:             Please give to facility    From:   Krystal Valdovinos RN  Care Coordinator   [facility]   P: [phone]  [email or web address] [email or web address]   Patient Name:  Hugo Weber YOB: 1945   Admit date: 8/22/23      *Information Needed:  Please contact me when the patient will discharge (or if they will move to long term care)- include the discharge date, disposition, and main diagnosis   If the patient is discharged with home care services, please provide the name of the agency    Also- Please inform me if a care conference is being held.   Phone, Fax or Email with information                        Thank you

## 2023-08-23 NOTE — PROGRESS NOTES
[facility] Heart - CORE Clinic    -Faxed below orders and appointment to [facility] at [phone].    JAZZ Fink   12:34 PM 8/23/2023    CORE nurse line M-F 8a-4p: [phone]

## 2023-08-23 NOTE — PROGRESS NOTES
"[facility]  PRIMARY CARE PROVIDER AND CLINIC:  Brett Cassidy MD, [address] [facility] [address]  Chief Complaint   Patient presents with    [facility] Care     [place] Medical Record Number:  [id number]  Place of Service where encounter took place:  [facility] ([facility]) [[id number]]    Hugo Weber  is a 78 year old  (1945), admitted to the above facility from  [facility]. Hospital stay 8/15/23 through 8/22/23..  Admitted to this facility for  rehab, medical management, and nursing care.     Atherosclerosis of native coronary artery of native heart without angina pectoris  Encounter for surgical aftercare following surgery on the circulatory system  Presence of aortocoronary bypass graft  Ischemic cardiomyopathy  Leg edema, right  Essential (primary) hypertension  Chronic atrial fibrillation, unspecified (H)  Physical deconditioning  Personal history of transient ischemic attack (TIA), and cerebral infarction without residual deficits  HAYLEE on CPAP  Prostate cancer (H)    HPI:    HPI information obtained from: facility chart records, facility staff, patient report, [facility] chart review, and Care Everywhere [facility] chart review.   Brief Summary of Hospital Course: pt with PMH as noted above and below:  he had worsening Cardiomyopathy, moderate aortic stenosis and EF so was scheduled for an angiogram and stent but underwent a CABGx1 and removal of an embolized un-retrievable right coronary stent and delivery system.  Also had an occlusion of left atrial appendage with a 45 mm atri clip on 8/15/2023.  Was diuresed though remained at 9 kg above his home pre op wt. Entresto  and diuretics still held on discharge to tcu due to lower bps. Sent of rehab    TODAY DURING EXAM HPI and ROS:  \"I want to go home this wkd. I think Ill do fine\".  No CP, SOB, Cough, dizziness, fevers, chills, HA, N/V, dysuria or Bowel Abnormalities. " "Appetite is good.  No pain in sternum, but left leg is very sore \"it is so swollen\" and tylenol works.      CODE STATUS/ADVANCE DIRECTIVES DISCUSSION:   CPR/Full code   Patient's living condition: lives alone  ALLERGIES: Patient has no known allergies.  PAST MEDICAL HISTORY:  has a past medical history of Aortic stenosis, Atrial fibrillation (2006), Calculus of kidney (2005, 6/06, 10/12, 8/18, 9/19), Cervical stenosis of spine, Cholelithiasis, Chronic peptic ulcer, unspecified site, without mention of hemorrhage, perforation, or obstruction (1985), Colon polyps (8/05, 9/10, 10/15), CVA (cerebral vascular accident) (H) (01/2019), Elevated prostate specific antigen (PSA), Hepatitis C (10/2012), HFrEF (heart failure with reduced ejection fraction) (H), Hyperlipidemia LDL goal <70, Hypertension goal BP (blood pressure) < 140/90 (06/2006), Iron deficiency anemia, unspecified (1985), Mitral regurgitation, Nephrolithiasis (multiple episodes, last 10/19), OA (osteoarthritis), Obesity, unspecified, Obstructive sleep apnea syndrome, Prediabetes, Presbyacusis (01/2004), Prostate cancer (H) (2023), Pyelonephritis, unspecified (12/1999), SCC (squamous cell carcinoma), ear (10/2008), Sleep apnea (10/2002), Stroke (H) (01/19/2019), 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    He has no past medical history of Asymptomatic human immunodeficiency virus (HIV) infection status (H), Blood in semen, Blood transfusion, Cerebral palsy (H), Chronic infection, Congenital renal agenesis and dysgenesis, Congestive heart failure (H), Congestive heart failure, unspecified, COPD (chronic obstructive pulmonary disease) (H), Depressive disorder, Diabetes (H), Difficult intubation, Epididymitis, bilateral, Epididymitis, left, Epididymitis, right, Gastro-oesophageal reflux disease, Goiter, Gout, Hernia, abdominal, History of blood transfusion, History of spinal cord injury, " History of thrombophlebitis, Horseshoe kidney, Hydrocephalus (H), Malignant hyperthermia, Mumps, Orchitis, epididymitis, and epididymo-orchitis, with abscess, Other and unspecified nonspecific immunological findings, Palpitations, Parkinsons disease (H), Penile discharge, PONV (postoperative nausea and vomiting), Prostate infection, Spider veins, Spina bifida (H), Spinal headache, STD (sexually transmitted disease), Swelling of testicle, Tethered cord (H), Thyroid disease, Tuberculosis, or Uncomplicated asthma.  PAST SURGICAL HISTORY:   has a past surgical history that includes surgical history of -  (1985); surgical history of -  (11/1998); surgical history of -  (1979); surgical history of -  (11/1998); surgical history of -  (1/01,6/02,11/02); KNEE SCOPE,DIAGNOSTIC (05/2000); surgical history of -  (11/1999); surgical history of -  (07/2006); colonoscopy (8/05, 9/10, 10/15); surgical history of -  (10/08, 12/08); Arthroplasty knee (08/13/2012); Laser holmium lithotripsy ureter(s), insert stent, combined (10/16/2012); Cardioversion (2016); Colonoscopy (N/A, 10/29/2015); Colonoscopy (N/A, 09/07/2016); appendectomy; Laser holmium lithotripsy ureter(s), insert stent, combined (Right, 05/02/2018); surgical history of -  (Right, 10/2019); surgical history of -  (Right, 11/2019); colonoscopy (11/2020); Esophagoscopy, gastroscopy, duodenoscopy (EGD), combined (N/A, 11/24/2020); Colonoscopy (N/A, 11/24/2020); Cystoscopy W/ Laser Lithotripsy (10/16/2012,5/2/2018); Eye surgery (1/01,6/02,11/02); Cardioversion; Mri Biopsy Prostate (Bilateral, 02/09/2023); Reverse arthroplasty shoulder (Left, 03/07/2023); Coronary Angiogram (N/A, 8/15/2023); Percutaneous Coronary Intervention (N/A, 8/15/2023); Fractional Flow Ratio Wire (N/A, 8/15/2023); and Bypass graft artery coronary (N/A, 8/15/2023).  FAMILY HISTORY: family history includes Alzheimer Disease (age of onset: 82) in his father; Cancer in his paternal uncle; Heart Disease in  his maternal grandfather; Heart Disease (age of onset: 80) in his mother; Prostate Cancer in his father.  SOCIAL HISTORY:   reports that he has never smoked. He has never used smokeless tobacco. He reports current alcohol use of about 2.0 - 3.0 standard drinks of alcohol per week. He reports that he does not use drugs.    Post Discharge Medication Reconciliation Status: discharge medications reconciled and changed, per note/orders     Current Outpatient Medications   Medication Sig Dispense Refill    acetaminophen (TYLENOL) 325 MG tablet Take 650 mg by mouth 4 times daily 7 am, 12 N, 6p, 10p      apixaban ANTICOAGULANT (ELIQUIS) 5 MG tablet Take 1 tablet (5 mg) by mouth 2 times daily Appointment needed for additional refills. Please call [phone] to schedule. 180 tablet 3    aspirin (ASA) 81 MG chewable tablet 1 tablet (81 mg) by Oral or NG Tube route daily      Elastic Bandages & Supports (MEDICAL LEGWEAR/KNEE HIGH) MISC Knee Hi ace wraps in am off in pm      empagliflozin (JARDIANCE) 10 MG TABS tablet Take 1 tablet (10 mg) by mouth daily 90 tablet 3    Ferrous Sulfate (IRON) 325 (65 Fe) MG tablet Take 1 tablet by mouth three times a week (Monday, Wednesday, Friday) 90 tablet 3    fluticasone (FLONASE) 50 MCG/ACT nasal spray Spray 2 sprays into both nostrils daily as needed for rhinitis or allergies 54.6 mL 3    metoprolol tartrate (LOPRESSOR) 25 MG tablet Take 0.5 tablets (12.5 mg) by mouth 2 times daily (Patient taking differently: Take 12.5 mg by mouth 2 times daily HOLD if SBP <110 OR HR < 60 Call NP if held.)      NONFORMULARY Elevate extremities at all times when up.      pantoprazole (PROTONIX) 40 MG EC tablet Take 40 mg by mouth daily      rosuvastatin (CRESTOR) 10 MG tablet Take 1 tablet (10 mg) by mouth daily      senna-docusate (SENOKOT-S/PERICOLACE) 8.6-50 MG tablet Take 1 tablet by mouth 2 times daily as needed for constipation      senna-docusate (SENOKOT-S/PERICOLACE) 8.6-50 MG tablet Take 1  tablet by mouth daily Hold for loose stools. For constipation      tamsulosin (FLOMAX) 0.4 MG capsule Take 1 capsule (0.4 mg) by mouth every evening      UNABLE TO FIND daily before breakfast Daily weights      vitamin B-12 (CYANOCOBALAMIN) 1000 MCG tablet Take 1,000 mcg by mouth daily      vitamin C (ASCORBIC ACID) 1000 MG TABS Take 1,000 mg by mouth daily      vitamin D3 (CHOLECALCIFEROL) 125 MCG (5000 UT) tablet Take 1 tablet by mouth daily             ROS:see above    Vitals:  /67   Pulse 87   Temp 97.9  F (36.6  C)   Resp 14   Ht 1.829 m (6')   Wt 142.2 kg (313 lb 8 oz)   SpO2 97%   BMI 42.52 kg/m    Exam:  GENERAL APPEARANCE:  Alert, in no distress, appears healthy, oriented, morbidly obese, cooperative  ENT:  Mouth and posterior oropharynx normal, moist mucous membranes, normal hearing acuity  EYES:  EOM, conjunctivae, lids, pupils and irises normal  RESP:  respiratory effort and palpation of chest normal, lungs clear to auscultation , no respiratory distress, diminished breath sounds mildly mary beth bases.  CV:  Palpation and auscultation of heart done , regular rate and rhythm. SEMI, no rub, or gallop. Trace right leg edema and left leg with +3  edema, +2 pedal pulses  ABDOMEN:  normal bowel sounds, soft, nontender, no hepatosplenomegaly or other masses, rotund large abd  M/S:   MANDEL though less mobile with left leg  SKIN:  Inspection of skin and subcutaneous tissue baseline, except sternal incision is healing. Left leg has many bruises, edema and two healing vein harvest sights.  NEURO:   Cranial nerves 2-12 are normal tested and grossly at patient's baseline  PSYCH:  oriented X 3, normal insight, judgement and memory, affect and mood normal    Lab/Diagnostic data:  Recent Labs   Lab Test 08/22/23  0526 08/19/23  0355    137   POTASSIUM 3.7 3.9   CHLORIDE 104 104   CO2 22 22   ANIONGAP 13 11   * 113*   BUN 25.3* 26.5*   CR 0.58* 0.58*   BEBO 8.1* 8.0*    < > = values in this interval not  displayed.     Hemoglobin   Date Value Ref Range Status   08/22/2023 7.7 (L) 13.3 - 17.7 g/dL Final   08/19/2023 7.9 (L) 13.3 - 17.7 g/dL Final   06/22/2021 14.6 13.3 - 17.7 g/dL Final   06/21/2021 15.5 13.3 - 17.7 g/dL Final     ASSESSMENT / PLAN:  (I25.10) Atherosclerosis of native coronary artery of native heart without angina pectoris  (primary encounter diagnosis)  (Z48.812) Encounter for surgical aftercare following surgery on the circulatory system  (Z95.1) Presence of aortocoronary bypass graft:CAB x1 8/15/2023  (I25.5) Ischemic cardiomyopathy  (R60.0) Leg edema, right  (I10) Essential (primary) hypertension  (I48.20) Chronic atrial fibrillation, unspecified (H)  Comment/Plan: cont meds-asa, Crestor, lopressor, Eliquis,, elevate legs, ACE wraps to left leg. Will check bmp on 8/28, f/U per cards recs and CV surgery    (D62) Acute blood loss as cause of postoperative anemia  Comment/Plan: check Hgb on 8/28--on Fe and VitC     (R53.81) Physical deconditioning  Comment/Plan: PT OT, pt agrees to stay thru wkd but wants to go on Monday afternoon if conts to progress.    (Z86.73) Personal history of transient ischemic attack (TIA), and cerebral infarction without residual deficits  Comment/Plan: see above, no sequela but on DOAC    (G47.33,  Z99.89) HAYLEE on CPAP  Comment/Plan: CPAP    (C61) Prostate cancer (H)  Comment/Plan:  followed by [facility], PSA in October.            Electronically signed by:  BIENVENIDO White CNP

## 2023-08-23 NOTE — PROGRESS NOTES
Clinic Care Coordination Contact  Care Coordination Transition Communication    Clinical Data: Patient was hospitalized at [facility] from 8/15/23 to 8/22/23 with diagnosis of CABG X 1.     Transition to Facility:              Facility Name: [facility]              Contact name and phone number/fax: [phone]/ [phone]    Plan: RN/SW Care Coordinator will await notification from facility staff informing RN/SW Care Coordinator of patient's discharge plans/needs. RN/SW Care Coordinator will review chart and outreach to facility staff every 4 weeks and as needed.     Krystal Valdovinos RN, BSN, CPHN, CM  [facility] Ambulatory Care Management  [facility]  Phone: [phone]  Email: [email or web address]

## 2023-08-23 NOTE — PROGRESS NOTES
[facility] Heart-CORE Clinic    Mr. Weber has CORE enrollment next week. Called TCU [facility] and confirmed phone/fax numbers: ([phone], fax: [phone]).    Will ask CORE RN to please fax the following to TCU:  Draw BMP, NTproBNP, TSH reflex, Hgb on 8/28/23 and fax results to [phone]  Cardiology follow-up appointment: 8/30/23 10:50 am with Laura FRAZIER [facility], [id number], [place] [address]  Diagnosis code: I50.20    Future Appointments   Date Time Provider Department Center   8/24/2023  7:00 AM Jeimy Vasquez APRN CNP [facility]   8/30/2023  9:00 AM [facility]   8/30/2023 10:50 AM Laura Olivia PA-C [facility] PSA CLIN   9/6/2023  2:45 PM [facility] CARDIOTHORACIC SURGERY, CHEYANNE THOMAS [facility]   9/7/2023  8:30 AM Brett Cassidy MD RVFP RV   9/18/2023  3:30 PM Laura Olivia PA-C [facility] PSA CLIN   1/19/2024 10:45 AM Martita Narvaez MD [facility] PSA CLIN       Peyton Reeder RN BSN   11:44 AM 08/23/23  CORE nurse line M-F 8a-4p: [phone]

## 2023-08-23 NOTE — TELEPHONE ENCOUNTER
[facility] Triage Nurse Telephone Encounter    Provider: BIENVENIDO Womack CNP    Facility: [facility]  Facility Type:  TCU    Caller: Cat  Call Back Number: [phone]    Allergies:  No Known Allergies     Reason for call: Nursing is calling to report that the patient was c/o of heart palpitations during therapy today. The HR ranged from  in therapy. Once therapy was over the palpitations subsided. BP was 104/67 p 87.      Verbal Order/Direction given by Provider: NNO, continue to monitor and krista with any further complaints. The NP will see the pt tomorrow.     Provider giving Order:  Gissell Pollack MD    Verbal Order given to: Rodney Scott RN

## 2023-08-24 ENCOUNTER — TRANSITIONAL CARE UNIT VISIT (OUTPATIENT)
Dept: GERIATRICS | Facility: CLINIC | Age: 78
End: 2023-08-24
Payer: COMMERCIAL

## 2023-08-24 DIAGNOSIS — I25.10 ATHEROSCLEROSIS OF NATIVE CORONARY ARTERY OF NATIVE HEART WITHOUT ANGINA PECTORIS: Primary | ICD-10-CM

## 2023-08-24 DIAGNOSIS — I10 ESSENTIAL (PRIMARY) HYPERTENSION: ICD-10-CM

## 2023-08-24 DIAGNOSIS — Z95.1 PRESENCE OF AORTOCORONARY BYPASS GRAFT: ICD-10-CM

## 2023-08-24 DIAGNOSIS — G47.33 OSA ON CPAP: ICD-10-CM

## 2023-08-24 DIAGNOSIS — D62 ACUTE BLOOD LOSS AS CAUSE OF POSTOPERATIVE ANEMIA: ICD-10-CM

## 2023-08-24 DIAGNOSIS — I25.5 ISCHEMIC CARDIOMYOPATHY: ICD-10-CM

## 2023-08-24 DIAGNOSIS — C61 PROSTATE CANCER (H): ICD-10-CM

## 2023-08-24 DIAGNOSIS — R53.81 PHYSICAL DECONDITIONING: ICD-10-CM

## 2023-08-24 DIAGNOSIS — Z48.812 ENCOUNTER FOR SURGICAL AFTERCARE FOLLOWING SURGERY ON THE CIRCULATORY SYSTEM: ICD-10-CM

## 2023-08-24 DIAGNOSIS — Z86.73 PERSONAL HISTORY OF TRANSIENT ISCHEMIC ATTACK (TIA), AND CEREBRAL INFARCTION WITHOUT RESIDUAL DEFICITS: ICD-10-CM

## 2023-08-24 DIAGNOSIS — R60.0 LEG EDEMA, RIGHT: ICD-10-CM

## 2023-08-24 DIAGNOSIS — I48.20 CHRONIC ATRIAL FIBRILLATION, UNSPECIFIED (H): ICD-10-CM

## 2023-08-24 PROCEDURE — 99309 SBSQ NF CARE MODERATE MDM 30: CPT | Performed by: NURSE PRACTITIONER

## 2023-08-24 RX ORDER — ACETAMINOPHEN 325 MG/1
650 TABLET ORAL EVERY 6 HOURS PRN
COMMUNITY

## 2023-08-24 RX ORDER — AMOXICILLIN 250 MG
1 CAPSULE ORAL DAILY
COMMUNITY
End: 2023-08-30

## 2023-08-24 RX ORDER — AMOXICILLIN 250 MG
1 CAPSULE ORAL 2 TIMES DAILY PRN
COMMUNITY
End: 2024-04-16

## 2023-08-25 ENCOUNTER — LAB REQUISITION (OUTPATIENT)
Dept: LAB | Facility: CLINIC | Age: 78
End: 2023-08-25
Payer: COMMERCIAL

## 2023-08-25 DIAGNOSIS — I50.20 UNSPECIFIED SYSTOLIC (CONGESTIVE) HEART FAILURE (H): ICD-10-CM

## 2023-08-28 ENCOUNTER — DISCHARGE SUMMARY NURSING HOME (OUTPATIENT)
Dept: GERIATRICS | Facility: CLINIC | Age: 78
End: 2023-08-28
Payer: COMMERCIAL

## 2023-08-28 ENCOUNTER — TELEPHONE (OUTPATIENT)
Dept: CARDIOLOGY | Facility: CLINIC | Age: 78
End: 2023-08-28

## 2023-08-28 VITALS
OXYGEN SATURATION: 98 % | DIASTOLIC BLOOD PRESSURE: 86 MMHG | RESPIRATION RATE: 18 BRPM | HEIGHT: 72 IN | TEMPERATURE: 97.4 F | BODY MASS INDEX: 42.5 KG/M2 | WEIGHT: 313.8 LBS | SYSTOLIC BLOOD PRESSURE: 140 MMHG | HEART RATE: 92 BPM

## 2023-08-28 DIAGNOSIS — Z48.812 ENCOUNTER FOR SURGICAL AFTERCARE FOLLOWING SURGERY ON THE CIRCULATORY SYSTEM: ICD-10-CM

## 2023-08-28 DIAGNOSIS — I25.5 ISCHEMIC CARDIOMYOPATHY: ICD-10-CM

## 2023-08-28 DIAGNOSIS — I48.20 CHRONIC ATRIAL FIBRILLATION, UNSPECIFIED (H): ICD-10-CM

## 2023-08-28 DIAGNOSIS — I10 ESSENTIAL (PRIMARY) HYPERTENSION: ICD-10-CM

## 2023-08-28 DIAGNOSIS — G47.33 OSA ON CPAP: ICD-10-CM

## 2023-08-28 DIAGNOSIS — R60.0 LEG EDEMA, RIGHT: ICD-10-CM

## 2023-08-28 DIAGNOSIS — I25.10 ATHEROSCLEROSIS OF NATIVE CORONARY ARTERY OF NATIVE HEART WITHOUT ANGINA PECTORIS: Primary | ICD-10-CM

## 2023-08-28 DIAGNOSIS — R53.81 PHYSICAL DECONDITIONING: ICD-10-CM

## 2023-08-28 DIAGNOSIS — C61 PROSTATE CANCER (H): ICD-10-CM

## 2023-08-28 DIAGNOSIS — Z86.73 PERSONAL HISTORY OF TRANSIENT ISCHEMIC ATTACK (TIA), AND CEREBRAL INFARCTION WITHOUT RESIDUAL DEFICITS: ICD-10-CM

## 2023-08-28 DIAGNOSIS — Z95.1 PRESENCE OF AORTOCORONARY BYPASS GRAFT: ICD-10-CM

## 2023-08-28 LAB
ANION GAP SERPL CALCULATED.3IONS-SCNC: 14 MMOL/L (ref 7–15)
BUN SERPL-MCNC: 14.8 MG/DL (ref 8–23)
CALCIUM SERPL-MCNC: 8.4 MG/DL (ref 8.8–10.2)
CHLORIDE SERPL-SCNC: 102 MMOL/L (ref 98–107)
CREAT SERPL-MCNC: 0.64 MG/DL (ref 0.67–1.17)
DEPRECATED HCO3 PLAS-SCNC: 21 MMOL/L (ref 22–29)
GFR SERPL CREATININE-BSD FRML MDRD: >90 ML/MIN/1.73M2
GLUCOSE SERPL-MCNC: 79 MG/DL (ref 70–99)
HGB BLD-MCNC: 9.1 G/DL (ref 13.3–17.7)
NT-PROBNP SERPL-MCNC: 4917 PG/ML (ref 0–1800)
POTASSIUM SERPL-SCNC: 4.1 MMOL/L (ref 3.4–5.3)
SODIUM SERPL-SCNC: 137 MMOL/L (ref 136–145)
TSH SERPL DL<=0.005 MIU/L-ACNC: 4.12 UIU/ML (ref 0.3–4.2)

## 2023-08-28 PROCEDURE — 83880 ASSAY OF NATRIURETIC PEPTIDE: CPT | Mod: ORL

## 2023-08-28 PROCEDURE — 36415 COLL VENOUS BLD VENIPUNCTURE: CPT | Mod: ORL | Performed by: NURSE PRACTITIONER

## 2023-08-28 PROCEDURE — 80048 BASIC METABOLIC PNL TOTAL CA: CPT | Mod: ORL

## 2023-08-28 PROCEDURE — 83880 ASSAY OF NATRIURETIC PEPTIDE: CPT | Mod: ORL | Performed by: NURSE PRACTITIONER

## 2023-08-28 PROCEDURE — 85018 HEMOGLOBIN: CPT | Mod: ORL

## 2023-08-28 PROCEDURE — 84443 ASSAY THYROID STIM HORMONE: CPT | Mod: ORL

## 2023-08-28 PROCEDURE — 82310 ASSAY OF CALCIUM: CPT | Mod: ORL | Performed by: NURSE PRACTITIONER

## 2023-08-28 PROCEDURE — 85018 HEMOGLOBIN: CPT | Mod: ORL | Performed by: NURSE PRACTITIONER

## 2023-08-28 PROCEDURE — P9604 ONE-WAY ALLOW PRORATED TRIP: HCPCS | Mod: ORL | Performed by: NURSE PRACTITIONER

## 2023-08-28 PROCEDURE — 84443 ASSAY THYROID STIM HORMONE: CPT | Mod: ORL | Performed by: NURSE PRACTITIONER

## 2023-08-28 PROCEDURE — 36415 COLL VENOUS BLD VENIPUNCTURE: CPT | Mod: ORL

## 2023-08-28 PROCEDURE — 99316 NF DSCHRG MGMT 30 MIN+: CPT | Performed by: NURSE PRACTITIONER

## 2023-08-28 PROCEDURE — P9604 ONE-WAY ALLOW PRORATED TRIP: HCPCS | Mod: ORL

## 2023-08-28 NOTE — TELEPHONE ENCOUNTER
per orders TCU notes patient is progressing well with therapy and medically stable. He is potentially discharging tomorrow with outpatient cardiac rehab scheduled for 8/30 and CV surgery follow up on 9/6. Patient had hemoglobin of 7.7 at discharge from hospital 8/28 was 9.1.    JAJA CASTILLO RN

## 2023-08-28 NOTE — PROGRESS NOTES
[facility] DISCHARGE SUMMARY    PATIENT'S NAME: Hugo Weber  YOB: 1945    PRIMARY CARE PROVIDER AND CLINIC RESPONSIBLE AFTER TRANSFER: Brett Cassidy     CODE STATUS: Full Code    TRANSFERRING PROVIDERS: BIENVENIDO White CNP, Dr. Gissell Pollack MD      DATE OF SNF ADMISSION:  August / 22 / 2023.    DATE OF SNF DISCHARGE (including anticipating DC): August / 29 / 2023.    DISCHARGE DISPOSITION: [facility] Provider    Nursing Facility: [facility] stay 8/15/23 to 8/22/23.     Condition on Discharge:  Stable.    Function:  Ambulates: 135 ft w/fww, Transfers: mod 1  Cognitive Scores: SLUMS 22/30     Physical Function:  TBA  Equipment: walker  DME: Walker    DISCHARGE DIAGNOSIS:      Atherosclerosis of native coronary artery of native heart without angina pectoris  Encounter for surgical aftercare following surgery on the circulatory system  Presence of aortocoronary bypass graft  Ischemic cardiomyopathy  Leg edema, right  Essential (primary) hypertension  Chronic atrial fibrillation, unspecified (H)  Physical deconditioning  Personal history of transient ischemic attack (TIA), and cerebral infarction without residual deficits  HAYLEE on CPAP  Prostate cancer (H)        HOSPITAL COURSE:  pt with PMH as noted above and below:  he had worsening Cardiomyopathy, moderate aortic stenosis and EF so was scheduled for an angiogram and stent but underwent a CABGx1 and removal of an embolized un-retrievable right coronary stent and delivery system.  Also had an occlusion of left atrial appendage with a 45 mm atri clip on 8/15/2023.  Was diuresed though remained at 9 kg above his home pre op wt. Entresto  and diuretics still held on discharge to tcu due to lower bps. Sent of rehab    TCU/SNF COURSE:  pt has progressed well, has minimal chest sternal discomfort.  Left leg is sorest area but better today as edema is down.  Accuchecks  in  low 100's mostly. Pt is medically stable and therapy feels he is fine to home. Edema left leg is better today as he has been ace wrapped and elevating legs.      PAST MEDICAL HISTORY:  Past Medical History:   Diagnosis Date    Aortic stenosis     moderate    Atrial fibrillation 2006    Followed by [facility] - persistent    Calculus of kidney 2005, 6/06, 10/12, 8/18, 9/19    dr walker/nestor/иван - hematuria - w/u o/w neg    Cervical stenosis of spine     Moderate C4-5    Cholelithiasis     Chronic peptic ulcer, unspecified site, without mention of hemorrhage, perforation, or obstruction 1985    gastric bypass    Colon polyps 8/05, 9/10, 10/15    2 tubular adenoma, 1 hyperplastic - multiple serrated/tubular adenomas - last colonscopy 11/20 due 5 yrs    CVA (cerebral vascular accident) (H) 01/2019    small right periorlandic subcortical - left sided residual - left hand tingling/numbness - Left leg weak/numbness - cardioembolic    Elevated prostate specific antigen (PSA)     Dr Santos    Hepatitis C 10/2012    chronic hepatitis C, grade 1-2, stage 1 - mild - Dr Douglas    HFrEF (heart failure with reduced ejection fraction) (H)     [facility] - 35%    Hyperlipidemia LDL goal <70     Hypertension goal BP (blood pressure) < 140/90 06/2006    dr jaciel winchester    Iron deficiency anemia, unspecified 1985    gastric bypass    Mitral regurgitation     mild-moderate    Nephrolithiasis multiple episodes, last 10/19    Dr Bey/Ivana/Tom - 9mm 3/2021    OA (osteoarthritis)     Multiple - Left shoulder with rotator cuff tear - Dr Durham    Obesity, unspecified     s/p gastric bypass 1985     Obstructive sleep apnea syndrome     CPAP - 15 cm - [facility] 1/2023    Prediabetes     Presbyacusis 01/2004    dr corona    Prostate cancer (H) 2023    Dr Santos - [facility] 3+4, 4+3 = 7, bx 5/13 positive    Pyelonephritis, unspecified 12/1999    SCC (squamous cell carcinoma), 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 Please call [phone] to schedule. 180 tablet 3    aspirin (ASA) 81 MG chewable tablet 1 tablet (81 mg) by Oral or NG Tube route daily      Elastic Bandages & Supports (MEDICAL LEGWEAR/KNEE HIGH) MISC Knee Hi ace wraps in am off in pm      empagliflozin (JARDIANCE) 10 MG TABS tablet Take 1 tablet (10 mg) by mouth daily 90 tablet 3    Ferrous Sulfate (IRON) 325 (65 Fe) MG tablet Take 1 tablet by mouth three times a week (Monday, Wednesday, Friday) 90 tablet 3    fluticasone (FLONASE) 50 MCG/ACT nasal spray Spray 2 sprays into both nostrils daily as needed for rhinitis or allergies 54.6 mL 3    metoprolol tartrate (LOPRESSOR) 25 MG tablet Take 0.5 tablets (12.5 mg) by mouth 2 times daily (Patient taking differently: Take 12.5 mg by mouth 2 times daily HOLD if SBP <110 OR HR < 60 Call NP if held.)      pantoprazole (PROTONIX) 40 MG EC tablet Take 40 mg by mouth daily      rosuvastatin (CRESTOR) 10 MG tablet Take 1 tablet (10 mg) by mouth daily      senna-docusate (SENOKOT-S/PERICOLACE) 8.6-50 MG tablet Take 1 tablet by mouth 2 times daily as needed for constipation      senna-docusate (SENOKOT-S/PERICOLACE) 8.6-50 MG tablet Take 1 tablet by mouth daily Hold for loose stools. For constipation      tamsulosin (FLOMAX) 0.4 MG capsule Take 1 capsule (0.4 mg) by mouth every evening      vitamin B-12 (CYANOCOBALAMIN) 1000 MCG tablet Take 1,000 mcg by mouth daily      vitamin C (ASCORBIC ACID) 1000  MG TABS Take 1,000 mg by mouth daily      vitamin D3 (CHOLECALCIFEROL) 125 MCG (5000 UT) tablet Take 1 tablet by mouth daily       MED REC REQUIRED   Post Medication Reconciliation Status: discharge medications reconciled and changed, per note/orders     MEDICATION CHANGES/RATIONALE:   none  BP (!) 140/86   Pulse 92   Temp 97.4  F (36.3  C)   Resp 18   Ht 1.829 m (6')   Wt 142.3 kg (313 lb 12.8 oz)   SpO2 98%   BMI 42.56 kg/m      TODAY DURING EXAM/ROS:  No CP, SOB, Cough, dizziness, fevers, chills, HA, N/V, dysuria or Bowel Abnormalities. Appetite is good.  No pain except  as noted above.      PHYSICAL EXAM Today:  A & O x 3, NAD. Lungs CTA, non labored. RRR, S1/S2 w/o murmur,gallop or rub.  Trace edema right leg and +2 left leg edema.  Abdomen soft, nontender, +BT'S. No focal neurological deficits. Up with walker and assist   Incision is healing chest.       SNF LABS  Recent Labs   Lab Test 8/28/2023 08/22/23  0526 08/20/23  0738 08/19/23  0355   NA  139  --  137   POTASSIUM  3.7  --  3.9   CHLORIDE  104  --  104   CO2  22  --  22   ANIONGAP  13  --  11   GLC  100* 123* 113*   BUN  25.3*  --  26.5*   CR  0.58*  --  0.58*   BEBO  8.1*  --  8.0*    < > = values in this interval not displayed.     Hemoglobin   Date Value Ref Range Status   08/28/2023 9.1 (L) 13.3 - 17.7 g/dL Final   08/22/2023 7.7 (L) 13.3 - 17.7 g/dL Final   06/22/2021 14.6 13.3 - 17.7 g/dL Final   06/21/2021 15.5 13.3 - 17.7 g/dL Final             DISCHARGE PLAN:  Occupational Therapy, Physical Therapy, Registered Nurse, and From:  [facility].  Follow-up with PCP in 7 days: 7 days.    Current [place] or other scheduled appointments:  Future Appointments   Date Time Provider Department Center   8/30/2023  9:00 AM  CARDIAC REHAB [facility] RID   8/30/2023 10:50 AM Laura Olivia PA-C [facility] PSA CLIN   9/6/2023  2:45 PM [facility] CARDIOTHORACIC SURGERY, [facility]   9/7/2023  8:30 AM Brett Cassidy MD [facility] RV   9/18/2023   3:30 PM [facility]   1/19/2024 10:45 AM Martita Narvaez MD [facility] PSA CLIN        MTM referral needed and placed by this provider: none    Pending labs:recheck Hgb as outpt     Discharge Treatments:sternal precautions, see discharge hospital orders.       TOTAL DISCHARGE TIME:   Greater than 30 minutes    BIENVENIDO White [facility]             Documentation of Face-to-Face and Certification for Home Health Services     Patient: Hugo Weber   YOB: 1945  MR Number: [id number]  Today's Date: 8/28/2023    I certify that patient: Hugo Weber is under my care and that I, or a nurse practitioner or physician's assistant working with me, had a face-to-face encounter that meets the physician face-to-face encounter requirements with this patient on: 8/28/2023.    This encounter with the patient was in whole, or in part, for the following medical condition, which is the primary reason for home health care:    Atherosclerosis of native coronary artery of native heart without angina pectoris  Encounter for surgical aftercare following surgery on the circulatory system  Presence of aortocoronary bypass graft  Ischemic cardiomyopathy  Leg edema, right  Essential (primary) hypertension  Chronic atrial fibrillation, unspecified (H)  Physical deconditioning  Personal history of transient ischemic attack (TIA), and cerebral infarction without residual deficits  HAYLEE on CPAP  Prostate cancer (H)  .    I certify that, based on my findings, the following services are medically necessary home health services: Nursing, Occupational Therapy, and Physical Therapy.    My clinical findings support the need for the above services because: Nurse is needed: To assess CV status, edema,  after changes in medications or other medical regimen.., Occupational Therapy Services are needed to assess and treat cognitive ability and address ADL  safety due to impairment in sternal precautions and functioning at home--lives alone., and Physical Therapy Services are needed to assess and treat the following functional impairments: deconditioning, and see under OT.    Further, I certify that my clinical findings support that this patient is homebound (i.e. absences from home require considerable and taxing effort and are for medical reasons or [facility] services or infrequently or of short duration when for other reasons) because: Requires assistance of another person or specialized equipment to access medical services because patient: Is unable to walk greater than 135 feet without rest...    Based on the above findings. I certify that this patient is confined to the home and needs intermittent skilled nursing care, physical therapy and/or speech therapy.  The patient is under my care, and I have initiated the establishment of the plan of care.  This patient will be followed by a physician who will periodically review the plan of care.  Physician/Provider to provide follow up care: Brett Cassidy    Responsible Medicare certified PREET Physician: Jeimy Vasquez RN, CNP  Physician Signature: See electronic signature associated with these discharge orders.  Date: 8/28/2023

## 2023-08-28 NOTE — PATIENT INSTRUCTIONS
[facility] Discharge Orders    Name: Hugo Weber  : 1945       DISCHARGE MEDICATIONS:  The patient s pharmacy is authorized to dispense a 30-day supply of medications. Refill requests should be directed to the primary provider, Brett Cassidy .   No narcotics are prescribed at time of discharge.   Current Outpatient Medications   Medication Sig Dispense Refill    acetaminophen (TYLENOL) 325 MG tablet Take 650 mg by mouth 4 times daily 7 am, 12 N, 6p, 10p      apixaban ANTICOAGULANT (ELIQUIS) 5 MG tablet Take 1 tablet (5 mg) by mouth 2 times daily Appointment needed for additional refills. Please call [phone] to schedule. 180 tablet 3    aspirin (ASA) 81 MG chewable tablet 1 tablet (81 mg) by Oral or NG Tube route daily      Elastic Bandages & Supports (MEDICAL LEGWEAR/KNEE HIGH) MISC Knee Hi ace wraps in am off in pm      empagliflozin (JARDIANCE) 10 MG TABS tablet Take 1 tablet (10 mg) by mouth daily 90 tablet 3    Ferrous Sulfate (IRON) 325 (65 Fe) MG tablet Take 1 tablet by mouth three times a week (Monday, Wednesday, Friday) 90 tablet 3    fluticasone (FLONASE) 50 MCG/ACT nasal spray Spray 2 sprays into both nostrils daily as needed for rhinitis or allergies 54.6 mL 3    metoprolol tartrate (LOPRESSOR) 25 MG tablet Take 0.5 tablets (12.5 mg) by mouth 2 times daily (Patient taking differently: Take 12.5 mg by mouth 2 times daily HOLD if SBP <110 OR HR < 60 Call NP if held.)      pantoprazole (PROTONIX) 40 MG EC tablet Take 40 mg by mouth daily      rosuvastatin (CRESTOR) 10 MG tablet Take 1 tablet (10 mg) by mouth daily      senna-docusate (SENOKOT-S/PERICOLACE) 8.6-50 MG tablet Take 1 tablet by mouth 2 times daily as needed for constipation      senna-docusate (SENOKOT-S/PERICOLACE) 8.6-50 MG tablet Take 1 tablet by mouth daily Hold for loose stools. For constipation      tamsulosin (FLOMAX) 0.4 MG capsule Take 1 capsule (0.4 mg) by mouth every evening      vitamin B-12  (CYANOCOBALAMIN) 1000 MCG tablet Take 1,000 mcg by mouth daily      vitamin C (ASCORBIC ACID) 1000 MG TABS Take 1,000 mg by mouth daily      vitamin D3 (CHOLECALCIFEROL) 125 MCG (5000 UT) tablet Take 1 tablet by mouth daily            BIENVENIDO White CNP  This document was electronically signed on August 28, 2023

## 2023-08-30 ENCOUNTER — PATIENT OUTREACH (OUTPATIENT)
Dept: CARDIOLOGY | Facility: CLINIC | Age: 78
End: 2023-08-30

## 2023-08-30 ENCOUNTER — PATIENT OUTREACH (OUTPATIENT)
Dept: CARE COORDINATION | Facility: CLINIC | Age: 78
End: 2023-08-30

## 2023-08-30 ENCOUNTER — HOSPITAL ENCOUNTER (OUTPATIENT)
Dept: CARDIAC REHAB | Facility: CLINIC | Age: 78
Discharge: HOME | End: 2023-08-30
Attending: STUDENT IN AN ORGANIZED HEALTH CARE EDUCATION/TRAINING PROGRAM
Payer: COMMERCIAL

## 2023-08-30 ENCOUNTER — OFFICE VISIT (OUTPATIENT)
Dept: CARDIOLOGY | Facility: CLINIC | Age: 78
End: 2023-08-30
Payer: COMMERCIAL

## 2023-08-30 VITALS
WEIGHT: 314 LBS | HEART RATE: 88 BPM | BODY MASS INDEX: 42.53 KG/M2 | HEIGHT: 72 IN | SYSTOLIC BLOOD PRESSURE: 136 MMHG | DIASTOLIC BLOOD PRESSURE: 82 MMHG

## 2023-08-30 DIAGNOSIS — Z95.1 S/P CABG (CORONARY ARTERY BYPASS GRAFT): ICD-10-CM

## 2023-08-30 DIAGNOSIS — I50.22 CHRONIC SYSTOLIC HEART FAILURE (H): Primary | ICD-10-CM

## 2023-08-30 PROCEDURE — 99215 OFFICE O/P EST HI 40 MIN: CPT | Performed by: PHYSICIAN ASSISTANT

## 2023-08-30 PROCEDURE — 93798 PHYS/QHP OP CAR RHAB W/ECG: CPT

## 2023-08-30 PROCEDURE — 93797 PHYS/QHP OP CAR RHAB WO ECG: CPT

## 2023-08-30 RX ORDER — FUROSEMIDE 40 MG
40 TABLET ORAL DAILY
Qty: 90 TABLET | Refills: 3 | Status: SHIPPED | OUTPATIENT
Start: 2023-08-30 | End: 2023-09-18

## 2023-08-30 RX ORDER — ROSUVASTATIN CALCIUM 10 MG/1
10 TABLET, COATED ORAL DAILY
Qty: 30 TABLET | Refills: 1 | Status: SHIPPED | OUTPATIENT
Start: 2023-08-30 | End: 2023-10-12

## 2023-08-30 NOTE — LETTER
8/30/2023    Brett Cassidy MD  [address] [facility] [address]    RE: Hugo Weber       Dear Colleague,     I had the pleasure of seeing Hugo Weber in the [facility].    C.O.R.E (Heart Failure) Clinic Progress Note    Hugo Weber MRN# [id number]   YOB: 1945 Age: 78 year old     Primary cardiology team: Was Dr. Castillo         Assessment and Plan     In summary, Hugo Weber presents today for a hospital follow up visit. When I met him for a routine annual follow up visit in July, I suggested ischemic workup for his EF decline. He opted for angiogram > stress test. Unfortunately, his angiogram was very complicated and resulted in the need for emergent 1v bypass surgery.     Today, he is recovering slowly, and is quite volume up.     Chronic HFrEF.   EF 55% (2019) --> 45% (2021) --> 35% (2022), where it has remained.   GDMT includes jardiance 10 mg daily  (Entresto and Toprol XL stopped in hospital d/t hypotension).  Appears volume up. Up 20 lbs from pre-op wt.   QRS narrow.    CAD, s/p emergent 1v bypass (VG-PDA) on 8/15/23.  Now on aspirin and statin.    Mild-mod MR, Moderate aortic stenosis.     CAF.   Rate controlled, anticoagulated.     HTN.   Well-controlled, marginal.     Plan:  Re-start Entresto at 12-13 mg BID.   Start Lasix 40 mg daily.   Will ask PCP to check a BMP when he sees him on 9/7.   Plan to re-start Toprol XL once euvolemic.     Follow-up:  With me as scheduled 9/18 with a BMP prior.  Lipid panel in 3 months.   With Dr. 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 TTE prior.    [facility]         History of Presenting Illness     Hugo Weber is a pleasant 78 year old patient with a pertinent history chronic atrial fibrillation and a cardiomyopathy, NSVT, morbid obesity with hx of gastric bypass surgery, HAYLEE (on CPAP), hx of CVA (2019), and HTN.     This patient  previously followed with Dr. Castillo.  It appears that in 2021 he had an echocardiogram that showed a decline in his EF from 55-60% in 2019, to 45-50%.  This was obtained after a murmur was noted on exam by my colleague Norma Stein.  No changes were made at that time.  Echo was repeated again in July 2022, showing further decline in his EF to 35-40%, with mild to moderate MR, and mild aortic stenosis with a moderately dilated ascending aorta.  At that time, Dr. Castillo added losartan to his medication regimen and recommended follow-up in another year. 24-hour Holter monitor in November 2022 showed atrial fibrillation with an average heart rate of 84 bpm.  Ranging from  bpm.    Echo was repeated in July 2023, showing a stable EF at 35%. The RV is normal. MR is stable. Aortic stenosis is now moderate. His GDMT included losartan 50 mg daily, Toprol-XL 50 mg daily. He had no prior ischemic workup.      When I met him for a routine annual follow up visit in July, I stopped his Losartan and started Entresto and jardiance. I also suggested ischemic workup for his EF decline. He opted for angiogram > stress test. Angiogram showed a distal 75% RCA lesion. Dr. Luna attempted to PCI this however the stent delivery system failed resulting in retention of an undeployed drug-eluting stent and stent balloon in the ostial and proximal RCA. He ultimately underwent emergent 1v bypass surgery with VG-PDA on 8/15. His Entresto and Toprol were both stopped in the hospital d/t hypotension.     Today, Hugo returns to clinic with his son, stating he feels crummy, as expected. Last night was his first night at home from TCU. Some orthopnea and significant peripheral edema.  Started cardiac rehab today and did well, but due to fatigue is in a wheelchair. Wt is up 20 lbs from pre-admit. BP at home has been running 110's systolic. HR in clinic is well controlled off beta blocker.  Over the past year, he's also gone through a shoulder  surgery and radiation for prostate ca at [place] (now completed), and tolerated this well. Patient denies chest pain, near syncope or syncope. He has a smart watch but doesn't really monitor his HR with it. At baseline, he exercises 3x per week on machines, bikes, etc, and feels well with this. Retired teacher. .         Review of Systems     12-pt ROS is negative except for as noted in the HPI.          Physical Exam     Vitals: /82 (BP Location: Right arm, Patient Position: Sitting, Cuff Size: Adult Regular)   Pulse 88   Ht 1.829 m (6')   Wt 142.4 kg (314 lb)   BMI 42.59 kg/m    Wt Readings from Last 10 Encounters:   08/30/23 142.4 kg (314 lb)   08/28/23 142.3 kg (313 lb 12.8 oz)   08/23/23 142.2 kg (313 lb 8 oz)   08/22/23 142.1 kg (313 lb 3.2 oz)   08/14/23 134.3 kg (296 lb)   07/19/23 133.6 kg (294 lb 9.6 oz)   03/07/23 135.2 kg (298 lb)   02/21/23 136.5 kg (301 lb)   02/17/23 138.8 kg (306 lb)   02/09/23 139 kg (306 lb 6.4 oz)       Constitutional:  Patient is pleasant, alert, cooperative, and in NAD.  HEENT:  NCAT. PERRLA. EOM's intact.   Neck:  CVP appears normal. No carotid bruits.   Pulmonary: Normal respiratory effort. CTAB.   Cardiac: Irregularly irregular rhythm, variable S1, grade 3/6 sm at the LSB. Central chest incision appears to be healing well.  Abdomen:  Non-tender abdomen, no hepatosplenomegaly appreciated.   Vascular: Pulses in the upper and lower extremities are 2+ and equal bilaterally.  Extremities: 2+ pitting edema bilateral pedal to knees.   Skin:  No rashes or lesions appreciated.   Neurological:  No gross motor or sensory deficits.   Psych: Appropriate affect.          Data   Labs reviewed:  Recent Labs   Lab Test 08/28/23  0853 08/16/22  0804 08/11/21  0840 08/07/20  0847   LDL  --  81 67 88   HDL  --  43 44 43   NHDL  --  96 78 105   CHOL  --  139 122 148   TRIG  --  73 54 84   TSH 4.12 2.84 2.58 2.03   NTBNP 4,917*  --   --   --    IRON  --  154  --  99   FEB  --  361   --  350   IRONSAT  --  43  --  28   ELENA  --  29  --  28       Lab Results   Component Value Date    WBC 6.7 08/22/2023    WBC 8.4 06/22/2021    RBC 2.61 (L) 08/22/2023    RBC 5.13 06/22/2021    HGB 9.1 (L) 08/28/2023    HGB 14.6 06/22/2021    HCT 24.1 (L) 08/22/2023    HCT 46.8 06/22/2021    MCV 92 08/22/2023    MCV 91 06/22/2021    MCH 29.5 08/22/2023    MCH 28.5 06/22/2021    MCHC 32.0 08/22/2023    MCHC 31.2 (L) 06/22/2021    RDW 14.8 08/22/2023    RDW 14.3 06/22/2021     08/22/2023     06/22/2021       Lab Results   Component Value Date     08/28/2023     06/22/2021    POTASSIUM 4.1 08/28/2023    POTASSIUM 4.1 08/15/2023    POTASSIUM 4.2 08/16/2022    POTASSIUM 4.0 06/23/2021    CHLORIDE 102 08/28/2023    CHLORIDE 108 08/16/2022    CHLORIDE 108 06/22/2021    CO2 21 (L) 08/28/2023    CO2 25 08/16/2022    CO2 26 06/22/2021    ANIONGAP 14 08/28/2023    ANIONGAP 7 08/16/2022    ANIONGAP 4 06/22/2021    GLC 79 08/28/2023     (H) 08/20/2023     (H) 08/16/2022     (H) 06/22/2021    BUN 14.8 08/28/2023    BUN 16 08/16/2022    BUN 14 06/22/2021    CR 0.64 (L) 08/28/2023    CR 0.81 06/22/2021    GFRESTIMATED >90 08/28/2023    GFRESTIMATED >60 01/11/2023    GFRESTIMATED 87 06/22/2021    GFRESTBLACK >90 06/22/2021    BEBO 8.4 (L) 08/28/2023    BEBO 8.4 (L) 06/22/2021      Lab Results   Component Value Date    AST 22 08/16/2023    AST 13 06/21/2021    ALT 11 08/16/2023    ALT 19 06/21/2021       Lab Results   Component Value Date    A1C 5.4 02/21/2023    A1C 5.8 (H) 11/19/2020       Lab Results   Component Value Date    INR 1.37 (H) 08/17/2023    INR 1.47 (H) 08/15/2023    INR 1.04 03/21/2016    INR 1.04 02/12/2015            Problem List     Patient Active Problem List   Diagnosis    Iron deficiency anemia    Colon polyps    Obstructive sleep apnea syndrome    Hyperlipidemia LDL goal <70    Long term current use of anticoagulant therapy - for intermittent a-fib    Advance Care  Planning    Total knee replacement status    Calculus of kidney - 1.2 cm stone at the upper pole as of CT urogram fr 1/11/2023    NAFLD (nonalcoholic fatty liver disease)    Morbid obesity due to excess calories (H)    History of hepatitis C    Prediabetes    Paroxysmal atrial fibrillation (H)    Cholelithiasis    Hypertension goal BP (blood pressure) < 140/90    TMJ arthralgia    Nephrolithiasis    OA (osteoarthritis)    First degree AV block    Benign prostatic hyperplasia (BPH) with urinary urge incontinence    Thoracic aortic ectasia (H)    Stroke (H)    CVA (cerebral vascular accident) (H)    Cervical stenosis of spine    Vitamin B12 deficiency (non anemic)    Vitamin D deficiency    Myofascial pain    Permanent atrial fibrillation (H)    History of CVA (cerebrovascular accident)    Small bowel obstruction (H)    NSVT (nonsustained ventricular tachycardia) (H)    Chronic LLQ pain    Elevated prostate specific antigen (PSA)    Prostate cancer (H)    Chronic systolic heart failure (H)    HFrEF (heart failure with reduced ejection fraction) (H)    Aortic valve stenosis, etiology of cardiac valve disease unspecified- moderate 2+ with FREDY = 1.2cm2 on echocardiogram fr 7/14/2023    Mitral [id number]-2-4+ on echocardiogram fr 7/14/2023    Cardiomyopathy (H)    Cardiomyopathy, unspecified type (H)    S/P CABG (coronary artery bypass graft)    Fluid overload    Transient hyperglycemia post procedure    Status post ligation of left atrial appendage    Encounter for surgical aftercare following surgery on the circulatory system    Presence of aortocoronary bypass graft:CAB x1 8/15/2023    Atherosclerosis of native coronary artery of native heart without angina pectoris    Essential (primary) hypertension    Chronic atrial fibrillation, unspecified (H)    Personal history of transient ischemic attack (TIA), and cerebral infarction without residual deficits    Physical deconditioning    Leg edema, right    HAYLEE on CPAP             Medications     Current Outpatient Medications   Medication Sig Dispense Refill    acetaminophen (TYLENOL) 325 MG tablet Take 650 mg by mouth 4 times daily 7 am, 12 N, 6p, 10p      apixaban ANTICOAGULANT (ELIQUIS) 5 MG tablet Take 1 tablet (5 mg) by mouth 2 times daily Appointment needed for additional refills. Please call [phone] to schedule. 180 tablet 3    aspirin (ASA) 81 MG chewable tablet 1 tablet (81 mg) by Oral or NG Tube route daily      Elastic Bandages & Supports (MEDICAL LEGWEAR/KNEE HIGH) MISC Knee Hi ace wraps in am off in pm      empagliflozin (JARDIANCE) 10 MG TABS tablet Take 1 tablet (10 mg) by mouth daily 90 tablet 3    Ferrous Sulfate (IRON) 325 (65 Fe) MG tablet Take 1 tablet by mouth three times a week (Monday, Wednesday, Friday) 90 tablet 3    fluticasone (FLONASE) 50 MCG/ACT nasal spray Spray 2 sprays into both nostrils daily as needed for rhinitis or allergies 54.6 mL 3    metoprolol tartrate (LOPRESSOR) 25 MG tablet Take 0.5 tablets (12.5 mg) by mouth 2 times daily (Patient taking differently: Take 12.5 mg by mouth 2 times daily HOLD if SBP <110 OR HR < 60 Call NP if held.)      pantoprazole (PROTONIX) 40 MG EC tablet Take 40 mg by mouth daily      rosuvastatin (CRESTOR) 10 MG tablet Take 1 tablet (10 mg) by mouth daily      senna-docusate (SENOKOT-S/PERICOLACE) 8.6-50 MG tablet Take 1 tablet by mouth 2 times daily as needed for constipation      tamsulosin (FLOMAX) 0.4 MG capsule Take 1 capsule (0.4 mg) by mouth every evening      vitamin B-12 (CYANOCOBALAMIN) 1000 MCG tablet Take 1,000 mcg by mouth daily      vitamin C (ASCORBIC ACID) 1000 MG TABS Take 1,000 mg by mouth daily      vitamin D3 (CHOLECALCIFEROL) 125 MCG (5000 UT) tablet Take 1 tablet by mouth daily              Past Medical History     Past Medical History:   Diagnosis Date    Aortic stenosis     moderate    Atrial fibrillation 2006    Followed by [facility] - persistent    Calculus of kidney 2005, 6/06, 10/12,  8/18, 9/19    dr walker/nestor/иван - hematuria - w/u o/w neg    Cervical stenosis of spine     Moderate C4-5    Cholelithiasis     Chronic peptic ulcer, unspecified site, without mention of hemorrhage, perforation, or obstruction 1985    gastric bypass    Colon polyps 8/05, 9/10, 10/15    2 tubular adenoma, 1 hyperplastic - multiple serrated/tubular adenomas - last colonscopy 11/20 due 5 yrs    CVA (cerebral vascular accident) (H) 01/2019    small right periorlandic subcortical - left sided residual - left hand tingling/numbness - Left leg weak/numbness - cardioembolic    Elevated prostate specific antigen (PSA)     Dr Santos    Hepatitis C 10/2012    chronic hepatitis C, grade 1-2, stage 1 - mild - Dr Douglas    HFrEF (heart failure with reduced ejection fraction) (H)     [facility] - 35%    Hyperlipidemia LDL goal <70     Hypertension goal BP (blood pressure) < 140/90 06/2006    dr jaciel winchester    Iron deficiency anemia, unspecified 1985    gastric bypass    Mitral regurgitation     mild-moderate    Nephrolithiasis multiple episodes, last 10/19    Dr Bey/Ivana/Tom - 9mm 3/2021    OA (osteoarthritis)     Multiple - Left shoulder with rotator cuff tear - Dr Durham    Obesity, unspecified     s/p gastric bypass 1985     Obstructive sleep apnea syndrome     CPAP - 15 cm - [facility] 1/2023    Prediabetes     Presbyacusis 01/2004    dr corona    Prostate cancer (H) 2023    Dr Santos - Manju 3+4, 4+3 = 7, bx 5/13 positive    Pyelonephritis, unspecified 12/1999    SCC (squamous cell carcinoma), 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    dr saez - lt conchal bowl    Sleep apnea 10/2002    cpap - severe - 15 cm - dr cunha    Stroke (H) 01/19/2019    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    [facility]    Vitamin B12 deficiency (non anemic) 04/15/2019    Vitamin D deficiency 04/15/2019     Past Surgical History:   Procedure Laterality Date    APPENDECTOMY      ARTHROPLASTY KNEE  08/13/2012    LT TKA - Dr Villanueva    BYPASS GRAFT ARTERY  CORONARY N/A 8/15/2023    Procedure: CORONARY ARTERY BYPASS GRAFT x 1 (SAPHENOUS VEIN - RIGHT POSTERIOR DESCENDING ARTERY) WITH ENDOSCOPIC SAPHENOUS VEIN HARVEST ON THE LEFT LOWER EXTREMITY, AND ON CARDIOPULMONARY PUMP OXYGENATOR  (INTRAOPERATIVE TRANSESOPHAGEAL ECHOCARDIOGRAM BY ANESTHESIOLOGIST), REMOVAL OF RIGHT CORONARY ARTERY STENT AND DELIVERY SYSTEM, LEFT ATRIAL APPENDAGE CLIPPING WITH ATRICLIP FLEX V DEVICE SIZE: 45    CARDIOVERSION  2016    CARDIOVERSION      COLONOSCOPY  8/05, 9/10, 10/15    multiple tubular/serrated/hyperplastic polyps    COLONOSCOPY N/A 10/29/2015    multiple tubular and serrated adenomas    COLONOSCOPY N/A 09/07/2016    COLONOSCOPY  11/2020    tubular adenomas - due 5 yrs    COLONOSCOPY N/A 11/24/2020    Procedure: COLONOSCOPY with biopsies;  Surgeon: Chadwick Burgos MD;  Location: [facility] OR    CV CORONARY ANGIOGRAM N/A 8/15/2023    Procedure: Coronary Angiogram;  Surgeon: Carly Luna MD;  Location: [facility] CARDIAC CATH LAB    CV FRACTIONAL FLOW RATIO WIRE N/A 8/15/2023    Procedure: Fractional Flow Ratio Wire;  Surgeon: Carly Luna MD;  Location: [facility] CARDIAC CATH LAB    CV PCI N/A 8/15/2023    Procedure: Percutaneous Coronary Intervention;  Surgeon: Carly Luna MD;  Location: [facility] CARDIAC CATH LAB    CYSTOSCOPY W/ LASER LITHOTRIPSY  10/16/2012,5/2/2018    ESOPHAGOSCOPY, GASTROSCOPY, DUODENOSCOPY (EGD), COMBINED N/A 11/24/2020    Procedure: ESOPHAGOGASTRODUODENOSCOPY, COLONOSCOPY with biopsies;  Surgeon: Chadwick Burgos MD;  Location: [facility] OR    EYE SURGERY  1/01,6/02,11/02    lasik    HC KNEE SCOPE, DIAGNOSTIC  05/2000    Arthroscopy, Knee RT    LASER HOLMIUM LITHOTRIPSY URETER(S), INSERT STENT, COMBINED  10/16/2012    stones x 4, Dr Campa    LASER HOLMIUM LITHOTRIPSY URETER(S), INSERT STENT, COMBINED Right 05/02/2018    CYSTOSCOPY, RIGHT URETEROSCOPY, HOLMIUM LASER LITHOTRIPSY, RIGHT STENT PLACEMENT - Dr Bey    MRI BIOPSY PROSTATE Bilateral 02/09/2023     mark    REVERSE ARTHROPLASTY SHOULDER Left 2023    Procedure: LEFT REVERSE SHOULDER ARTHROPLASTY WITH CUSTOM GUIDE WITH AUTOLOGOUS BONE GRAFTOF PROXIMAL HUMERUS;  Surgeon: Booker Multani MD;  Location: [facility] OR    SURGICAL HISTORY OF -       s/p gastric bypass Bilroth II    SURGICAL HISTORY OF -   1998    wisdom teeth    SURGICAL HISTORY OF -       cellulitis    SURGICAL HISTORY OF -   1998    lt forearm spur's    SURGICAL HISTORY OF -   ,,    s/p lasik    SURGICAL HISTORY OF -   1999    rt forearm spurs    SURGICAL HISTORY OF -   2006    dr stevenson - lithotrypsy    SURGICAL HISTORY OF -   10/08,     Lt ear chonchal lesion removal SCC - dr saez    SURGICAL HISTORY OF -  Right 10/2019    nephrolithiasis - cystoscopy, rt lithotrypsy, Dr Heath    SURGICAL HISTORY OF -  Right 2019    Cystoscopy, Rt lithotrypsy, Calcium Oxalate, Dr Heath     Family History   Problem Relation Age of Onset    Alzheimer Disease Father 82         at 88    Prostate Cancer Father 76        bladder and prostate    Heart Disease Mother 80        CHF    Heart Disease Maternal Grandfather         MI at 55    Cancer Paternal Uncle         ?    Ulcerative Colitis No family hx of     Crohn's Disease No family hx of     Stomach Cancer No family hx of     GERD No family hx of      Social History     Socioeconomic History    Marital status:      Spouse name: Mayra    Number of children: 3    Years of education: 21    Highest education level: Not on file   Occupational History    Occupation: retired teacher and football and       Employer: [employer]     Employer: RETIRED   Tobacco Use    Smoking status: Never    Smokeless tobacco: Never   Vaping Use    Vaping Use: Never used   Substance and Sexual Activity    Alcohol use: Yes     Alcohol/week: 2.0 - 3.0 standard drinks of alcohol     Types: 2 - 3 Standard drinks or equivalent per week     Comment:  "occassiinally    Drug use: No     Comment: acknowledges using herbal supplement \"Vibe\" for energy-none used recently     Sexual activity: Not Currently     Partners: Male     Birth control/protection: None     Comment:    Other Topics Concern     Service Not Asked    Blood Transfusions Not Asked    Caffeine Concern Yes     Comment: <1 can qd    Occupational Exposure Not Asked    Hobby Hazards Not Asked    Sleep Concern Yes     Comment: sleep apnea, wears cpap    Stress Concern Not Asked    Weight Concern Not Asked    Special Diet Not Asked    Back Care Not Asked    Exercise No    Bike Helmet Not Asked    Seat Belt Yes    Self-Exams Not Asked    Parent/sibling w/ CABG, MI or angioplasty before 65F 55M? No   Social History Narrative    Wife , Mayra,  from brain gliobastoma 2020.  --Ingrid Girard MD           Social Determinants of Health     Financial Resource Strain: Not on file   Food Insecurity: Not on file   Transportation Needs: Not on file   Physical Activity: Not on file   Stress: Not on file   Social Connections: Not on file   Intimate Partner Violence: Not on file   Housing Stability: Not on file            Allergies   Patient has no known allergies.    Today's clinic visit entailed:  Review of the result(s) of each unique test - coronary angiogram, echocardiogram, BMP, CBC  Ordering of each unique test  Prescription drug management  I spent a total of 40 minutes on the day of the visit.   Time spent by me doing chart review, history and exam, documentation and further activities per the note  Provider  Link to [facility] Help Grid     The level of medical decision making during this visit was of moderate complexity.      Thank you for allowing me to participate in the care of your patient.      Sincerely,     Laura Olivia PA-C     [facility]  cc:   No referring provider defined for this encounter.      "

## 2023-08-30 NOTE — PATIENT INSTRUCTIONS
Today, we discussed the following:  Medication changes:   Start furosemide (Lasix) 40 mg once daily.   Re-start Entresto at 1/2 tab twice daily.  Can stay off metoprolol for now.   Follow up:   RN call next week for update on weight/BP/symptoms.  With me as scheduled  with a non-fasting lab prior.    Please, remember to continue the followin.  Weigh yourself daily. Call if your weight is up > than 2 pounds in one day, or 5 pounds in one week; if you feel more short of breath or have worsening swelling in your legs or abdomen.  2.  Eat a low sodium diet (less than 2,000mg or 2g daily). If you eat less salt, you will retain less fluid.   3.  Avoid alcohol, as this can worsen heart failure.   4.  Avoid NSAIDs as able (For example, Ibuprofen / aleve / advil / naprosen / diclofenac).    Thank you for your visit with the C.O.R.E. Clinic today.   CORE stands for Cardiomyopathy Optimization Rehabilitation and Education. The CORE clinic will teach and help you to manage your heart failure and keep you out of the hospital.    Call C.O.R.E. nurse for any questions or concerns Mon-Fri 8am-4pm  [phone]: Nurse number   [phone]: After Hours Phone Number

## 2023-08-30 NOTE — PROGRESS NOTES
Clinic Care Coordination Contact  [facility]: Post-Discharge Note  SITUATION                                                      Admission:    Admission Date: 08/22/23   Reason for Admission: S/P CABG  Discharge:   Discharge Date: 08/29/23  Discharge Diagnosis: S/P CABG    BACKGROUND                                                      Per hospital discharge summary and inpatient provider notes:  Summary of Hospital Course: pt with PMH as noted above and below:  he had worsening Cardiomyopathy, moderate aortic stenosis and EF so was scheduled for an angiogram and stent but underwent a CABGx1 and removal of an embolized un-retrievable right coronary stent and delivery system.  Also had an occlusion of left atrial appendage with a 45 mm atri clip on 8/15/2023.  Was diuresed though remained at 9 kg above his home pre op wt. Entresto  and diuretics still held on discharge to tcu due to lower bps. Sent to rehab at [facility]. Physical deconditioning  Comment/Plan: PT OT, pt agrees to stay thru wkd but wants to go on Monday afternoon if conts to progress.    ASSESSMENT      Discharge Assessment  How are you doing now that you are home?: Doing okay.  How are your symptoms? (Red Flag symptoms escalate to triage hotline per guidelines): Improved  Do you feel your condition is stable enough to be safe at home until your provider visit?: Yes  Does the patient have their discharge instructions? : Yes  Does the patient have questions regarding their discharge instructions? : No  Were you started on any new medications or were there changes to any of your previous medications? : Yes  Does the patient have all of their medications?: Yes  Do you have questions regarding any of your medications? : No  Do you have all of your needed medical supplies or equipment (DME)?  (i.e. oxygen tank, CPAP, cane, etc.): Yes  Discharge follow-up appointment scheduled within 14 calendar days? : Yes  Discharge Follow Up  Appointment Date: 08/30/23  Discharge Follow Up Appointment Scheduled with?: Specialty Care Provider    Post-op ([facility]A Only)  If the patient had a surgery or procedure, do they have any questions for a nurse?: No    Post-op (Clinicians Only)  Did the patient have surgery or a procedure: Yes  Incision: closed  Drainage: No  Bleeding: none  Fever: No  Chills: No  Redness: No  Warmth: No  Swelling: No  Incision site pain: No  Closure: suture  Eating & Drinking: eating and drinking without complaints/concerns  PO Intake: regular diet  Bowel Function: normal  Date of last BM: 08/29/23  Urinary Status: voiding without complaint/concerns    PLAN                                                      Outpatient Plan:  Follow up with Lena/Kaitlynn/Tammie/Pedro Dan with Dr Guevara/Armando/Lissa, heart surgeon, at [facility] [address] on 9/6/23 at 2:45 PM. If any questions or concerns call [phone].  You will see us once at this visit and then if everything is going well you will not need to see us again.  You will follow long term with your cardiologist.  * Follow up with Laura Pacheco NP with cardiology on 9[facility]18/23 at 3:[facility]  *Follow up with Dr Narvaez, cardiologist, 1/19/24 at 10:45. This is who you will follow with long term about your heart issues. [phone].  *Please schedule outpatient cardiac rehab within 5 days of discharge from TCU, call [phone].     RN CC introduced Care Coordination to patient. Full Care Coordination assessment was not indicated as patient declined Care Coordination support at this time. He was agreeable to receive introduction letter with additional information on Care Coordination.     Future Appointments   Date Time Provider Department Center   9/5/2023  9:30 AM 2, [facility]   9/6/2023  2:45 PM [facility]   9/7/2023  8:30 AM Brett Cassidy MD [facility]   9/8/2023  9:00 AM 2,  [facility]   9/11/2023 10:00 AM 1, [facility]   9/15/2023  9:00 AM 2, [facility]   9/18/2023 11:00 AM 2, [facility] Cardiac [facility]   9/18/2023 12:30 PM RU LAB [facility]   9/18/2023  3:30 PM Laura Olivia PA-C [facility]   9/20/2023 10:00 AM 1, [facility] Cardiac Rehab [facility]   1/19/2024 10:45 AM Martita Narvaez MD [facility]     For any urgent concerns, please contact our 24 hour nurse triage line: [phone] ([phone])       Krystal Valdovinos RN, BSN, CPHN, CM  [facility] Ambulatory Care Management  [facility]  Phone: [phone]  Email: [email or web address]

## 2023-08-30 NOTE — LETTER
[facility] CARE COORDINATION  [address] [address]    August 30, 2023    Hugo [address]ARMA [address]  [address]   [place] [facility] [address]      Dear Hugo,      I am a  clinic care coordinator who works with Brett Cassidy MD with the [facility]. I wanted to introduce myself and provide you with my contact information for you to be able to call me with any questions or concerns. I wanted to thank you for spending the time to talk with me.  Below is a description of clinic care coordination and how I can further assist you.       The clinic care coordination team is made up of a registered nurse, , financial resource worker and community health worker who understand the health care system. The goal of clinic care coordination is to help you manage your health and improve access to the health care system. Our team works alongside your provider to assist you in determining your health and social needs. We can help you obtain health care and community resources, providing you with necessary information and education. We can work with you through any barriers and develop a care plan that helps coordinate and strengthen the communication between you and your care team.  Our services are voluntary and are offered without charge to you personally.    Please feel free to contact me with any questions or concerns regarding care coordination and what we can offer.      We are focused on providing you with the highest-quality healthcare experience possible.    Sincerely,     Krystal Valdovinos RN, BSN, CPHN, CM  [facility] Ambulatory Care Management  [facility]  Phone: [phone]  Email: [email or web address]      Enclosed: Additional information on Care Coordination.     WHAT IS CARE COORDINATION?    [facility] Care Coordination supports patients  and families dealing with chronic or complex health  conditions, developmental issues,  and social service  needs. This service is available to patients of all ages,  from babies to seniors.  When you re facing a difficult decision about caring  for yourself or someone you love, we can help you  understand your options. We identify and refer you to  community resources that help with financial, legal,  mental health, transportation, and other issues. We also  help with your medical and related education needs.    IS CARE COORDINATION RIGHT FOR ME?    Discuss a referral to Care Coordination with your  primary care provider or care team member.    HOW CAN I CONNECT WITH CARE COORDINATION?     Contact your clinic.   Speak with your doctor or clinic staff.   Discuss care coordination with hospital  staff before discharge.    MEET YOUR CARE COORDINATION TEAM    Registered Nurse Care Coordinator   Provides education on medications,  disease management, and new diagnoses.   Addresses concerns about medical conditions  and connects you to resources.   Develops patient-centered goals and  communicates with patient s care team.    Social Work Care Coordinator   Provides education on emotional wellbeing.   Connects you to a variety of  community-based resources.   Communicates with care team  and community partners.    Community Health Worker   Identifies health and social barriers and  connects you with community resources.   Develops nonclinical patient and  family centered goals.   Enhances communication between  patients and care teams.    Financial Resource Worker   Assists patients with applying for health  [place] ([place], [employer], [place]   Helps patients with applying for  county benefits.   Connects patients with [facility]

## 2023-08-30 NOTE — PROGRESS NOTES
C.O.R.E (Heart Failure) Clinic Progress Note    Hugo Weber MRN# [id number]   YOB: 1945 Age: 78 year old     Primary cardiology team: Was Dr. Castillo         Assessment and Plan     In summary, Hugo Weber presents today for a hospital follow up visit. When I met him for a routine annual follow up visit in July, I suggested ischemic workup for his EF decline. He opted for angiogram > stress test. Unfortunately, his angiogram was very complicated and resulted in the need for emergent 1v bypass surgery.     Today, he is recovering slowly, and is quite volume up.     Chronic HFrEF.   EF 55% (2019) --> 45% (2021) --> 35% (2022), where it has remained.   GDMT includes jardiance 10 mg daily  (Entresto and Toprol XL stopped in hospital d/t hypotension).  Appears volume up. Up 20 lbs from pre-op wt.   QRS narrow.    CAD, s/p emergent 1v bypass (VG-PDA) on 8/15/23.  Now on aspirin and statin.    Mild-mod MR, Moderate aortic stenosis.     CAF.   Rate controlled, anticoagulated.     HTN.   Well-controlled, marginal.     Plan:  Re-start Entresto at 12-13 mg BID.   Start Lasix 40 mg daily.   Will ask PCP to check a BMP when he sees him on 9/7.   Plan to re-start Toprol XL once euvolemic.     Follow-up:  With me as scheduled 9/18 with a BMP prior.  Lipid panel in 3 months.   With Dr. 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 TTE prior.    CHICO Aguillon [facility]         History of Presenting Illness     Hugo Weber is a pleasant 78 year old patient with a pertinent history chronic atrial fibrillation and a cardiomyopathy, NSVT, morbid obesity with hx of gastric bypass surgery, HAYLEE (on CPAP), hx of CVA (2019), and HTN.     This patient previously followed with Dr. Castillo.  It appears that in 2021 he had an echocardiogram that showed a decline in his EF from 55-60% in 2019, to 45-50%.  This was obtained after a murmur was noted on exam by my  colleague Norma Stein.  No changes were made at that time.  Echo was repeated again in July 2022, showing further decline in his EF to 35-40%, with mild to moderate MR, and mild aortic stenosis with a moderately dilated ascending aorta.  At that time, Dr. Castillo added losartan to his medication regimen and recommended follow-up in another year. 24-hour Holter monitor in November 2022 showed atrial fibrillation with an average heart rate of 84 bpm.  Ranging from  bpm.    Echo was repeated in July 2023, showing a stable EF at 35%. The RV is normal. MR is stable. Aortic stenosis is now moderate. His GDMT included losartan 50 mg daily, Toprol-XL 50 mg daily. He had no prior ischemic workup.      When I met him for a routine annual follow up visit in July, I stopped his Losartan and started Entresto and jardiance. I also suggested ischemic workup for his EF decline. He opted for angiogram > stress test. Angiogram showed a distal 75% RCA lesion. Dr. Luna attempted to PCI this however the stent delivery system failed resulting in retention of an undeployed drug-eluting stent and stent balloon in the ostial and proximal RCA. He ultimately underwent emergent 1v bypass surgery with VG-PDA on 8/15. His Entresto and Toprol were both stopped in the hospital d/t hypotension.     Today, Hugo returns to clinic with his son, stating he feels crummy, as expected. Last night was his first night at home from TCU. Some orthopnea and significant peripheral edema.  Started cardiac rehab today and did well, but due to fatigue is in a wheelchair. Wt is up 20 lbs from pre-admit. BP at home has been running 110's systolic. HR in clinic is well controlled off beta blocker.  Over the past year, he's also gone through a shoulder surgery and radiation for prostate ca at [facility] (now completed), and tolerated this well. Patient denies chest pain, near syncope or syncope. He has a smart watch but doesn't really monitor his HR with it. At  baseline, he exercises 3x per week on machines, bikes, etc, and feels well with this. Retired teacher. .         Review of Systems     12-pt ROS is negative except for as noted in the HPI.          Physical Exam     Vitals: /82 (BP Location: Right arm, Patient Position: Sitting, Cuff Size: Adult Regular)   Pulse 88   Ht 1.829 m (6')   Wt 142.4 kg (314 lb)   BMI 42.59 kg/m    Wt Readings from Last 10 Encounters:   08/30/23 142.4 kg (314 lb)   08/28/23 142.3 kg (313 lb 12.8 oz)   08/23/23 142.2 kg (313 lb 8 oz)   08/22/23 142.1 kg (313 lb 3.2 oz)   08/14/23 134.3 kg (296 lb)   07/19/23 133.6 kg (294 lb 9.6 oz)   03/07/23 135.2 kg (298 lb)   02/21/23 136.5 kg (301 lb)   02/17/23 138.8 kg (306 lb)   02/09/23 139 kg (306 lb 6.4 oz)       Constitutional:  Patient is pleasant, alert, cooperative, and in NAD.  HEENT:  NCAT. PERRLA. EOM's intact.   Neck:  CVP appears normal. No carotid bruits.   Pulmonary: Normal respiratory effort. CTAB.   Cardiac: Irregularly irregular rhythm, variable S1, grade 3/6 sm at the LSB. Central chest incision appears to be healing well.  Abdomen:  Non-tender abdomen, no hepatosplenomegaly appreciated.   Vascular: Pulses in the upper and lower extremities are 2+ and equal bilaterally.  Extremities: 2+ pitting edema bilateral pedal to knees.   Skin:  No rashes or lesions appreciated.   Neurological:  No gross motor or sensory deficits.   Psych: Appropriate affect.          Data   Labs reviewed:  Recent Labs   Lab Test 08/28/23  0853 08/16/22  0804 08/11/21  0840 08/07/20  0847   LDL  --  81 67 88   HDL  --  43 44 43   NHDL  --  96 78 105   CHOL  --  139 122 148   TRIG  --  73 54 84   TSH 4.12 2.84 2.58 2.03   NTBNP 4,917*  --   --   --    IRON  --  154  --  99   FEB  --  361  --  350   IRONSAT  --  43  --  28   ELENA  --  29  --  28       Lab Results   Component Value Date    WBC 6.7 08/22/2023    WBC 8.4 06/22/2021    RBC 2.61 (L) 08/22/2023    RBC 5.13 06/22/2021    HGB 9.1 (L)  08/28/2023    HGB 14.6 06/22/2021    HCT 24.1 (L) 08/22/2023    HCT 46.8 06/22/2021    MCV 92 08/22/2023    MCV 91 06/22/2021    MCH 29.5 08/22/2023    MCH 28.5 06/22/2021    MCHC 32.0 08/22/2023    MCHC 31.2 (L) 06/22/2021    RDW 14.8 08/22/2023    RDW 14.3 06/22/2021     08/22/2023     06/22/2021       Lab Results   Component Value Date     08/28/2023     06/22/2021    POTASSIUM 4.1 08/28/2023    POTASSIUM 4.1 08/15/2023    POTASSIUM 4.2 08/16/2022    POTASSIUM 4.0 06/23/2021    CHLORIDE 102 08/28/2023    CHLORIDE 108 08/16/2022    CHLORIDE 108 06/22/2021    CO2 21 (L) 08/28/2023    CO2 25 08/16/2022    CO2 26 06/22/2021    ANIONGAP 14 08/28/2023    ANIONGAP 7 08/16/2022    ANIONGAP 4 06/22/2021    GLC 79 08/28/2023     (H) 08/20/2023     (H) 08/16/2022     (H) 06/22/2021    BUN 14.8 08/28/2023    BUN 16 08/16/2022    BUN 14 06/22/2021    CR 0.64 (L) 08/28/2023    CR 0.81 06/22/2021    GFRESTIMATED >90 08/28/2023    GFRESTIMATED >60 01/11/2023    GFRESTIMATED 87 06/22/2021    GFRESTBLACK >90 06/22/2021    BEBO 8.4 (L) 08/28/2023    BEBO 8.4 (L) 06/22/2021      Lab Results   Component Value Date    AST 22 08/16/2023    AST 13 06/21/2021    ALT 11 08/16/2023    ALT 19 06/21/2021       Lab Results   Component Value Date    A1C 5.4 02/21/2023    A1C 5.8 (H) 11/19/2020       Lab Results   Component Value Date    INR 1.37 (H) 08/17/2023    INR 1.47 (H) 08/15/2023    INR 1.04 03/21/2016    INR 1.04 02/12/2015            Problem List     Patient Active Problem List   Diagnosis    Iron deficiency anemia    Colon polyps    Obstructive sleep apnea syndrome    Hyperlipidemia LDL goal <70    Long term current use of anticoagulant therapy - for intermittent a-fib    Advance Care Planning    Total knee replacement status    Calculus of kidney - 1.2 cm stone at the upper pole as of CT urogram fr 1/11/2023    NAFLD (nonalcoholic fatty liver disease)    Morbid obesity due to excess  calories (H)    History of hepatitis C    Prediabetes    Paroxysmal atrial fibrillation (H)    Cholelithiasis    Hypertension goal BP (blood pressure) < 140/90    TMJ arthralgia    Nephrolithiasis    OA (osteoarthritis)    First degree AV block    Benign prostatic hyperplasia (BPH) with urinary urge incontinence    Thoracic aortic ectasia (H)    Stroke (H)    CVA (cerebral vascular accident) (H)    Cervical stenosis of spine    Vitamin B12 deficiency (non anemic)    Vitamin D deficiency    Myofascial pain    Permanent atrial fibrillation (H)    History of CVA (cerebrovascular accident)    Small bowel obstruction (H)    NSVT (nonsustained ventricular tachycardia) (H)    Chronic LLQ pain    Elevated prostate specific antigen (PSA)    Prostate cancer (H)    Chronic systolic heart failure (H)    HFrEF (heart failure with reduced ejection fraction) (H)    Aortic valve stenosis, etiology of cardiac valve disease unspecified- moderate 2+ with FREDY = 1.2cm2 on echocardiogram fr 7/14/2023    Mitral ybrgnbucdcuur-0-7+ on echocardiogram fr 7/14/2023    Cardiomyopathy (H)    Cardiomyopathy, unspecified type (H)    S/P CABG (coronary artery bypass graft)    Fluid overload    Transient hyperglycemia post procedure    Status post ligation of left atrial appendage    Encounter for surgical aftercare following surgery on the circulatory system    Presence of aortocoronary bypass graft:CAB x1 8/15/2023    Atherosclerosis of native coronary artery of native heart without angina pectoris    Essential (primary) hypertension    Chronic atrial fibrillation, unspecified (H)    Personal history of transient ischemic attack (TIA), and cerebral infarction without residual deficits    Physical deconditioning    Leg edema, right    HAYLEE on CPAP            Medications     Current Outpatient Medications   Medication Sig Dispense Refill    acetaminophen (TYLENOL) 325 MG tablet Take 650 mg by mouth 4 times daily 7 am, 12 N, 6p, 10p      apixaban  ANTICOAGULANT (ELIQUIS) 5 MG tablet Take 1 tablet (5 mg) by mouth 2 times daily Appointment needed for additional refills. Please call [phone] to schedule. 180 tablet 3    aspirin (ASA) 81 MG chewable tablet 1 tablet (81 mg) by Oral or NG Tube route daily      Elastic Bandages & Supports (MEDICAL LEGWEAR/KNEE HIGH) MISC Knee Hi ace wraps in am off in pm      empagliflozin (JARDIANCE) 10 MG TABS tablet Take 1 tablet (10 mg) by mouth daily 90 tablet 3    Ferrous Sulfate (IRON) 325 (65 Fe) MG tablet Take 1 tablet by mouth three times a week (Monday, Wednesday, Friday) 90 tablet 3    fluticasone (FLONASE) 50 MCG/ACT nasal spray Spray 2 sprays into both nostrils daily as needed for rhinitis or allergies 54.6 mL 3    metoprolol tartrate (LOPRESSOR) 25 MG tablet Take 0.5 tablets (12.5 mg) by mouth 2 times daily (Patient taking differently: Take 12.5 mg by mouth 2 times daily HOLD if SBP <110 OR HR < 60 Call NP if held.)      pantoprazole (PROTONIX) 40 MG EC tablet Take 40 mg by mouth daily      rosuvastatin (CRESTOR) 10 MG tablet Take 1 tablet (10 mg) by mouth daily      senna-docusate (SENOKOT-S/PERICOLACE) 8.6-50 MG tablet Take 1 tablet by mouth 2 times daily as needed for constipation      tamsulosin (FLOMAX) 0.4 MG capsule Take 1 capsule (0.4 mg) by mouth every evening      vitamin B-12 (CYANOCOBALAMIN) 1000 MCG tablet Take 1,000 mcg by mouth daily      vitamin C (ASCORBIC ACID) 1000 MG TABS Take 1,000 mg by mouth daily      vitamin D3 (CHOLECALCIFEROL) 125 MCG (5000 UT) tablet Take 1 tablet by mouth daily              Past Medical History     Past Medical History:   Diagnosis Date    Aortic stenosis     moderate    Atrial fibrillation 2006    Followed by [facility] - persistent    Calculus of kidney 2005, 6/06, 10/12, 8/18, 9/19    dr walker/nestor/иван - hematuria - w/u o/w neg    Cervical stenosis of spine     Moderate C4-5    Cholelithiasis     Chronic peptic ulcer, unspecified site, without mention of  hemorrhage, perforation, or obstruction 1985    gastric bypass    Colon polyps 8/05, 9/10, 10/15    2 tubular adenoma, 1 hyperplastic - multiple serrated/tubular adenomas - last colonscopy 11/20 due 5 yrs    CVA (cerebral vascular accident) (H) 01/2019    small right periorlandic subcortical - left sided residual - left hand tingling/numbness - Left leg weak/numbness - cardioembolic    Elevated prostate specific antigen (PSA)     Dr Santos    Hepatitis C 10/2012    chronic hepatitis C, grade 1-2, stage 1 - mild - Dr Douglas    HFrEF (heart failure with reduced ejection fraction) (H)     [facility] - 35%    Hyperlipidemia LDL goal <70     Hypertension goal BP (blood pressure) < 140/90 06/2006    dr jaciel winchester    Iron deficiency anemia, unspecified 1985    gastric bypass    Mitral regurgitation     mild-moderate    Nephrolithiasis multiple episodes, last 10/19    Dr Bey/Ivana/Tom - 9mm 3/2021    OA (osteoarthritis)     Multiple - Left shoulder with rotator cuff tear - Dr Durham    Obesity, unspecified     s/p gastric bypass 1985     Obstructive sleep apnea syndrome     CPAP - 15 cm - [facility] 1/2023    Prediabetes     Presbyacusis 01/2004    dr corona    Prostate cancer (H) 2023    Dr Santos - [place] 3+4, 4+3 = 7, bx 5/13 positive    Pyelonephritis, unspecified 12/1999    [facility] (squamous cell carcinoma[facility]    Sleep apnea 10/2002    cpap - severe - 15 cm - dr cunha    Stroke (H) 01/19/2019    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    [facility]    Vitamin B12 deficiency (non anemic) 04/15/2019    Vitamin D deficiency 04/15/2019     Past Surgical History:   Procedure Laterality Date    APPENDECTOMY      ARTHROPLASTY KNEE  08/13/2012    LT TKA - Dr Villanueva    BYPASS GRAFT ARTERY CORONARY N/A 8/15/2023    Procedure: CORONARY ARTERY BYPASS GRAFT x 1 (SAPHENOUS VEIN - RIGHT POSTERIOR DESCENDING ARTERY) WITH ENDOSCOPIC SAPHENOUS VEIN HARVEST ON THE LEFT LOWER EXTREMITY, AND ON  CARDIOPULMONARY PUMP OXYGENATOR  (INTRAOPERATIVE TRANSESOPHAGEAL ECHOCARDIOGRAM BY ANESTHESIOLOGIST), REMOVAL OF RIGHT CORONARY ARTERY STENT AND DELIVERY SYSTEM, LEFT ATRIAL APPENDAGE CLIPPING WITH ATRICLIP FLEX V DEVICE SIZE: 45    CARDIOVERSION  2016    CARDIOVERSION      COLONOSCOPY  8/05, 9/10, 10/15    multiple tubular/serrated/hyperplastic polyps    COLONOSCOPY N/A 10/29/2015    multiple tubular and serrated adenomas    COLONOSCOPY N/A 09/07/2016    COLONOSCOPY  11/2020    tubular adenomas - due 5 yrs    COLONOSCOPY N/A 11/24/2020    Procedure: COLONOSCOPY with biopsies;  Surgeon: Chadwick Burgos MD;  Location: [facility] OR    CV CORONARY ANGIOGRAM N/A 8/15/2023    Procedure: Coronary Angiogram;  Surgeon: Carly Luna MD;  Location: [facility] CARDIAC CATH LAB    CV FRACTIONAL FLOW RATIO WIRE N/A 8/15/2023    Procedure: Fractional Flow Ratio Wire;  Surgeon: Carly Luna MD;  Location: [facility] CARDIAC CATH LAB    CV PCI N/A 8/15/2023    Procedure: Percutaneous Coronary Intervention;  Surgeon: Carly Luna MD;  Location: [facility] CARDIAC CATH LAB    CYSTOSCOPY W/ LASER LITHOTRIPSY  10/16/2012,5/2/2018    ESOPHAGOSCOPY, GASTROSCOPY, DUODENOSCOPY (EGD), COMBINED N/A 11/24/2020    Procedure: ESOPHAGOGASTRODUODENOSCOPY, COLONOSCOPY with biopsies;  Surgeon: Chadwick Burgos MD;  Location: [facility] OR    EYE SURGERY  1/01,6/02,11/02    lasik    HC KNEE SCOPE, DIAGNOSTIC  05/2000    Arthroscopy, Knee RT    LASER HOLMIUM LITHOTRIPSY URETER(S), INSERT STENT, COMBINED  10/16/2012    stones x 4, Dr Campa    LASER HOLMIUM LITHOTRIPSY URETER(S), INSERT STENT, COMBINED Right 05/02/2018    CYSTOSCOPY, RIGHT URETEROSCOPY, HOLMIUM LASER LITHOTRIPSY, RIGHT STENT PLACEMENT - Dr Bey    MRI BIOPSY PROSTATE Bilateral 02/09/2023    mark    REVERSE ARTHROPLASTY SHOULDER Left 03/07/2023    Procedure: LEFT REVERSE SHOULDER ARTHROPLASTY WITH CUSTOM GUIDE WITH AUTOLOGOUS BONE GRAFTOF PROXIMAL HUMERUS;  Surgeon: Booker Multani  "MD Frank;  Location: [facility] OR    SURGICAL HISTORY OF -       s/p gastric bypass Bilroth II    SURGICAL HISTORY OF -   1998    wisdom teeth    SURGICAL HISTORY OF -       cellulitis    SURGICAL HISTORY OF -   1998    lt forearm spur's    SURGICAL HISTORY OF -   ,,    s/p lasik    SURGICAL HISTORY OF -   1999    rt forearm spurs    SURGICAL HISTORY OF -   2006    dr stevenson - lithotrypsy    SURGICAL HISTORY OF -   10/08,     Lt ear chonchal lesion removal SCC - dr saez    SURGICAL HISTORY OF -  Right 10/2019    nephrolithiasis - cystoscopy, rt lithotrypsy, Dr Heath    SURGICAL HISTORY OF -  Right 2019    Cystoscopy, Rt lithotrypsy, Calcium Oxalate, Dr Heath     Family History   Problem Relation Age of Onset    Alzheimer Disease Father 82         at 88    Prostate Cancer Father 76        bladder and prostate    Heart Disease Mother 80        CHF    Heart Disease Maternal Grandfather         MI at 55    Cancer Paternal Uncle         ?    Ulcerative Colitis No family hx of     Crohn's Disease No family hx of     Stomach Cancer No family hx of     GERD No family hx of      Social History     Socioeconomic History    Marital status:      Spouse name: Mayra    Number of children: 3    Years of education: 21    Highest education level: Not on file   Occupational History    Occupation: retired teacher and football and       Employer: [employer]     Employer: RETIRED   Tobacco Use    Smoking status: Never    Smokeless tobacco: Never   Vaping Use    Vaping Use: Never used   Substance and Sexual Activity    Alcohol use: Yes     Alcohol/week: 2.0 - 3.0 standard drinks of alcohol     Types: 2 - 3 Standard drinks or equivalent per week     Comment: occassiinally    Drug use: No     Comment: acknowledges using herbal supplement \"Vibe\" for energy-none used recently     Sexual activity: Not Currently     Partners: Male     Birth " control/protection: None     Comment:    Other Topics Concern     Service Not Asked    Blood Transfusions Not Asked    Caffeine Concern Yes     Comment: <1 can qd    Occupational Exposure Not Asked    Hobby Hazards Not Asked    Sleep Concern Yes     Comment: sleep apnea, wears cpap    Stress Concern Not Asked    Weight Concern Not Asked    Special Diet Not Asked    Back Care Not Asked    Exercise No    Bike Helmet Not Asked    Seat Belt Yes    Self-Exams Not Asked    Parent/sibling w/ CABG, MI or angioplasty before 65F 55M? No   Social History Narrative    Wife , Mayra,  from brain gliobastoma 2020.  --Ingrid Girard MD           Social Determinants of Health     Financial Resource Strain: Not on file   Food Insecurity: Not on file   Transportation Needs: Not on file   Physical Activity: Not on file   Stress: Not on file   Social Connections: Not on file   Intimate Partner Violence: Not on file   Housing Stability: Not on file            Allergies   Patient has no known allergies.    Today's clinic visit entailed:  Review of the result(s) of each unique test - coronary angiogram, echocardiogram, BMP, CBC  Ordering of each unique test  Prescription drug management  I spent a total of 40 minutes on the day of the visit.   Time spent by me doing chart review, history and exam, documentation and further activities per the note  Provider  Link to MDM Help Grid     The level of medical decision making during this visit was of moderate complexity.

## 2023-08-31 ENCOUNTER — TELEPHONE (OUTPATIENT)
Dept: CARDIOLOGY | Facility: CLINIC | Age: 78
End: 2023-08-31
Payer: COMMERCIAL

## 2023-08-31 ENCOUNTER — MEDICAL CORRESPONDENCE (OUTPATIENT)
Dept: HEALTH INFORMATION MANAGEMENT | Facility: CLINIC | Age: 78
End: 2023-08-31
Payer: COMMERCIAL

## 2023-08-31 ENCOUNTER — PATIENT OUTREACH (OUTPATIENT)
Dept: CARE COORDINATION | Facility: CLINIC | Age: 78
End: 2023-08-31
Payer: COMMERCIAL

## 2023-08-31 NOTE — PROGRESS NOTES
"Clinic Care Coordination Contact    Situation: Patient chart reviewed by care coordinator.    Background: Patient was contacted for a TCM outreach yesterday 8/30/23. Today, his daughter-in-law contacted RN CC with question re: home care vs Cardiac rehab.     Assessment: Patient was admitted into home care service with [facility]. Patient/family was told that he couldn't have both cardiac rehab and home care at the same time. Writer believes this has to do with home-bound status.     Plan/Recommendations: RN ASHISH recommended DIL contact cardiac rehab to see if it would be appropriate for patient to postpone cardiac rehab for a month until patient is no longer \"homebound\". DIL was agreeable to this recommendation.     Krystal Valdovinos RN, BSN, CPHN, CM  [facility] Ambulatory Care Management  [facility]  Phone: [phone]  Email: [email or web address]      "

## 2023-08-31 NOTE — TELEPHONE ENCOUNTER
[facility] Call Center    Phone Message    May a detailed message be left on voicemail: yes     Reason for Call: Other: Patient's daughter in law Sona called wanting to speak with someone on the care team to let them know that they were told patient cannot do two outpatient care at the same time,  such as having homehealth care and cardiac rehab. Wanting to know what they should do for next steps of care. Please call Sona back at [phone] to further discuss.     Action Taken: Other: Cardiology    Travel Screening: Not Applicable    Thank you!  [facility]

## 2023-09-01 NOTE — TELEPHONE ENCOUNTER
"[facility] C.O.R.E Clinic    Called Hugo as daughter in law Sona is not on CTC. I reviewed Sona's incoming message and let him know that Laura Olivia PA-C is aware of this she writes, \"Yes, he will need to start cardiac rehab once home health has been completed.\" Hugo is agreeable to this plan, he requests that I cancel his cardiac rehab appointments. I told him I would call cardiac rehab and let them know and ask them to give him their scheduling line to set up future appointments.    I called cardiac rehab and spoke to Pamela I canceled his upcoming cardiac rehab and asked that she give pt scheduling line for future appointment set up.    Loretta Sheffield RN, BSN  [facility] [place], [place]  C.O.R.E. Clinic Care Coordinator  September 1, 2023 9:58 AM    Future Appointments   Date Time Provider Department Center   9/5/2023  9:30 AM 2, [facility] [place] [facility]   9/6/2023  2:45 PM [facility] [place] [facility]   9/7/2023  8:30 AM Brett Cassidy MD [facility]   9/8/2023  9:00 AM 2, [facility] [place] [facility]   9/11/2023 10:00 AM 1, [facility] [place] [facility]   9/15/2023  9:00 AM 2, [facility] [place] [facility]   9/18/2023 11:00 AM 2, [facility] [place] [facility]   9/18/2023 12:30 PM RU LAB [facility] [place] [facility]   9/18/2023  3:30 PM Laura Olivia PA-C [facility]   9/20/2023 10:00 AM 1, [facility] Cardiac Rehab [facility] [place] [facility]   1/19/2024 10:45 AM [facility]       "

## 2023-09-05 NOTE — PROGRESS NOTES
CARDIOTHORACIC SURGERY FOLLOW-UP VISIT     Hugo Weber   1945   [id number]      Reason for visit: Post-Op CABGx1 with Dr. Mulvihill on 8/15/2023    ASSESSMENT/PLAN:  Hugo Weber is a 78 year old year old male status post {CARD SUR} who returns to clinic for postop visit.     CABG x1, vein graft to PDA, aortotomy and removal of embolized/un-retrievable right coronary stent and delivery system  Paroxysmal atrial fibrillation s/p occlusion of left atrial appendage with Atricip  HFrEF   Anticoagulation with Eliquis for pAF  ***HTN/HLD  Today in clinic patient sounds to be in a *** regular rhythm with HR <100 bpm.   Discharge weight 313 lbs; weight today *** lbs; Appears *** euvolemic upon examinination with no appreciable JVD, BLE edema, abdominal bloating. LS are ***CTA.  Discussed the importance of nutrition in healing and staying adequately hydrated.   Midline sternal incision healing well with no noted erythema or drainage or signs of infection. Increasing activity and strength overall.    Hemodynamics are stable. Medications reviewed and no changes were needed today.  ***Follow up with your cardiologist and CORE clinic as scheduled.  Continue Outpatient Cardiac Rehab until completed.   Continue sternal precautions for 12 weeks from surgery date.   No driving for 4*** weeks from surgery date.     Postoperative restrictions have been reviewed and all of the patient's questions were answered. Our contact information was given to the patient if he should have any further questions/concerns. No further follow up is needed with us.  The total time spent with the patient was 25*** minutes, > 50% of which was spent in counseling and coordination of care.    BIENVENIDO Michelle, ACNPC-AG, CCRN  Nurse Practitioner  Cardiothoracic Surgery  Pager: [phone]    HPI: Per discharge summary:  In brief, Hugo Weber is a 78 year old gentleman with a cardiomyopathy of an uncertain etiology. He went to  the lab today electively for evaluation and to ascertain an ischemic etiology of his cardiomyopathy. During treatment of a R coronary lesion, the stent + delivery system were unfortunately embolized. The delivery system fractured and despite attempts to snare and retrieve the balloon + stent, it became clear that the pieces were un-retrievable via endovascular approaches. Fortunately, he has remained hemodynamically stable with flow through the R system. On 8/15/23 Mr Weber successfully underwent CABG x1 (vein graft to PDA), Endoscopic Vein [place],  Aortotomy and removal of embolized / un-retrievable right coronary stent + delivery system, Occlusion of left atrial appendage (45 mm Atriclip) by Dr. Mendes.  Course was complicated by hypervolemia and soft blood pressures requiring gentle diuresis. CORE clinic was consulted for HFrEF and he was discharged to TCU on POD 7.     Returns to clinic today for postoperative visit.    Since discharge, has been recovering well *** at ***.    ***States pain is ***. He continues to need *** for pain management. Sleep is ***. Participating in therapy at ***. Breathing has been ***stable/improving. Continues to work on C &DB, able to reach *** on IS. Denies SOB at rest, PND, orthopnea. Non-productive ***cough. Patient endorses {UMPCARDFEVER:[id number]}.  Patient denies any {UMPCARDFEVER:[id number]}.  Blood glucose levels {HAVE:393281} been under good control.  *** Has had a *** appetite. Having ***regular bowel movements and ***voiding without difficulty.  Denies *** sternal popping or clicking or ***incisional drainage/erythema. ***No psychiatric concerns.    PAST MEDICAL HISTORY:  Past Medical History:   Diagnosis Date    Aortic stenosis     moderate    Atrial fibrillation 2006    Followed by [facility] - persistent    CAD (coronary artery disease)     Calculus of kidney 2005, 6/06, 10/12, 8/18, 9/19    dr walker/nestor/иван - hematuria - w/u o/w neg    Cervical stenosis  of spine     Moderate C4-5    Cholelithiasis     Chronic peptic ulcer, unspecified site, without mention of hemorrhage, perforation, or obstruction 1985    gastric bypass    Colon polyps 8/05, 9/10, 10/15    2 tubular adenoma, 1 hyperplastic - multiple serrated/tubular adenomas - last colonscopy 11/20 due 5 yrs    CVA (cerebral vascular accident) (H) 01/2019    small right periorlandic subcortical - left sided residual - left hand tingling/numbness - Left leg weak/numbness - cardioembolic    Elevated prostate specific antigen (PSA)     Dr Santos    Hepatitis C 10/2012    chronic hepatitis C, grade 1-2, stage 1 - mild - Dr Douglas    HFrEF (heart failure with reduced ejection fraction) (H)     [facility] - 35%    Hyperlipidemia LDL goal <70     Hypertension goal BP (blood pressure) < 140/90 06/2006    dr jaciel winchester    Iron deficiency anemia, unspecified 1985    gastric bypass    Mitral regurgitation     mild-moderate    Nephrolithiasis multiple episodes, last 10/19    Dr Bey/Ivana/Tom - 9mm 3/2021    OA (osteoarthritis)     Multiple - Left shoulder with rotator cuff tear - Dr Durham    Obesity, unspecified     s/p gastric bypass 1985     Obstructive sleep apnea syndrome     CPAP - 15 cm - [facility] 1/2023    Prediabetes     Presbyacusis 01/2004    dr corona    Prostate cancer (H) 2023    Dr Santos - Manju 3+4, 4+3 = 7, bx 5/13 positive    Pyelonephritis, unspecified 12/1999    SCC (squamous cell carcinoma), 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    dr saez - lt conchal bowl    Sleep apnea 10/2002    cpap - severe - 15 cm - dr cunha    Stroke (H) 01/19/2019    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    [facility]    Vitamin B12 deficiency (non anemic) 04/15/2019    Vitamin D deficiency 04/15/2019       PAST SURGICAL HISTORY:  Past Surgical History:   Procedure Laterality Date    APPENDECTOMY      ARTHROPLASTY KNEE  08/13/2012    LT TKA - Dr Villanueva    BYPASS GRAFT ARTERY CORONARY N/A 8/15/2023    Procedure: CORONARY ARTERY BYPASS GRAFT  x 1 (SAPHENOUS VEIN - RIGHT POSTERIOR DESCENDING ARTERY) WITH ENDOSCOPIC SAPHENOUS VEIN HARVEST ON THE LEFT LOWER EXTREMITY, AND ON CARDIOPULMONARY PUMP OXYGENATOR  (INTRAOPERATIVE TRANSESOPHAGEAL ECHOCARDIOGRAM BY ANESTHESIOLOGIST), REMOVAL OF RIGHT CORONARY ARTERY STENT AND DELIVERY SYSTEM, LEFT ATRIAL APPENDAGE CLIPPING WITH ATRICLIP FLEX V DEVICE SIZE: 45    CARDIOVERSION  2016    CARDIOVERSION      COLONOSCOPY  8/05, 9/10, 10/15    multiple tubular/serrated/hyperplastic polyps    COLONOSCOPY N/A 10/29/2015    multiple tubular and serrated adenomas    COLONOSCOPY N/A 09/07/2016    COLONOSCOPY  11/2020    tubular adenomas - due 5 yrs    COLONOSCOPY N/A 11/24/2020    Procedure: COLONOSCOPY with biopsies;  Surgeon: Chadwick Burgos MD;  Location: [facility] OR    CV CORONARY ANGIOGRAM N/A 8/15/2023    Procedure: Coronary Angiogram;  Surgeon: Carly Luna MD;  Location: [facility] CARDIAC CATH LAB    CV FRACTIONAL FLOW RATIO WIRE N/A 8/15/2023    Procedure: Fractional Flow Ratio Wire;  Surgeon: Carly Luna MD;  Location: [facility] CARDIAC CATH LAB    CV PCI N/A 8/15/2023    Procedure: Percutaneous Coronary Intervention;  Surgeon: Carly Luna MD;  Location: [facility] CARDIAC CATH LAB    CYSTOSCOPY W/ LASER LITHOTRIPSY  10/16/2012,5/2/2018    ESOPHAGOSCOPY, GASTROSCOPY, DUODENOSCOPY (EGD), COMBINED N/A 11/24/2020    Procedure: ESOPHAGOGASTRODUODENOSCOPY, COLONOSCOPY with biopsies;  Surgeon: Chadwick Burgos MD;  Location: [facility] OR    EYE SURGERY  1/01,6/02,11/02    [facility]  [facility]    Arthroscopy, Knee RT    LASER HOLMIUM LITHOTRIPSY URETER(S), INSERT STENT, COMBINED  10/16/2012    stones x 4, Dr Campa    LASER HOLMIUM LITHOTRIPSY URETER(S), INSERT STENT, COMBINED Right 05/02/2018    CYSTOSCOPY, RIGHT URETEROSCOPY, HOLMIUM LASER LITHOTRIPSY, RIGHT STENT PLACEMENT - Dr Bey    MRI BIOPSY PROSTATE Bilateral 02/09/2023    mark    REVERSE ARTHROPLASTY SHOULDER Left 03/07/2023     Procedure: LEFT REVERSE SHOULDER ARTHROPLASTY WITH CUSTOM GUIDE WITH AUTOLOGOUS BONE GRAFTOF PROXIMAL HUMERUS;  Surgeon: Booker Multani MD;  Location: [facility] OR    SURGICAL HISTORY OF -   1985    s/p gastric bypass Bilroth II    SURGICAL HISTORY OF -   11/1998    wisdom teeth    SURGICAL HISTORY OF -   1979    cellulitis    SURGICAL HISTORY OF -   11/1998    lt forearm spur's    SURGICAL HISTORY OF -   1/01,6/02,11/02    s/p lasik    SURGICAL HISTORY OF -   11/1999    rt forearm spurs    SURGICAL HISTORY OF -   07/2006    dr stevenson - lithotrypsy    SURGICAL HISTORY OF -   10/08, 12/08    Lt ear chonchal lesion removal SCC - dr saez    SURGICAL HISTORY OF -  Right 10/2019    nephrolithiasis - cystoscopy, rt lithotrypsy, Dr Heath    SURGICAL HISTORY OF -  Right 11/2019    Cystoscopy, Rt lithotrypsy, Calcium Oxalate, Dr Heath       CURRENT MEDICATIONS:   Current Outpatient Medications   Medication    acetaminophen (TYLENOL) 325 MG tablet    apixaban ANTICOAGULANT (ELIQUIS) 5 MG tablet    aspirin (ASA) 81 MG chewable tablet    Elastic Bandages & Supports (MEDICAL LEGWEAR/KNEE HIGH) MISC    empagliflozin (JARDIANCE) 10 MG TABS tablet    Ferrous Sulfate (IRON) 325 (65 Fe) MG tablet    fluticasone (FLONASE) 50 MCG/ACT nasal spray    furosemide (LASIX) 40 MG tablet    pantoprazole (PROTONIX) 40 MG EC tablet    rosuvastatin (CRESTOR) 10 MG tablet    sacubitril-valsartan (ENTRESTO) 24-26 MG per tablet    senna-docusate (SENOKOT-S/PERICOLACE) 8.6-50 MG tablet    tamsulosin (FLOMAX) 0.4 MG capsule    vitamin B-12 (CYANOCOBALAMIN) 1000 MCG tablet    vitamin C (ASCORBIC ACID) 1000 MG TABS    vitamin D3 (CHOLECALCIFEROL) 125 MCG (5000 UT) tablet     No current facility-administered medications for this visit.       ALLERGIES:    No Known Allergies    ROS:  Review of symptoms otherwise negative unless commented about in HPI.     LABS:  Last Basic Metabolic Panel:  Lab Results   Component Value Date     08/28/2023      06/22/2021      Lab Results   Component Value Date    POTASSIUM 4.1 08/28/2023    POTASSIUM 4.1 08/15/2023    POTASSIUM 4.2 08/16/2022    POTASSIUM 4.0 06/23/2021     Lab Results   Component Value Date    CHLORIDE 102 08/28/2023    CHLORIDE 108 08/16/2022    CHLORIDE 108 06/22/2021     Lab Results   Component Value Date    BEBO 8.4 08/28/2023    BEBO 8.4 06/22/2021     Lab Results   Component Value Date    CO2 21 08/28/2023    CO2 25 08/16/2022    CO2 26 06/22/2021     Lab Results   Component Value Date    BUN 14.8 08/28/2023    BUN 16 08/16/2022    BUN 14 06/22/2021     Lab Results   Component Value Date    CR 0.64 08/28/2023    CR 0.81 06/22/2021     Lab Results   Component Value Date    GLC 79 08/28/2023     08/20/2023     08/16/2022     06/22/2021       Last CBC:   Lab Results   Component Value Date    WBC 6.7 08/22/2023    WBC 8.4 06/22/2021     Lab Results   Component Value Date    RBC 2.61 08/22/2023    RBC 5.13 06/22/2021     Lab Results   Component Value Date    HGB 9.1 08/28/2023    HGB 14.6 06/22/2021     Lab Results   Component Value Date    HCT 24.1 08/22/2023    HCT 46.8 06/22/2021     No components found for: MCT  Lab Results   Component Value Date    MCV 92 08/22/2023    MCV 91 06/22/2021     Lab Results   Component Value Date    MCH 29.5 08/22/2023    MCH 28.5 06/22/2021     Lab Results   Component Value Date    MCHC 32.0 08/22/2023    MCHC 31.2 06/22/2021     Lab Results   Component Value Date    RDW 14.8 08/22/2023    RDW 14.3 06/22/2021     Lab Results   Component Value Date     08/22/2023     06/22/2021       INR:  Lab Results   Component Value Date    INR 1.37 08/17/2023    INR 1.47 08/15/2023    INR 1.79 08/15/2023    INR 1.14 08/15/2023    INR 1.04 03/21/2016    INR 1.04 02/12/2015    INR 1.10 06/19/2013    INR 2.0 05/17/2013    INR 2.1 02/22/2013    INR 1.68 01/16/2013    INR 2.1 12/24/2012    INR 2.3 11/27/2012    INR 1.5 11/20/2012    INR 1.12  11/13/2012    INR 1.26 10/16/2012    INR 2.4 10/08/2012    INR 1.9 09/10/2012    INR 2.3 08/27/2012    INR 2.3 08/20/2012    INR 1.17 08/16/2012    INR 1.19 08/15/2012    INR 1.18 08/14/2012    INR 1.30 08/11/2012    INR 1.7 08/07/2012    INR 2.0 07/31/2012    INR 2.0 06/12/2012    INR  03/30/2012     Sample delayed in shipping (carrier error)   Charge credited    INR 4.19 02/20/2010         IMAGING: *** None    PHYSICAL EXAM:   There were no vitals taken for this visit.  General: alert and oriented x 3, pleasant, no acute distress, normal mood and affect  Neuro: ***no focal deficits   CV: S1 S2, no murmurs, rubs or gallops, regular rate and rhythm, no peripheral edema  Pulm: bilateral breath sounds, clear to auscultation, easy work of breathing  Incision: incisions*** clean dry and intact without erythema, swelling or drainage    PROCEDURES: ***None         ASHISH Cassidy

## 2023-09-06 ENCOUNTER — TELEPHONE (OUTPATIENT)
Dept: FAMILY MEDICINE | Facility: CLINIC | Age: 78
End: 2023-09-06

## 2023-09-06 ENCOUNTER — OFFICE VISIT (OUTPATIENT)
Dept: CARDIOLOGY | Facility: CLINIC | Age: 78
End: 2023-09-06
Payer: COMMERCIAL

## 2023-09-06 VITALS
HEART RATE: 72 BPM | HEIGHT: 72 IN | DIASTOLIC BLOOD PRESSURE: 71 MMHG | OXYGEN SATURATION: 98 % | BODY MASS INDEX: 39.24 KG/M2 | WEIGHT: 289.7 LBS | SYSTOLIC BLOOD PRESSURE: 107 MMHG

## 2023-09-06 DIAGNOSIS — Z95.1 S/P CABG (CORONARY ARTERY BYPASS GRAFT): Primary | Chronic | ICD-10-CM

## 2023-09-06 PROCEDURE — 99024 POSTOP FOLLOW-UP VISIT: CPT | Performed by: PHYSICIAN ASSISTANT

## 2023-09-06 NOTE — TELEPHONE ENCOUNTER
Forms/Letter Request    Type of form/letter:  [facility] Order # [id number]    Have you been seen for this request: N/A    Do we have the form/letter: Yes: In Dr Cassidy's green folder    Who is the form from? Home care    Where did/will the form come from? form was faxed in

## 2023-09-06 NOTE — TELEPHONE ENCOUNTER
Called Jeanine from [employer] and left detailed message to relay provider was okay with verbal orders requested. Instructed to call clinic back if any further questions/concerns.    Thank you,  Trent Mejia, Triage RN Oriana Núñez  2:20 PM 9/6/2023

## 2023-09-06 NOTE — TELEPHONE ENCOUNTER
Requesting orders from: Brett Cassidy  Provider is following patient: Yes  Is this a 60-day recertification request?  No    Orders Requested    Physical Therapy  Request for initial certification (first set of orders)   Frequency:  2x/wk for 2 wks    Information was gathered and will be sent to provider for review.  RN will contact Home Care with information after provider review.  Confirmed ok to leave a detailed message with call back.  Contact information confirmed and updated as needed.    Call Jeanine Physical Therapist with [facility] - [phone].    Leah Mejia RN

## 2023-09-06 NOTE — PROGRESS NOTES
CV Surgery Post Op Follow Up Note:     Assessment/Plan:     Mr. Weber is doing well in their post operative period.   SBP/HR at home: 100s/60s per patient from home care.   Resp: breathing, using IS couple times/day, 2000  Incisions: healing, washing with antibacterial soap  Extremities/weight: does not feel to be carrying around additional fluid  Rehab plans: walking daily and is receiving Cardiac rehab at home    Follow up apts: 9/18/23 at 3:30pm  Laura Olivia    S:  From discharge summary: On 8/15/23 Mr Weber successfully underwent CABG x1 (vein graft to PDA), Endoscopic Vein [place],  Aortotomy and removal of embolized / un-retrievable right coronary stent + delivery system, Occlusion of left atrial appendage (45 mm Atriclip) by Dr. Verde.   Was extubated within 24 hours of surgery. Once he was weaned off hemodynamic stabilizing gtt's, transferred to the surgical floor.    Had some transient hyperglycemia treated with an insulin gtt, transitioned to sliding scale insulin and has no need at discharge.  Had some fluid overload treated with diuretic medication. At discharge he is 9 kg above his pre-op weight, he was given a dose of diuretics prior to discharge and will closely monitor his weight at TCU and then home. His blood pressures were too soft to send him with diuretic medication. I have asked TCU to give lasix as blood pressure allows. His PTA entresto was held on discharge as well due to softer pressures. CORE clinic was consulted as he will need close follow up due to dec EF/Fluid overload-EF 35%. He had some PAF which is chronic and was resumed on his PTA   Eliquis. Heart rhythm remained stable. He progressed well with cardiac rehab. He is discharged to TCU on pod# 7 with the help of their family.    Since being discharged from hospital, patient is recovering at [facility] and now home after 7 days TCU stay.      Allergies: No Known Allergies    Medications:   Current Outpatient  Medications:     acetaminophen (TYLENOL) 325 MG tablet, Take 650 mg by mouth 4 times daily 7 am, 12 N, 6p, 10p, Disp: , Rfl:     apixaban ANTICOAGULANT (ELIQUIS) 5 MG tablet, Take 1 tablet (5 mg) by mouth 2 times daily Appointment needed for additional refills. Please call [phone] to schedule., Disp: 180 tablet, Rfl: 3    aspirin (ASA) 81 MG chewable tablet, 1 tablet (81 mg) by Oral or NG Tube route daily, Disp: , Rfl:     Elastic Bandages & Supports (MEDICAL LEGWEAR/KNEE HIGH) MISC, Knee Hi ace wraps in am off in pm, Disp: , Rfl:     empagliflozin (JARDIANCE) 10 MG TABS tablet, Take 1 tablet (10 mg) by mouth daily, Disp: 90 tablet, Rfl: 3    Ferrous Sulfate (IRON) 325 (65 Fe) MG tablet, Take 1 tablet by mouth three times a week (Monday, Wednesday, Friday), Disp: 90 tablet, Rfl: 3    fluticasone (FLONASE) 50 MCG/ACT nasal spray, Spray 2 sprays into both nostrils daily as needed for rhinitis or allergies, Disp: 54.6 mL, Rfl: 3    furosemide (LASIX) 40 MG tablet, Take 1 tablet (40 mg) by mouth daily, Disp: 90 tablet, Rfl: 3    pantoprazole (PROTONIX) 40 MG EC tablet, Take 40 mg by mouth daily, Disp: , Rfl:     rosuvastatin (CRESTOR) 10 MG tablet, Take 1 tablet (10 mg) by mouth daily, Disp: 30 tablet, Rfl: 1    sacubitril-valsartan (ENTRESTO) 24-26 MG per tablet, Take 1/2 tablet twice daily., Disp: , Rfl:     senna-docusate (SENOKOT-S/PERICOLACE) 8.6-50 MG tablet, Take 1 tablet by mouth 2 times daily as needed for constipation, Disp: , Rfl:     tamsulosin (FLOMAX) 0.4 MG capsule, Take 1 capsule (0.4 mg) by mouth every evening, Disp: , Rfl:     vitamin B-12 (CYANOCOBALAMIN) 1000 MCG tablet, Take 1,000 mcg by mouth daily, Disp: , Rfl:     vitamin C (ASCORBIC ACID) 1000 MG TABS, Take 1,000 mg by mouth daily, Disp: , Rfl:     vitamin D3 (CHOLECALCIFEROL) 125 MCG (5000 UT) tablet, Take 1 tablet by mouth daily, Disp: , Rfl:     Physical Exam:   Gen: NAD  CV: s1/s2, no m/r/g  Lungs: course  Sternum: cdi  Extremities:  trace LE edema      All of the patient's questions were answered. Our contact information was given to him if they should have any further questions/concerns. No further follow-up is needed with us.      Lena Fam PA-C  CV Surgery  [facility]  Pager: [phone]

## 2023-09-07 ENCOUNTER — TELEPHONE (OUTPATIENT)
Dept: FAMILY MEDICINE | Facility: CLINIC | Age: 78
End: 2023-09-07

## 2023-09-07 ENCOUNTER — OFFICE VISIT (OUTPATIENT)
Dept: FAMILY MEDICINE | Facility: CLINIC | Age: 78
End: 2023-09-07
Payer: COMMERCIAL

## 2023-09-07 ENCOUNTER — MEDICAL CORRESPONDENCE (OUTPATIENT)
Dept: HEALTH INFORMATION MANAGEMENT | Facility: CLINIC | Age: 78
End: 2023-09-07

## 2023-09-07 VITALS
RESPIRATION RATE: 18 BRPM | HEIGHT: 72 IN | WEIGHT: 288.9 LBS | TEMPERATURE: 97.5 F | SYSTOLIC BLOOD PRESSURE: 100 MMHG | OXYGEN SATURATION: 98 % | HEART RATE: 115 BPM | DIASTOLIC BLOOD PRESSURE: 60 MMHG | BODY MASS INDEX: 39.13 KG/M2

## 2023-09-07 DIAGNOSIS — R53.81 PHYSICAL DECONDITIONING: ICD-10-CM

## 2023-09-07 DIAGNOSIS — K76.0 NAFLD (NONALCOHOLIC FATTY LIVER DISEASE): ICD-10-CM

## 2023-09-07 DIAGNOSIS — R73.03 PREDIABETES: ICD-10-CM

## 2023-09-07 DIAGNOSIS — D50.9 IRON DEFICIENCY ANEMIA, UNSPECIFIED IRON DEFICIENCY ANEMIA TYPE: ICD-10-CM

## 2023-09-07 DIAGNOSIS — E55.9 VITAMIN D DEFICIENCY: ICD-10-CM

## 2023-09-07 DIAGNOSIS — I25.810 CORONARY ARTERY DISEASE INVOLVING CORONARY BYPASS GRAFT OF NATIVE HEART WITHOUT ANGINA PECTORIS: Primary | ICD-10-CM

## 2023-09-07 DIAGNOSIS — G47.33 OBSTRUCTIVE SLEEP APNEA SYNDROME: ICD-10-CM

## 2023-09-07 DIAGNOSIS — I48.21 PERMANENT ATRIAL FIBRILLATION (H): ICD-10-CM

## 2023-09-07 DIAGNOSIS — I42.9 CARDIOMYOPATHY, UNSPECIFIED TYPE (H): ICD-10-CM

## 2023-09-07 DIAGNOSIS — Z86.73 HISTORY OF CVA (CEREBROVASCULAR ACCIDENT): ICD-10-CM

## 2023-09-07 DIAGNOSIS — I10 HYPERTENSION GOAL BP (BLOOD PRESSURE) < 140/90: ICD-10-CM

## 2023-09-07 DIAGNOSIS — M15.0 PRIMARY OSTEOARTHRITIS INVOLVING MULTIPLE JOINTS: ICD-10-CM

## 2023-09-07 DIAGNOSIS — I35.0 AORTIC VALVE STENOSIS, ETIOLOGY OF CARDIAC VALVE DISEASE UNSPECIFIED: ICD-10-CM

## 2023-09-07 DIAGNOSIS — Z51.81 MEDICATION MONITORING ENCOUNTER: ICD-10-CM

## 2023-09-07 DIAGNOSIS — Z86.19 HISTORY OF HEPATITIS C: ICD-10-CM

## 2023-09-07 DIAGNOSIS — Z95.1 S/P CABG (CORONARY ARTERY BYPASS GRAFT): ICD-10-CM

## 2023-09-07 DIAGNOSIS — C61 PROSTATE CANCER (H): ICD-10-CM

## 2023-09-07 DIAGNOSIS — E78.5 HYPERLIPIDEMIA LDL GOAL <70: ICD-10-CM

## 2023-09-07 DIAGNOSIS — E53.8 VITAMIN B12 DEFICIENCY (NON ANEMIC): ICD-10-CM

## 2023-09-07 DIAGNOSIS — I50.20 HFREF (HEART FAILURE WITH REDUCED EJECTION FRACTION) (H): ICD-10-CM

## 2023-09-07 LAB
ALBUMIN SERPL BCG-MCNC: 3.8 G/DL (ref 3.5–5.2)
ALP SERPL-CCNC: 116 U/L (ref 40–129)
ALT SERPL W P-5'-P-CCNC: 7 U/L (ref 0–70)
ANION GAP SERPL CALCULATED.3IONS-SCNC: 12 MMOL/L (ref 7–15)
AST SERPL W P-5'-P-CCNC: 18 U/L (ref 0–45)
BILIRUB SERPL-MCNC: 0.6 MG/DL
BUN SERPL-MCNC: 16.7 MG/DL (ref 8–23)
CALCIUM SERPL-MCNC: 9 MG/DL (ref 8.8–10.2)
CHLORIDE SERPL-SCNC: 99 MMOL/L (ref 98–107)
CREAT SERPL-MCNC: 0.79 MG/DL (ref 0.67–1.17)
DEPRECATED HCO3 PLAS-SCNC: 26 MMOL/L (ref 22–29)
EGFRCR SERPLBLD CKD-EPI 2021: >90 ML/MIN/1.73M2
ERYTHROCYTE [DISTWIDTH] IN BLOOD BY AUTOMATED COUNT: 14.4 % (ref 10–15)
GLUCOSE SERPL-MCNC: 92 MG/DL (ref 70–99)
HCT VFR BLD AUTO: 32 % (ref 40–53)
HGB BLD-MCNC: 9.8 G/DL (ref 13.3–17.7)
MCH RBC QN AUTO: 27.8 PG (ref 26.5–33)
MCHC RBC AUTO-ENTMCNC: 30.6 G/DL (ref 31.5–36.5)
MCV RBC AUTO: 91 FL (ref 78–100)
NT-PROBNP SERPL-MCNC: 2695 PG/ML (ref 0–1800)
PLATELET # BLD AUTO: 316 10E3/UL (ref 150–450)
POTASSIUM SERPL-SCNC: 4.7 MMOL/L (ref 3.4–5.3)
PROT SERPL-MCNC: 6.8 G/DL (ref 6.4–8.3)
RBC # BLD AUTO: 3.52 10E6/UL (ref 4.4–5.9)
SODIUM SERPL-SCNC: 137 MMOL/L (ref 136–145)
WBC # BLD AUTO: 5.9 10E3/UL (ref 4–11)

## 2023-09-07 PROCEDURE — 99214 OFFICE O/P EST MOD 30 MIN: CPT | Performed by: FAMILY MEDICINE

## 2023-09-07 PROCEDURE — 83880 ASSAY OF NATRIURETIC PEPTIDE: CPT | Performed by: FAMILY MEDICINE

## 2023-09-07 PROCEDURE — 36415 COLL VENOUS BLD VENIPUNCTURE: CPT | Performed by: FAMILY MEDICINE

## 2023-09-07 PROCEDURE — 80053 COMPREHEN METABOLIC PANEL: CPT | Performed by: FAMILY MEDICINE

## 2023-09-07 PROCEDURE — 85027 COMPLETE CBC AUTOMATED: CPT | Performed by: FAMILY MEDICINE

## 2023-09-07 NOTE — TELEPHONE ENCOUNTER
Reason for Call:  Same Day Appointment, Requested Provider:  Brett Cassidy MD    PCP: Brett Cassidy    Reason for visit: The patient is calling to get in to see Dr. Cassidy for a preop. He is having a colonoscopy on 11/02/2018. He would like a call back.    Can we leave a detailed message on this number? YES    Phone number patient can be reached at: Cell number on file:    Telephone Information:   Mobile [phone]     Best Time: Anytime    Call taken on 9/7/2018 at 10:58 AM by Gaby Huerta     Undermining Type: One Wound Edge

## 2023-09-07 NOTE — TELEPHONE ENCOUNTER
Forms/Letter Request    Type of form/letter:  [facility] Order # [id number]    Have you been seen for this request: N/A    Do we have the form/letter: Yes: In Dr Cassidy's green folder    Who is the form from? Home care    Where did/will the form come from? form was faxed in

## 2023-09-07 NOTE — LETTER
September 11, 2023      Hugo Weber  [address]   [facility] [address]        Dear ,    We are writing to inform you of your test results.    They showed:     Labs are improving     We advise:     Continue current cares.   Balanced low cholesterol diet.   Regular exercise.   Weight loss.     Close follow up     For additional lab test information, [email or web address].[email or web address] is an excellent reference.       Resulted Orders   Comprehensive metabolic panel (BMP + Alb, Alk Phos, ALT, AST, Total. Bili, TP)   Result Value Ref Range    Sodium 137 136 - 145 mmol/L    Potassium 4.7 3.4 - 5.3 mmol/L    Chloride 99 98 - 107 mmol/L    Carbon Dioxide (CO2) 26 22 - 29 mmol/L    Anion Gap 12 7 - 15 mmol/L    Urea Nitrogen 16.7 8.0 - 23.0 mg/dL    Creatinine 0.79 0.67 - 1.17 mg/dL    Calcium 9.0 8.8 - 10.2 mg/dL    Glucose 92 70 - 99 mg/dL    Alkaline Phosphatase 116 40 - 129 U/L    AST 18 0 - 45 U/L      Comment:      Reference intervals for this test were updated on 6/12/2023 to more accurately reflect our healthy population. There may be differences in the flagging of prior results with similar values performed with this method. Interpretation of those prior results can be made in the context of the updated reference intervals.    ALT 7 0 - 70 U/L      Comment:      Reference intervals for this test were updated on 6/12/2023 to more accurately reflect our healthy population. There may be differences in the flagging of prior results with similar values performed with this method. Interpretation of those prior results can be made in the context of the updated reference intervals.      Protein Total 6.8 6.4 - 8.3 g/dL    Albumin 3.8 3.5 - 5.2 g/dL    Bilirubin Total 0.6 <=1.2 mg/dL    GFR Estimate >90 >60 mL/min/1.73m2   CBC with platelets   Result Value Ref Range    WBC Count 5.9 4.0 - 11.0 10e3/uL    RBC Count 3.52 (L) 4.40 - 5.90 10e6/uL    Hemoglobin 9.8 (L) 13.3 - 17.7 g/dL    Hematocrit 32.0 (L) 40.0 - 53.0 %    MCV  91 78 - 100 fL    MCH 27.8 26.5 - 33.0 pg    MCHC 30.6 (L) 31.5 - 36.5 g/dL    RDW 14.4 10.0 - 15.0 %    Platelet Count 316 150 - 450 10e3/uL   BNP-N terminal pro   Result Value Ref Range    N Terminal Pro BNP Outpatient 2,695 (H) 0 - 1,800 pg/mL      Comment:      Reference range shown and results flagged as abnormal are for the outpatient, non acute settings. Establishing a baseline value for each individual patient is useful for follow-up.    Suggested inpatient cut points for confirming diagnosis of CHF in an acute setting are:  >450 pg/mL (age 18 to less than 50)  >900 pg/mL (age 50 to less than 75)  >1800 pg/mL (75 yrs and older)    An inpatient or emergency department NT-proPBNP <300 pg/mL effectively rules out acute CHF, with 99% negative predictive value.           If you have any questions or concerns, please call the clinic at the number listed above.       Sincerely,          Brett Cassidy M.D.

## 2023-09-07 NOTE — TELEPHONE ENCOUNTER
Forms/Letter Request    Type of form/letter:  [employer] home health certification and plan of care  order [id number], 8/31/23-10/29/23    Have you been seen for this request: N/A    Do we have the form/letter: Yes: FD TC bin, green folder  Who is the form from? Home care    Where did/will the form come from? form was faxed in     Hospital Medicine Daily Progress Note    Date of Service  3/1/2020    Chief Complaint  74 y.o. female admitted 1/16/2020 with altered mental status    Hospital Course    Ms. Beltran is a 74 y.o. female admitted to [facility] on 1/14/20 with seizures.She was at a SNF.She had been previously hospitalized at [facility] and diaysis was initiated.  She was doing very poorly then and palliative care was discussed with the family,but they declined.She had very poor functional status and required Jimena lift to get into the dialysis chair.She was found to have  a CVA on MRI at [facility].Her seizures were not controlled and it was felt she was in status epilepticus.  She was getting HD at [facility].Last HD was on 1/13/20.She was seen by Dr Carcamo at [facility].Creatinine was 1.8 and dialysis was held. Neurology [place] that the patient needed continuous EEG monitoring and he was transferred to [facility]. Patient intubated due to seizures and was eventually transitioned to tracheostomy on 2/2/2020 and off the vent on 2/5/2020. Family at this time does not want to change CODE status. Patient remains unresponsive. LTAC being pursued. PEG tube in place.         Interval Problem Update  Opens eyes to tactile stimuli, not tracking around the room today. No overnight events. CXR from yesterday personally reviewed, and largely unchanged. Appears to be breathing more comfortably today.    Consultants/Specialty  Palliative care  Critical care - signed off  Neurology - signed off  Nephrology  ID     Code Status  FULL    Disposition  TBD- declined by LTACH. Pending meeting with family, SW, and palliative to re-address goals and discharge plan.     Review of Systems  Review of Systems   Unable to perform ROS: Medical condition       Physical Exam  Temp:  [36.2 °C (97.1 °F)-36.8 °C (98.3 °F)] 36.8 °C (98.3 °F)  Pulse:  [65-88] 79  Resp:  [16-17] 17  BP: (112-187)/(50-88) 140/50  SpO2:  [90 %-100 %] 94 %    Physical Exam  Vitals signs reviewed.    Constitutional:       General: She is not in acute distress.     Appearance: She is ill-appearing. She is not diaphoretic.   HENT:      Head: Normocephalic.      Nose: No congestion.      Mouth/Throat:      Comments: Tacky mucous membranes  Eyes:      General:         Right eye: No discharge.         Left eye: No discharge.      Extraocular Movements: Extraocular movements intact.   Cardiovascular:      Rate and Rhythm: Normal rate and regular rhythm.      Pulses: Normal pulses.      Comments: Tunneled HD catheter  Pulmonary:      Breath sounds: Rales (coarse upper airway noises improved) present.      Comments: Trach in place  Abdominal:      General: Bowel sounds are normal. There is no distension.      Palpations: Abdomen is soft.      Tenderness: There is no abdominal tenderness. There is no guarding.      Comments: PEG tube in place; no tenderness or erythema   Genitourinary:     Comments: River and rectal tube in place  Skin:     General: Skin is warm.      Comments: Swelling of right hand; slightly improved   Neurological:      Mental Status: She is alert.      Motor: Weakness (diffuse) present.   Psychiatric:         Attention and Perception: She is inattentive.         Behavior: Behavior is not agitated.       Fluids    Intake/Output Summary (Last 24 hours) at 3/1/2020 0620  Last data filed at 2/29/2020 1447  Gross per 24 hour   Intake 580 ml   Output 500 ml   Net 80 ml       Laboratory  Recent Labs     02/28/20  0320   WBC 12.2*   RBC 2.71*   HEMOGLOBIN 8.8*   HEMATOCRIT 27.1*   .0*   MCH 32.5   MCHC 32.5*   RDW 59.4*   PLATELETCT 165   MPV 9.9     Recent Labs     02/28/20  0320 02/29/20  0420   SODIUM 129* 133*   POTASSIUM 3.6 3.5*   CHLORIDE 92* 97   CO2 27 27   GLUCOSE 171* 176*   BUN 53* 27*   CREATININE 1.27 0.92   CALCIUM 8.5 8.4*                   Imaging  DX-CHEST-LIMITED (1 VIEW)   Final Result         No significant interval change.      DX-CHEST-PORTABLE (1 VIEW)   Final Result      1.   Apparent improvement of LEFT pleural effusion and basilar consolidation.   2.  No pneumothorax.   3.  Supportive tubing as described above.      US-THORACENTESIS PUNCTURE LEFT   Final Result      1. Ultrasound guided left sided diagnostic and therapeutic thoracentesis.      2. 400 mL of fluid withdrawn.      CT-CHEST (THORAX) W/O   Final Result      1.  Bilateral atelectasis/consolidation, left greater than right.      2.  Moderate left and small right pleural effusion.      3.  Atherosclerotic vascular calcification.      4.  Ascites within the upper abdomen.            US-ABDOMEN LTD (SOFT TISSUE)   Final Result         1. Trace free fluid in the pelvis. No abdominal ascites.      DX-ABDOMEN FOR TUBE PLACEMENT   Final Result         Feeding tube with tip projecting over the expected area of the stomach.      [id number] VIEW   Final Result      Feeding tube tip overlies the gastric antrum.      DX-CHEST-PORTABLE (1 VIEW)   Final Result         1.  Pulmonary edema and/or infiltrates are identified, which are stable since the prior exam.   2.  Cardiomegaly   3.  Atherosclerosis      DX-CHEST-PORTABLE (1 VIEW)   Final Result         1.  Pulmonary edema and/or infiltrates are identified, which are stable since the prior exam.   2.  Cardiomegaly   3.  Atherosclerosis      DX-CHEST-PORTABLE (1 VIEW)   Final Result      1.  Unchanged left lower lobe atelectasis or pneumonia.      2.  Clear right lung.      DX-CHEST-PORTABLE (1 VIEW)   Final Result      Unchanged LEFT basilar atelectasis and/or airspace disease      DX-CHEST-PORTABLE (1 VIEW)   Final Result      Left basilar atelectasis, hilar and cardiac silhouette enlargement as before      DX-CHEST-PORTABLE (1 VIEW)   Final Result      Interval removal of a left subclavian central line. Stable patchy bilateral infiltrates.      IR-PICC LINE PLACEMENT W/ GUIDANCE > AGE 5   Final Result                  Ultrasound-guided PICC placement performed by qualified nursing  staff as    above.          DX-CHEST-PORTABLE (1 VIEW)   Final Result      1.  Multiple support devices present.      2.  Patchy bilateral atelectasis.      DX-CHEST-PORTABLE (1 VIEW)   Final Result      Stable chest with retrocardiac opacity from atelectasis and/or pleural fluid favored over consolidation      DX-CHEST-PORTABLE (1 VIEW)   Final Result      Stable chest with retrocardiac opacity from atelectasis and/or pleural fluid favored over consolidation      DX-CHEST-PORTABLE (1 VIEW)   Final Result         No significant change from prior.               DX-CHEST-PORTABLE (1 VIEW)   Final Result         1. Stable lines and tubes..   2. Stable retrocardiac and left perihilar atelectasis versus consolidation. Suspected small left pleural effusion.            DX-CHEST-PORTABLE (1 VIEW)   Final Result         1. Stable lines and tubes..   2. Stable retrocardiac atelectasis versus consolidation with increased left perihilar opacity. Suspected trace left pleural effusion.         [id number] VIEW   Final Result      1.  Enteric tube has been placed and the tip projects over the stomach.      2.  Pre-existing feeding tube tip projects at the gastroduodenal junction      DX-CHEST-PORTABLE (1 VIEW)   Final Result         1. Lines and tubes as above.   2. Stable retrocardiac atelectasis versus consolidation. Suspected trace left pleural effusion.         MR-BRAIN-WITH & W/O   Final Result      1.  Moderate cerebral atrophy.   2.  Evidence of intraventricular hemorrhage, indeterminate age. Possibly chronic intraventricular hemosiderin deposition.   3.  Extensive encephalomalacic change with hemosiderin deposition involving the right corona radiata, basal ganglia, posterior thalamus, subthalamic region, bordering the right cerebral peduncle. Associated ex vacuo dilatation of the body of the right    lateral ventricle. No change.   4.  Additional foci of old microhemorrhage most consistent with old hypertensive  microhemorrhage or amyloid angiopathy in the left basal ganglia, left thalamus, and midline upper ventral abel.   5.  Punctate focus of acute infarction in the right parietal deep white matter. No change.   6.  Advanced supratentorial white matter disease most consistent with microvascular ischemic change.   7.  Encephalomalacic changes in the abel and right cerebral peduncle consistent with old infarction.   8.  Partially empty sella.   9.  Overall, no new findings and no significant change from 1/14/2020.      DX-CHEST-PORTABLE (1 VIEW)   Final Result         1. Stable lines and tubes.   2. Unchanged retrocardiac atelectasis versus consolidation.   3. Stable trace left pleural effusion.         OUTSIDE IMAGES-DX CHEST   Final Result      OUTSIDE IMAGES-US VASCULAR   Final Result      OUTSIDE IMAGES-MR BRAIN   Final Result      OUTSIDE IMAGES-DX CHEST   Final Result      OUTSIDE IMAGES-CT HEAD   Final Result      EC-ECHOCARDIOGRAM COMPLETE W/O CONT   Final Result      DX-CHEST-FOR LINE PLACEMENT Perform procedure in: Patient's Room   Final Result         1.  Retrocardiac opacity concerning for infiltrate, stable.   2.  Trace left pleural effusion, stable   3.  Cardiomegaly   4.  Atherosclerosis   5.  Perihilar interstitial prominence and bronchial wall cuffing, appearance suggests changes of underlying bronchial inflammation, consider bronchitis.      DX-ABDOMEN FOR TUBE PLACEMENT   Final Result         1.  Nonspecific bowel gas pattern.   2.  Dobbhoff tube tip overlying the expected location of the pylorus or first duodenal segment.   3.  Left lung base atelectasis and/or small effusion      DX-CHEST-PORTABLE (1 VIEW)   Final Result         1.  Retrocardiac opacity concerning for infiltrate.   2.  Trace left pleural effusion, stable   3.  Cardiomegaly   4.  Atherosclerosis      [id number] FILM X-RAY   Final Result      OUTSIDE IMAGES-DX CHEST   Final Result           Assessment/Plan  * Status epilepticus  "(HCC)- (present on admission)  Assessment & Plan  On admission, she was intubated for airway protection, now trach since 2/2/20. No evidence of seizure activity. Neurology gave the opinion that likelihood of patient returning to baseline was low, and that the likelihood of the patient returning to full independent function was essentially 0. Very poor prognosis, comfort care recommended.  - neurology evaluated, appreciate recs  - continuing regimen of vimpat, topamax, depakote  - repeat EEG showed no seizures    TB lung, latent- (present on admission)  Assessment & Plan  Quantiferon + and will have to rule out active TB. Chest CT found positive left-sided consolidation with pleural effusion.  - pending sputum cx/ AFB stain to rule out active disease before initiating  - s/p thora on 2/25; f/u pathology   - ID following, appreciate recs  - AFB stain negative x3; remove isolation  - Per ID, we will \"treat for LTBI with  mg PO daily and B6 25 mg PO daily for 9 months if AFB cxs all negative at 6 weeks\"    Acute respiratory failure with hypoxia (HCC)- (present on admission)  Assessment & Plan  - stable on trach/T-piece  - continue RT protocol    Hypokalemia  Assessment & Plan  In the setting of hypomag which has now resolved.   - monitor and replete    Severe protein-calorie malnutrition (HCC)- (present on admission)  Assessment & Plan  - Continue nutrition via tube feeding    Cerebrovascular accident (CVA) due to embolism of right middle cerebral artery (HCC)- (present on admission)  Assessment & Plan  Very poor prognosis.  - continue statin and aspirin  - unable to participate with PT/OT    End stage renal failure on dialysis (HCC)- (present on admission)  Assessment & Plan  Poor prognosis per nephrology, recommending comfort care.   - continue hemodialysis MWF  - nephrology following, appreciate recs  - avoid nephrotoxins and continue to renally dose all medications    Normocytic anemia- (present on " admission)  Assessment & Plan  Likely anemia of chronic kidney disease.  FOBT is negative.   - trend H&H  - transfuse if drops below 7    Hypertension- (present on admission)  Assessment & Plan  BP elevated, now improving  - continue coreg, hydralazine, caldura (with holding parameters)  - started low dose lisinopril  - IV labetalol and vasotec PRN    Type 2 diabetes mellitus, with long-term current use of insulin ([facility])- (present on admission)  Assessment & Plan  A1C 7.5%, blood sugars have been ~170. Was taking insulin at home.   - start sensitive sliding scale  - hypoglycemia protocol    Goals of care, counseling/discussion- (present on admission)  Assessment & Plan  There have been multiple discussions with palliative/treatment team and family, to no avail. Patient remains full code, has trach and PEG.  - SW and palliative care following    Pressure injury of sacrum, coccyx, stage 3 (HCC)- (present on admission)  Assessment & Plan  - Wound care  - Continue pressure offloading strategies    Dysphagia as late effect of cerebrovascular accident (CVA)- (present on admission)  Assessment & Plan  - PEG in place  - aspiration precautions    Gout- (present on admission)  Assessment & Plan  - Continue with allopurinol via PEG tube       VTE prophylaxis: SCDs

## 2023-09-07 NOTE — PROGRESS NOTES
Assessment & Plan       ICD-10-CM    1. Coronary artery disease involving coronary bypass graft of native heart without angina pectoris  I25.810 Comprehensive metabolic panel (BMP + Alb, Alk Phos, ALT, AST, Total. Bili, TP)     CBC with platelets     BNP-N terminal pro     Comprehensive metabolic panel (BMP + Alb, Alk Phos, ALT, AST, Total. Bili, TP)     CBC with platelets     BNP-N terminal pro      2. HFrEF (heart failure with reduced ejection fraction) (H)  I50.20 Comprehensive metabolic panel (BMP + Alb, Alk Phos, ALT, AST, Total. Bili, TP)     CBC with platelets     BNP-N terminal pro     Comprehensive metabolic panel (BMP + Alb, Alk Phos, ALT, AST, Total. Bili, TP)     CBC with platelets     BNP-N terminal pro      3. Cardiomyopathy, unspecified type (H)  I42.9 Comprehensive metabolic panel (BMP + Alb, Alk Phos, ALT, AST, Total. Bili, TP)     CBC with platelets     BNP-N terminal pro     Comprehensive metabolic panel (BMP + Alb, Alk Phos, ALT, AST, Total. Bili, TP)     CBC with platelets     BNP-N terminal pro      4. Aortic valve stenosis, etiology of cardiac valve disease unspecified- moderate 2+ with FREDY = 1.2cm2 on echocardiogram fr 7/14/2023  I35.0 Comprehensive metabolic panel (BMP + Alb, Alk Phos, ALT, AST, Total. Bili, TP)     CBC with platelets     BNP-N terminal pro     Comprehensive metabolic panel (BMP + Alb, Alk Phos, ALT, AST, Total. Bili, TP)     CBC with platelets     BNP-N terminal pro      5. S/P CABG (coronary artery bypass graft)  Z95.1 Comprehensive metabolic panel (BMP + Alb, Alk Phos, ALT, AST, Total. Bili, TP)     CBC with platelets     BNP-N terminal pro     Comprehensive metabolic panel (BMP + Alb, Alk Phos, ALT, AST, Total. Bili, TP)     CBC with platelets     BNP-N terminal pro      6. History of CVA (cerebrovascular accident)  Z86.73 Comprehensive metabolic panel (BMP + Alb, Alk Phos, ALT, AST, Total. Bili, TP)     CBC with platelets     BNP-N terminal pro     Comprehensive  metabolic panel (BMP + Alb, Alk Phos, ALT, AST, Total. Bili, TP)     CBC with platelets     BNP-N terminal pro      7. Permanent atrial fibrillation (H)  I48.21 Comprehensive metabolic panel (BMP + Alb, Alk Phos, ALT, AST, Total. Bili, TP)     CBC with platelets     BNP-N terminal pro     Comprehensive metabolic panel (BMP + Alb, Alk Phos, ALT, AST, Total. Bili, TP)     CBC with platelets     BNP-N terminal pro      8. Prediabetes  R73.03 Comprehensive metabolic panel (BMP + Alb, Alk Phos, ALT, AST, Total. Bili, TP)     Comprehensive metabolic panel (BMP + Alb, Alk Phos, ALT, AST, Total. Bili, TP)      9. Hypertension goal BP (blood pressure) < 140/90  I10 Comprehensive metabolic panel (BMP + Alb, Alk Phos, ALT, AST, Total. Bili, TP)     Comprehensive metabolic panel (BMP + Alb, Alk Phos, ALT, AST, Total. Bili, TP)      10. Hyperlipidemia LDL goal <70  E78.5 Comprehensive metabolic panel (BMP + Alb, Alk Phos, ALT, AST, Total. Bili, TP)     Comprehensive metabolic panel (BMP + Alb, Alk Phos, ALT, AST, Total. Bili, TP)      11. Prostate cancer (H)  C61       12. Obstructive sleep apnea syndrome  G47.33       13. NAFLD (nonalcoholic fatty liver disease)  K76.0 Comprehensive metabolic panel (BMP + Alb, Alk Phos, ALT, AST, Total. Bili, TP)     Comprehensive metabolic panel (BMP + Alb, Alk Phos, ALT, AST, Total. Bili, TP)      14. History of hepatitis C  Z86.19       15. Vitamin B12 deficiency (non anemic)  E53.8       16. Vitamin D deficiency  E55.9       17. Iron deficiency anemia, unspecified iron deficiency anemia type  D50.9 CBC with platelets     CBC with platelets      18. Primary osteoarthritis involving multiple joints  M15.9       19. Physical deconditioning  R53.81 Comprehensive metabolic panel (BMP + Alb, Alk Phos, ALT, AST, Total. Bili, TP)     CBC with platelets     BNP-N terminal pro     Comprehensive metabolic panel (BMP + Alb, Alk Phos, ALT, AST, Total. Bili, TP)     CBC with platelets     BNP-N terminal  pro      20. Medication monitoring encounter  Z51.81 Comprehensive metabolic panel (BMP + Alb, Alk Phos, ALT, AST, Total. Bili, TP)     CBC with platelets     BNP-N terminal pro     Comprehensive metabolic panel (BMP + Alb, Alk Phos, ALT, AST, Total. Bili, TP)     CBC with platelets     BNP-N terminal pro          Discussed treatment/modality options, including risk and benefits, he desires:    1) complete home care    2) start cardiac rehab    3) labs    4) cardiology/cardiac surgery    5) Close follow up    All diagnosis above reviewed and noted above, otherwise stable.      See [employer] orders for further details.      MED REC REQUIRED  Post Medication Reconciliation Status:       Return in about 1 month (around 10/7/2023) for Medication Recheck Visit, Follow Up Acute, Follow Up Chronic.   Follow-up Visit   Expected date:  Oct 07, 2023 (Approximate)      Follow Up Appointment Details:     Follow-up with whom?: Me    Follow-Up for what?: Acute Issue Recheck    How?: In Person                   No LOS data to display    Doing chart review, history and exam, documentation and further activities as noted.           Brett Cassidy MD, FAAFP     [facility]  [address] [place] [id number]  [email or web address]   Office: [phone]  Fax: [phone]  Pager: [phone]       Malick Arias is a 78 year old, presenting for the following health issues:  Hospital F/U        9/7/2023     8:17 AM   Additional Questions   Roomed by Joan GIVENS   Accompanied by Daughter and grandson         8/30/2023     1:12 PM   Post Discharge Outreach   Admission Date 8/22/2023   Reason for Admission S/P CABG   Discharge Date 8/29/2023   Discharge Diagnosis S/P CABG   How are you doing now that you are home? Doing okay.   How are your symptoms? (Red Flag symptoms escalate to triage hotline per guidelines) Improved   Do you feel your condition is  stable enough to be safe at home until your provider visit? Yes   Does the patient have their discharge instructions?  Yes   Does the patient have questions regarding their discharge instructions?  No   Were you started on any new medications or were there changes to any of your previous medications?  Yes   Does the patient have all of their medications? Yes   Do you have questions regarding any of your medications?  No   Do you have all of your needed medical supplies or equipment (DME)?  (i.e. oxygen tank, CPAP, cane, etc.) Yes   Discharge follow-up appointment scheduled within 14 calendar days?  Yes   Discharge Follow Up Appointment Date 8/30/2023   Discharge Follow Up Appointment Scheduled with? Specialty Care Provider     Hospital Follow-up Visit:    Hospital/Nursing Home/IP Rehab Facility: [facility]  Date of Admission: 8/22/2023  Date of Discharge: 8/29/2023  Reason(s) for Admission: S/P CABG    Was your hospitalization related to COVID-19? No   Problems taking medications regularly:  None  Medication changes since discharge: None  Problems adhering to non-medication therapy:  None    Summary of hospitalization:  [facility] discharge summary reviewed  Diagnostic Tests/Treatments reviewed.  Follow up needed: labs, cardiology  Other Healthcare Providers Involved in Patient s Care:         Homecare and cardiac rehab  Update since discharge: improved.       Home Care - PT, OT, RN, HHA    Cardiac Rehab - resumes next week    [facility] - 8/15 to 8/22, [facility] 8/22 to 8/29, is at home now    Slow improvement, wounds healing well, minimal edema, no cardiac chest pain, weight decreasing, bowels/bladder ok, no f/c/s, no sob, no significant edema    BP Readings from Last 3 Encounters:   09/07/23 100/60   09/06/23 107/71   08/30/23 136/82     Creatinine   Date Value Ref Range Status   09/07/2023 0.79 0.67 - 1.17 mg/dL Final   06/22/2021 0.81 0.66 - 1.25 mg/dL Final     GFR  Estimate   Date Value Ref Range Status   09/07/2023 >90 >60 mL/min/1.73m2 Final   06/22/2021 87 >60 mL/min/[1.73_m2] Final     Comment:     Non  GFR Calc  Starting 12/18/2018, serum creatinine based estimated GFR (eGFR) will be   calculated using the Chronic Kidney Disease Epidemiology Collaboration   (CKD-EPI) equation.       GFR, ESTIMATED POCT   Date Value Ref Range Status   01/11/2023 >60 >60 mL/min/1.73m2 Final     Recent Labs   Lab Test 08/16/22  0804 08/11/21  0840   CHOL 139 122   HDL 43 44   LDL 81 67   TRIG 73 54     Plan of care communicated with patient and family         Date of Admission:                  8/15/2023  Date of Discharge:                  8/22/2023  Admitting Physician:               Michael Mulvihill, MD  Discharge Physician:              Mulvihill, Michael, MD  Discharging Service:               Cardiovascular and Thoracic Surgery     Home clinic: [facility]   Primary Provider: Brett Cassidy          Admission Diagnoses:   Cardiomyopathy (H) [I42.9]  Cardiomyopathy, unspecified type (H) [I42.9]          Condition on Discharge:      Discharge condition: Stable   Discharge vitals: Blood pressure 105/59, pulse 85, temperature 97.7  F (36.5  C), temperature source Oral, resp. rate 21, height 1.829 m (6'), weight 142.1 kg (313 lb 3.2 oz), SpO2 95 %.   Code status on discharge: Full Code           Procedures:   On 8/15/23 Mr Weber successfully underwent CABG x1 (vein graft to PDA), Endoscopic Vein [place],  Aortotomy and removal of embolized / un-retrievable right coronary stent + delivery system, Occlusion of left atrial appendage (45 mm Atriclip) by Dr. Verde.            Discharge Medications:      Current Discharge Medication List               START taking these medications     Details   aspirin (ASA) 81 MG chewable tablet 1 tablet (81 mg) by Oral or NG Tube route daily     Associated Diagnoses: S/P CABG (coronary artery bypass graft)       metoprolol tartrate  (LOPRESSOR) 25 MG tablet Take 0.5 tablets (12.5 mg) by mouth 2 times daily     Associated Diagnoses: S/P CABG (coronary artery bypass graft)       rosuvastatin (CRESTOR) 10 MG tablet Take 1 tablet (10 mg) by mouth daily     Associated Diagnoses: S/P CABG (coronary artery bypass graft)                   CONTINUE these medications which have CHANGED     Details   acetaminophen (TYLENOL) 325 MG tablet Take 2 tablets (650 mg) by mouth every 4 hours as needed for other (For optimal non-opioid multimodal pain management to improve pain control.)     Associated Diagnoses: S/P CABG (coronary artery bypass graft)       tamsulosin (FLOMAX) 0.4 MG capsule Take 1 capsule (0.4 mg) by mouth every evening     Associated Diagnoses: S/P CABG (coronary artery bypass graft)                   CONTINUE these medications which have NOT CHANGED     Details   apixaban ANTICOAGULANT (ELIQUIS) 5 MG tablet Take 1 tablet (5 mg) by mouth 2 times daily Appointment needed for additional refills. Please call [phone] to schedule.  Qty: 180 tablet, Refills: 3     Associated Diagnoses: Permanent atrial fibrillation (H); NSVT (nonsustained ventricular tachycardia) (H)       empagliflozin (JARDIANCE) 10 MG TABS tablet Take 1 tablet (10 mg) by mouth daily  Qty: 90 tablet, Refills: 3     Associated Diagnoses: Chronic systolic heart failure (H)       Ferrous Sulfate (IRON) 325 (65 Fe) MG tablet Take 1 tablet by mouth three times a week (Monday, Wednesday, Friday)  Qty: 90 tablet, Refills: 3     Associated Diagnoses: Iron deficiency anemia, unspecified iron deficiency anemia type       fluticasone (FLONASE) 50 MCG/ACT nasal spray Spray 2 sprays into both nostrils daily as needed for rhinitis or allergies  Qty: 54.6 mL, Refills: 3     Associated Diagnoses: Obstructive sleep apnea syndrome       pantoprazole (PROTONIX) 40 MG EC tablet Take 40 mg by mouth daily       senna-docusate (SENOKOT-S/PERICOLACE) 8.6-50 MG tablet Take 1-2 tablets by mouth 2 times  daily Take while on oral narcotics to prevent or treat constipation.  Qty: 30 tablet, Refills: 0     Comments: While taking narcotics  Associated Diagnoses: S/P shoulder replacement, left       vitamin B-12 (CYANOCOBALAMIN) 1000 MCG tablet Take 1,000 mcg by mouth daily       Vitamin D-Vitamin K (K2 PLUS D3) 100-1000 MCG-UNIT TABS Take 1 tablet by mouth daily       vitamin D3 (CHOLECALCIFEROL) 125 MCG (5000 UT) tablet Take 1 tablet by mouth daily       vitamin C (ASCORBIC ACID) 1000 MG TABS Take 1,000 mg by mouth daily                   STOP taking these medications         enoxaparin ANTICOAGULANT (LOVENOX) 120 MG/0.8ML syringe Comments:   Reason for Stopping:            metoprolol succinate ER (TOPROL XL) 50 MG 24 hr tablet Comments:   Reason for Stopping:            niacin 500 MG tablet Comments:   Reason for Stopping:            nitroFURantoin macrocrystal-monohydrate (MACROBID) 100 MG capsule Comments:   Reason for Stopping:            sacubitril-valsartan (ENTRESTO) 24-26 MG per tablet Comments:   Reason for Stopping:                      Consultations:      Nutrition, Intensivist, social work, [facility] clinic             Brief History of Illness:   In brief, this is a 78 year old gentleman with a cardiomyopathy of an uncertain etiology. He went to the lab today electively for evaluation and to ascertain an ischemic etiology of his cardiomyopathy. During treatment of a R coronary lesion, the stent + delivery system were unfortunately embolized. The delivery system fractured and despite attempts to snare and retrieve the balloon + stent, it became clear that the pieces were un-retrievable via endovascular approaches. Fortunately, he has remained hemodynamically stable with flow through the R system.           Hospital Course:      On 8/15/23 Mr Weber successfully underwent CABG x1 (vein graft to PDA), Endoscopic Vein [place],  Aortotomy and removal of embolized / un-retrievable right coronary stent + delivery  system, Occlusion of left atrial appendage (45 mm Atriclip) by Dr. Verde.   Was extubated within 24 hours of surgery. Once he was weaned off hemodynamic stabilizing gtt's, transferred to the surgical floor.    Had some transient hyperglycemia treated with an insulin gtt, transitioned to sliding scale insulin and has no need at discharge.  Had some fluid overload treated with diuretic medication. At discharge he is 9 kg above his pre-op weight, he was given a dose of diuretics prior to discharge and will closely monitor his weight at TCU and then home. His blood pressures were too soft to send him with diuretic medication. I have asked TCU to give lasix as blood pressure allows. His PTA entresto was held on discharge as well due to softer pressures. CORE clinic was consulted as he will need close follow up due to dec EF/Fluid overload-EF 35%. He had some PAF which is chronic and was resumed on his PTA   Eliquis. Heart rhythm remained stable. He progressed well with cardiac rehab. He is discharged to TCU on pod# 7 with the help of their family.              Significant Results:            Lab Results   Component Value Date     WBC 6.7 08/22/2023     WBC 8.4 06/22/2021            Lab Results   Component Value Date     RBC 2.61 08/22/2023     RBC 5.13 06/22/2021            Lab Results   Component Value Date     HGB 7.7 08/22/2023     HGB 14.6 06/22/2021            Lab Results   Component Value Date     HCT 24.1 08/22/2023     HCT 46.8 06/22/2021      No components found for: MCT        Lab Results   Component Value Date     MCV 92 08/22/2023     MCV 91 06/22/2021            Lab Results   Component Value Date     MCH 29.5 08/22/2023     MCH 28.5 06/22/2021            Lab Results   Component Value Date     MCHC 32.0 08/22/2023     MCHC 31.2 06/22/2021            Lab Results   Component Value Date     RDW 14.8 08/22/2023     RDW 14.3 06/22/2021            Lab Results   Component Value Date      08/22/2023     PLT  244 06/22/2021         Last Basic Metabolic Panel:        Lab Results   Component Value Date      08/22/2023      06/22/2021            Lab Results   Component Value Date     POTASSIUM 3.7 08/22/2023     POTASSIUM 4.1 08/15/2023     POTASSIUM 4.2 08/16/2022     POTASSIUM 4.0 06/23/2021            Lab Results   Component Value Date     CHLORIDE 104 08/22/2023     CHLORIDE 108 08/16/2022     CHLORIDE 108 06/22/2021            Lab Results   Component Value Date     BEBO 8.1 08/22/2023     BEBO 8.4 06/22/2021            Lab Results   Component Value Date     CO2 22 08/22/2023     CO2 25 08/16/2022     CO2 26 06/22/2021            Lab Results   Component Value Date     BUN 25.3 08/22/2023     BUN 16 08/16/2022     BUN 14 06/22/2021            Lab Results   Component Value Date     CR 0.58 08/22/2023     CR 0.81 06/22/2021            Lab Results   Component Value Date      08/22/2023      08/20/2023      08/16/2022      06/22/2021            Lab Results   Component Value Date    A1C 5.4 02/21/2023    A1C 5.7 08/16/2022    A1C 5.6 08/11/2021    A1C 5.8 11/19/2020    A1C 5.8 08/07/2020    A1C 5.9 02/14/2020    A1C 5.7 12/02/2019    A1C 5.6 09/20/2019          Pending Results:      None           Discharge Instructions and Follow-Up:      Discharge diet: Regular   Discharge activity: Daily weights: Call if weight gain 2-3 lbs over 24 hours or if greater than 5 lbs in 1 week.  No lifting more than 10 lbs for 8-12 weeks.  No driving for 1 month.  Call for pain medication refill.  Call for temperature greater than 101.0  Daily shower with antibacterial soap.   Discharge follow-up: Follow-up Appointments  Follow Up and recommended labs and tests   *Follow up primary care provider in 7-10 days after discharge in order to review your medication, vital signs, obtain any necessary lab work your doctor may want, and to update them on your hospitalization and medical issues.  *Follow up with  Lena/Kaitlynn/Tammie/Pedro EDWARD-Abdi with Dr Guevara/Armando/Lissa, heart surgeon, at [facility] at [facility] [address] on 9/6/23 at 2:45 PM. If any questions or concerns call [phone].  You will see us once at this visit and then if everything is going well you will not need to see us again.  You will follow long term with your cardiologist.  * Follow up with Laura Pacheco NP with cardiology on 9/18/23 at 3:30 PM at [facility]  *Follow up with Dr Narvaez, cardiologist, 1/19/24 at 10:45. This is who you will follow with long term about your heart issues. [phone].  *Please schedule outpatient cardiac rehab within 5 days of discharge from TCU, call [phone].      Outpatient therapy: Cardiac rehab   Home Care agency: None    Supplies and equipment: None   Lines and drains: None    Wound care: Wash incision daily with antibacterial soap   Other instructions: None                     Review of Systems   CONSTITUTIONAL: NEGATIVE for fever, chills, change in weight  INTEGUMENTARY/SKIN: NEGATIVE for worrisome rashes, moles or lesions  EYES: NEGATIVE for vision changes or irritation  ENT/MOUTH: NEGATIVE for ear, mouth and throat problems  RESP: NEGATIVE for significant cough or SOB  CV: NEGATIVE for chest pain, palpitations or peripheral edema  GI: NEGATIVE for nausea, abdominal pain, heartburn, or change in bowel habits  : NEGATIVE for frequency, dysuria, or hematuria  MUSCULOSKELETAL: NEGATIVE for significant arthralgias or myalgia  NEURO: NEGATIVE for weakness, dizziness or paresthesias  ENDOCRINE: NEGATIVE for temperature intolerance, skin/hair changes  HEME: NEGATIVE for bleeding problems  PSYCHIATRIC: NEGATIVE for changes in mood or affect      Objective    /60   Pulse 115   Temp 97.5  F (36.4  C) (Tympanic)   Resp 18   Ht 1.829 m (6')   Wt 131 kg (288 lb 14.4 oz)   SpO2 98%   BMI 39.18 kg/m    Body mass index is 39.18 kg/m .    Physical Exam   GENERAL: healthy, alert and no  distress  EYES: Eyes grossly normal to inspection, PERRL and conjunctivae and sclerae normal  HENT: ear canals and TM's normal, nose and mouth without ulcers or lesions  NECK: no adenopathy, no asymmetry, masses, or scars and thyroid normal to palpation  RESP: lungs clear to auscultation - no rales, rhonchi or wheezes  CV: regular rate and rhythm, normal S1 S2, no S3 or S4, no murmur, click or rub, no peripheral edema and peripheral pulses strong  ABDOMEN: soft, nontender, no hepatosplenomegaly, no masses and bowel sounds normal  MS: no gross musculoskeletal defects noted, no edema  SKIN: no suspicious lesions or rashes - wounds healing well  NEURO: Normal strength and tone, mentation intact and speech normal  PSYCH: mentation appears normal, affect normal/bright    Labs reviewed and pending  Imaging reviewed

## 2023-09-07 NOTE — TELEPHONE ENCOUNTER
Forms/Letter Request    Type of form/letter: [facility] Order # [id number]    Have you been seen for this request: N/A    Do we have the form/letter: Yes: In Dr Cassidy's green folder    Who is the form from? Home care    Where did/will the form come from? form was faxed in

## 2023-09-08 NOTE — TELEPHONE ENCOUNTER
Faxed to [facility] #[phone]    Form #[id number]    Copied into [facility]    Filed in [facility]

## 2023-09-08 NOTE — TELEPHONE ENCOUNTER
Faxed to [facility] #[phone]    Referral Form # [id number]    Copied into HIMS    Filed in [place] / Joan K/ [place]

## 2023-09-08 NOTE — TELEPHONE ENCOUNTER
Forms faxed to [facility] #[phone]    Form #[id number] - - New Orders -  Furosemide 40 Mg    Copied into HIMS    Filed in [place] / Joan K/ [place]

## 2023-09-08 NOTE — TELEPHONE ENCOUNTER
Faxed to [employer] #[phone]    Medicare requirements - Cert Period: 08/31/23 - 10/29/23    Faxed to [facility]    Filed in [place] / Joan K/[place]

## 2023-09-11 ENCOUNTER — MEDICAL CORRESPONDENCE (OUTPATIENT)
Dept: HEALTH INFORMATION MANAGEMENT | Facility: CLINIC | Age: 78
End: 2023-09-11
Payer: COMMERCIAL

## 2023-09-11 ENCOUNTER — TELEPHONE (OUTPATIENT)
Dept: FAMILY MEDICINE | Facility: CLINIC | Age: 78
End: 2023-09-11
Payer: COMMERCIAL

## 2023-09-11 ENCOUNTER — DOCUMENTATION ONLY (OUTPATIENT)
Dept: CARDIOLOGY | Facility: CLINIC | Age: 78
End: 2023-09-11
Payer: COMMERCIAL

## 2023-09-11 DIAGNOSIS — I50.20 HFREF (HEART FAILURE WITH REDUCED EJECTION FRACTION) (H): ICD-10-CM

## 2023-09-11 DIAGNOSIS — I42.9 CARDIOMYOPATHY (H): Primary | ICD-10-CM

## 2023-09-11 NOTE — TELEPHONE ENCOUNTER
Forms/Letter Request    Type of form/letter:  [facility] Order # [id number]    Have you been seen for this request: N/A    Do we have the form/letter: Yes: In Dr Cassidy's green folder    Who is the form from? Home care    Where did/will the form come from? form was faxed in

## 2023-09-11 NOTE — PROGRESS NOTES
Renal fn stable, proBNP down by nearly 50% and weight down about 20 lbs (313 --> 290 lbs) per PCP vitals, which is exactly what we wanted. I would keep his entresto/lasix the same.     However, I do notice that his HR was 115 bpm at PCP visit. I don't believe any med changes were made but I'll route this to Dr. Cassidy just in case I'm missing something. I would re-start his Toprol XL at 25 mg daily at this time. Does pt have ability to check this at home for us? If not, he'll need a 48h holter monitor placed when he comes in to see me on 9/18.    Thanks,  Laura

## 2023-09-11 NOTE — PROGRESS NOTES
[facility]    Pt has 8/30/23 OV with Laura Olivia PA-C.    Plan:  Re-start Entresto at 12-13 mg BID.   Start Lasix 40 mg daily.   Will ask PCP to check a BMP when he sees him on 9/7.   Plan to re-start Toprol XL once euvolemic.      Follow-up:  With me as scheduled 9/18 with a BMP prior.  Lipid panel in 3 months.   With Dr. 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 TTE prior.    FYI sent to Laura Olivia PA-C as CMP drawn per pt's PCP on 9/7/23. Available in Epic for review.    Component      Latest Ref Rng 9/7/2023  9:46 AM   Sodium      136 - 145 mmol/L 137    Anion Gap      7 - 15 mmol/L 12    Creatinine      0.67 - 1.17 mg/dL 0.79    GFR Estimate      >60 mL/min/1.73m2 >90    Calcium      8.8 - 10.2 mg/dL 9.0    Bilirubin Total      <=1.2 mg/dL 0.6    Protein Total      6.4 - 8.3 g/dL 6.8    ALT      0 - 70 U/L 7    AST      0 - 45 U/L 18    Alkaline Phosphatase      40 - 129 U/L 116    Potassium      3.4 - 5.3 mmol/L 4.7    Chloride      98 - 107 mmol/L 99    Carbon Dioxide (CO2)      22 - 29 mmol/L 26    Urea Nitrogen      8.0 - 23.0 mg/dL 16.7    Glucose      70 - 99 mg/dL 92    Albumin      3.5 - 5.2 g/dL 3.8      Pt was also seen by CV surgery on 9/6/23.  Pt set for follow up on 9/18/23 with Laura Olivia PA-C lab scheduled prior on 9/15/23.    Loretta Sheffield, RN, BSN  [facility] [place], [place]  [facility] Care Coordinator  September 11, 2023 1:47 PM    Future Appointments   Date Time Provider Department Center   9/15/2023  1:15 PM RV LAB RVLABR RV   9/18/2023  3:30 PM Laura Olivia PA-C [facility] PSA CLIN   9/20/2023  9:00 AM [facility]   9/22/2023  2:00 PM 2, [facility] Card[facility]   9/25/2023  3:00 PM 1, Rh Cardiac Rehab [facility]   9/27/2023  1:00 PM 1, Rh Cardiac Rehab [facility] R[facility]   10/12/2023  9:30 AM Brett Cassidy MD RVFP RV   10/30/2023  2:00 PM Brett Cassidy MD  RVFP RV   1/19/2024 10:45 AM Martita Narvaez MD [facility] PSA CLIN

## 2023-09-12 ENCOUNTER — TELEPHONE (OUTPATIENT)
Dept: FAMILY MEDICINE | Facility: CLINIC | Age: 78
End: 2023-09-12
Payer: COMMERCIAL

## 2023-09-12 NOTE — PROGRESS NOTES
[facility]    Called Hugo. He states he does not normally take his BP or HR's at home. He states he has an apple watch so he will begin to record this. I asked that he start to monitor HR's once a day this week while at rest and call [facility] RN Line if over 100bpm. If not, [facility] RN to call at end of the week on Friday for an update on his HR results this week.     Will send update to Laura Olivia PA-C with this plan.     Laura- would you like Toprol XL 25mg started now or wait until we have this week's heart rates?    Loretta Sheffield, RN, BSN  [facility] [place], [place]  [facility] Care Coordinator  September 12, 2023 3:22 PM    Future Appointments   Date Time Provider Department Center   9/15/2023  1:15 PM RV LAB RVLABR RV   9/18/2023  3:30 PM [facility]   9/20/2023  9:00 AM 2, Rh Cardiac Rehab [facility] RID   9/22/2023  2:00 PM 2, Rh Cardiac Rehab [facility] RID   9/25/2023  3:00 PM 1, Rh Cardiac Rehab [facility] RID   9/27/2023  1:00 PM 1, Rh Cardiac Rehab [facility] RID   10/12/2023  9:30 AM Brett Cassidy MD RVFP RV   10/30/2023  2:00 PM Brett Cassidy MD RVFP RV   1/19/2024 10:45 AM Martita Narvaez MD [facility] PSA CLIN

## 2023-09-12 NOTE — TELEPHONE ENCOUNTER
Faxed to  [facility] #[phone]    Form # [id number]    Copied to HIMS    Filed in [facility]/ [facility]

## 2023-09-12 NOTE — TELEPHONE ENCOUNTER
Forms/Letter Request    Type of form/letter:  [facility] Homecare order [id number] physician order 9/7/23    Have you been seen for this request: N/A    Do we have the form/letter: Yes: FD TC bin, green folder    Who is the form from? Home care    Where did/will the form come from? form was faxed in

## 2023-09-12 NOTE — TELEPHONE ENCOUNTER
Forms/Letter Request    Type of form/letter:  [id number] (patient starting cardiac rehab 09-18; will discharge from home care 09-15; updating calendar to reflect PT discharge for this week; patient is in agreement w/ plan)     Have you been seen for this request: N/A    Do we have the form/letter: Yes: Provider's Bin    Who is the form from? Home care  ([facility])  Fax: [phone]    Where did/will the form come from? form was faxed in

## 2023-09-12 NOTE — TELEPHONE ENCOUNTER
Faxed to [facility] #[phone]    Discharge  Dates/Current ordered Medications    Copied to HIMS    Filed in [place] / [place]

## 2023-09-12 NOTE — PROGRESS NOTES
Daughter is an RN, please check with Hugo, if she can help with monitoring pulse, this was taken on sat meter, double check for accuracy

## 2023-09-13 ENCOUNTER — TELEPHONE (OUTPATIENT)
Dept: FAMILY MEDICINE | Facility: CLINIC | Age: 78
End: 2023-09-13
Payer: COMMERCIAL

## 2023-09-13 ENCOUNTER — MEDICAL CORRESPONDENCE (OUTPATIENT)
Dept: HEALTH INFORMATION MANAGEMENT | Facility: CLINIC | Age: 78
End: 2023-09-13
Payer: COMMERCIAL

## 2023-09-13 RX ORDER — METOPROLOL SUCCINATE 25 MG/1
12.5 TABLET, EXTENDED RELEASE ORAL 2 TIMES DAILY
Qty: 90 TABLET | Refills: 1 | Status: SHIPPED | OUTPATIENT
Start: 2023-09-13 | End: 2023-09-27

## 2023-09-13 NOTE — TELEPHONE ENCOUNTER
Forms/Letter Request    Type of form/letter:  [facility] Homecare order [id number], physician order 9/7/23    Have you been seen for this request: N/A    Do we have the form/letter: Yes: FD TC bin, green folder    Who is the form from? Home care    Where did/will the form come from? form was faxed in

## 2023-09-13 NOTE — TELEPHONE ENCOUNTER
Forms/Letter Request    [facility]letter[id number]    Have you been seen for this request: N/A    Do we have the form/letter: Yes: In Dr Cassidy's green folder    Who is the form from? Home care    Where did/will the form come from? form was faxed in

## 2023-09-13 NOTE — TELEPHONE ENCOUNTER
Forms/Letter Request    Type of form/letter:  [facility] Order # [id number]    Have you been seen for this request: N/A    Do we have the form/letter: Yes: In Dr Cassidy's green folder    Who is the form from? Home care    Where did/will the form come from? form was faxed in

## 2023-09-13 NOTE — PROGRESS NOTES
RV triage nurse reviewed all messages, rx is not signed and metoprolol is not on epic list. Sending to RN and provider in group to review is rx should or should not be sent.     Per routing messages -   Laura Olivia PA-C Gerdowsky, Christina, RN; [facility] Core Nurse21 minutes ago (2:37 PM)     SD  Yes that is fine.  No need for call - I'll discuss when I see him.     Gian Gonzales, Laura Chi PA-C; [facility] Core Nurse1 hour ago (1:25 PM)     CG  Update. Sent in script for Toprol XL 12.5 mg BID (previous dose) that okay?  You see him 9/18. Would you like [facility] to call him Fri 9/15 for update??     Gian Gonzales, RN1 hour ago (1:25 PM)     CG  Addended by: GIAN GONZALES on: 9/13/2023 01:25 PM       Modules accepted: Orders

## 2023-09-13 NOTE — PROGRESS NOTES
"Daughter Ingrid called back. We did discuss HRs and BPs. Also plan to restart Toprol XL 25 mg daily. She agrees with plan \"with close watch on his blood pressure.\"   She reports HRs of \"93-95 when he was getting into the car.\" She has not seen HRs above 100 at rest.   He does have a home BP cuff and will help him check his blood pressure.   She will call [facility] if he is having issues.       Future Appointments   Date Time Provider Department Center   9/15/2023  1:15 PM RV LAB RVLABR RV   9/18/2023  3:30 PM Laura Olivia PA-C [facility] PSA CLIN   9/20/2023  9:00 AM 2, Rh Cardiac Rehab [facility] RID   9/22/2023  2:00 PM 2, Rh Cardiac Rehab [facility] RID   9/25/2023  3:00 PM 1, Rh Cardiac Rehab [facility] RID   9/27/2023  1:00 PM 1, Rh Cardiac Rehab [facility] RID   10/12/2023  9:30 AM Brett Cassidy MD RVFP RV   10/30/2023  2:00 PM Brett Cassidy MD RVFP RV   1/19/2024 10:45 AM Martita Narvaez MD [facility] PSA CLIN         PRIMITIVO Valente, RN 3:20 PM 09/13/23      "

## 2023-09-13 NOTE — TELEPHONE ENCOUNTER
Forms/Letter Request    Type of form/letter:  [facility] Homecare order [id number], physician order 9/13/23    Have you been seen for this request: N/A    Do we have the form/letter: Yes: Dr. Cassidy's bin    Who is the form from? Home care    Where did/will the form come from? form was faxed in

## 2023-09-13 NOTE — PROGRESS NOTES
"Call to Hugo. He is feeling well. He has been keeping track of his heart with Apple watch. He has not noticed a resting heart rate above 100. \"It does go past 100 when I am walking. 110-115 or so.\"     He states \"the nurse just left here and she took my blood pressure\" 118/57, HR 75 bpm     We did discuss metoprolol and he is familiar with it. He will need a prescription.     Left voice mail for daughter to verify resting heart rates.        Future Appointments   Date Time Provider Department Center   9/15/2023  1:15 PM RV LAB RVLABR RV   9/18/2023  3:30 PM [facility]   9/20/2023  9:00 AM 2, Rh Cardiac Rehab [facility] RID   9/22/2023  2:00 PM 2, Rh Cardiac Rehab [facility] RID   9/25/2023  3:00 PM 1, Rh Cardiac Rehab [facility] RID   9/27/2023  1:00 PM 1, Rh Cardiac Rehab [facility] RID   10/12/2023  9:30 AM Brett Cassidy MD RVFP RV   10/30/2023  2:00 PM Brett Cassidy MD RVFP RV   1/19/2024 10:45 AM Martita Narvaez MD [facility] PSA CLIN           PRIMITIVO Valente, RN 1:02 PM 09/13/23          "

## 2023-09-14 ENCOUNTER — TELEPHONE (OUTPATIENT)
Dept: FAMILY MEDICINE | Facility: CLINIC | Age: 78
End: 2023-09-14
Payer: COMMERCIAL

## 2023-09-14 ENCOUNTER — MEDICAL CORRESPONDENCE (OUTPATIENT)
Dept: HEALTH INFORMATION MANAGEMENT | Facility: CLINIC | Age: 78
End: 2023-09-14
Payer: COMMERCIAL

## 2023-09-14 NOTE — TELEPHONE ENCOUNTER
Forms/Letter Request    Type of form/letter: Home care orders : medication change regarding stuffy nose.    Have you been seen for this request: N/A    Do we have the form/letter: Yes: Given to Dr Kolby reddy folder.    Who is the form from? Home care    Where did/will the form come from? form was faxed in    When is form/letter needed by: when available    How would you like the form/letter returned: Fax : [phone]

## 2023-09-14 NOTE — TELEPHONE ENCOUNTER
Faxed to [facility] #[phone]    Form #[id number]    Discharge from [facility] - Transfer to  outpatient cardiac    Copied  to HIMS    Filed  in [place] /  [place]  [place]  /[place]

## 2023-09-14 NOTE — TELEPHONE ENCOUNTER
Forms/Letter Request    Type of form/letter: Home Care [facility] #[id number]    Have you been seen for this request: N/A    Do we have the form/letter: Yes: Given to   Dr Cassidy for review and placed in his green folder    Who is the form from? Home care    Where did/will the form come from? form was faxed in    When is form/letter needed by: when avail available   How would you like the form/letter returned: Fax : [phone]    Faxed back form to [phone]  And sent to HIMS  And filed in the [facility].

## 2023-09-14 NOTE — TELEPHONE ENCOUNTER
Faxed to [facility] #[phone]    Medication change - Stuffy nose    Form #[id number]    Copied to HIMS  - Filed in [facility]

## 2023-09-14 NOTE — TELEPHONE ENCOUNTER
Faxed to [facility] #[phone]    Form #[id number]    Copied into HIMS    Filed  in [place] /  [place]  /South

## 2023-09-14 NOTE — TELEPHONE ENCOUNTER
Forms from [facility]  put in providers in box to sign.    Form #[id number]    [place]

## 2023-09-14 NOTE — TELEPHONE ENCOUNTER
Forms were completed and faxed to [phone]  And sent to [facility]  And filed in the [facility].

## 2023-09-15 ENCOUNTER — LAB (OUTPATIENT)
Dept: LAB | Facility: CLINIC | Age: 78
End: 2023-09-15
Payer: COMMERCIAL

## 2023-09-15 DIAGNOSIS — I50.22 CHRONIC SYSTOLIC HEART FAILURE (H): ICD-10-CM

## 2023-09-15 LAB
ANION GAP SERPL CALCULATED.3IONS-SCNC: 12 MMOL/L (ref 7–15)
BUN SERPL-MCNC: 21.2 MG/DL (ref 8–23)
CALCIUM SERPL-MCNC: 8.8 MG/DL (ref 8.8–10.2)
CHLORIDE SERPL-SCNC: 101 MMOL/L (ref 98–107)
CREAT SERPL-MCNC: 0.86 MG/DL (ref 0.67–1.17)
DEPRECATED HCO3 PLAS-SCNC: 24 MMOL/L (ref 22–29)
EGFRCR SERPLBLD CKD-EPI 2021: 89 ML/MIN/1.73M2
GLUCOSE SERPL-MCNC: 150 MG/DL (ref 70–99)
POTASSIUM SERPL-SCNC: 4.8 MMOL/L (ref 3.4–5.3)
SODIUM SERPL-SCNC: 137 MMOL/L (ref 136–145)

## 2023-09-15 PROCEDURE — 80048 BASIC METABOLIC PNL TOTAL CA: CPT

## 2023-09-15 PROCEDURE — 36415 COLL VENOUS BLD VENIPUNCTURE: CPT

## 2023-09-15 NOTE — PROGRESS NOTES
[facility]    Received incoming call from Hugo with clarifying question regarding his metoprolol. LVM for call back to discuss.    Loretta Sheffield RN, BSN  [facility] [place], [place]  [facility] Care Coordinator  September 15, 2023 9:51 AM    Future Appointments   Date Time Provider Department Center   9/15/2023  1:15 PM RV LAB RVLABR RV   9/18/2023  3:30 PM Laura Olivia PA-C [facility] PSA CLIN   9/20/2023  9:00 AM 2, Rh Cardiac Rehab [facility] RID   9/22/2023  2:00 PM 2, Rh Cardiac Rehab [facility] RID   9/25/2023  3:00 PM 1, Rh Cardiac Rehab [facility] RID   9/27/2023  1:00 PM 1, Rh Cardiac Rehab [facility] RID   10/12/2023  9:30 AM Brett Cassidy MD RVFP RV   10/30/2023  2:00 PM Brett Cassidy MD RVFP RV   1/19/2024 10:45 AM Martita Narvaez MD [facility] PSA CLIN

## 2023-09-15 NOTE — TELEPHONE ENCOUNTER
Faxed to [facility] #[phone]    Form #[id number]  -     Copied into HIMS     Filed in [facility]/ South

## 2023-09-18 ENCOUNTER — OFFICE VISIT (OUTPATIENT)
Dept: CARDIOLOGY | Facility: CLINIC | Age: 78
End: 2023-09-18
Payer: COMMERCIAL

## 2023-09-18 VITALS
BODY MASS INDEX: 39.32 KG/M2 | HEIGHT: 72 IN | HEART RATE: 72 BPM | OXYGEN SATURATION: 99 % | WEIGHT: 290.3 LBS | DIASTOLIC BLOOD PRESSURE: 82 MMHG | SYSTOLIC BLOOD PRESSURE: 124 MMHG

## 2023-09-18 DIAGNOSIS — I50.22 CHRONIC SYSTOLIC HEART FAILURE (H): ICD-10-CM

## 2023-09-18 PROCEDURE — 99215 OFFICE O/P EST HI 40 MIN: CPT | Performed by: PHYSICIAN ASSISTANT

## 2023-09-18 RX ORDER — FUROSEMIDE 20 MG
20 TABLET ORAL DAILY
Qty: 90 TABLET | Refills: 3 | Status: SHIPPED | OUTPATIENT
Start: 2023-09-18 | End: 2023-09-27

## 2023-09-18 NOTE — PROGRESS NOTES
No return call.   BPs/HRs will be addressed at 9/18 appt with KARIN Morocho.       Future Appointments   Date Time Provider Department Center   9/18/2023  3:30 PM [facility]   9/20/2023  9:00 AM 2, Rh Cardiac Rehab [facility] RID   9/22/2023  2:00 PM 2, Rh Cardiac Rehab [facility] RID   9/25/2023  3:00 PM 1, Rh Cardiac Rehab [facility] RID   9/27/2023  1:00 PM 1, Rh Cardiac Rehab [facility] RID   10/12/2023  9:30 AM Brett Cassidy MD RVFP RV   10/30/2023  2:00 PM Brett Cassidy MD RVFP RV   1/19/2024 10:45 AM [facility]         PRIMITIVO Valente, RN 9:34 AM 09/18/23

## 2023-09-18 NOTE — PROGRESS NOTES
[facility] (Heart Failure) Clinic Progress Note    Hugo Weber MRN# [id number]   YOB: 1945 Age: 78 year old     Primary cardiology team: Was Dr. Castillo / will be Dr. Narvaez         Assessment and Plan     In summary, Hugo Weber presents today for a hospital follow up visit. When I met him for a routine annual follow up visit in July, I suggested ischemic workup for his EF decline. He opted for angiogram > stress test. Unfortunately, his angiogram was very complicated and resulted in the need for emergent 1v bypass surgery.     When I saw him last on 8/30, he was 20 pounds up from his preop weight.  I started Lasix 40 mg daily and restarted his Entresto at 12/50 mg twice daily.  With this, his weight has returned to his preop weight.  Last week, I restarted his Toprol-XL at 25 mg daily.      He continues to recover appropriately from his surgery.    Chronic HFrEF / mixed CMP.  EF 55% (2019) --> 45% (2021) --> 35% (2022), where it has remained. No prior workup for this.  Started on Entresto and jardiance when I met pt 7/2023. Ischemic workup pursued with angiogram.   Significant distal RCA lesion noted on angiogram -- see below.   Current GDMT includes jardiance 10 mg daily, Entresto 12-13 mg BID, Toprol XL 25 mg daily (Entresto and Toprol XL previously stopped in hospital d/t hypotension).   Appears slightly volume up at 290 lbs. Lasix reduced from 40 mg daily to q3days on 9/6 by surgery team.  QRS narrow.    CAD, with distal RCA lesion failed PCI (d/t equipment malfunction), s/p emergent 1v bypass (VG-PDA) and removal of embolized/ un-retrievable right coronary stent + delivery system, occlusion of left atrial appendage (45 mm Atriclip) on 8/15/23 with Dr. Michael Mulvihill.  Now on aspirin and statin.    Mild-mod MR, Moderate aortic stenosis.     CAF.   Rate controlled, anticoagulated.     HTN.   Well-controlled.    Plan:  Change Lasix from 40 mg q3d to 20 mg daily.  Increase Entresto to  full tablet twice daily.   BMP and call for wt/BP update in one week. If stable, plan to increase Toprol to 50 mg daily +/- further increase Entresto and stop Lasix.    Follow-up:  With Dr. Roberts in [facility] on 11/10/23, with ECHO and lipid panel prior.  With Dr. 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     Laura Olivia PA-C  [facility]         History of Presenting Illness     Hugo Weber is a pleasant 78 year old patient with a pertinent history chronic atrial fibrillation and a cardiomyopathy, NSVT, morbid obesity with hx of gastric bypass surgery, HAYLEE (on CPAP), hx of CVA (2019), prostate ca (s/p RT), and HTN. He is a retired teacher and .    This patient previously followed with Dr. Castillo.  It appears that in 2021 he had an echocardiogram that showed a decline in his EF from 55-60% in 2019, to 45-50%.  This was obtained after a murmur was noted on exam by my colleague Norma Stein.  No changes were made at that time.  Echo was repeated again in July 2022, showing further decline in his EF to 35-40%, with mild to moderate MR, and mild aortic stenosis with a moderately dilated ascending aorta.  At that time, Dr. Castillo added losartan to his medication regimen and recommended follow-up in another year. 24-hour Holter monitor in November 2022 showed atrial fibrillation with an average heart rate of 84 bpm.  Ranging from  bpm.    Echo was repeated in July 2023, showing a stable EF at 35%. The RV is normal. MR is stable. Aortic stenosis is now moderate. His GDMT at that time included losartan 50 mg daily, Toprol-XL 50 mg daily.     When I met him for a routine annual EP follow up visit in July, he was feeling well from a cardiac standpoint, but wasn't aware of his heart failure or his aortic stenosis. Provided education on this. I stopped his Losartan and started Entresto and jardiance. I also suggested ischemic workup for his EF decline. He opted  for angiogram > stress test. Angiogram showed a distal 75% RCA lesion. Dr. Luna attempted to PCI this however the stent delivery system failed resulting in retention of an undeployed drug-eluting stent and stent balloon in the ostial and proximal RCA. He ultimately underwent emergent 1v bypass surgery with VG-PDA on 8/15. His Entresto and Toprol were both stopped in the hospital d/t hypotension.     When I saw him post-op on 8/30, he was 20 lbs up from his preop weight. I started Lasix 40 mg daily and re-started Entresto at 12/13 mg BID. With this, his weight returned to his preop weight. I then re-started Toprol XL at 25 mg daily.     Today, Hugo returns to clinic with his daughter, stating he's feeling okay overall. Wt is down 25 lbs at 290 lbs. He feels significantly better at this weight. For the past ten days or so he's been taking Lasix every 3 days at advisement by CV surgery. Recent BMP stable. BP robust. Just starting cardiac rehab. Incision healing well.          Review of Systems     12-pt ROS is negative except for as noted in the HPI.          Physical Exam     Vitals: /82 (BP Location: Left arm, Patient Position: Sitting)   Pulse 72   Ht 1.829 m (6')   Wt 131.7 kg (290 lb 4.8 oz)   SpO2 99%   BMI 39.37 kg/m    Wt Readings from Last 10 Encounters:   09/18/23 131.7 kg (290 lb 4.8 oz)   09/07/23 131 kg (288 lb 14.4 oz)   09/06/23 131.4 kg (289 lb 11.2 oz)   08/30/23 142.4 kg (314 lb)   08/28/23 142.3 kg (313 lb 12.8 oz)   08/23/23 142.2 kg (313 lb 8 oz)   08/22/23 142.1 kg (313 lb 3.2 oz)   08/14/23 134.3 kg (296 lb)   07/19/23 133.6 kg (294 lb 9.6 oz)   03/07/23 135.2 kg (298 lb)       Constitutional:  Patient is pleasant, alert, cooperative, and in NAD.  HEENT:  NCAT. PERRLA. EOM's intact.   Neck:  CVP appears normal. No carotid bruits.   Pulmonary: Normal respiratory effort. CTAB.   Cardiac: Irregularly irregular rhythm, variable S1, grade 3/6 sm at the LSB. Central chest incision  appears to be healing well.  Abdomen:  Non-tender abdomen, no hepatosplenomegaly appreciated.   Vascular: Pulses in the upper and lower extremities are 2+ and equal bilaterally.  Extremities: 1+ pitting edema bilateral pedal to knees.   Skin:  No rashes or lesions appreciated.   Neurological:  No gross motor or sensory deficits.   Psych: Appropriate affect.          Data   Labs reviewed:  Recent Labs   Lab Test 09/07/23  0946 08/28/23  0853 08/16/22  0804 08/11/21  0840 08/07/20  0847   LDL  --   --  81 67 88   HDL  --   --  43 44 43   NHDL  --   --  96 78 105   CHOL  --   --  139 122 148   TRIG  --   --  73 54 84   TSH  --  4.12 2.84 2.58 2.03   NTBNP 2,695* 4,917*  --   --   --    IRON  --   --  154  --  99   FEB  --   --  361  --  350   IRONSAT  --   --  43  --  28   ELENA  --   --  29  --  28       Lab Results   Component Value Date    WBC 5.9 09/07/2023    WBC 8.4 06/22/2021    RBC 3.52 (L) 09/07/2023    RBC 5.13 06/22/2021    HGB 9.8 (L) 09/07/2023    HGB 14.6 06/22/2021    HCT 32.0 (L) 09/07/2023    HCT 46.8 06/22/2021    MCV 91 09/07/2023    MCV 91 06/22/2021    MCH 27.8 09/07/2023    MCH 28.5 06/22/2021    MCHC 30.6 (L) 09/07/2023    MCHC 31.2 (L) 06/22/2021    RDW 14.4 09/07/2023    RDW 14.3 06/22/2021     09/07/2023     06/22/2021       Lab Results   Component Value Date     09/15/2023     06/22/2021    POTASSIUM 4.8 09/15/2023    POTASSIUM 4.1 08/15/2023    POTASSIUM 4.2 08/16/2022    POTASSIUM 4.0 06/23/2021    CHLORIDE 101 09/15/2023    CHLORIDE 108 08/16/2022    CHLORIDE 108 06/22/2021    CO2 24 09/15/2023    CO2 25 08/16/2022    CO2 26 06/22/2021    ANIONGAP 12 09/15/2023    ANIONGAP 7 08/16/2022    ANIONGAP 4 06/22/2021     (H) 09/15/2023     (H) 08/20/2023     (H) 08/16/2022     (H) 06/22/2021    BUN 21.2 09/15/2023    BUN 16 08/16/2022    BUN 14 06/22/2021    CR 0.86 09/15/2023    CR 0.81 06/22/2021    GFRESTIMATED 89 09/15/2023    GFRESTIMATED  >60 01/11/2023    GFRESTIMATED 87 06/22/2021    GFRESTBLACK >90 06/22/2021    BEBO 8.8 09/15/2023    BEBO 8.4 (L) 06/22/2021      Lab Results   Component Value Date    AST 18 09/07/2023    AST 13 06/21/2021    ALT 7 09/07/2023    ALT 19 06/21/2021       Lab Results   Component Value Date    A1C 5.4 02/21/2023    A1C 5.8 (H) 11/19/2020       Lab Results   Component Value Date    INR 1.37 (H) 08/17/2023    INR 1.47 (H) 08/15/2023    INR 1.04 03/21/2016    INR 1.04 02/12/2015            Problem List     Patient Active Problem List   Diagnosis    Iron deficiency anemia    Colon polyps    Obstructive sleep apnea syndrome    Hyperlipidemia LDL goal <70    Long term current use of anticoagulant therapy - for intermittent a-fib    Advance Care Planning    Total knee replacement status    Calculus of kidney - 1.2 cm stone at the upper pole as of CT urogram fr 1/11/2023    NAFLD (nonalcoholic fatty liver disease)    Morbid obesity due to excess calories (H)    History of hepatitis C    Prediabetes    Paroxysmal atrial fibrillation (H)    Cholelithiasis    Hypertension goal BP (blood pressure) < 140/90    TMJ arthralgia    Nephrolithiasis    OA (osteoarthritis)    First degree AV block    Benign prostatic hyperplasia (BPH) with urinary urge incontinence    Thoracic aortic ectasia (H)    Stroke (H)    CVA (cerebral vascular accident) (H)    Cervical stenosis of spine    Vitamin B12 deficiency (non anemic)    Vitamin D deficiency    Myofascial pain    Permanent atrial fibrillation (H)    History of CVA (cerebrovascular accident)    Small bowel obstruction (H)    NSVT (nonsustained ventricular tachycardia) (H)    Chronic LLQ pain    Elevated prostate specific antigen (PSA)    Prostate cancer (H)    Chronic systolic heart failure (H)    HFrEF (heart failure with reduced ejection fraction) (H)    Aortic valve stenosis, etiology of cardiac valve disease unspecified- moderate 2+ with FREDY = 1.2cm2 on echocardiogram fr 7/14/2023     Mitral [id number]+ on echocardiogram fr 7/14/2023    Cardiomyopathy (H)    Cardiomyopathy, unspecified type (H)    S/P CABG (coronary artery bypass graft)    Fluid overload    Transient hyperglycemia post procedure    Status post ligation of left atrial appendage    Encounter for surgical aftercare following surgery on the circulatory system    Presence of aortocoronary bypass graft:CAB x1 8/15/2023    Atherosclerosis of native coronary artery of native heart without angina pectoris    Essential (primary) hypertension    Chronic atrial fibrillation, unspecified (H)    Personal history of transient ischemic attack (TIA), and cerebral infarction without residual deficits    Physical deconditioning    Leg edema, right    HAYLEE on CPAP            Medications     Current Outpatient Medications   Medication Sig Dispense Refill    acetaminophen (TYLENOL) 325 MG tablet Take 650 mg by mouth every 6 hours as needed 7 am, 12 N, 6p, 10p      apixaban ANTICOAGULANT (ELIQUIS) 5 MG tablet Take 1 tablet (5 mg) by mouth 2 times daily Appointment needed for additional refills. Please call [phone] to schedule. 180 tablet 3    aspirin (ASA) 81 MG chewable tablet 1 tablet (81 mg) by Oral or NG Tube route daily      Elastic Bandages & Supports (MEDICAL LEGWEAR/KNEE HIGH) MISC Knee Hi ace wraps in am off in pm      empagliflozin (JARDIANCE) 10 MG TABS tablet Take 1 tablet (10 mg) by mouth daily 90 tablet 3    Ferrous Sulfate (IRON) 325 (65 Fe) MG tablet Take 1 tablet by mouth three times a week (Monday, Wednesday, Friday) 90 tablet 3    fluticasone (FLONASE) 50 MCG/ACT nasal spray Spray 2 sprays into both nostrils daily as needed for rhinitis or allergies 54.6 mL 3    furosemide (LASIX) 40 MG tablet Take 1 tablet (40 mg) by mouth daily (Patient taking differently: Take 40 mg by mouth every 3 days) 90 tablet 3    metoprolol succinate ER (TOPROL XL) 25 MG 24 hr tablet Take 0.5 tablets (12.5 mg) by mouth 2 times daily 90 tablet 1     pantoprazole (PROTONIX) 40 MG EC tablet Take 40 mg by mouth daily      rosuvastatin (CRESTOR) 10 MG tablet Take 1 tablet (10 mg) by mouth daily 30 tablet 1    sacubitril-valsartan (ENTRESTO) 24-26 MG per tablet Take 1/2 tablet twice daily.      senna-docusate (SENOKOT-S/PERICOLACE) 8.6-50 MG tablet Take 1 tablet by mouth 2 times daily as needed for constipation      tamsulosin (FLOMAX) 0.4 MG capsule Take 1 capsule (0.4 mg) by mouth every evening      vitamin B-12 (CYANOCOBALAMIN) 1000 MCG tablet Take 1,000 mcg by mouth daily      vitamin C (ASCORBIC ACID) 1000 MG TABS Take 1,000 mg by mouth daily      vitamin D3 (CHOLECALCIFEROL) 125 MCG (5000 UT) tablet Take 1 tablet by mouth daily              Past Medical History     Past Medical History:   Diagnosis Date    Aortic stenosis     moderate    Atrial fibrillation 2006    Followed by [facility] - persistent    CAD (coronary artery disease)     Calculus of kidney 2005, 6/06, 10/12, 8/18, 9/19    dr walker/nestor/иван - hematuria - w/u o/w neg    Cervical stenosis of spine     Moderate C4-5    Cholelithiasis     Chronic peptic ulcer, unspecified site, without mention of hemorrhage, perforation, or obstruction 1985    gastric bypass    Colon polyps 8/05, 9/10, 10/15    2 tubular adenoma, 1 hyperplastic - multiple serrated/tubular adenomas - last colonscopy 11/20 due 5 yrs    CVA (cerebral vascular accident) (H) 01/2019    small right periorlandic subcortical - left sided residual - left hand tingling/numbness - Left leg weak/numbness - cardioembolic    Elevated prostate specific antigen (PSA)     Dr Santos    Hepatitis C 10/2012    chronic hepatitis C, grade 1-2, stage 1 - mild - Dr Douglas    HFrEF (heart failure with reduced ejection fraction) (H)     [facility] - 35%    Hyperlipidemia LDL goal <70     Hypertension goal BP (blood pressure) < 140/90 06/2006    dr jaciel winchester    Iron deficiency anemia, unspecified 1985    gastric bypass    Mitral regurgitation      mild-moderate    Nephrolithiasis multiple episodes, last 10/19    Dr Bey/Ivana/Tom - 9mm 3/2021    OA (osteoarthritis)     Multiple - Left shoulder with rotator cuff tear - Dr Durham    Obesity, unspecified     s/p gastric bypass 1985     Obstructive sleep apnea syndrome     CPAP - 15 cm - [facility] 1/2023    Prediabetes     Presbyacusis 01/2004    dr corona    Prostate cancer (H) 2023    Dr Santos - [place] 3+4, 4+3 = 7, bx 5/13 positive    Pyelonephritis, unspecified 12/1999    SCC (squamous cell carcinoma), 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    dr saez - lt [place] [place]    Sleep apnea 10/2002    cpap - severe - 15 cm - dr cunha    Stroke (H) 01/19/2019    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    [facility]    Vitamin B12 deficiency (non anemic) 04/15/2019    Vitamin D deficiency 04/15/2019     Past Surgical History:   Procedure Laterality Date    APPENDECTOMY      ARTHROPLASTY KNEE  08/13/2012    LT TKA - Dr Villanueva    BYPASS GRAFT ARTERY CORONARY N/A 8/15/2023    Procedure: CORONARY ARTERY BYPASS GRAFT x 1 (SAPHENOUS VEIN - RIGHT POSTERIOR DESCENDING ARTERY) WITH ENDOSCOPIC SAPHENOUS VEIN HARVEST ON THE LEFT LOWER EXTREMITY, AND ON CARDIOPULMONARY PUMP OXYGENATOR  (INTRAOPERATIVE TRANSESOPHAGEAL ECHOCARDIOGRAM BY ANESTHESIOLOGIST), REMOVAL OF RIGHT CORONARY ARTERY STENT AND DELIVERY SYSTEM, LEFT ATRIAL APPENDAGE CLIPPING WITH ATRICLIP FLEX V DEVICE SIZE: 45    CARDIOVERSION  2016    CARDIOVERSION      COLONOSCOPY  8/05, 9/10, 10/15    multiple tubular/serrated/hyperplastic polyps    COLONOSCOPY N/A 10/29/2015    multiple tubular and serrated adenomas    COLONOSCOPY N/A 09/07/2016    COLONOSCOPY  11/2020    tubular adenomas - due 5 yrs    COLONOSCOPY N/A 11/24/2020    Procedure: COLONOSCOPY with biopsies;  Surgeon: Chadwick Burgos MD;  Location: [facility] OR    CV CORONARY ANGIOGRAM N/A 8/15/2023    Procedure: Coronary Angiogram;  Surgeon: Carly Luna MD;  Location:  [facility]    CV FRACTIONAL FLOW RATIO  WIRE N/A 8/15/2023    Procedure: Fractional Flow Ratio Wire;  Surgeon: Carly Luna MD;  Location:  [facility]ARD[facility]ATH LAB    CV PCI N/A 8/15/2023    Procedure: Percutaneous Coronary Intervention;  Surgeon: Carly Luna MD;  Location:  [facility]ARD[facility]AC CATH LAB    CYSTOSCOPY W/ LASER LITHOTRIPSY  10/16/2012,2018    ESOPHAGOSCOPY, GASTROSCOPY, DUODENOSCOPY (EGD), COMBINED N/A 2020    Procedure: ESOPHAGOGASTRODUODENOSCOPY, COLONOSCOPY with biopsies;  Surgeon: Chadwick Burgos MD;  Location:  OR    EYE SURGERY  ,,    lasik    HC KNEE SCOPE, DIAGNOSTIC  2000    Arthroscopy, Knee RT    LASER HOLMIUM LITHOTRIPSY URETER(S), INSERT STENT, COMBINED  10/16/2012    stones x 4, Dr Campa    LASER HOLMIUM LITHOTRIPSY URETER(S), INSERT STENT, COMBINED Right 2018    CYSTOSCOPY, RIGHT URETEROSCOPY, HOLMIUM LASER LITHOTRIPSY, RIGHT STENT PLACEMENT - Dr Bey    MRI BIOPSY PROSTATE Bilateral 2023    mark    REVERSE ARTHROPLASTY SHOULDER Left 2023    Procedure: LEFT REVERSE SHOULDER ARTHROPLASTY WITH CUSTOM GUIDE WITH AUTOLOGOUS BONE GRAFTOF PROXIMAL HUMERUS;  Surgeon: Booker Multani MD;  Location:  OR    SURGICAL HISTORY OF -       s/p gastric bypass Bilroth II    SURGICAL HISTORY OF -   1998    wisdom teeth    SURGICAL HISTORY OF -       cellulitis    SURGICAL HISTORY OF -   1998    lt forearm spur's    SURGICAL HISTORY OF -   ,,    s/p lasik    SURGICAL HISTORY OF -   1999    rt forearm spurs    SURGICAL HISTORY OF -   2006    dr stevenson - lithotrypsy    SURGICAL HISTORY OF -   10/08,     Lt ear chonchal lesion removal SCC - dr saez    SURGICAL HISTORY OF -  Right 10/2019    nephrolithiasis - cystoscopy, rt lithotrypsy, Dr Heath    SURGICAL HISTORY OF -  Right 2019    Cystoscopy, Rt lithotrypsy, Calcium Oxalate, Dr Heath     Family History   Problem Relation Age of Onset    Alzheimer Disease Father 82          "at 88    Prostate Cancer Father 76        bladder and prostate    Heart Disease Mother 80        CHF    Heart Disease Maternal Grandfather         MI at 55    Cancer Paternal Uncle         ?    Ulcerative Colitis No family hx of     Crohn's Disease No family hx of     Stomach Cancer No family hx of     GERD No family hx of      Social History     Socioeconomic History    Marital status:      Spouse name: Mayra    Number of children: 3    Years of education: 21    Highest education level: Not on file   Occupational History    Occupation: retired teacher and football and       Employer: [id number]     Employer: RETIRED   Tobacco Use    Smoking status: Never    Smokeless tobacco: Never   Vaping Use    Vaping Use: Never used   Substance and Sexual Activity    Alcohol use: Not Currently     Alcohol/week: 2.0 - 3.0 standard drinks of alcohol     Types: 2 - 3 Standard drinks or equivalent per week     Comment: occassiinally    Drug use: No     Comment: acknowledges using herbal supplement \"Vibe\" for energy-none used recently     Sexual activity: Not Currently     Partners: Male     Birth control/protection: None     Comment:    Other Topics Concern     Service Not Asked    Blood Transfusions Not Asked    Caffeine Concern Yes     Comment: <1 can qd    Occupational Exposure Not Asked    Hobby Hazards Not Asked    Sleep Concern Yes     Comment: sleep apnea, wears cpap    Stress Concern Not Asked    Weight Concern Not Asked    Special Diet Not Asked    Back Care Not Asked    Exercise No    Bike Helmet Not Asked    Seat Belt Yes    Self-Exams Not Asked    Parent/sibling w/ CABG, MI or angioplasty before 65F 55M? No   Social History Narrative    Wife , Mayra,  from brain gliobastoma 2020.  --Ingrid Girard MD           Social Determinants of Health     Financial Resource Strain: Not on file   Food Insecurity: Not on file   Transportation Needs: Not on file "   Physical Activity: Not on file   Stress: Not on file   Social Connections: Not on file   Intimate Partner Violence: Not on file   Housing Stability: Not on file            Allergies   Patient has no known allergies.    Today's clinic visit entailed:  Review of the result(s) of each unique test - BMP  Ordering of each unique test  Prescription drug management  I spent a total of 55 minutes on the day of the visit.   Time spent by me doing chart review, history and exam, documentation and further activities per the note  Provider  Link to MDM Help Grid     The level of medical decision making during this visit was of high complexity.

## 2023-09-18 NOTE — LETTER
9/18/2023    Brett Cassidy MD  [address] [facility] [address]    RE: Hugo Weber       Dear Colleague,     I had the pleasure of seeing Hugo Weber in the [facility].    C.O.R.E (Heart Failure) Clinic Progress Note    Hugo Weber MRN# [id number]   YOB: 1945 Age: 78 year old     Primary cardiology team: Was Dr. Castillo / will be Dr. Narvaez         Assessment and Plan     In summary, Hugo Weber presents today for a hospital follow up visit. When I met him for a routine annual follow up visit in July, I suggested ischemic workup for his EF decline. He opted for angiogram > stress test. Unfortunately, his angiogram was very complicated and resulted in the need for emergent 1v bypass surgery.     When I saw him last on 8/30, he was 20 pounds up from his preop weight.  I started Lasix 40 mg daily and restarted his Entresto at 12/50 mg twice daily.  With this, his weight has returned to his preop weight.  Last week, I restarted his Toprol-XL at 25 mg daily.      He continues to recover appropriately from his surgery.    Chronic HFrEF / mixed CMP.  EF 55% (2019) --> 45% (2021) --> 35% (2022), where it has remained. No prior workup for this.  Started on Entresto and jardiance when I met pt 7/2023. Ischemic workup pursued with angiogram.   Significant distal RCA lesion noted on angiogram -- see below.   Current GDMT includes jardiance 10 mg daily, Entresto 12-13 mg BID, Toprol XL 25 mg daily (Entresto and Toprol XL previously stopped in hospital d/t hypotension).   Appears slightly volume up at 290 lbs. Lasix reduced from 40 mg daily to q3days on 9/6 by surgery team.  QRS narrow.    CAD, with distal RCA lesion failed PCI (d/t equipment malfunction), s/p emergent 1v bypass (VG-PDA) and removal of embolized/ un-retrievable right coronary stent + delivery system, occlusion of left atrial appendage (45 mm Atriclip) on 8/15/23 with Dr. Michael Mulvihill.  Now on  aspirin and statin.    Mild-mod MR, Moderate aortic stenosis.     CAF.   Rate controlled, anticoagulated.     HTN.   Well-controlled.    Plan:  Change Lasix from 40 mg q3d to 20 mg daily.  Increase Entresto to full tablet twice daily.   BMP and call for wt/BP update in one week. If stable, plan to increase Toprol to 50 mg daily +/- further increase Entresto and stop Lasix.    Follow-up:  With Dr. Roberts in [facility] clinic on 11/10/23, with ECHO and lipid panel prior.  With Dr. 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     Laura Olivia PA-C  [facility]         History of Presenting Illness     Hugo Weber is a pleasant 78 year old patient with a pertinent history chronic atrial fibrillation and a cardiomyopathy, NSVT, morbid obesity with hx of gastric bypass surgery, HAYLEE (on CPAP), hx of CVA (2019), prostate ca (s/p RT), and HTN. He is a retired teacher and .    This patient previously followed with Dr. Castillo.  It appears that in 2021 he had an echocardiogram that showed a decline in his EF from 55-60% in 2019, to 45-50%.  This was obtained after a murmur was noted on exam by my colleague Norma Stein.  No changes were made at that time.  Echo was repeated again in July 2022, showing further decline in his EF to 35-40%, with mild to moderate MR, and mild aortic stenosis with a moderately dilated ascending aorta.  At that time, Dr. Castillo added losartan to his medication regimen and recommended follow-up in another year. 24-hour Holter monitor in November 2022 showed atrial fibrillation with an average heart rate of 84 bpm.  Ranging from  bpm.    Echo was repeated in July 2023, showing a stable EF at 35%. The RV is normal. MR is stable. Aortic stenosis is now moderate. His GDMT at that time included losartan 50 mg daily, Toprol-XL 50 mg daily.     When I met him for a routine annual EP follow up visit in July, he was feeling well from a cardiac standpoint,  but wasn't aware of his heart failure or his aortic stenosis. Provided education on this. I stopped his Losartan and started Entresto and jardiance. I also suggested ischemic workup for his EF decline. He opted for angiogram > stress test. Angiogram showed a distal 75% RCA lesion. Dr. Luna attempted to PCI this however the stent delivery system failed resulting in retention of an undeployed drug-eluting stent and stent balloon in the ostial and proximal RCA. He ultimately underwent emergent 1v bypass surgery with VG-PDA on 8/15. His Entresto and Toprol were both stopped in the hospital d/t hypotension.     When I saw him post-op on 8/30, he was 20 lbs up from his preop weight. I started Lasix 40 mg daily and re-started Entresto at 12/13 mg BID. With this, his weight returned to his preop weight. I then re-started Toprol XL at 25 mg daily.     Today, Hugo returns to clinic with his daughter, stating he's feeling okay overall. Wt is down 25 lbs at 290 lbs. He feels significantly better at this weight. For the past ten days or so he's been taking Lasix every 3 days at advisement by CV surgery. Recent BMP stable. BP robust. Just starting cardiac rehab. Incision healing well.          Review of Systems     12-pt ROS is negative except for as noted in the HPI.          Physical Exam     Vitals: /82 (BP Location: Left arm, Patient Position: Sitting)   Pulse 72   Ht 1.829 m (6')   Wt 131.7 kg (290 lb 4.8 oz)   SpO2 99%   BMI 39.37 kg/m    Wt Readings from Last 10 Encounters:   09/18/23 131.7 kg (290 lb 4.8 oz)   09/07/23 131 kg (288 lb 14.4 oz)   09/06/23 131.4 kg (289 lb 11.2 oz)   08/30/23 142.4 kg (314 lb)   08/28/23 142.3 kg (313 lb 12.8 oz)   08/23/23 142.2 kg (313 lb 8 oz)   08/22/23 142.1 kg (313 lb 3.2 oz)   08/14/23 134.3 kg (296 lb)   07/19/23 133.6 kg (294 lb 9.6 oz)   03/07/23 135.2 kg (298 lb)       Constitutional:  Patient is pleasant, alert, cooperative, and in NAD.  HEENT:  NCAT. PERRLA.  EOM's intact.   Neck:  CVP appears normal. No carotid bruits.   Pulmonary: Normal respiratory effort. CTAB.   Cardiac: Irregularly irregular rhythm, variable S1, grade 3/6 sm at the LSB. Central chest incision appears to be healing well.  Abdomen:  Non-tender abdomen, no hepatosplenomegaly appreciated.   Vascular: Pulses in the upper and lower extremities are 2+ and equal bilaterally.  Extremities: 1+ pitting edema bilateral pedal to knees.   Skin:  No rashes or lesions appreciated.   Neurological:  No gross motor or sensory deficits.   Psych: Appropriate affect.          Data   Labs reviewed:  Recent Labs   Lab Test 09/07/23  0946 08/28/23  0853 08/16/22  0804 08/11/21  0840 08/07/20  0847   LDL  --   --  81 67 88   HDL  --   --  43 44 43   NHDL  --   --  96 78 105   CHOL  --   --  139 122 148   TRIG  --   --  73 54 84   TSH  --  4.12 2.84 2.58 2.03   NTBNP 2,695* 4,917*  --   --   --    IRON  --   --  154  --  99   FEB  --   --  361  --  350   IRONSAT  --   --  43  --  28   ELENA  --   --  29  --  28       Lab Results   Component Value Date    WBC 5.9 09/07/2023    WBC 8.4 06/22/2021    RBC 3.52 (L) 09/07/2023    RBC 5.13 06/22/2021    HGB 9.8 (L) 09/07/2023    HGB 14.6 06/22/2021    HCT 32.0 (L) 09/07/2023    HCT 46.8 06/22/2021    MCV 91 09/07/2023    MCV 91 06/22/2021    MCH 27.8 09/07/2023    MCH 28.5 06/22/2021    MCHC 30.6 (L) 09/07/2023    MCHC 31.2 (L) 06/22/2021    RDW 14.4 09/07/2023    RDW 14.3 06/22/2021     09/07/2023     06/22/2021       Lab Results   Component Value Date     09/15/2023     06/22/2021    POTASSIUM 4.8 09/15/2023    POTASSIUM 4.1 08/15/2023    POTASSIUM 4.2 08/16/2022    POTASSIUM 4.0 06/23/2021    CHLORIDE 101 09/15/2023    CHLORIDE 108 08/16/2022    CHLORIDE 108 06/22/2021    CO2 24 09/15/2023    CO2 25 08/16/2022    CO2 26 06/22/2021    ANIONGAP 12 09/15/2023    ANIONGAP 7 08/16/2022    ANIONGAP 4 06/22/2021     (H) 09/15/2023     (H)  08/20/2023     (H) 08/16/2022     (H) 06/22/2021    BUN 21.2 09/15/2023    BUN 16 08/16/2022    BUN 14 06/22/2021    CR 0.86 09/15/2023    CR 0.81 06/22/2021    GFRESTIMATED 89 09/15/2023    GFRESTIMATED >60 01/11/2023    GFRESTIMATED 87 06/22/2021    GFRESTBLACK >90 06/22/2021    BEBO 8.8 09/15/2023    BEBO 8.4 (L) 06/22/2021      Lab Results   Component Value Date    AST 18 09/07/2023    AST 13 06/21/2021    ALT 7 09/07/2023    ALT 19 06/21/2021       Lab Results   Component Value Date    A1C 5.4 02/21/2023    A1C 5.8 (H) 11/19/2020       Lab Results   Component Value Date    INR 1.37 (H) 08/17/2023    INR 1.47 (H) 08/15/2023    INR 1.04 03/21/2016    INR 1.04 02/12/2015            Problem List     Patient Active Problem List   Diagnosis    Iron deficiency anemia    Colon polyps    Obstructive sleep apnea syndrome    Hyperlipidemia LDL goal <70    Long term current use of anticoagulant therapy - for intermittent a-fib    Advance Care Planning    Total knee replacement status    Calculus of kidney - 1.2 cm stone at the upper pole as of CT urogram fr 1/11/2023    NAFLD (nonalcoholic fatty liver disease)    Morbid obesity due to excess calories (H)    History of hepatitis C    Prediabetes    Paroxysmal atrial fibrillation (H)    Cholelithiasis    Hypertension goal BP (blood pressure) < 140/90    TMJ arthralgia    Nephrolithiasis    OA (osteoarthritis)    First degree AV block    Benign prostatic hyperplasia (BPH) with urinary urge incontinence    Thoracic aortic ectasia (H)    Stroke (H)    CVA (cerebral vascular accident) (H)    Cervical stenosis of spine    Vitamin B12 deficiency (non anemic)    Vitamin D deficiency    Myofascial pain    Permanent atrial fibrillation (H)    History of CVA (cerebrovascular accident)    Small bowel obstruction (H)    NSVT (nonsustained ventricular tachycardia) (H)    Chronic LLQ pain    Elevated prostate specific antigen (PSA)    Prostate cancer (H)    Chronic systolic  heart failure (H)    HFrEF (heart failure with reduced ejection fraction) (H)    Aortic valve stenosis, etiology of cardiac valve disease unspecified- moderate 2+ with FREDY = 1.2cm2 on echocardiogram fr 7/14/2023    Mitral vvwfruaionuur-7-4+ on echocardiogram fr 7/14/2023    Cardiomyopathy (H)    Cardiomyopathy, unspecified type (H)    S/P CABG (coronary artery bypass graft)    Fluid overload    Transient hyperglycemia post procedure    Status post ligation of left atrial appendage    Encounter for surgical aftercare following surgery on the circulatory system    Presence of aortocoronary bypass graft:CAB x1 8/15/2023    Atherosclerosis of native coronary artery of native heart without angina pectoris    Essential (primary) hypertension    Chronic atrial fibrillation, unspecified (H)    Personal history of transient ischemic attack (TIA), and cerebral infarction without residual deficits    Physical deconditioning    Leg edema, right    HAYLEE on CPAP            Medications     Current Outpatient Medications   Medication Sig Dispense Refill    acetaminophen (TYLENOL) 325 MG tablet Take 650 mg by mouth every 6 hours as needed 7 am, 12 N, 6p, 10p      apixaban ANTICOAGULANT (ELIQUIS) 5 MG tablet Take 1 tablet (5 mg) by mouth 2 times daily Appointment needed for additional refills. Please call [phone] to schedule. 180 tablet 3    aspirin (ASA) 81 MG chewable tablet 1 tablet (81 mg) by Oral or NG Tube route daily      Elastic Bandages & Supports (MEDICAL LEGWEAR/KNEE HIGH) MISC Knee Hi ace wraps in am off in pm      empagliflozin (JARDIANCE) 10 MG TABS tablet Take 1 tablet (10 mg) by mouth daily 90 tablet 3    Ferrous Sulfate (IRON) 325 (65 Fe) MG tablet Take 1 tablet by mouth three times a week (Monday, Wednesday, Friday) 90 tablet 3    fluticasone (FLONASE) 50 MCG/ACT nasal spray Spray 2 sprays into both nostrils daily as needed for rhinitis or allergies 54.6 mL 3    furosemide (LASIX) 40 MG tablet Take 1 tablet (40  mg) by mouth daily (Patient taking differently: Take 40 mg by mouth every 3 days) 90 tablet 3    metoprolol succinate ER (TOPROL XL) 25 MG 24 hr tablet Take 0.5 tablets (12.5 mg) by mouth 2 times daily 90 tablet 1    pantoprazole (PROTONIX) 40 MG EC tablet Take 40 mg by mouth daily      rosuvastatin (CRESTOR) 10 MG tablet Take 1 tablet (10 mg) by mouth daily 30 tablet 1    sacubitril-valsartan (ENTRESTO) 24-26 MG per tablet Take 1/2 tablet twice daily.      senna-docusate (SENOKOT-S/PERICOLACE) 8.6-50 MG tablet Take 1 tablet by mouth 2 times daily as needed for constipation      tamsulosin (FLOMAX) 0.4 MG capsule Take 1 capsule (0.4 mg) by mouth every evening      vitamin B-12 (CYANOCOBALAMIN) 1000 MCG tablet Take 1,000 mcg by mouth daily      vitamin C (ASCORBIC ACID) 1000 MG TABS Take 1,000 mg by mouth daily      vitamin D3 (CHOLECALCIFEROL) 125 MCG (5000 UT) tablet Take 1 tablet by mouth daily              Past Medical History     Past Medical History:   Diagnosis Date    Aortic stenosis     moderate    Atrial fibrillation 2006    Followed by [facility] - persistent    CAD (coronary artery disease)     Calculus of kidney 2005, 6/06, 10/12, 8/18, 9/19    dr walker/nestor/иван - hematuria - w/u o/w neg    Cervical stenosis of spine     Moderate C4-5    Cholelithiasis     Chronic peptic ulcer, unspecified site, without mention of hemorrhage, perforation, or obstruction 1985    gastric bypass    Colon polyps 8/05, 9/10, 10/15    2 tubular adenoma, 1 hyperplastic - multiple serrated/tubular adenomas - last colonscopy 11/20 due 5 yrs    CVA (cerebral vascular accident) (H) 01/2019    small right periorlandic subcortical - left sided residual - left hand tingling/numbness - Left leg weak/numbness - cardioembolic    Elevated prostate specific antigen (PSA)     Dr Santos    Hepatitis C 10/2012    chronic hepatitis C, grade 1-2, stage 1 - mild - Dr Douglas    HFrEF (heart failure with reduced ejection fraction) (H)     [facility] - 35%    Hyperlipidemia LDL goal <70     Hypertension goal BP (blood pressure) < 140/90 06/2006    dr jaciel winchester    Iron deficiency anemia, unspecified 1985    gastric bypass    Mitral regurgitation     mild-moderate    Nephrolithiasis multiple episodes, last 10/19    Dr Bey/Ivana/Tom - 9mm 3/2021    OA (osteoarthritis)     Multiple - Left shoulder with rotator cuff tear - Dr Durham    Obesity, unspecified     s/p gastric bypass 1985     Obstructive sleep apnea syndrome     CPAP - 15 cm - [facility] 1/2023    Prediabetes     Presbyacusis 01/2004    dr corona    Prostate cancer (H) 2023    Dr Santos - Logan 3+4, 4+3 = 7, bx 5/13 positive    Pyelonephritis, unspecified 12/1999    SCC (squamous cell carcinoma), 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    dr saez - lt conchal bowl    Sleep apnea 10/2002    cpap - severe - 15 cm - dr cunha    Stroke (H) 01/19/2019    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    [facility]    Vitamin B12 deficiency (non anemic) 04/15/2019    Vitamin D deficiency 04/15/2019     Past Surgical History:   Procedure Laterality Date    APPENDECTOMY      ARTHROPLASTY KNEE  08/13/2012    LT TKA - Dr Villanueva    BYPASS GRAFT ARTERY CORONARY N/A 8/15/2023    Procedure: CORONARY ARTERY BYPASS GRAFT x 1 (SAPHENOUS VEIN - RIGHT POSTERIOR DESCENDING ARTERY) WITH ENDOSCOPIC SAPHENOUS VEIN HARVEST ON THE LEFT LOWER EXTREMITY, AND ON CARDIOPULMONARY PUMP OXYGENATOR  (INTRAOPERATIVE TRANSESOPHAGEAL ECHOCARDIOGRAM BY ANESTHESIOLOGIST), REMOVAL OF RIGHT CORONARY ARTERY STENT AND DELIVERY SYSTEM, LEFT ATRIAL APPENDAGE CLIPPING WITH ATRICLIP FLEX V DEVICE SIZE: 45    CARDIOVERSION  2016    CARDIOVERSION      COLONOSCOPY  8/05, 9/10, 10/15    multiple tubular/serrated/hyperplastic polyps    COLONOSCOPY N/A 10/29/2015    multiple tubular and serrated adenomas    COLONOSCOPY N/A 09/07/2016    COLONOSCOPY  11/2020    tubular adenomas - due 5 yrs    COLONOSCOPY N/A 11/24/2020    Procedure: COLONOSCOPY with biopsies;  Surgeon:  Chadwick Burgos MD;  Location: [facility] OR    CV CORONARY ANGIOGRAM N/A 8/15/2023    Procedure: Coronary Angiogram;  Surgeon: Carly Luna MD;  Location:  [facility] LAB    CV FRACTIONAL FLOW RATIO WIRE N/A 8/15/2023    Procedure: Fractional Flow Ratio Wire;  Surgeon: Carly Luna MD;  Location:  [facility]    CV PCI N/A 8/15/2023    Procedure: Percutaneous Coronary Intervention;  Surgeon: Carly Luna MD;  Location:  [facility] LAB    CYSTOSCOPY W/ LASER LITHOTRIPSY  10/16/2012,5/2/2018    ESOPHAGOSCOPY, GASTROSCOPY, DUODENOSCOPY (EGD), COMBINED N/A 11/24/2020    Procedure: ESOPHAGOGASTRODUODENOSCOPY, COLONOSCOPY with biopsies;  Surgeon: Chadwick Burgos MD;  Location: [facility] OR    EYE SURGERY  1/01,6/02,11/02    lasik    HC KNEE SCOPE, DIAGNOSTIC  05/2000    Arthroscopy, Knee RT    LASER HOLMIUM LITHOTRIPSY URETER(S), INSERT STENT, COMBINED  10/16/2012    stones x 4, Dr Campa    LASER HOLMIUM LITHOTRIPSY URETER(S), INSERT STENT, COMBINED Right 05/02/2018    CYSTOSCOPY, RIGHT URETEROSCOPY, HOLMIUM LASER LITHOTRIPSY, RIGHT STENT PLACEMENT - Dr Bey    MRI BIOPSY PROSTATE Bilateral 02/09/2023    mark    REVERSE ARTHROPLASTY SHOULDER Left 03/07/2023    Procedure: LEFT REVERSE SHOULDER ARTHROPLASTY WITH CUSTOM GUIDE WITH AUTOLOGOUS BONE GRAFTOF PROXIMAL HUMERUS;  Surgeon: Booker Mutlani MD;  Location:  OR    SURGICAL HISTORY OF -   1985    s/p gastric bypass Bilroth II    SURGICAL HISTORY OF -   11/1998    wisdom teeth    SURGICAL HISTORY OF -   1979    cellulitis    SURGICAL HISTORY OF -   11/1998    lt forearm spur's    SURGICAL HISTORY OF -   1/01,6/02,11/02    s/p lasik    SURGICAL HISTORY OF -   11/1999    rt forearm spurs    SURGICAL HISTORY OF -   07/2006    dr stevenson - lithotrypsy    SURGICAL HISTORY OF -   10/08, 12/08    Lt ear chonchal lesion removal SCC - dr saez    SURGICAL HISTORY OF -  Right 10/2019    nephrolithiasis - cystoscopy, rt  "aur[place], Dr Heath    SURGICAL HISTORY OF -  Right 2019    Cystoscopy, Rt lithotrypsy, Calcium Oxalate, Dr Heath     Family History   Problem Relation Age of Onset    Alzheimer Disease Father 82         at 88    Prostate Cancer Father 76        bladder and prostate    Heart Disease Mother 80        CHF    Heart Disease Maternal Grandfather         MI at 55    Cancer Paternal Uncle         ?    Ulcerative Colitis No family hx of     Crohn's Disease No family hx of     Stomach Cancer No family hx of     GERD No family hx of      Social History     Socioeconomic History    Marital status:      Spouse name: Mayra    Number of children: 3    Years of education: 21    Highest education level: Not on file   Occupational History    Occupation: retired teacher and football and       Employer: [employer]     Employer: [employer]   Tobacco Use    Smoking status: Never    Smokeless tobacco: Never   Vaping Use    Vaping Use: Never used   Substance and Sexual Activity    Alcohol use: Not Currently     Alcohol/week: 2.0 - 3.0 standard drinks of alcohol     Types: 2 - 3 Standard drinks or equivalent per week     Comment: occassiinally    Drug use: No     Comment: acknowledges using herbal supplement \"Vibe\" for energy-none used recently     Sexual activity: Not Currently     Partners: Male     Birth control/protection: None     Comment:    Other Topics Concern     Service Not Asked    Blood Transfusions Not Asked    Caffeine Concern Yes     Comment: <1 can qd    Occupational Exposure Not Asked    Hobby Hazards Not Asked    Sleep Concern Yes     Comment: sleep apnea, wears cpap    Stress Concern Not Asked    Weight Concern Not Asked    Special Diet Not Asked    Back Care Not Asked    Exercise No    Bike Helmet Not Asked    Seat Belt Yes    Self-Exams Not Asked    Parent/sibling w/ CABG, MI or angioplasty before 65F 55M? No   Social History Narrative    Wife , Mayra, "  from brain gliobastoma 2020.  --Ingrid Girard MD           Social Determinants of Health     Financial Resource Strain: Not on file   Food Insecurity: Not on file   Transportation Needs: Not on file   Physical Activity: Not on file   Stress: Not on file   Social Connections: Not on file   Intimate Partner Violence: Not on file   Housing Stability: Not on file            Allergies   Patient has no known allergies.    Today's clinic visit entailed:  Review of the result(s) of each unique test - BMP  Ordering of each unique test  Prescription drug management  I spent a total of 55 minutes on the day of the visit.   Time spent by me doing chart review, history and exam, documentation and further activities per the note  Provider  Link to [facility] Help Grid     The level of medical decision making during this visit was of high complexity.      Thank you for allowing me to participate in the care of your patient.      Sincerely,     Laura Olivia PA-C     [facility]  cc:   No referring provider defined for this encounter.

## 2023-09-18 NOTE — PATIENT INSTRUCTIONS
Today, we discussed the following:   - Results:   Kidney function looks excellent.   - Medication changes:    INCREASE Entresto to 1 full tablet twice daily.   INCREASE Lasix (furosemide) to 20 mg daily.   - Follow up:   Non-fasting lab and RN call for BP/wt update in ~one week.   Will plan to adjust medications further from there.   Echo and fasting lab draw prior to visit with Dr. Roberts on .     Please, remember to continue the followin.  Weigh yourself daily. Call if your weight is up > than 2 pounds in one day, or 5 pounds in one week; if you feel more short of breath or have worsening swelling in your legs or abdomen.  2.  Eat a low sodium diet (less than 2,000mg or 2g daily). If you eat less salt, you will retain less fluid.   3.  Avoid alcohol, as this can worsen heart failure.   4.  Avoid NSAIDs as able (For example, Ibuprofen / aleve / advil / naprosen / diclofenac).    Please call [facility] nurse for any questions or concerns Mon-Fri 8am-4pm:   [phone]  For concerns after hours:   [phone] option 2   Scheduling phone number:   [phone]    Thank you for visiting with us today.   Laura Olivia PA-C  ______________________________________________________________

## 2023-09-19 ENCOUNTER — TELEPHONE (OUTPATIENT)
Dept: FAMILY MEDICINE | Facility: CLINIC | Age: 78
End: 2023-09-19
Payer: COMMERCIAL

## 2023-09-19 NOTE — TELEPHONE ENCOUNTER
[facility] put into Providers in box to sign/view    Form #[id number]    Joan K / [place]

## 2023-09-20 ENCOUNTER — MEDICAL CORRESPONDENCE (OUTPATIENT)
Dept: HEALTH INFORMATION MANAGEMENT | Facility: CLINIC | Age: 78
End: 2023-09-20
Payer: COMMERCIAL

## 2023-09-20 ENCOUNTER — HOSPITAL ENCOUNTER (OUTPATIENT)
Dept: CARDIAC REHAB | Facility: CLINIC | Age: 78
Discharge: HOME | End: 2023-09-20
Attending: STUDENT IN AN ORGANIZED HEALTH CARE EDUCATION/TRAINING PROGRAM
Payer: COMMERCIAL

## 2023-09-20 PROCEDURE — 93798 PHYS/QHP OP CAR RHAB W/ECG: CPT

## 2023-09-21 ENCOUNTER — TELEPHONE (OUTPATIENT)
Dept: FAMILY MEDICINE | Facility: CLINIC | Age: 78
End: 2023-09-21
Payer: COMMERCIAL

## 2023-09-21 NOTE — TELEPHONE ENCOUNTER
Forms/Letter Request    Type of form/letter: [facility] #[id number]    Have you been seen for this request: Yes     Do we have the form/letter: Yes: Given to Dr Cassidy    Who is the form from? Home care:  [facility]    Where did/will the form come from? form was faxed in    When is form/letter needed by: When available can we fax back to [phone]    How would you like the form/letter returned: Fax :

## 2023-09-22 ENCOUNTER — HOSPITAL ENCOUNTER (OUTPATIENT)
Dept: CARDIAC REHAB | Facility: CLINIC | Age: 78
Discharge: HOME | End: 2023-09-22
Attending: STUDENT IN AN ORGANIZED HEALTH CARE EDUCATION/TRAINING PROGRAM
Payer: COMMERCIAL

## 2023-09-22 PROCEDURE — 93798 PHYS/QHP OP CAR RHAB W/ECG: CPT

## 2023-09-25 ENCOUNTER — HOSPITAL ENCOUNTER (OUTPATIENT)
Dept: CARDIAC REHAB | Facility: CLINIC | Age: 78
Discharge: HOME | End: 2023-09-25
Attending: STUDENT IN AN ORGANIZED HEALTH CARE EDUCATION/TRAINING PROGRAM
Payer: COMMERCIAL

## 2023-09-25 ENCOUNTER — LAB (OUTPATIENT)
Dept: LAB | Facility: CLINIC | Age: 78
End: 2023-09-25
Payer: COMMERCIAL

## 2023-09-25 DIAGNOSIS — I50.22 CHRONIC SYSTOLIC HEART FAILURE (H): ICD-10-CM

## 2023-09-25 LAB
ANION GAP SERPL CALCULATED.3IONS-SCNC: 13 MMOL/L (ref 7–15)
BUN SERPL-MCNC: 23 MG/DL (ref 8–23)
CALCIUM SERPL-MCNC: 9.1 MG/DL (ref 8.8–10.2)
CHLORIDE SERPL-SCNC: 104 MMOL/L (ref 98–107)
CHOLEST SERPL-MCNC: 96 MG/DL
CREAT SERPL-MCNC: 0.87 MG/DL (ref 0.67–1.17)
DEPRECATED HCO3 PLAS-SCNC: 24 MMOL/L (ref 22–29)
EGFRCR SERPLBLD CKD-EPI 2021: 89 ML/MIN/1.73M2
GLUCOSE SERPL-MCNC: 97 MG/DL (ref 70–99)
HDLC SERPL-MCNC: 47 MG/DL
LDLC SERPL CALC-MCNC: 36 MG/DL
NONHDLC SERPL-MCNC: 49 MG/DL
POTASSIUM SERPL-SCNC: 5.1 MMOL/L (ref 3.4–5.3)
SODIUM SERPL-SCNC: 141 MMOL/L (ref 136–145)
TRIGL SERPL-MCNC: 64 MG/DL

## 2023-09-25 PROCEDURE — 93798 PHYS/QHP OP CAR RHAB W/ECG: CPT | Performed by: OCCUPATIONAL THERAPIST

## 2023-09-25 PROCEDURE — 36415 COLL VENOUS BLD VENIPUNCTURE: CPT

## 2023-09-25 PROCEDURE — 87086 URINE CULTURE/COLONY COUNT: CPT | Performed by: FAMILY MEDICINE

## 2023-09-25 PROCEDURE — 80061 LIPID PANEL: CPT

## 2023-09-25 PROCEDURE — 80048 BASIC METABOLIC PNL TOTAL CA: CPT

## 2023-09-26 ENCOUNTER — PATIENT OUTREACH (OUTPATIENT)
Dept: CARDIOLOGY | Facility: CLINIC | Age: 78
End: 2023-09-26
Payer: COMMERCIAL

## 2023-09-26 DIAGNOSIS — I42.9 CARDIOMYOPATHY (H): ICD-10-CM

## 2023-09-26 DIAGNOSIS — I50.20 HFREF (HEART FAILURE WITH REDUCED EJECTION FRACTION) (H): ICD-10-CM

## 2023-09-26 NOTE — PROGRESS NOTES
[facility]: Heart Failure Care Coordination   Follow-Up Outreach     Situation/Background:      Post 9/18/23 OV Follow Up    Current diuretic:   Jardiance 10mg/ daily  Lasix 20mg/ daily  Entresto 24-26mg BID (pt still taking half tablet per pt report on 9/26/23 12-13mg BID)     PRN diuretic plan: None     Current potassium chloride dose: None     Plan from 9/18/23 OV with Laura Olivia PA-C  Change Lasix from 40 mg q3d to 20 mg daily.  Increase Entresto to full tablet twice daily.   BMP and call for wt/BP update in one week. If stable, plan to increase Toprol to 50 mg daily +/- further increase Entresto and stop Lasix.    Assessment:      Component      Latest Ref Rng 9/25/2023  10:43 AM   Sodium      136 - 145 mmol/L 141    Potassium      3.4 - 5.3 mmol/L 5.1    Chloride      98 - 107 mmol/L 104    Carbon Dioxide (CO2)      22 - 29 mmol/L 24    Anion Gap      7 - 15 mmol/L 13    Urea Nitrogen      8.0 - 23.0 mg/dL 23.0    Creatinine      0.67 - 1.17 mg/dL 0.87    GFR Estimate      >60 mL/min/1.73m2 89    Calcium      8.8 - 10.2 mg/dL 9.1    Glucose      70 - 99 mg/dL 97    Cholesterol      <200 mg/dL 96    Triglycerides      <150 mg/dL 64    HDL Cholesterol      >=40 mg/dL 47    LDL Cholesterol Calculated      <=100 mg/dL 36    Non HDL Cholesterol      <130 mg/dL 49        Recent OV weight on 9/18/23: 290 lbs     Recent home weights:   9/21/23:  282.6 lbs  9/22/23:  282 lbs  9/23/23:  281.8 lbs  9/24/23:  283.2 lbs  9/25/23:  282 lbs  9/26/23:  284 lbs      Recent BP's per Cardiac Rehab (pt does not have further results)  9/20/23: 126/62 (resting)  9/22/23: 122/64 (resting)    Notes: Called and spoke to Hugo. He confirms he decreased his lasix to 20mg/ daily, however, he states that he is still taking a half tablet of Entresto BID. He denies SOB. No new swelling. No LH or dizziness. He states he is feeling well. He has walked 2500 steps today and does not have SOB with this, though a little tired.        Remote monitoring: No     Intervention/Plan:      RN CC routing to provider for review.    Future Appointments   Date Time Provider Department Center   9/27/2023  1:00 PM 1, [facility]   9/29/2023  1:00 PM 1, [facility]   10/2/2023 11:00 AM 2, [facility]   10/4/2023  1:00 PM 1, [facility]   10/6/2023 10:00 AM 1, [facility]   10/11/2023 10:00 AM 1, [facility]   10/12/2023  9:30 AM Brett Cassidy MD [facility]   10/13/2023  1:00 PM 1, [facility]   10/16/2023  9:00 AM 2, [facility]   10/18/2023 10:00 AM 1, [facility]   10/20/2023 10:00 AM 1, [facility]   10/23/2023 10:00 AM 1, [facility]   10/25/2023 10:00 AM 1, [facility]   10/27/2023 10:00 AM 1, [facility]   10/30/2023 10:00 AM 1, [facility]   10/30/2023  2:00 PM Brett Cassidy MD [facility]   11/1/2023 10:00 AM 1, [facility]   11/3/2023 10:00 AM 1, [facility]   11/6/2023 10:00 AM 1, [facility]   11/8/2023  9:30 AM RU LAB [facility]   11/8/2023  9:45 AM [email or web address] [facility]   11/8/2023  1:00 PM 1, [facility]   11/10/2023 10:00 AM 1, [facility]   11/10/2023 12:45 PM Ayad Roberts MD [facility]   11/13/2023 10:00 AM 1, [facility]   11/15/2023 10:00 AM 1, [facility]   1/19/2024 10:45 AM Martita Narvaez MD [facility]

## 2023-09-27 ENCOUNTER — HOSPITAL ENCOUNTER (OUTPATIENT)
Dept: CARDIAC REHAB | Facility: CLINIC | Age: 78
Discharge: HOME | End: 2023-09-27
Attending: STUDENT IN AN ORGANIZED HEALTH CARE EDUCATION/TRAINING PROGRAM
Payer: COMMERCIAL

## 2023-09-27 LAB — BACTERIA UR CULT: NORMAL

## 2023-09-27 PROCEDURE — 93798 PHYS/QHP OP CAR RHAB W/ECG: CPT

## 2023-09-27 PROCEDURE — 93797 PHYS/QHP OP CAR RHAB WO ECG: CPT | Mod: XU | Performed by: REHABILITATION PRACTITIONER

## 2023-09-27 RX ORDER — METOPROLOL SUCCINATE 25 MG/1
25 TABLET, EXTENDED RELEASE ORAL 2 TIMES DAILY
Qty: 180 TABLET | Refills: 0 | Status: SHIPPED | OUTPATIENT
Start: 2023-09-27 | End: 2023-10-12

## 2023-09-27 NOTE — PROGRESS NOTES
"Plan from 9/18/23 OV with Laura Olivia PA-C  Change Lasix from 40 mg q3d to 20 mg daily.  Increase Entresto to full tablet twice daily.   BMP and call for wt/BP update in one week. If stable, plan to increase Toprol to 50 mg daily +/- further increase Entresto and stop Lasix.      - 9/26/23: Call to Hugo. Per [facility] RN notes: \"confirms he decreased his lasix to 20mg/ daily, however, he states that he is still taking a half tablet of Entresto BID. \"  Reviewed with Laura: \"Please have him make the increase now, repeat BMP in two weeks, call again for update, thanks.\"    Call to Hugo to review recommendations per Laura Olivia PAC. Hugo will increase Entresto to a full tablet twice day and Toprol XL to 50 mg daily. He will take 25 mg in morning and 25 mg in the evening.   He has not been checking home blood pressures. Encouraged him to do so at least 2 hours after medications. He expressed understanding.   He will call his PCC to make BMP appt in 2 weeks.   He asked about \"my urine test.\" Will send message to Dr Cassidy's team.     Order placed for BMP.  Medication list updated.       Recent home weights:   9/21/23:  282.6 lbs  9/22/23:  282 lbs  9/23/23:  281.8 lbs  9/24/23:  283.2 lbs  9/25/23:  282 lbs  9/26/23:  284 lbs    9/27/23:  283.4 lbs      Recent BP's per Cardiac Rehab (pt does not have further results)  9/20/23: 126/62 (resting)  9/22/23: 122/64 (resting)        Future Appointments   Date Time Provider Department Center   9/27/2023  1:00 PM 1, [facility]   9/29/2023  1:00 PM 1, [facility]   10/2/2023 11:00 AM 2, [facility]   10/4/2023  1:00 PM 1, [facility]   10/6/2023 10:00 AM 1, [facility]   10/11/2023 10:00 AM 1, [facility]   10/12/2023  9:30 AM Brett Cassidy MD [facility]   10/13/2023  1:00 PM 1, [facility]   10/16/2023  9:00 AM 2, Rh " [facility]   10/18/2023 10:00 AM 1, [facility]   10/20/2023 10:00 AM 1, [facility]   10/23/2023 10:00 AM 1, [facility]   10/25/2023 10:00 AM 1, [facility]   10/27/2023 10:00 AM 1, [facility]   10/30/2023 10:00 AM 1, [facility]   10/30/2023  2:00 PM Brett Cassidy MD [facility]   11/1/2023 10:00 AM 1, [facility]   11/3/2023 10:00 AM 1, [facility]   11/6/2023 10:00 AM 1, [facility]   11/8/2023  9:30 AM RU LAB [facility]   11/8/2023  9:45 AM [email or web address] [facility]   11/8/2023  1:00 PM 1, [facility]   11/10/2023 10:00 AM 1, [facility]   11/10/2023 12:45 PM Ayad Roberts MD [facility]   11/13/2023 10:00 AM 1, [facility]   11/15/2023 10:00 AM 1, [facility]   1/19/2024 10:45 AM Martita Narvaez MD [facility]       PRIMITIVO Valente, RN 11:36 AM 09/27/23

## 2023-09-29 ENCOUNTER — HOSPITAL ENCOUNTER (OUTPATIENT)
Dept: CARDIAC REHAB | Facility: CLINIC | Age: 78
Discharge: HOME | End: 2023-09-29
Attending: STUDENT IN AN ORGANIZED HEALTH CARE EDUCATION/TRAINING PROGRAM
Payer: COMMERCIAL

## 2023-09-29 PROCEDURE — 93798 PHYS/QHP OP CAR RHAB W/ECG: CPT | Performed by: REHABILITATION PRACTITIONER

## 2023-10-01 DIAGNOSIS — Z51.81 MEDICATION MONITORING ENCOUNTER: ICD-10-CM

## 2023-10-01 DIAGNOSIS — Z98.84 HISTORY OF GASTRIC BYPASS: ICD-10-CM

## 2023-10-01 DIAGNOSIS — K76.0 NAFLD (NONALCOHOLIC FATTY LIVER DISEASE): Primary | ICD-10-CM

## 2023-10-02 RX ORDER — PANTOPRAZOLE SODIUM 40 MG/1
40 TABLET, DELAYED RELEASE ORAL DAILY
Qty: 90 TABLET | Refills: 3 | Status: SHIPPED | OUTPATIENT
Start: 2023-10-02 | End: 2024-03-04

## 2023-10-04 ENCOUNTER — HOSPITAL ENCOUNTER (OUTPATIENT)
Dept: CARDIAC REHAB | Facility: CLINIC | Age: 78
Discharge: HOME | End: 2023-10-04
Attending: STUDENT IN AN ORGANIZED HEALTH CARE EDUCATION/TRAINING PROGRAM
Payer: COMMERCIAL

## 2023-10-04 PROCEDURE — 93798 PHYS/QHP OP CAR RHAB W/ECG: CPT | Performed by: REHABILITATION PRACTITIONER

## 2023-10-06 ENCOUNTER — HOSPITAL ENCOUNTER (OUTPATIENT)
Dept: CARDIAC REHAB | Facility: CLINIC | Age: 78
Discharge: HOME | End: 2023-10-06
Attending: STUDENT IN AN ORGANIZED HEALTH CARE EDUCATION/TRAINING PROGRAM
Payer: COMMERCIAL

## 2023-10-06 PROCEDURE — 93798 PHYS/QHP OP CAR RHAB W/ECG: CPT

## 2023-10-11 ENCOUNTER — HOSPITAL ENCOUNTER (OUTPATIENT)
Dept: CARDIAC REHAB | Facility: CLINIC | Age: 78
Discharge: HOME | End: 2023-10-11
Attending: STUDENT IN AN ORGANIZED HEALTH CARE EDUCATION/TRAINING PROGRAM
Payer: COMMERCIAL

## 2023-10-11 PROCEDURE — 93798 PHYS/QHP OP CAR RHAB W/ECG: CPT | Performed by: REHABILITATION PRACTITIONER

## 2023-10-12 ENCOUNTER — OFFICE VISIT (OUTPATIENT)
Dept: FAMILY MEDICINE | Facility: CLINIC | Age: 78
End: 2023-10-12
Payer: COMMERCIAL

## 2023-10-12 VITALS
HEIGHT: 72 IN | WEIGHT: 282 LBS | DIASTOLIC BLOOD PRESSURE: 42 MMHG | SYSTOLIC BLOOD PRESSURE: 80 MMHG | TEMPERATURE: 98 F | BODY MASS INDEX: 38.19 KG/M2 | RESPIRATION RATE: 11 BRPM | OXYGEN SATURATION: 98 % | HEART RATE: 95 BPM

## 2023-10-12 DIAGNOSIS — G47.33 OBSTRUCTIVE SLEEP APNEA SYNDROME: ICD-10-CM

## 2023-10-12 DIAGNOSIS — I50.20 HFREF (HEART FAILURE WITH REDUCED EJECTION FRACTION) (H): Primary | ICD-10-CM

## 2023-10-12 DIAGNOSIS — R53.81 PHYSICAL DECONDITIONING: ICD-10-CM

## 2023-10-12 DIAGNOSIS — E55.9 VITAMIN D DEFICIENCY: ICD-10-CM

## 2023-10-12 DIAGNOSIS — Z86.73 HISTORY OF CVA (CEREBROVASCULAR ACCIDENT): ICD-10-CM

## 2023-10-12 DIAGNOSIS — I48.21 PERMANENT ATRIAL FIBRILLATION (H): ICD-10-CM

## 2023-10-12 DIAGNOSIS — D50.9 IRON DEFICIENCY ANEMIA, UNSPECIFIED IRON DEFICIENCY ANEMIA TYPE: ICD-10-CM

## 2023-10-12 DIAGNOSIS — I25.810 CORONARY ARTERY DISEASE INVOLVING CORONARY BYPASS GRAFT OF NATIVE HEART WITHOUT ANGINA PECTORIS: ICD-10-CM

## 2023-10-12 DIAGNOSIS — I42.9 CARDIOMYOPATHY, UNSPECIFIED TYPE (H): ICD-10-CM

## 2023-10-12 DIAGNOSIS — C61 PROSTATE CANCER (H): ICD-10-CM

## 2023-10-12 DIAGNOSIS — M15.0 PRIMARY OSTEOARTHRITIS INVOLVING MULTIPLE JOINTS: ICD-10-CM

## 2023-10-12 DIAGNOSIS — Z86.19 HISTORY OF HEPATITIS C: ICD-10-CM

## 2023-10-12 DIAGNOSIS — I10 HYPERTENSION GOAL BP (BLOOD PRESSURE) < 140/90: ICD-10-CM

## 2023-10-12 DIAGNOSIS — E53.8 VITAMIN B12 DEFICIENCY (NON ANEMIC): ICD-10-CM

## 2023-10-12 DIAGNOSIS — Z98.84 HISTORY OF GASTRIC BYPASS: ICD-10-CM

## 2023-10-12 DIAGNOSIS — Z95.1 S/P CABG (CORONARY ARTERY BYPASS GRAFT): ICD-10-CM

## 2023-10-12 DIAGNOSIS — Z51.81 MEDICATION MONITORING ENCOUNTER: ICD-10-CM

## 2023-10-12 DIAGNOSIS — I35.0 AORTIC VALVE STENOSIS, ETIOLOGY OF CARDIAC VALVE DISEASE UNSPECIFIED: ICD-10-CM

## 2023-10-12 DIAGNOSIS — K76.0 NAFLD (NONALCOHOLIC FATTY LIVER DISEASE): ICD-10-CM

## 2023-10-12 DIAGNOSIS — I50.20 HFREF (HEART FAILURE WITH REDUCED EJECTION FRACTION) (H): ICD-10-CM

## 2023-10-12 DIAGNOSIS — E78.5 HYPERLIPIDEMIA LDL GOAL <70: ICD-10-CM

## 2023-10-12 DIAGNOSIS — R73.03 PREDIABETES: ICD-10-CM

## 2023-10-12 PROCEDURE — 99214 OFFICE O/P EST MOD 30 MIN: CPT | Performed by: FAMILY MEDICINE

## 2023-10-12 RX ORDER — RESPIRATORY SYNCYTIAL VIRUS VACCINE 120MCG/0.5
0.5 KIT INTRAMUSCULAR ONCE
Qty: 1 EACH | Refills: 0 | Status: CANCELLED | OUTPATIENT
Start: 2023-10-12 | End: 2023-10-12

## 2023-10-12 RX ORDER — ROSUVASTATIN CALCIUM 10 MG/1
10 TABLET, COATED ORAL DAILY
Qty: 90 TABLET | Refills: 3 | Status: SHIPPED | OUTPATIENT
Start: 2023-10-12 | End: 2023-11-02

## 2023-10-12 RX ORDER — METOPROLOL SUCCINATE 25 MG/1
25 TABLET, EXTENDED RELEASE ORAL 2 TIMES DAILY
Qty: 180 TABLET | OUTPATIENT
Start: 2023-10-12

## 2023-10-12 RX ORDER — METOPROLOL SUCCINATE 25 MG/1
25 TABLET, EXTENDED RELEASE ORAL 2 TIMES DAILY
Qty: 90 TABLET | Refills: 3 | Status: SHIPPED | OUTPATIENT
Start: 2023-10-12 | End: 2023-10-25

## 2023-10-12 NOTE — PROGRESS NOTES
Assessment & Plan       ICD-10-CM    1. HFrEF (heart failure with reduced ejection fraction) (H)  I50.20 rosuvastatin (CRESTOR) 10 MG tablet     metoprolol succinate ER (TOPROL XL) 25 MG 24 hr tablet     Comprehensive metabolic panel (BMP + Alb, Alk Phos, ALT, AST, Total. Bili, TP)     BNP-N terminal pro     CBC with platelets     Hemoglobin A1c     Lipid panel reflex to direct LDL Fasting      2. Coronary artery disease involving coronary bypass graft of native heart without angina pectoris  I25.810 rosuvastatin (CRESTOR) 10 MG tablet     metoprolol succinate ER (TOPROL XL) 25 MG 24 hr tablet     Comprehensive metabolic panel (BMP + Alb, Alk Phos, ALT, AST, Total. Bili, TP)     BNP-N terminal pro     CBC with platelets     Hemoglobin A1c     Lipid panel reflex to direct LDL Fasting      3. S/P CABG (coronary artery bypass graft)  Z95.1 rosuvastatin (CRESTOR) 10 MG tablet     metoprolol succinate ER (TOPROL XL) 25 MG 24 hr tablet     Comprehensive metabolic panel (BMP + Alb, Alk Phos, ALT, AST, Total. Bili, TP)     BNP-N terminal pro     CBC with platelets     Hemoglobin A1c     Lipid panel reflex to direct LDL Fasting      4. Cardiomyopathy, unspecified type (H)  I42.9 rosuvastatin (CRESTOR) 10 MG tablet     metoprolol succinate ER (TOPROL XL) 25 MG 24 hr tablet     Comprehensive metabolic panel (BMP + Alb, Alk Phos, ALT, AST, Total. Bili, TP)     BNP-N terminal pro     CBC with platelets     Hemoglobin A1c     Lipid panel reflex to direct LDL Fasting      5. Aortic valve stenosis, etiology of cardiac valve disease unspecified- moderate 2+ with FREDY = 1.2cm2 on echocardiogram fr 7/14/2023  I35.0       6. History of CVA (cerebrovascular accident)  Z86.73 rosuvastatin (CRESTOR) 10 MG tablet     metoprolol succinate ER (TOPROL XL) 25 MG 24 hr tablet     Comprehensive metabolic panel (BMP + Alb, Alk Phos, ALT, AST, Total. Bili, TP)     Hemoglobin A1c     Lipid panel reflex to direct LDL Fasting      7. Permanent  atrial fibrillation (H)  I48.21 rosuvastatin (CRESTOR) 10 MG tablet     metoprolol succinate ER (TOPROL XL) 25 MG 24 hr tablet     Comprehensive metabolic panel (BMP + Alb, Alk Phos, ALT, AST, Total. Bili, TP)      8. Prediabetes  R73.03 rosuvastatin (CRESTOR) 10 MG tablet     Comprehensive metabolic panel (BMP + Alb, Alk Phos, ALT, AST, Total. Bili, TP)     Hemoglobin A1c     Lipid panel reflex to direct LDL Fasting      9. Hypertension goal BP (blood pressure) < 140/90  I10 metoprolol succinate ER (TOPROL XL) 25 MG 24 hr tablet     Comprehensive metabolic panel (BMP + Alb, Alk Phos, ALT, AST, Total. Bili, TP)      10. Hyperlipidemia LDL goal <70  E78.5 rosuvastatin (CRESTOR) 10 MG tablet     Comprehensive metabolic panel (BMP + Alb, Alk Phos, ALT, AST, Total. Bili, TP)     CBC with platelets     Lipid panel reflex to direct LDL Fasting      11. Prostate cancer (H)  C61       12. Obstructive sleep apnea syndrome  G47.33       13. NAFLD (nonalcoholic fatty liver disease)  K76.0 Comprehensive metabolic panel (BMP + Alb, Alk Phos, ALT, AST, Total. Bili, TP)      14. History of gastric bypass  Z98.84 Comprehensive metabolic panel (BMP + Alb, Alk Phos, ALT, AST, Total. Bili, TP)     CBC with platelets     Hemoglobin A1c     CK total     Vitamin B12     Vitamin D Deficiency     Iron and iron binding capacity     Ferritin     Lipid panel reflex to direct LDL Fasting      15. History of hepatitis C  Z86.19 Comprehensive metabolic panel (BMP + Alb, Alk Phos, ALT, AST, Total. Bili, TP)      16. Vitamin B12 deficiency (non anemic)  E53.8 Vitamin B12      17. Vitamin D deficiency  E55.9 Vitamin D Deficiency      18. Iron deficiency anemia, unspecified iron deficiency anemia type  D50.9 CBC with platelets      19. Primary osteoarthritis involving multiple joints  M15.9       20. Physical deconditioning  R53.81 Comprehensive metabolic panel (BMP + Alb, Alk Phos, ALT, AST, Total. Bili, TP)     BNP-N terminal pro     CBC with  platelets     Hemoglobin A1c     CK total     Vitamin B12     Vitamin D Deficiency     Iron and iron binding capacity     Ferritin     Lipid panel reflex to direct LDL Fasting      21. Medication monitoring encounter  Z51.81 Comprehensive metabolic panel (BMP + Alb, Alk Phos, ALT, AST, Total. Bili, TP)     BNP-N terminal pro     CBC with platelets     Hemoglobin A1c     CK total     Vitamin B12     Vitamin D Deficiency     Iron and iron binding capacity     Ferritin     Lipid panel reflex to direct LDL Fasting          Discussed treatment/modality options, including risk and benefits, he desires:    1) Dr Roberts 11/10 with ECHO - [facility]    2) Dr Narvaez 1/2024    3) Dr Sue Brooks, 11/10/2023, PSA < 0.01    4) decreased metoprolol to 25 mg daily    5) refill rosuvastatin    6) recent covid/flu, advise prevnar 20/rsv    All diagnosis above reviewed and noted above, otherwise stable.      See [facility] for further details.      Return in about 2 months (around 12/12/2023) for Medication Recheck Visit, Follow Up Chronic.    No LOS data to display    Doing chart review, history and exam, documentation and further activities as noted.           Brett Cassidy MD, FAAFP     [facility]  [address] [place] [address] [id number] [place], [place] [email or web address]   Office: [phone]  Fax: [phone]  Pager: [phone]       Malick Arias is a 78 year old, presenting for the following health issues:        10/12/2023     9:13 AM   Additional Questions   Roomed by Vazquez GIVENS   Accompanied by Self       History of Present Illness       Reason for visit:  Follow up    He eats 2-3 servings of fruits and vegetables daily.He consumes 0 sweetened beverage(s) daily.He exercises with enough effort to increase his heart rate 30 to 60 minutes per day.  He exercises with enough effort to increase his heart rate 3 or less days per week.   He is taking  medications regularly.     10/12/2023    Slow improvement, wounds healing well, minimal edema, no cardiac chest pain, weight decreasing, bowels/bladder ok, no f/c/s, no sob, home care down, Cardiac rehab 3 / week, 20 sessions, [facility], recent covid/flu vaccine 9/29    Wt Readings from Last 4 Encounters:   10/12/23 127.9 kg (282 lb)   09/18/23 131.7 kg (290 lb 4.8 oz)   09/07/23 131 kg (288 lb 14.4 oz)   09/06/23 131.4 kg (289 lb 11.2 oz)     HFrEF / CAD / Cardiomyopathy / Aortic stenosis / Permanent A Fib - stable    Hx of CVA - no residual    Hyperlipidemia Follow-Up    Are you regularly taking any medication or supplement to lower your cholesterol?   Never refilled by cardiology - has not had communication if he should continue or not. Call from [facility] that cardiology is not refilling  Are you having muscle aches or other side effects that you think could be caused by your cholesterol lowering medication?  No    Recent Labs   Lab Test 09/25/23  1043 08/16/22  0804   CHOL 96 139   HDL 47 43   LDL 36 81   TRIG 64 73     Vascular Disease Follow-up    How often do you take nitroglycerin? Never prescribed  Do you take an aspirin every day? Yes    Hypertension Follow-up    Do you check your blood pressure regularly outside of the clinic? Only when at rehab   Are you following a low salt diet? Does not salt  -- does still eat processed foods  Are your blood pressures ever more than 140 on the top number (systolic) OR more   than 90 on the bottom number (diastolic), for example 140/90? No - low BP was told lower the better as long as no dizziness    BP Readings from Last 3 Encounters:   10/12/23 (!) 80/42   09/18/23 124/82   09/07/23 100/60     Creatinine   Date Value Ref Range Status   09/25/2023 0.87 0.67 - 1.17 mg/dL Final   06/22/2021 0.81 0.66 - 1.25 mg/dL Final     GFR Estimate   Date Value Ref Range Status   09/25/2023 89 >60 mL/min/1.73m2 Final   06/22/2021 87 >60 mL/min/[1.73_m2] Final      Comment:     Non  GFR Calc  Starting 12/18/2018, serum creatinine based estimated GFR (eGFR) will be   calculated using the Chronic Kidney Disease Epidemiology Collaboration   (CKD-EPI) equation.       GFR, ESTIMATED POCT   Date Value Ref Range Status   01/11/2023 >60 >60 mL/min/1.73m2 Final     GFR Estimate If Black   Date Value Ref Range Status   06/22/2021 >90 >60 mL/min/[1.73_m2] Final     Comment:      GFR Calc  Starting 12/18/2018, serum creatinine based estimated GFR (eGFR) will be   calculated using the Chronic Kidney Disease Epidemiology Collaboration   (CKD-EPI) equation.       Prostate cancer - doing very well    HAYLEE - using CPAP every night    9/7/2023    Admission Date 8/22/2023   Reason for Admission S/P CABG   Discharge Date 8/29/2023   Discharge Diagnosis S/P CABG   How are you doing now that you are home? Doing okay.   How are your symptoms? (Red Flag symptoms escalate to triage hotline per guidelines) Improved   Do you feel your condition is stable enough to be safe at home until your provider visit? Yes   Does the patient have their discharge instructions?  Yes   Does the patient have questions regarding their discharge instructions?  No   Were you started on any new medications or were there changes to any of your previous medications?  Yes   Does the patient have all of their medications? Yes   Do you have questions regarding any of your medications?  No   Do you have all of your needed medical supplies or equipment (DME)?  (i.e. oxygen tank, CPAP, cane, etc.) Yes   Discharge follow-up appointment scheduled within 14 calendar days?  Yes   Discharge Follow Up Appointment Date 8/30/2023   Discharge Follow Up Appointment Scheduled with? Specialty Care Provider      Hospital Follow-up Visit:     Hospital/Nursing Home/IP Rehab Facility:   Date of Admission: 8/22/2023  Date of Discharge: 8/29/2023  Reason(s) for Admission: S/P CABG      Was your hospitalization related to COVID-19? No   Problems taking medications regularly:  None  Medication changes since discharge: None  Problems adhering to non-medication therapy:  None     Summary of hospitalization:  [facility] discharge summary reviewed  Diagnostic Tests/Treatments reviewed.  Follow up needed: labs, cardiology  Other Healthcare Providers Involved in Patient s Care:         Homecare and cardiac rehab  Update since discharge: improved.      Home Care - PT, OT, RN, HHA     Cardiac Rehab - resumes next week     [facility] - 8/15 to 8/22, [facility] 8/22 to 8/29, is at home now     Slow improvement, wounds healing well, minimal edema, no cardiac chest pain, weight decreasing, bowels/bladder ok, no f/c/s, no sob, no significant edema         BP Readings from Last 3 Encounters:   09/07/23 100/60   09/06/23 107/71   08/30/23 136/82            Creatinine   Date Value Ref Range Status   09/07/2023 0.79 0.67 - 1.17 mg/dL Final   06/22/2021 0.81 0.66 - 1.25 mg/dL Final              GFR Estimate   Date Value Ref Range Status   09/07/2023 >90 >60 mL/min/1.73m2 Final   06/22/2021 87 >60 mL/min/[1.73_m2] Final       Comment:       Non  GFR Calc  Starting 12/18/2018, serum creatinine based estimated GFR (eGFR) will be   calculated using the Chronic Kidney Disease Epidemiology Collaboration   (CKD-EPI) equation.              Recent Labs   Lab Test 08/16/22  0804 08/11/21  0840   CHOL 139 122   HDL 43 44   LDL 81 67   TRIG 73 54      Date of Admission:                  8/15/2023  Date of Discharge:                  8/22/2023  Admitting Physician:               Michael Mulvihill, MD  Discharge Physician:              Mulvihill, Michael, MD  Discharging Service:               Cardiovascular and Thoracic Surgery          Admission Diagnoses:   Cardiomyopathy (H) [I42.9]  Cardiomyopathy, unspecified type (H) [I42.9]         Procedures:   On 8/15/23 Mr Weber successfully  underwent CABG x1 (vein graft to PDA), Endoscopic Vein [place],  Aortotomy and removal of embolized / un-retrievable right coronary stent + delivery system, Occlusion of left atrial appendage (45 mm Atriclip) by Dr. Verde.            Discharge Medications:      Current Discharge Medication List                  START taking these medications     Details   aspirin (ASA) 81 MG chewable tablet 1 tablet (81 mg) by Oral or NG Tube route daily     Associated Diagnoses: S/P CABG (coronary artery bypass graft)       metoprolol tartrate (LOPRESSOR) 25 MG tablet Take 0.5 tablets (12.5 mg) by mouth 2 times daily     Associated Diagnoses: S/P CABG (coronary artery bypass graft)       rosuvastatin (CRESTOR) 10 MG tablet Take 1 tablet (10 mg) by mouth daily     Associated Diagnoses: S/P CABG (coronary artery bypass graft)                      CONTINUE these medications which have CHANGED     Details   acetaminophen (TYLENOL) 325 MG tablet Take 2 tablets (650 mg) by mouth every 4 hours as needed for other (For optimal non-opioid multimodal pain management to improve pain control.)     Associated Diagnoses: S/P CABG (coronary artery bypass graft)       tamsulosin (FLOMAX) 0.4 MG capsule Take 1 capsule (0.4 mg) by mouth every evening     Associated Diagnoses: S/P CABG (coronary artery bypass graft)                      CONTINUE these medications which have NOT CHANGED     Details   apixaban ANTICOAGULANT (ELIQUIS) 5 MG tablet Take 1 tablet (5 mg) by mouth 2 times daily Appointment needed for additional refills. Please call [phone] to schedule.  Qty: 180 tablet, Refills: 3     Associated Diagnoses: Permanent atrial fibrillation (H); NSVT (nonsustained ventricular tachycardia) (H)       empagliflozin (JARDIANCE) 10 MG TABS tablet Take 1 tablet (10 mg) by mouth daily  Qty: 90 tablet, Refills: 3     Associated Diagnoses: Chronic systolic heart failure (H)       Ferrous Sulfate (IRON) 325 (65 Fe) MG tablet Take 1 tablet by mouth  three times a week (Monday, Wednesday, Friday)  Qty: 90 tablet, Refills: 3     Associated Diagnoses: Iron deficiency anemia, unspecified iron deficiency anemia type       fluticasone (FLONASE) 50 MCG/ACT nasal spray Spray 2 sprays into both nostrils daily as needed for rhinitis or allergies  Qty: 54.6 mL, Refills: 3     Associated Diagnoses: Obstructive sleep apnea syndrome       pantoprazole (PROTONIX) 40 MG EC tablet Take 40 mg by mouth daily       senna-docusate (SENOKOT-S/PERICOLACE) 8.6-50 MG tablet Take 1-2 tablets by mouth 2 times daily Take while on oral narcotics to prevent or treat constipation.  Qty: 30 tablet, Refills: 0     Comments: While taking narcotics  Associated Diagnoses: S/P shoulder replacement, left       vitamin B-12 (CYANOCOBALAMIN) 1000 MCG tablet Take 1,000 mcg by mouth daily       Vitamin D-Vitamin K (K2 PLUS D3) 100-1000 MCG-UNIT TABS Take 1 tablet by mouth daily       vitamin D3 (CHOLECALCIFEROL) 125 MCG (5000 UT) tablet Take 1 tablet by mouth daily       vitamin C (ASCORBIC ACID) 1000 MG TABS Take 1,000 mg by mouth daily                      STOP taking these medications         enoxaparin ANTICOAGULANT (LOVENOX) 120 MG/0.8ML syringe Comments:   Reason for Stopping:            metoprolol succinate ER (TOPROL XL) 50 MG 24 hr tablet Comments:   Reason for Stopping:            niacin 500 MG tablet Comments:   Reason for Stopping:            nitroFURantoin macrocrystal-monohydrate (MACROBID) 100 MG capsule Comments:   Reason for Stopping:            sacubitril-valsartan (ENTRESTO) 24-26 MG per tablet Comments:   Reason for Stopping:                      Consultations:      Nutrition, Intensivist, social work, [facility] clinic             Brief History of Illness:   In brief, this is a 78 year old gentleman with a cardiomyopathy of an uncertain etiology. He went to the lab today electively for evaluation and to ascertain an ischemic etiology of his cardiomyopathy. During treatment of a R  coronary lesion, the stent + delivery system were unfortunately embolized. The delivery system fractured and despite attempts to snare and retrieve the balloon + stent, it became clear that the pieces were un-retrievable via endovascular approaches. Fortunately, he has remained hemodynamically stable with flow through the R system.           Hospital Course:      On 8/15/23 Mr Weber successfully underwent CABG x1 (vein graft to PDA), Endoscopic Vein [place],  Aortotomy and removal of embolized / un-retrievable right coronary stent + delivery system, Occlusion of left atrial appendage (45 mm Atriclip) by Dr. Verde.   Was extubated within 24 hours of surgery. Once he was weaned off hemodynamic stabilizing gtt's, transferred to the surgical floor.    Had some transient hyperglycemia treated with an insulin gtt, transitioned to sliding scale insulin and has no need at discharge.  Had some fluid overload treated with diuretic medication. At discharge he is 9 kg above his pre-op weight, he was given a dose of diuretics prior to discharge and will closely monitor his weight at TCU and then home. His blood pressures were too soft to send him with diuretic medication. I have asked TCU to give lasix as blood pressure allows. His PTA entresto was held on discharge as well due to softer pressures. [facility] clinic was consulted as he will need close follow up due to dec EF/Fluid overload-EF 35%. He had some PAF which is chronic and was resumed on his PTA   Eliquis. Heart rhythm remained stable. He progressed well with cardiac rehab. He is discharged to TCU on pod# 7 with the help of their family.              Discharge Instructions and Follow-Up:      Discharge diet: Regular   Discharge activity: Daily weights: Call if weight gain 2-3 lbs over 24 hours or if greater than 5 lbs in 1 week.  No lifting more than 10 lbs for 8-12 weeks.  No driving for 1 month.  Call for pain medication refill.  Call for temperature greater than  101.0  Daily shower with antibacterial soap.   Discharge follow-up: Follow-up Appointments  Follow Up and recommended labs and tests   *Follow up primary care provider in 7-10 days after discharge in order to review your medication, vital signs, obtain any necessary lab work your doctor may want, and to update them on your hospitalization and medical issues.  *Follow up with Lena/Kaitlynn/Tammie/Pedro Dan with Dr Guevara/Armando/Lissa, heart surgeon, at [facility] at [facility] [address] on 9/6/23 at 2:45 PM. If any questions or concerns call [phone].  You will see us once at this visit and then if everything is going well you will not need to see us again.  You will follow long term with your cardiologist.  * Follow up with Laura Pacheco NP with cardiology on 9/18/23 at 3:30 PM at [facility]  *Follow up with Dr Narvaez, cardiologist, 1/19/24 at 10:45. This is who you will follow with long term about your heart issues. [phone].  *Please schedule outpatient cardiac rehab within 5 days of discharge from TCU, call [phone].      Outpatient therapy: Cardiac rehab   Home Care agency: None    Supplies and equipment: None   Lines and drains: None    Wound care: Wash incision daily with antibacterial soap   Other instructions: None      Review of Systems   CONSTITUTIONAL: NEGATIVE for fever, chills, change in weight  INTEGUMENTARY/SKIN: NEGATIVE for worrisome rashes, moles or lesions  EYES: NEGATIVE for vision changes or irritation  ENT/MOUTH: NEGATIVE for ear, mouth and throat problems  RESP: NEGATIVE for significant cough or SOB  CV: NEGATIVE for chest pain, palpitations or peripheral edema  GI: NEGATIVE for nausea, abdominal pain, heartburn, or change in bowel habits  : NEGATIVE for frequency, dysuria, or hematuria  MUSCULOSKELETAL: NEGATIVE for significant arthralgias or myalgia  NEURO: NEGATIVE for weakness, dizziness or paresthesias  ENDOCRINE: NEGATIVE for temperature  intolerance, skin/hair changes  HEME: NEGATIVE for bleeding problems  PSYCHIATRIC: NEGATIVE for changes in mood or affect      Objective    BP (!) 80/42 (BP Location: Right arm, Patient Position: Chair, Cuff Size: Adult Large)   Pulse 95   Temp 98  F (36.7  C) (Tympanic)   Resp 11   Ht 1.829 m (6')   Wt 127.9 kg (282 lb)   SpO2 98%   BMI 38.25 kg/m    Body mass index is 38.25 kg/m .    Physical Exam   GENERAL: healthy, alert and no distress  EYES: Eyes grossly normal to inspection, PERRL and conjunctivae and sclerae normal  HENT: ear canals and TM's normal, nose and mouth without ulcers or lesions  NECK: no adenopathy, no asymmetry, masses, or scars and thyroid normal to palpation  RESP: lungs clear to auscultation - no rales, rhonchi or wheezes  CV: regular rate and rhythm, normal S1 S2, no S3 or S4, no murmur, click or rub, no peripheral edema and peripheral pulses strong  ABDOMEN: soft, nontender, no hepatosplenomegaly, no masses and bowel sounds normal  MS: no gross musculoskeletal defects noted, no edema  SKIN: no suspicious lesions or rashes  NEURO: Normal strength and tone, mentation intact and speech normal  PSYCH: mentation appears normal, affect normal/bright    Labs reviewed / pending  Labs ordered

## 2023-10-13 ENCOUNTER — HOSPITAL ENCOUNTER (OUTPATIENT)
Dept: CARDIAC REHAB | Facility: CLINIC | Age: 78
Discharge: HOME | End: 2023-10-13
Attending: STUDENT IN AN ORGANIZED HEALTH CARE EDUCATION/TRAINING PROGRAM
Payer: COMMERCIAL

## 2023-10-13 PROCEDURE — 93798 PHYS/QHP OP CAR RHAB W/ECG: CPT

## 2023-10-16 ENCOUNTER — HOSPITAL ENCOUNTER (OUTPATIENT)
Dept: CARDIAC REHAB | Facility: CLINIC | Age: 78
Discharge: HOME | End: 2023-10-16
Attending: STUDENT IN AN ORGANIZED HEALTH CARE EDUCATION/TRAINING PROGRAM
Payer: COMMERCIAL

## 2023-10-16 PROCEDURE — 93798 PHYS/QHP OP CAR RHAB W/ECG: CPT | Performed by: OCCUPATIONAL THERAPIST

## 2023-10-18 ENCOUNTER — HOSPITAL ENCOUNTER (OUTPATIENT)
Dept: CARDIAC REHAB | Facility: CLINIC | Age: 78
Discharge: HOME | End: 2023-10-18
Attending: STUDENT IN AN ORGANIZED HEALTH CARE EDUCATION/TRAINING PROGRAM
Payer: COMMERCIAL

## 2023-10-18 PROCEDURE — 93798 PHYS/QHP OP CAR RHAB W/ECG: CPT

## 2023-10-20 ENCOUNTER — HOSPITAL ENCOUNTER (OUTPATIENT)
Dept: CARDIAC REHAB | Facility: CLINIC | Age: 78
Discharge: HOME | End: 2023-10-20
Attending: STUDENT IN AN ORGANIZED HEALTH CARE EDUCATION/TRAINING PROGRAM
Payer: COMMERCIAL

## 2023-10-20 PROCEDURE — 93798 PHYS/QHP OP CAR RHAB W/ECG: CPT | Performed by: OCCUPATIONAL THERAPIST

## 2023-10-23 ENCOUNTER — HOSPITAL ENCOUNTER (OUTPATIENT)
Dept: CARDIAC REHAB | Facility: CLINIC | Age: 78
Discharge: HOME | End: 2023-10-23
Attending: STUDENT IN AN ORGANIZED HEALTH CARE EDUCATION/TRAINING PROGRAM
Payer: COMMERCIAL

## 2023-10-23 PROCEDURE — 93798 PHYS/QHP OP CAR RHAB W/ECG: CPT

## 2023-10-24 NOTE — PROGRESS NOTES
[facility]    Patient was supposed to have BMP done 2 weeks post medication changes on 9/27/23. Order for BMP is already placed.    Message sent to scheduling to reach out to patient and set up BMP.    [facility] RN to check on weights and symptoms with BMP results.    Loretta Sheffield, RN, BSN  [facility] [place], [place]  [facility] Clinic Care Coordinator  October 24, 2023 12:18 PM    Future Appointments   Date Time Provider Department Center   10/25/2023 10:00 AM 1, [facility] Cardiac [facility]hab [facility]   10/27/2023 10:00 AM 1, [facility]   10/30/2023 10:00 AM 1, [facility]   10/30/2023  2:00 PM Brett Cassidy MD [facility]   11/1/2023 10:00 AM 1, [facility] Card[facility]   11/3/2023 10:00 AM 1, [facility] Card[facility]hab [facility]   11/6/2023 10:00 AM 1, [facility] Cardiac Rehab [facility]   11/7/2023  8:15 AM RV LAB [facility]   11/8/2023  9:30 AM RU LAB [facility]   11/8/2023  9:45 AM [id number] [facility]   11/8/2023  1:00 PM 1, [facility] Cardiac Rehab [facility]   11/10/2023 10:00 AM 1, [facility] Cardiac Rehab [facility]   11/10/2023 12:45 PM Ayad Roberts MD [facility]   11/13/2023 10:00 AM 1, [facility] Cardiac Rehab [facility]   11/15/2023 10:00 AM 1, [facility] Cardiac Rehab [facility]   11/16/2023  2:30 PM 2, [facility] Cardiac Rehab [facility]   12/12/2023  9:00 AM Brett Cassidy MD [facility] RV   1/19/2024 10:45 AM Martita Narvaez MD [facility]

## 2023-10-25 ENCOUNTER — HOSPITAL ENCOUNTER (OUTPATIENT)
Dept: CARDIAC REHAB | Facility: CLINIC | Age: 78
Discharge: HOME | End: 2023-10-25
Attending: STUDENT IN AN ORGANIZED HEALTH CARE EDUCATION/TRAINING PROGRAM
Payer: COMMERCIAL

## 2023-10-25 ENCOUNTER — LAB (OUTPATIENT)
Dept: LAB | Facility: CLINIC | Age: 78
End: 2023-10-25
Payer: COMMERCIAL

## 2023-10-25 DIAGNOSIS — I10 HYPERTENSION GOAL BP (BLOOD PRESSURE) < 140/90: ICD-10-CM

## 2023-10-25 DIAGNOSIS — Z95.1 S/P CABG (CORONARY ARTERY BYPASS GRAFT): ICD-10-CM

## 2023-10-25 DIAGNOSIS — I42.9 CARDIOMYOPATHY, UNSPECIFIED TYPE (H): ICD-10-CM

## 2023-10-25 DIAGNOSIS — I50.20 HFREF (HEART FAILURE WITH REDUCED EJECTION FRACTION) (H): ICD-10-CM

## 2023-10-25 DIAGNOSIS — I42.9 CARDIOMYOPATHY (H): ICD-10-CM

## 2023-10-25 DIAGNOSIS — Z86.73 HISTORY OF CVA (CEREBROVASCULAR ACCIDENT): ICD-10-CM

## 2023-10-25 DIAGNOSIS — I48.21 PERMANENT ATRIAL FIBRILLATION (H): ICD-10-CM

## 2023-10-25 DIAGNOSIS — I25.810 CORONARY ARTERY DISEASE INVOLVING CORONARY BYPASS GRAFT OF NATIVE HEART WITHOUT ANGINA PECTORIS: ICD-10-CM

## 2023-10-25 LAB
ANION GAP SERPL CALCULATED.3IONS-SCNC: 8 MMOL/L (ref 7–15)
BUN SERPL-MCNC: 19.6 MG/DL (ref 8–23)
CALCIUM SERPL-MCNC: 8.9 MG/DL (ref 8.8–10.2)
CHLORIDE SERPL-SCNC: 103 MMOL/L (ref 98–107)
CREAT SERPL-MCNC: 0.77 MG/DL (ref 0.67–1.17)
DEPRECATED HCO3 PLAS-SCNC: 26 MMOL/L (ref 22–29)
EGFRCR SERPLBLD CKD-EPI 2021: >90 ML/MIN/1.73M2
GLUCOSE SERPL-MCNC: 105 MG/DL (ref 70–99)
POTASSIUM SERPL-SCNC: 4.1 MMOL/L (ref 3.4–5.3)
SODIUM SERPL-SCNC: 137 MMOL/L (ref 135–145)

## 2023-10-25 PROCEDURE — 80048 BASIC METABOLIC PNL TOTAL CA: CPT | Performed by: PHYSICIAN ASSISTANT

## 2023-10-25 PROCEDURE — 93798 PHYS/QHP OP CAR RHAB W/ECG: CPT

## 2023-10-25 PROCEDURE — 36415 COLL VENOUS BLD VENIPUNCTURE: CPT | Performed by: PHYSICIAN ASSISTANT

## 2023-10-25 RX ORDER — METOPROLOL SUCCINATE 25 MG/1
25 TABLET, EXTENDED RELEASE ORAL EVERY EVENING
COMMUNITY
Start: 2023-10-25 | End: 2023-10-30

## 2023-10-25 RX ORDER — FUROSEMIDE 20 MG
40 TABLET ORAL 2 TIMES DAILY
COMMUNITY
Start: 2023-10-25

## 2023-10-25 NOTE — PROGRESS NOTES
[facility]: Heart Failure Care Coordination   Follow-Up Outreach      Situation/Background:       Post 9/18/23 OV Follow Up     Current Relevant Medication Regimen  Jardiance 10mg/ daily  Lasix 20mg/ daily (Removed from med list 9/27/23, pt still taking 10/25/23)  Entresto 24-26mg BID   Toprol XL 25mg BID (Patient taking 25mg/ daily per Dr. Brett Cassidy 10/12/23)     PRN diuretic plan: None     Current potassium chloride dose: None      Plan from 9/18/23 OV with Laura Olivia PA-C  Change Lasix from 40 mg q3d to 20 mg daily.  Increase Entresto to full tablet twice daily.   BMP and call for wt/BP update in one week. If stable, plan to increase Toprol to 50 mg daily +/- further increase Entresto and stop Lasix.     Assessment:      Component      Latest Ref Rng 9/25/2023  10:43 AM 10/25/2023  9:48 AM   Sodium      135 - 145 mmol/L 141  137    Anion Gap      7 - 15 mmol/L 13  8    Creatinine      0.67 - 1.17 mg/dL 0.87  0.77    GFR Estimate      >60 mL/min/1.73m2 89  >90    Calcium      8.8 - 10.2 mg/dL 9.1  8.9    Potassium      3.4 - 5.3 mmol/L 5.1  4.1    Chloride      98 - 107 mmol/L 104  103    Carbon Dioxide (CO2)      22 - 29 mmol/L 24  26    Urea Nitrogen      8.0 - 23.0 mg/dL 23.0  19.6    Glucose      70 - 99 mg/dL 97  105 (H)       Recent home weights:   9/21/23:   282.6 lbs  9/22/23:   282 lbs  9/23/23:   281.8 lbs  9/24/23:   283.2 lbs  9/25/23:   282 lbs  9/26/23:   284 lbs    10/25/23: 282 lbs     Recent BP's per Cardiac Rehab   9/20/23:   126/62 (resting)  9/22/23:   122/64 (resting)  9/27/23:   114/62 (resting)  9/29/23:   108/66 (resting)  10/4/23:   100/60 (resting)  10/6/23:   108/66 (resting)  10/13/23: 100/60 (resting)  10/16/23: 102/62 (resting)  10/18/23: 90/60   (resting)  10/20/23: 102/66 (resting)  10/25/23: 102/64 (resting)      Recent plan of care changes:  -- 9/18/23 OV with Laura Olivia PA-C  Change Lasix from 40 mg q3d to 20 mg daily.  Increase Entresto to full tablet twice daily.  "  BMP and call for wt/BP update in one week. If stable, plan to increase Toprol to 50 mg daily +/- further increase Entresto and stop Lasix.  -- 9/26/23 RN Telephone Call w/ Pt  Decreased lasix  Did not increase Entresto (still taking half tablet BID. 12-13mg BID)  Per Laura Olivia PA-C, \"Please have him make the increase now, repeat BMP in two weeks, call again for update, thanks\"   -- 9/27/23 RN Telephone Call w/ Pt   Pt advised to increase Entresto to a full tablet twice day and Toprol XL to 50 mg daily. He will take 25 mg in morning and 25 mg in the evening. No BMP apt made.  -- 10/12/23 OV with Dr. Khanh West  BP 80/42  Decrease Toprol XL to 25mg/ Daily (med list still says 25mg BID)  -- 10/25/23 BMP done available for review above    Notes: Pt reports he has been significantly tired. He confirms he increased Entresto to two tables BID and increased Toprol XL to 50mg daily per JAZZ Santoyo. It has since been decreased to 25 mg AM per Dr. Brett Cassidy on 10/12/23 for low BP. He continues to take 20mg lasix/daily but that was removed from med list on 9/27/23. No LH/ dizziness. He states that during cardiac rehab his HR has been down in the 80's during exertion in October. Denies SOB.     Intervention/Plan:      Routing to Laura Olivia PA-C for update and medication clarification.    Loretta Sheffield, RN, BSN  [facility] [place], [place]  [facility] Clinic Care Coordinator  October 25, 2023 11:22 AM    Future Appointments   Date Time Provider Department Center   10/27/2023 10:00 AM 1, Rh Cardiac Rehab [facility]   10/30/2023 10:00 AM 1, [facility] Card[facility]   10/30/2023  2:00 PM Brett Cassidy MD [facility]    11/1/2023 10:00 AM 1, [facility] Cardiac Rehab [facility]   11/3/2023 10:00 AM 1, [facility] Card[facility]   11/6/2023 10:00 AM 1, Rh Cardiac Rehab [facility]   11/7/2023  8:15 AM RV LAB [facility]LAB[facility] RV   11/8/2023  9:30 AM RU LAB [facility]   11/8/2023  9:45 AM " RSCCECHO2 [facility]   11/8/2023  1:00 PM 1, Rh Cardiac Rehab [facility]   11/10/2023 10:00 AM 1, Rh Cardiac Rehab [facility]   11/10/2023 12:45 PM Ayad Roberts MD [facility]   11/13/2023 10:00 AM 1, Rh Cardiac Rehab [facility]   11/15/2023 10:00 AM 1, Rh Cardiac Rehab [facility]   11/16/2023  2:30 PM 2, Rh Cardiac Rehab [facility]   12/12/2023  9:00 AM Brett Cassidy MD [facility]FP RV   1/19/2024 10:45 AM Martita Narvaez MD [facility]

## 2023-10-25 NOTE — PROGRESS NOTES
[facility]- [facility]    Med list updated to reflect Laura Olivia PA-C's most recent recommendation. See 10/25/23 note in 9/26/23 patient outreach encounter.    Toprol XL 25mg/ in the evening per Laura Olivia PA-C.    Loretta Sheffield, RN, BSN  [facility] [place], [place]  [facility] Clinic Care Coordinator  October 25, 2023 12:43 PM    Future Appointments   Date Time Provider Department Center   10/27/2023 10:00 AM 1, Rh Cardiac Rehab [facility]   10/30/2023 10:00 AM 1, Rh Cardiac Rehab [facility]   10/30/2023  2:00 PM Brett Cassidy MD RVFP RV   11/1/2023 10:00 AM 1, Rh Cardiac Rehab [facility]   11/3/2023 10:00 AM 1, Rh Cardiac Rehab [facility]   11/6/2023 10:00 AM 1, Rh Cardiac Rehab [facility]   11/7/2023  8:15 AM RV LAB RVLABR RV   11/8/2023  9:30 AM RU LAB [facility]   11/8/2023  9:45 AM RSCCECHO2 RHCVCC RSCC   11/8/2023  1:00 PM 1, Rh Cardiac Rehab [facility]   11/10/2023 10:00 AM 1, Rh Cardiac Rehab [facility]   11/10/2023 12:45 PM Ayad Roberts MD [facility] CLIN   11/13/2023 10:00 AM 1, Rh Cardiac Rehab [facility]   11/15/2023 10:00 AM 1, Rh Cardiac Rehab [facility]   11/16/2023  2:30 PM 2, Rh Cardiac Rehab [facility]   12/12/2023  9:00 AM Brett Cassidy MD RVFP RV   1/19/2024 10:45 AM Martita Narvaez MD [facility] CLIN

## 2023-10-25 NOTE — PROGRESS NOTES
[facility]- [facility]    Reviewed encounter with Laura Olivia PA-C verbally in clinic.    She made the following recommendations.    - Stop the lasix 20mg/daily. Take one tablet lasix 20mg PRN for SOB, worsening swelling or weight gain of 2 lbs in a night or 5 lbs in a week.  - Continue the Entresto at 24-26mg BID  - Continue the Toprol XL 25mg one time daily but try it in the evening rather than the morning to combat the fatigue.    Called Hugo and reviewed the changes above. He expresses understanding with no further questions.    Confirmed follow up with Dr. Roberts on 11/10/23.    Med list updated.    Loretta Sheffield RN, BSN  [facility]- [place], [place]  [place]. Clinic Care Coordinator  October 25, 2023 12:37 PM    Future Appointments   Date Time Provider Department Center   10/27/2023 10:00 AM 1, Rh Cardiac Rehab [facility]   10/30/2023 10:00 AM 1, Rh Cardiac Rehab [facility]   10/30/2023  2:00 PM Brett Cassidy MD RV[facility] RV   11/1/2023 10:00 AM 1, Rh Cardiac Rehab [facility]   11/3/2023 10:00 AM 1, Rh Cardiac Rehab [facility]   11/6/2023 10:00 AM 1, Rh Cardiac Rehab [facility]   11/7/2023  8:15 AM RV LAB RVLABR RV   11/8/2023  9:30 AM RU LAB [facility]   11/8/2023  9:45 AM RSCCECHO2 RHCVCC RSCC   11/8/2023  1:00 PM 1, Rh Cardiac Rehab [facility]   11/10/2023 10:00 AM 1, Rh Cardiac Rehab [facility]   11/10/2023 12:45 PM Ayad Roberts MD [facility]   11/13/2023 10:00 AM 1, Rh Cardiac Rehab [facility]   11/15/2023 10:00 AM 1, Rh Cardiac Rehab [facility]   11/16/2023  2:30 PM 2, Rh Cardiac Rehab [facility]   12/12/2023  9:00 AM Brett Cassidy MD RVFP RV   1/19/2024 10:45 AM Martita Narvaze MD [facility] PSA CLIN

## 2023-10-26 ENCOUNTER — TRANSFERRED RECORDS (OUTPATIENT)
Dept: HEALTH INFORMATION MANAGEMENT | Facility: CLINIC | Age: 78
End: 2023-10-26
Payer: COMMERCIAL

## 2023-10-27 ENCOUNTER — HOSPITAL ENCOUNTER (OUTPATIENT)
Dept: CARDIAC REHAB | Facility: CLINIC | Age: 78
Discharge: HOME | End: 2023-10-27
Attending: STUDENT IN AN ORGANIZED HEALTH CARE EDUCATION/TRAINING PROGRAM
Payer: COMMERCIAL

## 2023-10-27 PROCEDURE — 93798 PHYS/QHP OP CAR RHAB W/ECG: CPT | Performed by: OCCUPATIONAL THERAPIST

## 2023-10-28 DIAGNOSIS — Z95.1 S/P CABG (CORONARY ARTERY BYPASS GRAFT): ICD-10-CM

## 2023-10-29 ENCOUNTER — HEALTH MAINTENANCE LETTER (OUTPATIENT)
Age: 78
End: 2023-10-29

## 2023-10-30 ENCOUNTER — MYC REFILL (OUTPATIENT)
Dept: FAMILY MEDICINE | Facility: CLINIC | Age: 78
End: 2023-10-30
Payer: COMMERCIAL

## 2023-10-30 ENCOUNTER — HOSPITAL ENCOUNTER (OUTPATIENT)
Dept: CARDIAC REHAB | Facility: CLINIC | Age: 78
Discharge: HOME | End: 2023-10-30
Attending: STUDENT IN AN ORGANIZED HEALTH CARE EDUCATION/TRAINING PROGRAM
Payer: COMMERCIAL

## 2023-10-30 DIAGNOSIS — E78.5 HYPERLIPIDEMIA LDL GOAL <70: ICD-10-CM

## 2023-10-30 DIAGNOSIS — R73.03 PREDIABETES: ICD-10-CM

## 2023-10-30 DIAGNOSIS — I48.21 PERMANENT ATRIAL FIBRILLATION (H): ICD-10-CM

## 2023-10-30 DIAGNOSIS — I50.20 HFREF (HEART FAILURE WITH REDUCED EJECTION FRACTION) (H): ICD-10-CM

## 2023-10-30 DIAGNOSIS — Z95.1 S/P CABG (CORONARY ARTERY BYPASS GRAFT): ICD-10-CM

## 2023-10-30 DIAGNOSIS — I10 HYPERTENSION GOAL BP (BLOOD PRESSURE) < 140/90: ICD-10-CM

## 2023-10-30 DIAGNOSIS — I42.9 CARDIOMYOPATHY, UNSPECIFIED TYPE (H): ICD-10-CM

## 2023-10-30 DIAGNOSIS — I25.810 CORONARY ARTERY DISEASE INVOLVING CORONARY BYPASS GRAFT OF NATIVE HEART WITHOUT ANGINA PECTORIS: ICD-10-CM

## 2023-10-30 DIAGNOSIS — Z86.73 HISTORY OF CVA (CEREBROVASCULAR ACCIDENT): ICD-10-CM

## 2023-10-30 PROCEDURE — 93797 PHYS/QHP OP CAR RHAB WO ECG: CPT | Mod: XU

## 2023-10-30 PROCEDURE — 93798 PHYS/QHP OP CAR RHAB W/ECG: CPT

## 2023-10-30 RX ORDER — METOPROLOL SUCCINATE 25 MG/1
25 TABLET, EXTENDED RELEASE ORAL EVERY EVENING
Qty: 90 TABLET | Refills: 3 | Status: SHIPPED | OUTPATIENT
Start: 2023-10-30 | End: 2024-03-28

## 2023-10-30 RX ORDER — TAMSULOSIN HYDROCHLORIDE 0.4 MG/1
0.4 CAPSULE ORAL 2 TIMES DAILY
Qty: 180 CAPSULE | Refills: 3 | Status: SHIPPED | OUTPATIENT
Start: 2023-10-30 | End: 2024-03-04

## 2023-10-30 NOTE — TELEPHONE ENCOUNTER
Please advise on diagnosis     Thank you     Kathia Centeno RN, BSN  [facility] - [facility]

## 2023-11-01 ENCOUNTER — HOSPITAL ENCOUNTER (OUTPATIENT)
Dept: CARDIAC REHAB | Facility: CLINIC | Age: 78
Discharge: HOME | End: 2023-11-01
Attending: STUDENT IN AN ORGANIZED HEALTH CARE EDUCATION/TRAINING PROGRAM
Payer: COMMERCIAL

## 2023-11-01 PROCEDURE — 93798 PHYS/QHP OP CAR RHAB W/ECG: CPT

## 2023-11-01 PROCEDURE — 93797 PHYS/QHP OP CAR RHAB WO ECG: CPT | Mod: 59

## 2023-11-02 DIAGNOSIS — I50.20 HFREF (HEART FAILURE WITH REDUCED EJECTION FRACTION) (H): Primary | ICD-10-CM

## 2023-11-02 RX ORDER — ROSUVASTATIN CALCIUM 10 MG/1
10 TABLET, COATED ORAL DAILY
Qty: 90 TABLET | Refills: 3 | Status: SHIPPED | OUTPATIENT
Start: 2023-11-02 | End: 2024-03-04

## 2023-11-02 NOTE — PROGRESS NOTES
[facility]- [facility]    Order added for BMP prior to 11/10/23 OV with Dr. Roberts. Med changes made on 10/25/23.    Loretta Sheffield RN, BSN  [facility] [place], [place]  [place]. Clinic Care Coordinator  November 2, 2023 2:45 PM    Future Appointments   Date Time Provider Department Center   11/3/2023 10:00 AM 1, Rh Cardiac Rehab [facility]   11/6/2023 10:00 AM 1, Rh Cardiac Rehab [facility]   11/7/2023  8:15 AM RV LAB RVLABR RV   11/8/2023  9:30 AM RU LAB [facility]   11/8/2023  9:45 AM RSCCECHO2 RHCVCC RSCC   11/8/2023  1:00 PM 1, Rh Cardiac Rehab [facility]   11/10/2023 10:00 AM 1, Rh Cardiac Rehab [facility]   11/10/2023 12:45 PM Ayad Roberts MD [facility] CLIN   11/13/2023 10:00 AM 1, Rh Cardiac Rehab [facility]   11/15/2023 10:00 AM 1, Rh Cardiac Rehab [facility]   11/16/2023  2:30 PM 2, Rh Cardiac Rehab [facility]   12/12/2023  9:00 AM Brett Cassidy MD RVFP RV   1/19/2024 10:45 AM Martita Narvaez MD [facility] PSA CLIN

## 2023-11-02 NOTE — TELEPHONE ENCOUNTER
Tt patient and he said he already picked up the Rosuvastatin Tabs but ONLY wants these rx filled at [facility].  NO [facility] orders any more.    The form that is in house (recent) is a duplicate and filed in the sourth drawer.    Joan REED

## 2023-11-03 ENCOUNTER — HOSPITAL ENCOUNTER (OUTPATIENT)
Dept: CARDIAC REHAB | Facility: CLINIC | Age: 78
Discharge: HOME | End: 2023-11-03
Attending: STUDENT IN AN ORGANIZED HEALTH CARE EDUCATION/TRAINING PROGRAM
Payer: COMMERCIAL

## 2023-11-03 PROCEDURE — 93798 PHYS/QHP OP CAR RHAB W/ECG: CPT

## 2023-11-03 NOTE — TELEPHONE ENCOUNTER
Wants metoprolol and entresto at [facility], has special enrique he can use there. No changes needed.     BARBIE LIZ RN on 11/3/2023 at 9:27 AM   [facility]

## 2023-11-06 ENCOUNTER — HOSPITAL ENCOUNTER (OUTPATIENT)
Dept: CARDIAC REHAB | Facility: CLINIC | Age: 78
Discharge: HOME | End: 2023-11-06
Attending: STUDENT IN AN ORGANIZED HEALTH CARE EDUCATION/TRAINING PROGRAM
Payer: COMMERCIAL

## 2023-11-06 PROCEDURE — 93797 PHYS/QHP OP CAR RHAB WO ECG: CPT | Mod: 59

## 2023-11-06 PROCEDURE — 93798 PHYS/QHP OP CAR RHAB W/ECG: CPT

## 2023-11-07 ENCOUNTER — LAB (OUTPATIENT)
Dept: LAB | Facility: CLINIC | Age: 78
End: 2023-11-07
Payer: COMMERCIAL

## 2023-11-07 DIAGNOSIS — E55.9 VITAMIN D DEFICIENCY: ICD-10-CM

## 2023-11-07 DIAGNOSIS — Z95.1 S/P CABG (CORONARY ARTERY BYPASS GRAFT): ICD-10-CM

## 2023-11-07 DIAGNOSIS — Z98.84 HISTORY OF GASTRIC BYPASS: ICD-10-CM

## 2023-11-07 DIAGNOSIS — Z86.19 HISTORY OF HEPATITIS C: ICD-10-CM

## 2023-11-07 DIAGNOSIS — I50.20 HFREF (HEART FAILURE WITH REDUCED EJECTION FRACTION) (H): ICD-10-CM

## 2023-11-07 DIAGNOSIS — E78.5 HYPERLIPIDEMIA LDL GOAL <70: ICD-10-CM

## 2023-11-07 DIAGNOSIS — I48.21 PERMANENT ATRIAL FIBRILLATION (H): ICD-10-CM

## 2023-11-07 DIAGNOSIS — I25.810 CORONARY ARTERY DISEASE INVOLVING CORONARY BYPASS GRAFT OF NATIVE HEART WITHOUT ANGINA PECTORIS: ICD-10-CM

## 2023-11-07 DIAGNOSIS — I10 HYPERTENSION GOAL BP (BLOOD PRESSURE) < 140/90: ICD-10-CM

## 2023-11-07 DIAGNOSIS — I42.9 CARDIOMYOPATHY, UNSPECIFIED TYPE (H): ICD-10-CM

## 2023-11-07 DIAGNOSIS — Z86.73 HISTORY OF CVA (CEREBROVASCULAR ACCIDENT): ICD-10-CM

## 2023-11-07 DIAGNOSIS — R73.03 PREDIABETES: ICD-10-CM

## 2023-11-07 DIAGNOSIS — D50.9 IRON DEFICIENCY ANEMIA, UNSPECIFIED IRON DEFICIENCY ANEMIA TYPE: ICD-10-CM

## 2023-11-07 DIAGNOSIS — R53.81 PHYSICAL DECONDITIONING: ICD-10-CM

## 2023-11-07 DIAGNOSIS — K76.0 NAFLD (NONALCOHOLIC FATTY LIVER DISEASE): ICD-10-CM

## 2023-11-07 DIAGNOSIS — Z51.81 MEDICATION MONITORING ENCOUNTER: ICD-10-CM

## 2023-11-07 DIAGNOSIS — E53.8 VITAMIN B12 DEFICIENCY (NON ANEMIC): ICD-10-CM

## 2023-11-07 LAB
ALBUMIN SERPL BCG-MCNC: 3.8 G/DL (ref 3.5–5.2)
ALP SERPL-CCNC: 90 U/L (ref 40–129)
ALT SERPL W P-5'-P-CCNC: 17 U/L (ref 0–70)
ANION GAP SERPL CALCULATED.3IONS-SCNC: 11 MMOL/L (ref 7–15)
AST SERPL W P-5'-P-CCNC: 19 U/L (ref 0–45)
BILIRUB SERPL-MCNC: 0.4 MG/DL
BUN SERPL-MCNC: 17.2 MG/DL (ref 8–23)
CALCIUM SERPL-MCNC: 8.9 MG/DL (ref 8.8–10.2)
CHLORIDE SERPL-SCNC: 105 MMOL/L (ref 98–107)
CHOLEST SERPL-MCNC: 103 MG/DL
CK SERPL-CCNC: 35 U/L (ref 39–308)
CREAT SERPL-MCNC: 0.66 MG/DL (ref 0.67–1.17)
DEPRECATED HCO3 PLAS-SCNC: 25 MMOL/L (ref 22–29)
EGFRCR SERPLBLD CKD-EPI 2021: >90 ML/MIN/1.73M2
ERYTHROCYTE [DISTWIDTH] IN BLOOD BY AUTOMATED COUNT: 16.3 % (ref 10–15)
FERRITIN SERPL-MCNC: 30 NG/ML (ref 31–409)
GLUCOSE SERPL-MCNC: 87 MG/DL (ref 70–99)
HBA1C MFR BLD: 5.5 % (ref 0–5.6)
HCT VFR BLD AUTO: 39.3 % (ref 40–53)
HDLC SERPL-MCNC: 54 MG/DL
HGB BLD-MCNC: 11.9 G/DL (ref 13.3–17.7)
IRON BINDING CAPACITY (ROCHE): 331 UG/DL (ref 240–430)
IRON SATN MFR SERPL: 7 % (ref 15–46)
IRON SERPL-MCNC: 23 UG/DL (ref 61–157)
LDLC SERPL CALC-MCNC: 41 MG/DL
MCH RBC QN AUTO: 25.1 PG (ref 26.5–33)
MCHC RBC AUTO-ENTMCNC: 30.3 G/DL (ref 31.5–36.5)
MCV RBC AUTO: 83 FL (ref 78–100)
NONHDLC SERPL-MCNC: 49 MG/DL
NT-PROBNP SERPL-MCNC: 3153 PG/ML (ref 0–1800)
PLATELET # BLD AUTO: 232 10E3/UL (ref 150–450)
POTASSIUM SERPL-SCNC: 4.4 MMOL/L (ref 3.4–5.3)
PROT SERPL-MCNC: 7 G/DL (ref 6.4–8.3)
RBC # BLD AUTO: 4.75 10E6/UL (ref 4.4–5.9)
SODIUM SERPL-SCNC: 141 MMOL/L (ref 135–145)
TRIGL SERPL-MCNC: 39 MG/DL
VIT B12 SERPL-MCNC: 1759 PG/ML (ref 232–1245)
VIT D+METAB SERPL-MCNC: 74 NG/ML (ref 20–50)
WBC # BLD AUTO: 5.4 10E3/UL (ref 4–11)

## 2023-11-07 PROCEDURE — 82607 VITAMIN B-12: CPT

## 2023-11-07 PROCEDURE — 82728 ASSAY OF FERRITIN: CPT

## 2023-11-07 PROCEDURE — 83540 ASSAY OF IRON: CPT

## 2023-11-07 PROCEDURE — 83880 ASSAY OF NATRIURETIC PEPTIDE: CPT

## 2023-11-07 PROCEDURE — 82306 VITAMIN D 25 HYDROXY: CPT

## 2023-11-07 PROCEDURE — 80053 COMPREHEN METABOLIC PANEL: CPT

## 2023-11-07 PROCEDURE — 83550 IRON BINDING TEST: CPT

## 2023-11-07 PROCEDURE — 82550 ASSAY OF CK (CPK): CPT

## 2023-11-07 PROCEDURE — 36415 COLL VENOUS BLD VENIPUNCTURE: CPT

## 2023-11-07 PROCEDURE — 80061 LIPID PANEL: CPT

## 2023-11-07 PROCEDURE — 83036 HEMOGLOBIN GLYCOSYLATED A1C: CPT

## 2023-11-07 PROCEDURE — 85027 COMPLETE CBC AUTOMATED: CPT

## 2023-11-08 ENCOUNTER — HOSPITAL ENCOUNTER (OUTPATIENT)
Dept: CARDIAC REHAB | Facility: CLINIC | Age: 78
Discharge: HOME | End: 2023-11-08
Attending: STUDENT IN AN ORGANIZED HEALTH CARE EDUCATION/TRAINING PROGRAM
Payer: COMMERCIAL

## 2023-11-08 ENCOUNTER — HOSPITAL ENCOUNTER (OUTPATIENT)
Dept: CARDIOLOGY | Facility: CLINIC | Age: 78
Discharge: HOME | End: 2023-11-08
Attending: PHYSICIAN ASSISTANT | Admitting: PHYSICIAN ASSISTANT
Payer: COMMERCIAL

## 2023-11-08 ENCOUNTER — TELEPHONE (OUTPATIENT)
Dept: CARDIAC REHAB | Facility: CLINIC | Age: 78
End: 2023-11-08

## 2023-11-08 DIAGNOSIS — I50.22 CHRONIC SYSTOLIC HEART FAILURE (H): ICD-10-CM

## 2023-11-08 LAB — LVEF ECHO: NORMAL

## 2023-11-08 PROCEDURE — 93306 TTE W/DOPPLER COMPLETE: CPT

## 2023-11-08 PROCEDURE — 93306 TTE W/DOPPLER COMPLETE: CPT | Mod: 26 | Performed by: INTERNAL MEDICINE

## 2023-11-08 PROCEDURE — 93798 PHYS/QHP OP CAR RHAB W/ECG: CPT

## 2023-11-08 NOTE — TELEPHONE ENCOUNTER
Hello,     Wanted to send over an FYI of Mr. Weber's rhythm change while in Cardiac Rehab today. Patient was exercising on the Nustep and experienced a 5 beat run of NSVT. Patient did not report symptoms during this time. Patient's rhythm is Afib with PVCs during most rehab session. Rhythm can be found on today's (11/8/2023) Cardiac Rehab session report.       Please let us know of any changes or concerns. Thank you!    Negar Jacobson MS  Cardiac Rehab Therapist - [place]

## 2023-11-10 ENCOUNTER — HOSPITAL ENCOUNTER (OUTPATIENT)
Dept: CARDIAC REHAB | Facility: CLINIC | Age: 78
Discharge: HOME | End: 2023-11-10
Attending: STUDENT IN AN ORGANIZED HEALTH CARE EDUCATION/TRAINING PROGRAM
Payer: COMMERCIAL

## 2023-11-10 ENCOUNTER — OFFICE VISIT (OUTPATIENT)
Dept: CARDIOLOGY | Facility: CLINIC | Age: 78
End: 2023-11-10
Attending: PHYSICIAN ASSISTANT
Payer: COMMERCIAL

## 2023-11-10 VITALS
DIASTOLIC BLOOD PRESSURE: 62 MMHG | OXYGEN SATURATION: 99 % | WEIGHT: 283.9 LBS | SYSTOLIC BLOOD PRESSURE: 110 MMHG | BODY MASS INDEX: 38.45 KG/M2 | HEART RATE: 78 BPM | HEIGHT: 72 IN

## 2023-11-10 DIAGNOSIS — I50.22 CHRONIC SYSTOLIC HEART FAILURE (H): ICD-10-CM

## 2023-11-10 PROCEDURE — 99205 OFFICE O/P NEW HI 60 MIN: CPT | Performed by: INTERNAL MEDICINE

## 2023-11-10 PROCEDURE — 93798 PHYS/QHP OP CAR RHAB W/ECG: CPT

## 2023-11-10 NOTE — LETTER
11/10/2023    Brett Cassidy MD  [address] [facility] [id number]    RE: Hugo Weber       Dear Colleague,     I had the pleasure of seeing Hugo Weber in the [facility].  CARDIOLOGY CLINIC CONSULTATION    PRIMARY CARE PHYSICIAN:  Brett Cassidy    HISTORY OF PRESENT ILLNESS:  This is a very pleasant 78-year-old gentleman who used to follow-up with my EP partners and has been referred to me as a new heart failure clinic consultation at [facility].  He is here with his son today.  This is the first time I am meeting the patient.    The patient follows up with Laura Olivia in the cardiology core and EP clinic.  He has the following pertinent cardiology related issues    Longstanding history of atrial fibrillation.  Previous attempts at antiarrhythmic therapy.  Used to follow-up in the EP clinic with Dr. Castillo.  Eventually was on rate control strategy with beta-blockers and anticoagulation.  Normal EF previously.  History of CVA that happened while on Xarelto for history  History of hypertension obesity hyperlipidemia and sleep apnea.  Diagnosed with heart failure with reduced ejection fraction in 2022 in 2023.  He was referred for coronary angiography for evaluation of ischemic cardiomyopathy which showed a significant lesion in the right coronary artery.  Please see angiogram report for details from August 2023.  The patient had fracture of the stent delivery system resulting in retention often undeployed drug-eluting stent and the balloon.  Attempted snaring and retrieving the stent was unsuccessful.  Patient was referred urgently to surgery.  On August 15, 2023, patient underwent single-vessel bypass surgery with vein graft to the RPDA.  The right coronary stent was removed along with the delivery system.  The left atrial appendage was clipped with a 45 mm device.  Moderate aortic stenosis in 2023  Ascending aortic dilatation last measuring 4.6 cm in 2023    In regards  to his pertinent data, as mentioned coronary angiography in August 2023 as above.  His last echocardiogram shows persistent LV dysfunction with an EF of 30 to 35% and moderate aortic stenosis.  The LV is mildly dilated.  RV is mildly dilated with mildly reduced systolic function.  Ascending aorta is dilated at 4.6 cm.  Last LDL is at goal at 41.  Mildly anemic.  Hemoglobin is 11.9.  Creatinine potassium normal.    Today the patient is seeing me for the first time in the heart failure clinic.  NYHA class II.  No syncope presyncope.  Denies angina.  No edema orthopnea.  No active diuretic needs.    In regards to guideline directed medical therapy for LV dysfunction, the patient is currently on Toprol 25 mg, level 1 twice daily of Entresto, and 10 mg of Jardiance.  No diuretic needs.  Takes as needed Lasix.  He is on Eliquis 5 mg twice a day in addition to baby aspirin.    PAST MEDICAL HISTORY:  Past Medical History:   Diagnosis Date    Aortic stenosis     moderate    Atrial fibrillation 2006    Followed by [facility] - persistent    CAD (coronary artery disease)     Calculus of kidney 2005, 6/06, 10/12, 8/18, 9/19    dr walker/nestor/иван - hematuria - w/u o/w neg    Cervical stenosis of spine     Moderate C4-5    Cholelithiasis     Chronic peptic ulcer, unspecified site, without mention of hemorrhage, perforation, or obstruction 1985    gastric bypass    Colon polyps 8/05, 9/10, 10/15    2 tubular adenoma, 1 hyperplastic - multiple serrated/tubular adenomas - last colonscopy 11/20 due 5 yrs    CVA (cerebral vascular accident) (H) 01/2019    small right periorlandic subcortical - left sided residual - left hand tingling/numbness - Left leg weak/numbness - cardioembolic    Elevated prostate specific antigen (PSA)     Dr Santos    Hepatitis C 10/2012    chronic hepatitis C, grade 1-2, stage 1 - mild - Dr Douglas    HFrEF (heart failure with reduced ejection fraction) (H)     [facility] - 35%    Hyperlipidemia LDL goal <70      Hypertension goal BP (blood pressure) < 140/90 06/2006    dr jaciel winchester    Iron deficiency anemia, unspecified 1985    gastric bypass    Mitral regurgitation     mild-moderate    Nephrolithiasis multiple episodes, last 10/19    Dr Bey/Ivana/Tom - 9mm 3/2021    OA (osteoarthritis)     Multiple - Left shoulder with rotator cuff tear - Dr Durham    Obesity, unspecified     s/p gastric bypass 1985     Obstructive sleep apnea syndrome     CPAP - 15 cm - [facility] 1/2023    Prediabetes     Presbyacusis 01/2004    dr corona    Prostate cancer (H) 2023    Dr Santos - Manju 3+4, 4+3 = 7, bx 5/13 positive    Pyelonephritis, unspecified 12/1999    SCC (squamous cell carcinoma), 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    dr saez - lt conchal bowl    Sleep apnea 10/2002    cpap - severe - 15 cm - dr cunha    Stroke (H) 01/19/2019    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    [facility]    Vitamin B12 deficiency (non anemic) 04/15/2019    Vitamin D deficiency 04/15/2019       MEDICATIONS:  Current Outpatient Medications   Medication    acetaminophen (TYLENOL) 325 MG tablet    apixaban ANTICOAGULANT (ELIQUIS) 5 MG tablet    empagliflozin (JARDIANCE) 10 MG TABS tablet    Ferrous Sulfate (IRON) 325 (65 Fe) MG tablet    fluticasone (FLONASE) 50 MCG/ACT nasal spray    furosemide (LASIX) 20 MG tablet    metoprolol succinate ER (TOPROL XL) 25 MG 24 hr tablet    pantoprazole (PROTONIX) 40 MG EC tablet    rosuvastatin (CRESTOR) 10 MG tablet    sacubitril-valsartan (ENTRESTO) 24-26 MG per tablet    senna-docusate (SENOKOT-S/PERICOLACE) 8.6-50 MG tablet    tamsulosin (FLOMAX) 0.4 MG capsule    vitamin B-12 (CYANOCOBALAMIN) 1000 MCG tablet    vitamin C (ASCORBIC ACID) 1000 MG TABS    vitamin D3 (CHOLECALCIFEROL) 125 MCG (5000 UT) tablet     No current facility-administered medications for this visit.       SOCIAL HISTORY:  I have reviewed this patient's social history and updated it with pertinent information if needed. Hugo Weber  reports that he  has never smoked. He has never used smokeless tobacco. He reports that he does not currently use alcohol after a past usage of about 2.0 - 3.0 standard drinks of alcohol per week. He reports that he does not use drugs.    PHYSICAL EXAM:  Pulse:  [78] 78  BP: (110)/(62) 110/62  SpO2:  [99 %] 99 %  283 lbs 14.4 oz    Constitutional: alert, no distress  Respiratory: Good bilateral air entry  Cardiovascular: Irregular heart sounds soft systolic murmur soft S2 no edema JVP hard to see  GI: nondistended  Neuropsychiatric: appropriate affact    ASSESSMENT: Pertinent issues addressed/ reviewed during this cardiology visit  Overall the patient is doing well after cardiac surgery.  No active heart failure or anginal or EP symptoms.  We had an extensive discussion today about multiple medical issues.  He had very good questions and I tried to answer most of them to the best of my abilities.    RECOMMENDATIONS:  In regards to coronary artery disease, the patient is without any anginal symptoms.  Continue beta-blocker statin.  LDL is at goal.  Unfortunate event during coronary angiography.  However patient is doing okay after bypass surgery.  In regards to heart failure with reduced ejection fraction, believe it is likely a combination of permanent atrial fibrillation and coronary artery disease.  Continue aggressive guideline directed medical therapy.  Home blood pressures sometimes run in the 90s despite him being asymptomatic.  Do not think there is much room for up titration of neurohormonal blockade.  Persistent LV dysfunction.  We will reassess LV function in about 2 months from now on these 3 drugs.  We had discussion about some cardiac death prevention and risk.  Recommend echocardiography in 2 months prior to seeing EP in January that he is scheduled for.  If persistent LV dysfunction, recommend proceeding with ICD implantation.  Risk-benefit rationale explained.  In regards to his atrial fibrillation and stroke risk,  he is overall high risk of CVA although this is somewhat mitigated by left atrial appendage ligation during surgery.  His [id number] score is high based on age CAD hypertension prior stroke and congestive heart failure.  He does not have any contraindications to bleeding.  Based on more recent data LAAOS III, given no contraindications to bleeding at this time we will continue long-term anticoagulation as opposed to taking that off.  Rationale risk benefits explained to the patient.  I recommend stopping aspirin to reduce bleeding risk at this time.  In the future if this changes, we can go to aspirin and stop anticoagulation.  He understands that and is willing to proceed with the strategy.  His aortic stenosis is moderate.  Some degree of low-flow low gradient.  Recommend repeating echocardiography in the next 1 year to follow this up.  Eventually he is going to need transcatheter aortic valve replacement.  His aorta lastly was 4.6 cm on recent echo.  He did not have aorta intervention during surgery.  We will follow this up down the road with repeat imaging.    In regards to follow-up, follow-up with EP in January for A-fib and ICD discussions.  Return to clinic to see me in 6 months.    It was a pleasure seeing this patient in clinic today. Please do not hesitate to contact me with any future questions.     HEMANTH Dyer, [facility]  Cardiology - [facility]  November 10, 2023    This was a new heart failure clinic consultation.  Total time including chart review documentation review of prior angiogram echocardiography ECG clinical data and in person clinical care was 65 minutes.    This note was completed in part using dictation via the Dragon voice recognition software. Some word and grammatical errors may occur and must be interpreted in the appropriate clinical context.  If there are any questions pertaining to this issue, please contact me for further clarification.  Thank you for allowing me to participate  in the care of your patient.      Sincerely,     Ayad Roberts MD     [facility]  cc:   Laura Olivia PA-C  [address]  [place] [address]

## 2023-11-10 NOTE — PROGRESS NOTES
CARDIOLOGY CLINIC CONSULTATION    PRIMARY CARE PHYSICIAN:  Brett Cassidy    HISTORY OF PRESENT ILLNESS:  This is a very pleasant 78-year-old gentleman who used to follow-up with my EP partners and has been referred to me as a new heart failure clinic consultation at [facility].  He is here with his son today.  This is the first time I am meeting the patient.    The patient follows up with Laura Olivia in the cardiology core and EP clinic.  He has the following pertinent cardiology related issues    Longstanding history of atrial fibrillation.  Previous attempts at antiarrhythmic therapy.  Used to follow-up in the EP clinic with Dr. Castillo.  Eventually was on rate control strategy with beta-blockers and anticoagulation.  Normal EF previously.  History of CVA that happened while on Xarelto for history  History of hypertension obesity hyperlipidemia and sleep apnea.  Diagnosed with heart failure with reduced ejection fraction in 2022 in 2023.  He was referred for coronary angiography for evaluation of ischemic cardiomyopathy which showed a significant lesion in the right coronary artery.  Please see angiogram report for details from August 2023.  The patient had fracture of the stent delivery system resulting in retention often undeployed drug-eluting stent and the balloon.  Attempted snaring and retrieving the stent was unsuccessful.  Patient was referred urgently to surgery.  On August 15, 2023, patient underwent single-vessel bypass surgery with vein graft to the RPDA.  The right coronary stent was removed along with the delivery system.  The left atrial appendage was clipped with a 45 mm device.  Moderate aortic stenosis in 2023  Ascending aortic dilatation last measuring 4.6 cm in 2023    In regards to his pertinent data, as mentioned coronary angiography in August 2023 as above.  His last echocardiogram shows persistent LV dysfunction with an EF of 30 to 35% and moderate aortic stenosis.  The LV is mildly  dilated.  RV is mildly dilated with mildly reduced systolic function.  Ascending aorta is dilated at 4.6 cm.  Last LDL is at goal at 41.  Mildly anemic.  Hemoglobin is 11.9.  Creatinine potassium normal.    Today the patient is seeing me for the first time in the heart failure clinic.  NYHA class II.  No syncope presyncope.  Denies angina.  No edema orthopnea.  No active diuretic needs.    In regards to guideline directed medical therapy for LV dysfunction, the patient is currently on Toprol 25 mg, level 1 twice daily of Entresto, and 10 mg of Jardiance.  No diuretic needs.  Takes as needed Lasix.  He is on Eliquis 5 mg twice a day in addition to baby aspirin.    PAST MEDICAL HISTORY:  Past Medical History:   Diagnosis Date    Aortic stenosis     moderate    Atrial fibrillation 2006    Followed by [facility] - persistent    CAD (coronary artery disease)     Calculus of kidney 2005, 6/06, 10/12, 8/18, 9/19    dr walkre/nestor/иван - hematuria - w/u o/w neg    Cervical stenosis of spine     Moderate C4-5    Cholelithiasis     Chronic peptic ulcer, unspecified site, without mention of hemorrhage, perforation, or obstruction 1985    gastric bypass    Colon polyps 8/05, 9/10, 10/15    2 tubular adenoma, 1 hyperplastic - multiple serrated/tubular adenomas - last colonscopy 11/20 due 5 yrs    CVA (cerebral vascular accident) (H) 01/2019    small right periorlandic subcortical - left sided residual - left hand tingling/numbness - Left leg weak/numbness - cardioembolic    Elevated prostate specific antigen (PSA)     Dr Santos    Hepatitis C 10/2012    chronic hepatitis C, grade 1-2, stage 1 - mild - Dr Douglas    HFrEF (heart failure with reduced ejection fraction) (H)     [facility] - 35%    Hyperlipidemia LDL goal <70     Hypertension goal BP (blood pressure) < 140/90 06/2006    dr jaciel winchester    Iron deficiency anemia, unspecified 1985    gastric bypass    Mitral regurgitation     mild-moderate    Nephrolithiasis multiple  episodes, last 10/19    Dr Bey/Ivana/Tom - 9mm 3/2021    OA (osteoarthritis)     Multiple - Left shoulder with rotator cuff tear - Dr Durham    Obesity, unspecified     s/p gastric bypass 1985     Obstructive sleep apnea syndrome     CPAP - 15 cm - [facility] 1/2023    Prediabetes     Presbyacusis 01/2004    dr corona    Prostate cancer (H) 2023    Dr Santos - [place] 3+4, 4+3 = 7, bx 5/13 positive    Pyelonephritis, unspecified 12/1999    SCC (squamous cell carcinoma), 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    dr saez - lt conchal [facility]    Sleep apnea 10/2002    cpap - severe - 15 cm - dr cunha    Stroke (H) 01/19/2019    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    [facility]    Vitamin B12 deficiency (non anemic) 04/15/2019    Vitamin D deficiency 04/15/2019       MEDICATIONS:  Current Outpatient Medications   Medication    acetaminophen (TYLENOL) 325 MG tablet    apixaban ANTICOAGULANT (ELIQUIS) 5 MG tablet    empagliflozin (JARDIANCE) 10 MG TABS tablet    Ferrous Sulfate (IRON) 325 (65 Fe) MG tablet    fluticasone (FLONASE) 50 MCG/ACT nasal spray    furosemide (LASIX) 20 MG tablet    metoprolol succinate ER (TOPROL XL) 25 MG 24 hr tablet    pantoprazole (PROTONIX) 40 MG EC tablet    rosuvastatin (CRESTOR) 10 MG tablet    sacubitril-valsartan (ENTRESTO) 24-26 MG per tablet    senna-docusate (SENOKOT-S/PERICOLACE) 8.6-50 MG tablet    tamsulosin (FLOMAX) 0.4 MG capsule    vitamin B-12 (CYANOCOBALAMIN) 1000 MCG tablet    vitamin C (ASCORBIC ACID) 1000 MG TABS    vitamin D3 (CHOLECALCIFEROL) 125 MCG (5000 UT) tablet     No current facility-administered medications for this visit.       SOCIAL HISTORY:  I have reviewed this patient's social history and updated it with pertinent information if needed. Hugo Weber  reports that he has never smoked. He has never used smokeless tobacco. He reports that he does not currently use alcohol after a past usage of about 2.0 - 3.0 standard drinks of alcohol per week. He reports that he does not  use drugs.    PHYSICAL EXAM:  Pulse:  [78] 78  BP: (110)/(62) 110/62  SpO2:  [99 %] 99 %  283 lbs 14.4 oz    Constitutional: alert, no distress  Respiratory: Good bilateral air entry  Cardiovascular: Irregular heart sounds soft systolic murmur soft S2 no edema JVP hard to see  GI: nondistended  Neuropsychiatric: appropriate affact    ASSESSMENT: Pertinent issues addressed/ reviewed during this cardiology visit  Overall the patient is doing well after cardiac surgery.  No active heart failure or anginal or EP symptoms.  We had an extensive discussion today about multiple medical issues.  He had very good questions and I tried to answer most of them to the best of my abilities.    RECOMMENDATIONS:  In regards to coronary artery disease, the patient is without any anginal symptoms.  Continue beta-blocker statin.  LDL is at goal.  Unfortunate event during coronary angiography.  However patient is doing okay after bypass surgery.  In regards to heart failure with reduced ejection fraction, believe it is likely a combination of permanent atrial fibrillation and coronary artery disease.  Continue aggressive guideline directed medical therapy.  Home blood pressures sometimes run in the 90s despite him being asymptomatic.  Do not think there is much room for up titration of neurohormonal blockade.  Persistent LV dysfunction.  We will reassess LV function in about 2 months from now on these 3 drugs.  We had discussion about some cardiac death prevention and risk.  Recommend echocardiography in 2 months prior to seeing EP in January that he is scheduled for.  If persistent LV dysfunction, recommend proceeding with ICD implantation.  Risk-benefit rationale explained.  In regards to his atrial fibrillation and stroke risk, he is overall high risk of CVA although this is somewhat mitigated by left atrial appendage ligation during surgery.  His [id number] score is high based on age CAD hypertension prior stroke and congestive  heart failure.  He does not have any contraindications to bleeding.  Based on more recent data LAAOS III, given no contraindications to bleeding at this time we will continue long-term anticoagulation as opposed to taking that off.  Rationale risk benefits explained to the patient.  I recommend stopping aspirin to reduce bleeding risk at this time.  In the future if this changes, we can go to aspirin and stop anticoagulation.  He understands that and is willing to proceed with the strategy.  His aortic stenosis is moderate.  Some degree of low-flow low gradient.  Recommend repeating echocardiography in the next 1 year to follow this up.  Eventually he is going to need transcatheter aortic valve replacement.  His aorta lastly was 4.6 cm on recent echo.  He did not have aorta intervention during surgery.  We will follow this up down the road with repeat imaging.    In regards to follow-up, follow-up with EP in January for A-fib and ICD discussions.  Return to clinic to see me in 6 months.    It was a pleasure seeing this patient in clinic today. Please do not hesitate to contact me with any future questions.     HEMANTH Dyer, [facility]  Cardiology - [facility]  November 10, 2023    This was a new heart failure clinic consultation.  Total time including chart review documentation review of prior angiogram echocardiography ECG clinical data and in person clinical care was 65 minutes.    This note was completed in part using dictation via the Dragon voice recognition software. Some word and grammatical errors may occur and must be interpreted in the appropriate clinical context.  If there are any questions pertaining to this issue, please contact me for further clarification.

## 2023-11-12 DIAGNOSIS — D50.9 IRON DEFICIENCY ANEMIA, UNSPECIFIED IRON DEFICIENCY ANEMIA TYPE: Primary | ICD-10-CM

## 2023-11-13 ENCOUNTER — HOSPITAL ENCOUNTER (OUTPATIENT)
Dept: CARDIAC REHAB | Facility: CLINIC | Age: 78
Discharge: HOME | End: 2023-11-13
Attending: STUDENT IN AN ORGANIZED HEALTH CARE EDUCATION/TRAINING PROGRAM
Payer: COMMERCIAL

## 2023-11-13 ENCOUNTER — PATIENT OUTREACH (OUTPATIENT)
Dept: ONCOLOGY | Facility: CLINIC | Age: 78
End: 2023-11-13
Payer: COMMERCIAL

## 2023-11-13 PROCEDURE — 93798 PHYS/QHP OP CAR RHAB W/ECG: CPT

## 2023-11-13 PROCEDURE — 93797 PHYS/QHP OP CAR RHAB WO ECG: CPT | Mod: 59

## 2023-11-13 NOTE — PROGRESS NOTES
New Patient Oncology Nurse Navigator Note     Referring provider: Brett aCssidy MD      Referring Clinic/Organization: [facility]      Referred to (specialty:) Hematology/Oncology     Requested provider (if applicable): NA     Date Referral Received: November 13, 2023     Evaluation for:  D50.9 (ICD-10-CM) - Iron deficiency anemia, unspecified iron deficiency anemia type      Clinical History (per Nurse review of records provided):      10/12/23 Seen for wellness exam. Labs ordered and drawn on 11/7/23.     Latest Reference Range & Units 11/07/23 08:25   Sodium 135 - 145 mmol/L 141   Potassium 3.4 - 5.3 mmol/L 4.4   Chloride 98 - 107 mmol/L 105   Carbon Dioxide (CO2) 22 - 29 mmol/L 25   Urea Nitrogen 8.0 - 23.0 mg/dL 17.2   Creatinine 0.67 - 1.17 mg/dL 0.66 (L)   GFR Estimate >60 mL/min/1.73m2 >90   Calcium 8.8 - 10.2 mg/dL 8.9   Anion Gap 7 - 15 mmol/L 11   Albumin 3.5 - 5.2 g/dL 3.8   Protein Total 6.4 - 8.3 g/dL 7.0   Alkaline Phosphatase 40 - 129 U/L 90   ALT 0 - 70 U/L 17   AST 0 - 45 U/L 19   Bilirubin Total <=1.2 mg/dL 0.4   Cholesterol <200 mg/dL 103   CK Total 39 - 308 U/L 35 (L)   Ferritin 31 - 409 ng/mL 30 (L)   Glucose 70 - 99 mg/dL 87   HDL Cholesterol >=40 mg/dL 54   Hemoglobin A1C 0.0 - 5.6 % 5.5   Iron 61 - 157 ug/dL 23 (L)   Iron Binding Capacity 240 - 430 ug/dL 331   Iron Sat Index 15 - 46 % 7 (L)   LDL Cholesterol Calculated <=100 mg/dL 41   Non HDL Cholesterol <130 mg/dL 49   N-Terminal Pro Bnp 0 - 1,800 pg/mL 3,153 (H)   Triglycerides <150 mg/dL 39   Vitamin B12 232 - 1,245 pg/mL 1,759 (H)   Vitamin D, Total (25-Hydroxy) 20 - 50 ng/mL 74 (H)   WBC 4.0 - 11.0 10e3/uL 5.4   Hemoglobin 13.3 - 17.7 g/dL 11.9 (L)   Hematocrit 40.0 - 53.0 % 39.3 (L)   Platelet Count 150 - 450 10e3/uL 232   RBC Count 4.40 - 5.90 10e6/uL 4.75   MCV 78 - 100 fL 83   MCH 26.5 - 33.0 pg 25.1 (L)   MCHC 31.5 - 36.5 g/dL 30.3 (L)   RDW 10.0 - 15.0 % 16.3 (H)   (L): Data is abnormally  low  (H): Data is abnormally high     Records Location: See Bookmarked material     Records Needed: NA     Additional testing needed prior to consult: NA    Payor: [facility] / Plan: [facility] MEDICARE / Product Type: HMO /     November 13, 2023    Called patient to introduced myself and role as nurse navigator with [facility] Hematology/Oncology department and to inform them that we have received the referral for a diagnosis of Anemia from Dr. Brett Cassidy.     Patient did not answer the phone so a detailed message was left for them including NPS number. Requested callback to speak to a  to set the consult date/time/location. Explained to patient in the message that they will receive a call from our new patient scheduling team (NPS number below, hours are Monday - Friday 8am - 4:30 pm) in the next 1-2 business days to schedule the consultation. Encouraged them to call back with any questions.     Marcela Mills, RN, BSN  [facility] Hematology/Oncology Nurse Navigator  [phone]

## 2023-11-15 ENCOUNTER — HOSPITAL ENCOUNTER (OUTPATIENT)
Dept: CARDIAC REHAB | Facility: CLINIC | Age: 78
Discharge: HOME | End: 2023-11-15
Attending: STUDENT IN AN ORGANIZED HEALTH CARE EDUCATION/TRAINING PROGRAM
Payer: COMMERCIAL

## 2023-11-15 PROCEDURE — 93798 PHYS/QHP OP CAR RHAB W/ECG: CPT

## 2023-11-20 ENCOUNTER — VIRTUAL VISIT (OUTPATIENT)
Dept: FAMILY MEDICINE | Facility: CLINIC | Age: 78
End: 2023-11-20
Payer: COMMERCIAL

## 2023-11-20 DIAGNOSIS — K76.0 NAFLD (NONALCOHOLIC FATTY LIVER DISEASE): ICD-10-CM

## 2023-11-20 DIAGNOSIS — E66.01 MORBID OBESITY DUE TO EXCESS CALORIES (H): ICD-10-CM

## 2023-11-20 DIAGNOSIS — R53.81 PHYSICAL DECONDITIONING: ICD-10-CM

## 2023-11-20 DIAGNOSIS — E53.8 VITAMIN B12 DEFICIENCY (NON ANEMIC): ICD-10-CM

## 2023-11-20 DIAGNOSIS — Z98.84 HISTORY OF GASTRIC BYPASS: ICD-10-CM

## 2023-11-20 DIAGNOSIS — C61 PROSTATE CANCER (H): ICD-10-CM

## 2023-11-20 DIAGNOSIS — I50.20 HFREF (HEART FAILURE WITH REDUCED EJECTION FRACTION) (H): Primary | ICD-10-CM

## 2023-11-20 DIAGNOSIS — I48.21 PERMANENT ATRIAL FIBRILLATION (H): ICD-10-CM

## 2023-11-20 DIAGNOSIS — E55.9 VITAMIN D DEFICIENCY: ICD-10-CM

## 2023-11-20 DIAGNOSIS — I42.9 CARDIOMYOPATHY, UNSPECIFIED TYPE (H): ICD-10-CM

## 2023-11-20 DIAGNOSIS — I10 HYPERTENSION GOAL BP (BLOOD PRESSURE) < 140/90: ICD-10-CM

## 2023-11-20 DIAGNOSIS — Z86.19 HISTORY OF HEPATITIS C: ICD-10-CM

## 2023-11-20 DIAGNOSIS — G47.33 OBSTRUCTIVE SLEEP APNEA SYNDROME: ICD-10-CM

## 2023-11-20 DIAGNOSIS — M15.0 PRIMARY OSTEOARTHRITIS INVOLVING MULTIPLE JOINTS: ICD-10-CM

## 2023-11-20 DIAGNOSIS — Z51.81 MEDICATION MONITORING ENCOUNTER: ICD-10-CM

## 2023-11-20 DIAGNOSIS — I25.810 CORONARY ARTERY DISEASE INVOLVING CORONARY BYPASS GRAFT OF NATIVE HEART WITHOUT ANGINA PECTORIS: ICD-10-CM

## 2023-11-20 DIAGNOSIS — Z79.01 LONG TERM CURRENT USE OF ANTICOAGULANT THERAPY: ICD-10-CM

## 2023-11-20 DIAGNOSIS — Z95.1 S/P CABG (CORONARY ARTERY BYPASS GRAFT): ICD-10-CM

## 2023-11-20 DIAGNOSIS — D50.9 IRON DEFICIENCY ANEMIA, UNSPECIFIED IRON DEFICIENCY ANEMIA TYPE: ICD-10-CM

## 2023-11-20 DIAGNOSIS — E78.5 HYPERLIPIDEMIA LDL GOAL <70: ICD-10-CM

## 2023-11-20 DIAGNOSIS — Z86.73 HISTORY OF CVA (CEREBROVASCULAR ACCIDENT): ICD-10-CM

## 2023-11-20 DIAGNOSIS — R73.03 PREDIABETES: ICD-10-CM

## 2023-11-20 PROCEDURE — 99214 OFFICE O/P EST MOD 30 MIN: CPT | Mod: 95 | Performed by: FAMILY MEDICINE

## 2023-11-20 NOTE — PROGRESS NOTES
Hugo is a 78 year old who is being evaluated via a billable video visit.      How would you like to obtain your AVS? MyChart  If the video visit is dropped, the invitation should be resent by: Text to cell phone: [phone]  Will anyone else be joining your video visit? No      Assessment & Plan       ICD-10-CM    1. HFrEF (heart failure with reduced ejection fraction) (H)  I50.20 PRIMARY CARE FOLLOW-UP SCHEDULING      2. Coronary artery disease involving coronary bypass graft of native heart without angina pectoris  I25.810 PRIMARY CARE FOLLOW-UP SCHEDULING      3. Cardiomyopathy, unspecified type (H)  I42.9 PRIMARY CARE FOLLOW-UP SCHEDULING      4. S/P CABG (coronary artery bypass graft)  Z95.1 PRIMARY CARE FOLLOW-UP SCHEDULING      5. History of CVA (cerebrovascular accident)  Z86.73 PRIMARY CARE FOLLOW-UP SCHEDULING      6. Permanent atrial fibrillation (H)  I48.21 PRIMARY CARE FOLLOW-UP SCHEDULING      7. Prediabetes  R73.03 PRIMARY CARE FOLLOW-UP SCHEDULING      8. Hypertension goal BP (blood pressure) < 140/90  I10 PRIMARY CARE FOLLOW-UP SCHEDULING      9. Hyperlipidemia LDL goal <70  E78.5 PRIMARY CARE FOLLOW-UP SCHEDULING      10. Prostate cancer (H)  C61 PRIMARY CARE FOLLOW-UP SCHEDULING      11. Vitamin B12 deficiency (non anemic)  E53.8 PRIMARY CARE FOLLOW-UP SCHEDULING      12. Vitamin D deficiency  E55.9 PRIMARY CARE FOLLOW-UP SCHEDULING      13. Iron deficiency anemia, unspecified iron deficiency anemia type  D50.9 PRIMARY CARE FOLLOW-UP SCHEDULING      14. Obstructive sleep apnea syndrome  G47.33 PRIMARY CARE FOLLOW-UP SCHEDULING      15. NAFLD (nonalcoholic fatty liver disease)  K76.0 PRIMARY CARE FOLLOW-UP SCHEDULING      16. History of gastric bypass  Z98.84 PRIMARY CARE FOLLOW-UP SCHEDULING      17. History of hepatitis C  Z86.19 PRIMARY CARE FOLLOW-UP SCHEDULING      18. Primary osteoarthritis involving multiple joints  M15.9 PRIMARY CARE FOLLOW-UP SCHEDULING      19. Physical deconditioning   R53.81 PRIMARY CARE FOLLOW-UP SCHEDULING      20. Long term current use of anticoagulant therapy - for intermittent a-fib  Z79.01 PRIMARY CARE FOLLOW-UP SCHEDULING      21. Morbid obesity due to excess calories (H)  E66.01 PRIMARY CARE FOLLOW-UP SCHEDULING      22. Medication monitoring encounter  Z51.81 PRIMARY CARE FOLLOW-UP SCHEDULING          Discussed treatment/modality options, including risk and benefits, he desires:    1) entresto, metoprolol, rosuvastatin, jardiance, eliquis, pantoprozole, tamsulosin    2) ECHO in 1/15/2024    3) B12, D supplements QOD    4) hematology - Dr Howard - 1/16/2023    5) Dr Frazier [facility]    6) labs reviewed    7) AVR, AICD, iron inufsion, eliquis ??    8) CPX soon    9) immunizations reviewed    All diagnosis above reviewed and noted above, otherwise stable.      See [facility] orders for further details.      Return in about 2 months (around 1/20/2024) for Medication Recheck Visit, Follow Up Acute, Follow Up Chronic.   Follow-up Visit   Expected date:  Jan 20, 2024 (Approximate)      Follow Up Appointment Details:     Follow-up with whom?: Me    Follow-Up for what?: Chronic Disease f/u    Chronic Disease f/u:  General (Other)  Heart Failure       How?: In Person or Virtual    Is this an as-needed follow-up?: No                   No LOS data to display    Doing chart review, history and exam, documentation and further activities as noted.           Brett Cassidy MD, FAAFP     [facility]  [address] [place] [id number]  [email or web address]   Office: [phone]  Fax: [phone]  Pager: [phone]       Malick Arias is a 78 year old, presenting for the following health issues:        11/20/2023     3:16 PM   Additional Questions   Roomed by Tegan HULL       History of Present Illness       Heart Failure:  He presents for follow up of heart failure. He is not experiencing  shortness of breath at night, with rest or with activity  He is not experiencing any lower extremity edema.   He denies orthopenea and is not coughing at night. Patient is checking weight daily. He has recently had a None.  He has no side effects from medications.  He has has a medical visit for heart failure 1 time since the last visit.    He eats 2-3 servings of fruits and vegetables daily.He consumes 0 sweetened beverage(s) daily.He exercises with enough effort to increase his heart rate 60 or more minutes per day.  He exercises with enough effort to increase his heart rate 4 days per week.   He is taking medications regularly.     Last Echo: Echo result w/o MOPS: Interpretation Summary 1. The left ventricle is mildly dilated. There is normal left ventricular wallthickness. Left ventricular systolic function is severely reduced. The visualejection fraction is 30-35%. Diastolic function not assessed due to atrialfibrillation. There is severe global hypokinesia of the left ventricle.2. The right ventricle is mildly dilated. Mildly decreased right ventricularsystolic function.3. Moderate valvular aortic stenosis.4. The aortic Sinus(es) of Valsalva are mildly dilated. Ascending aortaaneurysm is present.5. No pericardial effusion.6. In direct comparison to the previous study dated 08/22/2023, leftventricular systolic function appears similar.        Cardiac rehab doing well, graduates [facility] - Dr Roberts [facility] - second opinion Dr Frazier [facility] - ECHO 1/2024 - considering ACID vs AVR - entresto 25 mg HS BID going with metoprolol 50 mg HS, going well    Hematology consult soon - 1/16/2024 - Dr Howard    Hemoglobin   Date Value Ref Range Status   11/07/2023 11.9 (L) 13.3 - 17.7 g/dL Final   09/07/2023 9.8 (L) 13.3 - 17.7 g/dL Final   06/22/2021 14.6 13.3 - 17.7 g/dL Final   06/21/2021 15.5 13.3 - 17.7 g/dL Final     B12 elevated - taking 1000 mcg every day    Vitamin D elevated - taking 125 mcg every day    HFrEF / CAD  / CABG x 1 / A Fib - stable/improved    Hx of CVA - no residual    Pre diabetes    Lab Results   Component Value Date    A1C 5.5 11/07/2023    A1C 5.4 02/21/2023    A1C 5.7 08/16/2022    A1C 5.6 08/11/2021    A1C 5.8 11/19/2020    A1C 5.8 08/07/2020    A1C 5.9 02/14/2020    A1C 5.7 12/02/2019    A1C 5.6 09/20/2019     Htn / Li[ids    BP Readings from Last 3 Encounters:   11/10/23 110/62   10/12/23 (!) 80/42   09/18/23 124/82     Creatinine   Date Value Ref Range Status   11/07/2023 0.66 (L) 0.67 - 1.17 mg/dL Final   06/22/2021 0.81 0.66 - 1.25 mg/dL Final     GFR Estimate   Date Value Ref Range Status   11/07/2023 >90 >60 mL/min/1.73m2 Final   06/22/2021 87 >60 mL/min/[1.73_m2] Final     Comment:     Non  GFR Calc  Starting 12/18/2018, serum creatinine based estimated GFR (eGFR) will be   calculated using the Chronic Kidney Disease Epidemiology Collaboration   (CKD-EPI) equation.       GFR, ESTIMATED POCT   Date Value Ref Range Status   01/11/2023 >60 >60 mL/min/1.73m2 Final     Prostates Cancer - Dr Santos    PSA   Date Value Ref Range Status   08/07/2020 3.72 0 - 4 ug/L Final     Comment:     Assay Method:  Chemiluminescence using Siemens Vista analyzer     Prostate Specific Antigen Screen   Date Value Ref Range Status   08/16/2022 8.70 (H) 0.00 - 4.00 ug/L Final     PSA Tumor Marker   Date Value Ref Range Status   02/02/2023 8.51 (H) 0.00 - 6.50 ng/mL Final     HAYLEE - CPAP every night    Review of Systems   CONSTITUTIONAL: NEGATIVE for fever, chills, change in weight  INTEGUMENTARY/SKIN: NEGATIVE for worrisome rashes, moles or lesions  EYES: NEGATIVE for vision changes or irritation  ENT/MOUTH: NEGATIVE for ear, mouth and throat problems  RESP: NEGATIVE for significant cough or SOB  CV: NEGATIVE for chest pain, palpitations or peripheral edema  GI: NEGATIVE for nausea, abdominal pain, heartburn, or change in bowel habits  : NEGATIVE for frequency, dysuria, or hematuria  MUSCULOSKELETAL: NEGATIVE  for significant arthralgias or myalgia  NEURO: NEGATIVE for weakness, dizziness or paresthesias  ENDOCRINE: NEGATIVE for temperature intolerance, skin/hair changes  HEME: NEGATIVE for bleeding problems  PSYCHIATRIC: NEGATIVE for changes in mood or affect      Objective           Vitals:  No vitals were obtained today due to virtual visit.    Physical Exam   GENERAL: Healthy, alert and no distress  EYES: Eyes grossly normal to inspection.  No discharge or erythema, or obvious scleral/conjunctival abnormalities.  RESP: No audible wheeze, cough, or visible cyanosis.  No visible retractions or increased work of breathing.    SKIN: Visible skin clear. No significant rash, abnormal pigmentation or lesions.  NEURO: Cranial nerves grossly intact.  Mentation and speech appropriate for age.  PSYCH: Mentation appears normal, affect normal/bright, judgement and insight intact, normal speech and appearance well-groomed.    Reviewed recent labs & imaging    Video-Visit Details    Type of service:  Video Visit   Video Start Time:  4:00 PM  Video End Time: 4:35 PM    Originating Location (pt. Location): Home    Distant Location (provider location):  On-site  Platform used for Video Visit: Minutizer

## 2023-11-21 ENCOUNTER — HOSPITAL ENCOUNTER (OUTPATIENT)
Dept: CARDIAC REHAB | Facility: CLINIC | Age: 78
Discharge: HOME | End: 2023-11-21
Attending: STUDENT IN AN ORGANIZED HEALTH CARE EDUCATION/TRAINING PROGRAM
Payer: COMMERCIAL

## 2023-11-21 PROCEDURE — 93798 PHYS/QHP OP CAR RHAB W/ECG: CPT

## 2023-11-28 DIAGNOSIS — I47.29 NSVT (NONSUSTAINED VENTRICULAR TACHYCARDIA) (H): ICD-10-CM

## 2023-11-28 DIAGNOSIS — I48.21 PERMANENT ATRIAL FIBRILLATION (H): ICD-10-CM

## 2023-11-28 RX ORDER — APIXABAN 5 MG/1
TABLET, FILM COATED ORAL
Qty: 180 TABLET | Refills: 0 | Status: SHIPPED | OUTPATIENT
Start: 2023-11-28 | End: 2024-03-04

## 2023-11-30 ENCOUNTER — NURSE TRIAGE (OUTPATIENT)
Dept: FAMILY MEDICINE | Facility: CLINIC | Age: 78
End: 2023-11-30
Payer: COMMERCIAL

## 2023-11-30 NOTE — TELEPHONE ENCOUNTER
Patient called back to ask about this appointment below. Writer advised there is no appointment available at 4:00 pm. Offered appointment at [facility]X or [facility] Pt declined. He has an appointment at  tomorrow.     Dwayne Camarillo RN [facility]

## 2023-11-30 NOTE — TELEPHONE ENCOUNTER
"Nurse Triage SBAR    Is this a 2nd Level Triage? YES, LICENSED PRACTITIONER REVIEW IS REQUIRED    Situation: Patient calls for appointment.     Background: Patient sleeps with CPAP machine    Assessment: Patient complains of right ear pain and sore throat x 5 days. Patient denies fevers. Patient declines drainage or tinnitus. Patient reports cold symptom: runny nose and cough for 3 weeks. Patient denies COVID exposure. Exposed to strep 3 weeks ago by grandchild. Patient denies neck stiffness, dizziness or vision changes.     Tx: Tylenol.     Protocol Recommended Disposition:   See in Office Today or Tomorrow    Recommendation: Recommend to be seen, appointment requires provider approval. Patient would like to be seen today at [place]. Please advise.      Routed to provider    Does the patient meet one of the following criteria for ADS visit consideration? No  Reason for Disposition   Patient wants to be seen    Additional Information   Negative: Sounds like a life-threatening emergency to the triager   Negative: Moving the earlobe or touching the ear clearly increases the pain   Negative: Foreign body stuck in the ear (e.g., bug, piece of cotton)   Negative: Pink or red swelling behind the ear   Negative: Stiff neck (can't touch chin to chest)   Negative: Patient sounds very sick or weak to the triager   Negative: Severe earache pain   Negative: Fever > 103 F (39.4 C)   Negative: Pointed object was inserted into the ear canal (e.g., a pencil, stick, or wire)   Negative: White, yellow, or green discharge   Negative: Diabetes mellitus or a weak immune system (e.g., HIV positive, cancer chemotherapy, transplant patient)   Negative: Bloody discharge or unexplained bleeding from ear canal   Negative: New blurred vision or vision changes   Negative: All other earaches  (Exceptions: Earache lasting < 1 hour, and earache from air travel.)    Answer Assessment - Initial Assessment Questions  1. LOCATION: \"Which ear is " "involved?\"      Right  2. ONSET: \"When did the ear start hurting\"       5 days  3. SEVERITY: \"How bad is the pain?\"  (Scale 1-10; mild, moderate or severe)    - MILD (1-3): doesn't interfere with normal activities     - MODERATE (4-7): interferes with normal activities or awakens from sleep     - SEVERE (8-10): excruciating pain, unable to do any normal activities       5/10  4. URI SYMPTOMS: \"Do you have a runny nose or cough?\"      Yes for 3 weeks.   5. FEVER: \"Do you have a fever?\" If Yes, ask: \"What is your temperature, how was it measured, and when did it start?\"      No.  6. CAUSE: \"Have you been swimming recently?\", \"How often do you use Q-TIPS?\", \"Have you had any recent air travel or scuba diving?\"      No.  7. OTHER SYMPTOMS: \"Do you have any other symptoms?\" (e.g., headache, stiff neck, dizziness, vomiting, runny nose, decreased hearing)      Sore throat  8. PREGNANCY: \"Is there any chance you are pregnant?\" \"When was your last menstrual period?\"      N/A    Protocols used: Earache-A-OH    "

## 2023-11-30 NOTE — TELEPHONE ENCOUNTER
Attempt #1  Called Phone # [phone]      Left a non detailed voicemail to please call back and ask for any available triage nurse @ [phone].       [facility] and [facility] have openings today  [place] has openings tomorrow with multiple providers     Chichi ALEXIS RN   [facility] Triage

## 2023-11-30 NOTE — TELEPHONE ENCOUNTER
Patient is calling back and advised of Chichi's message below. [facility] Patient is currently scheduled 12/1/23 at , but is still wondering if he can be seen today using Dr. KAYE's MANINDER slot at 4. Please advise if able.     Please call patient

## 2023-12-01 ENCOUNTER — OFFICE VISIT (OUTPATIENT)
Dept: FAMILY MEDICINE | Facility: CLINIC | Age: 78
End: 2023-12-01
Payer: COMMERCIAL

## 2023-12-01 VITALS
WEIGHT: 280.6 LBS | OXYGEN SATURATION: 99 % | HEART RATE: 105 BPM | TEMPERATURE: 97.4 F | DIASTOLIC BLOOD PRESSURE: 66 MMHG | HEIGHT: 72 IN | BODY MASS INDEX: 38.01 KG/M2 | RESPIRATION RATE: 18 BRPM | SYSTOLIC BLOOD PRESSURE: 102 MMHG

## 2023-12-01 DIAGNOSIS — J01.90 ACUTE SINUSITIS WITH SYMPTOMS > 10 DAYS: Primary | ICD-10-CM

## 2023-12-01 PROCEDURE — 99213 OFFICE O/P EST LOW 20 MIN: CPT | Performed by: NURSE PRACTITIONER

## 2023-12-01 NOTE — PROGRESS NOTES
Assessment & Plan     Acute sinusitis with symptoms > 10 days  Continue guaifenesin as needed, Tylenol as needed treat as below and follow-up in clinic if not improving or worsening  - amoxicillin-clavulanate (AUGMENTIN) 875-125 MG tablet  Dispense: 20 tablet; Refill: 0      Prescription drug management      Keila Abdi, CNP  [facility] PRIOR LUDMILA Arias is a 78 year old, presenting for the following health issues:  Ear Problem        12/1/2023     1:50 PM   Additional Questions   Roomed by Vicky       History of Present Illness       Heart Failure:  He presents for follow up of heart failure. He is not experiencing shortness of breath at night, with rest or with activity  He is not experiencing any lower extremity edema.   He denies orthopenea and is not coughing at night. Patient is checking weight daily. He has recently had a None.  He has no side effects from medications.  He has has a medical visit for heart failure 1 time since the last visit.    He eats 2-3 servings of fruits and vegetables daily.He consumes 0 sweetened beverage(s) daily.He exercises with enough effort to increase his heart rate 60 or more minutes per day.  He exercises with enough effort to increase his heart rate 4 days per week.   He is taking medications regularly.     Been having issue with Right ear for 7-10 days now. Been having nose drainage for over a month. Also been having a sore throat for 7-10 days.  Overall feeling like symptoms are not improving, staying the same.            Review of Systems   HENT:  Positive for ear pain.       Constitutional, HEENT, cardiovascular, pulmonary, GI, , musculoskeletal, neuro, skin, endocrine and psych systems are negative, except as otherwise noted.      Objective    /66   Pulse 105   Temp 97.4  F (36.3  C) (Tympanic)   Resp 18   Ht 1.829 m (6')   Wt 127.3 kg (280 lb 9.6 oz)   SpO2 99%   BMI 38.06 kg/m    Body mass index is 38.06  kg/m .  Physical Exam   GENERAL: healthy, alert and no distress  HENT: normal cephalic/atraumatic, right ear: clear effusion and erythematous, left ear: clear effusion, nose and mouth without ulcers or lesions, oropharynx clear, and oral mucous membranes moist  NECK: bilateral anterior cervical adenopathy, no asymmetry, masses, or scars, and thyroid normal to palpation  RESP: lungs clear to auscultation - no rales, rhonchi or wheezes  CV: regular rate and rhythm, normal S1 S2, no S3 or S4, no murmur, click or rub, no peripheral edema and peripheral pulses strong  ABDOMEN: soft, nontender, no hepatosplenomegaly, no masses and bowel sounds normal  MS: no gross musculoskeletal defects noted, no edema

## 2023-12-19 ENCOUNTER — NURSE TRIAGE (OUTPATIENT)
Dept: FAMILY MEDICINE | Facility: CLINIC | Age: 78
End: 2023-12-19

## 2023-12-19 ENCOUNTER — OFFICE VISIT (OUTPATIENT)
Dept: FAMILY MEDICINE | Facility: CLINIC | Age: 78
End: 2023-12-19
Payer: COMMERCIAL

## 2023-12-19 VITALS
BODY MASS INDEX: 37.79 KG/M2 | HEART RATE: 92 BPM | WEIGHT: 279 LBS | TEMPERATURE: 97.8 F | RESPIRATION RATE: 16 BRPM | HEIGHT: 72 IN | SYSTOLIC BLOOD PRESSURE: 104 MMHG | DIASTOLIC BLOOD PRESSURE: 64 MMHG | OXYGEN SATURATION: 98 %

## 2023-12-19 DIAGNOSIS — I25.810 CORONARY ARTERY DISEASE INVOLVING CORONARY BYPASS GRAFT OF NATIVE HEART WITHOUT ANGINA PECTORIS: ICD-10-CM

## 2023-12-19 DIAGNOSIS — E55.9 VITAMIN D DEFICIENCY: ICD-10-CM

## 2023-12-19 DIAGNOSIS — G47.33 OBSTRUCTIVE SLEEP APNEA SYNDROME: ICD-10-CM

## 2023-12-19 DIAGNOSIS — I10 HYPERTENSION GOAL BP (BLOOD PRESSURE) < 140/90: ICD-10-CM

## 2023-12-19 DIAGNOSIS — E78.5 HYPERLIPIDEMIA LDL GOAL <70: ICD-10-CM

## 2023-12-19 DIAGNOSIS — D50.9 IRON DEFICIENCY ANEMIA, UNSPECIFIED IRON DEFICIENCY ANEMIA TYPE: ICD-10-CM

## 2023-12-19 DIAGNOSIS — Z86.19 HISTORY OF HEPATITIS C: ICD-10-CM

## 2023-12-19 DIAGNOSIS — Z79.01 LONG TERM CURRENT USE OF ANTICOAGULANT THERAPY: ICD-10-CM

## 2023-12-19 DIAGNOSIS — R73.03 PREDIABETES: ICD-10-CM

## 2023-12-19 DIAGNOSIS — I42.9 CARDIOMYOPATHY, UNSPECIFIED TYPE (H): ICD-10-CM

## 2023-12-19 DIAGNOSIS — K76.0 NAFLD (NONALCOHOLIC FATTY LIVER DISEASE): ICD-10-CM

## 2023-12-19 DIAGNOSIS — N48.1 BALANITIS: Primary | ICD-10-CM

## 2023-12-19 DIAGNOSIS — I50.20 HFREF (HEART FAILURE WITH REDUCED EJECTION FRACTION) (H): ICD-10-CM

## 2023-12-19 DIAGNOSIS — E53.8 VITAMIN B12 DEFICIENCY (NON ANEMIC): ICD-10-CM

## 2023-12-19 DIAGNOSIS — Z98.84 HISTORY OF GASTRIC BYPASS: ICD-10-CM

## 2023-12-19 DIAGNOSIS — C61 PROSTATE CANCER (H): ICD-10-CM

## 2023-12-19 DIAGNOSIS — Z95.1 S/P CABG (CORONARY ARTERY BYPASS GRAFT): ICD-10-CM

## 2023-12-19 DIAGNOSIS — Z78.9 UNCIRCUMCISED MALE: ICD-10-CM

## 2023-12-19 DIAGNOSIS — Z86.73 HISTORY OF CVA (CEREBROVASCULAR ACCIDENT): ICD-10-CM

## 2023-12-19 DIAGNOSIS — E66.01 MORBID OBESITY DUE TO EXCESS CALORIES (H): ICD-10-CM

## 2023-12-19 DIAGNOSIS — Z51.81 MEDICATION MONITORING ENCOUNTER: ICD-10-CM

## 2023-12-19 DIAGNOSIS — I48.21 PERMANENT ATRIAL FIBRILLATION (H): ICD-10-CM

## 2023-12-19 PROCEDURE — 99213 OFFICE O/P EST LOW 20 MIN: CPT | Performed by: FAMILY MEDICINE

## 2023-12-19 NOTE — TELEPHONE ENCOUNTER
Pt states for a few weeks, 2-3 his foreskin and end of penis is red and itching. He is not able to pull back the foreskin. It is NOT swollen. Denies any fevers.     When urinates, he is leaking more, has to use a pad because doesn't empty completely.   No discharge at all.     Please advise.   He would like to see Dr Cassidy only.       Reason for Disposition   Patient wants to be seen    Additional Information   Negative: Followed a genital area injury (e.g., penis, scrotum)   Negative: Pain or burning with passing urine (urination) is main symptom   Negative: Blood in urine (red, pink, or tea-colored)   Negative: Pain in scrotum or testicle is main symptom   Negative: Swollen scrotum OR lump in the scrotum/groin area   Negative: Pubic lice suspected   Negative: Sexually Transmitted Infection (STI) exposure and prevention, question about   Negative: SEVERE pain (e.g., excruciating)   Negative: Foreskin pulled back and stuck (not circumcised)   Negative: Fever > 100.4 F (38.0 C)   Negative: Unable to urinate (or only a few drops) and bladder feels very full   Negative: Prolonged unwanted erection (i.e., not related to sexual interest) and lasting > 4 hours   Negative: Patient sounds very sick or weak to the triager   Negative: Entire penis is swollen (i.e., edema)   Negative: Looks infected (e.g., draining sore, spreading redness)   Negative: Pain or burning with passing urine (urination)   Negative: Pus (white, yellow) or bloody discharge from end of penis   Negative: Swollen foreskin (not circumcised)   Negative: Tiny water blisters rash, 3 or more   Negative: Antibiotic treatment > 3 days for STI (e.g., penile discharge from gonorrhea, chlamydia) and painful urination not improved   Negative: Sounds like a life-threatening emergency to the triager    Protocols used: Penis and Scrotum Symptoms-A-OH

## 2023-12-19 NOTE — TELEPHONE ENCOUNTER
Message handled by Nurse Triage with [facility] - provider name: kimberli Concepcion for 4 pm visit today    Called and spoke with patient.   Scheduled today at 4.    BARBIE LIZ RN on 12/19/2023 at 11:03 AM   [facility]

## 2023-12-19 NOTE — PROGRESS NOTES
Assessment & Plan       ICD-10-CM    1. Balanitis  N48.1 PRIMARY CARE FOLLOW-UP SCHEDULING      2. Prostate cancer (H)  C61 PRIMARY CARE FOLLOW-UP SCHEDULING      3. Uncircumcised male  Z78.9 PRIMARY CARE FOLLOW-UP SCHEDULING      4. Coronary artery disease involving coronary bypass graft of native heart without angina pectoris  I25.810 PRIMARY CARE FOLLOW-UP SCHEDULING      5. HFrEF (heart failure with reduced ejection fraction) (H)  I50.20 PRIMARY CARE FOLLOW-UP SCHEDULING      6. Cardiomyopathy, unspecified type (H)  I42.9 PRIMARY CARE FOLLOW-UP SCHEDULING      7. S/P CABG (coronary artery bypass graft)  Z95.1 PRIMARY CARE FOLLOW-UP SCHEDULING      8. History of CVA (cerebrovascular accident)  Z86.73 PRIMARY CARE FOLLOW-UP SCHEDULING      9. Permanent atrial fibrillation (H)  I48.21 PRIMARY CARE FOLLOW-UP SCHEDULING      10. Prediabetes  R73.03 PRIMARY CARE FOLLOW-UP SCHEDULING      11. Hypertension goal BP (blood pressure) < 140/90  I10 PRIMARY CARE FOLLOW-UP SCHEDULING      12. Hyperlipidemia LDL goal <70  E78.5 PRIMARY CARE FOLLOW-UP SCHEDULING      13. Vitamin B12 deficiency (non anemic)  E53.8 PRIMARY CARE FOLLOW-UP SCHEDULING      14. Vitamin D deficiency  E55.9 PRIMARY CARE FOLLOW-UP SCHEDULING      15. Iron deficiency anemia, unspecified iron deficiency anemia type  D50.9 PRIMARY CARE FOLLOW-UP SCHEDULING      16. Obstructive sleep apnea syndrome  G47.33 PRIMARY CARE FOLLOW-UP SCHEDULING      17. NAFLD (nonalcoholic fatty liver disease)  K76.0 PRIMARY CARE FOLLOW-UP SCHEDULING      18. History of gastric bypass  Z98.84 PRIMARY CARE FOLLOW-UP SCHEDULING      19. History of hepatitis C  Z86.19 PRIMARY CARE FOLLOW-UP SCHEDULING      20. Long term current use of anticoagulant therapy - for intermittent a-fib  Z79.01 PRIMARY CARE FOLLOW-UP SCHEDULING      21. Morbid obesity due to excess calories (H)  E66.01 PRIMARY CARE FOLLOW-UP SCHEDULING      22. Medication monitoring encounter  Z51.81 PRIMARY CARE  FOLLOW-UP SCHEDULING          Discussed treatment/modality options, including risk and benefits, he desires:    1) good foreskin cares, BID wash/dry, retract foreskin    2) lotrimin BID, HC 1% BID    3) consider Urology consult    4) close follow up    All diagnosis above reviewed and noted above, otherwise stable.      See [facility] orders for further details.      Return in about 1 week (around 12/26/2023) for Follow Up Acute, Medication Recheck Visit.   Follow-up Visit   Expected date:  Dec 26, 2023 (Approximate)      Follow Up Appointment Details:     Follow-up with whom?: Me    Follow-Up for what?: Acute Issue Recheck    How?: In Person    Is this an as-needed follow-up?: Yes                   No LOS data to display    Doing chart review, history and exam, documentation and further activities as noted.           Brett Cassidy MD, FAAFP     [facility]  [address] [place] [id number]  [email or web address]   Office: [phone]  Fax: [phone]  Pager: [phone]     Malick Arias is a 78 year old, presenting for the following health issues:    Penis/Scrotum Problem        12/19/2023     3:50 PM   Additional Questions   Roomed by Clarisa BLANCO CMA       History of Present Illness       Reason for visit:  Rash  Symptom onset:  1-2 weeks ago    He eats 2-3 servings of fruits and vegetables daily.He consumes 0 sweetened beverage(s) daily.He exercises with enough effort to increase his heart rate 60 or more minutes per day.  He exercises with enough effort to increase his heart rate 3 or less days per week.   He is taking medications regularly.     Hx of prostate cancer, no surgery, not circumcised, no erection since CABG x 1 8/2023, increasing itch x 2-3 weeks, no discharge, using pad secondary urine incontinence, unable to retract foreskin today, foreskin red, not swollen, no f/c/s,     Wt Readings from Last 4 Encounters:    12/19/23 126.6 kg (279 lb)   12/01/23 127.3 kg (280 lb 9.6 oz)   11/10/23 128.8 kg (283 lb 14.4 oz)   10/12/23 127.9 kg (282 lb)     TRIAGE 12/19/2023    Pt states for a few weeks, 2-3 his foreskin and end of penis is red and itching. He is not able to pull back the foreskin. It is NOT swollen. Denies any fevers.      When urinates, he is leaking more, has to use a pad because doesn't empty completely.   No discharge at all.      Reason for Disposition   Patient wants to be seen    Additional Information   Negative: Followed a genital area injury (e.g., penis, scrotum)   Negative: Pain or burning with passing urine (urination) is main symptom   Negative: Blood in urine (red, pink, or tea-colored)   Negative: Pain in scrotum or testicle is main symptom   Negative: Swollen scrotum OR lump in the scrotum/groin area   Negative: Pubic lice suspected   Negative: Sexually Transmitted Infection (STI) exposure and prevention, question about   Negative: SEVERE pain (e.g., excruciating)   Negative: Foreskin pulled back and stuck (not circumcised)   Negative: Fever > 100.4 F (38.0 C)   Negative: Unable to urinate (or only a few drops) and bladder feels very full   Negative: Prolonged unwanted erection (i.e., not related to sexual interest) and lasting > 4 hours   Negative: Patient sounds very sick or weak to the triager   Negative: Entire penis is swollen (i.e., edema)   Negative: Looks infected (e.g., draining sore, spreading redness)   Negative: Pain or burning with passing urine (urination)   Negative: Pus (white, yellow) or bloody discharge from end of penis   Negative: Swollen foreskin (not circumcised)   Negative: Tiny water blisters rash, 3 or more   Negative: Antibiotic treatment > 3 days for STI (e.g., penile discharge from gonorrhea, chlamydia) and painful urination not improved   Negative: Sounds like a life-threatening emergency to the triager    BP Readings from Last 3 Encounters:   12/19/23 104/64   12/01/23  102/66   11/10/23 110/62     Creatinine   Date Value Ref Range Status   11/07/2023 0.66 (L) 0.67 - 1.17 mg/dL Final   06/22/2021 0.81 0.66 - 1.25 mg/dL Final     GFR Estimate   Date Value Ref Range Status   11/07/2023 >90 >60 mL/min/1.73m2 Final   06/22/2021 87 >60 mL/min/[1.73_m2] Final     Comment:     Non  GFR Calc  Starting 12/18/2018, serum creatinine based estimated GFR (eGFR) will be   calculated using the Chronic Kidney Disease Epidemiology Collaboration   (CKD-EPI) equation.       GFR, ESTIMATED POCT   Date Value Ref Range Status   01/11/2023 >60 >60 mL/min/1.73m2 Final     Recent Labs   Lab Test 11/07/23  0825 09/25/23  1043   CHOL 103 96   HDL 54 47   LDL 41 36   TRIG 39 64     Lab Results   Component Value Date    A1C 5.5 11/07/2023    A1C 5.4 02/21/2023    A1C 5.7 08/16/2022    A1C 5.6 08/11/2021    A1C 5.8 11/19/2020    A1C 5.8 08/07/2020    A1C 5.9 02/14/2020    A1C 5.7 12/02/2019    A1C 5.6 09/20/2019     Hemoglobin   Date Value Ref Range Status   11/07/2023 11.9 (L) 13.3 - 17.7 g/dL Final   09/07/2023 9.8 (L) 13.3 - 17.7 g/dL Final   06/22/2021 14.6 13.3 - 17.7 g/dL Final   06/21/2021 15.5 13.3 - 17.7 g/dL Final       Review of Systems   CONSTITUTIONAL: NEGATIVE for fever, chills, change in weight  INTEGUMENTARY/SKIN: NEGATIVE for worrisome rashes, moles or lesions  EYES: NEGATIVE for vision changes or irritation  ENT/MOUTH: NEGATIVE for ear, mouth and throat problems  RESP: NEGATIVE for significant cough or SOB  CV: NEGATIVE for chest pain, palpitations or peripheral edema  GI: NEGATIVE for nausea, abdominal pain, heartburn, or change in bowel habits  : NEGATIVE for frequency, dysuria, or hematuria  MUSCULOSKELETAL: NEGATIVE for significant arthralgias or myalgia  NEURO: NEGATIVE for weakness, dizziness or paresthesias  ENDOCRINE: NEGATIVE for temperature intolerance, skin/hair changes  HEME: NEGATIVE for bleeding problems  PSYCHIATRIC: NEGATIVE for changes in mood or affect       Objective    /64   Pulse 92   Temp 97.8  F (36.6  C) (Tympanic)   Resp 16   Ht 1.829 m (6')   Wt 126.6 kg (279 lb)   SpO2 98%   BMI 37.84 kg/m    Body mass index is 37.84 kg/m .    Physical Exam   GENERAL: healthy, alert and no distress  EYES: Eyes grossly normal to inspection, PERRL and conjunctivae and sclerae normal  HENT: ear canals and TM's normal, nose and mouth without ulcers or lesions  NECK: no adenopathy, no asymmetry, masses, or scars and thyroid normal to palpation  RESP: lungs clear to auscultation - no rales, rhonchi or wheezes  CV: regular rate and rhythm, normal S1 S2, no S3 or S4, no murmur, click or rub, no peripheral edema and peripheral pulses strong  ABDOMEN: soft, nontender, no hepatosplenomegaly, no masses and bowel sounds normal   (male): mild balanitis, unable to retract foreskin  MS: no gross musculoskeletal defects noted, no edema  SKIN: no suspicious lesions or rashes  NEURO: Normal strength and tone, mentation intact and speech normal  PSYCH: mentation appears normal, affect normal/bright

## 2024-01-09 ENCOUNTER — TRANSFERRED RECORDS (OUTPATIENT)
Dept: HEALTH INFORMATION MANAGEMENT | Facility: CLINIC | Age: 79
End: 2024-01-09
Payer: COMMERCIAL

## 2024-01-10 ENCOUNTER — TELEPHONE (OUTPATIENT)
Dept: FAMILY MEDICINE | Facility: CLINIC | Age: 79
End: 2024-01-10
Payer: COMMERCIAL

## 2024-01-10 ENCOUNTER — MEDICAL CORRESPONDENCE (OUTPATIENT)
Dept: HEALTH INFORMATION MANAGEMENT | Facility: CLINIC | Age: 79
End: 2024-01-10
Payer: COMMERCIAL

## 2024-01-10 NOTE — TELEPHONE ENCOUNTER
Home Health Care      Reason for call:  [facility] - Order #[id number] - [facility]    Cert Period:  08/31/23 - 10/29/23    Orders are needed for this patient.  OT: Effective 09/03/23 1q2wk2, 1wk1    Pt Provider: Dr. Cassidy    Phone Number Homecare Nurse can be reached at: fax       Could we send this information to you in [employer] or would you prefer to receive a phone call?:   na    Put In providers in box    Joan  k

## 2024-01-11 NOTE — TELEPHONE ENCOUNTER
[facility] signed form by provider Faxed  to     Order #[id number]    Cert Period: 08/31/23 - 10/29/23    Copied into HIMS / Filed in [place] / Joan REED

## 2024-01-16 ENCOUNTER — VIRTUAL VISIT (OUTPATIENT)
Dept: ONCOLOGY | Facility: CLINIC | Age: 79
End: 2024-01-16
Attending: FAMILY MEDICINE
Payer: COMMERCIAL

## 2024-01-16 VITALS — WEIGHT: 283.31 LBS | BODY MASS INDEX: 38.42 KG/M2

## 2024-01-16 DIAGNOSIS — D50.9 IRON DEFICIENCY ANEMIA, UNSPECIFIED IRON DEFICIENCY ANEMIA TYPE: ICD-10-CM

## 2024-01-16 PROCEDURE — 99204 OFFICE O/P NEW MOD 45 MIN: CPT | Mod: 95 | Performed by: INTERNAL MEDICINE

## 2024-01-16 ASSESSMENT — PAIN SCALES - GENERAL: PAINLEVEL: NO PAIN (0)

## 2024-01-16 NOTE — NURSING NOTE
Is the patient currently in the state of [place]? YES    Visit mode:VIDEO    If the visit is dropped, the patient can be reconnected by: VIDEO VISIT: Text to cell phone:   Telephone Information:   Mobile [phone]       Will anyone else be joining the visit? NO  (If patient encounters technical issues they should call [phone]    How would you like to obtain your AVS? MyChart    Are changes needed to the allergy or medication list? Pt stated no changes to allergies and Pt stated no med changes    Reason for visit: Consult    Jennifer Salmeron LPN      
Cyclical vomiting with nausea

## 2024-01-16 NOTE — LETTER
"    1/16/2024         RE: Hugo Weber  [address]  [facility] [address]        Dear Colleague,    Thank you for referring your patient, Hugo Weber, to the [facility]. Please see a copy of my visit note below.    Virtual Visit Details    Type of service:  Video Visit   Video Start Time: {video visit start/end time for provider to select:975631}  Video End Time:{video visit start/end time for provider to select:655822}    Originating Location (pt. Location): {video visit patient location:768308::\"Home\"}  {PROVIDER LOCATION On-site should be selected for visits conducted from your clinic location or adjoining [facility] hospital, academic office, or other nearby [facility] building. Off-site should be selected for all other provider locations, including home:788112}  Distant Location (provider location):  {virtual location provider:227455}  Platform used for Video Visit: {Virtual Visit Platforms:712511::\"BOLD Guidance\"}    [facility]    Hematology/Oncology New Patient Note virtual      Today's Date: 01/16/24    Reason for Consult: Anemia      HISTORY OF PRESENT ILLNESS: Hugo is a very pleasant 78-year-old male who is here for further evaluation of anemia.  His hematologic history is as follows    1.  Hemoglobin back on 3/8/2023 was at 14.0 prior to that was all normal most recently within the last year his hemoglobin dropped as low as 7.2 on 11/7/2023 was at 11.9  2.  Normal B12 at 1759  3.  Iron study showed iron saturation of 7% which was 2 months ago, ferritin was 32 months ago.  Bilirubin normal  4.  Other comorbidities include heart failure, atrial fibrillation, history of stroke, moderate aortic stenosis  Presence of aortocoronary bypass graft  Ischemic cardiomyopathy  Leg edema, right  Essential (primary) hypertension  Chronic atrial fibrillation, unspecified (H)  Physical deconditioning  Personal history of transient ischemic attack (TIA), and cerebral infarction " without residual deficits  HAYLEE on CPAP  Prostate cancer (H)    5.  Endoscopy and colonoscopy in 2020 Was negative hx of gastric bypass in the 80s      Hugo feels well.  He has had some intentional weight loss.  He is waiting to see whether they need to replace his aortic valve is tolerating oral iron well.  REVIEW OF SYSTEMS:   14 point ROS was reviewed and is negative other than as noted above in HPI.       HOME MEDICATIONS:  Current Outpatient Medications   Medication Sig Dispense Refill     acetaminophen (TYLENOL) 325 MG tablet Take 650 mg by mouth every 6 hours as needed 7 am, 12 N, 6p, 10p       apixaban ANTICOAGULANT (ELIQUIS ANTICOAGULANT) 5 MG tablet TAKE 1 TABLET TWICE A DAY (APPOINTMENT NEEDED FOR ADDITIONAL REFILLS. PLEASE CALL [phone] TO SCHEDULE) 180 tablet 0     empagliflozin (JARDIANCE) 10 MG TABS tablet Take 1 tablet (10 mg) by mouth daily 90 tablet 3     Ferrous Sulfate (IRON) 325 (65 Fe) MG tablet Take 1 tablet by mouth three times a week (Monday, Wednesday, Friday) 90 tablet 3     fluticasone (FLONASE) 50 MCG/ACT nasal spray Spray 2 sprays into both nostrils daily as needed for rhinitis or allergies 54.6 mL 3     furosemide (LASIX) 20 MG tablet Take 1 tablet (20 mg) by mouth as needed (take one tablet (20 mg) for shortness of breath, swelling, or weight gain of 2 lbs in a night or 5 lbs in a week) (Patient not taking: Reported on 12/1/2023)       metoprolol succinate ER (TOPROL XL) 25 MG 24 hr tablet Take 1 tablet (25 mg) by mouth every evening 90 tablet 3     pantoprazole (PROTONIX) 40 MG EC tablet TAKE 1 TABLET DAILY 90 tablet 3     rosuvastatin (CRESTOR) 10 MG tablet Take 1 tablet (10 mg) by mouth daily 90 tablet 3     sacubitril-valsartan (ENTRESTO) 24-26 MG per tablet Take 1 tablet by mouth 2 times daily 180 tablet 3     senna-docusate (SENOKOT-S/PERICOLACE) 8.6-50 MG tablet Take 1 tablet by mouth 2 times daily as needed for constipation (Patient not taking: Reported on 12/1/2023)        tamsulosin (FLOMAX) 0.4 MG capsule TAKE 1 CAPSULE TWICE A  capsule 3     vitamin B-12 (CYANOCOBALAMIN) 1000 MCG tablet Take 1,000 mcg by mouth daily       vitamin C (ASCORBIC ACID) 1000 MG TABS Take 1,000 mg by mouth daily       vitamin D3 (CHOLECALCIFEROL) 125 MCG (5000 UT) tablet Take 1 tablet by mouth daily           ALLERGIES:  No Known Allergies      PAST MEDICAL HISTORY:  Past Medical History:   Diagnosis Date     Aortic stenosis     moderate     Atrial fibrillation 2006    Followed by [facility] - persistent     CAD (coronary artery disease)      Calculus of kidney 2005, 6/06, 10/12, 8/18, 9/19    dr walker/nestor/иван - hematuria - w/u o/w neg     Cervical stenosis of spine     Moderate C4-5     Cholelithiasis      Chronic peptic ulcer, unspecified site, without mention of hemorrhage, perforation, or obstruction 1985    gastric bypass     Colon polyps 8/05, 9/10, 10/15    2 tubular adenoma, 1 hyperplastic - multiple serrated/tubular adenomas - last colonscopy 11/20 due 5 yrs     CVA (cerebral vascular accident) (H) 01/2019    small right periorlandic subcortical - left sided residual - left hand tingling/numbness - Left leg weak/numbness - cardioembolic     Elevated prostate specific antigen (PSA)     Dr Santos     Hepatitis C 10/2012    chronic hepatitis C, grade 1-2, stage 1 - mild - Dr Douglas     HFrEF (heart failure with reduced ejection fraction) (H)     [facility] - 35%     Hyperlipidemia LDL goal <70      Hypertension goal BP (blood pressure) < 140/90 06/2006    dr jaciel winchester     Iron deficiency anemia, unspecified 1985    gastric bypass     Mitral regurgitation     mild-moderate     Nephrolithiasis multiple episodes, last 10/19    Dr Bey/Ivana/Tom - 9mm 3/2021     OA (osteoarthritis)     Multiple - Left shoulder with rotator cuff tear - Dr Durham     Obesity, unspecified     s/p gastric bypass 1985      Obstructive sleep apnea syndrome     CPAP - 15 cm - [facility] 1/2023      Prediabetes      Presbyacusis 01/2004    dr corona     Prostate cancer (H) 2023    Dr Santos - [place] 3+4, 4+3 = 7, bx 5/13 positive     Pyelonephritis, unspecified 12/1999     SCC (squamous cell carcinoma), 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    dr saez - lt conchal bowl     Sleep apnea 10/2002    cpap - severe - 15 cm - dr cunha     Stroke (H) 01/19/2019     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    [facility]     Vitamin B12 deficiency (non anemic) 04/15/2019     Vitamin D deficiency 04/15/2019         PAST SURGICAL HISTORY:  Past Surgical History:   Procedure Laterality Date     APPENDECTOMY       ARTHROPLASTY KNEE  08/13/2012    LT TKA - Dr Villanueva     BYPASS GRAFT ARTERY CORONARY N/A 8/15/2023    Procedure: CORONARY ARTERY BYPASS GRAFT x 1 (SAPHENOUS VEIN - RIGHT POSTERIOR DESCENDING ARTERY) WITH ENDOSCOPIC SAPHENOUS VEIN HARVEST ON THE LEFT LOWER EXTREMITY, AND ON CARDIOPULMONARY PUMP OXYGENATOR  (INTRAOPERATIVE TRANSESOPHAGEAL ECHOCARDIOGRAM BY ANESTHESIOLOGIST), REMOVAL OF RIGHT CORONARY ARTERY STENT AND DELIVERY SYSTEM, LEFT ATRIAL APPENDAGE CLIPPING WITH ATRICLIP FLEX V DEVICE SIZE: 45     CARDIOVERSION  2016     CARDIOVERSION       COLONOSCOPY  8/05, 9/10, 10/15    multiple tubular/serrated/hyperplastic polyps     COLONOSCOPY N/A 10/29/2015    multiple tubular and serrated adenomas     COLONOSCOPY N/A 09/07/2016     COLONOSCOPY  11/2020    tubular adenomas - due 5 yrs     COLONOSCOPY N/A 11/24/2020    Procedure: COLONOSCOPY with biopsies;  Surgeon: Chadwick Burgos MD;  Location: [facility] OR     CV CORONARY ANGIOGRAM N/A 8/15/2023    Procedure: Coronary Angiogram;  Surgeon: Carly Luna MD;  Location:  [facility]     CV FRACTIONAL FLOW RATIO WIRE N/A 8/15/2023    Procedure: Fractional Flow Ratio Wire;  Surgeon: Carly Luna MD;  Location:  [facility] CATH LAB     CV PCI N/A 8/15/2023    Procedure: Percutaneous Coronary Intervention;  Surgeon: Carly Luna MD;  Location:  [facility]ARD[facility] CATH LAB      CYSTOSCOPY W/ LASER LITHOTRIPSY  10/16/2012,5/2/2018     ESOPHAGOSCOPY, GASTROSCOPY, DUODENOSCOPY (EGD), COMBINED N/A 11/24/2020    Procedure: ESOPHAGOGASTRODUODENOSCOPY, COLONOSCOPY with biopsies;  Surgeon: Chadwick Burgos MD;  Location:  OR     EYE SURGERY  1/01,6/02,11/02    lasik     HC KNEE SCOPE, DIAGNOSTIC  05/2000    Arthroscopy, Knee RT     LASER HOLMIUM LITHOTRIPSY URETER(S), INSERT STENT, COMBINED  10/16/2012    stones x 4, Dr Campa     LASER HOLMIUM LITHOTRIPSY URETER(S), INSERT STENT, COMBINED Right 05/02/2018    CYSTOSCOPY, RIGHT URETEROSCOPY, HOLMIUM LASER LITHOTRIPSY, RIGHT STENT PLACEMENT - Dr Bey     MRI BIOPSY PROSTATE Bilateral 02/09/2023    mark     REVERSE ARTHROPLASTY SHOULDER Left 03/07/2023    Procedure: LEFT REVERSE SHOULDER ARTHROPLASTY WITH CUSTOM GUIDE WITH AUTOLOGOUS BONE GRAFTOF PROXIMAL HUMERUS;  Surgeon: Booker Multani MD;  Location:  OR     SURGICAL HISTORY OF -   1985    s/p gastric bypass Bilroth II     SURGICAL HISTORY OF -   11/1998    wisdom teeth     SURGICAL HISTORY OF -   1979    cellulitis     SURGICAL HISTORY OF -   11/1998    lt forearm spur's     SURGICAL HISTORY OF -   1/01,6/02,11/02    s/p lasik     SURGICAL HISTORY OF -   11/1999    rt forearm spurs     SURGICAL HISTORY OF -   07/2006    dr stevenson - lithotrypsy     SURGICAL HISTORY OF -   10/08, 12/08    Lt ear chonchal lesion removal SCC - dr saez     SURGICAL HISTORY OF -  Right 10/2019    nephrolithiasis - cystoscopy, rt lithotrypsy, Dr Heath     SURGICAL HISTORY OF -  Right 11/2019    Cystoscopy, Rt lithotrypsy, Calcium Oxalate, Dr Heath         SOCIAL HISTORY:  Social History     Socioeconomic History     Marital status:      Spouse name: Mayra     Number of children: 3     Years of education: 21     Highest education level: Not on file   Occupational History     Occupation: retired teacher and football and       Employer: [id number]      "Employer: RETIRED   Tobacco Use     Smoking status: Never     Smokeless tobacco: Never   Vaping Use     Vaping Use: Never used   Substance and Sexual Activity     Alcohol use: Not Currently     Alcohol/week: 2.0 - 3.0 standard drinks of alcohol     Types: 2 - 3 Standard drinks or equivalent per week     Comment: occassiinally     Drug use: No     Comment: acknowledges using herbal supplement \"Vibe\" for energy-none used recently      Sexual activity: Not Currently     Partners: Male     Birth control/protection: None     Comment:    Other Topics Concern      Service Not Asked     Blood Transfusions Not Asked     Caffeine Concern Yes     Comment: <1 can qd     Occupational Exposure Not Asked     Hobby Hazards Not Asked     Sleep Concern Yes     Comment: sleep apnea, wears cpap     Stress Concern Not Asked     Weight Concern Not Asked     Special Diet Not Asked     Back Care Not Asked     Exercise No     Bike Helmet Not Asked     Seat Belt Yes     Self-Exams Not Asked     Parent/sibling w/ CABG, MI or angioplasty before 65F 55M? No   Social History Narrative    Wife , Mayra,  from brain gliobastoma 2020.  --Ingrid Girard MD           Social Determinants of Health     Financial Resource Strain: Low Risk  (2023)    Financial Resource Strain      Within the past 12 months, have you or your family members you live with been unable to get utilities (heat, electricity) when it was really needed?: No   Food Insecurity: Low Risk  (2023)    Food Insecurity      Within the past 12 months, did you worry that your food would run out before you got money to buy more?: No      Within the past 12 months, did the food you bought just not last and you didn t have money to get more?: No   Transportation Needs: Low Risk  (2023)    Transportation Needs      Within the past 12 months, has lack of transportation kept you from medical appointments, getting your medicines, non-medical meetings " or appointments, work, or from getting things that you need?: No   Physical Activity: Not on file   Stress: Not on file   Social Connections: Not on file   Interpersonal Safety: Low Risk  (10/12/2023)    Interpersonal Safety      Do you feel physically and emotionally safe where you currently live?: Yes      Within the past 12 months, have you been hit, slapped, kicked or otherwise physically hurt by someone?: No      Within the past 12 months, have you been humiliated or emotionally abused in other ways by your partner or ex-partner?: No   Housing Stability: Low Risk  (2023)    Housing Stability      Do you have housing? : Yes      Are you worried about losing your housing?: No         FAMILY HISTORY:  Family History   Problem Relation Age of Onset     Alzheimer Disease Father 82         at 88     Prostate Cancer Father 76        bladder and prostate     Heart Disease Mother 80        CHF     Heart Disease Maternal Grandfather         MI at 55     Cancer Paternal Uncle         ?     Ulcerative Colitis No family hx of      Crohn's Disease No family hx of      Stomach Cancer No family hx of      GERD No family hx of          PHYSICAL EXAM:  Vital signs:  Wt 128.5 kg (283 lb 5 oz)   BMI 38.42 kg/m     ECO  GENERAL/CONSTITUTIONAL: No acute distress. Healthy, alert.  EYES: No scleral icterus.  No redness or discharge.    RESPIRATORY: No audible wheeze, cough, or visible cyanosis.  No visible retractions or increased work of breathing.  Able to speak fully in complete sentences.  MUSCULOSKELETAL: Normal range of motion.  NEUROLOGIC: Alert, oriented, answers questions appropriately. No tremor. Mentation intact and speech normal  INTEGUMENTARY: No jaundice.  No obvious rash or skin lesions.  PSYCHIATRIC:  Mentation appears normal, affect normal/bright, judgement and insight intact, normal speech and appearance well-groomed.    The rest of a comprehensive physical exam is deferred due to public health  emergency video visit restrictions.     LABS:  CBC RESULTS:   Recent Labs   Lab Test 11/07/23  0825   WBC 5.4   RBC 4.75   HGB 11.9*   HCT 39.3*   MCV 83   MCH 25.1*   MCHC 30.3*   RDW 16.3*          Recent Labs   Lab Test 11/07/23  0825 10/25/23  0948    137   POTASSIUM 4.4 4.1   CHLORIDE 105 103   CO2 25 26   ANIONGAP 11 8   GLC 87 105*   BUN 17.2 19.6   CR 0.66* 0.77   BEBO 8.9 8.9         PATHOLOGY:  None     IMAGING:  noted    ASSESSMENT/PLAN:  Hugo is a very pleasant 78-year-old male who has a history of iron deficiency anemia with increasing hemoglobin    His hemoglobin dropped December due to stent that was not retrievable requiring an open heart bypass surgery since then his blood counts are improving.  He continues to have iron deficiency with iron saturation of 7% completed 2 months ago    I discussed with the patient that my recommendation at this point would be to repeat his iron studies along with his hemoglobin to see if he would require IV iron.  Patient states that cardiology is working on determining whether he needs an aortic valve replaced and he would prefer to do blood counts with them along with Dr. Cassidy.  He does not need a bone marrow biopsy at this time and if he does need iron I would recommend Venofer from 3-5 treatments.  He prefers that he would move to getting those scheduled and further worked up when he gets scheduled for his heart surgery and he would like his primary care physician to follow-up on that    1.  Would recommend checking blood counts and IV Venofer if needed due to history of gastric bypass.  Continue oral iron.  Ferritin is at 30 and hemoglobin up to 11.9 all stable  2.  I will send a message to Dr. Cassidy to follow this up further and I am available for assistance if needed.  Patient is not interested in following up with hematology          Total time spent on day of visit, including review of tests, obtaining/reviewing separately obtained history,  ordering medications/tests/procedures, communicating with PCP/consultants, and documenting in electronic medical record: 45 minutes         Kj Howard MD  Hematology/Oncology  [facility]       Again, thank you for allowing me to participate in the care of your patient.        Sincerely,        Kj Howard MD

## 2024-01-16 NOTE — PROGRESS NOTES
[facility]    Hematology/Oncology New Patient Note virtual      Today's Date: 01/16/24    Reason for Consult: Anemia      HISTORY OF PRESENT ILLNESS: Hugo is a very pleasant 78-year-old male who is here for further evaluation of anemia.  His hematologic history is as follows    1.  Hemoglobin back on 3/8/2023 was at 14.0 prior to that was all normal most recently within the last year his hemoglobin dropped as low as 7.2 on 11/7/2023 was at 11.9  2.  Normal B12 at 1759  3.  Iron study showed iron saturation of 7% which was 2 months ago, ferritin was 32 months ago.  Bilirubin normal  4.  Other comorbidities include heart failure, atrial fibrillation, history of stroke, moderate aortic stenosis  Presence of aortocoronary bypass graft  Ischemic cardiomyopathy  Leg edema, right  Essential (primary) hypertension  Chronic atrial fibrillation, unspecified (H)  Physical deconditioning  Personal history of transient ischemic attack (TIA), and cerebral infarction without residual deficits  HAYLEE on CPAP  Prostate cancer (H)    5.  Endoscopy and colonoscopy in 2020 Was negative hx of gastric bypass in the 80s      Hugo feels well.  He has had some intentional weight loss.  He is waiting to see whether they need to replace his aortic valve is tolerating oral iron well.  REVIEW OF SYSTEMS:   14 point ROS was reviewed and is negative other than as noted above in HPI.       HOME MEDICATIONS:  Current Outpatient Medications   Medication Sig Dispense Refill    acetaminophen (TYLENOL) 325 MG tablet Take 650 mg by mouth every 6 hours as needed 7 am, 12 N, 6p, 10p      apixaban ANTICOAGULANT (ELIQUIS ANTICOAGULANT) 5 MG tablet TAKE 1 TABLET TWICE A DAY (APPOINTMENT NEEDED FOR ADDITIONAL REFILLS. PLEASE CALL [phone] TO SCHEDULE) 180 tablet 0    empagliflozin (JARDIANCE) 10 MG TABS tablet Take 1 tablet (10 mg) by mouth daily 90 tablet 3    Ferrous Sulfate (IRON) 325 (65 Fe) MG tablet Take 1 tablet by mouth  three times a week (Monday, Wednesday, Friday) 90 tablet 3    fluticasone (FLONASE) 50 MCG/ACT nasal spray Spray 2 sprays into both nostrils daily as needed for rhinitis or allergies 54.6 mL 3    furosemide (LASIX) 20 MG tablet Take 1 tablet (20 mg) by mouth as needed (take one tablet (20 mg) for shortness of breath, swelling, or weight gain of 2 lbs in a night or 5 lbs in a week) (Patient not taking: Reported on 12/1/2023)      metoprolol succinate ER (TOPROL XL) 25 MG 24 hr tablet Take 1 tablet (25 mg) by mouth every evening 90 tablet 3    pantoprazole (PROTONIX) 40 MG EC tablet TAKE 1 TABLET DAILY 90 tablet 3    rosuvastatin (CRESTOR) 10 MG tablet Take 1 tablet (10 mg) by mouth daily 90 tablet 3    sacubitril-valsartan (ENTRESTO) 24-26 MG per tablet Take 1 tablet by mouth 2 times daily 180 tablet 3    senna-docusate (SENOKOT-S/PERICOLACE) 8.6-50 MG tablet Take 1 tablet by mouth 2 times daily as needed for constipation (Patient not taking: Reported on 12/1/2023)      tamsulosin (FLOMAX) 0.4 MG capsule TAKE 1 CAPSULE TWICE A  capsule 3    vitamin B-12 (CYANOCOBALAMIN) 1000 MCG tablet Take 1,000 mcg by mouth daily      vitamin C (ASCORBIC ACID) 1000 MG TABS Take 1,000 mg by mouth daily      vitamin D3 (CHOLECALCIFEROL) 125 MCG (5000 UT) tablet Take 1 tablet by mouth daily           ALLERGIES:  No Known Allergies      PAST MEDICAL HISTORY:  Past Medical History:   Diagnosis Date    Aortic stenosis     moderate    Atrial fibrillation 2006    Followed by [facility] - persistent    CAD (coronary artery disease)     Calculus of kidney 2005, 6/06, 10/12, 8/18, 9/19    dr walker/nestor/иван - hematuria - w/u o/w neg    Cervical stenosis of spine     Moderate C4-5    Cholelithiasis     Chronic peptic ulcer, unspecified site, without mention of hemorrhage, perforation, or obstruction 1985    gastric bypass    Colon polyps 8/05, 9/10, 10/15    2 tubular adenoma, 1 hyperplastic - multiple serrated/tubular adenomas -  last colonscopy 11/20 due 5 yrs    CVA (cerebral vascular accident) (H) 01/2019    small right periorlandic subcortical - left sided residual - left hand tingling/numbness - Left leg weak/numbness - cardioembolic    Elevated prostate specific antigen (PSA)     Dr Santos    Hepatitis C 10/2012    chronic hepatitis C, grade 1-2, stage 1 - mild - Dr Douglas    HFrEF (heart failure with reduced ejection fraction) (H)     [facility] - 35%    Hyperlipidemia LDL goal <70     Hypertension goal BP (blood pressure) < 140/90 06/2006    dr jaciel winchester    Iron deficiency anemia, unspecified 1985    gastric bypass    Mitral regurgitation     mild-moderate    Nephrolithiasis multiple episodes, last 10/19    Dr Bey/Ivana/Tom - 9mm 3/2021    OA (osteoarthritis)     Multiple - Left shoulder with rotator cuff tear - Dr Durham    Obesity, unspecified     s/p gastric bypass 1985     Obstructive sleep apnea syndrome     CPAP - 15 cm - [facility] 1/2023    Prediabetes     Presbyacusis 01/2004    dr corona    Prostate cancer (H) 2023    Dr Santos - Manju 3+4, 4+3 = 7, bx 5/13 positive    Pyelonephritis, unspecified 12/1999    SCC (squamous cell carcinoma), 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    dr saez - lt conchal bowl    Sleep apnea 10/2002    cpap - severe - 15 cm - dr cunha    Stroke (H) 01/19/2019    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    [facility]    Vitamin B12 deficiency (non anemic) 04/15/2019    Vitamin D deficiency 04/15/2019         PAST SURGICAL HISTORY:  Past Surgical History:   Procedure Laterality Date    APPENDECTOMY      ARTHROPLASTY KNEE  08/13/2012    LT TKA - Dr Villanueva    BYPASS GRAFT ARTERY CORONARY N/A 8/15/2023    Procedure: CORONARY ARTERY BYPASS GRAFT x 1 (SAPHENOUS VEIN - RIGHT POSTERIOR DESCENDING ARTERY) WITH ENDOSCOPIC SAPHENOUS VEIN HARVEST ON THE LEFT LOWER EXTREMITY, AND ON CARDIOPULMONARY PUMP OXYGENATOR  (INTRAOPERATIVE TRANSESOPHAGEAL ECHOCARDIOGRAM BY ANESTHESIOLOGIST), REMOVAL OF RIGHT CORONARY ARTERY STENT AND  DELIVERY SYSTEM, LEFT ATRIAL APPENDAGE CLIPPING WITH ATRICLIP FLEX V DEVICE SIZE: 45    CARDIOVERSION  2016    CARDIOVERSION      COLONOSCOPY  8/05, 9/10, 10/15    multiple tubular/serrated/hyperplastic polyps    COLONOSCOPY N/A 10/29/2015    multiple tubular and serrated adenomas    COLONOSCOPY N/A 09/07/2016    COLONOSCOPY  11/2020    tubular adenomas - due 5 yrs    COLONOSCOPY N/A 11/24/2020    Procedure: COLONOSCOPY with biopsies;  Surgeon: Chadwick Burgos MD;  Location: [facility] OR    CV CORONARY ANGIOGRAM N/A 8/15/2023    Procedure: Coronary Angiogram;  Surgeon: Carly Luna MD;  Location:  [facility]    CV FRACTIONAL FLOW RATIO WIRE N/A 8/15/2023    Procedure: Fractional Flow Ratio Wire;  Surgeon: Carly Luna MD;  Location:  [facility]    CV PCI N/A 8/15/2023    Procedure: Percutaneous Coronary Intervention;  Surgeon: Carly Luna MD;  Location:  [facility] C[facility]    CYSTOSCOPY W/ LASER LITHOTRIPSY  10/16/2012,5/2/2018    ESOPHAGOSCOPY, GASTROSCOPY, DUODENOSCOPY (EGD), COMBINED N/A 11/24/2020    Procedure: ESOPHAGOGASTRODUODENOSCOPY, COLONOSCOPY with biopsies;  Surgeon: Chadwick Burgos MD;  Location:  OR    EYE SURGERY  1/01,6/02,11/02    lasik    HC KNEE SCOPE, DIAGNOSTIC  05/2000    Arthroscopy, Knee RT    LASER HOLMIUM LITHOTRIPSY URETER(S), INSERT STENT, COMBINED  10/16/2012    stones x 4, Dr Campa    LASER HOLMIUM LITHOTRIPSY URETER(S), INSERT STENT, COMBINED Right 05/02/2018    CYSTOSCOPY, RIGHT URETEROSCOPY, HOLMIUM LASER LITHOTRIPSY, RIGHT STENT PLACEMENT - Dr Bey    MRI BIOPSY PROSTATE Bilateral 02/09/2023    mark    REVERSE ARTHROPLASTY SHOULDER Left 03/07/2023    Procedure: LEFT REVERSE SHOULDER ARTHROPLASTY WITH CUSTOM GUIDE WITH AUTOLOGOUS BONE GRAFTOF PROXIMAL HUMERUS;  Surgeon: Booker Multani MD;  Location:  OR    SURGICAL HISTORY OF -   1985    s/p gastric bypass Bilroth II    SURGICAL HISTORY OF -   11/1998    wisdom teeth  "   SURGICAL HISTORY OF -   1979    cellulitis    SURGICAL HISTORY OF -   11/1998    lt forearm spur's    SURGICAL HISTORY OF -   1/01,6/02,11/02    s/p lasik    SURGICAL HISTORY OF -   11/1999    rt forearm spurs    SURGICAL HISTORY OF -   07/2006    dr stevenson - lithotrypsy    SURGICAL HISTORY OF -   10/08, 12/08    Lt ear chonchal lesion removal SCC - dr saez    SURGICAL HISTORY OF -  Right 10/2019    nephrolithiasis - cystoscopy, rt lithotrypsy, Dr Heath    SURGICAL HISTORY OF -  Right 11/2019    Cystoscopy, Rt lithotrypsy, Calcium Oxalate, Dr Heath         SOCIAL HISTORY:  Social History     Socioeconomic History    Marital status:      Spouse name: Mayra    Number of children: 3    Years of education: 21    Highest education level: Not on file   Occupational History    Occupation: retired teacher and football and       Employer: [employer]     Employer: RETIRED   Tobacco Use    Smoking status: Never    Smokeless tobacco: Never   Vaping Use    Vaping Use: Never used   Substance and Sexual Activity    Alcohol use: Not Currently     Alcohol/week: 2.0 - 3.0 standard drinks of alcohol     Types: 2 - 3 Standard drinks or equivalent per week     Comment: occassiinally    Drug use: No     Comment: acknowledges using herbal supplement \"Vibe\" for energy-none used recently     Sexual activity: Not Currently     Partners: Male     Birth control/protection: None     Comment:    Other Topics Concern     Service Not Asked    Blood Transfusions Not Asked    Caffeine Concern Yes     Comment: <1 can qd    Occupational Exposure Not Asked    Hobby Hazards Not Asked    Sleep Concern Yes     Comment: sleep apnea, wears cpap    Stress Concern Not Asked    Weight Concern Not Asked    Special Diet Not Asked    Back Care Not Asked    Exercise No    Bike Helmet Not Asked    Seat Belt Yes    Self-Exams Not Asked    Parent/sibling w/ CABG, MI or angioplasty before 65F 55M? No "   Social History Narrative    Wife , Mayra,  from brain gliobastoma 2020.  --Ingrid Girard MD           Social Determinants of Health     Financial Resource Strain: Low Risk  (2023)    Financial Resource Strain     Within the past 12 months, have you or your family members you live with been unable to get utilities (heat, electricity) when it was really needed?: No   Food Insecurity: Low Risk  (2023)    Food Insecurity     Within the past 12 months, did you worry that your food would run out before you got money to buy more?: No     Within the past 12 months, did the food you bought just not last and you didn t have money to get more?: No   Transportation Needs: Low Risk  (2023)    Transportation Needs     Within the past 12 months, has lack of transportation kept you from medical appointments, getting your medicines, non-medical meetings or appointments, work, or from getting things that you need?: No   Physical Activity: Not on file   Stress: Not on file   Social Connections: Not on file   Interpersonal Safety: Low Risk  (10/12/2023)    Interpersonal Safety     Do you feel physically and emotionally safe where you currently live?: Yes     Within the past 12 months, have you been hit, slapped, kicked or otherwise physically hurt by someone?: No     Within the past 12 months, have you been humiliated or emotionally abused in other ways by your partner or ex-partner?: No   Housing Stability: Low Risk  (2023)    Housing Stability     Do you have housing? : Yes     Are you worried about losing your housing?: No         FAMILY HISTORY:  Family History   Problem Relation Age of Onset    Alzheimer Disease Father 82         at 88    Prostate Cancer Father 76        bladder and prostate    Heart Disease Mother 80        CHF    Heart Disease Maternal Grandfather         MI at 55    Cancer Paternal Uncle         ?    Ulcerative Colitis No family hx of     Crohn's Disease No family  hx of     Stomach Cancer No family hx of     GERD No family hx of          PHYSICAL EXAM:  Vital signs:  Wt 128.5 kg (283 lb 5 oz)   BMI 38.42 kg/m     ECO  GENERAL/CONSTITUTIONAL: No acute distress. Healthy, alert.  EYES: No scleral icterus.  No redness or discharge.    RESPIRATORY: No audible wheeze, cough, or visible cyanosis.  No visible retractions or increased work of breathing.  Able to speak fully in complete sentences.  MUSCULOSKELETAL: Normal range of motion.  NEUROLOGIC: Alert, oriented, answers questions appropriately. No tremor. Mentation intact and speech normal  INTEGUMENTARY: No jaundice.  No obvious rash or skin lesions.  PSYCHIATRIC:  Mentation appears normal, affect normal/bright, judgement and insight intact, normal speech and appearance well-groomed.    The rest of a comprehensive physical exam is deferred due to public health emergency video visit restrictions.     LABS:  CBC RESULTS:   Recent Labs   Lab Test 23  0825   WBC 5.4   RBC 4.75   HGB 11.9*   HCT 39.3*   MCV 83   MCH 25.1*   MCHC 30.3*   RDW 16.3*          Recent Labs   Lab Test 23  0825 10/25/23  0948    137   POTASSIUM 4.4 4.1   CHLORIDE 105 103   CO2 25 26   ANIONGAP 11 8   GLC 87 105*   BUN 17.2 19.6   CR 0.66* 0.77   BEBO 8.9 8.9         PATHOLOGY:  None     IMAGING:  noted    ASSESSMENT/PLAN:  Hugo is a very pleasant 78-year-old male who has a history of iron deficiency anemia with increasing hemoglobin    His hemoglobin dropped December due to stent that was not retrievable requiring an open heart bypass surgery since then his blood counts are improving.  He continues to have iron deficiency with iron saturation of 7% completed 2 months ago    I discussed with the patient that my recommendation at this point would be to repeat his iron studies along with his hemoglobin to see if he would require IV iron.  Patient states that cardiology is working on determining whether he needs an aortic valve  replaced and he would prefer to do blood counts with them along with Dr. Cassidy.  He does not need a bone marrow biopsy at this time and if he does need iron I would recommend Venofer from 3-5 treatments.  He prefers that he would move to getting those scheduled and further worked up when he gets scheduled for his heart surgery and he would like his primary care physician to follow-up on that    1.  Would recommend checking blood counts and IV Venofer if needed due to history of gastric bypass.  Continue oral iron.  Ferritin is at 30 and hemoglobin up to 11.9 all stable  2.  I will send a message to Dr. Cassidy to follow this up further and I am available for assistance if needed.  Patient is not interested in following up with hematology          Total time spent on day of visit, including review of tests, obtaining/reviewing separately obtained history, ordering medications/tests/procedures, communicating with PCP/consultants, and documenting in electronic medical record: 45 minutes         Kj Howard MD  Hematology/Oncology  [facility]

## 2024-01-16 NOTE — LETTER
"    1/16/2024         RE: Hugo Weber  [address]  [facility] [address]        Dear Colleague,    Thank you for referring your patient, Hugo Weber, to the [facility]. Please see a copy of my visit note below.    Virtual Visit Details    Type of service:  Video Visit   Video Start Time: {video visit start/end time for provider to select:098479}  Video End Time:{video visit start/end time for provider to select:962361}    Originating Location (pt. Location): {video visit patient location:049562::\"Home\"}  {PROVIDER LOCATION On-site should be selected for visits conducted from your clinic location or adjoining [facility] hospital, academic office, or other nearby [facility] building. Off-site should be selected for all other provider locations, including home:482657}  Distant Location (provider location):  {virtual location provider:056981}  Platform used for Video Visit: {Virtual Visit Platforms:358658::\"SendRR\"}    [facility]    Hematology/Oncology New Patient Note virtual      Today's Date: 01/16/24    Reason for Consult: Anemia      HISTORY OF PRESENT ILLNESS: Hugo is a very pleasant 78-year-old male who is here for further evaluation of anemia.  His hematologic history is as follows    1.  Hemoglobin back on 3/8/2023 was at 14.0 prior to that was all normal most recently within the last year his hemoglobin dropped as low as 7.2 on 11/7/2023 was at 11.9  2.  Normal B12 at 1759  3.  Iron study showed iron saturation of 7% which was 2 months ago, ferritin was 32 months ago.  Bilirubin normal  4.  Other comorbidities include heart failure, atrial fibrillation, history of stroke, moderate aortic stenosis  Presence of aortocoronary bypass graft  Ischemic cardiomyopathy  Leg edema, right  Essential (primary) hypertension  Chronic atrial fibrillation, unspecified (H)  Physical deconditioning  Personal history of transient ischemic attack (TIA), and cerebral infarction " without residual deficits  HAYLEE on CPAP  Prostate cancer (H)    5.  Endoscopy and colonoscopy in 2020 Was negative hx of gastric bypass in the 80s      Hugo feels well.  He has had some intentional weight loss.  He is waiting to see whether they need to replace his aortic valve is tolerating oral iron well.  REVIEW OF SYSTEMS:   14 point ROS was reviewed and is negative other than as noted above in HPI.       HOME MEDICATIONS:  Current Outpatient Medications   Medication Sig Dispense Refill     acetaminophen (TYLENOL) 325 MG tablet Take 650 mg by mouth every 6 hours as needed 7 am, 12 N, 6p, 10p       apixaban ANTICOAGULANT (ELIQUIS ANTICOAGULANT) 5 MG tablet TAKE 1 TABLET TWICE A DAY (APPOINTMENT NEEDED FOR ADDITIONAL REFILLS. PLEASE CALL [phone] TO SCHEDULE) 180 tablet 0     empagliflozin (JARDIANCE) 10 MG TABS tablet Take 1 tablet (10 mg) by mouth daily 90 tablet 3     Ferrous Sulfate (IRON) 325 (65 Fe) MG tablet Take 1 tablet by mouth three times a week (Monday, Wednesday, Friday) 90 tablet 3     fluticasone (FLONASE) 50 MCG/ACT nasal spray Spray 2 sprays into both nostrils daily as needed for rhinitis or allergies 54.6 mL 3     furosemide (LASIX) 20 MG tablet Take 1 tablet (20 mg) by mouth as needed (take one tablet (20 mg) for shortness of breath, swelling, or weight gain of 2 lbs in a night or 5 lbs in a week) (Patient not taking: Reported on 12/1/2023)       metoprolol succinate ER (TOPROL XL) 25 MG 24 hr tablet Take 1 tablet (25 mg) by mouth every evening 90 tablet 3     pantoprazole (PROTONIX) 40 MG EC tablet TAKE 1 TABLET DAILY 90 tablet 3     rosuvastatin (CRESTOR) 10 MG tablet Take 1 tablet (10 mg) by mouth daily 90 tablet 3     sacubitril-valsartan (ENTRESTO) 24-26 MG per tablet Take 1 tablet by mouth 2 times daily 180 tablet 3     senna-docusate (SENOKOT-S/PERICOLACE) 8.6-50 MG tablet Take 1 tablet by mouth 2 times daily as needed for constipation (Patient not taking: Reported on 12/1/2023)        tamsulosin (FLOMAX) 0.4 MG capsule TAKE 1 CAPSULE TWICE A  capsule 3     vitamin B-12 (CYANOCOBALAMIN) 1000 MCG tablet Take 1,000 mcg by mouth daily       vitamin C (ASCORBIC ACID) 1000 MG TABS Take 1,000 mg by mouth daily       vitamin D3 (CHOLECALCIFEROL) 125 MCG (5000 UT) tablet Take 1 tablet by mouth daily           ALLERGIES:  No Known Allergies      PAST MEDICAL HISTORY:  Past Medical History:   Diagnosis Date     Aortic stenosis     moderate     Atrial fibrillation 2006    Followed by [facility] - persistent     CAD (coronary artery disease)      Calculus of kidney 2005, 6/06, 10/12, 8/18, 9/19    dr walker/nestor/иван - hematuria - w/u o/w neg     Cervical stenosis of spine     Moderate C4-5     Cholelithiasis      Chronic peptic ulcer, unspecified site, without mention of hemorrhage, perforation, or obstruction 1985    gastric bypass     Colon polyps 8/05, 9/10, 10/15    2 tubular adenoma, 1 hyperplastic - multiple serrated/tubular adenomas - last colonscopy 11/20 due 5 yrs     CVA (cerebral vascular accident) (H) 01/2019    small right periorlandic subcortical - left sided residual - left hand tingling/numbness - Left leg weak/numbness - cardioembolic     Elevated prostate specific antigen (PSA)     Dr Santos     Hepatitis C 10/2012    chronic hepatitis C, grade 1-2, stage 1 - mild - Dr Douglas     HFrEF (heart failure with reduced ejection fraction) (H)     [facility] - 35%     Hyperlipidemia LDL goal <70      Hypertension goal BP (blood pressure) < 140/90 06/2006    dr jaciel winchester     Iron deficiency anemia, unspecified 1985    gastric bypass     Mitral regurgitation     mild-moderate     Nephrolithiasis multiple episodes, last 10/19    Dr Bey/Ivana/Tom - 9mm 3/2021     OA (osteoarthritis)     Multiple - Left shoulder with rotator cuff tear - Dr Durham     Obesity, unspecified     s/p gastric bypass 1985      Obstructive sleep apnea syndrome     CPAP - 15 cm - [facility] 1/2023      Prediabetes      Presbyacusis 01/2004    dr corona     Prostate cancer (H) 2023    Dr Santos - [place] 3+4, 4+3 = 7, bx 5/13 positive     Pyelonephritis, unspecified 12/1999     SCC (squamous cell carcinoma), 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    dr saez - lt conchal bowl     Sleep apnea 10/2002    cpap - severe - 15 cm - dr cunha     Stroke (H) 01/19/2019     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    [facility]     Vitamin B12 deficiency (non anemic) 04/15/2019     Vitamin D deficiency 04/15/2019         PAST SURGICAL HISTORY:  Past Surgical History:   Procedure Laterality Date     APPENDECTOMY       ARTHROPLASTY KNEE  08/13/2012    LT TKA - Dr Villanueva     BYPASS GRAFT ARTERY CORONARY N/A 8/15/2023    Procedure: CORONARY ARTERY BYPASS GRAFT x 1 (SAPHENOUS VEIN - RIGHT POSTERIOR DESCENDING ARTERY) WITH ENDOSCOPIC SAPHENOUS VEIN HARVEST ON THE LEFT LOWER EXTREMITY, AND ON CARDIOPULMONARY PUMP OXYGENATOR  (INTRAOPERATIVE TRANSESOPHAGEAL ECHOCARDIOGRAM BY ANESTHESIOLOGIST), REMOVAL OF RIGHT CORONARY ARTERY STENT AND DELIVERY SYSTEM, LEFT ATRIAL APPENDAGE CLIPPING WITH ATRICLIP FLEX V DEVICE SIZE: 45     CARDIOVERSION  2016     CARDIOVERSION       COLONOSCOPY  8/05, 9/10, 10/15    multiple tubular/serrated/hyperplastic polyps     COLONOSCOPY N/A 10/29/2015    multiple tubular and serrated adenomas     COLONOSCOPY N/A 09/07/2016     COLONOSCOPY  11/2020    tubular adenomas - due 5 yrs     COLONOSCOPY N/A 11/24/2020    Procedure: COLONOSCOPY with biopsies;  Surgeon: Chadwick Burgos MD;  Location: [facility] OR     CV CORONARY ANGIOGRAM N/A 8/15/2023    Procedure: Coronary Angiogram;  Surgeon: Carly Luna MD;  Location:  [facility]ATH [facility]     CV FRACTIONAL FLOW RATIO WIRE N/A 8/15/2023    Procedure: Fractional Flow Ratio Wire;  Surgeon: Carly Luna MD;  Location:  HEART CARDI[facility] CATH LAB     CV PCI N/A 8/15/2023    Procedure: Percutaneous Coronary Intervention;  Surgeon: Carly Luna MD;  Location:  HEART CARDIAC CATH LAB      CYSTOSCOPY W/ LASER LITHOTRIPSY  10/16/2012,5/2/2018     ESOPHAGOSCOPY, GASTROSCOPY, DUODENOSCOPY (EGD), COMBINED N/A 11/24/2020    Procedure: ESOPHAGOGASTRODUODENOSCOPY, COLONOSCOPY with biopsies;  Surgeon: Chadwick Burgos MD;  Location:  OR     EYE SURGERY  1/01,6/02,11/02    lasik     HC KNEE SCOPE, DIAGNOSTIC  05/2000    Arthroscopy, Knee RT     LASER HOLMIUM LITHOTRIPSY URETER(S), INSERT STENT, COMBINED  10/16/2012    stones x 4, Dr Campa     LASER HOLMIUM LITHOTRIPSY URETER(S), INSERT STENT, COMBINED Right 05/02/2018    CYSTOSCOPY, RIGHT URETEROSCOPY, HOLMIUM LASER LITHOTRIPSY, RIGHT STENT PLACEMENT - Dr Bey     MRI BIOPSY PROSTATE Bilateral 02/09/2023    mark     REVERSE ARTHROPLASTY SHOULDER Left 03/07/2023    Procedure: LEFT REVERSE SHOULDER ARTHROPLASTY WITH CUSTOM GUIDE WITH AUTOLOGOUS BONE GRAFTOF PROXIMAL HUMERUS;  Surgeon: Booker Multani MD;  Location:  OR     SURGICAL HISTORY OF -   1985    s/p gastric bypass Bilroth II     SURGICAL HISTORY OF -   11/1998    wisdom teeth     SURGICAL HISTORY OF -   1979    cellulitis     SURGICAL HISTORY OF -   11/1998    lt forearm spur's     SURGICAL HISTORY OF -   1/01,6/02,11/02    s/p lasik     SURGICAL HISTORY OF -   11/1999    rt forearm spurs     SURGICAL HISTORY OF -   07/2006    dr stevenson - lithotrypsy     SURGICAL HISTORY OF -   10/08, 12/08    Lt ear chonchal lesion removal SCC - dr saez     SURGICAL HISTORY OF -  Right 10/2019    nephrolithiasis - cystoscopy, rt lithotrypsy, Dr Heath     SURGICAL HISTORY OF -  Right 11/2019    Cystoscopy, Rt lithotrypsy, Calcium Oxalate, Dr Heaht         SOCIAL HISTORY:  Social History     Socioeconomic History     Marital status:      Spouse name: Mayra     Number of children: 3     Years of education: 21     Highest education level: Not on file   Occupational History     Occupation: retired teacher and football and       Employer: [employer]      "Employer: RETIRED   Tobacco Use     Smoking status: Never     Smokeless tobacco: Never   Vaping Use     Vaping Use: Never used   Substance and Sexual Activity     Alcohol use: Not Currently     Alcohol/week: 2.0 - 3.0 standard drinks of alcohol     Types: 2 - 3 Standard drinks or equivalent per week     Comment: occassiinally     Drug use: No     Comment: acknowledges using herbal supplement \"Vibe\" for energy-none used recently      Sexual activity: Not Currently     Partners: Male     Birth control/protection: None     Comment:    Other Topics Concern      Service Not Asked     Blood Transfusions Not Asked     Caffeine Concern Yes     Comment: <1 can qd     Occupational Exposure Not Asked     Hobby Hazards Not Asked     Sleep Concern Yes     Comment: sleep apnea, wears cpap     Stress Concern Not Asked     Weight Concern Not Asked     Special Diet Not Asked     Back Care Not Asked     Exercise No     Bike Helmet Not Asked     Seat Belt Yes     Self-Exams Not Asked     Parent/sibling w/ CABG, MI or angioplasty before 65F 55M? No   Social History Narrative    Wife , Mayra,  from brain gliobastoma 2020.  --Ingrid Girard MD           Social Determinants of Health     Financial Resource Strain: Low Risk  (2023)    Financial Resource Strain      Within the past 12 months, have you or your family members you live with been unable to get utilities (heat, electricity) when it was really needed?: No   Food Insecurity: Low Risk  (2023)    Food Insecurity      Within the past 12 months, did you worry that your food would run out before you got money to buy more?: No      Within the past 12 months, did the food you bought just not last and you didn t have money to get more?: No   Transportation Needs: Low Risk  (2023)    Transportation Needs      Within the past 12 months, has lack of transportation kept you from medical appointments, getting your medicines, non-medical meetings " or appointments, work, or from getting things that you need?: No   Physical Activity: Not on file   Stress: Not on file   Social Connections: Not on file   Interpersonal Safety: Low Risk  (10/12/2023)    Interpersonal Safety      Do you feel physically and emotionally safe where you currently live?: Yes      Within the past 12 months, have you been hit, slapped, kicked or otherwise physically hurt by someone?: No      Within the past 12 months, have you been humiliated or emotionally abused in other ways by your partner or ex-partner?: No   Housing Stability: Low Risk  (2023)    Housing Stability      Do you have housing? : Yes      Are you worried about losing your housing?: No         FAMILY HISTORY:  Family History   Problem Relation Age of Onset     Alzheimer Disease Father 82         at 88     Prostate Cancer Father 76        bladder and prostate     Heart Disease Mother 80        CHF     Heart Disease Maternal Grandfather         MI at 55     Cancer Paternal Uncle         ?     Ulcerative Colitis No family hx of      Crohn's Disease No family hx of      Stomach Cancer No family hx of      GERD No family hx of          PHYSICAL EXAM:  Vital signs:  Wt 128.5 kg (283 lb 5 oz)   BMI 38.42 kg/m     ECO  GENERAL/CONSTITUTIONAL: No acute distress. Healthy, alert.  EYES: No scleral icterus.  No redness or discharge.    RESPIRATORY: No audible wheeze, cough, or visible cyanosis.  No visible retractions or increased work of breathing.  Able to speak fully in complete sentences.  MUSCULOSKELETAL: Normal range of motion.  NEUROLOGIC: Alert, oriented, answers questions appropriately. No tremor. Mentation intact and speech normal  INTEGUMENTARY: No jaundice.  No obvious rash or skin lesions.  PSYCHIATRIC:  Mentation appears normal, affect normal/bright, judgement and insight intact, normal speech and appearance well-groomed.    The rest of a comprehensive physical exam is deferred due to public health  emergency video visit restrictions.     LABS:  CBC RESULTS:   Recent Labs   Lab Test 11/07/23  0825   WBC 5.4   RBC 4.75   HGB 11.9*   HCT 39.3*   MCV 83   MCH 25.1*   MCHC 30.3*   RDW 16.3*          Recent Labs   Lab Test 11/07/23  0825 10/25/23  0948    137   POTASSIUM 4.4 4.1   CHLORIDE 105 103   CO2 25 26   ANIONGAP 11 8   GLC 87 105*   BUN 17.2 19.6   CR 0.66* 0.77   BEBO 8.9 8.9         PATHOLOGY:  None     IMAGING:  noted    ASSESSMENT/PLAN:  Hugo is a very pleasant 78-year-old male who has a history of iron deficiency anemia with increasing hemoglobin    His hemoglobin dropped December due to stent that was not retrievable requiring an open heart bypass surgery since then his blood counts are improving.  He continues to have iron deficiency with iron saturation of 7% completed 2 months ago    I discussed with the patient that my recommendation at this point would be to repeat his iron studies along with his hemoglobin to see if he would require IV iron.  Patient states that cardiology is working on determining whether he needs an aortic valve replaced and he would prefer to do blood counts with them along with Dr. Cassidy.  He does not need a bone marrow biopsy at this time and if he does need iron I would recommend Venofer from 3-5 treatments.  He prefers that he would move to getting those scheduled and further worked up when he gets scheduled for his heart surgery and he would like his primary care physician to follow-up on that    1.  Would recommend checking blood counts and IV Venofer if needed due to history of gastric bypass.  Continue oral iron.  Ferritin is at 30 and hemoglobin up to 11.9 all stable  2.  I will send a message to Dr. Cassidy to follow this up further and I am available for assistance if needed.  Patient is not interested in following up with hematology          Total time spent on day of visit, including review of tests, obtaining/reviewing separately obtained history,  ordering medications/tests/procedures, communicating with PCP/consultants, and documenting in electronic medical record: 45 minutes         Kj Howard MD  Hematology/Oncology  [facility]       Again, thank you for allowing me to participate in the care of your patient.        Sincerely,        Kj Howard MD

## 2024-01-16 NOTE — PROGRESS NOTES
Virtual Visit Details    Type of service:  Video Visit       Originating Location (pt. Location): Home    Distant Location (provider location):  On-site  Platform used for Video Visit: Garett

## 2024-01-23 ENCOUNTER — TRANSFERRED RECORDS (OUTPATIENT)
Dept: HEALTH INFORMATION MANAGEMENT | Facility: CLINIC | Age: 79
End: 2024-01-23
Payer: COMMERCIAL

## 2024-02-13 ENCOUNTER — TELEPHONE (OUTPATIENT)
Dept: PHARMACY | Facility: OTHER | Age: 79
End: 2024-02-13
Payer: COMMERCIAL

## 2024-02-13 NOTE — TELEPHONE ENCOUNTER
MTM Recruitment: [facility] insurance     Referral outreach attempt #1 on February 13, 2024      Outcome: visit scheduled    Laura Turcios PharmD  MTM Pharmacist  [facility]

## 2024-02-29 ENCOUNTER — TRANSFERRED RECORDS (OUTPATIENT)
Dept: HEALTH INFORMATION MANAGEMENT | Facility: CLINIC | Age: 79
End: 2024-02-29
Payer: COMMERCIAL

## 2024-03-04 ENCOUNTER — TELEPHONE (OUTPATIENT)
Dept: FAMILY MEDICINE | Facility: CLINIC | Age: 79
End: 2024-03-04
Payer: COMMERCIAL

## 2024-03-04 DIAGNOSIS — R73.03 PREDIABETES: ICD-10-CM

## 2024-03-04 DIAGNOSIS — Z95.1 S/P CABG (CORONARY ARTERY BYPASS GRAFT): ICD-10-CM

## 2024-03-04 DIAGNOSIS — I50.20 HFREF (HEART FAILURE WITH REDUCED EJECTION FRACTION) (H): ICD-10-CM

## 2024-03-04 DIAGNOSIS — Z98.84 HISTORY OF GASTRIC BYPASS: ICD-10-CM

## 2024-03-04 DIAGNOSIS — E78.5 HYPERLIPIDEMIA LDL GOAL <70: ICD-10-CM

## 2024-03-04 DIAGNOSIS — I42.9 CARDIOMYOPATHY, UNSPECIFIED TYPE (H): ICD-10-CM

## 2024-03-04 DIAGNOSIS — Z86.73 HISTORY OF CVA (CEREBROVASCULAR ACCIDENT): ICD-10-CM

## 2024-03-04 DIAGNOSIS — Z51.81 MEDICATION MONITORING ENCOUNTER: ICD-10-CM

## 2024-03-04 DIAGNOSIS — I48.21 PERMANENT ATRIAL FIBRILLATION (H): ICD-10-CM

## 2024-03-04 DIAGNOSIS — K76.0 NAFLD (NONALCOHOLIC FATTY LIVER DISEASE): ICD-10-CM

## 2024-03-04 DIAGNOSIS — I25.810 CORONARY ARTERY DISEASE INVOLVING CORONARY BYPASS GRAFT OF NATIVE HEART WITHOUT ANGINA PECTORIS: ICD-10-CM

## 2024-03-04 DIAGNOSIS — I47.29 NSVT (NONSUSTAINED VENTRICULAR TACHYCARDIA) (H): ICD-10-CM

## 2024-03-04 RX ORDER — ROSUVASTATIN CALCIUM 10 MG/1
10 TABLET, COATED ORAL DAILY
Qty: 30 TABLET | Refills: 0 | Status: SHIPPED | OUTPATIENT
Start: 2024-03-04 | End: 2024-04-16

## 2024-03-04 RX ORDER — TAMSULOSIN HYDROCHLORIDE 0.4 MG/1
0.4 CAPSULE ORAL 2 TIMES DAILY
Qty: 180 CAPSULE | Refills: 1 | Status: SHIPPED | OUTPATIENT
Start: 2024-03-04

## 2024-03-04 RX ORDER — ROSUVASTATIN CALCIUM 10 MG/1
10 TABLET, COATED ORAL DAILY
Qty: 90 TABLET | Refills: 1 | Status: SHIPPED | OUTPATIENT
Start: 2024-03-04

## 2024-03-04 RX ORDER — PANTOPRAZOLE SODIUM 40 MG/1
40 TABLET, DELAYED RELEASE ORAL DAILY
Qty: 90 TABLET | Refills: 1 | Status: SHIPPED | OUTPATIENT
Start: 2024-03-04

## 2024-03-04 NOTE — TELEPHONE ENCOUNTER
Patient is requesting rosuvastatin and eliquis rx to be sent to local pharmacy, due to being out of town for month. Patient leaving this week. Please advise.

## 2024-03-04 NOTE — TELEPHONE ENCOUNTER
Patient is calling to report that his insurance no longer covers [facility], but [facility] mail order. Patient is requesting Eliquis, rosuvastatin, tamsulosin, and pantoprazole to be sent to [facility] mail order pharmacy. Pharmacy desired attached. Please send as able.

## 2024-03-04 NOTE — TELEPHONE ENCOUNTER
Eventually all scripts will go to [facility] mail order . Patient will call back with [facility] information.

## 2024-03-04 NOTE — TELEPHONE ENCOUNTER
Temp scripts sent to [facility] and 6 month supply sent to [facility] mail order    BARBIE LIZ RN on 3/4/2024 at 3:36 PM   [facility]

## 2024-03-04 NOTE — TELEPHONE ENCOUNTER
Sent pantoprazole and tamsulosin in to alternate pharmacy.     Eliquis and rosuvastatin pended in other encounter.     BARBIE LIZ RN on 3/4/2024 at 3:27 PM   [facility]

## 2024-03-05 RX ORDER — ROSUVASTATIN CALCIUM 10 MG/1
10 TABLET, COATED ORAL DAILY
Qty: 90 TABLET | OUTPATIENT
Start: 2024-03-05

## 2024-03-15 ENCOUNTER — TELEPHONE (OUTPATIENT)
Dept: FAMILY MEDICINE | Facility: CLINIC | Age: 79
End: 2024-03-15
Payer: COMMERCIAL

## 2024-03-15 DIAGNOSIS — M47.814 SPONDYLOSIS OF THORACIC REGION WITHOUT MYELOPATHY OR RADICULOPATHY: ICD-10-CM

## 2024-03-15 DIAGNOSIS — R10.9 CHRONIC ABDOMINAL PAIN: ICD-10-CM

## 2024-03-15 DIAGNOSIS — G89.29 CHRONIC ABDOMINAL PAIN: ICD-10-CM

## 2024-03-15 DIAGNOSIS — G89.29 CHRONIC LLQ PAIN: ICD-10-CM

## 2024-03-15 DIAGNOSIS — R10.32 CHRONIC LLQ PAIN: ICD-10-CM

## 2024-03-15 DIAGNOSIS — M79.18 MYOFASCIAL PAIN: ICD-10-CM

## 2024-03-15 DIAGNOSIS — M15.0 PRIMARY OSTEOARTHRITIS INVOLVING MULTIPLE JOINTS: Primary | ICD-10-CM

## 2024-03-15 NOTE — TELEPHONE ENCOUNTER
Pt is requesting a referral to pain management. He called them for an appointment and they stated he needs a new referral as last visit was 7/15/22. Referral needed for same issues as previously:    1. Thoracic radiculopathy    2. Chronic abdominal pain      Pt reports pain is a chronic issue not acute.     Dwayne Camarillo RN [facility]

## 2024-03-28 ENCOUNTER — OFFICE VISIT (OUTPATIENT)
Dept: FAMILY MEDICINE | Facility: CLINIC | Age: 79
End: 2024-03-28
Payer: COMMERCIAL

## 2024-03-28 ENCOUNTER — NURSE TRIAGE (OUTPATIENT)
Dept: FAMILY MEDICINE | Facility: CLINIC | Age: 79
End: 2024-03-28

## 2024-03-28 VITALS
BODY MASS INDEX: 37.97 KG/M2 | TEMPERATURE: 98 F | HEART RATE: 95 BPM | SYSTOLIC BLOOD PRESSURE: 108 MMHG | OXYGEN SATURATION: 97 % | WEIGHT: 280 LBS | DIASTOLIC BLOOD PRESSURE: 62 MMHG

## 2024-03-28 DIAGNOSIS — B35.4 TINEA CORPORIS: ICD-10-CM

## 2024-03-28 DIAGNOSIS — J01.90 ACUTE SINUSITIS TREATED WITH ANTIBIOTICS IN THE PAST 60 DAYS: Primary | ICD-10-CM

## 2024-03-28 PROCEDURE — 99214 OFFICE O/P EST MOD 30 MIN: CPT | Performed by: FAMILY MEDICINE

## 2024-03-28 RX ORDER — DOXYCYCLINE HYCLATE 100 MG
1 TABLET ORAL
COMMUNITY
Start: 2024-03-21 | End: 2024-04-16

## 2024-03-28 RX ORDER — BENZONATATE 100 MG/1
100 CAPSULE ORAL 3 TIMES DAILY PRN
COMMUNITY
Start: 2024-03-21 | End: 2024-04-16

## 2024-03-28 RX ORDER — METOPROLOL SUCCINATE 50 MG/1
50 TABLET, EXTENDED RELEASE ORAL 2 TIMES DAILY
COMMUNITY
Start: 2023-11-15

## 2024-03-28 RX ORDER — CLOTRIMAZOLE 1 %
CREAM (GRAM) TOPICAL
Qty: 85 G | Refills: 1 | Status: SHIPPED | OUTPATIENT
Start: 2024-03-28

## 2024-03-28 NOTE — TELEPHONE ENCOUNTER
Central scheduling states he has a virtual today but wants to schedule with Dr. Cassidy next week.   Rescheduled to in person today that was available with Dr. Mojica. .       Assessment   Patient states sinus tenderness and drainage has not improved and he has a red rash on his feet and left thigh for the past couple weeks.     He was prescribed Doxycycline in [place], he has 2 pills left of 10 day course for a sinus infection.   He states the sinus infection has been present for a month     Mild left cheek tenderness.   Yellow sinus drainage   Can breath through his nose.   Blows his nose 8-10 times a day.   No fevers   Feels tired.     Mild Migraine headaches- behind eyes - intermittent   Someday's no headache and sometimes forehead headache is present for 1/2 hour 1-2 times a day      rash present a couple weeks- started before the antibiotic.    scattered tiny red spots on top of both feet  Not raised, no burning, no itching  Unchanged for a couple weeks.     Red area on upper left thigh   Patient estimated the area to be about 4 inches- has not changed in the last few weeks.   Minor itching on the thigh occasionally.   No pain, burning or itching on thigh.   Denied swelling in feet or thigh.   No new meds or supplements in the last 2 months  Jardiance  and Entresto was started about 4 months ago.     Recommendations .  Advised patient of in person appointment today, location, arrival and apt time.     Reason for Disposition   Taking antibiotic > 72 hours (3 days) and sinus pain not improved    Additional Information   Negative: SEVERE difficulty breathing (e.g., struggling for each breath, speaks in single words)   Negative: Sounds like a life-threatening emergency to the triager   Negative: Difficulty breathing and not from stuffy nose (e.g., not relieved by cleaning out the nose)   Negative: SEVERE headache and fever   Negative: Taking antibiotic > 24 hours and fever > 103 F (39.4 C)   Negative: Redness or  swelling on the cheek, forehead or around the eye and fever   Negative: Patient sounds very sick or weak to the triager   Negative: SEVERE sinus pain and not improved 2 hours after pain medicine   Negative: Redness or swelling on the cheek, forehead or around the eye and new since starting antibiotics   Negative: Taking antibiotic > 48 hours (2 days) and fever persists    Protocols used: Sinus Infection on Antibiotic Follow-up Call-A-[place]

## 2024-03-28 NOTE — PROGRESS NOTES
Assessment & Plan     Acute sinusitis treated with antibiotics in the past 60 days  - amoxicillin-clavulanate (AUGMENTIN) 875-125 MG tablet; Take 1 tablet by mouth 2 times daily for 10 days    Tinea corporis  - clotrimazole (LOTRIMIN) 1 % external cream; Apply thin layer of cream to affected area on penis, leg, and feet twice daily for 14 days.        BMI  Estimated body mass index is 37.97 kg/m  as calculated from the following:    Height as of 12/19/23: 1.829 m (6').    Weight as of this encounter: 127 kg (280 lb).         Malick Arias is a 78 year old, presenting for the following health issues:  Cold Symptoms        3/28/2024     1:05 PM   Additional Questions   Roomed by Jason MIGUEL CMA     History of Present Illness       Reason for visit:  Sinus infection  Symptom onset:  3-4 weeks ago    He eats 2-3 servings of fruits and vegetables daily.He consumes 0 sweetened beverage(s) daily.He exercises with enough effort to increase his heart rate 30 to 60 minutes per day.  He exercises with enough effort to increase his heart rate 3 or less days per week.   He is taking medications regularly.       Was seen at clinic in [place] and prescribed Tessalon and Doxycycline. Some improvement but still having sinus tenderness and drainage    Triage Note: 3/28/2024  12:28 PM     Central scheduling states he has a virtual today but wants to schedule with Dr. Cassidy next week.   Rescheduled to in person today that was available with Dr. Mojica. .         Assessment   Patient states sinus tenderness and drainage has not improved and he has a red rash on his feet and left thigh for the past couple weeks.      He was prescribed Doxycycline in [place], he has 2 pills left of 10 day course for a sinus infection.   He states the sinus infection has been present for a month      Mild left cheek tenderness.   Yellow sinus drainage   Can breath through his nose.   Blows his nose 8-10 times a day.   No fevers   Feels tired.      Mild  Migraine headaches- behind eyes - intermittent   Someday's no headache and sometimes forehead headache is present for 1/2 hour 1-2 times a day       rash present a couple weeks- started before the antibiotic.    scattered tiny red spots on top of both feet  Not raised, no burning, no itching  Unchanged for a couple weeks.      Red area on upper left thigh   Patient estimated the area to be about 4 inches- has not changed in the last few weeks.   Minor itching on the thigh occasionally.   No pain, burning or itching on thigh. - crotisone cream seems to help.   Denied swelling in feet or thigh.   No new meds or supplements in the last 2 months  Jardiance  and Entresto was started about 4 months ago.      Recommendations .  Advised patient of in person appointment today, location, arrival and apt time.      Reason for Disposition   Taking antibiotic > 72 hours (3 days) and sinus pain not improved    Additional Information   Negative: SEVERE difficulty breathing (e.g., struggling for each breath, speaks in single words)   Negative: Sounds like a life-threatening emergency to the triager   Negative: Difficulty breathing and not from stuffy nose (e.g., not relieved by cleaning out the nose)   Negative: SEVERE headache and fever   Negative: Taking antibiotic > 24 hours and fever > 103 F (39.4 C)   Negative: Redness or swelling on the cheek, forehead or around the eye and fever   Negative: Patient sounds very sick or weak to the triager   Negative: SEVERE sinus pain and not improved 2 hours after pain medicine   Negative: Redness or swelling on the cheek, forehead or around the eye and new since starting antibiotics   Negative: Taking antibiotic > 48 hours (2 days) and fever persists    Protocols used: Sinus Infection on Antibiotic Follow-up Call-A-[facility]                Review of Systems  Constitutional, HEENT, cardiovascular, pulmonary, gi and gu systems are negative, except as otherwise noted.      Objective    /62    Pulse 95   Temp 98  F (36.7  C) (Tympanic)   Wt 127 kg (280 lb)   SpO2 97%   BMI 37.97 kg/m    Body mass index is 37.97 kg/m .  Physical Exam   GENERAL: alert and no distress  HENT: ear canals and TM's normal, nose and mouth without ulcers or lesions  NECK: no adenopathy and no asymmetry, masses, or scars  RESP: lungs clear to auscultation - no rales, rhonchi or wheezes  CV: regular rates and rhythm, normal S1 S2, no S3 or S4, and no murmur, click or rub  PSYCH: mentation appears normal, affect normal/bright  SKIN: Red cracked, flaky rash on bilateral feet. Red rash in right inguinal region and medial thigh with raised edge            Signed Electronically by: Khanh Mojica DO

## 2024-04-02 ENCOUNTER — LAB (OUTPATIENT)
Dept: LAB | Facility: CLINIC | Age: 79
End: 2024-04-02
Payer: COMMERCIAL

## 2024-04-02 DIAGNOSIS — E78.5 HYPERLIPIDEMIA LDL GOAL <70: ICD-10-CM

## 2024-04-02 DIAGNOSIS — Z12.5 SCREENING FOR PROSTATE CANCER: Primary | ICD-10-CM

## 2024-04-02 LAB
CHOLEST SERPL-MCNC: 110 MG/DL
FASTING STATUS PATIENT QL REPORTED: YES
HDLC SERPL-MCNC: 53 MG/DL
LDLC SERPL CALC-MCNC: 47 MG/DL
NONHDLC SERPL-MCNC: 57 MG/DL
PSA SERPL DL<=0.01 NG/ML-MCNC: 0.11 NG/ML (ref 0–6.5)
TRIGL SERPL-MCNC: 50 MG/DL

## 2024-04-02 PROCEDURE — 36415 COLL VENOUS BLD VENIPUNCTURE: CPT

## 2024-04-02 PROCEDURE — 80061 LIPID PANEL: CPT

## 2024-04-02 PROCEDURE — G0103 PSA SCREENING: HCPCS

## 2024-04-02 NOTE — LETTER
April 12, 2024      [address]  [address]   [facility] [address]        Dear ,    We are writing to inform you of your test results.     They showed:     Labs are overall excellent     PSA much improved, after radiation therapy     We advise:     Follow up with Dr Santos/Todd     For additional lab test information, [email or web address] is an excellent reference.     Let us know if you have any questions or concerns.     Thank you for choosing us for your health care needs!     Sincerely,              Brett Cassidy MD, FAAFP                      Resulted Orders   Lipid panel reflex to direct LDL Non-fasting   Result Value Ref Range    Cholesterol 110 <200 mg/dL    Triglycerides 50 <150 mg/dL    Direct Measure HDL 53 >=40 mg/dL    LDL Cholesterol Calculated 47 <=100 mg/dL    Non HDL Cholesterol 57 <130 mg/dL    Patient Fasting > 8hrs? Yes     Narrative    Cholesterol  Desirable:  <200 mg/dL    Triglycerides  Normal:  Less than 150 mg/dL  Borderline High:  150-199 mg/dL  High:  200-499 mg/dL  Very High:  Greater than or equal to 500 mg/dL    Direct Measure HDL  Female:  Greater than or equal to 50 mg/dL   Male:  Greater than or equal to 40 mg/dL    LDL Cholesterol  Desirable:  <100mg/dL  Above Desirable:  100-129 mg/dL   Borderline High:  130-159 mg/dL   High:  160-189 mg/dL   Very High:  >= 190 mg/dL    Non HDL Cholesterol  Desirable:  130 mg/dL  Above Desirable:  130-159 mg/dL  Borderline High:  160-189 mg/dL  High:  190-219 mg/dL  Very High:  Greater than or equal to 220 mg/dL   PROSTATE SPEC ANTIGEN SCREEN   Result Value Ref Range    Prostate Specific Antigen Screen 0.11 0.00 - 6.50 ng/mL    Narrative    This result is obtained using the Roche Elecsys total PSA method on the xavier e801 immunoassay analyzer. Results obtained with different assay methods or kits cannot be used interchangeably.

## 2024-04-16 ENCOUNTER — VIRTUAL VISIT (OUTPATIENT)
Dept: PHARMACY | Facility: CLINIC | Age: 79
End: 2024-04-16
Payer: COMMERCIAL

## 2024-04-16 DIAGNOSIS — I48.0 PAROXYSMAL ATRIAL FIBRILLATION (H): ICD-10-CM

## 2024-04-16 DIAGNOSIS — R52 PAIN: ICD-10-CM

## 2024-04-16 DIAGNOSIS — N40.1 BENIGN PROSTATIC HYPERPLASIA (BPH) WITH URINARY URGE INCONTINENCE: ICD-10-CM

## 2024-04-16 DIAGNOSIS — I48.21 PERMANENT ATRIAL FIBRILLATION (H): ICD-10-CM

## 2024-04-16 DIAGNOSIS — B35.9 TINEA: ICD-10-CM

## 2024-04-16 DIAGNOSIS — I50.20 HFREF (HEART FAILURE WITH REDUCED EJECTION FRACTION) (H): ICD-10-CM

## 2024-04-16 DIAGNOSIS — K21.9 GASTROESOPHAGEAL REFLUX DISEASE, UNSPECIFIED WHETHER ESOPHAGITIS PRESENT: ICD-10-CM

## 2024-04-16 DIAGNOSIS — E78.5 HYPERLIPIDEMIA LDL GOAL <70: Primary | ICD-10-CM

## 2024-04-16 DIAGNOSIS — Z78.9 TAKES DIETARY SUPPLEMENTS: ICD-10-CM

## 2024-04-16 DIAGNOSIS — N39.41 BENIGN PROSTATIC HYPERPLASIA (BPH) WITH URINARY URGE INCONTINENCE: ICD-10-CM

## 2024-04-16 PROCEDURE — 99607 MTMS BY PHARM ADDL 15 MIN: CPT | Mod: 95 | Performed by: PHARMACIST

## 2024-04-16 PROCEDURE — 99605 MTMS BY PHARM NP 15 MIN: CPT | Mod: 95 | Performed by: PHARMACIST

## 2024-04-16 RX ORDER — SACUBITRIL AND VALSARTAN 49; 51 MG/1; MG/1
1 TABLET, FILM COATED ORAL
COMMUNITY
Start: 2024-04-11

## 2024-04-16 NOTE — LETTER
_  Medication List        Prepared on: 04/16/2024     Bring your Medication List when you go to the doctor, hospital, or   emergency room. And, share it with your family or caregivers.     Note any changes to how you take your medications.  Cross out medications when you no longer use them.    Medication How I take it Why I use it Prescriber   acetaminophen (TYLENOL) 325 MG tablet Take 650 mg by mouth every 6 hours as needed Pain Patient Reported   apixaban ANTICOAGULANT (ELIQUIS) 5 MG tablet Take 1 tablet (5 mg) by mouth 2 times daily S/P CABG (Coronary Artery Bypass Graft); Cardiomyopathy, unspecified type (H); HFrEF (heart failure with reduced ejection fraction) (H); Coronary artery disease involving coronary bypass graft of native heart without angina pectoris; History of CVA (cerebrovascular accident); Permanent Atrial Fibrillation (H); Prediabetes; Hyperlipidemia LDL Goal <70; NSVT (nonsustained ventricular tachycardia) (H) Brett Cassidy MD   clotrimazole (LOTRIMIN) 1 % external cream Apply thin layer of cream to affected area on penis, leg, and feet twice daily for 14 days. Tinea Corporis Khanh Mojica,    empagliflozin (JARDIANCE) 10 MG TABS tablet Take 1 tablet (10 mg) by mouth daily Chronic Systolic Heart Failure (H) Laura Olivia PA-C   ENTRESTO 49-51 MG per tablet Take 1 tablet by mouth 2 times daily  Chronic Systolic Heart Failure (H) Patient Reported   Ferrous Sulfate (IRON) 325 (65 Fe) MG tablet Take 1 tablet by mouth three times a week (Monday, Wednesday, Friday) Iron deficiency anemia, unspecified iron deficiency anemia type Brett Cassidy MD   furosemide (LASIX) 20 MG tablet Take 20 mg by mouth as needed (take one tablet (20 mg) for shortness of breath, swelling, or weight gain of 2 lbs in a night or 5 lbs in a week)  Chronic Systolic Heart Failure (H) Laura Olivia PA-C   metoprolol succinate ER (TOPROL XL) 50 MG 24 hr tablet Take 50 mg by mouth 2 times daily  Chronic Systolic Heart  Failure (H) Patient Reported   pantoprazole (PROTONIX) 40 MG EC tablet Take 1 tablet (40 mg) by mouth daily NAFLD (nonalcoholic fatty liver disease); History of gastric bypass; Medication Monitoring Encounter Brett Cassidy MD   rosuvastatin (CRESTOR) 10 MG tablet Take 1 tablet (10 mg) by mouth daily S/P CABG (Coronary Artery Bypass Graft); Cardiomyopathy, unspecified type (H); HFrEF (heart failure with reduced ejection fraction) (H); Coronary artery disease involving coronary bypass graft of native heart without angina pectoris; History of CVA (cerebrovascular accident); Permanent Atrial Fibrillation (H); Prediabetes; Hyperlipidemia LDL Goal <70; NSVT (nonsustained ventricular tachycardia) (H) Brett Cassidy MD   tamsulosin (FLOMAX) 0.4 MG capsule Take 1 capsule (0.4 mg) by mouth 2 times daily S/P CABG (Coronary Artery Bypass Graft) Brett Cassidy MD   vitamin B-12 (CYANOCOBALAMIN) 1000 MCG tablet Take 1,000 mcg by mouth every other day Supplement Patient Reported   vitamin C (ASCORBIC ACID) 1000 MG TABS Take 1,000 mg by mouth every other day Supplement Patient Reported   vitamin D3 (CHOLECALCIFEROL) 125 MCG (5000 UT) tablet Take 1 tablet by mouth every other day Supplement Patient Reported         Add new medications, over-the-counter drugs, herbals, vitamins, or  minerals in the blank rows below.    Medication How I take it Why I use it Prescriber                                      Allergies:      No Known Allergies        Side effects I have had:      No Known Side Effects        Other Information:              My notes and questions:

## 2024-04-16 NOTE — LETTER
April 16, 2024  [address]o [address]  [address]   [facility] [address]    Dear  Gus, [facility]     Thank you for talking with me on Apr 16, 2024 about your health and medications. As a follow-up to our conversation, I have included two documents:      Your Recommended To-Do List has steps you should take to get the best results from your medications.  Your Medication List will help you keep track of your medications and how to take them.    If you want to talk about these documents, please call Junior Estevez RPH at phone: [phone], Monday-Friday 8-4:30pm.    I look forward to working with you and your doctors to make sure your medications work well for you.    Sincerely,  Junior Estevez RPH  [facility] Pharmacist, [facility]

## 2024-04-16 NOTE — LETTER
"Recommended To-Do List      Prepared on: 04/16/2024       You can get the best results from your medications by completing the items on this \"To-Do List.\"      Bring your To-Do List when you go to your doctor. And, share it with your family or caregivers.    My To-Do List:  What we talked about: What I should do:   A medication that is not working    Make an appointment with your PCP to discuss clotrimazole and if they want you to use another agent          What we talked about: What I should do:                     "

## 2024-04-16 NOTE — PROGRESS NOTES
Medication Therapy Management (MTM) Encounter    ASSESSMENT:                            Medication Adherence/Access: No issues identified      Hyperlipidemia /CAD/AFIB  Stable.    HFrEF (</=40%)   Stable.  Plan placed with cardiology.  Patient could potentially be indicated for spironolactone however he said that his cardiologist did not want to start any new diuretics at this time.  Also they may be optimizing Entresto and do not want to add in spironolactone for fear of causing hypotension.    GERD:   Stable.    Pain:    Stable.    Tinea corporis   Since clotrimazole was meant to be used for 2 weeks and has not yet cleared up he should follow-up with his PCP to determine if they would like him to continue using this medication or consider an alternative antifungal.    BPH:    Stable.  Dizziness is tolerable not a large concern.    Supplements:    Stable    PLAN:                            Follow-up with the clinic to let them know your Tinea corpis has not resolved and whether they would like you to continue the clotrimazole or try another medication.    Follow-up: Return in about 1 year (around 4/16/2025) for Follow up, with me.    SUBJECTIVE/OBJECTIVE:                          Hugo Weber is a 78 year old male called for a yearly medication review. He was referred to me from insurance plan.      Reason for visit: CMR.    Allergies/ADRs: Reviewed in chart  Past Medical History: Reviewed in chart  Tobacco: He reports that he has never smoked. He has never used smokeless tobacco.  Alcohol: 1-3 beverages / week    Medication Adherence/Access: Currently is getting a enrique to help him afford Entresto and Jardiance and will be up for renewal in June and will be applying for this.     Hyperlipidemia /CAD/AFIB  Eliquis 5 mg twice a day   rosuvastatin 10mg daily    Patient reports no significant myalgias or other side effects.         Recent Labs   Lab Test 04/02/24  0955 11/07/23  0825   CHOL 110 103   HDL 53 54    LDL 47 41   TRIG 50 39     HFrEF (</=40%)   Beta Blocker: metoprolol ER 50 mg twice a day  ACEi/ARB/ARNI: Entresto 24-26 mg twice a day   SGLT2i: Jardiance 10 mg daily  Diuretic(s): furosemide 20 mg as needed - hasn't taken in a long time.  Stated cardiologist does not want to initiate any new diuretics at this time.  Patient reports no current medication side effects.     Patient reports these HF symptoms: none.    Patient is measuring daily weights  and reports stable.   Patient does not self-monitor blood pressure.   Patient is following a low sodium diet and is not avoiding alcohol.    BP Readings from Last 3 Encounters:   03/28/24 108/62   12/19/23 104/64   12/01/23 102/66       Pulse Readings from Last 3 Encounters:   03/28/24 95   12/19/23 92   12/01/23 105       GERD:   Pantoprazole 40 mg once daily   Patient feels that current regimen is effective. Hasn't had acid reflux for awhile. Did try going off of this 2 years ago but the reflux returned      Pain:    Acetaminophen 650 mg as needed - hasn't taken in a couple weeks    Tinea corporis   Clotrimazole 1% cream twice a day - reports this has not cleared up yet. Wants a refill of this    BPH:    Tamsulosin 0.4 mg twice a day     Reports this is working. Every once in awhile will have dizziness when standing up but not bad    Supplements:    Ferrous sulfate every other day  Vitamin B12 1000 mcg every other day  Vitamin C 1000 mg every other day  Vitamin D 5000 units every other day    No concerns or questions at this time.       Today's Vitals: There were no vitals taken for this visit.  ----------------      I spent 16 minutes with this patient today. All changes were made via collaborative practice agreement with Brett Cassidy MD. A copy of the visit note was provided to the patient's provider(s).    A summary of these recommendations was sent via [employer].    Junior Estevez, Pharm. D., BCACP  Medication Therapy Management Pharmacist      Telemedicine Visit  Details  Type of service:  Video Conference via [employer]  Start Time:  901 am  End Time: 9:17 AM     Medication Therapy Recommendations  Tinea    Current Medication: clotrimazole (LOTRIMIN) 1 % external cream   Rationale: Condition refractory to medication - Ineffective medication - Effectiveness   Recommendation: Make Appt with PCP   Status: Patient Agreed - Adherence/Education

## 2024-04-16 NOTE — PATIENT INSTRUCTIONS
"Recommendations from today's MTM visit:                                                    MTM (medication therapy management) is a service provided by a clinical pharmacist designed to help you get the most of out of your medicines.   Today we reviewed what your medicines are for, how to know if they are working, that your medicines are safe and how to make your medicine regimen as easy as possible.    Follow-up with the clinic to let them know your Tinea corpis has not resolved and whether they would like you to continue the clotrimazole or try another medication.    Follow-up: Return in about 1 year (around 4/16/2025) for Follow up, with me.    It was great speaking with you today.  I value your experience and would be very thankful for your time in providing feedback in our clinic survey. In the next few days, you may receive an email or text message from [employer] with a link to a survey related to your  clinical pharmacist.\"     To schedule another MTM appointment, please call the clinic directly or you may call the MTM scheduling line at [phone] or toll-free at [phone].     My Clinical Pharmacist's contact information:                                                      Please feel free to contact me with any questions or concerns you have.      Junior Estevez, Pharm. D., [facility]  Medication Therapy Management Pharmacist    "

## 2024-05-03 ENCOUNTER — TELEPHONE (OUTPATIENT)
Dept: FAMILY MEDICINE | Facility: CLINIC | Age: 79
End: 2024-05-03
Payer: COMMERCIAL

## 2024-05-03 NOTE — TELEPHONE ENCOUNTER
Symptoms    Describe your symptoms: back pain/issues    Any pain: Yes: inflammation    How long have you been having symptoms: 3.5   weeks    Have you been seen for this:  Yes: only by chiropractor     Appointment offered?: No    Triage offered?: Yes: declined    Home remedies tried: no    Preferred Pharmacy:   [id number] - [place], [place] - [address]  [address]  [facility] [address]  Phone: [phone] Fax: [phone]      Could we send this information to you in [employer] or would you prefer to receive a phone call?:   Patient would prefer a phone call   Okay to leave a detailed message?: Yes at Cell number on file:    Telephone Information:   Mobile [phone]

## 2024-05-03 NOTE — TELEPHONE ENCOUNTER
"Called and spoke with patient.     Been to 2 different chiropractors.    \"Dr. Owusu suggested I talk to dr. Cassidy - for steroids. Swelling in there.\"     Scheduled appointment for 5/8.     BARBIE LIZ RN on 5/3/2024 at 12:52 PM   [facility]        "

## 2024-05-08 ENCOUNTER — ANCILLARY PROCEDURE (OUTPATIENT)
Dept: GENERAL RADIOLOGY | Facility: CLINIC | Age: 79
End: 2024-05-08
Attending: FAMILY MEDICINE
Payer: COMMERCIAL

## 2024-05-08 ENCOUNTER — OFFICE VISIT (OUTPATIENT)
Dept: FAMILY MEDICINE | Facility: CLINIC | Age: 79
End: 2024-05-08
Payer: COMMERCIAL

## 2024-05-08 VITALS
HEART RATE: 95 BPM | BODY MASS INDEX: 40.36 KG/M2 | RESPIRATION RATE: 16 BRPM | TEMPERATURE: 97.6 F | OXYGEN SATURATION: 99 % | DIASTOLIC BLOOD PRESSURE: 62 MMHG | SYSTOLIC BLOOD PRESSURE: 112 MMHG | HEIGHT: 72 IN | WEIGHT: 298 LBS

## 2024-05-08 DIAGNOSIS — Z98.84 HISTORY OF GASTRIC BYPASS: ICD-10-CM

## 2024-05-08 DIAGNOSIS — M54.42 ACUTE LEFT-SIDED LOW BACK PAIN WITH LEFT-SIDED SCIATICA: Primary | ICD-10-CM

## 2024-05-08 DIAGNOSIS — Z51.81 MEDICATION MONITORING ENCOUNTER: ICD-10-CM

## 2024-05-08 DIAGNOSIS — I25.810 CORONARY ARTERY DISEASE INVOLVING CORONARY BYPASS GRAFT OF NATIVE HEART WITHOUT ANGINA PECTORIS: ICD-10-CM

## 2024-05-08 DIAGNOSIS — E78.5 HYPERLIPIDEMIA LDL GOAL <70: ICD-10-CM

## 2024-05-08 DIAGNOSIS — R73.03 PREDIABETES: ICD-10-CM

## 2024-05-08 DIAGNOSIS — G89.29 CHRONIC LLQ PAIN: ICD-10-CM

## 2024-05-08 DIAGNOSIS — I42.9 CARDIOMYOPATHY, UNSPECIFIED TYPE (H): ICD-10-CM

## 2024-05-08 DIAGNOSIS — Z86.73 HISTORY OF CVA (CEREBROVASCULAR ACCIDENT): ICD-10-CM

## 2024-05-08 DIAGNOSIS — K76.0 NAFLD (NONALCOHOLIC FATTY LIVER DISEASE): ICD-10-CM

## 2024-05-08 DIAGNOSIS — E66.01 MORBID OBESITY (H): ICD-10-CM

## 2024-05-08 DIAGNOSIS — C61 PROSTATE CANCER (H): ICD-10-CM

## 2024-05-08 DIAGNOSIS — Z95.1 S/P CABG (CORONARY ARTERY BYPASS GRAFT): ICD-10-CM

## 2024-05-08 DIAGNOSIS — I50.20 HFREF (HEART FAILURE WITH REDUCED EJECTION FRACTION) (H): ICD-10-CM

## 2024-05-08 DIAGNOSIS — M54.42 ACUTE LEFT-SIDED LOW BACK PAIN WITH LEFT-SIDED SCIATICA: ICD-10-CM

## 2024-05-08 DIAGNOSIS — I77.810 THORACIC AORTIC ECTASIA (H): ICD-10-CM

## 2024-05-08 DIAGNOSIS — R10.32 CHRONIC LLQ PAIN: ICD-10-CM

## 2024-05-08 DIAGNOSIS — I10 HYPERTENSION GOAL BP (BLOOD PRESSURE) < 140/90: ICD-10-CM

## 2024-05-08 DIAGNOSIS — I48.21 PERMANENT ATRIAL FIBRILLATION (H): ICD-10-CM

## 2024-05-08 PROCEDURE — 72100 X-RAY EXAM L-S SPINE 2/3 VWS: CPT | Mod: TC | Performed by: RADIOLOGY

## 2024-05-08 PROCEDURE — 99214 OFFICE O/P EST MOD 30 MIN: CPT | Performed by: FAMILY MEDICINE

## 2024-05-08 PROCEDURE — G2211 COMPLEX E/M VISIT ADD ON: HCPCS | Performed by: FAMILY MEDICINE

## 2024-05-08 PROCEDURE — 73522 X-RAY EXAM HIPS BI 3-4 VIEWS: CPT | Mod: TC | Performed by: RADIOLOGY

## 2024-05-08 RX ORDER — METHYLPREDNISOLONE 4 MG
TABLET, DOSE PACK ORAL
Qty: 21 TABLET | Refills: 0 | Status: SHIPPED | OUTPATIENT
Start: 2024-05-08 | End: 2024-05-20

## 2024-05-08 ASSESSMENT — ENCOUNTER SYMPTOMS: BACK PAIN: 1

## 2024-05-08 NOTE — PROGRESS NOTES
Assessment & Plan     Acute left-sided low back pain with left-sided sciatica    - XR Lumbar Spine 2/3 Views  - XR Pelvis and Hip Bilateral 2 Views  - methylPREDNISolone (MEDROL DOSEPAK) 4 MG tablet therapy pack  Dispense: 21 tablet; Refill: 0    Prostate cancer (H)      History of CVA (cerebrovascular accident)      Coronary artery disease involving coronary bypass graft of native heart without angina pectoris      S/P CABG (coronary artery bypass graft)      Cardiomyopathy, unspecified type (H)      HFrEF (heart failure with reduced ejection fraction) (H)      Permanent atrial fibrillation (H)      Prediabetes      Hypertension goal BP (blood pressure) < 140/90      Hyperlipidemia LDL goal <70      NAFLD (nonalcoholic fatty liver disease)      History of gastric bypass      Chronic LLQ pain      Morbid obesity (H)      Medication monitoring encounter        Ordering of each unique test  Prescription drug management    24 minutes spent by me on the date of the encounter doing chart review, history and exam, documentation and further activities per the note    BMI  Estimated body mass index is 40.42 kg/m  as calculated from the following:    Height as of this encounter: 1.829 m (6').    Weight as of this encounter: 135.2 kg (298 lb).     Weight management plan: Patient has [employer] employee Insurance plan and was referred to the [facility] GLP-1 Weight Loss RX Criteria    Work on weight loss  Regular exercise    Plan:    1) Medications: medrol dose kiko, tylenol 1000 mg every 8 hrs    2) Labs: NA    3) Immunizations: NA    4) Imaging/Diagnostics: xrays pending    5) Consults: consider PT, consider follow up with Dr Umer CALDERA    6) heat/ice/stretching    Subjective   Hugo is a 78 year old, presenting for the following health issues:  Back Pain        5/8/2024    11:29 AM   Additional Questions   Roomed by Clarisa BLANCO CMA     History of Present Illness       Back Pain:  He presents for follow up of  back pain. Patient's back pain is a recurring problem.  Location of back pain:  Left lower back  Description of back pain: sharp and stabbing  Back pain spreads: left thigh    Since patient first noticed back pain, pain is: always present, but gets better and worse  Does back pain interfere with his job:  No       He eats 2-3 servings of fruits and vegetables daily.He consumes 0 sweetened beverage(s) daily.He exercises with enough effort to increase his heart rate 60 or more minutes per day.  He exercises with enough effort to increase his heart rate 3 or less days per week.   He is taking medications regularly.     Telephone Encounter 5/03/2024    Symptoms     Describe your symptoms: back pain/issues     Any pain: Yes: inflammation     How long have you been having symptoms: 3.5   weeks     Have you been seen for this:  Yes: only by chiropractor      Appointment offered?: No     Triage offered?: Yes: declined     Home remedies tried: no     On/Off left lower back for many years - in past help with chiropractor (Dr Behm/Radha/Umer) - recent minimal improvement, overall - slow term success - last seen 4/6/2024 - flare started 3+ weeks ago - left sided - radiates to left thigh - no incontinence - no numbness  tingling - no perirectal numbness - Hx of chronic LLQ with negative work up    Cardiac - stable - no issues    Hx of CVA - no significant residual    Prediabetes    Lab Results   Component Value Date    A1C 5.5 11/07/2023    A1C 5.4 02/21/2023    A1C 5.7 08/16/2022    A1C 5.6 08/11/2021    A1C 5.8 11/19/2020    A1C 5.8 08/07/2020    A1C 5.9 02/14/2020    A1C 5.7 12/02/2019    A1C 5.6 09/20/2019     Htn    BP Readings from Last 3 Encounters:   05/08/24 112/62   03/28/24 108/62   12/19/23 104/64     Lipids    Recent Labs   Lab Test 04/02/24  0955 11/07/23  0825   CHOL 110 103   HDL 53 54   LDL 47 41   TRIG 50 39       Patient Active Problem List   Diagnosis    Iron deficiency anemia    Colon polyps     Obstructive sleep apnea syndrome    Hyperlipidemia LDL goal <70    Long term current use of anticoagulant therapy - for intermittent a-fib    Advance Care Planning    Total knee replacement status    Calculus of kidney - 1.2 cm stone at the upper pole as of CT urogram fr 1/11/2023    NAFLD (nonalcoholic fatty liver disease)    Morbid obesity due to excess calories (H)    History of hepatitis C    Prediabetes    Paroxysmal atrial fibrillation (H)    Cholelithiasis    Hypertension goal BP (blood pressure) < 140/90    TMJ arthralgia    Nephrolithiasis    OA (osteoarthritis)    First degree AV block    Benign prostatic hyperplasia (BPH) with urinary urge incontinence    Thoracic aortic ectasia (H24)    Stroke (H)    CVA (cerebral vascular accident) (H)    Cervical stenosis of spine    Vitamin B12 deficiency (non anemic)    Vitamin D deficiency    Myofascial pain    Permanent atrial fibrillation (H)    History of CVA (cerebrovascular accident)    Small bowel obstruction (H)    NSVT (nonsustained ventricular tachycardia) (H)    Chronic LLQ pain    Elevated prostate specific antigen (PSA)    Prostate cancer (H)    Chronic systolic heart failure (H)    HFrEF (heart failure with reduced ejection fraction) (H)    Aortic valve stenosis, etiology of cardiac valve disease unspecified- moderate 2+ with FREDY = 1.2cm2 on echocardiogram fr 7/14/2023    Mitral [id number]+ on echocardiogram fr 7/14/2023    Cardiomyopathy (H)    Cardiomyopathy, unspecified type (H)    S/P CABG (coronary artery bypass graft)    Fluid overload    Transient hyperglycemia post procedure    Status post ligation of left atrial appendage    Encounter for surgical aftercare following surgery on the circulatory system    Presence of aortocoronary bypass graft:CAB x1 8/15/2023    Atherosclerosis of native coronary artery of native heart without angina pectoris    Essential (primary) hypertension    Chronic atrial fibrillation, unspecified (H)    Personal  history of transient ischemic attack (TIA), and cerebral infarction without residual deficits    Physical deconditioning    Leg edema, right    HAYLEE on CPAP       Past Medical History:   Diagnosis Date    Aortic stenosis     moderate    Atrial fibrillation 2006    Followed by [facility] - persistent    CAD (coronary artery disease)     Calculus of kidney 2005, 6/06, 10/12, 8/18, 9/19    dr walker/nestor/иван - hematuria - w/u o/w neg    Cervical stenosis of spine     Moderate C4-5    Cholelithiasis     Chronic peptic ulcer, unspecified site, without mention of hemorrhage, perforation, or obstruction 1985    gastric bypass    Colon polyps 8/05, 9/10, 10/15    2 tubular adenoma, 1 hyperplastic - multiple serrated/tubular adenomas - last colonscopy 11/20 due 5 yrs    CVA (cerebral vascular accident) (H) 01/2019    small right periorlandic subcortical - left sided residual - left hand tingling/numbness - Left leg weak/numbness - cardioembolic    Elevated prostate specific antigen (PSA)     Dr Santos    Hepatitis C 10/2012    chronic hepatitis C, grade 1-2, stage 1 - mild - Dr Douglas    HFrEF (heart failure with reduced ejection fraction) (H)     [facility] - 35%    Hyperlipidemia LDL goal <70     Hypertension goal BP (blood pressure) < 140/90 06/2006    dr jaciel winchester    Iron deficiency anemia, unspecified 1985    gastric bypass    Mitral regurgitation     mild-moderate    Nephrolithiasis multiple episodes, last 10/19    Dr Bey/Ivana/Tom - 9mm 3/2021    OA (osteoarthritis)     Multiple - Left shoulder with rotator cuff tear - Dr Durham    Obesity, unspecified     s/p gastric bypass 1985     Obstructive sleep apnea syndrome     CPAP - 15 cm - [facility] 1/2023    Prediabetes     Presbyacusis 01/2004    dr corona    Prostate cancer (H) 2023    Dr Santos - [place] 3+4, 4+3 = 7, bx 5/13 positive    Pyelonephritis, unspecified 12/1999    SCC (squamous cell carcinoma), 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    dr saez - [facility]     Sleep apnea 10/2002    Auto CPAP - severe - 12-15 cm - dr cunha    Stroke (H) 01/19/2019    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    [facility]    Vitamin B12 deficiency (non anemic) 04/15/2019    Vitamin D deficiency 04/15/2019       Past Surgical History:   Procedure Laterality Date    APPENDECTOMY      ARTHROPLASTY KNEE  08/13/2012    LT TKA - Dr Villanueva    BYPASS GRAFT ARTERY CORONARY N/A 8/15/2023    Procedure: CORONARY ARTERY BYPASS GRAFT x 1 (SAPHENOUS VEIN - RIGHT POSTERIOR DESCENDING ARTERY) WITH ENDOSCOPIC SAPHENOUS VEIN HARVEST ON THE LEFT LOWER EXTREMITY, AND ON CARDIOPULMONARY PUMP OXYGENATOR  (INTRAOPERATIVE TRANSESOPHAGEAL ECHOCARDIOGRAM BY ANESTHESIOLOGIST), REMOVAL OF RIGHT CORONARY ARTERY STENT AND DELIVERY SYSTEM, LEFT ATRIAL APPENDAGE CLIPPING WITH ATRICLIP FLEX V DEVICE SIZE: 45    CARDIOVERSION  2016    CARDIOVERSION      COLONOSCOPY  8/05, 9/10, 10/15    multiple tubular/serrated/hyperplastic polyps    COLONOSCOPY N/A 10/29/2015    multiple tubular and serrated adenomas    COLONOSCOPY N/A 09/07/2016    COLONOSCOPY  11/2020    tubular adenomas - due 5 yrs    COLONOSCOPY N/A 11/24/2020    Procedure: COLONOSCOPY with biopsies;  Surgeon: Chadwick Burgos MD;  Location:  OR    CV CORONARY ANGIOGRAM N/A 8/15/2023    Procedure: Coronary Angiogram;  Surgeon: Carly Luna MD;  Location: [facility] CARDIAC CATH LAB    CV FRACTIONAL FLOW RATIO WIRE N/A 8/15/2023    Procedure: Fractional Flow Ratio Wire;  Surgeon: Carly Luna MD;  Location: [facility] CARDIAC CATH LAB    CV PCI N/A 8/15/2023    Procedure: Percutaneous Coronary Intervention;  Surgeon: Carly Luna MD;  Location: [facility] CARDIAC CATH LAB    CYSTOSCOPY W/ LASER LITHOTRIPSY  10/16/2012,5/2/2018    ESOPHAGOSCOPY, GASTROSCOPY, DUODENOSCOPY (EGD), COMBINED N/A 11/24/2020    Procedure: ESOPHAGOGASTRODUODENOSCOPY, COLONOSCOPY with biopsies;  Surgeon: Chadwick Burgos MD;  Location: [facility] OR    EYE SURGERY  1/01,6/02,11/02    lasik     HC KNEE SCOPE, DIAGNOSTIC  05/2000    Arthroscopy, Knee RT    LASER HOLMIUM LITHOTRIPSY URETER(S), INSERT STENT, COMBINED  10/16/2012    stones x 4, Dr Campa    LASER HOLMIUM LITHOTRIPSY URETER(S), INSERT STENT, COMBINED Right 05/02/2018    CYSTOSCOPY, RIGHT URETEROSCOPY, HOLMIUM LASER LITHOTRIPSY, RIGHT STENT PLACEMENT - Dr Bey    MRI BIOPSY PROSTATE Bilateral 02/09/2023    mark    REVERSE ARTHROPLASTY SHOULDER Left 03/07/2023    Procedure: LEFT REVERSE SHOULDER ARTHROPLASTY WITH CUSTOM GUIDE WITH AUTOLOGOUS BONE GRAFTOF PROXIMAL HUMERUS;  Surgeon: Booker Multani MD;  Location: [facility] OR    SURGICAL HISTORY OF -   1985    s/p gastric bypass Bilroth II    SURGICAL HISTORY OF -   11/1998    wisdom teeth    SURGICAL HISTORY OF -   1979    cellulitis    SURGICAL HISTORY OF -   11/1998    lt forearm spur's    SURGICAL HISTORY OF -   1/01,6/02,11/02    s/p lasik    SURGICAL HISTORY OF -   11/1999    rt forearm spurs    SURGICAL HISTORY OF -   07/2006    dr stevenson - lithotrypsy    SURGICAL HISTORY OF -   10/08, 12/08    Lt ear chonchal lesion removal SCC - dr saez    SURGICAL HISTORY OF -  Right 10/2019    nephrolithiasis - cystoscopy, rt lithotrypsy, Dr Heath    SURGICAL HISTORY OF -  Right 11/2019    Cystoscopy, Rt lithotrypsy, Calcium Oxalate, Dr Heath       Current Outpatient Medications   Medication Sig Dispense Refill    acetaminophen (TYLENOL) 325 MG tablet Take 650 mg by mouth every 6 hours as needed      apixaban ANTICOAGULANT (ELIQUIS) 5 MG tablet Take 1 tablet (5 mg) by mouth 2 times daily 180 tablet 1    clotrimazole (LOTRIMIN) 1 % external cream Apply thin layer of cream to affected area on penis, leg, and feet twice daily for 14 days. 85 g 1    empagliflozin (JARDIANCE) 10 MG TABS tablet Take 1 tablet (10 mg) by mouth daily 90 tablet 3    ENTRESTO 49-51 MG per tablet Take 1 tablet by mouth 2 times daily      Ferrous Sulfate (IRON) 325 (65 Fe) MG tablet Take 1 tablet by mouth three times a  week (Monday, Wednesday, Friday) 90 tablet 3    furosemide (LASIX) 20 MG tablet Take 20 mg by mouth as needed (take one tablet (20 mg) for shortness of breath, swelling, or weight gain of 2 lbs in a night or 5 lbs in a week)      methylPREDNISolone (MEDROL DOSEPAK) 4 MG tablet therapy pack Follow Package Directions 21 tablet 0    metoprolol succinate ER (TOPROL XL) 50 MG 24 hr tablet Take 50 mg by mouth 2 times daily      pantoprazole (PROTONIX) 40 MG EC tablet Take 1 tablet (40 mg) by mouth daily 90 tablet 1    rosuvastatin (CRESTOR) 10 MG tablet Take 1 tablet (10 mg) by mouth daily 90 tablet 1    tamsulosin (FLOMAX) 0.4 MG capsule Take 1 capsule (0.4 mg) by mouth 2 times daily 180 capsule 1    vitamin B-12 (CYANOCOBALAMIN) 1000 MCG tablet Take 1,000 mcg by mouth every other day      vitamin C (ASCORBIC ACID) 1000 MG TABS Take 1,000 mg by mouth every other day      vitamin D3 (CHOLECALCIFEROL) 125 MCG (5000 UT) tablet Take 1 tablet by mouth every other day         No Known Allergies    Family History   Problem Relation Age of Onset    Alzheimer Disease Father 82         at 88    Prostate Cancer Father 76        bladder and prostate    Heart Disease Mother 80        CHF    Heart Disease Maternal Grandfather         MI at 55    Cancer Paternal Uncle         ?    Ulcerative Colitis No family hx of     Crohn's Disease No family hx of     Stomach Cancer No family hx of     GERD No family hx of        Social History     Socioeconomic History    Marital status:      Spouse name: Mayra    Number of children: 3    Years of education: 21    Highest education level: None   Occupational History    Occupation: retired teacher and football and       Employer: [employer]     Employer: RETIRED   Tobacco Use    Smoking status: Never    Smokeless tobacco: Never   Vaping Use    Vaping status: Never Used   Substance and Sexual Activity    Alcohol use: Not Currently     Alcohol/week: 2.0 - 3.0  "standard drinks of alcohol     Types: 2 - 3 Standard drinks or equivalent per week     Comment: occassiinally    Drug use: No     Comment: acknowledges using herbal supplement \"Vibe\" for energy-none used recently     Sexual activity: Not Currently     Partners: Male     Birth control/protection: None     Comment:    Other Topics Concern    Caffeine Concern Yes     Comment: <1 can qd    Sleep Concern Yes     Comment: sleep apnea, wears cpap    Exercise No    Seat Belt Yes    Parent/sibling w/ CABG, MI or angioplasty before 65F 55M? No   Social History Narrative    Wife , Mayra,  from brain gliobastoma 2020.  --Ingrid Girard MD           Social Determinants of Health     Financial Resource Strain: Low Risk  (2023)    Financial Resource Strain     Within the past 12 months, have you or your family members you live with been unable to get utilities (heat, electricity) when it was really needed?: No   Food Insecurity: Low Risk  (2023)    Food Insecurity     Within the past 12 months, did you worry that your food would run out before you got money to buy more?: No     Within the past 12 months, did the food you bought just not last and you didn t have money to get more?: No   Transportation Needs: Low Risk  (2023)    Transportation Needs     Within the past 12 months, has lack of transportation kept you from medical appointments, getting your medicines, non-medical meetings or appointments, work, or from getting things that you need?: No   Physical Activity: Sufficiently Active (3/24/2023)    Received from [facility]    Exercise Vital Sign     Days of Exercise per Week: 3 days     Minutes of Exercise per Session: 90 min   Stress: No Stress Concern Present (3/24/2023)    Received from [facility]    [employer] - Occupational Stress Questionnaire     Feeling of Stress : Not at all    [facility]    Social Connections   Interpersonal Safety: Low Risk  (5/8/2024)    Interpersonal Safety     Do you feel physically and emotionally safe where you currently live?: Yes     Within the past 12 months, have you been hit, slapped, kicked or otherwise physically hurt by someone?: No     Within the past 12 months, have you been humiliated or emotionally abused in other ways by your partner or ex-partner?: No   Housing Stability: Low Risk  (12/19/2023)    Housing Stability     Do you have housing? : Yes     Are you worried about losing your housing?: No     Review of Systems  CONSTITUTIONAL: NEGATIVE for fever, chills, change in weight  INTEGUMENTARY/SKIN: NEGATIVE for worrisome rashes, moles or lesions  EYES: NEGATIVE for vision changes or irritation  ENT/MOUTH: NEGATIVE for ear, mouth and throat problems  RESP: NEGATIVE for significant cough or SOB  BREAST: NEGATIVE for masses, tenderness or discharge  CV: NEGATIVE for chest pain, palpitations or peripheral edema  GI: NEGATIVE for nausea, abdominal pain, heartburn, or change in bowel habits  : NEGATIVE for frequency, dysuria, or hematuria  MUSCULOSKELETAL: NEGATIVE for significant arthralgias or myalgia  NEURO: NEGATIVE for weakness, dizziness or paresthesias  ENDOCRINE: NEGATIVE for temperature intolerance, skin/hair changes  HEME: NEGATIVE for bleeding problems  PSYCHIATRIC: NEGATIVE for changes in mood or affect      Objective    /62   Pulse 95   Temp 97.6  F (36.4  C) (Tympanic)   Resp 16   Ht 1.829 m (6')   Wt 135.2 kg (298 lb)   SpO2 99%   BMI 40.42 kg/m    Body mass index is 40.42 kg/m .    Physical Exam   GENERAL: alert and no distress  EYES: Eyes grossly normal to inspection, PERRL and conjunctivae and sclerae normal  HENT: ear canals and TM's normal, nose and mouth without ulcers or lesions  NECK: no adenopathy, no asymmetry, masses, or scars  RESP: lungs clear to auscultation - no rales, rhonchi or  wheezes  CV: regular rate and rhythm, normal S1 S2, no S3 or S4, no murmur, click or rub, no peripheral edema  ABDOMEN: soft, nontender, no hepatosplenomegaly, no masses and bowel sounds normal  MS: no gross musculoskeletal defects noted, no edema  SKIN: no suspicious lesions or rashes  NEURO: Normal strength and tone, mentation intact and speech normal  PSYCH: mentation appears normal, affect normal/bright    Xrays pending      Signed Electronically by:              Brett Cassidy MD, FAAFP     [facility]  [address] [place] [address] [id number] [email or web address], [email or web address]   Office: [phone]  Fax: [phone]

## 2024-05-09 NOTE — CONFIDENTIAL NOTE
NEUROSURGERY- NEW PREVISIT PLANNING       Record Status/Location     Referring Provider Referral Brett Cassidy MD    Diagnosis Referral M54.42 (ICD-10-CM) - Acute left-sided low back pain with left-sided sciatica    MRI (HEAD, NECK, SPINE) Na     CT Pacs Pelvis 6/21/21 [facility]    X-ray Pacs Lumbar 5/8/24 [facility]   INJECTION Na    PHYSICAL THERAPY Na    SURGERY Na

## 2024-05-17 NOTE — DISCHARGE INSTRUCTIONS
DR. SAMY HANCOCK M.D.     CLINIC PHONE NUMBER:  [phone]      [facility] ADULT DISCHARGE INSTRUCTIONS   SPECIAL PRECAUTIONS FOR 24 HOURS AFTER SURGERY    IT IS NOT UNUSUAL TO FEEL LIGHT-HEADED OR FAINT, UP TO 24 HOURS AFTER SURGERY OR WHILE TAKING PAIN MEDICATION.  IF YOU HAVE THESE SYMPTOMS; SIT FOR A FEW MINUTES BEFORE STANDING AND HAVE SOMEONE ASSIST YOU WHEN YOU GET UP TO WALK OR USE THE BATHROOM.    YOU SHOULD REST AND RELAX FOR THE NEXT 24 HOURS AND YOU MUST MAKE ARRANGEMENTS TO HAVE SOMEONE STAY WITH YOU FOR AT LEAST 24 HOURS AFTER YOUR DISCHARGE.  AVOID HAZARDOUS AND STRENUOUS ACTIVITIES.  DO NOT MAKE IMPORTANT DECISIONS FOR 24 HOURS.    DO NOT DRIVE ANY VEHICLE OR OPERATE MECHANICAL EQUIPMENT FOR 24 HOURS FOLLOWING THE END OF YOUR SURGERY.  EVEN THOUGH YOU MAY FEEL NORMAL, YOUR REACTIONS MAY BE AFFECTED BY THE MEDICATION YOU HAVE RECEIVED.    DO NOT DRINK ALCOHOLIC BEVERAGES FOR 24 HOURS FOLLOWING YOUR SURGERY.    DRINK CLEAR LIQUIDS (APPLE JUICE, GINGER ALE, 7-UP, BROTH, ETC.).  PROGRESS TO YOUR REGULAR DIET AS YOU FEEL ABLE.    YOU MAY HAVE A DRY MOUTH, A SORE THROAT, MUSCLES ACHES OR TROUBLE SLEEPING.  THESE SHOULD GO AWAY AFTER 24 HOURS.    CALL YOUR DOCTOR FOR ANY OF THE FOLLOWING:  SIGNS OF INFECTION (FEVER, GROWING TENDERNESS AT THE SURGERY SITE, A LARGE AMOUNT OF DRAINAGE OR BLEEDING, SEVERE PAIN, FOUL-SMELLING DRAINAGE, REDNESS OR SWELLING.    IT HAS BEEN OVER 8 TO 10 HOURS SINCE SURGERY AND YOU ARE STILL NOT ABLE TO URINATE (PASS WATER).     COLONOSCOPY DISCHARGE INSTRUCTIONS    You may not drive, use heavy equipment or consume alcohol for 24 hours because the drugs you were given may cause dizziness, drowsiness, forgetfulness and slower reaction time.    You may resume your regular diet and medications.    If you had a biopsy or polypectomy done, do not take aspirin, aleve (naproxen) or ibuprofen products for the next 10 days.  Tylenol (acetaminophen) is safe to take.    What to watch  for:    Problems rarely occur after the exam.  It is important for you to be aware of the early signs of a possible complication.  Call your doctor immediately if you notice any of the followin.  Unusual or persistent abdominal pain (pain that does not move around like a gas pain).    2.  Passing bright red blood from your rectum.    3.  Black or bloody stools.    4.  Temperature above 100.6 degrees F (37.5 degrees C)    If you feel this has become a medical emergency please call 911.      ESOPHAGOGASTRODUODENOSCOPY DISCHARGE INSTRUCTIONS    You may not drive, use heavy equipment or consume alcohol for 24 hours because the drugs you were given may cause dizziness, drowsiness, forgetfulness and slower reaction time.    Small pieces or tissue (biopsies) or polyps may have been removed.    You may resume your regular diet and medications. Exception: If you had a biopsy or polypectomy, do not take aspirin, aleve (naproxen) or ibuprofen for the next 10 days.  Tylenol (acetaminophen) is safe to take.    Additional instructions:  If you had a biopsy or polypectomy, the pathology report will be sent to your doctor.  If you have not received the results within 10 days, call your doctor's office.    What to watch for:  Problems rarely occur after the procedure.  It is important for you to be aware of the early signs of a possible complication.  Call immediately if you notice any of the followin.  Unusual pain or difficulty swallowing.    2.  Unusual abdominal or chest pain.    3.  Vomiting of blood.    4.  Black or bloody stools.    5.  Temperature above 100.6 degrees F        Statement Selected

## 2024-05-20 ENCOUNTER — OFFICE VISIT (OUTPATIENT)
Dept: FAMILY MEDICINE | Facility: CLINIC | Age: 79
End: 2024-05-20
Payer: COMMERCIAL

## 2024-05-20 VITALS
DIASTOLIC BLOOD PRESSURE: 64 MMHG | OXYGEN SATURATION: 98 % | BODY MASS INDEX: 40.42 KG/M2 | TEMPERATURE: 97.4 F | HEART RATE: 94 BPM | SYSTOLIC BLOOD PRESSURE: 108 MMHG | WEIGHT: 298 LBS

## 2024-05-20 DIAGNOSIS — H92.02 ACUTE EAR PAIN, LEFT: Primary | ICD-10-CM

## 2024-05-20 PROCEDURE — 99213 OFFICE O/P EST LOW 20 MIN: CPT | Performed by: FAMILY MEDICINE

## 2024-05-20 NOTE — PROGRESS NOTES
Assessment & Plan     Acute ear pain, left  Small area of abrasion noted in ear canal, likely irritation from hearing aid. Appears to be healing and does not need further attention at this time. If worsening or new symptoms, recommended him to reach out to facility that made his hearing aids to see if adjustments can be made.     Kari Portillo, MS4 has participated in the care of this patient.     Provider Disclosure:  I agree with above History, Review of Systems, Physical exam and Plan.  I have reviewed the content of the documentation and have edited it as needed. I have personally performed the services documented here and the documentation accurately represents those services and the decisions I have made.      Electronically signed by:          Timur Dimas M.D.        Malick Arias is a 78 year old, presenting for the following health issues:  Ear Problem (Pain, Left/)    First noticed pain with removal of hearing aid about a week ago, now feels the same pain every time he removes it. No trauma to the ear and has not used cotton swabs for ear cleaning for 3 weeks. Has not noticed a change in his hearing from baseline hearing loss. No pain with pulling on the ear and does not notice it aside from hearing aid removal. No sinus pressure or pain, headaches, fevers, chills, cough, or sore throat. No drainage from the ear. No concerns with right ear.    Ear Problem    History of Present Illness       Back Pain:  He presents for follow up of back pain. Patient's back pain is a recurring problem.  Location of back pain:  Left lower back  Description of back pain: sharp and stabbing  Back pain spreads: left thigh    Since patient first noticed back pain, pain is: always present, but gets better and worse  Does back pain interfere with his job:  No       He eats 2-3 servings of fruits and vegetables daily.He consumes 0 sweetened beverage(s) daily.He exercises with enough effort to increase his heart rate 60 or  more minutes per day.  He exercises with enough effort to increase his heart rate 3 or less days per week.   He is taking medications regularly.     Ear Problem - Left x 1 week, no drainage        Objective    /64   Pulse 94   Temp 97.4  F (36.3  C) (Tympanic)   Wt 135.2 kg (298 lb)   SpO2 98%   BMI 40.42 kg/m    Body mass index is 40.42 kg/m .  Physical Exam   GENERAL: no acute distress  EYES: Conjunctiva are not injected, no discharge.  EARS: Left TM - no erythema, no effusion, not bulged. Small area of redness noted on anterior ear canal. No pain with ear tugging.              Right TM - no erythema, no effusion, not bulged. Ear canal appears normal.  NOSE: no discharge, no sinus tenderness  THROAT: no erythema, no exudate, no lesions  NECK: supple, no adenopathy.        Signed Electronically by: Magen Dimas MD

## 2024-05-21 ENCOUNTER — PRE VISIT (OUTPATIENT)
Dept: NEUROSURGERY | Facility: CLINIC | Age: 79
End: 2024-05-21

## 2024-05-21 ENCOUNTER — OFFICE VISIT (OUTPATIENT)
Dept: NEUROSURGERY | Facility: CLINIC | Age: 79
End: 2024-05-21
Attending: FAMILY MEDICINE
Payer: COMMERCIAL

## 2024-05-21 VITALS
HEART RATE: 74 BPM | DIASTOLIC BLOOD PRESSURE: 85 MMHG | WEIGHT: 298.8 LBS | OXYGEN SATURATION: 98 % | HEIGHT: 73 IN | BODY MASS INDEX: 39.6 KG/M2 | SYSTOLIC BLOOD PRESSURE: 124 MMHG

## 2024-05-21 DIAGNOSIS — M54.42 ACUTE LEFT-SIDED LOW BACK PAIN WITH LEFT-SIDED SCIATICA: ICD-10-CM

## 2024-05-21 PROCEDURE — G2211 COMPLEX E/M VISIT ADD ON: HCPCS

## 2024-05-21 PROCEDURE — 99204 OFFICE O/P NEW MOD 45 MIN: CPT

## 2024-05-21 RX ORDER — METHOCARBAMOL 500 MG/1
500 TABLET, FILM COATED ORAL 4 TIMES DAILY PRN
Qty: 40 TABLET | Refills: 0 | Status: SHIPPED | OUTPATIENT
Start: 2024-05-21 | End: 2024-10-07

## 2024-05-21 ASSESSMENT — PAIN SCALES - GENERAL: PAINLEVEL: NO PAIN (1)

## 2024-05-21 NOTE — TELEPHONE ENCOUNTER
My chart message sent     Kathia Centeno RN, BSN  [facility]     SUBJECTIVE:    Patient ID: Brooke Gomez is a 46 y.o. female.    CC: strep throat    HPI: The patient presents to the office for an acute visit.    Patient was seen in the office about 2.5 weeks ago with complaint of sore throat and ear pain.  At that time, symptoms have been present for about 3 days.  She had a low-grade temp and some sinus tenderness.  Strep testing was negative.  She was treated acutely for pharyngitis with methylprednisone and lidocaine viscous.    With the above treatment, patient reports she improved but for the past few weeks has never felt back to normal.  She has An intermittent, mild sore throat which worsened again today.  She checked her temp and found it to be low-grade.  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      Current Outpatient Medications   Medication Sig Dispense Refill    amoxicillin-clavulanate (AUGMENTIN) 875-125 MG per tablet Take 1 tablet by mouth 2 times daily for 10 days 20 tablet 0    valACYclovir (VALTREX) 500 MG tablet TAKE ONE TABLET BY MOUTH ONCE A DAY 90 tablet 1    rizatriptan (MAXALT) 10 MG tablet 1 at onset May repeat in 2 hours x2 within 24 hrs if needed for HA 9 tablet 3    esomeprazole (NEXIUM) 20 MG delayed release capsule Take 1 capsule by mouth daily 90 capsule 3    furosemide (LASIX) 20 MG tablet TAKE ONE TABLET BY MOUTH ONE TIME A DAY 90 tablet 1    buPROPion (WELLBUTRIN XL) 150 MG extended release tablet Take 2 tablets by mouth every morning 180 tablet 1    albuterol sulfate HFA (PROVENTIL HFA) 108 (90 Base) MCG/ACT inhaler Inhale 1 puff into the lungs every 4 hours as needed for Wheezing or Shortness of Breath 1 Inhaler 5    diphenhydrAMINE (BENADRYL) 25 MG capsule Take 1 capsule by mouth every 6 hours as needed for Itching       No current facility-administered medications for this visit.          Review of Systems   Constitutional:  Positive for fatigue and fever (temp).   HENT:  Positive for

## 2024-05-21 NOTE — PROGRESS NOTES
[facility] Clinic Visit      HPI: Hugo Weber is a 78 year old male who presents for evaluation of acute on chronic low back pain. Symptoms started approximately 1 month ago. Today, patient reports a nagging/aching lower back pain.  He does very rarely have left anterior thigh numbness. Denies any weakness, falls, foot drop, saddle anesthesia, or bladder/bowel incontinence.  Patient states he has had lower back discomfort off and on for the last many years.  He states that usually he can go to the chiropractor 2-3 times and he is fine for a year or more.  He has never had any spine surgeries in the past.  He did see his primary care this episode after he went to the chiropractor and his PCP prescribed a Medrol Dosepak which did help.  He states he is not at the point where he would consider any invasive treatments.     Patient does have extensive cardiac history,'s please see PMH.  He is on Eliquis.  Patient does have a history of prostate cancer.  He follows this now via surveillance.  He did receive radiation last May 2023.    Past Medical History:   Diagnosis Date    Aortic stenosis     moderate    Atrial fibrillation 2006    Followed by [facility] - persistent    CAD (coronary artery disease)     Calculus of kidney 2005, 6/06, 10/12, 8/18, 9/19    dr walker/nestor/иван - hematuria - w/u o/w neg    Cervical stenosis of spine     Moderate C4-5    Cholelithiasis     Chronic peptic ulcer, unspecified site, without mention of hemorrhage, perforation, or obstruction 1985    gastric bypass    Colon polyps 8/05, 9/10, 10/15    2 tubular adenoma, 1 hyperplastic - multiple serrated/tubular adenomas - last colonscopy 11/20 due 5 yrs    CVA (cerebral vascular accident) (H) 01/2019    small right periorlandic subcortical - left sided residual - left hand tingling/numbness - Left leg weak/numbness - cardioembolic    Elevated prostate specific antigen (PSA)     Dr Santos    Hepatitis C 10/2012     chronic hepatitis C, grade 1-2, stage 1 - mild - Dr Douglas    HFrEF (heart failure with reduced ejection fraction) (H)     [facility] - 35%    Hyperlipidemia LDL goal <70     Hypertension goal BP (blood pressure) < 140/90 06/2006    dr jaciel winchester    Iron deficiency anemia, unspecified 1985    gastric bypass    Mitral regurgitation     mild-moderate    Nephrolithiasis multiple episodes, last 10/19    Dr Bey/Ivana/Tom - 9mm 3/2021    OA (osteoarthritis)     Multiple - Left shoulder with rotator cuff tear - Dr Durham    Obesity, unspecified     s/p gastric bypass 1985     Obstructive sleep apnea syndrome     CPAP - 15 cm - [facility] 1/2023    Prediabetes     Presbyacusis 01/2004    dr corona    Prostate cancer (H) 2023    Dr Santos - Manju 3+4, 4+3 = 7, bx 5/13 positive    Pyelonephritis, unspecified 12/1999    SCC (squamous cell carcinoma), 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    Past Surgical History:   Procedure Laterality Date    APPENDECTOMY      ARTHROPLASTY KNEE  08/13/2012    LT TKA - Dr Villanueva    BYPASS GRAFT ARTERY CORONARY N/A 8/15/2023    Procedure: CORONARY ARTERY BYPASS GRAFT x 1 (SAPHENOUS VEIN - RIGHT POSTERIOR DESCENDING ARTERY) WITH ENDOSCOPIC SAPHENOUS VEIN HARVEST ON THE LEFT LOWER EXTREMITY, AND ON CARDIOPULMONARY PUMP OXYGENATOR  (INTRAOPERATIVE TRANSESOPHAGEAL ECHOCARDIOGRAM BY ANESTHESIOLOGIST), REMOVAL OF RIGHT CORONARY ARTERY STENT AND DELIVERY SYSTEM, LEFT ATRIAL APPENDAGE CLIPPING WITH ATRICLIP FLEX V DEVICE SIZE: 45    CARDIOVERSION  2016    CARDIOVERSION      COLONOSCOPY  8/05, 9/10, 10/15    multiple tubular/serrated/hyperplastic polyps    COLONOSCOPY N/A 10/29/2015    multiple  tubular and serrated adenomas    COLONOSCOPY N/A 09/07/2016    COLONOSCOPY  11/2020    tubular adenomas - due 5 yrs    COLONOSCOPY N/A 11/24/2020    Procedure: COLONOSCOPY with biopsies;  Surgeon: Chadwick Burgos MD;  Location: [facility] OR    CV CORONARY ANGIOGRAM N/A 8/15/2023    Procedure: Coronary Angiogram;  Surgeon: Carly Luna MD;  Location:  [facility]    CV FRACTIONAL FLOW RATIO WIRE N/A 8/15/2023    Procedure: Fractional Flow Ratio Wire;  Surgeon: Carly Luna MD;  Location:  [facility]    CV PCI N/A 8/15/2023    Procedure: Percutaneous Coronary Intervention;  Surgeon: Carly Luna MD;  Location:  [facility] LAB    CYSTOSCOPY W/ LASER LITHOTRIPSY  10/16/2012,5/2/2018    ESOPHAGOSCOPY, GASTROSCOPY, DUODENOSCOPY (EGD), COMBINED N/A 11/24/2020    Procedure: ESOPHAGOGASTRODUODENOSCOPY, COLONOSCOPY with biopsies;  Surgeon: Chadwick Burgos MD;  Location:  OR    EYE SURGERY  1/01,6/02,11/02    lasik    HC KNEE SCOPE, DIAGNOSTIC  05/2000    Arthroscopy, Knee RT    LASER HOLMIUM LITHOTRIPSY URETER(S), INSERT STENT, COMBINED  10/16/2012    stones x 4, Dr Campa    LASER HOLMIUM LITHOTRIPSY URETER(S), INSERT STENT, COMBINED Right 05/02/2018    CYSTOSCOPY, RIGHT URETEROSCOPY, HOLMIUM LASER LITHOTRIPSY, RIGHT STENT PLACEMENT - Dr Bey    MRI BIOPSY PROSTATE Bilateral 02/09/2023    mark    REVERSE ARTHROPLASTY SHOULDER Left 03/07/2023    Procedure: LEFT REVERSE SHOULDER ARTHROPLASTY WITH CUSTOM GUIDE WITH AUTOLOGOUS BONE GRAFTOF PROXIMAL HUMERUS;  Surgeon: Booker Multani MD;  Location:  OR    SURGICAL HISTORY OF -   1985    s/p gastric bypass Bilroth II    SURGICAL HISTORY OF -   11/1998    wisdom teeth    SURGICAL HISTORY OF -   1979    cellulitis    SURGICAL HISTORY OF -   11/1998    lt forearm spur's    SURGICAL HISTORY OF -   1/01,6/02,11/02    s/p lasik    SURGICAL HISTORY OF -   11/1999    rt forearm spurs    SURGICAL HISTORY OF -   07/2006      stevenson - lithotrypsy    SURGICAL HISTORY OF -   10/08, 12/08    Lt ear chonchal lesion removal SCC - dr saez    SURGICAL HISTORY OF -  Right 10/2019    nephrolithiasis - cystoscopy, rt lithotrypsy, Dr Heath    SURGICAL HISTORY OF -  Right 11/2019    Cystoscopy, Rt lithotrypsy, Calcium Oxalate, Dr Heath     Past Surgical History reviewed with patient during visit.    Current Outpatient Medications   Medication Sig Dispense Refill    acetaminophen (TYLENOL) 325 MG tablet Take 650 mg by mouth every 6 hours as needed      apixaban ANTICOAGULANT (ELIQUIS) 5 MG tablet Take 1 tablet (5 mg) by mouth 2 times daily 180 tablet 1    clotrimazole (LOTRIMIN) 1 % external cream Apply thin layer of cream to affected area on penis, leg, and feet twice daily for 14 days. 85 g 1    empagliflozin (JARDIANCE) 10 MG TABS tablet Take 1 tablet (10 mg) by mouth daily 90 tablet 3    ENTRESTO 49-51 MG per tablet Take 1 tablet by mouth 2 times daily      Ferrous Sulfate (IRON) 325 (65 Fe) MG tablet Take 1 tablet by mouth three times a week (Monday, Wednesday, Friday) 90 tablet 3    furosemide (LASIX) 20 MG tablet Take 20 mg by mouth as needed (take one tablet (20 mg) for shortness of breath, swelling, or weight gain of 2 lbs in a night or 5 lbs in a week)      methocarbamol (ROBAXIN) 500 MG tablet Take 1 tablet (500 mg) by mouth 4 times daily as needed for muscle spasms 40 tablet 0    metoprolol succinate ER (TOPROL XL) 50 MG 24 hr tablet Take 50 mg by mouth 2 times daily      pantoprazole (PROTONIX) 40 MG EC tablet Take 1 tablet (40 mg) by mouth daily 90 tablet 1    rosuvastatin (CRESTOR) 10 MG tablet Take 1 tablet (10 mg) by mouth daily 90 tablet 1    tamsulosin (FLOMAX) 0.4 MG capsule Take 1 capsule (0.4 mg) by mouth 2 times daily 180 capsule 1    vitamin B-12 (CYANOCOBALAMIN) 1000 MCG tablet Take 1,000 mcg by mouth every other day      vitamin C (ASCORBIC ACID) 1000 MG TABS Take 1,000 mg by mouth every other day      vitamin D3  "(CHOLECALCIFEROL) 125 MCG (5000 UT) tablet Take 1 tablet by mouth every other day       No current facility-administered medications for this visit.       No Known Allergies    Social History     Socioeconomic History    Marital status:      Spouse name: Mayra    Number of children: 3    Years of education: 21    Highest education level: None   Occupational History    Occupation: retired teacher and football and       Employer: [employer]     Employer: RETIRED   Tobacco Use    Smoking status: Never    Smokeless tobacco: Never   Vaping Use    Vaping status: Never Used   Substance and Sexual Activity    Alcohol use: Not Currently     Alcohol/week: 2.0 - 3.0 standard drinks of alcohol     Types: 2 - 3 Standard drinks or equivalent per week     Comment: occassiinally    Drug use: No     Comment: acknowledges using herbal supplement \"Vibe\" for energy-none used recently     Sexual activity: Not Currently     Partners: Male     Birth control/protection: None     Comment:    Other Topics Concern    Caffeine Concern Yes     Comment: <1 can qd    Sleep Concern Yes     Comment: sleep apnea, wears cpap    Exercise No    Seat Belt Yes    Parent/sibling w/ CABG, MI or angioplasty before 65F 55M? No   Social History Narrative    Wife , Mayra,  from brain gliobastoma 2020.  --Ingrid Girard MD           Social Determinants of Health     Financial Resource Strain: Low Risk  (2023)    Financial Resource Strain     Within the past 12 months, have you or your family members you live with been unable to get utilities (heat, electricity) when it was really needed?: No   Food Insecurity: Low Risk  (2023)    Food Insecurity     Within the past 12 months, did you worry that your food would run out before you got money to buy more?: No     Within the past 12 months, did the food you bought just not last and you didn t have money to get more?: No   Transportation Needs: " "Low Risk  (2023)    Transportation Needs     Within the past 12 months, has lack of transportation kept you from medical appointments, getting your medicines, non-medical meetings or appointments, work, or from getting things that you need?: No   Physical Activity: Sufficiently Active (3/24/2023)    Received from [facility]    Exercise Vital Sign     Days of Exercise per Week: 3 days     Minutes of Exercise per Session: 90 min   Stress: No Stress Concern Present (3/24/2023)    Received from [facility]    [employer] - Occupational Stress Questionnaire     Feeling of Stress : Not at all    [facility] [employer] [facility]    Social Connections   Interpersonal Safety: Low Risk  (2024)    Interpersonal Safety     Do you feel physically and emotionally safe where you currently live?: Yes     Within the past 12 months, have you been hit, slapped, kicked or otherwise physically hurt by someone?: No     Within the past 12 months, have you been humiliated or emotionally abused in other ways by your partner or ex-partner?: No   Housing Stability: Low Risk  (2023)    Housing Stability     Do you have housing? : Yes     Are you worried about losing your housing?: No       Family History   Problem Relation Age of Onset    Alzheimer Disease Father 82         at 88    Prostate Cancer Father 76        bladder and prostate    Heart Disease Mother 80        CHF    Heart Disease Maternal Grandfather         MI at 55    Cancer Paternal Uncle         ?    Ulcerative Colitis No family hx of     Crohn's Disease No family hx of     Stomach Cancer No family hx of     GERD No family hx of        ROS: 10 point ROS neg other than the symptoms noted above in the HPI.    Vital Signs: /85   Pulse 74   Ht 6' 1\" (1.854 m)   Wt 298 lb 12.8 oz (135.5 kg)   SpO2 98%   BMI 39.42 kg/m      Neurological " Examination:  Awake  Alert  Oriented x 3  Speech clear    Motor exam:    Iliopsoas  (hip flexion)               Right: 5/5  Left:  5/5  Quadriceps  (knee extension)       Right:  5/5  Left:  5/5  Hamstrings  (knee flexion)            Right:  5/5  Left:  5/5  Gastroc Soleus  (PF)                          Right:  5/5  Left:  5/5  Tibialis Ant  (DF)                          Right:  5/5  Left:  5/5  EHL                          Right:  5/5  Left:  5/5         Sensation normal to light touch    Reflexes are 2+ in the patellar and Achilles. There is no clonus.     Musculoskeletal:  Gait: Able to stand from a seated position.  Impaired tandem gait    Imaging:   Lumbar spine x-rays reviewed from 5/8/24  IMPRESSION: Five lumbar type vertebrae. Normal alignment. Vertebral  body heights normal. No definite fractures. Facet arthropathy  throughout the lumbar spine. Degenerative endplate spurring throughout  the lumbar spine. Anterior osteophyte/syndesmophyte formation at all  levels of the lumbar spine.    Assessment/Plan:   Hugo Weber is a 78 year old male who presents for evaluation of acute on chronic low back pain. Symptoms started approximately 1 month ago. We discussed symptoms, imaging, and next steps.  I advised that we should continue with conservative measures at this time.  This would be in the form of medications and physical therapy.  I also advised that since he does have a history of prostate cancer we could consider doing a lumbar MRI scan initially.  He is understanding of the reasoning for an MRI scan but would like to proceed with conservative cares and hold off on the MRI for now.  He will reach out to our office in 4-6 weeks if symptoms persist.  For now I will prescribe him an order for physical therapy, and Robaxin.  If the Robaxin is helpful then he can reach out to his primary care to continue with refills.    Advised patient to call our clinic with any questions or concerns. Discussed red flag  symptoms and advised to seek medical attention if these develop. Patient voiced understanding and agreement.      Negar Camarillo PA-C  [facility] Neurosurgery  [address] [address]

## 2024-05-21 NOTE — LETTER
5/21/2024         RE: Hugo Weber  [address]  [facility] [address]        Dear Colleague,    Thank you for referring your patient, Hugo Weber, to the [facility]UROLOG[facility]. Please see a copy of my visit note below.    [facility] Neurosurgery Clinic Visit      HPI: Hugo Weber is a 78 year old male who presents for evaluation of acute on chronic low back pain. Symptoms started approximately 1 month ago. Today, patient reports a nagging/aching lower back pain.  He does very rarely have left anterior thigh numbness. Denies any weakness, falls, foot drop, saddle anesthesia, or bladder/bowel incontinence.  Patient states he has had lower back discomfort off and on for the last many years.  He states that usually he can go to the chiropractor 2-3 times and he is fine for a year or more.  He has never had any spine surgeries in the past.  He did see his primary care this episode after he went to the chiropractor and his PCP prescribed a Medrol Dosepak which did help.  He states he is not at the point where he would consider any invasive treatments.     Patient does have extensive cardiac history,'s please see PMH.  He is on Eliquis.  Patient does have a history of prostate cancer.  He follows this now via surveillance.  He did receive radiation last May 2023.    Past Medical History:   Diagnosis Date     Aortic stenosis     moderate     Atrial fibrillation 2006    Followed by [facility] - persistent     CAD (coronary artery disease)      Calculus of kidney 2005, 6/06, 10/12, 8/18, 9/19    dr walker/nestor/иван - hematuria - w/u o/w neg     Cervical stenosis of spine     Moderate C4-5     Cholelithiasis      Chronic peptic ulcer, unspecified site, without mention of hemorrhage, perforation, or obstruction 1985    gastric bypass     Colon polyps 8/05, 9/10, 10/15    2 tubular adenoma, 1 hyperplastic - multiple serrated/tubular adenomas - last colonscopy 11/20 due 5 yrs      CVA (cerebral vascular accident) (H) 01/2019    small right periorlandic subcortical - left sided residual - left hand tingling/numbness - Left leg weak/numbness - cardioembolic     Elevated prostate specific antigen (PSA)     Dr Santos     Hepatitis C 10/2012    chronic hepatitis C, grade 1-2, stage 1 - mild - Dr Douglas     HFrEF (heart failure with reduced ejection fraction) (H)     [facility] - 35%     Hyperlipidemia LDL goal <70      Hypertension goal BP (blood pressure) < 140/90 06/2006    dr jaciel winchester     Iron deficiency anemia, unspecified 1985    gastric bypass     Mitral regurgitation     mild-moderate     Nephrolithiasis multiple episodes, last 10/19    Dr Bey/Ivana/Tom - 9mm 3/2021     OA (osteoarthritis)     Multiple - Left shoulder with rotator cuff tear - Dr Durham     Obesity, unspecified     s/p gastric bypass 1985      Obstructive sleep apnea syndrome     CPAP - 15 cm - [facility] 1/2023     Prediabetes      Presbyacusis 01/2004    dr corona     Prostate cancer (H) 2023    Dr Santos - Manju 3+4, 4+3 = 7, bx 5/13 positive     Pyelonephritis, unspecified 12/1999     SCC (squamous cell carcinoma), 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    Past Surgical History:   Procedure Laterality Date     APPENDECTOMY       ARTHROPLASTY KNEE  08/13/2012    LT TKA - Dr Villanueva     BYPASS GRAFT ARTERY CORONARY N/A 8/15/2023    Procedure: CORONARY ARTERY BYPASS GRAFT x 1 (SAPHENOUS VEIN - RIGHT POSTERIOR DESCENDING ARTERY) WITH ENDOSCOPIC SAPHENOUS VEIN HARVEST ON THE LEFT LOWER EXTREMITY, AND ON CARDIOPULMONARY PUMP OXYGENATOR  (INTRAOPERATIVE TRANSESOPHAGEAL ECHOCARDIOGRAM BY ANESTHESIOLOGIST), REMOVAL OF  RIGHT CORONARY ARTERY STENT AND DELIVERY SYSTEM, LEFT ATRIAL APPENDAGE CLIPPING WITH ATRICLIP FLEX V DEVICE SIZE: 45     CARDIOVERSION  2016     CARDIOVERSION       COLONOSCOPY  8/05, 9/10, 10/15    multiple tubular/serrated/hyperplastic polyps     COLONOSCOPY N/A 10/29/2015    multiple tubular and serrated adenomas     COLONOSCOPY N/A 09/07/2016     COLONOSCOPY  11/2020    tubular adenomas - due 5 yrs     COLONOSCOPY N/A 11/24/2020    Procedure: COLONOSCOPY with biopsies;  Surgeon: Chadwick Burgos MD;  Location: [facility] OR     CV CORONARY ANGIOGRAM N/A 8/15/2023    Procedure: Coronary Angiogram;  Surgeon: Carly Luna MD;  Location:  HEART CARDIAC CATH LAB     CV FRACTIONAL FLOW RATIO WIRE N/A 8/15/2023    Procedure: Fractional Flow Ratio Wire;  Surgeon: Carly Luna MD;  Location:  HEART CARDIAC CATH LAB     CV PCI N/A 8/15/2023    Procedure: Percutaneous Coronary Intervention;  Surgeon: Carly Luna MD;  Location: [facility] CARDIAC CATH LAB     CYSTOSCOPY W/ LASER LITHOTRIPSY  10/16/2012,5/2/2018     ESOPHAGOSCOPY, GASTROSCOPY, DUODENOSCOPY (EGD), COMBINED N/A 11/24/2020    Procedure: ESOPHAGOGASTRODUODENOSCOPY, COLONOSCOPY with biopsies;  Surgeon: Chadwick Burgos MD;  Location:  OR     EYE SURGERY  1/01,6/02,11/02    las     HC KNEE SCOPE, DIAGNOSTIC  05/2000    Arthroscopy, Knee RT     LASER HOLMIUM LITHOTRIPSY URETER(S), INSERT STENT, COMBINED  10/16/2012    stones x 4, Dr Campa     LASER HOLMIUM LITHOTRIPSY URETER(S), INSERT STENT, COMBINED Right 05/02/2018    CYSTOSCOPY, RIGHT URETEROSCOPY, HOLMIUM LASER LITHOTRIPSY, RIGHT STENT PLACEMENT - Dr Bey     MRI BIOPSY PROSTATE Bilateral 02/09/2023    mark     REVERSE ARTHROPLASTY SHOULDER Left 03/07/2023    Procedure: LEFT REVERSE SHOULDER ARTHROPLASTY WITH CUSTOM GUIDE WITH AUTOLOGOUS BONE GRAFTOF PROXIMAL HUMERUS;  Surgeon: Booker Multani MD;  Location:  OR     SURGICAL HISTORY OF -   1985    s/p gastric bypass Bilroth II      SURGICAL HISTORY OF -   11/1998    wisdom teeth     SURGICAL HISTORY OF -   1979    cellulitis     SURGICAL HISTORY OF -   11/1998    lt forearm spur's     SURGICAL HISTORY OF -   1/01,6/02,11/02    s/p lasik     SURGICAL HISTORY OF -   11/1999    rt forearm spurs     SURGICAL HISTORY OF -   07/2006    dr stevenson - lithotrypsy     SURGICAL HISTORY OF -   10/08, 12/08    Lt ear chonchal lesion removal SCC - dr saez     SURGICAL HISTORY OF -  Right 10/2019    nephrolithiasis - cystoscopy, rt lithotrypsy, Dr Heath     SURGICAL HISTORY OF -  Right 11/2019    Cystoscopy, Rt lithotrypsy, Calcium Oxalate, Dr Heath     Past Surgical History reviewed with patient during visit.    Current Outpatient Medications   Medication Sig Dispense Refill     acetaminophen (TYLENOL) 325 MG tablet Take 650 mg by mouth every 6 hours as needed       apixaban ANTICOAGULANT (ELIQUIS) 5 MG tablet Take 1 tablet (5 mg) by mouth 2 times daily 180 tablet 1     clotrimazole (LOTRIMIN) 1 % external cream Apply thin layer of cream to affected area on penis, leg, and feet twice daily for 14 days. 85 g 1     empagliflozin (JARDIANCE) 10 MG TABS tablet Take 1 tablet (10 mg) by mouth daily 90 tablet 3     ENTRESTO 49-51 MG per tablet Take 1 tablet by mouth 2 times daily       Ferrous Sulfate (IRON) 325 (65 Fe) MG tablet Take 1 tablet by mouth three times a week (Monday, Wednesday, Friday) 90 tablet 3     furosemide (LASIX) 20 MG tablet Take 20 mg by mouth as needed (take one tablet (20 mg) for shortness of breath, swelling, or weight gain of 2 lbs in a night or 5 lbs in a week)       methocarbamol (ROBAXIN) 500 MG tablet Take 1 tablet (500 mg) by mouth 4 times daily as needed for muscle spasms 40 tablet 0     metoprolol succinate ER (TOPROL XL) 50 MG 24 hr tablet Take 50 mg by mouth 2 times daily       pantoprazole (PROTONIX) 40 MG EC tablet Take 1 tablet (40 mg) by mouth daily 90 tablet 1     rosuvastatin (CRESTOR) 10 MG tablet Take 1 tablet  "(10 mg) by mouth daily 90 tablet 1     tamsulosin (FLOMAX) 0.4 MG capsule Take 1 capsule (0.4 mg) by mouth 2 times daily 180 capsule 1     vitamin B-12 (CYANOCOBALAMIN) 1000 MCG tablet Take 1,000 mcg by mouth every other day       vitamin C (ASCORBIC ACID) 1000 MG TABS Take 1,000 mg by mouth every other day       vitamin D3 (CHOLECALCIFEROL) 125 MCG (5000 UT) tablet Take 1 tablet by mouth every other day       No current facility-administered medications for this visit.       No Known Allergies    Social History     Socioeconomic History     Marital status:      Spouse name: Mayra     Number of children: 3     Years of education: 21     Highest education level: None   Occupational History     Occupation: retired teacher and football and       Employer: [employer]     Employer: RETIRED   Tobacco Use     Smoking status: Never     Smokeless tobacco: Never   Vaping Use     Vaping status: Never Used   Substance and Sexual Activity     Alcohol use: Not Currently     Alcohol/week: 2.0 - 3.0 standard drinks of alcohol     Types: 2 - 3 Standard drinks or equivalent per week     Comment: occassiinally     Drug use: No     Comment: acknowledges using herbal supplement \"Vibe\" for energy-none used recently      Sexual activity: Not Currently     Partners: Male     Birth control/protection: None     Comment:    Other Topics Concern     Caffeine Concern Yes     Comment: <1 can qd     Sleep Concern Yes     Comment: sleep apnea, wears cpap     Exercise No     Seat Belt Yes     Parent/sibling w/ CABG, MI or angioplasty before 65F 55M? No   Social History Narrative    Wife , Mayra,  from brain gliobastoma 2020.  --Ingrid Girard MD           Social Determinants of Health     Financial Resource Strain: Low Risk  (2023)    Financial Resource Strain      Within the past 12 months, have you or your family members you live with been unable to get utilities (heat, " electricity) when it was really needed?: No   Food Insecurity: Low Risk  (2023)    Food Insecurity      Within the past 12 months, did you worry that your food would run out before you got money to buy more?: No      Within the past 12 months, did the food you bought just not last and you didn t have money to get more?: No   Transportation Needs: Low Risk  (2023)    Transportation Needs      Within the past 12 months, has lack of transportation kept you from medical appointments, getting your medicines, non-medical meetings or appointments, work, or from getting things that you need?: No   Physical Activity: Sufficiently Active (3/24/2023)    Received from [facility]    Exercise Vital Sign      Days of Exercise per Week: 3 days      Minutes of Exercise per Session: 90 min   Stress: No Stress Concern Present (3/24/2023)    Received from [facility]    [employer] - Occupational Stress Questionnaire      Feeling of Stress : Not at all    [facility]    Social Connections   Interpersonal Safety: Low Risk  (2024)    Interpersonal Safety      Do you feel physically and emotionally safe where you currently live?: Yes      Within the past 12 months, have you been hit, slapped, kicked or otherwise physically hurt by someone?: No      Within the past 12 months, have you been humiliated or emotionally abused in other ways by your partner or ex-partner?: No   Housing Stability: Low Risk  (2023)    Housing Stability      Do you have housing? : Yes      Are you worried about losing your housing?: No       Family History   Problem Relation Age of Onset     Alzheimer Disease Father 82         at 88     Prostate Cancer Father 76        bladder and prostate     Heart Disease Mother 80        CHF     Heart Disease Maternal Grandfather         MI at 55     Cancer Paternal Uncle         ?  "    Ulcerative Colitis No family hx of      Crohn's Disease No family hx of      Stomach Cancer No family hx of      GERD No family hx of        ROS: 10 point ROS neg other than the symptoms noted above in the HPI.    Vital Signs: /85   Pulse 74   Ht 6' 1\" (1.854 m)   Wt 298 lb 12.8 oz (135.5 kg)   SpO2 98%   BMI 39.42 kg/m      Neurological Examination:  Awake  Alert  Oriented x 3  Speech clear    Motor exam:    Iliopsoas  (hip flexion)               Right: 5/5  Left:  5/5  Quadriceps  (knee extension)       Right:  5/5  Left:  5/5  Hamstrings  (knee flexion)            Right:  5/5  Left:  5/5  Gastroc Soleus  (PF)                          Right:  5/5  Left:  5/5  Tibialis Ant  (DF)                          Right:  5/5  Left:  5/5  EHL                          Right:  5/5  Left:  5/5         Sensation normal to light touch    Reflexes are 2+ in the patellar and Achilles. There is no clonus.     Musculoskeletal:  Gait: Able to stand from a seated position.  Impaired tandem gait    Imaging:   Lumbar spine x-rays reviewed from 5/8/24  IMPRESSION: Five lumbar type vertebrae. Normal alignment. Vertebral  body heights normal. No definite fractures. Facet arthropathy  throughout the lumbar spine. Degenerative endplate spurring throughout  the lumbar spine. Anterior osteophyte/syndesmophyte formation at all  levels of the lumbar spine.    Assessment/Plan:   Hugo Weber is a 78 year old male who presents for evaluation of acute on chronic low back pain. Symptoms started approximately 1 month ago. We discussed symptoms, imaging, and next steps.  I advised that we should continue with conservative measures at this time.  This would be in the form of medications and physical therapy.  I also advised that since he does have a history of prostate cancer we could consider doing a lumbar MRI scan initially.  He is understanding of the reasoning for an MRI scan but would like to proceed with conservative cares and " hold off on the MRI for now.  He will reach out to our office in 4-6 weeks if symptoms persist.  For now I will prescribe him an order for physical therapy, and Robaxin.  If the Robaxin is helpful then he can reach out to his primary care to continue with refills.    Advised patient to call our clinic with any questions or concerns. Discussed red flag symptoms and advised to seek medical attention if these develop. Patient voiced understanding and agreement.      Negar Camarillo PA-C  [facility] [place] [address]        Again, thank you for allowing me to participate in the care of your patient.        Sincerely,        NEGAR CAMARILLO PA-C

## 2024-05-24 NOTE — TELEPHONE ENCOUNTER
[facility]: Heart Failure Care Coordination   Heart Failure Education    Situation/Background:      RN CC provided heart failure education to Hugo during clinic visit.    Assessment:      Living situation: TCU     Barriers to Heart Failure follow-up: Transportation      Medication management:  TCU        Intervention/Plan:      CM/HF education topics reviewed:  Low sodium: 2000 mg or less daily, meal choices and label reading   Daily weight monitoring and logging   Medication review and importance of compliance   Home blood pressure monitoring and logging   Overview of C.O.R.E. clinic   Overview of heart failure appointments and testing   Importance of exercise   Importance of Cardiac Rehab   Symptoms of HF to be reported to Core Team      Given a scale and instructions given for daily weights and recording them.    Discussed medication changes.   Updated Hugo that CORE RN will watch for BMP results from upcoming (9/7) appt with Dr Cassidy. CORE RN to call Hugo for weights, BPs and symptom update. Will then update KARIN Morocho. Hugo expressed understanding.      Per KARIN Morocho, RN CC to follow up with patient in 8 days, 9/7, after BMP resulted and update from Hugo.  Patient to follow up as scheduled.  Instructed patient to call RN line with new or worsening heart failure symptoms and/or rapid weight gain.  Patient made aware of future appointment(s) needed; request(s) routed to scheduling. 9/18/23 at 230 PM for NON fasting and 330 PM with KARIN Morocho. Hugo is aware appts are in [place].    Confirmed patient has clinic and scheduling phone numbers.          Future Appointments   Date Time Provider Department Center   9/5/2023  9:30 AM 2, [facility]   9/6/2023  2:45 PM P CARDIOTHORACIC SURGERY, [facility]   9/7/2023  8:30 AM Brett Cassidy MD [facility]   9/8/2023  9:00 AM 2, [facility]   9/11/2023 10:00 AM 1, [facility]  [FreeTextEntry1] : Attention deficit disorder  Doing well on Adderall XR 15 mg a day.  Follow-up in the office in 6 months and by phone prior to that as needed. [facility]   9/15/2023  9:00 AM 2, [facility]   9/18/2023 11:00 AM 2, [facility]   9/18/2023 12:30 PM [facility]   9/18/2023  3:30 PM Laura Olivia PA-C [facility]   9/20/2023 10:00 AM 1, [facility]   1/19/2024 10:45 AM Martita Narvaez MD [facility]          Patient expressed understanding of above education/instructions and denied further questions at this time.      Yarelis Gonzales, RENETTAN, RN 1:00 PM 08/30/23

## 2024-06-02 ENCOUNTER — HOSPITAL ENCOUNTER (EMERGENCY)
Facility: CLINIC | Age: 79
Discharge: HOME | End: 2024-06-02
Attending: EMERGENCY MEDICINE | Admitting: EMERGENCY MEDICINE
Payer: COMMERCIAL

## 2024-06-02 ENCOUNTER — APPOINTMENT (OUTPATIENT)
Dept: ULTRASOUND IMAGING | Facility: CLINIC | Age: 79
End: 2024-06-02
Attending: EMERGENCY MEDICINE
Payer: COMMERCIAL

## 2024-06-02 VITALS
TEMPERATURE: 98 F | SYSTOLIC BLOOD PRESSURE: 121 MMHG | RESPIRATION RATE: 18 BRPM | DIASTOLIC BLOOD PRESSURE: 73 MMHG | HEART RATE: 72 BPM | OXYGEN SATURATION: 99 %

## 2024-06-02 DIAGNOSIS — M79.89 LEG SWELLING: ICD-10-CM

## 2024-06-02 LAB
ANION GAP SERPL CALCULATED.3IONS-SCNC: 8 MMOL/L (ref 7–15)
BASOPHILS # BLD AUTO: 0.1 10E3/UL (ref 0–0.2)
BASOPHILS NFR BLD AUTO: 1 %
BUN SERPL-MCNC: 23.3 MG/DL (ref 8–23)
CALCIUM SERPL-MCNC: 8.9 MG/DL (ref 8.8–10.2)
CHLORIDE SERPL-SCNC: 104 MMOL/L (ref 98–107)
CREAT SERPL-MCNC: 0.92 MG/DL (ref 0.67–1.17)
DEPRECATED HCO3 PLAS-SCNC: 28 MMOL/L (ref 22–29)
EGFRCR SERPLBLD CKD-EPI 2021: 85 ML/MIN/1.73M2
EOSINOPHIL # BLD AUTO: 0.2 10E3/UL (ref 0–0.7)
EOSINOPHIL NFR BLD AUTO: 4 %
ERYTHROCYTE [DISTWIDTH] IN BLOOD BY AUTOMATED COUNT: 16.4 % (ref 10–15)
GLUCOSE SERPL-MCNC: 110 MG/DL (ref 70–99)
HCT VFR BLD AUTO: 41.6 % (ref 40–53)
HGB BLD-MCNC: 13.2 G/DL (ref 13.3–17.7)
HOLD SPECIMEN: NORMAL
HOLD SPECIMEN: NORMAL
IMM GRANULOCYTES # BLD: 0 10E3/UL
IMM GRANULOCYTES NFR BLD: 0 %
LYMPHOCYTES # BLD AUTO: 1.3 10E3/UL (ref 0.8–5.3)
LYMPHOCYTES NFR BLD AUTO: 26 %
MCH RBC QN AUTO: 29.3 PG (ref 26.5–33)
MCHC RBC AUTO-ENTMCNC: 31.7 G/DL (ref 31.5–36.5)
MCV RBC AUTO: 92 FL (ref 78–100)
MONOCYTES # BLD AUTO: 0.5 10E3/UL (ref 0–1.3)
MONOCYTES NFR BLD AUTO: 10 %
NEUTROPHILS # BLD AUTO: 2.9 10E3/UL (ref 1.6–8.3)
NEUTROPHILS NFR BLD AUTO: 59 %
NRBC # BLD AUTO: 0 10E3/UL
NRBC BLD AUTO-RTO: 0 /100
NT-PROBNP SERPL-MCNC: 2479 PG/ML (ref 0–1800)
PLATELET # BLD AUTO: 182 10E3/UL (ref 150–450)
POTASSIUM SERPL-SCNC: 4.8 MMOL/L (ref 3.4–5.3)
RBC # BLD AUTO: 4.5 10E6/UL (ref 4.4–5.9)
SODIUM SERPL-SCNC: 140 MMOL/L (ref 135–145)
WBC # BLD AUTO: 4.9 10E3/UL (ref 4–11)

## 2024-06-02 PROCEDURE — 36415 COLL VENOUS BLD VENIPUNCTURE: CPT | Performed by: EMERGENCY MEDICINE

## 2024-06-02 PROCEDURE — 83880 ASSAY OF NATRIURETIC PEPTIDE: CPT | Performed by: EMERGENCY MEDICINE

## 2024-06-02 PROCEDURE — 80048 BASIC METABOLIC PNL TOTAL CA: CPT | Performed by: EMERGENCY MEDICINE

## 2024-06-02 PROCEDURE — 85025 COMPLETE CBC W/AUTO DIFF WBC: CPT | Performed by: EMERGENCY MEDICINE

## 2024-06-02 PROCEDURE — 93971 EXTREMITY STUDY: CPT | Mod: RT

## 2024-06-02 PROCEDURE — 99285 EMERGENCY DEPT VISIT HI MDM: CPT | Mod: 25

## 2024-06-02 PROCEDURE — 93005 ELECTROCARDIOGRAM TRACING: CPT

## 2024-06-02 RX ORDER — FUROSEMIDE 20 MG
40 TABLET ORAL DAILY
Qty: 14 TABLET | Refills: 0 | Status: SHIPPED | OUTPATIENT
Start: 2024-06-02 | End: 2024-06-09

## 2024-06-02 ASSESSMENT — COLUMBIA-SUICIDE SEVERITY RATING SCALE - C-SSRS
6. HAVE YOU EVER DONE ANYTHING, STARTED TO DO ANYTHING, OR PREPARED TO DO ANYTHING TO END YOUR LIFE?: NO
1. IN THE PAST MONTH, HAVE YOU WISHED YOU WERE DEAD OR WISHED YOU COULD GO TO SLEEP AND NOT WAKE UP?: NO
2. HAVE YOU ACTUALLY HAD ANY THOUGHTS OF KILLING YOURSELF IN THE PAST MONTH?: NO

## 2024-06-02 ASSESSMENT — ACTIVITIES OF DAILY LIVING (ADL)
ADLS_ACUITY_SCORE: 41
ADLS_ACUITY_SCORE: 43

## 2024-06-02 NOTE — DISCHARGE INSTRUCTIONS
Please increase your Lasix to 40 mg for the next 3 days.  Please call your physician tomorrow to discuss if the increased dose of Lasix needs to be continued.    Please return to the emergency department if your symptoms worsen, you experience shortness of breath or increase in swelling.

## 2024-06-02 NOTE — ED PROVIDER NOTES
Emergency Department Note      History of Present Illness     Chief Complaint  Leg Swelling    HPI  Hugo Weber is a 79 year old male on Eliquis with history of hyperlipidemia, congestive heart failure, cardiomyopathy, A-fib who presents to the ED for evaluation of leg swelling. Hugo reports right lower leg swelling worse than the left over the last 2 weeks. He endorses some aching but no posterior calf pain. His daughter is a nurse and told him to be evaluated. There is chronic discoloration to the bilateral lower extremities that is not new or changed. He has been consistent with his prescribed Lasix.  No fevers, nausea, vomiting.    Independent Historian  None    Review of External Notes  Reviewed patient's office visit with neurosurgery on 5/21/2024, who sees him for acute left sided low back pain with left-sided sciatica.  No invasive treatments at this time.  Past Medical History   Medical History and Problem List  Hyperlipidemia  Iron deficiency anemia  HAYLEE  Calculus of kidney  NAFLD  Hep C  Prediabetes  PAF  Cholelithiasis  Hypertension  TMJ arthralgia  Osteoarthritis   First degree AV block  BPH with urinary urge incontinence  Thoracic aortic ectasia  Stroke  TIA  Cervical spinal stenosis  Vitamin B12 deficiency  Vitamin D deficiency  SBO  NSVT  Chronic LLQ pain  Prostate cancer  HFrEF  Aortic valve stenosis  Mitral regurgitation  Cardiomyopathy  Fluid overload  Atherosclerosis of native coronary artery  Colon polyps  Chronic peptic ulcer  CAD  Pyelonephritis  SCC, ear    Medications  Apixaban  Empagliflozin  Pantoprazole  Rosuvastatin  Tamsulosin  Entresto  Furosemide  Metoprolol succinate ER    Surgical History   Appendectomy  Left TKA  Bypass graft coronary artery  Cardioversion x2  Coronary angiogram  PCI  Lasik  Lithotripsy x2  Left reverse shoulder arthroplasty  Gastric bypass Bilroth II  [place] teeth extraction  Bilateral forearm spurs  Left ear lesion removal SCC    Physical Exam    Patient Vitals for the past 24 hrs:   BP Temp Pulse Resp SpO2   06/02/24 1405 -- 98  F (36.7  C) -- -- --   06/02/24 1404 115/71 -- -- -- --   06/02/24 1401 115/71 -- 74 18 99 %     Physical Exam  General: Well-nourished, resting comfortably when I enter the room  Eyes: Pupils equal, conjunctivae pink no scleral icterus or conjunctival injection  ENT:  Moist mucus membranes  Respiratory:  Lungs clear to auscultation bilaterally, no crackles/rubs/wheezes.  Good air movement  CV: Normal rate and rhythm, no murmurs  GI:  Abdomen soft and non-distended.  No tenderness, guarding or rebound  Skin: Warm, dry.  No rashes or petechiae  Musculoskeletal: Bilateral peripheral edema worse on the right than the left.  Discoloration of the bilateral lower extremities.  No warmth or redness.  Pulses intact, sensation intact.  Neuro: Alert and oriented to person/place/time  Psychiatric: Normal affect    Diagnostics   Lab Results   Labs Ordered and Resulted from Time of ED Arrival to Time of ED Departure   BASIC METABOLIC PANEL - Abnormal       Result Value    Sodium 140      Potassium 4.8      Chloride 104      Carbon Dioxide (CO2) 28      Anion Gap 8      Urea Nitrogen 23.3 (*)     Creatinine 0.92      GFR Estimate 85      Calcium 8.9      Glucose 110 (*)    CBC WITH PLATELETS AND DIFFERENTIAL - Abnormal    WBC Count 4.9      RBC Count 4.50      Hemoglobin 13.2 (*)     Hematocrit 41.6      MCV 92      MCH 29.3      MCHC 31.7      RDW 16.4 (*)     Platelet Count 182      % Neutrophils 59      % Lymphocytes 26      % Monocytes 10      % Eosinophils 4      % Basophils 1      % Immature Granulocytes 0      NRBCs per 100 WBC 0      Absolute Neutrophils 2.9      Absolute Lymphocytes 1.3      Absolute Monocytes 0.5      Absolute Eosinophils 0.2      Absolute Basophils 0.1      Absolute Immature Granulocytes 0.0      Absolute NRBCs 0.0     NT PROBNP INPATIENT - Abnormal    N terminal Pro BNP Inpatient 2,479 (*)        Imaging  US Lower  Extremity Venous Duplex Right   Final Result   IMPRESSION:   1.  No deep venous thrombosis in the right lower extremity.          EKG   ECG results from 06/02/24   EKG 12-lead, tracing only     Value    Systolic Blood Pressure     Diastolic Blood Pressure     Ventricular Rate 73    Atrial Rate 41    [place] Interval     QRS Duration 90        QTc 436    P Axis     R AXIS 1    T Axis 120    Interpretation ECG      Atrial fibrillation  Septal infarct , age undetermined  Abnormal ECG  When compared with ECG of 16-AUG-2023 06:43,  Atrial fibrillation has replaced Sinus rhythm  Septal infarct is now Present  Nonspecific T wave abnormality now evident in Inferior leads  Read by Dr. Gilbert 1541       *Note: Due to a large number of results and/or encounters for the requested time period, some results have not been displayed. A complete set of results can be found in Results Review.        Independent Interpretation  None  ED Course    Medications Administered  Medications - No data to display    Procedures  Procedures     Discussion of Management  None    Social Determinants of Health adding to complexity of care  None    ED Course  ED Course as of 06/02/24 1736   Sun Jun 02, 2024   1541 I obtained history and examined the patient as noted above.    1732 I rechecked the patient and explained findings. We discussed plans for discharge and the patient is comfortable with this plan.      Medical Decision Making / Diagnosis   CMS Diagnoses: None    MIPS     None    MDM  Hugo Weber is a 79 year old male with a history of hyperlipidemia, congestive heart failure, cardiomyopathy, and A-fib on Eliquis presents emergency department with a complaint of increasing leg swelling.  Reports that his right leg is more swollen than his left and he is concerned for a blood clot.  He has never had a blood clot before.  He has not missed any doses of his Eliquis.  He is not having any shortness of breath or chest pain.  Patient  reports that he also takes his Lasix daily and has never had to increase his dose for leg swelling.  On exam patient's right leg is more swollen than the left.  He does have pitting edema in both legs.  There is some chronic discoloration that the patient reports is baseline for him.  No signs of cellulitis.  Lung sounds are clear.  Workup reveals BNP of 2479.  He does not have a previous evaluation for this, so I am not sure what his baseline is.  He does not have any other signs of fluid overload such as hypoxia or shortness of breath.  Ultrasound does not show a DVT.  Patient has not had any trauma and I have low suspicion for fracture.  I am going to have the patient take double his dose of Lasix for the next 3 days.  He will call his primary care physician tomorrow to see if they would like him to continue this.  I think this is probably edema secondary to his heart failure but I do not think that he needs admission to the hospital at this time.  He will try diuresis at home.  Patient is agreeable with this plan, he is discharged home.      Disposition  The patient was discharged.     ICD-10 Codes:    ICD-10-CM    1. Leg swelling  M79.89            Discharge Medications  New Prescriptions    FUROSEMIDE (LASIX) 20 MG TABLET    Take 2 tablets (40 mg) by mouth daily for 7 days         Scribe Disclosure:  I, Nimco Verma, am serving as a scribe at 3:46 PM on 6/2/2024 to document services personally performed by Karie Gilbert DO based on my observations and the provider's statements to me.        Karie Gilbert MD  06/02/24 [id number]

## 2024-06-02 NOTE — ED TRIAGE NOTES
Pt presents to ED with bilateral leg swelling, right greater than the left. States they started swelling about 2 weeks ago and its not improving. The right leg feels like a dull ache and then cramps occasionally as well. Denies chest pain or SOB, but does state he was walking to his [employer] sports game and walking to the field he had to stop about 3 times because his legs were weaker, which is unusual for him.

## 2024-06-03 ENCOUNTER — PATIENT OUTREACH (OUTPATIENT)
Dept: FAMILY MEDICINE | Facility: CLINIC | Age: 79
End: 2024-06-03
Payer: COMMERCIAL

## 2024-06-03 LAB
ATRIAL RATE - MUSE: 41 BPM
DIASTOLIC BLOOD PRESSURE - MUSE: NORMAL MMHG
INTERPRETATION ECG - MUSE: NORMAL
P AXIS - MUSE: NORMAL DEGREES
PR INTERVAL - MUSE: NORMAL MS
QRS DURATION - MUSE: 90 MS
QT - MUSE: 396 MS
QTC - MUSE: 436 MS
R AXIS - MUSE: 1 DEGREES
SYSTOLIC BLOOD PRESSURE - MUSE: NORMAL MMHG
T AXIS - MUSE: 120 DEGREES
VENTRICULAR RATE- MUSE: 73 BPM

## 2024-06-04 ENCOUNTER — MEDICAL CORRESPONDENCE (OUTPATIENT)
Dept: HEALTH INFORMATION MANAGEMENT | Facility: CLINIC | Age: 79
End: 2024-06-04
Payer: COMMERCIAL

## 2024-06-17 PROBLEM — Z71.89 ADVANCED DIRECTIVES, COUNSELING/DISCUSSION: Status: RESOLVED | Noted: 2017-09-25 | Resolved: 2024-06-17

## 2024-06-20 ENCOUNTER — TELEPHONE (OUTPATIENT)
Dept: CARDIOLOGY | Facility: CLINIC | Age: 79
End: 2024-06-20
Payer: COMMERCIAL

## 2024-06-20 DIAGNOSIS — I48.19 PERSISTENT ATRIAL FIBRILLATION (H): ICD-10-CM

## 2024-06-20 DIAGNOSIS — I50.22 CHRONIC SYSTOLIC HEART FAILURE (H): ICD-10-CM

## 2024-06-20 DIAGNOSIS — I48.0 PAROXYSMAL ATRIAL FIBRILLATION (H): ICD-10-CM

## 2024-06-20 DIAGNOSIS — I42.9 CARDIOMYOPATHY, UNSPECIFIED TYPE (H): Primary | ICD-10-CM

## 2024-06-20 NOTE — TELEPHONE ENCOUNTER
"Scheduling contacted pt to make follow-up appts:\"Patient declined to make appts states he will be going to [facility] now and no longer need care here at . Please review if needed and remove all orders after. \"    No appts made. Will leave orders in Epic in case pt wishes to be seen.    No future appointments.        PRIMITIVO Valente, RN 2:24 PM 06/20/24    "

## 2024-06-20 NOTE — TELEPHONE ENCOUNTER
"Chart reviewed. Last appt was 11/10/2023 with Dr Roberts.  Recommended follow-up plan:   His aortic stenosis is moderate. Some degree of low-flow low gradient. Recommend repeating echocardiography in the next 1 year to follow this up. Eventually he is going to need transcatheter aortic valve replacement.   Follow-up with EP in January for A-fib and ICD discussions. Return to clinic to see me in 6 months.     Pt has not followed up with EP or Dr Roberts.     BMP ordered per [facility] recommendation.     Noted pt was seen in ER on 6/2/24 for leg swelling. NT Pro BNP elevated at 2,479. Pt was instructed to \"double his dose of Lasix for the next 3 days. He will call his primary care physician tomorrow to see if they would like him to continue this. I think this is probably edema secondary to his heart failure but I do not think that he needs admission to the hospital at this time. He will try diuresis at home. Patient is agreeable with this plan, he is discharged home. \"        Message sent to scheduling to arrange for EP follow-up and Dr Roberts (with BMP)       No future appointments.      PRIMITIVO Valente, RN 1:56 PM 06/20/24    "

## 2024-08-05 ENCOUNTER — MEDICAL CORRESPONDENCE (OUTPATIENT)
Dept: HEALTH INFORMATION MANAGEMENT | Facility: CLINIC | Age: 79
End: 2024-08-05

## 2024-08-05 ENCOUNTER — TRANSFERRED RECORDS (OUTPATIENT)
Dept: HEALTH INFORMATION MANAGEMENT | Facility: CLINIC | Age: 79
End: 2024-08-05
Payer: COMMERCIAL

## 2024-09-17 ENCOUNTER — OFFICE VISIT (OUTPATIENT)
Dept: FAMILY MEDICINE | Facility: CLINIC | Age: 79
End: 2024-09-17
Payer: COMMERCIAL

## 2024-09-17 ENCOUNTER — NURSE TRIAGE (OUTPATIENT)
Dept: FAMILY MEDICINE | Facility: CLINIC | Age: 79
End: 2024-09-17

## 2024-09-17 VITALS
TEMPERATURE: 97.5 F | HEART RATE: 84 BPM | SYSTOLIC BLOOD PRESSURE: 106 MMHG | HEIGHT: 73 IN | WEIGHT: 296.3 LBS | OXYGEN SATURATION: 97 % | BODY MASS INDEX: 39.27 KG/M2 | DIASTOLIC BLOOD PRESSURE: 60 MMHG

## 2024-09-17 DIAGNOSIS — I48.21 PERMANENT ATRIAL FIBRILLATION (H): ICD-10-CM

## 2024-09-17 DIAGNOSIS — Z95.1 S/P CABG (CORONARY ARTERY BYPASS GRAFT): ICD-10-CM

## 2024-09-17 DIAGNOSIS — I25.810 CORONARY ARTERY DISEASE INVOLVING CORONARY BYPASS GRAFT OF NATIVE HEART WITHOUT ANGINA PECTORIS: ICD-10-CM

## 2024-09-17 DIAGNOSIS — E78.5 HYPERLIPIDEMIA LDL GOAL <70: ICD-10-CM

## 2024-09-17 DIAGNOSIS — H92.02 OTALGIA, LEFT: Primary | ICD-10-CM

## 2024-09-17 DIAGNOSIS — I42.9 CARDIOMYOPATHY, UNSPECIFIED TYPE (H): ICD-10-CM

## 2024-09-17 DIAGNOSIS — E66.01 MORBID OBESITY (H): ICD-10-CM

## 2024-09-17 DIAGNOSIS — R73.03 PREDIABETES: ICD-10-CM

## 2024-09-17 DIAGNOSIS — I10 HYPERTENSION GOAL BP (BLOOD PRESSURE) < 140/90: ICD-10-CM

## 2024-09-17 DIAGNOSIS — Z51.81 MEDICATION MONITORING ENCOUNTER: ICD-10-CM

## 2024-09-17 DIAGNOSIS — G47.33 OBSTRUCTIVE SLEEP APNEA SYNDROME: ICD-10-CM

## 2024-09-17 DIAGNOSIS — I50.20 HFREF (HEART FAILURE WITH REDUCED EJECTION FRACTION) (H): ICD-10-CM

## 2024-09-17 DIAGNOSIS — H61.23 IMPACTED CERUMEN OF BOTH EARS: ICD-10-CM

## 2024-09-17 DIAGNOSIS — Z98.84 HISTORY OF GASTRIC BYPASS: ICD-10-CM

## 2024-09-17 DIAGNOSIS — Z86.73 HISTORY OF CVA (CEREBROVASCULAR ACCIDENT): ICD-10-CM

## 2024-09-17 DIAGNOSIS — K76.0 NAFLD (NONALCOHOLIC FATTY LIVER DISEASE): ICD-10-CM

## 2024-09-17 DIAGNOSIS — C61 PROSTATE CANCER (H): ICD-10-CM

## 2024-09-17 PROCEDURE — G2211 COMPLEX E/M VISIT ADD ON: HCPCS | Performed by: FAMILY MEDICINE

## 2024-09-17 PROCEDURE — 99214 OFFICE O/P EST MOD 30 MIN: CPT | Performed by: FAMILY MEDICINE

## 2024-09-17 RX ORDER — ACETAMINOPHEN 160 MG
1 TABLET,DISINTEGRATING ORAL SEE ADMIN INSTRUCTIONS
Status: DISCONTINUED | OUTPATIENT
Start: 2024-09-24 | End: 2024-10-07

## 2024-09-17 NOTE — PROGRESS NOTES
Assessment & Plan     Otalgia, left    - REVIEW OF HEALTH MAINTENANCE PROTOCOL ORDERS  - OR REMOVAL IMPACTED CERUMEN IRRIGATION/LVG UNILAT (RN/MA)  - hydrogen peroxide 3 % solution 1 mL    Impacted cerumen of both ears    - REVIEW OF HEALTH MAINTENANCE PROTOCOL ORDERS  - OR REMOVAL IMPACTED CERUMEN IRRIGATION/LVG UNILAT (RN/MA)  - hydrogen peroxide 3 % solution 1 mL    Prostate cancer (H)    - REVIEW OF HEALTH MAINTENANCE PROTOCOL ORDERS    History of CVA (cerebrovascular accident)    - REVIEW OF HEALTH MAINTENANCE PROTOCOL ORDERS    Coronary artery disease involving coronary bypass graft of native heart without angina pectoris    - REVIEW OF HEALTH MAINTENANCE PROTOCOL ORDERS    S/P CABG (coronary artery bypass graft)    - REVIEW OF HEALTH MAINTENANCE PROTOCOL ORDERS    Cardiomyopathy, unspecified type (H)    - REVIEW OF HEALTH MAINTENANCE PROTOCOL ORDERS    HFrEF (heart failure with reduced ejection fraction) (H)    - REVIEW OF HEALTH MAINTENANCE PROTOCOL ORDERS    Permanent atrial fibrillation (H)    - REVIEW OF HEALTH MAINTENANCE PROTOCOL ORDERS    Obstructive sleep apnea syndrome    - REVIEW OF HEALTH MAINTENANCE PROTOCOL ORDERS    Prediabetes    - REVIEW OF HEALTH MAINTENANCE PROTOCOL ORDERS    Hypertension goal BP (blood pressure) < 140/90    - REVIEW OF HEALTH MAINTENANCE PROTOCOL ORDERS    Hyperlipidemia LDL goal <70    - REVIEW OF HEALTH MAINTENANCE PROTOCOL ORDERS    NAFLD (nonalcoholic fatty liver disease)    - REVIEW OF HEALTH MAINTENANCE PROTOCOL ORDERS    History of gastric bypass    - REVIEW OF HEALTH MAINTENANCE PROTOCOL ORDERS    Morbid obesity (H)    - REVIEW OF HEALTH MAINTENANCE PROTOCOL ORDERS    Medication monitoring encounter    - REVIEW OF HEALTH MAINTENANCE PROTOCOL ORDERS      Work on weight loss  Regular exercise    Plan:    1) Medications: reviewed    2) Labs: NA, reviewed    3) Immunizations: reviewed    4) Imaging/Diagnostics: NA    5) Consults: NA, ENT prn    6) cerumen irrigated  Chief Complaint   Patient presents with    Follow-up     3 month   1. Have you been to the ER, urgent care clinic since your last visit? Hospitalized since your last visit? No    2. Have you seen or consulted any other health care providers outside of the [address] [place] [address] since your last visit? Include any pap smears or colon screening.  No     Discuss left elbow pain clear bilaterally    30 minutes spent by myself on the date of the encounter doing chart review, history and exam, documentation and further activities per the note.    The longitudinal plan of care for the diagnosis(es)/condition(s) as documented were addressed during this visit. Due to the added complexity in care, I will continue to support Hugo in the subsequent management and with ongoing continuity of care.    Malick Arias is a 79 year old, presenting for the following health issues:  Ear Problem (Left ear bleeding started last night. Denies any pain)        9/17/2024     9:08 AM   Additional Questions   Roomed by Jerrica MAYNARD CMA   Accompanied by self     Ear Problem    Left ear pain bleeding for one day.  Denies any pain. Similar episode in 5/2024. Was cleaning ears yesterday, right ear went well, Left ear did not with some bleeding, hearing decreased in Left, using bilateral hearing aids - no f/c/s, recent uri / no sinus sx    Last cpx 8/2022 - will schedule in next few months    Upcoming Rt TSA - Dr Multani - 10/29/2024 - preop scheduled    Patient identified using two patient identifiers.  Ear exam showing wax occlusion completed by provider.  H202/H20 was placed in the bilateral ear(s) via irrigation tool: elephant ear.    Next 5 appointments (look out 90 days)      Oct 07, 2024 9:00 AM  (Arrive by 8:40 AM)  Pre-Operative Physical with Brett Cassidy MD  [facility] ([facility] ) [address] [place] [id number] [phone]          Wt Readings from Last 4 Encounters:   09/17/24 134.4 kg (296 lb 4.8 oz)   05/21/24 135.5 kg (298 lb 12.8 oz)   05/20/24 135.2 kg (298 lb)   05/08/24 135.2 kg (298 lb)     Prostate cancer    PSA   Date Value Ref Range Status   08/07/2020 3.72 0 - 4 ug/L Final     Comment:     Assay Method:  Chemiluminescence using Siemens Vista analyzer     Prostate Specific Antigen Screen   Date Value Ref Range Status    04/02/2024 0.11 0.00 - 6.50 ng/mL Final   08/16/2022 8.70 (H) 0.00 - 4.00 ug/L Final     PSA Tumor Marker   Date Value Ref Range Status   02/02/2023 8.51 (H) 0.00 - 6.50 ng/mL Final     Hx of CVA - stable    CAD - S/P CABG - Cardiomyopathy - HFrEF - permanent A Fib - stable    HAYLEE - stable - using CPAP    Prediabetes    Lab Results   Component Value Date    A1C 5.5 11/07/2023    A1C 5.4 02/21/2023    A1C 5.7 08/16/2022    A1C 5.6 08/11/2021    A1C 5.8 11/19/2020    A1C 5.8 08/07/2020    A1C 5.9 02/14/2020    A1C 5.7 12/02/2019    A1C 5.6 09/20/2019     Htn    BP Readings from Last 3 Encounters:   09/17/24 106/60   06/02/24 121/73   05/21/24 124/85     Lipids    Recent Labs   Lab Test 04/02/24  0955 11/07/23  0825   CHOL 110 103   HDL 53 54   LDL 47 41   TRIG 50 39     NAFLD    Hx of Gastric Bypass    Patient Active Problem List   Diagnosis    Iron deficiency anemia    Colon polyps    Obstructive sleep apnea syndrome    Hyperlipidemia LDL goal <70    Long term current use of anticoagulant therapy - for intermittent a-fib    Total knee replacement status    Calculus of kidney - 1.2 cm stone at the upper pole as of CT urogram fr 1/11/2023    NAFLD (nonalcoholic fatty liver disease)    Morbid obesity due to excess calories (H)    History of hepatitis C    Prediabetes    Paroxysmal atrial fibrillation (H)    Cholelithiasis    Hypertension goal BP (blood pressure) < 140/90    TMJ arthralgia    Nephrolithiasis    OA (osteoarthritis)    First degree AV block    Benign prostatic hyperplasia (BPH) with urinary urge incontinence    Thoracic aortic ectasia (H24)    Stroke (H)    CVA (cerebral vascular accident) (H)    Cervical stenosis of spine    Vitamin B12 deficiency (non anemic)    Vitamin D deficiency    Myofascial pain    Permanent atrial fibrillation (H)    History of CVA (cerebrovascular accident)    Small bowel obstruction (H)    NSVT (nonsustained ventricular tachycardia) (H)    Chronic LLQ pain    Elevated prostate  specific antigen (PSA)    Prostate cancer (H)    Chronic systolic heart failure (H)    HFrEF (heart failure with reduced ejection fraction) (H)    Aortic valve stenosis, etiology of cardiac valve disease unspecified- moderate 2+ with FREDY = 1.2cm2 on echocardiogram fr 7/14/2023    Mitral jngadsqogaiel-4-9+ on echocardiogram fr 7/14/2023    Cardiomyopathy (H)    Cardiomyopathy, unspecified type (H)    S/P CABG (coronary artery bypass graft)    Fluid overload    Transient hyperglycemia post procedure    Status post ligation of left atrial appendage    Encounter for surgical aftercare following surgery on the circulatory system    Presence of aortocoronary bypass graft:CAB x1 8/15/2023    Atherosclerosis of native coronary artery of native heart without angina pectoris    Essential (primary) hypertension    Chronic atrial fibrillation, unspecified (H)    Personal history of transient ischemic attack (TIA), and cerebral infarction without residual deficits    Physical deconditioning    Leg edema, right    HAYLEE on CPAP       Past Medical History:   Diagnosis Date    Aortic stenosis     moderate    Atrial fibrillation 2006    Followed by [facility] - persistent    CAD (coronary artery disease)     Calculus of kidney 2005, 6/06, 10/12, 8/18, 9/19    dr walker/nestor/иавн - hematuria - w/u o/w neg    Cervical stenosis of spine     Moderate C4-5    Cholelithiasis     Chronic peptic ulcer, unspecified site, without mention of hemorrhage, perforation, or obstruction 1985    gastric bypass    Colon polyps 8/05, 9/10, 10/15    2 tubular adenoma, 1 hyperplastic - multiple serrated/tubular adenomas - last colonscopy 11/20 due 5 yrs    CVA (cerebral vascular accident) (H) 01/2019    small right periorlandic subcortical - left sided residual - left hand tingling/numbness - Left leg weak/numbness - cardioembolic    Elevated prostate specific antigen (PSA)     Dr Santos    Hepatitis C 10/2012    chronic hepatitis C, grade 1-2, stage  1 - mild - Dr Douglas    HFrEF (heart failure with reduced ejection fraction) (H)     [facility] - 35%    Hyperlipidemia LDL goal <70     Hypertension goal BP (blood pressure) < 140/90 06/2006    dr jaciel winchester    Iron deficiency anemia, unspecified 1985    gastric bypass    Mitral regurgitation     mild-moderate    Nephrolithiasis multiple episodes, last 10/19    Dr Bey/Ivana/Tom - 9mm 3/2021    OA (osteoarthritis)     Multiple - Left shoulder with rotator cuff tear - Dr Durham    Obesity, unspecified     s/p gastric bypass 1985     Obstructive sleep apnea syndrome     CPAP - 15 cm - [facility] 1/2023    Prediabetes     Presbyacusis 01/2004    dr corona    Prostate cancer (H) 2023    Dr Santos - Manju 3+4, 4+3 = 7, bx 5/13 positive    Pyelonephritis, unspecified 12/1999    SCC (squamous cell carcinoma), 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    dr saez - lt conchal bowl    Sleep apnea 10/2002    Auto CPAP - severe - 12-15 cm - dr cunha    Stroke (H) 01/19/2019    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    [facility]    Vitamin B12 deficiency (non anemic) 04/15/2019    Vitamin D deficiency 04/15/2019       Past Surgical History:   Procedure Laterality Date    APPENDECTOMY      ARTHROPLASTY KNEE  08/13/2012    LT TKA - Dr Villanueva    BYPASS GRAFT ARTERY CORONARY N/A 8/15/2023    Procedure: CORONARY ARTERY BYPASS GRAFT x 1 (SAPHENOUS VEIN - RIGHT POSTERIOR DESCENDING ARTERY) WITH ENDOSCOPIC SAPHENOUS VEIN HARVEST ON THE LEFT LOWER EXTREMITY, AND ON CARDIOPULMONARY PUMP OXYGENATOR  (INTRAOPERATIVE TRANSESOPHAGEAL ECHOCARDIOGRAM BY ANESTHESIOLOGIST), REMOVAL OF RIGHT CORONARY ARTERY STENT AND DELIVERY SYSTEM, LEFT ATRIAL APPENDAGE CLIPPING WITH ATRICLIP FLEX V DEVICE SIZE: 45    CARDIOVERSION  2016    CARDIOVERSION      COLONOSCOPY  8/05, 9/10, 10/15    multiple tubular/serrated/hyperplastic polyps    COLONOSCOPY N/A 10/29/2015    multiple tubular and serrated adenomas    COLONOSCOPY N/A 09/07/2016    COLONOSCOPY  11/2020    tubular adenomas  - due 5 yrs    COLONOSCOPY N/A 11/24/2020    Procedure: COLONOSCOPY with biopsies;  Surgeon: Chadwick Burgos MD;  Location: [facility] OR    CV CORONARY ANGIOGRAM N/A 8/15/2023    Procedure: Coronary Angiogram;  Surgeon: Carly Luna MD;  Location:  [facility]    CV FRACTIONAL FLOW RATIO WIRE N/A 8/15/2023    Procedure: Fractional Flow Ratio Wire;  Surgeon: Carly Luna MD;  Location:  [facility]    CV PCI N/A 8/15/2023    Procedure: Percutaneous Coronary Intervention;  Surgeon: Carly Luna MD;  Location:  [facility]    CYSTOSCOPY W/ LASER LITHOTRIPSY  10/16/2012,5/2/2018    ESOPHAGOSCOPY, GASTROSCOPY, DUODENOSCOPY (EGD), COMBINED N/A 11/24/2020    Procedure: ESOPHAGOGASTRODUODENOSCOPY, COLONOSCOPY with biopsies;  Surgeon: Chadwick Burgos MD;  Location: [facility] OR    EYE SURGERY  1/01,6/02,11/02    lasik    HC KNEE SCOPE, DIAGNOSTIC  05/2000    Arthroscopy, Knee RT    LASER HOLMIUM LITHOTRIPSY URETER(S), INSERT STENT, COMBINED  10/16/2012    stones x 4, Dr Campa    LASER HOLMIUM LITHOTRIPSY URETER(S), INSERT STENT, COMBINED Right 05/02/2018    CYSTOSCOPY, RIGHT URETEROSCOPY, HOLMIUM LASER LITHOTRIPSY, RIGHT STENT PLACEMENT - Dr Bey    MRI BIOPSY PROSTATE Bilateral 02/09/2023    mark    REVERSE ARTHROPLASTY SHOULDER Left 03/07/2023    Procedure: LEFT REVERSE SHOULDER ARTHROPLASTY WITH CUSTOM GUIDE WITH AUTOLOGOUS BONE GRAFTOF PROXIMAL HUMERUS;  Surgeon: Booker Multani MD;  Location:  OR    SURGICAL HISTORY OF -   1985    s/p gastric bypass Bilroth II    SURGICAL HISTORY OF -   11/1998    wisdom teeth    SURGICAL HISTORY OF -   1979    cellulitis    SURGICAL HISTORY OF -   11/1998    lt forearm spur's    SURGICAL HISTORY OF -   1/01,6/02,11/02    s/p lasik    SURGICAL HISTORY OF -   11/1999    rt forearm spurs    SURGICAL HISTORY OF -   07/2006    dr stevenson - lithotrypsy    SURGICAL HISTORY OF -   10/08, 12/08    Lt ear chonchal lesion removal SCC -   se    SURGICAL HISTORY OF -  Right 10/2019    nephrolithiasis - cystoscopy, rt lithotrypsy, Dr Heath    SURGICAL HISTORY OF -  Right 11/2019    Cystoscopy, Rt lithotrypsy, Calcium Oxalate, Dr Heath       Current Outpatient Medications   Medication Sig Dispense Refill    acetaminophen (TYLENOL) 325 MG tablet Take 650 mg by mouth every 6 hours as needed      apixaban ANTICOAGULANT (ELIQUIS) 5 MG tablet Take 1 tablet (5 mg) by mouth 2 times daily 180 tablet 1    clotrimazole (LOTRIMIN) 1 % external cream Apply thin layer of cream to affected area on penis, leg, and feet twice daily for 14 days. 85 g 1    empagliflozin (JARDIANCE) 10 MG TABS tablet Take 1 tablet (10 mg) by mouth daily 90 tablet 3    ENTRESTO 49-51 MG per tablet Take 1 tablet by mouth 2 times daily      Ferrous Sulfate (IRON) 325 (65 Fe) MG tablet Take 1 tablet by mouth three times a week (Monday, Wednesday, Friday) 90 tablet 3    furosemide (LASIX) 20 MG tablet Take 40 mg by mouth 2 times daily.      methocarbamol (ROBAXIN) 500 MG tablet Take 1 tablet (500 mg) by mouth 4 times daily as needed for muscle spasms 40 tablet 0    metoprolol succinate ER (TOPROL XL) 50 MG 24 hr tablet Take 50 mg by mouth 2 times daily      pantoprazole (PROTONIX) 40 MG EC tablet Take 1 tablet (40 mg) by mouth daily 90 tablet 1    rosuvastatin (CRESTOR) 10 MG tablet Take 1 tablet (10 mg) by mouth daily 90 tablet 1    tamsulosin (FLOMAX) 0.4 MG capsule Take 1 capsule (0.4 mg) by mouth 2 times daily 180 capsule 1    vitamin B-12 (CYANOCOBALAMIN) 1000 MCG tablet Take 1,000 mcg by mouth every other day      vitamin C (ASCORBIC ACID) 1000 MG TABS Take 1,000 mg by mouth every other day      vitamin D3 (CHOLECALCIFEROL) 125 MCG (5000 UT) tablet Take 1 tablet by mouth every other day      furosemide (LASIX) 20 MG tablet Take 2 tablets (40 mg) by mouth daily for 7 days 14 tablet 0       No Known Allergies    Family History   Problem Relation Age of Onset    Alzheimer Disease  "Father 82         at 88    Prostate Cancer Father 76        bladder and prostate    Heart Disease Mother 80        CHF    Heart Disease Maternal Grandfather         MI at 55    Cancer Paternal Uncle         ?    Ulcerative Colitis No family hx of     Crohn's Disease No family hx of     Stomach Cancer No family hx of     GERD No family hx of        Social History     Socioeconomic History    Marital status:      Spouse name: Mayra    Number of children: 3    Years of education: 21    Highest education level: None   Occupational History    Occupation: retired teacher and football and       Employer: [employer]     Employer: RETIRED   Tobacco Use    Smoking status: Never    Smokeless tobacco: Never   Vaping Use    Vaping status: Never Used   Substance and Sexual Activity    Alcohol use: Not Currently     Alcohol/week: 2.0 - 3.0 standard drinks of alcohol     Types: 2 - 3 Standard drinks or equivalent per week     Comment: occassiinally    Drug use: No     Comment: acknowledges using herbal supplement \"Vibe\" for energy-none used recently     Sexual activity: Not Currently     Partners: Male     Birth control/protection: None     Comment:    Other Topics Concern    Caffeine Concern Yes     Comment: <1 can qd    Sleep Concern Yes     Comment: sleep apnea, wears cpap    Exercise No    Seat Belt Yes    Parent/sibling w/ CABG, MI or angioplasty before 65F 55M? No   Social History Narrative    Wife , Mayra,  from brain gliobastoma 2020.  --Ingrid Girard MD           Social Determinants of Health     Financial Resource Strain: Low Risk  (2023)    Financial Resource Strain     Within the past 12 months, have you or your family members you live with been unable to get utilities (heat, electricity) when it was really needed?: No   Food Insecurity: Low Risk  (2023)    Food Insecurity     Within the past 12 months, did you worry that your food would run " out before you got money to buy more?: No     Within the past 12 months, did the food you bought just not last and you didn t have money to get more?: No   Transportation Needs: Low Risk  (12/19/2023)    Transportation Needs     Within the past 12 months, has lack of transportation kept you from medical appointments, getting your medicines, non-medical meetings or appointments, work, or from getting things that you need?: No   Physical Activity: Sufficiently Active (3/24/2023)    Received from [facility]    Exercise Vital Sign     Days of Exercise per Week: 3 days     Minutes of Exercise per Session: 90 min   Stress: No Stress Concern Present (3/24/2023)    Received from [facility]    [employer] - Occupational Stress Questionnaire     Feeling of Stress : Not at all    [facility]    Social Connections   Interpersonal Safety: Low Risk  (5/8/2024)    Interpersonal Safety     Do you feel physically and emotionally safe where you currently live?: Yes     Within the past 12 months, have you been hit, slapped, kicked or otherwise physically hurt by someone?: No     Within the past 12 months, have you been humiliated or emotionally abused in other ways by your partner or ex-partner?: No   Housing Stability: Low Risk  (12/19/2023)    Housing Stability     Do you have housing? : Yes     Are you worried about losing your housing?: No       Review of Systems  CONSTITUTIONAL: NEGATIVE for fever, chills, change in weight  INTEGUMENTARY/SKIN: NEGATIVE for worrisome rashes, moles or lesions  EYES: NEGATIVE for vision changes or irritation  ENT/MOUTH: NEGATIVE for ear, mouth and throat problems  RESP: NEGATIVE for significant cough or SOB  CV: NEGATIVE for chest pain, palpitations or peripheral edema  GI: NEGATIVE for nausea, abdominal pain, heartburn, or change in bowel habits  : NEGATIVE for frequency,  "dysuria, or hematuria  MUSCULOSKELETAL: NEGATIVE for significant arthralgias or myalgia  NEURO: NEGATIVE for weakness, dizziness or paresthesias  ENDOCRINE: NEGATIVE for temperature intolerance, skin/hair changes  HEME: NEGATIVE for bleeding problems  PSYCHIATRIC: NEGATIVE for changes in mood or affect      Objective    /60   Pulse 84   Temp 97.5  F (36.4  C) (Tympanic)   Ht 1.854 m (6' 1\")   Wt 134.4 kg (296 lb 4.8 oz)   SpO2 97%   BMI 39.09 kg/m    Body mass index is 39.09 kg/m .    Physical Exam   GENERAL: alert and no distress  EYES: Eyes grossly normal to inspection, PERRL and conjunctivae and sclerae normal  HENT: ear canals and TM's normal, nose and mouth without ulcers or lesions  NECK: no adenopathy, no asymmetry, masses, or scars  RESP: lungs clear to auscultation - no rales, rhonchi or wheezes  CV: regular rate and rhythm, normal S1 S2, no S3 or S4, no murmur, click or rub, no peripheral edema  ABDOMEN: soft, nontender, no hepatosplenomegaly, no masses and bowel sounds normal  MS: no gross musculoskeletal defects noted, no edema  SKIN: no suspicious lesions or rashes  NEURO: Normal strength and tone, mentation intact and speech normal  PSYCH: mentation appears normal, affect normal/bright      Signed Electronically by:            Brett Cassidy MD, FAAFP     [facility]  [address] [place] [id number]  [email or web address]   Office: [phone]  Fax: [phone]       "

## 2024-09-17 NOTE — TELEPHONE ENCOUNTER
Situation  Bleeding in left ear canal     Background   Patient is on Eliquis    Assessment   Patient states when he put his  little finger in his ear there is a spot of fresh red blood    Denied blood dripping from ear    No blood on pillow over night   No pain now or before bleeding     Reduced hearing compared to normal, wears hearing aids    Asked if he recently cleaned his ears- Last night he swabbed his left ear to clean out the wax.     Patient denied any ear symptoms before cleaning    Patient prefers to be seen   Recommendations   Scheduled.   Advised of 840am arrival and 9am apt   Future Appointments 9/17/2024 - 3/16/2025        Date Visit Type Length Department Provider     9/17/2024  9:00 AM OFFICE VISIT 30 min [facility] Brett Cassidy MD    Location Instructions:     [facility] is located at [address] [place] [address], along [address] Free parking is available; access the lot by [address]  onto [facility], then west onto [facility].              10/7/2024  9:00 AM PRE-OPERATIVE 30 min [facility] Brett Cassidy MD    Location Instructions:     [facility] is located at [address] [place] [address], along [address] Free parking is available; access the lot by [address]  onto [address], then west onto [facility] [address].                       Reason for Disposition   Unexplained bleeding     On eliquis and used q tips last night    Additional Information   Negative: Clear or white discharge and swimmer's ear suspected (e.g., recent swimming, ear pain occurs or increases when the earlobe is pulled up and down)   Negative: Bloody ear discharge and after an ear injury   Negative: Earwax is main concern   Negative: Bloody or clear ear discharge and after a head or face injury   Negative: Unexplained bleeding and lasts > 10 minutes or large amount  (Exception: If a few drops of blood, continue  with triage.)   Negative: Fever > 103 F (39.4 C)   Negative: Patient sounds very sick or weak to the triager   Negative: Ear pain   Negative: Fever > 100.4 F (38.0 C)   Negative: Foul-smelling discharge   Negative: Cloudy - white discharge   Negative: Clear drainage (not from a head injury) and persists > 24 hours    Protocols used: Ear - [id number]

## 2024-09-25 DIAGNOSIS — I50.20 HEART FAILURE WITH REDUCED EJECTION FRACTION (H): Primary | ICD-10-CM

## 2024-09-25 DIAGNOSIS — I42.9 CARDIOMYOPATHY (H): ICD-10-CM

## 2024-09-30 ENCOUNTER — LAB (OUTPATIENT)
Dept: LAB | Facility: CLINIC | Age: 79
End: 2024-09-30
Payer: COMMERCIAL

## 2024-09-30 DIAGNOSIS — I50.20 HEART FAILURE WITH REDUCED EJECTION FRACTION (H): ICD-10-CM

## 2024-09-30 DIAGNOSIS — I42.9 CARDIOMYOPATHY (H): ICD-10-CM

## 2024-09-30 DIAGNOSIS — I10 HYPERTENSION GOAL BP (BLOOD PRESSURE) < 140/90: Primary | ICD-10-CM

## 2024-09-30 LAB
ANION GAP SERPL CALCULATED.3IONS-SCNC: 10 MMOL/L (ref 7–15)
BUN SERPL-MCNC: 27.9 MG/DL (ref 8–23)
CALCIUM SERPL-MCNC: 8.7 MG/DL (ref 8.8–10.4)
CHLORIDE SERPL-SCNC: 105 MMOL/L (ref 98–107)
CREAT SERPL-MCNC: 0.97 MG/DL (ref 0.67–1.17)
CREAT UR-MCNC: 52.1 MG/DL
EGFRCR SERPLBLD CKD-EPI 2021: 80 ML/MIN/1.73M2
GLUCOSE SERPL-MCNC: 84 MG/DL (ref 70–99)
HCO3 SERPL-SCNC: 27 MMOL/L (ref 22–29)
MICROALBUMIN UR-MCNC: 16.4 MG/L
MICROALBUMIN/CREAT UR: 31.48 MG/G CR (ref 0–17)
POTASSIUM SERPL-SCNC: 4.8 MMOL/L (ref 3.4–5.3)
SODIUM SERPL-SCNC: 142 MMOL/L (ref 135–145)

## 2024-09-30 PROCEDURE — 36415 COLL VENOUS BLD VENIPUNCTURE: CPT

## 2024-09-30 PROCEDURE — 80048 BASIC METABOLIC PNL TOTAL CA: CPT

## 2024-09-30 PROCEDURE — 82570 ASSAY OF URINE CREATININE: CPT

## 2024-09-30 PROCEDURE — 82043 UR ALBUMIN QUANTITATIVE: CPT

## 2024-10-07 ENCOUNTER — OFFICE VISIT (OUTPATIENT)
Dept: FAMILY MEDICINE | Facility: CLINIC | Age: 79
End: 2024-10-07
Payer: COMMERCIAL

## 2024-10-07 VITALS
SYSTOLIC BLOOD PRESSURE: 100 MMHG | TEMPERATURE: 96.3 F | DIASTOLIC BLOOD PRESSURE: 70 MMHG | RESPIRATION RATE: 18 BRPM | HEIGHT: 73 IN | BODY MASS INDEX: 39.49 KG/M2 | OXYGEN SATURATION: 98 % | HEART RATE: 72 BPM | WEIGHT: 298 LBS

## 2024-10-07 DIAGNOSIS — Z01.818 PREOPERATIVE EXAMINATION: Primary | ICD-10-CM

## 2024-10-07 DIAGNOSIS — Z86.73 HISTORY OF CVA (CEREBROVASCULAR ACCIDENT): ICD-10-CM

## 2024-10-07 DIAGNOSIS — Z23 NEED FOR COVID-19 VACCINE: ICD-10-CM

## 2024-10-07 DIAGNOSIS — Z95.1 S/P CABG (CORONARY ARTERY BYPASS GRAFT): ICD-10-CM

## 2024-10-07 DIAGNOSIS — Z23 NEED FOR INFLUENZA VACCINATION: ICD-10-CM

## 2024-10-07 DIAGNOSIS — R73.03 PREDIABETES: ICD-10-CM

## 2024-10-07 DIAGNOSIS — E66.01 MORBID OBESITY (H): ICD-10-CM

## 2024-10-07 DIAGNOSIS — I42.9 CARDIOMYOPATHY, UNSPECIFIED TYPE (H): ICD-10-CM

## 2024-10-07 DIAGNOSIS — K76.0 NAFLD (NONALCOHOLIC FATTY LIVER DISEASE): ICD-10-CM

## 2024-10-07 DIAGNOSIS — I25.810 CORONARY ARTERY DISEASE INVOLVING CORONARY BYPASS GRAFT OF NATIVE HEART WITHOUT ANGINA PECTORIS: ICD-10-CM

## 2024-10-07 DIAGNOSIS — I50.20 HFREF (HEART FAILURE WITH REDUCED EJECTION FRACTION) (H): ICD-10-CM

## 2024-10-07 DIAGNOSIS — G47.33 OBSTRUCTIVE SLEEP APNEA SYNDROME: ICD-10-CM

## 2024-10-07 DIAGNOSIS — Z51.81 MEDICATION MONITORING ENCOUNTER: ICD-10-CM

## 2024-10-07 DIAGNOSIS — I10 HYPERTENSION GOAL BP (BLOOD PRESSURE) < 140/90: ICD-10-CM

## 2024-10-07 DIAGNOSIS — E78.5 HYPERLIPIDEMIA LDL GOAL <70: ICD-10-CM

## 2024-10-07 DIAGNOSIS — Z98.84 HISTORY OF GASTRIC BYPASS: ICD-10-CM

## 2024-10-07 DIAGNOSIS — M19.011 PRIMARY OSTEOARTHRITIS OF RIGHT SHOULDER: ICD-10-CM

## 2024-10-07 DIAGNOSIS — I50.22 CHRONIC SYSTOLIC HEART FAILURE (H): ICD-10-CM

## 2024-10-07 DIAGNOSIS — I48.21 PERMANENT ATRIAL FIBRILLATION (H): ICD-10-CM

## 2024-10-07 LAB
ALBUMIN SERPL BCG-MCNC: 4 G/DL (ref 3.5–5.2)
ALBUMIN UR-MCNC: NEGATIVE MG/DL
ALP SERPL-CCNC: 107 U/L (ref 40–150)
ALT SERPL W P-5'-P-CCNC: 10 U/L (ref 0–70)
ANION GAP SERPL CALCULATED.3IONS-SCNC: 9 MMOL/L (ref 7–15)
APPEARANCE UR: CLEAR
AST SERPL W P-5'-P-CCNC: 17 U/L (ref 0–45)
BACTERIA #/AREA URNS HPF: ABNORMAL /HPF
BILIRUB SERPL-MCNC: 0.6 MG/DL
BILIRUB UR QL STRIP: NEGATIVE
BUN SERPL-MCNC: 25.9 MG/DL (ref 8–23)
CALCIUM SERPL-MCNC: 8.9 MG/DL (ref 8.8–10.4)
CHLORIDE SERPL-SCNC: 104 MMOL/L (ref 98–107)
COLOR UR AUTO: YELLOW
CREAT SERPL-MCNC: 0.97 MG/DL (ref 0.67–1.17)
EGFRCR SERPLBLD CKD-EPI 2021: 80 ML/MIN/1.73M2
ERYTHROCYTE [DISTWIDTH] IN BLOOD BY AUTOMATED COUNT: 14.2 % (ref 10–15)
EST. AVERAGE GLUCOSE BLD GHB EST-MCNC: 108 MG/DL
GLUCOSE SERPL-MCNC: 92 MG/DL (ref 70–99)
GLUCOSE UR STRIP-MCNC: 250 MG/DL
HBA1C MFR BLD: 5.4 % (ref 0–5.6)
HCO3 SERPL-SCNC: 27 MMOL/L (ref 22–29)
HCT VFR BLD AUTO: 41.9 % (ref 40–53)
HGB BLD-MCNC: 13.6 G/DL (ref 13.3–17.7)
HGB UR QL STRIP: ABNORMAL
KETONES UR STRIP-MCNC: NEGATIVE MG/DL
LEUKOCYTE ESTERASE UR QL STRIP: NEGATIVE
MCH RBC QN AUTO: 30 PG (ref 26.5–33)
MCHC RBC AUTO-ENTMCNC: 32.5 G/DL (ref 31.5–36.5)
MCV RBC AUTO: 93 FL (ref 78–100)
NITRATE UR QL: NEGATIVE
NT-PROBNP SERPL-MCNC: 2617 PG/ML (ref 0–1800)
PH UR STRIP: 5.5 [PH] (ref 5–7)
PLATELET # BLD AUTO: 176 10E3/UL (ref 150–450)
POTASSIUM SERPL-SCNC: 4 MMOL/L (ref 3.4–5.3)
PROT SERPL-MCNC: 7.1 G/DL (ref 6.4–8.3)
RBC # BLD AUTO: 4.53 10E6/UL (ref 4.4–5.9)
RBC #/AREA URNS AUTO: ABNORMAL /HPF
SODIUM SERPL-SCNC: 140 MMOL/L (ref 135–145)
SP GR UR STRIP: 1.02 (ref 1–1.03)
SQUAMOUS #/AREA URNS AUTO: ABNORMAL /LPF
UROBILINOGEN UR STRIP-ACNC: 1 E.U./DL
WBC # BLD AUTO: 4.5 10E3/UL (ref 4–11)
WBC #/AREA URNS AUTO: ABNORMAL /HPF

## 2024-10-07 PROCEDURE — 90480 ADMN SARSCOV2 VAC 1/ONLY CMP: CPT | Performed by: FAMILY MEDICINE

## 2024-10-07 PROCEDURE — 81001 URINALYSIS AUTO W/SCOPE: CPT | Performed by: FAMILY MEDICINE

## 2024-10-07 PROCEDURE — 36415 COLL VENOUS BLD VENIPUNCTURE: CPT | Performed by: FAMILY MEDICINE

## 2024-10-07 PROCEDURE — 83880 ASSAY OF NATRIURETIC PEPTIDE: CPT | Performed by: FAMILY MEDICINE

## 2024-10-07 PROCEDURE — 99214 OFFICE O/P EST MOD 30 MIN: CPT | Mod: 25 | Performed by: FAMILY MEDICINE

## 2024-10-07 PROCEDURE — 90662 IIV NO PRSV INCREASED AG IM: CPT | Performed by: FAMILY MEDICINE

## 2024-10-07 PROCEDURE — 91320 SARSCV2 VAC 30MCG TRS-SUC IM: CPT | Performed by: FAMILY MEDICINE

## 2024-10-07 PROCEDURE — G0008 ADMIN INFLUENZA VIRUS VAC: HCPCS | Performed by: FAMILY MEDICINE

## 2024-10-07 PROCEDURE — 85027 COMPLETE CBC AUTOMATED: CPT | Performed by: FAMILY MEDICINE

## 2024-10-07 PROCEDURE — 93000 ELECTROCARDIOGRAM COMPLETE: CPT | Performed by: FAMILY MEDICINE

## 2024-10-07 PROCEDURE — 80053 COMPREHEN METABOLIC PANEL: CPT | Performed by: FAMILY MEDICINE

## 2024-10-07 PROCEDURE — 83036 HEMOGLOBIN GLYCOSYLATED A1C: CPT | Performed by: FAMILY MEDICINE

## 2024-10-07 NOTE — PROGRESS NOTES
Preoperative Evaluation  [address] [address]  Phone: [phone]  Primary Provider: Brett Cassidy MD  Pre-op Performing Provider: Brett Cassidy MD  Oct 7, 2024               10/3/2024   Surgical Information   What procedure is being done? Right shoulder replacement   Facility or Hospital where procedure/surgery will be performed: [facility]   Who is doing the procedure / surgery? Maciej Multani   Date of surgery / procedure: 10/29/2024   Time of surgery / procedure: Afternoon   Where do you plan to recover after surgery? at home with family      Fax number for surgical facility: Note does not need to be faxed, will be available electronically in Epic.    Assessment & Plan     The proposed surgical procedure is considered INTERMEDIATE risk.    Preoperative examination    - Comprehensive metabolic panel  - CBC with platelets  - UA Macroscopic with reflex to Microscopic and Culture  - EKG 12-lead complete w/read - Clinics  - Hemoglobin A1c  - Comprehensive metabolic panel  - CBC with platelets  - UA Macroscopic with reflex to Microscopic and Culture  - Hemoglobin A1c  - UA Microscopic with Reflex to Culture    Primary osteoarthritis of right shoulder    - Comprehensive metabolic panel  - CBC with platelets  - PRIMARY CARE FOLLOW-UP SCHEDULING    History of CVA (cerebrovascular accident)    - Comprehensive metabolic panel  - EKG 12-lead complete w/read - Clinics  - Comprehensive metabolic panel  - BNP-N terminal pro  - PRIMARY CARE FOLLOW-UP SCHEDULING    Coronary artery disease involving coronary bypass graft of native heart without angina pectoris    - Comprehensive metabolic panel  - EKG 12-lead complete w/read - Clinics  - Comprehensive metabolic panel  - BNP-N terminal pro  - PRIMARY CARE FOLLOW-UP SCHEDULING    Chronic systolic heart failure (H)    - Comprehensive metabolic panel  - EKG 12-lead complete w/read - Clinics  - BNP-N terminal  pro  - PRIMARY CARE FOLLOW-UP SCHEDULING    S/P CABG (coronary artery bypass graft)    - Comprehensive metabolic panel  - EKG 12-lead complete w/read - Clinics  - Comprehensive metabolic panel  - BNP-N terminal pro  - PRIMARY CARE FOLLOW-UP SCHEDULING    Cardiomyopathy, unspecified type (H)    - Comprehensive metabolic panel  - EKG 12-lead complete w/read - Clinics  - Comprehensive metabolic panel  - BNP-N terminal pro  - PRIMARY CARE FOLLOW-UP SCHEDULING    HFrEF (heart failure with reduced ejection fraction) (H)    - Comprehensive metabolic panel  - EKG 12-lead complete w/read - Clinics  - Comprehensive metabolic panel  - BNP-N terminal pro  - PRIMARY CARE FOLLOW-UP SCHEDULING    Permanent atrial fibrillation (H)    - Comprehensive metabolic panel  - EKG 12-lead complete w/read - Clinics  - Comprehensive metabolic panel  - PRIMARY CARE FOLLOW-UP SCHEDULING    Prediabetes    - Comprehensive metabolic panel  - EKG 12-lead complete w/read - Clinics  - Hemoglobin A1c  - Comprehensive metabolic panel  - Hemoglobin A1c  - PRIMARY CARE FOLLOW-UP SCHEDULING    Hypertension goal BP (blood pressure) < 140/90    - Comprehensive metabolic panel  - EKG 12-lead complete w/read - Clinics  - Comprehensive metabolic panel  - PRIMARY CARE FOLLOW-UP SCHEDULING    Hyperlipidemia LDL goal <70    - Comprehensive metabolic panel  - Comprehensive metabolic panel  - PRIMARY CARE FOLLOW-UP SCHEDULING    Obstructive sleep apnea syndrome    - Comprehensive metabolic panel  - Comprehensive metabolic panel  - PRIMARY CARE FOLLOW-UP SCHEDULING    NAFLD (nonalcoholic fatty liver disease)    - Comprehensive metabolic panel  - Comprehensive metabolic panel  - PRIMARY CARE FOLLOW-UP SCHEDULING    History of gastric bypass    - Comprehensive metabolic panel  - CBC with platelets  - UA Macroscopic with reflex to Microscopic and Culture  - EKG 12-lead complete w/read - Clinics  - Comprehensive metabolic panel  - CBC with platelets  - UA Macroscopic  with reflex to Microscopic and Culture  - UA Microscopic with Reflex to Culture  - PRIMARY CARE FOLLOW-UP SCHEDULING    Morbid obesity (H)    - PRIMARY CARE FOLLOW-UP SCHEDULING    Medication monitoring encounter    - Comprehensive metabolic panel  - CBC with platelets  - UA Macroscopic with reflex to Microscopic and Culture  - EKG 12-lead complete w/read - Clinics  - Hemoglobin A1c  - Comprehensive metabolic panel  - CBC with platelets  - UA Macroscopic with reflex to Microscopic and Culture  - Hemoglobin A1c  - UA Microscopic with Reflex to Culture  - PRIMARY CARE FOLLOW-UP SCHEDULING    Need for influenza vaccination    - INFLUENZA HIGH DOSE, TRIVALENT, PF (FLUZONE)  - PRIMARY CARE FOLLOW-UP SCHEDULING    Need for COVID-19 vaccine    - COVID-19 12+ (PFIZER)  - PRIMARY CARE FOLLOW-UP SCHEDULING    Risks and Recommendations  The patient has the following additional risks and recommendations for perioperative complications:   - Morbid obesity (BMI >40)    Medications reviewed    Hold eliquis 2 days  Hold vitamins / supplements for 1 week  No NSAID's  Tyelnol Ok  Hold rest of meds 1 day prior    Recommendation  Approval given to proceed with proposed procedure, without further diagnostic evaluation.    Malick Arias is a 79 year old, presenting for the following:  Pre-Op Exam          10/7/2024     8:48 AM   Additional Questions   Roomed by Mitzi GIVENS   Accompanied by self     HPI related to upcoming procedure:     Rt Shoulder pain requiring Rt Shoulder replacement - Dr Multani        10/3/2024   Pre-Op Questionnaire   Have you ever had a heart attack or stroke? (!) YES CVA - yes   Have you ever had surgery on your heart or blood vessels, such as a stent placement, a coronary artery bypass, or surgery on an artery in your head, neck, heart, or legs? (!) YES CABG x 1   Do you have chest pain with activity? No   Do you have a history of heart failure? No   Do you currently have a cold, bronchitis or symptoms of  other infection? No   Do you have a cough, shortness of breath, or wheezing? No   Do you or anyone in your family have previous history of blood clots? No   Do you or does anyone in your family have a serious bleeding problem such as prolonged bleeding following surgeries or cuts? No   Have you ever had problems with anemia or been told to take iron pills? (!) YES - Hx of Gastric Bypass   Have you had any abnormal blood loss such as black, tarry or bloody stools? No   Have you ever had a blood transfusion? No   Are you willing to have a blood transfusion if it is medically needed before, during, or after your surgery? Yes   Have you or any of your relatives ever had problems with anesthesia? No   Do you have sleep apnea, excessive snoring or daytime drowsiness? (!) YES - using CPA   Do you have a CPAP machine? Yes   Do you have any artifical heart valves or other implanted medical devices like a pacemaker, defibrillator, or continuous glucose monitor? No   Do you have artificial joints? (!) YES - Left TSA / Left TKA   Are you allergic to latex? No        Health Care Directive  Patient has a Health Care Directive on file      Preoperative Review of    reviewed - no record of controlled substances prescribed.      Status of Chronic Conditions:    Hx of CVA - no residual    CAD - S/P CABG x 1 - HFrEF - Permanent A Fib - no issues    Prediabetes    Lab Results   Component Value Date    A1C 5.4 10/07/2024    A1C 5.5 11/07/2023    A1C 5.4 02/21/2023    A1C 5.7 08/16/2022    A1C 5.6 08/11/2021    A1C 5.8 11/19/2020    A1C 5.8 08/07/2020    A1C 5.9 02/14/2020    A1C 5.7 12/02/2019    A1C 5.6 09/20/2019     Htn      BP Readings from Last 3 Encounters:   10/07/24 100/70   09/17/24 106/60   06/02/24 121/73       Creatinine   Date Value Ref Range Status   09/30/2024 0.97 0.67 - 1.17 mg/dL Final   06/22/2021 0.81 0.66 - 1.25 mg/dL Final       GFR Estimate   Date Value Ref Range Status   09/30/2024 80 >60 mL/min/1.73m2  Final     Comment:     eGFR calculated using 2021 CKD-EPI equation.   06/22/2021 87 >60 mL/min/[1.73_m2] Final     Comment:     Non  GFR Calc  Starting 12/18/2018, serum creatinine based estimated GFR (eGFR) will be   calculated using the Chronic Kidney Disease Epidemiology Collaboration   (CKD-EPI) equation.       GFR, ESTIMATED POCT   Date Value Ref Range Status   01/11/2023 >60 >60 mL/min/1.73m2 Final     Lipids    Recent Labs   Lab Test 04/02/24  0955 11/07/23  0825   CHOL 110 103   HDL 53 54   LDL 47 41   TRIG 50 39     HAYLEE - using CPAP    NAFLD - no issues    Hx of gastric bypass - 1986    Patient Active Problem List    Diagnosis Date Noted    Vitamin B12 deficiency (non anemic) 04/15/2019     Priority: High    Vitamin D deficiency 04/15/2019     Priority: High    Stroke (H) 01/19/2019     Priority: High     Reported 2/5/2019, per patient occurred on 1/19/2019 while in [place]      CVA (cerebral vascular accident) (H) 01/01/2019     Priority: High     small right periorlandic subcortical      Thoracic aortic ectasia (H) 07/06/2018     Priority: High    Hypertension goal BP (blood pressure) < 140/90 09/25/2017     Priority: High    Paroxysmal atrial fibrillation (H) 05/27/2016     Priority: High    Prediabetes      Priority: High    History of hepatitis C 05/12/2015     Priority: High     SVR May 2015      NAFLD (nonalcoholic fatty liver disease) 09/05/2014     Priority: High    Hyperlipidemia LDL goal <70 12/30/2011     Priority: High    Obstructive sleep apnea syndrome      Priority: High     cpap - severe - 15 cm - dr cunha      Iron deficiency anemia 08/22/2003     Priority: High     Problem list name updated by automated process. Provider to review      Encounter for surgical aftercare following surgery on the circulatory system 08/24/2023     Priority: Medium    Presence of aortocoronary bypass graft:CAB x1 8/15/2023 08/24/2023     Priority: Medium    Atherosclerosis of native coronary  artery of native heart without angina pectoris 08/24/2023     Priority: Medium    Essential (primary) hypertension 08/24/2023     Priority: Medium    Chronic atrial fibrillation, unspecified (H) 08/24/2023     Priority: Medium    Personal history of transient ischemic attack (TIA), and cerebral infarction without residual deficits 08/24/2023     Priority: Medium    Physical deconditioning 08/24/2023     Priority: Medium    Leg edema, right 08/24/2023     Priority: Medium    HAYLEE on CPAP 08/24/2023     Priority: Medium    S/P CABG (coronary artery bypass graft) 08/23/2023     Priority: Medium    Fluid overload 08/23/2023     Priority: Medium    Transient hyperglycemia post procedure 08/23/2023     Priority: Medium    Status post ligation of left atrial appendage 08/23/2023     Priority: Medium    Cardiomyopathy (H) 08/15/2023     Priority: Medium    Cardiomyopathy, unspecified type (H) 08/15/2023     Priority: Medium    HFrEF (heart failure with reduced ejection fraction) (H) 07/20/2023     Priority: Medium    Aortic valve stenosis, etiology of cardiac valve disease unspecified- moderate 2+ with FREDY = 1.2cm2 on echocardiogram fr 7/14/2023 07/20/2023     Priority: Medium     moderate      Mitral [id number]-1-8+ on echocardiogram fr 7/14/2023 07/20/2023     Priority: Medium     mild-moderate      Chronic systolic heart failure (H) 07/19/2023     Priority: Medium    Elevated prostate specific antigen (PSA) 02/03/2023     Priority: Medium     Dr Santos      Prostate cancer (H) 2023     Priority: Medium     Dr Santos - [place] 4+3 = 7      Chronic LLQ pain 08/16/2022     Priority: Medium    NSVT (nonsustained ventricular tachycardia) (H) 06/23/2021     Priority: Medium    Small bowel obstruction (H) 06/21/2021     Priority: Medium    History of CVA (cerebrovascular accident) 10/29/2020     Priority: Medium    Cervical stenosis of spine      Priority: Medium     Moderate C4-5      Benign prostatic hyperplasia (BPH)  with urinary urge incontinence 07/05/2018     Priority: Medium    First degree AV block      Priority: Medium    OA (osteoarthritis)      Priority: Medium     Multiple - Left shoulder with rotator cuff tear - Dr Durham      Nephrolithiasis      Priority: Medium    Myofascial pain 10/12/2017     Priority: Medium    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     Priority: Medium     [facility]      Cholelithiasis      Priority: Medium    Total knee replacement status 08/13/2012     Priority: Medium    Colon polyps      Priority: Medium    Permanent atrial fibrillation (H) 2006     Priority: Medium     Followed by [facility] - persistent      Calculus of kidney - 1.2 cm stone at the upper pole as of CT urogram fr 1/11/2023 06/06/2005     Priority: Medium     dr walker - hematuria - w/u o/w neg      Morbid obesity due to excess calories (H) 09/05/2014     Priority: Low    Long term current use of anticoagulant therapy - for intermittent a-fib 03/30/2012     Priority: Low      Past Medical History:   Diagnosis Date    Aortic stenosis     moderate    Atrial fibrillation 2006    Followed by [facility] - persistent    CAD (coronary artery disease)     Calculus of kidney 2005, 6/06, 10/12, 8/18, 9/19    dr walker/nestor/иван - hematuria - w/u o/w neg    Cervical stenosis of spine     Moderate C4-5    Cholelithiasis     Chronic peptic ulcer, unspecified site, without mention of hemorrhage, perforation, or obstruction 1985    gastric bypass    Colon polyps 8/05, 9/10, 10/15    2 tubular adenoma, 1 hyperplastic - multiple serrated/tubular adenomas - last colonscopy 11/20 due 5 yrs    CVA (cerebral vascular accident) (H) 01/2019    small right periorlandic subcortical - left sided residual - left hand tingling/numbness - Left leg weak/numbness - cardioembolic    Elevated prostate specific antigen (PSA)     Dr Santos    Hepatitis C 10/2012    chronic hepatitis C, grade 1-2, stage 1 - mild - Dr Douglas    HFrEF (heart failure with reduced  ejection fraction) (H)     [facility] - 35%    Hyperlipidemia LDL goal <70     Hypertension goal BP (blood pressure) < 140/90 06/2006    dr jaciel winchester    Iron deficiency anemia, unspecified 1985    gastric bypass    Mitral regurgitation     mild-moderate    Nephrolithiasis multiple episodes, last 10/19    Dr Bey/Ivana/Tom - 9mm 3/2021    OA (osteoarthritis)     Multiple - Left shoulder with rotator cuff tear - Dr Durham    Obesity, unspecified     s/p gastric bypass 1985     Obstructive sleep apnea syndrome     CPAP - 15 cm - [facility] 1/2023    Prediabetes     Presbyacusis 01/2004    dr corona    Prostate cancer (H) 2023    Dr Santos - [place] 3+4, 4+3 = 7, bx 5/13 positive    Pyelonephritis, unspecified 12/1999    SCC (squamous cell carcinoma), 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    dr saez - lt conchal bowl    Sleep apnea 10/2002    Auto CPAP - severe - 12-15 cm - dr cunha    Stroke (H) 01/19/2019    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    [facility]    Vitamin B12 deficiency (non anemic) 04/15/2019    Vitamin D deficiency 04/15/2019     Past Surgical History:   Procedure Laterality Date    ABDOMEN SURGERY      APPENDECTOMY      ARTHROPLASTY KNEE  08/13/2012    LT TKA - Dr Villanueva    BYPASS GRAFT ARTERY CORONARY N/A 08/15/2023    Procedure: CORONARY ARTERY BYPASS GRAFT x 1 (SAPHENOUS VEIN - RIGHT POSTERIOR DESCENDING ARTERY) WITH ENDOSCOPIC SAPHENOUS VEIN HARVEST ON THE LEFT LOWER EXTREMITY, AND ON CARDIOPULMONARY PUMP OXYGENATOR  (INTRAOPERATIVE TRANSESOPHAGEAL ECHOCARDIOGRAM BY ANESTHESIOLOGIST), REMOVAL OF RIGHT CORONARY ARTERY STENT AND DELIVERY SYSTEM, LEFT ATRIAL APPENDAGE CLIPPING WITH ATRICLIP FLEX V DEVICE SIZE: 45    CARDIOVERSION  2016    CARDIOVERSION      COLONOSCOPY  8/05, 9/10, 10/15    multiple tubular/serrated/hyperplastic polyps    COLONOSCOPY N/A 10/29/2015    multiple tubular and serrated adenomas    COLONOSCOPY N/A 09/07/2016    COLONOSCOPY  11/2020    tubular adenomas - due 5 yrs    COLONOSCOPY N/A  11/24/2020    Procedure: COLONOSCOPY with biopsies;  Surgeon: Chadwick Burgos MD;  Location: [facility] OR    CV CORONARY ANGIOGRAM N/A 08/15/2023    Procedure: Coronary Angiogram;  Surgeon: Carly Luna MD;  Location:  [facility]    CV FRACTIONAL FLOW RATIO WIRE N/A 08/15/2023    Procedure: Fractional Flow Ratio Wire;  Surgeon: Carly Luna MD;  Location:  [facility]    CV PCI N/A 08/15/2023    Procedure: Percutaneous Coronary Intervention;  Surgeon: Carly Luna MD;  Location:  [facility]    CYSTOSCOPY W/ LASER LITHOTRIPSY  10/16/2012,5/2/2018    ESOPHAGOSCOPY, GASTROSCOPY, DUODENOSCOPY (EGD), COMBINED N/A 11/24/2020    Procedure: ESOPHAGOGASTRODUODENOSCOPY, COLONOSCOPY with biopsies;  Surgeon: Chadwick Burgos MD;  Location: [facility] OR    EYE SURGERY  1/01,6/02,11/02    lasik    HC KNEE SCOPE, DIAGNOSTIC  05/2000    Arthroscopy, Knee RT    LASER HOLMIUM LITHOTRIPSY URETER(S), INSERT STENT, COMBINED  10/16/2012    stones x 4, Dr Campa    LASER HOLMIUM LITHOTRIPSY URETER(S), INSERT STENT, COMBINED Right 05/02/2018    CYSTOSCOPY, RIGHT URETEROSCOPY, HOLMIUM LASER LITHOTRIPSY, RIGHT STENT PLACEMENT - Dr Bey    MRI BIOPSY PROSTATE Bilateral 02/09/2023    mark    REVERSE ARTHROPLASTY SHOULDER Left 03/07/2023    Procedure: LEFT REVERSE SHOULDER ARTHROPLASTY WITH CUSTOM GUIDE WITH AUTOLOGOUS BONE GRAFTOF PROXIMAL HUMERUS;  Surgeon: Booker Multani MD;  Location:  OR    SURGICAL HISTORY OF -   1985    s/p gastric bypass Bilroth II    SURGICAL HISTORY OF -   11/1998    wisdom teeth    SURGICAL HISTORY OF -   1979    cellulitis    SURGICAL HISTORY OF -   11/1998    lt forearm spur's    SURGICAL HISTORY OF -   1/01,6/02,11/02    s/p lasik    SURGICAL HISTORY OF -   11/1999    rt forearm spurs    SURGICAL HISTORY OF -   07/2006    dr stevenson - lithotrypsy    SURGICAL HISTORY OF -   10/08, 12/08    Lt ear chonchal lesion removal SCC - dr saez    SURGICAL HISTORY  OF -  Right 10/2019    nephrolithiasis - cystoscopy, rt lithotrypsy, Dr Heath    SURGICAL HISTORY OF -  Right 11/2019    Cystoscopy, Rt lithotrypsy, Calcium Oxalate, Dr Heath     Current Outpatient Medications   Medication Sig Dispense Refill    acetaminophen (TYLENOL) 325 MG tablet Take 650 mg by mouth every 6 hours as needed      apixaban ANTICOAGULANT (ELIQUIS) 5 MG tablet Take 1 tablet (5 mg) by mouth 2 times daily 180 tablet 1    empagliflozin (JARDIANCE) 10 MG TABS tablet Take 1 tablet (10 mg) by mouth daily 90 tablet 3    ENTRESTO 49-51 MG per tablet Take 1 tablet by mouth 2 times daily      Ferrous Sulfate (IRON) 325 (65 Fe) MG tablet Take 1 tablet by mouth three times a week (Monday, Wednesday, Friday) 90 tablet 3    furosemide (LASIX) 20 MG tablet Take 40 mg by mouth 2 times daily.      metoprolol succinate ER (TOPROL XL) 50 MG 24 hr tablet Take 50 mg by mouth 2 times daily      pantoprazole (PROTONIX) 40 MG EC tablet Take 1 tablet (40 mg) by mouth daily 90 tablet 1    rosuvastatin (CRESTOR) 10 MG tablet Take 1 tablet (10 mg) by mouth daily 90 tablet 1    tamsulosin (FLOMAX) 0.4 MG capsule Take 1 capsule (0.4 mg) by mouth 2 times daily 180 capsule 1    vitamin B-12 (CYANOCOBALAMIN) 1000 MCG tablet Take 1,000 mcg by mouth every other day      vitamin C (ASCORBIC ACID) 1000 MG TABS Take 1,000 mg by mouth every other day      vitamin D3 (CHOLECALCIFEROL) 125 MCG (5000 UT) tablet Take 1 tablet by mouth every other day      clotrimazole (LOTRIMIN) 1 % external cream Apply thin layer of cream to affected area on penis, leg, and feet twice daily for 14 days. 85 g 1    furosemide (LASIX) 20 MG tablet Take 2 tablets (40 mg) by mouth daily for 7 days 14 tablet 0       No Known Allergies     Social History     Tobacco Use    Smoking status: Never    Smokeless tobacco: Never   Substance Use Topics    Alcohol use: Yes     Alcohol/week: 2.0 - 3.0 standard drinks of alcohol     Types: 2 - 3 Standard drinks or  "equivalent per week     Comment: occassiinally     Family History   Problem Relation Age of Onset    Alzheimer Disease Father 82         at 88    Prostate Cancer Father 76        bladder and prostate    Heart Disease Mother 80        CHF    Heart Disease Maternal Grandfather         MI at 55    Cancer Paternal Uncle         ?    Ulcerative Colitis No family hx of     Crohn's Disease No family hx of     Stomach Cancer No family hx of     GERD No family hx of      History   Drug Use No     Comment: acknowledges using herbal supplement \"Vibe\" for energy-none used recently           Review of Systems  CONSTITUTIONAL: NEGATIVE for fever, chills, change in weight  INTEGUMENTARY/SKIN: NEGATIVE for worrisome rashes, moles or lesions  EYES: NEGATIVE for vision changes or irritation  ENT/MOUTH: NEGATIVE for ear, mouth and throat problems  RESP: NEGATIVE for significant cough or SOB  CV: NEGATIVE for chest pain, palpitations or peripheral edema  GI: NEGATIVE for nausea, abdominal pain, heartburn, or change in bowel habits  : NEGATIVE for frequency, dysuria, or hematuria  MUSCULOSKELETAL: NEGATIVE for significant arthralgias or myalgia  NEURO: NEGATIVE for weakness, dizziness or paresthesias  ENDOCRINE: NEGATIVE for temperature intolerance, skin/hair changes  HEME: NEGATIVE for bleeding problems  PSYCHIATRIC: NEGATIVE for changes in mood or affect    Objective    /70   Pulse 72   Temp (!) 96.3  F (35.7  C) (Tympanic)   Resp 18   Ht 1.854 m (6' 1\")   Wt 135.2 kg (298 lb)   SpO2 98%   BMI 39.32 kg/m     Estimated body mass index is 39.32 kg/m  as calculated from the following:    Height as of this encounter: 1.854 m (6' 1\").    Weight as of this encounter: 135.2 kg (298 lb).    Physical Exam  GENERAL: alert and no distress  EYES: Eyes grossly normal to inspection, PERRL and conjunctivae and sclerae normal  HENT: ear canals and TM's normal, nose and mouth without ulcers or lesions  NECK: no adenopathy, no " asymmetry, masses, or scars  RESP: lungs clear to auscultation - no rales, rhonchi or wheezes  CV: regular rate and rhythm, normal S1 S2, no S3 or S4, 2-3 systolic murmur, click or rub, no peripheral edema  ABDOMEN: soft, nontender, no hepatosplenomegaly, no masses and bowel sounds normal  MS: no gross musculoskeletal defects noted, no edema  SKIN: no suspicious lesions or rashes  NEURO: Normal strength and tone, mentation intact and speech normal  PSYCH: mentation appears normal, affect normal/bright    Recent Labs   Lab Test 09/30/24  1338 06/02/24  1551 11/07/23  0825   HGB  --  13.2* 11.9*   PLT  --  182 232    140 141   POTASSIUM 4.8 4.8 4.4   CR 0.97 0.92 0.66*   A1C  --   --  5.5        Diagnostics  Labs pending at this time.  Results will be reviewed when available.   EKG: atrial fibrillation, rate 65, normal axis, normal intervals, no acute ST/T changes c/w ischemia, no LVH by voltage criteria, unchanged from previous tracings    Revised Cardiac Risk Index (RCRI)  The patient has the following serious cardiovascular risks for perioperative complications:   - Coronary Artery Disease (MI, positive stress test, angina, Qs on EKG) = 1 point   - Congestive Heart Failure (pulmonary edema, PND, s3 puma, CXR with pulmonary congestion, basilar rales) = 1 point   - Cerebrovascular Disease (TIA or CVA) = 1 point     RCRI Interpretation: 3 points: Class IV (high risk - >11% complication rate)    Estimated Functional Capacity: Performs 4 METS exercise without symptoms (e.g., light housework, stairs, 4 mph walk, 7 mph bike, slow step dance)         The longitudinal plan of care for the diagnosis(es)/condition(s) as documented were addressed during this visit. Due to the added complexity in care, I will continue to support Hugo in the subsequent management and with ongoing continuity of care.    Signed Electronically by:              Brett Cassidy MD, FAAFP     [facility]  [facility]  [address] [address]  [email or web address]   Office: [phone]  Fax: [phone]     A copy of this evaluation report is provided to the requesting physician.

## 2024-10-07 NOTE — LETTER
[facility]  [address] [place] [address]  [phone]                    October 9, 2024    Hugo Weber  [address]   [facility] [id number]      Dear Hugo,    Here is a summary of your recent test results: Labs are overall stable / improved     We advise: Please proceed with surgery. For additional lab test information, [email or web address] is an excellent reference. Your test results are enclosed.    Please contact me if you have any questions.    In addition, here is a list of due or overdue Health Maintenance reminders.    Health Maintenance Due   Topic Date Due    Heart Failure Action Plan  Never done    RSV VACCINE (1 - 1-dose 75+ series) Never done    Annual Wellness Visit  08/16/2023       Please call us at [phone] (or use [employer]) to address the above recommendations.            Thank you very much for trusting [facility].     Healthy regards,          Brett Cassidy M.D.        Results for orders placed or performed in visit on 10/07/24   Comprehensive metabolic panel     Status: Abnormal   Result Value Ref Range    Sodium 140 135 - 145 mmol/L    Potassium 4.0 3.4 - 5.3 mmol/L    Carbon Dioxide (CO2) 27 22 - 29 mmol/L    Anion Gap 9 7 - 15 mmol/L    Urea Nitrogen 25.9 (H) 8.0 - 23.0 mg/dL    Creatinine 0.97 0.67 - 1.17 mg/dL    GFR Estimate 80 >60 mL/min/1.73m2    Calcium 8.9 8.8 - 10.4 mg/dL    Chloride 104 98 - 107 mmol/L    Glucose 92 70 - 99 mg/dL    Alkaline Phosphatase 107 40 - 150 U/L    AST 17 0 - 45 U/L    ALT 10 0 - 70 U/L    Protein Total 7.1 6.4 - 8.3 g/dL    Albumin 4.0 3.5 - 5.2 g/dL    Bilirubin Total 0.6 <=1.2 mg/dL   CBC with platelets     Status: Normal   Result Value Ref Range    WBC Count 4.5 4.0 - 11.0 10e3/uL    RBC Count 4.53 4.40 - 5.90 10e6/uL    Hemoglobin 13.6 13.3 - 17.7 g/dL    Hematocrit 41.9 40.0 - 53.0 %    MCV 93 78 - 100 fL    MCH 30.0 26.5 - 33.0 pg    MCHC 32.5 31.5 - 36.5 g/dL    RDW 14.2 10.0 -  15.0 %    Platelet Count 176 150 - 450 10e3/uL   UA Macroscopic with reflex to Microscopic and Culture     Status: Abnormal    Specimen: Urine, NOS   Result Value Ref Range    Color Urine Yellow Colorless, Straw, Light Yellow, Yellow    Appearance Urine Clear Clear    Glucose Urine 250 (A) Negative mg/dL    Bilirubin Urine Negative Negative    Ketones Urine Negative Negative mg/dL    Specific Gravity Urine 1.020 1.003 - 1.035    Blood Urine Trace (A) Negative    pH Urine 5.5 5.0 - 7.0    Protein Albumin Urine Negative Negative mg/dL    Urobilinogen Urine 1.0 0.2, 1.0 E.U./dL    Nitrite Urine Negative Negative    Leukocyte Esterase Urine Negative Negative   Hemoglobin A1c     Status: Normal   Result Value Ref Range    Estimated Average Glucose 108 <117 mg/dL    Hemoglobin A1C 5.4 0.0 - 5.6 %   UA Microscopic with Reflex to Culture     Status: Abnormal   Result Value Ref Range    Bacteria Urine Few (A) None Seen /HPF    RBC Urine 0-2 0-2 /HPF /HPF    WBC Urine 0-5 0-5 /HPF /HPF    Squamous Epithelials Urine Few (A) None Seen /LPF    Narrative    Urine Culture not indicated   BNP-N terminal pro     Status: Abnormal   Result Value Ref Range    N Terminal Pro BNP Outpatient 2,617 (H) 0 - 1,800 pg/mL

## 2024-10-07 NOTE — PATIENT INSTRUCTIONS
Medications reviewed    Hold eliquis 2 days  Hold vitamins / supplements for 1 week  No NSAID's  Tyelnol Ok  Hold rest of meds 1 day prior

## 2024-10-24 RX ORDER — AMOXICILLIN 500 MG/1
2000 CAPSULE ORAL PRN
COMMUNITY

## 2024-10-24 RX ORDER — ACETAMINOPHEN 500 MG
500 TABLET ORAL EVERY 6 HOURS PRN
Status: ON HOLD | COMMUNITY
End: 2024-10-30

## 2024-10-24 RX ORDER — FLUTICASONE PROPIONATE 50 MCG
1 SPRAY, SUSPENSION (ML) NASAL DAILY
COMMUNITY

## 2024-10-24 NOTE — PROGRESS NOTES
PTA medications updated by Medication Scribe prior to surgery via phone call with patient (last doses completed by Nurse)     Medication history sources: Patient, Surescripts, and H&P  In the past week, patient estimated taking medication this percent of the time: Greater than 90%      Significant changes made to the medication list:  None      Additional medication history information:   Patient was advised to bring: Lotrimin, Flonase    Medication reconciliation completed by provider prior to medication history? No    Time spent in this activity: 30 minutes    The information provided in this note is only as accurate as the sources available at the time of update(s)      Prior to Admission medications    Medication Sig Last Dose Taking? Auth Provider Long Term End Date   acetaminophen (TYLENOL) 500 MG tablet Take 500 mg by mouth every 6 hours as needed for mild pain.  Yes Reported, Patient No    amoxicillin (AMOXIL) 500 MG capsule Take 2,000 mg by mouth as needed ([id number]). Before dental appointment More than a month Yes Reported, Patient     apixaban ANTICOAGULANT (ELIQUIS ANTICOAGULANT) 5 MG tablet Take 1 tablet (5 mg) by mouth 2 times daily.  Yes Brett Cassidy MD     calcium carbonate-vitamin D (OSCAL) 250-3.125 MG-MCG TABS per tablet Take 1 tablet by mouth daily. 10/21/2024 Morning Yes Reported, Patient     clotrimazole (LOTRIMIN) 1 % external cream Apply thin layer of cream to affected area on penis, leg, and feet twice daily for 14 days.  Yes Khanh Mojica, DO     empagliflozin (JARDIANCE) 10 MG TABS tablet Take 1 tablet (10 mg) by mouth daily Morning Yes Laura Olivia PA-C     ENTRESTO 49-51 MG per tablet Take 1 tablet by mouth 2 times daily Evening Yes Reported, Patient Yes    Ferrous Sulfate (IRON) 325 (65 Fe) MG tablet Take 1 tablet by mouth three times a week (Monday, Wednesday, Friday) 10/21/2024 Morning Yes Brett Cassidy MD     fluticasone (FLONASE) 50 MCG/ACT nasal spray Spray 1 spray  into both nostrils daily.  Yes Reported, Patient     furosemide (LASIX) 20 MG tablet Take 40 mg by mouth 2 times daily. Evening Yes Laura Olivia PA-C Yes    metoprolol succinate ER (TOPROL XL) 50 MG 24 hr tablet Take 50 mg by mouth 2 times daily Morning Yes Reported, Patient Yes    pantoprazole (PROTONIX) 40 MG EC tablet Take 1 tablet (40 mg) by mouth daily. Morning Yes Brett Cassidy MD     rosuvastatin (CRESTOR) 10 MG tablet Take 1 tablet (10 mg) by mouth daily. Morning Yes Brett Cassidy MD Yes    tamsulosin (FLOMAX) 0.4 MG capsule Take 1 capsule (0.4 mg) by mouth 2 times daily Evening Yes Brett Cassidy MD No    vitamin B-12 (CYANOCOBALAMIN) 1000 MCG tablet Take 1,000 mcg by mouth every other day 10/21/2024 Morning Yes Reported, Patient     vitamin C (ASCORBIC ACID) 1000 MG TABS Take 1,000 mg by mouth daily. 10/21/2024 Morning Yes Reported, Patient     vitamin D3 (CHOLECALCIFEROL) 125 MCG (5000 UT) tablet Take 1 tablet by mouth daily. 10/21/2024 Morning Yes Reported, Patient         Medication history completed by: Joshua Varela

## 2024-10-28 ENCOUNTER — ANESTHESIA EVENT (OUTPATIENT)
Dept: SURGERY | Facility: CLINIC | Age: 79
End: 2024-10-28
Payer: COMMERCIAL

## 2024-10-29 ENCOUNTER — HOSPITAL ENCOUNTER (OUTPATIENT)
Facility: CLINIC | Age: 79
Discharge: HOME | End: 2024-10-30
Attending: ORTHOPAEDIC SURGERY | Admitting: ORTHOPAEDIC SURGERY
Payer: COMMERCIAL

## 2024-10-29 ENCOUNTER — ANESTHESIA (OUTPATIENT)
Dept: SURGERY | Facility: CLINIC | Age: 79
End: 2024-10-29
Payer: COMMERCIAL

## 2024-10-29 ENCOUNTER — APPOINTMENT (OUTPATIENT)
Dept: GENERAL RADIOLOGY | Facility: CLINIC | Age: 79
End: 2024-10-29
Attending: STUDENT IN AN ORGANIZED HEALTH CARE EDUCATION/TRAINING PROGRAM
Payer: COMMERCIAL

## 2024-10-29 DIAGNOSIS — Z98.890 H/O SHOULDER SURGERY: ICD-10-CM

## 2024-10-29 DIAGNOSIS — I48.21 PERMANENT ATRIAL FIBRILLATION (H): ICD-10-CM

## 2024-10-29 DIAGNOSIS — I50.20 HFREF (HEART FAILURE WITH REDUCED EJECTION FRACTION) (H): ICD-10-CM

## 2024-10-29 DIAGNOSIS — R73.03 PREDIABETES: ICD-10-CM

## 2024-10-29 DIAGNOSIS — I25.810 CORONARY ARTERY DISEASE INVOLVING CORONARY BYPASS GRAFT OF NATIVE HEART WITHOUT ANGINA PECTORIS: ICD-10-CM

## 2024-10-29 DIAGNOSIS — Z86.73 HISTORY OF CVA (CEREBROVASCULAR ACCIDENT): ICD-10-CM

## 2024-10-29 DIAGNOSIS — M19.029: ICD-10-CM

## 2024-10-29 DIAGNOSIS — I47.29 NSVT (NONSUSTAINED VENTRICULAR TACHYCARDIA) (H): ICD-10-CM

## 2024-10-29 DIAGNOSIS — Z96.611 S/P SHOULDER REPLACEMENT, RIGHT: Primary | ICD-10-CM

## 2024-10-29 DIAGNOSIS — E78.5 HYPERLIPIDEMIA LDL GOAL <70: ICD-10-CM

## 2024-10-29 DIAGNOSIS — Z95.1 S/P CABG (CORONARY ARTERY BYPASS GRAFT): ICD-10-CM

## 2024-10-29 DIAGNOSIS — I42.9 CARDIOMYOPATHY, UNSPECIFIED TYPE (H): ICD-10-CM

## 2024-10-29 LAB
CREAT SERPL-MCNC: 0.87 MG/DL (ref 0.67–1.17)
EGFRCR SERPLBLD CKD-EPI 2021: 88 ML/MIN/1.73M2
FASTING STATUS PATIENT QL REPORTED: YES
GLUCOSE SERPL-MCNC: 104 MG/DL (ref 70–99)
POTASSIUM SERPL-SCNC: 4.2 MMOL/L (ref 3.4–5.3)

## 2024-10-29 PROCEDURE — 250N000011 HC RX IP 250 OP 636: Performed by: STUDENT IN AN ORGANIZED HEALTH CARE EDUCATION/TRAINING PROGRAM

## 2024-10-29 PROCEDURE — 250N000011 HC RX IP 250 OP 636: Performed by: ORTHOPAEDIC SURGERY

## 2024-10-29 PROCEDURE — 23472 RECONSTRUCT SHOULDER JOINT: CPT | Performed by: ANESTHESIOLOGY

## 2024-10-29 PROCEDURE — L3670 SO ACRO/CLAV CAN WEB PRE OTS: HCPCS

## 2024-10-29 PROCEDURE — 82565 ASSAY OF CREATININE: CPT | Performed by: STUDENT IN AN ORGANIZED HEALTH CARE EDUCATION/TRAINING PROGRAM

## 2024-10-29 PROCEDURE — 250N000011 HC RX IP 250 OP 636: Performed by: NURSE ANESTHETIST, CERTIFIED REGISTERED

## 2024-10-29 PROCEDURE — 250N000009 HC RX 250: Performed by: NURSE ANESTHETIST, CERTIFIED REGISTERED

## 2024-10-29 PROCEDURE — 258N000003 HC RX IP 258 OP 636: Performed by: STUDENT IN AN ORGANIZED HEALTH CARE EDUCATION/TRAINING PROGRAM

## 2024-10-29 PROCEDURE — 250N000011 HC RX IP 250 OP 636: Performed by: ANESTHESIOLOGY

## 2024-10-29 PROCEDURE — 250N000025 HC SEVOFLURANE, PER MIN: Performed by: ORTHOPAEDIC SURGERY

## 2024-10-29 PROCEDURE — 999N000141 HC STATISTIC PRE-PROCEDURE NURSING ASSESSMENT: Performed by: ORTHOPAEDIC SURGERY

## 2024-10-29 PROCEDURE — 23472 RECONSTRUCT SHOULDER JOINT: CPT | Performed by: NURSE ANESTHETIST, CERTIFIED REGISTERED

## 2024-10-29 PROCEDURE — C1776 JOINT DEVICE (IMPLANTABLE): HCPCS | Performed by: ORTHOPAEDIC SURGERY

## 2024-10-29 PROCEDURE — 710N000009 HC RECOVERY PHASE 1, LEVEL 1, PER MIN: Performed by: ORTHOPAEDIC SURGERY

## 2024-10-29 PROCEDURE — 272N000001 HC OR GENERAL SUPPLY STERILE: Performed by: ORTHOPAEDIC SURGERY

## 2024-10-29 PROCEDURE — 258N000001 HC RX 258: Performed by: ORTHOPAEDIC SURGERY

## 2024-10-29 PROCEDURE — C1713 ANCHOR/SCREW BN/BN,TIS/BN: HCPCS | Performed by: ORTHOPAEDIC SURGERY

## 2024-10-29 PROCEDURE — 360N000077 HC SURGERY LEVEL 4, PER MIN: Performed by: ORTHOPAEDIC SURGERY

## 2024-10-29 PROCEDURE — 82947 ASSAY GLUCOSE BLOOD QUANT: CPT | Performed by: STUDENT IN AN ORGANIZED HEALTH CARE EDUCATION/TRAINING PROGRAM

## 2024-10-29 PROCEDURE — 99100 ANES PT EXTEME AGE<1 YR&>70: CPT | Performed by: NURSE ANESTHETIST, CERTIFIED REGISTERED

## 2024-10-29 PROCEDURE — 84132 ASSAY OF SERUM POTASSIUM: CPT | Performed by: STUDENT IN AN ORGANIZED HEALTH CARE EDUCATION/TRAINING PROGRAM

## 2024-10-29 PROCEDURE — P9045 ALBUMIN (HUMAN), 5%, 250 ML: HCPCS | Performed by: NURSE ANESTHETIST, CERTIFIED REGISTERED

## 2024-10-29 PROCEDURE — 250N000013 HC RX MED GY IP 250 OP 250 PS 637: Performed by: STUDENT IN AN ORGANIZED HEALTH CARE EDUCATION/TRAINING PROGRAM

## 2024-10-29 PROCEDURE — 250N000009 HC RX 250: Performed by: ORTHOPAEDIC SURGERY

## 2024-10-29 PROCEDURE — 258N000003 HC RX IP 258 OP 636: Performed by: NURSE ANESTHETIST, CERTIFIED REGISTERED

## 2024-10-29 PROCEDURE — C9290 INJ, BUPIVACAINE LIPOSOME: HCPCS | Performed by: ANESTHESIOLOGY

## 2024-10-29 PROCEDURE — 999N000065 XR SHOULDER RIGHT PORT G/E 2 VIEWS: Mod: RT

## 2024-10-29 PROCEDURE — 370N000017 HC ANESTHESIA TECHNICAL FEE, PER MIN: Performed by: ORTHOPAEDIC SURGERY

## 2024-10-29 DEVICE — IMPLANTABLE DEVICE
Type: IMPLANTABLE DEVICE | Site: SHOULDER | Status: FUNCTIONAL
Brand: TORNIER FLEX SHOULDER SYSTEM

## 2024-10-29 DEVICE — SCREW PERIPHERAL 26MM: Type: IMPLANTABLE DEVICE | Site: SHOULDER | Status: FUNCTIONAL

## 2024-10-29 DEVICE — IMPLANTABLE DEVICE
Type: IMPLANTABLE DEVICE | Site: SHOULDER | Status: FUNCTIONAL
Brand: TORNIER PERFORM® REVERSED GLENOID

## 2024-10-29 DEVICE — SCREW PERIPHERAL 38MM: Type: IMPLANTABLE DEVICE | Site: SHOULDER | Status: FUNCTIONAL

## 2024-10-29 DEVICE — SCREW PERIPHERAL 18MM DWJ318: Type: IMPLANTABLE DEVICE | Site: SHOULDER | Status: FUNCTIONAL

## 2024-10-29 DEVICE — SCREW PERIPHERAL 5.0X30MM DWJ330: Type: IMPLANTABLE DEVICE | Site: SHOULDER | Status: FUNCTIONAL

## 2024-10-29 DEVICE — IMPLANTABLE DEVICE
Type: IMPLANTABLE DEVICE | Site: SHOULDER | Status: FUNCTIONAL
Brand: TORNIER PERFORM® REVERSED AUGMENTED GLENOID

## 2024-10-29 DEVICE — SCREW CENTRAL 6.5X30MM DWJ130: Type: IMPLANTABLE DEVICE | Site: SHOULDER | Status: FUNCTIONAL

## 2024-10-29 RX ORDER — AMOXICILLIN 250 MG
1 CAPSULE ORAL 2 TIMES DAILY
Status: DISCONTINUED | OUTPATIENT
Start: 2024-10-29 | End: 2024-10-30 | Stop reason: HOSPADM

## 2024-10-29 RX ORDER — NALOXONE HYDROCHLORIDE 0.4 MG/ML
0.2 INJECTION, SOLUTION INTRAMUSCULAR; INTRAVENOUS; SUBCUTANEOUS
Status: DISCONTINUED | OUTPATIENT
Start: 2024-10-29 | End: 2024-10-30 | Stop reason: HOSPADM

## 2024-10-29 RX ORDER — DEXAMETHASONE SODIUM PHOSPHATE 4 MG/ML
INJECTION, SOLUTION INTRA-ARTICULAR; INTRALESIONAL; INTRAMUSCULAR; INTRAVENOUS; SOFT TISSUE PRN
Status: DISCONTINUED | OUTPATIENT
Start: 2024-10-29 | End: 2024-10-29

## 2024-10-29 RX ORDER — ACETAMINOPHEN 325 MG/1
975 TABLET ORAL EVERY 8 HOURS
Status: DISCONTINUED | OUTPATIENT
Start: 2024-10-29 | End: 2024-10-30 | Stop reason: HOSPADM

## 2024-10-29 RX ORDER — NALOXONE HYDROCHLORIDE 0.4 MG/ML
0.4 INJECTION, SOLUTION INTRAMUSCULAR; INTRAVENOUS; SUBCUTANEOUS
Status: DISCONTINUED | OUTPATIENT
Start: 2024-10-29 | End: 2024-10-30 | Stop reason: HOSPADM

## 2024-10-29 RX ORDER — HYDROMORPHONE HCL IN WATER/PF 6 MG/30 ML
0.4 PATIENT CONTROLLED ANALGESIA SYRINGE INTRAVENOUS EVERY 5 MIN PRN
Status: DISCONTINUED | OUTPATIENT
Start: 2024-10-29 | End: 2024-10-29 | Stop reason: HOSPADM

## 2024-10-29 RX ORDER — ONDANSETRON 4 MG/1
4 TABLET, ORALLY DISINTEGRATING ORAL EVERY 6 HOURS PRN
Status: DISCONTINUED | OUTPATIENT
Start: 2024-10-29 | End: 2024-10-30 | Stop reason: HOSPADM

## 2024-10-29 RX ORDER — ONDANSETRON 4 MG/1
4 TABLET, ORALLY DISINTEGRATING ORAL EVERY 30 MIN PRN
Status: DISCONTINUED | OUTPATIENT
Start: 2024-10-29 | End: 2024-10-29 | Stop reason: HOSPADM

## 2024-10-29 RX ORDER — TRANEXAMIC ACID 650 MG/1
1950 TABLET ORAL ONCE
Status: COMPLETED | OUTPATIENT
Start: 2024-10-29 | End: 2024-10-29

## 2024-10-29 RX ORDER — NALOXONE HYDROCHLORIDE 0.4 MG/ML
0.1 INJECTION, SOLUTION INTRAMUSCULAR; INTRAVENOUS; SUBCUTANEOUS
Status: DISCONTINUED | OUTPATIENT
Start: 2024-10-29 | End: 2024-10-29 | Stop reason: HOSPADM

## 2024-10-29 RX ORDER — ONDANSETRON 2 MG/ML
4 INJECTION INTRAMUSCULAR; INTRAVENOUS EVERY 30 MIN PRN
Status: DISCONTINUED | OUTPATIENT
Start: 2024-10-29 | End: 2024-10-29 | Stop reason: HOSPADM

## 2024-10-29 RX ORDER — OXYCODONE HYDROCHLORIDE 5 MG/1
10 TABLET ORAL EVERY 4 HOURS PRN
Status: DISCONTINUED | OUTPATIENT
Start: 2024-10-29 | End: 2024-10-30 | Stop reason: HOSPADM

## 2024-10-29 RX ORDER — ONDANSETRON 2 MG/ML
INJECTION INTRAMUSCULAR; INTRAVENOUS PRN
Status: DISCONTINUED | OUTPATIENT
Start: 2024-10-29 | End: 2024-10-29

## 2024-10-29 RX ORDER — ACETAMINOPHEN 325 MG/1
650 TABLET ORAL EVERY 4 HOURS PRN
Status: DISCONTINUED | OUTPATIENT
Start: 2024-11-01 | End: 2024-10-30 | Stop reason: HOSPADM

## 2024-10-29 RX ORDER — PROPOFOL 10 MG/ML
INJECTION, EMULSION INTRAVENOUS PRN
Status: DISCONTINUED | OUTPATIENT
Start: 2024-10-29 | End: 2024-10-29

## 2024-10-29 RX ORDER — ACETAMINOPHEN 325 MG/1
975 TABLET ORAL ONCE
Status: COMPLETED | OUTPATIENT
Start: 2024-10-29 | End: 2024-10-29

## 2024-10-29 RX ORDER — FENTANYL CITRATE 0.05 MG/ML
50 INJECTION, SOLUTION INTRAMUSCULAR; INTRAVENOUS EVERY 5 MIN PRN
Status: DISCONTINUED | OUTPATIENT
Start: 2024-10-29 | End: 2024-10-29 | Stop reason: HOSPADM

## 2024-10-29 RX ORDER — CEFAZOLIN SODIUM/WATER 3 G/30 ML
3 SYRINGE (ML) INTRAVENOUS
Status: COMPLETED | OUTPATIENT
Start: 2024-10-29 | End: 2024-10-29

## 2024-10-29 RX ORDER — ONDANSETRON 2 MG/ML
4 INJECTION INTRAMUSCULAR; INTRAVENOUS EVERY 6 HOURS PRN
Status: DISCONTINUED | OUTPATIENT
Start: 2024-10-29 | End: 2024-10-30 | Stop reason: HOSPADM

## 2024-10-29 RX ORDER — CEFAZOLIN SODIUM/WATER 3 G/30 ML
3 SYRINGE (ML) INTRAVENOUS SEE ADMIN INSTRUCTIONS
Status: DISCONTINUED | OUTPATIENT
Start: 2024-10-29 | End: 2024-10-29 | Stop reason: HOSPADM

## 2024-10-29 RX ORDER — FENTANYL CITRATE 0.05 MG/ML
25 INJECTION, SOLUTION INTRAMUSCULAR; INTRAVENOUS EVERY 5 MIN PRN
Status: DISCONTINUED | OUTPATIENT
Start: 2024-10-29 | End: 2024-10-29 | Stop reason: HOSPADM

## 2024-10-29 RX ORDER — HYDROMORPHONE HCL IN WATER/PF 6 MG/30 ML
0.4 PATIENT CONTROLLED ANALGESIA SYRINGE INTRAVENOUS
Status: DISCONTINUED | OUTPATIENT
Start: 2024-10-29 | End: 2024-10-30 | Stop reason: HOSPADM

## 2024-10-29 RX ORDER — OXYCODONE HYDROCHLORIDE 5 MG/1
5 TABLET ORAL EVERY 4 HOURS PRN
Status: DISCONTINUED | OUTPATIENT
Start: 2024-10-29 | End: 2024-10-30 | Stop reason: HOSPADM

## 2024-10-29 RX ORDER — VANCOMYCIN HYDROCHLORIDE 1 G/20ML
INJECTION, POWDER, LYOPHILIZED, FOR SOLUTION INTRAVENOUS PRN
Status: DISCONTINUED | OUTPATIENT
Start: 2024-10-29 | End: 2024-10-29 | Stop reason: HOSPADM

## 2024-10-29 RX ORDER — HYDROMORPHONE HCL IN WATER/PF 6 MG/30 ML
0.2 PATIENT CONTROLLED ANALGESIA SYRINGE INTRAVENOUS EVERY 5 MIN PRN
Status: DISCONTINUED | OUTPATIENT
Start: 2024-10-29 | End: 2024-10-29 | Stop reason: HOSPADM

## 2024-10-29 RX ORDER — HYDROMORPHONE HCL IN WATER/PF 6 MG/30 ML
0.2 PATIENT CONTROLLED ANALGESIA SYRINGE INTRAVENOUS
Status: DISCONTINUED | OUTPATIENT
Start: 2024-10-29 | End: 2024-10-30 | Stop reason: HOSPADM

## 2024-10-29 RX ORDER — SODIUM CHLORIDE, SODIUM LACTATE, POTASSIUM CHLORIDE, CALCIUM CHLORIDE 600; 310; 30; 20 MG/100ML; MG/100ML; MG/100ML; MG/100ML
INJECTION, SOLUTION INTRAVENOUS CONTINUOUS
Status: DISCONTINUED | OUTPATIENT
Start: 2024-10-29 | End: 2024-10-29 | Stop reason: HOSPADM

## 2024-10-29 RX ORDER — MAGNESIUM HYDROXIDE 1200 MG/15ML
LIQUID ORAL PRN
Status: DISCONTINUED | OUTPATIENT
Start: 2024-10-29 | End: 2024-10-29 | Stop reason: HOSPADM

## 2024-10-29 RX ORDER — OXYCODONE HYDROCHLORIDE 5 MG/1
5-10 TABLET ORAL EVERY 4 HOURS PRN
Qty: 26 TABLET | Refills: 0 | Status: SHIPPED | OUTPATIENT
Start: 2024-10-29

## 2024-10-29 RX ORDER — AMOXICILLIN 250 MG
1-2 CAPSULE ORAL 2 TIMES DAILY
Qty: 30 TABLET | Refills: 0 | Status: SHIPPED | OUTPATIENT
Start: 2024-10-29

## 2024-10-29 RX ORDER — LIDOCAINE HYDROCHLORIDE 20 MG/ML
INJECTION, SOLUTION INFILTRATION; PERINEURAL PRN
Status: DISCONTINUED | OUTPATIENT
Start: 2024-10-29 | End: 2024-10-29

## 2024-10-29 RX ORDER — DEXAMETHASONE SODIUM PHOSPHATE 4 MG/ML
4 INJECTION, SOLUTION INTRA-ARTICULAR; INTRALESIONAL; INTRAMUSCULAR; INTRAVENOUS; SOFT TISSUE
Status: DISCONTINUED | OUTPATIENT
Start: 2024-10-29 | End: 2024-10-29 | Stop reason: HOSPADM

## 2024-10-29 RX ORDER — PROCHLORPERAZINE MALEATE 5 MG/1
5 TABLET ORAL EVERY 6 HOURS PRN
Status: DISCONTINUED | OUTPATIENT
Start: 2024-10-29 | End: 2024-10-30 | Stop reason: HOSPADM

## 2024-10-29 RX ORDER — CEFAZOLIN SODIUM 2 G/100ML
2 INJECTION, SOLUTION INTRAVENOUS EVERY 8 HOURS
Status: COMPLETED | OUTPATIENT
Start: 2024-10-29 | End: 2024-10-30

## 2024-10-29 RX ORDER — FENTANYL CITRATE 50 UG/ML
INJECTION, SOLUTION INTRAMUSCULAR; INTRAVENOUS PRN
Status: DISCONTINUED | OUTPATIENT
Start: 2024-10-29 | End: 2024-10-29

## 2024-10-29 RX ORDER — SODIUM CHLORIDE, SODIUM LACTATE, POTASSIUM CHLORIDE, CALCIUM CHLORIDE 600; 310; 30; 20 MG/100ML; MG/100ML; MG/100ML; MG/100ML
INJECTION, SOLUTION INTRAVENOUS CONTINUOUS
Status: DISCONTINUED | OUTPATIENT
Start: 2024-10-29 | End: 2024-10-30 | Stop reason: HOSPADM

## 2024-10-29 RX ORDER — LIDOCAINE 40 MG/G
CREAM TOPICAL
Status: DISCONTINUED | OUTPATIENT
Start: 2024-10-29 | End: 2024-10-30 | Stop reason: HOSPADM

## 2024-10-29 RX ORDER — HYDROXYZINE HYDROCHLORIDE 10 MG/1
10 TABLET, FILM COATED ORAL EVERY 6 HOURS PRN
Status: DISCONTINUED | OUTPATIENT
Start: 2024-10-29 | End: 2024-10-30 | Stop reason: HOSPADM

## 2024-10-29 RX ORDER — VASOPRESSIN IN 0.9 % NACL 2 UNIT/2ML
SYRINGE (ML) INTRAVENOUS PRN
Status: DISCONTINUED | OUTPATIENT
Start: 2024-10-29 | End: 2024-10-29

## 2024-10-29 RX ADMIN — SODIUM CHLORIDE, POTASSIUM CHLORIDE, SODIUM LACTATE AND CALCIUM CHLORIDE: 600; 310; 30; 20 INJECTION, SOLUTION INTRAVENOUS at 19:21

## 2024-10-29 RX ADMIN — Medication 3 G: at 14:37

## 2024-10-29 RX ADMIN — PHENYLEPHRINE HYDROCHLORIDE 0.5 MCG/KG/MIN: 10 INJECTION INTRAVENOUS at 14:51

## 2024-10-29 RX ADMIN — Medication 0.5 UNITS: at 15:14

## 2024-10-29 RX ADMIN — ACETAMINOPHEN 975 MG: 325 TABLET ORAL at 13:05

## 2024-10-29 RX ADMIN — ROCURONIUM BROMIDE 50 MG: 50 INJECTION, SOLUTION INTRAVENOUS at 14:38

## 2024-10-29 RX ADMIN — TRANEXAMIC ACID 1950 MG: 650 TABLET ORAL at 13:06

## 2024-10-29 RX ADMIN — FENTANYL CITRATE 50 MCG: 50 INJECTION INTRAMUSCULAR; INTRAVENOUS at 16:03

## 2024-10-29 RX ADMIN — FENTANYL CITRATE 25 MCG: 50 INJECTION INTRAMUSCULAR; INTRAVENOUS at 14:37

## 2024-10-29 RX ADMIN — ONDANSETRON 4 MG: 2 INJECTION INTRAMUSCULAR; INTRAVENOUS at 16:33

## 2024-10-29 RX ADMIN — Medication 200 MG: at 16:43

## 2024-10-29 RX ADMIN — PROPOFOL 200 MG: 10 INJECTION, EMULSION INTRAVENOUS at 14:37

## 2024-10-29 RX ADMIN — ACETAMINOPHEN 975 MG: 325 TABLET, FILM COATED ORAL at 20:52

## 2024-10-29 RX ADMIN — MIDAZOLAM 1 MG: 1 INJECTION INTRAMUSCULAR; INTRAVENOUS at 13:16

## 2024-10-29 RX ADMIN — ROCURONIUM BROMIDE 20 MG: 50 INJECTION, SOLUTION INTRAVENOUS at 15:30

## 2024-10-29 RX ADMIN — ALBUMIN (HUMAN): 12.5 SOLUTION INTRAVENOUS at 14:51

## 2024-10-29 RX ADMIN — BUPIVACAINE 10 ML: 13.3 INJECTION, SUSPENSION, LIPOSOMAL INFILTRATION at 13:45

## 2024-10-29 RX ADMIN — ROCURONIUM BROMIDE 20 MG: 50 INJECTION, SOLUTION INTRAVENOUS at 16:03

## 2024-10-29 RX ADMIN — LIDOCAINE HYDROCHLORIDE 100 MG: 20 INJECTION, SOLUTION INFILTRATION; PERINEURAL at 14:37

## 2024-10-29 RX ADMIN — Medication 0.5 UNITS: at 15:00

## 2024-10-29 RX ADMIN — CEFAZOLIN SODIUM 2 G: 2 INJECTION, SOLUTION INTRAVENOUS at 22:53

## 2024-10-29 RX ADMIN — PHENYLEPHRINE HYDROCHLORIDE 100 MCG: 10 INJECTION INTRAVENOUS at 14:57

## 2024-10-29 RX ADMIN — MIDAZOLAM 1 MG: 1 INJECTION INTRAMUSCULAR; INTRAVENOUS at 13:34

## 2024-10-29 RX ADMIN — DEXAMETHASONE SODIUM PHOSPHATE 4 MG: 4 INJECTION, SOLUTION INTRA-ARTICULAR; INTRALESIONAL; INTRAMUSCULAR; INTRAVENOUS; SOFT TISSUE at 15:09

## 2024-10-29 RX ADMIN — ALBUMIN (HUMAN): 12.5 SOLUTION INTRAVENOUS at 14:30

## 2024-10-29 RX ADMIN — SENNOSIDES AND DOCUSATE SODIUM 1 TABLET: 50; 8.6 TABLET ORAL at 20:52

## 2024-10-29 RX ADMIN — FENTANYL CITRATE 50 MCG: 50 INJECTION INTRAMUSCULAR; INTRAVENOUS at 16:01

## 2024-10-29 ASSESSMENT — ACTIVITIES OF DAILY LIVING (ADL)
ADLS_ACUITY_SCORE: 0
ADLS_ACUITY_SCORE: 0
ADLS_ACUITY_SCORE: 20.75
ADLS_ACUITY_SCORE: 0
ADLS_ACUITY_SCORE: 20.75
ADLS_ACUITY_SCORE: 0

## 2024-10-29 ASSESSMENT — ENCOUNTER SYMPTOMS: DYSRHYTHMIAS: 1

## 2024-10-29 NOTE — ANESTHESIA PROCEDURE NOTES
Airway       Patient location during procedure: OR       Procedure Start/Stop Times: 10/29/2024 2:40 PM  Staff -        Anesthesiologist:  Roge Walters MD       CRNA: Juana Agrawal APRN CRNA       Performed By: CRNAIndications and Patient Condition       Indications for airway management: hodan-procedural       Induction type:intravenous       Mask difficulty assessment: 2 - vent by mask + OA or adjuvant +/- NMBA    Final Airway Details       Final airway type: endotracheal airway       Successful airway: ETT - single  Endotracheal Airway Details        ETT size (mm): 8.0       Cuffed: yes       Successful intubation technique: video laryngoscopy       VL Blade Size: Glidescope 4       Grade View of Cords: 1       Adjucts: stylet       Position: Left       Measured from: lips       Secured at (cm): 23       Bite block used: None    Post intubation assessment        Placement verified by: capnometry, equal breath sounds and chest rise        Number of attempts at approach: 1       Number of other approaches attempted: 0       Secured with: ties       Ease of procedure: easy (Anterior airway. Glidescope)       Dentition: Intact and Unchanged    Medication(s) Administered   Medication Administration Time: 10/29/2024 2:40 PM

## 2024-10-29 NOTE — ANESTHESIA POSTPROCEDURE EVALUATION
Patient: Hugo Weber    Procedure: Procedure(s):  RIGHT REVERSE SHOULDER ARTHROPLASTY , NO CUSTOM GUIDE       Anesthesia Type:  General    Note:     Postop Pain Control: Uneventful            Sign Out: Well controlled pain   PONV: No   Neuro/Psych: Uneventful            Sign Out: Acceptable/Baseline neuro status   Airway/Respiratory: Uneventful            Sign Out: Acceptable/Baseline resp. status   CV/Hemodynamics: Uneventful            Sign Out: Acceptable CV status; No obvious hypovolemia; No obvious fluid overload   Other NRE: NONE   DID A NON-ROUTINE EVENT OCCUR? No           Last vitals:  Vitals Value Taken Time   /79 10/29/24 1700   Temp     Pulse 79 10/29/24 1710   Resp 30 10/29/24 1710   SpO2 98 % 10/29/24 1710   Vitals shown include unfiled device data.    Electronically Signed By: Roge Walters MD  October 29, 2024  5:11 PM

## 2024-10-29 NOTE — ANESTHESIA PREPROCEDURE EVALUATION
Anesthesia Pre-Procedure Evaluation    Patient: Hugo Weber   MRN: [id number] : 1945        Procedure : Procedure(s):  RIGHT REVERSE SHOULDER ARTHROPLASTY , NO CUSTOM GUIDE          Past Medical History:   Diagnosis Date    Aortic stenosis     moderate    Atrial fibrillation     Followed by [facility] - persistent    CAD (coronary artery disease)     Calculus of kidney , , 10/12, ,     dr walker/nestor/иван - hematuria - w/u o/w neg    Cervical stenosis of spine     Moderate C4-5    Cholelithiasis     Chronic peptic ulcer, unspecified site, without mention of hemorrhage, perforation, or obstruction     gastric bypass    Colon polyps , 9/10, 10/15    2 tubular adenoma, 1 hyperplastic - multiple serrated/tubular adenomas - last colonscopy  due 5 yrs    CVA (cerebral vascular accident) (H) 2019    small right periorlandic subcortical - left sided residual - left hand tingling/numbness - Left leg weak/numbness - cardioembolic    Elevated prostate specific antigen (PSA)     Dr Santos    Hepatitis C 10/2012    chronic hepatitis C, grade 1-2, stage 1 - mild - Dr Douglas    HFrEF (heart failure with reduced ejection fraction) (H)     [facility] - 35%    Hyperlipidemia LDL goal <70     Hypertension goal BP (blood pressure) < 140/90 2006    dr jaciel winchester    Iron deficiency anemia, unspecified     gastric bypass    Mitral regurgitation     mild-moderate    Nephrolithiasis multiple episodes, last 10/19    Dr Bey/Ivana/Tom - 9mm 3/2021    OA (osteoarthritis)     Multiple - Left shoulder with rotator cuff tear - Dr Durham    Obesity, unspecified     s/p gastric bypass      Obstructive sleep apnea syndrome     CPAP - 15 cm - [facility] 2023    Prediabetes     Presbyacusis 2004    dr corona    Prostate cancer (H)     Dr Santos - Manju 3+4, 4+3 = 7, bx  positive    Pyelonephritis, unspecified 1999    SCC (squamous cell carcinoma), 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     dr saez - [facility]    Sleep apnea 10/2002    Auto CPAP - severe - 12-15 cm - dr cunha    Stroke (H) 01/19/2019    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    [facility]    Vitamin B12 deficiency (non anemic) 04/15/2019    Vitamin D deficiency 04/15/2019      Past Surgical History:   Procedure Laterality Date    ABDOMEN SURGERY      APPENDECTOMY      ARTHROPLASTY KNEE  08/13/2012    LT TKA - Dr Villanueva    BYPASS GRAFT ARTERY CORONARY N/A 08/15/2023    Procedure: CORONARY ARTERY BYPASS GRAFT x 1 (SAPHENOUS VEIN - RIGHT POSTERIOR DESCENDING ARTERY) WITH ENDOSCOPIC SAPHENOUS VEIN HARVEST ON THE LEFT LOWER EXTREMITY, AND ON CARDIOPULMONARY PUMP OXYGENATOR  (INTRAOPERATIVE TRANSESOPHAGEAL ECHOCARDIOGRAM BY ANESTHESIOLOGIST), REMOVAL OF RIGHT CORONARY ARTERY STENT AND DELIVERY SYSTEM, LEFT ATRIAL APPENDAGE CLIPPING WITH ATRICLIP FLEX V DEVICE SIZE: 45    CARDIOVERSION  2016    CARDIOVERSION      COLONOSCOPY  8/05, 9/10, 10/15    multiple tubular/serrated/hyperplastic polyps    COLONOSCOPY N/A 10/29/2015    multiple tubular and serrated adenomas    COLONOSCOPY N/A 09/07/2016    COLONOSCOPY  11/2020    tubular adenomas - due 5 yrs    COLONOSCOPY N/A 11/24/2020    Procedure: COLONOSCOPY with biopsies;  Surgeon: Chadwick Burgos MD;  Location: [facility] OR    CV CORONARY ANGIOGRAM N/A 08/15/2023    Procedure: Coronary Angiogram;  Surgeon: Carly Luna MD;  Location: [facility] CARDIAC CATH LAB    CV FRACTIONAL FLOW RATIO WIRE N/A 08/15/2023    Procedure: Fractional Flow Ratio Wire;  Surgeon: Carly Luna MD;  Location: [facility] CARDIAC CATH LAB    CV PCI N/A 08/15/2023    Procedure: Percutaneous Coronary Intervention;  Surgeon: Carly Luna MD;  Location: [facility] CARDIAC CATH LAB    CYSTOSCOPY W/ LASER LITHOTRIPSY  10/16/2012,5/2/2018    ESOPHAGOSCOPY, GASTROSCOPY, DUODENOSCOPY (EGD), COMBINED N/A 11/24/2020    Procedure: ESOPHAGOGASTRODUODENOSCOPY, COLONOSCOPY with biopsies;  Surgeon: Chadwick Burgos MD;   Location: [facility] OR    EYE SURGERY  1/01,6/02,11/02    lasik    HC KNEE SCOPE, DIAGNOSTIC  05/2000    Arthroscopy, Knee RT    LASER HOLMIUM LITHOTRIPSY URETER(S), INSERT STENT, COMBINED  10/16/2012    stones x 4, Dr Campa    LASER HOLMIUM LITHOTRIPSY URETER(S), INSERT STENT, COMBINED Right 05/02/2018    CYSTOSCOPY, RIGHT URETEROSCOPY, HOLMIUM LASER LITHOTRIPSY, RIGHT STENT PLACEMENT - Dr Bey    MRI BIOPSY PROSTATE Bilateral 02/09/2023    mark    REVERSE ARTHROPLASTY SHOULDER Left 03/07/2023    Procedure: LEFT REVERSE SHOULDER ARTHROPLASTY WITH CUSTOM GUIDE WITH AUTOLOGOUS BONE GRAFTOF PROXIMAL HUMERUS;  Surgeon: Booker Multani MD;  Location: [facility] OR    SURGICAL HISTORY OF -   1985    s/p gastric bypass Bilroth II    SURGICAL HISTORY OF -   11/1998    wisdom teeth    SURGICAL HISTORY OF -   1979    cellulitis    SURGICAL HISTORY OF -   11/1998    lt forearm spur's    SURGICAL HISTORY OF -   1/01,6/02,11/02    s/p lasik    SURGICAL HISTORY OF -   11/1999    rt forearm spurs    SURGICAL HISTORY OF -   07/2006    dr stevenson - lithotrypsy    SURGICAL HISTORY OF -   10/08, 12/08    Lt ear chonchal lesion removal SCC - dr saez    SURGICAL HISTORY OF -  Right 10/2019    nephrolithiasis - cystoscopy, rt lithotrypsy, Dr Heath    SURGICAL HISTORY OF -  Right 11/2019    Cystoscopy, Rt lithotrypsy, Calcium Oxalate, Dr Heath      No Known Allergies   Social History     Tobacco Use    Smoking status: Never    Smokeless tobacco: Never   Substance Use Topics    Alcohol use: Yes     Alcohol/week: 2.0 - 3.0 standard drinks of alcohol     Types: 2 - 3 Standard drinks or equivalent per week     Comment: occassiinally      Wt Readings from Last 1 Encounters:   10/07/24 135.2 kg (298 lb)        Anesthesia Evaluation   Pt has had prior anesthetic.     No history of anesthetic complications       ROS/MED HX  ENT/Pulmonary:     (+) sleep apnea, uses CPAP,                                      Neurologic:     (+)          CVA,  date: 2019, without deficits,                    Cardiovascular:     (+) Dyslipidemia hypertension- -  CAD -  CABG-date: 2023. stent-2023. 1  Taking blood thinners                     dysrhythmias, a-fib,  valvular problems/murmurs type: AS moderate.    Previous cardiac testing   Echo: Date: 11/2023 Results:  1. The left ventricle is mildly dilated. There is normal left ventricular wall  thickness. Left ventricular systolic function is severely reduced. The visual  ejection fraction is 30-35%. Diastolic function not assessed due to atrial  fibrillation. There is severe global hypokinesia of the left ventricle.  2. The right ventricle is mildly dilated. Mildly decreased right ventricular  systolic function.  3. Moderate valvular aortic stenosis.  4. The aortic Sinus(es) of Valsalva are mildly dilated. Ascending aorta  aneurysm is present.  5. No pericardial effusion.  6. In direct comparison to the previous study dated 08/22/2023, left  ventricular systolic function appears similar.    Stress Test:  Date: Results:    ECG Reviewed:  Date: Results:    Cath:  Date: Results:      METS/Exercise Tolerance: 3 - Able to walk 1-2 blocks without stopping    Hematologic:     (+)      anemia,          Musculoskeletal:   (+)  arthritis,             GI/Hepatic:     (+) GERD,          hepatitis type C, liver disease,       Renal/Genitourinary:     (+) renal disease, type: CRI, Pt does not require dialysis,    Nephrolithiasis ,       Endo:     (+)               Obesity,    (-) Type II DM   Psychiatric/Substance Use:       Infectious Disease:       Malignancy:   (+) Malignancy, History of Prostate.    Other:            Physical Exam    Airway        Mallampati: II   TM distance: > 3 FB   Neck ROM: full   Mouth opening: > 3 cm    Respiratory Devices and Support         Dental       (+) Modest Abnormalities - crowns, retainers, 1 or 2 missing teeth      Cardiovascular          Rhythm and rate: irregular and normal     Pulmonary            (+) decreased breath sounds           OUTSIDE LABS:  CBC:   Lab Results   Component Value Date    WBC 4.5 10/07/2024    WBC 4.9 06/02/2024    HGB 13.6 10/07/2024    HGB 13.2 (L) 06/02/2024    HCT 41.9 10/07/2024    HCT 41.6 06/02/2024     10/07/2024     06/02/2024     BMP:   Lab Results   Component Value Date     10/07/2024     09/30/2024    POTASSIUM 4.0 10/07/2024    POTASSIUM 4.8 09/30/2024    CHLORIDE 104 10/07/2024    CHLORIDE 105 09/30/2024    CO2 27 10/07/2024    CO2 27 09/30/2024    BUN 25.9 (H) 10/07/2024    BUN 27.9 (H) 09/30/2024    CR 0.97 10/07/2024    CR 0.97 09/30/2024    GLC 92 10/07/2024    GLC 84 09/30/2024     COAGS:   Lab Results   Component Value Date    PTT 53 (H) 08/17/2023    INR 1.37 (H) 08/17/2023    FIBR 207 08/15/2023     POC:   Lab Results   Component Value Date     (H) 09/07/2016     HEPATIC:   Lab Results   Component Value Date    ALBUMIN 4.0 10/07/2024    PROTTOTAL 7.1 10/07/2024    ALT 10 10/07/2024    AST 17 10/07/2024    GGT 77 (H) 12/24/2012    ALKPHOS 107 10/07/2024    BILITOTAL 0.6 10/07/2024     OTHER:   Lab Results   Component Value Date    PH 7.41 08/17/2023    LACT 3.3 (H) 08/16/2023    A1C 5.4 10/07/2024    BEBO 8.9 10/07/2024    PHOS 3.6 08/16/2023    MAG 1.9 08/17/2023    LIPASE 27 (L) 03/08/2021    AMYLASE 74 10/28/2020    TSH 4.12 08/28/2023    T4 1.08 02/21/2019    CRP <2.9 10/28/2020    SED 8 10/28/2020       Anesthesia Plan    ASA Status:  3    NPO Status:  NPO Appropriate    Anesthesia Type: General.     - Airway: ETT   Induction: Propofol.   Maintenance: Balanced.   Techniques and Equipment:     - Airway: Video-Laryngoscope     - Lines/Monitors: Arterial Line     Consents    Anesthesia Plan(s) and associated risks, benefits, and realistic alternatives discussed. Questions answered and patient/representative(s) expressed understanding.     - Discussed:     - Discussed with:  Patient            Postoperative Care    Pain management:  "IV analgesics, Oral pain medications, Peripheral nerve block (Single Shot), Multi-modal analgesia.   PONV prophylaxis: Ondansetron (or other 5HT-3), Dexamethasone or Solumedrol, Background Propofol Infusion     Comments:               Aryan Wagner MD    I have reviewed the pertinent notes and labs in the chart from the past 30 days and (re)examined the patient.  Any updates or changes from those notes are reflected in this note.            # Drug Induced Coagulation Defect: home medication list includes an anticoagulant medication    # Hypertension: Noted on problem list  # Chronic heart failure with reduced ejection fraction: last echo with EF <40%        # Obesity: Estimated body mass index is 39.32 kg/m  as calculated from the following:    Height as of 10/7/24: 1.854 m (6' 1\").    Weight as of 10/7/24: 135.2 kg (298 lb).        # History of CABG: noted on surgical history      "

## 2024-10-29 NOTE — ANESTHESIA PROCEDURE NOTES
Arterial Line Procedure Note    Pre-Procedure   Staff -        Anesthesiologist:  Roge Walters MD       Performed By: anesthesiologist       Location: pre-op       Pre-Anesthestic Checklist: patient identified, IV checked, risks and benefits discussed, informed consent, monitors and equipment checked and pre-op evaluation  Timeout:       Correct Patient: Yes        Correct Procedure: Yes        Correct Site: Yes        Correct Position: Yes   Line Placement:   This line was placed Pre Induction starting at 10/29/2024 1:15 PM and ending at 10/29/2024 1:30 PM  Procedure   Procedure: arterial line       Laterality: left       Insertion Site: radial.  Sterile Prep        All elements of maximal sterile barrier technique followed       Patient Prep/Sterile Barriers: hand hygiene, sterile gloves, hat, mask, draped, draped       Skin prep: Chloraprep  Insertion/Injection        Technique: Seldinger Technique and Eduardo's test completed        Catheter Type/Size: 20 gauge, 12 cm  Narrative         Secured by: other       Tegaderm dressing used.       Complications: None apparent,        Arterial waveform: Yes        IBP within 10% of NIBP: Yes

## 2024-10-29 NOTE — OP NOTE
[facility]    Operative Note    Pre-operative diagnosis: Glenohumeral arthritis, right [M19.011]. Posterior glenoid bone loss, Cuff tear.  Post-operative diagnosis Same as pre-operative diagnosis    Procedure: RIGHT REVERSE SHOULDER ARTHROPLASTY , NO CUSTOM GUIDE, Right - Shoulder    Surgeon: Surgeons and Role:     * Booker Multani MD - Primary     * Susanna Whelan PA-C - Assisting  Anesthesia: General with Block   Estimated Blood Loss: 150ml    Drains: None  Specimens: * No specimens in log *  Findings:   None.  Complications: None.  Implants:   Implant Name Type Inv. Item Serial No.  Lot No. LRB No. Used Action   BASEPLATE LATERAL RVRS 25MM OFFS 15D WEDGE AUGMENT [id number] - [id number] Total Joint Component/Insert BASEPLATE LATERAL RVRS 25MM OFFS 15D WEDGE AUGMENT [id number] [employer]  Right 1 Implanted   GLEOSPHERE LATERALIZED AP REVERSED +3 39MM [id number] - [id number] Total Joint Component/Insert GLEOSPHERE LATERALIZED AP REVERSED +3 39MM [id number] [employer]  Right 1 Implanted   SCREW CENTRAL 6.5X30MM ZYE858 - [id number] Metallic Hardware/[place] SCREW CENTRAL 6.5X30MM [id number]  [employer] [id number] Right 1 Implanted   SCREW PERIPHERAL 38MM - [id number] Metallic Hardware/[place] SCREW PERIPHERAL 38MM  [employer] [id number] Right 1 Implanted   SCREW PERIPHERAL 18MM HUZ727 - [id number] Metallic Hardware/[place] SCREW PERIPHERAL 18MM [id number]  [employer] [id number] Right 1 Implanted   SCREW PERIPHERAL 26MM - [id number] Metallic Hardware/[place] SCREW PERIPHERAL 26MM  [employer] [id number] Right 1 Implanted   SCREW PERIPHERAL 5.0X30MM SGS122 - [id number] Metallic Hardware/[place] SCREW PERIPHERAL 5.0X30MM [id number]  [employer] [id number] Right 1 Implanted   STEM HUMERAL ANATOMIC STD PTC 7B - [id number] Total Joint Component/Insert STEM HUMERAL ANATOMIC STD PTC 7B [id number] [employer]  Right 1 Implanted    INSERT REVISION REVERSE +6/39MM [id number] - [id number] Total Joint Component/Insert INSERT REVISION REVERSE +6/39MM [id number] [employer] [facility]  Right 1 Implanted   TRAY REVERSE HIGH OFFSET +0 XBN032 - [id number] Total Joint Component/Insert TRAY REVERSE HIGH OFFSET +0 [id number] [employer]  Right 1 Implanted         NARRATIVE:  After discussing the risks, benefits, possible complications and alternatives, the patient voiced their understanding and gave consent . The patient wished to proceed.  An interscalene block was administered. The patient was then brought to the operating room and placed in a supine position. Following administration of general anesthetic, the patient was positioned in the modified beach chair position. All bony prominences were well-padded. A lateral chest pad was placed, and the head was secured with a [place] head hernandez with safety goggles in place.    After sterile prep and drape, a standard deltopectoral incision   was made, carried down through the skin and soft tissue. Electrocautery was used for hemostasis. Cephalic vein was taken laterally with the deltoid, and was noted to be intact throughout the case and at closure. The deltopectoral fascia was incised to approach the shoulder.    The superior cuff is thinned and abnormal.  The subscapularis is intact.    The biceps was tenodesed at the superior edge of the bicipital groove.  The subscapularis tendon is tenotomized 1cm medial to the insertion area for later repair.  This allows delivery of the head from the incision, after which a starting point   for the proximal guide was then well-visualized.  The proximal cut guide is used to check the anatomic version which is approximately 35 degrees. This was cut in 30 degrees of retroversion. Following this, it was then reamed and broached up to a size 7.  This was left in place and the protective cap was placed.     A Fukuda retractor was used to displace the humeral  head posteriorly.  The glenoid was then prepared resecting the labrum in a 360 degree arc, and removing the residual biceps stump.  Once the appropriate approach had been obtained, the guide for the central pin was then positioned at the inferior aspect of the glenoid with a wedge  to recreate the appropriate angle. Using [employer] software the patient had a custom surgical plan created for his shoulder.  This involves the use of the wedge  as the pin guide at the inferior aspect of the glenoid.   The central pin was passed and checked along the medial scapular neck with my direct digital palpation, and noted to be in excellent position.  The pin was then reamed over to prepare the glenoid baseplate.  THis is reamed with the angled glenoid reamer at 15 degrees. There is good bone for fixation.    The baseplate is fully seated and drilled. Appropriate length screws were placed x4 with excellent fixation.  The cone reamer was used to remove any glenoid prominence.  The glenosphere was impacted into place and the central screw was fully tightened. This shows excellent fixation and good clearance inferiorly.    Following this, attention was turned to the humerus.  The baseplate was then trialed with the high offset metaphyseal tray and  6mm bearing, showing excellent fit, stability, and rotation. The arm could be brought through 135 degrees of passive forward flexion and 60 degrees of passive external rotation without any opening of the anterior joint line.  Instruments were removed. Final implants were seated after copious irrigation.  Using the same standard bearing and tray, this shows excellent stability after reduction.  1000 mL of brown wash followed by 1000 mL of normal saline was used for irrigation.    The subscapularis was then reattached with transtendon figure eight sutures x8.  This was done with the arm abducted 40 degrees and externally rotated 30 degrees to allow excellent excursion.   Copious irrigation was performed again with pulsatile lavage. The deltopectoral interval was closed with 0 Stratafix. The subcutaneous tissue was closed with 3-0 Vicryl and skin with 3-0 Stratafix. Generic Prineo dressing was applied.     Vancomycin powder 1gm total was used during the case and was applied to the intramedulary canal prior to stem placement, the joint space prior to subscapularis repair and the deep and superficial wound layers.    A physician assistant was necessary for this case to protect the neurovascular structures when retracting.  Maintaining the position of the arm during implantation of the components, Assisting with the dislocation of the implant trials and maintaining a clear operative field during the case. The PA is present for the entirety of the case.    The patient went to the recovery room in good condition, and will have a standard reverse total shoulder rehabilitation course.     COMPLICATIONS:  None.     CONDITION UPON DISCHARGE FOR OPERATING ROOM:  Stable    PLAN:  1. Antibiotic prophylaxis were given including Ancef. Abx given within 1 hr of surgical incision and discontinued within 24hrs.   2. DVT prophylaxis: aspirin and sequential compression device's will be started within 24hrs of surgery.   3. Weight Bearing NWB (Non weight bearing) right .upper extremity   4. Discharge anticipated date POD# 1 to home.   5. Pain Medication oral Oxycodone and IV Dilaudid.  6. Exercises: Full elbow, wrist, and hand AROM/PROM on operative side 5x/day.  Encourage ADLs out of sling as tolerated.

## 2024-10-29 NOTE — PROGRESS NOTES
S. We received an order for an right shoulder Ultrasling III for the main OR.    O/goal. To reduce motion and help with post-op support    A. At this time, I have provided the main OR with a size large Og Blair Ultrasling III.    P. Staffing have been instructed to contact our facility with any future questions and/or concerns.     Jian CHRISTY

## 2024-10-29 NOTE — ANESTHESIA CARE TRANSFER NOTE
Patient: Hugo Weber    Procedure: Procedure(s):  RIGHT REVERSE SHOULDER ARTHROPLASTY , NO CUSTOM GUIDE       Diagnosis: Glenohumeral arthritis, right [M19.011]  Diagnosis Additional Information: No value filed.    Anesthesia Type:   General     Note:    Oropharynx: oropharynx clear of all foreign objects and spontaneously breathing  Level of Consciousness: awake  Oxygen Supplementation: face mask  Level of Supplemental Oxygen (L/min / FiO2): 10  Independent Airway: airway patency satisfactory and stable  Dentition: dentition unchanged  Vital Signs Stable: post-procedure vital signs reviewed and stable  Report to RN Given: handoff report given  Patient transferred to: PACU  Comments: Pt awake and able to verbalize needs. ALL monitors on and audible. Spontaneous respiration without difficulty. Pt denies pain. Report given to PACU RN.  Handoff Report: Identifed the Patient, Identified the Reponsible Provider, Reviewed the pertinent medical history, Discussed the surgical course, Reviewed Intra-OP anesthesia mangement and issues during anesthesia, Set expectations for post-procedure period and Allowed opportunity for questions and acknowledgement of understanding      Vitals:  Vitals Value Taken Time   /79 10/29/24 1700   Temp     Pulse 75 10/29/24 1702   Resp 12 10/29/24 1702   SpO2 99 % 10/29/24 1702   Vitals shown include unfiled device data.    Electronically Signed By: BIENVENIDO Hollis CRNA  October 29, 2024  5:03 PM

## 2024-10-29 NOTE — ANESTHESIA PROCEDURE NOTES
"Brachial plexus Procedure Note    Pre-Procedure   Staff -        Anesthesiologist:  Roge Walters MD       Performed By: anesthesiologist       Location: pre-op       Pre-Anesthestic Checklist: patient identified, IV checked, site marked, risks and benefits discussed, informed consent, monitors and equipment checked, pre-op evaluation, at physician/surgeon's request and post-op pain management  Timeout:       Correct Patient: Yes        Correct Procedure: Yes        Correct Site: Yes        Correct Position: Yes        Correct Laterality: Yes        Site Marked: Yes  Procedure Documentation  Procedure: Brachial plexus       Laterality: right       Patient Position: supine       Patient Prep/Sterile Barriers: sterile gloves, mask, \"no-touch\" technique        Skin prep: Chloraprep       Local skin infiltrated with 3 mL of 1% lidocaine.  (superior trunk block approach).       Needle Type: insulated and short bevel ([place]unk)       Needle Gauge: 21.        Needle Length (millimeters): 100        Ultrasound guided       1. Ultrasound was used to identify targeted nerve, plexus, vascular marker, or fascial plane and place a needle adjacent to it in real-time.       2. Ultrasound was used to visualize the spread of anesthetic in close proximity to the above referenced structure.       3. A permanent image is entered into the patient's record.       4. The visualized anatomic structures appeared normal.       5. There were no apparent abnormal pathologic findings.    Assessment/Narrative         The placement was negative for: blood aspirated, painful injection and site bleeding       Paresthesias: No.       Test dose of mL at.         Test dose negative, 3 minutes after injection, for signs of intravascular, subdural, or intrathecal injection.       Bolus given via needle..        Secured via.        Insertion/Infusion Method: Single Shot       Complications: none       Injection made incrementally with aspirations " "every 5 mL.    Medication(s) Administered   Bupivacaine 0.5% w/ 1:400K Epi (Injection) - Injection   10 mL - 10/29/2024 1:45:00 PM  Bupivacaine liposome (Exparel) 1.3% LA inj susp (Infiltration) - Infiltration   10 mL - 10/29/2024 1:45:00 PM   Comments:  0.5% Bupivacaine with 1:400,000 epinephrine injected.  Patient tolerated the procedure well.    The surgeon has given a verbal order transferring care of this patient to me for the performance of a regional analgesia block for post-op pain control. It is requested of me because I am uniquely trained and qualified to perform this block and the surgeon is neither trained nor qualified to perform this procedure.            FOR [facility] ([facility] [place]) ONLY:   Pain Team Contact information: please page the Pain Team Via [employer]. Search \"Pain\". During daytime hours, please page the attending first. At night please page the resident first.      "

## 2024-10-30 ENCOUNTER — APPOINTMENT (OUTPATIENT)
Dept: OCCUPATIONAL THERAPY | Facility: CLINIC | Age: 79
End: 2024-10-30
Attending: ORTHOPAEDIC SURGERY
Payer: COMMERCIAL

## 2024-10-30 VITALS
OXYGEN SATURATION: 95 % | TEMPERATURE: 98.3 F | DIASTOLIC BLOOD PRESSURE: 65 MMHG | HEIGHT: 72 IN | RESPIRATION RATE: 16 BRPM | HEART RATE: 72 BPM | WEIGHT: 300.1 LBS | BODY MASS INDEX: 40.65 KG/M2 | SYSTOLIC BLOOD PRESSURE: 116 MMHG

## 2024-10-30 LAB
GLUCOSE BLDC GLUCOMTR-MCNC: 181 MG/DL (ref 70–99)
HGB BLD-MCNC: 12.4 G/DL (ref 13.3–17.7)

## 2024-10-30 PROCEDURE — 250N000011 HC RX IP 250 OP 636: Mod: JZ | Performed by: STUDENT IN AN ORGANIZED HEALTH CARE EDUCATION/TRAINING PROGRAM

## 2024-10-30 PROCEDURE — 99215 OFFICE O/P EST HI 40 MIN: CPT | Performed by: INTERNAL MEDICINE

## 2024-10-30 PROCEDURE — 82962 GLUCOSE BLOOD TEST: CPT

## 2024-10-30 PROCEDURE — 250N000013 HC RX MED GY IP 250 OP 250 PS 637: Performed by: INTERNAL MEDICINE

## 2024-10-30 PROCEDURE — 36415 COLL VENOUS BLD VENIPUNCTURE: CPT | Performed by: STUDENT IN AN ORGANIZED HEALTH CARE EDUCATION/TRAINING PROGRAM

## 2024-10-30 PROCEDURE — 97535 SELF CARE MNGMENT TRAINING: CPT | Mod: GO

## 2024-10-30 PROCEDURE — 250N000013 HC RX MED GY IP 250 OP 250 PS 637: Performed by: STUDENT IN AN ORGANIZED HEALTH CARE EDUCATION/TRAINING PROGRAM

## 2024-10-30 PROCEDURE — 97165 OT EVAL LOW COMPLEX 30 MIN: CPT | Mod: GO

## 2024-10-30 PROCEDURE — 85018 HEMOGLOBIN: CPT | Performed by: STUDENT IN AN ORGANIZED HEALTH CARE EDUCATION/TRAINING PROGRAM

## 2024-10-30 RX ORDER — ACETAMINOPHEN 500 MG
1000 TABLET ORAL EVERY 8 HOURS
COMMUNITY
Start: 2024-10-30

## 2024-10-30 RX ORDER — METOPROLOL SUCCINATE 50 MG/1
50 TABLET, EXTENDED RELEASE ORAL 2 TIMES DAILY
Status: DISCONTINUED | OUTPATIENT
Start: 2024-10-30 | End: 2024-10-30 | Stop reason: HOSPADM

## 2024-10-30 RX ADMIN — ACETAMINOPHEN 975 MG: 325 TABLET, FILM COATED ORAL at 06:15

## 2024-10-30 RX ADMIN — METOPROLOL SUCCINATE 50 MG: 50 TABLET, EXTENDED RELEASE ORAL at 11:38

## 2024-10-30 RX ADMIN — CEFAZOLIN SODIUM 2 G: 2 INJECTION, SOLUTION INTRAVENOUS at 05:16

## 2024-10-30 RX ADMIN — SENNOSIDES AND DOCUSATE SODIUM 1 TABLET: 50; 8.6 TABLET ORAL at 09:36

## 2024-10-30 ASSESSMENT — ACTIVITIES OF DAILY LIVING (ADL)
ADLS_ACUITY_SCORE: 0
IADL_COMMENTS: HIRED ASSIST FOR HOME MANAGEMENT TASKS.
ADLS_ACUITY_SCORE: 0
PREVIOUS_RESPONSIBILITIES: MEAL PREP;LAUNDRY;HOUSEKEEPING;SHOPPING;MEDICATION MANAGEMENT;FINANCES;DRIVING
ADLS_ACUITY_SCORE: 0

## 2024-10-30 NOTE — PROGRESS NOTES
10/30/24 1102   Appointment Info   Signing Clinician's Name / Credentials (OT) Mallorie Kruse OTR/L   Quick Adds   Quick Adds Certification   Living Environment   People in Home alone   Current Living Arrangements house  (rambler style town home)   Home Accessibility stairs to enter home;stairs within home   Transportation Anticipated family or friend will provide  (Pt typcially drives)   Living Environment Comments Son and daughter will assist upon discharge.   Self-Care   Usual Activity Tolerance good   Current Activity Tolerance moderate   Regular Exercise Yes   Activity/Exercise Type strength training  (cardio)   Exercise Amount/Frequency   (3x/week)   Equipment Currently Used at Home shower chair;walker, standard   Activity/Exercise/Self-Care Comment Independent in all ADLs at baseline and mobility.   Instrumental Activities of Daily Living (IADL)   Previous Responsibilities meal prep;laundry;housekeeping;shopping;medication management;finances;driving   IADL Comments Hired assist for home management tasks.   General Information   Onset of Illness/Injury or Date of Surgery 10/29/24   Referring Physician Susanna Whelan PA-C   Patient/Family Therapy Goal Statement (OT) Home   Additional Occupational Profile Info/Pertinent History of Current Problem RIGHT REVERSE SHOULDER ARTHROPLASTY POD #1; NWB.   Cognitive Status Examination   Orientation Status orientation to person, place and time   Safety Deficit at risk behavior observed   Pain Assessment   Patient Currently in Pain Yes, see Vital Sign flowsheet   Transfers   Transfers bed-chair transfer;sit-stand transfer;toilet transfer   Transfer Skill: Bed to Chair/Chair to Bed   Bed-Chair [place] (Transfers) set up;verbal cues  (SBA)   Sit-Stand Transfer   Sit-Stand [place] (Transfers) set up;verbal cues  (SBA)   Toilet Transfer   Type (Toilet Transfer) sit-stand;stand-sit   [place] Level (Toilet Transfer) set up;verbal cues  (SBA)   Activities of  Daily Living   BADL Assessment/Intervention lower body dressing;upper body dressing   Upper Body Dressing Assessment/Training   [place] Level (Upper Body Dressing) doff;don;front opening garment;set up;verbal cues;moderate assist (50% patient effort)  (dond/off sling)   Lower Body Dressing Assessment/Training   [place] Level (Lower Body Dressing) doff;don;pants/bottoms;socks;shoes/slippers;set up;verbal cues;minimum assist (75% patient effort)   Clinical Impression   Criteria for Skilled Therapeutic Interventions Met (OT) Yes, treatment indicated   OT Diagnosis decreased independence in I/ADLs.   Influenced by the following impairments decreased independence in I/ADLs.   OT Problem List-Impairments impacting ADL problems related to;activity tolerance impaired;cognition;post-surgical precautions;pain;range of motion (ROM)   Assessment of Occupational Performance 1-3 Performance Deficits   Identified Performance Deficits decreased independence in I/ADLs. (Dressing, bathing, toileting)   Planned Therapy Interventions (OT) ADL retraining;IADL retraining;cognition;transfer training;home program guidelines;ROM   Clinical Decision Making Complexity (OT) problem focused assessment/low complexity   Risk & Benefits of therapy have been explained evaluation/treatment results reviewed;care plan/treatment goals reviewed;risks/benefits reviewed;patient   OT Total Evaluation Time   OT Eval, Low Complexity Minutes (55449) 7   Therapy Certification   Medical Diagnosis RIGHT REVERSE SHOULDER ARTHROPLASTY   Start of Care Date 10/30/24   OT Goals   Therapy Frequency (OT) Daily   OT Predicted Duration/Target Date for Goal Attainment 10/31/24   OT Goals Hygiene/Grooming;Upper Body Dressing;Lower Body Dressing;Toilet Transfer/Toileting;Cognition;OT Goal 1;OT Goal 2   OT: Hygiene/Grooming supervision/stand-by assist;while standing   OT: Upper Body Dressing Supervision/stand-by assist;within precautions   OT: Lower Body Dressing  Supervision/stand-by assist;within precautions   OT: Toilet Transfer/Toileting Supervision/stand-by assist;cleaning and garment management;toilet transfer   OT: Cognitive Patient/caregiver will verbalize understanding of cognitive assessment results/recommendations as needed for safe discharge planning   OT: Goal 1 Pt will verbalize and demonstrate understanding of UE exrecises in order to increase IND in I/ADls.   OT: Goal 2 Pt will verbalize understanding of 2 shower transfer techniques, in order to increase IND in I/ADls.   Interventions   Interventions Quick Adds Self-Care/Home Management;Therapeutic Procedures/Exercise   Self-Care/Home Management   Self-Care/Home Mgmt/ADL, Compensatory, Meal Prep Minutes (65184) 23   Symptoms Noted During/After Treatment (Meal Preparation/Planning Training) fatigue   Treatment Detail/Skilled Intervention Educated on NWB orders, pt able to follow through during session. Pt ambulated to the bathroom with SBA and no AD and transferred to/from the toilet with SBA, after education on safe transfer technique. Educated on safe functional transfers and safe hand placement, pt demonstrated carryover after education. Educated on upper and lower body dressing with compensatory techniques. While seated/standing pt completed LE dressing (don underwear and pants and socks) with minimum A, after education on technique.  Pt  completed don/doff pull over shirt and don/doff sling with moderate assist. Educated on  shower transfer and recommendations. Pt verbalized understanding of all education and reported plans to have assist upon discharge for I/ADLs.   Therapeutic Procedures/Exercise   Therapeutic Procedure: strength, endurance, ROM, flexibillity minutes (63959) 6   Symptoms Noted During/After Treatment fatigue   Treatment Detail/Skilled Intervention Educated on RUE exercises in order to increase independence in I/ADLs. Pt completed RUE exercises for 1 set of 10 reps each exercise. Exercises  included elbow flexion/extension, pronation/supination, wrist flexion/extension/deviation, grasp and release. Educated on Codmans Pendulum. Pt demonstrated understanding of exercises. Handout provided.   OT Discharge Planning   OT Plan TB Dress; HEP teachback   OT Discharge Recommendation (DC Rec)   (Defer to Ortho)   OT Rationale for DC Rec Assist in ADLs including moderate assist for UE dressing and minimum assist for LE dressing. Assist for strenuous IADLs including home management tasks, meal preparation etc.   OT Brief overview of current status Goals of therapy will be to address safe mobility and make recs for d/c to next level of care. Pt and RN will continue to follow all falls risk precautions as documented by RN staff while hospitalized   Total Session Time   Timed Code Treatment Minutes 29   Total Session Time (sum of timed and untimed services) 36    [facility]  OUTPATIENT OCCUPATIONAL THERAPY  EVALUATION  PLAN OF TREATMENT FOR OUTPATIENT REHABILITATION  (COMPLETE FOR INITIAL CLAIMS ONLY)  Patient's Last Name, First Name, M.I.  YOB: 1945  Hugo Weber                          Provider's Name  [facility] Medical Record No.  [id number]                             Onset Date:  10/29/24   Start of Care Date:  10/30/24   Type:     ___PT   _X_OT   ___SLP Medical Diagnosis:  RIGHT REVERSE SHOULDER ARTHROPLASTY                    OT Diagnosis:  decreased independence in I/ADLs. Visits from SOC:  1     See note for plan of treatment, functional goals and certification details    I CERTIFY THE NEED FOR THESE SERVICES FURNISHED UNDER        THIS PLAN OF TREATMENT AND WHILE UNDER MY CARE     (Physician co-signature of this document indicates review and certification of the therapy plan).

## 2024-10-30 NOTE — PLAN OF CARE
Goal Outcome Evaluation:         Pt A&Ox4. SBA. Scheduled tylenol given for headache. Tingling in R index & R thumb. R shoulder dressing CDI, sling in place & NWB to the RUE. Denies RUE pain d/t block still in place. PIV SL.

## 2024-10-30 NOTE — PROGRESS NOTES
ORTHOPEDIC UPPER EXTREMITY PROGRESS NOTE    POD# 1  Patient is a 79 year old male who underwent Procedure(s):  RIGHT REVERSE SHOULDER ARTHROPLASTY , NO CUSTOM GUIDE on 10/29/2024. Pain is well controlled. Tolerating medication well, no nausea or vomiting. Block intact. No concerns. PTA Eliquis to restart POD 2.     Vitals:   Blood pressure 123/80, pulse 69, temperature 97.4  F (36.3  C), temperature source Oral, resp. rate 16, height 1.829 m (6'), weight 136.1 kg (300 lb 1.6 oz), SpO2 96%.  Temp (24hrs), Av.7  F (36.5  C), Min:97.4  F (36.3  C), Max:99  F (37.2  C)      EXAM   The patient is awake and alert.   Sensation is mildly decreased due to block.   Digital Flexion/Extension maintained.   Brisk cap refill.   The incision is covered with mesh. No redness, drainage. Mild swelling of upper arm, right.  Left hand is moderately swollen, no redness. Limited ROM due to swelling.   Labs:   Recent Labs   Lab Test 10/07/24  0913 24  1551 23  0825 23  0701 23  0834 23  0024 08/15/23  2043 08/15/23  1802 08/15/23  1757   HGB 13.6 13.2* 11.9*   < >  --    < > 9.1*   < > 8.3*   INR  --   --   --   --  1.37*  --  1.47*  --  1.79*    < > = values in this interval not displayed.       ASSESSMENT   1 day s/p Right Reverse Shoulder arthroplasty, no guide.      PLAN  1. DVT prophylaxis:  restart PTA Eliquis POD 2  2. Weight Bearing NWB (Non weight bearing). Sling x 4 weeks.   3. Wound Care: leave mesh dressing in place for 3 weeks.   4. Discharge anticipated date today. Home with No Skilled Service  5. Cont Pain Control Oxycodone and obtain Tylenol 650 mg otc. Ice continuously for the first 24-72 hours.   6. Continue with shoulder immobilizer.  Okay to remove for ADLs.  Okay for active range of motion for elbow, wrist and digits. Encourage ROM, ice and elevation for left hand.     Susanna Whelan PA-C  [facility]

## 2024-10-30 NOTE — PROGRESS NOTES
Patient vital signs are at baseline: Yes  Patient able to ambulate as they were prior to admission or with assist devices provided by therapies during their stay:  Yes  Patient MUST void prior to discharge:  Yes  Patient able to tolerate oral intake:  Yes  Pain has adequate pain control using Oral analgesics:  Yes  Does patient have an identified :  Yes  Has goal D/C date and time been discussed with patient:  Yes    Pt alert and oriented x4. Denied SOB, CP, N/V. Incision WNL. Pt able to repeat all discharge instructions. Pt educated to take metoprolol today and start eliquis,  entresto, and lasix tomorrow. Pt left with all discharge instructions, medications, and personal belongings. Pt acknowledged he has eliquis at home and it was too soon to refill at our discharge pharmacy.

## 2024-10-30 NOTE — PLAN OF CARE
Occupational Therapy Discharge Summary    Reason for therapy discharge:    Discharged to home.    Progress towards therapy goal(s). See goals on Care Plan in [facility] electronic health record for goal details.  Goals not met.  Barriers to achieving goals:   discharge from facility.    Therapy recommendation(s):    Assist in ADLs including moderate assist for UE dressing and minimum assist for LE dressing. Assist for strenuous IADLs including home management tasks, meal preparation etc.

## 2024-10-30 NOTE — PROGRESS NOTES
[facility]    Medicine Progress Note - Hospitalist Service    Date of Admission:  10/29/2024    Assessment & Plan   79 y.o. male with a past medical history of permanent atrial fibrillation, CAD s/p coronary artery bypass graft 8/15/23, HFrEF (30-35%), moderate aortic stenosis, HAYLEE and morbid obesity, underwent right reverse shoulder arthroplasty on 10/29/2024    Status post right reverse shoulder arthroplasty  Postop management including pain control, DVT prophylaxis as per orthopedic surgery team  Patient is doing well and they are planning on discharging him to home today   Weight Bearing NWB (Non weight bearing). Sling x 4 weeks.   3. Wound Care: leave mesh dressing in place for 3 weeks.       History of coronary artery disease status post CABG  History of cardiomyopathy ischemic  Moderate aortic stenosis  History of atrial fibrillation on anticoagulation with Eliquis  Hypertension  Restart PTA Toprol-XL 50 mg p.o. twice daily on 10-30 24  Restart PTA furosemide, Entresto, Eliquis on postop day 2 on 10/31/2024            Diet: Advance Diet as Tolerated: Regular Diet Adult  Discharge Instruction - Regular Diet Adult    DVT Prophylaxis: DOAC  Quach Catheter: Not present  Lines: None     Cardiac Monitoring: None  Code Status: Full Code      Clinically Significant Risk Factors Present on Admission                # Drug Induced Coagulation Defect: home medication list includes an anticoagulant medication    # Hypertension: Noted on problem list  # Chronic heart failure with reduced ejection fraction: last echo with EF <40%        # Severe Obesity: Estimated body mass index is 40.7 kg/m  as calculated from the following:    Height as of this encounter: 1.829 m (6').    Weight as of this encounter: 136.1 kg (300 lb 1.6 oz).        # History of CABG: noted on surgical history       Disposition Plan     Medically Ready for Discharge: Ready Now             Harmony Rose MD  Hospitalist Service    [facility]  Securely message with [employer] (more info)  Text page via [employer] Paging/Directory   ______________________________________________________________________    Interval History   Chart reviewed.  Discussed with bedside RN  Patient is sitting comfortably in bed.  Pain is well-controlled.  Denies any chest pain or shortness of breath.  No other acute issues      Physical Exam   Vital Signs: Temp: 98.3  F (36.8  C) Temp src: Oral BP: 95/45 Pulse: 81   Resp: 16 SpO2: 95 % O2 Device: None (Room air) Oxygen Delivery: 2 LPM  Weight: 300 lbs 1.6 oz    General Appearance: Alert awake, not in acute distress, sitting comfortably in the chair  Respiratory: Clear to auscultation bilaterally  Cardiovascular: Irregularly irregular, heart rate well-controlled  GI: Soft, nontender nondistended bowel sounds positive  Skin: Warm and dry  Other: No lower extremity edema noted    Medical Decision Making       49 MINUTES SPENT BY ME on the date of service doing chart review, history, exam, documentation & further activities per the note.      Data     I have personally reviewed the following data over the past 24 hrs:    N/A  \   N/A   / N/A     N/A N/A N/A /  181 (H)   4.2 N/A 0.87 \       Imaging results reviewed over the past 24 hrs:   Recent Results (from the past 24 hours)   XR Shoulder Right Port G/E 2 Views    Narrative    EXAM: XR SHOULDER RIGHT PORT G/E 2 VIEWS  LOCATION: [facility]  DATE: 10/29/2024    INDICATION: Status post surgery  COMPARISON: None.      Impression    IMPRESSION: Right reverse total shoulder arthroplasty. No fracture.

## 2024-11-04 NOTE — DISCHARGE SUMMARY
HOSPITAL DISCHARGE SUMMARY    Patient Name: Hugo Weber  YOB: 1945  Age: 79 year old  Medical Record Number: [id number]  Primary Physician: Brett Cassidy  Phone: [phone]  Admission Date: 10/29/2024  Discharge Date: 10/30/24, POD 1    He will be discharged from [facility] to Home.    PRINCIPAL DISCHARGE DIAGNOSIS: s/p right reverse shoulder arthroplasty, no guide    Patient Active Problem List   Diagnosis    Iron deficiency anemia    Colon polyps    Obstructive sleep apnea syndrome    Hyperlipidemia LDL goal <70    Long term current use of anticoagulant therapy - for intermittent a-fib    Total knee replacement status    Calculus of kidney - 1.2 cm stone at the upper pole as of CT urogram fr 1/11/2023    NAFLD (nonalcoholic fatty liver disease)    Morbid obesity due to excess calories (H)    History of hepatitis C    Prediabetes    Paroxysmal atrial fibrillation (H)    Cholelithiasis    Hypertension goal BP (blood pressure) < 140/90    TMJ arthralgia    Nephrolithiasis    OA (osteoarthritis)    First degree AV block    Benign prostatic hyperplasia (BPH) with urinary urge incontinence    Thoracic aortic ectasia (H)    Stroke (H)    CVA (cerebral vascular accident) (H)    Cervical stenosis of spine    Vitamin B12 deficiency (non anemic)    Vitamin D deficiency    Myofascial pain    Permanent atrial fibrillation (H)    History of CVA (cerebrovascular accident)    Small bowel obstruction (H)    NSVT (nonsustained ventricular tachycardia) (H)    Chronic LLQ pain    Elevated prostate specific antigen (PSA)    Prostate cancer (H)    Chronic systolic heart failure (H)    HFrEF (heart failure with reduced ejection fraction) (H)    Aortic valve stenosis, etiology of cardiac valve disease unspecified- moderate 2+ with FREDY = 1.2cm2 on echocardiogram fr 7/14/2023    Mitral [id number]+ on echocardiogram fr 7/14/2023    Cardiomyopathy (H)    Cardiomyopathy, unspecified type (H)     S/P CABG (coronary artery bypass graft)    Fluid overload    Transient hyperglycemia post procedure    Status post ligation of left atrial appendage    Encounter for surgical aftercare following surgery on the circulatory system    Presence of aortocoronary bypass graft:CAB x1 8/15/2023    Atherosclerosis of native coronary artery of native heart without angina pectoris    Essential (primary) hypertension    Chronic atrial fibrillation, unspecified (H)    Personal history of transient ischemic attack (TIA), and cerebral infarction without residual deficits    Physical deconditioning    Leg edema, right    HAYLEE on CPAP    S/P shoulder replacement, right       PROCEDURES PERFORMED DURING HOSPITALIZATION: right Reverse Total Shoulder Arthroplasty    BRIEF HOSPITAL COURSE: This is a pleasant 79 year old male who has had persistent complaints of pain that has failed non-operative management. Preoperative imaging revealed advanced osteoarthritis in the right shoulder.  The risks, benefits and possible complications of the above procedure were discussed with the patient. Patient elected to proceed to improve their lifestyle. Informed consent was obtained. For further details, please refer to the H&P in the chart.    The patient was admitted on 10/29/2024 and underwent the above-mentioned procedure. Patient tolerated this procedure well. Postoperatively, patient was seen in consultation by the internal medicine hospitalist service. Throughout the hospitalization, wound remained clean. The patient was started and advanced in a diet. CMS remained intact. Patient was seen and evaluated by occupational therapy. Hemoglobin was stable prior to discharge.    COMPLICATIONS IN HOSPITAL: None     Latest Laboratory Results:  Chem:  Recent Labs   Lab Test 10/29/24  1349 10/07/24  0913   POTASSIUM 4.2 4.0     WBC/Hgb:  Recent Labs   Lab Test 10/30/24  1137 10/07/24  0913 06/02/24  1551   WBC  --  4.5 4.9   HGB 12.4* 13.6 13.2*      INR:  Recent Labs   Lab Test 08/17/23  0834 08/15/23  2043 08/15/23  1757 08/15/23  0735   INR 1.37* 1.47* 1.79* 1.14       IMPORTANT PENDING TEST RESULTS: None.     CONDITION AT DISCHARGE:    Doing well.  Continues to improve.  Pain is well-controlled.  No fevers.  Restart PTA Eliquis POD 2    DISCHARGE ORDERS  Oxycodone 5 mg, 1 to 2 tablets, take every 4 to 6 hours as needed for pain, based upon pain scores  Tylenol 650 mg, 2 tablets, take every 8 hours around the clock (over the counter)      After Discharge Recommendations:  1. Resume pre-admission diet  2. DVT prophylaxis: Restart PTA Eliquis on POD 2  3. Follow up: He should see Susanna Whelan PA-C or Dr. Multani in clinic in 7-10 days.  4. No sutures will need to be removed. Prineo dressing to be removed 3 weeks postoperatively. You may shower over dressing. Do not soak or submerge incision for 3 weeks.  5. Weight Bearing NWB (No weight bearing) right upper extremity.  6. Sling for one month. Wear at all times, including sleep. May remove to dress and bathe.      Total time spent for discharge on date of discharge: 23 minutes    Physician(s) in addition to primary physician who should receive a copy:  Primary Care Physician Brett Cassidy  [facility], Fax: [phone]    Susanna Whelan PA-C ....................  11/4/2024   8:49 AM

## 2024-11-06 ENCOUNTER — TRANSFERRED RECORDS (OUTPATIENT)
Dept: HEALTH INFORMATION MANAGEMENT | Facility: CLINIC | Age: 79
End: 2024-11-06
Payer: COMMERCIAL

## 2024-11-11 NOTE — ADDENDUM NOTE
Addendum  created 11/11/24 1326 by Roge Walters MD    Clinical Note Signed, Diagnosis association updated, Intraprocedure Blocks edited, SmartForm saved

## 2024-12-04 ENCOUNTER — TRANSFERRED RECORDS (OUTPATIENT)
Dept: HEALTH INFORMATION MANAGEMENT | Facility: CLINIC | Age: 79
End: 2024-12-04
Payer: COMMERCIAL

## 2024-12-24 NOTE — PROGRESS NOTES
"[facility]    Hugo Weber is a 75 year old male who presents for Preventive Visit.    Grieving recent loss of wife, Mayra, from Glioblastoma, reviewed in great detail    Are you in the first 12 months of your Medicare coverage?  No    Healthy Habits:    In general, how would you rate your overall health?  Good    Frequency of exercise:  None    Do you usually eat at least 4 servings of fruit and vegetables a day, include whole grains    & fiber and avoid regularly eating high fat or \"junk\" foods?  No    Taking medications regularly:  Yes    Barriers to taking medications:  None    Medication side effects:  None    Ability to successfully perform activities of daily living:  No assistance needed    Home Safety:  No safety concerns identified    Hearing Impairment:  No hearing concerns (wears bilateral hearing aids)    In the past 6 months, have you been bothered by leaking of urine? Yes    In general, how would you rate your overall mental or emotional health?  Good      PHQ-2 Total Score:    Additional concerns today:  Yes (left shoulder - would like referral)    Kidney  stone    Do you feel safe in your environment? Yes    Have you ever done Advance Care Planning? (For example, a Health Directive, POLST, or a discussion with a medical provider or your loved ones about your wishes): Yes, advance care planning is on file.      Fall risk  Fallen 2 or more times in the past year?: No  Any fall with injury in the past year?: No    Cognitive Screening   1) Repeat 3 items (Leader, Season, Table)    2) Clock draw: NORMAL  3) 3 item recall: Recalls 3 objects  Results: 3 items recalled: COGNITIVE IMPAIRMENT LESS LIKELY    Mini-CogTM Copyright PANCHO Gaspar. Licensed by the author for use in [facility]; reprinted with permission (josi@.[email or web address]). All rights reserved.      Do you have sleep apnea, excessive snoring or daytime drowsiness?: yes uses cpap    Reviewed and updated as " Frederick Watkins was admitted to [facility] from ED via cart accompanied by Other ED tech .   Reason for hospitalization is SOB.   Upon arrival, patient is stable. Patient has history significant for   Past Medical History:   Diagnosis Date    Atrial fibrillation  (CMD)     Bilateral pulmonary embolism  (CMD) 07/11/2015    Blood clot associated with vein wall inflammation     CKD (chronic kidney disease)     w/mild renal insufficiency    Congestive cardiac failure  (CMD)     DVT (deep venous thrombosis), right 09/03/2013    Ongoing anticoagulation - rivoraxaban (Xarelto)    DVT, lower extremity  (CMD)     right    History of TIA (transient ischemic attack)     Malignant Hypercalcemia     Malignant tumor of prostate  (CMD)     Multiple myeloma (CMD)     PMH of     myeloma bone disease    RAD (reactive airway disease) (CMD)     Sensorineural hearing loss, unilateral 10/22/2013    Sinusitis, chronic      .  Patient oriented to bed, call light, , room, and unit.  Patient provided with the following educational materials upon admission:safety.   Level of understanding patient verbalized understanding.   Admission orders received at this time.   MD notified of patient arrival.   See Epic documentation for patient individualized nursing care plan.   needed this visit by clinical staff  Tobacco  Allergies  Meds  Med Hx  Surg Hx  Fam Hx  Soc Hx        Reviewed and updated as needed this visit by Provider        Social History     Tobacco Use     Smoking status: Never Smoker     Smokeless tobacco: Never Used   Substance Use Topics     Alcohol use: Yes     Alcohol/week: 2.0 standard drinks     Types: 2 Standard drinks or equivalent per week     Comment: 2 per week     If you drink alcohol do you typically have >3 drinks per day or >7 drinks per week? No    Alcohol Use 8/7/2020   Prescreen: >3 drinks/day or >7 drinks/week? -   Prescreen: >3 drinks/day or >7 drinks/week? No     Current providers sharing in care for this patient include:   Patient Care Team:  Brett Cassidy MD as PCP - General  Brett Cassidy MD as Assigned PCP  Lauren Otero RD as Diabetes Educator (Dietitian, Registered)  Regulo Heath MD as MD (Urology)    The following health maintenance items are reviewed in Epic and correct as of today:  Health Maintenance   Topic Date Due     MEDICARE ANNUAL WELLNESS VISIT  09/20/2019     MICROALBUMIN  09/20/2019     PSA  09/20/2019     FALL RISK ASSESSMENT  09/20/2019     PHQ-2  01/01/2020     LIPID  02/22/2020     INFLUENZA VACCINE (1) 09/01/2020     BMP  12/02/2020     ADVANCE CARE PLANNING  09/25/2022     COLORECTAL CANCER SCREENING  12/13/2024     DTAP/TDAP/TD IMMUNIZATION (4 - Td) 10/20/2025     PNEUMOCOCCAL IMMUNIZATION 65+ LOW/MEDIUM RISK  Completed     ZOSTER IMMUNIZATION  Completed     AORTIC ANEURYSM SCREENING (SYSTEM ASSIGNED)  Completed     IPV IMMUNIZATION  Aged Out     MENINGITIS IMMUNIZATION  Aged Out     HX of CVA - 2019 - no residual    Persistent A Fib - no sameer Castillo - annual follow up    Prediabetes -     Glucose   Date Value Ref Range Status   12/02/2019 120 (H) 70 - 99 mg/dL Final   09/20/2019 89 70 - 99 mg/dL Final   08/14/2019 102 (H) 70 - 99 mg/dL Final     Comment:     Fasting specimen   02/06/2019 79 70 - 99 mg/dL Final    11/12/2018 91 70 - 99 mg/dL Final     Lab Results   Component Value Date    A1C 5.8 08/07/2020    A1C 5.9 02/14/2020    A1C 5.7 12/02/2019    A1C 5.6 09/20/2019    A1C 5.6 08/14/2019     Lipids    Recent Labs   Lab Test 02/22/19 09/20/18  0919  01/13/15  0815 09/03/14  0810   CHOL 106 165   < > 131 156   HDL 44 44   < > 43 42   LDL 49 100*   < > 74 93   TRIG 72 106   < > 72 103   CHOLHDLRATIO  --   --   --  3.0 3.7    < > = values in this interval not displayed.     Htn    BP Readings from Last 3 Encounters:   08/07/20 126/68   02/19/20 136/84   02/12/20 130/76     Creatinine   Date Value Ref Range Status   12/02/2019 1.00 0.66 - 1.25 mg/dL Final     Nephrolithiasis - Dr Heath    HAYLEE - CPAP every night    NAFLD    TOMI    Hx of Hep C    GERD - no issues    Skin cancer - Dr Saldivar    Left shoulder pain - no redness, no warmth, no pain, no numbness, no tingling, aches - mainly at night - Dr Durham    Health Maintenance     Colonoscopy:  Due 9/2021   FIT:  given              PSA:  done   DEXA:  NA    Health Maintenance Due   Topic Date Due     MEDICARE ANNUAL WELLNESS VISIT  09/20/2019     MICROALBUMIN  09/20/2019     PSA  09/20/2019     FALL RISK ASSESSMENT  09/20/2019     PHQ-2  01/01/2020     LIPID  02/22/2020       Current Problem List    Patient Active Problem List   Diagnosis     Iron deficiency anemia     Colon polyps     Obstructive sleep apnea syndrome     Hyperlipidemia LDL goal <70     Long term current use of anticoagulant therapy - for intermittent a-fib     Advance Care Planning     Total knee replacement status     Calculus of kidney     NAFLD (nonalcoholic fatty liver disease)     Morbid obesity due to excess calories (H)     History of hepatitis C     Prediabetes     Paroxysmal atrial fibrillation (H)     Cholelithiasis     Hypertension goal BP (blood pressure) < 140/90     TMJ arthralgia     Nephrolithiasis     OA (osteoarthritis)     First degree AV block     Benign prostatic hyperplasia (BPH)  with urinary urge incontinence     Thoracic aortic ectasia (H)     Stroke (H)     CVA (cerebral vascular accident) (H)     Cervical stenosis of spine     Vitamin B12 deficiency (non anemic)     Vitamin D deficiency     Myofascial pain     Atrial fibrillation       Past Medical History    Past Medical History:   Diagnosis Date     Arrhythmia      Arthritis      Atrial fibrillation 2006    Followed by [facility] - persistent     Calculus of kidney 2005, 6/06, 10/12, 8/18, 9/19    dr walker/nestor/иван - hematuria - w/u o/w neg     Cervical stenosis of spine     Moderate C4-5     Cholelithiasis      Chronic peptic ulcer, unspecified site, without mention of hemorrhage, perforation, or obstruction 1985    gastric bypass     Colon polyps 8/05, 9/10, 10/15    2 tubular adenoma, 1 hyperplastic - multiple serrated/tubular adenomas     CVA (cerebral vascular accident) (H) 01/2019    small right periorlandic subcortical - left sided residual - left hand tingling/numbness - Left leg weak/numbness - cardioembolic     First degree AV block      Hepatitis C 10/12    chronic hepatitis C, grade 1-2, stage 1 - mild - Dr Douglas     Hyperlipidemia LDL goal <70      Hypertension goal BP (blood pressure) < 140/90 6/06    dr jaciel winchester     Iron deficiency anemia, unspecified 1985    gastric bypass     Nephrolithiasis multiple episodes, last 10/19    Dr Bey/Ivana     OA (osteoarthritis)     Multiple - Left shoulder with rotator cuff tear - Dr Durham     Obesity, unspecified     s/p gastric bypass 1985      Prediabetes      Presbyacusis 1/04    dr corona     Pyelonephritis, unspecified 12/99     SCC (squamous cell carcinoma), 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    dr saez - lt conchal bowl     Sleep apnea 10/02    cpap - severe - 15 cm - dr cunha     Stroke (H) 1/19/2019     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    [facility]     Vitamin B12 deficiency (non anemic) 4/15/2019     Vitamin D deficiency 4/15/2019       Past Surgical History    Past Surgical History:    Procedure Laterality Date     APPENDECTOMY       ARTHROPLASTY KNEE  8/13/2012    LT TKA - Dr Villanueva     CARDIOVERSION  2016     COLONOSCOPY  8/05, 9/10, 10/15    multiple tubular/serrated/hyperplastic polyps     COLONOSCOPY N/A 10/29/2015    multiple tubular and serrated adenomas     COLONOSCOPY N/A 9/7/2016     EYE SURGERY  Lasik     HC KNEE SCOPE, DIAGNOSTIC  05/00    Arthroscopy, Knee RT     LASER HOLMIUM LITHOTRIPSY URETER(S), INSERT STENT, COMBINED  10/16/2012    stones x 4, Dr Campa     LASER HOLMIUM LITHOTRIPSY URETER(S), INSERT STENT, COMBINED Right 5/2/2018    CYSTOSCOPY, RIGHT URETEROSCOPY, HOLMIUM LASER LITHOTRIPSY, RIGHT STENT PLACEMENT - Dr Bey     SURGICAL HISTORY OF -   1985    s/p gastric bypass Bilroth II     SURGICAL HISTORY OF -   11/98    wisdom teeth     SURGICAL HISTORY OF -   1979    cellulitis     SURGICAL HISTORY OF -   11/98    lt forearm spur's     SURGICAL HISTORY OF -   1/01,6/02,11/02    s/p lasik     SURGICAL HISTORY OF -   11/99    rt forearm spurs     SURGICAL HISTORY OF -   7/06    dr stevenson - lithotrypsy     SURGICAL HISTORY OF -   10/08, 12/08    Lt ear chonchal lesion removal SCC - dr saez     SURGICAL HISTORY OF -  Right 10/2019    nephrolithiasis - cystoscopy, rt lithotrypsy, Dr Heath     SURGICAL HISTORY OF -  Right 11/2019    Cystoscopy, Rt lithotrypsy, Calcium Oxalate, Dr Heath       Current Medications    Current Outpatient Medications   Medication Sig Dispense Refill     apixaban ANTICOAGULANT (ELIQUIS) 5 MG tablet Take 1 tablet (5 mg) by mouth 2 times daily 180 tablet 3     Cyanocobalamin 5000 MCG TBDP        diltiazem ER (DILT-XR) 180 MG 24 hr capsule Take 2 capsules (360 mg) by mouth daily 180 capsule 3     Ferrous Sulfate (IRON) 325 (65 Fe) MG tablet Take 1 tablet by mouth daily (with breakfast) 90 tablet 3     fluticasone (FLONASE) 50 MCG/ACT nasal spray Spray 2 sprays into both nostrils daily as needed for rhinitis or allergies 54.6 mL 3      Multiple Vitamins-Minerals (VITAMIN D3 COMPLETE PO) Take 125 mcg by mouth       niacin 500 MG tablet Take by mouth daily (with breakfast)       pantoprazole (PROTONIX) 40 MG EC tablet Take 1 tablet (40 mg) by mouth daily 90 tablet 3     STATIN NOT PRESCRIBED (INTENTIONAL) Please choose reason not prescribed, below, treated for hepatitis C       tamsulosin (FLOMAX) 0.4 MG capsule Take 1 capsule (0.4 mg) by mouth 2 times daily 180 capsule 3     vitamin C (ASCORBIC ACID) 1000 MG TABS Take 1,000 mg by mouth daily       Vitamin D-Vitamin K (VITAMIN K2-VITAMIN D3 PO)          Allergies    No Known Allergies    Immunizations    Immunization History   Administered Date(s) Administered     Influenza (High Dose) 3 valent vaccine 2012, 2013, 10/20/2015, 2017, 2018, 2019     Influenza (IIV3) PF 10/26/2007, 2008, 2009, 10/14/2011     Influenza Vaccine, 6+MO IM (QUADRIVALENT W/PRESERVATIVES) 10/30/2017     Pneumo Conj 13-V (2010&after) 03/10/2016, 2017     Pneumococcal 23 valent 2005, 2012     TD (ADULT, 7+) 1998     TDAP Vaccine (Adacel) 2007     TDAP Vaccine (Boostrix) 10/20/2015     Twinrix A/B 2005, 2007, 2008     Zoster vaccine recombinant adjuvanted (SHINGRIX) 2019, 2019       Family History    Family History   Problem Relation Age of Onset     Alzheimer Disease Father 82         at 88     Prostate Cancer Father 76        bladder and prostate     Heart Disease Mother 80        CHF     Heart Disease Maternal Grandfather         MI at 55     Cancer Paternal Uncle         ?       Social History    Social History     Socioeconomic History     Marital status:      Spouse name: Mayra     Number of children: 3     Years of education: 21     Highest education level: Not on file   Occupational History     Occupation: retired teacher and football and       Employer: [employer] [address]     Employer:  "RETIRED   Social Needs     Financial resource strain: Not on file     Food insecurity     Worry: Not on file     Inability: Not on file     Transportation needs     Medical: Not on file     Non-medical: Not on file   Tobacco Use     Smoking status: Never Smoker     Smokeless tobacco: Never Used   Substance and Sexual Activity     Alcohol use: Yes     Alcohol/week: 2.0 standard drinks     Types: 2 Standard drinks or equivalent per week     Comment: 2 per week     Drug use: No     Comment: acknowledges using herbal supplement \"Vibe\" for energy-none used recently      Sexual activity: Not Currently     Partners: Female     Comment:     Lifestyle     Physical activity     Days per week: Not on file     Minutes per session: Not on file     Stress: Not on file   Relationships     Social connections     Talks on phone: Not on file     Gets together: Not on file     Attends [employer]ist service: Not on file     Active member of club or organization: Not on file     Attends meetings of clubs or organizations: Not on file     Relationship status: Not on file     Intimate partner violence     Fear of current or ex partner: Not on file     Emotionally abused: Not on file     Physically abused: Not on file     Forced sexual activity: Not on file   Other Topics Concern      Service Not Asked     Blood Transfusions Not Asked     Caffeine Concern Yes     Comment: <1 can qd     Occupational Exposure Not Asked     Hobby Hazards Not Asked     Sleep Concern Yes     Comment: sleep apnea, wears cpap     Stress Concern Not Asked     Weight Concern Not Asked     Special Diet Not Asked     Back Care Not Asked     Exercise No     Bike Helmet Not Asked     Seat Belt Yes     Self-Exams Not Asked     Parent/sibling w/ CABG, MI or angioplasty before 65F 55M? No   Social History Narrative     Not on file       ROS    CONSTITUTIONAL: NEGATIVE for fever, chills, change in weight  INTEGUMENTARY/SKIN: NEGATIVE for worrisome rashes, moles " "or lesions  EYES: NEGATIVE for vision changes or irritation  ENT/MOUTH: NEGATIVE for ear, mouth and throat problems  RESP: NEGATIVE for significant cough or SOB  CV: NEGATIVE for chest pain, palpitations or peripheral edema  GI: NEGATIVE for nausea, abdominal pain, heartburn, or change in bowel habits  : NEGATIVE for frequency, dysuria, or hematuria  MUSCULOSKELETAL: NEGATIVE for significant arthralgias or myalgia  NEURO: NEGATIVE for weakness, dizziness or paresthesias  ENDOCRINE: NEGATIVE for temperature intolerance, skin/hair changes  HEME: NEGATIVE for bleeding problems  PSYCHIATRIC: NEGATIVE for changes in mood or affect    OBJECTIVE    /68   Pulse 87   Temp 97.6  F (36.4  C) (Tympanic)   Ht 1.905 m (6' 3\")   Wt 148.8 kg (328 lb)   SpO2 97%   BMI 41.00 kg/m   Estimated body mass index is 41 kg/m  as calculated from the following:    Height as of this encounter: 1.905 m (6' 3\").    Weight as of this encounter: 148.8 kg (328 lb).  EXAM:   GENERAL: healthy, alert and no distress  EYES: Eyes grossly normal to inspection, PERRL and conjunctivae and sclerae normal  HENT: ear canals and TM's normal, nose and mouth without ulcers or lesions  NECK: no adenopathy, no asymmetry, masses, or scars and thyroid normal to palpation  RESP: lungs clear to auscultation - no rales, rhonchi or wheezes  CV: regular rate and rhythm, normal S1 S2, no S3 or S4, no murmur, click or rub, no peripheral edema and peripheral pulses strong  ABDOMEN: soft, nontender, no hepatosplenomegaly, no masses and bowel sounds normal   (male): testicles normal without atrophy or masses, no hernias and penis normal without urethral discharge  RECTAL: normal sphincter tone, no rectal masses, prostate of normal size for age, smooth, nontender without masses/nodules and prostate 1+ enlarged, nontender  MS: no gross musculoskeletal defects noted, no edema  NEURO: Normal strength and tone, mentation intact and speech normal  PSYCH: mentation " appears normal, affect normal/bright  LYMPH: no cervical, supraclavicular, axillary, or inguinal adenopathy    Bilateral lower extremity chronic venous stasis changes - brown changes    DIAGNOSTICS/PROCEDURES    Pending    ASSESSMENT      ICD-10-CM    1. Encounter for routine adult health examination without abnormal findings  Z00.00 Comprehensive metabolic panel     Lipid panel reflex to direct LDL Fasting     CK total     CBC with platelets     TSH with free T4 reflex     UA reflex to Microscopic and Culture     Albumin Random Urine Quantitative with Creat Ratio     Prostate spec antigen screen     Fecal colorectal cancer screen FIT     Hepatitis C RNA, quantitative     Hemoglobin A1c     Vitamin D Deficiency     Vitamin B12     Iron and iron binding capacity     Ferritin   2. Encounter for Medicare annual wellness exam  Z00.00    3. Grieving  F43.21    4. History of CVA (cerebrovascular accident)  Z86.73 Comprehensive metabolic panel     Lipid panel reflex to direct LDL Fasting     UA reflex to Microscopic and Culture     Albumin Random Urine Quantitative with Creat Ratio     Hemoglobin A1c   5. Longstanding persistent atrial fibrillation (H)  I48.11 apixaban ANTICOAGULANT (ELIQUIS) 5 MG tablet     diltiazem ER (DILT-XR) 180 MG 24 hr capsule   6. Persistent atrial fibrillation (H)  I48.19    7. Paroxysmal atrial fibrillation (H)  I48.0 Comprehensive metabolic panel     TSH with free T4 reflex   8. Prediabetes  R73.03 Comprehensive metabolic panel     UA reflex to Microscopic and Culture     Albumin Random Urine Quantitative with Creat Ratio     Hemoglobin A1c   9. Hypertension goal BP (blood pressure) < 140/90  I10 Comprehensive metabolic panel     UA reflex to Microscopic and Culture     Albumin Random Urine Quantitative with Creat Ratio     diltiazem ER (DILT-XR) 180 MG 24 hr capsule   10. Hyperlipidemia LDL goal <70  E78.5 Comprehensive metabolic panel     Lipid panel reflex to direct LDL Fasting     CK total    11. Obstructive sleep apnea syndrome  G47.33 TSH with free T4 reflex     fluticasone (FLONASE) 50 MCG/ACT nasal spray   12. NAFLD (nonalcoholic fatty liver disease)  K76.0 Comprehensive metabolic panel   13. Iron deficiency anemia, unspecified iron deficiency anemia type  D50.9 CBC with platelets     Iron and iron binding capacity     Ferritin   14. Primary osteoarthritis involving multiple joints  M89.49    15. History of hepatitis C  Z86.19 Comprehensive metabolic panel     Hepatitis C RNA, quantitative   16. Gastroesophageal reflux disease with esophagitis  K21.0 CBC with platelets     pantoprazole (PROTONIX) 40 MG EC tablet     tamsulosin (FLOMAX) 0.4 MG capsule   17. Nephrolithiasis  N20.0 UA reflex to Microscopic and Culture   18. Chronic left shoulder pain  M25.512 PHYSICAL THERAPY REFERRAL    G89.29    19. Vitamin B12 deficiency (non anemic)  E53.8 Vitamin B12   20. Vitamin D deficiency  E55.9 Vitamin D Deficiency   21. Screening for prostate cancer  Z12.5 Prostate spec antigen screen   22. Screen for colon cancer  Z12.11 Fecal colorectal cancer screen FIT   23. Morbid obesity (H)  E66.01    24. Medication monitoring encounter  Z51.81 Comprehensive metabolic panel     Lipid panel reflex to direct LDL Fasting     CK total     CBC with platelets     TSH with free T4 reflex     UA reflex to Microscopic and Culture     Albumin Random Urine Quantitative with Creat Ratio     Hepatitis C RNA, quantitative     Hemoglobin A1c     Vitamin D Deficiency     Vitamin B12     Iron and iron binding capacity     Ferritin   25. Long term current use of anticoagulant therapy - for intermittent a-fib  Z79.01 CBC with platelets       PLAN    Discussed treatment/modality options, including risk and benefits, he desires:    advised alcohol consumption 1oz per day or less, advised aspirin 81 mg po daily, advised 1 multivitamin per day, advised calcium 3432-0795 mg/d and Vitamin D 800-1200 IU/d, advised dentist every 6 months,  "advised diet, exercise, and weight loss, advised opthalmologist every 1-2 years, advised self testicular exam q month, further health care maintenance, further lab(s), medication refill(s) and observation    All diagnosis above reviewed and noted above, otherwise stable.      See [facility] orders for further details.      1) labs    2) med refills    3) flu vac in fall    4) close follow up, offered counseling to help with grieving    Return in about 3 months (around 11/7/2020), or if symptoms worsen or fail to improve, for Annual Wellness Visit, Follow Up Chronic, Medication Recheck Visit.    Health Maintenance Due   Topic Date Due     MEDICARE ANNUAL WELLNESS VISIT  09/20/2019     MICROALBUMIN  09/20/2019     PSA  09/20/2019     FALL RISK ASSESSMENT  09/20/2019     PHQ-2  01/01/2020     LIPID  02/22/2020       End of Life Planning:  Patient currently has an advanced directive: pending    COUNSELING    Reviewed preventive health counseling, as reflected in patient instructions    Estimated body mass index is 41 kg/m  as calculated from the following:    Height as of this encounter: 1.905 m (6' 3\").    Weight as of this encounter: 148.8 kg (328 lb).    Weight management plan: diet and exercise     reports that he has never smoked. He has never used smokeless tobacco.      Appropriate preventive services were discussed with this patient, including applicable screening as appropriate for cardiovascular disease, diabetes, osteopenia/osteoporosis, and glaucoma.  As appropriate for age/gender, discussed screening for colorectal cancer, prostate cancer, breast cancer, and cervical cancer. Checklist reviewing preventive services available has been given to the patient.    Reviewed patients plan of care and provided an AVS. The Intermediate Care Plan ( asthma action plan, low back pain action plan, and migraine action plan) for Hugo meets the Care Plan requirement. This Care Plan has been established and reviewed with the " Patient.         Brett Cassidy MD, FAAFP     [facility]  [address] [place] [id number]  [email or web address]   Office: [phone]  Fax: [phone]  Pager: [phone]

## 2025-01-25 SDOH — HEALTH STABILITY: PHYSICAL HEALTH: ON AVERAGE, HOW MANY DAYS PER WEEK DO YOU ENGAGE IN MODERATE TO STRENUOUS EXERCISE (LIKE A BRISK WALK)?: 3 DAYS

## 2025-01-25 SDOH — HEALTH STABILITY: PHYSICAL HEALTH: ON AVERAGE, HOW MANY MINUTES DO YOU ENGAGE IN EXERCISE AT THIS LEVEL?: 130 MIN

## 2025-01-25 ASSESSMENT — SOCIAL DETERMINANTS OF HEALTH (SDOH): HOW OFTEN DO YOU GET TOGETHER WITH FRIENDS OR RELATIVES?: MORE THAN THREE TIMES A WEEK

## 2025-01-28 ENCOUNTER — OFFICE VISIT (OUTPATIENT)
Dept: FAMILY MEDICINE | Facility: CLINIC | Age: 80
End: 2025-01-28
Payer: COMMERCIAL

## 2025-01-28 VITALS
RESPIRATION RATE: 18 BRPM | HEART RATE: 96 BPM | SYSTOLIC BLOOD PRESSURE: 98 MMHG | DIASTOLIC BLOOD PRESSURE: 64 MMHG | TEMPERATURE: 97.9 F | OXYGEN SATURATION: 97 % | BODY MASS INDEX: 42.84 KG/M2 | HEIGHT: 71 IN | WEIGHT: 306 LBS

## 2025-01-28 DIAGNOSIS — I25.810 CORONARY ARTERY DISEASE INVOLVING CORONARY BYPASS GRAFT OF NATIVE HEART WITHOUT ANGINA PECTORIS: ICD-10-CM

## 2025-01-28 DIAGNOSIS — E53.8 VITAMIN B12 DEFICIENCY (NON ANEMIC): ICD-10-CM

## 2025-01-28 DIAGNOSIS — G47.33 OSA ON CPAP: ICD-10-CM

## 2025-01-28 DIAGNOSIS — E55.9 VITAMIN D DEFICIENCY: ICD-10-CM

## 2025-01-28 DIAGNOSIS — I10 HYPERTENSION GOAL BP (BLOOD PRESSURE) < 140/90: ICD-10-CM

## 2025-01-28 DIAGNOSIS — I50.20 HFREF (HEART FAILURE WITH REDUCED EJECTION FRACTION) (H): ICD-10-CM

## 2025-01-28 DIAGNOSIS — I47.29 NSVT (NONSUSTAINED VENTRICULAR TACHYCARDIA) (H): ICD-10-CM

## 2025-01-28 DIAGNOSIS — Z98.84 HISTORY OF GASTRIC BYPASS: ICD-10-CM

## 2025-01-28 DIAGNOSIS — N20.0 NEPHROLITHIASIS: ICD-10-CM

## 2025-01-28 DIAGNOSIS — Z12.11 SCREEN FOR COLON CANCER: ICD-10-CM

## 2025-01-28 DIAGNOSIS — I34.0 NONRHEUMATIC MITRAL VALVE REGURGITATION: ICD-10-CM

## 2025-01-28 DIAGNOSIS — Z00.00 MEDICARE ANNUAL WELLNESS VISIT, SUBSEQUENT: ICD-10-CM

## 2025-01-28 DIAGNOSIS — K80.20 CALCULUS OF GALLBLADDER WITHOUT CHOLECYSTITIS WITHOUT OBSTRUCTION: ICD-10-CM

## 2025-01-28 DIAGNOSIS — Z98.84 HX OF GASTRIC BYPASS: ICD-10-CM

## 2025-01-28 DIAGNOSIS — I35.0 AORTIC VALVE STENOSIS, ETIOLOGY OF CARDIAC VALVE DISEASE UNSPECIFIED: ICD-10-CM

## 2025-01-28 DIAGNOSIS — C61 PROSTATE CANCER (H): ICD-10-CM

## 2025-01-28 DIAGNOSIS — I42.9 CARDIOMYOPATHY, UNSPECIFIED TYPE (H): ICD-10-CM

## 2025-01-28 DIAGNOSIS — Z96.60 HISTORY OF JOINT REPLACEMENT, UNSPECIFIED JOINT: ICD-10-CM

## 2025-01-28 DIAGNOSIS — Z95.1 S/P CABG (CORONARY ARTERY BYPASS GRAFT): Chronic | ICD-10-CM

## 2025-01-28 DIAGNOSIS — K76.0 NAFLD (NONALCOHOLIC FATTY LIVER DISEASE): ICD-10-CM

## 2025-01-28 DIAGNOSIS — Z51.81 MEDICATION MONITORING ENCOUNTER: ICD-10-CM

## 2025-01-28 DIAGNOSIS — I50.22 CHRONIC SYSTOLIC HEART FAILURE (H): ICD-10-CM

## 2025-01-28 DIAGNOSIS — Z86.73 HISTORY OF CVA (CEREBROVASCULAR ACCIDENT): ICD-10-CM

## 2025-01-28 DIAGNOSIS — Z79.899 MEDICATION MANAGEMENT: ICD-10-CM

## 2025-01-28 DIAGNOSIS — Z86.73 PERSONAL HISTORY OF TRANSIENT ISCHEMIC ATTACK (TIA), AND CEREBRAL INFARCTION WITHOUT RESIDUAL DEFICITS: ICD-10-CM

## 2025-01-28 DIAGNOSIS — Z00.00 ROUTINE GENERAL MEDICAL EXAMINATION AT A HEALTH CARE FACILITY: Primary | ICD-10-CM

## 2025-01-28 DIAGNOSIS — D50.9 IRON DEFICIENCY ANEMIA, UNSPECIFIED IRON DEFICIENCY ANEMIA TYPE: ICD-10-CM

## 2025-01-28 DIAGNOSIS — R73.03 PREDIABETES: ICD-10-CM

## 2025-01-28 DIAGNOSIS — E66.01 MORBID OBESITY (H): ICD-10-CM

## 2025-01-28 DIAGNOSIS — E78.5 HYPERLIPIDEMIA LDL GOAL <70: ICD-10-CM

## 2025-01-28 DIAGNOSIS — I63.9 CEREBROVASCULAR ACCIDENT (CVA), UNSPECIFIED MECHANISM (H): ICD-10-CM

## 2025-01-28 DIAGNOSIS — G47.33 OBSTRUCTIVE SLEEP APNEA SYNDROME: ICD-10-CM

## 2025-01-28 DIAGNOSIS — I48.20 CHRONIC ATRIAL FIBRILLATION, UNSPECIFIED (H): ICD-10-CM

## 2025-01-28 DIAGNOSIS — I48.21 PERMANENT ATRIAL FIBRILLATION (H): ICD-10-CM

## 2025-01-28 DIAGNOSIS — Z86.19 HISTORY OF HEPATITIS C: ICD-10-CM

## 2025-01-28 LAB
ALBUMIN SERPL BCG-MCNC: 3.7 G/DL (ref 3.5–5.2)
ALBUMIN UR-MCNC: NEGATIVE MG/DL
ALP SERPL-CCNC: 118 U/L (ref 40–150)
ALT SERPL W P-5'-P-CCNC: 14 U/L (ref 0–70)
ANION GAP SERPL CALCULATED.3IONS-SCNC: 9 MMOL/L (ref 7–15)
APPEARANCE UR: CLEAR
AST SERPL W P-5'-P-CCNC: 20 U/L (ref 0–45)
BILIRUB SERPL-MCNC: 0.5 MG/DL
BILIRUB UR QL STRIP: NEGATIVE
BUN SERPL-MCNC: 19.9 MG/DL (ref 8–23)
CALCIUM SERPL-MCNC: 9 MG/DL (ref 8.8–10.4)
CHLORIDE SERPL-SCNC: 102 MMOL/L (ref 98–107)
CHOLEST SERPL-MCNC: 103 MG/DL
CK SERPL-CCNC: 64 U/L (ref 39–308)
COLOR UR AUTO: YELLOW
CREAT SERPL-MCNC: 1.14 MG/DL (ref 0.67–1.17)
CREAT UR-MCNC: 89.4 MG/DL
EGFRCR SERPLBLD CKD-EPI 2021: 66 ML/MIN/1.73M2
ERYTHROCYTE [DISTWIDTH] IN BLOOD BY AUTOMATED COUNT: 14.1 % (ref 10–15)
EST. AVERAGE GLUCOSE BLD GHB EST-MCNC: 111 MG/DL
FASTING STATUS PATIENT QL REPORTED: YES
FASTING STATUS PATIENT QL REPORTED: YES
FERRITIN SERPL-MCNC: 32 NG/ML (ref 31–409)
GLUCOSE SERPL-MCNC: 95 MG/DL (ref 70–99)
GLUCOSE UR STRIP-MCNC: 100 MG/DL
HBA1C MFR BLD: 5.5 % (ref 0–5.6)
HCO3 SERPL-SCNC: 29 MMOL/L (ref 22–29)
HCT VFR BLD AUTO: 39.1 % (ref 40–53)
HDLC SERPL-MCNC: 49 MG/DL
HGB BLD-MCNC: 12.5 G/DL (ref 13.3–17.7)
HGB UR QL STRIP: NEGATIVE
IRON BINDING CAPACITY (ROCHE): 350 UG/DL (ref 240–430)
IRON SATN MFR SERPL: 15 % (ref 15–46)
IRON SERPL-MCNC: 52 UG/DL (ref 61–157)
KETONES UR STRIP-MCNC: NEGATIVE MG/DL
LDLC SERPL CALC-MCNC: 43 MG/DL
LEUKOCYTE ESTERASE UR QL STRIP: NEGATIVE
MCH RBC QN AUTO: 28.8 PG (ref 26.5–33)
MCHC RBC AUTO-ENTMCNC: 32 G/DL (ref 31.5–36.5)
MCV RBC AUTO: 90 FL (ref 78–100)
MICROALBUMIN UR-MCNC: 25.2 MG/L
MICROALBUMIN/CREAT UR: 28.19 MG/G CR (ref 0–17)
NITRATE UR QL: NEGATIVE
NONHDLC SERPL-MCNC: 54 MG/DL
NT-PROBNP SERPL-MCNC: 2834 PG/ML (ref 0–1800)
PH UR STRIP: 5.5 [PH] (ref 5–7)
PLATELET # BLD AUTO: 196 10E3/UL (ref 150–450)
POTASSIUM SERPL-SCNC: 4 MMOL/L (ref 3.4–5.3)
PROT SERPL-MCNC: 7 G/DL (ref 6.4–8.3)
RBC # BLD AUTO: 4.34 10E6/UL (ref 4.4–5.9)
SODIUM SERPL-SCNC: 140 MMOL/L (ref 135–145)
SP GR UR STRIP: 1.02 (ref 1–1.03)
TRIGL SERPL-MCNC: 53 MG/DL
TSH SERPL DL<=0.005 MIU/L-ACNC: 4.19 UIU/ML (ref 0.3–4.2)
UROBILINOGEN UR STRIP-ACNC: 1 E.U./DL
VIT B12 SERPL-MCNC: 1381 PG/ML (ref 232–1245)
VIT D+METAB SERPL-MCNC: 46 NG/ML (ref 20–50)
WBC # BLD AUTO: 5 10E3/UL (ref 4–11)

## 2025-01-28 PROCEDURE — 99214 OFFICE O/P EST MOD 30 MIN: CPT | Mod: 25 | Performed by: FAMILY MEDICINE

## 2025-01-28 PROCEDURE — 82550 ASSAY OF CK (CPK): CPT | Performed by: FAMILY MEDICINE

## 2025-01-28 PROCEDURE — 84153 ASSAY OF PSA TOTAL: CPT | Performed by: FAMILY MEDICINE

## 2025-01-28 PROCEDURE — 83550 IRON BINDING TEST: CPT | Performed by: FAMILY MEDICINE

## 2025-01-28 PROCEDURE — 80061 LIPID PANEL: CPT | Performed by: FAMILY MEDICINE

## 2025-01-28 PROCEDURE — 83880 ASSAY OF NATRIURETIC PEPTIDE: CPT | Performed by: FAMILY MEDICINE

## 2025-01-28 PROCEDURE — 83036 HEMOGLOBIN GLYCOSYLATED A1C: CPT | Performed by: FAMILY MEDICINE

## 2025-01-28 PROCEDURE — 82728 ASSAY OF FERRITIN: CPT | Performed by: FAMILY MEDICINE

## 2025-01-28 PROCEDURE — 83540 ASSAY OF IRON: CPT | Performed by: FAMILY MEDICINE

## 2025-01-28 PROCEDURE — G2211 COMPLEX E/M VISIT ADD ON: HCPCS | Performed by: FAMILY MEDICINE

## 2025-01-28 PROCEDURE — 36415 COLL VENOUS BLD VENIPUNCTURE: CPT | Performed by: FAMILY MEDICINE

## 2025-01-28 PROCEDURE — G0439 PPPS, SUBSEQ VISIT: HCPCS | Performed by: FAMILY MEDICINE

## 2025-01-28 PROCEDURE — 81003 URINALYSIS AUTO W/O SCOPE: CPT | Performed by: FAMILY MEDICINE

## 2025-01-28 PROCEDURE — 80053 COMPREHEN METABOLIC PANEL: CPT | Performed by: FAMILY MEDICINE

## 2025-01-28 PROCEDURE — 82306 VITAMIN D 25 HYDROXY: CPT | Performed by: FAMILY MEDICINE

## 2025-01-28 PROCEDURE — 82570 ASSAY OF URINE CREATININE: CPT | Performed by: FAMILY MEDICINE

## 2025-01-28 PROCEDURE — 82607 VITAMIN B-12: CPT | Performed by: FAMILY MEDICINE

## 2025-01-28 PROCEDURE — 87522 HEPATITIS C REVRS TRNSCRPJ: CPT | Performed by: FAMILY MEDICINE

## 2025-01-28 PROCEDURE — 85027 COMPLETE CBC AUTOMATED: CPT | Performed by: FAMILY MEDICINE

## 2025-01-28 PROCEDURE — 82043 UR ALBUMIN QUANTITATIVE: CPT | Performed by: FAMILY MEDICINE

## 2025-01-28 PROCEDURE — 84443 ASSAY THYROID STIM HORMONE: CPT | Performed by: FAMILY MEDICINE

## 2025-01-28 RX ORDER — AMOXICILLIN 500 MG/1
2000 CAPSULE ORAL PRN
Qty: 8 CAPSULE | Refills: 3 | Status: SHIPPED | OUTPATIENT
Start: 2025-01-28

## 2025-01-28 RX ORDER — TAMSULOSIN HYDROCHLORIDE 0.4 MG/1
0.4 CAPSULE ORAL 2 TIMES DAILY
Qty: 180 CAPSULE | Refills: 3 | Status: SHIPPED | OUTPATIENT
Start: 2025-01-28 | End: 2025-01-30

## 2025-01-28 RX ORDER — FUROSEMIDE 20 MG/1
40 TABLET ORAL 2 TIMES DAILY
Qty: 180 TABLET | Refills: 3 | Status: SHIPPED | OUTPATIENT
Start: 2025-01-28

## 2025-01-28 RX ORDER — ROSUVASTATIN CALCIUM 10 MG/1
10 TABLET, COATED ORAL DAILY
Qty: 90 TABLET | Refills: 3 | Status: SHIPPED | OUTPATIENT
Start: 2025-01-28

## 2025-01-28 RX ORDER — SACUBITRIL AND VALSARTAN 49; 51 MG/1; MG/1
1 TABLET, FILM COATED ORAL
Qty: 180 TABLET | Refills: 3 | Status: SHIPPED | OUTPATIENT
Start: 2025-01-28

## 2025-01-28 RX ORDER — FUROSEMIDE 20 MG/1
TABLET ORAL
Qty: 360 TABLET | OUTPATIENT
Start: 2025-01-28

## 2025-01-28 RX ORDER — METOPROLOL SUCCINATE 50 MG/1
50 TABLET, EXTENDED RELEASE ORAL 2 TIMES DAILY
Qty: 180 TABLET | Refills: 3 | Status: SHIPPED | OUTPATIENT
Start: 2025-01-28

## 2025-01-28 RX ORDER — PANTOPRAZOLE SODIUM 40 MG/1
40 TABLET, DELAYED RELEASE ORAL DAILY
Qty: 90 TABLET | Refills: 3 | Status: SHIPPED | OUTPATIENT
Start: 2025-01-28

## 2025-01-28 RX ORDER — PNV NO.95/FERROUS FUM/FOLIC AC 28MG-0.8MG
1 TABLET ORAL
Qty: 90 TABLET | Refills: 3 | Status: SHIPPED | OUTPATIENT
Start: 2025-01-29

## 2025-01-28 ASSESSMENT — PAIN SCALES - GENERAL: PAINLEVEL_OUTOF10: NO PAIN (0)

## 2025-01-28 NOTE — PROGRESS NOTES
Preventive Care Visit  [facility] [place]  Brett Cassidy MD, Family Medicine  Jan 28, 2025      Assessment & Plan     Routine general medical examination at a health care facility    - Comprehensive metabolic panel  - Lipid panel reflex to direct LDL Fasting  - CBC with platelets  - CK total  - UA Macroscopic with reflex to Microscopic and Culture  - Albumin Random Urine Quantitative with Creat Ratio  - TSH with free T4 reflex  - Fecal colorectal cancer screen FIT  - Hemoglobin A1c  - Vitamin B12  - Vitamin D Deficiency  - Hepatitis C RNA, quantitative  - Iron and iron binding capacity  - BNP-N terminal pro  - PSA, tumor marker  - Ferritin  - Med Therapy Management Referral  - Comprehensive metabolic panel  - Lipid panel reflex to direct LDL Fasting  - CBC with platelets  - CK total  - UA Macroscopic with reflex to Microscopic and Culture  - Albumin Random Urine Quantitative with Creat Ratio  - TSH with free T4 reflex  - Fecal colorectal cancer screen FIT  - Hemoglobin A1c  - Vitamin B12  - Vitamin D Deficiency  - Hepatitis C RNA, quantitative  - Iron and iron binding capacity  - BNP-N terminal pro  - PSA, tumor marker  - Ferritin  - PRIMARY CARE FOLLOW-UP SCHEDULING    Medicare annual wellness visit, subsequent    - Comprehensive metabolic panel  - Lipid panel reflex to direct LDL Fasting  - CBC with platelets  - CK total  - UA Macroscopic with reflex to Microscopic and Culture  - Albumin Random Urine Quantitative with Creat Ratio  - TSH with free T4 reflex  - Fecal colorectal cancer screen FIT  - Hemoglobin A1c  - Vitamin B12  - Vitamin D Deficiency  - Hepatitis C RNA, quantitative  - Iron and iron binding capacity  - BNP-N terminal pro  - PSA, tumor marker  - Ferritin  - Med Therapy Management Referral  - Comprehensive metabolic panel  - Lipid panel reflex to direct LDL Fasting  - CBC with platelets  - CK total  - UA Macroscopic with reflex to Microscopic and Culture  - Albumin Random Urine  Quantitative with Creat Ratio  - TSH with free T4 reflex  - Fecal colorectal cancer screen FIT  - Hemoglobin A1c  - Vitamin B12  - Vitamin D Deficiency  - Hepatitis C RNA, quantitative  - Iron and iron binding capacity  - BNP-N terminal pro  - PSA, tumor marker  - Ferritin  - PRIMARY CARE FOLLOW-UP SCHEDULING    Prostate cancer (H)    - PSA, tumor marker  - Med Therapy Management Referral  - PSA, tumor marker  - PRIMARY CARE FOLLOW-UP SCHEDULING    Coronary artery disease involving coronary bypass graft of native heart without angina pectoris    - Comprehensive metabolic panel  - Lipid panel reflex to direct LDL Fasting  - UA Macroscopic with reflex to Microscopic and Culture  - Albumin Random Urine Quantitative with Creat Ratio  - TSH with free T4 reflex  - Hemoglobin A1c  - BNP-N terminal pro  - Med Therapy Management Referral  - rosuvastatin (CRESTOR) 10 MG tablet  Dispense: 90 tablet; Refill: 3  - Comprehensive metabolic panel  - Lipid panel reflex to direct LDL Fasting  - UA Macroscopic with reflex to Microscopic and Culture  - Albumin Random Urine Quantitative with Creat Ratio  - TSH with free T4 reflex  - Hemoglobin A1c  - BNP-N terminal pro  - PRIMARY CARE FOLLOW-UP SCHEDULING  - apixaban ANTICOAGULANT (ELIQUIS ANTICOAGULANT) 5 MG tablet  Dispense: 180 tablet; Refill: 3  - sacubitril-valsartan (ENTRESTO) 49-51 MG per tablet  Dispense: 180 tablet; Refill: 3  - furosemide (LASIX) 20 MG tablet  Dispense: 180 tablet; Refill: 3  - metoprolol succinate ER (TOPROL XL) 50 MG 24 hr tablet  Dispense: 180 tablet; Refill: 3    S/P CABG (coronary artery bypass graft)    - Comprehensive metabolic panel  - Lipid panel reflex to direct LDL Fasting  - UA Macroscopic with reflex to Microscopic and Culture  - Albumin Random Urine Quantitative with Creat Ratio  - TSH with free T4 reflex  - Hemoglobin A1c  - BNP-N terminal pro  - Med Therapy Management Referral  - rosuvastatin (CRESTOR) 10 MG tablet  Dispense: 90 tablet; Refill:  3  - tamsulosin (FLOMAX) 0.4 MG capsule  Dispense: 180 capsule; Refill: 3  - Comprehensive metabolic panel  - Lipid panel reflex to direct LDL Fasting  - UA Macroscopic with reflex to Microscopic and Culture  - Albumin Random Urine Quantitative with Creat Ratio  - TSH with free T4 reflex  - Hemoglobin A1c  - BNP-N terminal pro  - PRIMARY CARE FOLLOW-UP SCHEDULING  - apixaban ANTICOAGULANT (ELIQUIS ANTICOAGULANT) 5 MG tablet  Dispense: 180 tablet; Refill: 3  - sacubitril-valsartan (ENTRESTO) 49-51 MG per tablet  Dispense: 180 tablet; Refill: 3  - furosemide (LASIX) 20 MG tablet  Dispense: 180 tablet; Refill: 3  - metoprolol succinate ER (TOPROL XL) 50 MG 24 hr tablet  Dispense: 180 tablet; Refill: 3    Chronic systolic heart failure (H)    - Comprehensive metabolic panel  - Lipid panel reflex to direct LDL Fasting  - UA Macroscopic with reflex to Microscopic and Culture  - Albumin Random Urine Quantitative with Creat Ratio  - TSH with free T4 reflex  - Hemoglobin A1c  - BNP-N terminal pro  - Med Therapy Management Referral  - Comprehensive metabolic panel  - Lipid panel reflex to direct LDL Fasting  - UA Macroscopic with reflex to Microscopic and Culture  - Albumin Random Urine Quantitative with Creat Ratio  - TSH with free T4 reflex  - Hemoglobin A1c  - BNP-N terminal pro  - PRIMARY CARE FOLLOW-UP SCHEDULING  - empagliflozin (JARDIANCE) 10 MG TABS tablet  Dispense: 90 tablet; Refill: 3  - sacubitril-valsartan (ENTRESTO) 49-51 MG per tablet  Dispense: 180 tablet; Refill: 3  - furosemide (LASIX) 20 MG tablet  Dispense: 180 tablet; Refill: 3  - metoprolol succinate ER (TOPROL XL) 50 MG 24 hr tablet  Dispense: 180 tablet; Refill: 3    HFrEF (heart failure with reduced ejection fraction) (H)    - Comprehensive metabolic panel  - Lipid panel reflex to direct LDL Fasting  - UA Macroscopic with reflex to Microscopic and Culture  - Albumin Random Urine Quantitative with Creat Ratio  - TSH with free T4 reflex  - Hemoglobin  A1c  - BNP-N terminal pro  - Med Therapy Management Referral  - rosuvastatin (CRESTOR) 10 MG tablet  Dispense: 90 tablet; Refill: 3  - Comprehensive metabolic panel  - Lipid panel reflex to direct LDL Fasting  - UA Macroscopic with reflex to Microscopic and Culture  - Albumin Random Urine Quantitative with Creat Ratio  - TSH with free T4 reflex  - Hemoglobin A1c  - BNP-N terminal pro  - PRIMARY CARE FOLLOW-UP SCHEDULING  - apixaban ANTICOAGULANT (ELIQUIS ANTICOAGULANT) 5 MG tablet  Dispense: 180 tablet; Refill: 3  - empagliflozin (JARDIANCE) 10 MG TABS tablet  Dispense: 90 tablet; Refill: 3  - sacubitril-valsartan (ENTRESTO) 49-51 MG per tablet  Dispense: 180 tablet; Refill: 3  - furosemide (LASIX) 20 MG tablet  Dispense: 180 tablet; Refill: 3  - metoprolol succinate ER (TOPROL XL) 50 MG 24 hr tablet  Dispense: 180 tablet; Refill: 3    Cardiomyopathy, unspecified type (H)    - Comprehensive metabolic panel  - Lipid panel reflex to direct LDL Fasting  - UA Macroscopic with reflex to Microscopic and Culture  - Albumin Random Urine Quantitative with Creat Ratio  - TSH with free T4 reflex  - Hemoglobin A1c  - BNP-N terminal pro  - Med Therapy Management Referral  - rosuvastatin (CRESTOR) 10 MG tablet  Dispense: 90 tablet; Refill: 3  - Comprehensive metabolic panel  - Lipid panel reflex to direct LDL Fasting  - UA Macroscopic with reflex to Microscopic and Culture  - Albumin Random Urine Quantitative with Creat Ratio  - TSH with free T4 reflex  - Hemoglobin A1c  - BNP-N terminal pro  - PRIMARY CARE FOLLOW-UP SCHEDULING  - apixaban ANTICOAGULANT (ELIQUIS ANTICOAGULANT) 5 MG tablet  Dispense: 180 tablet; Refill: 3  - empagliflozin (JARDIANCE) 10 MG TABS tablet  Dispense: 90 tablet; Refill: 3  - sacubitril-valsartan (ENTRESTO) 49-51 MG per tablet  Dispense: 180 tablet; Refill: 3  - furosemide (LASIX) 20 MG tablet  Dispense: 180 tablet; Refill: 3  - metoprolol succinate ER (TOPROL XL) 50 MG 24 hr tablet  Dispense: 180  tablet; Refill: 3    Cerebrovascular accident (CVA), unspecified mechanism (H)    - Comprehensive metabolic panel  - Lipid panel reflex to direct LDL Fasting  - UA Macroscopic with reflex to Microscopic and Culture  - Albumin Random Urine Quantitative with Creat Ratio  - TSH with free T4 reflex  - Hemoglobin A1c  - Med Therapy Management Referral  - Comprehensive metabolic panel  - Lipid panel reflex to direct LDL Fasting  - UA Macroscopic with reflex to Microscopic and Culture  - Albumin Random Urine Quantitative with Creat Ratio  - TSH with free T4 reflex  - Hemoglobin A1c  - PRIMARY CARE FOLLOW-UP SCHEDULING    History of CVA (cerebrovascular accident)    - Comprehensive metabolic panel  - Lipid panel reflex to direct LDL Fasting  - UA Macroscopic with reflex to Microscopic and Culture  - Albumin Random Urine Quantitative with Creat Ratio  - TSH with free T4 reflex  - Hemoglobin A1c  - Med Therapy Management Referral  - rosuvastatin (CRESTOR) 10 MG tablet  Dispense: 90 tablet; Refill: 3  - Comprehensive metabolic panel  - Lipid panel reflex to direct LDL Fasting  - UA Macroscopic with reflex to Microscopic and Culture  - Albumin Random Urine Quantitative with Creat Ratio  - TSH with free T4 reflex  - Hemoglobin A1c  - PRIMARY CARE FOLLOW-UP SCHEDULING  - apixaban ANTICOAGULANT (ELIQUIS ANTICOAGULANT) 5 MG tablet  Dispense: 180 tablet; Refill: 3    Personal history of transient ischemic attack (TIA), and cerebral infarction without residual deficits    - Comprehensive metabolic panel  - Lipid panel reflex to direct LDL Fasting  - UA Macroscopic with reflex to Microscopic and Culture  - Albumin Random Urine Quantitative with Creat Ratio  - TSH with free T4 reflex  - Hemoglobin A1c  - Med Therapy Management Referral  - Comprehensive metabolic panel  - Lipid panel reflex to direct LDL Fasting  - UA Macroscopic with reflex to Microscopic and Culture  - Albumin Random Urine Quantitative with Creat Ratio  - TSH with  free T4 reflex  - Hemoglobin A1c  - PRIMARY CARE FOLLOW-UP SCHEDULING    Chronic atrial fibrillation, unspecified (H)    - Comprehensive metabolic panel  - UA Macroscopic with reflex to Microscopic and Culture  - Albumin Random Urine Quantitative with Creat Ratio  - TSH with free T4 reflex  - Hemoglobin A1c  - Med Therapy Management Referral  - Comprehensive metabolic panel  - UA Macroscopic with reflex to Microscopic and Culture  - Albumin Random Urine Quantitative with Creat Ratio  - TSH with free T4 reflex  - Hemoglobin A1c  - PRIMARY CARE FOLLOW-UP SCHEDULING    Permanent atrial fibrillation (H)    - Med Therapy Management Referral  - rosuvastatin (CRESTOR) 10 MG tablet  Dispense: 90 tablet; Refill: 3  - PRIMARY CARE FOLLOW-UP SCHEDULING  - apixaban ANTICOAGULANT (ELIQUIS ANTICOAGULANT) 5 MG tablet  Dispense: 180 tablet; Refill: 3  - metoprolol succinate ER (TOPROL XL) 50 MG 24 hr tablet  Dispense: 180 tablet; Refill: 3    NSVT (nonsustained ventricular tachycardia) (H)    - Med Therapy Management Referral  - rosuvastatin (CRESTOR) 10 MG tablet  Dispense: 90 tablet; Refill: 3  - PRIMARY CARE FOLLOW-UP SCHEDULING  - apixaban ANTICOAGULANT (ELIQUIS ANTICOAGULANT) 5 MG tablet  Dispense: 180 tablet; Refill: 3    Prediabetes    - Comprehensive metabolic panel  - Lipid panel reflex to direct LDL Fasting  - UA Macroscopic with reflex to Microscopic and Culture  - Albumin Random Urine Quantitative with Creat Ratio  - Hemoglobin A1c  - Med Therapy Management Referral  - rosuvastatin (CRESTOR) 10 MG tablet  Dispense: 90 tablet; Refill: 3  - Comprehensive metabolic panel  - Lipid panel reflex to direct LDL Fasting  - UA Macroscopic with reflex to Microscopic and Culture  - Albumin Random Urine Quantitative with Creat Ratio  - Hemoglobin A1c  - PRIMARY CARE FOLLOW-UP SCHEDULING  - apixaban ANTICOAGULANT (ELIQUIS ANTICOAGULANT) 5 MG tablet  Dispense: 180 tablet; Refill: 3    Hypertension goal BP (blood pressure) <  140/90    - Comprehensive metabolic panel  - UA Macroscopic with reflex to Microscopic and Culture  - Albumin Random Urine Quantitative with Creat Ratio  - Med Therapy Management Referral  - Comprehensive metabolic panel  - UA Macroscopic with reflex to Microscopic and Culture  - Albumin Random Urine Quantitative with Creat Ratio  - PRIMARY CARE FOLLOW-UP SCHEDULING  - sacubitril-valsartan (ENTRESTO) 49-51 MG per tablet  Dispense: 180 tablet; Refill: 3  - furosemide (LASIX) 20 MG tablet  Dispense: 180 tablet; Refill: 3  - metoprolol succinate ER (TOPROL XL) 50 MG 24 hr tablet  Dispense: 180 tablet; Refill: 3    Hyperlipidemia LDL goal <70    - Comprehensive metabolic panel  - Lipid panel reflex to direct LDL Fasting  - CK total  - Med Therapy Management Referral  - rosuvastatin (CRESTOR) 10 MG tablet  Dispense: 90 tablet; Refill: 3  - Comprehensive metabolic panel  - Lipid panel reflex to direct LDL Fasting  - CK total  - PRIMARY CARE FOLLOW-UP SCHEDULING  - apixaban ANTICOAGULANT (ELIQUIS ANTICOAGULANT) 5 MG tablet  Dispense: 180 tablet; Refill: 3    NAFLD (nonalcoholic fatty liver disease)    - Comprehensive metabolic panel  - CBC with platelets  - Med Therapy Management Referral  - pantoprazole (PROTONIX) 40 MG EC tablet  Dispense: 90 tablet; Refill: 3  - Comprehensive metabolic panel  - CBC with platelets  - PRIMARY CARE FOLLOW-UP SCHEDULING    History of hepatitis C    - Comprehensive metabolic panel  - CBC with platelets  - Hepatitis C RNA, quantitative  - Med Therapy Management Referral  - Comprehensive metabolic panel  - CBC with platelets  - Hepatitis C RNA, quantitative  - PRIMARY CARE FOLLOW-UP SCHEDULING    HAYLEE on CPAP    - TSH with free T4 reflex  - Med Therapy Management Referral  - TSH with free T4 reflex  - PRIMARY CARE FOLLOW-UP SCHEDULING    Obstructive sleep apnea syndrome    - TSH with free T4 reflex  - Med Therapy Management Referral  - TSH with free T4 reflex  - PRIMARY CARE FOLLOW-UP  SCHEDULING    Iron deficiency anemia, unspecified iron deficiency anemia type    - CBC with platelets  - Ferritin  - Med Therapy Management Referral  - Ferrous Sulfate (IRON) 325 (65 Fe) MG tablet  Dispense: 90 tablet; Refill: 3  - CBC with platelets  - Ferritin  - PRIMARY CARE FOLLOW-UP SCHEDULING    Vitamin B12 deficiency (non anemic)    - Vitamin B12  - Med Therapy Management Referral  - Vitamin B12  - PRIMARY CARE FOLLOW-UP SCHEDULING    Vitamin D deficiency    - Vitamin D Deficiency  - Med Therapy Management Referral  - Vitamin D Deficiency  - PRIMARY CARE FOLLOW-UP SCHEDULING    Nephrolithiasis    - UA Macroscopic with reflex to Microscopic and Culture  - Albumin Random Urine Quantitative with Creat Ratio  - Med Therapy Management Referral  - UA Macroscopic with reflex to Microscopic and Culture  - Albumin Random Urine Quantitative with Creat Ratio  - PRIMARY CARE FOLLOW-UP SCHEDULING    Calculus of gallbladder without cholecystitis without obstruction    - Comprehensive metabolic panel  - CBC with platelets  - Med Therapy Management Referral  - Comprehensive metabolic panel  - CBC with platelets  - PRIMARY CARE FOLLOW-UP SCHEDULING    Hx of gastric bypass    - Comprehensive metabolic panel  - Lipid panel reflex to direct LDL Fasting  - CBC with platelets  - UA Macroscopic with reflex to Microscopic and Culture  - Albumin Random Urine Quantitative with Creat Ratio  - Hemoglobin A1c  - Vitamin B12  - Vitamin D Deficiency  - Hepatitis C RNA, quantitative  - Iron and iron binding capacity  - BNP-N terminal pro  - PSA, tumor marker  - Ferritin  - Med Therapy Management Referral  - pantoprazole (PROTONIX) 40 MG EC tablet  Dispense: 90 tablet; Refill: 3  - Comprehensive metabolic panel  - Lipid panel reflex to direct LDL Fasting  - CBC with platelets  - UA Macroscopic with reflex to Microscopic and Culture  - Albumin Random Urine Quantitative with Creat Ratio  - Hemoglobin A1c  - Vitamin B12  - Vitamin D  Deficiency  - Hepatitis C RNA, quantitative  - Iron and iron binding capacity  - BNP-N terminal pro  - PSA, tumor marker  - Ferritin  - PRIMARY CARE FOLLOW-UP SCHEDULING    History of gastric bypass    - Med Therapy Management Referral  - pantoprazole (PROTONIX) 40 MG EC tablet  Dispense: 90 tablet; Refill: 3  - PRIMARY CARE FOLLOW-UP SCHEDULING    Screen for colon cancer    - Fecal colorectal cancer screen FIT  - Med Therapy Management Referral  - Fecal colorectal cancer screen FIT  - PRIMARY CARE FOLLOW-UP SCHEDULING    Morbid obesity (H)    - TSH with free T4 reflex  - Med Therapy Management Referral  - TSH with free T4 reflex  - PRIMARY CARE FOLLOW-UP SCHEDULING    Medication monitoring encounter    - Comprehensive metabolic panel  - Lipid panel reflex to direct LDL Fasting  - CBC with platelets  - CK total  - UA Macroscopic with reflex to Microscopic and Culture  - Albumin Random Urine Quantitative with Creat Ratio  - TSH with free T4 reflex  - Fecal colorectal cancer screen FIT  - Hemoglobin A1c  - Vitamin B12  - Vitamin D Deficiency  - Hepatitis C RNA, quantitative  - Iron and iron binding capacity  - BNP-N terminal pro  - PSA, tumor marker  - Ferritin  - Med Therapy Management Referral  - Comprehensive metabolic panel  - Lipid panel reflex to direct LDL Fasting  - CBC with platelets  - CK total  - UA Macroscopic with reflex to Microscopic and Culture  - Albumin Random Urine Quantitative with Creat Ratio  - TSH with free T4 reflex  - Fecal colorectal cancer screen FIT  - Hemoglobin A1c  - Vitamin B12  - Vitamin D Deficiency  - Hepatitis C RNA, quantitative  - Iron and iron binding capacity  - BNP-N terminal pro  - PSA, tumor marker  - Ferritin  - PRIMARY CARE FOLLOW-UP SCHEDULING    Medication management    - Med Therapy Management Referral  - PRIMARY CARE FOLLOW-UP SCHEDULING    History of joint replacement, unspecified joint    - amoxicillin (AMOXIL) 500 MG capsule  Dispense: 8 capsule; Refill: 3    Patient  "has been advised of split billing requirements and indicates understanding: Yes    BMI  Estimated body mass index is 43.29 kg/m  as calculated from the following:    Height as of this encounter: 1.791 m (5' 10.5\").    Weight as of this encounter: 138.8 kg (306 lb).   Weight management plan: diet and exercise, offered weight loss clinic    Counseling  Appropriate preventive services were addressed with this patient via screening, questionnaire, or discussion as appropriate for fall prevention, nutrition, physical activity, Tobacco-use cessation, social engagement, weight loss and cognition.  Checklist reviewing preventive services available has been given to the patient.  Reviewed patient's diet, addressing concerns and/or questions.   He is at risk for lack of exercise and has been provided with information to increase physical activity for the benefit of his well-being.   Information on urinary incontinence and treatment options given to patient.     Work on weight loss  Regular exercise    Plan:    1) Medications: reviewed, refilled    2) Labs: pending    3) Immunizations: advised prevnar 20, Tdap, RSV    4) Imaging/Diagnostics: reviewed    5) Consults: cardiology    Return in about 1 year (around 1/28/2026) for Complete Physical, Medication Recheck Visit, Follow Up Chronic.    45 minutes spent by myself on the date of the encounter doing chart review, history and exam, documentation and further activities per the note.    The longitudinal plan of care for the diagnosis(es)/condition(s) as documented were addressed during this visit. Due to the added complexity in care, I will continue to support Pat in the subsequent management and with ongoing continuity of care.    Malick Arias is a 79 year old, presenting for the following:  Physical        1/28/2025     8:16 AM   Additional Questions   Roomed by Margot SCHULZ   Accompanied by Self           Prostate Cancer    PSA   Date Value Ref Range Status   08/07/2020 " 3.72 0 - 4 ug/L Final     Comment:     Assay Method:  Chemiluminescence using Siemens Vista analyzer     Prostate Specific Antigen Screen   Date Value Ref Range Status   04/02/2024 0.11 0.00 - 6.50 ng/mL Final   08/16/2022 8.70 (H) 0.00 - 4.00 ug/L Final     PSA Tumor Marker   Date Value Ref Range Status   02/02/2023 8.51 (H) 0.00 - 6.50 ng/mL Final     CAD / S/P CABG / HFrEF / Cardiomyopathy / Permanent A Fib- Cardiology - Dr Frazier - ECHO in 7/2025 - moderate Aortic Stenosis - no current issues    HX of CVA - 2019 - no residual    Prediabetes    Lab Results   Component Value Date    A1C 5.5 01/28/2025    A1C 5.4 10/07/2024    A1C 5.5 11/07/2023    A1C 5.4 02/21/2023    A1C 5.7 08/16/2022    A1C 5.8 11/19/2020    A1C 5.8 08/07/2020    A1C 5.9 02/14/2020    A1C 5.7 12/02/2019    A1C 5.6 09/20/2019     Hyperlipidemia Follow-Up    Are you regularly taking any medication or supplement to lower your cholesterol?   Yes- rosuvastatin  Are you having muscle aches or other side effects that you think could be caused by your cholesterol lowering medication?  No    Recent Labs   Lab Test 04/02/24  0955 11/07/23  0825   CHOL 110 103   HDL 53 54   LDL 47 41   TRIG 50 39     Hypertension Follow-up    Do you check your blood pressure regularly outside of the clinic? No   Are you following a low salt diet? Yes  Are your blood pressures ever more than 140 on the top number (systolic) OR more   than 90 on the bottom number (diastolic), for example 140/90? N/A    BP Readings from Last 3 Encounters:   01/28/25 98/64   10/30/24 116/65   10/07/24 100/70     Creatinine   Date Value Ref Range Status   10/29/2024 0.87 0.67 - 1.17 mg/dL Final   06/22/2021 0.81 0.66 - 1.25 mg/dL Final     GFR Estimate   Date Value Ref Range Status   10/29/2024 88 >60 mL/min/1.73m2 Final     Comment:     eGFR calculated using 2021 CKD-EPI equation.   06/22/2021 87 >60 mL/min/[1.73_m2] Final     Comment:     Non  GFR Calc  Starting 12/18/2018,  serum creatinine based estimated GFR (eGFR) will be   calculated using the Chronic Kidney Disease Epidemiology Collaboration   (CKD-EPI) equation.       GFR, ESTIMATED POCT   Date Value Ref Range Status   01/11/2023 >60 >60 mL/min/1.73m2 Final     Atrial Fibrillation Follow-up    Symptoms: no recent chest pain, significant palpitations, dizziness/lightheadedness, dyspnea, or increased peripheral edema.  Stroke prevention: DOAC (Eliquis, Xarelto, Pradaxa)        10/29/2024     9:35 PM 10/30/2024     6:15 AM 10/30/2024     8:21 AM 10/30/2024    11:38 AM 1/28/2025     8:26 AM   Date   Pulse 87 69 81 72 96     Current [id number] Score: 5 points - A score of 5 or greater represents a 7.2 - 12.2% annual risk of major embolic event, without anti-coagulation or an LAAO device.     NAFLD / Hx of Hepatitis C - no issues    HAYLEE - using CPAP every night    Hx of Gastric Bypass - 1986 - Max weight weight - 462 lbs - lowest 239 lbs    TOMI    Hemoglobin   Date Value Ref Range Status   01/28/2025 12.5 (L) 13.3 - 17.7 g/dL Final   10/30/2024 12.4 (L) 13.3 - 17.7 g/dL Final   06/22/2021 14.6 13.3 - 17.7 g/dL Final   06/21/2021 15.5 13.3 - 17.7 g/dL Final     Vitamin B12 / Vitamin D    Nephrolithiasis - stable    Cholelithiasis - no issues    S/P bilateral TSA, last 10/2024 - Rt    Health Care Directive  Patient has a Health Care Directive on file  Advance care planning document is on file and is current.      1/25/2025   General Health   How would you rate your overall physical health? Good   Feel stress (tense, anxious, or unable to sleep) Not at all         1/25/2025   Nutrition   Diet: I don't know         1/25/2025   Exercise   Days per week of moderate/strenous exercise 3 days   Average minutes spent exercising at this level 130 min         1/25/2025   Social Factors   Frequency of gathering with friends or relatives More than three times a week   Worry food won't last until get money to buy more No   Food not last or not have  enough money for food? No   Do you have housing? (Housing is defined as stable permanent housing and does not include staying ouside in a car, in a tent, in an abandoned building, in an overnight shelter, or couch-surfing.) Yes   Are you worried about losing your housing? No   Lack of transportation? No   Unable to get utilities (heat,electricity)? No         1/25/2025   Fall Risk   Fallen 2 or more times in the past year? No   Trouble with walking or balance? No          1/25/2025   Activities of Daily Living- Home Safety   Needs help with the following daily activites None of the above   Safety concerns in the home None of the above         1/25/2025   Dental   Dentist two times every year? Yes         1/25/2025   Hearing Screening   Hearing concerns? None of the above         1/25/2025   Driving Risk Screening   Patient/family members have concerns about driving No         1/25/2025   General Alertness/Fatigue Screening   Have you been more tired than usual lately? No         1/25/2025   Urinary Incontinence Screening   Bothered by leaking urine in past 6 months Yes         1/25/2025   TB Screening   Were you born outside of the [place]? No         Today's PHQ-2 Score:       1/28/2025     8:15 AM   PHQ-2 ( 1999 [employer])   Q1: Little interest or pleasure in doing things 0   Q2: Feeling down, depressed or hopeless 0   PHQ-2 Score 0    Q1: Little interest or pleasure in doing things Not at all   Q2: Feeling down, depressed or hopeless Not at all   PHQ-2 Score 0       Patient-reported           1/25/2025   Substance Use   Alcohol more than 3/day or more than 7/wk No   Do you have a current opioid prescription? No   How severe/bad is pain from 1 to 10? 0/10 (No Pain)   Do you use any other substances recreationally? (!) PRESCRIPTION DRUGS     Social History     Tobacco Use    Smoking status: Never    Smokeless tobacco: Never   Vaping Use    Vaping status: Never Used   Substance Use Topics    Alcohol use: Yes      "Alcohol/week: 2.0 - 3.0 standard drinks of alcohol     Types: 2 - 3 Standard drinks or equivalent per week     Comment: occassiinally    Drug use: No     Comment: acknowledges using herbal supplement \"Vibe\" for energy-none used recently        ASCVD Risk   The ASCVD Risk score (Elvin TORO, et al., 2019) failed to calculate for the following reasons:    Risk score cannot be calculated because patient has a medical history suggesting prior/existing ASCVD    Fracture Risk Assessment Tool  Link to Frax Calculator  Use the information below to complete the Frax calculator  : 1945  Sex: male  Weight (kg): 138.8 kg (actual weight)  Height (cm): 179.1 cm  Previous Fragility Fracture:  No  History of parent with fractured hip:  No  Current Smoking:  No  Patient has been on glucocorticoids for more than 3 months (5mg/day or more): No  Rheumatoid Arthritis on Problem List:  No  Secondary Osteoporosis on Problem List:  No  Consumes 3 or more units of alcohol per day: No  Femoral Neck BMD (g/cm2)              Reviewed and updated as needed this visit by Provider                  Current providers sharing in care for this patient include:  Patient Care Team:  Brett Cassidy MD as PCP - General  Brett Cassidy MD as Assigned PCP  Lauren Otero RD as Diabetes Educator (Dietitian, Registered)  Regulo Heath MD as MD (Urology)  Margot Mendez PA-C as Physician Assistant (Urology)  Kj Howard MD as Assigned Cancer Care Provider  No Ref-Primary, Physician  Laura Olivia PA-C as Assigned Heart and Vascular Provider  Junior Estevez RPH as Assigned MTM Pharmacist  Katya Banda RPH as Pharmacist (Pharmacist)  Negar Camarillo PA-C as Assigned Musculoskeletal Provider  Katya Banda RPH as MTM Pharmacist    The following health maintenance items are reviewed in Epic and correct as of today:  Health Maintenance   Topic Date Due    HF ACTION PLAN  Never done    RSV VACCINE (1 - 1-dose 75+ " series) Never done    COVID-19 Vaccine (8 - 2024-25 season) 12/02/2024    Medicare Annual MTM Pharmacist Visit (once per calendar year)  01/01/2025    LIPID  04/02/2025    PSA  04/02/2025    ANNUAL REVIEW OF HM ORDERS  09/17/2025    MICROALBUMIN  09/30/2025    ALT  10/07/2025    BMP  10/07/2025    DTAP/TDAP/TD IMMUNIZATION (3 - Td or Tdap) 10/20/2025    MEDICARE ANNUAL WELLNESS VISIT  01/28/2026    FALL RISK ASSESSMENT  01/28/2026    CBC  01/28/2026    GLUCOSE  10/30/2027    ADVANCE CARE PLANNING  01/28/2030    TSH W/FREE T4 REFLEX  Completed    PHQ-2 (once per calendar year)  Completed    INFLUENZA VACCINE  Completed    Pneumococcal Vaccine: 50+ Years  Completed    ZOSTER IMMUNIZATION  Completed    HEPATITIS A IMMUNIZATION  Completed    HEPATITIS B IMMUNIZATION  Completed    HPV IMMUNIZATION  Aged Out    MENINGITIS IMMUNIZATION  Aged Out    RSV MONOCLONAL ANTIBODY  Aged Out    COLORECTAL CANCER SCREENING  Discontinued     Patient Active Problem List   Diagnosis    Iron deficiency anemia    Colon polyps    Obstructive sleep apnea syndrome    Hyperlipidemia LDL goal <70    Long term current use of anticoagulant therapy - for intermittent a-fib    Total knee replacement status    Calculus of kidney - 1.2 cm stone at the upper pole as of CT urogram fr 1/11/2023    NAFLD (nonalcoholic fatty liver disease)    Morbid obesity due to excess calories (H)    History of hepatitis C    Prediabetes    Paroxysmal atrial fibrillation (H)    Cholelithiasis    Hypertension goal BP (blood pressure) < 140/90    TMJ arthralgia    Nephrolithiasis    OA (osteoarthritis)    First degree AV block    Benign prostatic hyperplasia (BPH) with urinary urge incontinence    Thoracic aortic ectasia    Stroke (H)    CVA (cerebral vascular accident) (H)    Cervical stenosis of spine    Vitamin B12 deficiency (non anemic)    Vitamin D deficiency    Myofascial pain    Permanent atrial fibrillation (H)    History of CVA (cerebrovascular accident)     Small bowel obstruction (H)    NSVT (nonsustained ventricular tachycardia) (H)    Chronic LLQ pain    Elevated prostate specific antigen (PSA)    Prostate cancer (H)    Chronic systolic heart failure (H)    HFrEF (heart failure with reduced ejection fraction) (H)    Aortic valve stenosis, etiology of cardiac valve disease unspecified- moderate 2+ with FREDY = 1.2cm2 on echocardiogram fr 7/14/2023    Mitral wnrctpisrdopa-1-3+ on echocardiogram fr 7/14/2023    Cardiomyopathy (H)    Cardiomyopathy, unspecified type (H)    S/P CABG (coronary artery bypass graft)    Fluid overload    Transient hyperglycemia post procedure    Status post ligation of left atrial appendage    Encounter for surgical aftercare following surgery on the circulatory system    Presence of aortocoronary bypass graft:CAB x1 8/15/2023    Atherosclerosis of native coronary artery of native heart without angina pectoris    Essential (primary) hypertension    Chronic atrial fibrillation, unspecified (H)    Personal history of transient ischemic attack (TIA), and cerebral infarction without residual deficits    Physical deconditioning    Leg edema, right    HAYLEE on CPAP    S/P shoulder replacement, right       Past Medical History:   Diagnosis Date    Aortic stenosis     moderate    Atrial fibrillation 2006    Followed by [facility] - persistent    CAD (coronary artery disease)     Calculus of kidney 2005, 6/06, 10/12, 8/18, 9/19    dr walker/nestor/иван - hematuria - w/u o/w neg    Cervical stenosis of spine     Moderate C4-5    Cholelithiasis     Chronic peptic ulcer, unspecified site, without mention of hemorrhage, perforation, or obstruction 1985    gastric bypass    Colon polyps 8/05, 9/10, 10/15    2 tubular adenoma, 1 hyperplastic - multiple serrated/tubular adenomas - last colonscopy 11/20 due 5 yrs    CVA (cerebral vascular accident) (H) 01/2019    small right periorlandic subcortical - left sided residual - left hand tingling/numbness - Left  leg weak/numbness - cardioembolic    Elevated prostate specific antigen (PSA)     Dr Santos    Hepatitis C 10/2012    chronic hepatitis C, grade 1-2, stage 1 - mild - Dr Douglas    HFrEF (heart failure with reduced ejection fraction) (H)     [facility] - 35%    Hyperlipidemia LDL goal <70     Hypertension goal BP (blood pressure) < 140/90 06/2006    dr jaciel winchester    Iron deficiency anemia, unspecified 1985    gastric bypass    Mitral regurgitation     mild-moderate    Nephrolithiasis multiple episodes, last 10/19    Dr Bey/Ivana/Tom - 9mm 3/2021    OA (osteoarthritis)     Multiple - Left shoulder with rotator cuff tear - Dr Durham    Obesity, unspecified     s/p gastric bypass 1985     Obstructive sleep apnea syndrome     CPAP - 15 cm - [facility] 1/2023    Prediabetes     Presbyacusis 01/2004    dr corona    Prostate cancer (H) 2023    Dr Santos - Manju 3+4, 4+3 = 7, bx 5/13 positive    Pyelonephritis, unspecified 12/1999    SCC (squamous cell carcinoma), 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    dr saez - lt conchal bowl    Sleep apnea 10/2002    Auto CPAP - severe - 12-15 cm - dr cunha    Stroke (H) 01/19/2019    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    [facility]    Vitamin B12 deficiency (non anemic) 04/15/2019    Vitamin D deficiency 04/15/2019       Past Surgical History:   Procedure Laterality Date    APPENDECTOMY  1969    ARTHROPLASTY KNEE  08/13/2012    LT TKA - Dr Villanueva    BYPASS GRAFT ARTERY CORONARY N/A 08/15/2023    Procedure: CORONARY ARTERY BYPASS GRAFT x 1 (SAPHENOUS VEIN - RIGHT POSTERIOR DESCENDING ARTERY) WITH ENDOSCOPIC SAPHENOUS VEIN HARVEST ON THE LEFT LOWER EXTREMITY, AND ON CARDIOPULMONARY PUMP OXYGENATOR  (INTRAOPERATIVE TRANSESOPHAGEAL ECHOCARDIOGRAM BY ANESTHESIOLOGIST), REMOVAL OF RIGHT CORONARY ARTERY STENT AND DELIVERY SYSTEM, LEFT ATRIAL APPENDAGE CLIPPING WITH ATRICLIP FLEX V DEVICE SIZE: 45    CARDIOVERSION  2016    COLONOSCOPY  8/05, 9/10, 10/15    multiple tubular/serrated/hyperplastic polyps     COLONOSCOPY N/A 10/29/2015    multiple tubular and serrated adenomas    COLONOSCOPY N/A 09/07/2016    COLONOSCOPY N/A 11/24/2020    tubular adenomas - due 5 yrs    CV CORONARY ANGIOGRAM N/A 08/15/2023    Procedure: Coronary Angiogram;  Surgeon: Carly Luna MD;  Location:  [facility]    CV FRACTIONAL FLOW RATIO WIRE N/A 08/15/2023    Procedure: Fractional Flow Ratio Wire;  Surgeon: Carly Luna MD;  Location:  [facility]    CV PCI N/A 08/15/2023    Procedure: Percutaneous Coronary Intervention;  Surgeon: Carly Luna MD;  Location:  [facility]    CYSTOSCOPY W/ LASER LITHOTRIPSY  10/16/2012,5/2/2018    ESOPHAGOSCOPY, GASTROSCOPY, DUODENOSCOPY (EGD), COMBINED N/A 11/24/2020    Procedure: ESOPHAGOGASTRODUODENOSCOPY, COLONOSCOPY with biopsies;  Surgeon: Chadwick Burgos MD;  Location:  OR    EYE SURGERY  1/01,6/02,11/02    lasik    HC KNEE SCOPE, DIAGNOSTIC  05/2000    Arthroscopy, Knee RT    LASER HOLMIUM LITHOTRIPSY URETER(S), INSERT STENT, COMBINED  10/16/2012    stones x 4, Dr Campa    LASER HOLMIUM LITHOTRIPSY URETER(S), INSERT STENT, COMBINED Right 05/02/2018    CYSTOSCOPY, RIGHT URETEROSCOPY, HOLMIUM LASER LITHOTRIPSY, RIGHT STENT PLACEMENT - Dr Bey    MRI BIOPSY PROSTATE Bilateral 02/09/2023    [facility]    REVERSE ARTHROPLASTY SHOULDER Left 03/07/2023    Procedure: LEFT REVERSE SHOULDER ARTHROPLASTY WITH CUSTOM GUIDE WITH AUTOLOGOUS BONE GRAFTOF PROXIMAL HUMERUS;  Surgeon: Booker Multani MD;  Location:  OR    REVERSE ARTHROPLASTY SHOULDER Right 10/29/2024    Procedure: RIGHT REVERSE SHOULDER ARTHROPLASTY , NO CUSTOM GUIDE;  Surgeon: Booker Multani MD;  Location:  OR    SURGICAL HISTORY OF -   1986    s/p gastric bypass Bilroth II    SURGICAL HISTORY OF -   11/1998    wisdom teeth    SURGICAL HISTORY OF -   1979    cellulitis    SURGICAL HISTORY OF -   11/1998    lt forearm spur's    SURGICAL HISTORY OF -   1/01,6/02,11/02    s/p  lasik    SURGICAL HISTORY OF -   11/1999    rt forearm spurs    SURGICAL HISTORY OF -   07/2006    dr stevenson - lithotrypsy    SURGICAL HISTORY OF -   10/08, 12/08    Lt ear chonchal lesion removal SCC - dr saez    SURGICAL HISTORY OF -  Right 10/2019    nephrolithiasis - cystoscopy, rt lithotrypsy, Dr Heath    SURGICAL HISTORY OF -  Right 11/2019    Cystoscopy, Rt lithotrypsy, Calcium Oxalate, Dr Heath       Current Outpatient Medications   Medication Sig Dispense Refill    acetaminophen (TYLENOL) 500 MG tablet Take 2 tablets (1,000 mg) by mouth every 8 hours. Take scheduled for 2weeks then change to every 6hours PRN      amoxicillin (AMOXIL) 500 MG capsule Take 4 capsules (2,000 mg) by mouth as needed ([id number]). Before dental appointment 8 capsule 3    apixaban ANTICOAGULANT (ELIQUIS ANTICOAGULANT) 5 MG tablet Take 1 tablet (5 mg) by mouth 2 times daily. 180 tablet 3    calcium carbonate-vitamin D (OSCAL) 250-3.125 MG-MCG TABS per tablet Take 1 tablet by mouth daily.      clotrimazole (LOTRIMIN) 1 % external cream Apply thin layer of cream to affected area on penis, leg, and feet twice daily for 14 days. 85 g 1    empagliflozin (JARDIANCE) 10 MG TABS tablet Take 1 tablet (10 mg) by mouth daily. 90 tablet 3    [START ON 1/29/2025] Ferrous Sulfate (IRON) 325 (65 Fe) MG tablet Take 1 tablet by mouth three times a week. (Monday, Wednesday, Friday) 90 tablet 3    fluticasone (FLONASE) 50 MCG/ACT nasal spray Spray 1 spray into both nostrils daily.      furosemide (LASIX) 20 MG tablet Take 2 tablets (40 mg) by mouth 2 times daily. 180 tablet 3    metoprolol succinate ER (TOPROL XL) 50 MG 24 hr tablet Take 1 tablet (50 mg) by mouth 2 times daily. 180 tablet 3    pantoprazole (PROTONIX) 40 MG EC tablet Take 1 tablet (40 mg) by mouth daily. 90 tablet 3    rosuvastatin (CRESTOR) 10 MG tablet Take 1 tablet (10 mg) by mouth daily. 90 tablet 3    sacubitril-valsartan (ENTRESTO) 49-51 MG per tablet Take 1 tablet  "by mouth 2 times daily. 180 tablet 3    senna-docusate (SENOKOT-S/PERICOLACE) 8.6-50 MG tablet Take 1-2 tablets by mouth 2 times daily. Take while on oral narcotics to prevent or treat constipation. 30 tablet 0    tamsulosin (FLOMAX) 0.4 MG capsule Take 1 capsule (0.4 mg) by mouth 2 times daily. 180 capsule 3    vitamin B-12 (CYANOCOBALAMIN) 1000 MCG tablet Take 1,000 mcg by mouth every other day      vitamin C (ASCORBIC ACID) 1000 MG TABS Take 1,000 mg by mouth daily.      vitamin D3 (CHOLECALCIFEROL) 125 MCG (5000 UT) tablet Take 1 tablet by mouth daily.         No Known Allergies    Family History   Problem Relation Age of Onset    Alzheimer Disease Father 82         at 88    Prostate Cancer Father 76        bladder and prostate    Heart Disease Mother 80        CHF    Heart Disease Maternal Grandfather         MI at 55    Cancer Paternal Uncle         ?    Ulcerative Colitis No family hx of     Crohn's Disease No family hx of     Stomach Cancer No family hx of     GERD No family hx of        Social History     Socioeconomic History    Marital status:      Spouse name: Mayra    Number of children: 3    Years of education: 21    Highest education level: None   Occupational History    Occupation: retired teacher and football and       Employer: [employer]     Employer: RETIRED   Tobacco Use    Smoking status: Never    Smokeless tobacco: Never   Vaping Use    Vaping status: Never Used   Substance and Sexual Activity    Alcohol use: Yes     Alcohol/week: 2.0 - 3.0 standard drinks of alcohol     Types: 2 - 3 Standard drinks or equivalent per week     Comment: occassiinally    Drug use: No     Comment: acknowledges using herbal supplement \"Vibe\" for energy-none used recently     Sexual activity: Not Currently     Partners: Male     Birth control/protection: None     Comment:    Other Topics Concern    Caffeine Concern Yes     Comment: <1 can qd    Sleep Concern Yes     " Comment: sleep apnea, wears cpap    Exercise No    Seat Belt Yes    Parent/sibling w/ CABG, MI or angioplasty before 65F 55M? No   Social History Narrative    Wife , Mayra,  from brain gliobastoma 2020.  --Ingrid Girard MD           Social Drivers of Health     Financial Resource Strain: Low Risk  (2025)    Financial Resource Strain     Within the past 12 months, have you or your family members you live with been unable to get utilities (heat, electricity) when it was really needed?: No   Food Insecurity: Low Risk  (2025)    Food Insecurity     Within the past 12 months, did you worry that your food would run out before you got money to buy more?: No     Within the past 12 months, did the food you bought just not last and you didn t have money to get more?: No   Transportation Needs: Low Risk  (2025)    Transportation Needs     Within the past 12 months, has lack of transportation kept you from medical appointments, getting your medicines, non-medical meetings or appointments, work, or from getting things that you need?: No   Physical Activity: Sufficiently Active (2025)    Exercise Vital Sign     Days of Exercise per Week: 3 days     Minutes of Exercise per Session: 130 min   Stress: No Stress Concern Present (2025)    [employer] - Occupational Stress Questionnaire     Feeling of Stress : Not at all   Social Connections: Unknown (2025)    Social Connection and Isolation Panel [NHANES]     Frequency of Social Gatherings with Friends and Family: More than three times a week   Interpersonal Safety: Low Risk  (2025)    Interpersonal Safety     Do you feel physically and emotionally safe where you currently live?: Yes     Within the past 12 months, have you been hit, slapped, kicked or otherwise physically hurt by someone?: No     Within the past 12 months, have you been humiliated or emotionally abused in other ways by your partner or  "ex-partner?: No   Housing Stability: Low Risk  (1/25/2025)    Housing Stability     Do you have housing? : Yes     Are you worried about losing your housing?: No     Colonoscopy:  due 11/2025  FIT / Cologuard: NA  PSA: pending    Review of Systems  CONSTITUTIONAL: NEGATIVE for fever, chills, change in weight  INTEGUMENTARY/SKIN: NEGATIVE for worrisome rashes, moles or lesions  EYES: NEGATIVE for vision changes or irritation  ENT/MOUTH: NEGATIVE for ear, mouth and throat problems  RESP: NEGATIVE for significant cough or SOB  CV: NEGATIVE for chest pain, palpitations or peripheral edema  GI: NEGATIVE for nausea, abdominal pain, heartburn, or change in bowel habits  : NEGATIVE for frequency, dysuria, or hematuria  MUSCULOSKELETAL: NEGATIVE for significant arthralgias or myalgia  NEURO: NEGATIVE for weakness, dizziness or paresthesias  ENDOCRINE: NEGATIVE for temperature intolerance, skin/hair changes  HEME: NEGATIVE for bleeding problems  PSYCHIATRIC: NEGATIVE for changes in mood or affect     Objective    Exam  BP 98/64 (BP Location: Right arm, Patient Position: Sitting, Cuff Size: Adult Regular)   Pulse 96   Temp 97.9  F (36.6  C) (Tympanic)   Resp 18   Ht 1.791 m (5' 10.5\")   Wt (!) 138.8 kg (306 lb)   SpO2 97%   BMI 43.29 kg/m     Estimated body mass index is 43.29 kg/m  as calculated from the following:    Height as of this encounter: 1.791 m (5' 10.5\").    Weight as of this encounter: 138.8 kg (306 lb).    Physical Exam  GENERAL: alert and no distress  EYES: Eyes grossly normal to inspection, PERRL and conjunctivae and sclerae normal  HENT: ear canals and TM's normal, nose and mouth without ulcers or lesions  NECK: no adenopathy, no asymmetry, masses, or scars  RESP: lungs clear to auscultation - no rales, rhonchi or wheezes  CV: regular rate and rhythm, normal S1 S2, no S3 or S4, 2-3 systolic murmur, click or rub, no peripheral edema  ABDOMEN: soft, nontender, no hepatosplenomegaly, no masses and bowel " sounds normal   (male): pt declines  RECTAL: pt declines  MS: no gross musculoskeletal defects noted, no edema  SKIN: no suspicious lesions or rashes  NEURO: Normal strength and tone, mentation intact and speech normal  PSYCH: mentation appears normal, affect normal/bright         1/28/2025   Mini Cog   Clock Draw Score 2 Normal   3 Item Recall 2 objects recalled   Mini Cog Total Score 4         Signed Electronically by:              Brett Cassidy MD, FAAFP     [facility]  [address] [place] [id number]  [email or web address]   Office: [phone]  Fax: [phone]

## 2025-01-29 LAB
HCV RNA SERPL NAA+PROBE-ACNC: NOT DETECTED IU/ML
PSA SERPL DL<=0.01 NG/ML-MCNC: 0.08 NG/ML (ref 0–6.5)

## 2025-01-30 DIAGNOSIS — Z95.1 S/P CABG (CORONARY ARTERY BYPASS GRAFT): Chronic | ICD-10-CM

## 2025-01-30 RX ORDER — TAMSULOSIN HYDROCHLORIDE 0.4 MG/1
0.4 CAPSULE ORAL 2 TIMES DAILY
Qty: 180 CAPSULE | Refills: 2 | Status: SHIPPED | OUTPATIENT
Start: 2025-01-30

## 2025-01-30 NOTE — TELEPHONE ENCOUNTER
Pending Prescriptions:                       Disp   Refills    tamsulosin (FLOMAX) 0.4 MG capsule [Pharm*180 ca*2            Sig: TAKE ONE CAPSULE BY MOUTH TWICE DAILY    Change in pharmacy.

## 2025-02-03 ENCOUNTER — APPOINTMENT (OUTPATIENT)
Dept: LAB | Facility: CLINIC | Age: 80
End: 2025-02-03
Payer: COMMERCIAL

## 2025-02-12 ENCOUNTER — TELEPHONE (OUTPATIENT)
Dept: GASTROENTEROLOGY | Facility: CLINIC | Age: 80
End: 2025-02-12
Payer: COMMERCIAL

## 2025-02-12 NOTE — TELEPHONE ENCOUNTER
Pre assessment completed for upcoming procedure.   (Please see previous telephone encounter notes for complete details)          Procedure details:    Arrival time and facility location reviewed.    Pre op exam needed? No.    Designated  policy reviewed. Instructed to have someone stay 6  hours post procedure.       Medication review:    Medications reviewed. Please see supporting documentation below. Holding recommendations discussed (if applicable).   Blood thinner/Anti-platelet medication(s):  Apixaban (Eliquis): Recommended HOLD 2 days before procedure.  Consult with your managing provider.  Oral diabetic medication(s): Jardiance (empagliflozin): HOLD  3 days before procedure.  Ferrous Sulfate (iron supplement): HOLD 7 days before procedure.      Prep for procedure:     Procedure prep instructions reviewed.        Any additional information needed:  N/A      Patient verbalized understanding and had no questions or concerns at this time.      Nneka Mae LPN  Endoscopy Procedure Pre Assessment   [phone] option 2

## 2025-02-12 NOTE — TELEPHONE ENCOUNTER
"Patient called back to complete scheduling      Patient will be calling back/he thought he could get in early next week not knowing he has to be off his blood thinners 1st and getting the okay to be off of them 1st /he has to talk to his daughter 1st    Endoscopy Scheduling Screen    Have you had any respiratory illness or flu-like symptoms in the last 10 days?  No    What is your communication preference for Instructions and/or Bowel Prep?   MyChart    What insurance is in the chart?  [facility]/medicare    Ordering/Referring Provider:     LINDA DANIELLE      (If ordering provider performs procedure, schedule with ordering provider unless otherwise instructed. )    BMI: Estimated body mass index is 43.29 kg/m  as calculated from the following:    Height as of 1/28/25: 1.791 m (5' 10.5\").    Weight as of 1/28/25: 138.8 kg (306 lb).     Sedation Ordered  moderate sedation.   If patient BMI > 50 do not schedule in [facility].    If patient BMI > 45 do not schedule at [facility].    Are you taking methadone or Suboxone?  NO, No RN review required.    Have you been diagnosed and are being treated for severe PTSD or severe anxiety?  NO, No RN review required.    Are you taking any prescription medications for pain 3 or more times per week?   NO, No RN review required.    Do you have a history of malignant hyperthermia?  No    (Females) Are you currently pregnant?        Have you been diagnosed or told you have pulmonary hypertension?   No    Do you have an LVAD?  No    Have you been told you have moderate to severe sleep apnea?  Yes. Do you use a CPAP? Yes Where is the patient located?. (RN Review required for scheduling unless scheduling in Hospital.)     Have you been told you have COPD, asthma, or any other lung disease?  No    Do you have any heart conditions?  Yes   Has 1 stent    In the past year, have you had any hospitalizations for heart related issues including cardiomyopathy, heart attack, or stent placement?  No    Do you have " "any implantable devices in your body (pacemaker, ICD)?  No    Do you take nitroglycerine?  No    Have you ever had or are you waiting for an organ transplant?  No. Continue scheduling, no site restrictions.    Have you had a stroke or transient ischemic attack (TIA aka \"mini stroke\" in the last 6 months?   No    Have you been diagnosed with or been told you have cirrhosis of the liver?   No.    Are you currently on dialysis?   No    Do you need assistance transferring?   No    BMI: Estimated body mass index is 43.29 kg/m  as calculated from the following:    Height as of 1/28/25: 1.791 m (5' 10.5\").    Weight as of 1/28/25: 138.8 kg (306 lb).     Is patients BMI > 40 and scheduling location [facility]?  No    Do you take an injectable or oral medication for weight loss or diabetes (excluding insulin)?  No    Do you take the medication Naltrexone?  No    Do you take blood thinners?  Yes, you must contact your prescribing provider for directions on holding or bridging with a different medication.     [place]  Prep   Are you currently on dialysis or do you have chronic kidney disease?  No    Do you have a diagnosis of diabetes?  No    Do you have a diagnosis of cystic fibrosis (CF)?  No    On a regular basis do you go 3 -5 days between bowel movements?  No    BMI > 40?  Yes (Extended Prep)    Preferred Pharmacy:    [id number] [address] AT [place] [address] & [address]  [facility] [place] [address]  Phone: [phone] Fax: [phone]      Final Scheduling Details     Procedure scheduled  Colonoscopy    Surgeon:  Jossy    Date of procedure:  2/25    Pre-OP / PAC:   No - Not required for this site.    Location  [facility] - Per exclusion criteria.    Sedation   Moderate Sedation - Per order.      Patient Reminders:   You will receive a call from a Nurse to review instructions and health history.  This assessment must be completed prior to your procedure.  Failure to complete " the Nurse assessment may result in the procedure being cancelled.      On the day of your procedure, please designate an adult(s) who can drive you home stay with you for the next 24 hours. The medicines used in the exam will make you sleepy. You will not be able to drive.      You cannot take public transportation, ride share services, or non-medical taxi service without a responsible caregiver.  Medical transport services are allowed with the requirement that a responsible caregiver will receive you at your destination.  We require that drivers and caregivers are confirmed prior to your procedure.

## 2025-02-12 NOTE — TELEPHONE ENCOUNTER
Chief complaint: Hospital follow up of HFrEF    HISTORY OF PRESENT ILLNESS     Lizz Lane is a 87 year old female with a history of chronic HFrEF, nonischemic cardiomyopathy, hypertension, and dyslipidemia who presents for a post admission FUV of HFrEF.     She was recently admitted from 2/1-2/5/2025. She initially presented with chest pain. In the ED she was found to be hypertensive. She had reportedly run out of her coreg 3 days PTA. Troponin was negative x2. ECG with non specific changes. BP was controlled and patient was discharged home in stable condition.    Patient presents to the clinic accompanied by her son. She states that overall she has been feeling okay since discharge. Did notice some increased fatigue at cardiac rehab on Monday. Continues to have intermittent sharp, stabbing L rib pain. She states that pain is coming from under her ribs. Lasts for about 3 seconds and then goes away on its own. Comes and goes randomly. Has occasional lower extremity edema which goes away with lasix. Denies shortness of breath. Adhereing to a fluid restriction of 5 12 oz fluids per day.     PAST MEDICAL, FAMILY AND SOCIAL HISTORY     Medications:  Current Outpatient Medications   Medication Sig Dispense Refill    aspirin 81 MG chewable tablet Chew 1 tablet by mouth daily. 90 tablet 3    carvedilol (COREG) 25 MG tablet Take 1 tablet by mouth every 12 hours. 180 tablet 3    furosemide (LASIX) 20 MG tablet Take 1 tablet by mouth daily. 90 tablet 3    losartan (COZAAR) 100 MG tablet Take 1 tablet by mouth daily. 90 tablet 3    potassium chloride (K-TAB) 20 MEQ ER tablet Take 1 tablet by mouth daily. Indications: Low Amount of Potassium in the Blood 90 tablet 3    spironolactone (ALDACTONE) 25 MG tablet Take 1 tablet by mouth daily. 90 tablet 3    simvastatin (ZOCOR) 20 MG tablet Take 1 tablet by mouth nightly. 90 tablet 3    fluticasone (FLONASE) 50 MCG/ACT nasal spray Spray 2 sprays in each nostril daily. 16 g 0     Pre visit planning completed.      Procedure details:    Patient scheduled for Colonoscopy on 02.25.2025.     Arrival time: 0940. Procedure time 1025    Facility location: [facility]; [address] [place] [address]. Check in location: Main entrance, door #1 on the North side of the building under roundabout awning. DO NOT GO TO SURGERY/ED ENTRANCE.     Sedation type: Conscious sedation     Pre op exam needed? No.    Indication for procedure:   Positive FIT (fecal immunochemical test)            Chart review:     Electronic implanted devices? No    Recent diagnosis of diverticulitis within the last 6 weeks? No      Medication review:    Diabetic? No    Anticoagulants? Yes Apixaban (Eliquis): Recommended HOLD 2 days before procedure.  Consult with your managing provider.    Weight loss medication/injectable? No GLP-1 medication per patient's medication list. Nursing to verify with pre-assessment call.    Other medication HOLDING recommendations:  Ferrous sulfate (iron supplements): HOLD 7 days before procedure.  Patient takes Jardiance for cardiomyopathy-hold x 3 days-discuss with managing provider      Prep for procedure:     Bowel prep recommendation: Extended Golytely. Bowel prep sent to    [id number] - [place], [place] - [address] AT [address]    Due to: BMI > 40 and constipation noted or reported    Procedure information and instructions sent via [facility] RN  Endoscopy Procedure Pre Assessment   [phone] option 2    benzonatate (TESSALON PERLES) 100 MG capsule Take 1 capsule by mouth 3 times daily as needed for Cough. 30 capsule 0    [START ON 3/29/2025] pantoprazole (PROTONIX) 40 MG tablet Take 1 tablet by mouth daily. Begin taking on March 29, 2025. 90 tablet 3    lidocaine (LIDODERM) 5 % patch Place 1 patch onto the skin every 24 hours. Remove patch 12 hours after applying 30 patch 0    Calcium Carb-Cholecalciferol (Calcium 500 + D) 500-3.125 MG-MCG Tab Take 1 tablet by mouth daily. 90 tablet 3    fish oil-omega-3 fatty acids 1000 MG Cap Take 1 capsule by mouth daily. 90 capsule 3    cycloSPORINE (RESTASIS) 0.05 % ophthalmic emulsion Place 1 drop into both eyes 2 times daily. 180 each 4    acetaminophen (TYLENOL) 500 MG tablet Take 1-2 tablets by mouth every 6 hours as needed. 45 tablet 0     No current facility-administered medications for this visit.     Allergies:  ALLERGIES:  No Known Allergies  Past Medical  History/Surgeries:  Past Medical History:   Diagnosis Date    Abdominal pain     Acute on chronic congestive heart failure, unspecified heart failure type  (CMD) 06/25/2024    Anemia     Angiodysplasia of intestine     Anxiety and depression     Arthritis     CAD (coronary artery disease)     Calcific tendinitis     Chronic pain     Back     Colon polyps     Dry eyes     Essential (primary) hypertension     Gastric polyp     GERD (gastroesophageal reflux disease)     Glossodynia     High cholesterol     Kidney infection     LBBB (left bundle branch block)     Meningioma  (CMD)     Migraines     NICM (nonischemic cardiomyopathy)  (CMD)     Osteoporosis     Pruritus     Sciatica     Seasonal allergic rhinitis     Spinal stenosis     VHD (valvular heart disease)     Vitamin D deficiency      Past Surgical History:   Procedure Laterality Date    Appendectomy      Arthroscopy ankle w/arthrodes      Back surgery      2 lumbar fusions    Cardiac catherization  08/01/2017    Colonoscopy w/ polypectomy  11/2022    Coronary  angiogram - cv      Esophagogastroduodenoscopy  01/2023    Gynecologic cryosurgery      Hemangioma excision      2003    Hernia repair      pt stated it was in here esophagus    Hysterectomy      Ir myelogram      Ir spine injection      Lumbar fusion      Nissen fundoplication  2002    Pacemaker      Removal of ovary(s)       Family History:  Family History   Problem Relation Age of Onset    Hypertension Mother     Hypertension Father     Cancer, Colon Half-Brother      Social History:  Social History     Tobacco Use    Smoking status: Never     Passive exposure: Current    Smokeless tobacco: Never   Substance Use Topics    Alcohol use: No     REVIEW OF SYSTEMS     Review of Systems   Respiratory:  Positive for cough. Negative for shortness of breath.    Cardiovascular:  Positive for leg swelling. Negative for chest pain and palpitations.   Neurological:  Negative for dizziness and light-headedness.     PHYSICAL EXAM     Vitals:    02/12/25 1245   BP: 128/64   Pulse: (!) 59   Weight: 74.6 kg (164 lb 6.4 oz)   Height: 5' 1\" (1.549 m)     Vitals reviewed.   Constitutional:       Appearance: Healthy appearance. Not in distress.   Neck:      Vascular: JVD normal.   Pulmonary:      Effort: Pulmonary effort is normal.      Breath sounds: Normal breath sounds. No wheezing. No rhonchi. No rales.   Cardiovascular:      Normal rate. Regular rhythm.      Murmurs: There is no murmur.      No gallop.  No rub.   Pulses:     Intact distal pulses.   Edema:     Peripheral edema absent.   Abdominal:      General: There is no distension.   Skin:     General: Skin is warm and dry.   Neurological:      General: No focal deficit present.      Mental Status: Alert.       IMAGING/RESULTS     Recent Labs   Lab 02/05/25  0456 02/04/25  0323 02/03/25  0356 02/02/25  0322 02/01/25  1718 12/19/24  1109 12/05/24  0404 12/04/24  0427 12/04/24  0007 11/01/24  1032 06/26/24  0421 06/25/24  0858 04/15/24  2215 04/15/24  1236 04/03/24  1558  03/21/24  1304   HGB  --   --   --  11.8* 12.1 12.7 11.5* 13.5   < > 12.9   < > 10.8*   < > 12.1   < >  --    BUN 26* 24* 22* 23* 21* 24* 29* 25*   < > 30*   < > 14   < > 13   < > 18   Creatinine 0.98* 1.09* 0.86 0.94 1.13* 1.04* 1.10* 1.07*   < > 0.99*   < > 0.77   < > 0.85   < > 0.82   C-Reactive Protein  --   --   --   --   --   --   --   --   --   --   --   --   --  14.4*  --  <3.0   Potassium 3.9 3.9 3.9 3.9 4.2 4.4 4.2 3.8   < > 4.7   < > 3.8   < > 3.4   < > 4.0   NT-proBNP  --   --   --  1,042*  --   --   --   --   --   --   --  1,412*  --   --   --   --    Cholesterol  --   --   --   --   --   --   --   --   --  170  --  124  --   --   --   --    HDL  --   --   --   --   --   --   --   --   --  56  --  61  --   --   --   --    Cholesterol/ HDL Ratio  --   --   --   --   --   --   --   --   --  3.0  --  2.0  --   --   --   --    Triglycerides  --   --   --   --   --   --   --   --   --  200*  --  98  --   --   --   --    CALCLDL  --   --   --   --   --   --   --   --   --  74  --  43  --   --   --   --    GOT/AST  --   --   --  20 21 16 14 16   < > 20   < > 19   < > 13   < >  --    GPT  --   --   --  19 20 17 17 19   < > 16   < > 25   < > 18   < >  --    TSH  --   --   --  1.980  --   --   --   --   --  1.654  --  1.767  --   --   --   --     < > = values in this interval not displayed.       Lab Results   Component Value Date    EF 30 06/26/2024    EF 57.0 08/07/2017     TTE 11/18/2024 ([facility])  Left ventricle is normal in size.   There is moderate left ventricular systolic dysfunction.   There is moderate global hypokinesis.   Left ventricle is normal in size.   There is moderate left ventricular systolic dysfunction.   The quantitative LVEF based on modified Farley's method is 34%.   There is moderate global hypokinesis.   The left ventricular mass indexed to body surface area and based on gender is   mildly increased.   There is eccentric left ventricular hypertrophy.   Right ventricle is normal in  size.   There is normal right ventricular systolic function.   The left atrium is mildly enlarged in size based on measured volume indexed to   body surface area.   There is moderate aortic regurgitation.   There is moderate to severe mitral regurgitation.   There is mild tricuspid regurgitation.   The estimated pulmonary artery systolic pressure is within normal range.   There is no pericardial effusion.     NM Stress Test 6/28/2024  1.  Myocardial perfusion scan demonstrates mildly dilated left ventricle with fixed perfusion defect involving inferior wall and apex probably due to attenuation.  There is no reversible myocardial stress-induced ischemia.  2.   Moderately decreased post-regadenoson LV systolic function. LVEF:  38%  3. Cardiac Risk Assessment: Intermediate due to moderately reduced left ventricle systolic function.  4.No previous study for comparison    cMRI 7/1/2024  1.  Small focus of delayed myocardial enhancement along the basal lateral  segment of the left ventricle suggestive of prior infarct. This does not  account for the global hypokinesia and overall reduced systolic function.  2.  Dilated cardiomyopathy:-Mild left ventricular enlargement with severe  global hypokinesis and moderately reduced LVEF at 30%.  3.  Reduced right ventricular systolic function with RVEF of 35%.  4.  Thickening of the aortic and mitral valve leaflets with regurgitation  across both valves. Aortic valve regurgitation fraction is 16%. Mitral  valve regurgitant not quantified.    CARDIAC CATH 08/01/2017  Mild nonocclusive coronary artery disease, mild mid LAD and left circumflex artery irregularities less than 10 percent stenosis.Normal LVEF 55 percent, no regional wall motion abnormality.        ASSESSMENT/PLAN     #Chronic HFrEF, suspected nonischemic cardiomyopathy   EF dropped from 50 to 30% 6/2024. Most recently 34% by TTE 11/2024 at [facility]. [facility] 2017 without obstructive CAD. cMRI 7/2024 with evidence of small  prior infarct but CMP out of proportion to this. No overt HF on exam. On lasix 20 mg daily. On coreg 25 BID, losartan 100 daily, and aldactone 25 daily. No [id number] of genital yeast infection with prior use. Last entresto price check $93-will avoid at this time. Discussed referral to EP for ICD consideration, patient would like to think about it and discuss again at next office visit.     #Nonobstructive CAD  No angina. Has some non cardiac chest pain. On ASA, coreg and simvastatin     #Hypertension  BP controlled     #Dyslipidemia  11/1/24 LDL 74, Chol 170. On simvastatin       Return in about 3 months (around 5/12/2025) for Follow up with Dr. Mei. Call or return to clinic as needed if symptoms worsen, fail to improve as anticipated, or if new symptoms develop.       Nicci Tao PA-C

## 2025-02-12 NOTE — LETTER
2025      Hugo Keysvivian  PO   [facility] [address]              Dear Lion Arias Extended Bowel Prep   Prep instructions for your colonoscopy     [facility]; [address], [place] [address]  For prep questions, please call: [facility] - [phone] option 2             Date:  2025         Arrival Time:  9:40am         Procedure Time:   10:25am  Please read these instructions carefully at least 7 days prior to your colonoscopy procedure. Be sure to follow all directions completely. The inside of your colon must be clean to allow for a complete examination for the presence of any growths, polyps, and/or abnormalities, as well as their biopsy or removal. A number of tips are included in order to make this part of the procedure as comfortable as possible.    Getting ready      prescription at the pharmacy. Endoscopy team will order this about 2 weeks before to your scheduled procedure. Included in the kit will be the followin  Dulcolax (Bisacodyl) 5mg tablets  Two containers of Polyethylene glycol (Golytely, Colyte, Nulytely, Gavilyte-G)    A nurse will call you to go over your appointment details and prep instructions.     You must arrange for an adult to drive you home after your exam. Your colonoscopy cannot be done unless you have a ride. If you need to use public transportation, someone must ride with you and stay with you for up to 24 hours.       7 days before procedure     Medications that may need to be held before procedure:     GLP-1 agonist medication for diabetes or weight loss: such as Mounjaro (Tirzepatide).  Ozempic (Semaglutide). Rybelsus (Semaglutide), Tirzepatide-Weight Management (Zepbound), Wegovy (Semaglutide) or others, holding times may vary based on how you take this medication. This may be up to a 7 day hold. Our pre assessment nurses will call and discuss holding recommendations 1-2 weeks before scheduled procedure.      Blood thinning and/or anti platelet medications: such as Coumadin, Plavix, Xarelto, Eliquis, Lovenox or others, ask your your prescribing provider about holding recommendations.     If you take insulin for diabetes, ask your prescribing provider for instructions on how to manage this medication while preparing for a colonoscopy.     Stop taking iron (ferrous sulfate), multivitamins that contain iron, and/or fiber supplements (Metamucil, Benefiber, Psyllium husk powder, Fibercon, Bran, etc.) 7 days before procedure.     Stop eating whole kernel corn, popcorn, nuts, and foods that contain seeds. These can stay in the colon for many days and they can clog up the colonoscope.     Begin a low-fiber diet. (See examples below). No Olestra (a fat substitute).   Consume no more than 10-15 grams of fiber each day.                                                         LOW FIBER DIET   You may have: Do not have:    Starches: White bread, rolls, biscuits, croissants, Sharon toast, white flour tortillas, waffles, pancakes, Barbadian toast; white rice, noodles, pasta, macaroni; cooked and peeled potatoes; plain crackers, saltines; cooked farina or cream of rice; puffed rice, corn flakes, Rice Krispies, Special K      Vegetables: tender cooked and canned, vegetable broths     Fruits and fruit juices: Strained fruit juice, canned fruit without seeds or skin (not pineapple), applesauce, pear sauce, ripe bananas, melons (not watermelon)     Milk products: Milk (plain or flavored), cheese, cottage cheese, yogurt (no berries), custard, ice cream       Proteins: Tender, well-cooked ground beef, lamb, veal, ham, pork, chicken, turkey, fish or organ meat, Tofu, eggs, creamy peanut butter      Fats and condiments: Margarine, butter, oils, mayonnaise, sour cream, salad dressing, plain gravy; spices, cooked herbs; sugar, clear jelly, honey, syrup      Snacks, sweets and drinks: Pretzels, hard candy; plain cakes and cookies (no nuts or  seeds); gelatin, plain pudding, sherbet, Popsicles; coffee, tea, carbonated ( fizzy ) drinks  Starches: Breads or rolls that contain nuts, seeds or fruit; whole wheat or whole grain breads that contain more than 2 grams of fiber per serving; cornbread; corn or whole wheat tortillas; potatoes with skin; brown rice, wild rice, quinoa, kasha (buckwheat), and oatmeal      Vegetables: Any raw or steamed vegetables; vegetables with seeds; corn in any form      Fruits and fruit juices: Prunes, prune juice, raisins and other dried fruits, berries and other fruits with seeds, canned pineapple juices with pulp such as orange, grapefruit, pineapple or tomato juice     Milk products: Any yogurt with nuts, seeds or berries      Proteins: Tough, fibrous meats with gristle; cooked dried beans, peas or lentils; crunchy peanut butter     Fats and condiments: Pickles, olives, relish, horseradish; jam, marmalade, preserves      Snacks, sweets and drinks: Popcorn, nuts, seeds, granola, coconut, candies made with nuts or seeds; all desserts that contain nuts, seeds, raisins and other dried fruits, coconut, whole grains or bran.                                     2 days before procedure       You may have a light, low fiber breakfast and lunch. Begin a clear liquid diet AFTER 1 PM. Do not eat solid foods. (See examples below).    Drink at least eight to ten 8-ounce glasses of water throughout the day  ? ? ? ? ? ? ? ?    Fill one container of Golytely with a full gallon of warm water. Cover and shake until well mixed. Chill in refrigerator until it is time to drink solution.     CLEAR LIQUID DIET:  You can have: Do not have:    Water, tea, coffee (no milk or cream)   Soda pop, Gatorade (not red or purple)   Coconut water   Jell-O, Popsicles (no milk or fruit pieces - not red or purple)   Fat-free soup broth or bouillon   Plain hard candy, such as clear life savers (not red or purple)   Clear juices and fruit-flavored drinks, such as  apple juice, white grape juice, Hi-C, and Conrado-Aid (not red or purple)    Milk or milk products such as ice cream, malts or shakes, or coffee creamer   Red or purple drinks of any kind such as cranberry juice, grape juice, or Conrado-Aid. Avoid red or purple Jell-O, Popsicles, sorbet, sherbet and candy.   Juices with pulp such as orange, grapefruit, pineapple, or tomato juice   Cream soups of any kind   Alcohol and beer   Protein drinks or protein powder     Step 1     At 4 PM, take 2 Dulcolax (Bisacodyl) tablets.  At 5 PM, start drinking one 8-ounce glass of Golytely mixture every 15 minutes until the container is HALF empty. Drink each glass quickly. Store the rest in the refrigerator.   Continue to drink clear liquids.      Reminders While Drinking Laxatives:     After you start drinking the solution, stay near a toilet. You may have watery stools (diarrhea), mild cramping, bloating, and nausea. You may want to use Vaseline on the skin around your anus after each bowel movement or use wet wipes to prevent irritation. Bowel movements will be liquid and dark in color at first and then should turn clear yellow in color. Drinking the prepared solution may make you cold, wearing warm clothing can be helpful.  Some find it helpful to:  For added flavor, include a crystal light lemonade packet in each glass of Golytely, rather than mixing it into the entire prepared mixture.  In between each glass, try sucking on a lemon or a piece of hard candy to help diminish the flavor of the preparation.  Drinking from a straw can help minimize the taste of the prepared mixture.    If you have nausea or vomiting during drinking the solution, rinse your mouth with water and take a 15-30 minute break and then continue drinking solution.       1 day before procedure       Continue on a clear liquid diet. Do not eat solid foods.    Drink at least eight to ten 8-ounce glasses of water throughout the day  ? ? ? ? ? ? ? ?    Step 2     At 4  PM, take 2 Dulcolax (Bisacodyl) tablets.  At 5 PM, start drinking the 2nd half of Golytely mixture. Drink one 8-ounce glass of Golytely mixture every 15 minutes until the container is empty.  Drink each glass quickly.   Continue to drink clear liquids.    Before you go to bed mix the second container of Golytely and place in the refrigerator for the morning.     Day of procedure     Step 3     6 hours before your arrival time, start drinking one 8-ounce glass of Golytely mixture every 15 minutes until the container is HALF empty. You will not drink the entire container. The remaining solution can be discarded.     2 hours before your arrival time stop drinking all liquids, including water.   Do not smoke or swallow anything, including water or gum for at least 2 hours before your arrival time. This is a safety issue. Your procedure could be cancelled if you do not follow directions.  No chewing tobacco 6 hours prior to procedure arrival time.     You may take your necessary morning medications with sips of water (4 ounces).   Do not take diabetes medicine by mouth until after your exam.  If you have asthma, bring your inhalers.  Please perform your nebulizer treatments and airway clearance therapy in the morning prior to the procedure (if applicable).  Arrive with a responsible adult who can drive you home and stay with you for a minimum of 24 hours. The medications used during the procedure will make you sleepy, so you won't be able to drive yourself home.   You cannot use public transportation, ride-share services, or non-medical taxi services without a responsible caregiver. Medical transport services are okay, but a caregiver must be there to receive you at your destination.  Please check in with your  when you arrive. Drivers should stay on campus.    Expect to be at the procedure center for about 1.5-2.5 hours.    Do not wear jewelry (i.e. earrings, rings, necklaces, watches, etc.). Leave your purse,  billfold, credit cards, and other valuables at home.    Bring insurance card and ID.       Answers to Commonly Asked Questions     How soon can I eat after the procedure?  You may resume your normal diet when you feel ready, unless advised otherwise by the doctor performing your procedure. We recommend starting with a light meal.   Do not drink alcohol for 24 hours after your procedure.  You may resume normal activities (work, exercise, etc.) after 24 hours.    How will I feel after the procedure?  It is normal to feel bloated and gassy after your procedure. Walking will help move the air through your colon. You can take non-aspirin pain relievers that contain acetaminophen (Tylenol).  If you are having sedation, we require a responsible adult to take you home for your safety. The sedation medicines used to relax you during the procedure can impair your judgement and reaction time and make you forgetful and possibly a little unsteady.  Do not drive, make any important decisions, or sign any legal documents for 24 hours after your procedure.    When will I get my test results?  You should have your procedure results and any lab results (if applicable) by letter, [facility] message, or phone call within 2 weeks. If you have any questions, please call the doctor that referred you for the procedure.    How do I know if my colon is cleaned out?   After completing the bowel prep, your bowel movements should be all liquid and yellow. Your bowel movements will look similar to urine in the toilet. If there are pieces of stool (poop) in the toilet, or if you can't see to the bottom of the toilet, please call our office for advice. Call [phone] and ask to speak with a nurse.    Why is the Golytely bowel prep taken in several steps?   The stool is flushed out by a large wave of fluid going through the colon. Just sipping a large volume of the solution will not achieve the desired result. Studies have shown that two smaller  waves (or more in some cases) are better than one large one.      What if I need to cancel or reschedule my procedure?  Contact our endoscopy scheduling team at [phone], option 2. Monday through Friday, 7:00am-5:00pm.            Sincerely,    Nneka Mae LPN  Endoscopy Procedure Pre Assessment LPN  [phone] option 2

## 2025-02-25 ENCOUNTER — HOSPITAL ENCOUNTER (OUTPATIENT)
Facility: CLINIC | Age: 80
Discharge: HOME | End: 2025-02-25
Attending: COLON & RECTAL SURGERY | Admitting: COLON & RECTAL SURGERY
Payer: COMMERCIAL

## 2025-02-25 VITALS
RESPIRATION RATE: 22 BRPM | TEMPERATURE: 97.2 F | DIASTOLIC BLOOD PRESSURE: 67 MMHG | HEART RATE: 82 BPM | SYSTOLIC BLOOD PRESSURE: 114 MMHG | BODY MASS INDEX: 42.7 KG/M2 | WEIGHT: 305 LBS | OXYGEN SATURATION: 97 % | HEIGHT: 71 IN

## 2025-02-25 LAB — COLONOSCOPY: NORMAL

## 2025-02-25 PROCEDURE — 250N000011 HC RX IP 250 OP 636: Performed by: COLON & RECTAL SURGERY

## 2025-02-25 PROCEDURE — 45382 COLONOSCOPY W/CONTROL BLEED: CPT | Performed by: COLON & RECTAL SURGERY

## 2025-02-25 PROCEDURE — 45384 COLONOSCOPY W/LESION REMOVAL: CPT | Performed by: COLON & RECTAL SURGERY

## 2025-02-25 PROCEDURE — 45385 COLONOSCOPY W/LESION REMOVAL: CPT | Performed by: COLON & RECTAL SURGERY

## 2025-02-25 PROCEDURE — 45380 COLONOSCOPY AND BIOPSY: CPT | Performed by: COLON & RECTAL SURGERY

## 2025-02-25 PROCEDURE — 88305 TISSUE EXAM BY PATHOLOGIST: CPT | Mod: TC | Performed by: COLON & RECTAL SURGERY

## 2025-02-25 PROCEDURE — 99153 MOD SED SAME PHYS/QHP EA: CPT | Performed by: COLON & RECTAL SURGERY

## 2025-02-25 PROCEDURE — G0500 MOD SEDAT ENDO SERVICE >5YRS: HCPCS | Performed by: COLON & RECTAL SURGERY

## 2025-02-25 PROCEDURE — 258N000003 HC RX IP 258 OP 636: Performed by: COLON & RECTAL SURGERY

## 2025-02-25 RX ORDER — ONDANSETRON 4 MG/1
4 TABLET, ORALLY DISINTEGRATING ORAL EVERY 6 HOURS PRN
Status: DISCONTINUED | OUTPATIENT
Start: 2025-02-25 | End: 2025-02-25 | Stop reason: HOSPADM

## 2025-02-25 RX ORDER — FLUMAZENIL 0.1 MG/ML
0.2 INJECTION, SOLUTION INTRAVENOUS
Status: DISCONTINUED | OUTPATIENT
Start: 2025-02-25 | End: 2025-02-25 | Stop reason: HOSPADM

## 2025-02-25 RX ORDER — SIMETHICONE 40MG/0.6ML
133 SUSPENSION, DROPS(FINAL DOSAGE FORM)(ML) ORAL
Status: DISCONTINUED | OUTPATIENT
Start: 2025-02-25 | End: 2025-02-25 | Stop reason: HOSPADM

## 2025-02-25 RX ORDER — ONDANSETRON 2 MG/ML
4 INJECTION INTRAMUSCULAR; INTRAVENOUS
Status: DISCONTINUED | OUTPATIENT
Start: 2025-02-25 | End: 2025-02-25 | Stop reason: HOSPADM

## 2025-02-25 RX ORDER — NALOXONE HYDROCHLORIDE 0.4 MG/ML
0.4 INJECTION, SOLUTION INTRAMUSCULAR; INTRAVENOUS; SUBCUTANEOUS
Status: DISCONTINUED | OUTPATIENT
Start: 2025-02-25 | End: 2025-02-25 | Stop reason: HOSPADM

## 2025-02-25 RX ORDER — FENTANYL CITRATE 50 UG/ML
25-100 INJECTION, SOLUTION INTRAMUSCULAR; INTRAVENOUS EVERY 5 MIN PRN
Status: DISCONTINUED | OUTPATIENT
Start: 2025-02-25 | End: 2025-02-25 | Stop reason: HOSPADM

## 2025-02-25 RX ORDER — ATROPINE SULFATE 0.1 MG/ML
1 INJECTION INTRAVENOUS
Status: DISCONTINUED | OUTPATIENT
Start: 2025-02-25 | End: 2025-02-25 | Stop reason: HOSPADM

## 2025-02-25 RX ORDER — ONDANSETRON 2 MG/ML
4 INJECTION INTRAMUSCULAR; INTRAVENOUS EVERY 6 HOURS PRN
Status: DISCONTINUED | OUTPATIENT
Start: 2025-02-25 | End: 2025-02-25 | Stop reason: HOSPADM

## 2025-02-25 RX ORDER — LIDOCAINE 40 MG/G
CREAM TOPICAL
Status: DISCONTINUED | OUTPATIENT
Start: 2025-02-25 | End: 2025-02-25 | Stop reason: HOSPADM

## 2025-02-25 RX ORDER — NALOXONE HYDROCHLORIDE 0.4 MG/ML
0.2 INJECTION, SOLUTION INTRAMUSCULAR; INTRAVENOUS; SUBCUTANEOUS
Status: DISCONTINUED | OUTPATIENT
Start: 2025-02-25 | End: 2025-02-25 | Stop reason: HOSPADM

## 2025-02-25 RX ORDER — DIPHENHYDRAMINE HYDROCHLORIDE 50 MG/ML
25-50 INJECTION INTRAMUSCULAR; INTRAVENOUS
Status: DISCONTINUED | OUTPATIENT
Start: 2025-02-25 | End: 2025-02-25 | Stop reason: HOSPADM

## 2025-02-25 RX ORDER — PROCHLORPERAZINE MALEATE 5 MG/1
5 TABLET ORAL EVERY 6 HOURS PRN
Status: DISCONTINUED | OUTPATIENT
Start: 2025-02-25 | End: 2025-02-25 | Stop reason: HOSPADM

## 2025-02-25 RX ORDER — SODIUM CHLORIDE, SODIUM LACTATE, POTASSIUM CHLORIDE, CALCIUM CHLORIDE 600; 310; 30; 20 MG/100ML; MG/100ML; MG/100ML; MG/100ML
INJECTION, SOLUTION INTRAVENOUS CONTINUOUS
Status: DISCONTINUED | OUTPATIENT
Start: 2025-02-25 | End: 2025-02-25 | Stop reason: HOSPADM

## 2025-02-25 RX ORDER — EPINEPHRINE 1 MG/ML
0.1 INJECTION, SOLUTION, CONCENTRATE INTRAVENOUS
Status: DISCONTINUED | OUTPATIENT
Start: 2025-02-25 | End: 2025-02-25 | Stop reason: HOSPADM

## 2025-02-25 RX ADMIN — MIDAZOLAM 1 MG: 1 INJECTION INTRAMUSCULAR; INTRAVENOUS at 10:52

## 2025-02-25 RX ADMIN — SODIUM CHLORIDE 500 ML: 9 INJECTION, SOLUTION INTRAVENOUS at 11:50

## 2025-02-25 RX ADMIN — MIDAZOLAM 1 MG: 1 INJECTION INTRAMUSCULAR; INTRAVENOUS at 11:27

## 2025-02-25 RX ADMIN — MIDAZOLAM 1 MG: 1 INJECTION INTRAMUSCULAR; INTRAVENOUS at 12:10

## 2025-02-25 RX ADMIN — MIDAZOLAM 1 MG: 1 INJECTION INTRAMUSCULAR; INTRAVENOUS at 11:05

## 2025-02-25 RX ADMIN — MIDAZOLAM 1 MG: 1 INJECTION INTRAMUSCULAR; INTRAVENOUS at 11:32

## 2025-02-25 RX ADMIN — FENTANYL CITRATE 100 MCG: 50 INJECTION, SOLUTION INTRAMUSCULAR; INTRAVENOUS at 10:52

## 2025-02-25 ASSESSMENT — ACTIVITIES OF DAILY LIVING (ADL)
ADLS_ACUITY_SCORE: 62

## 2025-02-25 NOTE — H&P
Pre-Endoscopy History and Physical     Hugo Weber MRN# [id number]   YOB: 1945 Age: 79 year old     Date of Procedure: 2/25/2025  Primary care provider: Brett Cassidy  Type of Endoscopy: colonoscopy  Reason for Procedure: surveillance  Type of Anesthesia Anticipated: Moderate Sedation    HPI:    Hugo is a 79 year old male who will be undergoing the above procedure.      A history and physical has been performed. The patient's medications and allergies have been reviewed. The risks and benefits of the procedure and the sedation options and risks were discussed with the patient.  All questions were answered and informed consent was obtained.      He denies a personal or family history of anesthesia complications or bleeding disorders.     No Known Allergies     Prior to Admission Medications   Prescriptions Last Dose Informant Patient Reported? Taking?   Ferrous Sulfate (IRON) 325 (65 Fe) MG tablet 2/18/2025  No No   Sig: Take 1 tablet by mouth three times a week. (Monday, Wednesday, Friday)   acetaminophen (TYLENOL) 500 MG tablet   No No   Sig: Take 2 tablets (1,000 mg) by mouth every 8 hours. Take scheduled for 2weeks then change to every 6hours PRN   amoxicillin (AMOXIL) 500 MG capsule   No No   Sig: Take 4 capsules (2,000 mg) by mouth as needed ([id number]). Before dental appointment   apixaban ANTICOAGULANT (ELIQUIS ANTICOAGULANT) 5 MG tablet 2/22/2025  No No   Sig: Take 1 tablet (5 mg) by mouth 2 times daily.   calcium carbonate-vitamin D (OSCAL) 250-3.125 MG-MCG TABS per tablet  Self Yes No   Sig: Take 1 tablet by mouth daily.   clotrimazole (LOTRIMIN) 1 % external cream  Self No No   Sig: Apply thin layer of cream to affected area on penis, leg, and feet twice daily for 14 days.   empagliflozin (JARDIANCE) 10 MG TABS tablet 2/22/2025  No No   Sig: Take 1 tablet (10 mg) by mouth daily.   fluticasone (FLONASE) 50 MCG/ACT nasal spray  Self Yes No   Sig: Spray 1 spray into both nostrils  daily.   furosemide (LASIX) 20 MG tablet Past Week  No Yes   Sig: Take 2 tablets (40 mg) by mouth 2 times daily.   metoprolol succinate ER (TOPROL XL) 50 MG 24 hr tablet 2/25/2025 Morning  No Yes   Sig: Take 1 tablet (50 mg) by mouth 2 times daily.   pantoprazole (PROTONIX) 40 MG EC tablet 2/25/2025 Morning  No Yes   Sig: Take 1 tablet (40 mg) by mouth daily.   rosuvastatin (CRESTOR) 10 MG tablet 2/25/2025 Morning  No Yes   Sig: Take 1 tablet (10 mg) by mouth daily.   sacubitril-valsartan (ENTRESTO) 49-51 MG per tablet 2/25/2025 Morning  No Yes   Sig: Take 1 tablet by mouth 2 times daily.   tamsulosin (FLOMAX) 0.4 MG capsule 2/25/2025 Morning  No Yes   Sig: TAKE ONE CAPSULE BY MOUTH TWICE DAILY   vitamin B-12 (CYANOCOBALAMIN) 1000 MCG tablet  Self Yes No   Sig: Take 1,000 mcg by mouth every other day   vitamin C (ASCORBIC ACID) 1000 MG TABS  Self Yes No   Sig: Take 1,000 mg by mouth daily.   vitamin D3 (CHOLECALCIFEROL) 125 MCG (5000 UT) tablet  Self Yes No   Sig: Take 1 tablet by mouth daily.      Facility-Administered Medications: None       Patient Active Problem List   Diagnosis    Iron deficiency anemia    Colon polyps    Obstructive sleep apnea syndrome    Hyperlipidemia LDL goal <70    Long term current use of anticoagulant therapy - for intermittent a-fib    Total knee replacement status    Calculus of kidney - 1.2 cm stone at the upper pole as of CT urogram fr 1/11/2023    NAFLD (nonalcoholic fatty liver disease)    Morbid obesity due to excess calories (H)    History of hepatitis C    Prediabetes    Paroxysmal atrial fibrillation (H)    Cholelithiasis    Hypertension goal BP (blood pressure) < 140/90    TMJ arthralgia    Nephrolithiasis    OA (osteoarthritis)    First degree AV block    Benign prostatic hyperplasia (BPH) with urinary urge incontinence    Thoracic aortic ectasia    Stroke (H)    CVA (cerebral vascular accident) (H)    Cervical stenosis of spine    Vitamin B12 deficiency (non anemic)     Vitamin D deficiency    Myofascial pain    Permanent atrial fibrillation (H)    History of CVA (cerebrovascular accident)    Small bowel obstruction (H)    NSVT (nonsustained ventricular tachycardia) (H)    Chronic LLQ pain    Elevated prostate specific antigen (PSA)    Prostate cancer (H)    Chronic systolic heart failure (H)    HFrEF (heart failure with reduced ejection fraction) (H)    Aortic valve stenosis, etiology of cardiac valve disease unspecified- moderate 2+ with FREDY = 1.2cm2 on echocardiogram fr 7/14/2023    Mitral pyztoypqhcpmj-0-4+ on echocardiogram fr 7/14/2023    Cardiomyopathy (H)    Cardiomyopathy, unspecified type (H)    S/P CABG (coronary artery bypass graft)    Fluid overload    Transient hyperglycemia post procedure    Status post ligation of left atrial appendage    Encounter for surgical aftercare following surgery on the circulatory system    Presence of aortocoronary bypass graft:CAB x1 8/15/2023    Atherosclerosis of native coronary artery of native heart without angina pectoris    Essential (primary) hypertension    Chronic atrial fibrillation, unspecified (H)    Personal history of transient ischemic attack (TIA), and cerebral infarction without residual deficits    Physical deconditioning    Leg edema, right    HAYLEE on CPAP    S/P shoulder replacement, right        Past Medical History:   Diagnosis Date    Aortic stenosis     moderate    Atrial fibrillation 2006    Followed by [facility] - persistent    CAD (coronary artery disease)     Calculus of kidney 2005, 6/06, 10/12, 8/18, 9/19    dr walker/nestor/иван - hematuria - w/u o/w neg    Cervical stenosis of spine     Moderate C4-5    Cholelithiasis     Chronic peptic ulcer, unspecified site, without mention of hemorrhage, perforation, or obstruction 1985    gastric bypass    Colon polyps 8/05, 9/10, 10/15    2 tubular adenoma, 1 hyperplastic - multiple serrated/tubular adenomas - last colonscopy 11/20 due 5 yrs    CVA (cerebral  vascular accident) (H) 01/2019    small right periorlandic subcortical - left sided residual - left hand tingling/numbness - Left leg weak/numbness - cardioembolic    Elevated prostate specific antigen (PSA)     Dr Santos    Hepatitis C 10/2012    chronic hepatitis C, grade 1-2, stage 1 - mild - Dr Douglas    HFrEF (heart failure with reduced ejection fraction) (H)     [facility] - 35%    Hyperlipidemia LDL goal <70     Hypertension goal BP (blood pressure) < 140/90 06/2006    dr jaciel winchester    Iron deficiency anemia, unspecified 1985    gastric bypass    Mitral regurgitation     mild-moderate    Nephrolithiasis multiple episodes, last 10/19    Dr Bey/Ivana/Tom - 9mm 3/2021    OA (osteoarthritis)     Multiple - Left shoulder with rotator cuff tear - Dr Durham    Obesity, unspecified     s/p gastric bypass 1985     Obstructive sleep apnea syndrome     CPAP - 15 cm - [facility] 1/2023    Prediabetes     Presbyacusis 01/2004    dr corona    Prostate cancer (H) 2023    Dr Santos - [place] 3+4, 4+3 = 7, bx 5/13 positive    Pyelonephritis, unspecified 12/1999    SCC (squamous cell carcinoma), 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    dr saez - lt conchal bowl    Sleep apnea 10/2002    Auto CPAP - severe - 12-15 cm - dr cunha    Stroke (H) 01/19/2019    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    [facility]    Vitamin B12 deficiency (non anemic) 04/15/2019    Vitamin D deficiency 04/15/2019        Past Surgical History:   Procedure Laterality Date    APPENDECTOMY  1969    ARTHROPLASTY KNEE  08/13/2012    LT TKA - Dr Villanueva    BYPASS GRAFT ARTERY CORONARY N/A 08/15/2023    Procedure: CORONARY ARTERY BYPASS GRAFT x 1 (SAPHENOUS VEIN - RIGHT POSTERIOR DESCENDING ARTERY) WITH ENDOSCOPIC SAPHENOUS VEIN HARVEST ON THE LEFT LOWER EXTREMITY, AND ON CARDIOPULMONARY PUMP OXYGENATOR  (INTRAOPERATIVE TRANSESOPHAGEAL ECHOCARDIOGRAM BY ANESTHESIOLOGIST), REMOVAL OF RIGHT CORONARY ARTERY STENT AND DELIVERY SYSTEM, LEFT ATRIAL APPENDAGE CLIPPING WITH ATRICLIP  FLEX V DEVICE SIZE: 45    CARDIOVERSION  2016    COLONOSCOPY  8/05, 9/10, 10/15    multiple tubular/serrated/hyperplastic polyps    COLONOSCOPY N/A 10/29/2015    multiple tubular and serrated adenomas    COLONOSCOPY N/A 09/07/2016    COLONOSCOPY N/A 11/24/2020    tubular adenomas - due 5 yrs    CV CORONARY ANGIOGRAM N/A 08/15/2023    Procedure: Coronary Angiogram;  Surgeon: Carly Luna MD;  Location:  [facility]    CV FRACTIONAL FLOW RATIO WIRE N/A 08/15/2023    Procedure: Fractional Flow Ratio Wire;  Surgeon: Carly Luna MD;  Location:  [facility]    CV PCI N/A 08/15/2023    Procedure: Percutaneous Coronary Intervention;  Surgeon: Carly Luna MD;  Location:  [facility]    CYSTOSCOPY W/ LASER LITHOTRIPSY  10/16/2012,5/2/2018    ESOPHAGOSCOPY, GASTROSCOPY, DUODENOSCOPY (EGD), COMBINED N/A 11/24/2020    Procedure: ESOPHAGOGASTRODUODENOSCOPY, COLONOSCOPY with biopsies;  Surgeon: Chadwick Burgos MD;  Location:  OR    EYE SURGERY  1/01,6/02,11/02    lasik    HC KNEE SCOPE, DIAGNOSTIC  05/2000    Arthroscopy, Knee RT    LASER HOLMIUM LITHOTRIPSY URETER(S), INSERT STENT, COMBINED  10/16/2012    stones x 4, Dr Campa    LASER HOLMIUM LITHOTRIPSY URETER(S), INSERT STENT, COMBINED Right 05/02/2018    CYSTOSCOPY, RIGHT URETEROSCOPY, HOLMIUM LASER LITHOTRIPSY, RIGHT STENT PLACEMENT - Dr Bey    MRI BIOPSY PROSTATE Bilateral 02/09/2023    mark    REVERSE ARTHROPLASTY SHOULDER Left 03/07/2023    Procedure: LEFT REVERSE SHOULDER ARTHROPLASTY WITH CUSTOM GUIDE WITH AUTOLOGOUS BONE GRAFTOF PROXIMAL HUMERUS;  Surgeon: Booker Multani MD;  Location:  OR    REVERSE ARTHROPLASTY SHOULDER Right 10/29/2024    Procedure: RIGHT REVERSE SHOULDER ARTHROPLASTY , NO CUSTOM GUIDE;  Surgeon: Booker Multani MD;  Location:  OR    SURGICAL HISTORY OF -   1986    s/p gastric bypass Bilroth II    SURGICAL HISTORY OF -   11/1998    wisdom teeth    SURGICAL HISTORY OF  "-   1979    cellulitis    SURGICAL HISTORY OF -   1998    lt forearm spur's    SURGICAL HISTORY OF -   ,,    s/p lasik    SURGICAL HISTORY OF -   1999    rt forearm spurs    SURGICAL HISTORY OF -   2006    dr stevenson - lithotrypsy    SURGICAL HISTORY OF -   10/08,     Lt ear chonchal lesion removal SCC - dr saez    SURGICAL HISTORY OF -  Right 10/2019    nephrolithiasis - cystoscopy, rt lithotrypsy, Dr Heath    SURGICAL HISTORY OF -  Right 2019    Cystoscopy, Rt lithotrypsy, Calcium Oxalate, Dr Heath       Social History     Tobacco Use    Smoking status: Never    Smokeless tobacco: Never   Substance Use Topics    Alcohol use: Yes     Alcohol/week: 2.0 - 3.0 standard drinks of alcohol     Types: 2 - 3 Standard drinks or equivalent per week     Comment: occassiinally       Family History   Problem Relation Age of Onset    Alzheimer Disease Father 82         at 88    Prostate Cancer Father 76        bladder and prostate    Heart Disease Mother 80        CHF    Heart Disease Maternal Grandfather         MI at 55    Cancer Paternal Uncle         ?    Ulcerative Colitis No family hx of     Crohn's Disease No family hx of     Stomach Cancer No family hx of     GERD No family hx of        REVIEW OF SYSTEMS:     5 point ROS negative except as noted above in HPI, including Gen., Resp., CV, GI &  system review.      PHYSICAL EXAM:   /83   Pulse 63   Temp 97.2  F (36.2  C) (Temporal)   Resp 14   Ht 1.791 m (5' 10.5\")   Wt (!) 138.3 kg (305 lb)   SpO2 97%   BMI 43.14 kg/m   Estimated body mass index is 43.14 kg/m  as calculated from the following:    Height as of this encounter: 1.791 m (5' 10.5\").    Weight as of this encounter: 138.3 kg (305 lb).   GENERAL APPEARANCE: healthy and alert  MENTAL STATUS: alert  AIRWAY EXAM: Mallampatti Class II (visualization of the soft palate, fauces, and uvula)  RESP: lungs clear to auscultation - no rales, rhonchi or wheezes  CV: " regular rates and rhythm      DIAGNOSTICS:    Not indicated      IMPRESSION   ASA Class 2 - Mild systemic disease        PLAN:       Plan for colonoscopy. We discussed the risks, benefits and alternatives and the patient wished to proceed.    The above has been forwarded to the consulting provider.      Signed Electronically by: Laura Fenton MD  February 25, 2025

## 2025-02-25 NOTE — DISCHARGE INSTRUCTIONS
The patient has received a copy of the Provation report the doctor has written and discharge instructions have been discussed with the patient and responsible adult.  All questions were addressed and answered prior to patient discharge.    Clip Placement    This Patient has received a temporary endoscopic clip which can be safely used in a MRI with a static magnetic field of 1/5-Kendra and 3-Kendra ONLY.  Retention times for this clip vary so if you have an MRI within the next year make sure to mention you had a clip placed on this day to the MRI staff. If you were given the informational card, keep that in in your wallet or purse for 1 year.    Non-clinical testing demonstrated that the clip is MR Conditional according to ASTM -32. A patient with this device can be scanned safely in an MR system under the following conditions.    Static magnetic field of 1.5-Kendra and 3-Telsa, only.  Maximum spatial gradient magnetic field of 2,000-gauss/cm (20-T/m)  Maximum MR system reported, whole body averaged specific absorption rate (ALINA) of 2-W/kg for 15 minutes of scanning (i.e., per pulse sequence) in the Normal Operating Mode.    Under the scan conditions defined, the clip is expected to produce a maximum temperature rise of 1.9 degrees C after 15 minutes of continuous scanning (i.e., per pulse sequence). In Non-clinical testing, the image artifact caused by the clip extends approximately 30-mm from this implant when imaged using a gradient echo pulse sequence and a 3-Kendra RM system.

## 2025-02-26 ENCOUNTER — VIRTUAL VISIT (OUTPATIENT)
Dept: PHARMACY | Facility: CLINIC | Age: 80
End: 2025-02-26
Attending: FAMILY MEDICINE
Payer: COMMERCIAL

## 2025-02-26 DIAGNOSIS — J30.9 ALLERGIC RHINITIS: ICD-10-CM

## 2025-02-26 DIAGNOSIS — I48.0 PAROXYSMAL ATRIAL FIBRILLATION (H): ICD-10-CM

## 2025-02-26 DIAGNOSIS — I10 ESSENTIAL (PRIMARY) HYPERTENSION: ICD-10-CM

## 2025-02-26 DIAGNOSIS — I50.20 HFREF (HEART FAILURE WITH REDUCED EJECTION FRACTION) (H): ICD-10-CM

## 2025-02-26 DIAGNOSIS — Z78.9 TAKES DIETARY SUPPLEMENTS: ICD-10-CM

## 2025-02-26 DIAGNOSIS — N39.41 BENIGN PROSTATIC HYPERPLASIA (BPH) WITH URINARY URGE INCONTINENCE: ICD-10-CM

## 2025-02-26 DIAGNOSIS — B35.9 TINEA: ICD-10-CM

## 2025-02-26 DIAGNOSIS — K21.9 GASTROESOPHAGEAL REFLUX DISEASE, UNSPECIFIED WHETHER ESOPHAGITIS PRESENT: ICD-10-CM

## 2025-02-26 DIAGNOSIS — R52 PAIN: ICD-10-CM

## 2025-02-26 DIAGNOSIS — Z95.1 S/P CABG (CORONARY ARTERY BYPASS GRAFT): Chronic | ICD-10-CM

## 2025-02-26 DIAGNOSIS — N40.1 BENIGN PROSTATIC HYPERPLASIA (BPH) WITH URINARY URGE INCONTINENCE: ICD-10-CM

## 2025-02-26 DIAGNOSIS — E78.5 HYPERLIPIDEMIA LDL GOAL <70: Primary | ICD-10-CM

## 2025-02-26 LAB
PATH REPORT.COMMENTS IMP SPEC: NORMAL
PATH REPORT.FINAL DX SPEC: NORMAL
PATH REPORT.GROSS SPEC: NORMAL
PATH REPORT.MICROSCOPIC SPEC OTHER STN: NORMAL
PATH REPORT.RELEVANT HX SPEC: NORMAL
PHOTO IMAGE: NORMAL

## 2025-02-26 PROCEDURE — 88305 TISSUE EXAM BY PATHOLOGIST: CPT | Mod: 26 | Performed by: PATHOLOGY

## 2025-02-26 RX ORDER — CHOLECALCIFEROL (VITAMIN D3) 50 MCG
1 TABLET ORAL DAILY
COMMUNITY

## 2025-02-26 RX ORDER — CETIRIZINE HYDROCHLORIDE 10 MG/1
5 TABLET ORAL DAILY
COMMUNITY

## 2025-02-26 NOTE — LETTER
"Recommended To-Do List      Prepared on: Feb 26, 2025       You can get the best results from your medications by completing the items on this \"To-Do List.\"      Bring your To-Do List when you go to your doctor. And, share it with your family or caregivers.    My To-Do List:  What we talked about: What I should do:   A medication that is not working    Change the medication you are taking from fluticasone (FLONASE) to cetirizine (zyrTEC)          What we talked about: What I should do:                     "

## 2025-02-26 NOTE — LETTER
February 26, 2025  Hugo Weber  [address]   [facility] [address]    Dear  Ludmil[place]ury, [facility]HA     Thank you for talking with me on Feb 26, 2025 about your health and medications. As a follow-up to our conversation, I have included two documents:      Your Recommended To-Do List has steps you should take to get the best results from your medications.  Your Medication List will help you keep track of your medications and how to take them.    If you want to talk about these documents, please call Jennifer Montiel RPH at phone: [phone], Monday-Friday 8-4:30pm.    I look forward to working with you and your doctors to make sure your medications work well for you.    Sincerely,  Jennifer Montiel RPH  [facility] Pharmacist, [facility]

## 2025-02-26 NOTE — LETTER
_  Medication List        Prepared on: Feb 26, 2025     Bring your Medication List when you go to the doctor, hospital, or   emergency room. And, share it with your family or caregivers.     Note any changes to how you take your medications.  Cross out medications when you no longer use them.    Medication How I take it Why I use it Prescriber   acetaminophen (TYLENOL) 500 MG tablet Take 2 tablets (1,000 mg) by mouth every 8 hours. Take scheduled for 2weeks then change to every 6hours PRN H/O shoulder surgery Harmony Rose MD   amoxicillin (AMOXIL) 500 MG capsule Take 4 capsules (2,000 mg) by mouth as needed ([id number]). Before dental appointment History of joint replacement, unspecified joint Brett Cassidy MD   apixaban ANTICOAGULANT (ELIQUIS ANTICOAGULANT) 5 MG tablet Take 1 tablet (5 mg) by mouth 2 times daily. Coronary artery disease involving coronary bypass graft of native heart without angina pectoris; S/P CABG (Coronary Artery Bypass Graft); HFrEF (heart failure with reduced ejection fraction) (H); Cardiomyopathy, unspecified type (H); History of CVA (cerebrovascular accident); Permanent Atrial Fibrillation (H); NSVT (nonsustained ventricular tachycardia) (H); Prediabetes; Hyperlipidemia LDL Goal <70 Brett Cassidy MD   cetirizine (ZYRTEC) 10 MG tablet Take 5 mg by mouth daily.  Allergic rhinitis Patient Reported   clotrimazole (LOTRIMIN) 1 % external cream Apply thin layer of cream to affected area on penis, leg, and feet twice daily for 14 days. Tinea Corporis Khanh Mojica,    empagliflozin (JARDIANCE) 10 MG TABS tablet Take 1 tablet (10 mg) by mouth daily. Chronic Systolic Heart Failure (H); HFrEF (heart failure with reduced ejection fraction) (H); Cardiomyopathy, unspecified type (H) Brett Cassidy MD   Ferrous Sulfate (IRON) 325 (65 Fe) MG tablet Take 1 tablet by mouth three times a week. (Monday, Wednesday, Friday) Iron deficiency anemia, unspecified iron deficiency anemia type Brett Cassidy MD    furosemide (LASIX) 20 MG tablet Take 2 tablets (40 mg) by mouth 2 times daily. Coronary artery disease involving coronary bypass graft of native heart without angina pectoris; S/P CABG (Coronary Artery Bypass Graft); Chronic Systolic Heart Failure (H); HFrEF (heart failure with reduced ejection fraction) (H); Cardiomyopathy, unspecified type (H); Hypertension Goal BP (Blood Pressure) < 140/90 Brett Cassidy MD   Magnesium Oxide 250 MG TABS Take 1 tablet by mouth daily.  General Health  Patient Reported   metoprolol succinate ER (TOPROL XL) 50 MG 24 hr tablet Take 1 tablet (50 mg) by mouth 2 times daily. Coronary artery disease involving coronary bypass graft of native heart without angina pectoris; S/P CABG (Coronary Artery Bypass Graft); Chronic Systolic Heart Failure (H); HFrEF (heart failure with reduced ejection fraction) (H); Cardiomyopathy, unspecified type (H); Permanent Atrial Fibrillation (H); Hypertension Goal BP (Blood Pressure) < 140/90 Brett Cassidy MD   pantoprazole (PROTONIX) 40 MG EC tablet Take 1 tablet (40 mg) by mouth daily. NAFLD (nonalcoholic fatty liver disease); Hx of gastric bypass; History of gastric bypass Brett Cassidy MD   rosuvastatin (CRESTOR) 10 MG tablet Take 1 tablet (10 mg) by mouth daily. Coronary artery disease involving coronary bypass graft of native heart without angina pectoris; S/P CABG (Coronary Artery Bypass Graft); HFrEF (heart failure with reduced ejection fraction) (H); Cardiomyopathy, unspecified type (H); History of CVA (cerebrovascular accident); Permanent Atrial Fibrillation (H); NSVT (nonsustained ventricular tachycardia) (H); Prediabetes; Hyperlipidemia LDL Goal <70 Brett Cassidy MD   sacubitril-valsartan (ENTRESTO) 49-51 MG per tablet Take 1 tablet by mouth 2 times daily. Coronary artery disease involving coronary bypass graft of native heart without angina pectoris; S/P CABG (Coronary Artery Bypass Graft); Chronic Systolic Heart Failure (H); HFrEF (heart failure with  reduced ejection fraction) (H); Cardiomyopathy, unspecified type (H); Hypertension Goal BP (Blood Pressure) < 140/90 Brett Cassidy MD   tamsulosin (FLOMAX) 0.4 MG capsule TAKE ONE CAPSULE BY MOUTH TWICE DAILY S/P CABG (Coronary Artery Bypass Graft) Brett Cassidy MD   vitamin B-12 (CYANOCOBALAMIN) 1000 MCG tablet Take 1,000 mcg by mouth every other day. Three times a week  General Health  Patient Reported   vitamin C (ASCORBIC ACID) 1000 MG TABS Take 1,000 mg by mouth daily.  General Health  Patient Reported   vitamin D3 (CHOLECALCIFEROL) 50 mcg (2000 units) tablet Take 1 tablet by mouth daily.  General Health  Patient Reported         Add new medications, over-the-counter drugs, herbals, vitamins, or  minerals in the blank rows below.    Medication How I take it Why I use it Prescriber                                      Allergies:      No Known Allergies        Side effects I have had:      No Known Side Effects        Other Information:              My notes and questions:

## 2025-02-26 NOTE — PATIENT INSTRUCTIONS
"Recommendations from today's MTM visit:                                                       Contact the [place] Prescription Assistance Program/Fund at [phone] if you have concerns about medicine cost.  You could try cetirizine 5 mg once daily in the evening to see if that helps with allergy symptoms. If that doesn't seem to help you could go up to 10 mg daily. If you still don't find benefit I would schedule follow-up with your primary care provider to discuss further.  If your weak urine flow or leakage becomes unmanageable, follow-up with Dr. Cassidy or your urologist.  When you visit with your cardiologist next time you could ask if a medicine called spironolactone would be appropriate for you. This is one of the medications recommended if you have reduced ejection fraction.     Follow-up: Return in about 1 year (around 2/26/2026).    It was great speaking with you today.  I value your experience and would be very thankful for your time in providing feedback in our clinic survey. In the next few days, you may receive an email or text message from [employer] with a link to a survey related to your  clinical pharmacist.\"     To schedule another MTM appointment, please call the clinic directly or you may call the MTM scheduling line at [phone] or toll-free at [phone].     My Clinical Pharmacist's contact information:                                                      Please feel free to contact me with any questions or concerns you have.      Clarisa Montiel, PharmD  Medication Therapy Management Pharmacist  [facility] - Monday and Wednesday 7:30 - 4:00    "

## 2025-02-26 NOTE — PROGRESS NOTES
Medication Therapy Management (MTM) Encounter    ASSESSMENT:                            Medication Adherence/Access: No issues identified.    Hyperlipidemia /CAD/AFIB  Stable.    HFrEF (</=40%)/Hypertension   Stable. He is following and managed by outside cardiologist. Patient could potentially be indicated for spironolactone. Asked patient to discuss at follow-up cardiology visit.     Supplements:    Stable    GERD:   Stable.    Pain:    Stable.    Tinea corporis   Stable.     BPH:    Stable.  Recommended he follow-up with his primary care provider or urologist if his symptoms worsen or become unmanageable.     Runny nose:   Recommended he try cetirizine for allergy symptoms and follow-up with primary care provider if no improvement.     PLAN:                            Contact the [employer] Prescription Assistance [employer] at [phone] if you have concerns about medicine cost.  You could try cetirizine 5 mg once daily in the evening to see if that helps with allergy symptoms. If that doesn't seem to help you could go up to 10 mg daily. If you still don't find benefit I would schedule follow-up with your primary care provider to discuss further.  If your weak urine flow or leakage becomes unmanageable, follow-up with Dr. Cassidy or your urologist.  When you visit with your cardiologist next time you could ask if a medicine called spironolactone would be appropriate for you. This is one of the medications recommended if you have reduced ejection fraction.     Follow-up: Return in about 1 year (around 2/26/2026).    SUBJECTIVE/OBJECTIVE:                          Hugo Weber is a 79 year old male seen for a follow-up visit from 4/16/24.       Reason for visit: annual medication review.    Allergies/ADRs: Reviewed in chart  Past Medical History: Reviewed in chart  Tobacco: He reports that he has never smoked. He has never used smokeless tobacco.  Alcohol: a couple drinks per week    Specialty Providers:    Cardiologist: Kendell Frazier MD at [facility].    Medication Adherence/Access:   Patient uses pill box(es).  Patient takes medications 2 time(s) per day.   Per patient, misses medication 1 time(s) every 3 months.   Medication barriers: none.   The patient fills medications at [place]: NO, fills medications at [facility].  He has a enrique from [employer] for Jardiance, Entresto, and metoprolol so this helps with cost.    Hyperlipidemia /CAD/AFIB  Eliquis 5 mg twice a day   rosuvastatin 10mg daily    Patient reports no significant myalgias or other side effects an no significant bruising.     Heart Failure HFrEF (</=40%)/Hypertension   Beta Blocker: metoprolol ER 50 mg twice a day  ACEi/ARB/ARNI: Entresto 49-51 mg twice a day   SGLT2i: Jardiance 10 mg daily  Diuretic(s): furosemide 40 mg twice daily  He is managed by outside cardiologist - Kendell Frazier MD at [facility]. No changes made at last visit on 1/22/25.  Patient reports no current medication side effects. No recent UTIs. He did have a small red patch of skin on right upper leg, 2x2, he put clotrimazole cream on it and it has resolved mostly.   Patient reports these HF symptoms: none.    Patient is measuring daily weights  and reports it fluctuates 304-309 lbs.    Patient self-monitors blood pressure.  Home BP monitoring 105/60's pulse 60-70's.   Patient does avoid adding extra salt but does not really following a low sodium diet and is not avoiding alcohol but limits it.     Supplements   Ferrous sulfate 325 mg five times a week  Vitamin B12 1000 mcg every other day  Vitamin C 1000 mg every other day  Vitamin D 5000 units every other day  Magnesium oxide 250 mg once daily - recommended by physical therapy     Reports softer stools at times but it didn't increase when starting magnesium.        GERD    Pantoprazole 40 mg once daily     Patient feels that current regimen is effective. Hasn't  had acid reflux for awhile. Did try going off of this a few years ago but the reflux returned       Pain:    Acetaminophen 500 mg as needed - he only takes for his shoulder and doesn't use all the time     Reports this does helps and no side effects.    Tinea corporis   Clotrimazole 1% cream twice a day     Reports he doesn't use this all the time but it keeps     BPH:    Tamsulosin 0.4 mg twice a day       Not sure if this is working. He still has leakage and will wear a pad. He has seen a urologist in the past but not for a while. Reports no dizziness.    Runny nose:   Fluticasone nasal spray 2 sprays once daily in the evening    He doesn't feel that the fluticasone helps much. Reports has stuffy nose, runny nose and will have some sneezing. He has been dealing with these symptoms for a long time. He feels it is worse in the winter. He doesn't notice it as much in the summer. He has not tried taking oral medications for allergy symptoms yet.     Today's Vitals: There were no vitals taken for this visit.  ----------------  Post Discharge Medication Reconciliation Status: discharge medications reconciled, continue medications without change.    I spent 35 minutes with this patient today. All changes were made via collaborative practice agreement with Brett Cassidy MD.     A summary of these recommendations was sent via [employer].    Clarisa Montiel, FranciscaD  Medication Therapy Management Pharmacist    Telemedicine Visit Details  The patient's medications can be safely assessed via a telemedicine encounter.  Type of service:  Telephone visit  Originating Location (pt. Location): Home    Distant Location (provider location):  On-site  Start Time: 1:07 PM  End Time: 1:42 PM     Medication Therapy Recommendations  Allergic rhinitis   1 Current Medication: fluticasone (FLONASE) 50 MCG/ACT nasal spray (Discontinued)   Current Medication Sig: Spray 2 sprays into both nostrils daily.   Rationale: More effective medication available  - Ineffective medication - Effectiveness   Recommendation: Change Medication - cetirizine 10 MG tablet   Status: Accepted - no CPA Needed   Identified Date: 2/26/2025 Completed Date: 2/26/2025

## 2025-04-02 ENCOUNTER — LAB (OUTPATIENT)
Dept: LAB | Facility: CLINIC | Age: 80
End: 2025-04-02
Payer: COMMERCIAL

## 2025-04-02 DIAGNOSIS — C61 MALIGNANT NEOPLASM OF PROSTATE (H): Primary | ICD-10-CM

## 2025-04-02 PROCEDURE — 36415 COLL VENOUS BLD VENIPUNCTURE: CPT

## 2025-04-10 ENCOUNTER — OFFICE VISIT (OUTPATIENT)
Dept: FAMILY MEDICINE | Facility: CLINIC | Age: 80
End: 2025-04-10
Payer: COMMERCIAL

## 2025-04-10 VITALS
OXYGEN SATURATION: 98 % | TEMPERATURE: 97.8 F | WEIGHT: 300 LBS | DIASTOLIC BLOOD PRESSURE: 68 MMHG | RESPIRATION RATE: 19 BRPM | BODY MASS INDEX: 42.95 KG/M2 | HEART RATE: 83 BPM | HEIGHT: 70 IN | SYSTOLIC BLOOD PRESSURE: 96 MMHG

## 2025-04-10 DIAGNOSIS — K63.5 POLYP OF COLON, UNSPECIFIED PART OF COLON, UNSPECIFIED TYPE: ICD-10-CM

## 2025-04-10 DIAGNOSIS — Z01.818 PREOP GENERAL PHYSICAL EXAM: Primary | ICD-10-CM

## 2025-04-10 LAB
ANION GAP SERPL CALCULATED.3IONS-SCNC: 10 MMOL/L (ref 7–15)
BUN SERPL-MCNC: 16.4 MG/DL (ref 8–23)
CALCIUM SERPL-MCNC: 9 MG/DL (ref 8.8–10.4)
CHLORIDE SERPL-SCNC: 101 MMOL/L (ref 98–107)
CREAT SERPL-MCNC: 1.12 MG/DL (ref 0.67–1.17)
EGFRCR SERPLBLD CKD-EPI 2021: 67 ML/MIN/1.73M2
ERYTHROCYTE [DISTWIDTH] IN BLOOD BY AUTOMATED COUNT: 15.7 % (ref 10–15)
GLUCOSE SERPL-MCNC: 98 MG/DL (ref 70–99)
HCO3 SERPL-SCNC: 28 MMOL/L (ref 22–29)
HCT VFR BLD AUTO: 41.3 % (ref 40–53)
HGB BLD-MCNC: 13.2 G/DL (ref 13.3–17.7)
MCH RBC QN AUTO: 28.6 PG (ref 26.5–33)
MCHC RBC AUTO-ENTMCNC: 32 G/DL (ref 31.5–36.5)
MCV RBC AUTO: 90 FL (ref 78–100)
PLATELET # BLD AUTO: 182 10E3/UL (ref 150–450)
POTASSIUM SERPL-SCNC: 4.6 MMOL/L (ref 3.4–5.3)
RBC # BLD AUTO: 4.61 10E6/UL (ref 4.4–5.9)
SODIUM SERPL-SCNC: 139 MMOL/L (ref 135–145)
WBC # BLD AUTO: 5.4 10E3/UL (ref 4–11)

## 2025-04-10 NOTE — RESULT ENCOUNTER NOTE
Dear Hugo,     - Red blood cell (hgb) level is just slightly low (but improved from when this was checked 2 months ago), normal white blood cell count and normal platelet levels.      Thank you,     BIENVENIDO Garcia, CNP  [facility]

## 2025-04-10 NOTE — PATIENT INSTRUCTIONS
How to Take Your Medication Before Surgery  Preoperative Medication Instructions     Preoperative Medication Instructions  Antiplatelet or Anticoagulation Medication Instructions   - apixaban (Eliquis):   Bleeding risk is moderate or high for this procedure AND CrCl  (>=) 50 mL/min. DO NOT TAKE 3 days before surgery (per [facility] provider).      Additional Medication Instructions  Take all scheduled medications on the day of surgery EXCEPT for modifications listed below:   - Herbal medications and vitamins: DO NOT TAKE 14 days prior to surgery.   - ACE/ARB/ARNI (Entresto) : Continue without modification (e.g., MAC anesthesia, neurosurgery, spine surgery, heart failure, or labile hypertension with risk of hypertension).   - Beta Blockers (Metoprolol) : Continue taking on the day of surgery.   - Diuretics (furosemide, hydrochlorothiazide, chlorothalidone): DO NOT TAKE on the day of surgery.   - Statins (atorvastatin, simvastatin, pravastatin) : Continue taking on the day of surgery.    - SGLT2 Inhibitor (empagliflozin - Jardiance): DO NOT TAKE 3 days before surgery.          Patient Education   Preparing for Your Surgery  For Adults  Getting started  In most cases, a nurse will call to review your health history and instructions. They will give you an arrival time based on your scheduled surgery time. Please be ready to share:  Your doctor's clinic name and phone number  Your medical, surgical, and anesthesia history  A list of allergies and sensitivities  A list of medicines, including herbal treatments and over-the-counter drugs  Whether the patient has a legal guardian (ask how to send us the papers in advance)  Note: You may not receive a call if you were seen at our PAC (Preoperative Assessment Center).  Please tell us if you're pregnant--or if there's any chance you might be pregnant. Some surgeries may injure a fetus (unborn baby), so they require a pregnancy test. Surgeries that are safe for a fetus don't always  need a test, and you can choose whether to have one.   Preparing for surgery  Within 10 to 30 days of surgery: Have a pre-op exam (sometimes called an H&P, or History and Physical). This can be done at a clinic or pre-operative center.  If you're having a , you may not need this exam. Talk to your care team.  At your pre-op exam, talk to your care team about all medicines you take. (This includes CBD oil and any drugs, such as THC, marijuana, and other forms of cannabis.) If you need to stop any medicine before surgery, ask when to start taking it again.  This is for your safety. Many medicines and drugs can make you bleed too much during surgery. Some change how well surgery (anesthesia) drugs work.  Call your insurance company to let them know you're having surgery. (If you don't have insurance, call [phone].)  Call your clinic if there's any change in your health. This includes a scrape or scratch near the surgery site, or any signs of a cold (sore throat, runny nose, cough, rash, fever).  Eating and drinking guidelines  For your safety: Unless your surgeon tells you otherwise, follow the guidelines below.  Eat and drink as normal until 8 hours before you arrive for surgery. After that, no food or milk. You can spit out gum when you arrive.  Drink clear liquids until 2 hours before you arrive. These are liquids you can see through, like water, Gatorade, and Propel Water. They also include plain black coffee and tea (no cream or milk).  No alcohol for 24 hours before you arrive. The night before surgery, stop any drinks that contain THC.  If your care team tells you to take medicine on the morning of surgery, it's okay to take it with a sip of water. No other medicines or drugs are allowed (including CBD oil)--follow your care team's instructions.  If you have questions the day of surgery, call your hospital or surgery center.   Preventing infection  Shower or bathe the night before and the morning  of surgery. Follow the instructions your clinic gave you. (If no instructions, use regular soap.)  Don't shave or clip hair near your surgery site. We'll remove the hair if needed.  Don't smoke or vape the morning of surgery. No chewing tobacco for 6 hours before you arrive. A nicotine patch is okay. You may spit out nicotine gum when you arrive.  For some surgeries, the surgeon will tell you to fully quit smoking and nicotine.  We will make every effort to keep you safe from infection. We will:  Clean our hands often with soap and water (or an alcohol-based hand rub).  Clean the skin at your surgery site with a special soap that kills germs.  Give you a special gown to keep you warm. (Cold raises the risk of infection.)  Wear hair covers, masks, gowns, and gloves during surgery.  Give antibiotic medicine, if prescribed. Not all surgeries need this medicine.  What to bring on the day of surgery  Photo ID and insurance card  Copy of your health care directive, if you have one  Glasses and hearing aids (bring cases)  You can't wear contacts during surgery  Inhaler and eye drops, if you use them (tell us about these when you arrive)  CPAP machine or breathing device, if you use them  A few personal items, if spending the night  If you have . . .  A pacemaker, ICD (cardiac defibrillator), or other implant: Bring the ID card.  An implanted stimulator: Bring the remote control.  A legal guardian: Bring a copy of the certified (court-stamped) guardianship papers.  Please remove any jewelry, including body piercings. Leave jewelry and other valuables at home.  If you're going home the day of surgery  You must have a responsible adult drive you home. They should stay with you overnight as well.  If you don't have someone to stay with you, and you aren't safe to go home alone, we may keep you overnight. Insurance often won't pay for this.  After surgery  If it's hard to control your pain or you need more pain medicine, please  call your surgeon's office.  Questions?   If you have any questions for your care team, list them here:   ____________________________________________________________________________________________________________________________________________________________________________________________________________________________________________________________  For informational purposes only. Not to replace the advice of your health care provider. Copyright   2003, 2019 [facility]. All rights reserved. Clinically reviewed by Mesfin Rebollar MD. [employer] [id number] - REV 08/24.

## 2025-04-10 NOTE — PROGRESS NOTES
Preoperative Evaluation  [facility]  [address] [place] [id number]  Phone: [phone]  Primary Provider: Brett Cassidy MD  Pre-op Performing Provider: BIENVENIDO Garcia CNP  Apr 10, 2025         4/10/2025   Surgical Information   What procedure is being done? pre op   Facility or Hospital where procedure[facility]surgery [facility] be performed[facility]   Who is doing the procedure / surgery? dr drew gossgi   Date of surgery / procedure: april 22   Time of surgery / procedure: 800am   Where do you plan to recover after surgery? at home with family     Fax number for surgical facility.     Assessment & Plan     The proposed surgical procedure is considered LOW risk.  Hugo was seen today for pre-op exam.    Diagnoses and all orders for this visit:    Preop general physical exam  Polyp of colon, unspecified part of colon, unspecified type  -     EKG 12-lead complete w/read - Clinics  -     CBC with platelets  -     Basic metabolic panel  (Ca, Cl, CO2, Creat, Gluc, K, Na, BUN)      Risks and Recommendations  The patient has the following additional risks and recommendations for perioperative complications:   - Morbid obesity (BMI >40)  Cardiovascular:  Currently stable.   Obstructive Sleep Apnea:   Bring CPAP to outpatient surgery center    Preoperative Medication Instructions  Antiplatelet or Anticoagulation Medication Instructions   - apixaban (Eliquis), edoxaban (Savaysa), rivaroxaban (Xarelto): Bleeding risk is moderate or high for this procedure AND CrCl  (>=) 50 mL/min. DO NOT TAKE 3 days before surgery (per [facility] provider).  Discussed with cardiology - Dr. Frazier on 4/3/25 with recommendation for reinitiation of anticoagulation post procedurally per [facility] provider.      Additional Medication Instructions  Take all scheduled medications on the day of surgery EXCEPT for modifications listed below:   - Herbal medications and vitamins: DO NOT TAKE 14 days prior to surgery.   -  ACE/ARB/ARNI (Entresto) : Continue without modification (e.g., MAC anesthesia, neurosurgery, spine surgery, heart failure, or labile hypertension with risk of hypertension).   - Beta Blockers (Metoprolol) : Continue taking on the day of surgery.   - Diuretics (furosemide, hydrochlorothiazide, chlorothalidone): DO NOT TAKE on the day of surgery.   - Statins (atorvastatin, simvastatin, pravastatin) : Continue taking on the day of surgery.    - SGLT2 Inhibitor (empagliflozin - Jardiance): DO NOT TAKE 3 days before surgery.     Recommendation  Approval given to proceed with proposed procedure, without further diagnostic evaluation.        Malick Arias is a 79 year old, presenting for the following:  Pre-Op Exam          4/10/2025    10:28 AM   Additional Questions   Roomed by Roxana MIGUEL MA   Accompanied by Self     HPI: Vitals are w/in NL.          4/10/2025   Pre-Op Questionnaire   Have you ever had a heart attack or stroke? (!) YES CVA history   Have you ever had surgery on your heart or blood vessels, such as a stent placement, a coronary artery bypass, or surgery on an artery in your head, neck, heart, or legs? (!) YES CABG x1  Last seen by cardiology 1/22/2025   Do you have chest pain with activity? No   Do you have a history of heart failure? No   Do you currently have a cold, bronchitis or symptoms of other infection? No   Do you have a cough, shortness of breath, or wheezing? No   Do you or anyone in your family have previous history of blood clots? No   Do you or does anyone in your family have a serious bleeding problem such as prolonged bleeding following surgeries or cuts? No   Have you ever had problems with anemia or been told to take iron pills? (!) YES  - History of gastric bypass   Have you had any abnormal blood loss such as black, tarry or bloody stools? No   Have you ever had a blood transfusion? No   Are you willing to have a blood transfusion if it is medically needed before, during, or after  your surgery? Yes   Have you or any of your relatives ever had problems with anesthesia? No   Do you have sleep apnea, excessive snoring or daytime drowsiness? (!) YES   Do you have a CPAP machine? Yes   Do you have any artifical heart valves or other implanted medical devices like a pacemaker, defibrillator, or continuous glucose monitor? No   Do you have artificial joints? (!) YES - Left total knee and bilateral shoulder replacements   Are you allergic to latex? No     Health Care Directive  Patient has a Health Care Directive on file      Preoperative Review of    reviewed - controlled substances reflected in medication list.      Patient Active Problem List    Diagnosis Date Noted    Hyperlipidemia LDL goal <70 12/30/2011     Priority: High    S/P shoulder replacement, right 10/29/2024     Priority: Medium    Encounter for surgical aftercare following surgery on the circulatory system 08/24/2023     Priority: Medium    Presence of aortocoronary bypass graft:CAB x1 8/15/2023 08/24/2023     Priority: Medium    Atherosclerosis of native coronary artery of native heart without angina pectoris 08/24/2023     Priority: Medium    Essential (primary) hypertension 08/24/2023     Priority: Medium    Chronic atrial fibrillation, unspecified (H) 08/24/2023     Priority: Medium    Personal history of transient ischemic attack (TIA), and cerebral infarction without residual deficits 08/24/2023     Priority: Medium    Physical deconditioning 08/24/2023     Priority: Medium    Leg edema, right 08/24/2023     Priority: Medium    HAYLEE on CPAP 08/24/2023     Priority: Medium    S/P CABG (coronary artery bypass graft) 08/23/2023     Priority: Medium    Fluid overload 08/23/2023     Priority: Medium    Transient hyperglycemia post procedure 08/23/2023     Priority: Medium    Status post ligation of left atrial appendage 08/23/2023     Priority: Medium    Cardiomyopathy (H) 08/15/2023     Priority: Medium    Cardiomyopathy,  unspecified type (H) 08/15/2023     Priority: Medium    HFrEF (heart failure with reduced ejection fraction) (H) 07/20/2023     Priority: Medium    Aortic valve stenosis, etiology of cardiac valve disease unspecified- moderate 2+ with FREDY = 1.2cm2 on echocardiogram fr 7/14/2023 07/20/2023     Priority: Medium     moderate      Mitral ezxphvooylzoe-5-0+ on echocardiogram fr 7/14/2023 07/20/2023     Priority: Medium     mild-moderate      Chronic systolic heart failure (H) 07/19/2023     Priority: Medium    Elevated prostate specific antigen (PSA) 02/03/2023     Priority: Medium     Dr Santos      Prostate cancer (H) 2023     Priority: Medium     Dr Santos - Manju 4+3 = 7      Chronic LLQ pain 08/16/2022     Priority: Medium    NSVT (nonsustained ventricular tachycardia) (H) 06/23/2021     Priority: Medium    Small bowel obstruction (H) 06/21/2021     Priority: Medium    History of CVA (cerebrovascular accident) 10/29/2020     Priority: Medium    Vitamin B12 deficiency (non anemic) 04/15/2019     Priority: Medium    Vitamin D deficiency 04/15/2019     Priority: Medium    Cervical stenosis of spine      Priority: Medium     Moderate C4-5      Stroke (H) 01/19/2019     Priority: Medium     Reported 2/5/2019, per patient occurred on 1/19/2019 while in [place]      CVA (cerebral vascular accident) (H) 01/01/2019     Priority: Medium     small right periorlandic subcortical      Thoracic aortic ectasia 07/06/2018     Priority: Medium    Benign prostatic hyperplasia (BPH) with urinary urge incontinence 07/05/2018     Priority: Medium    First degree AV block      Priority: Medium    OA (osteoarthritis)      Priority: Medium     Multiple - Left shoulder with rotator cuff tear - Dr Durham      Nephrolithiasis      Priority: Medium    Myofascial pain 10/12/2017     Priority: Medium    Hypertension goal BP (blood pressure) < 140/90 09/25/2017     Priority: Medium    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     Priority: Medium     Mn Head  and Neck      Cholelithiasis      Priority: Medium    Paroxysmal atrial fibrillation (H) 05/27/2016     Priority: Medium    Prediabetes      Priority: Medium    History of hepatitis C 05/12/2015     Priority: Medium     SVR May 2015      NAFLD (nonalcoholic fatty liver disease) 09/05/2014     Priority: Medium    Morbid obesity due to excess calories (H) 09/05/2014     Priority: Medium    Total knee replacement status 08/13/2012     Priority: Medium    Long term current use of anticoagulant therapy - for intermittent a-fib 03/30/2012     Priority: Medium    Obstructive sleep apnea syndrome      Priority: Medium     cpap - severe - 15 cm - dr cunha      Permanent atrial fibrillation (H) 2006     Priority: Medium     Followed by [facility] - persistent      Calculus of kidney - 1.2 cm stone at the upper pole as of CT urogram fr 1/11/2023 06/06/2005     Priority: Medium     dr walker - hematuria - w/u o/w neg      Iron deficiency anemia 08/22/2003     Priority: Medium     Problem list name updated by automated process. Provider to review      Colon polyps      Priority: Low      Past Medical History:   Diagnosis Date    Aortic stenosis     moderate    Atrial fibrillation 2006    Followed by [facility] - persistent    CAD (coronary artery disease)     Calculus of kidney 2005, 6/06, 10/12, 8/18, 9/19    dr walker/nestor/иван - hematuria - w/u o/w neg    Cervical stenosis of spine     Moderate C4-5    Cholelithiasis     Chronic peptic ulcer, unspecified site, without mention of hemorrhage, perforation, or obstruction 1985    gastric bypass    Colon polyps 8/05, 9/10, 10/15    2 tubular adenoma, 1 hyperplastic - multiple serrated/tubular adenomas - last colonscopy 11/20 due 5 yrs, 2/2025 - polyps x 8, up to 15 mm, diverticulosis    CVA (cerebral vascular accident) (H) 01/2019    small right periorlandic subcortical - left sided residual - left hand tingling/numbness - Left leg weak/numbness - cardioembolic    Elevated  prostate specific antigen (PSA)     Dr Santos    Hepatitis C 10/2012    chronic hepatitis C, grade 1-2, stage 1 - mild - Dr Douglas    HFrEF (heart failure with reduced ejection fraction) (H)     [facility] - 35%    Hyperlipidemia LDL goal <70     Hypertension goal BP (blood pressure) < 140/90 06/2006    dr jaciel winchester    Iron deficiency anemia, unspecified 1985    gastric bypass    Mitral regurgitation     mild-moderate    Nephrolithiasis multiple episodes, last 10/19    Dr Bey/Ivana/Tom - 9mm 3/2021    OA (osteoarthritis)     Multiple - Left shoulder with rotator cuff tear - Dr Durham    Obesity, unspecified     s/p gastric bypass 1985     Obstructive sleep apnea syndrome     CPAP - 15 cm - [facility] 1/2023    Prediabetes     Presbyacusis 01/2004    dr corona    Prostate cancer (H) 2023    Dr Santos - Manju 3+4, 4+3 = 7, bx 5/13 positive    Pyelonephritis, unspecified 12/1999    SCC (squamous cell carcinoma), 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    dr saez - lt conchal bowl    Sleep apnea 10/2002    Auto CPAP - severe - 12-15 cm - dr cunha    Stroke (H) 01/19/2019    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    Mn Head and Neck    Vitamin B12 deficiency (non anemic) 04/15/2019    Vitamin D deficiency 04/15/2019     Past Surgical History:   Procedure Laterality Date    ABDOMEN SURGERY      APPENDECTOMY  1969    ARTHROPLASTY KNEE  08/13/2012    LT TKA - Dr Villanueva    BYPASS GRAFT ARTERY CORONARY N/A 08/15/2023    Procedure: CORONARY ARTERY BYPASS GRAFT x 1 (SAPHENOUS VEIN - RIGHT POSTERIOR DESCENDING ARTERY) WITH ENDOSCOPIC SAPHENOUS VEIN HARVEST ON THE LEFT LOWER EXTREMITY, AND ON CARDIOPULMONARY PUMP OXYGENATOR  (INTRAOPERATIVE TRANSESOPHAGEAL ECHOCARDIOGRAM BY ANESTHESIOLOGIST), REMOVAL OF RIGHT CORONARY ARTERY STENT AND DELIVERY SYSTEM, LEFT ATRIAL APPENDAGE CLIPPING WITH ATRICLIP FLEX V DEVICE SIZE: 45    CARDIOVERSION  2016    COLONOSCOPY  8/05, 9/10, 10/15    multiple tubular/serrated/hyperplastic polyps    COLONOSCOPY N/A 10/29/2015     multiple tubular and serrated adenomas    COLONOSCOPY N/A 09/07/2016    COLONOSCOPY N/A 11/24/2020    tubular adenomas - due 5 yrs    COLONOSCOPY  02/2025    polyps x 8 - advanced adenoma x 2, tubular adenoma x4 - Dr Fenton    COLONOSCOPY N/A 02/25/2025    Procedure: Colonoscopy with biopsies using biopsy forceps and polypectomies using cold and hot snare with clip placement x 6 for hemorrhage control;  Surgeon: Laura Fenton MD;  Location:  GI    COLONOSCOPY N/A 02/25/2025    Procedure: COLONOSCOPY, FLEXIBLE, WITH LESION REMOVAL USING SNARE;  Surgeon: Laura Fenton MD;  Location:  GI    CV CORONARY ANGIOGRAM N/A 08/15/2023    Procedure: Coronary Angiogram;  Surgeon: Carly Luna MD;  Location: [facility] CARDIAC CATH LAB    CV FRACTIONAL FLOW RATIO WIRE N/A 08/15/2023    Procedure: Fractional Flow Ratio Wire;  Surgeon: Carly Luna MD;  Location: [facility] CARDIAC CATH LAB    CV PCI N/A 08/15/2023    Procedure: Percutaneous Coronary Intervention;  Surgeon: Carly Luna MD;  Location: [facility] CARDIAC CATH LAB    CYSTOSCOPY W/ LASER LITHOTRIPSY  10/16/2012,5/2/2018    ESOPHAGOSCOPY, GASTROSCOPY, DUODENOSCOPY (EGD), COMBINED N/A 11/24/2020    Procedure: ESOPHAGOGASTRODUODENOSCOPY, COLONOSCOPY with biopsies;  Surgeon: Chadwick Burgos MD;  Location:  OR    EYE SURGERY  1/01,6/02,11/02    lasik    HC KNEE SCOPE, DIAGNOSTIC  05/2000    Arthroscopy, Knee RT    LASER HOLMIUM LITHOTRIPSY URETER(S), INSERT STENT, COMBINED  10/16/2012    stones x 4, Dr Campa    LASER HOLMIUM LITHOTRIPSY URETER(S), INSERT STENT, COMBINED Right 05/02/2018    CYSTOSCOPY, RIGHT URETEROSCOPY, HOLMIUM LASER LITHOTRIPSY, RIGHT STENT PLACEMENT - Dr Bey    MRI BIOPSY PROSTATE Bilateral 02/09/2023    mark    REVERSE ARTHROPLASTY SHOULDER Left 03/07/2023    Procedure: LEFT REVERSE SHOULDER ARTHROPLASTY WITH CUSTOM GUIDE WITH AUTOLOGOUS BONE GRAFTOF PROXIMAL HUMERUS;  Surgeon: Booker Multani MD;   Location: [facility] OR    REVERSE ARTHROPLASTY SHOULDER Right 10/29/2024    Procedure: RIGHT REVERSE SHOULDER ARTHROPLASTY , NO CUSTOM GUIDE;  Surgeon: Booker Multani MD;  Location: [facility] OR    SURGICAL HISTORY OF -   1986    s/p gastric bypass Bilroth II    SURGICAL HISTORY OF -   11/1998    wisdom teeth    SURGICAL HISTORY OF -   1979    cellulitis    SURGICAL HISTORY OF -   11/1998    lt forearm spur's    SURGICAL HISTORY OF -   1/01,6/02,11/02    s/p lasik    SURGICAL HISTORY OF -   11/1999    rt forearm spurs    SURGICAL HISTORY OF -   07/2006    dr stevenson - lithotrypsy    SURGICAL HISTORY OF -   10/08, 12/08    Lt ear chonchal lesion removal SCC - dr saez    SURGICAL HISTORY OF -  Right 10/2019    nephrolithiasis - cystoscopy, rt lithotrypsy, Dr Heath    SURGICAL HISTORY OF -  Right 11/2019    Cystoscopy, Rt lithotrypsy, Calcium Oxalate, Dr Heath     Current Outpatient Medications   Medication Sig Dispense Refill    acetaminophen (TYLENOL) 500 MG tablet Take 2 tablets (1,000 mg) by mouth every 8 hours. Take scheduled for 2weeks then change to every 6hours PRN      apixaban ANTICOAGULANT (ELIQUIS ANTICOAGULANT) 5 MG tablet Take 1 tablet (5 mg) by mouth 2 times daily. 180 tablet 3    clotrimazole (LOTRIMIN) 1 % external cream Apply thin layer of cream to affected area on penis, leg, and feet twice daily for 14 days. 85 g 1    empagliflozin (JARDIANCE) 10 MG TABS tablet Take 1 tablet (10 mg) by mouth daily. 90 tablet 3    Ferrous Sulfate (IRON) 325 (65 Fe) MG tablet Take 1 tablet by mouth three times a week. (Monday, Wednesday, Friday) 90 tablet 3    furosemide (LASIX) 20 MG tablet Take 2 tablets (40 mg) by mouth 2 times daily. 180 tablet 3    Magnesium Oxide 250 MG TABS Take 1 tablet by mouth daily.      metoprolol succinate ER (TOPROL XL) 50 MG 24 hr tablet Take 1 tablet (50 mg) by mouth 2 times daily. 180 tablet 3    pantoprazole (PROTONIX) 40 MG EC tablet Take 1 tablet (40 mg) by mouth daily. 90 tablet  "3    rosuvastatin (CRESTOR) 10 MG tablet Take 1 tablet (10 mg) by mouth daily. 90 tablet 3    sacubitril-valsartan (ENTRESTO) 49-51 MG per tablet Take 1 tablet by mouth 2 times daily. 180 tablet 3    tamsulosin (FLOMAX) 0.4 MG capsule TAKE ONE CAPSULE BY MOUTH TWICE DAILY 180 capsule 2    vitamin B-12 (CYANOCOBALAMIN) 1000 MCG tablet Take 1,000 mcg by mouth every other day. Three times a week      vitamin C (ASCORBIC ACID) 1000 MG TABS Take 1,000 mg by mouth daily.      vitamin D3 (CHOLECALCIFEROL) 50 mcg (2000 units) tablet Take 1 tablet by mouth daily.      amoxicillin (AMOXIL) 500 MG capsule Take 4 capsules (2,000 mg) by mouth as needed ([id number]). Before dental appointment (Patient not taking: Reported on 4/10/2025) 8 capsule 3    cetirizine (ZYRTEC) 10 MG tablet Take 5 mg by mouth daily. (Patient not taking: Reported on 4/10/2025)         No Known Allergies     Social History     Tobacco Use    Smoking status: Never     Passive exposure: Never    Smokeless tobacco: Never   Substance Use Topics    Alcohol use: Yes     Alcohol/week: 2.0 - 3.0 standard drinks of alcohol     Types: 2 - 3 Standard drinks or equivalent per week     Comment: occassiinally     Family History   Problem Relation Age of Onset    Alzheimer Disease Father 82         at 88    Prostate Cancer Father 76        bladder and prostate    Heart Disease Mother 80        CHF    Heart Disease Maternal Grandfather         MI at 55    Cancer Paternal Uncle         ?    Ulcerative Colitis No family hx of     Crohn's Disease No family hx of     Stomach Cancer No family hx of     GERD No family hx of      History   Drug Use No     Comment: acknowledges using herbal supplement \"Vibe\" for energy-none used recently              Review of Systems    CONSTITUTIONAL: NEGATIVE for fever, chills, change in weight  INTEGUMENTARY/SKIN: NEGATIVE for worrisome rashes, moles or lesions  EYES: NEGATIVE for vision changes or irritation  ENT/MOUTH: NEGATIVE for " "ear, mouth and throat problems  RESP: NEGATIVE for significant cough or SOB  CV: NEGATIVE for chest pain, palpitations or peripheral edema  GI: NEGATIVE for nausea, abdominal pain, heartburn, or change in bowel habits  : NEGATIVE for frequency, dysuria, or hematuria, POSITIVE for urinary leakage  MUSCULOSKELETAL: NEGATIVE for significant arthralgias or myalgia  NEURO: NEGATIVE for weakness, dizziness or paresthesias  ENDOCRINE: NEGATIVE for temperature intolerance, skin/hair changes  HEME: NEGATIVE for bleeding problems  PSYCHIATRIC: NEGATIVE for changes in mood or affect    Objective    BP 96/68   Pulse 83   Temp 97.8  F (36.6  C) (Tympanic)   Resp 19   Ht 1.778 m (5' 10\")   Wt 136.1 kg (300 lb)   SpO2 98%   BMI 43.05 kg/m     Estimated body mass index is 43.05 kg/m  as calculated from the following:    Height as of this encounter: 1.778 m (5' 10\").    Weight as of this encounter: 136.1 kg (300 lb).    Physical Exam    GENERAL: alert and no distress  EYES: Eyes grossly normal to inspection  HENT: ear canals and TM's normal, nose and mouth without ulcers or lesions  NECK: no adenopathy, no asymmetry, masses, or scars  RESP: lungs clear to auscultation - no rales, rhonchi or wheezes  CV: irregularly irregular, normal S1 S2, +systolic murmur  ABDOMEN: soft, nontender, bowel sounds normal  MS: no gross musculoskeletal defects noted, no edema  SKIN: no suspicious lesions or rashes  NEURO: Normal strength and tone, mentation intact and speech normal  PSYCH: mentation appears normal, affect normal/bright    Recent Labs   Lab Test 01/28/25  0836 10/30/24  1137 10/29/24  1349 10/07/24  0913   HGB 12.5* 12.4*  --  13.6     --   --  176     --   --  140   POTASSIUM 4.0  --  4.2 4.0   CR 1.14  --  0.87 0.97   A1C 5.5  --   --  5.4        Diagnostics  Labs pending at this time.  Results will be reviewed when available.   EKG: atrial fibrillation, rate 67, normal axis, normal intervals, no acute ST/T " changes c/w ischemia, no LVH by voltage criteria, unchanged from previous tracings     Revised Cardiac Risk Index (RCRI)  The patient has the following serious cardiovascular risks for perioperative complications:   - Coronary Artery Disease (MI, positive stress test, angina, Qs on EKG) = 1 point   - Congestive Heart Failure (pulmonary edema, PND, s3 puma, CXR with pulmonary congestion, basilar rales) = 1 point   - Cerebrovascular Disease (TIA or CVA) = 1 point     RCRI Interpretation: 3 points: Class IV (high risk - >11% complication rate)    Estimated Functional Capacity: Performs 4 METS exercise without symptoms (e.g., light housework, stairs, 4 mph walk, 7 mph bike, slow step dance)         Signed Electronically by: BIENVENIDO Garcia CNP  A copy of this evaluation report is provided to the requesting physician.

## 2025-05-23 ENCOUNTER — HOSPITAL ENCOUNTER (INPATIENT)
Facility: CLINIC | Age: 80
Setting detail: SURGERY ADMIT
End: 2025-05-23
Attending: COLON & RECTAL SURGERY | Admitting: COLON & RECTAL SURGERY
Payer: COMMERCIAL

## 2025-06-06 NOTE — Clinical Note
Patient presents for suture removal. The wound is well healed without signs of infection.  The sutures are removed. Wound care and activity instructions given. Return prn.    Thanks for the referral! See my assessment and plan and let me know if you have any questions.  Clarisa Montiel PharmD Medication Therapy Management Pharmacist [facility] - Monday and Wednesday 7:30 - 4:00

## 2025-06-23 ENCOUNTER — TELEPHONE (OUTPATIENT)
Dept: FAMILY MEDICINE | Facility: CLINIC | Age: 80
End: 2025-06-23
Payer: COMMERCIAL

## 2025-06-23 NOTE — TELEPHONE ENCOUNTER
Pt would like to talk to Dr Khanh Trejo or Dr Cassidy about this new procedure at [place] [facility]. Please call pt at [phone] [facility]

## 2025-06-24 NOTE — TELEPHONE ENCOUNTER
Situation   Called patient   [phone] to get  more information about a procedure at [facility].     Patient needs to know how long Dr. Cassidy recommends Eliquis to be held before and after the procedure.     Background   2 colonoscopies in February and April.     Assessment   He is having a different type of colonoscopy at [facility]  Instead of a colon resection that had been planned through [facility] on July 8th.     Patient states he has a flatted out polyp they are referring to as a mass precancerous in colon.     [facility] preforms a type of colonoscopy that they inject a liquid under the mass and in theory it should lift the polyp up they can snip the polyp and verify if there is precancerous or cancerous tissue under neath the flat mass.     He explains that gastroenterology states to hold the Eliquis at least 2 days prior to the procedure and didn't talk about when he can restart.     The bleeding risk with history of Eliquis with this procedure is 1 our of 100 patients.  The patient explains that with a normal colonoscopy and having been on Eliquis, bleeding risk would be 1 in  1000.     He has to stay overnight in a [facility] post the procedure to make sure he doesn't have any bleeding.     His daughter had reservations about the resection that was scheduled 7/8 given the post op complication and risk of bowel obstructions.     Recommendations   Explained will send this information to Dr. Cassidy and call him back.

## 2025-06-25 NOTE — TELEPHONE ENCOUNTER
Attempt #1  Called Phone # [phone]      Left a non detailed voicemail to please call back and ask for any available triage nurse @ [phone] regarding a medication, follow up to previous phone call.     Chichi ALEXIS RN   [facility] Triage

## 2025-06-25 NOTE — TELEPHONE ENCOUNTER
Advise typically hold 2 days prior to procedure, restart 1 day after, before confirm with [facility] after procedure, depending on what they needed to do

## (undated) DEVICE — SU STRATAFIX PDS PLUS 0 CT 45CM SXPP1A406

## (undated) DEVICE — LINEN TOWEL PACK X5 5464

## (undated) DEVICE — INTRO GLIDESHEATH SLENDER 6FR 10X45CM 60-1060

## (undated) DEVICE — GUIDEWIRE VASC 0.014INX180CM RUNTHROUGH 25-1011

## (undated) DEVICE — PAD CHUX UNDERPAD 30X36" P3036C

## (undated) DEVICE — CATH ANGIO INFINITI JR4 4FRX100CM 538421

## (undated) DEVICE — SU STRATAFIX MONOCRYL 4-0 SPIRAL 30CM PS-2 SXMP1B117

## (undated) DEVICE — SU ETHIBOND 3-0 BBDA 36" X588H

## (undated) DEVICE — SOL NACL 0.9% IRRIG 1000ML BOTTLE 2F7124

## (undated) DEVICE — SYR RED NITRO PRNT 10CC

## (undated) DEVICE — GLOVE BIOGEL PI MICRO INDICATOR UNDERGLOVE SZ 8.0 48980

## (undated) DEVICE — SU PROLENE 7-0 BV-1DA 4X30" M8703

## (undated) DEVICE — ESU ELEC BLADE 4" COATED

## (undated) DEVICE — SU ETHIBOND 0 CT-1 CR 8X18" CX21D

## (undated) DEVICE — ESU GROUND PAD UNIVERSAL W/O CORD

## (undated) DEVICE — Device

## (undated) DEVICE — SU VICRYL 3-0 SH 27" UND J416H

## (undated) DEVICE — DECANTER BAG 2002S

## (undated) DEVICE — KIT ENDO TURNOVER/PROCEDURE W/CLEAN A SCOPE LINERS 103888

## (undated) DEVICE — ENDO ADAPTER CHECK-FLO DISP 27550-CKU

## (undated) DEVICE — SPONGE LAP 18X18" X8435

## (undated) DEVICE — GUIDEWIRE SENSOR DUAL FLEX STR 0.035"X150CM M0066703080

## (undated) DEVICE — SOL WATER IRRIG 1000ML BOTTLE 2F7114

## (undated) DEVICE — SUCTION CATH AIRLIFE TRI-FLO W/CONTROL PORT 14FR  T60C

## (undated) DEVICE — RAD G/W INQWIRE .035X260CM J-TIP EXCHANGE IQ35F260J1O5RS

## (undated) DEVICE — SU PROLENE 6-0 C-1DA 30" 8706H

## (undated) DEVICE — SU STEEL 6 CCS 4X18" M654G

## (undated) DEVICE — KIT LG BORE TOUHY ACCESS PLUS MAP152

## (undated) DEVICE — DRAPE STERI U 1015

## (undated) DEVICE — PREP CHLORAPREP 26ML TINTED HI-LITE ORANGE 930815

## (undated) DEVICE — GOWN XLG DISP 9545

## (undated) DEVICE — CATH GUIDING  6F MACH 1 ART3.5

## (undated) DEVICE — SU VICRYL 0 CTX 36" J370H

## (undated) DEVICE — SU PROLENE 7-0 BV175-6 24' M8737

## (undated) DEVICE — CATH FOLEY 20FR 5ML SILVER COAT LATEX 0165SI20

## (undated) DEVICE — DRAPE STERI INCISE 1050

## (undated) DEVICE — SU MONOCRYL 4-0 PS-2 18" UND Y496G

## (undated) DEVICE — PACK OPEN SHOULDER SOP15OCFSC

## (undated) DEVICE — SU PROLENE 3-0 RB-1DA 36"  8558H

## (undated) DEVICE — ESU GROUND PAD ADULT W/CORD E7507

## (undated) DEVICE — SU ETHIBOND 2-0 SHDA 30" X563H

## (undated) DEVICE — BLADE SAW SAGITTAL STRK MED WIDE 25X73X0.89MM 2108-105-000

## (undated) DEVICE — RAD INTRODUCER KIT MICRO 5FRX10CM .018 NITINOL G/W

## (undated) DEVICE — ENDO SNARE POLYPECTOMY SPIRAL 20MM LOOP SD-230U-20

## (undated) DEVICE — SU PROLENE 7-0 BV-1DA 4X24" M8702

## (undated) DEVICE — GLOVE BIOGEL PI SZ 7.5 40875

## (undated) DEVICE — CATH BALLOON NC EMERGE 3.50X15MM H7493926715350

## (undated) DEVICE — PACK SET-UP STD 9102

## (undated) DEVICE — PAD CHUX UNDERPAD 23X24" 7136

## (undated) DEVICE — CANNULA PERFUSION AORTIC ROOT 22FR 20012

## (undated) DEVICE — LEAD PACER MYOCARDIAL BIPOLAR TEMPORARY 53CM 6495F

## (undated) DEVICE — DEVICE ASSEMBLY SUCTION/ANTI COAG BTC93

## (undated) DEVICE — SU PROLENE 4-0 SHDA 36" 8521H

## (undated) DEVICE — LINEN FULL SHEET 5511

## (undated) DEVICE — BAG CLEAR TRASH 1.3M 39X33" P4040C

## (undated) DEVICE — ENDO SNARE POLYPECTOMY OVAL 15MM LOOP SD-240U-15

## (undated) DEVICE — CATH GUIDELINER 8FR 5573

## (undated) DEVICE — SOL WATER IRRIG 3000ML BAG 2B7117

## (undated) DEVICE — SUCTION CANISTER MEDIVAC LINER 3000ML W/LID 65651-530

## (undated) DEVICE — KIT WASH CELL SAVING ATL2001

## (undated) DEVICE — QUICK TRAP

## (undated) DEVICE — CONNECTOR PREFUSION REDUCER 3/8X1/2" W/O LL 6013

## (undated) DEVICE — STENT URETERAL CONTOUR SOFT PERCUFLEX 6FRX28CM: Type: IMPLANTABLE DEVICE | Status: NON-FUNCTIONAL

## (undated) DEVICE — SOL NACL 0.9% IRRIG 3000ML BAG 07972-08

## (undated) DEVICE — HOOD SURG T7PLUS PEEL AWAY FACE SHIELD STRL LF 0416-801-100

## (undated) DEVICE — CATH BALLOON EMERGE 2.5X12MM H7493918912250

## (undated) DEVICE — DRAPE IOBAN INCISE 23X17" 6650EZ

## (undated) DEVICE — BONE CLEANING TIP INTERPULSE  0210-010-000

## (undated) DEVICE — ESU PENCIL W/SMOKE EVAC NEPTUNE STRYKER 0703-046-000

## (undated) DEVICE — SU PLEDGET SOFT TFE 3/8"X3/26"X1/16" PCP40

## (undated) DEVICE — TOTE ANGIO CORP PC15AT SAN32CC83O

## (undated) DEVICE — SU SILK 1 TIE 10X30" SA87G

## (undated) DEVICE — GOWN IMPERVIOUS ZONED XLG 9041

## (undated) DEVICE — ENDO FORCEP ENDOJAW BIOPSY 2.8MMX230CM FB-220U

## (undated) DEVICE — SU PROLENE 7-0 BV-1DA 24" 8702H

## (undated) DEVICE — PACK OPEN HEART PV12OH524

## (undated) DEVICE — LINEN LEG ROLL 5489

## (undated) DEVICE — DEFIB PRO-PADZ LVP LQD GEL ADULT 8900-2105-01

## (undated) DEVICE — CATH URETERAL OPEN END 6FR AXXCESS

## (undated) DEVICE — CATH GUIDING BLUE YELLOW PTFE AL1 6FRX100CM 67003600

## (undated) DEVICE — CATH GUIDING 100CM 6FR .070IN VSTBR

## (undated) DEVICE — SYR ANGIOGRAPHY MULTIUSE KIT ACIST 014612

## (undated) DEVICE — RAD INFLATOR BASIC COMPAK  IN4130

## (undated) DEVICE — CANNULA PERFUSION ARTERIAL 22FR 12" 77422

## (undated) DEVICE — WIRE GUIDE HI-TRQ  WHISPER MS JTIP 0.014"X190CM 1005357HJ

## (undated) DEVICE — GLOVE PROTEXIS W/NEU-THERA 8.0  2D73TE80

## (undated) DEVICE — CLIP HEMOSTASIS ASSURANCE W18 MM 00711885

## (undated) DEVICE — PROTECTOR ARM ONE-STEP TRENDELENBURG 40418

## (undated) DEVICE — LINEN TOWEL PACK X30 5481

## (undated) DEVICE — SU FIBERWIRE 2 38"  AR-7200

## (undated) DEVICE — PIN ALIGNMENT 2.5X200MM

## (undated) DEVICE — SUCTION TIP YANKAUER STR K87

## (undated) DEVICE — PACK CYSTOSCOPY SBA15CYFSI

## (undated) DEVICE — DRAPE SHEET REV FOLD 3/4 9349

## (undated) DEVICE — COVER TABLE POLY 65X90" 8186

## (undated) DEVICE — BLOWER/MISTER CLEARVIEW 22150

## (undated) DEVICE — DRAPE CONVERTORS U-DRAPE 60X72" 8476

## (undated) DEVICE — SU VICRYL 2-0 CT-1 27" UND J259H

## (undated) DEVICE — DRAIN CHEST TUBE 32FR STR 8032

## (undated) DEVICE — SU PROLENE 3-0 SHDA 48" 8534H

## (undated) DEVICE — POSITIONER ASSIST ESSTECH 3S T401210S

## (undated) DEVICE — KIT HAND CONTROL ANGIOTOUCH ACIST 65CM AT-P65

## (undated) DEVICE — SOLUTION WOUND CLEANSING 3/4OZ 10% PVP EA-L3011FB-50

## (undated) DEVICE — CABLE MYO/LEAD PACING WHITE DISP 019-530

## (undated) DEVICE — PACK TUBING MINI VAC CUSTOM 1/2X3/8T BB9J78R4

## (undated) DEVICE — TUBING SUCTION VACUUM COLLECTION 6FT 610

## (undated) DEVICE — MANIFOLD NEPTUNE 4 PORT 700-20

## (undated) DEVICE — SLEEVE TR BAND RADIAL COMPRESSION DEVICE 29CM XX-RF06L

## (undated) DEVICE — CATH TRAY URINE METER DRAIN BAG 2000ML ADD-A-FOLEY 399400A

## (undated) DEVICE — SU PROLENE 4-0 RB-1DA 36" 8557H

## (undated) DEVICE — GW SURG OMNIWIRE STRAIGHT TIP L185CM PRESSURE GU 89185

## (undated) DEVICE — DRILL BIT PERIPHERAL SCREW 3.2MM MWJ126

## (undated) DEVICE — RESERVOIR CELL SAVING BLOOD COLLECTION EL2120

## (undated) DEVICE — TUBING SUCTION MEDI-VAC SOFT 3/16"X20' N520A

## (undated) DEVICE — LINEN TOWEL PACK X6 WHITE 5487

## (undated) DEVICE — DRAPE ARTHROSCOPY SHOULDER BEACHCHAIR 29369

## (undated) DEVICE — SUCTION IRR SYSTEM W/O TIP INTERPULSE HANDPIECE 0210-100-000

## (undated) DEVICE — KIT ENDO VASOVIEW HEMOPRO 2 VH-4000

## (undated) DEVICE — BAG RED BIOHAZARD 37X50" 40GAL A7450PR

## (undated) DEVICE — LINEN HALF SHEET 5512

## (undated) DEVICE — SHEATH URETERAL ACCESS NAVIGATOR 13/15FRX46CM M0062502100

## (undated) DEVICE — SU DERMABOND PRINEO 22CM CLR222US

## (undated) DEVICE — LINE MONITOR NASAL SMART CAPNOLINE ADULT LONG 12463

## (undated) DEVICE — GUIDE RVRS AQLS BLUEPRINT SRG GLND MWJ004

## (undated) DEVICE — PREP CHLORAPREP W/ORANGE TINT 10.5ML 930715

## (undated) DEVICE — CATH GUIDELINER 6FR 5571

## (undated) DEVICE — SOMASENSOR CEREBRAL OXIMETER ADULT SAFB-SM

## (undated) DEVICE — SU STEEL MYO/WIRE II STERNOTOMY 8 BE-1 3X14" 048-217

## (undated) DEVICE — CATH URETERAL DUAL LUMEN 10FRX54CM M0064051000

## (undated) DEVICE — SU VICRYL+ 3-0 27IN SH UND VCP416H

## (undated) DEVICE — BAG CYSTO TABLE DRAIN

## (undated) DEVICE — CLIP HEMOSTASIS ASSURANCE W16 MM BX00711884

## (undated) DEVICE — DRSG KERLIX 4 1/2"X4YDS ROLL 6715

## (undated) DEVICE — INTRO SHEATH 8FRX45CM PINNACLE DESTINATION STR 54-84501

## (undated) DEVICE — CATH ANGIO 100CM 5FR INFNT JL3.5 CRV COR FEM 534518T

## (undated) DEVICE — MANIFOLD KIT ANGIO AUTOMATED 014613

## (undated) DEVICE — SU PROLENE 6-0 C-1DA 30" M8706

## (undated) DEVICE — PACK MINI VAC CUSTOM CARDOPULMONARY BB5Z97R15

## (undated) DEVICE — BASKET STONE RETRIEVAL NTNL ZERO TIP 1.9FRX90CM M0063901050

## (undated) DEVICE — CATH LAUNCHER 6FR JR 4.0 LA6JR40

## (undated) DEVICE — BONE WAX OSTENE 3.5GM CT410

## (undated) DEVICE — LASER FIBER HOLMIUM FLEXIVA 200UM M0068403910 840-391

## (undated) DEVICE — INTRODUCER SHEATH OBTURATOR 5FRX13CM LF OBT-5F-11

## (undated) DEVICE — CANNULA VENOUS 2 STAGE 32-40FR

## (undated) DEVICE — DEVICE TISSUE STABILIZATION OCTOBASE 28707

## (undated) DEVICE — CATH BALLOON EMERGE 3.0X12MM H7493918912300

## (undated) DEVICE — PUNCH AORTIC 4.0MMX8" RCB40

## (undated) RX ORDER — ONDANSETRON 2 MG/ML
INJECTION INTRAMUSCULAR; INTRAVENOUS
Status: DISPENSED
Start: 2023-03-07

## (undated) RX ORDER — VANCOMYCIN HYDROCHLORIDE 1 G/20ML
INJECTION, POWDER, LYOPHILIZED, FOR SOLUTION INTRAVENOUS
Status: DISPENSED
Start: 2023-03-07

## (undated) RX ORDER — ADENOSINE 3 MG/ML
INJECTION, SOLUTION INTRAVENOUS
Status: DISPENSED
Start: 2023-08-15

## (undated) RX ORDER — NITROGLYCERIN 5 MG/ML
VIAL (ML) INTRAVENOUS
Status: DISPENSED
Start: 2023-08-15

## (undated) RX ORDER — DEXAMETHASONE SODIUM PHOSPHATE 4 MG/ML
INJECTION, SOLUTION INTRA-ARTICULAR; INTRALESIONAL; INTRAMUSCULAR; INTRAVENOUS; SOFT TISSUE
Status: DISPENSED
Start: 2018-05-02

## (undated) RX ORDER — VECURONIUM BROMIDE 1 MG/ML
INJECTION, POWDER, LYOPHILIZED, FOR SOLUTION INTRAVENOUS
Status: DISPENSED
Start: 2023-08-15

## (undated) RX ORDER — GLYCOPYRROLATE 0.2 MG/ML
INJECTION, SOLUTION INTRAMUSCULAR; INTRAVENOUS
Status: DISPENSED
Start: 2023-08-15

## (undated) RX ORDER — FENTANYL CITRATE 50 UG/ML
INJECTION, SOLUTION INTRAMUSCULAR; INTRAVENOUS
Status: DISPENSED
Start: 2020-11-24

## (undated) RX ORDER — LIDOCAINE HYDROCHLORIDE 20 MG/ML
INJECTION, SOLUTION EPIDURAL; INFILTRATION; INTRACAUDAL; PERINEURAL
Status: DISPENSED
Start: 2018-05-02

## (undated) RX ORDER — CLOPIDOGREL 300 MG/1
TABLET, FILM COATED ORAL
Status: DISPENSED
Start: 2023-08-15

## (undated) RX ORDER — VANCOMYCIN HYDROCHLORIDE 1 G/20ML
INJECTION, POWDER, LYOPHILIZED, FOR SOLUTION INTRAVENOUS
Status: DISPENSED
Start: 2024-10-29

## (undated) RX ORDER — HEPARIN SODIUM 1000 [USP'U]/ML
INJECTION, SOLUTION INTRAVENOUS; SUBCUTANEOUS
Status: DISPENSED
Start: 2023-08-15

## (undated) RX ORDER — ONDANSETRON 2 MG/ML
INJECTION INTRAMUSCULAR; INTRAVENOUS
Status: DISPENSED
Start: 2018-05-02

## (undated) RX ORDER — FENTANYL CITRATE 50 UG/ML
INJECTION, SOLUTION INTRAMUSCULAR; INTRAVENOUS
Status: DISPENSED
Start: 2018-05-02

## (undated) RX ORDER — PROPOFOL 10 MG/ML
INJECTION, EMULSION INTRAVENOUS
Status: DISPENSED
Start: 2020-11-24

## (undated) RX ORDER — PAPAVERINE HYDROCHLORIDE 30 MG/ML
INJECTION INTRAMUSCULAR; INTRAVENOUS
Status: DISPENSED
Start: 2023-08-15

## (undated) RX ORDER — VANCOMYCIN HYDROCHLORIDE 500 MG/10ML
INJECTION, POWDER, LYOPHILIZED, FOR SOLUTION INTRAVENOUS
Status: DISPENSED
Start: 2023-08-15

## (undated) RX ORDER — CEFAZOLIN SODIUM 1 G/50ML
SOLUTION INTRAVENOUS
Status: DISPENSED
Start: 2018-05-02

## (undated) RX ORDER — PROTAMINE SULFATE 10 MG/ML
INJECTION, SOLUTION INTRAVENOUS
Status: DISPENSED
Start: 2023-08-15

## (undated) RX ORDER — TRANEXAMIC ACID 650 MG/1
TABLET ORAL
Status: DISPENSED
Start: 2024-10-29

## (undated) RX ORDER — ACETAMINOPHEN 325 MG/1
TABLET ORAL
Status: DISPENSED
Start: 2024-10-29

## (undated) RX ORDER — FENTANYL CITRATE 50 UG/ML
INJECTION, SOLUTION INTRAMUSCULAR; INTRAVENOUS
Status: DISPENSED
Start: 2023-03-07

## (undated) RX ORDER — PROPOFOL 10 MG/ML
INJECTION, EMULSION INTRAVENOUS
Status: DISPENSED
Start: 2023-08-15

## (undated) RX ORDER — ACETAMINOPHEN 325 MG/1
TABLET ORAL
Status: DISPENSED
Start: 2023-03-07

## (undated) RX ORDER — VERAPAMIL HYDROCHLORIDE 2.5 MG/ML
INJECTION, SOLUTION INTRAVENOUS
Status: DISPENSED
Start: 2023-08-15

## (undated) RX ORDER — LIDOCAINE HYDROCHLORIDE 10 MG/ML
INJECTION, SOLUTION EPIDURAL; INFILTRATION; INTRACAUDAL; PERINEURAL
Status: DISPENSED
Start: 2020-11-24

## (undated) RX ORDER — FENTANYL CITRATE 50 UG/ML
INJECTION, SOLUTION INTRAMUSCULAR; INTRAVENOUS
Status: DISPENSED
Start: 2024-10-29

## (undated) RX ORDER — VASOPRESSIN 20 U/ML
INJECTION PARENTERAL
Status: DISPENSED
Start: 2024-10-29

## (undated) RX ORDER — NEOSTIGMINE METHYLSULFATE 1 MG/ML
VIAL (ML) INJECTION
Status: DISPENSED
Start: 2023-08-15

## (undated) RX ORDER — PROPOFOL 10 MG/ML
INJECTION, EMULSION INTRAVENOUS
Status: DISPENSED
Start: 2023-03-07

## (undated) RX ORDER — FENTANYL CITRATE 50 UG/ML
INJECTION, SOLUTION INTRAMUSCULAR; INTRAVENOUS
Status: DISPENSED
Start: 2023-08-15

## (undated) RX ORDER — FENTANYL CITRATE 50 UG/ML
INJECTION, SOLUTION INTRAMUSCULAR; INTRAVENOUS
Status: DISPENSED
Start: 2025-02-25

## (undated) RX ORDER — CEFAZOLIN SODIUM/WATER 3 G/30 ML
SYRINGE (ML) INTRAVENOUS
Status: DISPENSED
Start: 2024-10-29

## (undated) RX ORDER — TRANEXAMIC ACID 650 MG/1
TABLET ORAL
Status: DISPENSED
Start: 2023-03-07

## (undated) RX ORDER — FENTANYL CITRATE 0.05 MG/ML
INJECTION, SOLUTION INTRAMUSCULAR; INTRAVENOUS
Status: DISPENSED
Start: 2023-08-15

## (undated) RX ORDER — FENTANYL CITRATE 0.05 MG/ML
INJECTION, SOLUTION INTRAMUSCULAR; INTRAVENOUS
Status: DISPENSED
Start: 2024-10-29

## (undated) RX ORDER — CEFAZOLIN SODIUM 1 G/3ML
INJECTION, POWDER, FOR SOLUTION INTRAMUSCULAR; INTRAVENOUS
Status: DISPENSED
Start: 2023-08-15

## (undated) RX ORDER — PROPOFOL 10 MG/ML
INJECTION, EMULSION INTRAVENOUS
Status: DISPENSED
Start: 2018-05-02

## (undated) RX ORDER — DEXAMETHASONE SODIUM PHOSPHATE 4 MG/ML
INJECTION, SOLUTION INTRA-ARTICULAR; INTRALESIONAL; INTRAMUSCULAR; INTRAVENOUS; SOFT TISSUE
Status: DISPENSED
Start: 2020-11-24

## (undated) RX ORDER — LIDOCAINE HYDROCHLORIDE 10 MG/ML
INJECTION, SOLUTION EPIDURAL; INFILTRATION; INTRACAUDAL; PERINEURAL
Status: DISPENSED
Start: 2023-08-15

## (undated) RX ORDER — HEPARIN SODIUM 200 [USP'U]/100ML
INJECTION, SOLUTION INTRAVENOUS
Status: DISPENSED
Start: 2023-08-15

## (undated) RX ORDER — PROPOFOL 10 MG/ML
INJECTION, EMULSION INTRAVENOUS
Status: DISPENSED
Start: 2024-10-29